# Patient Record
Sex: MALE | Race: BLACK OR AFRICAN AMERICAN | NOT HISPANIC OR LATINO | URBAN - METROPOLITAN AREA
[De-identification: names, ages, dates, MRNs, and addresses within clinical notes are randomized per-mention and may not be internally consistent; named-entity substitution may affect disease eponyms.]

---

## 2021-05-27 ENCOUNTER — INPATIENT (INPATIENT)
Facility: HOSPITAL | Age: 71
LOS: 1 days | Discharge: HOME | End: 2021-05-29
Attending: GENERAL ACUTE CARE HOSPITAL | Admitting: GENERAL ACUTE CARE HOSPITAL
Payer: COMMERCIAL

## 2021-05-27 VITALS
HEIGHT: 74 IN | HEART RATE: 84 BPM | SYSTOLIC BLOOD PRESSURE: 219 MMHG | TEMPERATURE: 98 F | OXYGEN SATURATION: 97 % | DIASTOLIC BLOOD PRESSURE: 97 MMHG | WEIGHT: 225.09 LBS | RESPIRATION RATE: 17 BRPM

## 2021-05-27 LAB
ALBUMIN SERPL ELPH-MCNC: 3.5 G/DL — SIGNIFICANT CHANGE UP (ref 3.3–5)
ALP SERPL-CCNC: 83 U/L — SIGNIFICANT CHANGE UP (ref 40–120)
ALT FLD-CCNC: 24 U/L — SIGNIFICANT CHANGE UP (ref 12–78)
ANION GAP SERPL CALC-SCNC: 6 MMOL/L — SIGNIFICANT CHANGE UP (ref 5–17)
AST SERPL-CCNC: 13 U/L — LOW (ref 15–37)
BASOPHILS # BLD AUTO: 0.03 K/UL — SIGNIFICANT CHANGE UP (ref 0–0.2)
BASOPHILS NFR BLD AUTO: 0.5 % — SIGNIFICANT CHANGE UP (ref 0–2)
BILIRUB SERPL-MCNC: 0.3 MG/DL — SIGNIFICANT CHANGE UP (ref 0.2–1.2)
BUN SERPL-MCNC: 10 MG/DL — SIGNIFICANT CHANGE UP (ref 7–23)
CALCIUM SERPL-MCNC: 8.2 MG/DL — LOW (ref 8.5–10.1)
CHLORIDE SERPL-SCNC: 105 MMOL/L — SIGNIFICANT CHANGE UP (ref 96–108)
CO2 SERPL-SCNC: 29 MMOL/L — SIGNIFICANT CHANGE UP (ref 22–31)
CREAT SERPL-MCNC: 1.25 MG/DL — SIGNIFICANT CHANGE UP (ref 0.5–1.3)
EOSINOPHIL # BLD AUTO: 0.09 K/UL — SIGNIFICANT CHANGE UP (ref 0–0.5)
EOSINOPHIL NFR BLD AUTO: 1.5 % — SIGNIFICANT CHANGE UP (ref 0–6)
FLUAV AG NPH QL: SIGNIFICANT CHANGE UP
FLUBV AG NPH QL: SIGNIFICANT CHANGE UP
GLUCOSE BLDC GLUCOMTR-MCNC: 95 MG/DL — SIGNIFICANT CHANGE UP (ref 70–99)
GLUCOSE SERPL-MCNC: 89 MG/DL — SIGNIFICANT CHANGE UP (ref 70–99)
HCT VFR BLD CALC: 46.3 % — SIGNIFICANT CHANGE UP (ref 39–50)
HGB BLD-MCNC: 15.1 G/DL — SIGNIFICANT CHANGE UP (ref 13–17)
IMM GRANULOCYTES NFR BLD AUTO: 0.5 % — SIGNIFICANT CHANGE UP (ref 0–1.5)
LYMPHOCYTES # BLD AUTO: 2.24 K/UL — SIGNIFICANT CHANGE UP (ref 1–3.3)
LYMPHOCYTES # BLD AUTO: 36.8 % — SIGNIFICANT CHANGE UP (ref 13–44)
MCHC RBC-ENTMCNC: 26.8 PG — LOW (ref 27–34)
MCHC RBC-ENTMCNC: 32.6 GM/DL — SIGNIFICANT CHANGE UP (ref 32–36)
MCV RBC AUTO: 82.1 FL — SIGNIFICANT CHANGE UP (ref 80–100)
MONOCYTES # BLD AUTO: 0.62 K/UL — SIGNIFICANT CHANGE UP (ref 0–0.9)
MONOCYTES NFR BLD AUTO: 10.2 % — SIGNIFICANT CHANGE UP (ref 2–14)
NEUTROPHILS # BLD AUTO: 3.08 K/UL — SIGNIFICANT CHANGE UP (ref 1.8–7.4)
NEUTROPHILS NFR BLD AUTO: 50.5 % — SIGNIFICANT CHANGE UP (ref 43–77)
NRBC # BLD: 0 /100 WBCS — SIGNIFICANT CHANGE UP (ref 0–0)
PLATELET # BLD AUTO: 239 K/UL — SIGNIFICANT CHANGE UP (ref 150–400)
POTASSIUM SERPL-MCNC: 3.3 MMOL/L — LOW (ref 3.5–5.3)
POTASSIUM SERPL-SCNC: 3.3 MMOL/L — LOW (ref 3.5–5.3)
PROT SERPL-MCNC: 7.2 GM/DL — SIGNIFICANT CHANGE UP (ref 6–8.3)
RBC # BLD: 5.64 M/UL — SIGNIFICANT CHANGE UP (ref 4.2–5.8)
RBC # FLD: 14.4 % — SIGNIFICANT CHANGE UP (ref 10.3–14.5)
SARS-COV-2 RNA SPEC QL NAA+PROBE: SIGNIFICANT CHANGE UP
SODIUM SERPL-SCNC: 140 MMOL/L — SIGNIFICANT CHANGE UP (ref 135–145)
TROPONIN I SERPL-MCNC: <.015 NG/ML — SIGNIFICANT CHANGE UP (ref 0.01–0.04)
WBC # BLD: 6.09 K/UL — SIGNIFICANT CHANGE UP (ref 3.8–10.5)
WBC # FLD AUTO: 6.09 K/UL — SIGNIFICANT CHANGE UP (ref 3.8–10.5)

## 2021-05-27 PROCEDURE — 70498 CT ANGIOGRAPHY NECK: CPT | Mod: 26,MA

## 2021-05-27 PROCEDURE — 70496 CT ANGIOGRAPHY HEAD: CPT | Mod: 26,MA

## 2021-05-27 PROCEDURE — 93010 ELECTROCARDIOGRAM REPORT: CPT

## 2021-05-27 PROCEDURE — 99285 EMERGENCY DEPT VISIT HI MDM: CPT | Mod: CS

## 2021-05-27 PROCEDURE — 99222 1ST HOSP IP/OBS MODERATE 55: CPT

## 2021-05-27 RX ORDER — ASPIRIN/CALCIUM CARB/MAGNESIUM 324 MG
81 TABLET ORAL DAILY
Refills: 0 | Status: DISCONTINUED | OUTPATIENT
Start: 2021-05-27 | End: 2021-05-28

## 2021-05-27 RX ORDER — AMLODIPINE BESYLATE 2.5 MG/1
10 TABLET ORAL ONCE
Refills: 0 | Status: COMPLETED | OUTPATIENT
Start: 2021-05-27 | End: 2021-05-27

## 2021-05-27 RX ORDER — ATORVASTATIN CALCIUM 80 MG/1
80 TABLET, FILM COATED ORAL AT BEDTIME
Refills: 0 | Status: DISCONTINUED | OUTPATIENT
Start: 2021-05-27 | End: 2021-05-29

## 2021-05-27 RX ADMIN — AMLODIPINE BESYLATE 10 MILLIGRAM(S): 2.5 TABLET ORAL at 19:33

## 2021-05-27 RX ADMIN — ATORVASTATIN CALCIUM 80 MILLIGRAM(S): 80 TABLET, FILM COATED ORAL at 22:27

## 2021-05-27 NOTE — ED PROVIDER NOTE - OBJECTIVE STATEMENT
72yo male with pmh HTN (noncompliant x 1 year), preDM, presents with rt arm/leg and face numbness. PT noted the symptoms mainly in the arm, thought it was sore 2 days ago, resolved after 30 min. Pt noted the symptoms worse upon waking up yesterday morning, and lasted until today, but pt reports not as bad as yesterday. Pt also notes some unsteady gait. PT also reports urinating a lot. denies headache, dizziness, cp, sob, abd pain, NVD.     No fever/chills, No photophobia/eye pain/changes in vision, No ear pain/sore throat/dysphagia, No chest pain/palpitations, no SOB/cough, no wheeze/stridor, No abdominal pain, No N/V/D, no dysuria/frequency/discharge, No neck/back pain, no rash, + rt side numbness

## 2021-05-27 NOTE — ED ADULT NURSE NOTE - NSIMPLEMENTINTERV_GEN_ALL_ED
Implemented All Universal Safety Interventions:  [place] to call system. Call bell, personal items and telephone within reach. Instruct patient to call for assistance. Room bathroom lighting operational. Non-slip footwear when patient is off stretcher. Physically safe environment: no spills, clutter or unnecessary equipment. Stretcher in lowest position, wheels locked, appropriate side rails in place.

## 2021-05-27 NOTE — ED PROVIDER NOTE - PHYSICAL EXAMINATION
Gen: Alert, Well appearing. NAD    Head: NC, AT, PERRL, normal lids/conjunctiva   ENT: patent oropharynx without erythema/exudate, uvula midline  Neck: supple, no tenderness/meningismus  Pulm: Bilateral clear BS, normal resp effort  CV: RRR, no M/R/G, +dist pulses   Abd: soft, NT/ND, +BS, no guarding/rebound tenderness  Mskel: extremities x4 with normal ROM. no ctl spine ttp. no edema/erythema/cyanosis   Skin: no rash, no bruising  Neuro: AAOx3

## 2021-05-27 NOTE — ED PROVIDER NOTE - CLINICAL SUMMARY MEDICAL DECISION MAKING FREE TEXT BOX
Pt clinically with stroke. pending labs/ct. Patient signed out to incoming physician.  All decisions regarding the progression of care will be made at their discretion. Pt clinically with stroke. pending labs/ct. Patient signed out to incoming physician.  All decisions regarding the progression of care will be made at their discretion.    Code stroke called, patient w/ PMHx of HTN, DM, HLD, prior CVA/TIA, presenting with right facial numbness/right UE/LE numbness  , last known well time of 2 days ago  NIH Stroke Scale documented.  Given history and exam, I have low suspicion for infectious etiology, neurological changes secondary to toxicologic ingestions, seizures, or complex migraine. Presentation concerning for possible CVA requiring workup.  - FSG: normal, CBC, BMP, Troponin, PT/INR/PTT, T&S, EKG, CXR, CT Head w/ CTA brain/neck/perfusion to r/o large vessel occlusion amenable to thrombectomy.  - Tele-stroke neurology consult. Patient is NOT TPA candidate.  - Permissive HTN < 220/120 in non-TPA pts  - Admit to Telemetry.

## 2021-05-27 NOTE — CONSULT NOTE ADULT - ASSESSMENT
Subjective Complaints:      Consult requested by ER doctor:                  Attending:     History of Present Illness:  Chief Complaint/Reason for Admission:  History of Present Illness:  HPI: hx of htn numbness r  arm leg for 2 days ct head left thalamic small infarction cta noted r ica stenosis  no heaDACHE NO HX OF SEIZURE MILD R Arm wenakess and numbness face equal speech fluent for work up nih stroke scale 2 ,         PAST MEDICAL & SURGICAL HISTORY:  71yMale    MEDICATIONS  (STANDING):    MEDICATIONS  (PRN):      Allergies    No Known Allergies    Intolerances      FAMILY HISTORY:      REVIEW OF SYSTEMS:  General:  No wt loss, fevers, chills, night sweats  Eyes:  Good vision, no reported pain  ENT:  No sore throat, pain, runny nose, dysphagia  CV:  No pain, palpitatioins, hypo/hypertension  Resp:  No dyspnea, cough, tachypnea, wheezing  GI:  No pain, nausea, vomiting, diarrhea, constipatiion  :  No pain, bleeding, incontinence, nocturia  Muscle:  No pain, weakness  Breast:  No pain, abscess, mass, discharge  Neuro:  No weakness, tingling, memory problems  Psych:  No fatigue, insomnia, mood problems, depression  Endocrine:  No polyuria, polydypsia, cold/heat intolerance  Heme:  No petechiae, ecchymosis, easy bruisability  Skin:  No rash, tattoos, scars, edema      Vital Signs Last 24 Hrs  T(C): 36.8 (27 May 2021 17:35), Max: 36.8 (27 May 2021 17:35)  T(F): 98.2 (27 May 2021 17:35), Max: 98.2 (27 May 2021 17:35)  HR: 74 (27 May 2021 18:33) (74 - 84)  BP: 171/93 (27 May 2021 18:33) (171/93 - 219/97)  BP(mean): --  RR: 17 (27 May 2021 17:35) (17 - 17)  SpO2: 97% (27 May 2021 17:35) (97% - 97%)    GENERAL PHYSICAL EXAM:  General:  Appears stated age, well-groomed, well-nourished, no distress  HEENT:  NC/AT, patent nares w/ pink mucosa, OP clear w/o lesions, PERRL, EOMI, conjunctivae clear, no thyromegaly, nodules, adenopathy, no JVD  Chest:  Full & symmetric excursion, no increased effort, breath sounds clear  Cardiovascular:  Regular rhythm, S1, S2, no murmur/rub/S3/S4, no carotid/femoral/abdominal bruit, radial/pedal pulses 2+, no edema  Abdomen:  Soft, non-tender, non-distended, normoactive bowel sounds, no HSM  Extremities:  Gait & station:   Digits:   Nails:   Joints, Bones, Muscles:   ROM:   Stability:  Skin:  No rash/erythema/ecchymoses/petechiae/wounds/abscess/warm/dry  Musculoskeletal:  Full ROM in all joints w/o swelling/tenderness/effusion    NEUROLOGICAL EXAM:  HENT:  Normocephalic head; atraumatic head.  Neck supple.  ENT: normal looking.  Mental State:    Alert.  Fully oriented to person, place and date.  Coherent.  Speech clear and intact.  Cooperative.  Responds appropriately.    Cranial Nerves:  II-XII:   Pupils round and reactive to light and accommodation.  Extraocular movements full.  Visual fields full (no homonymous hemianopsia).  Visual acuity wnl.  Facial symmetry intact.  Tongue midline.  Motor Functions:  Moves all extremities  r arm mild weakness Hand  intact bilaterally.  Ambulatory.    Sensory Functions:   Intact to touch and pinprick to face and extremities.    Reflexes:  Deep tendon reflexes normoactive to biceps, knees and ankles.  Babinski absent (present).  Cerebellar Testing:    Finger to nose intact.  Nystagmus absent.  Neurovascular: Carotid auscultation full without bruits.      LABS:                        15.1   6.09  )-----------( 239      ( 27 May 2021 18:32 )             46.3     05-27    140  |  105  |  10  ----------------------------<  89  3.3<L>   |  29  |  1.25    Ca    8.2<L>      27 May 2021 18:32    TPro  7.2  /  Alb  3.5  /  TBili  0.3  /  DBili  x   /  AST  13<L>  /  ALT  24  /  AlkPhos  83  05-27            RADIOLOGY & ADDITIONAL STUDIES:      Assessment & Opinion: hx of htn r arm leg numbness and wekaness for 2 days ct head nlted left thlamaic small infarctions  cta noted r ica stenosis nih stroke scale 2 , for mri enedelia aminata asa lipitor     Recommendations:  Brain MRI.  Carotid doppler.  Echocardiogram.  EEG.   DVT prophylaxis as ordered. tsh b12 asa 81 mg   Medications:

## 2021-05-27 NOTE — ED ADULT TRIAGE NOTE - CHIEF COMPLAINT QUOTE
pt states he had right sided numbness a few days ago subsided and return yesterday. Pt states he does 600 push- ups a day.  Pt denies chest pain or sob at triage. Pt states he haven't taken bp meds in over 1 year

## 2021-05-27 NOTE — ED PROVIDER NOTE - PROGRESS NOTE DETAILS
BART PLASENCIA: Signout; (+) R thalamic subacute infarct matching presentation. Will admit for MR. Pending neuro evaluation. 71M with PMHX HTN (noncompliant x 1 year), preDM, presents with rt arm/leg and face numbness. PT noted the symptoms mainly in the arm, thought it was sore 2 days ago, resolved after 30 min. Gradually improving today. To be admitted.

## 2021-05-27 NOTE — ED ADULT NURSE NOTE - CAS TRG GEN SKIN COLOR
Per Mom pt has been trying to get up at night to go to the bathroom unassisted d/t his urgency and confusion. Pt's Diuril was given on days in hope that it will not be needed overnight and his Precedex gtt was stopped to help with confusion so Mom is hopeful this will help with pt attempting to get out of bed unassisted. This RN did mention to mom that we sometimes utilize sitters in situations like this in order to ensure a pt's safety and she seemed open to the idea. She wanted to see how tonight goes now that changes have been made to pt's meds but understands this may be needed at some point to keep Antony safe. Charge nurse updated on conversation with Mom.    Normal for race

## 2021-05-28 DIAGNOSIS — I63.9 CEREBRAL INFARCTION, UNSPECIFIED: ICD-10-CM

## 2021-05-28 LAB
A1C WITH ESTIMATED AVERAGE GLUCOSE RESULT: 7.2 % — HIGH (ref 4–5.6)
ANION GAP SERPL CALC-SCNC: 7 MMOL/L — SIGNIFICANT CHANGE UP (ref 5–17)
BUN SERPL-MCNC: 8 MG/DL — SIGNIFICANT CHANGE UP (ref 7–23)
CALCIUM SERPL-MCNC: 8.5 MG/DL — SIGNIFICANT CHANGE UP (ref 8.5–10.1)
CHLORIDE SERPL-SCNC: 107 MMOL/L — SIGNIFICANT CHANGE UP (ref 96–108)
CHOLEST SERPL-MCNC: 260 MG/DL — HIGH
CO2 SERPL-SCNC: 27 MMOL/L — SIGNIFICANT CHANGE UP (ref 22–31)
COVID-19 SPIKE DOMAIN AB INTERP: POSITIVE
COVID-19 SPIKE DOMAIN ANTIBODY RESULT: >250 U/ML — HIGH
CREAT SERPL-MCNC: 0.96 MG/DL — SIGNIFICANT CHANGE UP (ref 0.5–1.3)
ESTIMATED AVERAGE GLUCOSE: 160 MG/DL — HIGH (ref 68–114)
GLUCOSE BLDC GLUCOMTR-MCNC: 118 MG/DL — HIGH (ref 70–99)
GLUCOSE SERPL-MCNC: 105 MG/DL — HIGH (ref 70–99)
HCV AB S/CO SERPL IA: 0.12 S/CO — SIGNIFICANT CHANGE UP (ref 0–0.99)
HCV AB SERPL-IMP: SIGNIFICANT CHANGE UP
HDLC SERPL-MCNC: 50 MG/DL — SIGNIFICANT CHANGE UP
LIPID PNL WITH DIRECT LDL SERPL: 200 MG/DL — HIGH
NON HDL CHOLESTEROL: 210 MG/DL — HIGH
POTASSIUM SERPL-MCNC: 3.3 MMOL/L — LOW (ref 3.5–5.3)
POTASSIUM SERPL-SCNC: 3.3 MMOL/L — LOW (ref 3.5–5.3)
SARS-COV-2 IGG+IGM SERPL QL IA: >250 U/ML — HIGH
SARS-COV-2 IGG+IGM SERPL QL IA: POSITIVE
SODIUM SERPL-SCNC: 141 MMOL/L — SIGNIFICANT CHANGE UP (ref 135–145)
TRIGL SERPL-MCNC: 49 MG/DL — SIGNIFICANT CHANGE UP
TSH SERPL-MCNC: 3.55 UIU/ML — SIGNIFICANT CHANGE UP (ref 0.36–3.74)
VIT B12 SERPL-MCNC: 438 PG/ML — SIGNIFICANT CHANGE UP (ref 232–1245)

## 2021-05-28 PROCEDURE — 93306 TTE W/DOPPLER COMPLETE: CPT | Mod: 26

## 2021-05-28 PROCEDURE — 70551 MRI BRAIN STEM W/O DYE: CPT | Mod: 26

## 2021-05-28 PROCEDURE — 99233 SBSQ HOSP IP/OBS HIGH 50: CPT

## 2021-05-28 RX ORDER — POTASSIUM CHLORIDE 20 MEQ
20 PACKET (EA) ORAL ONCE
Refills: 0 | Status: COMPLETED | OUTPATIENT
Start: 2021-05-28 | End: 2021-05-28

## 2021-05-28 RX ORDER — PREGABALIN 225 MG/1
1000 CAPSULE ORAL DAILY
Refills: 0 | Status: DISCONTINUED | OUTPATIENT
Start: 2021-05-28 | End: 2021-05-29

## 2021-05-28 RX ORDER — ASPIRIN/CALCIUM CARB/MAGNESIUM 324 MG
81 TABLET ORAL DAILY
Refills: 0 | Status: DISCONTINUED | OUTPATIENT
Start: 2021-05-29 | End: 2021-05-29

## 2021-05-28 RX ORDER — CLOPIDOGREL BISULFATE 75 MG/1
75 TABLET, FILM COATED ORAL DAILY
Refills: 0 | Status: DISCONTINUED | OUTPATIENT
Start: 2021-05-28 | End: 2021-05-29

## 2021-05-28 RX ORDER — GLUCAGON INJECTION, SOLUTION 0.5 MG/.1ML
1 INJECTION, SOLUTION SUBCUTANEOUS ONCE
Refills: 0 | Status: DISCONTINUED | OUTPATIENT
Start: 2021-05-28 | End: 2021-05-29

## 2021-05-28 RX ORDER — DEXTROSE 50 % IN WATER 50 %
25 SYRINGE (ML) INTRAVENOUS ONCE
Refills: 0 | Status: DISCONTINUED | OUTPATIENT
Start: 2021-05-28 | End: 2021-05-29

## 2021-05-28 RX ORDER — INSULIN LISPRO 100/ML
VIAL (ML) SUBCUTANEOUS
Refills: 0 | Status: DISCONTINUED | OUTPATIENT
Start: 2021-05-28 | End: 2021-05-29

## 2021-05-28 RX ORDER — DEXTROSE 50 % IN WATER 50 %
12.5 SYRINGE (ML) INTRAVENOUS ONCE
Refills: 0 | Status: DISCONTINUED | OUTPATIENT
Start: 2021-05-28 | End: 2021-05-29

## 2021-05-28 RX ORDER — ENOXAPARIN SODIUM 100 MG/ML
40 INJECTION SUBCUTANEOUS DAILY
Refills: 0 | Status: DISCONTINUED | OUTPATIENT
Start: 2021-05-28 | End: 2021-05-29

## 2021-05-28 RX ORDER — DEXTROSE 50 % IN WATER 50 %
15 SYRINGE (ML) INTRAVENOUS ONCE
Refills: 0 | Status: DISCONTINUED | OUTPATIENT
Start: 2021-05-28 | End: 2021-05-29

## 2021-05-28 RX ORDER — HYDRALAZINE HCL 50 MG
5 TABLET ORAL EVERY 6 HOURS
Refills: 0 | Status: DISCONTINUED | OUTPATIENT
Start: 2021-05-28 | End: 2021-05-29

## 2021-05-28 RX ORDER — SODIUM CHLORIDE 9 MG/ML
1000 INJECTION, SOLUTION INTRAVENOUS
Refills: 0 | Status: DISCONTINUED | OUTPATIENT
Start: 2021-05-28 | End: 2021-05-29

## 2021-05-28 RX ORDER — AMLODIPINE BESYLATE 2.5 MG/1
10 TABLET ORAL DAILY
Refills: 0 | Status: DISCONTINUED | OUTPATIENT
Start: 2021-05-29 | End: 2021-05-29

## 2021-05-28 RX ORDER — ASPIRIN/CALCIUM CARB/MAGNESIUM 324 MG
325 TABLET ORAL ONCE
Refills: 0 | Status: COMPLETED | OUTPATIENT
Start: 2021-05-28 | End: 2021-05-28

## 2021-05-28 RX ADMIN — ATORVASTATIN CALCIUM 80 MILLIGRAM(S): 80 TABLET, FILM COATED ORAL at 21:09

## 2021-05-28 RX ADMIN — Medication 20 MILLIEQUIVALENT(S): at 01:07

## 2021-05-28 RX ADMIN — Medication 20 MILLIEQUIVALENT(S): at 19:19

## 2021-05-28 RX ADMIN — Medication 325 MILLIGRAM(S): at 11:42

## 2021-05-28 RX ADMIN — ENOXAPARIN SODIUM 40 MILLIGRAM(S): 100 INJECTION SUBCUTANEOUS at 11:42

## 2021-05-28 NOTE — OCCUPATIONAL THERAPY INITIAL EVALUATION ADULT - LIGHT TOUCH SENSATION, RUE, REHAB EVAL
(pt reports mild numbness to dorsal side of right UE, which does not impact pt's current functional level)/mild impairment

## 2021-05-28 NOTE — PHYSICAL THERAPY INITIAL EVALUATION ADULT - GAIT TRAINING, PT EVAL
Patient will ambulate 1200 feet without device independently for community ambulation in 1-2 weeks. Patient will ascend/descend 14 steps with L rail up/R rail down independently in 2-3 days to safely navigate home environment.

## 2021-05-28 NOTE — OCCUPATIONAL THERAPY INITIAL EVALUATION ADULT - PERTINENT HX OF CURRENT PROBLEM, REHAB EVAL
Pt admitted with c/o right sided numbness. MRI head - "small acute lacunar infarct". CT Angio Head - "left thalamic infarct possible acute"

## 2021-05-28 NOTE — H&P ADULT - ASSESSMENT
72 yo male with PMH of HTN not on meds, pre diabetes presented to ED with right arm/leg numbness for a few days. as per pt, he was in his normal health status until a few days ago. he noticed right arm and right arm numbness. it has been progressive to right side body and right face this morning, and it lasted for the entire day.  he denies headache, no blur vision. no speech or swalow difficulties. no similar problem before.     at ED, CT of head found left thalamus small infarct, NIHSS inital 2. neurology consulted. Dr Eisenberg recommended inpatient admission and further work up.

## 2021-05-28 NOTE — PHYSICAL THERAPY INITIAL EVALUATION ADULT - GAIT DEVIATIONS NOTED, PT EVAL
mild trendelenberg pattern, improved with straight cane use/decreased navneet/decreased step length/decreased stride length/decreased weight-shifting ability

## 2021-05-28 NOTE — H&P ADULT - NSHPPHYSICALEXAM_GEN_ALL_CORE
Physical exam:   General: patient in no acute distress, resting comfortably  Head:  Atraumatic, Normocephalic  Eyes: EOMI, PERRLA, clear sclera  Neck: Supple, thyroid nontender, non enlarged  Cardio: S1/S2 +ve, regular rate and rhythm, no M/G/R  Resp: clear to ausculation bilaterally, no rales or wheezes  GI: abdomen soft, nontender, non distended, no guarding, BS +ve x 4  Ext: no significant pedal edema  Neuro: CN 2-12 intact, decreased sensation on right arm and lower face   Skin: No rashes or lesions

## 2021-05-28 NOTE — PHYSICAL THERAPY INITIAL EVALUATION ADULT - ADDITIONAL COMMENTS
As per patient, he splits his residence between [place] and [facility]. Patient reports the following set up for his [place] home: 2 steps to enter, + B rails and then 1 flight of steps down to basement with +B rails. Patient reports he has a tub/shower combo, no grab bars, standard toilet. He is right handed.

## 2021-05-28 NOTE — H&P ADULT - HISTORY OF PRESENT ILLNESS
70 yo male with PMH of HTN not on meds, pre diabetes presented to ED with right arm/leg numbness for a few days. as per pt, he was in his normal health status until a few days ago. he noticed right arm and right arm numbness. it has been progressive to right side body and right face this morning, and it lasted for the entire day.  he denies headache, no blur vision. no speech or swalow difficulties. no similar problem before.

## 2021-05-28 NOTE — OCCUPATIONAL THERAPY INITIAL EVALUATION ADULT - ADDITIONAL COMMENTS
Pt reports he lives in [place] but is staying at his second home in [place] with his son and wife. Pt's [place] home has 2 steps to enter and 1 flight of stairs to basement apartment. Pt has a tub/shower combo and a standard toilet and was independent with ADLs and mobility prior to admission.

## 2021-05-28 NOTE — PHYSICAL THERAPY INITIAL EVALUATION ADULT - STRENGTHENING, PT EVAL
Patient will improve strength in R LE by 1 grade in 6-8 weeks to improve overall functional mobility including gait, transfers, bed mobility and decrease risk of falls.

## 2021-05-28 NOTE — PROGRESS NOTE ADULT - SUBJECTIVE AND OBJECTIVE BOX
HPI:  72 yo male with PMH of HTN not on meds, pre diabetes presented to ED with right arm/leg numbness for a few days. as per pt, he was in his normal health status until a few days ago. he noticed right arm and right arm numbness. it has been progressive to right side body and right face this morning, and it lasted for the entire day.  he denies headache, no blur vision. no speech or swalow difficulties. no similar problem before.      (28 May 2021 00:43)      SUBJECTIVE & OBJECTIVE: Pt seen and examined at bedside.   no overnight events.   states Rt sided numbness resolving     Denies fever, chills, N/V, dizziness, HA, cough, CP, palpitations, SOB, abdominal pain, dysuria, diarrhea, constipation.     PHYSICAL EXAM:  T(C): 36.4 (05-28-21 @ 17:13), Max: 37.2 (05-27-21 @ 21:42)  HR: 71 (05-28-21 @ 17:13) (61 - 83)  BP: 165/71 (05-28-21 @ 17:13) (151/81 - 175/89)  RR: 18 (05-28-21 @ 17:13) (17 - 18)  SpO2: 96% (05-28-21 @ 17:13) (96% - 98%)  Wt(kg): --   Weight (kg): 104 (05-27 @ 23:18)  I&O's Detail    28 May 2021 07:01  -  28 May 2021 17:44  --------------------------------------------------------  IN:    Oral Fluid: 480 mL  Total IN: 480 mL    OUT:    Voided (mL): 400 mL  Total OUT: 400 mL    Total NET: 80 mL        GENERAL: NAD, well-groomed, well-developed, no increased WOB   HEAD:  Atraumatic, Normocephalic  EYES: EOMI, PERRLA, conjunctiva and sclera clear  ENMT: Moist mucous membranes  NECK: Supple, No JVD  NERVOUS SYSTEM:  Alert & Oriented X3, Motor Strength 5/5 B/L upper and lower extremities; finger-nose test neg, no issues with swallowing, speech clear, +Rt facial droop   CHEST/LUNG: CTAB  HEART: Regular rate and rhythm; No murmurs, rubs, or gallops  ABDOMEN: Soft, Nontender, Nondistended; Bowel sounds present  EXTREMITIES:  2+ Peripheral Pulses b/l, No clubbing, cyanosis, calf tenderness or edema b/l     MEDICATIONS  (STANDING):  atorvastatin 80 milliGRAM(s) Oral at bedtime  clopidogrel Tablet 75 milliGRAM(s) Oral daily  dextrose 40% Gel 15 Gram(s) Oral once  dextrose 5%. 1000 milliLiter(s) (50 mL/Hr) IV Continuous <Continuous>  dextrose 5%. 1000 milliLiter(s) (100 mL/Hr) IV Continuous <Continuous>  dextrose 50% Injectable 25 Gram(s) IV Push once  dextrose 50% Injectable 12.5 Gram(s) IV Push once  dextrose 50% Injectable 25 Gram(s) IV Push once  enoxaparin Injectable 40 milliGRAM(s) SubCutaneous daily  glucagon  Injectable 1 milliGRAM(s) IntraMuscular once  insulin lispro (ADMELOG) corrective regimen sliding scale   SubCutaneous three times a day before meals    MEDICATIONS  (PRN):      LABS:                        15.1   6.09  )-----------( 239      ( 27 May 2021 18:32 )             46.3     05-28    141  |  107  |  8   ----------------------------<  105<H>  3.3<L>   |  27  |  0.96    Ca    8.5      28 May 2021 06:21    TPro  7.2  /  Alb  3.5  /  TBili  0.3  /  DBili  x   /  AST  13<L>  /  ALT  24  /  AlkPhos  83  05-27          CAPILLARY BLOOD GLUCOSE      POCT Blood Glucose.: 95 mg/dL (27 May 2021 18:31)      CARDIAC MARKERS ( 27 May 2021 18:32 )  <.015 ng/mL / x     / x     / x     / x            RECENT CULTURES:      RADIOLOGY & ADDITIONAL TESTS:  < from: CT Angio Neck w/ IV Cont (05.27.21 @ 19:49) >   Stenosis of the right internal carotid artery with narrowing of the cervical segment suggesting physiologically decreased flow. Moderate stenosis of the right supraclinoid internal carotid artery.    Severe stenosis of the left posterior cerebral artery.    Moderate stenosis of the basilar artery.    Age indeterminate left thalamic infarct which may be acute.    Low-density changes within the occipital lobes more prominent on the right. While this may be artifactual in nature, the possibility of cerebral infarction is raised. MR of the brain is advised for further evaluation as clinically warranted.    < end of copied text >    < from: MR Head No Cont (05.28.21 @ 11:29) >    INTERPRETATION:  MRI brain without contrast    History CVA    Comparison CT performed the prior day    There is a small oval focus of diffusion restriction in the left thalamus. It measures roughly 11 mm in long axis dimension without associated mass effect or hemorrhage. There is mild scattered chronic microvascular ischemic change elsewhere. There is no cortical edema or hydrocephalus. The orbital and sellar contents and cerebellar tonsils are within normal limits.    IMPRESSION:  Small uncomplicated acute lacunar infarct    < end of copied text >    EKG: NSR       Imaging Personally Reviewed:  [ ] YES  [ ] NO    Consultant(s) Notes Reviewed:  [x ] YES  [ ] NO    Care Discussed with Consultants/Other Providers [x] YES  [ ] NO  Care discussed in detail with patient and family (designated point of contact per patient--Kira Beebe [phone]).  All questions and concerns addressed

## 2021-05-28 NOTE — EEG REPORT - NS EEG TEXT BOX
Routine 21-channel digital EEG was obtained to rule out any seizure activity or focal abnormalities.    FINDINGS: Background rhythm during awake stage shows well-organized, well-developed, average voltage 9 hertz alpha activity in the posterior regions. It blocks with eye opening and it is bilaterally synchronous and symmetrical. No spike-and-wave discharges or any lateralizing abnormalities are seen. Photic stimulation did not produce any abnormalities. No abnormalities were found during the procedure. Intermittent EMG artifacts were seen. Drowsiness was achieved. Stage II sleep was not achieved.    IMPRESSION: Normal awake study. No epileptiform discharges or any other paroxysmal activities or focal abnormalities seen. Clinical correlation is recommended.    Yuniel Eisenberg DO

## 2021-05-28 NOTE — PHYSICAL THERAPY INITIAL EVALUATION ADULT - PERTINENT HX OF CURRENT PROBLEM, REHAB EVAL
Patient admitted with a few days of R arm and leg numbness that became more aggressive. MR head shows small uncomplicated acute lacunar infarct.

## 2021-05-28 NOTE — H&P ADULT - PROBLEM SELECTOR PLAN 1
admit to tele  allow permissive HTN  neuro check q4, echo, MRI of brain, carotid dupplex, EEG per neuro  check TSH, VITAMIN B12   c.w ASA and statin

## 2021-05-28 NOTE — H&P ADULT - NSHPLABSRESULTS_GEN_ALL_CORE
Vital Signs Last 24 Hrs  T(C): 36.8 (27 May 2021 23:18), Max: 37.2 (27 May 2021 21:42)  T(F): 98.2 (27 May 2021 23:18), Max: 98.9 (27 May 2021 21:42)  HR: 83 (27 May 2021 23:18) (61 - 84)  BP: 174/94 (27 May 2021 23:18) (171/93 - 219/97)  BP(mean): --  RR: 17 (27 May 2021 23:18) (17 - 18)  SpO2: 98% (27 May 2021 23:18) (97% - 98%)    CBC Full  -  ( 27 May 2021 18:32 )  WBC Count : 6.09 K/uL  RBC Count : 5.64 M/uL  Hemoglobin : 15.1 g/dL  Hematocrit : 46.3 %  Platelet Count - Automated : 239 K/uL  Mean Cell Volume : 82.1 fl  Mean Cell Hemoglobin : 26.8 pg  Mean Cell Hemoglobin Concentration : 32.6 gm/dL  Auto Neutrophil # : 3.08 K/uL  Auto Lymphocyte # : 2.24 K/uL  Auto Monocyte # : 0.62 K/uL  Auto Eosinophil # : 0.09 K/uL  Auto Basophil # : 0.03 K/uL  Auto Neutrophil % : 50.5 %  Auto Lymphocyte % : 36.8 %  Auto Monocyte % : 10.2 %  Auto Eosinophil % : 1.5 %  Auto Basophil % : 0.5 %    05-27    140  |  105  |  10  ----------------------------<  89  3.3<L>   |  29  |  1.25    Ca    8.2<L>      27 May 2021 18:32    TPro  7.2  /  Alb  3.5  /  TBili  0.3  /  DBili  x   /  AST  13<L>  /  ALT  24  /  AlkPhos  83  05-27      Home Medications:    LIVER FUNCTIONS - ( 27 May 2021 18:32 )  Alb: 3.5 g/dL / Pro: 7.2 gm/dL / ALK PHOS: 83 U/L / ALT: 24 U/L / AST: 13 U/L / GGT: x

## 2021-05-28 NOTE — PHYSICAL THERAPY INITIAL EVALUATION ADULT - BALANCE TRAINING, PT EVAL
Patient will be normal in static and dynamic standing balance without device in 1-2 weeks to improve safety and decrease risk of falls.

## 2021-05-28 NOTE — H&P ADULT - NSHPREVIEWOFSYSTEMS_GEN_ALL_CORE
Constitutional: no fever, chills, night sweats  Ears: no hearing changes or ear pain,   Nose: no nasal congestion, sinus pain, or rhinorrhea  Cardio: no chest pain, orthopnea, edema, or palpitations  Resp: no dyspnea, cough, wheezing  GI: no nausea, vomiting, diarrhea, constipation, hematochezia, or melena  : no dysuria, urinary frequency, hematuria  MSK: no back pain, neck pain  Skin: no rash, pruritis   Neuro: right arm/face/body numbness.   Heme/Lymph: no bruising or bleeding

## 2021-05-28 NOTE — OCCUPATIONAL THERAPY INITIAL EVALUATION ADULT - GENERAL OBSERVATIONS, REHAB EVAL
Pt was encountered supine in bed; NAD, cardiac monitor, AXOX4; pt reports he was admitted due to right UE numbness, which he states has improved.

## 2021-05-29 ENCOUNTER — TRANSCRIPTION ENCOUNTER (OUTPATIENT)
Age: 71
End: 2021-05-29

## 2021-05-29 VITALS
DIASTOLIC BLOOD PRESSURE: 78 MMHG | TEMPERATURE: 98 F | OXYGEN SATURATION: 98 % | HEART RATE: 66 BPM | RESPIRATION RATE: 18 BRPM | SYSTOLIC BLOOD PRESSURE: 133 MMHG

## 2021-05-29 LAB
ANION GAP SERPL CALC-SCNC: 6 MMOL/L — SIGNIFICANT CHANGE UP (ref 5–17)
BUN SERPL-MCNC: 14 MG/DL — SIGNIFICANT CHANGE UP (ref 7–23)
CALCIUM SERPL-MCNC: 8.5 MG/DL — SIGNIFICANT CHANGE UP (ref 8.5–10.1)
CHLORIDE SERPL-SCNC: 107 MMOL/L — SIGNIFICANT CHANGE UP (ref 96–108)
CO2 SERPL-SCNC: 26 MMOL/L — SIGNIFICANT CHANGE UP (ref 22–31)
CREAT SERPL-MCNC: 1.16 MG/DL — SIGNIFICANT CHANGE UP (ref 0.5–1.3)
GLUCOSE SERPL-MCNC: 113 MG/DL — HIGH (ref 70–99)
HCT VFR BLD CALC: 47.2 % — SIGNIFICANT CHANGE UP (ref 39–50)
HGB BLD-MCNC: 15.1 G/DL — SIGNIFICANT CHANGE UP (ref 13–17)
MAGNESIUM SERPL-MCNC: 2.2 MG/DL — SIGNIFICANT CHANGE UP (ref 1.6–2.6)
MCHC RBC-ENTMCNC: 27.2 PG — SIGNIFICANT CHANGE UP (ref 27–34)
MCHC RBC-ENTMCNC: 32 GM/DL — SIGNIFICANT CHANGE UP (ref 32–36)
MCV RBC AUTO: 84.9 FL — SIGNIFICANT CHANGE UP (ref 80–100)
NRBC # BLD: 0 /100 WBCS — SIGNIFICANT CHANGE UP (ref 0–0)
PHOSPHATE SERPL-MCNC: 3.6 MG/DL — SIGNIFICANT CHANGE UP (ref 2.5–4.5)
PLATELET # BLD AUTO: 241 K/UL — SIGNIFICANT CHANGE UP (ref 150–400)
POTASSIUM SERPL-MCNC: 4 MMOL/L — SIGNIFICANT CHANGE UP (ref 3.5–5.3)
POTASSIUM SERPL-SCNC: 4 MMOL/L — SIGNIFICANT CHANGE UP (ref 3.5–5.3)
RBC # BLD: 5.56 M/UL — SIGNIFICANT CHANGE UP (ref 4.2–5.8)
RBC # FLD: 14.5 % — SIGNIFICANT CHANGE UP (ref 10.3–14.5)
SODIUM SERPL-SCNC: 139 MMOL/L — SIGNIFICANT CHANGE UP (ref 135–145)
WBC # BLD: 4.63 K/UL — SIGNIFICANT CHANGE UP (ref 3.8–10.5)
WBC # FLD AUTO: 4.63 K/UL — SIGNIFICANT CHANGE UP (ref 3.8–10.5)

## 2021-05-29 PROCEDURE — 99239 HOSP IP/OBS DSCHRG MGMT >30: CPT

## 2021-05-29 RX ORDER — METFORMIN HYDROCHLORIDE 850 MG/1
1 TABLET ORAL
Qty: 60 | Refills: 0
Start: 2021-05-29 | End: 2021-06-27

## 2021-05-29 RX ORDER — ASPIRIN/CALCIUM CARB/MAGNESIUM 324 MG
1 TABLET ORAL
Qty: 30 | Refills: 0
Start: 2021-05-29 | End: 2021-06-27

## 2021-05-29 RX ORDER — AMLODIPINE BESYLATE 2.5 MG/1
1 TABLET ORAL
Qty: 30 | Refills: 0
Start: 2021-05-29 | End: 2021-06-27

## 2021-05-29 RX ORDER — PREGABALIN 225 MG/1
1 CAPSULE ORAL
Qty: 30 | Refills: 0
Start: 2021-05-29 | End: 2021-06-27

## 2021-05-29 RX ORDER — CLOPIDOGREL BISULFATE 75 MG/1
1 TABLET, FILM COATED ORAL
Qty: 30 | Refills: 0
Start: 2021-05-29 | End: 2021-06-27

## 2021-05-29 RX ORDER — ATORVASTATIN CALCIUM 80 MG/1
1 TABLET, FILM COATED ORAL
Qty: 30 | Refills: 0
Start: 2021-05-29 | End: 2021-06-27

## 2021-05-29 RX ORDER — CLOPIDOGREL BISULFATE 75 MG/1
1 TABLET, FILM COATED ORAL
Qty: 21 | Refills: 0
Start: 2021-05-29 | End: 2021-06-18

## 2021-05-29 RX ADMIN — CLOPIDOGREL BISULFATE 75 MILLIGRAM(S): 75 TABLET, FILM COATED ORAL at 12:16

## 2021-05-29 RX ADMIN — AMLODIPINE BESYLATE 10 MILLIGRAM(S): 2.5 TABLET ORAL at 05:45

## 2021-05-29 RX ADMIN — Medication 81 MILLIGRAM(S): at 12:13

## 2021-05-29 NOTE — DISCHARGE NOTE NURSING/CASE MANAGEMENT/SOCIAL WORK - PATIENT PORTAL LINK FT
You can access the FollowMyHealth Patient Portal offered by [facility] by registering at the following website: [email or web address]. By joining [facility]’s FollowMyHealth portal, you will also be able to view your health information using other applications (apps) compatible with our system.

## 2021-05-29 NOTE — DISCHARGE NOTE PROVIDER - CARE PROVIDER_API CALL
your primary care doctor,   Phone: (   )    -  Fax: (   )    -  Follow Up Time: 1 week    Rodolfo Eisenberg  NEUROLOGY  [address], [place] [id number]  Phone: [phone]  Fax: [phone]  Follow Up Time: 2 weeks    stroke neurology,   MD NAKUL Armando   Address: [address] [place] [address]  Phone: [phone]  Phone: (   )    -  Fax: (   )    -  Follow Up Time: 2 weeks

## 2021-05-29 NOTE — DISCHARGE NOTE PROVIDER - NSDCCPCAREPLAN_GEN_ALL_CORE_FT
PRINCIPAL DISCHARGE DIAGNOSIS  Diagnosis: CVA (cerebral vascular accident)  Assessment and Plan of Treatment:        PRINCIPAL DISCHARGE DIAGNOSIS  Diagnosis: CVA (cerebral vascular accident)  Assessment and Plan of Treatment: take plavix 75mg daily (for 21 days then stop), ASA 81mg daily, lipitor 80mg at bedtime      SECONDARY DISCHARGE DIAGNOSES  Diagnosis: Essential hypertension  Assessment and Plan of Treatment: amlodipine 10mg daily    Diagnosis: Hyperlipidemia  Assessment and Plan of Treatment: lipitor    Diagnosis: DM (diabetes mellitus)  Assessment and Plan of Treatment: A1c 7.2%, metformin 1000mg twice daily

## 2021-05-29 NOTE — DISCHARGE NOTE PROVIDER - PROVIDER TOKENS
FREE:[LAST:[your primary care doctor],PHONE:[(   )    -],FAX:[(   )    -],FOLLOWUP:[1 week]],PROVIDER:[TOKEN:[[id number]],FOLLOWUP:[2 weeks]],FREE:[LAST:[stroke neurology],PHONE:[(   )    -],FAX:[(   )    -],ADDRESS:[MD NAKUL Armando   Address: [address] [place] [address] [address], [place] [address]  Phone: [phone]],FOLLOWUP:[2 weeks]]

## 2021-05-29 NOTE — DISCHARGE NOTE PROVIDER - HOSPITAL COURSE
70 yo male with PMH of HTN, pre-diabetes (diet controlled per pt) presented to ED with right arm/leg numbness and right facial droop.  In ED, CT of head found left thalamus small infarct, NIHSS initially 2 on arrival.     DISCHARGE DIAGNOSES:     # Acute Left thalamic CVA   received ASA 325mg x 1  MRI head; Small uncomplicated acute lacunar infarct  CT angio neck: Moderate stenosis of the right supraclinoid internal carotid artery.  Severe stenosis of the left posterior cerebral artery.  Moderate stenosis of the basilar artery.  NIHSS currently 1 for right facial droop   PT/OT : home with outpatient PT   A1c 7.2    TSH wnl   Tele: NSR, few PACs  EEG : normal   continue with ASA 81mg daily; Plavix 75mg daily x 21 days; lipitor 80mg daily   ECHO      will need follow-up with stroke neurology re CT angio neck/head findings  no acute intervention needed at this time for the  moderate and severe stenosis of above IC vessels.   Dr. Tom Styles / amanda /NP Dipika Joseph   [address] [place] [address]. [phone]  and can be referred for outpatient endovascular surgery if warranted   patient and family aware     #DM2, new diagnoses   A1c 7.2%  will d/c on metformin     #Essential HTN   will d/c with amlodipine     # Hyperlipidemia --lipitor 80mg qhs     #Hypokalemia --repleted     low normal vit B12 -- supplement       Discharge time : 40 min     RETURN PARAMETERS DISCUSSED WITH PATIENT, PATIENT EXPRESSED UNDERSTANDING AND IS AGREEABLE. DISCUSSED WITH PATIENT ON REFRAINING FROM DRIVING UNTIL FOLLOW-UP/ CLEARED BY PMD. PATIENT EXPRESSED UNDERSTANDING.   72 yo male with PMH of HTN, pre-diabetes (diet controlled per pt) presented to ED with right arm/leg numbness and right facial droop.  In ED, CT of head found left thalamus small infarct, NIHSS initially 2 on arrival.     DISCHARGE DIAGNOSES:     # Acute Left thalamic CVA   received ASA 325mg x 1  MRI head; Small uncomplicated acute lacunar infarct  CT angio neck: Moderate stenosis of the right supraclinoid internal carotid artery.  Severe stenosis of the left posterior cerebral artery.  Moderate stenosis of the basilar artery.  NIHSS currently 1 for right facial droop   PT/OT : home with outpatient PT   A1c 7.2    TSH wnl   5/29 Tele: NSR, few PACs  EEG : normal   continue with ASA 81mg daily; Plavix 75mg daily x 21 days; lipitor 80mg daily   ECHO: pEF , no sig findings       will need follow-up with stroke neurology re CT angio neck/head findings  no acute intervention needed at this time for the  moderate and severe stenosis of above IC vessels.   Dr. Tom Styles / amanda /NP Dipika Joseph   [address] [place] [address]. [phone]  and can be referred for outpatient endovascular surgery if warranted   patient and family aware     #DM2, new diagnoses   A1c 7.2%  nutrition consulted   will d/c with metformin     #Essential HTN   will d/c with amlodipine     # Hyperlipidemia --lipitor 80mg qhs     #Hypokalemia --repleted     low normal vit B12 -- supplement     discussed findings and care plan with patient , wife, and brother-in law, all questions and concerns addressed.       Discharge time : 40 min     RETURN PARAMETERS DISCUSSED WITH PATIENT, PATIENT EXPRESSED UNDERSTANDING AND IS AGREEABLE. DISCUSSED WITH PATIENT ON REFRAINING FROM DRIVING UNTIL FOLLOW-UP/ CLEARED BY PMD. PATIENT EXPRESSED UNDERSTANDING.

## 2021-05-29 NOTE — DISCHARGE NOTE PROVIDER - NSDCFUADDINST_GEN_ALL_CORE_FT
It is important to see your primary physician as well as other necessary consultants within the next week to perform a comprehensive medical review.  Call their offices for an appointment as soon as you leave the hospital.  If you do not have a primary physician or cant reach him/her, contact the Health system Physician Referral Service at [phone].  Your medical issues appear to be stable at this time, but if your symptoms recur or worsen, contact your physicians and/or return to the hospital if necessary.  If you encounter any issues or questions with your medication, call your physicians before stopping the medication.

## 2021-05-29 NOTE — DISCHARGE NOTE PROVIDER - NSDCMRMEDTOKEN_GEN_ALL_CORE_FT
amLODIPine 10 mg oral tablet: 1 tab(s) orally once a day  aspirin 81 mg oral delayed release tablet: 1 tab(s) orally once a day  atorvastatin 80 mg oral tablet: 1 tab(s) orally once a day (at bedtime)  clopidogrel 75 mg oral tablet: 1 tab(s) orally once a day  cyanocobalamin 1000 mcg oral tablet: 1 tab(s) orally once a day  metFORMIN 1000 mg oral tablet: 1 tab(s) orally 2 times a day   Outpatient physical therapy: 1 application buccal once a day

## 2021-05-29 NOTE — PROGRESS NOTE ADULT - ASSESSMENT
Subjective Complaints:  Historian:             Vital Signs Last 24 Hrs  T(C): 36.3 (28 May 2021 05:18), Max: 37.2 (27 May 2021 21:42)  T(F): 97.3 (28 May 2021 05:18), Max: 98.9 (27 May 2021 21:42)  HR: 64 (28 May 2021 05:18) (61 - 84)  BP: 151/81 (28 May 2021 05:18) (151/81 - 219/97)  BP(mean): --  RR: 18 (28 May 2021 05:18) (17 - 18)  SpO2: 96% (28 May 2021 05:18) (96% - 98%)    GENERAL PHYSICAL EXAM:  General:  Appears stated age, well-groomed, well-nourished, no distress  HEENT:  NC/AT, patent nares w/ pink mucosa, OP clear w/o lesions, PERRL, EOMI, conjunctivae clear, no thyromegaly, nodules, adenopathy, no JVD  Chest:  Full & symmetric excursion, no increased effort, breath sounds clear  Cardiovascular:  Regular rhythm, S1, S2, no murmur/rub/S3/S4, no carotid/femoral/abdominal bruit, radial/pedal pulses 2+, no edema  Abdomen:  Soft, non-tender, non-distended, normoactive bowel sounds, no HSM  Extremities:  Gait & station:   Digits:   Nails:   Joints, Bones, Muscles:   ROM:   Stability:  Skin:  No rash/erythema/ecchymoses/petechiae/wounds/abscess/warm/dry  Musculoskeletal:  Full ROM in all joints w/o swelling/tenderness/effusion        LABS:                        15.1   6.09  )-----------( 239      ( 27 May 2021 18:32 )             46.3     05-28    141  |  107  |  8   ----------------------------<  105<H>  3.3<L>   |  27  |  0.96    Ca    8.5      28 May 2021 06:21    TPro  7.2  /  Alb  3.5  /  TBili  0.3  /  DBili  x   /  AST  13<L>  /  ALT  24  /  AlkPhos  83  05-27          RADIOLOGY & ADDITIONAL STUDIES:        Neurology Progress Note:      Mental Status: awaek alert speech fluent follows commands       Cranial Nerves: 2 12intact      Motor:   r arm mild weakenss and numbness         Sensory: sensory intact      Cerebellar:  defrd      Gait: no ataxia       Assesment/Plan: mri enedelia noted left thalamic acute infarction htn on asa echo  pt  will follow on lipitor         
Subjective Complaints:  Historian:             Vital Signs Last 24 Hrs  T(C): 36.3 (29 May 2021 05:00), Max: 36.4 (28 May 2021 17:13)  T(F): 97.4 (29 May 2021 05:00), Max: 97.6 (28 May 2021 17:13)  HR: 74 (29 May 2021 09:03) (58 - 76)  BP: 169/98 (29 May 2021 09:03) (149/88 - 169/98)  BP(mean): --  RR: 18 (29 May 2021 09:03) (18 - 18)  SpO2: 94% (29 May 2021 09:03) (94% - 98%)    GENERAL PHYSICAL EXAM:  General:  Appears stated age, well-groomed, well-nourished, no distress  HEENT:  NC/AT, patent nares w/ pink mucosa, OP clear w/o lesions, PERRL, EOMI, conjunctivae clear, no thyromegaly, nodules, adenopathy, no JVD  Chest:  Full & symmetric excursion, no increased effort, breath sounds clear  Cardiovascular:  Regular rhythm, S1, S2, no murmur/rub/S3/S4, no carotid/femoral/abdominal bruit, radial/pedal pulses 2+, no edema  Abdomen:  Soft, non-tender, non-distended, normoactive bowel sounds, no HSM  Extremities:  Gait & station:   Digits:   Nails:   Joints, Bones, Muscles:   ROM:   Stability:  Skin:  No rash/erythema/ecchymoses/petechiae/wounds/abscess/warm/dry  Musculoskeletal:  Full ROM in all joints w/o swelling/tenderness/effusion        LABS:                        15.1   4.63  )-----------( 241      ( 29 May 2021 07:14 )             47.2     05-29    139  |  107  |  14  ----------------------------<  113<H>  4.0   |  26  |  1.16    Ca    8.5      29 May 2021 07:14  Phos  3.6     05-29  Mg     2.2     05-29    TPro  7.2  /  Alb  3.5  /  TBili  0.3  /  DBili  x   /  AST  13<L>  /  ALT  24  /  AlkPhos  83  05-27          RADIOLOGY & ADDITIONAL STUDIES:        Neurology Progress Note:      Mental Status: awaek alert speech fluent follows commands       Cranial Nerves: 2 12intact      Motor:   arm leg 4/5         Sensory: intact      Cerebellar: defrd      Gait: no ataxia       Assesment/Plan: left cva htn t arm numbness resolving on asa nd plavix and lipitor will follow in office        
70 yo male with PMH of HTN, pre-diabetes (diet controlled per pt) presented to ED with right arm/leg numbness and right facial droop.  In ED, CT of head found left thalamus small infarct, NIHSS initially 2 on arrival.     DISCHARGE DIAGNOSES:     # Acute Left thalamic CVA   received ASA 325mg x 1  MRI head; Small uncomplicated acute lacunar infarct  CT angio neck: Moderate stenosis of the right supraclinoid internal carotid artery.  Severe stenosis of the left posterior cerebral artery.  Moderate stenosis of the basilar artery.  NIHSS currently 1 for right facial droop   PT/OT : home with outpatient PT   A1c 7.2    TSH wnl   Tele: NSR, few PACs  EEG : normal   continue with ASA 81mg daily; Plavix 75mg daily x 21 days; lipitor 80mg daily   follow ECHO  monitor Tele     will need follow-up with stroke neurology re CT angio neck/head findings  no acute intervention needed at this time for the  moderate and severe stenosis of above IC vessels.   Dr. Tom Styles / amanda /NP Dipika [address]. [phone]  and can be referred for outpatient endovascular surgery if warranted   patient and family aware     #DM2, new diagnoses   A1c 7.2%  ISS   monitor FS   will discharge on PO meds     #Essential HTN   permissive hypertension   start amlodipine  tomorrow     # Hyperlipidemia --lipitor 80mg qhs     #Hypokalemia --repleted     low normal vit B12 -- supplement     #Preventative measures   lovenox SQ-dvt ppx  fall precautions

## 2021-06-02 PROBLEM — Z00.00 ENCOUNTER FOR PREVENTIVE HEALTH EXAMINATION: Status: ACTIVE | Noted: 2021-06-02

## 2021-06-07 ENCOUNTER — APPOINTMENT (OUTPATIENT)
Dept: NEUROLOGY | Facility: CLINIC | Age: 71
End: 2021-06-07
Payer: COMMERCIAL

## 2021-06-07 ENCOUNTER — NON-APPOINTMENT (OUTPATIENT)
Age: 71
End: 2021-06-07

## 2021-06-07 VITALS
WEIGHT: 230 LBS | DIASTOLIC BLOOD PRESSURE: 82 MMHG | BODY MASS INDEX: 31.15 KG/M2 | HEART RATE: 67 BPM | HEIGHT: 72 IN | SYSTOLIC BLOOD PRESSURE: 138 MMHG

## 2021-06-07 PROCEDURE — 99205 OFFICE O/P NEW HI 60 MIN: CPT

## 2021-06-08 DIAGNOSIS — E11.9 TYPE 2 DIABETES MELLITUS WITHOUT COMPLICATIONS: ICD-10-CM

## 2021-06-08 DIAGNOSIS — I63.9 CEREBRAL INFARCTION, UNSPECIFIED: ICD-10-CM

## 2021-06-08 DIAGNOSIS — E78.5 HYPERLIPIDEMIA, UNSPECIFIED: ICD-10-CM

## 2021-06-08 DIAGNOSIS — I69.392 FACIAL WEAKNESS FOLLOWING CEREBRAL INFARCTION: ICD-10-CM

## 2021-06-08 DIAGNOSIS — E87.6 HYPOKALEMIA: ICD-10-CM

## 2021-06-08 DIAGNOSIS — I10 ESSENTIAL (PRIMARY) HYPERTENSION: ICD-10-CM

## 2021-06-14 ENCOUNTER — APPOINTMENT (OUTPATIENT)
Dept: INTERNAL MEDICINE | Facility: CLINIC | Age: 71
End: 2021-06-14
Payer: COMMERCIAL

## 2021-06-14 VITALS — HEIGHT: 72 IN | WEIGHT: 227 LBS | BODY MASS INDEX: 30.75 KG/M2

## 2021-06-14 VITALS — DIASTOLIC BLOOD PRESSURE: 66 MMHG | RESPIRATION RATE: 12 BRPM | SYSTOLIC BLOOD PRESSURE: 128 MMHG | HEART RATE: 70 BPM

## 2021-06-14 PROCEDURE — 99072 ADDL SUPL MATRL&STAF TM PHE: CPT

## 2021-06-14 PROCEDURE — 99204 OFFICE O/P NEW MOD 45 MIN: CPT

## 2021-06-14 NOTE — ASSESSMENT
[FreeTextEntry1] : Lacunar CVA.  Appears not to have had chol of DM care prior to CVA.  R facial droop and R hemip improving slowly.  Carotid and vertebral art dis noted on MRI.  To get carotid US soon.  Told to DC Plavix at 21 days.  \par Chol 260 .  Now on Atorvastatin 80\par DM  A1C 7.2.  Now on Metformin 1000 bid\par Pfizer covid vaccine complete x 2 doses.\par Compliant with all new meds.  \par BMI 30.  Promises to reduce wt.  \par 1 mo labs.

## 2021-06-14 NOTE — HEALTH RISK ASSESSMENT
[YearQuit] : 1972 [Audit-CScore] : 1 [[id number]] : 0 [Change in mental status noted] : No change in mental status noted [Language] : denies difficulty with language [Behavior] : denies difficulty with behavior [Learning/Retaining New Information] : denies difficulty learning/retaining new information [Handling Complex Tasks] : denies difficulty handling complex tasks [Reasoning] : denies difficulty with reasoning [Spatial Ability and Orientation] : denies difficulty with spatial ability and orientation [Reports changes in hearing] : Reports no changes in hearing [Reports changes in dental health] : Reports no changes in dental health [Reports changes in vision] : Reports no changes in vision [ColonoscopyDate] : 06/2016

## 2021-06-14 NOTE — HISTORY OF PRESENT ILLNESS
[FreeTextEntry1] : Hsop 5/27/21 for R sided numbness.  Disch 5/29/21.  Small lacunar L cva and carotid stenosis.  \par Says he still has weakness and he used to do 600 pushups but can only do 300 now.  \par Did not have meds prior to CVA\par Had covid vaccine x 2 doses.

## 2021-06-16 ENCOUNTER — APPOINTMENT (OUTPATIENT)
Dept: NEUROLOGY | Facility: CLINIC | Age: 71
End: 2021-06-16
Payer: COMMERCIAL

## 2021-06-16 PROCEDURE — 93880 EXTRACRANIAL BILAT STUDY: CPT

## 2021-06-16 PROCEDURE — 93886 INTRACRANIAL COMPLETE STUDY: CPT

## 2021-06-16 PROCEDURE — 99072 ADDL SUPL MATRL&STAF TM PHE: CPT

## 2021-06-16 PROCEDURE — 93892 TCD EMBOLI DETECT W/O INJ: CPT

## 2021-07-27 ENCOUNTER — APPOINTMENT (OUTPATIENT)
Dept: NEUROLOGY | Facility: CLINIC | Age: 71
End: 2021-07-27
Payer: COMMERCIAL

## 2021-07-27 VITALS — SYSTOLIC BLOOD PRESSURE: 157 MMHG | DIASTOLIC BLOOD PRESSURE: 78 MMHG | HEART RATE: 71 BPM

## 2021-07-27 DIAGNOSIS — I63.9 CEREBRAL INFARCTION, UNSPECIFIED: ICD-10-CM

## 2021-07-27 PROCEDURE — 99214 OFFICE O/P EST MOD 30 MIN: CPT

## 2021-07-27 PROCEDURE — 99072 ADDL SUPL MATRL&STAF TM PHE: CPT

## 2021-08-25 ENCOUNTER — APPOINTMENT (OUTPATIENT)
Dept: NEUROLOGY | Facility: CLINIC | Age: 71
End: 2021-08-25

## 2021-09-03 ENCOUNTER — APPOINTMENT (OUTPATIENT)
Dept: INTERNAL MEDICINE | Facility: CLINIC | Age: 71
End: 2021-09-03

## 2021-10-08 ENCOUNTER — APPOINTMENT (OUTPATIENT)
Dept: INTERNAL MEDICINE | Facility: CLINIC | Age: 71
End: 2021-10-08
Payer: COMMERCIAL

## 2021-10-08 VITALS — BODY MASS INDEX: 29.8 KG/M2 | WEIGHT: 220 LBS | HEIGHT: 72 IN

## 2021-10-08 VITALS — DIASTOLIC BLOOD PRESSURE: 64 MMHG | SYSTOLIC BLOOD PRESSURE: 120 MMHG | HEART RATE: 75 BPM | RESPIRATION RATE: 12 BRPM

## 2021-10-08 DIAGNOSIS — I11.9 HYPERTENSIVE HEART DISEASE W/OUT HEART FAILURE: ICD-10-CM

## 2021-10-08 DIAGNOSIS — Z87.891 PERSONAL HISTORY OF NICOTINE DEPENDENCE: ICD-10-CM

## 2021-10-08 DIAGNOSIS — E78.00 PURE HYPERCHOLESTEROLEMIA, UNSPECIFIED: ICD-10-CM

## 2021-10-08 DIAGNOSIS — E11.49 TYPE 2 DIABETES MELLITUS WITH OTHER DIABETIC NEUROLOGICAL COMPLICATION: ICD-10-CM

## 2021-10-08 DIAGNOSIS — Z23 ENCOUNTER FOR IMMUNIZATION: ICD-10-CM

## 2021-10-08 PROCEDURE — 99214 OFFICE O/P EST MOD 30 MIN: CPT | Mod: 25

## 2021-10-08 PROCEDURE — G0008: CPT

## 2021-10-08 PROCEDURE — 90662 IIV NO PRSV INCREASED AG IM: CPT

## 2021-10-08 PROCEDURE — 36415 COLL VENOUS BLD VENIPUNCTURE: CPT

## 2021-10-08 RX ORDER — CLOPIDOGREL BISULFATE 75 MG/1
75 TABLET, FILM COATED ORAL
Qty: 90 | Refills: 3 | Status: DISCONTINUED | COMMUNITY
End: 2021-10-08

## 2021-10-08 RX ORDER — MAGNESIUM OXIDE 241.3 MG/1000MG
400 TABLET ORAL
Refills: 0 | Status: ACTIVE | COMMUNITY

## 2021-10-08 NOTE — HEALTH RISK ASSESSMENT
[5-9] : 5-9 [Yes] : Yes [Monthly or less (1 pt)] : Monthly or less (1 point) [1 or 2 (0 pts)] : 1 or 2 (0 points) [No] : In the past 12 months have you used drugs other than those required for medical reasons? No [No falls in past year] : Patient reported no falls in the past year [0] : 2) Feeling down, depressed, or hopeless: Not at all (0) [PHQ-2 Negative - No further assessment needed] : PHQ-2 Negative - No further assessment needed [] : No [Audit-CScore] : 1 [FOY6Oopcq] : 0

## 2021-10-08 NOTE — ASSESSMENT
[FreeTextEntry1] : Lacunar CVA.  Appears not to have had chol or DM care prior to CVA.  R facial droop and R hemip improved with signif residual weakness R side. .  \par Unsteady gait and R sided weakness and numbness.  Unable to drive.  Patient hoping to get [employer].  \par Muscular weakness on Atorvastatin 80 mg.  OK to reduce to 40 mg and check labs. \par Carotid and vertebral art dis noted on MRI.    Told to DC Plavix at 21 days by ?neuro.  \par Chol 260 .  Now on Atorvastatin 80 but has weakness.  OK to reduce to 40\par DM  A1C 7.2.  Now on Metformin 1000 bid\par Colonoscopy 2016.  Told 10 year f/up.\par Pfizer covid vaccine complete x 2 doses.\par Compliant with all new meds.  \par Edema.  Wt loss advised\par BMI 30.  Promises to reduce wt.  \par Given flu vaccine left deltoid

## 2021-10-11 LAB
25(OH)D3 SERPL-MCNC: 35.8 NG/ML
ALBUMIN SERPL ELPH-MCNC: 4.4 G/DL
ALP BLD-CCNC: 98 U/L
ALT SERPL-CCNC: 31 U/L
ANION GAP SERPL CALC-SCNC: 15 MMOL/L
AST SERPL-CCNC: 25 U/L
BASOPHILS # BLD AUTO: 0.04 K/UL
BASOPHILS NFR BLD AUTO: 0.7 %
BILIRUB SERPL-MCNC: 0.3 MG/DL
BUN SERPL-MCNC: 15 MG/DL
CALCIUM SERPL-MCNC: 9.6 MG/DL
CHLORIDE SERPL-SCNC: 108 MMOL/L
CHOLEST SERPL-MCNC: 168 MG/DL
CO2 SERPL-SCNC: 20 MMOL/L
CREAT SERPL-MCNC: 1.06 MG/DL
EOSINOPHIL # BLD AUTO: 0.14 K/UL
EOSINOPHIL NFR BLD AUTO: 2.6 %
ESTIMATED AVERAGE GLUCOSE: 120 MG/DL
GLUCOSE SERPL-MCNC: 115 MG/DL
HBA1C MFR BLD HPLC: 5.8 %
HCT VFR BLD CALC: 44.3 %
HDLC SERPL-MCNC: 49 MG/DL
HGB BLD-MCNC: 13.7 G/DL
IMM GRANULOCYTES NFR BLD AUTO: 0.4 %
LDLC SERPL CALC-MCNC: 101 MG/DL
LYMPHOCYTES # BLD AUTO: 1.4 K/UL
LYMPHOCYTES NFR BLD AUTO: 26 %
MAN DIFF?: NORMAL
MCHC RBC-ENTMCNC: 27.5 PG
MCHC RBC-ENTMCNC: 30.9 GM/DL
MCV RBC AUTO: 89 FL
MONOCYTES # BLD AUTO: 0.46 K/UL
MONOCYTES NFR BLD AUTO: 8.5 %
NEUTROPHILS # BLD AUTO: 3.33 K/UL
NEUTROPHILS NFR BLD AUTO: 61.8 %
NONHDLC SERPL-MCNC: 119 MG/DL
PLATELET # BLD AUTO: 335 K/UL
POTASSIUM SERPL-SCNC: 4.1 MMOL/L
PROT SERPL-MCNC: 7.2 G/DL
PSA SERPL-MCNC: 2.35 NG/ML
RBC # BLD: 4.98 M/UL
RBC # FLD: 14.9 %
SODIUM SERPL-SCNC: 144 MMOL/L
TRIGL SERPL-MCNC: 89 MG/DL
TSH SERPL-ACNC: 1.6 UIU/ML
WBC # FLD AUTO: 5.39 K/UL

## 2021-10-11 RX ORDER — ATORVASTATIN CALCIUM 80 MG/1
80 TABLET, FILM COATED ORAL DAILY
Qty: 90 | Refills: 3 | Status: ACTIVE | COMMUNITY
Start: 1900-01-01 | End: 1900-01-01

## 2021-10-11 RX ORDER — METFORMIN HYDROCHLORIDE 1000 MG/1
1000 TABLET, COATED ORAL TWICE DAILY
Qty: 180 | Refills: 3 | Status: ACTIVE | COMMUNITY
Start: 1900-01-01 | End: 1900-01-01

## 2021-10-12 NOTE — PHYSICAL THERAPY INITIAL EVALUATION ADULT - ASSISTIVE DEVICE FOR TRANSFER: GAIT, REHAB EVAL
no device- 150 feet, 10 feet with straight cane Constitutional: See HPI.  Eyes: No visual changes, eye pain or discharge.   ENMT: No hearing changes, pain, discharge or infections.   Cardiac: + chest pain. No SOB or edema. No chest pain with exertion.  Respiratory: No cough or respiratory distress. No hemoptysis.  GI: No nausea, vomiting, diarrhea or abdominal pain.  : No dysuria, frequency or burning. No Discharge  MS: + back pain. No myalgia, muscle weakness, joint pain.  Neuro: No headache or weakness. No LOC.  Skin: No skin rash.  Except as documented in the HPI, all other systems are negative.

## 2021-10-14 ENCOUNTER — APPOINTMENT (OUTPATIENT)
Dept: ORTHOPEDIC SURGERY | Facility: CLINIC | Age: 71
End: 2021-10-14
Payer: COMMERCIAL

## 2021-10-14 VITALS
WEIGHT: 220 LBS | DIASTOLIC BLOOD PRESSURE: 64 MMHG | HEART RATE: 75 BPM | HEIGHT: 72 IN | SYSTOLIC BLOOD PRESSURE: 120 MMHG | BODY MASS INDEX: 29.8 KG/M2

## 2021-10-14 DIAGNOSIS — M25.511 PAIN IN RIGHT SHOULDER: ICD-10-CM

## 2021-10-14 DIAGNOSIS — G89.29 PAIN IN RIGHT SHOULDER: ICD-10-CM

## 2021-10-14 PROCEDURE — 99203 OFFICE O/P NEW LOW 30 MIN: CPT

## 2021-10-14 PROCEDURE — 73030 X-RAY EXAM OF SHOULDER: CPT | Mod: RT

## 2021-10-14 NOTE — HISTORY OF PRESENT ILLNESS
[de-identified] : 71 year old male presents today with right shoulder pain x 6 months. He think that he may have RTC tear as result of doing 600  push ups per day. The pain has gotten worse since he had the stroke back in May.  hx stroke/TIA 5/25/21 and second stroke 5/27/21. Pain is brought on with overhead reaching. He  is unable to do push ups like he use to due to pain. He is not taking pain medication. States that he has slight numbness in the upper arm as results of stroke. Patient states that he plays basketball and is having difficulty throwing a basketball. \par \par The patient's past medical history, past surgical history, medications and allergies were reviewed by me today with the patient and documented accordingly. In addition, the patient's family and social history, which were noncontributory to this visit, were reviewed also.

## 2021-10-14 NOTE — DISCUSSION/SUMMARY
[de-identified] : 70 y/o male with right shoulder pain. \par \par Patient presents for evaluation of right shoulder pain and has some weakness and pain in ROM. A discussion was had with the patient regarding potential sources of inflammatory shoulder discomfort; including rotator cuff tendon dysfunction (including tendonitis vs. internal structural damage), subdeltoid/subacromial bursitis, or impingement of the rotator cuff at the acromion. Other contributing sources of pain may be the acromioclavicular joint or long head of the biceps tendon. Irritation is typically caused either by overhead repetitive activity or as a result of minor injury. \par \par Discussed short-term and long-term outcomes as well as the goal of treatment to reduce pain and restore function. Nonsurgical treatment is typically first-line therapy that may take weeks to months to resolve symptoms; includes rest from overhead activities, NSAIDs, home exercise program versus physical therapy to restore normal strength/ROM/function of the shoulder, and possible corticosteroid injection. Also discussed the role of arthroscopic surgical intervention when nonsurgical treatment does not adequately relieve pain/inflammation. \par \par Recommendations: Begin trial of PT, Rx given. Conservative modalities as above (overhead activity rest/activity avoidance until less symptomatic, ice, NSAIDs, home exercise strengthening and stretching program). \par \par Followup: If symptoms progress or persist for additional treatment as needed

## 2021-10-14 NOTE — PHYSICAL EXAM
[de-identified] : Oriented to time, place, person\par Mood: Normal\par Affect: Normal\par Appearance: Healthy, well appearing, no acute distress.\par Gait: Normal\par Assistive Devices: None\par \par Right shoulder exam:\par \par Inspection: No malalignment, No defects, No atrophy\par Skin: No masses, No lesions\par Neck: Negative Spurling, full ROM, no pain with ROM\par AROM: FF to 170, abduction to 90, ER to 80, IR to upper lumbar\par Painful arc ROM: Pain with FF\par Tenderness: No bicipital tenderness, no tenderness to greater tuberosity/RTC insertion, no anterior shoulder/lesser tuberosity tenderness\par Strength: 4/5 ER, 5/5 IR in adduction, 4/5 supraspinatus testing, negative McKenzie's test\par AC joint: No TTP/pain with cross arm testing\par Biceps: Speed Negative, Yergason Negative \par Impingement test: Negative Kirk, + Neer\par Vasc: 2+ radial pulse \par Stability: Stable \par Neuro: AIN, PIN, Ulnar nerve intact to motor\par Sensation: Intact to light touch throughout  [de-identified] : The following radiographs were ordered and read by me during this patients visit. I reviewed each radiograph in detail with the patient and discussed the findings as highlighted below.\par \par 3 views of right shoulder were obtained today, 10/14/2021, that show no acute fracture or dislocation. There is mild glenohumeral and moderate AC joint degenerative change seen. Type II acromion. There is no significant malalignment. There is degenerative changes at the RTC insertion.

## 2021-12-20 ENCOUNTER — APPOINTMENT (OUTPATIENT)
Dept: NEUROLOGY | Facility: CLINIC | Age: 71
End: 2021-12-20
Payer: COMMERCIAL

## 2021-12-20 VITALS
DIASTOLIC BLOOD PRESSURE: 71 MMHG | HEIGHT: 72 IN | HEART RATE: 66 BPM | WEIGHT: 220 LBS | BODY MASS INDEX: 29.8 KG/M2 | SYSTOLIC BLOOD PRESSURE: 136 MMHG

## 2021-12-20 DIAGNOSIS — R26.89 OTHER ABNORMALITIES OF GAIT AND MOBILITY: ICD-10-CM

## 2021-12-20 PROCEDURE — 99215 OFFICE O/P EST HI 40 MIN: CPT

## 2022-06-19 NOTE — OCCUPATIONAL THERAPY INITIAL EVALUATION ADULT - IMPAIRED TRANSFERS: TOILET, REHAB EVAL
You can access the FollowMyHealth Patient Portal offered by Health system by registering at the following website: [email or web address]. By joining [email or web address]’s FollowMyHealth portal, you will also be able to view your health information using other applications (apps) compatible with our system.
impaired balance

## 2022-06-20 ENCOUNTER — APPOINTMENT (OUTPATIENT)
Dept: NEUROLOGY | Facility: CLINIC | Age: 72
End: 2022-06-20
Payer: COMMERCIAL

## 2022-06-20 DIAGNOSIS — R20.2 PARESTHESIA OF SKIN: ICD-10-CM

## 2022-06-20 DIAGNOSIS — I63.9 CEREBRAL INFARCTION, UNSPECIFIED: ICD-10-CM

## 2022-06-20 PROCEDURE — 99215 OFFICE O/P EST HI 40 MIN: CPT

## 2022-06-20 RX ORDER — AMLODIPINE BESYLATE AND BENAZEPRIL HYDROCHLORIDE 10; 20 MG/1; MG/1
10-20 CAPSULE ORAL
Qty: 90 | Refills: 3 | Status: DISCONTINUED | COMMUNITY
Start: 1900-01-01 | End: 2022-06-20

## 2022-08-04 ENCOUNTER — APPOINTMENT (OUTPATIENT)
Dept: NEUROLOGY | Facility: CLINIC | Age: 72
End: 2022-08-04

## 2022-12-28 ENCOUNTER — APPOINTMENT (OUTPATIENT)
Dept: NEUROLOGY | Facility: CLINIC | Age: 72
End: 2022-12-28

## 2023-08-15 NOTE — ED PROVIDER NOTE - IV ALTEPLASE EXCL ABS HIDDEN
Your wounds or well-appearing at this time without any obvious evidence of skin infection.  Please take the prescribed antibiotics in case the infection is very early on in its course.  Follow-up as scheduled with wound care physician.  Return to the ER for any concerns or worsening condition.   show

## 2023-10-31 NOTE — ADDENDUM
[FreeTextEntry1] : This note was written by Lynnette Smith on 10/14/2021 acting solely as a scribe for Dr. Nestor Van.\par \par All medical record entries made by the Scribe were at my, Dr. Nestor Van, direction and personally dictated by me on 10/14/2021. I have personally reviewed the chart and agree that the record accurately reflects my personal performance of the history, physical exam, assessment and plan. 
PALLAVI Lion MD

## 2024-02-09 ENCOUNTER — INPATIENT (INPATIENT)
Facility: HOSPITAL | Age: 74
LOS: 0 days | Discharge: ANOTHER HOSPITAL | End: 2024-02-09
Attending: INTERNAL MEDICINE | Admitting: INTERNAL MEDICINE
Payer: COMMERCIAL

## 2024-02-09 ENCOUNTER — INPATIENT (INPATIENT)
Facility: HOSPITAL | Age: 74
LOS: 61 days | Discharge: NURSING, REHAB OR LONG-TERM CARE | DRG: 4 | End: 2024-04-11
Attending: STUDENT IN AN ORGANIZED HEALTH CARE EDUCATION/TRAINING PROGRAM | Admitting: PSYCHIATRY & NEUROLOGY
Payer: MEDICARE

## 2024-02-09 VITALS — DIASTOLIC BLOOD PRESSURE: 110 MMHG | HEART RATE: 88 BPM | SYSTOLIC BLOOD PRESSURE: 130 MMHG

## 2024-02-09 VITALS
HEART RATE: 74 BPM | OXYGEN SATURATION: 98 % | WEIGHT: 238.1 LBS | TEMPERATURE: 99 F | RESPIRATION RATE: 18 BRPM | SYSTOLIC BLOOD PRESSURE: 161 MMHG | HEIGHT: 74 IN | DIASTOLIC BLOOD PRESSURE: 70 MMHG

## 2024-02-09 VITALS
SYSTOLIC BLOOD PRESSURE: 196 MMHG | DIASTOLIC BLOOD PRESSURE: 92 MMHG | HEART RATE: 89 BPM | OXYGEN SATURATION: 98 % | RESPIRATION RATE: 18 BRPM

## 2024-02-09 DIAGNOSIS — I63.9 CEREBRAL INFARCTION, UNSPECIFIED: ICD-10-CM

## 2024-02-09 DIAGNOSIS — E78.5 HYPERLIPIDEMIA, UNSPECIFIED: ICD-10-CM

## 2024-02-09 DIAGNOSIS — E11.8 TYPE 2 DIABETES MELLITUS WITH UNSPECIFIED COMPLICATIONS: ICD-10-CM

## 2024-02-09 DIAGNOSIS — R68.89 OTHER GENERAL SYMPTOMS AND SIGNS: ICD-10-CM

## 2024-02-09 DIAGNOSIS — I10 ESSENTIAL (PRIMARY) HYPERTENSION: ICD-10-CM

## 2024-02-09 LAB
ALBUMIN SERPL ELPH-MCNC: 3.5 G/DL — SIGNIFICANT CHANGE UP (ref 3.3–5)
ALBUMIN SERPL ELPH-MCNC: 4.3 G/DL — SIGNIFICANT CHANGE UP (ref 3.3–5)
ALP SERPL-CCNC: 88 U/L — SIGNIFICANT CHANGE UP (ref 40–120)
ALP SERPL-CCNC: 93 U/L — SIGNIFICANT CHANGE UP (ref 40–120)
ALT FLD-CCNC: 16 U/L — SIGNIFICANT CHANGE UP (ref 10–45)
ALT FLD-CCNC: 20 U/L — SIGNIFICANT CHANGE UP (ref 12–78)
ANION GAP SERPL CALC-SCNC: 16 MMOL/L — SIGNIFICANT CHANGE UP (ref 5–17)
ANION GAP SERPL CALC-SCNC: 7 MMOL/L — SIGNIFICANT CHANGE UP (ref 5–17)
ANISOCYTOSIS BLD QL: SIGNIFICANT CHANGE UP
ANISOCYTOSIS BLD QL: SLIGHT — SIGNIFICANT CHANGE UP
APTT BLD: 26.7 SEC — SIGNIFICANT CHANGE UP (ref 24.5–35.6)
APTT BLD: 28.3 SEC — SIGNIFICANT CHANGE UP (ref 24.5–35.6)
AST SERPL-CCNC: 28 U/L — SIGNIFICANT CHANGE UP (ref 10–40)
AST SERPL-CCNC: 33 U/L — SIGNIFICANT CHANGE UP (ref 15–37)
BASE EXCESS BLDV CALC-SCNC: -1 MMOL/L — SIGNIFICANT CHANGE UP (ref -2–3)
BASOPHILS # BLD AUTO: 0 K/UL — SIGNIFICANT CHANGE UP (ref 0–0.2)
BASOPHILS # BLD AUTO: 0.03 K/UL — SIGNIFICANT CHANGE UP (ref 0–0.2)
BASOPHILS NFR BLD AUTO: 0 % — SIGNIFICANT CHANGE UP (ref 0–2)
BASOPHILS NFR BLD AUTO: 0.5 % — SIGNIFICANT CHANGE UP (ref 0–2)
BILIRUB SERPL-MCNC: 0.3 MG/DL — SIGNIFICANT CHANGE UP (ref 0.2–1.2)
BILIRUB SERPL-MCNC: 0.5 MG/DL — SIGNIFICANT CHANGE UP (ref 0.2–1.2)
BUN SERPL-MCNC: 11 MG/DL — SIGNIFICANT CHANGE UP (ref 7–23)
BUN SERPL-MCNC: 13 MG/DL — SIGNIFICANT CHANGE UP (ref 7–23)
BURR CELLS BLD QL SMEAR: PRESENT — SIGNIFICANT CHANGE UP
CA-I SERPL-SCNC: 1.16 MMOL/L — SIGNIFICANT CHANGE UP (ref 1.15–1.33)
CALCIUM SERPL-MCNC: 9.2 MG/DL — SIGNIFICANT CHANGE UP (ref 8.5–10.1)
CALCIUM SERPL-MCNC: 9.8 MG/DL — SIGNIFICANT CHANGE UP (ref 8.4–10.5)
CHLORIDE BLDV-SCNC: 101 MMOL/L — SIGNIFICANT CHANGE UP (ref 96–108)
CHLORIDE SERPL-SCNC: 100 MMOL/L — SIGNIFICANT CHANGE UP (ref 96–108)
CHLORIDE SERPL-SCNC: 104 MMOL/L — SIGNIFICANT CHANGE UP (ref 96–108)
CO2 BLDV-SCNC: 27 MMOL/L — HIGH (ref 22–26)
CO2 SERPL-SCNC: 22 MMOL/L — SIGNIFICANT CHANGE UP (ref 22–31)
CO2 SERPL-SCNC: 25 MMOL/L — SIGNIFICANT CHANGE UP (ref 22–31)
CREAT SERPL-MCNC: 1.15 MG/DL — SIGNIFICANT CHANGE UP (ref 0.5–1.3)
CREAT SERPL-MCNC: 1.32 MG/DL — HIGH (ref 0.5–1.3)
EGFR: 57 ML/MIN/1.73M2 — LOW
EGFR: 67 ML/MIN/1.73M2 — SIGNIFICANT CHANGE UP
ELLIPTOCYTES BLD QL SMEAR: SLIGHT — SIGNIFICANT CHANGE UP
ELLIPTOCYTES BLD QL SMEAR: SLIGHT — SIGNIFICANT CHANGE UP
EOSINOPHIL # BLD AUTO: 0.09 K/UL — SIGNIFICANT CHANGE UP (ref 0–0.5)
EOSINOPHIL # BLD AUTO: 0.21 K/UL — SIGNIFICANT CHANGE UP (ref 0–0.5)
EOSINOPHIL NFR BLD AUTO: 1.6 % — SIGNIFICANT CHANGE UP (ref 0–6)
EOSINOPHIL NFR BLD AUTO: 2.6 % — SIGNIFICANT CHANGE UP (ref 0–6)
FLUAV AG NPH QL: SIGNIFICANT CHANGE UP
FLUBV AG NPH QL: SIGNIFICANT CHANGE UP
GAS PNL BLDV: 134 MMOL/L — LOW (ref 136–145)
GAS PNL BLDV: SIGNIFICANT CHANGE UP
GLUCOSE BLDC GLUCOMTR-MCNC: 111 MG/DL — HIGH (ref 70–99)
GLUCOSE BLDV-MCNC: 138 MG/DL — HIGH (ref 70–99)
GLUCOSE SERPL-MCNC: 107 MG/DL — HIGH (ref 70–99)
GLUCOSE SERPL-MCNC: 163 MG/DL — HIGH (ref 70–99)
HCO3 BLDV-SCNC: 25 MMOL/L — SIGNIFICANT CHANGE UP (ref 22–29)
HCT VFR BLD CALC: 40.4 % — SIGNIFICANT CHANGE UP (ref 39–50)
HCT VFR BLD CALC: 43.3 % — SIGNIFICANT CHANGE UP (ref 39–50)
HCT VFR BLDA CALC: 36 % — LOW (ref 39–51)
HGB BLD CALC-MCNC: 12.1 G/DL — LOW (ref 12.6–17.4)
HGB BLD-MCNC: 11.8 G/DL — LOW (ref 13–17)
HGB BLD-MCNC: 12.7 G/DL — LOW (ref 13–17)
IMM GRANULOCYTES NFR BLD AUTO: 0.9 % — SIGNIFICANT CHANGE UP (ref 0–0.9)
INR BLD: 1.04 RATIO — SIGNIFICANT CHANGE UP (ref 0.85–1.18)
INR BLD: 1.08 RATIO — SIGNIFICANT CHANGE UP (ref 0.85–1.18)
LACTATE BLDV-MCNC: 2.1 MMOL/L — HIGH (ref 0.5–2)
LYMPHOCYTES # BLD AUTO: 1.33 K/UL — SIGNIFICANT CHANGE UP (ref 1–3.3)
LYMPHOCYTES # BLD AUTO: 1.92 K/UL — SIGNIFICANT CHANGE UP (ref 1–3.3)
LYMPHOCYTES # BLD AUTO: 22.9 % — SIGNIFICANT CHANGE UP (ref 13–44)
LYMPHOCYTES # BLD AUTO: 24.3 % — SIGNIFICANT CHANGE UP (ref 13–44)
MACROCYTES BLD QL: SLIGHT — SIGNIFICANT CHANGE UP
MANUAL SMEAR VERIFICATION: SIGNIFICANT CHANGE UP
MANUAL SMEAR VERIFICATION: SIGNIFICANT CHANGE UP
MCHC RBC-ENTMCNC: 21.2 PG — LOW (ref 27–34)
MCHC RBC-ENTMCNC: 21.3 PG — LOW (ref 27–34)
MCHC RBC-ENTMCNC: 29.2 G/DL — LOW (ref 32–36)
MCHC RBC-ENTMCNC: 29.3 GM/DL — LOW (ref 32–36)
MCV RBC AUTO: 72.7 FL — LOW (ref 80–100)
MCV RBC AUTO: 72.7 FL — LOW (ref 80–100)
MICROCYTES BLD QL: SIGNIFICANT CHANGE UP
MONOCYTES # BLD AUTO: 0.55 K/UL — SIGNIFICANT CHANGE UP (ref 0–0.9)
MONOCYTES # BLD AUTO: 0.72 K/UL — SIGNIFICANT CHANGE UP (ref 0–0.9)
MONOCYTES NFR BLD AUTO: 12.4 % — SIGNIFICANT CHANGE UP (ref 2–14)
MONOCYTES NFR BLD AUTO: 7 % — SIGNIFICANT CHANGE UP (ref 2–14)
NEUTROPHILS # BLD AUTO: 3.58 K/UL — SIGNIFICANT CHANGE UP (ref 1.8–7.4)
NEUTROPHILS # BLD AUTO: 5.22 K/UL — SIGNIFICANT CHANGE UP (ref 1.8–7.4)
NEUTROPHILS NFR BLD AUTO: 61.7 % — SIGNIFICANT CHANGE UP (ref 43–77)
NEUTROPHILS NFR BLD AUTO: 66.1 % — SIGNIFICANT CHANGE UP (ref 43–77)
NRBC # BLD: 0 /100 WBCS — SIGNIFICANT CHANGE UP (ref 0–0)
OVALOCYTES BLD QL SMEAR: SLIGHT — SIGNIFICANT CHANGE UP
PCO2 BLDV: 48 MMHG — SIGNIFICANT CHANGE UP (ref 42–55)
PH BLDV: 7.33 — SIGNIFICANT CHANGE UP (ref 7.32–7.43)
PLAT MORPH BLD: NORMAL — SIGNIFICANT CHANGE UP
PLAT MORPH BLD: NORMAL — SIGNIFICANT CHANGE UP
PLATELET # BLD AUTO: 360 K/UL — SIGNIFICANT CHANGE UP (ref 150–400)
PLATELET # BLD AUTO: 398 K/UL — SIGNIFICANT CHANGE UP (ref 150–400)
PO2 BLDV: 77 MMHG — HIGH (ref 25–45)
POLYCHROMASIA BLD QL SMEAR: SLIGHT — SIGNIFICANT CHANGE UP
POLYCHROMASIA BLD QL SMEAR: SLIGHT — SIGNIFICANT CHANGE UP
POTASSIUM BLDV-SCNC: 3.8 MMOL/L — SIGNIFICANT CHANGE UP (ref 3.5–5.1)
POTASSIUM SERPL-MCNC: 3.8 MMOL/L — SIGNIFICANT CHANGE UP (ref 3.5–5.3)
POTASSIUM SERPL-MCNC: 4.2 MMOL/L — SIGNIFICANT CHANGE UP (ref 3.5–5.3)
POTASSIUM SERPL-SCNC: 3.8 MMOL/L — SIGNIFICANT CHANGE UP (ref 3.5–5.3)
POTASSIUM SERPL-SCNC: 4.2 MMOL/L — SIGNIFICANT CHANGE UP (ref 3.5–5.3)
PROT SERPL-MCNC: 8.2 G/DL — SIGNIFICANT CHANGE UP (ref 6–8.3)
PROT SERPL-MCNC: 8.3 GM/DL — SIGNIFICANT CHANGE UP (ref 6–8.3)
PROTHROM AB SERPL-ACNC: 11.9 SEC — SIGNIFICANT CHANGE UP (ref 9.5–13)
PROTHROM AB SERPL-ACNC: 12.5 SEC — SIGNIFICANT CHANGE UP (ref 9.5–13)
RBC # BLD: 5.56 M/UL — SIGNIFICANT CHANGE UP (ref 4.2–5.8)
RBC # BLD: 5.96 M/UL — HIGH (ref 4.2–5.8)
RBC # FLD: 19.3 % — HIGH (ref 10.3–14.5)
RBC # FLD: 19.6 % — HIGH (ref 10.3–14.5)
RBC BLD AUTO: ABNORMAL
RBC BLD AUTO: ABNORMAL
SAO2 % BLDV: 95.4 % — HIGH (ref 67–88)
SARS-COV-2 RNA SPEC QL NAA+PROBE: SIGNIFICANT CHANGE UP
SCHISTOCYTES BLD QL AUTO: SLIGHT — SIGNIFICANT CHANGE UP
SODIUM SERPL-SCNC: 136 MMOL/L — SIGNIFICANT CHANGE UP (ref 135–145)
SODIUM SERPL-SCNC: 138 MMOL/L — SIGNIFICANT CHANGE UP (ref 135–145)
TROPONIN I, HIGH SENSITIVITY RESULT: 9 NG/L — SIGNIFICANT CHANGE UP
TROPONIN T, HIGH SENSITIVITY RESULT: 18 NG/L — SIGNIFICANT CHANGE UP (ref 0–51)
WBC # BLD: 5.8 K/UL — SIGNIFICANT CHANGE UP (ref 3.8–10.5)
WBC # BLD: 7.89 K/UL — SIGNIFICANT CHANGE UP (ref 3.8–10.5)
WBC # FLD AUTO: 5.8 K/UL — SIGNIFICANT CHANGE UP (ref 3.8–10.5)
WBC # FLD AUTO: 7.89 K/UL — SIGNIFICANT CHANGE UP (ref 3.8–10.5)

## 2024-02-09 PROCEDURE — 99223 1ST HOSP IP/OBS HIGH 75: CPT | Mod: AI

## 2024-02-09 PROCEDURE — 70496 CT ANGIOGRAPHY HEAD: CPT | Mod: 26,MA

## 2024-02-09 PROCEDURE — 0042T: CPT

## 2024-02-09 PROCEDURE — 70496 CT ANGIOGRAPHY HEAD: CPT | Mod: 26,77

## 2024-02-09 PROCEDURE — 31500 INSERT EMERGENCY AIRWAY: CPT

## 2024-02-09 PROCEDURE — 93010 ELECTROCARDIOGRAM REPORT: CPT

## 2024-02-09 PROCEDURE — 99232 SBSQ HOSP IP/OBS MODERATE 35: CPT

## 2024-02-09 PROCEDURE — 70498 CT ANGIOGRAPHY NECK: CPT | Mod: 26,MA

## 2024-02-09 PROCEDURE — 0042T: CPT | Mod: MA

## 2024-02-09 PROCEDURE — 70450 CT HEAD/BRAIN W/O DYE: CPT | Mod: 26,MA,XU

## 2024-02-09 PROCEDURE — 70498 CT ANGIOGRAPHY NECK: CPT | Mod: 26,77

## 2024-02-09 PROCEDURE — 71046 X-RAY EXAM CHEST 2 VIEWS: CPT | Mod: 26

## 2024-02-09 PROCEDURE — 70450 CT HEAD/BRAIN W/O DYE: CPT | Mod: 26,77,XU

## 2024-02-09 PROCEDURE — 99291 CRITICAL CARE FIRST HOUR: CPT

## 2024-02-09 PROCEDURE — 99291 CRITICAL CARE FIRST HOUR: CPT | Mod: 25

## 2024-02-09 RX ORDER — FENTANYL CITRATE 50 UG/ML
100 INJECTION INTRAVENOUS ONCE
Refills: 0 | Status: DISCONTINUED | OUTPATIENT
Start: 2024-02-09 | End: 2024-02-09

## 2024-02-09 RX ORDER — DEXTROSE 50 % IN WATER 50 %
12.5 SYRINGE (ML) INTRAVENOUS ONCE
Refills: 0 | Status: DISCONTINUED | OUTPATIENT
Start: 2024-02-09 | End: 2024-02-09

## 2024-02-09 RX ORDER — DEXTROSE 50 % IN WATER 50 %
25 SYRINGE (ML) INTRAVENOUS ONCE
Refills: 0 | Status: DISCONTINUED | OUTPATIENT
Start: 2024-02-09 | End: 2024-02-09

## 2024-02-09 RX ORDER — PHENYLEPHRINE HYDROCHLORIDE 10 MG/ML
0.2 INJECTION INTRAVENOUS
Qty: 40 | Refills: 0 | Status: DISCONTINUED | OUTPATIENT
Start: 2024-02-09 | End: 2024-02-10

## 2024-02-09 RX ORDER — PREGABALIN 225 MG/1
1000 CAPSULE ORAL DAILY
Refills: 0 | Status: DISCONTINUED | OUTPATIENT
Start: 2024-02-09 | End: 2024-02-11

## 2024-02-09 RX ORDER — HYDRALAZINE HCL 50 MG
10 TABLET ORAL ONCE
Refills: 0 | Status: COMPLETED | OUTPATIENT
Start: 2024-02-09 | End: 2024-02-09

## 2024-02-09 RX ORDER — SENNA PLUS 8.6 MG/1
1 TABLET ORAL AT BEDTIME
Refills: 0 | Status: DISCONTINUED | OUTPATIENT
Start: 2024-02-09 | End: 2024-03-15

## 2024-02-09 RX ORDER — INSULIN LISPRO 100/ML
VIAL (ML) SUBCUTANEOUS
Refills: 0 | Status: DISCONTINUED | OUTPATIENT
Start: 2024-02-09 | End: 2024-02-09

## 2024-02-09 RX ORDER — DEXTROSE 50 % IN WATER 50 %
15 SYRINGE (ML) INTRAVENOUS ONCE
Refills: 0 | Status: DISCONTINUED | OUTPATIENT
Start: 2024-02-09 | End: 2024-02-09

## 2024-02-09 RX ORDER — CANGRELOR 50 MG/1
2 INJECTION, POWDER, LYOPHILIZED, FOR SOLUTION INTRAVENOUS
Qty: 50 | Refills: 0 | Status: DISCONTINUED | OUTPATIENT
Start: 2024-02-09 | End: 2024-02-09

## 2024-02-09 RX ORDER — PREGABALIN 225 MG/1
1000 CAPSULE ORAL DAILY
Refills: 0 | Status: DISCONTINUED | OUTPATIENT
Start: 2024-02-09 | End: 2024-02-09

## 2024-02-09 RX ORDER — ASPIRIN/CALCIUM CARB/MAGNESIUM 324 MG
324 TABLET ORAL ONCE
Refills: 0 | Status: COMPLETED | OUTPATIENT
Start: 2024-02-09 | End: 2024-02-09

## 2024-02-09 RX ORDER — MIDAZOLAM HYDROCHLORIDE 1 MG/ML
5 INJECTION, SOLUTION INTRAMUSCULAR; INTRAVENOUS ONCE
Refills: 0 | Status: DISCONTINUED | OUTPATIENT
Start: 2024-02-09 | End: 2024-02-09

## 2024-02-09 RX ORDER — SUCCINYLCHOLINE CHLORIDE 100 MG/5ML
200 SYRINGE (ML) INTRAVENOUS ONCE
Refills: 0 | Status: COMPLETED | OUTPATIENT
Start: 2024-02-09 | End: 2024-02-09

## 2024-02-09 RX ORDER — PROPOFOL 10 MG/ML
50 INJECTION, EMULSION INTRAVENOUS
Qty: 500 | Refills: 0 | Status: DISCONTINUED | OUTPATIENT
Start: 2024-02-09 | End: 2024-02-10

## 2024-02-09 RX ORDER — ASPIRIN/CALCIUM CARB/MAGNESIUM 324 MG
600 TABLET ORAL ONCE
Refills: 0 | Status: COMPLETED | OUTPATIENT
Start: 2024-02-09 | End: 2024-02-09

## 2024-02-09 RX ORDER — HEPARIN SODIUM 5000 [USP'U]/ML
5000 INJECTION INTRAVENOUS; SUBCUTANEOUS EVERY 8 HOURS
Refills: 0 | Status: DISCONTINUED | OUTPATIENT
Start: 2024-02-09 | End: 2024-02-09

## 2024-02-09 RX ORDER — ONDANSETRON 8 MG/1
4 TABLET, FILM COATED ORAL ONCE
Refills: 0 | Status: COMPLETED | OUTPATIENT
Start: 2024-02-09 | End: 2024-02-09

## 2024-02-09 RX ORDER — GLUCAGON INJECTION, SOLUTION 0.5 MG/.1ML
1 INJECTION, SOLUTION SUBCUTANEOUS ONCE
Refills: 0 | Status: DISCONTINUED | OUTPATIENT
Start: 2024-02-09 | End: 2024-02-09

## 2024-02-09 RX ORDER — ETOMIDATE 2 MG/ML
30 INJECTION INTRAVENOUS ONCE
Refills: 0 | Status: COMPLETED | OUTPATIENT
Start: 2024-02-09 | End: 2024-02-09

## 2024-02-09 RX ORDER — ATORVASTATIN CALCIUM 80 MG/1
80 TABLET, FILM COATED ORAL AT BEDTIME
Refills: 0 | Status: DISCONTINUED | OUTPATIENT
Start: 2024-02-09 | End: 2024-03-03

## 2024-02-09 RX ORDER — INSULIN LISPRO 100/ML
VIAL (ML) SUBCUTANEOUS AT BEDTIME
Refills: 0 | Status: DISCONTINUED | OUTPATIENT
Start: 2024-02-09 | End: 2024-02-09

## 2024-02-09 RX ORDER — ATORVASTATIN CALCIUM 80 MG/1
80 TABLET, FILM COATED ORAL AT BEDTIME
Refills: 0 | Status: DISCONTINUED | OUTPATIENT
Start: 2024-02-09 | End: 2024-02-09

## 2024-02-09 RX ORDER — POLYETHYLENE GLYCOL 3350 17 G/17G
17 POWDER, FOR SOLUTION ORAL DAILY
Refills: 0 | Status: DISCONTINUED | OUTPATIENT
Start: 2024-02-09 | End: 2024-02-24

## 2024-02-09 RX ORDER — PANTOPRAZOLE SODIUM 20 MG/1
40 TABLET, DELAYED RELEASE ORAL DAILY
Refills: 0 | Status: DISCONTINUED | OUTPATIENT
Start: 2024-02-09 | End: 2024-02-20

## 2024-02-09 RX ORDER — SODIUM CHLORIDE 9 MG/ML
1000 INJECTION, SOLUTION INTRAVENOUS
Refills: 0 | Status: DISCONTINUED | OUTPATIENT
Start: 2024-02-09 | End: 2024-02-09

## 2024-02-09 RX ORDER — DEXMEDETOMIDINE HYDROCHLORIDE IN 0.9% SODIUM CHLORIDE 4 UG/ML
0.2 INJECTION INTRAVENOUS
Qty: 200 | Refills: 0 | Status: DISCONTINUED | OUTPATIENT
Start: 2024-02-09 | End: 2024-02-16

## 2024-02-09 RX ORDER — CHLORHEXIDINE GLUCONATE 213 G/1000ML
15 SOLUTION TOPICAL EVERY 12 HOURS
Refills: 0 | Status: DISCONTINUED | OUTPATIENT
Start: 2024-02-09 | End: 2024-04-11

## 2024-02-09 RX ORDER — ASPIRIN/CALCIUM CARB/MAGNESIUM 324 MG
81 TABLET ORAL DAILY
Refills: 0 | Status: DISCONTINUED | OUTPATIENT
Start: 2024-02-10 | End: 2024-02-09

## 2024-02-09 RX ADMIN — FENTANYL CITRATE 100 MICROGRAM(S): 50 INJECTION INTRAVENOUS at 21:50

## 2024-02-09 RX ADMIN — Medication 324 MILLIGRAM(S): at 11:21

## 2024-02-09 RX ADMIN — MIDAZOLAM HYDROCHLORIDE 5 MILLIGRAM(S): 1 INJECTION, SOLUTION INTRAMUSCULAR; INTRAVENOUS at 21:35

## 2024-02-09 RX ADMIN — FENTANYL CITRATE 100 MICROGRAM(S): 50 INJECTION INTRAVENOUS at 20:00

## 2024-02-09 RX ADMIN — Medication 200 MILLIGRAM(S): at 19:45

## 2024-02-09 RX ADMIN — Medication 10 MILLIGRAM(S): at 18:21

## 2024-02-09 RX ADMIN — Medication 600 MILLIGRAM(S): at 23:58

## 2024-02-09 RX ADMIN — PROPOFOL 30.5 MICROGRAM(S)/KG/MIN: 10 INJECTION, EMULSION INTRAVENOUS at 20:10

## 2024-02-09 RX ADMIN — FENTANYL CITRATE 100 MICROGRAM(S): 50 INJECTION INTRAVENOUS at 20:15

## 2024-02-09 RX ADMIN — FENTANYL CITRATE 100 MICROGRAM(S): 50 INJECTION INTRAVENOUS at 21:35

## 2024-02-09 RX ADMIN — ETOMIDATE 30 MILLIGRAM(S): 2 INJECTION INTRAVENOUS at 19:50

## 2024-02-09 RX ADMIN — FENTANYL CITRATE 100 MICROGRAM(S): 50 INJECTION INTRAVENOUS at 20:22

## 2024-02-09 RX ADMIN — ONDANSETRON 4 MILLIGRAM(S): 8 TABLET, FILM COATED ORAL at 18:21

## 2024-02-09 RX ADMIN — MIDAZOLAM HYDROCHLORIDE 5 MILLIGRAM(S): 1 INJECTION, SOLUTION INTRAMUSCULAR; INTRAVENOUS at 20:00

## 2024-02-09 RX ADMIN — HEPARIN SODIUM 5000 UNIT(S): 5000 INJECTION INTRAVENOUS; SUBCUTANEOUS at 15:54

## 2024-02-09 RX ADMIN — FENTANYL CITRATE 100 MICROGRAM(S): 50 INJECTION INTRAVENOUS at 22:37

## 2024-02-09 RX ADMIN — MIDAZOLAM HYDROCHLORIDE 5 MILLIGRAM(S): 1 INJECTION, SOLUTION INTRAMUSCULAR; INTRAVENOUS at 20:23

## 2024-02-09 RX ADMIN — Medication 10 MILLIGRAM(S): at 19:57

## 2024-02-09 RX ADMIN — PREGABALIN 1000 MICROGRAM(S): 225 CAPSULE ORAL at 15:55

## 2024-02-09 NOTE — ED ADULT NURSE NOTE - CHIEF COMPLAINT QUOTE
pt c/o headache, numbness and tingling in his hand and feet for 2 days. left facial droop, feeling off balance and difficulty swallowing his medication since 6am.. pt c/o cough  for 2 weeks. history of stroke, dm , high cholesterol. fs 108

## 2024-02-09 NOTE — CONSULT NOTE ADULT - ASSESSMENT
Probable acute stroke  Severe basilar and left PCA stenosis  Old strokes  HTN  HLD  DM2    - probably evolving acute stroke. Recommend ICU care  - repeat CT head  - contact transfer center for possible transfer to stroke team  - goal SBP ~220-160  - aspirin, Plavix, and statin for secondary stroke prevention  - MRI brain and Echo with bubble study  - will follow closely.

## 2024-02-09 NOTE — H&P ADULT - PROBLEM SELECTOR PLAN 1
h/o CVA x 2, present with unsteady gait and facial droop  Questionable slight dysmetria on RUE on exam  CT head  ( I personally review) with no acute pathology. Chronic left thalamic lacunar infarct. CTA with no large vessel occlusion. 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 CT perfusion with no acute abnormality  likely CVA  MRI brain, ECHO, telemetry monitoring, neuro check, permissive HTN  aspirin, high intensity  Lipid panel, A1c  PT/PT consult   ED consulted neurology

## 2024-02-09 NOTE — CONSULT NOTE ADULT - SUBJECTIVE AND OBJECTIVE BOX
HPI: 73 year old man with hx of stroke, HTN, HLD, and DM2 presenting with unsteady gait and facial droop. Symptoms began on 2/7/24. Has not had symptoms like this in the past. ED hold nurse noted his speech is worse since her shift started. CT head shows no acute process. CTA head/neck showed high grade stenosis of proximal basilar and left PCA.     PMHx: stroke, HTN, HLD, DM2  PSHx: none  PFHx: stomach cancer, bone cancer, DM2  Social Hx: non-smoker, no etoh  Allergies: NKDA  ROS: unsteady gait, facial droop, dysarthria  Medications: see EMR    Vitals: Temp 98.7F       RR 17       HR 70      /70  General: NAD  CV: regular rate and rhythm  Resp: no respiratory distress  Neck: supple  Neuro Exam: AOx3. Follows commands. Moderate dysarthria. No aphasia. EOM intact. Left facial droop with ptosis. PERRL. VFF. Tongue is midline. Palate elevate symmetrically. Shoulder shrug is intact. Finger to nose and heel to shin intact. Moving all four extremities. No focal weakness. Reflexes symmetric and toes down. Gait exam deferred. Sensory intact to touch.     CT head, CTA head/neck and labs reviewed  HPI: 73 year old man with hx of stroke, HTN, HLD, and DM2 presenting with unsteady gait and facial droop. Symptoms began on night of 2/8/24. Has not had symptoms like this in the past. As per Dr. Stubbs when I spoke to him patient had NIHSS 1 for mild facial droop and noted to have ataxia with walking. ED hold nurse noted his speech is worse since her shift started. CT head shows no acute process. CTA head/neck showed high grade stenosis of proximal basilar and left PCA.     PMHx: stroke, HTN, HLD, DM2  PSHx: none  PFHx: stomach cancer, bone cancer, DM2  Social Hx: non-smoker, no etoh  Allergies: NKDA  ROS: unsteady gait, facial droop, dysarthria  Medications: see EMR    Vitals: Temp 98.7F       RR 17       HR 70      /70  General: NAD  CV: regular rate and rhythm  Resp: no respiratory distress  Neck: supple  Neuro Exam: AOx3. Follows commands. Mild dysarthria and moderate hoarseness of voice. No aphasia. EOM intact. Left facial droop with ptosis. PERRL. VFF. Tongue is midline. Palate elevate symmetrically. Shoulder shrug is intact. Dysmetria with heel to shin on left. Moving all four extremities with left leg weakness. Reflexes symmetric and toes down. Gait exam deferred. Sensory intact to touch.     NIHSS- 8    CT head, CTA head/neck and labs reviewed

## 2024-02-09 NOTE — ED PROVIDER NOTE - CARE PLAN
Principal Discharge DX:	Acute ischemic stroke  Secondary Diagnosis:	Required emergent intubation   1

## 2024-02-09 NOTE — PROGRESS NOTE ADULT - SUBJECTIVE AND OBJECTIVE BOX
HOSPITAL COURSE:      Admission Scores  GCS:   HH:   MF:   NIHSS:   RASS:    CAM-ICU:   ICH:    24 hour Events:       Allergies    Allergy Status Unknown    Intolerances        REVIEW OF SYSTEMS: [ ] Unable to Assess due to neurologic exam   [ ] All ROS addressed below are non-contributory, except:  Neuro: [ ] Headache [ ] Back pain [ ] Numbness [ ] Weakness [ ] Ataxia [ ] Dizziness [ ] Aphasia [ ] Dysarthria [ ] Visual disturbance  Resp: [ ] Shortness of breath/dyspnea, [ ] Orthopnea [ ] Cough  CV: [ ] Chest pain [ ] Palpitation [ ] Lightheadedness [ ] Syncope  Renal: [ ] Thirst [ ] Edema  GI: [ ] Nausea [ ] Emesis [ ] Abdominal pain [ ] Constipation [ ] Diarrhea  Hem: [ ] Hematemesis [ ] bright red blood per rectum  ID: [ ] Fever [ ] Chills [ ] Dysuria  ENT: [ ] Rhinorrhea      DEVICES:   [ ] Restraints [ ] ET tube [ ] central line [ ] arterial line [ ] vences [ ] NGT/OGT [ ] EVD [ ] LD [ ] HAL/HMV [ ] Trach [ ] PEG [ ] Chest Tube     VITALS:   Vital Signs Last 24 Hrs  T(C): 37.3 (09 Feb 2024 22:00), Max: 37.3 (09 Feb 2024 22:00)  T(F): 99.1 (09 Feb 2024 22:00), Max: 99.1 (09 Feb 2024 22:00)  HR: 89 (09 Feb 2024 22:15) (82 - 105)  BP: 155/90 (09 Feb 2024 22:15) (144/90 - 242/117)  BP(mean): 111 (09 Feb 2024 22:15) (109 - 111)  RR: 16 (09 Feb 2024 22:15) (14 - 31)  SpO2: 100% (09 Feb 2024 22:15) (96% - 100%)    Parameters below as of 09 Feb 2024 22:00  Patient On (Oxygen Delivery Method): ventilator      CAPILLARY BLOOD GLUCOSE      POCT Blood Glucose.: 106 mg/dL (09 Feb 2024 18:52)    I&O's Summary    09 Feb 2024 07:01  -  09 Feb 2024 22:49  --------------------------------------------------------  IN: 0 mL / OUT: 0 mL / NET: 0 mL        Respiratory:  Mode: AC/ CMV (Assist Control/ Continuous Mandatory Ventilation)  RR (machine): 12  TV (machine): 500  FiO2: 100  PEEP: 5  PIP: 28        LABS:                        12.7   7.89  )-----------( 398      ( 09 Feb 2024 19:05 )             43.3     02-09    138  |  100  |  11  ----------------------------<  107<H>  4.2   |  22  |  1.15             MEDICATION LEVELS:     IVF FLUIDS/MEDICATIONS:   MEDICATIONS  (STANDING):  cangrelor Infusion 2 MICROgram(s)/kG/Min (60.9 mL/Hr) IV Continuous <Continuous>  chlorhexidine 0.12% Liquid 15 milliLiter(s) Oral Mucosa every 12 hours  propofol Infusion 50 MICROgram(s)/kG/Min (30.5 mL/Hr) IV Continuous <Continuous>    MEDICATIONS  (PRN):        IMAGING: < from: CT Brain Stroke Protocol (02.09.24 @ 21:53) >    ACC: [id number] EXAM:  CT BRAIN STROKE PROTOCOL   ORDERED BY: KIAN QUILES     PROCEDURE DATE:  02/09/2024          INTERPRETATION:  CLINICAL INFORMATION: Stroke code. Pt. with left leg   drift and dysmetria. NIHSS 4. Out of window.    TECHNIQUE: Noncontrast axial CT images were acquired through the head.   Two-dimensional sagittal and coronal reformats were generated.    COMPARISON STUDY: CT head 2/9/2024 9:35 AM    FINDINGS:  The presence of intravenous contrast likely accounts for the dense   appearance to the venous sinuses.    VENTRICLES AND SULCI:  Stable and unremarkable in size and configuration   for the patient's stated age.  INTRA-AXIAL:  No acute hemorrhage, vasogenic edema or evidence for acute   territorial infarct. Few hypoattenuating foci are again noted in the   bihemispheric white matter, nonspecific, but likely sequela of minimal   chronic microvascular type change. Stable tiny chronic left   thalamocapsular infarct.  EXTRA-AXIAL:  No mass or abnormal collection allowing for the presence of   contrast.  VISUALIZED SINUSES:  Opacified posterior left ethmoid sinus, unchanged.   Remaining clear.  VISUALIZED MASTOIDS:  Clear.  CALVARIUM:  Intact.  MISCELLANEOUS:  None.    IMPRESSION:  Stable exam. No acute intracranial hemorrhage, vasogenic edema or   hydrocephalus.    These findings were discussed with Dr. Rojas at 2/9/2024 9:53 PM by Dr. Carver with read back confirmation.    --- End of Report ---           BRODY CARVER MD; Resident Radiologist  This documenthas been electronically signed.  RAHEEL VICENTE MD; Attending Radiologist  This document has been electronically signed. Feb 9 2024 10:22PM    < end of copied text >        EXAMINATION:  PHYSICAL EXAM:    Constitutional: No Acute Distress     Neurological: intubated, no EO, pinpoint pupils, no FC, LUE loc, RUE wd, BLE withdraws    Pulmonary: Clear to Auscultation, No rales, No rhonchi, No wheezes     Cardiovascular: S1, S2, Regular rate and rhythm     Gastrointestinal: Soft, Non-tender, Non-distended     Extremities: No calf tenderness     Incision:    HOSPITAL COURSE:  HPI:  73 years old male with h/o HTN, HLD, DM, h/o CVA x 2 on ASA81 with no residual deficits present to ED with complain of unsteady gait and facial droop. Patient reported unsteady gait for 2 days. Patient also noted facial droop today at around 6AM, he went to bed fine at 11PM last night ( 2/8/24). No slurred speech, vision changes. Patient reported flu like symptoms lately  Hemodynamically stable, afebrile, sat well at RA. EKG with NSR. CT head with no acute pathology. Chronic left thalamic lacunar infarct. CTA with no large vessel occlusion. 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 CT perfusion with no acute abnormality.     Admission Scores  GCS:   HH:   MF:   NIHSS:4   RASS:    CAM-ICU:   ICH:    24 hour Events: transferred from [place], NIHSS of 4 in ED, then became more lethargic, stridorous and desaturated in ED, intubated for airway protected, repeat CTH/A obtained and brought emergently to IR for basilar stenting     Allergies    Allergy Status Unknown    Intolerances        REVIEW OF SYSTEMS: [ x] Unable to Assess due to neurologic exam   [ ] All ROS addressed below are non-contributory, except:  Neuro: [ ] Headache [ ] Back pain [ ] Numbness [ ] Weakness [ ] Ataxia [ ] Dizziness [ ] Aphasia [ ] Dysarthria [ ] Visual disturbance  Resp: [ ] Shortness of breath/dyspnea, [ ] Orthopnea [ ] Cough  CV: [ ] Chest pain [ ] Palpitation [ ] Lightheadedness [ ] Syncope  Renal: [ ] Thirst [ ] Edema  GI: [ ] Nausea [ ] Emesis [ ] Abdominal pain [ ] Constipation [ ] Diarrhea  Hem: [ ] Hematemesis [ ] bright red blood per rectum  ID: [ ] Fever [ ] Chills [ ] Dysuria  ENT: [ ] Rhinorrhea      DEVICES:   [x ] Restraints [x ] ET tube [ ] central line [x ] arterial line [x ] vences [ ] NGT/OGT [ ] EVD [ ] LD [ ] HAL/HMV [ ] Trach [ ] PEG [ ] Chest Tube     VITALS:   Vital Signs Last 24 Hrs  T(C): 37.3 (09 Feb 2024 22:00), Max: 37.3 (09 Feb 2024 22:00)  T(F): 99.1 (09 Feb 2024 22:00), Max: 99.1 (09 Feb 2024 22:00)  HR: 89 (09 Feb 2024 22:15) (82 - 105)  BP: 155/90 (09 Feb 2024 22:15) (144/90 - 242/117)  BP(mean): 111 (09 Feb 2024 22:15) (109 - 111)  RR: 16 (09 Feb 2024 22:15) (14 - 31)  SpO2: 100% (09 Feb 2024 22:15) (96% - 100%)    Parameters below as of 09 Feb 2024 22:00  Patient On (Oxygen Delivery Method): ventilator      CAPILLARY BLOOD GLUCOSE      POCT Blood Glucose.: 106 mg/dL (09 Feb 2024 18:52)    I&O's Summary    09 Feb 2024 07:01  -  09 Feb 2024 22:49  --------------------------------------------------------  IN: 0 mL / OUT: 0 mL / NET: 0 mL        Respiratory:  Mode: AC/ CMV (Assist Control/ Continuous Mandatory Ventilation)  RR (machine): 12  TV (machine): 500  FiO2: 100  PEEP: 5  PIP: 28        LABS:                        12.7   7.89  )-----------( 398      ( 09 Feb 2024 19:05 )             43.3     02-09    138  |  100  |  11  ----------------------------<  107<H>  4.2   |  22  |  1.15             MEDICATION LEVELS:     IVF FLUIDS/MEDICATIONS:   MEDICATIONS  (STANDING):  cangrelor Infusion 2 MICROgram(s)/kG/Min (60.9 mL/Hr) IV Continuous <Continuous>  chlorhexidine 0.12% Liquid 15 milliLiter(s) Oral Mucosa every 12 hours  propofol Infusion 50 MICROgram(s)/kG/Min (30.5 mL/Hr) IV Continuous <Continuous>    MEDICATIONS  (PRN):        IMAGING: < from: CT Brain Stroke Protocol (02.09.24 @ 21:53) >    ACC: [id number] EXAM:  CT BRAIN STROKE PROTOCOL   ORDERED BY: KIAN QUILES     PROCEDURE DATE:  02/09/2024          INTERPRETATION:  CLINICAL INFORMATION: Stroke code. Pt. with left leg   drift and dysmetria. NIHSS 4. Out of window.    TECHNIQUE: Noncontrast axial CT images were acquired through the head.   Two-dimensional sagittal and coronal reformats were generated.    COMPARISON STUDY: CT head 2/9/2024 9:35 AM    FINDINGS:  The presence of intravenous contrast likely accounts for the dense   appearance to the venous sinuses.    VENTRICLES AND SULCI:  Stable and unremarkable in size and configuration   for the patient's stated age.  INTRA-AXIAL:  No acute hemorrhage, vasogenic edema or evidence for acute   territorial infarct. Few hypoattenuating foci are again noted in the   bihemispheric white matter, nonspecific, but likely sequela of minimal   chronic microvascular type change. Stable tiny chronic left   thalamocapsular infarct.  EXTRA-AXIAL:  No mass or abnormal collection allowing for the presence of   contrast.  VISUALIZED SINUSES:  Opacified posterior left ethmoid sinus, unchanged.   Remaining clear.  VISUALIZED MASTOIDS:  Clear.  CALVARIUM:  Intact.  MISCELLANEOUS:  None.    IMPRESSION:  Stable exam. No acute intracranial hemorrhage, vasogenic edema or   hydrocephalus.    These findings were discussed with Dr. Rojas at 2/9/2024 9:53 PM by Dr. Carver with read back confirmation.    --- End of Report ---           BRODY CARVER MD; Resident Radiologist  This documenthas been electronically signed.  RAHEEL VICENTE MD; Attending Radiologist  This document has been electronically signed. Feb 9 2024 10:22PM    < end of copied text >        EXAMINATION:  PHYSICAL EXAM:    Constitutional: No Acute Distress     Neurological: intubated, no EO, pinpoint pupils, no FC, LUE loc, RUE wd, BLE withdraws    Pulmonary: Clear to Auscultation, No rales, No rhonchi, No wheezes     Cardiovascular: S1, S2, Regular rate and rhythm     Gastrointestinal: Soft, Non-tender, Non-distended     Extremities: No calf tenderness     Incision:    HOSPITAL COURSE:  HPI:  73 years old male with h/o HTN, HLD, DM, h/o CVA x 2 on ASA81 with no residual deficits present to ED with complain of unsteady gait and facial droop. Patient reported unsteady gait for 2 days. Patient also noted facial droop today at around 6AM, he went to bed fine at 11PM last night ( 2/8/24). No slurred speech, vision changes. Patient reported flu like symptoms lately  Hemodynamically stable, afebrile, sat well at RA. EKG with NSR. CT head with no acute pathology. Chronic left thalamic lacunar infarct. CTA with no large vessel occlusion. 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 CT perfusion with no acute abnormality.     Admission Scores  GCS:   HH:   MF:   NIHSS:4   RASS:    CAM-ICU:   ICH:    24 hour Events: transferred from [place], NIHSS of 4 in ED, then became more lethargic, stridorous and desaturated in ED, intubated for airway protected, repeat CTH/A obtained and brought emergently to IR for basilar stenting     Allergies    Allergy Status Unknown    Intolerances        REVIEW OF SYSTEMS: [ x] Unable to Assess due to neurologic exam   [ ] All ROS addressed below are non-contributory, except:  Neuro: [ ] Headache [ ] Back pain [ ] Numbness [ ] Weakness [ ] Ataxia [ ] Dizziness [ ] Aphasia [ ] Dysarthria [ ] Visual disturbance  Resp: [ ] Shortness of breath/dyspnea, [ ] Orthopnea [ ] Cough  CV: [ ] Chest pain [ ] Palpitation [ ] Lightheadedness [ ] Syncope  Renal: [ ] Thirst [ ] Edema  GI: [ ] Nausea [ ] Emesis [ ] Abdominal pain [ ] Constipation [ ] Diarrhea  Hem: [ ] Hematemesis [ ] bright red blood per rectum  ID: [ ] Fever [ ] Chills [ ] Dysuria  ENT: [ ] Rhinorrhea      DEVICES:   [x ] Restraints [x ] ET tube [ ] central line [x ] arterial line [x ] vences [ x] NGT/OGT [ ] EVD [ ] LD [ ] HAL/HMV [ ] Trach [ ] PEG [ ] Chest Tube     VITALS:   Vital Signs Last 24 Hrs  T(C): 37.3 (09 Feb 2024 22:00), Max: 37.3 (09 Feb 2024 22:00)  T(F): 99.1 (09 Feb 2024 22:00), Max: 99.1 (09 Feb 2024 22:00)  HR: 89 (09 Feb 2024 22:15) (82 - 105)  BP: 155/90 (09 Feb 2024 22:15) (144/90 - 242/117)  BP(mean): 111 (09 Feb 2024 22:15) (109 - 111)  RR: 16 (09 Feb 2024 22:15) (14 - 31)  SpO2: 100% (09 Feb 2024 22:15) (96% - 100%)    Parameters below as of 09 Feb 2024 22:00  Patient On (Oxygen Delivery Method): ventilator      CAPILLARY BLOOD GLUCOSE      POCT Blood Glucose.: 106 mg/dL (09 Feb 2024 18:52)    I&O's Summary    09 Feb 2024 07:01  -  09 Feb 2024 22:49  --------------------------------------------------------  IN: 0 mL / OUT: 0 mL / NET: 0 mL        Respiratory:  Mode: AC/ CMV (Assist Control/ Continuous Mandatory Ventilation)  RR (machine): 12  TV (machine): 500  FiO2: 100  PEEP: 5  PIP: 28        LABS:                        12.7   7.89  )-----------( 398      ( 09 Feb 2024 19:05 )             43.3     02-09    138  |  100  |  11  ----------------------------<  107<H>  4.2   |  22  |  1.15             MEDICATIONS  (STANDING):  aspirin  chewable 81 milliGRAM(s) Oral <User Schedule>  atorvastatin 80 milliGRAM(s) Oral at bedtime  chlorhexidine 0.12% Liquid 15 milliLiter(s) Oral Mucosa every 12 hours  cyanocobalamin 1000 MICROGram(s) Oral daily  dexMEDEtomidine Infusion 0.2 MICROgram(s)/kG/Hr (5.08 mL/Hr) IV Continuous <Continuous>  influenza  Vaccine (HIGH DOSE) 0.7 milliLiter(s) IntraMuscular once  magnesium sulfate  IVPB 1 Gram(s) IV Intermittent once  pantoprazole  Injectable 40 milliGRAM(s) IV Push daily  phenylephrine    Infusion 0.2 MICROgram(s)/kG/Min (7.61 mL/Hr) IV Continuous <Continuous>  polyethylene glycol 3350 17 Gram(s) Oral daily  potassium chloride  10 mEq/100 mL IVPB 10 milliEquivalent(s) IV Intermittent every 1 hour  propofol Infusion 50 MICROgram(s)/kG/Min (30.5 mL/Hr) IV Continuous <Continuous>  senna 1 Tablet(s) Oral at bedtime  sodium chloride 0.9%. 1000 milliLiter(s) (70 mL/Hr) IV Continuous <Continuous>      IMAGING: < from: CT Brain Stroke Protocol (02.09.24 @ 21:53) >    ACC: [id number] EXAM:  CT BRAIN STROKE PROTOCOL   ORDERED BY: KIAN QUILES     PROCEDURE DATE:  02/09/2024          INTERPRETATION:  CLINICAL INFORMATION: Stroke code. Pt. with left leg   drift and dysmetria. NIHSS 4. Out of window.    TECHNIQUE: Noncontrast axial CT images were acquired through the head.   Two-dimensional sagittal and coronal reformats were generated.    COMPARISON STUDY: CT head 2/9/2024 9:35 AM    FINDINGS:  The presence of intravenous contrast likely accounts for the dense   appearance to the venous sinuses.    VENTRICLES AND SULCI:  Stable and unremarkable in size and configuration   for the patient's stated age.  INTRA-AXIAL:  No acute hemorrhage, vasogenic edema or evidence for acute   territorial infarct. Few hypoattenuating foci are again noted in the   bihemispheric white matter, nonspecific, but likely sequela of minimal   chronic microvascular type change. Stable tiny chronic left   thalamocapsular infarct.  EXTRA-AXIAL:  No mass or abnormal collection allowing for the presence of   contrast.  VISUALIZED SINUSES:  Opacified posterior left ethmoid sinus, unchanged.   Remaining clear.  VISUALIZED MASTOIDS:  Clear.  CALVARIUM:  Intact.  MISCELLANEOUS:  None.    IMPRESSION:  Stable exam. No acute intracranial hemorrhage, vasogenic edema or   hydrocephalus.    These findings were discussed with Dr. Rojas at 2/9/2024 9:53 PM by Dr. Carver with read back confirmation.    --- End of Report ---           BRODY CARVER MD; Resident Radiologist  This documenthas been electronically signed.  RAHEEL VICENTE MD; Attending Radiologist  This document has been electronically signed. Feb 9 2024 10:22PM    < end of copied text >    < from: CT Angio Neck Stroke Protocol w/ IV Cont (02.09.24 @ 21:54) >  IMPRESSION:  CTA Neck: Stable exam. Calcified and noncalcified plaque results in   approximately 30% stenosis of the left internal carotid artery using   NASCET criteria. No hemodynamically significant stenosis in the right   carotid system or bilateralvertebral arteries.    CTA Head: Stable exam. No evidence of acute thrombotic occlusion. Similar   high-grade stenosis of the proximal basilar artery and proximal left P2   segment.    CT Perfusion: Stable exam. No perfusion abnormalities.    --- Endof Report ---           BRODY CARVER MD; Resident Radiologist  This document has been electronically signed.  RAHEEL VICENTE MD; Attending Radiologist  This document has been electronically signed. Feb 9 2024 11:12PM    < end of copied text >      EXAMINATION:  PHYSICAL EXAM:    Constitutional: No Acute Distress     Neurological: intubated, Ox3 w/choices, ANA, PERRL, FC, REYES AG    Pulmonary: Clear to Auscultation, No rales, No rhonchi, No wheezes     Cardiovascular: S1, S2, Regular rate and rhythm     Gastrointestinal: Soft, Non-tender, Non-distended     Extremities: No calf tenderness     Incision: c/d/i no hematoma, no active bleeding

## 2024-02-09 NOTE — PROGRESS NOTE ADULT - ASSESSMENT
ASSESSMENT: HPI:  3 years old male with h/o HTN, HLD, DM, h/o CVA x 2 with no residual deficits present to ED with complain of unsteady gait and facial droop. Patient reported unsteady gait for 2 days. CT head with no acute pathology. Chronic left thalamic lacunar infarct. CTA with no large vessel occlusion. 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 CT perfusion with no acute abnormality.   On arrival to [facility] ED, initial NIHSS 4. Later, pt started to have difficulty clearing secretions, became stridorous, and started to desaturate, so decision was made to intubate patient. (09 Feb 2024 20:27)      NEURO:  POD0 IR   repeat CTH.CTA no acute heme, no edema, severe basilar stenosis   repeat CTH in am  MRI w/wo post-op,  prn pain control  Activity: [] OOB as tolerated [x] Bedrest [] PT [] OT [] PMNR    PULM:  intubated   PRVC     CV:  Keep -150mmHg  daphne   home atenolol-chlorthalidone held, home ASA held     RENAL:  IVF until good PO intake, d/c vences in AM    GI:  Diet: Dysphagia screen and then advance diet as tolerated  GI prophylaxis [] not indicated [x] PPI [] other:  Bowel regimen [] colace [x] senna [x] other: miralax     ENDO:   Goal euglycemia (-180)  on metformin 1G outpatient  a1c-p, TSH-p  ISS     HEME/ONC:  VTE prophylaxis: [x] SCDs [] chemoprophylaxis [x] hold chemoprophylaxis due to: risk of hemorrhagic conversion  [x] high risk of DVT/PE on admission due to: h/o CVA x2 and acute basilar stenosis   LED-p     ID:  afebrile    MISC:    SOCIAL/FAMILY:  [] awaiting [x] updated at bedside [] family meeting    CODE STATUS:  [x] Full Code [] DNR [] DNI [] Palliative/Comfort Care    DISPOSITION:  [x] ICU [] Stroke Unit [] Floor [] EMU [] RCU [] PCU    [x] Patient is at high risk of neurologic deterioration/death due to: basilar occlusion, cerebral edema and herniation     Time seen:  Time spent: __35_ [x] critical care minutes    Contact: [phone] ASSESSMENT: HPI:  3 years old male with h/o HTN, HLD, DM, h/o CVA x 2 with no residual deficits present to ED with complain of unsteady gait and facial droop. Patient reported unsteady gait for 2 days. CT head with no acute pathology. Chronic left thalamic lacunar infarct. CTA with no large vessel occlusion. 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 CT perfusion with no acute abnormality.   On arrival to [facility] ED, initial NIHSS 4. Later, pt started to have difficulty clearing secretions, became stridorous, and started to desaturate, so decision was made to intubate patient. (09 Feb 2024 20:27)      NEURO:  POD0 Angio, no intervention basilar artery with 50% stenosis not flow limiting  repeat CTH/CTA no acute heme, no edema, severe basilar stenosis   repeat CTH in am  MRI w/wo post-op,  on prop and precedex for sedation   prn pain control  Activity: [] OOB as tolerated [x] Bedrest [] PT [] OT [] PMNR    PULM:  intubated   PRVC 500/12/5/40    CV:  Keep -180mmHg  daphne carlson gtt for bp parameter   home atenolol-chlorthalidone held  started ASA 81   TTE-p     RENAL:  IVF until good PO intake, d/c vences in AM    GI:  Diet: OGT   GI prophylaxis [] not indicated [x] PPI [] other:  Bowel regimen [] colace [x] senna [x] other: miralax     ENDO:   Goal euglycemia (-180)  on metformin 1G outpatient  a1c-p, TSH-p  ISS     HEME/ONC:  VTE prophylaxis: [x] SCDs [] chemoprophylaxis [x] hold chemoprophylaxis due to: risk of hemorrhagic conversion  [x] high risk of DVT/PE on admission due to: h/o CVA x2 and acute basilar stenosis   home cyanocobalamin restarted   LED-p     ID:  afebrile    MISC:    SOCIAL/FAMILY:  [] awaiting [x] updated at bedside [] family meeting    CODE STATUS:  [x] Full Code [] DNR [] DNI [] Palliative/Comfort Care    DISPOSITION:  [x] ICU [] Stroke Unit [] Floor [] EMU [] RCU [] PCU    [x] Patient is at high risk of neurologic deterioration/death due to: basilar occlusion, cerebral edema and herniation     Time seen:  Time spent: __35_ [x] critical care minutes    Contact: [phone]

## 2024-02-09 NOTE — ACUTE INTERFACILITY TRANSFER NOTE - PLAN OF CARE
Worsening stroke symptoms, tier 1 transfer to [facility] Worsening stroke symptoms, tier 1 transfer to [facility] Accepting Dawn Galarza

## 2024-02-09 NOTE — ED ADULT NURSE NOTE - OBJECTIVE STATEMENT
Pt is a 74yo Male AAOx4 NKDA pmh htn CVA dm BIB wife after feeling off balance for the past two days. Pt reports waking up this morning and noticing a Left facial droop in the mirror after showering. Pt reports going to sleep around 23:00 last night and was not able to sleep well. Pt reports headache, nonproductive cough, difficulty swallowing, difficulty walking, and bilateral extremity numbness. Pt denies headache at this time, dizziness, blurry vision, photophobia, SOB, chest pain, nausea, vomiting, fever, chills, abdominal pain, and dysuria. Pt currently attached to continuous cardiac and pulse oximetry monitoring at this time. Seizure precautions in place at this time. Pt and family updated on plan of care.

## 2024-02-09 NOTE — ACUTE INTERFACILITY TRANSFER NOTE - NS MD INTERFACILITY TRANSFER INST FT
73 year old man with hx of stroke, HTN, HLD, and DM2 presenting with unsteady gait and facial droop. Symptoms began on 2/7/24. Has not had symptoms like this in the past. ED hold nurse noted his speech is worse since her shift started. CT head shows no acute process. CTA head/neck showed high grade stenosis of proximal basilar and left PCA. Worsening NIH scale acutely, called transfer center for stroke team. Accepted to [facility] tier 1 to ER and activate code stroke team. Spoke with Dr. Bar via transfer center. Accepting MD PLASENCIA Tension 73 year old man with hx of stroke, HTN, HLD, and DM2 presenting with unsteady gait and facial droop. Symptoms began on 2/7/24. Has not had symptoms like this in the past. ED hold nurse noted his speech is worse since her shift started. CT head shows no acute process. CTA head/neck showed high grade stenosis of proximal basilar and left PCA. Worsening NIH scale acutely, called transfer center for stroke team. Accepted to [facility] tier 1 to ER and activate code stroke team. Spoke with Dr. Bar via transfer center. Dawn Galarza is accpeting MD

## 2024-02-09 NOTE — ED PROVIDER NOTE - PROGRESS NOTE DETAILS
Pt increasingly hypoxic w/difficulty tolerating secretions, tachypneic, and overall in distress, decision made to intubate for airway protection given concern for impending respiratory failure, neurology notified. - Lakesha Grant, PGY-3

## 2024-02-09 NOTE — H&P ADULT - HISTORY OF PRESENT ILLNESS
On arrival to [facility] ED, initial NIHSS 4. Later, pt started to have difficulty clearing secretions, became stridorous, and started to desaturate, so decision was made to intubate patient  On arrival to [facility] ED, initial NIHSS 4. Later, pt started to have difficulty clearing secretions, became stridorous, and started to desaturate, so decision was made to intubate patient. Adapted from [facility] H&P: 73 years old RH male with h/o HTN, HLD, DM, h/o CVA x 2 with no residual deficits initially presented to [facility] ED with complain of unsteady gait and facial droop. Patient reported unsteady gait for 2 days. Patient also noted facial droop today (2/9) at around 6 AM, he went to bed fine at 11PM last night ( 2/8/24). No slurred speech, vision changes. Patient reported flu like symptoms lately. Hemodynamically stable, afebrile, sat well at RA. EKG with NSR. No leukocytosis, plt 360, Cr 1.32, hsTnT 9. CT head with no acute pathology. Chronic left thalamic lacunar infarct. CTA with no large vessel occlusion. 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 CT perfusion with no acute abnormality.     Pt transfered from [facility] to [facility] due to concern for neurologic deterioration i/s/o aforementioned high grade proximal basiliar and L PCA stenosis. On arrival to [facility] ED, initial NIHSS 4. Later, pt started to have difficulty clearing secretions, became stridorous, and started to desaturate, so decision was made to intubate patient. Once pt medically stabilized, taken to angio suite by IR. Unable to obtain further history from patient at [facility] due to intubation/sedation 73 years old RH male with h/o HTN, HLD, DM, h/o CVA x 2 (no residual deficits) initially presented to [facility] ED with unsteady gait that began on 2/7 (exact LKW unknown) and L facial droop that began 6 AM on 2/9 (went to bed 11 PM on 2/8 without facial droop). No slurred speech, vision changes. Initial NIHSS 0 at [facility]. Hemodynamically stable, CT head with no acute pathology. Chronic left thalamic lacunar infarct. CTA with no large vessel occlusion. 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 CT perfusion with no acute abnormality.     Pt transfered from [facility] to [facility] due to concern for neurologic deterioration i/s/o aforementioned high grade proximal basiliar and L PCA stenosis. On arrival to [facility] ED, initial NIHSS 4 (+1 for LLE drift, +2 for b/l limb dysmetria, +1 for mild dysarthria). Later, pt started to have difficulty clearing secretions, became stridorous, and started to desaturate, so decision was made to intubate patient. Once pt medically stabilized, taken to angio suite by IR. Unable to obtain further history from patient at [facility] due to intubation/sedation

## 2024-02-09 NOTE — CHART NOTE - NSCHARTNOTEFT_GEN_A_CORE
CAPRINI SCORE [CLOT]    AGE RELATED RISK FACTORS                                                       MOBILITY RELATED FACTORS  [ ] Age 41-60 years                                            (1 Point)                  [ ] Bed rest                                                        (1 Point)  [ x] Age: 61-74 years                                           (2 Points)                 [ ] Plaster cast                                                   (2 Points)  [ ] Age= 75 years                                              (3 Points)                 [ ] Bed bound for more than 72 hours                 (2 Points)    DISEASE RELATED RISK FACTORS                                               GENDER SPECIFIC FACTORS  [ ] Edema in the lower extremities                       (1 Point)                  [ ] Pregnancy                                                     (1 Point)  [ ] Varicose veins                                               (1 Point)                  [ ] Post-partum < 6 weeks                                   (1 Point)             [x ] BMI > 25 Kg/m2                                            (1 Point)                  [ ] Hormonal therapy  or oral contraception          (1 Point)                 [ ] Sepsis (in the previous month)                        (1 Point)                  [ ] History of pregnancy complications                 (1 point)  [ ] Pneumonia or serious lung disease                                               [ ] Unexplained or recurrent                     (1 Point)           (in the previous month)                               (1 Point)  [ ] Abnormal pulmonary function test                     (1 Point)                 SURGERY RELATED RISK FACTORS  [ ] Acute myocardial infarction                              (1 Point)                 [ ]  Section                                             (1 Point)  [ ] Congestive heart failure (in the previous month)  (1 Point)               [ ] Minor surgery                                                  (1 Point)   [ ] Inflammatory bowel disease                             (1 Point)                 [ ] Arthroscopic surgery                                        (2 Points)  [ ] Central venous access                                      (2 Points)                [ ] General surgery lasting more than 45 minutes   (2 Points)       [x ] Stroke (in the previous month)                          (5 Points)               [ ] Elective arthroplasty                                         (5 Points)                                                                                                                                               HEMATOLOGY RELATED FACTORS                                                 TRAUMA RELATED RISK FACTORS  [ ] Prior episodes of VTE                                     (3 Points)                [ ] Fracture of the hip, pelvis, or leg                       (5 Points)  [ ] Positive family history for VTE                         (3 Points)                 [ ] Acute spinal cord injury (in the previous month)  (5 Points)  [ ] Prothrombin 28945 A                                     (3 Points)                 [ ] Paralysis  (less than 1 month)                             (5 Points)  [ ] Factor V Leiden                                             (3 Points)                  [ ] Multiple Trauma within 1 month                        (5 Points)  [ ] Lupus anticoagulants                                     (3 Points)                                                           [ ] Anticardiolipin antibodies                               (3 Points)                                                       [ ] High homocysteine in the blood                      (3 Points)                                             [ ] Other congenital or acquired thrombophilia      (3 Points)                                                [ ] Heparin induced thrombocytopenia                  (3 Points)                                          Total Score [     8     ]    Caprini Score 0 - 2:  Low Risk, No VTE Prophylaxis required for most patients, encourage ambulation  Caprini Score 3 - 6:  At Risk, pharmacologic VTE prophylaxis is indicated for most patients (in the absence of a contraindication)  Caprini Score Greater than or = 7:  High Risk, pharmacologic VTE prophylaxis is indicated for most patients (in the absence of a contraindication)

## 2024-02-09 NOTE — ED PROVIDER NOTE - CONTACT TIME
writer.   
09-Feb-2024 10:23

## 2024-02-09 NOTE — ED PROVIDER NOTE - CLINICAL SUMMARY MEDICAL DECISION MAKING FREE TEXT BOX
Dr. Ascencio Note: see attg statement below 73yoM w/Hx of HTN, HLD, DM, h/o CVA x 2 with no residual deficits transferred from [facility] to [facility] due to worsening stroke-like symptoms. Unable to obtain Hx from pt in extremis, unable to obtain ROS for same reason. Code stroke activated upon arrival to ED. Exam notable for dysarthria, +coarse BS on lung exam b/l, +ataxic gait. Neuro recs pending at this time. - Lakesha Grant, PGY-3     Dr. Ascencio Note: see attg statement below

## 2024-02-09 NOTE — H&P ADULT - NSHPPHYSICALEXAM_GEN_ALL_CORE
CONSTITUTIONAL: alert and cooperative, no acute distress.  EYES: PERRL, EOMI  ENT: Mucosa moist, tongue normal  NECK: Neck supple, trachea midline, non-tender  CARDIAC: Normal S1 and S2. Regular rate and rhythms. No Pedal edema. Peripheral pulses intact  LUNGS: Clear to auscultation, equal air entry both lungs. No rales, rhonchi, wheezing. Normal respiratory effort.   ABDOMEN: Soft, nondistended, nontender. No guarding or rebound tenderness. No hepatomegaly or splenomegaly. Bowel sound normal.   MUSCULOSKELETAL: Normocephalic, atraumatic. No significant deformity or joint abnormality.  NEUROLOGICAL: No gross motor or sensory deficits.  Facial asymmetry +, questionable slight right UE dysmetria. Visual field intact  SKIN: no lesions or eruptions. Normal turgor  PSYCHIATRIC: A&O x 3, appropriate mood and affect.

## 2024-02-09 NOTE — ED PROVIDER NOTE - ATTENDING CONTRIBUTION TO CARE
Hx: pt transferred from outside hospital with MCA stenosis ischemic stroke, hx from patient limited due to extremis.  Hx from EMS, transfer records, neurologist at bedside.  Onset of sxs 2 days ago.    to be continued....

## 2024-02-09 NOTE — ED PROVIDER NOTE - CONSTITUTIONAL, MLM
normal... Well appearing, awake, alert, oriented to person, place, time/situation and in no apparent distress. Speaking in clear full sentences no nasal flaring no shoulders retractions no diaphoresis, no active coughing, not holding his head/chest/abdomen, appears very comfortable

## 2024-02-09 NOTE — H&P ADULT - ASSESSMENT
73 years old male with h/o HTN, HLD, DM, h/o CVA x 2 with no residual deficits present to ED with complain of unsteady gait and facial droop. Patient reported unsteady gait for 2 days. Patient also noted facial droop today at around 6M, he went to bed fine at 11PM last night ( 2/8/24). No slurred speech, vision changes. Patient reported flu like symptoms lately  Hemodynamically stable, afebrile, sat well at RA. EKG with NSR. No leukocytosis, plt 360, Cr 1.32, hsTnT 9. CT head with no acute pathology. Chronic left thalamic lacunar infarct. CTA with no large vessel occlusion. 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 CT perfusion with no acute abnormality.

## 2024-02-09 NOTE — CHART NOTE - NSCHARTNOTEFT_GEN_A_CORE
Interventional Neuro- Radiology   Procedure Note PA-C    Procedure: Catheter Cerebral Angiogram, mechanical thrombectomy and stent placement    Pre- Procedure Diagnosis:  Post- Procedure Diagnosis:    : Dr Vince Leija  Fellow:    Dr Mac Riddle   Physician Assistant: Madelaine Billingsley PA-C    Nurse:                   Jeane Baeza RN  Radiologic Tech:   Niranjan Fagan LRT, Reyna Benedict LRT   Anesthesiologist:   Dr Nestor Noel   Sheath:  Barbeau:      I/Os: EBL less than 10cc  IV fluids:     cc  Urine output     cc    Contrast Visi             Vitals: BP         HR      Spo2     %          Preliminary Report:  Using a 5 Polish short sheath to the right groin under MAC sedation via left vertebral artery, left internal carotid artery, left external carotid artery, right vertebral artery, right internal carotid artery, right external carotid artery a selective cerebral angiography was performed and demonstrated                       Official note to follow.  Patient tolerated procedure well, hemodynamically stable, no change in neurological status compared to baseline. Results discussed with neuro ICU team, patient and patient's family. Right groin sheath was removed, manual compression held to hemostasis for 20 minutes, no active bleeding, no hematoma, quick clot and safeguard balloon dressing applied at Interventional Neuro- Radiology   Procedure Note PA-C    Procedure: Catheter Cerebral Angiogram, mechanical thrombectomy and stent placement    Pre- Procedure Diagnosis:  Post- Procedure Diagnosis:    : Dr Vince Leija  Fellow:    Dr Mac Riddle   Physician Assistant: Madelaine Billingsley PA-C    Nurse:                   Jeane Baeza RN  Radiologic Tech:   iNranjan Fagan LRT, Reyna Benedict LRT   Anesthesiologist:   Dr Nestor Noel   Sheath:  Barbeau:      I/Os: EBL less than 10cc  IV fluids:     cc  Urine output     cc    Contrast Visi            Heparin 3,ooo units     Milrinone 1.5mg via left vertebral artery     Vitals: /72        HR  66    Spo2  100%          Preliminary Report:  Using a 8 Palauan short BMX to the left wrist under general anesthesia via left vertebral artery a selective cerebral angiography was performed and demonstrated                       Official note to follow.  Patient tolerated procedure well, hemodynamically stable, no change in neurological status compared to baseline. Results discussed with neuro ICU team and patient's family. left radial sheath was removed, manual compression held to hemostasis for 10 minutes, no active bleeding, no hematoma, quick clot and dressing applied at    by resident. Interventional Neuro- Radiology   Procedure Note PA-C    Procedure: Catheter Cerebral Angiogram   Pre- Procedure Diagnosis:  Post- Procedure Diagnosis:    : Dr Vince Leija  Fellow:    Dr Mac Riddle   Physician Assistant: Madelaine Billingsley PA-C    Nurse:                   Jeane Baeza RN  Radiologic Tech:   Niranjan Fagan LRT, Reyna Benedict LRT   Anesthesiologist:   Dr Nestor Noel   Sheath:                 6 Maltese BMX Left radial access   Barbeau:               A      I/Os: EBL less than 10cc  IV fluids: 200cc  Urine due to void     Contrast Visi 46cc          Heparin 3,ooo units      Milrinone 1.5mg via left vertebral artery     Vitals: /72        HR  66    Spo2  100%          Preliminary Report:  Using a 6 Maltese  BMX to the left wrist under general anesthesia via left vertebral artery a selective cerebral angiography was performed and demonstrated                       Official note to follow.  Patient tolerated procedure well, hemodynamically stable, no change in neurological status compared to baseline. Results discussed with neuro ICU team and patient's family. Left radial sheath was removed, manual compression held to hemostasis for 10 minutes, no active bleeding, no hematoma, quick clot and dressing applied at 11:45pm by Fellow. Interventional Neuro- Radiology   Procedure Note PA-C    Procedure: Catheter Cerebral Angiogram   Pre- Procedure Diagnosis: Basilar stenosis   Post- Procedure Diagnosis: Non-flowing limiting basilar stenosis     : Dr Vince Leija  Fellow:    Dr Mac Riddle   Physician Assistant: Madelaine Billingsley PA-C    Nurse:                   Jeane Baeza RN  Radiologic Tech:   Niranjan Fagan LRT, Reyna Benedict LRT   Anesthesiologist:   Dr Nestor Noel   Sheath:                 6 Khmer BMX Left radial access   Barbeau:               A    I/Os: EBL less than 10cc  IV fluids: 200cc  Urine due to void     Contrast Visi 46cc          Heparin 3,ooo units      Milrinone 1.5mg via left vertebral artery     Vitals: /72        HR  66    Spo2  100%          Preliminary Report:  Using a 6 Khmer  BMX to the left wrist under general anesthesia via left vertebral artery a selective cerebral angiography was performed and demonstrated non-flow limiting basilar stenosis. Official note to follow.  Patient tolerated procedure well, hemodynamically stable, no change in neurological status compared to baseline. Results discussed with neuro ICU team and patient's family. Left radial sheath was removed, manual compression held to hemostasis for 10 minutes, no active bleeding, no hematoma, quick clot and dressing applied at 11:45pm by Fellow. Systolic blood pressure 140 to 180.

## 2024-02-09 NOTE — ED PROVIDER NOTE - CRITICAL CARE ATTENDING CONTRIBUTION TO CARE
Pt was brought in by wife c/o two days of difficulty to swallow and keep balance and noted left facial droop after woke up at 6:00 am Pt had initial neuro examine NIH score was zero then sent to CT scan discussed the case with the radiologist ct angio head/neck no LVO discussed the case with neurologist

## 2024-02-09 NOTE — PATIENT PROFILE ADULT - FALL HARM RISK - HARM RISK INTERVENTIONS

## 2024-02-09 NOTE — ED PROVIDER NOTE - NSICDXPASTMEDICALHX_GEN_ALL_CORE_FT
PAST MEDICAL HISTORY:  CVA (cerebral vascular accident) x 2 with right side weakness and numbness    Diabetes     Hypertension

## 2024-02-09 NOTE — ED ADULT NURSE NOTE - NSFALLHARMRISKINTERV_ED_ALL_ED

## 2024-02-09 NOTE — H&P ADULT - NSHPPHYSICALEXAM_GEN_ALL_CORE
*NOTE: This exam was obtained after pt intubated and sedated, so it is different from the initial NIHSS exam documented in the code stroke note.    Neurologic Exam (possibly confounded by intubation and sedation)  Mental Status: Grimaces to noxious stimuli, no comprehensible verbal output, *NOTE: This exam was obtained after pt intubated and sedated, so it is different from the initial NIHSS exam documented in the code stroke note.    Neurologic Exam (possibly confounded by intubation and sedation)  Mental Status: Grimaces to noxious stimuli, no comprehensible verbal output, not following commands  CN  Motor: Withdraws all extremities to nailbed pressure  Sensory: Withdraws all extremities to nailbed pressure  Reflexes: +1 in b/l UE and LE   Coordination: DANIAL due to mental status  Gait: DANIAL due to mental status

## 2024-02-09 NOTE — ED PROVIDER NOTE - NSICDXPASTMEDICALHX_GEN_ALL_CORE_FT
PAST MEDICAL HISTORY:  CVA (cerebrovascular accident)     DM2 (diabetes mellitus, type 2)     HLD (hyperlipidemia)     HTN (hypertension)

## 2024-02-09 NOTE — ED PROVIDER NOTE - OBJECTIVE STATEMENT
73 years old male brought in by wife c/o left faci 73 years old male brought in by wife c/o walking off balance since two days ago and woke up took shower and looked at the mirror noted he had left facial droop at 6:00 am this morning pt went to bed at 23:00 pm last night. 73 years old male brought in by wife c/o walking off balance since two days ago and woke up took shower and looked at the mirror noted he had left facial droop at 6:00 am this morning pt went to bed at 23:00 pm last night. Pt also c/o two days headache, productive coughs nasal congestion, difficulty to swallow, difficulty to walk and keep balance and bilateral fingers and feet numbness. Pt has hx of hypertension. However pt sts he has no more headache and dizziness. Pt denies recent hx of traveling, trauma, blurred visions, light sensitivities, focal/distal weakness or numbness, neck/back/hips/calfs pain, sob, chest pain, nausea, vomiting, fever, chills, abd pain, dysuria, or irregular bowel movements.

## 2024-02-09 NOTE — CHART NOTE - NSCHARTNOTEFT_GEN_A_CORE
Interventional Neuro Radiology  Pre-Procedure Note MAYITO        On arrival to [facility] ED, initial NIHSS 4. Later, pt started to have difficulty clearing secretions, became stridorous, and started to desaturate, so decision was made to intubate patient  (09 Feb 2024 20:27)      Allergies: Allergy Status Unknown  PMHX:  PSHX:  Social History:   FAMILY HISTORY:    Current Medications: chlorhexidine 0.12% Liquid 15 milliLiter(s) Oral Mucosa every 12 hours  fentaNYL    Injectable 100 MICROGram(s) IV Push once  fentaNYL    Injectable 100 MICROGram(s) IV Push once  midazolam Injectable 5 milliGRAM(s) IV Push once  midazolam Injectable 5 milliGRAM(s) IV Push once  propofol Infusion 50 MICROgram(s)/kG/Min IV Continuous       Labs:                         12.7   7.89  )-----------( 398      ( 09 Feb 2024 19:05 )             43.3       02-09    138  |  100  |  11  ----------------------------<  107<H>  4.2   |  22  |  1.15    Ca    9.8      09 Feb 2024 19:05    TPro  8.2  /  Alb  4.3  /  TBili  0.3  /  DBili  x   /  AST  28  /  ALT  16  /  AlkPhos  93  02-09          Blood Bank:       Assessment/Plan:   This is a 73y  year old right hand dominant Male  presents with   Patient presents to Neuro-IR for selective cerebral angiography.   Procedure, goals, risks, benefits and alternatives were discussed with patient and patient's family. All questions were answered. Risks include but are not limited to stroke, vessel injury, hemorrhage, and or right groin hematoma. Patient demonstrates understanding of all risks involved with this procedure and wishes to continue. Appropriate consent was obtained from patient and consent is in the patient's chart. IR neuro team informed of stroke intervention at 9:20pm.

## 2024-02-09 NOTE — ED ADULT NURSE NOTE - OBJECTIVE STATEMENT
73y Male AOx4 transfer from  to the ED for slurred speech. Pt reports symptoms started on 2/7, did not seek medical attention until 2/9. Pt was found to have MCA stenosis, transferred for neuro eval. Per EMS, pt was found to be hypertensive, was given hydralazine at . Pt continues to have slurred speech, attempting to cough up secretions. Denies N/V, fever/chills, SOB, chest pain. Spontaneous/unlabored respirations, speaking in full sentences. Side rails up, bed in lowest position, safety maintained.

## 2024-02-09 NOTE — H&P ADULT - HISTORY OF PRESENT ILLNESS
73 years old male with h/o HTN, HLD, DM, h/o CVA x 2 with no residual deficits present to ED with complain of unsteady gait and facial droop. Patient reported unsteady gait for 2 days. Patient also noted facial droop today at around 6M, he went to bed fine at 11PM last night ( 2/8/24). No slurred speech, vision changes. Patient reported flu like symptoms lately  Hemodynamically stable, afebrile, sat well at RA. EKG with NSR. No leukocytosis, plt 360, Cr 1.32, hsTnT 9. CT head with no acute pathology. Chronic left thalamic lacunar infarct. CTA with no large vessel occlusion. 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 CT perfusion with no acute abnormality.     SH: no toxic habits  FH: DM, father-stomach Ca, Mother-bone Ca

## 2024-02-09 NOTE — ACUTE INTERFACILITY TRANSFER NOTE - HOSPITAL COURSE
73 year old man with hx of stroke, HTN, HLD, and DM2 presenting with unsteady gait and facial droop. Symptoms began on 2/7/24. Has not had symptoms like this in the past. ED hold nurse noted his speech is worse since her shift started. CT head shows no acute process. CTA head/neck showed high grade stenosis of proximal basilar and left PCA. Worsening NIH scale acutely, called transfer center for stroke team. Accepted to [facility] tier 1 to ER and activate code stroke team. Spoke with Dr. Bar via transfer center. Accepting MD PLASENCIA Tension 73 year old man with hx of stroke, HTN, HLD, and DM2 presenting with unsteady gait and facial droop. Symptoms began on 2/7/24. Has not had symptoms like this in the past. ED hold nurse noted his speech is worse since her shift started. CT head shows no acute process. CTA head/neck showed high grade stenosis of proximal basilar and left PCA. Worsening NIH scale acutely, called transfer center for stroke team. Accepted to [facility] tier 1 to ER and activate code stroke team. Spoke with Dr. Bar via transfer center. Accepting Dawn Galarza

## 2024-02-09 NOTE — ED PROVIDER NOTE - PROGRESS NOTE DETAILS
pt is not a candidate for tenecteplase due to difficulty walking stated two days ago ( 2/7/2024 and woke up with left facial drop when he looked at himself on the mirror after taking shower at 6:00 am this morning pt last seen him self ok at the mirror was yesterday morning. NIH is zero. discussed the case with the radiologist and sts no bleed in the ct head. Pt return from the ct scan and NIH score remains zero. pt is not a candidate for tenecteplase due to difficulty walking stated two days ago ( 2/7/2024 and woke up with left facial drop when he looked at himself on the mirror after taking shower at 6:00 am this morning pt last seen him self ok at the mirror was yesterday morning.  two days of headache, difficulty to keep balance and bilateral fingers and feet numbness and difficulty to swallow, NIH is zero. ct angio head/neck no LVO Dr. Madrigal is notified and admit pt.

## 2024-02-09 NOTE — ED ADULT NURSE NOTE - ED STAT RN HANDOFF DETAILS
Handoff report given to 1 C RN Zaynab. All pending orders endorsed, all safety precautions maintained this shift. Pt updated on plan of care at this time. Handoff report given to 1 C RN Zaynab. All pending orders endorsed, all safety precautions maintained this shift. Pt updated on plan of care at this time. Pt observed leaving emergency department with patient transport.

## 2024-02-09 NOTE — ED ADULT NURSE REASSESSMENT NOTE - NS ED NURSE REASSESS COMMENT FT1
Patient unable to clear secretions. MD Ascencio at bedside. Code intubate called at 1945. 30mg etomidate and 200mg succinylcholine IV Push by MD Grant. Patient intubated by MD Ayala - 24 at the lip line, bilateral chest rise and fall, breath sounds heard bilaterally, good ETCO2 wave form, x-ray to ordered to confirm placement.

## 2024-02-09 NOTE — ED ADULT TRIAGE NOTE - CHIEF COMPLAINT QUOTE
pt c/o headache, numbness and tingling in his hand and feet for 2 days. left facial droop, feeling off balance and difficulty swallowing his medication since 6am.. pt c/o cough  for 2 weeks. history of stroke, dm , high cholesterol. pt c/o headache, numbness and tingling in his hand and feet for 2 days. left facial droop, feeling off balance and difficulty swallowing his medication since 6am.. pt c/o cough  for 2 weeks. history of stroke, dm , high cholesterol. fs 108

## 2024-02-10 LAB
A1C WITH ESTIMATED AVERAGE GLUCOSE RESULT: 6.8 % — HIGH (ref 4–5.6)
ALBUMIN SERPL ELPH-MCNC: 4.1 G/DL — SIGNIFICANT CHANGE UP (ref 3.3–5)
ALP SERPL-CCNC: 92 U/L — SIGNIFICANT CHANGE UP (ref 40–120)
ALT FLD-CCNC: 15 U/L — SIGNIFICANT CHANGE UP (ref 10–45)
ANION GAP SERPL CALC-SCNC: 14 MMOL/L — SIGNIFICANT CHANGE UP (ref 5–17)
APPEARANCE UR: CLEAR — SIGNIFICANT CHANGE UP
APTT BLD: 26.3 SEC — SIGNIFICANT CHANGE UP (ref 24.5–35.6)
APTT BLD: 39 SEC — HIGH (ref 24.5–35.6)
AST SERPL-CCNC: 25 U/L — SIGNIFICANT CHANGE UP (ref 10–40)
BACTERIA # UR AUTO: NEGATIVE /HPF — SIGNIFICANT CHANGE UP
BILIRUB SERPL-MCNC: 0.5 MG/DL — SIGNIFICANT CHANGE UP (ref 0.2–1.2)
BILIRUB UR-MCNC: NEGATIVE — SIGNIFICANT CHANGE UP
BUN SERPL-MCNC: 11 MG/DL — SIGNIFICANT CHANGE UP (ref 7–23)
BUN SERPL-MCNC: 11 MG/DL — SIGNIFICANT CHANGE UP (ref 7–23)
CALCIUM SERPL-MCNC: 8.4 MG/DL — SIGNIFICANT CHANGE UP (ref 8.4–10.5)
CALCIUM SERPL-MCNC: 9 MG/DL — SIGNIFICANT CHANGE UP (ref 8.4–10.5)
CAST: 1 /LPF — SIGNIFICANT CHANGE UP (ref 0–4)
CHLORIDE SERPL-SCNC: 103 MMOL/L — SIGNIFICANT CHANGE UP (ref 96–108)
CHLORIDE SERPL-SCNC: 98 MMOL/L — SIGNIFICANT CHANGE UP (ref 96–108)
CHOLEST SERPL-MCNC: 226 MG/DL — HIGH
CO2 SERPL-SCNC: 22 MMOL/L — SIGNIFICANT CHANGE UP (ref 22–31)
CO2 SERPL-SCNC: 22 MMOL/L — SIGNIFICANT CHANGE UP (ref 22–31)
COLOR SPEC: YELLOW — SIGNIFICANT CHANGE UP
CREAT SERPL-MCNC: 0.98 MG/DL — SIGNIFICANT CHANGE UP (ref 0.5–1.3)
CREAT SERPL-MCNC: 1.05 MG/DL — SIGNIFICANT CHANGE UP (ref 0.5–1.3)
DIFF PNL FLD: NEGATIVE — SIGNIFICANT CHANGE UP
EGFR: 75 ML/MIN/1.73M2 — SIGNIFICANT CHANGE UP
EGFR: 81 ML/MIN/1.73M2 — SIGNIFICANT CHANGE UP
ESTIMATED AVERAGE GLUCOSE: 148 MG/DL — HIGH (ref 68–114)
GAS PNL BLDA: SIGNIFICANT CHANGE UP
GLUCOSE BLDC GLUCOMTR-MCNC: 100 MG/DL — HIGH (ref 70–99)
GLUCOSE BLDC GLUCOMTR-MCNC: 101 MG/DL — HIGH (ref 70–99)
GLUCOSE BLDC GLUCOMTR-MCNC: 168 MG/DL — HIGH (ref 70–99)
GLUCOSE SERPL-MCNC: 131 MG/DL — HIGH (ref 70–99)
GLUCOSE SERPL-MCNC: 144 MG/DL — HIGH (ref 70–99)
GLUCOSE UR QL: NEGATIVE MG/DL — SIGNIFICANT CHANGE UP
HCT VFR BLD CALC: 42.7 % — SIGNIFICANT CHANGE UP (ref 39–50)
HDLC SERPL-MCNC: 51 MG/DL — SIGNIFICANT CHANGE UP
HGB BLD-MCNC: 12.3 G/DL — LOW (ref 13–17)
INR BLD: 1.18 RATIO — SIGNIFICANT CHANGE UP (ref 0.85–1.18)
KETONES UR-MCNC: ABNORMAL MG/DL
LACTATE SERPL-SCNC: 0.9 MMOL/L — SIGNIFICANT CHANGE UP (ref 0.5–2)
LEUKOCYTE ESTERASE UR-ACNC: NEGATIVE — SIGNIFICANT CHANGE UP
LIPID PNL WITH DIRECT LDL SERPL: 162 MG/DL — HIGH
MAGNESIUM SERPL-MCNC: 1.8 MG/DL — SIGNIFICANT CHANGE UP (ref 1.6–2.6)
MCHC RBC-ENTMCNC: 21.2 PG — LOW (ref 27–34)
MCHC RBC-ENTMCNC: 28.8 GM/DL — LOW (ref 32–36)
MCV RBC AUTO: 73.5 FL — LOW (ref 80–100)
NITRITE UR-MCNC: NEGATIVE — SIGNIFICANT CHANGE UP
NON HDL CHOLESTEROL: 175 MG/DL — HIGH
NRBC # BLD: 0 /100 WBCS — SIGNIFICANT CHANGE UP (ref 0–0)
PH UR: 6.5 — SIGNIFICANT CHANGE UP (ref 5–8)
PHOSPHATE SERPL-MCNC: 4 MG/DL — SIGNIFICANT CHANGE UP (ref 2.5–4.5)
PLATELET # BLD AUTO: 349 K/UL — SIGNIFICANT CHANGE UP (ref 150–400)
POTASSIUM SERPL-MCNC: 3.7 MMOL/L — SIGNIFICANT CHANGE UP (ref 3.5–5.3)
POTASSIUM SERPL-MCNC: 4.9 MMOL/L — SIGNIFICANT CHANGE UP (ref 3.5–5.3)
POTASSIUM SERPL-SCNC: 3.7 MMOL/L — SIGNIFICANT CHANGE UP (ref 3.5–5.3)
POTASSIUM SERPL-SCNC: 4.9 MMOL/L — SIGNIFICANT CHANGE UP (ref 3.5–5.3)
PROT SERPL-MCNC: 8 G/DL — SIGNIFICANT CHANGE UP (ref 6–8.3)
PROT UR-MCNC: 30 MG/DL
PROTHROM AB SERPL-ACNC: 12.9 SEC — SIGNIFICANT CHANGE UP (ref 9.5–13)
RBC # BLD: 5.81 M/UL — HIGH (ref 4.2–5.8)
RBC # FLD: 19.6 % — HIGH (ref 10.3–14.5)
RBC CASTS # UR COMP ASSIST: 1 /HPF — SIGNIFICANT CHANGE UP (ref 0–4)
SODIUM SERPL-SCNC: 134 MMOL/L — LOW (ref 135–145)
SODIUM SERPL-SCNC: 140 MMOL/L — SIGNIFICANT CHANGE UP (ref 135–145)
SP GR SPEC: 1.02 — SIGNIFICANT CHANGE UP (ref 1–1.03)
SQUAMOUS # UR AUTO: 0 /HPF — SIGNIFICANT CHANGE UP (ref 0–5)
T3 SERPL-MCNC: 100 NG/DL — SIGNIFICANT CHANGE UP (ref 80–200)
T3 SERPL-MCNC: 110 NG/DL — SIGNIFICANT CHANGE UP (ref 80–200)
T4 AB SER-ACNC: 7.7 UG/DL — SIGNIFICANT CHANGE UP (ref 4.6–12)
T4 AB SER-ACNC: 7.9 UG/DL — SIGNIFICANT CHANGE UP (ref 4.6–12)
T4 FREE SERPL-MCNC: 1.2 NG/DL — SIGNIFICANT CHANGE UP (ref 0.9–1.8)
TRIGL SERPL-MCNC: 76 MG/DL — SIGNIFICANT CHANGE UP
TSH SERPL-MCNC: 1.01 UIU/ML — SIGNIFICANT CHANGE UP (ref 0.27–4.2)
TSH SERPL-MCNC: 1.05 UIU/ML — SIGNIFICANT CHANGE UP (ref 0.27–4.2)
UROBILINOGEN FLD QL: 0.2 MG/DL — SIGNIFICANT CHANGE UP (ref 0.2–1)
WBC # BLD: 9.27 K/UL — SIGNIFICANT CHANGE UP (ref 3.8–10.5)
WBC # FLD AUTO: 9.27 K/UL — SIGNIFICANT CHANGE UP (ref 3.8–10.5)
WBC UR QL: 1 /HPF — SIGNIFICANT CHANGE UP (ref 0–5)

## 2024-02-10 PROCEDURE — 70551 MRI BRAIN STEM W/O DYE: CPT | Mod: 26

## 2024-02-10 PROCEDURE — 95720 EEG PHY/QHP EA INCR W/VEEG: CPT

## 2024-02-10 PROCEDURE — 99291 CRITICAL CARE FIRST HOUR: CPT

## 2024-02-10 PROCEDURE — 93970 EXTREMITY STUDY: CPT | Mod: 26

## 2024-02-10 PROCEDURE — 71045 X-RAY EXAM CHEST 1 VIEW: CPT | Mod: 26

## 2024-02-10 RX ORDER — SODIUM CHLORIDE 9 MG/ML
500 INJECTION INTRAMUSCULAR; INTRAVENOUS; SUBCUTANEOUS ONCE
Refills: 0 | Status: COMPLETED | OUTPATIENT
Start: 2024-02-10 | End: 2024-02-10

## 2024-02-10 RX ORDER — ASPIRIN/CALCIUM CARB/MAGNESIUM 324 MG
81 TABLET ORAL
Refills: 0 | Status: DISCONTINUED | OUTPATIENT
Start: 2024-02-10 | End: 2024-02-14

## 2024-02-10 RX ORDER — SODIUM CHLORIDE 9 MG/ML
1000 INJECTION INTRAMUSCULAR; INTRAVENOUS; SUBCUTANEOUS
Refills: 0 | Status: DISCONTINUED | OUTPATIENT
Start: 2024-02-10 | End: 2024-02-10

## 2024-02-10 RX ORDER — NICARDIPINE HYDROCHLORIDE 30 MG/1
5 CAPSULE, EXTENDED RELEASE ORAL
Qty: 40 | Refills: 0 | Status: DISCONTINUED | OUTPATIENT
Start: 2024-02-10 | End: 2024-02-10

## 2024-02-10 RX ORDER — INSULIN LISPRO 100/ML
VIAL (ML) SUBCUTANEOUS
Refills: 0 | Status: DISCONTINUED | OUTPATIENT
Start: 2024-02-10 | End: 2024-02-10

## 2024-02-10 RX ORDER — CALCIUM GLUCONATE 100 MG/ML
1 VIAL (ML) INTRAVENOUS ONCE
Refills: 0 | Status: COMPLETED | OUTPATIENT
Start: 2024-02-10 | End: 2024-02-10

## 2024-02-10 RX ORDER — PHENYLEPHRINE HYDROCHLORIDE 10 MG/ML
0.2 INJECTION INTRAVENOUS
Qty: 40 | Refills: 0 | Status: DISCONTINUED | OUTPATIENT
Start: 2024-02-10 | End: 2024-02-13

## 2024-02-10 RX ORDER — ENOXAPARIN SODIUM 100 MG/ML
40 INJECTION SUBCUTANEOUS
Refills: 0 | Status: DISCONTINUED | OUTPATIENT
Start: 2024-02-10 | End: 2024-02-21

## 2024-02-10 RX ORDER — FENTANYL CITRATE 50 UG/ML
25 INJECTION INTRAVENOUS ONCE
Refills: 0 | Status: DISCONTINUED | OUTPATIENT
Start: 2024-02-10 | End: 2024-02-10

## 2024-02-10 RX ORDER — MAGNESIUM SULFATE 500 MG/ML
1 VIAL (ML) INJECTION ONCE
Refills: 0 | Status: COMPLETED | OUTPATIENT
Start: 2024-02-10 | End: 2024-02-10

## 2024-02-10 RX ORDER — INSULIN LISPRO 100/ML
VIAL (ML) SUBCUTANEOUS EVERY 6 HOURS
Refills: 0 | Status: DISCONTINUED | OUTPATIENT
Start: 2024-02-10 | End: 2024-02-13

## 2024-02-10 RX ORDER — NICARDIPINE HYDROCHLORIDE 30 MG/1
5 CAPSULE, EXTENDED RELEASE ORAL
Qty: 40 | Refills: 0 | Status: DISCONTINUED | OUTPATIENT
Start: 2024-02-10 | End: 2024-02-11

## 2024-02-10 RX ORDER — CLOPIDOGREL BISULFATE 75 MG/1
75 TABLET, FILM COATED ORAL DAILY
Refills: 0 | Status: DISCONTINUED | OUTPATIENT
Start: 2024-02-10 | End: 2024-02-11

## 2024-02-10 RX ORDER — NOREPINEPHRINE BITARTRATE/D5W 8 MG/250ML
0.05 PLASTIC BAG, INJECTION (ML) INTRAVENOUS
Qty: 8 | Refills: 0 | Status: DISCONTINUED | OUTPATIENT
Start: 2024-02-10 | End: 2024-02-11

## 2024-02-10 RX ORDER — INFLUENZA VIRUS VACCINE 15; 15; 15; 15 UG/.5ML; UG/.5ML; UG/.5ML; UG/.5ML
0.7 SUSPENSION INTRAMUSCULAR ONCE
Refills: 0 | Status: DISCONTINUED | OUTPATIENT
Start: 2024-02-10 | End: 2024-02-13

## 2024-02-10 RX ORDER — SODIUM CHLORIDE 9 MG/ML
1000 INJECTION INTRAMUSCULAR; INTRAVENOUS; SUBCUTANEOUS
Refills: 0 | Status: DISCONTINUED | OUTPATIENT
Start: 2024-02-09 | End: 2024-02-12

## 2024-02-10 RX ORDER — CHLORHEXIDINE GLUCONATE 213 G/1000ML
1 SOLUTION TOPICAL DAILY
Refills: 0 | Status: DISCONTINUED | OUTPATIENT
Start: 2024-02-10 | End: 2024-02-23

## 2024-02-10 RX ORDER — POTASSIUM CHLORIDE 20 MEQ
10 PACKET (EA) ORAL
Refills: 0 | Status: DISCONTINUED | OUTPATIENT
Start: 2024-02-10 | End: 2024-02-10

## 2024-02-10 RX ORDER — SODIUM CHLORIDE 9 MG/ML
1000 INJECTION INTRAMUSCULAR; INTRAVENOUS; SUBCUTANEOUS ONCE
Refills: 0 | Status: COMPLETED | OUTPATIENT
Start: 2024-02-10 | End: 2024-02-10

## 2024-02-10 RX ORDER — POTASSIUM CHLORIDE 20 MEQ
20 PACKET (EA) ORAL ONCE
Refills: 0 | Status: COMPLETED | OUTPATIENT
Start: 2024-02-10 | End: 2024-02-10

## 2024-02-10 RX ADMIN — Medication 2: at 13:45

## 2024-02-10 RX ADMIN — ENOXAPARIN SODIUM 40 MILLIGRAM(S): 100 INJECTION SUBCUTANEOUS at 20:29

## 2024-02-10 RX ADMIN — SENNA PLUS 1 TABLET(S): 8.6 TABLET ORAL at 21:35

## 2024-02-10 RX ADMIN — NICARDIPINE HYDROCHLORIDE 25 MG/HR: 30 CAPSULE, EXTENDED RELEASE ORAL at 12:55

## 2024-02-10 RX ADMIN — DEXMEDETOMIDINE HYDROCHLORIDE IN 0.9% SODIUM CHLORIDE 5.08 MICROGRAM(S)/KG/HR: 4 INJECTION INTRAVENOUS at 18:09

## 2024-02-10 RX ADMIN — FENTANYL CITRATE 25 MICROGRAM(S): 50 INJECTION INTRAVENOUS at 13:09

## 2024-02-10 RX ADMIN — FENTANYL CITRATE 25 MICROGRAM(S): 50 INJECTION INTRAVENOUS at 12:54

## 2024-02-10 RX ADMIN — POLYETHYLENE GLYCOL 3350 17 GRAM(S): 17 POWDER, FOR SOLUTION ORAL at 13:26

## 2024-02-10 RX ADMIN — PHENYLEPHRINE HYDROCHLORIDE 7.61 MICROGRAM(S)/KG/MIN: 10 INJECTION INTRAVENOUS at 19:31

## 2024-02-10 RX ADMIN — Medication 9.52 MICROGRAM(S)/KG/MIN: at 19:30

## 2024-02-10 RX ADMIN — PANTOPRAZOLE SODIUM 40 MILLIGRAM(S): 20 TABLET, DELAYED RELEASE ORAL at 13:25

## 2024-02-10 RX ADMIN — SODIUM CHLORIDE 70 MILLILITER(S): 9 INJECTION INTRAMUSCULAR; INTRAVENOUS; SUBCUTANEOUS at 05:16

## 2024-02-10 RX ADMIN — SODIUM CHLORIDE 1000 MILLILITER(S): 9 INJECTION INTRAMUSCULAR; INTRAVENOUS; SUBCUTANEOUS at 05:16

## 2024-02-10 RX ADMIN — Medication 100 GRAM(S): at 05:16

## 2024-02-10 RX ADMIN — NICARDIPINE HYDROCHLORIDE 25 MG/HR: 30 CAPSULE, EXTENDED RELEASE ORAL at 19:30

## 2024-02-10 RX ADMIN — PHENYLEPHRINE HYDROCHLORIDE 7.61 MICROGRAM(S)/KG/MIN: 10 INJECTION INTRAVENOUS at 07:34

## 2024-02-10 RX ADMIN — DEXMEDETOMIDINE HYDROCHLORIDE IN 0.9% SODIUM CHLORIDE 5.08 MICROGRAM(S)/KG/HR: 4 INJECTION INTRAVENOUS at 23:28

## 2024-02-10 RX ADMIN — CLOPIDOGREL BISULFATE 75 MILLIGRAM(S): 75 TABLET, FILM COATED ORAL at 10:00

## 2024-02-10 RX ADMIN — CHLORHEXIDINE GLUCONATE 15 MILLILITER(S): 213 SOLUTION TOPICAL at 05:15

## 2024-02-10 RX ADMIN — SODIUM CHLORIDE 70 MILLILITER(S): 9 INJECTION INTRAMUSCULAR; INTRAVENOUS; SUBCUTANEOUS at 07:34

## 2024-02-10 RX ADMIN — DEXMEDETOMIDINE HYDROCHLORIDE IN 0.9% SODIUM CHLORIDE 5.08 MICROGRAM(S)/KG/HR: 4 INJECTION INTRAVENOUS at 19:30

## 2024-02-10 RX ADMIN — Medication 100 MILLIEQUIVALENT(S): at 06:06

## 2024-02-10 RX ADMIN — DEXMEDETOMIDINE HYDROCHLORIDE IN 0.9% SODIUM CHLORIDE 5.08 MICROGRAM(S)/KG/HR: 4 INJECTION INTRAVENOUS at 07:34

## 2024-02-10 RX ADMIN — Medication 20 MILLIEQUIVALENT(S): at 10:00

## 2024-02-10 RX ADMIN — CHLORHEXIDINE GLUCONATE 1 APPLICATION(S): 213 SOLUTION TOPICAL at 13:01

## 2024-02-10 RX ADMIN — Medication 81 MILLIGRAM(S): at 05:15

## 2024-02-10 RX ADMIN — Medication 100 MILLIEQUIVALENT(S): at 03:37

## 2024-02-10 RX ADMIN — PREGABALIN 1000 MICROGRAM(S): 225 CAPSULE ORAL at 12:59

## 2024-02-10 RX ADMIN — CHLORHEXIDINE GLUCONATE 15 MILLILITER(S): 213 SOLUTION TOPICAL at 18:09

## 2024-02-10 RX ADMIN — SODIUM CHLORIDE 500 MILLILITER(S): 9 INJECTION INTRAMUSCULAR; INTRAVENOUS; SUBCUTANEOUS at 22:12

## 2024-02-10 RX ADMIN — ATORVASTATIN CALCIUM 80 MILLIGRAM(S): 80 TABLET, FILM COATED ORAL at 21:35

## 2024-02-10 RX ADMIN — SODIUM CHLORIDE 70 MILLILITER(S): 9 INJECTION INTRAMUSCULAR; INTRAVENOUS; SUBCUTANEOUS at 19:30

## 2024-02-10 RX ADMIN — Medication 102 GRAM(S): at 05:16

## 2024-02-10 NOTE — PROGRESS NOTE ADULT - SUBJECTIVE AND OBJECTIVE BOX
Patient seen and examined at bedside.    --Anticoagulation--    T(C): 37.3 (02-09-24 @ 22:00), Max: 37.3 (02-09-24 @ 22:00)  HR: 89 (02-09-24 @ 22:15) (82 - 105)  BP: 155/90 (02-09-24 @ 22:15) (144/90 - 242/117)  RR: 16 (02-09-24 @ 22:15) (14 - 31)  SpO2: 100% (02-09-24 @ 22:15) (96% - 100%)  Wt(kg): --    Exam: Intubated, cough/gag/overbreathes, no FC, PERRL, LUE LOC RUE wds BLE wds

## 2024-02-10 NOTE — PROGRESS NOTE ADULT - SUBJECTIVE AND OBJECTIVE BOX
OSPITAL COURSE:  HPI:  73 years old male with h/o HTN, HLD, DM, h/o CVA x 2 on ASA81 with no residual deficits present to ED with complain of unsteady gait and facial droop. Patient reported unsteady gait for 2 days. Patient also noted facial droop today at around 6AM, he went to bed fine at 11PM last night ( 2/8/24). No slurred speech, vision changes. Patient reported flu like symptoms lately  Hemodynamically stable, afebrile, sat well at RA. EKG with NSR. CT head with no acute pathology. Chronic left thalamic lacunar infarct. CTA with no large vessel occlusion. 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 CT perfusion with no acute abnormality.     Admission Scores  GCS:   HH:   MF:   NIHSS:4   RASS:    CAM-ICU:   ICH:    24 hour Events: transferred from [place], NIHSS of 4 in ED, then became more lethargic, stridorous and desaturated in ED, intubated for airway protected, repeat CTH/A obtained and brought emergently to IR for basilar stenting     Allergies    Allergy Status Unknown    Intolerances      REVIEW OF SYSTEMS: [ x] Unable to Assess due to neurologic exam   [ ] All ROS addressed below are non-contributory, except:  Neuro: [ ] Headache [ ] Back pain [ ] Numbness [ ] Weakness [ ] Ataxia [ ] Dizziness [ ] Aphasia [ ] Dysarthria [ ] Visual disturbance  Resp: [ ] Shortness of breath/dyspnea, [ ] Orthopnea [ ] Cough  CV: [ ] Chest pain [ ] Palpitation [ ] Lightheadedness [ ] Syncope  Renal: [ ] Thirst [ ] Edema  GI: [ ] Nausea [ ] Emesis [ ] Abdominal pain [ ] Constipation [ ] Diarrhea  Hem: [ ] Hematemesis [ ] bright red blood per rectum  ID: [ ] Fever [ ] Chills [ ] Dysuria  ENT: [ ] Rhinorrhea      DEVICES:   [x ] Restraints [x ] ET tube [ ] central line [x ] arterial line [x ] vences [ x] NGT/OGT [ ] EVD [ ] LD [ ] HAL/HMV [ ] Trach [ ] PEG [ ] Chest Tube     ICU Vital Signs Last 24 Hrs  T(C): 36.6 (10 Feb 2024 17:00), Max: 37.3 (09 Feb 2024 22:00)  T(F): 97.9 (10 Feb 2024 17:00), Max: 99.1 (09 Feb 2024 22:00)  HR: 74 (10 Feb 2024 18:45) (65 - 121)  BP: 209/114 (10 Feb 2024 12:30) (110/68 - 242/117)  BP(mean): 139 (10 Feb 2024 12:30) (79 - 139)  ABP: 162/65 (10 Feb 2024 18:45) (96/47 - 218/84)  ABP(mean): 96 (10 Feb 2024 18:45) (61 - 338)  RR: 13 (10 Feb 2024 18:45) (11 - 31)  SpO2: 100% (10 Feb 2024 18:45) (93% - 100%)    O2 Parameters below as of 10 Feb 2024 07:00  Patient On (Oxygen Delivery Method): ventilator      I&O's Summary    09 Feb 2024 07:01  -  10 Feb 2024 07:00  --------------------------------------------------------  IN: 2211.8 mL / OUT: 1550 mL / NET: 661.8 mL    10 Feb 2024 07:01  -  10 Feb 2024 19:17  --------------------------------------------------------  IN: 1026.1 mL / OUT: 2200 mL / NET: -1173.9 mL      MEDICATIONS  (STANDING):  aspirin  chewable 81 milliGRAM(s) Oral <User Schedule>  atorvastatin 80 milliGRAM(s) Oral at bedtime  chlorhexidine 0.12% Liquid 15 milliLiter(s) Oral Mucosa every 12 hours  chlorhexidine 4% Liquid 1 Application(s) Topical daily  clopidogrel Tablet 75 milliGRAM(s) Oral daily  cyanocobalamin 1000 MICROGram(s) Oral daily  dexMEDEtomidine Infusion 0.2 MICROgram(s)/kG/Hr (5.08 mL/Hr) IV Continuous <Continuous>  enoxaparin Injectable 40 milliGRAM(s) SubCutaneous <User Schedule>  influenza  Vaccine (HIGH DOSE) 0.7 milliLiter(s) IntraMuscular once  insulin lispro (ADMELOG) corrective regimen sliding scale   SubCutaneous every 6 hours  niCARdipine Infusion 5 mG/Hr (25 mL/Hr) IV Continuous <Continuous>  norepinephrine Infusion 0.05 MICROgram(s)/kG/Min (9.52 mL/Hr) IV Continuous <Continuous>  pantoprazole  Injectable 40 milliGRAM(s) IV Push daily  phenylephrine    Infusion 0.2 MICROgram(s)/kG/Min (7.61 mL/Hr) IV Continuous <Continuous>  polyethylene glycol 3350 17 Gram(s) Oral daily  senna 1 Tablet(s) Oral at bedtime  sodium chloride 0.9%. 1000 milliLiter(s) (70 mL/Hr) IV Continuous <Continuous>    MEDICATIONS  (PRN):                          12.3   9.27  )-----------( 349      ( 10 Feb 2024 01:43 )             42.7   02-10    140  |  103  |  11  ----------------------------<  144<H>  4.9   |  22  |  0.98    Ca    8.4      10 Feb 2024 14:19  Phos  4.0     02-10  Mg     1.8     02-10    TPro  8.0  /  Alb  4.1  /  TBili  0.5  /  DBili  x   /  AST  25  /  ALT  15  /  AlkPhos  92  02-10    < from: MR Head No Cont (02.10.24 @ 12:00) >  PROCEDURE DATE:  02/10/2024          INTERPRETATION:  EXAM: MRI OF THE BRAIN WITHOUT CONTRAST    HISTORY: Stroke    TECHNIQUE: Multi-planar multi-sequential MR imaging of the brain was   performed without intravenous contrast.    COMPARISON: CT/CTA of the head February 9, 2024.    FINDINGS:    Punctate foci of diffusion restriction in the bilateral cerebellar   hemispheres, left greater than right, compatible with acute infarctions.   No hydrocephalus. Foci of increased T2/FLAIR signal in the deep and   periventricular white matter, compatible with chronic small vessel   disease. Foci of susceptibility artifact in the left frontal lobe and   left insula, likely sequela of prior microhemorrhages. The visualized   extra axial spaces and basal cisterns are within normal limits. No   midline shift or mass effect present.    The craniocervical junction is within normal limits. The pituitary is   unremarkable. The major intracranialvessels demonstrate the expected   signal void related to vascular flow. Mucosal thickening in the ethmoid   air cells and sphenoid sinuses. Small layering fluid levels in the   bilateral sphenoid sinuses and a few ethmoid air cells. Trace right   mastoid effusion. Small vascular lesion posterior to the right globe   measuring approximately 1.0 x 0.5 cm.      IMPRESSION:    1.  Small acute infarctions in the bilateral cerebellar hemispheres, left   greater than right.  2.  Chronic small vessel disease.  3.  Small vascular lesion posterior to the right globe, likely small   cavernous hemangioma. Other etiologies not totally excluded.    < end of copied text >      PHYSICAL EXAM:    Constitutional: No Acute Distress     Neurological: intubated, follows commands, limited horizontal gaze wose towards the left  ,has vertical gaze , right side extension 3/5, flexion 3/5 , proximally 2/5, right leg 4/5, left UE 1/5 proximally, left extension 2/5, flexion 2/5, left leg 3/5 , has gag and cough , overbreathing   Pulmonary: Clear to Auscultation, No rales, No rhonchi, No wheezes     Cardiovascular: S1, S2, Regular rate and rhythm     Gastrointestinal: Soft, Non-tender, Non-distended     Extremities: No calf tenderness     Incision: c/d/i no hematoma, no active bleeding

## 2024-02-10 NOTE — PATIENT PROFILE ADULT - FALL HARM RISK - HARM RISK INTERVENTIONS

## 2024-02-10 NOTE — CHART NOTE - NSCHARTNOTEFT_GEN_A_CORE
***Vascular Neurology Follow-up Chart Note***    S) pt was intubated on precedex, light sedation  limited interview, eye closed    MEDICATIONS  (STANDING):  aspirin  chewable 81 milliGRAM(s) Oral <User Schedule>  atorvastatin 80 milliGRAM(s) Oral at bedtime  chlorhexidine 0.12% Liquid 15 milliLiter(s) Oral Mucosa every 12 hours  chlorhexidine 4% Liquid 1 Application(s) Topical daily  clopidogrel Tablet 75 milliGRAM(s) Oral daily  cyanocobalamin 1000 MICROGram(s) Oral daily  dexMEDEtomidine Infusion 0.2 MICROgram(s)/kG/Hr (5.08 mL/Hr) IV Continuous <Continuous>  enoxaparin Injectable 40 milliGRAM(s) SubCutaneous <User Schedule>  influenza  Vaccine (HIGH DOSE) 0.7 milliLiter(s) IntraMuscular once  insulin lispro (ADMELOG) corrective regimen sliding scale   SubCutaneous three times a day before meals  pantoprazole  Injectable 40 milliGRAM(s) IV Push daily  phenylephrine    Infusion 0.2 MICROgram(s)/kG/Min (7.61 mL/Hr) IV Continuous <Continuous>  polyethylene glycol 3350 17 Gram(s) Oral daily  potassium chloride   Solution 20 milliEquivalent(s) Oral once  senna 1 Tablet(s) Oral at bedtime  sodium chloride 0.9%. 1000 milliLiter(s) (70 mL/Hr) IV Continuous <Continuous>    MEDICATIONS  (PRN):    O)   ICU Vital Signs Last 24 Hrs  T(C): 36.7 (10 Feb 2024 08:00), Max: 37.3 (09 Feb 2024 22:00)  T(F): 98.1 (10 Feb 2024 08:00), Max: 99.1 (09 Feb 2024 22:00)  HR: 95 (10 Feb 2024 08:02) (70 - 121)  BP: 110/68 (10 Feb 2024 07:00) (110/68 - 242/117)  BP(mean): 79 (10 Feb 2024 07:00) (79 - 111)  ABP: 173/67 (10 Feb 2024 08:00) (96/47 - 218/84)  ABP(mean): 100 (10 Feb 2024 08:00) (61 - 130)  RR: 12 (10 Feb 2024 08:00) (11 - 31)  SpO2: 98% (10 Feb 2024 08:02) (93% - 100%)    O2 Parameters below as of 10 Feb 2024 07:00  Patient On (Oxygen Delivery Method): ventilator    Exam  eyes closed, not opening on voice/commands and encourage (like apraxia), but can follow 1 step simple command to raise BL arm antigravity  can give L-thumb up not 2 fingers  maually open eyes show right gaze hard to cross midline.   RUE 4-/5 RLE 3/5 LUE 1-2/5 LLE 1-2/5  cannot test coordiation and gait  reduced sensory on L on gentle nox stim                        12.3   9.27  )-----------( 349      ( 10 Feb 2024 01:43 )             42.7   02-10    134<L>  |  98  |  11  ----------------------------<  131<H>  3.7   |  22  |  1.05    Ca    9.0      10 Feb 2024 01:44  Phos  4.0     02-10  Mg     1.8     02-10    TPro  8.0  /  Alb  4.1  /  TBili  0.5  /  DBili  x   /  AST  25  /  ALT  15  /  AlkPhos  92  02-10      A/P  73 years old RH male with h/o HTN, HLD, DM, h/o CVA x 2 (no residual deficits) initially presented to [facility] ED with 2d h/o unsteady gait (since 2/7) and L facial droop that began 6 AM on 2/9 (went to bed 11 PM on 2/8 without facial droop).   No slurred speech, vision changes. Initial NIHSS 0 at [facility]. Hemodynamically stable,   CTH showed chronic left thalamic lacunar infarct. CTA showed High-grade stenosis involving proximal basilar artery and left PCA. CT perfusion was negative at OSH at 9AM.  No TNK due to OOW. LKW 2/7 .  Tele strke was called at 1800, concerning neurologic deterioration, said worsen LFD and LLE drift.  i/s/o aforementioned high grade proximal basiliar and L PCA stenosis.   On arrival to [facility] ED, initial NIHSS 4 (+1 for LLE drift, +2 for b/l upper limb dysmetria, +1 for mild dysarthria).  Later, pt started to have difficulty clearing secretions, became stridorous, and started to desaturate, was then intubated for airway protection. Had labile BP at ED.  Taken to IR, found basilar stenosis and much irregularity of his vert and basilar. NO angioplastic or stenting.     LKW 2/7 (exact time unknown)  preMRS 0  NIHSS 4 initially at [facility] (see code stroke note)  Pt not IV tenecteplase candidate because outside of time window  Pt not thrombectomy candidate because outside of time window    Impression: Acute onset gait instability, limb dysmetria LLE weakness, dysarthria. C/f  posterior circulation acute ischemic stroke   i/s/o critical vertebrobasilar stenosis. Now s/p angiography without angioplasty or stent placement d/t non-flow limiting stenosis.  -Unknown raeson of why at ED pt suddenly cannot protect airway, pending [] MRI to evaluate stroke burden  MOA: Large artery atherosclerosis disease    2/10 exam limited due to sedation and intubation. now on [id number]     Recommendations:  Studies:  [ ] MRI Brain w/o contrast to evaluate for infarction  [ ] TTE   [] tele for now     Medications:  [] will start [id number]  [] Will consider DAPT (sammprise protocol: ASA 75mg qDay and Plavix 75mg qDay for 3 months then [id number] qDay after MRI evaluation of stroke burden.    Lab:  A1C 6.8   [] LDL pending    Others:  - now [place] at [facility] [place] [place]  - q1h vitals and neurochecks  [ ] BP goals per [facility] [place] [place]    - Dysphagia screen. If fails, speech/swallow eval  - aspiration, fall precautions  - STAT CT head non-contrast for change in neuro exam.   - PT/ OT / DVT ppx per primary team       Ej Bar MD PhD  Vascular neurology fellow

## 2024-02-10 NOTE — PROGRESS NOTE ADULT - ASSESSMENT
CTH in AM  -180  -> f/u in AM  wean to extubate MR when able  CTH in AM  -180  -> f/u in AM  wean to extubate

## 2024-02-10 NOTE — PROVIDER CONTACT NOTE (OTHER) - ACTION/TREATMENT ORDERED:
Provider at the bedside during transport, patient is hypotensive and 500cc NS bolus was given. Patient able to follow commands, new onset of Afib is noted, new EKG was obtained.

## 2024-02-10 NOTE — CHART NOTE - NSCHARTNOTEFT_GEN_A_CORE
:  Teresa Schafer    INDICATION:    [] Shock    [x] Acute Hypoxic Respiratory failure    [ ] Other:       PROCEDURE:    [x] LIMITED ECHO    [x] LIMITED CHEST    [ ] LIMITED ABDOMINAL    [ ] OTHER      FINDINGS:  Cardiac:   LV: Grossly Normal LV Function. LVOT VTI 25cm  RV: Normal RV size.   Pericardium: No pericardial effusion.      Pulmonary: A lines throughout. No pleural Effusion     IVC 1.47cm with respiratory variations.    INTERPRETATION:  Normal GDE  Normal aeration pattern of the lung. No PEFF.  IVC collapsable    Images stored on the ultrasound.

## 2024-02-10 NOTE — PROGRESS NOTE ADULT - ASSESSMENT
ASSESSMENT: HPI:  73 years old male with h/o HTN, HLD, DM, h/o CVA x 2 with no residual deficits now with basilar stenosis angio done showed Non-flowing limiting basilar stenosis , concern for pontine stroke   pending MRI brain       NEURO:  - ASA/ Plavix   - lipitor 80 mg   - neuro checks q 1 hr   - MR w/ Small acute infarctions in the bilateral cerebellar hemispheres  - precedex for sedation   - prn pain control  - Activity: [] OOB as tolerated [x] Bedrest [] PT [] OT [] PMNR    PULM:  - intubated  Mode: AC/ CMV (Assist Control/ Continuous Mandatory Ventilation), RR (machine): 12, TV (machine): 500, FiO2: 40, PEEP: 5, ITime: 1, MAP: 7, PIP: 11  - chest xray lines in good position   - CXR 2/10 clear BL, no infiltrate or consolidation    CV:  - Keep -180 mmHg  - daphne    - phenylephrine   - home atenolol-chlorthalidone held  - TTE-p     RENAL:  - NS 70 ml/hr   - Na goal 135-145  - I&O  - monitor renal function  - replete lytes PRN    GI:  - Diet: OGT   - pantoprazole   - GI prophylaxis [] not indicated [x] PPI [] other:  - Bowel regimen [] colace [x] senna [x] other: miralax     ENDO:   - Goal euglycemia (-180)  - hold home metformin   - finger sticks q 6 hr, ISS   - a1c 6.8  - TSH 1.01, WNL    HEME/ONC:  - lovenox 40 mg sc qhs   - home cyanocobalamin restarted   - LED negative 2/10    ID:  - afebrile    MISC:    SOCIAL/FAMILY:  [] awaiting [x] updated at bedside [] family meeting    CODE STATUS:  [x] Full Code [] DNR [] DNI [] Palliative/Comfort Care    DISPOSITION:  [x] ICU [] Stroke Unit [] Floor [] EMU [] RCU [] PCU    [x] Patient is at high risk of neurologic deterioration/death due to: basilar occlusion, cerebral edema and herniation     Time seen:  Time spent: __45_ [x] critical care minutes    Contact: [phone] ASSESSMENT: HPI:  73 years old male with h/o HTN, HLD, DM, h/o CVA x 2 with no residual deficits now with basilar stenosis angio done showed Non-flowing limiting basilar stenosis , concern for pontine stroke   pending MRI brain       NEURO:  - ASA/ Plavix   - lipitor 80 mg   - neuro checks q 1 hr   - MR w/ Small acute infarctions in the bilateral cerebellar hemispheres  - precedex for sedation   - prn pain control  - Activity: [] OOB as tolerated [x] Bedrest [] PT [] OT [] PMNR    PULM:  - intubated  Mode: AC/ CMV (Assist Control/ Continuous Mandatory Ventilation), RR (machine): 12, TV (machine): 500, FiO2: 40, PEEP: 5, ITime: 1, MAP: 7, PIP: 11  - chest xray lines in good position   - CXR 2/10 clear BL, no infiltrate or consolidation    CV:  - Keep -180 mmHg  - daphne    - phenylephrine   - TTE-p     RENAL:  - NS @ 75  - Na goal 135-145  - I&O  - monitor renal function  - replete lytes PRN    GI:  - Diet: OGT  - pantoprazole   - GI prophylaxis [] not indicated [x] PPI [] other:  - Bowel regimen [] colace [x] senna [x] other: miralax     ENDO:   - Goal euglycemia (-180)  - hold home metformin   - finger sticks q 6 hr, ISS   - a1c 6.8  - TSH 1.01, WNL    HEME/ONC:  - lovenox 40 mg sc qhs   - home cyanocobalamin restarted   - LED negative 2/10    ID:  - afebrile    MISC:    SOCIAL/FAMILY:  [] awaiting [x] updated at bedside [] family meeting    CODE STATUS:  [x] Full Code [] DNR [] DNI [] Palliative/Comfort Care    DISPOSITION:  [x] ICU [] Stroke Unit [] Floor [] EMU [] RCU [] PCU    [x] Patient is at high risk of neurologic deterioration/death due to: basilar occlusion, cerebral edema and herniation     Time seen:  Time spent: __45_ [x] critical care minutes    Contact: [phone]

## 2024-02-10 NOTE — PROGRESS NOTE ADULT - SUBJECTIVE AND OBJECTIVE BOX
OSPITAL COURSE:  HPI:  73 years old male with h/o HTN, HLD, DM, h/o CVA x 2 on ASA81 with no residual deficits present to ED with complain of unsteady gait and facial droop. Patient reported unsteady gait for 2 days. Patient also noted facial droop today at around 6AM, he went to bed fine at 11PM last night ( 2/8/24). No slurred speech, vision changes. Patient reported flu like symptoms lately  Hemodynamically stable, afebrile, sat well at RA. EKG with NSR. CT head with no acute pathology. Chronic left thalamic lacunar infarct. CTA with no large vessel occlusion. 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 CT perfusion with no acute abnormality.     Admission Scores  GCS:   HH:   MF:   NIHSS:4   RASS:    CAM-ICU:   ICH:    24 hour Events: transferred from [place], NIHSS of 4 in ED, then became more lethargic, stridorous and desaturated in ED, intubated for airway protected, repeat CTH/A obtained and brought emergently to IR for basilar stenting     Allergies    Allergy Status Unknown    Intolerances        REVIEW OF SYSTEMS: [ x] Unable to Assess due to neurologic exam   [ ] All ROS addressed below are non-contributory, except:  Neuro: [ ] Headache [ ] Back pain [ ] Numbness [ ] Weakness [ ] Ataxia [ ] Dizziness [ ] Aphasia [ ] Dysarthria [ ] Visual disturbance  Resp: [ ] Shortness of breath/dyspnea, [ ] Orthopnea [ ] Cough  CV: [ ] Chest pain [ ] Palpitation [ ] Lightheadedness [ ] Syncope  Renal: [ ] Thirst [ ] Edema  GI: [ ] Nausea [ ] Emesis [ ] Abdominal pain [ ] Constipation [ ] Diarrhea  Hem: [ ] Hematemesis [ ] bright red blood per rectum  ID: [ ] Fever [ ] Chills [ ] Dysuria  ENT: [ ] Rhinorrhea      DEVICES:   [x ] Restraints [x ] ET tube [ ] central line [x ] arterial line [x ] vences [ x] NGT/OGT [ ] EVD [ ] LD [ ] HAL/HMV [ ] Trach [ ] PEG [ ] Chest Tube                     INTERPRETATION:  CLINICAL INFORMATION: Stroke code. Pt. with left leg   drift and dysmetria. NIHSS 4. Out of window.    TECHNIQUE: Noncontrast axial CT images were acquired through the head.   Two-dimensional sagittal and coronal reformats were generated.    COMPARISON STUDY: CT head 2/9/2024 9:35 AM    FINDINGS:  The presence of intravenous contrast likely accounts for the dense   appearance to the venous sinuses.    VENTRICLES AND SULCI:  Stable and unremarkable in size and configuration   for the patient's stated age.  INTRA-AXIAL:  No acute hemorrhage, vasogenic edema or evidence for acute   territorial infarct. Few hypoattenuating foci are again noted in the   bihemispheric white matter, nonspecific, but likely sequela of minimal   chronic microvascular type change. Stable tiny chronic left   thalamocapsular infarct.  EXTRA-AXIAL:  No mass or abnormal collection allowing for the presence of   contrast.  VISUALIZED SINUSES:  Opacified posterior left ethmoid sinus, unchanged.   Remaining clear.  VISUALIZED MASTOIDS:  Clear.  CALVARIUM:  Intact.  MISCELLANEOUS:  None.    IMPRESSION:  Stable exam. No acute intracranial hemorrhage, vasogenic edema or   hydrocephalus.    These findings were discussed with Dr. Rojas at 2/9/2024 9:53 PM by Dr. Carver with read back confirmation.    --- End of Report ---           BRODY CARVER MD; Resident Radiologist  This documenthas been electronically signed.  RAHEEL VICENTE MD; Attending Radiologist  This document has been electronically signed. Feb 9 2024 10:22PM    < end of copied text >    < from: CT Angio Neck Stroke Protocol w/ IV Cont (02.09.24 @ 21:54) >  IMPRESSION:  CTA Neck: Stable exam. Calcified and noncalcified plaque results in   approximately 30% stenosis of the left internal carotid artery using   NASCET criteria. No hemodynamically significant stenosis in the right   carotid system or bilateralvertebral arteries.    CTA Head: Stable exam. No evidence of acute thrombotic occlusion. Similar   high-grade stenosis of the proximal basilar artery and proximal left P2   segment.    CT Perfusion: Stable exam. No perfusion abnormalities.    --- Endof Report ---           BRODY CARVER MD; Resident Radiologist  This document has been electronically signed.  RAHEEL VICENTE MD; Attending Radiologist  This document has been electronically signed. Feb 9 2024 11:12PM    < end of copied text >    T(C): 36.7 (02-10-24 @ 08:00), Max: 37.3 (02-09-24 @ 22:00)  HR: 87 (02-10-24 @ 08:00) (70 - 121)  BP: 110/68 (02-10-24 @ 07:00) (110/68 - 242/117)  RR: 12 (02-10-24 @ 08:00) (11 - 31)  SpO2: 98% (02-10-24 @ 08:00) (93% - 100%)  02-09-24 @ 07:01  -  02-10-24 @ 07:00  --------------------------------------------------------  IN: 2211.8 mL / OUT: 1550 mL / NET: 661.8 mL    02-10-24 @ 07:01  -  02-10-24 @ 08:19  --------------------------------------------------------  IN: 82.6 mL / OUT: 0 mL / NET: 82.6 mL    aspirin  chewable 81 milliGRAM(s) Oral <User Schedule>  atorvastatin 80 milliGRAM(s) Oral at bedtime  chlorhexidine 0.12% Liquid 15 milliLiter(s) Oral Mucosa every 12 hours  chlorhexidine 4% Liquid 1 Application(s) Topical daily  cyanocobalamin 1000 MICROGram(s) Oral daily  dexMEDEtomidine Infusion 0.2 MICROgram(s)/kG/Hr IV Continuous <Continuous>  influenza  Vaccine (HIGH DOSE) 0.7 milliLiter(s) IntraMuscular once  pantoprazole  Injectable 40 milliGRAM(s) IV Push daily  phenylephrine    Infusion 0.2 MICROgram(s)/kG/Min IV Continuous <Continuous>  polyethylene glycol 3350 17 Gram(s) Oral daily  potassium chloride   Solution 20 milliEquivalent(s) Oral once  senna 1 Tablet(s) Oral at bedtime  sodium chloride 0.9%. 1000 milliLiter(s) IV Continuous <Continuous>  Mode: AC/ CMV (Assist Control/ Continuous Mandatory Ventilation), RR (machine): 12, TV (machine): 500, FiO2: 40, PEEP: 5, ITime: 1, MAP: 7, PIP: 11    EXAMINATION:  PHYSICAL EXAM:    Constitutional: No Acute Distress     Neurological: intubated, follows commands, limited horizontal gaze wose towards the left  , has vertical gaze , right side extension 3/5, flexion 3/5 , proximally 2/5, right leg 4/5, left UE 1/5 proximally, left extension 2/5, flexion 2/5, left leg 3/5 , has gag and cough , overbreathing   Pulmonary: Clear to Auscultation, No rales, No rhonchi, No wheezes     Cardiovascular: S1, S2, Regular rate and rhythm     Gastrointestinal: Soft, Non-tender, Non-distended     Extremities: No calf tenderness     Incision: c/d/i no hematoma, no active bleeding       LABS:  Na: 134 (02-10 @ 01:44), 138 (02-09 @ 19:05)  K: 3.7 (02-10 @ 01:44), 4.2 (02-09 @ 19:05)  Cl: 98 (02-10 @ 01:44), 100 (02-09 @ 19:05)  CO2: 22 (02-10 @ 01:44), 22 (02-09 @ 19:05)  BUN: 11 (02-10 @ 01:44), 11 (02-09 @ 19:05)  Cr: 1.05 (02-10 @ 01:44), 1.15 (02-09 @ 19:05)  Glu: 131(02-10 @ 01:44), 107(02-09 @ 19:05)    Hgb: 12.3 (02-10 @ 01:43), 12.7 (02-09 @ 19:05)  Hct: 42.7 (02-10 @ 01:43), 43.3 (02-09 @ 19:05)  WBC: 9.27 (02-10 @ 01:43), 7.89 (02-09 @ 19:05)  Plt: 349 (02-10 @ 01:43), 398 (02-09 @ 19:05)    INR: 1.18 02-10-24 @ 01:36, 1.08 02-09-24 @ 19:05  PTT: 39.0 02-10-24 @ 01:36, 26.7 02-09-24 @ 19:05        ASSESSMENT: HPI:  73 years old male with h/o HTN, HLD, DM, h/o CVA x 2 with no residual deficits now with basilar stenosis angio done showed Non-flowing limiting basilar stenosis , concern for pontine stroke   pending MRI brain     NEURO:  aspirin   add plavix   lipitor 80 mg   neuro checks q 1 hr   MRI brain pending  send lipid profile HVA 1 c   precedex for sedation   prn pain control  Activity: [] OOB as tolerated [x] Bedrest [] PT [] OT [] PMNR    PULM:  acute respiratory failure,   intubated   Mode: AC/ CMV (Assist Control/ Continuous Mandatory Ventilation), RR (machine): 12, TV (machine): 500, FiO2: 40, PEEP: 5, ITime: 1, MAP: 7, PIP: 11  chest xray lines in good position     CV:  Keep -180 mmHg  daphne   ECG   phenylephrine   home atenolol-chlorthalidone held  TTE-p     RENAL:  NS 70 ml/hr   repeat BMP now, keep sodium 135-145     GI:  Diet: OGT   pantoprazole   GI prophylaxis [] not indicated [x] PPI [] other:  Bowel regimen [] colace [x] senna [x] other: miralax     ENDO:   Goal euglycemia (-180)  hold home metformin   finger sticks q 6 hr, ISS     HEME/ONC:  lovenox 40 mg sc qhs   home cyanocobalamin restarted   LED-p     ID:  afebrile    MISC:    SOCIAL/FAMILY:  [] awaiting [x] updated at bedside [] family meeting    CODE STATUS:  [x] Full Code [] DNR [] DNI [] Palliative/Comfort Care    DISPOSITION:  [x] ICU [] Stroke Unit [] Floor [] EMU [] RCU [] PCU    [x] Patient is at high risk of neurologic deterioration/death due to: basilar occlusion, cerebral edema and herniation     Time seen:  Time spent: __45_ [x] critical care minutes    Contact: [phone]

## 2024-02-11 ENCOUNTER — RESULT REVIEW (OUTPATIENT)
Age: 74
End: 2024-02-11

## 2024-02-11 LAB
ANION GAP SERPL CALC-SCNC: 12 MMOL/L — SIGNIFICANT CHANGE UP (ref 5–17)
ANION GAP SERPL CALC-SCNC: 13 MMOL/L — SIGNIFICANT CHANGE UP (ref 5–17)
APPEARANCE CSF: CLEAR — SIGNIFICANT CHANGE UP
BUN SERPL-MCNC: 10 MG/DL — SIGNIFICANT CHANGE UP (ref 7–23)
BUN SERPL-MCNC: 14 MG/DL — SIGNIFICANT CHANGE UP (ref 7–23)
CALCIUM SERPL-MCNC: 8.2 MG/DL — LOW (ref 8.4–10.5)
CALCIUM SERPL-MCNC: 8.6 MG/DL — SIGNIFICANT CHANGE UP (ref 8.4–10.5)
CHLORIDE SERPL-SCNC: 104 MMOL/L — SIGNIFICANT CHANGE UP (ref 96–108)
CHLORIDE SERPL-SCNC: 105 MMOL/L — SIGNIFICANT CHANGE UP (ref 96–108)
CK SERPL-CCNC: 100 U/L — SIGNIFICANT CHANGE UP (ref 30–200)
CO2 SERPL-SCNC: 21 MMOL/L — LOW (ref 22–31)
CO2 SERPL-SCNC: 23 MMOL/L — SIGNIFICANT CHANGE UP (ref 22–31)
COLOR CSF: SIGNIFICANT CHANGE UP
CREAT SERPL-MCNC: 0.8 MG/DL — SIGNIFICANT CHANGE UP (ref 0.5–1.3)
CREAT SERPL-MCNC: 0.84 MG/DL — SIGNIFICANT CHANGE UP (ref 0.5–1.3)
EGFR: 92 ML/MIN/1.73M2 — SIGNIFICANT CHANGE UP
EGFR: 93 ML/MIN/1.73M2 — SIGNIFICANT CHANGE UP
ERYTHROCYTE [SEDIMENTATION RATE] IN BLOOD: 113 MM/HR — HIGH (ref 0–20)
FOLATE SERPL-MCNC: >20 NG/ML — SIGNIFICANT CHANGE UP
GAS PNL BLDA: SIGNIFICANT CHANGE UP
GLUCOSE BLDC GLUCOMTR-MCNC: 118 MG/DL — HIGH (ref 70–99)
GLUCOSE BLDC GLUCOMTR-MCNC: 125 MG/DL — HIGH (ref 70–99)
GLUCOSE BLDC GLUCOMTR-MCNC: 127 MG/DL — HIGH (ref 70–99)
GLUCOSE BLDC GLUCOMTR-MCNC: 146 MG/DL — HIGH (ref 70–99)
GLUCOSE CSF-MCNC: 83 MG/DL — HIGH (ref 40–70)
GLUCOSE SERPL-MCNC: 120 MG/DL — HIGH (ref 70–99)
GLUCOSE SERPL-MCNC: 139 MG/DL — HIGH (ref 70–99)
GRAM STN FLD: SIGNIFICANT CHANGE UP
HCT VFR BLD CALC: 43 % — SIGNIFICANT CHANGE UP (ref 39–50)
HCT VFR BLD CALC: 44.3 % — SIGNIFICANT CHANGE UP (ref 39–50)
HGB BLD-MCNC: 12.1 G/DL — LOW (ref 13–17)
HGB BLD-MCNC: 12.2 G/DL — LOW (ref 13–17)
LDH CSF L TO P-CCNC: 22 U/L — SIGNIFICANT CHANGE UP
LDH FLD-CCNC: 22 U/L — SIGNIFICANT CHANGE UP
LYMPHOCYTES # CSF: 67 % — SIGNIFICANT CHANGE UP (ref 40–80)
MAGNESIUM SERPL-MCNC: 2.1 MG/DL — SIGNIFICANT CHANGE UP (ref 1.6–2.6)
MAGNESIUM SERPL-MCNC: 2.1 MG/DL — SIGNIFICANT CHANGE UP (ref 1.6–2.6)
MCHC RBC-ENTMCNC: 20.9 PG — LOW (ref 27–34)
MCHC RBC-ENTMCNC: 21.6 PG — LOW (ref 27–34)
MCHC RBC-ENTMCNC: 27.5 GM/DL — LOW (ref 32–36)
MCHC RBC-ENTMCNC: 28.1 GM/DL — LOW (ref 32–36)
MCV RBC AUTO: 76 FL — LOW (ref 80–100)
MCV RBC AUTO: 76.8 FL — LOW (ref 80–100)
MONOS+MACROS NFR CSF: 26 % — SIGNIFICANT CHANGE UP (ref 15–45)
NEUTROPHILS # CSF: 7 % — HIGH (ref 0–6)
NRBC # BLD: 0 /100 WBCS — SIGNIFICANT CHANGE UP (ref 0–0)
NRBC # BLD: 0 /100 WBCS — SIGNIFICANT CHANGE UP (ref 0–0)
NRBC NFR CSF: 11 /UL — HIGH (ref 0–5)
PA ADP PRP-ACNC: 208 PRU — SIGNIFICANT CHANGE UP (ref 194–417)
PHOSPHATE SERPL-MCNC: 3 MG/DL — SIGNIFICANT CHANGE UP (ref 2.5–4.5)
PHOSPHATE SERPL-MCNC: 3.6 MG/DL — SIGNIFICANT CHANGE UP (ref 2.5–4.5)
PLATELET # BLD AUTO: 332 K/UL — SIGNIFICANT CHANGE UP (ref 150–400)
PLATELET # BLD AUTO: 366 K/UL — SIGNIFICANT CHANGE UP (ref 150–400)
PLATELET RESPONSE ASPIRIN RESULT: 409 ARU — SIGNIFICANT CHANGE UP
POTASSIUM SERPL-MCNC: 4.5 MMOL/L — SIGNIFICANT CHANGE UP (ref 3.5–5.3)
POTASSIUM SERPL-MCNC: 4.9 MMOL/L — SIGNIFICANT CHANGE UP (ref 3.5–5.3)
POTASSIUM SERPL-SCNC: 4.5 MMOL/L — SIGNIFICANT CHANGE UP (ref 3.5–5.3)
POTASSIUM SERPL-SCNC: 4.9 MMOL/L — SIGNIFICANT CHANGE UP (ref 3.5–5.3)
PROT CSF-MCNC: 67 MG/DL — HIGH (ref 15–45)
PROT SERPL-MCNC: 6.9 G/DL — SIGNIFICANT CHANGE UP (ref 6–8.3)
RBC # BLD: 5.6 M/UL — SIGNIFICANT CHANGE UP (ref 4.2–5.8)
RBC # BLD: 5.83 M/UL — HIGH (ref 4.2–5.8)
RBC # CSF: 3 /UL — HIGH (ref 0–0)
RBC # FLD: 19.1 % — HIGH (ref 10.3–14.5)
RBC # FLD: 19.8 % — HIGH (ref 10.3–14.5)
SODIUM SERPL-SCNC: 139 MMOL/L — SIGNIFICANT CHANGE UP (ref 135–145)
SODIUM SERPL-SCNC: 139 MMOL/L — SIGNIFICANT CHANGE UP (ref 135–145)
SPECIMEN SOURCE: SIGNIFICANT CHANGE UP
T3 SERPL-MCNC: 86 NG/DL — SIGNIFICANT CHANGE UP (ref 80–200)
T4 AB SER-ACNC: 10.3 UG/DL — SIGNIFICANT CHANGE UP (ref 4.6–12)
T4 FREE SERPL-MCNC: 1.8 NG/DL — SIGNIFICANT CHANGE UP (ref 0.9–1.8)
TROPONIN T, HIGH SENSITIVITY RESULT: 20 NG/L — SIGNIFICANT CHANGE UP (ref 0–51)
TSH SERPL-MCNC: 0.42 UIU/ML — SIGNIFICANT CHANGE UP (ref 0.27–4.2)
TUBE TYPE: SIGNIFICANT CHANGE UP
VIT B12 SERPL-MCNC: >2000 PG/ML — HIGH (ref 232–1245)
WBC # BLD: 10.72 K/UL — HIGH (ref 3.8–10.5)
WBC # BLD: 11.93 K/UL — HIGH (ref 3.8–10.5)
WBC # FLD AUTO: 10.72 K/UL — HIGH (ref 3.8–10.5)
WBC # FLD AUTO: 11.93 K/UL — HIGH (ref 3.8–10.5)

## 2024-02-11 PROCEDURE — 71045 X-RAY EXAM CHEST 1 VIEW: CPT | Mod: 26

## 2024-02-11 PROCEDURE — 72157 MRI CHEST SPINE W/O & W/DYE: CPT | Mod: 26

## 2024-02-11 PROCEDURE — 70553 MRI BRAIN STEM W/O & W/DYE: CPT | Mod: 26

## 2024-02-11 PROCEDURE — 99291 CRITICAL CARE FIRST HOUR: CPT

## 2024-02-11 PROCEDURE — 93010 ELECTROCARDIOGRAM REPORT: CPT

## 2024-02-11 PROCEDURE — 62270 DX LMBR SPI PNXR: CPT

## 2024-02-11 PROCEDURE — 93306 TTE W/DOPPLER COMPLETE: CPT | Mod: 26

## 2024-02-11 PROCEDURE — 72156 MRI NECK SPINE W/O & W/DYE: CPT | Mod: 26

## 2024-02-11 PROCEDURE — 70496 CT ANGIOGRAPHY HEAD: CPT | Mod: 26

## 2024-02-11 PROCEDURE — 70450 CT HEAD/BRAIN W/O DYE: CPT | Mod: 26,XU

## 2024-02-11 PROCEDURE — 72158 MRI LUMBAR SPINE W/O & W/DYE: CPT | Mod: 26

## 2024-02-11 RX ORDER — ACETAMINOPHEN 500 MG
650 TABLET ORAL EVERY 6 HOURS
Refills: 0 | Status: DISCONTINUED | OUTPATIENT
Start: 2024-02-11 | End: 2024-03-03

## 2024-02-11 RX ORDER — OXYCODONE HYDROCHLORIDE 5 MG/1
5 TABLET ORAL EVERY 4 HOURS
Refills: 0 | Status: DISCONTINUED | OUTPATIENT
Start: 2024-02-11 | End: 2024-02-15

## 2024-02-11 RX ORDER — CLOPIDOGREL BISULFATE 75 MG/1
75 TABLET, FILM COATED ORAL DAILY
Refills: 0 | Status: DISCONTINUED | OUTPATIENT
Start: 2024-02-11 | End: 2024-02-12

## 2024-02-11 RX ORDER — LIDOCAINE HCL 20 MG/ML
20 VIAL (ML) INJECTION ONCE
Refills: 0 | Status: COMPLETED | OUTPATIENT
Start: 2024-02-11 | End: 2024-02-11

## 2024-02-11 RX ORDER — OXYCODONE HYDROCHLORIDE 5 MG/1
10 TABLET ORAL EVERY 4 HOURS
Refills: 0 | Status: DISCONTINUED | OUTPATIENT
Start: 2024-02-11 | End: 2024-02-15

## 2024-02-11 RX ADMIN — DEXMEDETOMIDINE HYDROCHLORIDE IN 0.9% SODIUM CHLORIDE 5.08 MICROGRAM(S)/KG/HR: 4 INJECTION INTRAVENOUS at 07:12

## 2024-02-11 RX ADMIN — PANTOPRAZOLE SODIUM 40 MILLIGRAM(S): 20 TABLET, DELAYED RELEASE ORAL at 15:40

## 2024-02-11 RX ADMIN — PHENYLEPHRINE HYDROCHLORIDE 7.61 MICROGRAM(S)/KG/MIN: 10 INJECTION INTRAVENOUS at 15:41

## 2024-02-11 RX ADMIN — DEXMEDETOMIDINE HYDROCHLORIDE IN 0.9% SODIUM CHLORIDE 5.08 MICROGRAM(S)/KG/HR: 4 INJECTION INTRAVENOUS at 19:32

## 2024-02-11 RX ADMIN — Medication 20 MILLILITER(S): at 15:19

## 2024-02-11 RX ADMIN — SODIUM CHLORIDE 70 MILLILITER(S): 9 INJECTION INTRAMUSCULAR; INTRAVENOUS; SUBCUTANEOUS at 15:41

## 2024-02-11 RX ADMIN — PREGABALIN 1000 MICROGRAM(S): 225 CAPSULE ORAL at 15:39

## 2024-02-11 RX ADMIN — NICARDIPINE HYDROCHLORIDE 25 MG/HR: 30 CAPSULE, EXTENDED RELEASE ORAL at 07:13

## 2024-02-11 RX ADMIN — PHENYLEPHRINE HYDROCHLORIDE 7.61 MICROGRAM(S)/KG/MIN: 10 INJECTION INTRAVENOUS at 07:12

## 2024-02-11 RX ADMIN — ATORVASTATIN CALCIUM 80 MILLIGRAM(S): 80 TABLET, FILM COATED ORAL at 21:53

## 2024-02-11 RX ADMIN — PHENYLEPHRINE HYDROCHLORIDE 7.61 MICROGRAM(S)/KG/MIN: 10 INJECTION INTRAVENOUS at 18:18

## 2024-02-11 RX ADMIN — Medication 81 MILLIGRAM(S): at 05:06

## 2024-02-11 RX ADMIN — CHLORHEXIDINE GLUCONATE 15 MILLILITER(S): 213 SOLUTION TOPICAL at 05:06

## 2024-02-11 RX ADMIN — SENNA PLUS 1 TABLET(S): 8.6 TABLET ORAL at 21:54

## 2024-02-11 RX ADMIN — PHENYLEPHRINE HYDROCHLORIDE 7.61 MICROGRAM(S)/KG/MIN: 10 INJECTION INTRAVENOUS at 19:33

## 2024-02-11 RX ADMIN — CHLORHEXIDINE GLUCONATE 15 MILLILITER(S): 213 SOLUTION TOPICAL at 18:18

## 2024-02-11 RX ADMIN — SODIUM CHLORIDE 70 MILLILITER(S): 9 INJECTION INTRAMUSCULAR; INTRAVENOUS; SUBCUTANEOUS at 19:32

## 2024-02-11 RX ADMIN — CHLORHEXIDINE GLUCONATE 1 APPLICATION(S): 213 SOLUTION TOPICAL at 15:42

## 2024-02-11 RX ADMIN — SODIUM CHLORIDE 70 MILLILITER(S): 9 INJECTION INTRAMUSCULAR; INTRAVENOUS; SUBCUTANEOUS at 07:13

## 2024-02-11 RX ADMIN — ENOXAPARIN SODIUM 40 MILLIGRAM(S): 100 INJECTION SUBCUTANEOUS at 21:53

## 2024-02-11 RX ADMIN — DEXMEDETOMIDINE HYDROCHLORIDE IN 0.9% SODIUM CHLORIDE 5.08 MICROGRAM(S)/KG/HR: 4 INJECTION INTRAVENOUS at 15:40

## 2024-02-11 RX ADMIN — POLYETHYLENE GLYCOL 3350 17 GRAM(S): 17 POWDER, FOR SOLUTION ORAL at 15:40

## 2024-02-11 NOTE — SPEECH LANGUAGE PATHOLOGY EVALUATION - SLP DIAGNOSIS
Consult received and appreciated. Chart reviewed. Pt is not appropriate for evaluation at this time given he is intubated/sedated. This service will continue to follow and evaluate when medically appropriate. Consult received and appreciated. Chart reviewed. Pt is not appropriate for evaluation at this time given he is intubated/sedated. This service will continue to follow and evaluate when medically appropriate. TAMARA Rangel aware and in agreement.

## 2024-02-11 NOTE — EEG REPORT - NS EEG TEXT BOX
AMANDA WILKINS [facility]-[id number]     Study Date: 		02-10 15:22-10:34 2-11-24  Duration in hours:  x18    --------------------------------------------------------------------------------------------------  History:  CC/ HPI Patient is a 73y old  Male who presents with a chief complaint of Stroke, critical stenosis, evaluation for angiography (11 Feb 2024 01:18)    MEDICATIONS  (STANDING):  aspirin  chewable 81 milliGRAM(s) Oral <User Schedule>  atorvastatin 80 milliGRAM(s) Oral at bedtime  chlorhexidine 0.12% Liquid 15 milliLiter(s) Oral Mucosa every 12 hours  chlorhexidine 4% Liquid 1 Application(s) Topical daily  cyanocobalamin 1000 MICROGram(s) Oral daily  dexMEDEtomidine Infusion 0.2 MICROgram(s)/kG/Hr (5.08 mL/Hr) IV Continuous <Continuous>  enoxaparin Injectable 40 milliGRAM(s) SubCutaneous <User Schedule>  influenza  Vaccine (HIGH DOSE) 0.7 milliLiter(s) IntraMuscular once  insulin lispro (ADMELOG) corrective regimen sliding scale   SubCutaneous every 6 hours  pantoprazole  Injectable 40 milliGRAM(s) IV Push daily  phenylephrine    Infusion 0.2 MICROgram(s)/kG/Min (7.61 mL/Hr) IV Continuous <Continuous>  polyethylene glycol 3350 17 Gram(s) Oral daily  senna 1 Tablet(s) Oral at bedtime  sodium chloride 0.9%. 1000 milliLiter(s) (70 mL/Hr) IV Continuous <Continuous>    --------------------------------------------------------------------------------------------------  Study Interpretation:    [Abbreviation Key:  PDR=alpha rhythm/posterior dominant rhythm. A-P=anterior posterior.  Amplitude: ‘very low’:<20; ‘low’:20-49; ‘medium’:; ‘high’:>150uV.  Persistence for periodic/rhythmic patterns (% of epoch) ‘rare’:<1%; ‘occasional’:1-10%; ‘frequent’:10-50%; ‘abundant’:50-90%; ‘continuous’:>90%.  Persistence for sporadic discharges: ‘rare’:<1/hr; ‘occasional’:1/min-1/hr; ‘frequent’:>1/min; ‘abundant’:>1/10 sec.  RPP=rhythmic and periodic patterns; GRDA=generalized rhythmic delta activity; FIRDA=frontal intermittent GRDA; LRDA=lateralized rhythmic delta activity; TIRDA=temporal intermittent rhythmic delta activity;  LPD=PLED=lateralized periodic discharges; GPD=generalized periodic discharges; BIPDs =bilateral independent periodic discharges; Mf=multifocal; SIRPDs=stimulus induced rhythmic, periodic, or ictal appearing discharges; BIRDs=brief potentially ictal rhythmic discharges >4 Hz, lasting .5-10s; PFA (paroxysmal bursts >13 Hz or =8 Hz <10s).  Modifiers: +F=with fast component; +S=with spike component; +R=with rhythmic component.  S-B=burst suppression pattern.  Max=maximal. N1-drowsy; N2-stage II sleep; N3-slow wave sleep. SSS/BETS=small sharp spikes/benign epileptiform transients of sleep. HV=hyperventilation; PS=photic stimulation]    FINDINGS:      Background:  Continuity: continuous  Symmetry: symmetric  PDR: 7 Hz activity, with amplitude to 30 uV, that attenuated to eye opening.    Reactivity: present  Voltage: normal (between 20-150uV)  Anterior Posterior Gradient: present  Other background findings: none  Breach: absent    Background Slowing:  Generalized slowing: diffuse irregular delta and theta activity.  Focal slowing: none was present.    State Changes:   -Drowsiness noted with increased slowing, attenuation of fast activity, vertex transients.  -Present with N2 sleep transients with symmetric spindles and K-complexes.    Sporadic Epileptiform Discharges:    None    Rhythmic and Periodic Patterns (RPPs):  None     Electrographic and Electroclinical seizures:  None    Other Clinical Events:  None    Activation Procedures:   -Hyperventilation was not performed.    -Photic stimulation was not performed.     Artifacts:  Intermittent myogenic and movement artifacts were noted.    ECG:  The heart rate on single channel ECG was predominantly between 60-80 BPM, irregular appearing.    EEG Classification / Summary:  Abnormal EEG study  Mild generalized background slowing    -----------------------------------------------------------------------------------------------------    Clinical Impression:  Mild diffuse/multi-focal cerebral dysfunction, not specific as to etiology.  There were no epileptiform abnormalities recorded.      -------------------------------------------------------------------------------------------------------  [facility] EEG Reading Room Ph#: [phone]  Epilepsy Answering Service after 5PM and before 8:30AM: Ph#: [phone]    Harley Hernandez M.D.   of Neurology, [facility]

## 2024-02-11 NOTE — DIETITIAN INITIAL EVALUATION ADULT - REASON INDICATOR FOR ASSESSMENT
Nutrition Assessment warranted for length of stay  Information obtained from: RN, comprehensive chart review, interdisciplinary medical rounds

## 2024-02-11 NOTE — OCCUPATIONAL THERAPY INITIAL EVALUATION ADULT - DIAGNOSIS, OT EVAL
Pt p/w impaired balance, strength, endurance, AROM, coordination, motor control, vision, cognition impacting independence with ADLs and functional mobility.

## 2024-02-11 NOTE — PROGRESS NOTE ADULT - ASSESSMENT
ASSESSMENT: HPI:  73 years old male with h/o HTN, HLD, DM, h/o CVA x 2 with no residual deficits now with basilar stenosis angio done showed Non-flowing limiting basilar stenosis , concern for pontine stroke   pending MRI brain       NEURO:  - ASA/ Plavix   - lipitor 80 mg   - neuro checks q 1 hr   - MR w/ Small acute infarctions in the bilateral cerebellar hemispheres  - precedex for sedation   - prn pain control  - Activity: [] OOB as tolerated [x] Bedrest [] PT [] OT [] PMNR    PULM:  - intubated  Mode: AC/ CMV (Assist Control/ Continuous Mandatory Ventilation), RR (machine): 12, TV (machine): 500, FiO2: 40, PEEP: 5, ITime: 1, MAP: 7, PIP: 11  - chest xray lines in good position   - CXR 2/10 clear BL, no infiltrate or consolidation  - CPAP tomorrow AM    CV:  - Keep -180 mmHg  - daphne    - phenylephrine   - TTE-p     RENAL:  - NS @ 75  - Na goal 135-145  - I&O  - monitor renal function  - replete lytes PRN    GI:  - Diet: OGT  - pantoprazole   - GI prophylaxis [] not indicated [x] PPI [] other:  - Bowel regimen [] colace [x] senna [x] other: miralax     ENDO:   - Goal euglycemia (-180)  - hold home metformin   - finger sticks q 6 hr, ISS   - a1c 6.8  - TSH 1.01, WNL    HEME/ONC:  - lovenox 40 mg sc qhs   - home cyanocobalamin restarted   - LED negative 2/10    ID:  - afebrile ASSESSMENT: HPI:  73 years old male with h/o HTN, HLD, DM, h/o CVA x 2 with no residual deficits now with basilar stenosis angio done showed Non-flowing limiting basilar stenosis , concern for pontine stroke   pending MRI brain       NEURO:  - ASA 81 mg/ Plavix 75mg  -  /   - consider increasing antiplatelet dose  - consider INR intervention if he fails medical treatment  - lipitor 80 mg   - neuro checks q 1 hr   - MR w/ Small acute infarctions in the bilateral cerebellar hemispheres  - precedex for sedation   - prn pain control  - Activity: [] OOB as tolerated [x] Bedrest [] PT [] OT [] PMNR    PULM:  - intubated  Mode: PRVC RR (machine): 12, TV (machine): 500, FiO2: 40, PEEP: 5, ITime: 1, MAP: 7, PIP: 11  - chest xray lines in good position   - CXR 2/10 clear BL, no infiltrate or consolidation    CV:  - Keep  - 200 mmHg  - daphne    - phenylephrine   - TTE-p     RENAL:  - NS @ 75  - Na goal 135-145  - I&O  - monitor renal function  - replete lytes PRN    GI:  - Diet: OGT  - pantoprazole   - GI prophylaxis [] not indicated [x] PPI [] other:  - Bowel regimen [] colace [x] senna [x] other: miralax     ENDO:   - Goal euglycemia (-180)  - hold home metformin   - finger sticks q 6 hr, ISS   - a1c 6.8  - TSH 1.01, WNL    HEME/ONC:  - lovenox 40 mg sc qhs   - home cyanocobalamin restarted   - LED negative 2/10    ID:  - afebrile

## 2024-02-11 NOTE — DIETITIAN INITIAL EVALUATION ADULT - PERTINENT LABORATORY DATA
02-11    139  |  104  |  10  ----------------------------<  120<H>  4.5   |  23  |  0.84    Ca    8.6      11 Feb 2024 00:22  Phos  3.6     02-11  Mg     2.1     02-11    TPro  8.0  /  Alb  4.1  /  TBili  0.5  /  DBili  x   /  AST  25  /  ALT  15  /  AlkPhos  92  02-10  POCT Blood Glucose.: 146 mg/dL (02-11-24 @ 05:05)  A1C with Estimated Average Glucose Result: 6.8 % (02-10-24 @ 01:43)

## 2024-02-11 NOTE — DIETITIAN INITIAL EVALUATION ADULT - REASON FOR ADMISSION
Pt is a 72 yo M with PMH: HTN, HLD, DM, h/o CVA x 2 with no residual deficits. Presented with complaints of unsteady gait and facial droop. CT head with no acute pathology. Chronic left thalamic lacunar infact. CTA with no large vessel occlusion. High grade stenosis involving proximal basilar artery and left posterior cerebral artery. Transferred from [facility]. Intubated for airway protection. On 2/10, pt noted with worsening brainstem symptoms while on DAPT.

## 2024-02-11 NOTE — DIETITIAN INITIAL EVALUATION ADULT - PERTINENT MEDS FT
MEDICATIONS  (STANDING):  aspirin  chewable 81 milliGRAM(s) Oral <User Schedule>  atorvastatin 80 milliGRAM(s) Oral at bedtime  chlorhexidine 0.12% Liquid 15 milliLiter(s) Oral Mucosa every 12 hours  chlorhexidine 4% Liquid 1 Application(s) Topical daily  cyanocobalamin 1000 MICROGram(s) Oral daily  dexMEDEtomidine Infusion 0.2 MICROgram(s)/kG/Hr (5.08 mL/Hr) IV Continuous <Continuous>  enoxaparin Injectable 40 milliGRAM(s) SubCutaneous <User Schedule>  influenza  Vaccine (HIGH DOSE) 0.7 milliLiter(s) IntraMuscular once  insulin lispro (ADMELOG) corrective regimen sliding scale   SubCutaneous every 6 hours  pantoprazole  Injectable 40 milliGRAM(s) IV Push daily  phenylephrine    Infusion 0.2 MICROgram(s)/kG/Min (7.61 mL/Hr) IV Continuous <Continuous>  polyethylene glycol 3350 17 Gram(s) Oral daily  senna 1 Tablet(s) Oral at bedtime  sodium chloride 0.9%. 1000 milliLiter(s) (70 mL/Hr) IV Continuous <Continuous>    MEDICATIONS  (PRN):  acetaminophen   Oral Liquid .. 650 milliGRAM(s) Oral every 6 hours PRN Mild Pain (1 - 3)  oxyCODONE    IR 5 milliGRAM(s) Oral every 4 hours PRN Moderate Pain (4 - 6)  oxyCODONE    IR 10 milliGRAM(s) Oral every 4 hours PRN Severe Pain (7 - 10)

## 2024-02-11 NOTE — SPEECH LANGUAGE PATHOLOGY EVALUATION - COMMENTS
IMAGIN/11 MRI head: IMPRESSION: No significant interval change in the small acute bilateral cerebellar infarcts. No new superimposed acute intracranial abnormality. No acute intracranial hemorrhage. Findings suspicious for cerebral amyloid angiopathy or hypertensive microhemorrhages. Probable minimal chronic microvascular ischemic disease. Sinus disease.    ***Pt is not previously known to this service and no prior swallow studies noted in PACS.

## 2024-02-11 NOTE — OCCUPATIONAL THERAPY INITIAL EVALUATION ADULT - PERTINENT HX OF CURRENT PROBLEM, REHAB EVAL
73M with h/o HTN, HLD, DM, h/o CVA x 2 on ASA81 with no residual deficits present to ED with complain of unsteady gait and facial droop. Patient reported unsteady gait for 2 days. Patient also noted facial droop today at around 6AM, he went to bed fine at 11PM last night (2/8/24). No slurred speech, vision changes. Patient reported flu like symptoms lately. Hemodynamically stable, afebrile, sat well at RA. EKG with NSR. CT head with no acute pathology. Chronic left thalamic lacunar infarct. CTA with no large vessel occlusion. 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 CT perfusion with no acute abnormality. MRI HEAD 2/10: Small acute infarctions in the bilateral cerebellar hemispheres, left greater than right. Chronic small vessel disease. Small vascular lesion posterior to the right globe, likely small cavernous hemangioma. Other etiologies not totally excluded. CTH 2/11: Previously seen small acute/subacute infarcts within the bilateral cerebellar hemispheres were better evaluated on the prior MRI examination. No acute intracranial hemorrhage. 73M with h/o HTN, HLD, DM, h/o CVA x 2 on ASA81 with no residual deficits present to ED with complain of unsteady gait and facial droop. Patient reported unsteady gait for 2 days. Patient also noted facial droop today at around 6AM, he went to bed fine at 11PM last night (2/8/24). No slurred speech, vision changes. Patient reported flu like symptoms lately. Hemodynamically stable, afebrile, sat well at RA. EKG with NSR. CT head with no acute pathology. Chronic left thalamic lacunar infarct. CTA with no large vessel occlusion. 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 CT perfusion with no acute abnormality. MRI HEAD 2/10: Small acute infarctions in the bilateral cerebellar hemispheres, left greater than right. Chronic small vessel disease. Small vascular lesion posterior to the right globe, likely small cavernous hemangioma. Other etiologies not totally excluded. CTH 2/11: Previously seen small acute/subacute infarcts within the bilateral cerebellar hemispheres were better evaluated on the prior MRI examination. No acute intracranial hemorrhage.    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 MRI spine with cord lesions at C3 and C4 with nerve root enhancement in the L spine. S/p EEG with mild diffuse/multi-focal cerebral dysfunction, not specific as to etiology. No epileptiform abnormalities.  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 S/p Plex x3 2/16, plan for 5. S/p tracheostomy 2/16.

## 2024-02-11 NOTE — DIETITIAN INITIAL EVALUATION ADULT - CALCULATED FROM (G/KG)
Dr. Crisostomo at bedside, updating pt on test results, discharge plan,home care,  and follow up care.    111.65

## 2024-02-11 NOTE — DIETITIAN INITIAL EVALUATION ADULT - OTHER INFO
Dosing wt (2/9): 223.3 lbs  [facility] UBW appears to be ~220-230 lbs. Will continue to monitor/trend.

## 2024-02-11 NOTE — PROVIDER CONTACT NOTE (OTHER) - ACTION/TREATMENT ORDERED:
MD Moon, NAKUL Lerma and TAMARA Arredondo at bedside to assess Pt. labs sent as per order and Pt being set up for [facility]

## 2024-02-11 NOTE — PROGRESS NOTE ADULT - SUBJECTIVE AND OBJECTIVE BOX
Interval events:    VITALS:  T(C): , Max: 37.9 (02-11-24 @ 15:00)  HR:  (61 - 110)  BP: --  ABP:  (109/46 - 238/96)  RR:  (12 - 25)  SpO2:  (100% - 100%)  Wt(kg): --  Device: Avea, Mode: AC/ CMV (Assist Control/ Continuous Mandatory Ventilation), RR (machine): 12, TV (machine): 500, FiO2: 40, PEEP: 5, ITime: 1, MAP: 8, PIP: 17    02-10-24 @ 07:01  -  02-11-24 @ 07:00  --------------------------------------------------------  IN: 2987.7 mL / OUT: 3250 mL / NET: -262.3 mL    02-11-24 @ 07:01  -  02-11-24 @ 19:11  --------------------------------------------------------  IN: 1603.7 mL / OUT: 1350 mL / NET: 253.7 mL      LABS:  Na: 139 (02-11 @ 00:22), 140 (02-10 @ 14:19), 134 (02-10 @ 01:44), 138 (02-09 @ 19:05)  K: 4.5 (02-11 @ 00:22), 4.9 (02-10 @ 14:19), 3.7 (02-10 @ 01:44), 4.2 (02-09 @ 19:05)  Cl: 104 (02-11 @ 00:22), 103 (02-10 @ 14:19), 98 (02-10 @ 01:44), 100 (02-09 @ 19:05)  CO2: 23 (02-11 @ 00:22), 22 (02-10 @ 14:19), 22 (02-10 @ 01:44), 22 (02-09 @ 19:05)  BUN: 10 (02-11 @ 00:22), 11 (02-10 @ 14:19), 11 (02-10 @ 01:44), 11 (02-09 @ 19:05)  Cr: 0.84 (02-11 @ 00:22), 0.98 (02-10 @ 14:19), 1.05 (02-10 @ 01:44), 1.15 (02-09 @ 19:05)  Glu: 120(02-11 @ 00:22), 144(02-10 @ 14:19), 131(02-10 @ 01:44), 107(02-09 @ 19:05)    Hgb: 12.2 (02-11 @ 00:22), 12.3 (02-10 @ 01:43), 12.7 (02-09 @ 19:05)  Hct: 44.3 (02-11 @ 00:22), 42.7 (02-10 @ 01:43), 43.3 (02-09 @ 19:05)  WBC: 11.93 (02-11 @ 00:22), 9.27 (02-10 @ 01:43), 7.89 (02-09 @ 19:05)  Plt: 366 (02-11 @ 00:22), 349 (02-10 @ 01:43), 398 (02-09 @ 19:05)    INR: 1.18 02-10-24 @ 01:36, 1.08 02-09-24 @ 19:05  PTT: 26.3 02-10-24 @ 09:16, 39.0 02-10-24 @ 01:36, 26.7 02-09-24 @ 19:05    MEDICATIONS:  acetaminophen   Oral Liquid .. 650 milliGRAM(s) Oral every 6 hours PRN  aspirin  chewable 81 milliGRAM(s) Oral <User Schedule>  atorvastatin 80 milliGRAM(s) Oral at bedtime  chlorhexidine 0.12% Liquid 15 milliLiter(s) Oral Mucosa every 12 hours  chlorhexidine 4% Liquid 1 Application(s) Topical daily  cyanocobalamin 1000 MICROGram(s) Oral daily  dexMEDEtomidine Infusion 0.2 MICROgram(s)/kG/Hr IV Continuous <Continuous>  enoxaparin Injectable 40 milliGRAM(s) SubCutaneous <User Schedule>  influenza  Vaccine (HIGH DOSE) 0.7 milliLiter(s) IntraMuscular once  insulin lispro (ADMELOG) corrective regimen sliding scale   SubCutaneous every 6 hours  oxyCODONE    IR 5 milliGRAM(s) Oral every 4 hours PRN  oxyCODONE    IR 10 milliGRAM(s) Oral every 4 hours PRN  pantoprazole  Injectable 40 milliGRAM(s) IV Push daily  phenylephrine    Infusion 0.2 MICROgram(s)/kG/Min IV Continuous <Continuous>  polyethylene glycol 3350 17 Gram(s) Oral daily  senna 1 Tablet(s) Oral at bedtime  sodium chloride 0.9%. 1000 milliLiter(s) IV Continuous <Continuous>    EXAMINATION:  General:  in NAD  HEENT:  MMM  Neuro:  awake, alert, oriented x 3, follows commands, EOMI, face symmetric, no PD, DF 5/5   Cards:  RRR  Respiratory:  no respiratory distress  Abdomen:  soft  Extremities:  no LE edema    Assessment/Plan:    Interval events:  LP today with OP 28, WBC 11 with lymphocytic predominance, P 67, G 83.   Afebrile.     VITALS:  T(C): , Max: 37.9 (02-11-24 @ 15:00)  HR:  (61 - 110)  BP: --  ABP:  (109/46 - 238/96)  RR:  (12 - 25)  SpO2:  (100% - 100%)  Wt(kg): --  Device: Avea, Mode: AC/ CMV (Assist Control/ Continuous Mandatory Ventilation), RR (machine): 12, TV (machine): 500, FiO2: 40, PEEP: 5, ITime: 1, MAP: 8, PIP: 17    02-10-24 @ 07:01  -  02-11-24 @ 07:00  --------------------------------------------------------  IN: 2987.7 mL / OUT: 3250 mL / NET: -262.3 mL    02-11-24 @ 07:01  -  02-11-24 @ 19:11  --------------------------------------------------------  IN: 1603.7 mL / OUT: 1350 mL / NET: 253.7 mL      LABS:  Na: 139 (02-11 @ 00:22), 140 (02-10 @ 14:19), 134 (02-10 @ 01:44), 138 (02-09 @ 19:05)  K: 4.5 (02-11 @ 00:22), 4.9 (02-10 @ 14:19), 3.7 (02-10 @ 01:44), 4.2 (02-09 @ 19:05)  Cl: 104 (02-11 @ 00:22), 103 (02-10 @ 14:19), 98 (02-10 @ 01:44), 100 (02-09 @ 19:05)  CO2: 23 (02-11 @ 00:22), 22 (02-10 @ 14:19), 22 (02-10 @ 01:44), 22 (02-09 @ 19:05)  BUN: 10 (02-11 @ 00:22), 11 (02-10 @ 14:19), 11 (02-10 @ 01:44), 11 (02-09 @ 19:05)  Cr: 0.84 (02-11 @ 00:22), 0.98 (02-10 @ 14:19), 1.05 (02-10 @ 01:44), 1.15 (02-09 @ 19:05)  Glu: 120(02-11 @ 00:22), 144(02-10 @ 14:19), 131(02-10 @ 01:44), 107(02-09 @ 19:05)    Hgb: 12.2 (02-11 @ 00:22), 12.3 (02-10 @ 01:43), 12.7 (02-09 @ 19:05)  Hct: 44.3 (02-11 @ 00:22), 42.7 (02-10 @ 01:43), 43.3 (02-09 @ 19:05)  WBC: 11.93 (02-11 @ 00:22), 9.27 (02-10 @ 01:43), 7.89 (02-09 @ 19:05)  Plt: 366 (02-11 @ 00:22), 349 (02-10 @ 01:43), 398 (02-09 @ 19:05)    INR: 1.18 02-10-24 @ 01:36, 1.08 02-09-24 @ 19:05  PTT: 26.3 02-10-24 @ 09:16, 39.0 02-10-24 @ 01:36, 26.7 02-09-24 @ 19:05    MEDICATIONS:  acetaminophen   Oral Liquid .. 650 milliGRAM(s) Oral every 6 hours PRN  aspirin  chewable 81 milliGRAM(s) Oral <User Schedule>  atorvastatin 80 milliGRAM(s) Oral at bedtime  chlorhexidine 0.12% Liquid 15 milliLiter(s) Oral Mucosa every 12 hours  chlorhexidine 4% Liquid 1 Application(s) Topical daily  cyanocobalamin 1000 MICROGram(s) Oral daily  dexMEDEtomidine Infusion 0.2 MICROgram(s)/kG/Hr IV Continuous <Continuous>  enoxaparin Injectable 40 milliGRAM(s) SubCutaneous <User Schedule>  influenza  Vaccine (HIGH DOSE) 0.7 milliLiter(s) IntraMuscular once  insulin lispro (ADMELOG) corrective regimen sliding scale   SubCutaneous every 6 hours  oxyCODONE    IR 5 milliGRAM(s) Oral every 4 hours PRN  oxyCODONE    IR 10 milliGRAM(s) Oral every 4 hours PRN  pantoprazole  Injectable 40 milliGRAM(s) IV Push daily  phenylephrine    Infusion 0.2 MICROgram(s)/kG/Min IV Continuous <Continuous>  polyethylene glycol 3350 17 Gram(s) Oral daily  senna 1 Tablet(s) Oral at bedtime  sodium chloride 0.9%. 1000 milliLiter(s) IV Continuous <Continuous>    EXAMINATION:  General:  in NAD  HEENT:  MMM  Neuro:  awake, alert, oriented x 3, follows commands, EOMI, face symmetric, no PD, DF 5/5   Cards:  RRR  Respiratory:  no respiratory distress  Abdomen:  soft  Extremities:  no LE edema    Assessment/Plan:   72yo man with HTN, HLD, DM, h/o recent stroke x 2 on ASA81 2 years prior, who presented 2/7 with 1.5 weeks of worsening cough and several days of dysphagia, at [place] ED developed dysarthria. CTH without acute findings, CTA concerning for a basilar stenosis for which the patient was transferred to [facility]. Angiogram with moderate basilar stenosis. With worsening exam with loss of corneals and cough but still following commands and moving LEs. MRI brain before and after worsening with small strokes in the cerebellum likely 2/2 angiogram. MRI C/T/L spine disease. Indistinct cord lesions at the C3 and C4 vertebral levels are nonspecific but suggest inflammatory/infectious/demyelinating etiology, also with some nerve root enhancement in the L spine.     f/u LP studies including CSF PCR, ACE, Lyme, crypto, CMV, West Nile, VDRL, CSF autoimmune encephalitis panel   send GI PCR  f/u anti-GQ1b and ganglioside Abs, ACE serum, serum autoimmune encephalitis panel   c/w DAPT  SBP goal 160-200with genny, trops and EKG  ABG  restart feeds, senna and miralax     Victorina Bettencourt  Neurocritical Care Attending     Interval events:  LP today with OP 28, WBC 11 with lymphocytic predominance, P 67, G 83.   Afebrile.     VITALS:  T(C): , Max: 37.9 (02-11-24 @ 15:00)  HR:  (61 - 110)  BP: --  ABP:  (109/46 - 238/96)  RR:  (12 - 25)  SpO2:  (100% - 100%)  Wt(kg): --  Device: Avea, Mode: AC/ CMV (Assist Control/ Continuous Mandatory Ventilation), RR (machine): 12, TV (machine): 500, FiO2: 40, PEEP: 5, ITime: 1, MAP: 8, PIP: 17    02-10-24 @ 07:01  -  02-11-24 @ 07:00  --------------------------------------------------------  IN: 2987.7 mL / OUT: 3250 mL / NET: -262.3 mL    02-11-24 @ 07:01  -  02-11-24 @ 19:11  --------------------------------------------------------  IN: 1603.7 mL / OUT: 1350 mL / NET: 253.7 mL      LABS:  Na: 139 (02-11 @ 00:22), 140 (02-10 @ 14:19), 134 (02-10 @ 01:44), 138 (02-09 @ 19:05)  K: 4.5 (02-11 @ 00:22), 4.9 (02-10 @ 14:19), 3.7 (02-10 @ 01:44), 4.2 (02-09 @ 19:05)  Cl: 104 (02-11 @ 00:22), 103 (02-10 @ 14:19), 98 (02-10 @ 01:44), 100 (02-09 @ 19:05)  CO2: 23 (02-11 @ 00:22), 22 (02-10 @ 14:19), 22 (02-10 @ 01:44), 22 (02-09 @ 19:05)  BUN: 10 (02-11 @ 00:22), 11 (02-10 @ 14:19), 11 (02-10 @ 01:44), 11 (02-09 @ 19:05)  Cr: 0.84 (02-11 @ 00:22), 0.98 (02-10 @ 14:19), 1.05 (02-10 @ 01:44), 1.15 (02-09 @ 19:05)  Glu: 120(02-11 @ 00:22), 144(02-10 @ 14:19), 131(02-10 @ 01:44), 107(02-09 @ 19:05)    Hgb: 12.2 (02-11 @ 00:22), 12.3 (02-10 @ 01:43), 12.7 (02-09 @ 19:05)  Hct: 44.3 (02-11 @ 00:22), 42.7 (02-10 @ 01:43), 43.3 (02-09 @ 19:05)  WBC: 11.93 (02-11 @ 00:22), 9.27 (02-10 @ 01:43), 7.89 (02-09 @ 19:05)  Plt: 366 (02-11 @ 00:22), 349 (02-10 @ 01:43), 398 (02-09 @ 19:05)    INR: 1.18 02-10-24 @ 01:36, 1.08 02-09-24 @ 19:05  PTT: 26.3 02-10-24 @ 09:16, 39.0 02-10-24 @ 01:36, 26.7 02-09-24 @ 19:05    MEDICATIONS:  acetaminophen   Oral Liquid .. 650 milliGRAM(s) Oral every 6 hours PRN  aspirin  chewable 81 milliGRAM(s) Oral <User Schedule>  atorvastatin 80 milliGRAM(s) Oral at bedtime  chlorhexidine 0.12% Liquid 15 milliLiter(s) Oral Mucosa every 12 hours  chlorhexidine 4% Liquid 1 Application(s) Topical daily  cyanocobalamin 1000 MICROGram(s) Oral daily  dexMEDEtomidine Infusion 0.2 MICROgram(s)/kG/Hr IV Continuous <Continuous>  enoxaparin Injectable 40 milliGRAM(s) SubCutaneous <User Schedule>  influenza  Vaccine (HIGH DOSE) 0.7 milliLiter(s) IntraMuscular once  insulin lispro (ADMELOG) corrective regimen sliding scale   SubCutaneous every 6 hours  oxyCODONE    IR 5 milliGRAM(s) Oral every 4 hours PRN  oxyCODONE    IR 10 milliGRAM(s) Oral every 4 hours PRN  pantoprazole  Injectable 40 milliGRAM(s) IV Push daily  phenylephrine    Infusion 0.2 MICROgram(s)/kG/Min IV Continuous <Continuous>  polyethylene glycol 3350 17 Gram(s) Oral daily  senna 1 Tablet(s) Oral at bedtime  sodium chloride 0.9%. 1000 milliLiter(s) IV Continuous <Continuous>    EXAMINATION:  General:  in NAD  HEENT: intubated  Neuro: unable to open eyes but alert and able to follow commands, communicated by wiggling his toes, oriented to self and year by moving toes, with severe ophthalmoplegia and minimal eye movement in any direction, no cough, with 2/5 distal movement in arms and feet but no proximal movement  Cards:  RRR  Respiratory:  no respiratory distress  Abdomen:  soft  Extremities:  no LE edema    Assessment/Plan:   72yo man with HTN, HLD, DM, h/o 2 stroke on ASA81 2 years prior, who presented 2/7 with 1.5 weeks of worsening cough and several days of dysphagia, at [place] ED developed dysarthria. CTH without acute findings, CTA concerning for a basilar stenosis for which the patient was transferred to [facility] ED where he required intubation. Angiogram with moderate basilar stenosis with no intervention done. With worsening exam with loss of corneals and cough on 2/10 but still following commands and moving distal extremities. MRI brain before and after worsening with small strokes in the cerebellum likely 2/2 angiogram. MRI C/T/L spine with cord lesions at the C3 and C4 which suggest inflammatory/infectious/demyelinating etiology, also with some nerve root enhancement in the L spine. 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     f/u LP studies including CSF PCR, ACE, Lyme, crypto, CMV, West Nile, VDRL, CSF autoimmune encephalitis panel   send GI PCR  f/u anti-GQ1b and ganglioside Abs, ACE serum, serum autoimmune encephalitis panel   c/w DAPT  SBP goal 160-200 with genny for potentially symptomatic basilar stenosis, trops and EKG  ABG  restart feeds, senna and miralax     Victorina Bettenocurt  Neurocritical Care Attending

## 2024-02-11 NOTE — PHYSICAL THERAPY INITIAL EVALUATION ADULT - PERTINENT HX OF CURRENT PROBLEM, REHAB EVAL
73 y.o. M PMH HTN, HLD, DM, h/o CVA x 2 (no residual deficits) initially presented to [facility] ED with unsteady gait that began on 2/7 (exact LKW unknown) and L facial droop that began 6 AM on 2/9 (went to bed 11 PM on 2/8 without facial droop). No slurred speech, vision changes. Initial NIHSS 0 at [facility]. Hemodynamically stable, CT head with no acute pathology. Chronic left thalamic lacunar infarct. CTA with no large vessel occlusion. 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 CT perfusion with no acute abnormality.     Pt transfered from [facility] to [facility] due to concern for neurologic deterioration i/s/o aforementioned high grade proximal basiliar and L PCA stenosis. On arrival to [facility] ED, initial NIHSS 4 (+1 for LLE drift, +2 for b/l limb dysmetria, +1 for mild dysarthria). Later, pt started to have difficulty clearing secretions, became stridorous, and started to desaturate, so decision was made to intubate patient. Once pt medically stabilized, taken to angio suite by IR. Unable to obtain further history from patient at [facility] due to intubation/sedation

## 2024-02-11 NOTE — PROGRESS NOTE ADULT - SUBJECTIVE AND OBJECTIVE BOX
Patient seen and examined at bedside.    --Anticoagulation--  aspirin  chewable 81 milliGRAM(s) Oral <User Schedule>  clopidogrel Tablet 75 milliGRAM(s) Oral daily  enoxaparin Injectable 40 milliGRAM(s) SubCutaneous <User Schedule>    T(C): 36.3 (02-10-24 @ 23:00), Max: 36.7 (02-10-24 @ 03:00)  HR: 78 (02-11-24 @ 00:45) (64 - 121)  BP: 209/114 (02-10-24 @ 12:30) (110/68 - 209/114)  RR: 13 (02-11-24 @ 00:45) (11 - 27)  SpO2: 100% (02-11-24 @ 00:45) (93% - 100%)  Wt(kg): --    Exam:    Exam: Intubated, FC BLE, no horizontal eye movement

## 2024-02-11 NOTE — DIETITIAN INITIAL EVALUATION ADULT - ORAL INTAKE PTA/DIET HISTORY
-Pt intubated/sedated and unable to participate in nutrition evaluation.   -Baseline tolerance to chewing/swallowing, and baseline provision of energy/nutrient intake unclear.   -No documented food allergies.   -A1c 6.8%. Hx of DM noted. Unclear if pt adhered to therapeutic diet and/or antihyperglycemic regimen PTA.

## 2024-02-11 NOTE — DIETITIAN INITIAL EVALUATION ADULT - ENTERAL
As able, recommend increase EN feeds to Glucerna 1.5 at 65ml/hr x 24 hrs. To provide (based on dosing wt 101.5kg): 1560ml, 2340kcal (23kcal/kg) and 129g protein (1.27g protein/kg).

## 2024-02-11 NOTE — PROGRESS NOTE ADULT - SUBJECTIVE AND OBJECTIVE BOX
OSPITAL COURSE:  HPI:  73 years old male with h/o HTN, HLD, DM, h/o CVA x 2 on ASA81 with no residual deficits present to ED with complain of unsteady gait and facial droop. Patient reported unsteady gait for 2 days. Patient also noted facial droop today at around 6AM, he went to bed fine at 11PM last night ( 2/8/24). No slurred speech, vision changes. Patient reported flu like symptoms lately  Hemodynamically stable, afebrile, sat well at [facility]. EKG with NSR. CT head with no acute pathology. Chronic left thalamic lacunar infarct. CTA with no large vessel occlusion. 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 CT perfusion with no acute abnormality.     Admission Scores  GCS:   HH:   MF:   NIHSS:4   RASS:    CAM-ICU:   ICH:    2/9: transferred from [place], NIHSS of 4 in ED, then became more lethargic, stridorous and desaturated in ED, intubated for airway protected, repeat CTH/A obtained and brought emergently to IR for basilar stenting   2/10: on DAPT. MRI showed small stroke burden. Labile blood pressure    Allergies    Allergy Status Unknown    Intolerances      REVIEW OF SYSTEMS: [ x] Unable to Assess due to neurologic exam   [ ] All ROS addressed below are non-contributory, except:  Neuro: [ ] Headache [ ] Back pain [ ] Numbness [ ] Weakness [ ] Ataxia [ ] Dizziness [ ] Aphasia [ ] Dysarthria [ ] Visual disturbance  Resp: [ ] Shortness of breath/dyspnea, [ ] Orthopnea [ ] Cough  CV: [ ] Chest pain [ ] Palpitation [ ] Lightheadedness [ ] Syncope  Renal: [ ] Thirst [ ] Edema  GI: [ ] Nausea [ ] Emesis [ ] Abdominal pain [ ] Constipation [ ] Diarrhea  Hem: [ ] Hematemesis [ ] bright red blood per rectum  ID: [ ] Fever [ ] Chills [ ] Dysuria  ENT: [ ] Rhinorrhea      DEVICES:   [x ] Restraints [x ] ET tube [ ] central line [x ] arterial line [x ] vences [ x] NGT/OGT [ ] EVD [ ] LD [ ] HAL/HMV [ ] Trach [ ] PEG [ ] Chest Tube     T(C): 36.3 (02-10-24 @ 23:00), Max: 36.7 (02-10-24 @ 03:00)  HR: 78 (02-11-24 @ 00:45) (64 - 121)  BP: 209/114 (02-10-24 @ 12:30) (110/68 - 209/114)  BP(mean): 139 (02-10-24 @ 12:30) (79 - 139)  RR: 13 (02-11-24 @ 00:45) (11 - 27)  SpO2: 100% (02-11-24 @ 00:45) (93% - 100%)    aspirin  chewable 81 milliGRAM(s) Oral <User Schedule>  atorvastatin 80 milliGRAM(s) Oral at bedtime  chlorhexidine 0.12% Liquid 15 milliLiter(s) Oral Mucosa every 12 hours  chlorhexidine 4% Liquid 1 Application(s) Topical daily  clopidogrel Tablet 75 milliGRAM(s) Oral daily  cyanocobalamin 1000 MICROGram(s) Oral daily  dexMEDEtomidine Infusion 0.2 MICROgram(s)/kG/Hr IV Continuous <Continuous>  enoxaparin Injectable 40 milliGRAM(s) SubCutaneous <User Schedule>  influenza  Vaccine (HIGH DOSE) 0.7 milliLiter(s) IntraMuscular once  insulin lispro (ADMELOG) corrective regimen sliding scale   SubCutaneous every 6 hours  niCARdipine Infusion 5 mG/Hr IV Continuous <Continuous>  norepinephrine Infusion 0.05 MICROgram(s)/kG/Min IV Continuous <Continuous>  pantoprazole  Injectable 40 milliGRAM(s) IV Push daily  phenylephrine    Infusion 0.2 MICROgram(s)/kG/Min IV Continuous <Continuous>  polyethylene glycol 3350 17 Gram(s) Oral daily  senna 1 Tablet(s) Oral at bedtime  sodium chloride 0.9%. 1000 milliLiter(s) IV Continuous <Continuous>        02-09-24 @ 07:01  -  02-10-24 @ 07:00  --------------------------------------------------------  IN: 2211.8 mL / OUT: 1550 mL / NET: 661.8 mL    02-10-24 @ 07:01  -  02-11-24 @ 01:16  --------------------------------------------------------  IN: 2102.7 mL / OUT: 2850 mL / NET: -747.3 mL                          12.2   11.93 )-----------( 366      ( 11 Feb 2024 00:22 )             44.3     02-11    139  |  104  |  10  ----------------------------<  120<H>  4.5   |  23  |  0.84        < from: MR Head No Cont (02.10.24 @ 12:00) >  PROCEDURE DATE:  02/10/2024          INTERPRETATION:  EXAM: MRI OF THE BRAIN WITHOUT CONTRAST    HISTORY: Stroke    TECHNIQUE: Multi-planar multi-sequential MR imaging of the brain was   performed without intravenous contrast.    COMPARISON: CT/CTA of the head February 9, 2024.    FINDINGS:    Punctate foci of diffusion restriction in the bilateral cerebellar   hemispheres, left greater than right, compatible with acute infarctions.   No hydrocephalus. Foci of increased T2/FLAIR signal in the deep and   periventricular white matter, compatible with chronic small vessel   disease. Foci of susceptibility artifact in the left frontal lobe and   left insula, likely sequela of prior microhemorrhages. The visualized   extra axial spaces and basal cisterns are within normal limits. No   midline shift or mass effect present.    The craniocervical junction is within normal limits. The pituitary is   unremarkable. The major intracranialvessels demonstrate the expected   signal void related to vascular flow. Mucosal thickening in the ethmoid   air cells and sphenoid sinuses. Small layering fluid levels in the   bilateral sphenoid sinuses and a few ethmoid air cells. Trace right   mastoid effusion. Small vascular lesion posterior to the right globe   measuring approximately 1.0 x 0.5 cm.      IMPRESSION:    1.  Small acute infarctions in the bilateral cerebellar hemispheres, left   greater than right.  2.  Chronic small vessel disease.  3.  Small vascular lesion posterior to the right globe, likely small   cavernous hemangioma. Other etiologies not totally excluded.    < end of copied text >      PHYSICAL EXAM:    Constitutional: No Acute Distress     Neurological: intubated, follows commands, limited horizontal gaze wose towards the left  ,has vertical gaze , right side extension 3/5, flexion 3/5 , proximally 2/5, right leg 4/5, left UE 1/5 proximally, left extension 2/5, flexion 2/5, left leg 3/5 , has gag and cough , overbreathing   Pulmonary: Clear to Auscultation, No rales, No rhonchi, No wheezes     Cardiovascular: S1, S2, Regular rate and rhythm     Gastrointestinal: Soft, Non-tender, Non-distended     Extremities: No calf tenderness     Incision: c/d/i no hematoma, no active bleeding        OSPITAL COURSE:  HPI:  73 years old male with h/o HTN, HLD, DM, h/o CVA x 2 on ASA81 with no residual deficits present to ED with complain of unsteady gait and facial droop. Patient reported unsteady gait for 2 days. Patient also noted facial droop today at around 6AM, he went to bed fine at 11PM last night ( 2/8/24). No slurred speech, vision changes. Patient reported flu like symptoms lately  Hemodynamically stable, afebrile, sat well at [facility]. EKG with NSR. CT head with no acute pathology. Chronic left thalamic lacunar infarct. CTA with no large vessel occlusion. 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 CT perfusion with no acute abnormality.     Admission Scores  GCS:   HH:   MF:   NIHSS:4   RASS:    CAM-ICU:   ICH:    2/9: transferred from [place], NIHSS of 4 in ED, then became more lethargic, stridorous and desaturated in ED, intubated for airway protected, repeat CTH/A obtained and brought emergently to IR for basilar stenting   2/10: worsening brainstem symptoms while on DAPT    Allergies    Allergy Status Unknown    Intolerances      REVIEW OF SYSTEMS: [ x] Unable to Assess due to neurologic exam   [ ] All ROS addressed below are non-contributory, except:  Neuro: [ ] Headache [ ] Back pain [ ] Numbness [ ] Weakness [ ] Ataxia [ ] Dizziness [ ] Aphasia [ ] Dysarthria [ ] Visual disturbance  Resp: [ ] Shortness of breath/dyspnea, [ ] Orthopnea [ ] Cough  CV: [ ] Chest pain [ ] Palpitation [ ] Lightheadedness [ ] Syncope  Renal: [ ] Thirst [ ] Edema  GI: [ ] Nausea [ ] Emesis [ ] Abdominal pain [ ] Constipation [ ] Diarrhea  Hem: [ ] Hematemesis [ ] bright red blood per rectum  ID: [ ] Fever [ ] Chills [ ] Dysuria  ENT: [ ] Rhinorrhea      DEVICES:   [x ] Restraints [x ] ET tube [ ] central line [x ] arterial line [x ] vences [ x] NGT/OGT [ ] EVD [ ] LD [ ] HAL/HMV [ ] Trach [ ] PEG [ ] Chest Tube     T(C): 36.3 (02-10-24 @ 23:00), Max: 36.7 (02-10-24 @ 03:00)  HR: 78 (02-11-24 @ 00:45) (64 - 121)  BP: 209/114 (02-10-24 @ 12:30) (110/68 - 209/114)  BP(mean): 139 (02-10-24 @ 12:30) (79 - 139)  RR: 13 (02-11-24 @ 00:45) (11 - 27)  SpO2: 100% (02-11-24 @ 00:45) (93% - 100%)    aspirin  chewable 81 milliGRAM(s) Oral <User Schedule>  atorvastatin 80 milliGRAM(s) Oral at bedtime  chlorhexidine 0.12% Liquid 15 milliLiter(s) Oral Mucosa every 12 hours  chlorhexidine 4% Liquid 1 Application(s) Topical daily  clopidogrel Tablet 75 milliGRAM(s) Oral daily  cyanocobalamin 1000 MICROGram(s) Oral daily  dexMEDEtomidine Infusion 0.2 MICROgram(s)/kG/Hr IV Continuous <Continuous>  enoxaparin Injectable 40 milliGRAM(s) SubCutaneous <User Schedule>  influenza  Vaccine (HIGH DOSE) 0.7 milliLiter(s) IntraMuscular once  insulin lispro (ADMELOG) corrective regimen sliding scale   SubCutaneous every 6 hours  niCARdipine Infusion 5 mG/Hr IV Continuous <Continuous>  norepinephrine Infusion 0.05 MICROgram(s)/kG/Min IV Continuous <Continuous>  pantoprazole  Injectable 40 milliGRAM(s) IV Push daily  phenylephrine    Infusion 0.2 MICROgram(s)/kG/Min IV Continuous <Continuous>  polyethylene glycol 3350 17 Gram(s) Oral daily  senna 1 Tablet(s) Oral at bedtime  sodium chloride 0.9%. 1000 milliLiter(s) IV Continuous <Continuous>        02-09-24 @ 07:01  -  02-10-24 @ 07:00  --------------------------------------------------------  IN: 2211.8 mL / OUT: 1550 mL / NET: 661.8 mL    02-10-24 @ 07:01  -  02-11-24 @ 01:16  --------------------------------------------------------  IN: 2102.7 mL / OUT: 2850 mL / NET: -747.3 mL                          12.2   11.93 )-----------( 366      ( 11 Feb 2024 00:22 )             44.3     02-11    139  |  104  |  10  ----------------------------<  120<H>  4.5   |  23  |  0.84        < from: MR Head No Cont (02.10.24 @ 12:00) >  PROCEDURE DATE:  02/10/2024          INTERPRETATION:  EXAM: MRI OF THE BRAIN WITHOUT CONTRAST    HISTORY: Stroke    TECHNIQUE: Multi-planar multi-sequential MR imaging of the brain was   performed without intravenous contrast.    COMPARISON: CT/CTA of the head February 9, 2024.    FINDINGS:    Punctate foci of diffusion restriction in the bilateral cerebellar   hemispheres, left greater than right, compatible with acute infarctions.   No hydrocephalus. Foci of increased T2/FLAIR signal in the deep and   periventricular white matter, compatible with chronic small vessel   disease. Foci of susceptibility artifact in the left frontal lobe and   left insula, likely sequela of prior microhemorrhages. The visualized   extra axial spaces and basal cisterns are within normal limits. No   midline shift or mass effect present.    The craniocervical junction is within normal limits. The pituitary is   unremarkable. The major intracranialvessels demonstrate the expected   signal void related to vascular flow. Mucosal thickening in the ethmoid   air cells and sphenoid sinuses. Small layering fluid levels in the   bilateral sphenoid sinuses and a few ethmoid air cells. Trace right   mastoid effusion. Small vascular lesion posterior to the right globe   measuring approximately 1.0 x 0.5 cm.      IMPRESSION:    1.  Small acute infarctions in the bilateral cerebellar hemispheres, left   greater than right.  2.  Chronic small vessel disease.  3.  Small vascular lesion posterior to the right globe, likely small   cavernous hemangioma. Other etiologies not totally excluded.    < end of copied text >      PHYSICAL EXAM:    Constitutional: No Acute Distress     Neurological (on  mmHg): intubated, follows commands, ophthalmoplegia with minimal upward and left sided movement, no eyes opening, follows commands, nods appropriately bilateral facial weakness, bilateral upper ext 2/5 R > L, right lower ext 3/5, left lower ext 2/5    Pulmonary: Clear to Auscultation, No rales, No rhonchi, No wheezes     Cardiovascular: S1, S2, Regular rate and rhythm     Gastrointestinal: Soft, Non-tender, Non-distended     Extremities: No calf tenderness     Incision: c/d/i no hematoma, no active bleeding

## 2024-02-11 NOTE — OCCUPATIONAL THERAPY INITIAL EVALUATION ADULT - BALANCE TRAINING, PT EVAL
Pt will improve static sitting balance by 1/2 grade to improve functional performance with ADLs within 4 weeks.

## 2024-02-11 NOTE — DIETITIAN INITIAL EVALUATION ADULT - ETIOLOGY
complex clinical course with altered neurological function deferring ability of pt to consume/receive optimal energy intake

## 2024-02-11 NOTE — DIETITIAN INITIAL EVALUATION ADULT - NSFNSGIIOFT_GEN_A_CORE
-Last BM 2/8. On bowel regimen (senna, Miralax). Prescribed IV Protonix.   -Continues on insulin lispro sliding scale to aid in management of BG. A1c 6.8%.   -Sodium goal 135-145 determined by neurosurgical team. Current Na 139 (2/11).

## 2024-02-11 NOTE — PHYSICAL THERAPY INITIAL EVALUATION ADULT - GENERAL OBSERVATIONS, REHAB EVAL
Rec'd semi-supine in bed, +ICU monitoring, +NGT, +orally intubated, +straight cath, +b/l wrist restraints

## 2024-02-11 NOTE — DIETITIAN INITIAL EVALUATION ADULT - NS FNS DIET ORDER
Diet, NPO with Tube Feed:   Tube Feeding Modality: Orogastric  Glucerna 1.5 David ([id number])  Total Volume for 24 Hours (mL): 960  Continuous  Starting Tube Feed Rate {mL per Hour}: 10  Increase Tube Feed Rate by (mL): 10     Every 3 hours  Until Goal Tube Feed Rate (mL per Hour): 40  Tube Feed Duration (in Hours): 24  Tube Feed Start Time: 22:10 (02-10-24 @ 22:01)

## 2024-02-11 NOTE — DIETITIAN INITIAL EVALUATION ADULT - ADD RECOMMEND
1. Obtain subjective wt/diet history from pt/family PRN.   2. Monitor GI tolerance. RD to remain available to adjust EN formulary, volume/rate PRN.   3. Antihyperglycemic regimen deferred to team.   4. Recommend multivitamin (if no medication contraindications) to aid in prevention of micronutrient deficiencies.

## 2024-02-12 ENCOUNTER — TRANSCRIPTION ENCOUNTER (OUTPATIENT)
Age: 74
End: 2024-02-12

## 2024-02-12 LAB
ALBUMIN CSF-MCNC: 41.3 MG/DL — HIGH (ref 14–25)
ALBUMIN SERPL ELPH-MCNC: 3.4 G/DL — SIGNIFICANT CHANGE UP (ref 3.3–5)
ALBUMIN SERPL ELPH-MCNC: 3069 MG/DL — LOW (ref 3500–5200)
ALP SERPL-CCNC: 92 U/L — SIGNIFICANT CHANGE UP (ref 40–120)
ALT FLD-CCNC: 18 U/L — SIGNIFICANT CHANGE UP (ref 10–45)
ANION GAP SERPL CALC-SCNC: 10 MMOL/L — SIGNIFICANT CHANGE UP (ref 5–17)
ANION GAP SERPL CALC-SCNC: 10 MMOL/L — SIGNIFICANT CHANGE UP (ref 5–17)
APTT BLD: 26.6 SEC — SIGNIFICANT CHANGE UP (ref 24.5–35.6)
AST SERPL-CCNC: 23 U/L — SIGNIFICANT CHANGE UP (ref 10–40)
BILIRUB SERPL-MCNC: 0.4 MG/DL — SIGNIFICANT CHANGE UP (ref 0.2–1.2)
BUN SERPL-MCNC: 15 MG/DL — SIGNIFICANT CHANGE UP (ref 7–23)
BUN SERPL-MCNC: 16 MG/DL — SIGNIFICANT CHANGE UP (ref 7–23)
CA-I BLD-SCNC: 1.12 MMOL/L — LOW (ref 1.15–1.33)
CALCIUM SERPL-MCNC: 7.9 MG/DL — LOW (ref 8.4–10.5)
CALCIUM SERPL-MCNC: 8.5 MG/DL — SIGNIFICANT CHANGE UP (ref 8.4–10.5)
CHLORIDE SERPL-SCNC: 104 MMOL/L — SIGNIFICANT CHANGE UP (ref 96–108)
CHLORIDE SERPL-SCNC: 109 MMOL/L — HIGH (ref 96–108)
CMV DNA CSF QL NAA+PROBE: SIGNIFICANT CHANGE UP IU/ML
CMV DNA SPEC NAA+PROBE-LOG#: SIGNIFICANT CHANGE UP LOG10IU/ML
CO2 SERPL-SCNC: 25 MMOL/L — SIGNIFICANT CHANGE UP (ref 22–31)
CO2 SERPL-SCNC: 26 MMOL/L — SIGNIFICANT CHANGE UP (ref 22–31)
CREAT SERPL-MCNC: 0.76 MG/DL — SIGNIFICANT CHANGE UP (ref 0.5–1.3)
CREAT SERPL-MCNC: 0.76 MG/DL — SIGNIFICANT CHANGE UP (ref 0.5–1.3)
CRYPTOC AG CSF-ACNC: NEGATIVE — SIGNIFICANT CHANGE UP
CSF PCR RESULT: SIGNIFICANT CHANGE UP
EBV DNA SERPL NAA+PROBE-ACNC: SIGNIFICANT CHANGE UP IU/ML
EBVPCR LOG: SIGNIFICANT CHANGE UP LOG10IU/ML
EGFR: 95 ML/MIN/1.73M2 — SIGNIFICANT CHANGE UP
EGFR: 95 ML/MIN/1.73M2 — SIGNIFICANT CHANGE UP
FIBRINOGEN PPP-MCNC: 796 MG/DL — HIGH (ref 200–445)
GAS PNL BLDA: SIGNIFICANT CHANGE UP
GLUCOSE BLDC GLUCOMTR-MCNC: 121 MG/DL — HIGH (ref 70–99)
GLUCOSE BLDC GLUCOMTR-MCNC: 147 MG/DL — HIGH (ref 70–99)
GLUCOSE BLDC GLUCOMTR-MCNC: 151 MG/DL — HIGH (ref 70–99)
GLUCOSE BLDC GLUCOMTR-MCNC: 164 MG/DL — HIGH (ref 70–99)
GLUCOSE SERPL-MCNC: 164 MG/DL — HIGH (ref 70–99)
GLUCOSE SERPL-MCNC: 205 MG/DL — HIGH (ref 70–99)
HIV 1+2 AB+HIV1 P24 AG SERPL QL IA: SIGNIFICANT CHANGE UP
IGG CSF-MCNC: 9.7 MG/DL — HIGH
IGG FLD-MCNC: 1421 MG/DL — SIGNIFICANT CHANGE UP (ref 610–1660)
IGG SYNTH RATE SER+CSF CALC-MRATE: 1.4 MG/DAY — SIGNIFICANT CHANGE UP
IGG/ALB CLEAR SER+CSF-RTO: 0.5 — SIGNIFICANT CHANGE UP
IGG/ALB CSF: 0.23 RATIO — SIGNIFICANT CHANGE UP
IGG/ALB SER: 0.46 RATIO — SIGNIFICANT CHANGE UP
INR BLD: 1.28 RATIO — HIGH (ref 0.85–1.18)
MAGNESIUM SERPL-MCNC: 2 MG/DL — SIGNIFICANT CHANGE UP (ref 1.6–2.6)
MAGNESIUM SERPL-MCNC: 2.2 MG/DL — SIGNIFICANT CHANGE UP (ref 1.6–2.6)
NIGHT BLUE STAIN TISS: SIGNIFICANT CHANGE UP
PHOSPHATE SERPL-MCNC: 1.6 MG/DL — LOW (ref 2.5–4.5)
PHOSPHATE SERPL-MCNC: 2.4 MG/DL — LOW (ref 2.5–4.5)
POTASSIUM SERPL-MCNC: 4.2 MMOL/L — SIGNIFICANT CHANGE UP (ref 3.5–5.3)
POTASSIUM SERPL-MCNC: 4.2 MMOL/L — SIGNIFICANT CHANGE UP (ref 3.5–5.3)
POTASSIUM SERPL-SCNC: 4.2 MMOL/L — SIGNIFICANT CHANGE UP (ref 3.5–5.3)
POTASSIUM SERPL-SCNC: 4.2 MMOL/L — SIGNIFICANT CHANGE UP (ref 3.5–5.3)
PROT SERPL-MCNC: 7.3 G/DL — SIGNIFICANT CHANGE UP (ref 6–8.3)
PROTHROM AB SERPL-ACNC: 13.3 SEC — HIGH (ref 9.5–13)
SODIUM SERPL-SCNC: 140 MMOL/L — SIGNIFICANT CHANGE UP (ref 135–145)
SODIUM SERPL-SCNC: 144 MMOL/L — SIGNIFICANT CHANGE UP (ref 135–145)
SPECIMEN SOURCE: SIGNIFICANT CHANGE UP
T PALLIDUM AB TITR SER: NEGATIVE — SIGNIFICANT CHANGE UP

## 2024-02-12 PROCEDURE — 36556 INSERT NON-TUNNEL CV CATH: CPT

## 2024-02-12 PROCEDURE — 70498 CT ANGIOGRAPHY NECK: CPT | Mod: 26

## 2024-02-12 PROCEDURE — 71045 X-RAY EXAM CHEST 1 VIEW: CPT | Mod: 26

## 2024-02-12 PROCEDURE — 99221 1ST HOSP IP/OBS SF/LOW 40: CPT | Mod: 25

## 2024-02-12 PROCEDURE — 70496 CT ANGIOGRAPHY HEAD: CPT | Mod: 26

## 2024-02-12 PROCEDURE — 36514 APHERESIS PLASMA: CPT

## 2024-02-12 PROCEDURE — 70450 CT HEAD/BRAIN W/O DYE: CPT | Mod: 26,XU

## 2024-02-12 PROCEDURE — 99291 CRITICAL CARE FIRST HOUR: CPT

## 2024-02-12 RX ORDER — NICARDIPINE HYDROCHLORIDE 30 MG/1
5 CAPSULE, EXTENDED RELEASE ORAL
Qty: 40 | Refills: 0 | Status: DISCONTINUED | OUTPATIENT
Start: 2024-02-12 | End: 2024-02-13

## 2024-02-12 RX ORDER — SODIUM,POTASSIUM PHOSPHATES 278-250MG
1 POWDER IN PACKET (EA) ORAL ONCE
Refills: 0 | Status: COMPLETED | OUTPATIENT
Start: 2024-02-12 | End: 2024-02-12

## 2024-02-12 RX ADMIN — FENTANYL CITRATE 25 MICROGRAM(S): 50 INJECTION INTRAVENOUS at 21:15

## 2024-02-12 RX ADMIN — Medication 63.75 MILLIMOLE(S): at 16:23

## 2024-02-12 RX ADMIN — CHLORHEXIDINE GLUCONATE 1 APPLICATION(S): 213 SOLUTION TOPICAL at 11:32

## 2024-02-12 RX ADMIN — Medication 1 PACKET(S): at 14:32

## 2024-02-12 RX ADMIN — POLYETHYLENE GLYCOL 3350 17 GRAM(S): 17 POWDER, FOR SOLUTION ORAL at 11:32

## 2024-02-12 RX ADMIN — DEXMEDETOMIDINE HYDROCHLORIDE IN 0.9% SODIUM CHLORIDE 5.08 MICROGRAM(S)/KG/HR: 4 INJECTION INTRAVENOUS at 07:00

## 2024-02-12 RX ADMIN — PHENYLEPHRINE HYDROCHLORIDE 7.61 MICROGRAM(S)/KG/MIN: 10 INJECTION INTRAVENOUS at 19:24

## 2024-02-12 RX ADMIN — Medication 81 MILLIGRAM(S): at 05:19

## 2024-02-12 RX ADMIN — ENOXAPARIN SODIUM 40 MILLIGRAM(S): 100 INJECTION SUBCUTANEOUS at 19:24

## 2024-02-12 RX ADMIN — Medication 2: at 17:18

## 2024-02-12 RX ADMIN — DEXMEDETOMIDINE HYDROCHLORIDE IN 0.9% SODIUM CHLORIDE 5.08 MICROGRAM(S)/KG/HR: 4 INJECTION INTRAVENOUS at 19:24

## 2024-02-12 RX ADMIN — CHLORHEXIDINE GLUCONATE 15 MILLILITER(S): 213 SOLUTION TOPICAL at 17:18

## 2024-02-12 RX ADMIN — PANTOPRAZOLE SODIUM 40 MILLIGRAM(S): 20 TABLET, DELAYED RELEASE ORAL at 11:32

## 2024-02-12 RX ADMIN — SENNA PLUS 1 TABLET(S): 8.6 TABLET ORAL at 22:15

## 2024-02-12 RX ADMIN — FENTANYL CITRATE 25 MICROGRAM(S): 50 INJECTION INTRAVENOUS at 21:00

## 2024-02-12 RX ADMIN — PHENYLEPHRINE HYDROCHLORIDE 7.61 MICROGRAM(S)/KG/MIN: 10 INJECTION INTRAVENOUS at 07:00

## 2024-02-12 RX ADMIN — ATORVASTATIN CALCIUM 80 MILLIGRAM(S): 80 TABLET, FILM COATED ORAL at 22:15

## 2024-02-12 RX ADMIN — Medication 2: at 23:31

## 2024-02-12 RX ADMIN — Medication 10 MILLIGRAM(S): at 16:30

## 2024-02-12 RX ADMIN — CHLORHEXIDINE GLUCONATE 15 MILLILITER(S): 213 SOLUTION TOPICAL at 05:19

## 2024-02-12 NOTE — PROGRESS NOTE ADULT - SUBJECTIVE AND OBJECTIVE BOX
HOSPITAL COURSE:  73 years old male with h/o HTN, HLD, DM, h/o CVA x 2 on ASA81 with no residual deficits present to ED with complain of unsteady gait and facial droop. Patient reported unsteady gait for 2 days. Patient also noted facial droop today at around 6AM, he went to bed fine at 11PM last night ( 2/8/24). No slurred speech, vision changes. Patient reported flu like symptoms lately  Hemodynamically stable, afebrile, sat well at RA. EKG with NSR. CT head with no acute pathology. Chronic left thalamic lacunar infarct. CTA with no large vessel occlusion. 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 CT perfusion with no acute abnormality.     Admission Scores  GCS:   HH:   MF:   NIHSS:4   RASS:    CAM-ICU:   ICH:    2/9: transferred from [place], NIHSS of 4 in ED, then became more lethargic, stridorous and desaturated in ED, intubated for airway protected, repeat CTH/A obtained and brought emergently to IR for basilar stenting   2/10: worsening brainstem symptoms while on DAPT  2/12- LP performed to rule out MFS as patient's examination is not explained by the strokes on MRI.     Admission Scores  GCS:   HH:   MF:   NIHSS:   RASS:    CAM-ICU:   ICH:    Allergies    Allergy Status Unknown    Intolerances        REVIEW OF SYSTEMS: [ ] Unable to Assess due to neurologic exam   [x ] All ROS addressed below are non-contributory, except:  Neuro: [ ] Headache [ ] Back pain [ ] Numbness [ ] Weakness [ ] Ataxia [ ] Dizziness [ ] Aphasia [ ] Dysarthria [ ] Visual disturbance  Resp: [ ] Shortness of breath/dyspnea, [ ] Orthopnea [ ] Cough  CV: [ ] Chest pain [ ] Palpitation [ ] Lightheadedness [ ] Syncope  Renal: [ ] Thirst [ ] Edema  GI: [ ] Nausea [ ] Emesis [ ] Abdominal pain [ ] Constipation [ ] Diarrhea  Hem: [ ] Hematemesis [ ] bright red blood per rectum  ID: [ ] Fever [ ] Chills [ ] Dysuria  ENT: [ ] Rhinorrhea      DEVICES:   [ ] Restraints [ ] ET tube [ ] central line [ ] arterial line [ ] vences [ ] NGT/OGT [ ] EVD [ ] LD [ ] HAL/HMV [ ] Trach [ ] PEG [ ] Chest Tube     VITALS:   Vital Signs Last 24 Hrs  T(C): 37.5 (12 Feb 2024 03:00), Max: 37.9 (11 Feb 2024 15:00)  T(F): 99.5 (12 Feb 2024 03:00), Max: 100.2 (11 Feb 2024 15:00)  HR: 67 (12 Feb 2024 06:30) (50 - 110)  BP: --  BP(mean): --  RR: 16 (12 Feb 2024 06:30) (12 - 22)  SpO2: 99% (12 Feb 2024 06:30) (96% - 100%)    Parameters below as of 12 Feb 2024 03:00  Patient On (Oxygen Delivery Method): ventilator    O2 Concentration (%): 40  CAPILLARY BLOOD GLUCOSE      POCT Blood Glucose.: 121 mg/dL (12 Feb 2024 05:19)  POCT Blood Glucose.: 125 mg/dL (11 Feb 2024 23:55)  POCT Blood Glucose.: 118 mg/dL (11 Feb 2024 17:58)  POCT Blood Glucose.: 127 mg/dL (11 Feb 2024 15:39)    I&O's Summary    11 Feb 2024 07:01  -  12 Feb 2024 07:00  --------------------------------------------------------  IN: 3486.7 mL / OUT: 2327 mL / NET: 1159.7 mL        Respiratory:  Mode: AC/ CMV (Assist Control/ Continuous Mandatory Ventilation)  RR (machine): 16  TV (machine): 500  FiO2: 40  PEEP: 5  ITime: 1  MAP: 9  PIP: 19    ABG - ( 12 Feb 2024 00:50 )  pH, Arterial: 7.41  pH, Blood: x     /  pCO2: 41    /  pO2: 175   / HCO3: 26    / Base Excess: 1.2   /  SaO2: 99.5            LABS:                        12.1   10.72 )-----------( 332      ( 11 Feb 2024 22:19 )             43.0     02-11    139  |  105  |  14  ----------------------------<  139<H>  4.9   |  21<L>  |  0.80             MEDICATION LEVELS:     IVF FLUIDS/MEDICATIONS:   MEDICATIONS  (STANDING):  aspirin  chewable 81 milliGRAM(s) Oral <User Schedule>  atorvastatin 80 milliGRAM(s) Oral at bedtime  chlorhexidine 0.12% Liquid 15 milliLiter(s) Oral Mucosa every 12 hours  chlorhexidine 4% Liquid 1 Application(s) Topical daily  clopidogrel Tablet 75 milliGRAM(s) Oral daily  dexMEDEtomidine Infusion 0.2 MICROgram(s)/kG/Hr (5.08 mL/Hr) IV Continuous <Continuous>  enoxaparin Injectable 40 milliGRAM(s) SubCutaneous <User Schedule>  influenza  Vaccine (HIGH DOSE) 0.7 milliLiter(s) IntraMuscular once  insulin lispro (ADMELOG) corrective regimen sliding scale   SubCutaneous every 6 hours  pantoprazole  Injectable 40 milliGRAM(s) IV Push daily  phenylephrine    Infusion 0.2 MICROgram(s)/kG/Min (7.61 mL/Hr) IV Continuous <Continuous>  polyethylene glycol 3350 17 Gram(s) Oral daily  senna 1 Tablet(s) Oral at bedtime    MEDICATIONS  (PRN):  acetaminophen   Oral Liquid .. 650 milliGRAM(s) Oral every 6 hours PRN Mild Pain (1 - 3)  oxyCODONE    IR 5 milliGRAM(s) Oral every 4 hours PRN Moderate Pain (4 - 6)  oxyCODONE    IR 10 milliGRAM(s) Oral every 4 hours PRN Severe Pain (7 - 10)        IMAGING:      EXAMINATION:  PHYSICAL EXAM:    Constitutional: No Acute Distress, patient intubated but following commands     Neurological: Bilateral ptosis, pupils 2 mm and sluggish, restricted gaze in all directions however, vertical gaze is better than horizontal. Facial asymmetry could not be appreciated due to ETT, patient following commands with his LLE.     Motor exam:          Upper extremity                         Delt     Bicep     Tricep    HG                                                 R         0/5        0/5        0/5      2/5 (activation of muscles could be appreciated in bilateral biceps and triceps)                                               L          0/5        0/5        0/5       1/5          Lower extremity                        HF         KF        KE       DF         PF                                                  R        0/5        0/5        5/5       1/5         4/5                                               L         0/5        0/5       5/5       2/5          5/5                                                  Reflexes: Deep Tendon Reflexes absent in all 4 extremities    Pulmonary: Clear to Auscultation, No rales, No rhonchi, No wheezes     Cardiovascular: S1, S2, Regular rate and rhythm     Gastrointestinal: Soft, Non-tender, Non-distended     Extremities: No calf tenderness    HOSPITAL COURSE:  73 years old male with h/o HTN, HLD, DM, h/o CVA x 2 on ASA81 with no residual deficits present to ED with complain of unsteady gait and facial droop. Patient reported unsteady gait for 2 days. Patient also noted facial droop today at around 6AM, he went to bed fine at 11PM last night ( 2/8/24). No slurred speech, vision changes. Patient reported flu like symptoms lately  Hemodynamically stable, afebrile, sat well at RA. EKG with NSR. CT head with no acute pathology. Chronic left thalamic lacunar infarct. CTA with no large vessel occlusion. 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 CT perfusion with no acute abnormality.     Admission Scores  GCS:   HH:   MF:   NIHSS: 4   RASS:    CAM-ICU:   ICH:    2/9: transferred from [place], NIHSS of 4 in ED, then became more lethargic, stridorous and desaturated in ED, intubated for airway protected, repeat CTH/A obtained and brought emergently to IR for basilar stenting   2/10: worsening brainstem symptoms while on DAPT  2/12- LP performed to rule out MFS as patient's examination is not explained by the strokes on MRI.     Admission Scores  GCS:   HH:   MF:   NIHSS:   RASS:    CAM-ICU:   ICH:    Allergies    Allergy Status Unknown    Intolerances        REVIEW OF SYSTEMS: [ ] Unable to Assess due to neurologic exam   [x ] All ROS addressed below are non-contributory, except:  Neuro: [ ] Headache [ ] Back pain [ ] Numbness [ ] Weakness [ ] Ataxia [ ] Dizziness [ ] Aphasia [ ] Dysarthria [ ] Visual disturbance  Resp: [ ] Shortness of breath/dyspnea, [ ] Orthopnea [ ] Cough  CV: [ ] Chest pain [ ] Palpitation [ ] Lightheadedness [ ] Syncope  Renal: [ ] Thirst [ ] Edema  GI: [ ] Nausea [ ] Emesis [ ] Abdominal pain [ ] Constipation [ ] Diarrhea  Hem: [ ] Hematemesis [ ] bright red blood per rectum  ID: [ ] Fever [ ] Chills [ ] Dysuria  ENT: [ ] Rhinorrhea      DEVICES:   [ ] Restraints [ ] ET tube [ ] central line [ ] arterial line [ ] vences [ ] NGT/OGT [ ] EVD [ ] LD [ ] HAL/HMV [ ] Trach [ ] PEG [ ] Chest Tube     VITALS:   Vital Signs Last 24 Hrs  T(C): 37.5 (12 Feb 2024 03:00), Max: 37.9 (11 Feb 2024 15:00)  T(F): 99.5 (12 Feb 2024 03:00), Max: 100.2 (11 Feb 2024 15:00)  HR: 67 (12 Feb 2024 06:30) (50 - 110)  BP: --  BP(mean): --  RR: 16 (12 Feb 2024 06:30) (12 - 22)  SpO2: 99% (12 Feb 2024 06:30) (96% - 100%)    Parameters below as of 12 Feb 2024 03:00  Patient On (Oxygen Delivery Method): ventilator    O2 Concentration (%): 40  CAPILLARY BLOOD GLUCOSE      POCT Blood Glucose.: 121 mg/dL (12 Feb 2024 05:19)  POCT Blood Glucose.: 125 mg/dL (11 Feb 2024 23:55)  POCT Blood Glucose.: 118 mg/dL (11 Feb 2024 17:58)  POCT Blood Glucose.: 127 mg/dL (11 Feb 2024 15:39)    I&O's Summary    11 Feb 2024 07:01  -  12 Feb 2024 07:00  --------------------------------------------------------  IN: 3486.7 mL / OUT: 2327 mL / NET: 1159.7 mL        Respiratory:  Mode: AC/ CMV (Assist Control/ Continuous Mandatory Ventilation)  RR (machine): 16  TV (machine): 500  FiO2: 40  PEEP: 5  ITime: 1  MAP: 9  PIP: 19    ABG - ( 12 Feb 2024 00:50 )  pH, Arterial: 7.41  pH, Blood: x     /  pCO2: 41    /  pO2: 175   / HCO3: 26    / Base Excess: 1.2   /  SaO2: 99.5            LABS:                        12.1   10.72 )-----------( 332      ( 11 Feb 2024 22:19 )             43.0     02-11    139  |  105  |  14  ----------------------------<  139<H>  4.9   |  21<L>  |  0.80             MEDICATION LEVELS:     IVF FLUIDS/MEDICATIONS:   MEDICATIONS  (STANDING):  aspirin  chewable 81 milliGRAM(s) Oral <User Schedule>  atorvastatin 80 milliGRAM(s) Oral at bedtime  chlorhexidine 0.12% Liquid 15 milliLiter(s) Oral Mucosa every 12 hours  chlorhexidine 4% Liquid 1 Application(s) Topical daily  clopidogrel Tablet 75 milliGRAM(s) Oral daily  dexMEDEtomidine Infusion 0.2 MICROgram(s)/kG/Hr (5.08 mL/Hr) IV Continuous <Continuous>  enoxaparin Injectable 40 milliGRAM(s) SubCutaneous <User Schedule>  influenza  Vaccine (HIGH DOSE) 0.7 milliLiter(s) IntraMuscular once  insulin lispro (ADMELOG) corrective regimen sliding scale   SubCutaneous every 6 hours  pantoprazole  Injectable 40 milliGRAM(s) IV Push daily  phenylephrine    Infusion 0.2 MICROgram(s)/kG/Min (7.61 mL/Hr) IV Continuous <Continuous>  polyethylene glycol 3350 17 Gram(s) Oral daily  senna 1 Tablet(s) Oral at bedtime    MEDICATIONS  (PRN):  acetaminophen   Oral Liquid .. 650 milliGRAM(s) Oral every 6 hours PRN Mild Pain (1 - 3)  oxyCODONE    IR 5 milliGRAM(s) Oral every 4 hours PRN Moderate Pain (4 - 6)  oxyCODONE    IR 10 milliGRAM(s) Oral every 4 hours PRN Severe Pain (7 - 10)        IMAGING:      EXAMINATION:  PHYSICAL EXAM:    Constitutional: No Acute Distress, patient intubated but following commands     Neurological: Bilateral ptosis, pupils 2 mm and sluggish, restricted gaze in all directions however, vertical gaze is better than horizontal. Facial asymmetry could not be appreciated due to ETT, patient following commands with his LLE.     Motor exam:          Upper extremity                         Delt     Bicep     Tricep    HG                                                 R         0/5        0/5        0/5      2/5 (activation of muscles could be appreciated in bilateral biceps and triceps)                                               L          0/5        0/5        0/5       1/5          Lower extremity                        HF         KF        KE       DF         PF                                                  R        0/5        0/5        5/5       1/5         4/5                                               L         0/5        0/5       5/5       2/5          5/5                                                  Reflexes: Deep Tendon Reflexes absent in all 4 extremities    Pulmonary: Clear to Auscultation, No rales, No rhonchi, No wheezes     Cardiovascular: S1, S2, Regular rate and rhythm     Gastrointestinal: Soft, Non-tender, Non-distended     Extremities: No calf tenderness

## 2024-02-12 NOTE — PROGRESS NOTE ADULT - SUBJECTIVE AND OBJECTIVE BOX
Interval events:    VITALS:  T(C): , Max: 37.6 (02-12-24 @ 15:00)  HR:  (52 - 133)  BP: --  ABP:  (106/60 - 234/99)  RR:  (12 - 21)  SpO2:  (92% - 100%)  Wt(kg): --  Device: Avea, Mode: AC/ CMV (Assist Control/ Continuous Mandatory Ventilation), RR (machine): 16, TV (machine): 500, FiO2: 40, PEEP: 5, ITime: 1, MAP: 10, PIP: 20    02-11-24 @ 07:01  -  02-12-24 @ 07:00  --------------------------------------------------------  IN: 3562.8 mL / OUT: 2327 mL / NET: 1235.8 mL    02-12-24 @ 07:01  -  02-12-24 @ 23:29  --------------------------------------------------------  IN: 1468.3 mL / OUT: 750 mL / NET: 718.3 mL      LABS:  Na: 144 (02-12 @ 16:22), 140 (02-12 @ 12:38), 139 (02-11 @ 22:19), 139 (02-11 @ 00:22), 140 (02-10 @ 14:19), 134 (02-10 @ 01:44)  K: 4.2 (02-12 @ 16:22), 4.2 (02-12 @ 12:38), 4.9 (02-11 @ 22:19), 4.5 (02-11 @ 00:22), 4.9 (02-10 @ 14:19), 3.7 (02-10 @ 01:44)  Cl: 109 (02-12 @ 16:22), 104 (02-12 @ 12:38), 105 (02-11 @ 22:19), 104 (02-11 @ 00:22), 103 (02-10 @ 14:19), 98 (02-10 @ 01:44)  CO2: 25 (02-12 @ 16:22), 26 (02-12 @ 12:38), 21 (02-11 @ 22:19), 23 (02-11 @ 00:22), 22 (02-10 @ 14:19), 22 (02-10 @ 01:44)  BUN: 16 (02-12 @ 16:22), 15 (02-12 @ 12:38), 14 (02-11 @ 22:19), 10 (02-11 @ 00:22), 11 (02-10 @ 14:19), 11 (02-10 @ 01:44)  Cr: 0.76 (02-12 @ 16:22), 0.76 (02-12 @ 12:38), 0.80 (02-11 @ 22:19), 0.84 (02-11 @ 00:22), 0.98 (02-10 @ 14:19), 1.05 (02-10 @ 01:44)  Glu: 205(02-12 @ 16:22), 164(02-12 @ 12:38), 139(02-11 @ 22:19), 120(02-11 @ 00:22), 144(02-10 @ 14:19), 131(02-10 @ 01:44)    Hgb: 12.1 (02-11 @ 22:19), 12.2 (02-11 @ 00:22), 12.3 (02-10 @ 01:43)  Hct: 43.0 (02-11 @ 22:19), 44.3 (02-11 @ 00:22), 42.7 (02-10 @ 01:43)  WBC: 10.72 (02-11 @ 22:19), 11.93 (02-11 @ 00:22), 9.27 (02-10 @ 01:43)  Plt: 332 (02-11 @ 22:19), 366 (02-11 @ 00:22), 349 (02-10 @ 01:43)    INR: 1.28 02-12-24 @ 12:40, 1.18 02-10-24 @ 01:36  PTT: 26.6 02-12-24 @ 12:40, 26.3 02-10-24 @ 09:16, 39.0 02-10-24 @ 01:36    MEDICATIONS:  acetaminophen   Oral Liquid .. 650 milliGRAM(s) Oral every 6 hours PRN  aspirin  chewable 81 milliGRAM(s) Oral <User Schedule>  atorvastatin 80 milliGRAM(s) Oral at bedtime  chlorhexidine 0.12% Liquid 15 milliLiter(s) Oral Mucosa every 12 hours  chlorhexidine 4% Liquid 1 Application(s) Topical daily  dexMEDEtomidine Infusion 0.2 MICROgram(s)/kG/Hr IV Continuous <Continuous>  enoxaparin Injectable 40 milliGRAM(s) SubCutaneous <User Schedule>  influenza  Vaccine (HIGH DOSE) 0.7 milliLiter(s) IntraMuscular once  insulin lispro (ADMELOG) corrective regimen sliding scale   SubCutaneous every 6 hours  niCARdipine Infusion 5 mG/Hr IV Continuous <Continuous>  oxyCODONE    IR 5 milliGRAM(s) Oral every 4 hours PRN  oxyCODONE    IR 10 milliGRAM(s) Oral every 4 hours PRN  pantoprazole  Injectable 40 milliGRAM(s) IV Push daily  phenylephrine    Infusion 0.2 MICROgram(s)/kG/Min IV Continuous <Continuous>  polyethylene glycol 3350 17 Gram(s) Oral daily  senna 1 Tablet(s) Oral at bedtime    EXAMINATION:  General:  in NAD  HEENT: intubated  Neuro: unable to open eyes but alert and able to follow commands, communicated by wiggling his toes, oriented to self and year by moving toes, with severe ophthalmoplegia and minimal eye movement in any direction, no cough, with 2/5 distal movement in arms and feet but no proximal movement  Cards:  RRR  Respiratory:  no respiratory distress  Abdomen:  soft  Extremities:  no LE edema    Assessment/Plan:   72yo man with HTN, HLD, DM, h/o 2 stroke on ASA81 2 years prior, who presented 2/7 with 1.5 weeks of worsening cough and several days of dysphagia, at [place] ED developed dysarthria. CTH without acute findings, CTA concerning for a basilar stenosis for which the patient was transferred to [facility] ED where he required intubation. Angiogram with moderate basilar stenosis with no intervention done. With worsening exam with loss of corneals and cough on 2/10 but still following commands and moving distal extremities. MRI brain before and after worsening with small strokes in the cerebellum likely 2/2 angiogram. MRI C/T/L spine with cord lesions at the C3 and C4 which suggest inflammatory/infectious/demyelinating etiology, also with some nerve root enhancement in the L spine. 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     f/u LP studies including CSF PCR, ACE, Lyme, crypto, CMV, West Nile, VDRL, CSF autoimmune encephalitis panel   send GI PCR  f/u anti-GQ1b and ganglioside Abs, ACE serum, serum autoimmune encephalitis panel   c/w DAPT  SBP goal 160-200 with genny for potentially symptomatic basilar stenosis, trops and EKG  ABG  restart feeds, senna and miralax     Victorina Bettencourt  Neurocritical Care Attending   Interval events:  This evening with a transient episode of not following commands. CT/CTA stable. Then following commands again.     VITALS:  T(C): , Max: 37.6 (02-12-24 @ 15:00)  HR:  (52 - 133)  BP: --  ABP:  (106/60 - 234/99)  RR:  (12 - 21)  SpO2:  (92% - 100%)  Wt(kg): --  Device: Avea, Mode: AC/ CMV (Assist Control/ Continuous Mandatory Ventilation), RR (machine): 16, TV (machine): 500, FiO2: 40, PEEP: 5, ITime: 1, MAP: 10, PIP: 20    02-11-24 @ 07:01  -  02-12-24 @ 07:00  --------------------------------------------------------  IN: 3562.8 mL / OUT: 2327 mL / NET: 1235.8 mL    02-12-24 @ 07:01  -  02-12-24 @ 23:29  --------------------------------------------------------  IN: 1468.3 mL / OUT: 750 mL / NET: 718.3 mL      LABS:  Na: 144 (02-12 @ 16:22), 140 (02-12 @ 12:38), 139 (02-11 @ 22:19), 139 (02-11 @ 00:22), 140 (02-10 @ 14:19), 134 (02-10 @ 01:44)  K: 4.2 (02-12 @ 16:22), 4.2 (02-12 @ 12:38), 4.9 (02-11 @ 22:19), 4.5 (02-11 @ 00:22), 4.9 (02-10 @ 14:19), 3.7 (02-10 @ 01:44)  Cl: 109 (02-12 @ 16:22), 104 (02-12 @ 12:38), 105 (02-11 @ 22:19), 104 (02-11 @ 00:22), 103 (02-10 @ 14:19), 98 (02-10 @ 01:44)  CO2: 25 (02-12 @ 16:22), 26 (02-12 @ 12:38), 21 (02-11 @ 22:19), 23 (02-11 @ 00:22), 22 (02-10 @ 14:19), 22 (02-10 @ 01:44)  BUN: 16 (02-12 @ 16:22), 15 (02-12 @ 12:38), 14 (02-11 @ 22:19), 10 (02-11 @ 00:22), 11 (02-10 @ 14:19), 11 (02-10 @ 01:44)  Cr: 0.76 (02-12 @ 16:22), 0.76 (02-12 @ 12:38), 0.80 (02-11 @ 22:19), 0.84 (02-11 @ 00:22), 0.98 (02-10 @ 14:19), 1.05 (02-10 @ 01:44)  Glu: 205(02-12 @ 16:22), 164(02-12 @ 12:38), 139(02-11 @ 22:19), 120(02-11 @ 00:22), 144(02-10 @ 14:19), 131(02-10 @ 01:44)    Hgb: 12.1 (02-11 @ 22:19), 12.2 (02-11 @ 00:22), 12.3 (02-10 @ 01:43)  Hct: 43.0 (02-11 @ 22:19), 44.3 (02-11 @ 00:22), 42.7 (02-10 @ 01:43)  WBC: 10.72 (02-11 @ 22:19), 11.93 (02-11 @ 00:22), 9.27 (02-10 @ 01:43)  Plt: 332 (02-11 @ 22:19), 366 (02-11 @ 00:22), 349 (02-10 @ 01:43)    INR: 1.28 02-12-24 @ 12:40, 1.18 02-10-24 @ 01:36  PTT: 26.6 02-12-24 @ 12:40, 26.3 02-10-24 @ 09:16, 39.0 02-10-24 @ 01:36    MEDICATIONS:  acetaminophen   Oral Liquid .. 650 milliGRAM(s) Oral every 6 hours PRN  aspirin  chewable 81 milliGRAM(s) Oral <User Schedule>  atorvastatin 80 milliGRAM(s) Oral at bedtime  chlorhexidine 0.12% Liquid 15 milliLiter(s) Oral Mucosa every 12 hours  chlorhexidine 4% Liquid 1 Application(s) Topical daily  dexMEDEtomidine Infusion 0.2 MICROgram(s)/kG/Hr IV Continuous <Continuous>  enoxaparin Injectable 40 milliGRAM(s) SubCutaneous <User Schedule>  influenza  Vaccine (HIGH DOSE) 0.7 milliLiter(s) IntraMuscular once  insulin lispro (ADMELOG) corrective regimen sliding scale   SubCutaneous every 6 hours  niCARdipine Infusion 5 mG/Hr IV Continuous <Continuous>  oxyCODONE    IR 5 milliGRAM(s) Oral every 4 hours PRN  oxyCODONE    IR 10 milliGRAM(s) Oral every 4 hours PRN  pantoprazole  Injectable 40 milliGRAM(s) IV Push daily  phenylephrine    Infusion 0.2 MICROgram(s)/kG/Min IV Continuous <Continuous>  polyethylene glycol 3350 17 Gram(s) Oral daily  senna 1 Tablet(s) Oral at bedtime    EXAMINATION:  Please see exam from daytime, stable    Assessment/Plan:   72yo man with HTN, HLD, DM, h/o 2 stroke on ASA81 2 years prior, who presented 2/7 with 1.5 weeks of worsening cough and several days of dysphagia, at [place] ED developed dysarthria. CTH without acute findings, CTA concerning for a basilar stenosis for which the patient was transferred to [facility] ED where he required intubation. Angiogram with moderate basilar stenosis with no intervention done. With worsening exam with loss of corneals and cough on 2/10 but still following commands and moving distal extremities. MRI brain before and after worsening with small strokes in the cerebellum likely 2/2 angiogram. MRI C/T/L spine with cord lesions at the C3 and C4 which suggest inflammatory/infectious/demyelinating etiology, also with some nerve root enhancement in the L spine. 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   LP with OP 28, WBC 11 with lymphocytic predominance, P 67, G 83, CSF PCR neg.     f/u LP studies including CSF, ACE, Lyme, crypto, CMV, West Nile, VDRL, CSF autoimmune encephalitis panel   send GI PCR  f/u anti-GQ1b and ganglioside Abs, ACE serum, serum autoimmune encephalitis panel   c/w ASA  SBP goal 100-200  ABG  c/w feeds, senna and miralax, LBM 2/7, add fleet enema   voiding   Lov ppx     Victorina Bettencourt  Neurocritical Care Attending

## 2024-02-12 NOTE — PROGRESS NOTE ADULT - SUBJECTIVE AND OBJECTIVE BOX
Patient seen and examined at bedside.    --Anticoagulation--  aspirin  chewable 81 milliGRAM(s) Oral <User Schedule>  clopidogrel Tablet 75 milliGRAM(s) Oral daily  enoxaparin Injectable 40 milliGRAM(s) SubCutaneous <User Schedule>    T(C): 36.3 (02-10-24 @ 23:00), Max: 36.7 (02-10-24 @ 03:00)  HR: 78 (02-11-24 @ 00:45) (64 - 121)  BP: 209/114 (02-10-24 @ 12:30) (110/68 - 209/114)  RR: 13 (02-11-24 @ 00:45) (11 - 27)  SpO2: 100% (02-11-24 @ 00:45) (93% - 100%)  Wt(kg): --    Exam: Intubated, no EO, Ox3 w/choices, PERRL, no horizontal eye movement, FC, BUE 2/5 (mainly wrist pronation), BLE 2/5

## 2024-02-12 NOTE — CONSULT NOTE ADULT - SUBJECTIVE AND OBJECTIVE BOX
HPI:  73 years old male with h/o HTN, HLD, DM, h/o CVA x 2 on ASA81 with no residual deficits presented to ED as a transfer from [facility] with complaint of unsteady gait and facial droop. Patient reported unsteady gait for 2 days. Patient also noted new facial droop prior to presentation. He denied slurred speech and vision changes at time of admission. Patient reported flu like symptoms in the time leading to hospitalization. The patient was transferred to [facility] for neurologic deterioration with difficulty clearing secretions, stridor, and O2 desaturation requiring intubation.     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    The patient is currently intubated and unable to verbally communicate.    Male    73y    PAST MEDICAL & SURGICAL HISTORY:  HTN (hypertension)  HLD (hyperlipidemia)  DM2 (diabetes mellitus, type 2)  CVA (cerebrovascular accident)  No significant past surgical history        MEDICATIONS  (STANDING):  aspirin  chewable 81 milliGRAM(s) Oral <User Schedule>  atorvastatin 80 milliGRAM(s) Oral at bedtime  bisacodyl Suppository 10 milliGRAM(s) Rectal once  chlorhexidine 0.12% Liquid 15 milliLiter(s) Oral Mucosa every 12 hours  chlorhexidine 4% Liquid 1 Application(s) Topical daily  dexMEDEtomidine Infusion 0.2 MICROgram(s)/kG/Hr (5.08 mL/Hr) IV Continuous <Continuous>  enoxaparin Injectable 40 milliGRAM(s) SubCutaneous <User Schedule>  influenza  Vaccine (HIGH DOSE) 0.7 milliLiter(s) IntraMuscular once  insulin lispro (ADMELOG) corrective regimen sliding scale   SubCutaneous every 6 hours  pantoprazole  Injectable 40 milliGRAM(s) IV Push daily  phenylephrine    Infusion 0.2 MICROgram(s)/kG/Min (7.61 mL/Hr) IV Continuous <Continuous>  polyethylene glycol 3350 17 Gram(s) Oral daily  senna 1 Tablet(s) Oral at bedtime    MEDICATIONS  (PRN):  acetaminophen   Oral Liquid .. 650 milliGRAM(s) Oral every 6 hours PRN Mild Pain (1 - 3)  oxyCODONE    IR 5 milliGRAM(s) Oral every 4 hours PRN Moderate Pain (4 - 6)  oxyCODONE    IR 10 milliGRAM(s) Oral every 4 hours PRN Severe Pain (7 - 10)      Allergies    Allergy Status Unknown    Intolerances        REVIEW OF SYSTEMS:    CONSTITUTIONAL: No weakness, fevers or chills  EYES/ENT: No visual changes;  No vertigo or throat pain   RESPIRATORY: No cough, wheezing, hemoptysis; No shortness of breath  CARDIOVASCULAR: No chest pain or palpitations  GASTROINTESTINAL: No abdominal or epigastric pain. No nausea, vomiting, or hematemesis; No diarrhea or constipation. No melena or hematochezia.  MUSCULOSKELETAL: Full range of motion  NEUROLOGICAL: No numbness or weakness  HEMATOLOGY: No anemia, no bleeding  SKIN: No itching, burning, rashes, petechia, or lesions  ENDOCRINE: No diabetes, no thyroid disease  All other review of systems is negative unless indicated above.  Unable to obtain:    Vital Signs Last 24 Hrs  T(C): 37 (12 Feb 2024 11:00), Max: 37.9 (11 Feb 2024 15:00)  T(F): 98.6 (12 Feb 2024 11:00), Max: 100.2 (11 Feb 2024 15:00)  HR: 102 (12 Feb 2024 12:30) (50 - 110)  BP: --  BP(mean): --  RR: 16 (12 Feb 2024 12:30) (12 - 22)  SpO2: 96% (12 Feb 2024 12:30) (94% - 100%)    Parameters below as of 12 Feb 2024 03:00  Patient On (Oxygen Delivery Method): ventilator    O2 Concentration (%): 40    Daily     Daily     PHYSICAL EXAM:  Constitutional: No Acute Distress, patient intubated but following commands   Neurological: Patient able to follow commands. Facial asymmetry could not be appreciated due to ETT.    Motor exam: Weakness in all 4 extremities                                                Pulmonary: Patient intubated on ventilator    Cardiovascular: S1, S2, Regular rate and rhythm     Gastrointestinal: Soft, Non-tender, Non-distended     Extremities: No calf tenderness                             12.1   10.72 )-----------( 332      ( 11 Feb 2024 22:19 )             43.0       Hematocrit: 43.0 % (02-11 @ 22:19)  Hematocrit: 44.3 % (02-11 @ 00:22)  Hematocrit: 42.7 % (02-10 @ 01:43)  Hematocrit: 43.3 % (02-09 @ 19:05)    02-11    139  |  105  |  14  ----------------------------<  139<H>  4.9   |  21<L>  |  0.80    Ca    8.2<L>      11 Feb 2024 22:19  Phos  3.0     02-11  Mg     2.1     02-11    TPro  6.9  /  Alb  x   /  TBili  x   /  DBili  x   /  AST  x   /  ALT  x   /  AlkPhos  x   02-11                                           HPI:  73 years old male with h/o HTN, HLD, DM, h/o CVA x 2 on ASA81 with no residual deficits presented to ED as a transfer from [facility] with complaint of unsteady gait and facial droop. Patient reported unsteady gait for 2 days. Patient also noted new facial droop prior to presentation. He denied slurred speech and vision changes at time of admission. Patient reported flu like symptoms in the time leading to hospitalization. The patient was transferred to [facility] for neurologic deterioration with difficulty clearing secretions, stridor, and O2 desaturation requiring intubation.     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    The patient is currently intubated and unable to verbally communicate.    Male    73y    PAST MEDICAL & SURGICAL HISTORY:  HTN (hypertension)  HLD (hyperlipidemia)  DM2 (diabetes mellitus, type 2)  CVA (cerebrovascular accident)  No significant past surgical history        MEDICATIONS  (STANDING):  aspirin  chewable 81 milliGRAM(s) Oral <User Schedule>  atorvastatin 80 milliGRAM(s) Oral at bedtime  bisacodyl Suppository 10 milliGRAM(s) Rectal once  chlorhexidine 0.12% Liquid 15 milliLiter(s) Oral Mucosa every 12 hours  chlorhexidine 4% Liquid 1 Application(s) Topical daily  dexMEDEtomidine Infusion 0.2 MICROgram(s)/kG/Hr (5.08 mL/Hr) IV Continuous <Continuous>  enoxaparin Injectable 40 milliGRAM(s) SubCutaneous <User Schedule>  influenza  Vaccine (HIGH DOSE) 0.7 milliLiter(s) IntraMuscular once  insulin lispro (ADMELOG) corrective regimen sliding scale   SubCutaneous every 6 hours  pantoprazole  Injectable 40 milliGRAM(s) IV Push daily  phenylephrine    Infusion 0.2 MICROgram(s)/kG/Min (7.61 mL/Hr) IV Continuous <Continuous>  polyethylene glycol 3350 17 Gram(s) Oral daily  senna 1 Tablet(s) Oral at bedtime    MEDICATIONS  (PRN):  acetaminophen   Oral Liquid .. 650 milliGRAM(s) Oral every 6 hours PRN Mild Pain (1 - 3)  oxyCODONE    IR 5 milliGRAM(s) Oral every 4 hours PRN Moderate Pain (4 - 6)  oxyCODONE    IR 10 milliGRAM(s) Oral every 4 hours PRN Severe Pain (7 - 10)      Allergies    Allergy Status Unknown    Intolerances        REVIEW OF SYSTEMS:  Unable to obtain    Vital Signs Last 24 Hrs  T(C): 37 (12 Feb 2024 11:00), Max: 37.9 (11 Feb 2024 15:00)  T(F): 98.6 (12 Feb 2024 11:00), Max: 100.2 (11 Feb 2024 15:00)  HR: 102 (12 Feb 2024 12:30) (50 - 110)  BP: --  BP(mean): --  RR: 16 (12 Feb 2024 12:30) (12 - 22)  SpO2: 96% (12 Feb 2024 12:30) (94% - 100%)    Parameters below as of 12 Feb 2024 03:00  Patient On (Oxygen Delivery Method): ventilator    O2 Concentration (%): 40    Daily     Daily     PHYSICAL EXAM:  Constitutional: No Acute Distress, patient intubated but following commands   Eyes: Unable to open eyes  Neurological: Patient able to follow commands. Facial asymmetry could not be appreciated due to ETT.  Motor exam: Weakness in all 4 extremities                                              Pulmonary: Patient intubated on ventilator  Cardiovascular: S1, S2, Regular rate and rhythm   Gastrointestinal: Soft, Non-tender, Non-distended   Extremities: No calf tenderness   Skin: No rash, no petechia  Catheters/Tubes/Wires: Intubated, RIJ in place                                  12.1   10.72 )-----------( 332      ( 11 Feb 2024 22:19 )             43.0       Hematocrit: 43.0 % (02-11 @ 22:19)  Hematocrit: 44.3 % (02-11 @ 00:22)  Hematocrit: 42.7 % (02-10 @ 01:43)  Hematocrit: 43.3 % (02-09 @ 19:05)    02-11    139  |  105  |  14  ----------------------------<  139<H>  4.9   |  21<L>  |  0.80    Ca    8.2<L>      11 Feb 2024 22:19  Phos  3.0     02-11  Mg     2.1     02-11    TPro  6.9  /  Alb  x   /  TBili  x   /  DBili  x   /  AST  x   /  ALT  x   /  AlkPhos  x   02-11                                                                                 HPI:  73 years old male with h/o HTN, HLD, DM, h/o CVA x 2 on ASA81 with no residual deficits presented to ED as a transfer from [facility] with complaint of unsteady gait and facial droop. Patient reported unsteady gait for 2 days. Patient also noted new facial droop prior to presentation. He denied slurred speech and vision changes at time of admission. Patient reported flu like symptoms in the time leading to hospitalization. The patient was transferred to [facility] for neurologic deterioration with difficulty clearing secretions, stridor, and O2 desaturation requiring intubation.     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    The patient is currently intubated and unable to verbally communicate.    PAST MEDICAL & SURGICAL HISTORY:  HTN (hypertension)  HLD (hyperlipidemia)  DM2 (diabetes mellitus, type 2)  CVA (cerebrovascular accident)  No significant past surgical history      MEDICATIONS  (STANDING):  aspirin  chewable 81 milliGRAM(s) Oral <User Schedule>  atorvastatin 80 milliGRAM(s) Oral at bedtime  bisacodyl Suppository 10 milliGRAM(s) Rectal once  chlorhexidine 0.12% Liquid 15 milliLiter(s) Oral Mucosa every 12 hours  chlorhexidine 4% Liquid 1 Application(s) Topical daily  dexMEDEtomidine Infusion 0.2 MICROgram(s)/kG/Hr (5.08 mL/Hr) IV Continuous <Continuous>  enoxaparin Injectable 40 milliGRAM(s) SubCutaneous <User Schedule>  influenza  Vaccine (HIGH DOSE) 0.7 milliLiter(s) IntraMuscular once  insulin lispro (ADMELOG) corrective regimen sliding scale   SubCutaneous every 6 hours  pantoprazole  Injectable 40 milliGRAM(s) IV Push daily  phenylephrine    Infusion 0.2 MICROgram(s)/kG/Min (7.61 mL/Hr) IV Continuous <Continuous>  polyethylene glycol 3350 17 Gram(s) Oral daily  senna 1 Tablet(s) Oral at bedtime    MEDICATIONS  (PRN):  acetaminophen   Oral Liquid .. 650 milliGRAM(s) Oral every 6 hours PRN Mild Pain (1 - 3)  oxyCODONE    IR 5 milliGRAM(s) Oral every 4 hours PRN Moderate Pain (4 - 6)  oxyCODONE    IR 10 milliGRAM(s) Oral every 4 hours PRN Severe Pain (7 - 10)      Allergies    Allergy Status Unknown    Intolerances        REVIEW OF SYSTEMS:  Unable to obtain    Vital Signs Last 24 Hrs  T(C): 37 (12 Feb 2024 11:00), Max: 37.9 (11 Feb 2024 15:00)  T(F): 98.6 (12 Feb 2024 11:00), Max: 100.2 (11 Feb 2024 15:00)  HR: 102 (12 Feb 2024 12:30) (50 - 110)  BP: --  BP(mean): --  RR: 16 (12 Feb 2024 12:30) (12 - 22)  SpO2: 96% (12 Feb 2024 12:30) (94% - 100%)    Parameters below as of 12 Feb 2024 03:00  Patient On (Oxygen Delivery Method): ventilator    O2 Concentration (%): 40    Daily     Daily     PHYSICAL EXAM:  Constitutional: No Acute Distress, patient intubated but following commands   Eyes: Unable to open eyes  Neurological: Patient able to follow commands. Facial asymmetry could not be appreciated due to ETT.  Motor exam: Weakness in all 4 extremities                                              Pulmonary: Patient intubated on ventilator  Cardiovascular: S1, S2, Regular rate and rhythm   Gastrointestinal: Soft, Non-tender, Non-distended   Extremities: No calf tenderness   Skin: No rash, no petechia  Catheters/Tubes/Wires: Intubated, RIJ in place                                12.1   10.72 )-----------( 332      ( 11 Feb 2024 22:19 )             43.0       Hematocrit: 43.0 % (02-11 @ 22:19)  Hematocrit: 44.3 % (02-11 @ 00:22)  Hematocrit: 42.7 % (02-10 @ 01:43)  Hematocrit: 43.3 % (02-09 @ 19:05)    02-11    139  |  105  |  14  ----------------------------<  139<H>  4.9   |  21<L>  |  0.80    Ca    8.2<L>      11 Feb 2024 22:19  Phos  3.0     02-11  Mg     2.1     02-11    TPro  6.9  /  Alb  x   /  TBili  x   /  DBili  x   /  AST  x   /  ALT  x   /  AlkPhos  x   02-11

## 2024-02-12 NOTE — CONSULT NOTE ADULT - ASSESSMENT
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 Agree clinical picture seems more consistent with AIDP/GBS (or variant) but cannot completely exclude other mimic such as infection or inflammation. MRI brain, personally reviewed by me, with small b/l cerebellar strokes (post-procedural) and prior L thalamic strokes which would not explain current clinical deficits and progressive nature. CSF shows mild lymphocytic pleiocystosis and increased protein which could be secondary to inflammatory or viral/infectious process. MRI spine, personally reviewed by me, with C3-C4 medullary lesion that does not enhance (demyelinating?) and some subtle enhancement of the L4-L5 nerve roots which could be due to stenosis at this level or inflammatory, neoplastic, or infectious process  - Send serum studies for anti-ganglioside Ab panel, autoimmune encephalopathy panel, WNV, Lyme, CRP, Vitamin D 25 OH, B1, B6, copper, ACE< FLORESITA, dsDNA, quantiferon TB gold, NMO, MOG  - Send CSF studies for oligoclonal bands, myelin basic protein, VZV PCR, HSV 1/2 PCR, WNV, cytology, flow cytometry, ACE, VDRL, autoimmune encephalopathy panel  - No neurologic contraindications to PLEX therapy (even if viral infection). Would consider round of IVIG following PLEX if no other medical contraindications pending on clinical improvement  - Continue to address above medical problems, as you are doing  - Will continue to follow patient peripherally with you, please call [phone]) with additional questions or concerns

## 2024-02-12 NOTE — DISCHARGE NOTE NURSING/CASE MANAGEMENT/SOCIAL WORK - PATIENT PORTAL LINK FT
You can access the FollowMyHealth Patient Portal offered by [facility] by registering at the following website: [email or web address]. By joining [facility]’s FollowMyHealth portal, you will also be able to view your health information using other applications (apps) compatible with our system.

## 2024-02-12 NOTE — CHART NOTE - NSCHARTNOTEFT_GEN_A_CORE
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    Transfusion Medicine was consulted to perform Therapeutic Plasma Exchange (TPE) in the setting of Guillan-[place] Syndrome.    Plan for 5 TPE over approximately 10 days, once every other day.    TPE #1 (02/12/2024). TPE #1 was performed on 02/12/24. One plasma volume was exchanged with 5% albumin and 3g of calcium was given throughout the procedure. Patient tolerated the procedure well.    TPE #2 planned for 02/14/24

## 2024-02-12 NOTE — PROGRESS NOTE ADULT - ASSESSMENT
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     NEURO:  Neurochecks Q4 hours  MRI spine with cord lesions at C3 and C4 with nerve root enhancement in the L spine  Will follow up rest of the autoimmune/infectious panel   f/u LP studies including CSF PCR, ACE, Lyme, crypto, CMV, West Nile, VDRL, CSF autoimmune encephalitis panel   send GI PCR  f/u anti-GQ1b and ganglioside Abs, ACE serum, serum autoimmune encephalitis panel   CSF WBC 11, protein 67, Glucose 83  Patient will need PLEX, will obtain central line access today   c/w DAPT  Activity: [] mobilize as tolerated [] Bedrest [] PT [] OT [] PMNR    PULM:  Intubated for airway protection  Patient may need early trach- will discuss with family   CXR clear    CV:   SBP goal 100-160 mmHg  Continue DAPT  TTE- EF 66%    RENAL:  Fluids: IVL if tolerating diet    GI:  Diet: TF AT goal?   GI prophylaxis [] not indicated [x] PPI [] other:  Bowel regimen [] colace [] senna [] other:    ENDO:   Goal euglycemia (-180)  A1c 6.8    HEME/ONC:  H/H stable   VTE prophylaxis: [x] SCDs [x] chemoprophylaxis- SQL [] hold chemoprophylaxis due to: [] high risk of DVT/PE on admission due to:    ID:  Afebrile, improved leukocytosis     MISC:    SOCIAL/FAMILY:  [] awaiting [x] updated at bedside [] family meeting    CODE STATUS:  [x] Full Code [] DNR [] DNI [] Palliative/Comfort Care    DISPOSITION:  [x] ICU [] Stroke Unit [] Floor [] EMU [] RCU [] PCU    [x] Patient is at high risk of neurologic deterioration/death due to: infection     Time seen:  Time spent: ___ [] critical care minutes    Contact: [phone] ASSESSMENT/PLAN: 72 y/o male with multiple comorbidities presenting initially with suspicious of basilar stroke however, 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     NEURO:  Neurochecks Q4 hours  MRI spine with cord lesions at C3 and C4 with nerve root enhancement in the L spine  Will follow up rest of the autoimmune/infectious panel   f/u LP studies including CSF PCR, ACE, Lyme, crypto, CMV, West Nile, VDRL, CSF autoimmune encephalitis panel   send GI PCR  f/u anti-GQ1b and ganglioside Abs, ACE serum, serum autoimmune encephalitis panel   CSF WBC 11, protein 67, Glucose 83  Patient will need PLEX, will obtain central line access today   C/W ASA   Neuromuscular consultation   Activity: [] mobilize as tolerated [] Bedrest [] PT [] OT [] PMNR    PULM:  Intubated for airway protection  16/500/5/40%- ABG reviewed, will decrease FiO2 to 30%  Patient may need early trach- will discuss with family   CXR clear    CV:   SBP goal 120-200 with genny for potentially symptomatic basilar stenosis, trops and EKG  Continue DAPT  TTE- EF 66%  R- axillary A line     RENAL:  Fluids: IVL if tolerating diet    GI:  Diet: TF AT goal  GI prophylaxis [] not indicated [x] PPI [] other:  Bowel regimen [x] colace [x] senna, dulcolax suppository   LBM PTA     ENDO:   Goal euglycemia (-180)  A1c 6.8    HEME/ONC:  H/H stable   VTE prophylaxis: [x] SCDs [x] chemoprophylaxis- SQL [] hold chemoprophylaxis due to: [] high risk of DVT/PE on admission due to:    ID:  Afebrile, improved leukocytosis     MISC:    SOCIAL/FAMILY:  [] awaiting [x] updated at bedside [] family meeting    CODE STATUS:  [x] Full Code [] DNR [] DNI [] Palliative/Comfort Care    DISPOSITION:  [x] ICU [] Stroke Unit [] Floor [] EMU [] RCU [] PCU    [x] Patient is at high risk of neurologic deterioration/death due to: infection

## 2024-02-12 NOTE — CONSULT NOTE ADULT - SUBJECTIVE AND OBJECTIVE BOX
NEUROLOGY INITIAL CONSULT NOTE    RFC: Weakness, brainstem dysfunction, concern for AIDP/GBS    HPI: Patient is a 73y Male with PMHx significant for HTN, HLD, DM type 2, and two prior strokes without residual deficits who initially presented to [facility] with unsteady gait and L facial weakness on 2/9. 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 He was transferred to NSICU for angiogram, which was done on 2/9 and showed moderate basilar stenosis without intervention done. He had to be intubated for worsening respiratory status. On further history, NSICU team reports that patient had been having progressive weakness and numbness of his extremities for the preceding 1-2 weeks following a viral URI. He has had progressive worsening of his extremity weakness as well as facial weakness and cranial nerve dysfunction with intact awareness. He endorses diffuse pain. Had LP on 2/11 and ICU team is concerned about AIDP/GBS.    Meds:  MEDICATIONS  (STANDING):  aspirin  chewable 81 milliGRAM(s) Oral <User Schedule>  atorvastatin 80 milliGRAM(s) Oral at bedtime  bisacodyl Suppository 10 milliGRAM(s) Rectal once  chlorhexidine 0.12% Liquid 15 milliLiter(s) Oral Mucosa every 12 hours  chlorhexidine 4% Liquid 1 Application(s) Topical daily  dexMEDEtomidine Infusion 0.2 MICROgram(s)/kG/Hr (5.08 mL/Hr) IV Continuous <Continuous>  enoxaparin Injectable 40 milliGRAM(s) SubCutaneous <User Schedule>  influenza  Vaccine (HIGH DOSE) 0.7 milliLiter(s) IntraMuscular once  insulin lispro (ADMELOG) corrective regimen sliding scale   SubCutaneous every 6 hours  pantoprazole  Injectable 40 milliGRAM(s) IV Push daily  phenylephrine    Infusion 0.2 MICROgram(s)/kG/Min (7.61 mL/Hr) IV Continuous <Continuous>  polyethylene glycol 3350 17 Gram(s) Oral daily  senna 1 Tablet(s) Oral at bedtime    MEDICATIONS  (PRN):  acetaminophen   Oral Liquid .. 650 milliGRAM(s) Oral every 6 hours PRN Mild Pain (1 - 3)  oxyCODONE    IR 5 milliGRAM(s) Oral every 4 hours PRN Moderate Pain (4 - 6)  oxyCODONE    IR 10 milliGRAM(s) Oral every 4 hours PRN Severe Pain (7 - 10)    GTT:  Precedex 0.7 mcg/kg/hour  Phenylephrine 2 mcg/kg/min    PMHx/PSHx/FHx: As above,  Cerebral infarction    Handoff    MEWS Score    HTN (hypertension)    HLD (hyperlipidemia)    DM2 (diabetes mellitus, type 2)    CVA (cerebrovascular accident)    Acute ischemic stroke    No significant past surgical history    CODE STROKE 1850 TRANSFER    Required emergent intubation    SysAdmin_VisitLink        SHx:  No reports of current alcohol, tobacco, or illicit drug use    Allergies:  Allergy Status Unknown      ROS: All systems negative except as documented in HPI    O:  T(C): 37 (02-12-24 @ 11:00), Max: 37.9 (02-11-24 @ 15:00)  HR: 102 (02-12-24 @ 12:30) (50 - 110)  BP: --  RR: 16 (02-12-24 @ 12:30) (12 - 22)  SpO2: 96% (02-12-24 @ 12:30) (94% - 100%)    Gen - NAD, intubated, sedated  CV - Peripheral circulation normal, no evidence of edema  Eyes - Fundoscopy not well visualized    Neurologic exam:  MS - Intubated, sedated, awake but eyes closed, attends to all stimuli b/l, no speech output but can move feet appropriately in response to "yes"/"no" questions, follows simple commands but limited due to profound weakness. Due to clinical condition unable to assess (DANIAL) orientation, rep/naming, attn/conc/recent and remote memory/fund of knowledge  CN - PERRL, EOMI by OCR with significantly dec excursions and no volitional movements, VFF to threat b/l (by foot tapping), face sens intact, facial strength with profound weakness b/l but weak corneals present b/l, hearing intact to conversation b/l, (-) spontaneous respirations (vent rate 16 bpm), (-) cough. DANIAL tongue/palate, trap/SCM  Motor - Normal bulk and flaccid tone throughout. Strength 0/5 all except for R  3+/5, L  4/5, L finger extensors 3+/5, b/l DF/PF 4/5  Sens - LT/PP intact all  DTR's - 2+ BR b/l, 0+ rest, and neutral b/l plantar response  Coord - DANIAL  Gait and station - DANAIL    Pertinent labs/studies:  CBC with inc WBC 10.72, low Hgb 12 and MCV WNL, otherwise essentially WNL  BMP essentially WNL  Albumin 4.1, LFT's WNL  Mg WNL, Phos WNL  ESR inc 113, B12 WNL, folate WNL, TSH/free T4 WNL, CPK WNL, A1C inc 6.8%, syphilis serology TP (-), HIV (-), CPK WNL, LDL inc 162, HDL 51    CSF (2/11) - clear, colorless, RBC 3, WBC 11 and lymphocytes 67%, glucose 83, protein 67, Cx - no PMN's or organisms, PCR panel (-), LDH 22, IgG Index WNL, cryptococcal Ag (-), AFB (-)    < from: CT Angio Head w/ IV Cont (02.11.24 @ 02:26) >  1. Focal small dissection suspected in the proximal left V4 segment.  2. Unchanged high-grade focal stenosis involving the proximalbasilar   artery and proximal left P2 segment.  3. Otherwise, no large vessel occlusion or major stenosis.    < end of copied text >      < from: MR Head w/wo IV Cont (02.11.24 @ 13:08) >  1.  No significant interval change in the small acute bilateral   cerebellar infarcts. No new superimposed acute intracranial abnormality.   No acute intracranial hemorrhage.  2.  Findings suspicious for cerebral amyloid angiopathy or hypertensive   microhemorrhages.  3.  Probable minimal chronic microvascular ischemic disease.  4.  Sinus disease.    < end of copied text >    < from: MR Thoracic Spine w/wo IV Cont (02.11.24 @ 13:09) >  1. CERVICAL SPINE:  Low-grade cervical degenerative disc disease.   Indistinct cord lesions at the C3 and C4 vertebral levels are nonspecific   but suggest inflammatory/infectious/demyelinating etiology. No associated   enhancement.    2. THORACIC SPINE:   Low-grade thoracic degenerative disc disease.   Thoracic cord intact.  No abnomal enhancement.    3. LUMBAR SPINE:   Advanced lower lumbar degenerative disc disease   includes moderate degenerative central canal stenosis at L4-L5 and   high-grade degenerative foraminal stenosis at L5-S1. Conus intact. Cauda   equina demonstrates central clustering with spinal stenosis atL4-L5 and   mild radicular enhancement predominantly distal to this location of   spinal stenosis, nonspecific, inflammatory versus congestion from the   stenosis. Vascular congestion between the conus and the L4-L5 stenosis.    Superimposed nodular enhancement at the L4 superior endplate level is   also nonspecific, inflammatory versus schwannoma    < end of copied text >    < from: TTE W or WO Ultrasound Enhancing Agent (02.11.24 @ 07:56) >   1. Left ventricular cavity is small. Left ventricular systolic function is normal with an ejection fraction of 66 % by Glynn's method of disks. There are no regional wall motion abnormalities seen.   2. Normal left ventricular diastolic function, with normal filling pressure.   3. Normal right ventricular cavity size, wall thickness, and normal systolic function.   4. Normal left and right atrial size.   5. No pericardial effusion seen.   6. Agitated saline injection was negative for intracardiac shunt.   7. No prior echocardiogram is available for comparison.   8. There is no evidence of a left ventricular thrombus.   9. There is normal LV mass and concentric remodeling.    < end of copied text >

## 2024-02-12 NOTE — PHARMACOTHERAPY INTERVENTION NOTE - COMMENTS
Review home medication list with patient's spouse at bedside and confirmed NKDA  Home Medications:  ascorbic acid 1000 mg oral tablet: 1 tab(s) orally once a day (12 Feb 2024 13:43)  aspirin 81 mg oral delayed release tablet: 1 tab(s) orally once a day x stroke prevention (12 Feb 2024 13:41)  atenolol-chlorthalidone 50 mg-25 mg oral tablet: 1 tab(s) orally once a day (12 Feb 2024 13:42)  metFORMIN 1000 mg oral tablet: 1 tab(s) orally 2 times a day (12 Feb 2024 13:42)  potassium chloride 20 mEq oral tablet, extended release: 1 tab(s) orally once a day (12 Feb 2024 13:42)

## 2024-02-12 NOTE — CONSULT NOTE ADULT - CONSULT REASON
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

## 2024-02-12 NOTE — CONSULT NOTE ADULT - ASSESSMENT
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    Transfusion Medicine was consulted to perform Therapeutic Plasma Exchange (TPE) in the setting of Guillan-Bradshaw Syndrome.    Plan for 5 TPE over approximately 10 day, once every other day.    Informed consent was obtained from the patient's Spouse for Therapeutic Plasma exchange and possible use of Donor Plasma.  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    Transfusion Medicine was consulted to perform Therapeutic Plasma Exchange (TPE) in the setting of Guillan-Eva Syndrome.    Plan for 5 TPE over approximately 10 days, once every other day.    Therapeutic plasma exchange (TPE) procedure (including replacement with 5% albumin and/or plasma and anticoagulation), benefits, risks and complications of the procedure (electrolyte imbalance, fluid imbalance, transfusion transmitted infections and transfusion reactions which may be life threatening) and alternative options explained in detail to the patient's spouse and family who conferred understanding and consented to the procedure after all questions answered.

## 2024-02-13 PROBLEM — E11.9 TYPE 2 DIABETES MELLITUS WITHOUT COMPLICATIONS: Chronic | Status: ACTIVE | Noted: 2024-02-09

## 2024-02-13 PROBLEM — I63.9 CEREBRAL INFARCTION, UNSPECIFIED: Chronic | Status: ACTIVE | Noted: 2024-02-09

## 2024-02-13 PROBLEM — I10 ESSENTIAL (PRIMARY) HYPERTENSION: Chronic | Status: ACTIVE | Noted: 2024-02-09

## 2024-02-13 LAB
24R-OH-CALCIDIOL SERPL-MCNC: 14.3 NG/ML — LOW (ref 30–80)
CRP SERPL-MCNC: 65 MG/L — HIGH (ref 0–4)
DSDNA AB SER-ACNC: <12 IU/ML — SIGNIFICANT CHANGE UP
ERYTHROCYTE [SEDIMENTATION RATE] IN BLOOD: 12 MM/HR — SIGNIFICANT CHANGE UP (ref 0–20)
GLUCOSE BLDC GLUCOMTR-MCNC: 147 MG/DL — HIGH (ref 70–99)
JCPYV DNA # CSF NAA+PROBE: SIGNIFICANT CHANGE UP COPIES/ML

## 2024-02-13 PROCEDURE — 71045 X-RAY EXAM CHEST 1 VIEW: CPT | Mod: 26

## 2024-02-13 PROCEDURE — 71045 X-RAY EXAM CHEST 1 VIEW: CPT | Mod: 26,77

## 2024-02-13 PROCEDURE — 93010 ELECTROCARDIOGRAM REPORT: CPT

## 2024-02-13 PROCEDURE — 99291 CRITICAL CARE FIRST HOUR: CPT

## 2024-02-13 RX ORDER — ATORVASTATIN CALCIUM 80 MG/1
1 TABLET, FILM COATED ORAL
Refills: 0 | DISCHARGE

## 2024-02-13 RX ORDER — FENTANYL CITRATE 50 UG/ML
25 INJECTION INTRAVENOUS ONCE
Refills: 0 | Status: DISCONTINUED | OUTPATIENT
Start: 2024-02-13 | End: 2024-02-13

## 2024-02-13 RX ORDER — POTASSIUM PHOSPHATE, MONOBASIC POTASSIUM PHOSPHATE, DIBASIC 236; 224 MG/ML; MG/ML
15 INJECTION, SOLUTION INTRAVENOUS ONCE
Refills: 0 | Status: DISCONTINUED | OUTPATIENT
Start: 2024-02-13 | End: 2024-02-13

## 2024-02-13 RX ORDER — FENTANYL CITRATE 50 UG/ML
12.5 INJECTION INTRAVENOUS ONCE
Refills: 0 | Status: DISCONTINUED | OUTPATIENT
Start: 2024-02-13 | End: 2024-02-13

## 2024-02-13 RX ORDER — SODIUM CHLORIDE 9 MG/ML
500 INJECTION INTRAMUSCULAR; INTRAVENOUS; SUBCUTANEOUS ONCE
Refills: 0 | Status: COMPLETED | OUTPATIENT
Start: 2024-02-13 | End: 2024-02-13

## 2024-02-13 RX ORDER — ERGOCALCIFEROL 1.25 MG/1
50000 CAPSULE ORAL ONCE
Refills: 0 | Status: COMPLETED | OUTPATIENT
Start: 2024-02-13 | End: 2024-02-13

## 2024-02-13 RX ORDER — FENTANYL CITRATE 50 UG/ML
25 INJECTION INTRAVENOUS ONCE
Refills: 0 | Status: DISCONTINUED | OUTPATIENT
Start: 2024-02-13 | End: 2024-02-12

## 2024-02-13 RX ORDER — IPRATROPIUM/ALBUTEROL SULFATE 18-103MCG
3 AEROSOL WITH ADAPTER (GRAM) INHALATION EVERY 6 HOURS
Refills: 0 | Status: DISCONTINUED | OUTPATIENT
Start: 2024-02-13 | End: 2024-02-13

## 2024-02-13 RX ORDER — PHENYLEPHRINE HYDROCHLORIDE 10 MG/ML
0.1 INJECTION INTRAVENOUS
Qty: 160 | Refills: 0 | Status: DISCONTINUED | OUTPATIENT
Start: 2024-02-13 | End: 2024-02-14

## 2024-02-13 RX ORDER — ERGOCALCIFEROL 1.25 MG/1
50000 CAPSULE ORAL
Refills: 0 | Status: DISCONTINUED | OUTPATIENT
Start: 2024-02-13 | End: 2024-03-03

## 2024-02-13 RX ORDER — SODIUM CHLORIDE 9 MG/ML
4 INJECTION INTRAMUSCULAR; INTRAVENOUS; SUBCUTANEOUS EVERY 6 HOURS
Refills: 0 | Status: COMPLETED | OUTPATIENT
Start: 2024-02-13 | End: 2024-02-16

## 2024-02-13 RX ORDER — PHENYLEPHRINE HYDROCHLORIDE 10 MG/ML
0.1 INJECTION INTRAVENOUS
Qty: 40 | Refills: 0 | Status: DISCONTINUED | OUTPATIENT
Start: 2024-02-13 | End: 2024-02-14

## 2024-02-13 RX ORDER — NOREPINEPHRINE BITARTRATE/D5W 8 MG/250ML
0.05 PLASTIC BAG, INJECTION (ML) INTRAVENOUS
Qty: 8 | Refills: 0 | Status: DISCONTINUED | OUTPATIENT
Start: 2024-02-13 | End: 2024-02-13

## 2024-02-13 RX ADMIN — FENTANYL CITRATE 12.5 MICROGRAM(S): 50 INJECTION INTRAVENOUS at 18:30

## 2024-02-13 RX ADMIN — PHENYLEPHRINE HYDROCHLORIDE 3.81 MICROGRAM(S)/KG/MIN: 10 INJECTION INTRAVENOUS at 07:00

## 2024-02-13 RX ADMIN — PANTOPRAZOLE SODIUM 40 MILLIGRAM(S): 20 TABLET, DELAYED RELEASE ORAL at 11:14

## 2024-02-13 RX ADMIN — CHLORHEXIDINE GLUCONATE 15 MILLILITER(S): 213 SOLUTION TOPICAL at 05:07

## 2024-02-13 RX ADMIN — FENTANYL CITRATE 12.5 MICROGRAM(S): 50 INJECTION INTRAVENOUS at 18:15

## 2024-02-13 RX ADMIN — ATORVASTATIN CALCIUM 80 MILLIGRAM(S): 80 TABLET, FILM COATED ORAL at 21:30

## 2024-02-13 RX ADMIN — CHLORHEXIDINE GLUCONATE 15 MILLILITER(S): 213 SOLUTION TOPICAL at 17:30

## 2024-02-13 RX ADMIN — SODIUM CHLORIDE 1000 MILLILITER(S): 9 INJECTION INTRAMUSCULAR; INTRAVENOUS; SUBCUTANEOUS at 07:50

## 2024-02-13 RX ADMIN — SODIUM CHLORIDE 4 MILLILITER(S): 9 INJECTION INTRAMUSCULAR; INTRAVENOUS; SUBCUTANEOUS at 18:35

## 2024-02-13 RX ADMIN — FENTANYL CITRATE 12.5 MICROGRAM(S): 50 INJECTION INTRAVENOUS at 13:17

## 2024-02-13 RX ADMIN — ENOXAPARIN SODIUM 40 MILLIGRAM(S): 100 INJECTION SUBCUTANEOUS at 20:25

## 2024-02-13 RX ADMIN — Medication 81 MILLIGRAM(S): at 05:06

## 2024-02-13 RX ADMIN — DEXMEDETOMIDINE HYDROCHLORIDE IN 0.9% SODIUM CHLORIDE 5.08 MICROGRAM(S)/KG/HR: 4 INJECTION INTRAVENOUS at 07:00

## 2024-02-13 RX ADMIN — Medication 3 MILLILITER(S): at 06:23

## 2024-02-13 RX ADMIN — ERGOCALCIFEROL 50000 UNIT(S): 1.25 CAPSULE ORAL at 11:34

## 2024-02-13 RX ADMIN — FENTANYL CITRATE 12.5 MICROGRAM(S): 50 INJECTION INTRAVENOUS at 13:35

## 2024-02-13 RX ADMIN — CHLORHEXIDINE GLUCONATE 1 APPLICATION(S): 213 SOLUTION TOPICAL at 11:15

## 2024-02-13 RX ADMIN — SODIUM CHLORIDE 4 MILLILITER(S): 9 INJECTION INTRAMUSCULAR; INTRAVENOUS; SUBCUTANEOUS at 11:44

## 2024-02-13 RX ADMIN — SODIUM CHLORIDE 4 MILLILITER(S): 9 INJECTION INTRAMUSCULAR; INTRAVENOUS; SUBCUTANEOUS at 23:02

## 2024-02-13 NOTE — ADVANCED PRACTICE NURSE CONSULT - ASSESSMENT
Picc Insertion Note  Patient or patient representative educated about central line associated blood stream infection prevention practices.  Catheter type: 5F,  DL Picc  : Bard  Power injectable: Yes  Lot# [id number]    Informed consent obtained by covering floor team.  Procedure assisted by: JULIEN Black RN  Time out was preformed, confirming the patient's first and last name, date of birth, MR#,  procedure, and correct site prior to start of procedure.    Patient was placed with HOB 30 degrees. Patient placement site was prepped with chlorhexidine solution, then draped using maximum sterile barrier protection. The area was injected with 2 ml of 1% lidocaine. Using the Bard Site Rite 8, the catheter was placed using the Modified Seldinger Technique. Strict adherence to outline aseptic technique including handwashing, glove and gown, utilizing mask and cap, plus draping the patient with a sterile drape was observed. Upon completion of line placement, the insertion site was covered with a sterile occlusive CHG dressing. Pt tolerated procedure well.   Pre/Post VS: patient in NSCU under constant monitoring    All materials used for catheter insertion, including the intact guide wires, were accounted for at the end of the procedure.  Number of attempts: 1  Complications/Comments: None    Emergency Placement:  No  Site: New   Anatomical Site of insertion: Left Brachial  Catheter size/length: 5F, 44cm  US guided Bard double lumen power picc placed    Post procedure verification with chest Xray as per orders.

## 2024-02-13 NOTE — PROGRESS NOTE ADULT - ASSESSMENT
MR brain neg, poss lesion in C spine c/f poss inflammatory process  -200  on [id number]/[id number]  vEEG-p  CTH/CTA obtained for decline in exam stable, neurology to see cont plasmapheresis

## 2024-02-13 NOTE — PROGRESS NOTE ADULT - SUBJECTIVE AND OBJECTIVE BOX
Interval events:  No acute events on evening rounds.   Intermittently on genny.     VITALS:  T(C): , Max: 37.5 (02-13-24 @ 11:00)  HR:  (64 - 122)  BP: --  ABP:  (78/39 - 215/86)  RR:  (16 - 25)  SpO2:  (96% - 100%)  Wt(kg): --  Device: Avea, Mode: AC/ CMV (Assist Control/ Continuous Mandatory Ventilation), RR (machine): 16, TV (machine): 500, FiO2: 40, PEEP: 5, ITime: 1, MAP: 8, PIP: 17    02-12-24 @ 07:01  -  02-13-24 @ 07:00  --------------------------------------------------------  IN: 2248 mL / OUT: 1700 mL / NET: 548 mL    02-13-24 @ 07:01  -  02-14-24 @ 00:11  --------------------------------------------------------  IN: 2562.4 mL / OUT: 850 mL / NET: 1712.4 mL      LABS:  Na: 144 (02-12 @ 16:22), 140 (02-12 @ 12:38), 139 (02-11 @ 22:19), 139 (02-11 @ 00:22)  K: 4.2 (02-12 @ 16:22), 4.2 (02-12 @ 12:38), 4.9 (02-11 @ 22:19), 4.5 (02-11 @ 00:22)  Cl: 109 (02-12 @ 16:22), 104 (02-12 @ 12:38), 105 (02-11 @ 22:19), 104 (02-11 @ 00:22)  CO2: 25 (02-12 @ 16:22), 26 (02-12 @ 12:38), 21 (02-11 @ 22:19), 23 (02-11 @ 00:22)  BUN: 16 (02-12 @ 16:22), 15 (02-12 @ 12:38), 14 (02-11 @ 22:19), 10 (02-11 @ 00:22)  Cr: 0.76 (02-12 @ 16:22), 0.76 (02-12 @ 12:38), 0.80 (02-11 @ 22:19), 0.84 (02-11 @ 00:22)  Glu: 205(02-12 @ 16:22), 164(02-12 @ 12:38), 139(02-11 @ 22:19), 120(02-11 @ 00:22)    Hgb: 12.1 (02-11 @ 22:19), 12.2 (02-11 @ 00:22)  Hct: 43.0 (02-11 @ 22:19), 44.3 (02-11 @ 00:22)  WBC: 10.72 (02-11 @ 22:19), 11.93 (02-11 @ 00:22)  Plt: 332 (02-11 @ 22:19), 366 (02-11 @ 00:22)    INR: 1.28 02-12-24 @ 12:40  PTT: 26.6 02-12-24 @ 12:40    MEDICATIONS:  acetaminophen   Oral Liquid .. 650 milliGRAM(s) Oral every 6 hours PRN  aspirin  chewable 81 milliGRAM(s) Oral <User Schedule>  atorvastatin 80 milliGRAM(s) Oral at bedtime  chlorhexidine 0.12% Liquid 15 milliLiter(s) Oral Mucosa every 12 hours  chlorhexidine 4% Liquid 1 Application(s) Topical daily  dexMEDEtomidine Infusion 0.2 MICROgram(s)/kG/Hr IV Continuous <Continuous>  enoxaparin Injectable 40 milliGRAM(s) SubCutaneous <User Schedule>  ergocalciferol Drops 99603 Unit(s) Oral <User Schedule>  oxyCODONE    IR 5 milliGRAM(s) Oral every 4 hours PRN  oxyCODONE    IR 10 milliGRAM(s) Oral every 4 hours PRN  pantoprazole  Injectable 40 milliGRAM(s) IV Push daily  phenylephrine    Infusion 0.1 MICROgram(s)/kG/Min IV Continuous <Continuous>  phenylephrine    Infusion 0.1 MICROgram(s)/kG/Min IV Continuous <Continuous>  polyethylene glycol 3350 17 Gram(s) Oral daily  senna 1 Tablet(s) Oral at bedtime  sodium chloride 3%  Inhalation 4 milliLiter(s) Inhalation every 6 hours    EXAMINATION:  Please see exam from daytime, stable.    Assessment/Plan:   72yo man with HTN, HLD, DM, h/o 2 stroke on ASA81 2 years prior, who presented 2/7 with 1.5 weeks of worsening cough and several days of dysphagia, at [place] ED developed dysarthria. CTH without acute findings, CTA concerning for a basilar stenosis for which the patient was transferred to [facility] ED where he required intubation. Angiogram with moderate basilar stenosis with no intervention done. With worsening exam with loss of corneals and cough on 2/10 but still following commands and moving distal extremities. MRI brain before and after worsening with small strokes in the cerebellum likely 2/2 angiogram. MRI C/T/L spine with cord lesions at the C3 and C4 which suggest inflammatory/infectious/demyelinating etiology, also with some nerve root enhancement in the L spine. 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 With autonomic instability.   LP with OP 28, WBC 11 with lymphocytic predominance, P 67, G 83, CSF PCR neg.     f/u LP studies including CSF, ACE, Lyme, crypto, CMV, West Nile, VDRL, CSF autoimmune encephalitis panel   f/u anti-GQ1b and ganglioside Abs, ACE serum, serum autoimmune encephalitis panel   c/w ASA  SBP goal 100-200  c/w feeds, senna and miralax, LBM 2/12  voiding   Lov ppx     Victorina Bettencourt  Neurocritical Care Attending

## 2024-02-13 NOTE — PROGRESS NOTE ADULT - ASSESSMENT
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     NEURO:  Neurochecks Q4 hours  MRI spine with cord lesions at C3 and C4 with nerve root enhancement in the L spine  Will follow up rest of the autoimmune/infectious panel   f/u LP studies including CSF PCR, ACE, Lyme, crypto, CMV, West Nile, VDRL, CSF autoimmune encephalitis panel   send GI PCR  f/u anti-GQ1b and ganglioside Abs, ACE serum, serum autoimmune encephalitis panel   CSF WBC 11, protein 67, Glucose 83  Patient will need PLEX, will obtain central line access today   C/W ASA   Neuromuscular consultation   Activity: [] mobilize as tolerated [] Bedrest [] PT [] OT [] PMNR    PULM:  Intubated for airway protection  16/500/5/40%- ABG reviewed, will decrease FiO2 to 30%  Patient may need early trach- will discuss with family   CXR clear    CV:   SBP goal 120-200 with genny for potentially symptomatic basilar stenosis, trops and EKG  Continue DAPT  TTE- EF 66%  R- axillary A line     RENAL:  Fluids: IVL if tolerating diet    GI:  Diet: TF AT goal  GI prophylaxis [] not indicated [x] PPI [] other:  Bowel regimen [x] colace [x] senna, dulcolax suppository   LBM PTA     ENDO:   Goal euglycemia (-180)  A1c 6.8    HEME/ONC:  H/H stable   VTE prophylaxis: [x] SCDs [x] chemoprophylaxis- SQL [] hold chemoprophylaxis due to: [] high risk of DVT/PE on admission due to:    ID:  Afebrile, improved leukocytosis     MISC:    SOCIAL/FAMILY:  [] awaiting [x] updated at bedside [] family meeting    CODE STATUS:  [x] Full Code [] DNR [] DNI [] Palliative/Comfort Care    DISPOSITION:  [x] ICU [] Stroke Unit [] Floor [] EMU [] RCU [] PCU    [x] Patient is at high risk of neurologic deterioration/death due to: infection    ASSESSMENT/PLAN: 72 y/o male with multiple comorbidities presenting initially with suspicious of basilar stroke however, 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     NEURO:  Neurochecks Q4 hours  MRI spine with cord lesions at C3 and C4 with nerve root enhancement in the L spine  Will follow up rest of the autoimmune/infectious panel   f/u LP studies including CSF PCR, ACE, Lyme, crypto, CMV, West Nile, VDRL, CSF autoimmune encephalitis panel   send GI PCR  f/u anti-GQ1b and ganglioside Abs, ACE serum, serum autoimmune encephalitis panel   CSF WBC 11, protein 67, Glucose 83  S/p Plex 2/12, next PLEX 2/14. (plan for 5 sessions)  C/W ASA   Neuromuscular consultation   Activity: [] mobilize as tolerated [] Bedrest [] PT [] OT [] PMNR    PULM:  Intubated for airway protection  16/500/5/40%- ABG reviewed, will decrease FiO2 to 30%  Patient may need early trach- will discuss with family   CXR clear    CV:   SBP goal 120-200 with genny for potentially symptomatic basilar stenosis, trops and EKG  Continue DAPT  TTE- EF 66%  R- axillary A line     RENAL:  Fluids: IVL if tolerating diet    GI:  Diet: TF AT goal  GI prophylaxis [] not indicated [x] PPI [] other:  Bowel regimen [x] colace [x] senna, dulcolax suppository   LBM PTA     ENDO:   Goal euglycemia (-180)  A1c 6.8    HEME/ONC:  H/H stable   VTE prophylaxis: [x] SCDs [x] chemoprophylaxis- SQL [] hold chemoprophylaxis due to: [] high risk of DVT/PE on admission due to:    ID:  Afebrile, improved leukocytosis     MISC:    SOCIAL/FAMILY:  [] awaiting [x] updated at bedside [] family meeting    CODE STATUS:  [x] Full Code [] DNR [] DNI [] Palliative/Comfort Care    DISPOSITION:  [x] ICU [] Stroke Unit [] Floor [] EMU [] RCU [] PCU    [x] Patient is at high risk of neurologic deterioration/death due to: infection    ASSESSMENT/PLAN: 74 y/o male with multiple comorbidities presenting initially with suspicious of basilar stroke however, 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     NEURO:  Neurochecks Q4 hours  MRI spine with cord lesions at C3 and C4 with nerve root enhancement in the L spine  s/p EEG with mild diffuse/multi-focal cerebral dysfunction, not specific as to etiology. No epileptiform abnormalities.    Will follow up rest of the autoimmune/infectious panel   f/u LP studies including CSF PCR, ACE, Lyme, crypto, CMV, West Nile, VDRL, CSF autoimmune encephalitis panel   send GI PCR  f/u anti-GQ1b and ganglioside Abs, ACE serum, serum autoimmune encephalitis panel   CSF WBC 11, protein 67, Glucose 83  S/p Plex 2/12, next PLEX 2/14. (plan for 5 sessions)  C/W ASA   Neuromuscular consultation   Activity: [] mobilize as tolerated [] Bedrest [] PT [] OT [] PMNR    PULM:  Intubated for airway protection  16/500/5/40%- ABG reviewed, will decrease FiO2 to 30%  Patient may need early trach- will discuss with family   CXR clear    CV:   SBP goal 120-200 with genny for potentially symptomatic basilar stenosis, trops and EKG  Continue DAPT  TTE- EF 66%  R- axillary A line     RENAL:  Fluids: IVL if tolerating diet    GI:  Diet: TF AT goal  GI prophylaxis [] not indicated [x] PPI [] other:  Bowel regimen [x] colace [x] senna, dulcolax suppository   LBM PTA     ENDO:   Goal euglycemia (-180)  A1c 6.8    HEME/ONC:  H/H stable   VTE prophylaxis: [x] SCDs [x] chemoprophylaxis- SQL [] hold chemoprophylaxis due to: [] high risk of DVT/PE on admission due to:    ID:  Afebrile, improved leukocytosis     MISC:    SOCIAL/FAMILY:  [] awaiting [x] updated at bedside [] family meeting    CODE STATUS:  [x] Full Code [] DNR [] DNI [] Palliative/Comfort Care    DISPOSITION:  [x] ICU [] Stroke Unit [] Floor [] EMU [] RCU [] PCU    [x] Patient is at high risk of neurologic deterioration/death due to: infection    ASSESSMENT/PLAN: 72 y/o male with multiple comorbidities presenting initially with suspicious of basilar stroke however, 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     NEURO:  Neurochecks Q4 hours  MRI spine with cord lesions at C3 and C4 with nerve root enhancement in the L spine  s/p EEG with mild diffuse/multi-focal cerebral dysfunction, not specific as to etiology. No epileptiform abnormalities.    Will follow up rest of the autoimmune/infectious panel   f/u LP studies including CSF PCR, ACE, Lyme, crypto, CMV, West Nile, VDRL, CSF autoimmune encephalitis panel   send GI PCR  f/u anti-GQ1b and ganglioside Abs, ACE serum, serum autoimmune encephalitis panel   CSF WBC 11, protein 67, Glucose 83  S/p Plex 2/12, next PLEX 2/14. (plan for 5 sessions)  C/W ASA   Seen by neurology   Activity: [] mobilize as tolerated [] Bedrest [] PT [] OT [] PMNR    PULM:  Intubated for airway protection  16/500/5/40%- ABG reviewed, will decrease FiO2 to 30%  Patient may need early trach - continue discussion with family   C/w hypertonic nebs and chest PT   f/u CXR     CV:   SBP goal  with MAP >65   Titrate genny to goal - PICC c/s for pressors   Continue ASA   TTE- EF 66%  R- axillary A line     RENAL:  Fluids: IVL if tolerating diet    GI:  Diet: TF AT goal  Vitamin D supplementation   GI prophylaxis [] not indicated [x] PPI [] other:  Bowel regimen [x] colace [x] senna, dulcolax suppository   LBM PTA     ENDO:   Goal euglycemia (-180)  A1c 6.8    HEME/ONC:  H/H stable   VTE prophylaxis: [x] SCDs [x] chemoprophylaxis- SQL [] hold chemoprophylaxis due to: [] high risk of DVT/PE on admission due to:    ID:  Afebrile, improved leukocytosis     MISC:  Lines/tubes: RILIBORIO PARDO foley     SOCIAL/FAMILY:  [] awaiting [x] updated at bedside [] family meeting    CODE STATUS:  [x] Full Code [] DNR [] DNI [] Palliative/Comfort Care    DISPOSITION:  [x] ICU [] Stroke Unit [] Floor [] EMU [] RCU [] PCU    [x] Patient is at high risk of neurologic deterioration/death due to: infection    ASSESSMENT/PLAN: 74 y/o male with multiple comorbidities presenting initially with suspicious of basilar stroke however, 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     NEURO:  Neurochecks Q2 hours  MRI spine with cord lesions at C3 and C4 with nerve root enhancement in the L spine  s/p EEG with mild diffuse/multi-focal cerebral dysfunction, not specific as to etiology. No epileptiform abnormalities.    Will follow up rest of the autoimmune/infectious panel   f/u LP studies including CSF PCR, ACE, Lyme, crypto, CMV, West Nile, VDRL, CSF autoimmune encephalitis panel   send GI PCR  f/u anti-GQ1b and ganglioside Abs, ACE serum, serum autoimmune encephalitis panel   CSF WBC 11, protein 67, Glucose 83  S/p Plex 2/12, next PLEX 2/14. (plan for 5 sessions)  C/W ASA   Seen by neurology   Activity: [] mobilize as tolerated [] Bedrest [] PT [] OT [] PMNR    PULM:  Intubated for airway protection  16/500/5/40%- ABG reviewed, will decrease FiO2 to 30%  Patient may need early trach - continue discussion with family   C/w hypertonic nebs and chest PT   f/u CXR     CV:   SBP goal  with MAP >65   Titrate genny to goal - PICC c/s for pressors   Continue ASA   TTE- EF 66%  R- axillary A line     RENAL:  Fluids: IVL if tolerating diet    GI:  Diet: TF AT goal  Vitamin D supplementation   GI prophylaxis [] not indicated [x] PPI [] other:  Bowel regimen [x] colace [x] senna, dulcolax suppository   LBM PTA     ENDO:   Goal euglycemia (-180)  A1c 6.8    HEME/ONC:  H/H stable   VTE prophylaxis: [x] SCDs [x] chemoprophylaxis- SQL [] hold chemoprophylaxis due to: [] high risk of DVT/PE on admission due to:    ID:  Afebrile, improved leukocytosis     MISC:  Lines/tubes: LIBORIO CAMPOS foley     SOCIAL/FAMILY:  [] awaiting [x] updated at bedside [] family meeting    CODE STATUS:  [x] Full Code [] DNR [] DNI [] Palliative/Comfort Care    DISPOSITION:  [x] ICU [] Stroke Unit [] Floor [] EMU [] RCU [] PCU    [x] Patient is at high risk of neurologic deterioration/death due to: infection

## 2024-02-13 NOTE — PROGRESS NOTE ADULT - SUBJECTIVE AND OBJECTIVE BOX
HOSPITAL COURSE:  73 years old male with h/o HTN, HLD, DM, h/o CVA x 2 on ASA81 with no residual deficits present to ED with complain of unsteady gait and facial droop. Patient reported unsteady gait for 2 days. Patient also noted facial droop today at around 6AM, he went to bed fine at 11PM last night ( 2/8/24). No slurred speech, vision changes. Patient reported flu like symptoms lately  Hemodynamically stable, afebrile, sat well at [facility]. EKG with NSR. CT head with no acute pathology. Chronic left thalamic lacunar infarct. CTA with no large vessel occlusion. 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 CT perfusion with no acute abnormality.     Admission Scores  GCS:   HH:   MF:   NIHSS: 4   RASS:    CAM-ICU:   ICH:    2/9: transferred from [place], NIHSS of 4 in ED, then became more lethargic, stridorous and desaturated in ED, intubated for airway protected, repeat CTH/A obtained and brought emergently to IR for basilar stenting   2/10: worsening brainstem symptoms while on DAPT  2/12- LP performed to rule out MFS as patient's examination is not explained by the strokes on MRI.     24 hour Events:       Allergies    No Known Allergies    Intolerances        REVIEW OF SYSTEMS: [ ] Unable to Assess due to neurologic exam   [ ] All ROS addressed below are non-contributory, except:  Neuro: [ ] Headache [ ] Back pain [ ] Numbness [ ] Weakness [ ] Ataxia [ ] Dizziness [ ] Aphasia [ ] Dysarthria [ ] Visual disturbance  Resp: [ ] Shortness of breath/dyspnea, [ ] Orthopnea [ ] Cough  CV: [ ] Chest pain [ ] Palpitation [ ] Lightheadedness [ ] Syncope  Renal: [ ] Thirst [ ] Edema  GI: [ ] Nausea [ ] Emesis [ ] Abdominal pain [ ] Constipation [ ] Diarrhea  Hem: [ ] Hematemesis [ ] bright red blood per rectum  ID: [ ] Fever [ ] Chills [ ] Dysuria  ENT: [ ] Rhinorrhea      DEVICES:   [ ] Restraints [ ] ET tube [ ] central line [ ] arterial line [ ] vences [ ] NGT/OGT [ ] EVD [ ] LD [ ] HAL/HMV [ ] Trach [ ] PEG [ ] Chest Tube     VITALS:   Vital Signs Last 24 Hrs  T(C): 36.1 (13 Feb 2024 03:00), Max: 37.6 (12 Feb 2024 15:00)  T(F): 97 (13 Feb 2024 03:00), Max: 99.7 (12 Feb 2024 15:00)  HR: 104 (13 Feb 2024 07:00) (70 - 133)  BP: --  BP(mean): --  RR: 16 (13 Feb 2024 07:00) (16 - 25)  SpO2: 100% (13 Feb 2024 07:00) (92% - 100%)    Parameters below as of 12 Feb 2024 19:00  Patient On (Oxygen Delivery Method): ventilator      CAPILLARY BLOOD GLUCOSE      POCT Blood Glucose.: 147 mg/dL (13 Feb 2024 05:05)  POCT Blood Glucose.: 164 mg/dL (12 Feb 2024 23:29)  POCT Blood Glucose.: 151 mg/dL (12 Feb 2024 17:15)  POCT Blood Glucose.: 147 mg/dL (12 Feb 2024 11:15)    I&O's Summary    12 Feb 2024 07:01  -  13 Feb 2024 07:00  --------------------------------------------------------  IN: 2227.7 mL / OUT: 1700 mL / NET: 527.7 mL        Respiratory:  Mode: AC/ CMV (Assist Control/ Continuous Mandatory Ventilation)  RR (machine): 16  TV (machine): 500  FiO2: 40  PEEP: 5  ITime: 1  MAP: 9  PIP: 19    ABG - ( 12 Feb 2024 00:50 )  pH, Arterial: 7.41  pH, Blood: x     /  pCO2: 41    /  pO2: 175   / HCO3: 26    / Base Excess: 1.2   /  SaO2: 99.5                LABS:                        12.1   10.72 )-----------( 332      ( 11 Feb 2024 22:19 )             43.0     02-12    144  |  109<H>  |  16  ----------------------------<  205<H>  4.2   |  25  |  0.76             MEDICATION LEVELS:     IVF FLUIDS/MEDICATIONS:   MEDICATIONS  (STANDING):  albuterol/ipratropium for Nebulization 3 milliLiter(s) Nebulizer every 6 hours  aspirin  chewable 81 milliGRAM(s) Oral <User Schedule>  atorvastatin 80 milliGRAM(s) Oral at bedtime  chlorhexidine 0.12% Liquid 15 milliLiter(s) Oral Mucosa every 12 hours  chlorhexidine 4% Liquid 1 Application(s) Topical daily  dexMEDEtomidine Infusion 0.2 MICROgram(s)/kG/Hr (5.08 mL/Hr) IV Continuous <Continuous>  enoxaparin Injectable 40 milliGRAM(s) SubCutaneous <User Schedule>  insulin lispro (ADMELOG) corrective regimen sliding scale   SubCutaneous every 6 hours  pantoprazole  Injectable 40 milliGRAM(s) IV Push daily  phenylephrine    Infusion 0.1 MICROgram(s)/kG/Min (3.81 mL/Hr) IV Continuous <Continuous>  polyethylene glycol 3350 17 Gram(s) Oral daily  senna 1 Tablet(s) Oral at bedtime    MEDICATIONS  (PRN):  acetaminophen   Oral Liquid .. 650 milliGRAM(s) Oral every 6 hours PRN Mild Pain (1 - 3)  oxyCODONE    IR 5 milliGRAM(s) Oral every 4 hours PRN Moderate Pain (4 - 6)  oxyCODONE    IR 10 milliGRAM(s) Oral every 4 hours PRN Severe Pain (7 - 10)        IMAGING:      EXAMINATION:  PHYSICAL EXAM:    Constitutional: No Acute Distress, patient intubated but following commands     Neurological: Bilateral ptosis, pupils 2 mm and sluggish, restricted gaze in all directions however, vertical gaze is better than horizontal. Facial asymmetry could not be appreciated due to ETT, patient following commands with his LLE.     Motor exam:          Upper extremity                         Delt     Bicep     Tricep    HG                                                 R         0/5        0/5        0/5      2/5 (activation of muscles could be appreciated in bilateral biceps and triceps)                                               L          0/5        0/5        0/5       1/5          Lower extremity                        HF         KF        KE       DF         PF                                                  R        0/5        0/5        5/5       1/5         4/5                                               L         0/5        0/5       5/5       2/5          5/5                                                  Reflexes: Deep Tendon Reflexes absent in all 4 extremities    Pulmonary: Clear to Auscultation, No rales, No rhonchi, No wheezes     Cardiovascular: S1, S2, Regular rate and rhythm     Gastrointestinal: Soft, Non-tender, Non-distended     Extremities: No calf tenderness     Incision:    HOSPITAL COURSE:   73y Male with PMHx significant for HTN, HLD, DM type 2, and two prior strokes without residual deficits [place] and L facial weakness on 2/9. 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 He was transferred to NSICU for angiogram, which was done on 2/9 and showed moderate basilar stenosis. No intervention done. He had to be intubated for worsening respiratory status. On further history it was found that patient had been having progressive weakness and numbness of his extremities for the preceding 1-2 weeks following a viral URI. 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       Admission Scores  GCS:   HH:   MF:   NIHSS: 4   RASS:    CAM-ICU:   ICH:    2/9: transferred from [place], NIHSS of 4 in ED, then became more lethargic, stridorous and desaturated in ED, intubated for airway protected, repeat CTH/A obtained and brought emergently to IR for basilar stenting   2/10: worsening brainstem symptoms while on DAPT  2/12- LP performed to rule out MFS as patient's examination is not explained by the strokes on MRI.     24 hour Events:  Pt with change in neuro exam overnight. S/p CT Head with no acute findings     Allergies    No Known Allergies    Intolerances        REVIEW OF SYSTEMS: [x] Unable to Assess due to neurologic exam   [ ] All ROS addressed below are non-contributory, except:  Neuro: [ ] Headache [ ] Back pain [ ] Numbness [ ] Weakness [ ] Ataxia [ ] Dizziness [ ] Aphasia [ ] Dysarthria [ ] Visual disturbance  Resp: [ ] Shortness of breath/dyspnea, [ ] Orthopnea [ ] Cough  CV: [ ] Chest pain [ ] Palpitation [ ] Lightheadedness [ ] Syncope  Renal: [ ] Thirst [ ] Edema  GI: [ ] Nausea [ ] Emesis [ ] Abdominal pain [ ] Constipation [ ] Diarrhea  Hem: [ ] Hematemesis [ ] bright red blood per rectum  ID: [ ] Fever [ ] Chills [ ] Dysuria  ENT: [ ] Rhinorrhea      DEVICES:   [ ] Restraints [ ] ET tube [ ] central line [ ] arterial line [ ] vences [ ] NGT/OGT [ ] EVD [ ] LD [ ] HAL/HMV [ ] Trach [ ] PEG [ ] Chest Tube     VITALS:   Vital Signs Last 24 Hrs  T(C): 36.1 (13 Feb 2024 03:00), Max: 37.6 (12 Feb 2024 15:00)  T(F): 97 (13 Feb 2024 03:00), Max: 99.7 (12 Feb 2024 15:00)  HR: 104 (13 Feb 2024 07:00) (70 - 133)  BP: --  BP(mean): --  RR: 16 (13 Feb 2024 07:00) (16 - 25)  SpO2: 100% (13 Feb 2024 07:00) (92% - 100%)    Parameters below as of 12 Feb 2024 19:00  Patient On (Oxygen Delivery Method): ventilator      CAPILLARY BLOOD GLUCOSE      POCT Blood Glucose.: 147 mg/dL (13 Feb 2024 05:05)  POCT Blood Glucose.: 164 mg/dL (12 Feb 2024 23:29)  POCT Blood Glucose.: 151 mg/dL (12 Feb 2024 17:15)  POCT Blood Glucose.: 147 mg/dL (12 Feb 2024 11:15)    I&O's Summary    12 Feb 2024 07:01  -  13 Feb 2024 07:00  --------------------------------------------------------  IN: 2227.7 mL / OUT: 1700 mL / NET: 527.7 mL        Respiratory:  Mode: AC/ CMV (Assist Control/ Continuous Mandatory Ventilation)  RR (machine): 16  TV (machine): 500  FiO2: 40  PEEP: 5  ITime: 1  MAP: 9  PIP: 19    ABG - ( 12 Feb 2024 00:50 )  pH, Arterial: 7.41  pH, Blood: x     /  pCO2: 41    /  pO2: 175   / HCO3: 26    / Base Excess: 1.2   /  SaO2: 99.5                LABS:                        12.1   10.72 )-----------( 332      ( 11 Feb 2024 22:19 )             43.0     02-12    144  |  109<H>  |  16  ----------------------------<  205<H>  4.2   |  25  |  0.76             MEDICATION LEVELS:     IVF FLUIDS/MEDICATIONS:   MEDICATIONS  (STANDING):  albuterol/ipratropium for Nebulization 3 milliLiter(s) Nebulizer every 6 hours  aspirin  chewable 81 milliGRAM(s) Oral <User Schedule>  atorvastatin 80 milliGRAM(s) Oral at bedtime  chlorhexidine 0.12% Liquid 15 milliLiter(s) Oral Mucosa every 12 hours  chlorhexidine 4% Liquid 1 Application(s) Topical daily  dexMEDEtomidine Infusion 0.2 MICROgram(s)/kG/Hr (5.08 mL/Hr) IV Continuous <Continuous>  enoxaparin Injectable 40 milliGRAM(s) SubCutaneous <User Schedule>  insulin lispro (ADMELOG) corrective regimen sliding scale   SubCutaneous every 6 hours  pantoprazole  Injectable 40 milliGRAM(s) IV Push daily  phenylephrine    Infusion 0.1 MICROgram(s)/kG/Min (3.81 mL/Hr) IV Continuous <Continuous>  polyethylene glycol 3350 17 Gram(s) Oral daily  senna 1 Tablet(s) Oral at bedtime    MEDICATIONS  (PRN):  acetaminophen   Oral Liquid .. 650 milliGRAM(s) Oral every 6 hours PRN Mild Pain (1 - 3)  oxyCODONE    IR 5 milliGRAM(s) Oral every 4 hours PRN Moderate Pain (4 - 6)  oxyCODONE    IR 10 milliGRAM(s) Oral every 4 hours PRN Severe Pain (7 - 10)        IMAGING:      EXAMINATION:  PHYSICAL EXAM:    Constitutional: No Acute Distress, patient intubated but following commands     Neurological: Bilateral ptosis, pupils 2 mm and sluggish, restricted gaze in all directions however, vertical gaze is better than horizontal. Facial asymmetry could not be appreciated due to ETT, patient following commands with his LLE.     Motor exam:          Upper extremity                         Delt     Bicep     Tricep    HG                                                 R         0/5        0/5        0/5      2/5 (activation of muscles could be appreciated in bilateral biceps and triceps)                                               L          0/5        0/5        0/5       1/5          Lower extremity                        HF         KF        KE       DF         PF                                                  R        0/5        0/5        5/5       1/5         4/5                                               L         0/5        0/5       5/5       2/5          5/5                                                  Reflexes: Deep Tendon Reflexes absent in all 4 extremities    Pulmonary: Clear to Auscultation, No rales, No rhonchi, No wheezes     Cardiovascular: S1, S2, Regular rate and rhythm     Gastrointestinal: Soft, Non-tender, Non-distended     Extremities: No calf tenderness     Incision:    HOSPITAL COURSE:   73y Male with PMHx significant for HTN, HLD, DM type 2, and two prior strokes without residual deficits [place] and [place] facial weakness on [place]. 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 He was transferred to NSICU for angiogram, which was done on 2/9 and showed moderate basilar stenosis. No intervention done. He had to be intubated for worsening respiratory status. On further history it was found that patient had been having progressive weakness and numbness of his extremities for the preceding 1-2 weeks following a viral URI. 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       Admission Scores  GCS:   HH:   MF:   NIHSS: 4   RASS:    CAM-ICU:   ICH:    2/9: transferred from [place], NIHSS of 4 in ED, then became more lethargic, stridorous and desaturated in ED, intubated for airway protected, repeat CTH/A obtained and brought emergently to IR for basilar stenting   2/10: worsening brainstem symptoms while on DAPT  2/12- LP performed to rule out MFS as patient's examination is not explained by the strokes on MRI.     24 hour Events:  Pt with change in neuro exam overnight. S/p CT Head with no acute findings     Allergies  No Known Allergies    Intolerances        REVIEW OF SYSTEMS: [x] Unable to Assess due to neurologic exam   [ ] All ROS addressed below are non-contributory, except:  Neuro: [ ] Headache [ ] Back pain [ ] Numbness [ ] Weakness [ ] Ataxia [ ] Dizziness [ ] Aphasia [ ] Dysarthria [ ] Visual disturbance  Resp: [ ] Shortness of breath/dyspnea, [ ] Orthopnea [ ] Cough  CV: [ ] Chest pain [ ] Palpitation [ ] Lightheadedness [ ] Syncope  Renal: [ ] Thirst [ ] Edema  GI: [ ] Nausea [ ] Emesis [ ] Abdominal pain [ ] Constipation [ ] Diarrhea  Hem: [ ] Hematemesis [ ] bright red blood per rectum  ID: [ ] Fever [ ] Chills [ ] Dysuria  ENT: [ ] Rhinorrhea      DEVICES:   [ ] Restraints [ ] ET tube [ ] central line [ ] arterial line [ ] vences [ ] NGT/OGT [ ] EVD [ ] LD [ ] HAL/HMV [ ] Trach [ ] PEG [ ] Chest Tube     VITALS:   Vital Signs Last 24 Hrs  T(C): 36.1 (13 Feb 2024 03:00), Max: 37.6 (12 Feb 2024 15:00)  T(F): 97 (13 Feb 2024 03:00), Max: 99.7 (12 Feb 2024 15:00)  HR: 104 (13 Feb 2024 07:00) (70 - 133)  BP: --  BP(mean): --  RR: 16 (13 Feb 2024 07:00) (16 - 25)  SpO2: 100% (13 Feb 2024 07:00) (92% - 100%)    Parameters below as of 12 Feb 2024 19:00  Patient On (Oxygen Delivery Method): ventilator      CAPILLARY BLOOD GLUCOSE      POCT Blood Glucose.: 147 mg/dL (13 Feb 2024 05:05)  POCT Blood Glucose.: 164 mg/dL (12 Feb 2024 23:29)  POCT Blood Glucose.: 151 mg/dL (12 Feb 2024 17:15)  POCT Blood Glucose.: 147 mg/dL (12 Feb 2024 11:15)    I&O's Summary    12 Feb 2024 07:01  -  13 Feb 2024 07:00  --------------------------------------------------------  IN: 2227.7 mL / OUT: 1700 mL / NET: 527.7 mL        Respiratory:  Mode: AC/ CMV (Assist Control/ Continuous Mandatory Ventilation)  RR (machine): 16  TV (machine): 500  FiO2: 40  PEEP: 5  ITime: 1  MAP: 9  PIP: 19    ABG - ( 12 Feb 2024 00:50 )  pH, Arterial: 7.41  pH, Blood: x     /  pCO2: 41    /  pO2: 175   / HCO3: 26    / Base Excess: 1.2   /  SaO2: 99.5                LABS:                        12.1   10.72 )-----------( 332      ( 11 Feb 2024 22:19 )             43.0     02-12    144  |  109<H>  |  16  ----------------------------<  205<H>  4.2   |  25  |  0.76             MEDICATION LEVELS:     IVF FLUIDS/MEDICATIONS:   MEDICATIONS  (STANDING):  albuterol/ipratropium for Nebulization 3 milliLiter(s) Nebulizer every 6 hours  aspirin  chewable 81 milliGRAM(s) Oral <User Schedule>  atorvastatin 80 milliGRAM(s) Oral at bedtime  chlorhexidine 0.12% Liquid 15 milliLiter(s) Oral Mucosa every 12 hours  chlorhexidine 4% Liquid 1 Application(s) Topical daily  dexMEDEtomidine Infusion 0.2 MICROgram(s)/kG/Hr (5.08 mL/Hr) IV Continuous <Continuous>  enoxaparin Injectable 40 milliGRAM(s) SubCutaneous <User Schedule>  insulin lispro (ADMELOG) corrective regimen sliding scale   SubCutaneous every 6 hours  pantoprazole  Injectable 40 milliGRAM(s) IV Push daily  phenylephrine    Infusion 0.1 MICROgram(s)/kG/Min (3.81 mL/Hr) IV Continuous <Continuous>  polyethylene glycol 3350 17 Gram(s) Oral daily  senna 1 Tablet(s) Oral at bedtime    MEDICATIONS  (PRN):  acetaminophen   Oral Liquid .. 650 milliGRAM(s) Oral every 6 hours PRN Mild Pain (1 - 3)  oxyCODONE    IR 5 milliGRAM(s) Oral every 4 hours PRN Moderate Pain (4 - 6)  oxyCODONE    IR 10 milliGRAM(s) Oral every 4 hours PRN Severe Pain (7 - 10)        IMAGING:      EXAMINATION:  PHYSICAL EXAM:    Constitutional: No Acute Distress, patient intubated but following commands     Neurological: Bilateral ptosis, pupils 2 mm and sluggish, restricted gaze in all directions however, vertical gaze is better than horizontal. Facial asymmetry could not be appreciated due to ETT, patient following commands with his LLE.     Motor exam:          Upper extremity                         Delt     Bicep     Tricep    HG                                                 R         0/5        0/5        0/5      2/5 (activation of muscles could be appreciated in bilateral biceps and triceps)                                               L          0/5        0/5        0/5       1/5          Lower extremity                        HF         KF        KE       DF         PF                                                  R        0/5        0/5        5/5       1/5         4/5                                               L         0/5        0/5       5/5       2/5          5/5                                                  Reflexes: Deep Tendon Reflexes absent in all 4 extremities    Pulmonary: Clear to Auscultation, No rales, No rhonchi, No wheezes     Cardiovascular: S1, S2, Regular rate and rhythm     Gastrointestinal: Soft, Non-tender, Non-distended     Extremities: No calf tenderness     Incision:    HOSPITAL COURSE:   73y Male with PMHx significant for HTN, HLD, DM type 2, and two prior strokes without residual deficits [place] facial weakness on [place]. 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 He was transferred to NSICU for angiogram, which was done on 2/9 and showed moderate basilar stenosis. No intervention done. He had to be intubated for worsening respiratory status. On further history it was found that patient had been having progressive weakness and numbness of his extremities for the preceding 1-2 weeks following a viral URI. 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       Admission Scores  GCS:   HH:   MF:   NIHSS: 4   RASS:    CAM-ICU:   ICH:    2/9: transferred from [place], NIHSS of 4 in ED, then became more lethargic, stridorous and desaturated in ED, intubated for airway protected, repeat CTH/A obtained and brought emergently to IR for basilar stenting   2/10: worsening brainstem symptoms while on DAPT  2/12- LP performed to rule out MFS as patient's examination is not explained by the strokes on MRI.     24 hour Events:  Pt with change in neuro exam overnight. S/p CT Head with no acute findings. Pt also with episode of desaturation overnight 2/2 mucus plug     Allergies  No Known Allergies    Intolerances        REVIEW OF SYSTEMS: [x] Unable to Assess due to neurologic exam   [ ] All ROS addressed below are non-contributory, except:  Neuro: [ ] Headache [ ] Back pain [ ] Numbness [ ] Weakness [ ] Ataxia [ ] Dizziness [ ] Aphasia [ ] Dysarthria [ ] Visual disturbance  Resp: [ ] Shortness of breath/dyspnea, [ ] Orthopnea [ ] Cough  CV: [ ] Chest pain [ ] Palpitation [ ] Lightheadedness [ ] Syncope  Renal: [ ] Thirst [ ] Edema  GI: [ ] Nausea [ ] Emesis [ ] Abdominal pain [ ] Constipation [ ] Diarrhea  Hem: [ ] Hematemesis [ ] bright red blood per rectum  ID: [ ] Fever [ ] Chills [ ] Dysuria  ENT: [ ] Rhinorrhea      DEVICES:   [ ] Restraints [ ] ET tube [ ] central line [ ] arterial line [ ] vences [ ] NGT/OGT [ ] EVD [ ] LD [ ] HAL/HMV [ ] Trach [ ] PEG [ ] Chest Tube     VITALS:   Vital Signs Last 24 Hrs  T(C): 36.1 (13 Feb 2024 03:00), Max: 37.6 (12 Feb 2024 15:00)  T(F): 97 (13 Feb 2024 03:00), Max: 99.7 (12 Feb 2024 15:00)  HR: 104 (13 Feb 2024 07:00) (70 - 133)  BP: --  BP(mean): --  RR: 16 (13 Feb 2024 07:00) (16 - 25)  SpO2: 100% (13 Feb 2024 07:00) (92% - 100%)    Parameters below as of 12 Feb 2024 19:00  Patient On (Oxygen Delivery Method): ventilator      CAPILLARY BLOOD GLUCOSE      POCT Blood Glucose.: 147 mg/dL (13 Feb 2024 05:05)  POCT Blood Glucose.: 164 mg/dL (12 Feb 2024 23:29)  POCT Blood Glucose.: 151 mg/dL (12 Feb 2024 17:15)  POCT Blood Glucose.: 147 mg/dL (12 Feb 2024 11:15)    I&O's Summary    12 Feb 2024 07:01  -  13 Feb 2024 07:00  --------------------------------------------------------  IN: 2227.7 mL / OUT: 1700 mL / NET: 527.7 mL        Respiratory:  Mode: AC/ CMV (Assist Control/ Continuous Mandatory Ventilation)  RR (machine): 16  TV (machine): 500  FiO2: 40  PEEP: 5  ITime: 1  MAP: 9  PIP: 19    ABG - ( 12 Feb 2024 00:50 )  pH, Arterial: 7.41  pH, Blood: x     /  pCO2: 41    /  pO2: 175   / HCO3: 26    / Base Excess: 1.2   /  SaO2: 99.5                LABS:                        12.1   10.72 )-----------( 332      ( 11 Feb 2024 22:19 )             43.0     02-12    144  |  109<H>  |  16  ----------------------------<  205<H>  4.2   |  25  |  0.76             MEDICATION LEVELS:     IVF FLUIDS/MEDICATIONS:   MEDICATIONS  (STANDING):  albuterol/ipratropium for Nebulization 3 milliLiter(s) Nebulizer every 6 hours  aspirin  chewable 81 milliGRAM(s) Oral <User Schedule>  atorvastatin 80 milliGRAM(s) Oral at bedtime  chlorhexidine 0.12% Liquid 15 milliLiter(s) Oral Mucosa every 12 hours  chlorhexidine 4% Liquid 1 Application(s) Topical daily  dexMEDEtomidine Infusion 0.2 MICROgram(s)/kG/Hr (5.08 mL/Hr) IV Continuous <Continuous>  enoxaparin Injectable 40 milliGRAM(s) SubCutaneous <User Schedule>  insulin lispro (ADMELOG) corrective regimen sliding scale   SubCutaneous every 6 hours  pantoprazole  Injectable 40 milliGRAM(s) IV Push daily  phenylephrine    Infusion 0.1 MICROgram(s)/kG/Min (3.81 mL/Hr) IV Continuous <Continuous>  polyethylene glycol 3350 17 Gram(s) Oral daily  senna 1 Tablet(s) Oral at bedtime    MEDICATIONS  (PRN):  acetaminophen   Oral Liquid .. 650 milliGRAM(s) Oral every 6 hours PRN Mild Pain (1 - 3)  oxyCODONE    IR 5 milliGRAM(s) Oral every 4 hours PRN Moderate Pain (4 - 6)  oxyCODONE    IR 10 milliGRAM(s) Oral every 4 hours PRN Severe Pain (7 - 10)        IMAGING:      EXAMINATION:  PHYSICAL EXAM:    Constitutional: No Acute Distress, patient intubated but following commands     Neurological: Bilateral ptosis, pupils 2 mm and sluggish, restricted gaze in all directions however, vertical gaze is better than horizontal. Facial asymmetry could not be appreciated due to ETT, patient following commands with his LLE.     Motor exam:          Upper extremity                         Delt     Bicep     Tricep    HG                                                 R         0/5        0/5        0/5      2/5 (activation of muscles could be appreciated in bilateral biceps and triceps)                                               L          0/5        0/5        0/5       1/5          Lower extremity                        HF         KF        KE       DF         PF                                                  R        0/5        0/5        5/5       1/5         4/5                                               L         0/5        0/5       5/5       2/5          5/5                                                  Reflexes: Deep Tendon Reflexes absent in all 4 extremities    Pulmonary: Clear to Auscultation, No rales, No rhonchi, No wheezes     Cardiovascular: S1, S2, Regular rate and rhythm     Gastrointestinal: Soft, Non-tender, Non-distended     Extremities: No calf tenderness     Incision:

## 2024-02-13 NOTE — PROGRESS NOTE ADULT - SUBJECTIVE AND OBJECTIVE BOX
Patient seen and examined at bedside.    --Anticoagulation--  aspirin  chewable 81 milliGRAM(s) Oral <User Schedule>  clopidogrel Tablet 75 milliGRAM(s) Oral daily  enoxaparin Injectable 40 milliGRAM(s) SubCutaneous <User Schedule>    T(C): 36.3 (02-10-24 @ 23:00), Max: 36.7 (02-10-24 @ 03:00)  HR: 78 (02-11-24 @ 00:45) (64 - 121)  BP: 209/114 (02-10-24 @ 12:30) (110/68 - 209/114)  RR: 13 (02-11-24 @ 00:45) (11 - 27)  SpO2: 100% (02-11-24 @ 00:45) (93% - 100%)  Wt(kg): --    Exam: Intubated, no EO, no longer oriented, PERRL, no eye movement, no FC, flaccid x4

## 2024-02-13 NOTE — PROGRESS NOTE ADULT - ATTENDING COMMENTS
74yo man adm for progressive weakness c/f GBS  no FC overnight, CTH done  increased secretion, mucus plug  intubated 2/9    phenylephrine @2   precedex 0.5    SBP , MAP>65  TTE    1. Left ventricular cavity is small. Left ventricular systolic function is normal with an ejection fraction of 66 % by Glynn's method of disks. There are no regional wall motion abnormalities seen.   2. Normal left ventricular diastolic function, with normal filling pressure.   3. Normal right ventricular cavity size, wall thickness, and normal systolic function.   4. Normal left and right atrial size.   5. No pericardial effusion seen.   6. Agitated saline injection was negative for intracardiac shunt.   7. No prior echocardiogram is available for comparison.   8. There is no evidence of a left ventricular thrombus.   9. There is normal LV mass and concentric remodeling.    16/500/5/40%  d/c duonebs, add hypertonics nebs, chest PT/suctioning  f/u CXR  LBM 2/13    ANDRES CAPONE axillary daphne     PLEX #1 2/12, continue EOD for 5 sessions  f/u CSF studies  pain control;   neurology following  ASA only for now, for basilar multiple stenoses   glucerna 1.2  LBM 2/13  d/c LUZMARIA   afebrile     PICC consult  vit D supp  lovenox ppx     cc 45min

## 2024-02-14 LAB
% ALBUMIN: 49.1 % — SIGNIFICANT CHANGE UP
% ALPHA 1: 6.6 % — SIGNIFICANT CHANGE UP
% ALPHA 2: 11.7 % — SIGNIFICANT CHANGE UP
% BETA: 14.2 % — SIGNIFICANT CHANGE UP
% GAMMA: 18.4 % — SIGNIFICANT CHANGE UP
% M SPIKE: SIGNIFICANT CHANGE UP
ACE SERPL-CCNC: 50 U/L — SIGNIFICANT CHANGE UP (ref 14–82)
ALBUMIN SERPL ELPH-MCNC: 3.4 G/DL — LOW (ref 3.6–5.5)
ALBUMIN/GLOB SERPL ELPH: 1 RATIO — SIGNIFICANT CHANGE UP
ALPHA1 GLOB SERPL ELPH-MCNC: 0.5 G/DL — HIGH (ref 0.1–0.4)
ALPHA2 GLOB SERPL ELPH-MCNC: 0.8 G/DL — SIGNIFICANT CHANGE UP (ref 0.5–1)
ANION GAP SERPL CALC-SCNC: 7 MMOL/L — SIGNIFICANT CHANGE UP (ref 5–17)
ANION GAP SERPL CALC-SCNC: 9 MMOL/L — SIGNIFICANT CHANGE UP (ref 5–17)
APTT BLD: 28.2 SEC — SIGNIFICANT CHANGE UP (ref 24.5–35.6)
B BURGDOR C6 AB SER-ACNC: NEGATIVE — SIGNIFICANT CHANGE UP
B BURGDOR IGG+IGM SER-ACNC: 0.04 INDEX — SIGNIFICANT CHANGE UP (ref 0.01–0.89)
B-GLOBULIN SERPL ELPH-MCNC: 1 G/DL — SIGNIFICANT CHANGE UP (ref 0.5–1)
BUN SERPL-MCNC: 21 MG/DL — SIGNIFICANT CHANGE UP (ref 7–23)
BUN SERPL-MCNC: 23 MG/DL — SIGNIFICANT CHANGE UP (ref 7–23)
CA-I BLD-SCNC: 1.09 MMOL/L — LOW (ref 1.15–1.33)
CALCIUM SERPL-MCNC: 8.1 MG/DL — LOW (ref 8.4–10.5)
CALCIUM SERPL-MCNC: 8.4 MG/DL — SIGNIFICANT CHANGE UP (ref 8.4–10.5)
CALCIUM SERPL-MCNC: 8.6 MG/DL — SIGNIFICANT CHANGE UP (ref 8.4–10.5)
CHLORIDE SERPL-SCNC: 111 MMOL/L — HIGH (ref 96–108)
CHLORIDE SERPL-SCNC: 113 MMOL/L — HIGH (ref 96–108)
CHOLEST SERPL-MCNC: 92 MG/DL — SIGNIFICANT CHANGE UP
CO2 SERPL-SCNC: 25 MMOL/L — SIGNIFICANT CHANGE UP (ref 22–31)
CO2 SERPL-SCNC: 28 MMOL/L — SIGNIFICANT CHANGE UP (ref 22–31)
CREAT SERPL-MCNC: 0.78 MG/DL — SIGNIFICANT CHANGE UP (ref 0.5–1.3)
CREAT SERPL-MCNC: 0.84 MG/DL — SIGNIFICANT CHANGE UP (ref 0.5–1.3)
EGFR: 92 ML/MIN/1.73M2 — SIGNIFICANT CHANGE UP
EGFR: 94 ML/MIN/1.73M2 — SIGNIFICANT CHANGE UP
FIBRINOGEN PPP-MCNC: 502 MG/DL — HIGH (ref 200–445)
GAMMA GLOBULIN: 1.3 G/DL — SIGNIFICANT CHANGE UP (ref 0.6–1.6)
GAS PNL BLDA: SIGNIFICANT CHANGE UP
GLUCOSE SERPL-MCNC: 193 MG/DL — HIGH (ref 70–99)
GLUCOSE SERPL-MCNC: 217 MG/DL — HIGH (ref 70–99)
HCT VFR BLD CALC: 39.3 % — SIGNIFICANT CHANGE UP (ref 39–50)
HCT VFR BLD CALC: 41 % — SIGNIFICANT CHANGE UP (ref 39–50)
HDLC SERPL-MCNC: 19 MG/DL — LOW
HGB BLD-MCNC: 11.2 G/DL — LOW (ref 13–17)
HGB BLD-MCNC: 11.6 G/DL — LOW (ref 13–17)
INR BLD: 1.19 RATIO — HIGH (ref 0.85–1.18)
LIPID PNL WITH DIRECT LDL SERPL: 53 MG/DL — SIGNIFICANT CHANGE UP
LYME IGG LINE BLOT INTERP.: NEGATIVE — SIGNIFICANT CHANGE UP
LYME IGM LINE BLOT INTERP.: NEGATIVE — SIGNIFICANT CHANGE UP
M-SPIKE: SIGNIFICANT CHANGE UP (ref 0–0)
MAGNESIUM SERPL-MCNC: 2.1 MG/DL — SIGNIFICANT CHANGE UP (ref 1.6–2.6)
MAGNESIUM SERPL-MCNC: 2.4 MG/DL — SIGNIFICANT CHANGE UP (ref 1.6–2.6)
MCHC RBC-ENTMCNC: 21.1 PG — LOW (ref 27–34)
MCHC RBC-ENTMCNC: 21.5 PG — LOW (ref 27–34)
MCHC RBC-ENTMCNC: 28.3 GM/DL — LOW (ref 32–36)
MCHC RBC-ENTMCNC: 28.5 GM/DL — LOW (ref 32–36)
MCV RBC AUTO: 74.7 FL — LOW (ref 80–100)
MCV RBC AUTO: 75.4 FL — LOW (ref 80–100)
NON HDL CHOLESTEROL: 73 MG/DL — SIGNIFICANT CHANGE UP
NRBC # BLD: 0 /100 WBCS — SIGNIFICANT CHANGE UP (ref 0–0)
NRBC # BLD: 0 /100 WBCS — SIGNIFICANT CHANGE UP (ref 0–0)
P18 AB. IGG: SIGNIFICANT CHANGE UP
P23 AB. IGG: SIGNIFICANT CHANGE UP
P23 AB. IGM: SIGNIFICANT CHANGE UP
P28 AB. IGG: SIGNIFICANT CHANGE UP
P30 AB. IGG: SIGNIFICANT CHANGE UP
P39 AB. IGG: SIGNIFICANT CHANGE UP
P39 AB. IGM: SIGNIFICANT CHANGE UP
P41 AB. IGG: SIGNIFICANT CHANGE UP
P41 AB. IGM: SIGNIFICANT CHANGE UP
P45 AB. IGG: SIGNIFICANT CHANGE UP
P58 AB. IGG: SIGNIFICANT CHANGE UP
P66 AB. IGG: SIGNIFICANT CHANGE UP
P93 AB. IGG: SIGNIFICANT CHANGE UP
PHOSPHATE SERPL-MCNC: 1.8 MG/DL — LOW (ref 2.5–4.5)
PHOSPHATE SERPL-MCNC: 2.4 MG/DL — LOW (ref 2.5–4.5)
PLATELET # BLD AUTO: 366 K/UL — SIGNIFICANT CHANGE UP (ref 150–400)
PLATELET # BLD AUTO: 387 K/UL — SIGNIFICANT CHANGE UP (ref 150–400)
POTASSIUM SERPL-MCNC: 4.3 MMOL/L — SIGNIFICANT CHANGE UP (ref 3.5–5.3)
POTASSIUM SERPL-MCNC: 4.5 MMOL/L — SIGNIFICANT CHANGE UP (ref 3.5–5.3)
POTASSIUM SERPL-SCNC: 4.3 MMOL/L — SIGNIFICANT CHANGE UP (ref 3.5–5.3)
POTASSIUM SERPL-SCNC: 4.5 MMOL/L — SIGNIFICANT CHANGE UP (ref 3.5–5.3)
PROT PATTERN SERPL ELPH-IMP: SIGNIFICANT CHANGE UP
PROT SERPL-MCNC: 6.9 G/DL — SIGNIFICANT CHANGE UP (ref 6–8.3)
PROTHROM AB SERPL-ACNC: 13 SEC — SIGNIFICANT CHANGE UP (ref 9.5–13)
PTH-INTACT FLD-MCNC: 104 PG/ML — HIGH (ref 15–65)
RBC # BLD: 5.21 M/UL — SIGNIFICANT CHANGE UP (ref 4.2–5.8)
RBC # BLD: 5.49 M/UL — SIGNIFICANT CHANGE UP (ref 4.2–5.8)
RBC # FLD: 19.9 % — HIGH (ref 10.3–14.5)
RBC # FLD: 19.9 % — HIGH (ref 10.3–14.5)
SODIUM SERPL-SCNC: 146 MMOL/L — HIGH (ref 135–145)
SODIUM SERPL-SCNC: 147 MMOL/L — HIGH (ref 135–145)
TM INTERPRETATION: SIGNIFICANT CHANGE UP
TRIGL SERPL-MCNC: 104 MG/DL — SIGNIFICANT CHANGE UP
WBC # BLD: 10.68 K/UL — HIGH (ref 3.8–10.5)
WBC # BLD: 13.56 K/UL — HIGH (ref 3.8–10.5)
WBC # FLD AUTO: 10.68 K/UL — HIGH (ref 3.8–10.5)
WBC # FLD AUTO: 13.56 K/UL — HIGH (ref 3.8–10.5)

## 2024-02-14 PROCEDURE — 71045 X-RAY EXAM CHEST 1 VIEW: CPT | Mod: 26,76

## 2024-02-14 PROCEDURE — 99291 CRITICAL CARE FIRST HOUR: CPT

## 2024-02-14 PROCEDURE — 36514 APHERESIS PLASMA: CPT

## 2024-02-14 RX ORDER — SODIUM CHLORIDE 9 MG/ML
10 INJECTION INTRAMUSCULAR; INTRAVENOUS; SUBCUTANEOUS
Refills: 0 | Status: DISCONTINUED | OUTPATIENT
Start: 2024-02-14 | End: 2024-03-03

## 2024-02-14 RX ORDER — CALCIUM GLUCONATE 100 MG/ML
2 VIAL (ML) INTRAVENOUS ONCE
Refills: 0 | Status: COMPLETED | OUTPATIENT
Start: 2024-02-14 | End: 2024-02-14

## 2024-02-14 RX ORDER — GABAPENTIN 400 MG/1
300 CAPSULE ORAL THREE TIMES A DAY
Refills: 0 | Status: DISCONTINUED | OUTPATIENT
Start: 2024-02-14 | End: 2024-02-16

## 2024-02-14 RX ORDER — FENTANYL CITRATE 50 UG/ML
50 INJECTION INTRAVENOUS
Refills: 0 | Status: DISCONTINUED | OUTPATIENT
Start: 2024-02-14 | End: 2024-02-17

## 2024-02-14 RX ORDER — POTASSIUM PHOSPHATE, MONOBASIC POTASSIUM PHOSPHATE, DIBASIC 236; 224 MG/ML; MG/ML
30 INJECTION, SOLUTION INTRAVENOUS ONCE
Refills: 0 | Status: COMPLETED | OUTPATIENT
Start: 2024-02-14 | End: 2024-02-14

## 2024-02-14 RX ORDER — ASPIRIN/CALCIUM CARB/MAGNESIUM 324 MG
81 TABLET ORAL
Refills: 0 | Status: DISCONTINUED | OUTPATIENT
Start: 2024-02-14 | End: 2024-02-18

## 2024-02-14 RX ORDER — FENTANYL CITRATE 50 UG/ML
25 INJECTION INTRAVENOUS ONCE
Refills: 0 | Status: DISCONTINUED | OUTPATIENT
Start: 2024-02-14 | End: 2024-02-14

## 2024-02-14 RX ORDER — FENTANYL CITRATE 50 UG/ML
50 INJECTION INTRAVENOUS ONCE
Refills: 0 | Status: DISCONTINUED | OUTPATIENT
Start: 2024-02-14 | End: 2024-02-14

## 2024-02-14 RX ORDER — LABETALOL HCL 100 MG
10 TABLET ORAL ONCE
Refills: 0 | Status: COMPLETED | OUTPATIENT
Start: 2024-02-14 | End: 2024-02-14

## 2024-02-14 RX ORDER — PHENYLEPHRINE HYDROCHLORIDE 10 MG/ML
0.1 INJECTION INTRAVENOUS
Qty: 160 | Refills: 0 | Status: DISCONTINUED | OUTPATIENT
Start: 2024-02-14 | End: 2024-02-15

## 2024-02-14 RX ORDER — LABETALOL HCL 100 MG
5 TABLET ORAL ONCE
Refills: 0 | Status: COMPLETED | OUTPATIENT
Start: 2024-02-14 | End: 2024-02-14

## 2024-02-14 RX ADMIN — DEXMEDETOMIDINE HYDROCHLORIDE IN 0.9% SODIUM CHLORIDE 5.08 MICROGRAM(S)/KG/HR: 4 INJECTION INTRAVENOUS at 07:00

## 2024-02-14 RX ADMIN — FENTANYL CITRATE 50 MICROGRAM(S): 50 INJECTION INTRAVENOUS at 02:30

## 2024-02-14 RX ADMIN — Medication 650 MILLIGRAM(S): at 02:00

## 2024-02-14 RX ADMIN — Medication 200 GRAM(S): at 03:49

## 2024-02-14 RX ADMIN — POTASSIUM PHOSPHATE, MONOBASIC POTASSIUM PHOSPHATE, DIBASIC 83.33 MILLIMOLE(S): 236; 224 INJECTION, SOLUTION INTRAVENOUS at 02:49

## 2024-02-14 RX ADMIN — SENNA PLUS 1 TABLET(S): 8.6 TABLET ORAL at 21:15

## 2024-02-14 RX ADMIN — GABAPENTIN 300 MILLIGRAM(S): 400 CAPSULE ORAL at 18:24

## 2024-02-14 RX ADMIN — Medication 5 MILLIGRAM(S): at 18:10

## 2024-02-14 RX ADMIN — FENTANYL CITRATE 25 MICROGRAM(S): 50 INJECTION INTRAVENOUS at 04:47

## 2024-02-14 RX ADMIN — FENTANYL CITRATE 50 MICROGRAM(S): 50 INJECTION INTRAVENOUS at 18:03

## 2024-02-14 RX ADMIN — SODIUM CHLORIDE 4 MILLILITER(S): 9 INJECTION INTRAMUSCULAR; INTRAVENOUS; SUBCUTANEOUS at 13:10

## 2024-02-14 RX ADMIN — OXYCODONE HYDROCHLORIDE 5 MILLIGRAM(S): 5 TABLET ORAL at 09:53

## 2024-02-14 RX ADMIN — Medication 81 MILLIGRAM(S): at 05:53

## 2024-02-14 RX ADMIN — FENTANYL CITRATE 50 MICROGRAM(S): 50 INJECTION INTRAVENOUS at 22:00

## 2024-02-14 RX ADMIN — POLYETHYLENE GLYCOL 3350 17 GRAM(S): 17 POWDER, FOR SOLUTION ORAL at 13:14

## 2024-02-14 RX ADMIN — OXYCODONE HYDROCHLORIDE 10 MILLIGRAM(S): 5 TABLET ORAL at 23:00

## 2024-02-14 RX ADMIN — CHLORHEXIDINE GLUCONATE 15 MILLILITER(S): 213 SOLUTION TOPICAL at 17:48

## 2024-02-14 RX ADMIN — Medication 10 MILLIGRAM(S): at 12:43

## 2024-02-14 RX ADMIN — FENTANYL CITRATE 50 MICROGRAM(S): 50 INJECTION INTRAVENOUS at 21:32

## 2024-02-14 RX ADMIN — Medication 85 MILLIMOLE(S): at 23:56

## 2024-02-14 RX ADMIN — SODIUM CHLORIDE 4 MILLILITER(S): 9 INJECTION INTRAMUSCULAR; INTRAVENOUS; SUBCUTANEOUS at 18:28

## 2024-02-14 RX ADMIN — SODIUM CHLORIDE 4 MILLILITER(S): 9 INJECTION INTRAMUSCULAR; INTRAVENOUS; SUBCUTANEOUS at 05:18

## 2024-02-14 RX ADMIN — FENTANYL CITRATE 50 MICROGRAM(S): 50 INJECTION INTRAVENOUS at 17:48

## 2024-02-14 RX ADMIN — ENOXAPARIN SODIUM 40 MILLIGRAM(S): 100 INJECTION SUBCUTANEOUS at 20:18

## 2024-02-14 RX ADMIN — CHLORHEXIDINE GLUCONATE 1 APPLICATION(S): 213 SOLUTION TOPICAL at 13:14

## 2024-02-14 RX ADMIN — PANTOPRAZOLE SODIUM 40 MILLIGRAM(S): 20 TABLET, DELAYED RELEASE ORAL at 13:13

## 2024-02-14 RX ADMIN — FENTANYL CITRATE 50 MICROGRAM(S): 50 INJECTION INTRAVENOUS at 00:40

## 2024-02-14 RX ADMIN — CHLORHEXIDINE GLUCONATE 15 MILLILITER(S): 213 SOLUTION TOPICAL at 05:53

## 2024-02-14 RX ADMIN — OXYCODONE HYDROCHLORIDE 10 MILLIGRAM(S): 5 TABLET ORAL at 22:26

## 2024-02-14 RX ADMIN — OXYCODONE HYDROCHLORIDE 5 MILLIGRAM(S): 5 TABLET ORAL at 10:38

## 2024-02-14 RX ADMIN — GABAPENTIN 300 MILLIGRAM(S): 400 CAPSULE ORAL at 10:54

## 2024-02-14 RX ADMIN — ATORVASTATIN CALCIUM 80 MILLIGRAM(S): 80 TABLET, FILM COATED ORAL at 21:15

## 2024-02-14 RX ADMIN — SODIUM CHLORIDE 4 MILLILITER(S): 9 INJECTION INTRAMUSCULAR; INTRAVENOUS; SUBCUTANEOUS at 23:58

## 2024-02-14 RX ADMIN — DEXMEDETOMIDINE HYDROCHLORIDE IN 0.9% SODIUM CHLORIDE 5.08 MICROGRAM(S)/KG/HR: 4 INJECTION INTRAVENOUS at 21:15

## 2024-02-14 NOTE — PROGRESS NOTE ADULT - ASSESSMENT
MR brain neg, poss lesion in C spine c/f poss inflammatory process  SBP   on [id number]  Further management per NSCU/neurology

## 2024-02-14 NOTE — CHART NOTE - NSCHARTNOTEFT_GEN_A_CORE
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    Plan for 5 TPE over approximately 10 days, once every other day.    TPE #1 (02/12/2024). TPE #1 was performed on 02/12/24. One plasma volume was exchanged with 5% albumin and 3g of calcium was given throughout the procedure. Patient tolerated the procedure well.    TPE #2 was performed on 02/14/24. Labs were reviewed prior to procedure and 1g calcium given prior to procedure. One plasma volume was exchanged with 5% albumin and 3g of calcium was given throughout the procedure. Patient experienced episode of hypertension during procedure which resolved with labetalol, otherwise tolerated the procedure well.    TPE#3 planned for 02/16/24. Patient to be discharged to rehab once medically stable.     Ostomy team will continue to follow.    Please contact Wound/Ostomy Care Service Line if we can be of further assistance (ext [phone]).

## 2024-02-14 NOTE — PROGRESS NOTE ADULT - SUBJECTIVE AND OBJECTIVE BOX
HOSPITAL COURSE:  73y Male with PMHx significant for HTN, HLD, DM type 2, and two prior strokes without residual deficits [place] and [place] facial weakness on [place]. 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 He was transferred to NSICU for angiogram, which was done on 2/9 and showed moderate basilar stenosis. No intervention done. He had to be intubated for worsening respiratory status. On further history it was found that patient had been having progressive weakness and numbness of his extremities for the preceding 1-2 weeks following a viral URI. 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       Admission Scores  GCS:   HH:   MF:   NIHSS: 4   RASS:    CAM-ICU:   ICH:    2/9: transferred from [place], NIHSS of 4 in ED, then became more lethargic, stridorous and desaturated in ED, intubated for airway protected, repeat CTH/A obtained and brought emergently to IR for basilar stenting   2/10: worsening brainstem symptoms while on DAPT  2/12- LP performed to rule out MFS as patient's examination is not explained by the strokes on MRI. First session of PLEX       24 hour Events: Hypertensive overnight with improvement after fent     Allergies    No Known Allergies    Intolerances        REVIEW OF SYSTEMS: [ ] Unable to Assess due to neurologic exam   [ ] All ROS addressed below are non-contributory, except:  Neuro: [ ] Headache [ ] Back pain [ ] Numbness [ ] Weakness [ ] Ataxia [ ] Dizziness [ ] Aphasia [ ] Dysarthria [ ] Visual disturbance  Resp: [ ] Shortness of breath/dyspnea, [ ] Orthopnea [ ] Cough  CV: [ ] Chest pain [ ] Palpitation [ ] Lightheadedness [ ] Syncope  Renal: [ ] Thirst [ ] Edema  GI: [ ] Nausea [ ] Emesis [ ] Abdominal pain [ ] Constipation [ ] Diarrhea  Hem: [ ] Hematemesis [ ] bright red blood per rectum  ID: [ ] Fever [ ] Chills [ ] Dysuria  ENT: [ ] Rhinorrhea      DEVICES:   [ ] Restraints [ ] ET tube [ ] central line [ ] arterial line [ ] vences [ ] NGT/OGT [ ] EVD [ ] LD [ ] HAL/HMV [ ] Trach [ ] PEG [ ] Chest Tube     VITALS:   Vital Signs Last 24 Hrs  T(C): 36.9 (14 Feb 2024 03:00), Max: 37.5 (13 Feb 2024 11:00)  T(F): 98.4 (14 Feb 2024 03:00), Max: 99.5 (13 Feb 2024 11:00)  HR: 86 (14 Feb 2024 06:45) (64 - 122)  BP: --  BP(mean): --  RR: 16 (14 Feb 2024 06:45) (15 - 20)  SpO2: 97% (14 Feb 2024 06:45) (96% - 100%)    Parameters below as of 14 Feb 2024 05:59  Patient On (Oxygen Delivery Method): ventilator      CAPILLARY BLOOD GLUCOSE        I&O's Summary    13 Feb 2024 07:01  -  14 Feb 2024 07:00  --------------------------------------------------------  IN: 3313.7 mL / OUT: 1950 mL / NET: 1363.7 mL        Respiratory:  Mode: AC/ CMV (Assist Control/ Continuous Mandatory Ventilation)  RR (machine): 16  TV (machine): 500  FiO2: 40  PEEP: 5  ITime: 1  MAP: 9  PIP: 17    ABG - ( 14 Feb 2024 00:48 )  pH, Arterial: 7.41  pH, Blood: x     /  pCO2: 47    /  pO2: 110   / HCO3: 30    / Base Excess: 4.4   /  SaO2: 98.7                LABS:                        11.6   10.68 )-----------( 366      ( 14 Feb 2024 00:52 )             41.0     02-14    146<H>  |  111<H>  |  23  ----------------------------<  193<H>  4.3   |  28  |  0.84             MEDICATION LEVELS:     IVF FLUIDS/MEDICATIONS:   MEDICATIONS  (STANDING):  aspirin  chewable 81 milliGRAM(s) Oral <User Schedule>  atorvastatin 80 milliGRAM(s) Oral at bedtime  chlorhexidine 0.12% Liquid 15 milliLiter(s) Oral Mucosa every 12 hours  chlorhexidine 4% Liquid 1 Application(s) Topical daily  dexMEDEtomidine Infusion 0.2 MICROgram(s)/kG/Hr (5.08 mL/Hr) IV Continuous <Continuous>  enoxaparin Injectable 40 milliGRAM(s) SubCutaneous <User Schedule>  ergocalciferol Drops 76187 Unit(s) Oral <User Schedule>  pantoprazole  Injectable 40 milliGRAM(s) IV Push daily  phenylephrine    Infusion 0.1 MICROgram(s)/kG/Min (3.81 mL/Hr) IV Continuous <Continuous>  polyethylene glycol 3350 17 Gram(s) Oral daily  senna 1 Tablet(s) Oral at bedtime  sodium chloride 3%  Inhalation 4 milliLiter(s) Inhalation every 6 hours    MEDICATIONS  (PRN):  acetaminophen   Oral Liquid .. 650 milliGRAM(s) Oral every 6 hours PRN Mild Pain (1 - 3)  oxyCODONE    IR 10 milliGRAM(s) Oral every 4 hours PRN Severe Pain (7 - 10)  oxyCODONE    IR 5 milliGRAM(s) Oral every 4 hours PRN Moderate Pain (4 - 6)        IMAGING:      EXAMINATION:  PHYSICAL EXAM:    Constitutional: No Acute Distress, patient intubated but following commands     Neurological: Bilateral ptosis, pupils 2 mm and sluggish, restricted gaze in all directions however, vertical gaze is better than horizontal. Facial asymmetry could not be appreciated due to ETT, patient following commands with his LLE.     Motor exam:          Upper extremity                         Delt     Bicep     Tricep    HG                                                 R         0/5        0/5        0/5      2/5 (activation of muscles could be appreciated in bilateral biceps and triceps)                                               L          0/5        0/5        0/5       1/5          Lower extremity                        HF         KF        KE       DF         PF                                                  R        0/5        0/5        5/5       1/5         4/5                                               L         0/5        0/5       5/5       2/5          5/5                                                  Reflexes: Deep Tendon Reflexes absent in all 4 extremities    Pulmonary: Clear to Auscultation, No rales, No rhonchi, No wheezes     Cardiovascular: S1, S2, Regular rate and rhythm     Gastrointestinal: Soft, Non-tender, Non-distended     Extremities: No calf tenderness      HOSPITAL COURSE:  73y Male with PMHx significant for HTN, HLD, DM type 2, and two prior strokes without residual deficits [place] and L facial weakness on [facility]. 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 He was transferred to NSICU for angiogram, which was done on 2/9 and showed moderate basilar stenosis. No intervention done. He had to be intubated for worsening respiratory status. On further history it was found that patient had been having progressive weakness and numbness of his extremities for the preceding 1-2 weeks following a viral URI. 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       Admission Scores  GCS:   HH:   MF:   NIHSS: 4   RASS:    CAM-ICU:   ICH:    2/9: transferred from [place], NIHSS of 4 in ED, then became more lethargic, stridorous and desaturated in ED, intubated for airway protected, repeat CTH/A obtained and brought emergently to IR for basilar stenting   2/10: worsening brainstem symptoms while on DAPT  2/12- LP performed to rule out MFS as patient's examination is not explained by the strokes on MRI. First session of PLEX  2/14- s/p 2nd PLEX tx, no acute interval events     Allergies    No Known Allergies    Intolerances    REVIEW OF SYSTEMS: [x ] Unable to Assess due to neurologic exam   [ ] All ROS addressed below are non-contributory, except:  Neuro: [ ] Headache [ ] Back pain [ ] Numbness [ ] Weakness [ ] Ataxia [ ] Dizziness [ ] Aphasia [ ] Dysarthria [ ] Visual disturbance  Resp: [ ] Shortness of breath/dyspnea, [ ] Orthopnea [ ] Cough  CV: [ ] Chest pain [ ] Palpitation [ ] Lightheadedness [ ] Syncope  Renal: [ ] Thirst [ ] Edema  GI: [ ] Nausea [ ] Emesis [ ] Abdominal pain [ ] Constipation [ ] Diarrhea  Hem: [ ] Hematemesis [ ] bright red blood per rectum  ID: [ ] Fever [ ] Chills [ ] Dysuria  ENT: [ ] Rhinorrhea    ICU Vital Signs Last 24 Hrs  T(C): 37 (14 Feb 2024 19:00), Max: 37 (14 Feb 2024 11:00)  T(F): 98.6 (14 Feb 2024 19:00), Max: 98.6 (14 Feb 2024 11:00)  HR: 62 (14 Feb 2024 20:00) (54 - 107)  BP: --  BP(mean): --  ABP: 123/55 (14 Feb 2024 20:00) (78/39 - 226/92)  ABP(mean): 78 (14 Feb 2024 20:00) (48 - 144)  RR: 16 (14 Feb 2024 20:00) (15 - 17)  SpO2: 100% (14 Feb 2024 20:00) (95% - 100%)    O2 Parameters below as of 14 Feb 2024 19:00  Patient On (Oxygen Delivery Method): ventilator    O2 Concentration (%): 30    I&O's Summary    13 Feb 2024 07:01  -  14 Feb 2024 07:00  --------------------------------------------------------  IN: 3318.8 mL / OUT: 1950 mL / NET: 1368.8 mL    14 Feb 2024 07:01  -  14 Feb 2024 20:27  --------------------------------------------------------  IN: 1493.1 mL / OUT: 650 mL / NET: 843.1 mL      Mode: AC/ CMV (Assist Control/ Continuous Mandatory Ventilation)  RR (machine): 16  TV (machine): 500  FiO2: 30  PEEP: 5  ITime: 1  MAP: 8  PIP: 18      LABS:                        11.6   10.68 )-----------( 366      ( 14 Feb 2024 00:52 )             41.0   02-14    146<H>  |  111<H>  |  23  ----------------------------<  193<H>  4.3   |  28  |  0.84    Ca    8.4      14 Feb 2024 00:52  Phos  1.8     02-14  Mg     2.4     02-14    MEDICATIONS  (STANDING):  aspirin  chewable 81 milliGRAM(s) Oral <User Schedule>  atorvastatin 80 milliGRAM(s) Oral at bedtime  chlorhexidine 0.12% Liquid 15 milliLiter(s) Oral Mucosa every 12 hours  chlorhexidine 4% Liquid 1 Application(s) Topical daily  dexMEDEtomidine Infusion 0.2 MICROgram(s)/kG/Hr (5.08 mL/Hr) IV Continuous <Continuous>  enoxaparin Injectable 40 milliGRAM(s) SubCutaneous <User Schedule>  ergocalciferol Drops 21662 Unit(s) Oral <User Schedule>  gabapentin Solution 300 milliGRAM(s) Oral three times a day  pantoprazole  Injectable 40 milliGRAM(s) IV Push daily  phenylephrine    Infusion 0.1 MICROgram(s)/kG/Min (3.81 mL/Hr) IV Continuous <Continuous>  polyethylene glycol 3350 17 Gram(s) Oral daily  senna 1 Tablet(s) Oral at bedtime  sodium chloride 3%  Inhalation 4 milliLiter(s) Inhalation every 6 hours    MEDICATIONS  (PRN):  acetaminophen   Oral Liquid .. 650 milliGRAM(s) Oral every 6 hours PRN Mild Pain (1 - 3)  fentaNYL    Injectable 50 MICROGram(s) IV Push every 2 hours PRN Severe Pain (7 - 10)  oxyCODONE    IR 10 milliGRAM(s) Oral every 4 hours PRN Severe Pain (7 - 10)  oxyCODONE    IR 5 milliGRAM(s) Oral every 4 hours PRN Moderate Pain (4 - 6)  sodium chloride 0.9% lock flush 10 milliLiter(s) IV Push every 1 hour PRN Pre/post blood products, medications, blood draw, and to maintain line patency      EXAMINATION:  PHYSICAL EXAM:    Constitutional: No Acute Distress, patient intubated but following commands     Neurological: Bilateral ptosis, pupils 2 mm and sluggish, restricted gaze in all directions however, vertical gaze is better than horizontal. Facial asymmetry could not be appreciated due to ETT, patient following commands with his BL LE    Motor exam:          Upper extremity                         Delt     Bicep     Tricep    HG                                                 R         0/5        0/5        0/5      2/5 (activation of muscles could be appreciated in bilateral biceps and triceps)                                               L          0/5        0/5        0/5       1/5          Lower extremity                        HF         KF        KE       DF         PF                                                  R        0/5        0/5        5/5       1/5         4/5                                               L         0/5        0/5       5/5       2/5          5/5                                                  Reflexes: Deep Tendon Reflexes absent in all 4 extremities    Pulmonary: Clear to Auscultation, No rales, No rhonchi, No wheezes     Cardiovascular: S1, S2, Regular rate and rhythm     Gastrointestinal: Soft, Non-tender, Non-distended     Extremities: No calf tenderness

## 2024-02-14 NOTE — ADVANCED PRACTICE NURSE CONSULT - RECOMMEDATIONS
Post procedure verbal instructions given to the patient or patient representative. Patient or patient representative  instructed regarding signs and symptoms of infection. Patient instructed to keep dressing dry and clean. Care for catheter as per hospital protocols

## 2024-02-14 NOTE — PROGRESS NOTE ADULT - ATTENDING COMMENTS
72yo man adm for progressive weakness c/f AIDP  hypertensive to 200's overnight, fentanyl pushes x3    precedex 0.2    oriented to choices, no EO     hypocalcemia repleted, hypokalemia, hypophosphatemia repleted    SBP , MAP>65  TTE    1. Left ventricular cavity is small. Left ventricular systolic function is normal with an ejection fraction of 66 % by Glynn's method of disks. There are no regional wall motion abnormalities seen.   2. Normal left ventricular diastolic function, with normal filling pressure.   3. Normal right ventricular cavity size, wall thickness, and normal systolic function.   4. Normal left and right atrial size.   5. No pericardial effusion seen.   6. Agitated saline injection was negative for intracardiac shunt.   7. No prior echocardiogram is available for comparison.   8. There is no evidence of a left ventricular thrombus.   9. There is normal LV mass and concentric remodeling.    16/500/5/30%  cont hypertonics nebs, chest PT/suctioning; secretion improved   CPAP trials as tolerated   LBM 2/13    ANDRES CAPONE axillary daphne   L PICC CXR kinked in brachiocephalic, re-call PICC team     PLEX #2 2/12, continue EOD for 5 sessions  f/u CSF studies--double stranded DNA, lyme in serum negative today   pain control--fentanyl pushes, oxy prn   add gabapentin 300mg tid, c/f central storming   minimize sedation   ASA only for now, for basilar multiple stenoses     glucerna 1.2 at goal   add free water 200ml Q6  LBM 2/13  d/c LUZMARIA   afebrile     vit D supp  lovenox ppx     cc 45min

## 2024-02-14 NOTE — PROGRESS NOTE ADULT - SUBJECTIVE AND OBJECTIVE BOX
HOSPITAL COURSE:  73y Male with PMHx significant for HTN, HLD, DM type 2, and two prior strokes without residual deficits [place] and L facial weakness on 2/9. 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 He was transferred to NSICU for angiogram, which was done on 2/9 and showed moderate basilar stenosis. No intervention done. He had to be intubated for worsening respiratory status. On further history it was found that patient had been having progressive weakness and numbness of his extremities for the preceding 1-2 weeks following a viral URI. 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       Admission Scores  GCS:   HH:   MF:   NIHSS: 4   RASS:    CAM-ICU:   ICH:    2/9: transferred from [place], NIHSS of 4 in ED, then became more lethargic, stridorous and desaturated in ED, intubated for airway protected, repeat CTH/A obtained and brought emergently to IR for basilar stenting   2/10: worsening brainstem symptoms while on DAPT  2/12- LP performed to rule out MFS as patient's examination is not explained by the strokes on MRI. First session of PLEX       24 hour Events:       Allergies    No Known Allergies    Intolerances        REVIEW OF SYSTEMS: [ ] Unable to Assess due to neurologic exam   [ ] All ROS addressed below are non-contributory, except:  Neuro: [ ] Headache [ ] Back pain [ ] Numbness [ ] Weakness [ ] Ataxia [ ] Dizziness [ ] Aphasia [ ] Dysarthria [ ] Visual disturbance  Resp: [ ] Shortness of breath/dyspnea, [ ] Orthopnea [ ] Cough  CV: [ ] Chest pain [ ] Palpitation [ ] Lightheadedness [ ] Syncope  Renal: [ ] Thirst [ ] Edema  GI: [ ] Nausea [ ] Emesis [ ] Abdominal pain [ ] Constipation [ ] Diarrhea  Hem: [ ] Hematemesis [ ] bright red blood per rectum  ID: [ ] Fever [ ] Chills [ ] Dysuria  ENT: [ ] Rhinorrhea      DEVICES:   [ ] Restraints [ ] ET tube [ ] central line [ ] arterial line [ ] vences [ ] NGT/OGT [ ] EVD [ ] LD [ ] HAL/HMV [ ] Trach [ ] PEG [ ] Chest Tube     VITALS:   Vital Signs Last 24 Hrs  T(C): 36.9 (14 Feb 2024 03:00), Max: 37.5 (13 Feb 2024 11:00)  T(F): 98.4 (14 Feb 2024 03:00), Max: 99.5 (13 Feb 2024 11:00)  HR: 86 (14 Feb 2024 06:45) (64 - 122)  BP: --  BP(mean): --  RR: 16 (14 Feb 2024 06:45) (15 - 20)  SpO2: 97% (14 Feb 2024 06:45) (96% - 100%)    Parameters below as of 14 Feb 2024 05:59  Patient On (Oxygen Delivery Method): ventilator      CAPILLARY BLOOD GLUCOSE        I&O's Summary    13 Feb 2024 07:01  -  14 Feb 2024 07:00  --------------------------------------------------------  IN: 3313.7 mL / OUT: 1950 mL / NET: 1363.7 mL        Respiratory:  Mode: AC/ CMV (Assist Control/ Continuous Mandatory Ventilation)  RR (machine): 16  TV (machine): 500  FiO2: 40  PEEP: 5  ITime: 1  MAP: 9  PIP: 17    ABG - ( 14 Feb 2024 00:48 )  pH, Arterial: 7.41  pH, Blood: x     /  pCO2: 47    /  pO2: 110   / HCO3: 30    / Base Excess: 4.4   /  SaO2: 98.7                LABS:                        11.6   10.68 )-----------( 366      ( 14 Feb 2024 00:52 )             41.0     02-14    146<H>  |  111<H>  |  23  ----------------------------<  193<H>  4.3   |  28  |  0.84             MEDICATION LEVELS:     IVF FLUIDS/MEDICATIONS:   MEDICATIONS  (STANDING):  aspirin  chewable 81 milliGRAM(s) Oral <User Schedule>  atorvastatin 80 milliGRAM(s) Oral at bedtime  chlorhexidine 0.12% Liquid 15 milliLiter(s) Oral Mucosa every 12 hours  chlorhexidine 4% Liquid 1 Application(s) Topical daily  dexMEDEtomidine Infusion 0.2 MICROgram(s)/kG/Hr (5.08 mL/Hr) IV Continuous <Continuous>  enoxaparin Injectable 40 milliGRAM(s) SubCutaneous <User Schedule>  ergocalciferol Drops 85680 Unit(s) Oral <User Schedule>  pantoprazole  Injectable 40 milliGRAM(s) IV Push daily  phenylephrine    Infusion 0.1 MICROgram(s)/kG/Min (3.81 mL/Hr) IV Continuous <Continuous>  polyethylene glycol 3350 17 Gram(s) Oral daily  senna 1 Tablet(s) Oral at bedtime  sodium chloride 3%  Inhalation 4 milliLiter(s) Inhalation every 6 hours    MEDICATIONS  (PRN):  acetaminophen   Oral Liquid .. 650 milliGRAM(s) Oral every 6 hours PRN Mild Pain (1 - 3)  oxyCODONE    IR 10 milliGRAM(s) Oral every 4 hours PRN Severe Pain (7 - 10)  oxyCODONE    IR 5 milliGRAM(s) Oral every 4 hours PRN Moderate Pain (4 - 6)        IMAGING:      EXAMINATION:  PHYSICAL EXAM:    Constitutional: No Acute Distress, patient intubated but following commands     Neurological: Bilateral ptosis, pupils 2 mm and sluggish, restricted gaze in all directions however, vertical gaze is better than horizontal. Facial asymmetry could not be appreciated due to ETT, patient following commands with his LLE.     Motor exam:          Upper extremity                         Delt     Bicep     Tricep    HG                                                 R         0/5        0/5        0/5      2/5 (activation of muscles could be appreciated in bilateral biceps and triceps)                                               L          0/5        0/5        0/5       1/5          Lower extremity                        HF         KF        KE       DF         PF                                                  R        0/5        0/5        5/5       1/5         4/5                                               L         0/5        0/5       5/5       2/5          5/5                                                  Reflexes: Deep Tendon Reflexes absent in all 4 extremities    Pulmonary: Clear to Auscultation, No rales, No rhonchi, No wheezes     Cardiovascular: S1, S2, Regular rate and rhythm     Gastrointestinal: Soft, Non-tender, Non-distended     Extremities: No calf tenderness      HOSPITAL COURSE:  73y Male with PMHx significant for HTN, HLD, DM type 2, and two prior strokes without residual deficits [place] facial weakness [place]. 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 He was transferred to NSICU for angiogram, which was done on 2/9 and showed moderate basilar stenosis. No intervention done. He had to be intubated for worsening respiratory status. On further history it was found that patient had been having progressive weakness and numbness of his extremities for the preceding 1-2 weeks following a viral URI. 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       Admission Scores  GCS:   HH:   MF:   NIHSS: 4   RASS:    CAM-ICU:   ICH:    2/9: transferred from [place], NIHSS of 4 in ED, then became more lethargic, stridorous and desaturated in ED, intubated for airway protected, repeat CTH/A obtained and brought emergently to IR for basilar stenting   2/10: worsening brainstem symptoms while on DAPT  2/12- LP performed to rule out MFS as patient's examination is not explained by the strokes on MRI. First session of PLEX       24 hour Events: Hypertensive overnight with improvement after fent     Allergies    No Known Allergies    Intolerances        REVIEW OF SYSTEMS: [ ] Unable to Assess due to neurologic exam   [ ] All ROS addressed below are non-contributory, except:  Neuro: [ ] Headache [ ] Back pain [ ] Numbness [ ] Weakness [ ] Ataxia [ ] Dizziness [ ] Aphasia [ ] Dysarthria [ ] Visual disturbance  Resp: [ ] Shortness of breath/dyspnea, [ ] Orthopnea [ ] Cough  CV: [ ] Chest pain [ ] Palpitation [ ] Lightheadedness [ ] Syncope  Renal: [ ] Thirst [ ] Edema  GI: [ ] Nausea [ ] Emesis [ ] Abdominal pain [ ] Constipation [ ] Diarrhea  Hem: [ ] Hematemesis [ ] bright red blood per rectum  ID: [ ] Fever [ ] Chills [ ] Dysuria  ENT: [ ] Rhinorrhea      DEVICES:   [ ] Restraints [ ] ET tube [ ] central line [ ] arterial line [ ] vences [ ] NGT/OGT [ ] EVD [ ] LD [ ] HAL/HMV [ ] Trach [ ] PEG [ ] Chest Tube     VITALS:   Vital Signs Last 24 Hrs  T(C): 36.9 (14 Feb 2024 03:00), Max: 37.5 (13 Feb 2024 11:00)  T(F): 98.4 (14 Feb 2024 03:00), Max: 99.5 (13 Feb 2024 11:00)  HR: 86 (14 Feb 2024 06:45) (64 - 122)  BP: --  BP(mean): --  RR: 16 (14 Feb 2024 06:45) (15 - 20)  SpO2: 97% (14 Feb 2024 06:45) (96% - 100%)    Parameters below as of 14 Feb 2024 05:59  Patient On (Oxygen Delivery Method): ventilator      CAPILLARY BLOOD GLUCOSE        I&O's Summary    13 Feb 2024 07:01  -  14 Feb 2024 07:00  --------------------------------------------------------  IN: 3313.7 mL / OUT: 1950 mL / NET: 1363.7 mL        Respiratory:  Mode: AC/ CMV (Assist Control/ Continuous Mandatory Ventilation)  RR (machine): 16  TV (machine): 500  FiO2: 40  PEEP: 5  ITime: 1  MAP: 9  PIP: 17    ABG - ( 14 Feb 2024 00:48 )  pH, Arterial: 7.41  pH, Blood: x     /  pCO2: 47    /  pO2: 110   / HCO3: 30    / Base Excess: 4.4   /  SaO2: 98.7                LABS:                        11.6   10.68 )-----------( 366      ( 14 Feb 2024 00:52 )             41.0     02-14    146<H>  |  111<H>  |  23  ----------------------------<  193<H>  4.3   |  28  |  0.84             MEDICATION LEVELS:     IVF FLUIDS/MEDICATIONS:   MEDICATIONS  (STANDING):  aspirin  chewable 81 milliGRAM(s) Oral <User Schedule>  atorvastatin 80 milliGRAM(s) Oral at bedtime  chlorhexidine 0.12% Liquid 15 milliLiter(s) Oral Mucosa every 12 hours  chlorhexidine 4% Liquid 1 Application(s) Topical daily  dexMEDEtomidine Infusion 0.2 MICROgram(s)/kG/Hr (5.08 mL/Hr) IV Continuous <Continuous>  enoxaparin Injectable 40 milliGRAM(s) SubCutaneous <User Schedule>  ergocalciferol Drops 09390 Unit(s) Oral <User Schedule>  pantoprazole  Injectable 40 milliGRAM(s) IV Push daily  phenylephrine    Infusion 0.1 MICROgram(s)/kG/Min (3.81 mL/Hr) IV Continuous <Continuous>  polyethylene glycol 3350 17 Gram(s) Oral daily  senna 1 Tablet(s) Oral at bedtime  sodium chloride 3%  Inhalation 4 milliLiter(s) Inhalation every 6 hours    MEDICATIONS  (PRN):  acetaminophen   Oral Liquid .. 650 milliGRAM(s) Oral every 6 hours PRN Mild Pain (1 - 3)  oxyCODONE    IR 10 milliGRAM(s) Oral every 4 hours PRN Severe Pain (7 - 10)  oxyCODONE    IR 5 milliGRAM(s) Oral every 4 hours PRN Moderate Pain (4 - 6)        IMAGING:      EXAMINATION:  PHYSICAL EXAM:    Constitutional: No Acute Distress, patient intubated but following commands     Neurological: Bilateral ptosis, pupils 2 mm and sluggish, restricted gaze in all directions however, vertical gaze is better than horizontal. Facial asymmetry could not be appreciated due to ETT, patient following commands with his LLE.     Motor exam:          Upper extremity                         Delt     Bicep     Tricep    HG                                                 R         0/5        0/5        0/5      2/5 (activation of muscles could be appreciated in bilateral biceps and triceps)                                               L          0/5        0/5        0/5       1/5          Lower extremity                        HF         KF        KE       DF         PF                                                  R        0/5        0/5        5/5       1/5         4/5                                               L         0/5        0/5       5/5       2/5          5/5                                                  Reflexes: Deep Tendon Reflexes absent in all 4 extremities    Pulmonary: Clear to Auscultation, No rales, No rhonchi, No wheezes     Cardiovascular: S1, S2, Regular rate and rhythm     Gastrointestinal: Soft, Non-tender, Non-distended     Extremities: No calf tenderness

## 2024-02-14 NOTE — DIETITIAN NUTRITION RISK NOTIFICATION - TREATMENT: THE FOLLOWING DIET HAS BEEN RECOMMENDED
Diet, NPO with Tube Feed:   Tube Feeding Modality: Orogastric  Glucerna 1.2 David (GLUCERNARTH)  Total Volume for 24 Hours (mL): 1440  Continuous     Every 4 hours  Until Goal Tube Feed Rate (mL per Hour): 60  Tube Feed Duration (in Hours): 24  Tube Feed Start Time: 22:10 (02-13-24 @ 09:32) [Active]

## 2024-02-14 NOTE — CHART NOTE - NSCHARTNOTEFT_GEN_A_CORE
Nutrition Follow Up Note  Patient seen for: Nutrition Follow Up     Chart reviewed, events noted. Pt is a 74 yo M with PMH: HTN, HLD, T2DM and two prior strokes without residual deficits. Initially presented to [facility] with unsteady gait and L facial weakness on 2/9. 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 Transferred to [facility] for angiogram, was done on 2/9 and showed moderate basilar stenosis. No intervention done. Required intubation for worsening respiratory status. On further history it was found that the patient had been having progressive weakness and numbness of his extremities for the preceding 1-2 weeks following a viral URI. 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 S/P LP 2/12 and initiated on PLEX. Remains intubated.     Source: [] Patient       [x] EMR        [x] RN        [] Family at bedside       [x] Other: interdisciplinary medical team    -If unable to interview patient: [x] Trach/Vent/BiPAP  [x] Disoriented/confused/inappropriate to interview    Diet Order:   Diet, NPO with Tube Feed:   Tube Feeding Modality: Orogastric  Glucerna 1.2 David (GLUCERNARTH)  Total Volume for 24 Hours (mL): 1440  Continuous     Every 4 hours  Until Goal Tube Feed Rate (mL per Hour): 60  Tube Feed Duration (in Hours): 24  Tube Feed Start Time: 22:10 (02-13-24)    EN Order Provides: 1440ml, 1728kcal and 86g protein     EN Provision (per nursing flow sheet):   (2/14): feeds infusing at 60ml/hr.   (2/13): 85% of regimen goal (Glucerna 1.2 at 40ml/hr x 24 hrs --> 60ml/hr x 24 hrs)  (2/12): 88% of regimen goal (Glucerna 1.2 at 40ml/hr x 24 hrs; below energy needs at goal)  (2/11): 24% of regimen goal (Glucerna 1.2 at 40ml/hr x 24 hrs; below energy needs at goal)  (2/9-2/10): pt NPO, no enteral nutrition provision     Is current diet order appropriate/adequate? see below for updated EN recommendations     Nutrition-related concerns:  -Last BM (2/13): x 3. On bowel regimen (senna, Miralax). Continues on IV Protonix as prescribed.   -Intubated; sedated on Precedex, infusing at 5.1ml/hr. Goals of care discussion re: trach ongoing with family.   -Receiving vitamin D drops 1x/week in setting of vitamin D deficiency.   -A1c 6.8%. Not on apparent antihyperglycemic regimen. Previously prescribed insulin lispro sliding scale, however discontinued on 2/13. Antihyperglycemic regimen deferred to team. FS goal 120-180mg/dL.     Weights:   Dosing wt (2/9): 223.3 lbs    MEDICATIONS  (STANDING):  atorvastatin  ergocalciferol Drops  pantoprazole  Injectable  phenylephrine    Infusion  polyethylene glycol 3350  senna    Pertinent Labs: 02-14 @ 00:52: Na 146<H>, BUN 23, Cr 0.84, <H>, K+ 4.3, Phos 1.8<L>, Mg 2.4, Alk Phos --, ALT/SGPT --, AST/SGOT --, HbA1c --    A1C with Estimated Average Glucose Result: 6.8 % (02-10-24 @ 01:43)    Finger Sticks:    Triglycerides, Serum: 76 mg/dL (02-10-24 @ 09:16)    Skin per nursing documentation: no documented pressure injuries   Edema: 1+ generalized     (based on dosing wt 101.5kg):   Estimated Energy Needs: (23-27kcal/kg): 2334-2740kcal   Estimated Protein Needs: (1.1-1.3g protein/kg): 112-132g protein   Estimated Fluid Needs: deferred to team    Previous Nutrition Diagnosis: Inadequate Energy Intake   Nutrition Diagnosis is: [] ongoing  [] resolved [] not applicable     New Nutrition Diagnosis: Acute Moderate Protein Calorie Malnutrition  Related To: Complex clinical course deferring ability of pt to consume/receive optimal energy intake  As Evidenced By: 1+ edema, energy intake on average </=75% of estimated energy needs x >/=7 days    Nutrition Care Plan:  [x] In Progress  [] Achieved  [] Not applicable    Nutrition Interventions:     Education Provided:       [] Yes:  [x] No:        Recommendations:      1. Recommend change EN feeds to Glucerna 1.5 at 65ml/hr x 24 hrs. To provide (based on dosing wt 101.5kg): 1560ml, 2340kcal (23kcal/kg) and 129g protein (1.27g protein/kg).  2. Monitor GI tolerance. RD to remain available to adjust EN formulary, volume/rate PRN.   3. Vitamin D supplementation as per discretion of team.   4. Antihyperglycemic regimen deferred to team.   5. Monitor wt trends/labs/skin integrity/hydration status/bowel regularity.     Monitoring and Evaluation:   Continue to monitor nutritional intake, tolerance to diet prescription, weights, labs, skin integrity    RD remains available upon request and will follow up per protocol    Jacey Marquez RD, available via TEAMS

## 2024-02-14 NOTE — PROGRESS NOTE ADULT - SUBJECTIVE AND OBJECTIVE BOX
Patient seen and examined at bedside.    --Anticoagulation--  aspirin  chewable 81 milliGRAM(s) Oral <User Schedule>  clopidogrel Tablet 75 milliGRAM(s) Oral daily  enoxaparin Injectable 40 milliGRAM(s) SubCutaneous <User Schedule>    T(C): 36.3 (02-10-24 @ 23:00), Max: 36.7 (02-10-24 @ 03:00)  HR: 78 (02-11-24 @ 00:45) (64 - 121)  BP: 209/114 (02-10-24 @ 12:30) (110/68 - 209/114)  RR: 13 (02-11-24 @ 00:45) (11 - 27)  SpO2: 100% (02-11-24 @ 00:45) (93% - 100%)  Wt(kg): --    Exam: Intubated, Ox3, PERRL, R eye able to move supero-inferiorally, FC, trace pronation R arm and RLE AG distally

## 2024-02-14 NOTE — ADVANCED PRACTICE NURSE CONSULT - ASSESSMENT
PICC Line Insertion Note  Patient or patient representative educated about central line associated blood stream infection prevention practices.  Catheter type: 5 F,  DL Picc  : Bard  Power injectable: Yes  LOT# [id number]    Informed consent obtained by covering floor team.  Procedure assisted by: JULIEN Menchaca RN  Time out was preformed, confirming the patient's first and last name, date of birth, procedure, and correct site prior to start of procedure.    Patient was placed with HOB 30 degrees. Patient placement site was prepped with chlorhexidine solution, then draped using maximum sterile barrier protection. The area was injected with 2 ml of 1% lidocaine. Using the Bard Site Rite 8, the catheter was placed using the Modified Seldinger Technique. Strict adherence to outline aseptic technique including handwashing, glove and gown, utilizing mask and cap, plus draping the patient with a sterile drape was observed. A mask and cap provided for patient. Upon completion of line placement, the insertion site was covered with a sterile CHG dressing. Pt tolerated procedure well.   V/S stable and monitored.    All materials used for catheter insertion, including the intact guide wires, were accounted for at the end of the procedure.  Number of attempts: 1  Complications/Comments: None  Emergency Placement: No    Site: Change over guidewire  Anatomical Site of insertion: Left Brachial vein  Catheter size/length: 5F,  48cm  US guided Bard Double lumen power picc placed    Post procedure verification with chest Xray as per orders.

## 2024-02-14 NOTE — ADVANCED PRACTICE NURSE CONSULT - REASON FOR CONSULT
Previous PICC tip in brachiaocephalic vein. Bedside picc order placed for changing over wire..   Indication: DL picc placement for access.

## 2024-02-14 NOTE — PROGRESS NOTE ADULT - ASSESSMENT
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     NEURO:  Neurochecks Q2 hours  PRN oxy for pain control   Add gabapentin for possible central cause of hemodynamic instability  Wean sedation as tolerated    MRI spine with cord lesions at C3 and C4 with nerve root enhancement in the L spine  s/p EEG with mild diffuse/multi-focal cerebral dysfunction, not specific as to etiology. No epileptiform abnormalities.    Will follow up rest of the autoimmune/infectious panel   f/u LP studies including CSF PCR, ACE, Lyme, crypto, CMV, West Nile, VDRL, CSF autoimmune encephalitis panel   send GI PCR  f/u anti-GQ1b and ganglioside Abs, ACE serum, serum autoimmune encephalitis panel   CSF WBC 11, protein 67, Glucose 83  S/p Plex 2/12, next PLEX today 2/14. (plan for 5 sessions)  C/W ASA   Seen by neurology   Activity: [] mobilize as tolerated [] Bedrest [] PT [] OT [] PMNR    PULM:  Intubated for airway protection  16/500/5/40%- ABG reviewed, will decrease FiO2 to 30%  Patient may need early trach - continue discussion with family   C/w hypertonic nebs and chest PT   f/u CXR     CV:   SBP goal  with MAP >65   Titrate genny to goal - PICC c/s for pressors   Continue ASA   TTE- EF 66%  R- axillary A line     RENAL:  Fluids: IVL if tolerating diet  c/w Free water    GI:  Diet: TF AT goal  Vitamin D supplementation   GI prophylaxis [] not indicated [x] PPI [] other:  Bowel regimen [x] colace [x] senna, dulcolax suppository   LBM PTA     ENDO:   Goal euglycemia (-180)  A1c 6.8    HEME/ONC:  H/H stable   VTE prophylaxis: [x] SCDs [x] chemoprophylaxis- SQL [] hold chemoprophylaxis due to: [] high risk of DVT/PE on admission due to:    ID:  Afebrile, improved leukocytosis     MISC:  Lines/tubes: RIJ, R axillary daphne, vences     SOCIAL/FAMILY:  [] awaiting [x] updated at bedside [] family meeting    CODE STATUS:  [x] Full Code [] DNR [] DNI [] Palliative/Comfort Care    DISPOSITION:  [x] ICU [] Stroke Unit [] Floor [] EMU [] RCU [] PCU    [x] Patient is at high risk of neurologic deterioration/death due to: infection    ASSESSMENT/PLAN: 72 y/o male with multiple comorbidities presenting initially with suspicious of basilar stroke however, 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     NEURO:  - Neurochecks Q2 hours  - PRN oxy for pain control   - Add gabapentin for possible central cause of hemodynamic instability  - Wean sedation as tolerated    - MRI spine with cord lesions at C3 and C4 with nerve root enhancement in the L spine  - s/p EEG with mild diffuse/multi-focal cerebral dysfunction, not specific as to etiology. No epileptiform abnormalities.    - Will follow up rest of the autoimmune/infectious panel   - f/u LP studies including CSF PCR, ACE, Lyme, crypto, CMV, West Nile, VDRL, CSF autoimmune encephalitis panel   - f/u anti-GQ1b and ganglioside Abs, ACE serum, serum autoimmune encephalitis panel   - CSF WBC 11, protein 67, Glucose 83  - S/p Plex 2/12, next PLEX today 2/14. (plan for 5 sessions)  - C/W ASA   - Seen by neurology   Activity: [] mobilize as tolerated [] Bedrest [] PT [] OT [] PMNR    PULM:  - Intubated for airway protection  - 16/500/5/40%- ABG reviewed, will decrease FiO2 to 30%  - Patient may need early trach - continue discussion with family   - C/w hypertonic nebs and chest PT     CV:   - SBP goal  with MAP >65   - Titrate genny to goal - PICC c/s for pressors   - Continue ASA   - TTE- EF 66%  - R- axillary A line     RENAL:  - Fluids: IVL if tolerating diet  - c/w Free water    GI:  - Diet: TF AT goal  - Vitamin D supplementation   - GI prophylaxis [] not indicated [x] PPI [] other:  - Bowel regimen [x] colace [x] senna, dulcolax suppository   - LBM PTA     ENDO:   - Goal euglycemia (-180)  - A1c 6.8    HEME/ONC:  - H/H stable   - VTE prophylaxis: [x] SCDs [x] chemoprophylaxis- SQL [] hold chemoprophylaxis due to: [] high risk of DVT/PE on admission due to:    ID:  - Afebrile, improved leukocytosis     MISC:  Lines/tubes: LIBORIO CAMPOS foley     SOCIAL/FAMILY:  [] awaiting [x] updated at bedside [] family meeting    CODE STATUS:  [x] Full Code [] DNR [] DNI [] Palliative/Comfort Care    DISPOSITION:  [x] ICU [] Stroke Unit [] Floor [] EMU [] RCU [] PCU    [x] Patient is at high risk of neurologic deterioration/death due to: infection

## 2024-02-15 ENCOUNTER — TRANSCRIPTION ENCOUNTER (OUTPATIENT)
Age: 74
End: 2024-02-15

## 2024-02-15 LAB
ANA TITR SER: NEGATIVE — SIGNIFICANT CHANGE UP
B BURGDOR DNA SPEC QL NAA+PROBE: NEGATIVE — SIGNIFICANT CHANGE UP
CULTURE RESULTS: SIGNIFICANT CHANGE UP
GAMMA INTERFERON BACKGROUND BLD IA-ACNC: 0.02 IU/ML — SIGNIFICANT CHANGE UP
GD1A GANGL IGG+IGM SER IA-ACNC: 43 IV — SIGNIFICANT CHANGE UP (ref 0–50)
GD1B GANGL IGG+IGM SER IA-ACNC: 110 IV — HIGH (ref 0–50)
GM1 ASIALO IGG+IGM SER IA-ACNC: 13 IV — SIGNIFICANT CHANGE UP (ref 0–50)
GM1 GANGL IGG+IGM SER-ACNC: 9 IV — SIGNIFICANT CHANGE UP (ref 0–50)
GM2 GANGL IGG+IGM SER IA-ACNC: 8 IV — SIGNIFICANT CHANGE UP (ref 0–50)
GQ1B GANGL IGG+IGM SER IA-ACNC: 28 IV — SIGNIFICANT CHANGE UP (ref 0–50)
M TB IFN-G BLD-IMP: NEGATIVE — SIGNIFICANT CHANGE UP
M TB IFN-G CD4+ BCKGRND COR BLD-ACNC: 0 IU/ML — SIGNIFICANT CHANGE UP
M TB IFN-G CD4+CD8+ BCKGRND COR BLD-ACNC: 0 IU/ML — SIGNIFICANT CHANGE UP
MBP CSF-MCNC: 3.3 NG/ML — SIGNIFICANT CHANGE UP (ref 0–5.4)
OLIGOCLONAL BANDS CSF ELPH-IMP: SIGNIFICANT CHANGE UP
QUANT TB PLUS MITOGEN MINUS NIL: 3.46 IU/ML — SIGNIFICANT CHANGE UP
SPECIMEN SOURCE: SIGNIFICANT CHANGE UP
VDRL CSF-TITR: SIGNIFICANT CHANGE UP
VZV PCR: SIGNIFICANT CHANGE UP COPIES/ML

## 2024-02-15 PROCEDURE — 99291 CRITICAL CARE FIRST HOUR: CPT

## 2024-02-15 PROCEDURE — 71045 X-RAY EXAM CHEST 1 VIEW: CPT | Mod: 26

## 2024-02-15 RX ORDER — LABETALOL HCL 100 MG
5 TABLET ORAL ONCE
Refills: 0 | Status: COMPLETED | OUTPATIENT
Start: 2024-02-15 | End: 2024-02-15

## 2024-02-15 RX ORDER — FENTANYL CITRATE 50 UG/ML
50 INJECTION INTRAVENOUS ONCE
Refills: 0 | Status: DISCONTINUED | OUTPATIENT
Start: 2024-02-15 | End: 2024-02-15

## 2024-02-15 RX ORDER — OXYCODONE HYDROCHLORIDE 5 MG/1
10 TABLET ORAL EVERY 6 HOURS
Refills: 0 | Status: DISCONTINUED | OUTPATIENT
Start: 2024-02-15 | End: 2024-02-17

## 2024-02-15 RX ORDER — LABETALOL HCL 100 MG
10 TABLET ORAL ONCE
Refills: 0 | Status: DISCONTINUED | OUTPATIENT
Start: 2024-02-15 | End: 2024-02-15

## 2024-02-15 RX ORDER — OXYCODONE HYDROCHLORIDE 5 MG/1
10 TABLET ORAL EVERY 6 HOURS
Refills: 0 | Status: DISCONTINUED | OUTPATIENT
Start: 2024-02-15 | End: 2024-02-15

## 2024-02-15 RX ADMIN — FENTANYL CITRATE 50 MICROGRAM(S): 50 INJECTION INTRAVENOUS at 19:26

## 2024-02-15 RX ADMIN — FENTANYL CITRATE 50 MICROGRAM(S): 50 INJECTION INTRAVENOUS at 22:00

## 2024-02-15 RX ADMIN — FENTANYL CITRATE 50 MICROGRAM(S): 50 INJECTION INTRAVENOUS at 07:15

## 2024-02-15 RX ADMIN — FENTANYL CITRATE 50 MICROGRAM(S): 50 INJECTION INTRAVENOUS at 17:00

## 2024-02-15 RX ADMIN — SODIUM CHLORIDE 4 MILLILITER(S): 9 INJECTION INTRAMUSCULAR; INTRAVENOUS; SUBCUTANEOUS at 05:59

## 2024-02-15 RX ADMIN — CHLORHEXIDINE GLUCONATE 1 APPLICATION(S): 213 SOLUTION TOPICAL at 12:49

## 2024-02-15 RX ADMIN — OXYCODONE HYDROCHLORIDE 10 MILLIGRAM(S): 5 TABLET ORAL at 03:30

## 2024-02-15 RX ADMIN — FENTANYL CITRATE 50 MICROGRAM(S): 50 INJECTION INTRAVENOUS at 02:00

## 2024-02-15 RX ADMIN — FENTANYL CITRATE 50 MICROGRAM(S): 50 INJECTION INTRAVENOUS at 04:44

## 2024-02-15 RX ADMIN — Medication 5 MILLIGRAM(S): at 21:25

## 2024-02-15 RX ADMIN — Medication 81 MILLIGRAM(S): at 05:55

## 2024-02-15 RX ADMIN — POLYETHYLENE GLYCOL 3350 17 GRAM(S): 17 POWDER, FOR SOLUTION ORAL at 12:49

## 2024-02-15 RX ADMIN — DEXMEDETOMIDINE HYDROCHLORIDE IN 0.9% SODIUM CHLORIDE 5.08 MICROGRAM(S)/KG/HR: 4 INJECTION INTRAVENOUS at 07:31

## 2024-02-15 RX ADMIN — FENTANYL CITRATE 50 MICROGRAM(S): 50 INJECTION INTRAVENOUS at 23:51

## 2024-02-15 RX ADMIN — SODIUM CHLORIDE 4 MILLILITER(S): 9 INJECTION INTRAMUSCULAR; INTRAVENOUS; SUBCUTANEOUS at 12:01

## 2024-02-15 RX ADMIN — GABAPENTIN 300 MILLIGRAM(S): 400 CAPSULE ORAL at 03:01

## 2024-02-15 RX ADMIN — Medication 5 MILLIGRAM(S): at 08:05

## 2024-02-15 RX ADMIN — FENTANYL CITRATE 50 MICROGRAM(S): 50 INJECTION INTRAVENOUS at 01:27

## 2024-02-15 RX ADMIN — FENTANYL CITRATE 50 MICROGRAM(S): 50 INJECTION INTRAVENOUS at 20:00

## 2024-02-15 RX ADMIN — ATORVASTATIN CALCIUM 80 MILLIGRAM(S): 80 TABLET, FILM COATED ORAL at 21:25

## 2024-02-15 RX ADMIN — FENTANYL CITRATE 50 MICROGRAM(S): 50 INJECTION INTRAVENOUS at 05:51

## 2024-02-15 RX ADMIN — GABAPENTIN 300 MILLIGRAM(S): 400 CAPSULE ORAL at 18:04

## 2024-02-15 RX ADMIN — FENTANYL CITRATE 50 MICROGRAM(S): 50 INJECTION INTRAVENOUS at 06:00

## 2024-02-15 RX ADMIN — CHLORHEXIDINE GLUCONATE 15 MILLILITER(S): 213 SOLUTION TOPICAL at 17:20

## 2024-02-15 RX ADMIN — Medication 650 MILLIGRAM(S): at 19:05

## 2024-02-15 RX ADMIN — SODIUM CHLORIDE 4 MILLILITER(S): 9 INJECTION INTRAMUSCULAR; INTRAVENOUS; SUBCUTANEOUS at 18:03

## 2024-02-15 RX ADMIN — FENTANYL CITRATE 50 MICROGRAM(S): 50 INJECTION INTRAVENOUS at 21:50

## 2024-02-15 RX ADMIN — FENTANYL CITRATE 50 MICROGRAM(S): 50 INJECTION INTRAVENOUS at 07:00

## 2024-02-15 RX ADMIN — FENTANYL CITRATE 50 MICROGRAM(S): 50 INJECTION INTRAVENOUS at 16:45

## 2024-02-15 RX ADMIN — SENNA PLUS 1 TABLET(S): 8.6 TABLET ORAL at 21:49

## 2024-02-15 RX ADMIN — CHLORHEXIDINE GLUCONATE 15 MILLILITER(S): 213 SOLUTION TOPICAL at 05:51

## 2024-02-15 RX ADMIN — PANTOPRAZOLE SODIUM 40 MILLIGRAM(S): 20 TABLET, DELAYED RELEASE ORAL at 12:49

## 2024-02-15 RX ADMIN — ENOXAPARIN SODIUM 40 MILLIGRAM(S): 100 INJECTION SUBCUTANEOUS at 19:07

## 2024-02-15 RX ADMIN — OXYCODONE HYDROCHLORIDE 10 MILLIGRAM(S): 5 TABLET ORAL at 02:30

## 2024-02-15 RX ADMIN — Medication 650 MILLIGRAM(S): at 20:00

## 2024-02-15 RX ADMIN — FENTANYL CITRATE 50 MICROGRAM(S): 50 INJECTION INTRAVENOUS at 05:00

## 2024-02-15 RX ADMIN — GABAPENTIN 300 MILLIGRAM(S): 400 CAPSULE ORAL at 10:07

## 2024-02-15 NOTE — PROGRESS NOTE ADULT - SUBJECTIVE AND OBJECTIVE BOX
HOSPITAL COURSE:  73y Male with PMHx significant for HTN, HLD, DM type 2, and two prior strokes without residual deficits [place] and L facial weakness on 2/9. 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 He was transferred to NSICU for angiogram, which was done on 2/9 and showed moderate basilar stenosis. No intervention done. He had to be intubated for worsening respiratory status. On further history it was found that patient had been having progressive weakness and numbness of his extremities for the preceding 1-2 weeks following a viral URI. 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       Admission Scores  GCS:   HH:   MF:   NIHSS: 4   RASS:    CAM-ICU:   ICH:    2/9: transferred from [place], NIHSS of 4 in ED, then became more lethargic, stridorous and desaturated in ED, intubated for airway protected, repeat CTH/A obtained and brought emergently to IR for basilar stenting   2/10: worsening brainstem symptoms while on DAPT  2/12- LP performed to rule out MFS as patient's examination is not explained by the strokes on MRI. First session of PLEX  2/14- s/p 2nd PLEX tx, no acute interval events  2/15- No acute events overnight. Patient's examination more or less the same.     Allergies    No Known Allergies    Intolerances        REVIEW OF SYSTEMS: [ ] Unable to Assess due to neurologic exam   [x] All ROS addressed below are non-contributory, except:  Neuro: [ ] Headache [ ] Back pain [ ] Numbness [ ] Weakness [ ] Ataxia [ ] Dizziness [ ] Aphasia [ ] Dysarthria [ ] Visual disturbance  Resp: [ ] Shortness of breath/dyspnea, [ ] Orthopnea [ ] Cough  CV: [ ] Chest pain [ ] Palpitation [ ] Lightheadedness [ ] Syncope  Renal: [ ] Thirst [ ] Edema  GI: [ ] Nausea [ ] Emesis [ ] Abdominal pain [ ] Constipation [ ] Diarrhea  Hem: [ ] Hematemesis [ ] bright red blood per rectum  ID: [ ] Fever [ ] Chills [ ] Dysuria  ENT: [ ] Rhinorrhea      DEVICES:   [ ] Restraints [x] ET tube [x] central line [x] arterial line [ ] vences [x ] NGT/OGT [ ] EVD [ ] LD [ ] HAL/HMV [ ] Trach [ ] PEG [ ] Chest Tube     VITALS:   Vital Signs Last 24 Hrs  T(C): 36.2 (15 Feb 2024 07:00), Max: 37 (14 Feb 2024 11:00)  T(F): 97.1 (15 Feb 2024 07:00), Max: 98.6 (14 Feb 2024 11:00)  HR: 85 (15 Feb 2024 07:15) (54 - 106)  BP: --  BP(mean): --  RR: 16 (15 Feb 2024 07:15) (16 - 16)  SpO2: 98% (15 Feb 2024 07:15) (95% - 100%)    Parameters below as of 15 Feb 2024 07:00  Patient On (Oxygen Delivery Method): ventilator    O2 Concentration (%): 30  CAPILLARY BLOOD GLUCOSE        I&O's Summary    14 Feb 2024 07:01  -  15 Feb 2024 07:00  --------------------------------------------------------  IN: 3542.4 mL / OUT: 1700 mL / NET: 1842.4 mL    15 Feb 2024 07:01  -  15 Feb 2024 08:06  --------------------------------------------------------  IN: 7 mL / OUT: 0 mL / NET: 7 mL        Respiratory:  Mode: AC/ CMV (Assist Control/ Continuous Mandatory Ventilation)  RR (machine): 16  TV (machine): 500  FiO2: 30  PEEP: 5  ITime: 1  MAP: 8  PIP: 17    ABG - ( 14 Feb 2024 00:48 )  pH, Arterial: 7.41  pH, Blood: x     /  pCO2: 47    /  pO2: 110   / HCO3: 30    / Base Excess: 4.4   /  SaO2: 98.7                LABS:                        11.2   13.56 )-----------( 387      ( 14 Feb 2024 21:33 )             39.3     02-14    147<H>  |  113<H>  |  21  ----------------------------<  217<H>  4.5   |  25  |  0.78             MEDICATION LEVELS:     IVF FLUIDS/MEDICATIONS:   MEDICATIONS  (STANDING):  aspirin  chewable 81 milliGRAM(s) Oral <User Schedule>  atorvastatin 80 milliGRAM(s) Oral at bedtime  chlorhexidine 0.12% Liquid 15 milliLiter(s) Oral Mucosa every 12 hours  chlorhexidine 4% Liquid 1 Application(s) Topical daily  dexMEDEtomidine Infusion 0.2 MICROgram(s)/kG/Hr (5.08 mL/Hr) IV Continuous <Continuous>  enoxaparin Injectable 40 milliGRAM(s) SubCutaneous <User Schedule>  ergocalciferol Drops 58864 Unit(s) Oral <User Schedule>  gabapentin Solution 300 milliGRAM(s) Oral three times a day  pantoprazole  Injectable 40 milliGRAM(s) IV Push daily  polyethylene glycol 3350 17 Gram(s) Oral daily  senna 1 Tablet(s) Oral at bedtime  sodium chloride 3%  Inhalation 4 milliLiter(s) Inhalation every 6 hours    MEDICATIONS  (PRN):  acetaminophen   Oral Liquid .. 650 milliGRAM(s) Oral every 6 hours PRN Mild Pain (1 - 3)  fentaNYL    Injectable 50 MICROGram(s) IV Push every 2 hours PRN Severe Pain (7 - 10)  oxyCODONE    IR 10 milliGRAM(s) Oral every 4 hours PRN Severe Pain (7 - 10)  oxyCODONE    IR 5 milliGRAM(s) Oral every 4 hours PRN Moderate Pain (4 - 6)  sodium chloride 0.9% lock flush 10 milliLiter(s) IV Push every 1 hour PRN Pre/post blood products, medications, blood draw, and to maintain line patency        IMAGING:      EXAMINATION:  PHYSICAL EXAM:    Constitutional: No Acute Distress, patient intubated but following commands     Neurological: Bilateral ptosis, pupils 2 mm and sluggish, restricted gaze in all directions however, vertical gaze is better than horizontal. Facial asymmetry could not be appreciated due to ETT, patient following commands with his BL LE    Motor exam:          Upper extremity                         Delt     Bicep     Tricep    HG                                                 R         0/5        0/5        0/5      2/5 (activation of muscles could be appreciated in bilateral biceps and triceps)                                               L          0/5        0/5        0/5       3/5          Lower extremity                        HF         KF        KE       DF         PF                                                  R        0/5        0/5        4/5       3/5         5/5                                               L         0/5        0/5       4/5       2/5          5/5                                                  Reflexes: Deep Tendon Reflexes absent in all 4 extremities    Pulmonary: Clear to Auscultation, No rales, No rhonchi, No wheezes     Cardiovascular: S1, S2, Regular rate and rhythm     Gastrointestinal: Soft, Non-tender, Non-distended     Extremities: No calf tenderness

## 2024-02-15 NOTE — PROGRESS NOTE ADULT - SUBJECTIVE AND OBJECTIVE BOX
Patient seen and examined at bedside.    --Anticoagulation--  aspirin  chewable 81 milliGRAM(s) Oral <User Schedule>  clopidogrel Tablet 75 milliGRAM(s) Oral daily  enoxaparin Injectable 40 milliGRAM(s) SubCutaneous <User Schedule>    T(C): 36.3 (02-10-24 @ 23:00), Max: 36.7 (02-10-24 @ 03:00)  HR: 78 (02-11-24 @ 00:45) (64 - 121)  BP: 209/114 (02-10-24 @ 12:30) (110/68 - 209/114)  RR: 13 (02-11-24 @ 00:45) (11 - 27)  SpO2: 100% (02-11-24 @ 00:45) (93% - 100%)  Wt(kg): --    Exam: Intubated, Ox3, PERRL, R eye able to move supero-inferiorally, FC, trace pronation b/larm and BLE AG distally

## 2024-02-15 NOTE — SPEECH LANGUAGE PATHOLOGY EVALUATION - SLP DIAGNOSIS
Pt seen to assess comprehension and to establish a reliable yes/no response. Pt awake, but only able to respond reliably via movement of b/l feet. We were able to establish R foot movement as "yes" response, and L foot movement as "no" response. When response was absent or unclear, repeating the question was able to elicit an appropriate response. With use of this method, Pt was noted to demonstrate reliable yes/no response for simple to moderate complexity verbal questions. Pt with occasional incorrect response to grammatically complex questions, but otherwise comprehension appears grossly intact. Pt also able to demonstrate intact basic addition and subtraction skills using tapping of his Right foot. Signs placed above the bed and at Pt's feet to help with carryover of strategy use with care providers and family members. This service will continue to follow while in house. Will monitor for added means of communication as overall status and movement improve.

## 2024-02-15 NOTE — SPEECH LANGUAGE PATHOLOGY EVALUATION - COMMENTS
: transferred from [place], NIHSS of 4 in ED, then became more lethargic, stridorous and desaturated in ED, intubated for airway protected, repeat CTH/A obtained and brought emergently to IR for basilar stenting   2/10: worsening brainstem symptoms while on DAPT  - LP performed to rule out MFS as patient's examination is not explained by the strokes on MRI. First session of PLEX  - s/p 2nd PLEX tx, no acute interval events  2/15- No acute events overnight. Patient's examination more or less the same.     IMAGIN/11 MRI head: IMPRESSION: No significant interval change in the small acute bilateral cerebellar infarcts. No new superimposed acute intracranial abnormality. No acute intracranial hemorrhage. Findings suspicious for cerebral amyloid angiopathy or hypertensive microhemorrhages. Probable minimal chronic microvascular ischemic disease. Sinus disease.    ***Pt is not previously known to this service and no prior swallow studies noted in PACS. Suspected to be relatively preserved.  Pt able to demonstrate basic addition and subtraction via R foot tapping.  limited assessment due to intubation and paralysis unable to assess due to intubation unable to assess due to intubation; suspect anarthria in setting of paralysis with suspected MFS

## 2024-02-15 NOTE — PROGRESS NOTE ADULT - ASSESSMENT
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     NEURO:  - Neurochecks Q2 hours  - PRN oxy for pain control   - Continue gabapentin for possible central cause of hemodynamic instability  - Wean sedation as tolerated    - MRI spine with cord lesions at C3 and C4 with nerve root enhancement in the L spine  - s/p EEG with mild diffuse/multi-focal cerebral dysfunction, not specific as to etiology. No epileptiform abnormalities.    - Will follow up rest of the autoimmune/infectious panel   - f/u LP studies including CSF PCR, ACE, Lyme, crypto, CMV, West Nile, VDRL, CSF autoimmune encephalitis panel   - Anti-GD1b antibody elevated  - CSF WBC 11, protein 67, Glucose 83  - S/p Plex 2/12, next PLEX 2/16. (plan for 5 sessions) and possible Rituxin after  - C/W ASA   - Seen by neurology   - Will have the patient be seen by speech pathologist  Activity: [x] mobilize as tolerated [] Bedrest [x] PT [x] OT [] PMNR    PULM:  - Intubated for airway protection  - 16/500/5/40%- ABG reviewed, will decrease FiO2 to 30%  - Patient may need early trach - continue discussion with family   - C/w hypertonic nebs and chest PT     CV:   - SBP goal  with MAP >65   - Titrate genny to goal - PICC c/s for pressors   - Continue ASA   - TTE- EF 66%  - R- axillary A line     RENAL:  - Fluids: IVL if tolerating diet  - c/w Free water    GI:  - Diet: TF AT goal  - Vitamin D supplementation   - GI prophylaxis [] not indicated [x] PPI [] other:  - Bowel regimen [x] colace [x] senna, dulcolax suppository   - LBM PTA     ENDO:   - Goal euglycemia (-180)  - A1c 6.8    HEME/ONC:  - H/H stable   - VTE prophylaxis: [x] SCDs [x] chemoprophylaxis- SQL [] hold chemoprophylaxis due to: [] high risk of DVT/PE on admission due to:    ID:  - Afebrile, improved leukocytosis     MISC:  Lines/tubes: LIBORIO CAMPOS foley     SOCIAL/FAMILY:  [] awaiting [x] updated at bedside [] family meeting    CODE STATUS:  [x] Full Code [] DNR [] DNI [] Palliative/Comfort Care    DISPOSITION:  [x] ICU [] Stroke Unit [] Floor [] EMU [] RCU [] PCU    [x] Patient is at high risk of neurologic deterioration/death due to: infection    ASSESSMENT/PLAN: 72 y/o male with multiple comorbidities presenting initially with suspicious of basilar stroke however, 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     NEURO:  - Neurochecks Q4 hours  - PRN Fentanyl and oxy 5 mg and 10 mg for pain control   - Continue gabapentin, will add propanolol 10 mg Q8 for central storming   - MRI spine with cord lesions at C3 and C4 with nerve root enhancement in the L spine  - s/p EEG with mild diffuse/multi-focal cerebral dysfunction, not specific as to etiology. No epileptiform abnormalities.    - Will follow up rest of the autoimmune/infectious panel   - f/u LP studies including CSF PCR, ACE, Lyme, crypto, CMV, West Nile, VDRL, CSF autoimmune encephalitis panel   - Anti-GD1b antibody elevated   - CSF WBC 11, protein 67, Glucose 83  - S/p Plex 2/12, next PLEX 2/16. (plan for 5 sessions) and possible Rituxin after  - C/W ASA   - Neurology following   - Will have the patient be seen by speech pathologist  Activity: [x] mobilize as tolerated [] Bedrest [x] PT [x] OT [] PMNR    PULM:  - Intubated for airway protection  - 16/500/5/40%- ABG reviewed  - Patient may need early trach - continue discussion with family   - C/w hypertonic nebs and chest PT   - CPAP as tolerated     CV:   - SBP goal  with MAP >65   - PICC  - Continue ASA   - TTE- EF 66%  - R- axillary A line     RENAL:  - Fluids: IVL  - c/w Free water 300 ccQ6h    GI:  - Diet: TF AT goal  - Vitamin D supplementation   - GI prophylaxis [] not indicated [x] PPI [] other:  - Bowel regimen [x] miralax [x] senna, dulcolax suppository   - LBM  2/13    ENDO:   - Goal euglycemia (-180)  - A1c 6.8    HEME/ONC:  - H/H stable   - VTE prophylaxis: [x] SCDs [x] chemoprophylaxis- SQL [] hold chemoprophylaxis due to: [] high risk of DVT/PE on admission due to:    ID:  - Afebrile, leukocytosis noted     MISC:  Lines/tubes: RIJ, R axillary daphne, vences     Splints on, holistic therapy    SOCIAL/FAMILY:  [] awaiting [x] updated at bedside [] family meeting    CODE STATUS:  [x] Full Code [] DNR [] DNI [] Palliative/Comfort Care    DISPOSITION:  [x] ICU [] Stroke Unit [] Floor [] EMU [] RCU [] PCU    [x] Patient is at high risk of neurologic deterioration/death due to: infection

## 2024-02-15 NOTE — SPEECH LANGUAGE PATHOLOGY EVALUATION - SLP ATTENTION
Pt cooperative throughout, noted to attend to tasks with relatively brisk responsiveness to all questions

## 2024-02-15 NOTE — SPEECH LANGUAGE PATHOLOGY EVALUATION - SLP ABLE TO FOLLOW COMMANDS
able to follow commands for number of foot taps, and to demonstrate foot movement on request; other movements limited due to paralysis/within functional limits

## 2024-02-15 NOTE — PROGRESS NOTE ADULT - SUBJECTIVE AND OBJECTIVE BOX
NEUROLOGY FOLLOW-UP CONSULT NOTE    RFC: Weakness, ophthalmoplegia, concern for AIDP/GBS    Interval history: No acute neurologic events overnight. Patient started on PLEX and showing some improvement. No abnormal movements noted.    Meds:  MEDICATIONS  (STANDING):  aspirin  chewable 81 milliGRAM(s) Oral <User Schedule>  atorvastatin 80 milliGRAM(s) Oral at bedtime  chlorhexidine 0.12% Liquid 15 milliLiter(s) Oral Mucosa every 12 hours  chlorhexidine 4% Liquid 1 Application(s) Topical daily  dexMEDEtomidine Infusion 0.2 MICROgram(s)/kG/Hr (5.08 mL/Hr) IV Continuous <Continuous>  enoxaparin Injectable 40 milliGRAM(s) SubCutaneous <User Schedule>  ergocalciferol Drops 59547 Unit(s) Oral <User Schedule>  gabapentin Solution 300 milliGRAM(s) Oral three times a day  pantoprazole  Injectable 40 milliGRAM(s) IV Push daily  polyethylene glycol 3350 17 Gram(s) Oral daily  propranolol 10 milliGRAM(s) Oral every 8 hours  senna 1 Tablet(s) Oral at bedtime  sodium chloride 3%  Inhalation 4 milliLiter(s) Inhalation every 6 hours    MEDICATIONS  (PRN):  acetaminophen   Oral Liquid .. 650 milliGRAM(s) Oral every 6 hours PRN Mild Pain (1 - 3)  fentaNYL    Injectable 50 MICROGram(s) IV Push every 2 hours PRN Severe Pain (7 - 10)  oxyCODONE    Solution 10 milliGRAM(s) Oral every 6 hours PRN Moderate Pain (4 - 6)  sodium chloride 0.9% lock flush 10 milliLiter(s) IV Push every 1 hour PRN Pre/post blood products, medications, blood draw, and to maintain line patency    GTT:  Precedex 0.35 mcg/kg/min    PMHx/PSHx/FHx/SHx:  Cerebral infarction    Handoff    MEWS Score    Hypertension    Diabetes    CVA (cerebral vascular accident)    HTN (hypertension)    HLD (hyperlipidemia)    DM2 (diabetes mellitus, type 2)    CVA (cerebrovascular accident)    Acute ischemic stroke    No significant past surgical history    CODE STROKE 1850 TRANSFER    Required emergent intubation    [email or web address]        Allergies:  No Known Allergies      ROS: All systems negative except as documented in Interval history    O:  T(C): 36.2 (02-15-24 @ 07:00), Max: 37 (02-14-24 @ 19:00)  HR: 86 (02-15-24 @ 12:01) (54 - 103)  BP: --  RR: 16 (02-15-24 @ 10:00) (16 - 17)  SpO2: 97% (02-15-24 @ 12:01) (95% - 100%)    Focused neurologic exam:  MS - Intubated, sedated, awake but eyes closed, attends to all stimuli b/l, no speech output but can move feet appropriately in response to "yes"/"no" questions, follows simple commands but limited due to profound weakness. Due to clinical condition unable to assess (DANIAL) orientation, rep/naming, attn/conc/recent and remote memory/fund of knowledge  CN - PERRL, EOMI by OCR with significantly dec excursions and volitional movements of R eye only, VFF to threat b/l (by foot tapping), face sens intact, facial strength with profound weakness b/l but weak corneals present b/l, hearing intact to conversation b/l, (-) spontaneous respirations (vent rate 16 bpm), (-) cough. DANIAL tongue/palate, trap/SCM  Motor - Normal bulk and flaccid tone throughout. Strength 0/5 all except for R  3+/5, L  3/5, L finger extensors 3/5, R DF/PF 4/5, L DF/PF 2/5  Sens - LT/PP intact all  DTR's - 2+ BR b/l, 0+ rest, and neutral b/l plantar response  Coord - DANIAL  Gait and station - DANIAL    Pertinent labs/studies:  CBC with inc WBC 13.56, low Hgb 11 and MCV WNL, otherwise essentially WNL  PT/INR inc 13.0/1.19, PTT WNL  BMP with inc Na 147, otherwise essentially WNL  Albumin 4.1, LFT's WNL  Mg WNL, Phos dec 2.4  ESR inc 113 -> WNL, B12 WNL, folate WNL, TSH/free T4 WNL, CPK WNL, A1C inc 6.8%, syphilis serology TP (-), HIV (-), CPK WNL, LDL inc 162 -> 53, HDL 51 -> 19, CRP inc 65, Vitamin D dec 14.3, ACE WNL, anti-ganglioside Ab panel inc GD1b 110, dsDNA WNL, Lyme (-), FLORESITA (-)    CSF (2/11) - clear, colorless, RBC 3, WBC 11 and lymphocytes 67%, glucose 83, protein 67, Cx - no PMN's or organisms, PCR panel (-), LDH 22, IgG Index WNL, cryptococcal Ag (-), AFB (-), myelin basic protein WNL, Lyme (-), cryptococcal Ag (-), flow cytometry - insufficient cells    < from: CT Angio Head w/ IV Cont (02.11.24 @ 02:26) >  1. Focal small dissection suspected in the proximal left V4 segment.  2. Unchanged high-grade focal stenosis involving the proximalbasilar   artery and proximal left P2 segment.  3. Otherwise, no large vessel occlusion or major stenosis.    < end of copied text >      < from: MR Head w/wo IV Cont (02.11.24 @ 13:08) >  1.  No significant interval change in the small acute bilateral   cerebellar infarcts. No new superimposed acute intracranial abnormality.   No acute intracranial hemorrhage.  2.  Findings suspicious for cerebral amyloid angiopathy or hypertensive   microhemorrhages.  3.  Probable minimal chronic microvascular ischemic disease.  4.  Sinus disease.    < end of copied text >    < from: MR Thoracic Spine w/wo IV Cont (02.11.24 @ 13:09) >  1. CERVICAL SPINE:  Low-grade cervical degenerative disc disease.   Indistinct cord lesions at the C3 and C4 vertebral levels are nonspecific   but suggest inflammatory/infectious/demyelinating etiology. No associated   enhancement.    2. THORACIC SPINE:   Low-grade thoracic degenerative disc disease.   Thoracic cord intact.  No abnomal enhancement.    3. LUMBAR SPINE:   Advanced lower lumbar degenerative disc disease   includes moderate degenerative central canal stenosis at L4-L5 and   high-grade degenerative foraminal stenosis at L5-S1. Conus intact. Cauda   equina demonstrates central clustering with spinal stenosis atL4-L5 and   mild radicular enhancement predominantly distal to this location of   spinal stenosis, nonspecific, inflammatory versus congestion from the   stenosis. Vascular congestion between the conus and the L4-L5 stenosis.    Superimposed nodular enhancement at the L4 superior endplate level is   also nonspecific, inflammatory versus schwannoma    < end of copied text >    < from: TTE W or WO Ultrasound Enhancing Agent (02.11.24 @ 07:56) >   1. Left ventricular cavity is small. Left ventricular systolic function is normal with an ejection fraction of 66 % by Glynn's method of disks. There are no regional wall motion abnormalities seen.   2. Normal left ventricular diastolic function, with normal filling pressure.   3. Normal right ventricular cavity size, wall thickness, and normal systolic function.   4. Normal left and right atrial size.   5. No pericardial effusion seen.   6. Agitated saline injection was negative for intracardiac shunt.   7. No prior echocardiogram is available for comparison.   8. There is no evidence of a left ventricular thrombus.   9. There is normal LV mass and concentric remodeling.    < end of copied text >

## 2024-02-15 NOTE — PROGRESS NOTE ADULT - ASSESSMENT
MR brain neg, poss lesion in C spine c/f poss inflammatory process  SBP   on [id number]  Further management per NSCU/neurology

## 2024-02-15 NOTE — PROGRESS NOTE ADULT - ASSESSMENT
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     NEURO:  - Neurochecks Q4 hours  - PRN Fentanyl and oxy 5 mg and 10 mg for pain control   - Continue gabapentin, will add propanolol 10 mg Q8 for central storming   - MRI spine with cord lesions at C3 and C4 with nerve root enhancement in the L spine  - s/p EEG with mild diffuse/multi-focal cerebral dysfunction, not specific as to etiology. No epileptiform abnormalities.    - Will follow up rest of the autoimmune/infectious panel   - f/u LP studies including CSF PCR, ACE, Lyme, crypto, CMV, West Nile, VDRL, CSF autoimmune encephalitis panel   - Anti-GD1b antibody elevated   - CSF WBC 11, protein 67, Glucose 83  - S/p Plex 2/12, next PLEX 2/16. (plan for 5 sessions) and possible Rituxin after  - C/W ASA   - Neurology following   - Will have the patient be seen by speech pathologist  Activity: [x] mobilize as tolerated [] Bedrest [x] PT [x] OT [] PMNR    PULM:  - Intubated for airway protection  - 16/500/5/40%- ABG reviewed  - Patient may need early trach - continue discussion with family   - C/w hypertonic nebs and chest PT   - CPAP as tolerated     CV:   - SBP goal  with MAP >65   - PICC  - Continue ASA   - TTE- EF 66%  - R- axillary A line     RENAL:  - Fluids: IVL  - c/w Free water 300 ccQ6h    GI:  - Diet: TF AT goal  - Vitamin D supplementation   - GI prophylaxis [] not indicated [x] PPI [] other:  - Bowel regimen [x] miralax [x] senna, dulcolax suppository   - LBM  2/13    ENDO:   - Goal euglycemia (-180)  - A1c 6.8    HEME/ONC:  - H/H stable   - VTE prophylaxis: [x] SCDs [x] chemoprophylaxis- SQL [] hold chemoprophylaxis due to: [] high risk of DVT/PE on admission due to:    ID:  - Afebrile, leukocytosis noted     MISC:  Lines/tubes: RIJ, R axillary daphne, vences     Splints on, holistic therapy    SOCIAL/FAMILY:  [] awaiting [x] updated at bedside [] family meeting    CODE STATUS:  [x] Full Code [] DNR [] DNI [] Palliative/Comfort Care    DISPOSITION:  [x] ICU [] Stroke Unit [] Floor [] EMU [] RCU [] PCU    [x] Patient is at high risk of neurologic deterioration/death due to: infection    ASSESSMENT/PLAN: 72 y/o male with multiple comorbidities presenting initially with suspicious of basilar stroke however, 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     NEURO:  - Neurochecks Q4 hours  - PRN Fentanyl and oxy 5 mg and 10 mg for pain control   - Continue gabapentin, will add propanolol 10 mg Q8 for central storming   - MRI spine with cord lesions at C3 and C4 with nerve root enhancement in the L spine  - s/p EEG with mild diffuse/multi-focal cerebral dysfunction, not specific as to etiology. No epileptiform abnormalities.    - Will follow up rest of the autoimmune/infectious panel   - f/u LP studies including CSF PCR, ACE, Lyme, crypto, CMV, West Nile, VDRL, CSF autoimmune encephalitis panel   - Anti-GD1b antibody elevated   - CSF WBC 11, protein 67, Glucose 83  - S/p Plex 2/12, next PLEX 2/16. (plan for 5 sessions) and possible Rituxin after  - C/W ASA   - Neurology following   - Will have the patient be seen by speech pathologist  Activity: [x] mobilize as tolerated [] Bedrest [x] PT [x] OT [] PMNR    PULM:  - Intubated for airway protection  - 16/500/5/40%- ABG reviewed  - Patient may need early trach - continue discussion with family   - C/w hypertonic nebs and chest PT   - CPAP as tolerated     CV:   - SBP goal  with MAP >65   - PICC  - Continue ASA   - TTE- EF 66%  - R- axillary A line     RENAL:  - Fluids: IVL  - c/w Free water 300 ccQ6h for Na goal 135-145    GI:  - Diet: TF AT goal  - Vitamin D supplementation   - GI prophylaxis [] not indicated [x] PPI [] other:  - Bowel regimen [x] miralax [x] senna, dulcolax suppository   - LBM  2/13    ENDO:   - Goal euglycemia (-180)  - A1c 6.8    HEME/ONC:  - H/H stable   - VTE prophylaxis: [x] SCDs [x] chemoprophylaxis- SQL [] hold chemoprophylaxis due to: [] high risk of DVT/PE on admission due to:    ID:  - Afebrile, leukocytosis noted     MISC:  Lines/tubes: RIJ, R axillary daphne, vences     Splints on, holistic therapy    SOCIAL/FAMILY:  [] awaiting [x] updated at bedside [] family meeting    CODE STATUS:  [x] Full Code [] DNR [] DNI [] Palliative/Comfort Care    DISPOSITION:  [x] ICU [] Stroke Unit [] Floor [] EMU [] RCU [] PCU    [x] Patient is at high risk of neurologic deterioration/death due to: infection

## 2024-02-15 NOTE — PROGRESS NOTE ADULT - ASSESSMENT
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 Agree clinical picture seems more consistent with AIDP/GBS (or variant) but cannot completely exclude other mimic such as infection or inflammation. MRI brain, personally reviewed by me, with small b/l cerebellar strokes (post-procedural) and prior L thalamic strokes which would not explain current clinical deficits and progressive nature. CSF shows mild lymphocytic pleiocystosis and increased protein which could be secondary to inflammatory or viral/infectious process. MRI spine, personally reviewed by me, with C3-C4 medullary lesion that does not enhance (demyelinating?) and some subtle enhancement of the L4-L5 nerve roots which could be due to stenosis at this level or inflammatory, neoplastic, or infectious process. 2/15 - Started PLEX with some improvement. Likely AIDP (Timur Serrano?) given presence of GD1b antibodies  - Await serum studies for autoimmune encephalopathy panel, WNV, B1, B6, copper, quantiferon TB gold, NMO, MOG  - Await CSF studies for oligoclonal bands, VZV PCR, HSV 1/2 PCR, WNV, cytology, ACE, VDRL, autoimmune encephalopathy panel  - No neurologic contraindications to PLEX therapy (even if viral infection). Would consider round of IVIG following PLEX if no other medical contraindications pending on clinical improvement  - Vitamin D levels low, would replete with D2 50953 Units PO weekly for 8 weeks and then recheck new levels  - Above recommendations discussed with NSICU team, who verbalized agreement and understanding  - Continue to address above medical problems, as you are doing  - Will continue to follow patient peripherally with you, please call [phone] with additional questions or concerns

## 2024-02-15 NOTE — SPEECH LANGUAGE PATHOLOGY EVALUATION - SLP GENERAL OBSERVATIONS
Pt seen at bedside with wife present. Pt intubated to vent, on precedex, which was held for the duration of this assessment. Pt awake, responsive only through movement of bilateral feet, more brisk on the right, weaker on the left.

## 2024-02-15 NOTE — SPEECH LANGUAGE PATHOLOGY EVALUATION - SLP PERTINENT HISTORY OF CURRENT PROBLEM
73y M with PMHx of HTN, HLD, DM, and CVA x2 (no residual deficits) initially presented to [facility] ED with unsteady gait that began on 2/7 and L facial droop that began on 2/9. NIHSS at OSH 0. 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 Pt transferred to [facility] due to c/f neurologic deterioration iso aforementioned high grade proximal basiliar and L PCA stenosis. On arrival to [facility] ED, initial NIHSS 4. Pt started to have difficulty clearing secretions, became stridorous, and started to desaturate, so decision was made to intubate patient. Once pt medically stabilized, taken to angio suite by IR. No intervention provided to basilar artery with 50% stenosis not flow limiting. Pt w/ worsening brainstem symptoms. C/f pontine infarct. Pending MRI.
73y M with PMHx of HTN, HLD, DM, and CVA x2 (no residual deficits) initially presented to [facility] ED with unsteady gait that began on 2/7 and L facial droop that began on 2/9. NIHSS at OSH 0. 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 Pt transferred to [facility] due to c/f neurologic deterioration iso aforementioned high grade proximal basiliar and L PCA stenosis. On arrival to [facility] ED, initial NIHSS 4. Pt started to have difficulty clearing secretions, became stridorous, and started to desaturate, so decision was made to intubate patient. Once pt medically stabilized, taken to angio suite by IR. No intervention provided to basilar artery with 50% stenosis not flow limiting. Pt w/ worsening brainstem symptoms. C/f pontine infarct. Pending MRI.

## 2024-02-15 NOTE — PROGRESS NOTE ADULT - SUBJECTIVE AND OBJECTIVE BOX
HOSPITAL COURSE:  73y Male with PMHx significant for HTN, HLD, DM type 2, and two prior strokes without residual deficits [place] facial weakness on [place]. 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 He was transferred to NSICU for angiogram, which was done on 2/9 and showed moderate basilar stenosis. No intervention done. He had to be intubated for worsening respiratory status. On further history it was found that patient had been having progressive weakness and numbness of his extremities for the preceding 1-2 weeks following a viral URI. 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       Admission Scores  GCS:   HH:   MF:   NIHSS: 4   RASS:    CAM-ICU:   ICH:    2/9: transferred from [place], NIHSS of 4 in ED, then became more lethargic, stridorous and desaturated in ED, intubated for airway protected, repeat CTH/A obtained and brought emergently to IR for basilar stenting   2/10: worsening brainstem symptoms while on DAPT  2/12- LP performed to rule out MFS as patient's examination is not explained by the strokes on MRI. First session of PLEX  2/14- s/p 2nd PLEX tx, no acute interval events  2/15- No acute events overnight. Patient's examination more or less the same.     Allergies    No Known Allergies    Intolerances      REVIEW OF SYSTEMS: [ ] Unable to Assess due to neurologic exam   [x] All ROS addressed below are non-contributory, except:  Neuro: [ ] Headache [ ] Back pain [ ] Numbness [ ] Weakness [ ] Ataxia [ ] Dizziness [ ] Aphasia [ ] Dysarthria [ ] Visual disturbance  Resp: [ ] Shortness of breath/dyspnea, [ ] Orthopnea [ ] Cough  CV: [ ] Chest pain [ ] Palpitation [ ] Lightheadedness [ ] Syncope  Renal: [ ] Thirst [ ] Edema  GI: [ ] Nausea [ ] Emesis [ ] Abdominal pain [ ] Constipation [ ] Diarrhea  Hem: [ ] Hematemesis [ ] bright red blood per rectum  ID: [ ] Fever [ ] Chills [ ] Dysuria  ENT: [ ] Rhinorrhea      DEVICES:   [ ] Restraints [x] ET tube [x] central line [x] arterial line [ ] vences [x ] NGT/OGT [ ] EVD [ ] LD [ ] HAL/HMV [ ] Trach [ ] PEG [ ] Chest Tube       ICU Vital Signs Last 24 Hrs  T(C): 37.3 (15 Feb 2024 19:00), Max: 37.4 (15 Feb 2024 15:00)  T(F): 99.1 (15 Feb 2024 19:00), Max: 99.3 (15 Feb 2024 15:00)  HR: 86 (15 Feb 2024 19:00) (60 - 99)  BP: --  BP(mean): --  ABP: 197/82 (15 Feb 2024 19:00) (91/43 - 227/91)  ABP(mean): 126 (15 Feb 2024 19:00) (58 - 146)  RR: 16 (15 Feb 2024 19:00) (16 - 17)  SpO2: 99% (15 Feb 2024 19:00) (96% - 100%)    O2 Parameters below as of 15 Feb 2024 19:00  Patient On (Oxygen Delivery Method): ventilator    O2 Concentration (%): 30      I&O's Summary    14 Feb 2024 07:01  -  15 Feb 2024 07:00  --------------------------------------------------------  IN: 3544.4 mL / OUT: 1700 mL / NET: 1844.4 mL    15 Feb 2024 07:01  -  15 Feb 2024 20:08  --------------------------------------------------------  IN: 1710 mL / OUT: 1000 mL / NET: 710 mL      MEDICATIONS  (STANDING):  aspirin  chewable 81 milliGRAM(s) Oral <User Schedule>  atorvastatin 80 milliGRAM(s) Oral at bedtime  chlorhexidine 0.12% Liquid 15 milliLiter(s) Oral Mucosa every 12 hours  chlorhexidine 4% Liquid 1 Application(s) Topical daily  dexMEDEtomidine Infusion 0.2 MICROgram(s)/kG/Hr (5.08 mL/Hr) IV Continuous <Continuous>  enoxaparin Injectable 40 milliGRAM(s) SubCutaneous <User Schedule>  ergocalciferol Drops 12252 Unit(s) Oral <User Schedule>  gabapentin Solution 300 milliGRAM(s) Oral three times a day  pantoprazole  Injectable 40 milliGRAM(s) IV Push daily  polyethylene glycol 3350 17 Gram(s) Oral daily  propranolol 10 milliGRAM(s) Oral every 8 hours  senna 1 Tablet(s) Oral at bedtime  sodium chloride 3%  Inhalation 4 milliLiter(s) Inhalation every 6 hours    MEDICATIONS  (PRN):  acetaminophen   Oral Liquid .. 650 milliGRAM(s) Oral every 6 hours PRN Mild Pain (1 - 3)  fentaNYL    Injectable 50 MICROGram(s) IV Push every 2 hours PRN Severe Pain (7 - 10)  oxyCODONE    Solution 10 milliGRAM(s) Oral every 6 hours PRN Moderate Pain (4 - 6)  sodium chloride 0.9% lock flush 10 milliLiter(s) IV Push every 1 hour PRN Pre/post blood products, medications, blood draw, and to maintain line patency                          11.2   13.56 )-----------( 387      ( 14 Feb 2024 21:33 )             39.3     02-14    147<H>  |  113<H>  |  21  ----------------------------<  217<H>  4.5   |  25  |  0.78    Ca    8.6      14 Feb 2024 21:33  Phos  2.4     02-14  Mg     2.1     02-14    EXAMINATION:  PHYSICAL EXAM:    Constitutional: No Acute Distress, patient intubated but following commands     Neurological: Bilateral ptosis, pupils 2 mm and sluggish, restricted gaze in all directions however, vertical gaze is better than horizontal. Facial asymmetry could not be appreciated due to ETT, patient following commands with his BL LE    Motor exam:          Upper extremity                         Delt     Bicep     Tricep    HG                                                 R         0/5        0/5        0/5      2/5 (activation of muscles could be appreciated in bilateral biceps and triceps)                                               L          0/5        0/5        0/5       3/5          Lower extremity                        HF         KF        KE       DF         PF                                                  R        0/5        0/5        4/5       3/5         5/5                                               L         0/5        0/5       4/5       2/5          5/5                                                  Reflexes: Deep Tendon Reflexes absent in all 4 extremities    Pulmonary: Clear to Auscultation, No rales, No rhonchi, No wheezes     Cardiovascular: S1, S2, Regular rate and rhythm     Gastrointestinal: Soft, Non-tender, Non-distended     Extremities: No calf tenderness

## 2024-02-15 NOTE — PROGRESS NOTE ADULT - ATTENDING COMMENTS
72yo man adm for progressive weakness c/f AIDP    GD1b antibioties +  Ganglioside antibodies are associated with diverse peripheral neuropathies.Elevated antibody levels to ganglioside-monosialic acid (GM1), and the neutral glycolipid, asialo GM1 are associated  with motor or sensorimotor neuropathies, particularly multifocal motor neuropathy.  Anti-GM1 may occur as IgM (polyclonal or monoclonal) or IgG antibodies.  These antibodies may also be found  in patients with diverse connective tissue diseases as well as normal individuals.  GD1a antibodies are associated with different variants of Guillain-Monroe syndrome (GBS) particularly acute motor  axonal neuropathy while GD1b antibodies are predominantly found in sensory ataxic neuropathy syndrome.  Anti-GQ1b antibodies are seen in more than 80 percent of patients with Ding-Serrano syndrome  and may be elevated in GBS patients with ophthalmoplegia.The role of isolated anti-GM2 antibodies is unknown. These tests by themselves are not diagnostic and should be used in conjunction  with other clinical parameters to confirm disease.    intubation #6    labile BP  fent pushes x2, oxy 5  precedex @0.3  hypophosphatemia repleted     oriented to choices, no EO     hypocalcemia repleted, hypokalemia, hypophosphatemia repleted    SBP , MAP>65  TTE    1. Left ventricular cavity is small. Left ventricular systolic function is normal with an ejection fraction of 66 % by Glynn's method of disks. There are no regional wall motion abnormalities seen.   2. Normal left ventricular diastolic function, with normal filling pressure.   3. Normal right ventricular cavity size, wall thickness, and normal systolic function.   4. Normal left and right atrial size.   5. No pericardial effusion seen.   6. Agitated saline injection was negative for intracardiac shunt.   7. No prior echocardiogram is available for comparison.   8. There is no evidence of a left ventricular thrombus.   9. There is normal LV mass and concentric remodeling.    16/500/5/30%  cont hypertonics nebs, chest PT/suctioning; secretion improved   CPAP trials as tolerated   LBM 2/13    ANDRES BOUCHER PICC     PLEX #3 2/16, continue EOD for 5 sessions  [id number]   pain control--fentanyl pushes, oxy standing x24hrs   add propranolol 10mg Q8  add gabapentin 300mg tid, c/f central storming   minimize sedation   ASA only for now, for basilar multiple stenoses     minimal vent support, CPAP trial     glucerna 1.2 at goal   add free water increased to 300ml Q6  LBM 2/13  d/c LUZMARIA   afebrile     vit D supp  lovenox ppx     OT splints  family discussion re: trach/peg     cc 45min

## 2024-02-16 LAB
ANION GAP SERPL CALC-SCNC: 11 MMOL/L — SIGNIFICANT CHANGE UP (ref 5–17)
ANION GAP SERPL CALC-SCNC: 9 MMOL/L — SIGNIFICANT CHANGE UP (ref 5–17)
APTT BLD: 29.6 SEC — SIGNIFICANT CHANGE UP (ref 24.5–35.6)
BUN SERPL-MCNC: 16 MG/DL — SIGNIFICANT CHANGE UP (ref 7–23)
BUN SERPL-MCNC: 19 MG/DL — SIGNIFICANT CHANGE UP (ref 7–23)
CA-I BLD-SCNC: 1.16 MMOL/L — SIGNIFICANT CHANGE UP (ref 1.15–1.33)
CALCIUM SERPL-MCNC: 8.5 MG/DL — SIGNIFICANT CHANGE UP (ref 8.4–10.5)
CALCIUM SERPL-MCNC: 9.2 MG/DL — SIGNIFICANT CHANGE UP (ref 8.4–10.5)
CHLORIDE SERPL-SCNC: 106 MMOL/L — SIGNIFICANT CHANGE UP (ref 96–108)
CHLORIDE SERPL-SCNC: 108 MMOL/L — SIGNIFICANT CHANGE UP (ref 96–108)
CO2 SERPL-SCNC: 28 MMOL/L — SIGNIFICANT CHANGE UP (ref 22–31)
CO2 SERPL-SCNC: 28 MMOL/L — SIGNIFICANT CHANGE UP (ref 22–31)
CREAT SERPL-MCNC: 0.69 MG/DL — SIGNIFICANT CHANGE UP (ref 0.5–1.3)
CREAT SERPL-MCNC: 0.8 MG/DL — SIGNIFICANT CHANGE UP (ref 0.5–1.3)
CULTURE RESULTS: SIGNIFICANT CHANGE UP
EGFR: 93 ML/MIN/1.73M2 — SIGNIFICANT CHANGE UP
EGFR: 98 ML/MIN/1.73M2 — SIGNIFICANT CHANGE UP
FIBRINOGEN AG PPP IA-MCNC: 765 MG/DL — HIGH (ref 233–496)
FIBRINOGEN PPP-MCNC: 481 MG/DL — HIGH (ref 200–445)
GLUCOSE BLDC GLUCOMTR-MCNC: 128 MG/DL — HIGH (ref 70–99)
GLUCOSE BLDC GLUCOMTR-MCNC: 161 MG/DL — HIGH (ref 70–99)
GLUCOSE BLDC GLUCOMTR-MCNC: 215 MG/DL — HIGH (ref 70–99)
GLUCOSE SERPL-MCNC: 161 MG/DL — HIGH (ref 70–99)
GLUCOSE SERPL-MCNC: 212 MG/DL — HIGH (ref 70–99)
HCT VFR BLD CALC: 37.6 % — LOW (ref 39–50)
HGB BLD-MCNC: 10.6 G/DL — LOW (ref 13–17)
INR BLD: 1.13 RATIO — SIGNIFICANT CHANGE UP (ref 0.85–1.18)
MAGNESIUM SERPL-MCNC: 2.3 MG/DL — SIGNIFICANT CHANGE UP (ref 1.6–2.6)
MAGNESIUM SERPL-MCNC: 2.4 MG/DL — SIGNIFICANT CHANGE UP (ref 1.6–2.6)
MCHC RBC-ENTMCNC: 21.3 PG — LOW (ref 27–34)
MCHC RBC-ENTMCNC: 28.2 GM/DL — LOW (ref 32–36)
MCV RBC AUTO: 75.5 FL — LOW (ref 80–100)
NRBC # BLD: 0 /100 WBCS — SIGNIFICANT CHANGE UP (ref 0–0)
PHOSPHATE SERPL-MCNC: 3.2 MG/DL — SIGNIFICANT CHANGE UP (ref 2.5–4.5)
PHOSPHATE SERPL-MCNC: 3.6 MG/DL — SIGNIFICANT CHANGE UP (ref 2.5–4.5)
PLATELET # BLD AUTO: 384 K/UL — SIGNIFICANT CHANGE UP (ref 150–400)
POTASSIUM SERPL-MCNC: 4.5 MMOL/L — SIGNIFICANT CHANGE UP (ref 3.5–5.3)
POTASSIUM SERPL-MCNC: 4.8 MMOL/L — SIGNIFICANT CHANGE UP (ref 3.5–5.3)
POTASSIUM SERPL-SCNC: 4.5 MMOL/L — SIGNIFICANT CHANGE UP (ref 3.5–5.3)
POTASSIUM SERPL-SCNC: 4.8 MMOL/L — SIGNIFICANT CHANGE UP (ref 3.5–5.3)
PROTHROM AB SERPL-ACNC: 12.4 SEC — SIGNIFICANT CHANGE UP (ref 9.5–13)
RBC # BLD: 4.98 M/UL — SIGNIFICANT CHANGE UP (ref 4.2–5.8)
RBC # FLD: 20.2 % — HIGH (ref 10.3–14.5)
SODIUM SERPL-SCNC: 145 MMOL/L — SIGNIFICANT CHANGE UP (ref 135–145)
SODIUM SERPL-SCNC: 145 MMOL/L — SIGNIFICANT CHANGE UP (ref 135–145)
SPECIMEN SOURCE: SIGNIFICANT CHANGE UP
WBC # BLD: 10.92 K/UL — HIGH (ref 3.8–10.5)
WBC # FLD AUTO: 10.92 K/UL — HIGH (ref 3.8–10.5)
WNV IGG TITR FLD: NEGATIVE — SIGNIFICANT CHANGE UP
WNV IGM SPEC QL: NEGATIVE — SIGNIFICANT CHANGE UP

## 2024-02-16 PROCEDURE — 99221 1ST HOSP IP/OBS SF/LOW 40: CPT | Mod: 25

## 2024-02-16 PROCEDURE — 43246 EGD PLACE GASTROSTOMY TUBE: CPT | Mod: GC

## 2024-02-16 PROCEDURE — 31600 PLANNED TRACHEOSTOMY: CPT | Mod: GC

## 2024-02-16 PROCEDURE — 71045 X-RAY EXAM CHEST 1 VIEW: CPT | Mod: 26

## 2024-02-16 PROCEDURE — 99291 CRITICAL CARE FIRST HOUR: CPT

## 2024-02-16 PROCEDURE — 36514 APHERESIS PLASMA: CPT

## 2024-02-16 RX ORDER — LIDOCAINE HCL 20 MG/ML
5 VIAL (ML) INJECTION ONCE
Refills: 0 | Status: COMPLETED | OUTPATIENT
Start: 2024-02-16 | End: 2024-02-16

## 2024-02-16 RX ORDER — GABAPENTIN 400 MG/1
400 CAPSULE ORAL THREE TIMES A DAY
Refills: 0 | Status: DISCONTINUED | OUTPATIENT
Start: 2024-02-16 | End: 2024-03-03

## 2024-02-16 RX ORDER — FENTANYL CITRATE 50 UG/ML
100 INJECTION INTRAVENOUS ONCE
Refills: 0 | Status: DISCONTINUED | OUTPATIENT
Start: 2024-02-16 | End: 2024-02-16

## 2024-02-16 RX ORDER — MIDAZOLAM HYDROCHLORIDE 1 MG/ML
6 INJECTION, SOLUTION INTRAMUSCULAR; INTRAVENOUS ONCE
Refills: 0 | Status: DISCONTINUED | OUTPATIENT
Start: 2024-02-16 | End: 2024-02-16

## 2024-02-16 RX ORDER — CEFAZOLIN SODIUM 1 G
2000 VIAL (EA) INJECTION ONCE
Refills: 0 | Status: COMPLETED | OUTPATIENT
Start: 2024-02-16 | End: 2024-02-16

## 2024-02-16 RX ORDER — FENTANYL CITRATE 50 UG/ML
50 INJECTION INTRAVENOUS ONCE
Refills: 0 | Status: DISCONTINUED | OUTPATIENT
Start: 2024-02-16 | End: 2024-02-16

## 2024-02-16 RX ORDER — PROPOFOL 10 MG/ML
20 INJECTION, EMULSION INTRAVENOUS
Qty: 1000 | Refills: 0 | Status: DISCONTINUED | OUTPATIENT
Start: 2024-02-16 | End: 2024-02-17

## 2024-02-16 RX ORDER — PHENYLEPHRINE HYDROCHLORIDE 10 MG/ML
1 INJECTION INTRAVENOUS
Qty: 40 | Refills: 0 | Status: DISCONTINUED | OUTPATIENT
Start: 2024-02-16 | End: 2024-02-17

## 2024-02-16 RX ORDER — MIDAZOLAM HYDROCHLORIDE 1 MG/ML
2 INJECTION, SOLUTION INTRAMUSCULAR; INTRAVENOUS ONCE
Refills: 0 | Status: DISCONTINUED | OUTPATIENT
Start: 2024-02-16 | End: 2024-02-16

## 2024-02-16 RX ORDER — GABAPENTIN 400 MG/1
400 CAPSULE ORAL EVERY 8 HOURS
Refills: 0 | Status: DISCONTINUED | OUTPATIENT
Start: 2024-02-16 | End: 2024-02-16

## 2024-02-16 RX ORDER — LABETALOL HCL 100 MG
5 TABLET ORAL ONCE
Refills: 0 | Status: COMPLETED | OUTPATIENT
Start: 2024-02-16 | End: 2024-02-16

## 2024-02-16 RX ORDER — MULTIVIT WITH MIN/MFOLATE/K2 340-15/3 G
296 POWDER (GRAM) ORAL ONCE
Refills: 0 | Status: COMPLETED | OUTPATIENT
Start: 2024-02-16 | End: 2024-02-16

## 2024-02-16 RX ORDER — SODIUM CHLORIDE 9 MG/ML
1000 INJECTION INTRAMUSCULAR; INTRAVENOUS; SUBCUTANEOUS ONCE
Refills: 0 | Status: COMPLETED | OUTPATIENT
Start: 2024-02-16 | End: 2024-02-16

## 2024-02-16 RX ORDER — PHENYLEPHRINE HYDROCHLORIDE 10 MG/ML
0.1 INJECTION INTRAVENOUS
Qty: 40 | Refills: 0 | Status: DISCONTINUED | OUTPATIENT
Start: 2024-02-16 | End: 2024-02-17

## 2024-02-16 RX ORDER — INSULIN LISPRO 100/ML
VIAL (ML) SUBCUTANEOUS EVERY 6 HOURS
Refills: 0 | Status: DISCONTINUED | OUTPATIENT
Start: 2024-02-16 | End: 2024-02-20

## 2024-02-16 RX ADMIN — PANTOPRAZOLE SODIUM 40 MILLIGRAM(S): 20 TABLET, DELAYED RELEASE ORAL at 13:05

## 2024-02-16 RX ADMIN — Medication 0.1 MILLIGRAM(S): at 21:42

## 2024-02-16 RX ADMIN — PROPOFOL 12.2 MICROGRAM(S)/KG/MIN: 10 INJECTION, EMULSION INTRAVENOUS at 05:56

## 2024-02-16 RX ADMIN — SODIUM CHLORIDE 4000 MILLILITER(S): 9 INJECTION INTRAMUSCULAR; INTRAVENOUS; SUBCUTANEOUS at 16:45

## 2024-02-16 RX ADMIN — FENTANYL CITRATE 50 MICROGRAM(S): 50 INJECTION INTRAVENOUS at 02:10

## 2024-02-16 RX ADMIN — Medication 650 MILLIGRAM(S): at 09:00

## 2024-02-16 RX ADMIN — Medication 296 MILLILITER(S): at 13:05

## 2024-02-16 RX ADMIN — GABAPENTIN 400 MILLIGRAM(S): 400 CAPSULE ORAL at 21:41

## 2024-02-16 RX ADMIN — PROPOFOL 12.2 MICROGRAM(S)/KG/MIN: 10 INJECTION, EMULSION INTRAVENOUS at 19:33

## 2024-02-16 RX ADMIN — SODIUM CHLORIDE 4 MILLILITER(S): 9 INJECTION INTRAMUSCULAR; INTRAVENOUS; SUBCUTANEOUS at 06:22

## 2024-02-16 RX ADMIN — ATORVASTATIN CALCIUM 80 MILLIGRAM(S): 80 TABLET, FILM COATED ORAL at 21:42

## 2024-02-16 RX ADMIN — CHLORHEXIDINE GLUCONATE 15 MILLILITER(S): 213 SOLUTION TOPICAL at 05:26

## 2024-02-16 RX ADMIN — Medication 81 MILLIGRAM(S): at 05:26

## 2024-02-16 RX ADMIN — GABAPENTIN 400 MILLIGRAM(S): 400 CAPSULE ORAL at 15:43

## 2024-02-16 RX ADMIN — CHLORHEXIDINE GLUCONATE 1 APPLICATION(S): 213 SOLUTION TOPICAL at 18:36

## 2024-02-16 RX ADMIN — FENTANYL CITRATE 50 MICROGRAM(S): 50 INJECTION INTRAVENOUS at 03:55

## 2024-02-16 RX ADMIN — SODIUM CHLORIDE 4 MILLILITER(S): 9 INJECTION INTRAMUSCULAR; INTRAVENOUS; SUBCUTANEOUS at 00:27

## 2024-02-16 RX ADMIN — FENTANYL CITRATE 50 MICROGRAM(S): 50 INJECTION INTRAVENOUS at 05:15

## 2024-02-16 RX ADMIN — FENTANYL CITRATE 50 MICROGRAM(S): 50 INJECTION INTRAVENOUS at 03:12

## 2024-02-16 RX ADMIN — Medication 4: at 13:03

## 2024-02-16 RX ADMIN — FENTANYL CITRATE 50 MICROGRAM(S): 50 INJECTION INTRAVENOUS at 04:57

## 2024-02-16 RX ADMIN — ENOXAPARIN SODIUM 40 MILLIGRAM(S): 100 INJECTION SUBCUTANEOUS at 20:43

## 2024-02-16 RX ADMIN — FENTANYL CITRATE 50 MICROGRAM(S): 50 INJECTION INTRAVENOUS at 01:50

## 2024-02-16 RX ADMIN — FENTANYL CITRATE 50 MICROGRAM(S): 50 INJECTION INTRAVENOUS at 03:20

## 2024-02-16 RX ADMIN — Medication 100 MILLIGRAM(S): at 16:10

## 2024-02-16 RX ADMIN — CHLORHEXIDINE GLUCONATE 15 MILLILITER(S): 213 SOLUTION TOPICAL at 18:38

## 2024-02-16 RX ADMIN — GABAPENTIN 300 MILLIGRAM(S): 400 CAPSULE ORAL at 03:21

## 2024-02-16 RX ADMIN — Medication 5 MILLILITER(S): at 16:10

## 2024-02-16 RX ADMIN — PHENYLEPHRINE HYDROCHLORIDE 3.81 MICROGRAM(S)/KG/MIN: 10 INJECTION INTRAVENOUS at 19:33

## 2024-02-16 RX ADMIN — MIDAZOLAM HYDROCHLORIDE 2 MILLIGRAM(S): 1 INJECTION, SOLUTION INTRAMUSCULAR; INTRAVENOUS at 16:10

## 2024-02-16 RX ADMIN — FENTANYL CITRATE 100 MICROGRAM(S): 50 INJECTION INTRAVENOUS at 16:10

## 2024-02-16 RX ADMIN — FENTANYL CITRATE 50 MICROGRAM(S): 50 INJECTION INTRAVENOUS at 05:55

## 2024-02-16 RX ADMIN — Medication 2: at 18:35

## 2024-02-16 RX ADMIN — FENTANYL CITRATE 50 MICROGRAM(S): 50 INJECTION INTRAVENOUS at 04:10

## 2024-02-16 RX ADMIN — Medication 650 MILLIGRAM(S): at 08:30

## 2024-02-16 RX ADMIN — Medication 5 MILLIGRAM(S): at 04:42

## 2024-02-16 RX ADMIN — Medication 0.1 MILLIGRAM(S): at 09:53

## 2024-02-16 RX ADMIN — FENTANYL CITRATE 50 MICROGRAM(S): 50 INJECTION INTRAVENOUS at 00:07

## 2024-02-16 NOTE — BRIEF OPERATIVE NOTE - OPERATION/FINDINGS
Bedside trach and PEG. Trach 8.0 cuffed, PEG @ 4cm. Please see upper endoscopy note for further details.

## 2024-02-16 NOTE — PROGRESS NOTE ADULT - ASSESSMENT
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     NEURO:  - Neurochecks Q4 hours  - PRN Fentanyl and oxy 5 mg and 10 mg for pain control   - Central storming: gabapentin 400 Q8, propanolol 10 mg Q8, added clonidine 0.1 mg Q8  - MRI spine with cord lesions at C3 and C4 with nerve root enhancement in the L spine  - s/p EEG with mild diffuse/multi-focal cerebral dysfunction, not specific as to etiology. No epileptiform abnormalities.    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  - CSF WBC 11, protein 67, Glucose 83  - S/p Plex 2/12, next PLEX 2/16. (plan for 5 sessions) and possible IVIG after  - C/W ASA   - Neurology following   - Speech pathologist evaluation done and mode of speech established  Activity: [x] mobilize as tolerated [] Bedrest [x] PT [x] OT [x] PMNR    PULM:  - Intubated for airway protection  - C/w hypertonic nebs and chest PT   - Dr. Sood consulted for tracheostomy  - CPAP as tolerated     CV:   - SBP goal  with MAP >65   - PICC  - Continue ASA   - On propanolol and clonidine for central storming   - TTE- EF 66%  - R- axillary A line     RENAL:  - Fluids: IVL  - c/w Free water 200 ccQ6h    GI:  - Diet: TF AT goal  - Vitamin D supplementation   - GI prophylaxis [] not indicated [x] PPI [] other:  - Bowel regimen [x] miralax [x] senna, dulcolax suppository   - Dr. Sood consulted for PEG  - LBM  2/13    ENDO:   - Goal euglycemia (-180)  - A1c 6.8    HEME/ONC:  - H/H stable   - VTE prophylaxis: [x] SCDs [x] chemoprophylaxis- SQL [] hold chemoprophylaxis due to: [] high risk of DVT/PE on admission due to:    ID:  - Afebrile, leukocytosis- trending up    MISC:  Lines/tubes: RIJ, R axillary daphne, vences     Splints on, holistic therapy    SOCIAL/FAMILY:  [] awaiting [x] updated at bedside [] family meeting    CODE STATUS:  [x] Full Code [] DNR [] DNI [] Palliative/Comfort Care    DISPOSITION:  [x] ICU [] Stroke Unit [] Floor [] EMU [] RCU [] PCU    [x] Patient is at high risk of neurologic deterioration/death due to: infection   ASSESSMENT/PLAN: 72 y/o male with multiple comorbidities presenting initially with suspicious of basilar stroke however, 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     NEURO:  - Neurochecks Q4 hours  - PRN Fentanyl and oxy 5 mg and 10 mg for pain control   - Central storming: gabapentin 400 Q8, propanolol 10 mg Q8, added clonidine 0.1 mg Q8  - MRI spine with cord lesions at C3 and C4 with nerve root enhancement in the L spine  - s/p EEG with mild diffuse/multi-focal cerebral dysfunction, not specific as to etiology. No epileptiform abnormalities.    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  - CSF WBC 11, protein 67, Glucose 83  - S/p Plex x3 2/16, plan for 5  - C/W ASA   - Neurology following   - Speech pathologist evaluation done and mode of speech established  Activity: [x] mobilize as tolerated [] Bedrest [x] PT [x] OT [x] PMNR    PULM:  - Intubated for airway protection  - C/w hypertonic nebs and chest PT   - Dr. Sood consulted for tracheostomy  - CPAP as tolerated     CV:   - SBP goal  with MAP >65   - PICC  - Continue ASA   - On propanolol and clonidine for central storming   - TTE- EF 66%  - R- axillary A line     RENAL:  - Fluids: IVL  - c/w Free water 200 ccQ6h    GI:  - Diet: TF AT goal  - Vitamin D supplementation   - GI prophylaxis [] not indicated [x] PPI [] other:  - Bowel regimen [x] miralax [x] senna, dulcolax suppository   - Dr. Sood consulted for PEG  - LBM  2/13    ENDO:   - Goal euglycemia (-180)  - A1c 6.8    HEME/ONC:  - H/H stable   - VTE prophylaxis: [x] SCDs [x] chemoprophylaxis- SQL [] hold chemoprophylaxis due to: [] high risk of DVT/PE on admission due to:    ID:  - Afebrile, leukocytosis- trending up    MISC:  Lines/tubes: RIJ, R axillary daphne, vences     Splints on, holistic therapy    SOCIAL/FAMILY:  [] awaiting [x] updated at bedside [] family meeting    CODE STATUS:  [x] Full Code [] DNR [] DNI [] Palliative/Comfort Care    DISPOSITION:  [x] ICU [] Stroke Unit [] Floor [] EMU [] RCU [] PCU    [x] Patient is at high risk of neurologic deterioration/death due to: infection   ASSESSMENT/PLAN: 72 y/o male with multiple comorbidities presenting initially with suspicious of basilar stroke however, 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     NEURO:  - Neurochecks Q4 hours  - PRN Fentanyl and oxy 5 mg and 10 mg for pain control   - Central storming: gabapentin 400 Q8, propanolol 10 mg Q8, added clonidine 0.1 mg Q8  - MRI spine with cord lesions at C3 and C4 with nerve root enhancement in the L spine  - s/p EEG with mild diffuse/multi-focal cerebral dysfunction, not specific as to etiology. No epileptiform abnormalities.    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  - CSF WBC 11, protein 67, Glucose 83  - S/p Plex x3 2/16, plan for 5  - C/W ASA   - Neurology following   - Speech pathologist evaluation done and mode of speech established  Activity: [x] mobilize as tolerated [] Bedrest [x] PT [x] OT [x] PMNR    PULM:  - s/p tracheostomy 2/16  - C/w hypertonic nebs and chest PT   - CPAP as tolerated     CV:   - SBP goal  with MAP >65   - PICC  - Continue ASA   - On propanolol and clonidine for central storming   - TTE- EF 66%  - R- axillary A line     RENAL:  - Fluids: IVL  - c/w Free water 200 ccQ6h  - Na stable @ 145    GI:  - s/p PEG 2/16  - Vitamin D supplementation   - GI prophylaxis [] not indicated [x] PPI [] other:  - Bowel regimen [x] miralax [x] senna, dulcolax suppository   - LBM  2/13    ENDO:   - Goal euglycemia (-180)  - A1c 6.8    HEME/ONC:  - H/H stable   - VTE prophylaxis: [x] SCDs [x] chemoprophylaxis- SQL [] hold chemoprophylaxis due to: [] high risk of DVT/PE on admission due to:    ID:  - Afebrile, leukocytosis- trending up    MISC:  Lines/tubes: RIJ, R ru cullen     SOCIAL/FAMILY:  [] awaiting [x] updated at bedside [] family meeting    CODE STATUS:  [x] Full Code [] DNR [] DNI [] Palliative/Comfort Care    DISPOSITION:  [x] ICU [] Stroke Unit [] Floor [] EMU [] RCU [] PCU    [x] Patient is at high risk of neurologic deterioration/death due to: infection

## 2024-02-16 NOTE — CONSULT NOTE ADULT - SUBJECTIVE AND OBJECTIVE BOX
2/9/2024 - admitted for Guillain-[place] syndrome    on propofol infusion    not on antibiotics  not on therapeutic anticoagulation  on ASA 81 mg    intubated with 7.5 ET tube  on mechanical vent (16 / 500 / 30% / +5)  trachea palpable with neck in neutral position  innominate artery palpable posterior to manubrium    was on Glucerna 1.2 via NG feeding tube in right nostril, but tube feeds were held this morning at 0800 in preparation for trach/PEG  soft / NT / ND  no surgical scars on abdomen      plats - 384    2/16 coags  -INR - 1.1  -ptt - 29.6 sec    CXR (2/14) - entire stomach is in abdomen based on location of NG feeding tube

## 2024-02-16 NOTE — PROGRESS NOTE ADULT - ASSESSMENT
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     NEURO:  - Neurochecks Q4 hours  - PRN Fentanyl and oxy 5 mg and 10 mg for pain control   - Continue gabapentin, will add propanolol 10 mg Q8 for central storming   - MRI spine with cord lesions at C3 and C4 with nerve root enhancement in the L spine  - s/p EEG with mild diffuse/multi-focal cerebral dysfunction, not specific as to etiology. No epileptiform abnormalities.    - Will follow up rest of the autoimmune/infectious panel   - f/u LP studies including CSF PCR, ACE, Lyme, crypto, CMV, West Nile, VDRL, CSF autoimmune encephalitis panel   - Anti-GD1b antibody elevated   - CSF WBC 11, protein 67, Glucose 83  - S/p Plex 2/12, next PLEX 2/16. (plan for 5 sessions) and possible Rituxin after  - C/W ASA   - Neurology following   - Will have the patient be seen by speech pathologist  Activity: [x] mobilize as tolerated [] Bedrest [x] PT [x] OT [] PMNR    PULM:  - Intubated for airway protection  - 16/500/5/40%- ABG reviewed  - Patient may need early trach - continue discussion with family   - C/w hypertonic nebs and chest PT   - CPAP as tolerated     CV:   - SBP goal  with MAP >65   - PICC  - Continue ASA   - TTE- EF 66%  - R- axillary A line     RENAL:  - Fluids: IVL  - c/w Free water 300 ccQ6h    GI:  - Diet: TF AT goal  - Vitamin D supplementation   - GI prophylaxis [] not indicated [x] PPI [] other:  - Bowel regimen [x] miralax [x] senna, dulcolax suppository   - LBM  2/13    ENDO:   - Goal euglycemia (-180)  - A1c 6.8    HEME/ONC:  - H/H stable   - VTE prophylaxis: [x] SCDs [x] chemoprophylaxis- SQL [] hold chemoprophylaxis due to: [] high risk of DVT/PE on admission due to:    ID:  - Afebrile, leukocytosis noted     MISC:  Lines/tubes: RIJ, R axillary daphne, vences     Splints on, holistic therapy    SOCIAL/FAMILY:  [] awaiting [x] updated at bedside [] family meeting    CODE STATUS:  [x] Full Code [] DNR [] DNI [] Palliative/Comfort Care    DISPOSITION:  [x] ICU [] Stroke Unit [] Floor [] EMU [] RCU [] PCU    [x] Patient is at high risk of neurologic deterioration/death due to: infection    ASSESSMENT/PLAN: 72 y/o male with multiple comorbidities presenting initially with suspicious of basilar stroke however, 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     NEURO:  - Neurochecks Q4 hours  - PRN Fentanyl and oxy 5 mg and 10 mg for pain control   - Continue gabapentin, will add propanolol 10 mg Q8 for central storming   - MRI spine with cord lesions at C3 and C4 with nerve root enhancement in the L spine  - s/p EEG with mild diffuse/multi-focal cerebral dysfunction, not specific as to etiology. No epileptiform abnormalities.    - Will follow up rest of the autoimmune/infectious panel   - f/u LP studies including CSF PCR, ACE, Lyme, crypto, CMV, West Nile, VDRL, CSF autoimmune encephalitis panel   - Anti-GD1b antibody elevated   - CSF WBC 11, protein 67, Glucose 83  - S/p Plex 2/12, next PLEX 2/16. (plan for 5 sessions) and possible Rituxin after  - C/W ASA   - Neurology following   - Will have the patient be seen by speech pathologist  Activity: [x] mobilize as tolerated [] Bedrest [x] PT [x] OT [] PMNR    PULM:  - Intubated for airway protection  - 16/500/5/40%- ABG reviewed  - Patient may need early trach - continue discussion with family   - C/w hypertonic nebs and chest PT   - CPAP as tolerated     CV:   - SBP goal  with MAP >65   - PICC  - Continue ASA   - TTE- EF 66%  - R- axillary A line     RENAL:  - Fluids: IVL  - c/w Free water 200 ccQ6h    GI:  - Diet: TF AT goal  - Vitamin D supplementation   - GI prophylaxis [] not indicated [x] PPI [] other:  - Bowel regimen [x] miralax [x] senna, dulcolax suppository   - LBM  2/13    ENDO:   - Goal euglycemia (-180)  - A1c 6.8, FS today in 200s (2/16)  -start ISS    HEME/ONC:  - H/H stable   - VTE prophylaxis: [x] SCDs [x] chemoprophylaxis- SQL [] hold chemoprophylaxis due to: [] high risk of DVT/PE on admission due to:    ID:  - Afebrile, leukocytosis noted     MISC:  Lines/tubes: RIJ, R axillary adphne, vences     Splradha on, holistic therapy    SOCIAL/FAMILY:  [] awaiting [x] updated at bedside [] family meeting    CODE STATUS:  [x] Full Code [] DNR [] DNI [] Palliative/Comfort Care    DISPOSITION:  [x] ICU [] Stroke Unit [] Floor [] EMU [] RCU [] PCU    [x] Patient is at high risk of neurologic deterioration/death due to: infection

## 2024-02-16 NOTE — BRIEF OPERATIVE NOTE - NSICDXBRIEFPROCEDURE_GEN_ALL_CORE_FT
PROCEDURES:  Tracheostomy, planned 16-Feb-2024 17:33:47  Sunday Valles  EGD, with PEG 16-Feb-2024 17:33:53  Sunday Valles

## 2024-02-16 NOTE — PROGRESS NOTE ADULT - SUBJECTIVE AND OBJECTIVE BOX
HOSPITAL COURSE:  73y Male with PMHx significant for HTN, HLD, DM type 2, and two prior strokes without residual deficits who [place] and L facial weakness on 2/9. 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 He was transferred to NSICU for angiogram, which was done on 2/9 and showed moderate basilar stenosis. No intervention done. He had to be intubated for worsening respiratory status. On further history it was found that patient had been having progressive weakness and numbness of his extremities for the preceding 1-2 weeks following a viral URI. 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       Admission Scores  GCS:   HH:   MF:   NIHSS: 4   RASS:    CAM-ICU:   ICH:    2/9: transferred from [place], NIHSS of 4 in ED, then became more lethargic, stridorous and desaturated in ED, intubated for airway protected, repeat CTH/A obtained and brought emergently to IR for basilar stenting   2/10: worsening brainstem symptoms while on DAPT  2/12- LP performed to rule out MFS as patient's examination is not explained by the strokes on MRI. First session of PLEX  2/14- s/p 2nd PLEX tx, no acute interval events  2/15- No acute events overnight. Patient's examination more or less the same.   2/15- No acute events overnight. Established communication strategy with speech using b/l extremities.          Allergies    No Known Allergies    Intolerances        REVIEW OF SYSTEMS: [ ] Unable to Assess due to neurologic exam   [x] All ROS addressed below are non-contributory, except:  Neuro: [ ] Headache [ ] Back pain [ ] Numbness [ ] Weakness [ ] Ataxia [ ] Dizziness [ ] Aphasia [ ] Dysarthria [ ] Visual disturbance  Resp: [ ] Shortness of breath/dyspnea, [ ] Orthopnea [ ] Cough  CV: [ ] Chest pain [ ] Palpitation [ ] Lightheadedness [ ] Syncope  Renal: [ ] Thirst [ ] Edema  GI: [ ] Nausea [ ] Emesis [ ] Abdominal pain [ ] Constipation [ ] Diarrhea  Hem: [ ] Hematemesis [ ] bright red blood per rectum  ID: [ ] Fever [ ] Chills [ ] Dysuria  ENT: [ ] Rhinorrhea      DEVICES:   [ ] Restraints [x] ET tube [x] central line [x] arterial line [ ] vences [x ] NGT/OGT [ ] EVD [ ] LD [ ] HAL/HMV [ ] Trach [ ] PEG [ ] Chest Tube     ICU Vital Signs Last 24 Hrs  T(C): 36.8 (16 Feb 2024 07:00), Max: 37.4 (15 Feb 2024 15:00)  T(F): 98.2 (16 Feb 2024 07:00), Max: 99.3 (15 Feb 2024 15:00)  HR: 72 (16 Feb 2024 07:00) (69 - 92)  ABP: 165/70 (16 Feb 2024 07:00) (88/45 - 218/90)  ABP(mean): 104 (16 Feb 2024 07:00) (58 - 143)  RR: 16 (16 Feb 2024 07:00) (16 - 19)  SpO2: 98% (16 Feb 2024 07:00) (95% - 100%)    O2 Parameters below as of 16 Feb 2024 06:22  Patient On (Oxygen Delivery Method): ventilator        O2 Concentration (%): 30  CAPILLARY BLOOD GLUCOSE      I&O's Summary    15 Feb 2024 07:01  -  16 Feb 2024 07:00  --------------------------------------------------------  IN: 2960 mL / OUT: 1600 mL / NET: 1360 mL          Respiratory:  Mode: AC/ CMV (Assist Control/ Continuous Mandatory Ventilation)  RR (machine): 16  TV (machine): 500  FiO2: 30  PEEP: 5  ITime: 1  MAP: 8  PIP: 17    ABG - ( 14 Feb 2024 00:48 )  pH, Arterial: 7.41  pH, Blood: x     /  pCO2: 47    /  pO2: 110   / HCO3: 30    / Base Excess: 4.4   /  SaO2: 98.7            LABS:                        11.2   13.56 )-----------( 387      ( 14 Feb 2024 21:33 )             39.3     02-14    147<H>  |  113<H>  |  21  ----------------------------<  217<H>  4.5   |  25  |  0.78             MEDICATION LEVELS:     MEDICATIONS  (STANDING):  aspirin  chewable 81 milliGRAM(s) Oral <User Schedule>  atorvastatin 80 milliGRAM(s) Oral at bedtime  chlorhexidine 0.12% Liquid 15 milliLiter(s) Oral Mucosa every 12 hours  chlorhexidine 4% Liquid 1 Application(s) Topical daily  enoxaparin Injectable 40 milliGRAM(s) SubCutaneous <User Schedule>  ergocalciferol Drops 81957 Unit(s) Oral <User Schedule>  gabapentin Solution 300 milliGRAM(s) Oral three times a day  pantoprazole  Injectable 40 milliGRAM(s) IV Push daily  polyethylene glycol 3350 17 Gram(s) Oral daily  propofol Infusion 20 MICROgram(s)/kG/Min (12.2 mL/Hr) IV Continuous <Continuous>  propranolol 10 milliGRAM(s) Oral every 8 hours  senna 1 Tablet(s) Oral at bedtime    MEDICATIONS  (PRN):  acetaminophen   Oral Liquid .. 650 milliGRAM(s) Oral every 6 hours PRN Mild Pain (1 - 3)  fentaNYL    Injectable 50 MICROGram(s) IV Push every 2 hours PRN Severe Pain (7 - 10)  oxyCODONE    Solution 10 milliGRAM(s) Oral every 6 hours PRN Moderate Pain (4 - 6)  sodium chloride 0.9% lock flush 10 milliLiter(s) IV Push every 1 hour PRN Pre/post blood products, medications, blood draw, and to maintain line patency            IMAGING:      EXAMINATION:  PHYSICAL EXAM:    Constitutional: No Acute Distress, patient intubated but following commands. Does not open eyes to voice.      Neurological: Bilateral ptosis, pupils 2 mm and sluggish, restricted gaze in all directions however, vertical gaze is better than horizontal. Facial asymmetry could not be appreciated due to ETT, patient following commands with his BL LE. No Clonus.     Motor exam:          Upper extremity                         Delt     Bicep     Tricep    HG                                                 R         0/5        0/5        0/5      2/5 (activation of muscles could be appreciated in bilateral biceps and triceps)                                               L          0/5        0/5        0/5       3/5          Lower extremity                        HF         KF        KE       DF         PF                                                  R        0/5        0/5        4/5       3/5         5/5                                               L         0/5        0/5       4/5       2/5          5/5                                                  Reflexes: Deep Tendon Reflexes absent in all 4 extremities    Pulmonary: Clear to Auscultation, No rales, No rhonchi, No wheezes     Cardiovascular: S1, S2, Regular rate and rhythm     Gastrointestinal: Soft, Non-tender, Non-distended     Extremities: No calf tenderness    HOSPITAL COURSE:  73y Male with PMHx significant for HTN, HLD, DM type 2, and two prior strokes without residual deficits [place] [place] [facility] and L facial weakness on 2/9. 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 He was transferred to NSICU for angiogram, which was done on 2/9 and showed moderate basilar stenosis. No intervention done. He had to be intubated for worsening respiratory status. On further history it was found that patient had been having progressive weakness and numbness of his extremities for the preceding 1-2 weeks following a viral URI. 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       Admission Scores  GCS:   HH:   MF:   NIHSS: 4   RASS:    CAM-ICU:   ICH:      Overnight events:  ·	Started propofol for suspected storming    Hospital Course:   2/9: transferred from [place], NIHSS of 4 in ED, then became more lethargic, stridorous and desaturated in ED, intubated for airway protected, repeat CTH/A obtained and brought emergently to IR for basilar stenting   2/10: worsening brainstem symptoms while on DAPT  2/12- LP performed to rule out MFS as patient's examination is not explained by the strokes on MRI. First session of PLEX  2/14- s/p 2nd PLEX tx, no acute interval events  2/15- No acute events overnight. Patient's examination more or less the same.   2/16- No acute events overnight. Established communication strategy with speech using b/l extremities. PLEX scheduled for today.        Allergies    No Known Allergies    Intolerances        REVIEW OF SYSTEMS: [ ] Unable to Assess due to neurologic exam   [x] All ROS addressed below are non-contributory, except:  Neuro: [ ] Headache [ ] Back pain [ ] Numbness [ ] Weakness [ ] Ataxia [ ] Dizziness [ ] Aphasia [ ] Dysarthria [ ] Visual disturbance  Resp: [ ] Shortness of breath/dyspnea, [ ] Orthopnea [ ] Cough  CV: [ ] Chest pain [ ] Palpitation [ ] Lightheadedness [ ] Syncope  Renal: [ ] Thirst [ ] Edema  GI: [ ] Nausea [ ] Emesis [ ] Abdominal pain [ ] Constipation [ ] Diarrhea  Hem: [ ] Hematemesis [ ] bright red blood per rectum  ID: [ ] Fever [ ] Chills [ ] Dysuria  ENT: [ ] Rhinorrhea      DEVICES:   [ ] Restraints [x] ET tube [x] central line [x] arterial line [ ] vences [x ] NGT/OGT [ ] EVD [ ] LD [ ] HAL/HMV [ ] Trach [ ] PEG [ ] Chest Tube     ICU Vital Signs Last 24 Hrs  T(C): 36.8 (16 Feb 2024 07:00), Max: 37.4 (15 Feb 2024 15:00)  T(F): 98.2 (16 Feb 2024 07:00), Max: 99.3 (15 Feb 2024 15:00)  HR: 72 (16 Feb 2024 07:00) (69 - 92)  ABP: 165/70 (16 Feb 2024 07:00) (88/45 - 218/90)  ABP(mean): 104 (16 Feb 2024 07:00) (58 - 143)  RR: 16 (16 Feb 2024 07:00) (16 - 19)  SpO2: 98% (16 Feb 2024 07:00) (95% - 100%)    O2 Parameters below as of 16 Feb 2024 06:22  Patient On (Oxygen Delivery Method): ventilator        O2 Concentration (%): 30  CAPILLARY BLOOD GLUCOSE      I&O's Summary    15 Feb 2024 07:01  -  16 Feb 2024 07:00  --------------------------------------------------------  IN: 2960 mL / OUT: 1600 mL / NET: 1360 mL          Respiratory:  Mode: AC/ CMV (Assist Control/ Continuous Mandatory Ventilation)  RR (machine): 16  TV (machine): 500  FiO2: 30  PEEP: 5  ITime: 1  MAP: 8  PIP: 17    ABG - ( 14 Feb 2024 00:48 )  pH, Arterial: 7.41  pH, Blood: x     /  pCO2: 47    /  pO2: 110   / HCO3: 30    / Base Excess: 4.4   /  SaO2: 98.7            LABS:                        11.2   13.56 )-----------( 387      ( 14 Feb 2024 21:33 )             39.3     02-14    147<H>  |  113<H>  |  21  ----------------------------<  217<H>  4.5   |  25  |  0.78             MEDICATION LEVELS:     MEDICATIONS  (STANDING):  aspirin  chewable 81 milliGRAM(s) Oral <User Schedule>  atorvastatin 80 milliGRAM(s) Oral at bedtime  chlorhexidine 0.12% Liquid 15 milliLiter(s) Oral Mucosa every 12 hours  chlorhexidine 4% Liquid 1 Application(s) Topical daily  enoxaparin Injectable 40 milliGRAM(s) SubCutaneous <User Schedule>  ergocalciferol Drops 96159 Unit(s) Oral <User Schedule>  gabapentin Solution 300 milliGRAM(s) Oral three times a day  pantoprazole  Injectable 40 milliGRAM(s) IV Push daily  polyethylene glycol 3350 17 Gram(s) Oral daily  propofol Infusion 20 MICROgram(s)/kG/Min (12.2 mL/Hr) IV Continuous <Continuous>  propranolol 10 milliGRAM(s) Oral every 8 hours  senna 1 Tablet(s) Oral at bedtime    MEDICATIONS  (PRN):  acetaminophen   Oral Liquid .. 650 milliGRAM(s) Oral every 6 hours PRN Mild Pain (1 - 3)  fentaNYL    Injectable 50 MICROGram(s) IV Push every 2 hours PRN Severe Pain (7 - 10)  oxyCODONE    Solution 10 milliGRAM(s) Oral every 6 hours PRN Moderate Pain (4 - 6)  sodium chloride 0.9% lock flush 10 milliLiter(s) IV Push every 1 hour PRN Pre/post blood products, medications, blood draw, and to maintain line patency            IMAGING:      EXAMINATION:  PHYSICAL EXAM:    Constitutional: No Acute Distress, patient intubated but following commands. Does not open eyes to voice.      Neurological: Bilateral ptosis, pupils 2 mm and sluggish, restricted gaze in all directions however, vertical gaze is better than horizontal. Facial asymmetry could not be appreciated due to ETT, patient following commands with his BL LE. No Clonus.     Motor exam:          Upper extremity                         Delt     Bicep     Tricep    HG                                                 R         0/5        0/5        0/5      2/5 (activation of muscles could be appreciated in bilateral biceps and triceps)                                               L          0/5        0/5        0/5       3/5          Lower extremity                        HF         KF        KE       DF         PF                                                  R        0/5        0/5        4/5       3/5         5/5                                               L         0/5        0/5       4/5       2/5          5/5                                                  Reflexes: Deep Tendon Reflexes absent in all 4 extremities    Pulmonary: Clear to Auscultation, No rales, No rhonchi, No wheezes     Cardiovascular: S1, S2, Regular rate and rhythm     Gastrointestinal: Soft, Non-tender, Non-distended     Extremities: No calf tenderness    HOSPITAL COURSE:  73y Male with PMHx significant for HTN, HLD, DM type 2, and two prior strokes without residual deficits [place] and L facial weakness on 2/9. 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 He was transferred to NSICU for angiogram, which was done on 2/9 and showed moderate basilar stenosis. No intervention done. He had to be intubated for worsening respiratory status. On further history it was found that patient had been having progressive weakness and numbness of his extremities for the preceding 1-2 weeks following a viral URI. 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       Admission Scores  GCS:   HH:   MF:   NIHSS: 4   RASS:    CAM-ICU:   ICH:      Overnight events:  ·	Started propofol for suspected storming    Hospital Course:   2/9: transferred from [place], NIHSS of 4 in ED, then became more lethargic, stridorous and desaturated in ED, intubated for airway protected, repeat CTH/A obtained and brought emergently to IR for basilar stenting   2/10: worsening brainstem symptoms while on DAPT  2/12- LP performed to rule out MFS as patient's examination is not explained by the strokes on MRI. First session of PLEX  2/14- s/p 2nd PLEX tx, no acute interval events  2/15- No acute events overnight. Patient's examination more or less the same.   2/16- No acute events overnight. Established communication strategy with speech using b/l extremities. PLEX scheduled for today. Trach planned.         Allergies    No Known Allergies    Intolerances        REVIEW OF SYSTEMS: [ ] Unable to Assess due to neurologic exam   [x] All ROS addressed below are non-contributory, except:  Neuro: [ ] Headache [ ] Back pain [ ] Numbness [ ] Weakness [ ] Ataxia [ ] Dizziness [ ] Aphasia [ ] Dysarthria [ ] Visual disturbance  Resp: [ ] Shortness of breath/dyspnea, [ ] Orthopnea [ ] Cough  CV: [ ] Chest pain [ ] Palpitation [ ] Lightheadedness [ ] Syncope  Renal: [ ] Thirst [ ] Edema  GI: [ ] Nausea [ ] Emesis [ ] Abdominal pain [ ] Constipation [ ] Diarrhea  Hem: [ ] Hematemesis [ ] bright red blood per rectum  ID: [ ] Fever [ ] Chills [ ] Dysuria  ENT: [ ] Rhinorrhea      DEVICES:   [ ] Restraints [x] ET tube [x] central line [x] arterial line [ ] vences [x ] NGT/OGT [ ] EVD [ ] LD [ ] HAL/HMV [ ] Trach [ ] PEG [ ] Chest Tube     ICU Vital Signs Last 24 Hrs  T(C): 36.8 (16 Feb 2024 07:00), Max: 37.4 (15 Feb 2024 15:00)  T(F): 98.2 (16 Feb 2024 07:00), Max: 99.3 (15 Feb 2024 15:00)  HR: 72 (16 Feb 2024 07:00) (69 - 92)  ABP: 165/70 (16 Feb 2024 07:00) (88/45 - 218/90)  ABP(mean): 104 (16 Feb 2024 07:00) (58 - 143)  RR: 16 (16 Feb 2024 07:00) (16 - 19)  SpO2: 98% (16 Feb 2024 07:00) (95% - 100%)    O2 Parameters below as of 16 Feb 2024 06:22  Patient On (Oxygen Delivery Method): ventilator        O2 Concentration (%): 30  CAPILLARY BLOOD GLUCOSE      I&O's Summary    15 Feb 2024 07:01  -  16 Feb 2024 07:00  --------------------------------------------------------  IN: 2960 mL / OUT: 1600 mL / NET: 1360 mL          Respiratory:  Mode: AC/ CMV (Assist Control/ Continuous Mandatory Ventilation)  RR (machine): 16  TV (machine): 500  FiO2: 30  PEEP: 5  ITime: 1  MAP: 8  PIP: 17    ABG - ( 14 Feb 2024 00:48 )  pH, Arterial: 7.41  pH, Blood: x     /  pCO2: 47    /  pO2: 110   / HCO3: 30    / Base Excess: 4.4   /  SaO2: 98.7            LABS:                        11.2   13.56 )-----------( 387      ( 14 Feb 2024 21:33 )             39.3     02-14    147<H>  |  113<H>  |  21  ----------------------------<  217<H>  4.5   |  25  |  0.78             MEDICATION LEVELS:     MEDICATIONS  (STANDING):  aspirin  chewable 81 milliGRAM(s) Oral <User Schedule>  atorvastatin 80 milliGRAM(s) Oral at bedtime  chlorhexidine 0.12% Liquid 15 milliLiter(s) Oral Mucosa every 12 hours  chlorhexidine 4% Liquid 1 Application(s) Topical daily  enoxaparin Injectable 40 milliGRAM(s) SubCutaneous <User Schedule>  ergocalciferol Drops 72024 Unit(s) Oral <User Schedule>  gabapentin Solution 300 milliGRAM(s) Oral three times a day  pantoprazole  Injectable 40 milliGRAM(s) IV Push daily  polyethylene glycol 3350 17 Gram(s) Oral daily  propofol Infusion 20 MICROgram(s)/kG/Min (12.2 mL/Hr) IV Continuous <Continuous>  propranolol 10 milliGRAM(s) Oral every 8 hours  senna 1 Tablet(s) Oral at bedtime    MEDICATIONS  (PRN):  acetaminophen   Oral Liquid .. 650 milliGRAM(s) Oral every 6 hours PRN Mild Pain (1 - 3)  fentaNYL    Injectable 50 MICROGram(s) IV Push every 2 hours PRN Severe Pain (7 - 10)  oxyCODONE    Solution 10 milliGRAM(s) Oral every 6 hours PRN Moderate Pain (4 - 6)  sodium chloride 0.9% lock flush 10 milliLiter(s) IV Push every 1 hour PRN Pre/post blood products, medications, blood draw, and to maintain line patency            IMAGING:      EXAMINATION:  PHYSICAL EXAM:    Constitutional: No Acute Distress, patient intubated but following commands. Does not open eyes to voice.      Neurological: Bilateral ptosis, pupils 2 mm and sluggish, restricted gaze in all directions however, vertical gaze is better than horizontal. Facial asymmetry could not be appreciated due to ETT, patient following commands with his BL LE. No Clonus.     Motor exam:          Upper extremity                         Delt     Bicep     Tricep    HG                                                 R         0/5        0/5        0/5      2/5 (activation of muscles could be appreciated in bilateral biceps and triceps)                                               L          0/5        0/5        0/5       3/5          Lower extremity                        HF         KF        KE       DF         PF                                                  R        0/5        0/5        4/5       3/5         5/5                                               L         0/5        0/5       4/5       2/5          5/5                                                  Reflexes: Deep Tendon Reflexes absent in all 4 extremities    Pulmonary: Clear to Auscultation, No rales, No rhonchi, No wheezes     Cardiovascular: S1, S2, Regular rate and rhythm     Gastrointestinal: Soft, Non-tender, Non-distended     Extremities: No calf tenderness

## 2024-02-16 NOTE — PROGRESS NOTE ADULT - ASSESSMENT
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 Agree clinical picture seems more consistent with AIDP/GBS (or variant) but cannot completely exclude other mimic such as infection or inflammation. MRI brain, personally reviewed by me, with small b/l cerebellar strokes (post-procedural) and prior L thalamic strokes which would not explain current clinical deficits and progressive nature. CSF shows mild lymphocytic pleiocystosis and increased protein which could be secondary to inflammatory or viral/infectious process. MRI spine, personally reviewed by me, with C3-C4 medullary lesion that does not enhance (demyelinating?) and some subtle enhancement of the L4-L5 nerve roots which could be due to stenosis at this level or inflammatory, neoplastic, or infectious process. 2/15 - Started PLEX with some improvement. Likely AIDP (Timur Serrano?) given presence of GD1b antibodies. 2/16 - Eyes stable to improved but  worsened. Likely need to wait for PLEX to further begin effect  - Await serum studies for autoimmune encephalopathy panel, B1, B6, copper, NMO, MOG  - Await CSF studies for HSV 1/2 PCR, WNV, cytology, ACE, autoimmune encephalopathy panel  - No neurologic contraindications to PLEX therapy (even if viral infection). Would consider round of IVIG following PLEX if no other medical contraindications pending on clinical improvement  - Vitamin D levels low, would replete with D2 92074 Units PO weekly for 8 weeks and then recheck new levels  - Above recommendations discussed with NSICU team, who verbalized agreement and understanding  - Continue to address above medical problems, as you are doing  - Will continue to follow patient peripherally with you, please call [phone]) with additional questions or concerns

## 2024-02-16 NOTE — CHART NOTE - NSCHARTNOTEFT_GEN_A_CORE
Chart Note - Malnutrition Follow Up    Chart reviewed, events noted. Pt is a 72 yo M with PMH: HTN, HLD, T2DM and two prior strokes without residual deficits. Initially presented to OSH with unsteady gait and L facial weakness on 2/9. 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 Transferred to [facility] for angiogram, was done on 2/9 and showed moderate basilar stenosis. No intervention done. Required intubation for worsening respiratory status. On further history it was found that the patient had been having progressive weakness and numbness of his extremities for the preceding 1-2 weeks following a viral URI. 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 S/P LP 2/12 and initiated on PLEX. Pending trach and PEG. Surgery following.     Diet: NPO  -Previously prescribed Glucerna 1.2 at 60ml/hr x 24 hrs (2/13-2/16).   -Now held for placement of PEG and trach.   -Received 100% of feeds on 2/14-2/15.   -Free water flushes ordered for 200ml q 6 hrs.   -See below for updated EN recommendations when applicable.    Nutrition Related Concerns:   -Last BM: (2/13): x 3. Prescribed Miralax, Senna  -A1c 6.8%. Prescribed insulin lispro sliding scale to aid in management of BG.   -Receiving Propofol, infusing at 7.3ml/hr. If to continue x 24 hrs, will provide 193kcal daily.   -Receiving vitamin D supplementation.     Weights   Dosing wt (2/9): 223.3 lbs.     Pertinent Meds:   Cefazolin, Insulin lispro sliding scale, Propofol, Ergocalciferol, Protonix, Miralax, Senna.     Pertinent Labs:   (2/16): POCT ; Glu 212  (2/14):   (2/10): A1c 6.8%     Skin: no documented pressure injuries   Edema: none noted    based on dosing wt 101.5kg):   Estimated Energy Needs: (23-27kcal/kg): 2334-2740kcal   Estimated Protein Needs: (1.1-1.3g protein/kg): 112-132g protein   Estimated Fluid Needs: deferred to team    Previous Nutrition Diagnosis: Inadequate Energy Intake   Nutrition Diagnosis is: [] ongoing  [] resolved [] not applicable     New Nutrition Diagnosis: Acute Moderate Protein Calorie Malnutrition  Related To: Complex clinical course deferring ability of pt to consume/receive optimal energy intake  As Evidenced By: 1+ edema, energy intake on average </=75% of estimated energy needs x >/=7 days    Nutrition Care Plan:  [x] In Progress  [] Achieved  [] Not applicable    Nutrition Interventions:     Education Provided:       [] Yes:  [x] No:        Recommendations:      1. When able, recommend change EN feeds to Glucerna 1.5 at 65ml/hr x 24 hrs. To provide (based on dosing wt 101.5kg): 1560ml, 2340kcal (23kcal/kg) and 129g protein (1.27g protein/kg).  2. Monitor GI tolerance. RD to remain available to adjust EN formulary, volume/rate PRN. Free water flushes deferred to team.   3. Vitamin D supplementation as per discretion of team.   4. Antihyperglycemic regimen deferred to team.   5. Monitor wt trends/labs/skin integrity/hydration status/bowel regularity.    Jacey Marquez RD, available via TEAMS Chart Note - Malnutrition Follow Up    Chart reviewed, events noted. Pt is a 72 yo M with PMH: HTN, HLD, T2DM and two prior strokes without residual deficits. Initially presented to OSH with unsteady gait and L facial weakness on 2/9. 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 Transferred to [facility] for angiogram, was done on 2/9 and showed moderate basilar stenosis. No intervention done. Required intubation for worsening respiratory status. On further history it was found that the patient had been having progressive weakness and numbness of his extremities for the preceding 1-2 weeks following a viral URI. 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 S/P LP 2/12 and initiated on PLEX. Pending trach and PEG. Surgery following.     Diet: NPO  -Previously prescribed Glucerna 1.2 at 60ml/hr x 24 hrs (2/13-2/16).   -Now held for placement of PEG and trach.   -Received 100% of feeds on 2/14-2/15.   -Free water flushes ordered for 200ml q 6 hrs.   -See below for updated EN recommendations when applicable.    Nutrition Related Concerns:   -Last BM: (2/13): x 3. Prescribed Miralax, Senna  -A1c 6.8%. Prescribed insulin lispro sliding scale to aid in management of BG.   -Receiving Propofol, infusing at 7.3ml/hr. If to continue x 24 hrs, will provide 193kcal daily. Ordered 2/16.   -Receiving vitamin D supplementation.   -Receiving PLEX; next session planned for 2/16.    Weights   Dosing wt (2/9): 223.3 lbs.     Pertinent Meds:   Cefazolin, Insulin lispro sliding scale, Propofol, Ergocalciferol, Protonix, Miralax, Senna.     Pertinent Labs:   (2/16): POCT ; Glu 212  (2/14):   (2/10): A1c 6.8%     Skin: no documented pressure injuries   Edema: none noted    based on dosing wt 101.5kg):   Estimated Energy Needs: (23-27kcal/kg): 2334-2740kcal   Estimated Protein Needs: (1.1-1.3g protein/kg): 112-132g protein   Estimated Fluid Needs: deferred to team    Previous Nutrition Diagnosis: Inadequate Energy Intake   Nutrition Diagnosis is: [] ongoing  [] resolved [] not applicable     New Nutrition Diagnosis: Acute Moderate Protein Calorie Malnutrition  Related To: Complex clinical course deferring ability of pt to consume/receive optimal energy intake  As Evidenced By: 1+ edema, energy intake on average </=75% of estimated energy needs x >/=7 days    Nutrition Care Plan:  [x] In Progress  [] Achieved  [] Not applicable    Nutrition Interventions:     Education Provided:       [] Yes:  [x] No:        Recommendations:      1. When able, recommend change EN feeds to Glucerna 1.5 at 65ml/hr x 24 hrs. To provide (based on dosing wt 101.5kg): 1560ml, 2340kcal (23kcal/kg) and 129g protein (1.27g protein/kg).  2. Monitor GI tolerance. RD to remain available to adjust EN formulary, volume/rate PRN. Free water flushes deferred to team. Trend Propofol; if pt to continue to receive s/p trach/PEG, RD to review EN feed and determine if rate/volume/formula remain appropriate.   3. Vitamin D supplementation as per discretion of team.   4. Antihyperglycemic regimen deferred to team.   5. Monitor wt trends/labs/skin integrity/hydration status/bowel regularity.    Jacey Marquez RD, available via TEAMS

## 2024-02-16 NOTE — PROGRESS NOTE ADULT - ATTENDING COMMENTS
74yo man adm for progressive weakness c/f AIDP    GD1b antibioties +  Ganglioside antibodies are associated with diverse peripheral neuropathies.Elevated antibody levels to ganglioside-monosialic acid (GM1), and the neutral glycolipid, asialo GM1 are associated  with motor or sensorimotor neuropathies, particularly multifocal motor neuropathy.  Anti-GM1 may occur as IgM (polyclonal or monoclonal) or IgG antibodies.  These antibodies may also be found  in patients with diverse connective tissue diseases as well as normal individuals.  GD1a antibodies are associated with different variants of Guillain-Bradner syndrome (GBS) particularly acute motor  axonal neuropathy while GD1b antibodies are predominantly found in sensory ataxic neuropathy syndrome.  Anti-GQ1b antibodies are seen in more than 80 percent of patients with Ding-Serrano syndrome  and may be elevated in GBS patients with ophthalmoplegia.The role of isolated anti-GM2 antibodies is unknown. These tests by themselves are not diagnostic and should be used in conjunction  with other clinical parameters to confirm disease.    intubation #7    labile BP, hypertensive to over 200's   fent pushes, labetalol pushes  prop @12    oriented to choices, no EO, vertical gaze better than horizontal, FC BLE, del/bi/tri 1/5, LHG 3/5, RHG 2/5, KE 3/5, [id number], [id number]      SBP , MAP>65  TTE    1. Left ventricular cavity is small. Left ventricular systolic function is normal with an ejection fraction of 66 % by Glynn's method of disks. There are no regional wall motion abnormalities seen.   2. Normal left ventricular diastolic function, with normal filling pressure.   3. Normal right ventricular cavity size, wall thickness, and normal systolic function.   4. Normal left and right atrial size.   5. No pericardial effusion seen.   6. Agitated saline injection was negative for intracardiac shunt.   7. No prior echocardiogram is available for comparison.   8. There is no evidence of a left ventricular thrombus.   9. There is normal LV mass and concentric remodeling.    ANDRES BOUCHER PICC     PLEX #3 today, continue EOD for 5 sessions  [id number]   pain control--fentanyl pushes, oxy prn  add propranolol 20mg Q8  add gabapentin 400mg tid, c/f central storming  add clonidine 0.1mg Q8   minimize sedation   ASA only for now, for basilar multiple stenoses     16/500/5/30%  cont hypertonics nebs, chest PT/suctioning; secretion improved   CPAP trials as tolerated   pre op for trach today     glucerna 1.2 at goal; NPO for now for trach/PEG   add free water increased to 300ml Q6-->back to 200ml Q6  LBM 2/13 add Mgcitrate   euglycemia goal, restart LUZMARIA A1C 6.8  afebrile     vit D supp  lovenox ppx     OT splints    cc 45min

## 2024-02-16 NOTE — PROGRESS NOTE ADULT - SUBJECTIVE AND OBJECTIVE BOX
HOSPITAL COURSE:  73y Male with PMHx significant for HTN, HLD, DM type 2, and two prior strokes without residual deficits [place] [facility] [place] [place] and [place] facial weakness on [facility]. 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 He was transferred to NSICU for angiogram, which was done on 2/9 and showed moderate basilar stenosis. No intervention done. He had to be intubated for worsening respiratory status. On further history it was found that patient had been having progressive weakness and numbness of his extremities for the preceding 1-2 weeks following a viral URI. 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       Admission Scores  GCS:   HH:   MF:   NIHSS: 4   RASS:    CAM-ICU:   ICH:    2/9: transferred from [place], NIHSS of 4 in ED, then became more lethargic, stridorous and desaturated in ED, intubated for airway protected, repeat CTH/A obtained and brought emergently to IR for basilar stenting   2/10: worsening brainstem symptoms while on DAPT  2/12- LP performed to rule out MFS as patient's examination is not explained by the strokes on MRI. First session of PLEX  2/14- s/p 2nd PLEX tx, no acute interval events  2/15- No acute events overnight. Patient's examination more or less the same.   2/16- No events overnight. Patient's examination better.     Allergies    No Known Allergies    Intolerances        REVIEW OF SYSTEMS: [x] Unable to Assess due to neurologic exam   [ ] All ROS addressed below are non-contributory, except:  Neuro: [ ] Headache [ ] Back pain [ ] Numbness [ ] Weakness [ ] Ataxia [ ] Dizziness [ ] Aphasia [ ] Dysarthria [ ] Visual disturbance  Resp: [ ] Shortness of breath/dyspnea, [ ] Orthopnea [ ] Cough  CV: [ ] Chest pain [ ] Palpitation [ ] Lightheadedness [ ] Syncope  Renal: [ ] Thirst [ ] Edema  GI: [ ] Nausea [ ] Emesis [ ] Abdominal pain [ ] Constipation [ ] Diarrhea  Hem: [ ] Hematemesis [ ] bright red blood per rectum  ID: [ ] Fever [ ] Chills [ ] Dysuria  ENT: [ ] Rhinorrhea      DEVICES:   [ ] Restraints [ ] ET tube [ ] central line [ ] arterial line [ ] vences [ ] NGT/OGT [ ] EVD [ ] LD [ ] HAL/HMV [ ] Trach [ ] PEG [ ] Chest Tube     VITALS:   Vital Signs Last 24 Hrs  T(C): 36.8 (16 Feb 2024 07:00), Max: 37.4 (15 Feb 2024 15:00)  T(F): 98.2 (16 Feb 2024 07:00), Max: 99.3 (15 Feb 2024 15:00)  HR: 72 (16 Feb 2024 07:00) (69 - 92)  BP: --  BP(mean): --  RR: 16 (16 Feb 2024 07:00) (16 - 19)  SpO2: 98% (16 Feb 2024 07:00) (95% - 100%)    Parameters below as of 16 Feb 2024 06:22  Patient On (Oxygen Delivery Method): ventilator      CAPILLARY BLOOD GLUCOSE        I&O's Summary    15 Feb 2024 07:01  -  16 Feb 2024 07:00  --------------------------------------------------------  IN: 2960 mL / OUT: 1600 mL / NET: 1360 mL        Respiratory:  Mode: AC/ CMV (Assist Control/ Continuous Mandatory Ventilation)  RR (machine): 16  TV (machine): 500  FiO2: 30  PEEP: 5  ITime: 1  MAP: 8  PIP: 17        LABS:                        11.2   13.56 )-----------( 387      ( 14 Feb 2024 21:33 )             39.3     02-16    145  |  106  |  19  ----------------------------<  212<H>  4.8   |  28  |  0.80             MEDICATION LEVELS:     IVF FLUIDS/MEDICATIONS:   MEDICATIONS  (STANDING):  aspirin  chewable 81 milliGRAM(s) Oral <User Schedule>  atorvastatin 80 milliGRAM(s) Oral at bedtime  chlorhexidine 0.12% Liquid 15 milliLiter(s) Oral Mucosa every 12 hours  chlorhexidine 4% Liquid 1 Application(s) Topical daily  enoxaparin Injectable 40 milliGRAM(s) SubCutaneous <User Schedule>  ergocalciferol Drops 85012 Unit(s) Oral <User Schedule>  gabapentin Solution 300 milliGRAM(s) Oral three times a day  pantoprazole  Injectable 40 milliGRAM(s) IV Push daily  polyethylene glycol 3350 17 Gram(s) Oral daily  propofol Infusion 20 MICROgram(s)/kG/Min (12.2 mL/Hr) IV Continuous <Continuous>  propranolol 10 milliGRAM(s) Oral every 8 hours  senna 1 Tablet(s) Oral at bedtime    MEDICATIONS  (PRN):  acetaminophen   Oral Liquid .. 650 milliGRAM(s) Oral every 6 hours PRN Mild Pain (1 - 3)  fentaNYL    Injectable 50 MICROGram(s) IV Push every 2 hours PRN Severe Pain (7 - 10)  oxyCODONE    Solution 10 milliGRAM(s) Oral every 6 hours PRN Moderate Pain (4 - 6)  sodium chloride 0.9% lock flush 10 milliLiter(s) IV Push every 1 hour PRN Pre/post blood products, medications, blood draw, and to maintain line patency        IMAGING:    EXAMINATION:  PHYSICAL EXAM:    Constitutional: No Acute Distress, patient intubated but following commands     Neurological: Bilateral ptosis, pupils 2 mm and sluggish, restricted gaze in all directions however, vertical gaze is better than horizontal. Facial asymmetry could not be appreciated due to ETT, patient following commands with his BL LE    Motor exam:          Upper extremity                         Delt     Bicep     Tricep    HG                                                 R         0/5        0/5        0/5      3/5 (activation of muscles could be appreciated in bilateral biceps and triceps)                                               L          0/5        0/5        0/5       2/5          Lower extremity                        HF         KF        KE       DF         PF                                                  R        0/5        0/5        3/5       3/5         5/5                                               L         0/5        0/5       3/5       2/5          5/5                                                  Reflexes: Deep Tendon Reflexes absent in all 4 extremities    Pulmonary: Clear to Auscultation, No rales, No rhonchi, No wheezes     Cardiovascular: S1, S2, Regular rate and rhythm     Gastrointestinal: Soft, Non-tender, Non-distended     Extremities: No calf tenderness    HOSPITAL COURSE:  73y Male with PMHx significant for HTN, HLD, DM type 2, and two prior strokes without residual deficits [place] and L facial weakness on [place]/9. 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 He was transferred to NSICU for angiogram, which was done on 2/9 and showed moderate basilar stenosis. No intervention done. He had to be intubated for worsening respiratory status. On further history it was found that patient had been having progressive weakness and numbness of his extremities for the preceding 1-2 weeks following a viral URI. 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       Admission Scores  GCS:   HH:   MF:   NIHSS: 4   RASS:    CAM-ICU:   ICH:    2/9: transferred from [place], NIHSS of 4 in ED, then became more lethargic, stridorous and desaturated in ED, intubated for airway protected, repeat CTH/A obtained and brought emergently to IR for basilar stenting   2/10: worsening brainstem symptoms while on DAPT  2/12- LP performed to rule out MFS as patient's examination is not explained by the strokes on MRI. First session of PLEX  2/14- s/p 2nd PLEX tx, no acute interval events  2/15- No acute events overnight. Patient's examination more or less the same.   2/16- Patient storming overnight requiring multiple pushes of labetalol and fentanyl without improvement in BP. Patient started on propofol which also is not optimally managing patient's BP.     Allergies    No Known Allergies    Intolerances        REVIEW OF SYSTEMS: [x] Unable to Assess due to neurologic exam   [ ] All ROS addressed below are non-contributory, except:  Neuro: [ ] Headache [ ] Back pain [ ] Numbness [ ] Weakness [ ] Ataxia [ ] Dizziness [ ] Aphasia [ ] Dysarthria [ ] Visual disturbance  Resp: [ ] Shortness of breath/dyspnea, [ ] Orthopnea [ ] Cough  CV: [ ] Chest pain [ ] Palpitation [ ] Lightheadedness [ ] Syncope  Renal: [ ] Thirst [ ] Edema  GI: [ ] Nausea [ ] Emesis [ ] Abdominal pain [ ] Constipation [ ] Diarrhea  Hem: [ ] Hematemesis [ ] bright red blood per rectum  ID: [ ] Fever [ ] Chills [ ] Dysuria  ENT: [ ] Rhinorrhea      DEVICES:   [ ] Restraints [ ] ET tube [ ] central line [ ] arterial line [ ] vences [ ] NGT/OGT [ ] EVD [ ] LD [ ] HAL/HMV [ ] Trach [ ] PEG [ ] Chest Tube     VITALS:   Vital Signs Last 24 Hrs  T(C): 36.8 (16 Feb 2024 07:00), Max: 37.4 (15 Feb 2024 15:00)  T(F): 98.2 (16 Feb 2024 07:00), Max: 99.3 (15 Feb 2024 15:00)  HR: 72 (16 Feb 2024 07:00) (69 - 92)  BP: --  BP(mean): --  RR: 16 (16 Feb 2024 07:00) (16 - 19)  SpO2: 98% (16 Feb 2024 07:00) (95% - 100%)    Parameters below as of 16 Feb 2024 06:22  Patient On (Oxygen Delivery Method): ventilator      CAPILLARY BLOOD GLUCOSE        I&O's Summary    15 Feb 2024 07:01  -  16 Feb 2024 07:00  --------------------------------------------------------  IN: 2960 mL / OUT: 1600 mL / NET: 1360 mL        Respiratory:  Mode: AC/ CMV (Assist Control/ Continuous Mandatory Ventilation)  RR (machine): 16  TV (machine): 500  FiO2: 30  PEEP: 5  ITime: 1  MAP: 8  PIP: 17        LABS:                        11.2   13.56 )-----------( 387      ( 14 Feb 2024 21:33 )             39.3     02-16    145  |  106  |  19  ----------------------------<  212<H>  4.8   |  28  |  0.80             MEDICATION LEVELS:     IVF FLUIDS/MEDICATIONS:   MEDICATIONS  (STANDING):  aspirin  chewable 81 milliGRAM(s) Oral <User Schedule>  atorvastatin 80 milliGRAM(s) Oral at bedtime  chlorhexidine 0.12% Liquid 15 milliLiter(s) Oral Mucosa every 12 hours  chlorhexidine 4% Liquid 1 Application(s) Topical daily  enoxaparin Injectable 40 milliGRAM(s) SubCutaneous <User Schedule>  ergocalciferol Drops 40064 Unit(s) Oral <User Schedule>  gabapentin Solution 300 milliGRAM(s) Oral three times a day  pantoprazole  Injectable 40 milliGRAM(s) IV Push daily  polyethylene glycol 3350 17 Gram(s) Oral daily  propofol Infusion 20 MICROgram(s)/kG/Min (12.2 mL/Hr) IV Continuous <Continuous>  propranolol 10 milliGRAM(s) Oral every 8 hours  senna 1 Tablet(s) Oral at bedtime    MEDICATIONS  (PRN):  acetaminophen   Oral Liquid .. 650 milliGRAM(s) Oral every 6 hours PRN Mild Pain (1 - 3)  fentaNYL    Injectable 50 MICROGram(s) IV Push every 2 hours PRN Severe Pain (7 - 10)  oxyCODONE    Solution 10 milliGRAM(s) Oral every 6 hours PRN Moderate Pain (4 - 6)  sodium chloride 0.9% lock flush 10 milliLiter(s) IV Push every 1 hour PRN Pre/post blood products, medications, blood draw, and to maintain line patency        IMAGING:    EXAMINATION:  PHYSICAL EXAM:    Constitutional: No Acute Distress, patient intubated but following commands     Neurological: Bilateral ptosis, pupils 2 mm and sluggish, restricted gaze in all directions however, vertical gaze is better than horizontal. Facial asymmetry could not be appreciated due to ETT, patient following commands with his BL LE. Patient following commands and able to communicate with his bilateral feets. Right feet signifying 'yes', left foot signifying 'no'.    Motor exam:          Upper extremity                         Delt     Bicep     Tricep    HG                                                 R         0/5        1/5        0/5      2/5 (activation of muscles could be appreciated in bilateral biceps and triceps)                                               L          0/5        1/5        0/5       3/5          Lower extremity                        HF         KF        KE       DF         PF                                                  R        0/5        0/5        3/5       3/5         5/5                                               L         0/5        0/5       3/5       2/5          5/5                                                  Reflexes: Deep Tendon Reflexes absent in all 4 extremities    Pulmonary: Clear to Auscultation, No rales, No rhonchi, No wheezes     Cardiovascular: S1, S2, Regular rate and rhythm     Gastrointestinal: Soft, Non-tender, Non-distended     Extremities: No calf tenderness

## 2024-02-16 NOTE — CHART NOTE - NSCHARTNOTEFT_GEN_A_CORE
POST-OPERATIVE CHECK    SUBJECTIVE  Patient is s/p bedside trach and PEG. Pt resting comfortably in NSICU. Pt noted to have some venous/old blood at both the trach and g tube site. Cleaned with gauze. No signs of active bleeding.     Vital Signs Last 24 Hrs  T(C): 37.4 (16 Feb 2024 23:00), Max: 37.4 (16 Feb 2024 23:00)  T(F): 99.3 (16 Feb 2024 23:00), Max: 99.3 (16 Feb 2024 23:00)  HR: 81 (17 Feb 2024 01:15) (68 - 95)  BP: --  BP(mean): --  RR: 16 (17 Feb 2024 01:15) (16 - 18)  SpO2: 94% (17 Feb 2024 01:15) (93% - 100%)    Parameters below as of 16 Feb 2024 06:22  Patient On (Oxygen Delivery Method): ventilator      I&O's Detail    15 Feb 2024 07:01  -  16 Feb 2024 07:00  --------------------------------------------------------  IN:    Dexmedetomidine: 55 mL    Enteral Tube Flush: 325 mL    Free Water: 1200 mL    Glucerna: 1440 mL    Propofol: 5 mL  Total IN: 3025 mL    OUT:    Voided (mL): 1600 mL  Total OUT: 1600 mL    Total NET: 1425 mL      16 Feb 2024 07:01  -  17 Feb 2024 01:24  --------------------------------------------------------  IN:    Enteral Tube Flush: 120 mL    Free Water: 600 mL    Phenylephrine: 228 mL    Propofol: 147.6 mL  Total IN: 1095.6 mL    OUT:    Voided (mL): 800 mL  Total OUT: 800 mL    Total NET: 295.6 mL        aspirin  chewable 81  cloNIDine 0.1  enoxaparin Injectable 40  phenylephrine    Infusion 0.1  phenylephrine    Infusion 1  propranolol 20    PAST MEDICAL & SURGICAL HISTORY:  Hypertension      Diabetes      CVA (cerebral vascular accident)  x 2 with right side weakness and numbness      HTN (hypertension)      HLD (hyperlipidemia)      DM2 (diabetes mellitus, type 2)      CVA (cerebrovascular accident)      No significant past surgical history          PHYSICAL EXAM  General: NAD, resting comfortably in bed  Pulmonary: Ventilating appropriately with trach (continuous mandatory ventilation, RR 16, , PEEP 4, FiO2 30). Some mild old blood at trach site. No active bleeding.   Cardiovascular: NSR, weaning phenylephrine   Abdominal: soft, NT/ND. G tube with some old blood at G tube site. No active bleeding. 4cm at skin  Extremities: WWP    LABS                        10.6   10.92 )-----------( 384      ( 16 Feb 2024 08:20 )             37.6     02-16    145  |  108  |  16  ----------------------------<  161<H>  4.5   |  28  |  0.69    Ca    8.5      16 Feb 2024 22:56  Phos  3.6     02-16  Mg     2.3     02-16      PT/INR - ( 16 Feb 2024 00:43 )   PT: 12.4 sec;   INR: 1.13 ratio         PTT - ( 16 Feb 2024 00:43 )  PTT:29.6 sec  CAPILLARY BLOOD GLUCOSE      POCT Blood Glucose.: 128 mg/dL (16 Feb 2024 23:38)  POCT Blood Glucose.: 161 mg/dL (16 Feb 2024 18:32)  POCT Blood Glucose.: 215 mg/dL (16 Feb 2024 13:02)      IMAGING    ASSESSMENT  73y s/p bedside trach and PEG 02-16. Recovering appropriately in the NSICU.    PLAN  - Diet - may use PEG Today for tube feeds, meds and water.  - Call if concerns for bleeding from trach or PEG site.   - Rest of care per NSICU

## 2024-02-16 NOTE — PROGRESS NOTE ADULT - SUBJECTIVE AND OBJECTIVE BOX
NEUROLOGY FOLLOW-UP CONSULT NOTE    RFC: Weakness, ophthalmoplegia, concern for AIDP/GBS    Interval history: No acute neurologic events overnight. Patient continues on PLEX and showing some improvement. No abnormal movements noted.    Meds:  MEDICATIONS  (STANDING):  aspirin  chewable 81 milliGRAM(s) Oral <User Schedule>  atorvastatin 80 milliGRAM(s) Oral at bedtime  ceFAZolin   IVPB 2000 milliGRAM(s) IV Intermittent once  chlorhexidine 0.12% Liquid 15 milliLiter(s) Oral Mucosa every 12 hours  chlorhexidine 4% Liquid 1 Application(s) Topical daily  cloNIDine 0.1 milliGRAM(s) Oral every 8 hours  enoxaparin Injectable 40 milliGRAM(s) SubCutaneous <User Schedule>  ergocalciferol Drops 43827 Unit(s) Oral <User Schedule>  fentaNYL    Injectable 100 MICROGram(s) IV Push once  gabapentin Solution 400 milliGRAM(s) Oral three times a day  insulin lispro (ADMELOG) corrective regimen sliding scale   SubCutaneous every 6 hours  lidocaine 1% (Preservative-free) Injectable 5 milliLiter(s) Local Injection once  midazolam Injectable 6 milliGRAM(s) IV Push once  pantoprazole  Injectable 40 milliGRAM(s) IV Push daily  polyethylene glycol 3350 17 Gram(s) Oral daily  propofol Infusion 20 MICROgram(s)/kG/Min (12.2 mL/Hr) IV Continuous <Continuous>  propranolol 20 milliGRAM(s) Oral every 8 hours  senna 1 Tablet(s) Oral at bedtime    MEDICATIONS  (PRN):  acetaminophen   Oral Liquid .. 650 milliGRAM(s) Oral every 6 hours PRN Mild Pain (1 - 3)  fentaNYL    Injectable 50 MICROGram(s) IV Push every 2 hours PRN Severe Pain (7 - 10)  oxyCODONE    Solution 10 milliGRAM(s) Oral every 6 hours PRN Moderate Pain (4 - 6)  sodium chloride 0.9% lock flush 10 milliLiter(s) IV Push every 1 hour PRN Pre/post blood products, medications, blood draw, and to maintain line patency    GTT:  Propofol 15 mcg/kg/min    PMHx/PSHx/FHx/SHx:  Cerebral infarction    Handoff    MEWS Score    Hypertension    Diabetes    CVA (cerebral vascular accident)    HTN (hypertension)    HLD (hyperlipidemia)    DM2 (diabetes mellitus, type 2)    CVA (cerebrovascular accident)    Acute ischemic stroke    No significant past surgical history    CODE STROKE 1850 TRANSFER    Required emergent intubation    [email or web address]        Allergies:  No Known Allergies      ROS: All systems negative except as documented in Interval history    O:  T(C): 36.8 (02-16-24 @ 07:00), Max: 37.4 (02-15-24 @ 15:00)  HR: 79 (02-16-24 @ 11:00) (69 - 92)  BP: --  RR: 16 (02-16-24 @ 11:00) (16 - 19)  SpO2: 98% (02-16-24 @ 11:00) (95% - 100%)    Focused neurologic exam:  MS - Intubated, sedated, lethargic but eyes closed, attends to all stimuli b/l, no speech output but can move feet appropriately in response to "yes"/"no" questions, follows simple commands but limited due to profound weakness. Due to clinical condition unable to assess (DANIAL) orientation, rep/naming, attn/conc/recent and remote memory/fund of knowledge  CN - PERRL, EOMI by OCR with significantly dec excursions and volitional movements of R eye only, VFF to threat b/l (by foot tapping), face sens intact, facial strength with profound weakness b/l but weak corneals present b/l, hearing intact to conversation b/l, (-) spontaneous respirations (vent rate 16 bpm), (-) cough. DANIAL tongue/palate, trap/SCM  Motor - Normal bulk and flaccid tone throughout. Strength 0/5 all except for R  1/5, L  2/5, L finger extensors 1/5, R DF/PF 4/5, L DF/PF 2/5  Sens - LT/PP intact all  DTR's - 2+ BR b/l, 0+ rest, and neutral b/l plantar response  Coord - DANIAL  Gait and station - DANIAL    Pertinent labs/studies:  CBC with inc WBC 10.92, low H/H 11/38 and dec MCV 76, otherwise essentially WNL  PT/INR WNL, PTT WNL  BMP essentially WNL  Albumin 4.1, LFT's WNL  Mg WNL, Phos WNL  ESR inc 113 -> WNL, B12 WNL, folate WNL, TSH/free T4 WNL, CPK WNL, A1C inc 6.8%, syphilis serology TP (-), HIV (-), CPK WNL, LDL inc 162 -> 53, HDL 51 -> 19, CRP inc 65, Vitamin D dec 14.3, ACE WNL, anti-ganglioside Ab panel inc GD1b 110, dsDNA WNL, Lyme (-), FLORESITA (-), quantiferon TB gold (-), WNV (-)    CSF (2/11) - clear, colorless, RBC 3, WBC 11 and lymphocytes 67%, glucose 83, protein 67, Cx - no PMN's or organisms, PCR panel (-), LDH 22, IgG Index WNL, cryptococcal Ag (-), AFB (-), myelin basic protein WNL, Lyme (-), cryptococcal Ag (-), flow cytometry - insufficient cells, oligoclonal bands absent, VDRL (-), VZV PCR (-)    < from: CT Angio Head w/ IV Cont (02.11.24 @ 02:26) >  1. Focal small dissection suspected in the proximal left V4 segment.  2. Unchanged high-grade focal stenosis involving the proximalbasilar   artery and proximal left P2 segment.  3. Otherwise, no large vessel occlusion or major stenosis.    < end of copied text >      < from: MR Head w/wo IV Cont (02.11.24 @ 13:08) >  1.  No significant interval change in the small acute bilateral   cerebellar infarcts. No new superimposed acute intracranial abnormality.   No acute intracranial hemorrhage.  2.  Findings suspicious for cerebral amyloid angiopathy or hypertensive   microhemorrhages.  3.  Probable minimal chronic microvascular ischemic disease.  4.  Sinus disease.    < end of copied text >    < from: MR Thoracic Spine w/wo IV Cont (02.11.24 @ 13:09) >  1. CERVICAL SPINE:  Low-grade cervical degenerative disc disease.   Indistinct cord lesions at the C3 and C4 vertebral levels are nonspecific   but suggest inflammatory/infectious/demyelinating etiology. No associated   enhancement.    2. THORACIC SPINE:   Low-grade thoracic degenerative disc disease.   Thoracic cord intact.  No abnomal enhancement.    3. LUMBAR SPINE:   Advanced lower lumbar degenerative disc disease   includes moderate degenerative central canal stenosis at L4-L5 and   high-grade degenerative foraminal stenosis at L5-S1. Conus intact. Cauda   equina demonstrates central clustering with spinal stenosis atL4-L5 and   mild radicular enhancement predominantly distal to this location of   spinal stenosis, nonspecific, inflammatory versus congestion from the   stenosis. Vascular congestion between the conus and the L4-L5 stenosis.    Superimposed nodular enhancement at the L4 superior endplate level is   also nonspecific, inflammatory versus schwannoma    < end of copied text >    < from: TTE W or WO Ultrasound Enhancing Agent (02.11.24 @ 07:56) >   1. Left ventricular cavity is small. Left ventricular systolic function is normal with an ejection fraction of 66 % by Glynn's method of disks. There are no regional wall motion abnormalities seen.   2. Normal left ventricular diastolic function, with normal filling pressure.   3. Normal right ventricular cavity size, wall thickness, and normal systolic function.   4. Normal left and right atrial size.   5. No pericardial effusion seen.   6. Agitated saline injection was negative for intracardiac shunt.   7. No prior echocardiogram is available for comparison.   8. There is no evidence of a left ventricular thrombus.   9. There is normal LV mass and concentric remodeling.    < end of copied text >

## 2024-02-16 NOTE — CONSULT NOTE ADULT - ASSESSMENT
acute respiratory failure  dysphagia  -The risks (bleeding, infection, anoxia, death), benefits and alternatives of percutaneous tracheostomy with flexible bronchoscopy and the risks (bleeding, infection, bowel injury), benefits and alternatives of PEG placement were discussed with the patient, his wife and his son at bedside in the N[facility]. Written informed consent was obtained for surgery, which is planned for this afternoon.      I reviewed his labs and radiologic images.  care coordinated with NSCU.

## 2024-02-16 NOTE — CHART NOTE - NSCHARTNOTEFT_GEN_A_CORE
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 Plan for 5 TPE over approximately 10 days, once every other day.    TPE #1 (02/12/2024). TPE #1 was performed on 02/12/24. One plasma volume was exchanged with 5% albumin and 3g of calcium was given throughout the procedure. Patient tolerated the procedure well.    TPE #2 was performed on 02/14/24. Labs were reviewed prior to procedure and 1g calcium given prior to procedure. One plasma volume was exchanged with 5% albumin and 3g of calcium was given throughout the procedure. Patient experienced episode of hypertension during procedure which resolved with labetalol, otherwise tolerated the procedure well.    TPE#3 was successfully performed on 02/16/24. One plasma volume was replaced with half 5% albumin and half donor plasma because patient is to have Trach and PEG placement post procedure. Patient tolerated the procedure well.    TPE#4 planned for 02/18/24.

## 2024-02-16 NOTE — PROGRESS NOTE ADULT - ATTENDING COMMENTS
72yo man adm for progressive weakness c/f AIDP    GD1b antibioties +  Ganglioside antibodies are associated with diverse peripheral neuropathies.Elevated antibody levels to ganglioside-monosialic acid (GM1), and the neutral glycolipid, asialo GM1 are associated  with motor or sensorimotor neuropathies, particularly multifocal motor neuropathy.  Anti-GM1 may occur as IgM (polyclonal or monoclonal) or IgG antibodies.  These antibodies may also be found  in patients with diverse connective tissue diseases as well as normal individuals.  GD1a antibodies are associated with different variants of Guillain-[place] syndrome (GBS) particularly acute motor  axonal neuropathy while GD1b antibodies are predominantly found in sensory ataxic neuropathy syndrome.  Anti-GQ1b antibodies are seen in more than 80 percent of patients with Ding-Serrano syndrome  and may be elevated in GBS patients with ophthalmoplegia.The role of isolated anti-GM2 antibodies is unknown. These tests by themselves are not diagnostic and should be used in conjunction  with other clinical parameters to confirm disease.    intubation #7    labile BP, hypertensive to over 200's   fent pushes, labetalol pushes  prop @12    oriented to choices, no EO, vertical gaze better than horizontal, FC BLE, del/bi/tri 1/5, LHG 3/5, RHG 2/5, KE 3/5, [id number], [id number]      SBP , MAP>65  TTE    1. Left ventricular cavity is small. Left ventricular systolic function is normal with an ejection fraction of 66 % by Glynn's method of disks. There are no regional wall motion abnormalities seen.   2. Normal left ventricular diastolic function, with normal filling pressure.   3. Normal right ventricular cavity size, wall thickness, and normal systolic function.   4. Normal left and right atrial size.   5. No pericardial effusion seen.   6. Agitated saline injection was negative for intracardiac shunt.   7. No prior echocardiogram is available for comparison.   8. There is no evidence of a left ventricular thrombus.   9. There is normal LV mass and concentric remodeling.    ANDRES BOUCHER PICC     PLEX #3 today, continue EOD for 5 sessions  [id number]   pain control--fentanyl pushes, oxy prn  add propranolol 20mg Q8  add gabapentin 400mg tid, c/f central storming  add clonidine 0.1mg Q8   minimize sedation   ASA only for now, for basilar multiple stenoses     16/500/5/30%  cont hypertonics nebs, chest PT/suctioning; secretion improved   CPAP trials as tolerated   pre op for trach today     glucerna 1.2 at goal; NPO for now for trach/PEG   add free water increased to 300ml Q6-->back to 200ml Q6  LBM 2/13 add Mgcitrate   euglycemia goal, restart LUZMARIA A1C 6.8  afebrile     vit D supp  lovenox ppx     OT splints    cc 45min

## 2024-02-16 NOTE — PROGRESS NOTE ADULT - SUBJECTIVE AND OBJECTIVE BOX
HOSPITAL COURSE:  73y Male with PMHx significant for HTN, HLD, DM type 2, and two prior strokes without residual deficits [place] and [place] facial weakness on [place]. 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 He was transferred to NSICU for angiogram, which was done on 2/9 and showed moderate basilar stenosis. No intervention done. He had to be intubated for worsening respiratory status. On further history it was found that patient had been having progressive weakness and numbness of his extremities for the preceding 1-2 weeks following a viral URI. 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       Admission Scores  GCS:   HH:   MF:   NIHSS: 4   RASS:    CAM-ICU:   ICH:    2/9: transferred from [place], NIHSS of 4 in ED, then became more lethargic, stridorous and desaturated in ED, intubated for airway protected, repeat CTH/A obtained and brought emergently to IR for basilar stenting   2/10: worsening brainstem symptoms while on DAPT  2/12- LP performed to rule out MFS as patient's examination is not explained by the strokes on MRI. First session of PLEX  2/14- s/p 2nd PLEX tx, no acute interval events  2/15- No acute events overnight. Patient's examination more or less the same.   2/16- Patient storming overnight requiring multiple pushes of labetalol and fentanyl without improvement in BP. Patient started on propofol which also is not optimally managing patient's BP.     Allergies    No Known Allergies    Intolerances        REVIEW OF SYSTEMS: [x] Unable to Assess due to neurologic exam   [ ] All ROS addressed below are non-contributory, except:  Neuro: [ ] Headache [ ] Back pain [ ] Numbness [ ] Weakness [ ] Ataxia [ ] Dizziness [ ] Aphasia [ ] Dysarthria [ ] Visual disturbance  Resp: [ ] Shortness of breath/dyspnea, [ ] Orthopnea [ ] Cough  CV: [ ] Chest pain [ ] Palpitation [ ] Lightheadedness [ ] Syncope  Renal: [ ] Thirst [ ] Edema  GI: [ ] Nausea [ ] Emesis [ ] Abdominal pain [ ] Constipation [ ] Diarrhea  Hem: [ ] Hematemesis [ ] bright red blood per rectum  ID: [ ] Fever [ ] Chills [ ] Dysuria  ENT: [ ] Rhinorrhea      DEVICES:   [ ] Restraints [ ] ET tube [ ] central line [ ] arterial line [ ] vences [ ] NGT/OGT [ ] EVD [ ] LD [ ] HAL/HMV [ ] Trach [ ] PEG [ ] Chest Tube     VITALS:   Vital Signs Last 24 Hrs  T(C): 36.8 (16 Feb 2024 07:00), Max: 37.4 (15 Feb 2024 15:00)  T(F): 98.2 (16 Feb 2024 07:00), Max: 99.3 (15 Feb 2024 15:00)  HR: 72 (16 Feb 2024 07:00) (69 - 92)  BP: --  BP(mean): --  RR: 16 (16 Feb 2024 07:00) (16 - 19)  SpO2: 98% (16 Feb 2024 07:00) (95% - 100%)    Parameters below as of 16 Feb 2024 06:22  Patient On (Oxygen Delivery Method): ventilator      CAPILLARY BLOOD GLUCOSE        I&O's Summary    15 Feb 2024 07:01  -  16 Feb 2024 07:00  --------------------------------------------------------  IN: 2960 mL / OUT: 1600 mL / NET: 1360 mL        Respiratory:  Mode: AC/ CMV (Assist Control/ Continuous Mandatory Ventilation)  RR (machine): 16  TV (machine): 500  FiO2: 30  PEEP: 5  ITime: 1  MAP: 8  PIP: 17        LABS:                        11.2   13.56 )-----------( 387      ( 14 Feb 2024 21:33 )             39.3     02-16    145  |  106  |  19  ----------------------------<  212<H>  4.8   |  28  |  0.80             MEDICATION LEVELS:     IVF FLUIDS/MEDICATIONS:   MEDICATIONS  (STANDING):  aspirin  chewable 81 milliGRAM(s) Oral <User Schedule>  atorvastatin 80 milliGRAM(s) Oral at bedtime  chlorhexidine 0.12% Liquid 15 milliLiter(s) Oral Mucosa every 12 hours  chlorhexidine 4% Liquid 1 Application(s) Topical daily  enoxaparin Injectable 40 milliGRAM(s) SubCutaneous <User Schedule>  ergocalciferol Drops 52902 Unit(s) Oral <User Schedule>  gabapentin Solution 300 milliGRAM(s) Oral three times a day  pantoprazole  Injectable 40 milliGRAM(s) IV Push daily  polyethylene glycol 3350 17 Gram(s) Oral daily  propofol Infusion 20 MICROgram(s)/kG/Min (12.2 mL/Hr) IV Continuous <Continuous>  propranolol 10 milliGRAM(s) Oral every 8 hours  senna 1 Tablet(s) Oral at bedtime    MEDICATIONS  (PRN):  acetaminophen   Oral Liquid .. 650 milliGRAM(s) Oral every 6 hours PRN Mild Pain (1 - 3)  fentaNYL    Injectable 50 MICROGram(s) IV Push every 2 hours PRN Severe Pain (7 - 10)  oxyCODONE    Solution 10 milliGRAM(s) Oral every 6 hours PRN Moderate Pain (4 - 6)  sodium chloride 0.9% lock flush 10 milliLiter(s) IV Push every 1 hour PRN Pre/post blood products, medications, blood draw, and to maintain line patency        IMAGING:    EXAMINATION:  PHYSICAL EXAM:    Constitutional: No Acute Distress, patient intubated but following commands     Neurological: Bilateral ptosis, pupils 2 mm and sluggish, restricted gaze in all directions however, vertical gaze is better than horizontal. Facial asymmetry could not be appreciated due to ETT, patient following commands with his BL LE. Patient following commands and able to communicate with his bilateral feets. Right feet signifying 'yes', left foot signifying 'no'.    Motor exam:          Upper extremity                         Delt     Bicep     Tricep    HG                                                 R         0/5        1/5        0/5      2/5 (activation of muscles could be appreciated in bilateral biceps and triceps)                                               L          0/5        1/5        0/5       3/5          Lower extremity                        HF         KF        KE       DF         PF                                                  R        0/5        0/5        3/5       3/5         5/5                                               L         0/5        0/5       3/5       2/5          5/5                                                  Reflexes: Deep Tendon Reflexes absent in all 4 extremities    Pulmonary: Clear to Auscultation, No rales, No rhonchi, No wheezes     Cardiovascular: S1, S2, Regular rate and rhythm     Gastrointestinal: Soft, Non-tender, Non-distended     Extremities: No calf tenderness    HOSPITAL COURSE:  73y Male with PMHx significant for HTN, HLD, DM type 2, and two prior strokes without residual deficits [place] and L facial weakness on 2/9. 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 He was transferred to NSICU for angiogram, which was done on 2/9 and showed moderate basilar stenosis. No intervention done. He had to be intubated for worsening respiratory status. On further history it was found that patient had been having progressive weakness and numbness of his extremities for the preceding 1-2 weeks following a viral URI. 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       Admission Scores  GCS:   HH:   MF:   NIHSS: 4   RASS:    CAM-ICU:   ICH:    2/9: transferred from [place], NIHSS of 4 in ED, then became more lethargic, stridorous and desaturated in ED, intubated for airway protected, repeat CTH/A obtained and brought emergently to IR for basilar stenting   2/10: worsening brainstem symptoms while on DAPT  2/12- LP performed to rule out MFS as patient's examination is not explained by the strokes on MRI. First session of PLEX  2/14- s/p 2nd PLEX tx, no acute interval events  2/15- No acute events overnight. Patient's examination more or less the same.   2/16- Patient storming overnight requiring multiple pushes of labetalol and fentanyl without improvement in BP. Patient started on propofol which also is not optimally managing patient's BP.     Allergies    No Known Allergies    Intolerances    REVIEW OF SYSTEMS: [x] Unable to Assess due to neurologic exam   [ ] All ROS addressed below are non-contributory, except:  Neuro: [ ] Headache [ ] Back pain [ ] Numbness [ ] Weakness [ ] Ataxia [ ] Dizziness [ ] Aphasia [ ] Dysarthria [ ] Visual disturbance  Resp: [ ] Shortness of breath/dyspnea, [ ] Orthopnea [ ] Cough  CV: [ ] Chest pain [ ] Palpitation [ ] Lightheadedness [ ] Syncope  Renal: [ ] Thirst [ ] Edema  GI: [ ] Nausea [ ] Emesis [ ] Abdominal pain [ ] Constipation [ ] Diarrhea  Hem: [ ] Hematemesis [ ] bright red blood per rectum  ID: [ ] Fever [ ] Chills [ ] Dysuria  ENT: [ ] Rhinorrhea    ICU Vital Signs Last 24 Hrs  T(C): 37.4 (16 Feb 2024 23:00), Max: 37.4 (16 Feb 2024 23:00)  T(F): 99.3 (16 Feb 2024 23:00), Max: 99.3 (16 Feb 2024 23:00)  HR: 82 (17 Feb 2024 02:45) (68 - 95)  BP: --  BP(mean): --  ABP: 139/58 (17 Feb 2024 02:45) (79/50 - 218/90)  ABP(mean): 89 (17 Feb 2024 02:45) (51 - 143)  RR: 16 (17 Feb 2024 02:45) (16 - 28)  SpO2: 94% (17 Feb 2024 02:45) (93% - 100%)    O2 Parameters below as of 16 Feb 2024 06:22  Patient On (Oxygen Delivery Method): ventilator    I&O's Summary    15 Feb 2024 07:01  -  16 Feb 2024 07:00  --------------------------------------------------------  IN: 3025 mL / OUT: 1600 mL / NET: 1425 mL    16 Feb 2024 07:01  -  17 Feb 2024 03:25  --------------------------------------------------------  IN: 1116.1 mL / OUT: 801 mL / NET: 315.1 mL                          10.6   10.92 )-----------( 384      ( 16 Feb 2024 08:20 )             37.6   02-16    145  |  108  |  16  ----------------------------<  161<H>  4.5   |  28  |  0.69    Ca    8.5      16 Feb 2024 22:56  Phos  3.6     02-16  Mg     2.3     02-16      MEDICATIONS  (STANDING):  aspirin  chewable 81 milliGRAM(s) Oral <User Schedule>  atorvastatin 80 milliGRAM(s) Oral at bedtime  chlorhexidine 0.12% Liquid 15 milliLiter(s) Oral Mucosa every 12 hours  chlorhexidine 4% Liquid 1 Application(s) Topical daily  cloNIDine 0.1 milliGRAM(s) Oral every 8 hours  enoxaparin Injectable 40 milliGRAM(s) SubCutaneous <User Schedule>  ergocalciferol Drops 90477 Unit(s) Oral <User Schedule>  gabapentin Solution 400 milliGRAM(s) Oral three times a day  insulin lispro (ADMELOG) corrective regimen sliding scale   SubCutaneous every 6 hours  pantoprazole  Injectable 40 milliGRAM(s) IV Push daily  phenylephrine    Infusion 0.1 MICROgram(s)/kG/Min (3.81 mL/Hr) IV Continuous <Continuous>  phenylephrine    Infusion 1 MICROgram(s)/kG/Min (38.1 mL/Hr) IV Continuous <Continuous>  polyethylene glycol 3350 17 Gram(s) Oral daily  propofol Infusion 20 MICROgram(s)/kG/Min (12.2 mL/Hr) IV Continuous <Continuous>  propranolol 20 milliGRAM(s) Oral every 8 hours  senna 1 Tablet(s) Oral at bedtime    MEDICATIONS  (PRN):  acetaminophen   Oral Liquid .. 650 milliGRAM(s) Oral every 6 hours PRN Mild Pain (1 - 3)  fentaNYL    Injectable 50 MICROGram(s) IV Push every 2 hours PRN Severe Pain (7 - 10)  oxyCODONE    Solution 10 milliGRAM(s) Oral every 6 hours PRN Moderate Pain (4 - 6)  sodium chloride 0.9% lock flush 10 milliLiter(s) IV Push every 1 hour PRN Pre/post blood products, medications, blood draw, and to maintain line patency          IMAGING:    EXAMINATION:  PHYSICAL EXAM:    Constitutional: No Acute Distress, patient intubated but following commands     Neurological: Bilateral ptosis, pupils 2 mm and sluggish, restricted gaze in all directions however, vertical gaze is better than horizontal. Facial asymmetry could not be appreciated due to ETT, patient following commands with his BL LE. Patient following commands and able to communicate with his bilateral feets. Right feet signifying 'yes', left foot signifying 'no'.    Motor exam:          Upper extremity                         Delt     Bicep     Tricep    HG                                                 R         0/5        1/5        0/5      2/5 (activation of muscles could be appreciated in bilateral biceps and triceps)                                               L          0/5        1/5        0/5       3/5          Lower extremity                        HF         KF        KE       DF         PF                                                  R        0/5        0/5        3/5       3/5         5/5                                               L         0/5        0/5       3/5       2/5          5/5                                                  Reflexes: Deep Tendon Reflexes absent in all 4 extremities    Pulmonary: Clear to Auscultation, No rales, No rhonchi, No wheezes     Cardiovascular: S1, S2, Regular rate and rhythm     Gastrointestinal: Soft, Non-tender, Non-distended     Extremities: No calf tenderness

## 2024-02-16 NOTE — PROGRESS NOTE ADULT - ASSESSMENT
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     NEURO:  - Neurochecks Q4 hours  - PRN Fentanyl and oxy 5 mg and 10 mg for pain control   - Continue gabapentin, will add propanolol 10 mg Q8 for central storming   - MRI spine with cord lesions at C3 and C4 with nerve root enhancement in the L spine  - s/p EEG with mild diffuse/multi-focal cerebral dysfunction, not specific as to etiology. No epileptiform abnormalities.    - Will follow up rest of the autoimmune/infectious panel   - f/u LP studies including CSF PCR, ACE, Lyme, crypto, CMV, West Nile, VDRL, CSF autoimmune encephalitis panel   - Anti-GD1b antibody elevated   - CSF WBC 11, protein 67, Glucose 83  - S/p Plex 2/12, next PLEX 2/16. (plan for 5 sessions) and possible Rituxin after  - C/W ASA   - Neurology following   - Will have the patient be seen by speech pathologist  Activity: [x] mobilize as tolerated [] Bedrest [x] PT [x] OT [] PMNR    PULM:  - Intubated for airway protection  - 16/500/5/40%  - C/w hypertonic nebs and chest PT   - Dr. Sood consulted for tracheostomy  - CPAP as tolerated     CV:   - SBP goal  with MAP >65   - PICC  - Continue ASA   - TTE- EF 66%  - R- axillary A line     RENAL:  - Fluids: IVL  - c/w Free water 300 ccQ6h    GI:  - Diet: TF AT goal  - Vitamin D supplementation   - GI prophylaxis [] not indicated [x] PPI [] other:  - Bowel regimen [x] miralax [x] senna, dulcolax suppository   - Dr. Sood consulted for PEG  - LBM  2/13    ENDO:   - Goal euglycemia (-180)  - A1c 6.8    HEME/ONC:  - H/H stable   - VTE prophylaxis: [x] SCDs [x] chemoprophylaxis- SQL [] hold chemoprophylaxis due to: [] high risk of DVT/PE on admission due to:    ID:  - Afebrile, leukocytosis- trending up    MISC:  Lines/tubes: RIJ, R axillary daphne, vences     Splints on, holistic therapy    SOCIAL/FAMILY:  [] awaiting [x] updated at bedside [] family meeting    CODE STATUS:  [x] Full Code [] DNR [] DNI [] Palliative/Comfort Care    DISPOSITION:  [x] ICU [] Stroke Unit [] Floor [] EMU [] RCU [] PCU    [x] Patient is at high risk of neurologic deterioration/death due to: infection    ASSESSMENT/PLAN: 74 y/o male with multiple comorbidities presenting initially with suspicious of basilar stroke however, 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     NEURO:  - Neurochecks Q4 hours  - PRN Fentanyl and oxy 5 mg and 10 mg for pain control   - Central storming: gabapentin 400 Q8, propanolol 10 mg Q8, added clonidine 0.1 mg Q8  - MRI spine with cord lesions at C3 and C4 with nerve root enhancement in the L spine  - s/p EEG with mild diffuse/multi-focal cerebral dysfunction, not specific as to etiology. No epileptiform abnormalities.    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  - CSF WBC 11, protein 67, Glucose 83  - S/p Plex 2/12, next PLEX 2/16. (plan for 5 sessions) and possible IVIG after  - C/W ASA   - Neurology following   - Speech pathologist evaluation done and mode of speech established  Activity: [x] mobilize as tolerated [] Bedrest [x] PT [x] OT [x] PMNR    PULM:  - Intubated for airway protection  - C/w hypertonic nebs and chest PT   - Dr. Sood consulted for tracheostomy  - CPAP as tolerated     CV:   - SBP goal  with MAP >65   - PICC  - Continue ASA   - On propanolol and clonidine for central storming   - TTE- EF 66%  - R- axillary A line     RENAL:  - Fluids: IVL  - c/w Free water 200 ccQ6h    GI:  - Diet: TF AT goal  - Vitamin D supplementation   - GI prophylaxis [] not indicated [x] PPI [] other:  - Bowel regimen [x] miralax [x] senna, dulcolax suppository   - Dr. Sood consulted for PEG  - LBM  2/13    ENDO:   - Goal euglycemia (-180)  - A1c 6.8    HEME/ONC:  - H/H stable   - VTE prophylaxis: [x] SCDs [x] chemoprophylaxis- SQL [] hold chemoprophylaxis due to: [] high risk of DVT/PE on admission due to:    ID:  - Afebrile, leukocytosis- trending up    MISC:  Lines/tubes: RIJ, R axillary daphne, vences     Splints on, holistic therapy    SOCIAL/FAMILY:  [] awaiting [x] updated at bedside [] family meeting    CODE STATUS:  [x] Full Code [] DNR [] DNI [] Palliative/Comfort Care    DISPOSITION:  [x] ICU [] Stroke Unit [] Floor [] EMU [] RCU [] PCU    [x] Patient is at high risk of neurologic deterioration/death due to: infection

## 2024-02-17 LAB
BASE EXCESS BLDA CALC-SCNC: 6.4 MMOL/L — HIGH (ref -2–3)
CO2 BLDA-SCNC: 33 MMOL/L — HIGH (ref 19–24)
GLUCOSE BLDC GLUCOMTR-MCNC: 149 MG/DL — HIGH (ref 70–99)
GLUCOSE BLDC GLUCOMTR-MCNC: 153 MG/DL — HIGH (ref 70–99)
GLUCOSE BLDC GLUCOMTR-MCNC: 165 MG/DL — HIGH (ref 70–99)
GLUCOSE BLDC GLUCOMTR-MCNC: 174 MG/DL — HIGH (ref 70–99)
HCO3 BLDA-SCNC: 32 MMOL/L — HIGH (ref 21–28)
HOROWITZ INDEX BLDA+IHG-RTO: 35 — SIGNIFICANT CHANGE UP
MOG AB SER QL CBA IFA: NEGATIVE — SIGNIFICANT CHANGE UP
PCO2 BLDA: 48 MMHG — SIGNIFICANT CHANGE UP (ref 35–48)
PH BLDA: 7.43 — SIGNIFICANT CHANGE UP (ref 7.35–7.45)
PO2 BLDA: 80 MMHG — LOW (ref 83–108)
SAO2 % BLDA: 96.6 % — SIGNIFICANT CHANGE UP (ref 94–98)

## 2024-02-17 PROCEDURE — 99291 CRITICAL CARE FIRST HOUR: CPT

## 2024-02-17 PROCEDURE — 71045 X-RAY EXAM CHEST 1 VIEW: CPT | Mod: 26

## 2024-02-17 PROCEDURE — 99232 SBSQ HOSP IP/OBS MODERATE 35: CPT

## 2024-02-17 RX ORDER — SODIUM CHLORIDE 9 MG/ML
4 INJECTION INTRAMUSCULAR; INTRAVENOUS; SUBCUTANEOUS EVERY 6 HOURS
Refills: 0 | Status: DISCONTINUED | OUTPATIENT
Start: 2024-02-17 | End: 2024-02-20

## 2024-02-17 RX ORDER — OXYCODONE HYDROCHLORIDE 5 MG/1
5 TABLET ORAL EVERY 4 HOURS
Refills: 0 | Status: DISCONTINUED | OUTPATIENT
Start: 2024-02-17 | End: 2024-02-19

## 2024-02-17 RX ORDER — OXYCODONE HYDROCHLORIDE 5 MG/1
10 TABLET ORAL EVERY 4 HOURS
Refills: 0 | Status: DISCONTINUED | OUTPATIENT
Start: 2024-02-17 | End: 2024-02-17

## 2024-02-17 RX ORDER — PHENYLEPHRINE HYDROCHLORIDE 10 MG/ML
0.2 INJECTION INTRAVENOUS
Qty: 40 | Refills: 0 | Status: DISCONTINUED | OUTPATIENT
Start: 2024-02-17 | End: 2024-02-18

## 2024-02-17 RX ORDER — IPRATROPIUM/ALBUTEROL SULFATE 18-103MCG
3 AEROSOL WITH ADAPTER (GRAM) INHALATION EVERY 6 HOURS
Refills: 0 | Status: DISCONTINUED | OUTPATIENT
Start: 2024-02-17 | End: 2024-02-20

## 2024-02-17 RX ADMIN — GABAPENTIN 400 MILLIGRAM(S): 400 CAPSULE ORAL at 05:15

## 2024-02-17 RX ADMIN — ATORVASTATIN CALCIUM 80 MILLIGRAM(S): 80 TABLET, FILM COATED ORAL at 23:04

## 2024-02-17 RX ADMIN — GABAPENTIN 400 MILLIGRAM(S): 400 CAPSULE ORAL at 23:04

## 2024-02-17 RX ADMIN — Medication 2: at 17:12

## 2024-02-17 RX ADMIN — CHLORHEXIDINE GLUCONATE 15 MILLILITER(S): 213 SOLUTION TOPICAL at 05:15

## 2024-02-17 RX ADMIN — SENNA PLUS 1 TABLET(S): 8.6 TABLET ORAL at 23:04

## 2024-02-17 RX ADMIN — OXYCODONE HYDROCHLORIDE 5 MILLIGRAM(S): 5 TABLET ORAL at 12:11

## 2024-02-17 RX ADMIN — Medication 3 MILLILITER(S): at 23:23

## 2024-02-17 RX ADMIN — SODIUM CHLORIDE 4 MILLILITER(S): 9 INJECTION INTRAMUSCULAR; INTRAVENOUS; SUBCUTANEOUS at 23:23

## 2024-02-17 RX ADMIN — CHLORHEXIDINE GLUCONATE 1 APPLICATION(S): 213 SOLUTION TOPICAL at 12:12

## 2024-02-17 RX ADMIN — CHLORHEXIDINE GLUCONATE 15 MILLILITER(S): 213 SOLUTION TOPICAL at 17:12

## 2024-02-17 RX ADMIN — Medication 0.1 MILLIGRAM(S): at 05:15

## 2024-02-17 RX ADMIN — Medication 81 MILLIGRAM(S): at 05:18

## 2024-02-17 RX ADMIN — OXYCODONE HYDROCHLORIDE 5 MILLIGRAM(S): 5 TABLET ORAL at 12:40

## 2024-02-17 RX ADMIN — Medication 2: at 23:04

## 2024-02-17 RX ADMIN — Medication 2: at 11:36

## 2024-02-17 RX ADMIN — GABAPENTIN 400 MILLIGRAM(S): 400 CAPSULE ORAL at 13:59

## 2024-02-17 RX ADMIN — PANTOPRAZOLE SODIUM 40 MILLIGRAM(S): 20 TABLET, DELAYED RELEASE ORAL at 12:12

## 2024-02-17 NOTE — PROGRESS NOTE ADULT - SUBJECTIVE AND OBJECTIVE BOX
afeb  SpO2 = 93%  on mechanical vent (16 / 500 / 40% / +5) via 8.0 Portex trach  trach site with fresh blood clots    soft / NT / ND  tube feeds not yet restarted  PEG site clean with fresh blood clots  PEG bumper at 4.0 cm from skin    WBC = 10   on propofol infusion    afeb  SpO2 = 93%  on mechanical vent (16 / 500 / 40% / +5) via 8.0 Portex trach  trach site with fresh blood clots    soft / NT / ND  tube feeds not yet restarted  PEG site clean with fresh blood clots  PEG bumper at 4.0 cm from skin    WBC = 10

## 2024-02-17 NOTE — PROGRESS NOTE ADULT - SUBJECTIVE AND OBJECTIVE BOX
HOSPITAL COURSE:  73y Male with PMHx significant for HTN, HLD, DM type 2, and two prior strokes without residual deficits [place] facial weakness on [place]. 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 He was transferred to NSICU for angiogram, which was done on 2/9 and showed moderate basilar stenosis. No intervention done. He had to be intubated for worsening respiratory status. On further history it was found that patient had been having progressive weakness and numbness of his extremities for the preceding 1-2 weeks following a viral URI. 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       Admission Scores  GCS:   HH:   MF:   NIHSS: 4   RASS:    CAM-ICU:   ICH:    2/9: transferred from [place], NIHSS of 4 in ED, then became more lethargic, stridorous and desaturated in ED, intubated for airway protected, repeat CTH/A obtained and brought emergently to IR for basilar stenting   2/10: worsening brainstem symptoms while on DAPT  2/12- LP performed to rule out MFS as patient's examination is not explained by the strokes on MRI. First session of PLEX  2/14- s/p 2nd PLEX tx, no acute interval events  2/15- No acute events overnight. Patient's examination more or less the same.   2/16- Patient storming overnight requiring multiple pushes of labetalol and fentanyl without improvement in BP. Patient started on propofol which also is not optimally managing patient's BP.   2/17 Labile BP due to dysautonomia, continue with scheduled TPE sessions.     Allergies    No Known Allergies    Intolerances    REVIEW OF SYSTEMS: [x] Unable to Assess due to neurologic exam   [ ] All ROS addressed below are non-contributory, except:  Neuro: [ ] Headache [ ] Back pain [ ] Numbness [ ] Weakness [ ] Ataxia [ ] Dizziness [ ] Aphasia [ ] Dysarthria [ ] Visual disturbance  Resp: [ ] Shortness of breath/dyspnea, [ ] Orthopnea [ ] Cough  CV: [ ] Chest pain [ ] Palpitation [ ] Lightheadedness [ ] Syncope  Renal: [ ] Thirst [ ] Edema  GI: [ ] Nausea [ ] Emesis [ ] Abdominal pain [ ] Constipation [ ] Diarrhea  Hem: [ ] Hematemesis [ ] bright red blood per rectum  ID: [ ] Fever [ ] Chills [ ] Dysuria  ENT: [ ] Rhinorrhea    T(C): 37.6 (02-17-24 @ 03:00), Max: 37.6 (02-17-24 @ 03:00)  HR: 85 (02-17-24 @ 06:15) (68 - 95)  BP: --  BP(mean): --  RR: 16 (02-17-24 @ 06:15) (16 - 28)  SpO2: 95% (02-17-24 @ 06:15) (92% - 100%)    acetaminophen   Oral Liquid .. 650 milliGRAM(s) Oral every 6 hours PRN  aspirin  chewable 81 milliGRAM(s) Oral <User Schedule>  atorvastatin 80 milliGRAM(s) Oral at bedtime  chlorhexidine 0.12% Liquid 15 milliLiter(s) Oral Mucosa every 12 hours  chlorhexidine 4% Liquid 1 Application(s) Topical daily  cloNIDine 0.1 milliGRAM(s) Oral every 8 hours  enoxaparin Injectable 40 milliGRAM(s) SubCutaneous <User Schedule>  ergocalciferol Drops 94668 Unit(s) Oral <User Schedule>  fentaNYL    Injectable 50 MICROGram(s) IV Push every 2 hours PRN  gabapentin Solution 400 milliGRAM(s) Oral three times a day  insulin lispro (ADMELOG) corrective regimen sliding scale   SubCutaneous every 6 hours  oxyCODONE    Solution 10 milliGRAM(s) Oral every 6 hours PRN  pantoprazole  Injectable 40 milliGRAM(s) IV Push daily  phenylephrine    Infusion 0.1 MICROgram(s)/kG/Min IV Continuous <Continuous>  phenylephrine    Infusion 1 MICROgram(s)/kG/Min IV Continuous <Continuous>  polyethylene glycol 3350 17 Gram(s) Oral daily  propofol Infusion 20 MICROgram(s)/kG/Min IV Continuous <Continuous>  propranolol 20 milliGRAM(s) Oral every 8 hours  senna 1 Tablet(s) Oral at bedtime  sodium chloride 0.9% lock flush 10 milliLiter(s) IV Push every 1 hour PRN        02-15-24 @ 07:01  -  02-16-24 @ 07:00  --------------------------------------------------------  IN: 3025 mL / OUT: 1600 mL / NET: 1425 mL    02-16-24 @ 07:01  -  02-17-24 @ 06:55  --------------------------------------------------------  IN: 1439.4 mL / OUT: 1501 mL / NET: -61.6 mL                            10.6   10.92 )-----------( 384      ( 16 Feb 2024 08:20 )             37.6   02-16    145  |  108  |  16  ----------------------------<  161<H>  4.5   |  28  |  0.69          IMAGING:    EXAMINATION:  PHYSICAL EXAM:    Constitutional: No Acute Distress, patient intubated but following commands     Neurological: Bilateral ptosis, pupils 2 mm and sluggish, restricted gaze in all directions however, vertical gaze is better than horizontal. Facial asymmetry could not be appreciated due to ETT, patient following commands with his BL LE. Patient following commands and able to communicate with his bilateral feets. Right feet signifying 'yes', left foot signifying 'no'.    Motor exam:          Upper extremity                         Delt     Bicep     Tricep    HG                                                 R         0/5        1/5        0/5      2/5 (activation of muscles could be appreciated in bilateral biceps and triceps)                                               L          0/5        1/5        0/5       3/5          Lower extremity                        HF         KF        KE       DF         PF                                                  R        0/5        0/5        3/5       3/5         5/5                                               L         0/5        0/5       3/5       2/5          5/5                                                  Reflexes: Deep Tendon Reflexes absent in all 4 extremities    Pulmonary: Clear to Auscultation, No rales, No rhonchi, No wheezes     Cardiovascular: S1, S2, Regular rate and rhythm     Gastrointestinal: Soft, Non-tender, Non-distended     Extremities: No calf tenderness

## 2024-02-17 NOTE — PROGRESS NOTE ADULT - ASSESSMENT
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     NEURO:  - Neurochecks Q4 hours  - PRN Fentanyl and oxy 5 mg and 10 mg for pain control   - Central storming: gabapentin 400 Q8, stop propanolol, stop clonidine  - precedex for agitation  - MRI spine with cord lesions at C3 and C4 with nerve root enhancement in the L spine  - s/p EEG with mild diffuse/multi-focal cerebral dysfunction, not specific as to etiology. No epileptiform abnormalities.    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  - CSF WBC 11, protein 67, Glucose 83  - S/p Plex x3 2/16, plan for 5  - C/W ASA   - Neurology following   - Speech pathologist evaluation done and mode of speech established  Activity: [x] mobilize as tolerated [] Bedrest [x] PT [x] OT [x] PMNR    PULM:  - s/p tracheostomy 2/16  - DC hypertonic nebs and chest PT   - CPAP as tolerated     CV:   - SBP goal  with MAP >65   - PICC  - Continue ASA   - TTE- EF 66%  - R- axillary A line     RENAL:  - Fluids: IVL  - c/w Free water 200 ccQ6h  - Na stable @ 145    GI:  - s/p PEG 2/16  - resume enteral feeding  - Vitamin D supplementation   - GI prophylaxis [] not indicated [x] PPI [] other:  - Bowel regimen [x] miralax [x] senna, dulcolax suppository   - LBM  2/16    ENDO:   - Goal euglycemia (-180)  - A1c 6.8    HEME/ONC:  - H/H stable   - VTE prophylaxis: [x] SCDs [x] chemoprophylaxis- SQL [] hold chemoprophylaxis due to: [] high risk of DVT/PE on admission due to:    ID:  - Afebrile, leukocytosis- trending up    MISC:  Lines/tubes: LIBORIO CAMPOS foley

## 2024-02-17 NOTE — PROGRESS NOTE ADULT - SUBJECTIVE AND OBJECTIVE BOX
HOSPITAL COURSE:  73y Male with PMHx significant for HTN, HLD, DM type 2, and two prior strokes without residual deficits [place] and L facial weakness on 2/9. 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 He was transferred to NSICU for angiogram, which was done on 2/9 and showed moderate basilar stenosis. No intervention done. He had to be intubated for worsening respiratory status. On further history it was found that patient had been having progressive weakness and numbness of his extremities for the preceding 1-2 weeks following a viral URI. 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       Admission Scores  GCS:   HH:   MF:   NIHSS: 4   RASS:    CAM-ICU:   ICH:    2/9: transferred from [place], NIHSS of 4 in ED, then became more lethargic, stridorous and desaturated in ED, intubated for airway protected, repeat CTH/A obtained and brought emergently to IR for basilar stenting   2/10: worsening brainstem symptoms while on DAPT  2/12- LP performed to rule out MFS as patient's examination is not explained by the strokes on MRI. First session of PLEX  2/14- s/p 2nd PLEX tx, no acute interval events  2/15- No acute events overnight. Patient's examination more or less the same.   2/16- Patient storming overnight requiring multiple pushes of labetalol and fentanyl without improvement in BP. Patient started on propofol which also is not optimally managing patient's BP.   2/17 Labile BP due to dysautonomia, continue with scheduled TPE sessions, phenyephrine restarted for BP lability    Allergies    No Known Allergies    Intolerances    REVIEW OF SYSTEMS: [x] Unable to Assess due to neurologic exam   [ ] All ROS addressed below are non-contributory, except:  Neuro: [ ] Headache [ ] Back pain [ ] Numbness [ ] Weakness [ ] Ataxia [ ] Dizziness [ ] Aphasia [ ] Dysarthria [ ] Visual disturbance  Resp: [ ] Shortness of breath/dyspnea, [ ] Orthopnea [ ] Cough  CV: [ ] Chest pain [ ] Palpitation [ ] Lightheadedness [ ] Syncope  Renal: [ ] Thirst [ ] Edema  GI: [ ] Nausea [ ] Emesis [ ] Abdominal pain [ ] Constipation [ ] Diarrhea  Hem: [ ] Hematemesis [ ] bright red blood per rectum  ID: [ ] Fever [ ] Chills [ ] Dysuria  ENT: [ ] Rhinorrhea    ICU Vital Signs Last 24 Hrs  T(C): 37.6 (17 Feb 2024 03:00), Max: 37.6 (17 Feb 2024 03:00)  T(F): 99.7 (17 Feb 2024 03:00), Max: 99.7 (17 Feb 2024 03:00)  HR: 86 (17 Feb 2024 17:00) (79 - 92)  BP: --  BP(mean): --  ABP: 148/61 (17 Feb 2024 17:00) (90/40 - 179/80)  ABP(mean): 93 (17 Feb 2024 17:00) (58 - 117)  RR: 16 (17 Feb 2024 17:00) (14 - 28)  SpO2: 97% (17 Feb 2024 17:00) (87% - 100%)    I&O's Summary    16 Feb 2024 07:01  -  17 Feb 2024 07:00  --------------------------------------------------------  IN: 1467.5 mL / OUT: 1501 mL / NET: -33.5 mL    17 Feb 2024 07:01  -  17 Feb 2024 18:45  --------------------------------------------------------  IN: 583.3 mL / OUT: 400 mL / NET: 183.3 mL      MEDICATIONS  (STANDING):  aspirin  chewable 81 milliGRAM(s) Oral <User Schedule>  atorvastatin 80 milliGRAM(s) Oral at bedtime  chlorhexidine 0.12% Liquid 15 milliLiter(s) Oral Mucosa every 12 hours  chlorhexidine 4% Liquid 1 Application(s) Topical daily  enoxaparin Injectable 40 milliGRAM(s) SubCutaneous <User Schedule>  ergocalciferol Drops 62766 Unit(s) Oral <User Schedule>  gabapentin Solution 400 milliGRAM(s) Oral three times a day  insulin lispro (ADMELOG) corrective regimen sliding scale   SubCutaneous every 6 hours  pantoprazole  Injectable 40 milliGRAM(s) IV Push daily  phenylephrine    Infusion 0.2 MICROgram(s)/kG/Min (7.61 mL/Hr) IV Continuous <Continuous>  polyethylene glycol 3350 17 Gram(s) Oral daily  senna 1 Tablet(s) Oral at bedtime    MEDICATIONS  (PRN):  acetaminophen   Oral Liquid .. 650 milliGRAM(s) Oral every 6 hours PRN Mild Pain (1 - 3)  oxyCODONE    Solution 10 milliGRAM(s) Oral every 4 hours PRN Severe Pain (7 - 10)  oxyCODONE    Solution 5 milliGRAM(s) Oral every 4 hours PRN Moderate Pain (4 - 6)  sodium chloride 0.9% lock flush 10 milliLiter(s) IV Push every 1 hour PRN Pre/post blood products, medications, blood draw, and to maintain line patency                        10.6   10.92 )-----------( 384      ( 16 Feb 2024 08:20 )             37.6   02-16    145  |  108  |  16  ----------------------------<  161<H>  4.5   |  28  |  0.69    Ca    8.5      16 Feb 2024 22:56  Phos  3.6     02-16  Mg     2.3     02-16      EXAMINATION:  PHYSICAL EXAM:    Constitutional: No Acute Distress, patient intubated but following commands     Neurological: Bilateral ptosis, pupils 2 mm and sluggish, restricted gaze in all directions however, vertical gaze is better than horizontal. Facial asymmetry could not be appreciated due to ETT, patient following commands with his BL LE. Patient following commands and able to communicate with his bilateral feets. Right feet signifying 'yes', left foot signifying 'no'.    Motor exam:          Upper extremity                         Delt     Bicep     Tricep    HG                                                 R         0/5        1/5        0/5      2/5 (activation of muscles could be appreciated in bilateral biceps and triceps)                                               L          0/5        1/5        0/5       3/5          Lower extremity                        HF         KF        KE       DF         PF                                                  R        0/5        0/5        3/5       3/5         5/5                                               L         0/5        0/5       3/5       2/5          5/5                                                  Reflexes: Deep Tendon Reflexes absent in all 4 extremities    Pulmonary: Clear to Auscultation, No rales, No rhonchi, No wheezes     Cardiovascular: S1, S2, Regular rate and rhythm     Gastrointestinal: Soft, Non-tender, Non-distended     Extremities: No calf tenderness

## 2024-02-17 NOTE — PROGRESS NOTE ADULT - ASSESSMENT
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     NEURO:  - Neurochecks Q4 hours  - PRN Fentanyl and oxy 5 mg and 10 mg for pain control   - Central storming: gabapentin 400 Q8, propanolol 10 mg Q8, added clonidine 0.1 mg Q8  - MRI spine with cord lesions at C3 and C4 with nerve root enhancement in the L spine  - s/p EEG with mild diffuse/multi-focal cerebral dysfunction, not specific as to etiology. No epileptiform abnormalities.    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  - CSF WBC 11, protein 67, Glucose 83  - S/p Plex x3 2/16, plan for 5  - C/W ASA   - Neurology following   - Speech pathologist evaluation done and mode of speech established  Activity: [x] mobilize as tolerated [] Bedrest [x] PT [x] OT [x] PMNR    PULM:  - s/p tracheostomy 2/16  - C/w hypertonic nebs and chest PT   - CPAP as tolerated     CV:   - SBP goal  with MAP >65   - PICC  - Continue ASA   - On propanolol and clonidine for central storming   - TTE- EF 66%  - R- axillary A line     RENAL:  - Fluids: IVL  - c/w Free water 200 ccQ6h  - Na stable @ 145    GI:  - s/p PEG 2/16  - resume enteral feeding  - Vitamin D supplementation   - GI prophylaxis [] not indicated [x] PPI [] other:  - Bowel regimen [x] miralax [x] senna, dulcolax suppository   - LBM  2/13    ENDO:   - Goal euglycemia (-180)  - A1c 6.8    HEME/ONC:  - H/H stable   - VTE prophylaxis: [x] SCDs [x] chemoprophylaxis- SQL [] hold chemoprophylaxis due to: [] high risk of DVT/PE on admission due to:    ID:  - Afebrile, leukocytosis- trending up    MISC:  Lines/tubes: LIBORIO CAMPOS foley  ASSESSMENT/PLAN: 74 y/o male with multiple comorbidities presenting initially with suspicious of basilar stroke however, 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     NEURO:  - Neurochecks Q4 hours  - PRN Fentanyl and oxy 5 mg and 10 mg for pain control   - Central storming: gabapentin 400 Q8, stop propanolol, stop clonidine  - precedex for agitation  - MRI spine with cord lesions at C3 and C4 with nerve root enhancement in the L spine  - s/p EEG with mild diffuse/multi-focal cerebral dysfunction, not specific as to etiology. No epileptiform abnormalities.    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  - CSF WBC 11, protein 67, Glucose 83  - S/p Plex x3 2/16, plan for 5  - C/W ASA   - Neurology following   - Speech pathologist evaluation done and mode of speech established  Activity: [x] mobilize as tolerated [] Bedrest [x] PT [x] OT [x] PMNR    PULM:  - s/p tracheostomy 2/16  - DC hypertonic nebs and chest PT   - CPAP as tolerated     CV:   - SBP goal  with MAP >65   - PICC  - Continue ASA   - TTE- EF 66%  - R- axillary A line     RENAL:  - Fluids: IVL  - c/w Free water 200 ccQ6h  - Na stable @ 145    GI:  - s/p PEG 2/16  - resume enteral feeding  - Vitamin D supplementation   - GI prophylaxis [] not indicated [x] PPI [] other:  - Bowel regimen [x] miralax [x] senna, dulcolax suppository   - LBM  2/16    ENDO:   - Goal euglycemia (-180)  - A1c 6.8    HEME/ONC:  - H/H stable   - VTE prophylaxis: [x] SCDs [x] chemoprophylaxis- SQL [] hold chemoprophylaxis due to: [] high risk of DVT/PE on admission due to:    ID:  - Afebrile, leukocytosis- trending up    MISC:  Lines/tubes: LIBORIO CAMPOS foley

## 2024-02-17 NOTE — PROGRESS NOTE ADULT - ASSESSMENT
2/16/2024 - percutaneous tracheostomy / PEG placement  -remove sutures from tracheostomy flange on Wed 2/21/2024  -ok to restart PEG tube feeds    skin bleeding is most likely secondary to ASA use and should resolve in the next few days  -I placed a clean gauze under the trach flange. Please keep it in place for 2 days to decrease the risk of recurrent bleeding.  -I placed a clean gauze under the PEG flange. Please keep it in place for 2 days to decrease the risk of recurrent bleeding.      I reviewed his labs.  care coordinated with [facility] team.  plan discussed with his wife at bedside.

## 2024-02-18 LAB
ALBUMIN SERPL ELPH-MCNC: 3.3 G/DL — SIGNIFICANT CHANGE UP (ref 3.3–5)
ALP SERPL-CCNC: 71 U/L — SIGNIFICANT CHANGE UP (ref 40–120)
ALT FLD-CCNC: 44 U/L — SIGNIFICANT CHANGE UP (ref 10–45)
ANION GAP SERPL CALC-SCNC: 8 MMOL/L — SIGNIFICANT CHANGE UP (ref 5–17)
APTT BLD: 27.6 SEC — SIGNIFICANT CHANGE UP (ref 24.5–35.6)
AST SERPL-CCNC: 42 U/L — HIGH (ref 10–40)
BILIRUB SERPL-MCNC: 0.3 MG/DL — SIGNIFICANT CHANGE UP (ref 0.2–1.2)
BUN SERPL-MCNC: 24 MG/DL — HIGH (ref 7–23)
CA-I BLD-SCNC: 1.12 MMOL/L — LOW (ref 1.15–1.33)
CALCIUM SERPL-MCNC: 8.9 MG/DL — SIGNIFICANT CHANGE UP (ref 8.4–10.5)
CHLORIDE SERPL-SCNC: 108 MMOL/L — SIGNIFICANT CHANGE UP (ref 96–108)
CO2 SERPL-SCNC: 30 MMOL/L — SIGNIFICANT CHANGE UP (ref 22–31)
CREAT SERPL-MCNC: 0.84 MG/DL — SIGNIFICANT CHANGE UP (ref 0.5–1.3)
EGFR: 92 ML/MIN/1.73M2 — SIGNIFICANT CHANGE UP
FIBRINOGEN PPP-MCNC: 847 MG/DL — HIGH (ref 200–445)
GLUCOSE BLDC GLUCOMTR-MCNC: 178 MG/DL — HIGH (ref 70–99)
GLUCOSE BLDC GLUCOMTR-MCNC: 196 MG/DL — HIGH (ref 70–99)
GLUCOSE BLDC GLUCOMTR-MCNC: 234 MG/DL — HIGH (ref 70–99)
GLUCOSE SERPL-MCNC: 212 MG/DL — HIGH (ref 70–99)
HCT VFR BLD CALC: 34.5 % — LOW (ref 39–50)
HGB BLD-MCNC: 9.7 G/DL — LOW (ref 13–17)
INR BLD: 1.2 RATIO — HIGH (ref 0.85–1.18)
MAGNESIUM SERPL-MCNC: 2.5 MG/DL — SIGNIFICANT CHANGE UP (ref 1.6–2.6)
MCHC RBC-ENTMCNC: 21.5 PG — LOW (ref 27–34)
MCHC RBC-ENTMCNC: 28.1 GM/DL — LOW (ref 32–36)
MCV RBC AUTO: 76.3 FL — LOW (ref 80–100)
NRBC # BLD: 0 /100 WBCS — SIGNIFICANT CHANGE UP (ref 0–0)
PHOSPHATE SERPL-MCNC: 3.3 MG/DL — SIGNIFICANT CHANGE UP (ref 2.5–4.5)
PLATELET # BLD AUTO: 413 K/UL — HIGH (ref 150–400)
POTASSIUM SERPL-MCNC: 4.4 MMOL/L — SIGNIFICANT CHANGE UP (ref 3.5–5.3)
POTASSIUM SERPL-SCNC: 4.4 MMOL/L — SIGNIFICANT CHANGE UP (ref 3.5–5.3)
PROT SERPL-MCNC: 6.2 G/DL — SIGNIFICANT CHANGE UP (ref 6–8.3)
PROTHROM AB SERPL-ACNC: 13.1 SEC — HIGH (ref 9.5–13)
RBC # BLD: 4.52 M/UL — SIGNIFICANT CHANGE UP (ref 4.2–5.8)
RBC # FLD: 20.2 % — HIGH (ref 10.3–14.5)
SODIUM SERPL-SCNC: 146 MMOL/L — HIGH (ref 135–145)
WBC # BLD: 15.04 K/UL — HIGH (ref 3.8–10.5)
WBC # FLD AUTO: 15.04 K/UL — HIGH (ref 3.8–10.5)

## 2024-02-18 PROCEDURE — 99231 SBSQ HOSP IP/OBS SF/LOW 25: CPT

## 2024-02-18 PROCEDURE — 36514 APHERESIS PLASMA: CPT

## 2024-02-18 PROCEDURE — 99291 CRITICAL CARE FIRST HOUR: CPT

## 2024-02-18 PROCEDURE — 71045 X-RAY EXAM CHEST 1 VIEW: CPT | Mod: 26

## 2024-02-18 RX ORDER — DEXTROSE 50 % IN WATER 50 %
12.5 SYRINGE (ML) INTRAVENOUS ONCE
Refills: 0 | Status: DISCONTINUED | OUTPATIENT
Start: 2024-02-18 | End: 2024-02-19

## 2024-02-18 RX ORDER — DEXTROSE 50 % IN WATER 50 %
15 SYRINGE (ML) INTRAVENOUS ONCE
Refills: 0 | Status: DISCONTINUED | OUTPATIENT
Start: 2024-02-18 | End: 2024-02-19

## 2024-02-18 RX ORDER — DEXTROSE 50 % IN WATER 50 %
25 SYRINGE (ML) INTRAVENOUS ONCE
Refills: 0 | Status: DISCONTINUED | OUTPATIENT
Start: 2024-02-18 | End: 2024-02-19

## 2024-02-18 RX ORDER — SODIUM CHLORIDE 9 MG/ML
500 INJECTION INTRAMUSCULAR; INTRAVENOUS; SUBCUTANEOUS ONCE
Refills: 0 | Status: COMPLETED | OUTPATIENT
Start: 2024-02-18 | End: 2024-02-18

## 2024-02-18 RX ORDER — SODIUM CHLORIDE 9 MG/ML
1000 INJECTION, SOLUTION INTRAVENOUS
Refills: 0 | Status: DISCONTINUED | OUTPATIENT
Start: 2024-02-18 | End: 2024-02-19

## 2024-02-18 RX ORDER — GLUCAGON INJECTION, SOLUTION 0.5 MG/.1ML
1 INJECTION, SOLUTION SUBCUTANEOUS ONCE
Refills: 0 | Status: DISCONTINUED | OUTPATIENT
Start: 2024-02-18 | End: 2024-02-19

## 2024-02-18 RX ADMIN — Medication 3 MILLILITER(S): at 11:17

## 2024-02-18 RX ADMIN — CHLORHEXIDINE GLUCONATE 15 MILLILITER(S): 213 SOLUTION TOPICAL at 17:26

## 2024-02-18 RX ADMIN — SODIUM CHLORIDE 1000 MILLILITER(S): 9 INJECTION INTRAMUSCULAR; INTRAVENOUS; SUBCUTANEOUS at 01:00

## 2024-02-18 RX ADMIN — Medication 4: at 17:13

## 2024-02-18 RX ADMIN — Medication 3 MILLILITER(S): at 23:01

## 2024-02-18 RX ADMIN — GABAPENTIN 400 MILLIGRAM(S): 400 CAPSULE ORAL at 14:15

## 2024-02-18 RX ADMIN — ENOXAPARIN SODIUM 40 MILLIGRAM(S): 100 INJECTION SUBCUTANEOUS at 21:52

## 2024-02-18 RX ADMIN — Medication 2: at 11:28

## 2024-02-18 RX ADMIN — SODIUM CHLORIDE 4 MILLILITER(S): 9 INJECTION INTRAMUSCULAR; INTRAVENOUS; SUBCUTANEOUS at 05:01

## 2024-02-18 RX ADMIN — SODIUM CHLORIDE 4 MILLILITER(S): 9 INJECTION INTRAMUSCULAR; INTRAVENOUS; SUBCUTANEOUS at 23:01

## 2024-02-18 RX ADMIN — POLYETHYLENE GLYCOL 3350 17 GRAM(S): 17 POWDER, FOR SOLUTION ORAL at 11:25

## 2024-02-18 RX ADMIN — SENNA PLUS 1 TABLET(S): 8.6 TABLET ORAL at 21:53

## 2024-02-18 RX ADMIN — ATORVASTATIN CALCIUM 80 MILLIGRAM(S): 80 TABLET, FILM COATED ORAL at 21:53

## 2024-02-18 RX ADMIN — Medication 81 MILLIGRAM(S): at 06:25

## 2024-02-18 RX ADMIN — PANTOPRAZOLE SODIUM 40 MILLIGRAM(S): 20 TABLET, DELAYED RELEASE ORAL at 11:24

## 2024-02-18 RX ADMIN — Medication 3 MILLILITER(S): at 17:12

## 2024-02-18 RX ADMIN — Medication 2: at 06:26

## 2024-02-18 RX ADMIN — Medication 3 MILLILITER(S): at 05:01

## 2024-02-18 RX ADMIN — CHLORHEXIDINE GLUCONATE 15 MILLILITER(S): 213 SOLUTION TOPICAL at 06:25

## 2024-02-18 RX ADMIN — CHLORHEXIDINE GLUCONATE 1 APPLICATION(S): 213 SOLUTION TOPICAL at 11:30

## 2024-02-18 RX ADMIN — SODIUM CHLORIDE 4 MILLILITER(S): 9 INJECTION INTRAMUSCULAR; INTRAVENOUS; SUBCUTANEOUS at 17:12

## 2024-02-18 RX ADMIN — SODIUM CHLORIDE 4 MILLILITER(S): 9 INJECTION INTRAMUSCULAR; INTRAVENOUS; SUBCUTANEOUS at 11:18

## 2024-02-18 RX ADMIN — GABAPENTIN 400 MILLIGRAM(S): 400 CAPSULE ORAL at 21:53

## 2024-02-18 RX ADMIN — GABAPENTIN 400 MILLIGRAM(S): 400 CAPSULE ORAL at 06:25

## 2024-02-18 NOTE — PROGRESS NOTE ADULT - SUBJECTIVE AND OBJECTIVE BOX
S/p percutaneous tracheostomy and PEG placement 2/16.     Vitals stable.   Trach in place without surrounding skin bleeding.  Airway suction. No bloody secretions.   Abdomen non distended. PEG tube in place without skin bleeding.     Hb downtrending from 10.6 to 9.7.

## 2024-02-18 NOTE — PROGRESS NOTE ADULT - ASSESSMENT
Impression; Pt with diffuse weakness (requiring intubation/ventilation) and brainstem dsyfunction, with GD1b Abs 110, and L4-L5 radicular enhancement, c/f GBS, currently on PLEX      Recommendations:  - PLEX Day 4 out of 5 today. Final PLEX session on 2/20.  - WBC increasing (today 15.04) - recommend appropriate workup per primary team.  - Await serum studies for autoimmune encephalopathy panel, B1, B6, copper, NMO, MOG  - Await CSF studies for HSV 1/2 PCR, WNV, cytology, ACE, autoimmune encephalopathy panel  - No neurologic contraindications to PLEX therapy (even if viral infection). Would consider round of IVIG following PLEX if no other medical contraindications pending on clinical improvement  - Vitamin D levels low, would replete with D2 27769 Units PO weekly for 8 weeks and then recheck new levels  - Above recommendations discussed with NSICU team, who verbalized agreement and understanding  - Continue to address above medical problems, as you are doing  - Will continue to follow patient peripherally with you, please call [phone]) with additional questions or concerns Impression; Pt with progressive ataxia, diffuse weakness (requiring intubation/ventilation) and brainstem dsyfunction, with GD1b Abs 110, and L4-L5 radicular enhancement, CSF with mild pleocytosis and elevated protein. Clinical presentation c/f GBS, currently on PLEX. Vascular etiology also considered, MRI with small b/l cerebellar strokes (postprocedural after angiography) and prior L thalamic strokes, although these would not explain pt's current clinical deficits and progress nature.     Recommendations:  - PLEX Day 4 out of 5 today. Final PLEX session on 2/20.   - WBC increasing (today 15.04) - recommend appropriate workup per primary team.   - Await serum studies for autoimmune encephalopathy panel, B1, B6, copper, NMOauto  - Await CSF studies for HSV 1/2 PCR, cytology, ACE, autoimmune encephalopathy panel  - Vitamin D levels low, would replete with D2 08709 Units PO weekly for 8 weeks and then recheck new levels      Case seen with neuro attending Dr. Osbaldo Hendricks. Note finalized once attested by attending.

## 2024-02-18 NOTE — PROGRESS NOTE ADULT - ASSESSMENT
No evidence continue bleeding from trach or PEG site.   -Gauze left in place below trach flange.   -Gauze below PEG tube flange removed due.   -ACS to follow.

## 2024-02-18 NOTE — CHART NOTE - NSCHARTNOTEFT_GEN_A_CORE
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 Plan for 5 TPE over approximately 10 days, once every other day.    TPE #1 was performed on 02/12/24. One plasma volume was exchanged with 5% albumin and 3g of calcium was given throughout the procedure. Patient tolerated the procedure well.    TPE #2 was performed on 02/14/24. Labs were reviewed prior to procedure and 1g calcium given prior to procedure. One plasma volume was exchanged with 5% albumin and 3g of calcium was given throughout the procedure. Patient experienced episode of hypertension during procedure which resolved with labetalol, otherwise tolerated the procedure well.    TPE#3 was successfully performed on 02/16/24. One plasma volume was replaced with half 5% albumin and half donor plasma because patient is to have Trach and PEG placement post procedure. Patient tolerated the procedure well.    TPE#4 was performed on 02/18/24.  One plasma volume was exchanged with 5% albumin.  Patient tolerated well.    TPE#5 scheduled on 2/20/24.

## 2024-02-18 NOTE — PROGRESS NOTE ADULT - ATTENDING COMMENTS
Patient getting day 4/5 PLEX today.     Exam: Patient able to move right foot on command, otherwise cannot follow instructions. Doesn't open eyes to name or noxious. Doesn't move rest of extremities to noxious or command.    Imp: This is a 73M who presented to [facility] ED with ataxia and left facial droop, initially concerning for stroke and w/up notable for basilar stenosis. Transferred to [facility] and quickly developed worsening dysarthria, dysphagia, and weakness. He was intubated and taken to angiogram on 2/11 showed moderate stenosis but no intervention done. Per NSICU team, the patient had been having progressive weakness and numbness of the extremities for the preceding 1-2 weeks following URI. GD1b found to be 110. Started on PLEX 2/13/24 due to likely GBS. Of note, patient has been having leukocytosis since 2/11, elevated today to 15.    - In setting or worsening leukocytosis, recommend infectious workup  - C/w 5 day course of PLEX  - F/u studies

## 2024-02-18 NOTE — PROGRESS NOTE ADULT - SUBJECTIVE AND OBJECTIVE BOX
HOSPITAL COURSE:  73y Male with PMHx significant for HTN, HLD, DM type 2, and two prior strokes without residual deficits who [facility] and L facial weakness on 2/9. 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 He was transferred to NSICU for angiogram, which was done on 2/9 and showed moderate basilar stenosis. No intervention done. He had to be intubated for worsening respiratory status. On further history it was found that patient had been having progressive weakness and numbness of his extremities for the preceding 1-2 weeks following a viral URI. 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       Admission Scores  GCS:   HH:   MF:   NIHSS: 4   RASS:    CAM-ICU:   ICH:    2/9: transferred from [place], NIHSS of 4 in ED, then became more lethargic, stridorous and desaturated in ED, intubated for airway protected, repeat CTH/A obtained and brought emergently to IR for basilar stenting   2/10: worsening brainstem symptoms while on DAPT  2/12- LP performed to rule out MFS as patient's examination is not explained by the strokes on MRI. First session of PLEX  2/14- s/p 2nd PLEX tx, no acute interval events  2/15- No acute events overnight. Patient's examination more or less the same.   2/16- Patient storming overnight requiring multiple pushes of labetalol and fentanyl without improvement in BP. Patient started on propofol which also is not optimally managing patient's BP.   2/17 Labile BP due to dysautonomia, continue with scheduled TPE sessions, phenyephrine restarted for BP lability    Allergies    No Known Allergies    Intolerances    REVIEW OF SYSTEMS: [x] Unable to Assess due to neurologic exam   [ ] All ROS addressed below are non-contributory, except:  Neuro: [ ] Headache [ ] Back pain [ ] Numbness [ ] Weakness [ ] Ataxia [ ] Dizziness [ ] Aphasia [ ] Dysarthria [ ] Visual disturbance  Resp: [ ] Shortness of breath/dyspnea, [ ] Orthopnea [ ] Cough  CV: [ ] Chest pain [ ] Palpitation [ ] Lightheadedness [ ] Syncope  Renal: [ ] Thirst [ ] Edema  GI: [ ] Nausea [ ] Emesis [ ] Abdominal pain [ ] Constipation [ ] Diarrhea  Hem: [ ] Hematemesis [ ] bright red blood per rectum  ID: [ ] Fever [ ] Chills [ ] Dysuria  ENT: [ ] Rhinorrhea    ICU Vital Signs Last 24 Hrs  T(C): 37.6 (17 Feb 2024 03:00), Max: 37.6 (17 Feb 2024 03:00)  T(F): 99.7 (17 Feb 2024 03:00), Max: 99.7 (17 Feb 2024 03:00)  HR: 86 (17 Feb 2024 17:00) (79 - 92)  BP: --  BP(mean): --  ABP: 148/61 (17 Feb 2024 17:00) (90/40 - 179/80)  ABP(mean): 93 (17 Feb 2024 17:00) (58 - 117)  RR: 16 (17 Feb 2024 17:00) (14 - 28)  SpO2: 97% (17 Feb 2024 17:00) (87% - 100%)    I&O's Summary    16 Feb 2024 07:01  -  17 Feb 2024 07:00  --------------------------------------------------------  IN: 1467.5 mL / OUT: 1501 mL / NET: -33.5 mL    17 Feb 2024 07:01  -  17 Feb 2024 18:45  --------------------------------------------------------  IN: 583.3 mL / OUT: 400 mL / NET: 183.3 mL      MEDICATIONS  (STANDING):  aspirin  chewable 81 milliGRAM(s) Oral <User Schedule>  atorvastatin 80 milliGRAM(s) Oral at bedtime  chlorhexidine 0.12% Liquid 15 milliLiter(s) Oral Mucosa every 12 hours  chlorhexidine 4% Liquid 1 Application(s) Topical daily  enoxaparin Injectable 40 milliGRAM(s) SubCutaneous <User Schedule>  ergocalciferol Drops 70632 Unit(s) Oral <User Schedule>  gabapentin Solution 400 milliGRAM(s) Oral three times a day  insulin lispro (ADMELOG) corrective regimen sliding scale   SubCutaneous every 6 hours  pantoprazole  Injectable 40 milliGRAM(s) IV Push daily  phenylephrine    Infusion 0.2 MICROgram(s)/kG/Min (7.61 mL/Hr) IV Continuous <Continuous>  polyethylene glycol 3350 17 Gram(s) Oral daily  senna 1 Tablet(s) Oral at bedtime    MEDICATIONS  (PRN):  acetaminophen   Oral Liquid .. 650 milliGRAM(s) Oral every 6 hours PRN Mild Pain (1 - 3)  oxyCODONE    Solution 10 milliGRAM(s) Oral every 4 hours PRN Severe Pain (7 - 10)  oxyCODONE    Solution 5 milliGRAM(s) Oral every 4 hours PRN Moderate Pain (4 - 6)  sodium chloride 0.9% lock flush 10 milliLiter(s) IV Push every 1 hour PRN Pre/post blood products, medications, blood draw, and to maintain line patency                        10.6   10.92 )-----------( 384      ( 16 Feb 2024 08:20 )             37.6   02-16    145  |  108  |  16  ----------------------------<  161<H>  4.5   |  28  |  0.69    Ca    8.5      16 Feb 2024 22:56  Phos  3.6     02-16  Mg     2.3     02-16      EXAMINATION:  PHYSICAL EXAM:    Constitutional: No Acute Distress, patient intubated but following commands     Neurological: Bilateral ptosis, pupils 2 mm and sluggish, restricted gaze in all directions however, vertical gaze is better than horizontal. Facial asymmetry could not be appreciated due to ETT, patient following commands with his BL LE. Patient following commands and able to communicate with his bilateral feets. Right feet signifying 'yes', left foot signifying 'no'.    Motor exam:          Upper extremity                         Delt     Bicep     Tricep    HG                                                 R         0/5        1/5        0/5      2/5 (activation of muscles could be appreciated in bilateral biceps and triceps)                                               L          0/5        1/5        0/5       3/5          Lower extremity                        HF         KF        KE       DF         PF                                                  R        0/5        0/5        3/5       3/5         5/5                                               L         0/5        0/5       3/5       2/5          5/5                                                  Reflexes: Deep Tendon Reflexes absent in all 4 extremities    Pulmonary: Clear to Auscultation, No rales, No rhonchi, No wheezes     Cardiovascular: S1, S2, Regular rate and rhythm     Gastrointestinal: Soft, Non-tender, Non-distended     Extremities: No calf tenderness

## 2024-02-18 NOTE — PROGRESS NOTE ADULT - SUBJECTIVE AND OBJECTIVE BOX
Night rounds   Patient seen and examined    T(C): 37.3 (02-18-24 @ 15:00), Max: 37.5 (02-17-24 @ 23:00)  HR: 92 (02-18-24 @ 17:11) (83 - 98)  BP: --  RR: 16 (02-18-24 @ 15:00) (16 - 23)  SpO2: 97% (02-18-24 @ 17:11) (87% - 100%)  02-17-24 @ 07:01  -  02-18-24 @ 07:00  --------------------------------------------------------  IN: 2005 mL / OUT: 1100 mL / NET: 905 mL    02-18-24 @ 07:01  -  02-18-24 @ 18:52  --------------------------------------------------------  IN: 1060 mL / OUT: 450 mL / NET: 610 mL    acetaminophen   Oral Liquid .. 650 milliGRAM(s) Oral every 6 hours PRN  albuterol/ipratropium for Nebulization 3 milliLiter(s) Nebulizer every 6 hours  atorvastatin 80 milliGRAM(s) Oral at bedtime  chlorhexidine 0.12% Liquid 15 milliLiter(s) Oral Mucosa every 12 hours  chlorhexidine 4% Liquid 1 Application(s) Topical daily  enoxaparin Injectable 40 milliGRAM(s) SubCutaneous <User Schedule>  ergocalciferol Drops 80874 Unit(s) Oral <User Schedule>  gabapentin Solution 400 milliGRAM(s) Oral three times a day  insulin lispro (ADMELOG) corrective regimen sliding scale   SubCutaneous every 6 hours  oxyCODONE    Solution 5 milliGRAM(s) Oral every 4 hours PRN  oxyCODONE    Solution 10 milliGRAM(s) Oral every 4 hours PRN  pantoprazole  Injectable 40 milliGRAM(s) IV Push daily  polyethylene glycol 3350 17 Gram(s) Oral daily  senna 1 Tablet(s) Oral at bedtime  sodium chloride 0.9% lock flush 10 milliLiter(s) IV Push every 1 hour PRN  sodium chloride 3%  Inhalation 4 milliLiter(s) Inhalation every 6 hours  Mode: AC/ CMV (Assist Control/ Continuous Mandatory Ventilation), RR (machine): 16, TV (machine): 500, FiO2: 40, PEEP: 5, ITime: 1, MAP: 10, PIP: 18      Exam unchanged from day rounds     labs and imaging reviewed      Patient with worsening weakness and brainstem dysfunction 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, however with MRI brain showing no acute brainstem infarct and  antecedent viral illness and continued worsened neuro exam, 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 is now post PLEX 3/5, might require IVIG after completing PLEX, continue aspirin, autonomic dysfunction , trach , lovenox 40 mg sc qhs     Deana Philip NSCU attending  Night rounds   Patient seen and examined    T(C): 37.3 (02-18-24 @ 15:00), Max: 37.5 (02-17-24 @ 23:00)  HR: 92 (02-18-24 @ 17:11) (83 - 98)  BP: --  RR: 16 (02-18-24 @ 15:00) (16 - 23)  SpO2: 97% (02-18-24 @ 17:11) (87% - 100%)  02-17-24 @ 07:01  -  02-18-24 @ 07:00  --------------------------------------------------------  IN: 2005 mL / OUT: 1100 mL / NET: 905 mL    02-18-24 @ 07:01  -  02-18-24 @ 18:52  --------------------------------------------------------  IN: 1060 mL / OUT: 450 mL / NET: 610 mL    acetaminophen   Oral Liquid .. 650 milliGRAM(s) Oral every 6 hours PRN  albuterol/ipratropium for Nebulization 3 milliLiter(s) Nebulizer every 6 hours  atorvastatin 80 milliGRAM(s) Oral at bedtime  chlorhexidine 0.12% Liquid 15 milliLiter(s) Oral Mucosa every 12 hours  chlorhexidine 4% Liquid 1 Application(s) Topical daily  enoxaparin Injectable 40 milliGRAM(s) SubCutaneous <User Schedule>  ergocalciferol Drops 00038 Unit(s) Oral <User Schedule>  gabapentin Solution 400 milliGRAM(s) Oral three times a day  insulin lispro (ADMELOG) corrective regimen sliding scale   SubCutaneous every 6 hours  oxyCODONE    Solution 5 milliGRAM(s) Oral every 4 hours PRN  oxyCODONE    Solution 10 milliGRAM(s) Oral every 4 hours PRN  pantoprazole  Injectable 40 milliGRAM(s) IV Push daily  polyethylene glycol 3350 17 Gram(s) Oral daily  senna 1 Tablet(s) Oral at bedtime  sodium chloride 0.9% lock flush 10 milliLiter(s) IV Push every 1 hour PRN  sodium chloride 3%  Inhalation 4 milliLiter(s) Inhalation every 6 hours  Mode: AC/ CMV (Assist Control/ Continuous Mandatory Ventilation), RR (machine): 16, TV (machine): 500, FiO2: 40, PEEP: 5, ITime: 1, MAP: 10, PIP: 18      Exam unchanged from day rounds  Bilateral ptosis, pupils 2 mm and sluggish, restricted gaze in all directions however, vertical gaze is better than horizontal,  patient following commands with his BL LE. Patient following commands and able to communicate with his bilateral feets. 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

## 2024-02-18 NOTE — PROGRESS NOTE ADULT - SUBJECTIVE AND OBJECTIVE BOX
Neurology - Consult Note    -  Spectra: [id number] ([facility]), [phone] ([facility])  -    HPI: Patient AMANDA WILKINS is a 73y (1950) wo/man with a PMHx significant for ***      Review of Systems:  INCOMPLETE   CONSTITUTIONAL: No fevers or chills  EYES AND ENT: No visual changes or no throat pain   NECK: No pain or stiffness  RESPIRATORY: No hemoptysis or shortness of breath  CARDIOVASCULAR: No chest pain or palpitations  GASTROINTESTINAL: No melena or hematochezia  GENITOURINARY: No dysuria or hematuria  NEUROLOGICAL: +As stated in HPI above  SKIN: No itching, burning, rashes, or lesions   All other review of systems is negative unless indicated above.    Allergies:  No Known Allergies      PMHx/PSHx/Family Hx: As above, otherwise see below   Hypertension    Diabetes    CVA (cerebral vascular accident)    HTN (hypertension)    HLD (hyperlipidemia)    DM2 (diabetes mellitus, type 2)    CVA (cerebrovascular accident)        Social Hx:  No current use of tobacco, alcohol, or illicit drugs  Lives with ***    Medications:  MEDICATIONS  (STANDING):  albuterol/ipratropium for Nebulization 3 milliLiter(s) Nebulizer every 6 hours  atorvastatin 80 milliGRAM(s) Oral at bedtime  chlorhexidine 0.12% Liquid 15 milliLiter(s) Oral Mucosa every 12 hours  chlorhexidine 4% Liquid 1 Application(s) Topical daily  enoxaparin Injectable 40 milliGRAM(s) SubCutaneous <User Schedule>  ergocalciferol Drops 15034 Unit(s) Oral <User Schedule>  gabapentin Solution 400 milliGRAM(s) Oral three times a day  insulin lispro (ADMELOG) corrective regimen sliding scale   SubCutaneous every 6 hours  pantoprazole  Injectable 40 milliGRAM(s) IV Push daily  polyethylene glycol 3350 17 Gram(s) Oral daily  senna 1 Tablet(s) Oral at bedtime  sodium chloride 3%  Inhalation 4 milliLiter(s) Inhalation every 6 hours    MEDICATIONS  (PRN):  acetaminophen   Oral Liquid .. 650 milliGRAM(s) Oral every 6 hours PRN Mild Pain (1 - 3)  oxyCODONE    Solution 10 milliGRAM(s) Oral every 4 hours PRN Severe Pain (7 - 10)  oxyCODONE    Solution 5 milliGRAM(s) Oral every 4 hours PRN Moderate Pain (4 - 6)  sodium chloride 0.9% lock flush 10 milliLiter(s) IV Push every 1 hour PRN Pre/post blood products, medications, blood draw, and to maintain line patency      Vitals:  T(C): 37.4 (02-18-24 @ 07:00), Max: 37.5 (02-17-24 @ 23:00)  HR: 91 (02-18-24 @ 09:12) (80 - 98)  BP: --  RR: 16 (02-18-24 @ 09:00) (14 - 24)  SpO2: 99% (02-18-24 @ 09:12) (87% - 100%)    Physical Examination: INCOMPLETE  General - NAD  Cardiovascular - Peripheral pulses palpable, no edema  Eyes - Fundoscopy with flat, sharp optic discs and no hemorrhage or exudates; Fundoscopy not well visualized; Fundoscopy not performed due to safety precautions in the setting of the COVID-19 pandemic    Neurologic Exam:  Mental status - Awake, Alert, Oriented to person, place, and time. Speech fluent, repetition and naming intact. Follows simple and complex commands. Attention/concentration, recent and remote memory (including registration and recall), and fund of knowledge intact    Cranial nerves - PERRLA, VFF, EOMI, face sensation (V1-V3) intact b/l, facial strength intact without asymmetry b/l, hearing intact b/l, palate with symmetric elevation, trapezius OR sternocleidomastoid 5/5 strength b/l, tongue midline on protrusion with full lateral movement    Motor - Normal bulk and tone throughout. No pronator drift.  Strength testing            Deltoid      Biceps      Triceps     Wrist Extension    Wrist Flexion     Interossei         R            5                 5               5                     5                              5                        5                 5  L             5                 5               5                     5                              5                        5                 5              Hip Flexion    Hip Extension    Knee Flexion    Knee Extension    Dorsiflexion    Plantar Flexion  R              5                           5                       5                           5                            5                          5  L              5                           5                        5                           5                            5                          5    Sensation - Light touch/temperature OR pain/vibration intact throughout    DTR's -             Biceps      Triceps     Brachioradialis      Patellar    Ankle    Toes/plantar response  R             2+             2+                  2+                       2+            2+                 Down  L              2+             2+                 2+                        2+           2+                 Down    Coordination - Finger to Nose intact b/l. No tremors appreciated    Gait and station - Normal casual gait. Romberg (-)    Labs:                        9.7    15.04 )-----------( 413      ( 18 Feb 2024 04:18 )             34.5     02-18    146<H>  |  108  |  24<H>  ----------------------------<  212<H>  4.4   |  30  |  0.84    Ca    8.9      18 Feb 2024 04:18  Phos  3.3     02-18  Mg     2.5     02-18    TPro  6.2  /  Alb  3.3  /  TBili  0.3  /  DBili  x   /  AST  42<H>  /  ALT  44  /  AlkPhos  71  02-18    CAPILLARY BLOOD GLUCOSE      POCT Blood Glucose.: 196 mg/dL (18 Feb 2024 06:23)    LIVER FUNCTIONS - ( 18 Feb 2024 04:18 )  Alb: 3.3 g/dL / Pro: 6.2 g/dL / ALK PHOS: 71 U/L / ALT: 44 U/L / AST: 42 U/L / GGT: x             PT/INR - ( 18 Feb 2024 04:18 )   PT: 13.1 sec;   INR: 1.20 ratio         PTT - ( 18 Feb 2024 04:18 )  PTT:27.6 sec  CSF:    Total Nucleated Cell Count, CSF: 11 /uL (02-11-24 @ 16:25)  RBC Count - Spinal Fluid: 3 /uL (02-11-24 @ 16:25)          Protein, CSF: 67 mg/dL (02-11-24 @ 16:25)        Radiology:  CT Head No Cont:  (12 Feb 2024 21:22)  MR Head w/wo IV Cont:  (11 Feb 2024 13:08)     Neurology Progress Note    SUBJECTIVE/OBJECTIVE/INTERVAL EVENTS: Patient seen and examined at bedside w/ neuro attending .     MEDICATIONS  (STANDING):  albuterol/ipratropium for Nebulization 3 milliLiter(s) Nebulizer every 6 hours  atorvastatin 80 milliGRAM(s) Oral at bedtime  chlorhexidine 0.12% Liquid 15 milliLiter(s) Oral Mucosa every 12 hours  chlorhexidine 4% Liquid 1 Application(s) Topical daily  enoxaparin Injectable 40 milliGRAM(s) SubCutaneous <User Schedule>  ergocalciferol Drops 41783 Unit(s) Oral <User Schedule>  gabapentin Solution 400 milliGRAM(s) Oral three times a day  insulin lispro (ADMELOG) corrective regimen sliding scale   SubCutaneous every 6 hours  pantoprazole  Injectable 40 milliGRAM(s) IV Push daily  polyethylene glycol 3350 17 Gram(s) Oral daily  senna 1 Tablet(s) Oral at bedtime  sodium chloride 3%  Inhalation 4 milliLiter(s) Inhalation every 6 hours    MEDICATIONS  (PRN):  acetaminophen   Oral Liquid .. 650 milliGRAM(s) Oral every 6 hours PRN Mild Pain (1 - 3)  oxyCODONE    Solution 5 milliGRAM(s) Oral every 4 hours PRN Moderate Pain (4 - 6)  oxyCODONE    Solution 10 milliGRAM(s) Oral every 4 hours PRN Severe Pain (7 - 10)  sodium chloride 0.9% lock flush 10 milliLiter(s) IV Push every 1 hour PRN Pre/post blood products, medications, blood draw, and to maintain line patency      VITALS & EXAMINATION:  Vital Signs Last 24 Hrs  T(C): 37.4 (18 Feb 2024 07:00), Max: 37.5 (17 Feb 2024 23:00)  T(F): 99.3 (18 Feb 2024 07:00), Max: 99.5 (17 Feb 2024 23:00)  HR: 87 (18 Feb 2024 11:20) (80 - 98)  BP: --  BP(mean): --  RR: 16 (18 Feb 2024 09:00) (14 - 24)  SpO2: 96% (18 Feb 2024 11:20) (87% - 100%)    Parameters below as of 18 Feb 2024 11:20  Patient On (Oxygen Delivery Method): ventilator      Physical Exam:   Neurological (>12):  MS: Eyes closed, not verbalizing, can tap R foot to yes/no questions, but otherwise does not appear to be following commands.   CNs: face appears symmetric but facial muscles not moving.   Motor - Only wiggling R foot toes - no other movements appreciated.   Sensation: Does not withdraw extremities to noxious stimuli x 4.  Reflexes L/R:  Biceps(C5) 0/0  BR(C6) 2/2   Triceps(C7) 0/0 Patellar(L4)   0/0   Ankles 0/0   Coordination: DANIAL  Gait: DANIAL    LABORATORY:  CBC                       9.7    15.04 )-----------( 413      ( 18 Feb 2024 04:18 )             34.5     Chem 02-18    146<H>  |  108  |  24<H>  ----------------------------<  212<H>  4.4   |  30  |  0.84    Ca    8.9      18 Feb 2024 04:18  Phos  3.3     02-18  Mg     2.5     02-18    TPro  6.2  /  Alb  3.3  /  TBili  0.3  /  DBili  x   /  AST  42<H>  /  ALT  44  /  AlkPhos  71  02-18    LFTs LIVER FUNCTIONS - ( 18 Feb 2024 04:18 )  Alb: 3.3 g/dL / Pro: 6.2 g/dL / ALK PHOS: 71 U/L / ALT: 44 U/L / AST: 42 U/L / GGT: x           Coagulopathy PT/INR - ( 18 Feb 2024 04:18 )   PT: 13.1 sec;   INR: 1.20 ratio         PTT - ( 18 Feb 2024 04:18 )  PTT:27.6 sec  Lipid Panel 02-14 Chol 92 LDL -- HDL 19<L> Trig 104, 02-10 Chol 226<H> LDL -- HDL 51 Trig 76  A1c   Cardiac enzymes     U/A Urinalysis Basic - ( 18 Feb 2024 04:18 )    Color: x / Appearance: x / SG: x / pH: x  Gluc: 212 mg/dL / Ketone: x  / Bili: x / Urobili: x   Blood: x / Protein: x / Nitrite: x   Leuk Esterase: x / RBC: x / WBC x   Sq Epi: x / Non Sq Epi: x / Bacteria: x      CSF  Immunological  Other    STUDIES & IMAGING: (EEG, CT, MR, U/S, TTE/MILADIS):

## 2024-02-19 LAB
ALBUMIN SERPL ELPH-MCNC: 3.9 G/DL — SIGNIFICANT CHANGE UP (ref 3.3–5)
ALP SERPL-CCNC: 49 U/L — SIGNIFICANT CHANGE UP (ref 40–120)
ALT FLD-CCNC: 21 U/L — SIGNIFICANT CHANGE UP (ref 10–45)
ANION GAP SERPL CALC-SCNC: 8 MMOL/L — SIGNIFICANT CHANGE UP (ref 5–17)
AST SERPL-CCNC: 28 U/L — SIGNIFICANT CHANGE UP (ref 10–40)
BILIRUB SERPL-MCNC: 0.3 MG/DL — SIGNIFICANT CHANGE UP (ref 0.2–1.2)
BUN SERPL-MCNC: 32 MG/DL — HIGH (ref 7–23)
CALCIUM SERPL-MCNC: 8.2 MG/DL — LOW (ref 8.4–10.5)
CHLORIDE SERPL-SCNC: 111 MMOL/L — HIGH (ref 96–108)
CO2 SERPL-SCNC: 28 MMOL/L — SIGNIFICANT CHANGE UP (ref 22–31)
CREAT SERPL-MCNC: 1.01 MG/DL — SIGNIFICANT CHANGE UP (ref 0.5–1.3)
EGFR: 79 ML/MIN/1.73M2 — SIGNIFICANT CHANGE UP
GLUCOSE BLDC GLUCOMTR-MCNC: 227 MG/DL — HIGH (ref 70–99)
GLUCOSE BLDC GLUCOMTR-MCNC: 238 MG/DL — HIGH (ref 70–99)
GLUCOSE BLDC GLUCOMTR-MCNC: 241 MG/DL — HIGH (ref 70–99)
GLUCOSE BLDC GLUCOMTR-MCNC: 243 MG/DL — HIGH (ref 70–99)
GLUCOSE BLDC GLUCOMTR-MCNC: 258 MG/DL — HIGH (ref 70–99)
GLUCOSE BLDC GLUCOMTR-MCNC: 268 MG/DL — HIGH (ref 70–99)
GLUCOSE BLDC GLUCOMTR-MCNC: 269 MG/DL — HIGH (ref 70–99)
GLUCOSE SERPL-MCNC: 279 MG/DL — HIGH (ref 70–99)
HCT VFR BLD CALC: 31.8 % — LOW (ref 39–50)
HGB BLD-MCNC: 9 G/DL — LOW (ref 13–17)
MAGNESIUM SERPL-MCNC: 2.7 MG/DL — HIGH (ref 1.6–2.6)
MCHC RBC-ENTMCNC: 21.7 PG — LOW (ref 27–34)
MCHC RBC-ENTMCNC: 28.3 GM/DL — LOW (ref 32–36)
MCV RBC AUTO: 76.6 FL — LOW (ref 80–100)
NRBC # BLD: 0 /100 WBCS — SIGNIFICANT CHANGE UP (ref 0–0)
PHOSPHATE SERPL-MCNC: 3.2 MG/DL — SIGNIFICANT CHANGE UP (ref 2.5–4.5)
PLATELET # BLD AUTO: 388 K/UL — SIGNIFICANT CHANGE UP (ref 150–400)
POTASSIUM SERPL-MCNC: 4.9 MMOL/L — SIGNIFICANT CHANGE UP (ref 3.5–5.3)
POTASSIUM SERPL-SCNC: 4.9 MMOL/L — SIGNIFICANT CHANGE UP (ref 3.5–5.3)
PROT SERPL-MCNC: 5.4 G/DL — LOW (ref 6–8.3)
PYRIDOXAL PHOS SERPL-MCNC: 17.2 UG/L — SIGNIFICANT CHANGE UP (ref 3.4–65.2)
RBC # BLD: 4.15 M/UL — LOW (ref 4.2–5.8)
RBC # FLD: 20.1 % — HIGH (ref 10.3–14.5)
SODIUM SERPL-SCNC: 147 MMOL/L — HIGH (ref 135–145)
VIT B1 SERPL-MCNC: 148.7 NMOL/L — SIGNIFICANT CHANGE UP (ref 66.5–200)
WBC # BLD: 14.45 K/UL — HIGH (ref 3.8–10.5)
WBC # FLD AUTO: 14.45 K/UL — HIGH (ref 3.8–10.5)

## 2024-02-19 PROCEDURE — 99291 CRITICAL CARE FIRST HOUR: CPT

## 2024-02-19 PROCEDURE — 71045 X-RAY EXAM CHEST 1 VIEW: CPT | Mod: 26

## 2024-02-19 PROCEDURE — 99231 SBSQ HOSP IP/OBS SF/LOW 25: CPT

## 2024-02-19 RX ORDER — SODIUM CHLORIDE 9 MG/ML
500 INJECTION, SOLUTION INTRAVENOUS ONCE
Refills: 0 | Status: COMPLETED | OUTPATIENT
Start: 2024-02-19 | End: 2024-02-19

## 2024-02-19 RX ORDER — HUMAN INSULIN 100 [IU]/ML
2 INJECTION, SUSPENSION SUBCUTANEOUS EVERY 8 HOURS
Refills: 0 | Status: DISCONTINUED | OUTPATIENT
Start: 2024-02-19 | End: 2024-02-19

## 2024-02-19 RX ORDER — HUMAN INSULIN 100 [IU]/ML
3 INJECTION, SUSPENSION SUBCUTANEOUS ONCE
Refills: 0 | Status: COMPLETED | OUTPATIENT
Start: 2024-02-19 | End: 2024-02-19

## 2024-02-19 RX ORDER — SODIUM CHLORIDE 9 MG/ML
1000 INJECTION, SOLUTION INTRAVENOUS
Refills: 0 | Status: DISCONTINUED | OUTPATIENT
Start: 2024-02-19 | End: 2024-02-20

## 2024-02-19 RX ORDER — HUMAN INSULIN 100 [IU]/ML
2 INJECTION, SUSPENSION SUBCUTANEOUS ONCE
Refills: 0 | Status: DISCONTINUED | OUTPATIENT
Start: 2024-02-19 | End: 2024-02-19

## 2024-02-19 RX ORDER — HUMAN INSULIN 100 [IU]/ML
4 INJECTION, SUSPENSION SUBCUTANEOUS EVERY 6 HOURS
Refills: 0 | Status: DISCONTINUED | OUTPATIENT
Start: 2024-02-19 | End: 2024-02-19

## 2024-02-19 RX ORDER — HUMAN INSULIN 100 [IU]/ML
5 INJECTION, SUSPENSION SUBCUTANEOUS EVERY 8 HOURS
Refills: 0 | Status: DISCONTINUED | OUTPATIENT
Start: 2024-02-19 | End: 2024-02-19

## 2024-02-19 RX ORDER — HUMAN INSULIN 100 [IU]/ML
6 INJECTION, SUSPENSION SUBCUTANEOUS EVERY 6 HOURS
Refills: 0 | Status: DISCONTINUED | OUTPATIENT
Start: 2024-02-19 | End: 2024-02-20

## 2024-02-19 RX ORDER — GLUCAGON INJECTION, SOLUTION 0.5 MG/.1ML
1 INJECTION, SOLUTION SUBCUTANEOUS ONCE
Refills: 0 | Status: DISCONTINUED | OUTPATIENT
Start: 2024-02-19 | End: 2024-02-20

## 2024-02-19 RX ADMIN — GABAPENTIN 400 MILLIGRAM(S): 400 CAPSULE ORAL at 13:21

## 2024-02-19 RX ADMIN — SODIUM CHLORIDE 4 MILLILITER(S): 9 INJECTION INTRAMUSCULAR; INTRAVENOUS; SUBCUTANEOUS at 05:53

## 2024-02-19 RX ADMIN — Medication 6: at 17:12

## 2024-02-19 RX ADMIN — SODIUM CHLORIDE 4 MILLILITER(S): 9 INJECTION INTRAMUSCULAR; INTRAVENOUS; SUBCUTANEOUS at 18:01

## 2024-02-19 RX ADMIN — HUMAN INSULIN 2 UNIT(S): 100 INJECTION, SUSPENSION SUBCUTANEOUS at 06:02

## 2024-02-19 RX ADMIN — CHLORHEXIDINE GLUCONATE 15 MILLILITER(S): 213 SOLUTION TOPICAL at 17:15

## 2024-02-19 RX ADMIN — Medication 3 MILLILITER(S): at 05:54

## 2024-02-19 RX ADMIN — CHLORHEXIDINE GLUCONATE 15 MILLILITER(S): 213 SOLUTION TOPICAL at 06:01

## 2024-02-19 RX ADMIN — HUMAN INSULIN 3 UNIT(S): 100 INJECTION, SUSPENSION SUBCUTANEOUS at 08:34

## 2024-02-19 RX ADMIN — Medication 6: at 06:01

## 2024-02-19 RX ADMIN — Medication 4: at 01:04

## 2024-02-19 RX ADMIN — Medication 3 MILLILITER(S): at 18:02

## 2024-02-19 RX ADMIN — Medication 6: at 23:24

## 2024-02-19 RX ADMIN — ATORVASTATIN CALCIUM 80 MILLIGRAM(S): 80 TABLET, FILM COATED ORAL at 21:00

## 2024-02-19 RX ADMIN — OXYCODONE HYDROCHLORIDE 5 MILLIGRAM(S): 5 TABLET ORAL at 16:38

## 2024-02-19 RX ADMIN — HUMAN INSULIN 4 UNIT(S): 100 INJECTION, SUSPENSION SUBCUTANEOUS at 17:12

## 2024-02-19 RX ADMIN — SODIUM CHLORIDE 500 MILLILITER(S): 9 INJECTION, SOLUTION INTRAVENOUS at 05:02

## 2024-02-19 RX ADMIN — PANTOPRAZOLE SODIUM 40 MILLIGRAM(S): 20 TABLET, DELAYED RELEASE ORAL at 11:09

## 2024-02-19 RX ADMIN — HUMAN INSULIN 4 UNIT(S): 100 INJECTION, SUSPENSION SUBCUTANEOUS at 12:15

## 2024-02-19 RX ADMIN — Medication 3 MILLILITER(S): at 11:48

## 2024-02-19 RX ADMIN — GABAPENTIN 400 MILLIGRAM(S): 400 CAPSULE ORAL at 06:01

## 2024-02-19 RX ADMIN — OXYCODONE HYDROCHLORIDE 5 MILLIGRAM(S): 5 TABLET ORAL at 16:08

## 2024-02-19 RX ADMIN — SODIUM CHLORIDE 4 MILLILITER(S): 9 INJECTION INTRAMUSCULAR; INTRAVENOUS; SUBCUTANEOUS at 11:48

## 2024-02-19 RX ADMIN — Medication 4: at 11:08

## 2024-02-19 RX ADMIN — SENNA PLUS 1 TABLET(S): 8.6 TABLET ORAL at 21:00

## 2024-02-19 RX ADMIN — GABAPENTIN 400 MILLIGRAM(S): 400 CAPSULE ORAL at 21:00

## 2024-02-19 RX ADMIN — CHLORHEXIDINE GLUCONATE 1 APPLICATION(S): 213 SOLUTION TOPICAL at 11:09

## 2024-02-19 RX ADMIN — POLYETHYLENE GLYCOL 3350 17 GRAM(S): 17 POWDER, FOR SOLUTION ORAL at 11:09

## 2024-02-19 RX ADMIN — HUMAN INSULIN 6 UNIT(S): 100 INJECTION, SUSPENSION SUBCUTANEOUS at 23:25

## 2024-02-19 RX ADMIN — ENOXAPARIN SODIUM 40 MILLIGRAM(S): 100 INJECTION SUBCUTANEOUS at 20:04

## 2024-02-19 NOTE — PROGRESS NOTE ADULT - ASSESSMENT
2/16/2024 - percutaneous tracheostomy / PEG placement  -remove sutures from tracheostomy flange on Wed 2/21/2024  -continue tube feeds via PEG as tolerated    I reviewed his labs.  care coordinated with NSCU team.  plan discussed with his daughter at bedside.

## 2024-02-19 NOTE — PROGRESS NOTE ADULT - SUBJECTIVE AND OBJECTIVE BOX
Exam unchanged from day rounds  Bilateral ptosis, pupils 2 mm and sluggish, restricted gaze in all directions however, vertical gaze is better than horizontal,  patient following commands with his BL LE. Patient following commands and able to communicate with his bilateral feets. 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 is now post PLEX 4/5, PLEX tomorrow  might require IVIG after completing PLEX, aspirin held during the day due to trach bleeding,  PEG on glucerna, autonomic dysfunction , trach , lovenox 40 mg sc qhs     Deana Philip NSCU attending    Night rounds   Patient seen and examined    T(C): 37.4 (02-19-24 @ 15:00), Max: 37.5 (02-18-24 @ 23:00)  HR: 92 (02-19-24 @ 18:02) (91 - 102)  BP: 117/58 (02-19-24 @ 18:00) (117/58 - 131/61)  RR: 16 (02-19-24 @ 18:00) (16 - 19)  SpO2: 99% (02-19-24 @ 18:02) (93% - 100%)  02-18-24 @ 07:01  -  02-19-24 @ 07:00  --------------------------------------------------------  IN: 2890 mL / OUT: 1300 mL / NET: 1590 mL    02-19-24 @ 07:01  -  02-19-24 @ 18:42  --------------------------------------------------------  IN: 1260 mL / OUT: 750 mL / NET: 510 mL    acetaminophen   Oral Liquid .. 650 milliGRAM(s) Oral every 6 hours PRN  albuterol/ipratropium for Nebulization 3 milliLiter(s) Nebulizer every 6 hours  atorvastatin 80 milliGRAM(s) Oral at bedtime  chlorhexidine 0.12% Liquid 15 milliLiter(s) Oral Mucosa every 12 hours  chlorhexidine 4% Liquid 1 Application(s) Topical daily  enoxaparin Injectable 40 milliGRAM(s) SubCutaneous <User Schedule>  ergocalciferol Drops 22241 Unit(s) Oral <User Schedule>  gabapentin Solution 400 milliGRAM(s) Oral three times a day  insulin lispro (ADMELOG) corrective regimen sliding scale   SubCutaneous every 6 hours  insulin NPH human recombinant 4 Unit(s) SubCutaneous every 6 hours  oxyCODONE    Solution 5 milliGRAM(s) Oral every 4 hours PRN  oxyCODONE    Solution 10 milliGRAM(s) Oral every 4 hours PRN  pantoprazole  Injectable 40 milliGRAM(s) IV Push daily  polyethylene glycol 3350 17 Gram(s) Oral daily  senna 1 Tablet(s) Oral at bedtime  sodium chloride 0.9% lock flush 10 milliLiter(s) IV Push every 1 hour PRN  sodium chloride 3%  Inhalation 4 milliLiter(s) Inhalation every 6 hours  Mode: AC/ CMV (Assist Control/ Continuous Mandatory Ventilation), RR (machine): 16, TV (machine): 500, FiO2: 40, PEEP: 5, ITime: 1, MAP: 9, PIP: 19      Exam unchanged from day rounds  Bilateral ptosis, pupils 2 mm and sluggish, restricted gaze in all directions however, vertical gaze is better than horizontal,  patient following commands with his BL LE. Patient following commands and able to communicate with his bilateral feets. 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    Night rounds   Patient seen and examined    T(C): 37.4 (02-19-24 @ 15:00), Max: 37.5 (02-18-24 @ 23:00)  HR: 92 (02-19-24 @ 18:02) (91 - 102)  BP: 117/58 (02-19-24 @ 18:00) (117/58 - 131/61)  RR: 16 (02-19-24 @ 18:00) (16 - 19)  SpO2: 99% (02-19-24 @ 18:02) (93% - 100%)  02-18-24 @ 07:01  -  02-19-24 @ 07:00  --------------------------------------------------------  IN: 2890 mL / OUT: 1300 mL / NET: 1590 mL    02-19-24 @ 07:01  -  02-19-24 @ 18:42  --------------------------------------------------------  IN: 1260 mL / OUT: 750 mL / NET: 510 mL    acetaminophen   Oral Liquid .. 650 milliGRAM(s) Oral every 6 hours PRN  albuterol/ipratropium for Nebulization 3 milliLiter(s) Nebulizer every 6 hours  atorvastatin 80 milliGRAM(s) Oral at bedtime  chlorhexidine 0.12% Liquid 15 milliLiter(s) Oral Mucosa every 12 hours  chlorhexidine 4% Liquid 1 Application(s) Topical daily  enoxaparin Injectable 40 milliGRAM(s) SubCutaneous <User Schedule>  ergocalciferol Drops 96737 Unit(s) Oral <User Schedule>  gabapentin Solution 400 milliGRAM(s) Oral three times a day  insulin lispro (ADMELOG) corrective regimen sliding scale   SubCutaneous every 6 hours  insulin NPH human recombinant 4 Unit(s) SubCutaneous every 6 hours  oxyCODONE    Solution 5 milliGRAM(s) Oral every 4 hours PRN  oxyCODONE    Solution 10 milliGRAM(s) Oral every 4 hours PRN  pantoprazole  Injectable 40 milliGRAM(s) IV Push daily  polyethylene glycol 3350 17 Gram(s) Oral daily  senna 1 Tablet(s) Oral at bedtime  sodium chloride 0.9% lock flush 10 milliLiter(s) IV Push every 1 hour PRN  sodium chloride 3%  Inhalation 4 milliLiter(s) Inhalation every 6 hours  Mode: AC/ CMV (Assist Control/ Continuous Mandatory Ventilation), RR (machine): 16, TV (machine): 500, FiO2: 40, PEEP: 5, ITime: 1, MAP: 9, PIP: 19      Exam unchanged from day rounds  Bilateral ptosis, pupils 2 mm and sluggish, restricted gaze in all directions however, vertical gaze is better than horizontal,  patient following commands with his BL LE. Patient following commands and able to communicate with his bilateral feets. Right feet signifying 'yes', left foot signifying 'no' otherwise he is 0/5 in the UE with slight movement of his fingers , LE 0/5 except for the toes     labs and imaging reviewed       LABS:  Na: 147 (02-19 @ 01:05), 146 (02-18 @ 04:18), 145 (02-16 @ 22:56)  K: 4.9 (02-19 @ 01:05), 4.4 (02-18 @ 04:18), 4.5 (02-16 @ 22:56)  Cl: 111 (02-19 @ 01:05), 108 (02-18 @ 04:18), 108 (02-16 @ 22:56)  CO2: 28 (02-19 @ 01:05), 30 (02-18 @ 04:18), 28 (02-16 @ 22:56)  BUN: 32 (02-19 @ 01:05), 24 (02-18 @ 04:18), 16 (02-16 @ 22:56)  Cr: 1.01 (02-19 @ 01:05), 0.84 (02-18 @ 04:18), 0.69 (02-16 @ 22:56)  Glu: 279(02-19 @ 01:05), 212(02-18 @ 04:18), 161(02-16 @ 22:56)    Hgb: 9.0 (02-19 @ 01:05), 9.7 (02-18 @ 04:18)  Hct: 31.8 (02-19 @ 01:05), 34.5 (02-18 @ 04:18)  WBC: 14.45 (02-19 @ 01:05), 15.04 (02-18 @ 04:18)  Plt: 388 (02-19 @ 01:05), 413 (02-18 @ 04:18)    INR: 1.20 02-18-24 @ 04:18  PTT: 27.6 02-18-24 @ 04:18             Patient with worsening weakness and brainstem dysfunction 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

## 2024-02-19 NOTE — PROGRESS NOTE ADULT - SUBJECTIVE AND OBJECTIVE BOX
73y Male with PMHx significant for HTN, HLD, DM type 2, and two prior strokes without residual deficits [place] and L facial weakness on [place]/9. 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 He was transferred to NSICU for angiogram, which was done on 2/9 and showed moderate basilar stenosis. No intervention done. He had to be intubated for worsening respiratory status. On further history it was found that patient had been having progressive weakness and numbness of his extremities for the preceding 1-2 weeks following a viral URI. 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       Admission Scores  GCS:   HH:   MF:   NIHSS: 4   RASS:    CAM-ICU:   ICH:    2/9: transferred from [place], NIHSS of 4 in ED, then became more lethargic, stridorous and desaturated in ED, intubated for airway protected, repeat CTH/A obtained and brought emergently to IR for basilar stenting   2/10: worsening brainstem symptoms while on DAPT  2/12- LP performed to rule out MFS as patient's examination is not explained by the strokes on MRI. First session of PLEX  2/14- s/p 2nd PLEX tx, no acute interval events  2/15- No acute events overnight. Patient's examination more or less the same.   2/16- Patient storming overnight requiring multiple pushes of labetalol and fentanyl without improvement in BP. Patient started on propofol which also is not optimally managing patient's BP.   2/17 Labile BP due to dysautonomia, continue with scheduled TPE sessions, phenyephrine restarted for BP lability  2/19- No acute events overnight       Allergies    No Known Allergies    Intolerances        REVIEW OF SYSTEMS: [ ] Unable to Assess due to neurologic exam   [ ] All ROS addressed below are non-contributory, except:  Neuro: [ ] Headache [ ] Back pain [ ] Numbness [ ] Weakness [ ] Ataxia [ ] Dizziness [ ] Aphasia [ ] Dysarthria [ ] Visual disturbance  Resp: [ ] Shortness of breath/dyspnea, [ ] Orthopnea [ ] Cough  CV: [ ] Chest pain [ ] Palpitation [ ] Lightheadedness [ ] Syncope  Renal: [ ] Thirst [ ] Edema  GI: [ ] Nausea [ ] Emesis [ ] Abdominal pain [ ] Constipation [ ] Diarrhea  Hem: [ ] Hematemesis [ ] bright red blood per rectum  ID: [ ] Fever [ ] Chills [ ] Dysuria  ENT: [ ] Rhinorrhea      DEVICES:   [ ] Restraints [ ] ET tube [ ] central line [ ] arterial line [ ] vences [ ] NGT/OGT [ ] EVD [ ] LD [ ] HAL/HMV [ ] Trach [ ] PEG [ ] Chest Tube     VITALS:   Vital Signs Last 24 Hrs  T(C): 37.4 (19 Feb 2024 03:00), Max: 37.5 (18 Feb 2024 23:00)  T(F): 99.3 (19 Feb 2024 03:00), Max: 99.5 (18 Feb 2024 23:00)  HR: 97 (19 Feb 2024 06:00) (87 - 102)  BP: --  BP(mean): --  RR: 16 (19 Feb 2024 06:00) (16 - 19)  SpO2: 98% (19 Feb 2024 06:00) (87% - 100%)    Parameters below as of 19 Feb 2024 06:00  Patient On (Oxygen Delivery Method): ventilator    O2 Concentration (%): 40  CAPILLARY BLOOD GLUCOSE      POCT Blood Glucose.: 269 mg/dL (19 Feb 2024 05:56)  POCT Blood Glucose.: 243 mg/dL (19 Feb 2024 00:18)  POCT Blood Glucose.: 234 mg/dL (18 Feb 2024 17:12)  POCT Blood Glucose.: 178 mg/dL (18 Feb 2024 11:27)    I&O's Summary    18 Feb 2024 07:01  -  19 Feb 2024 07:00  --------------------------------------------------------  IN: 2890 mL / OUT: 1300 mL / NET: 1590 mL        Respiratory:  Mode: AC/ CMV (Assist Control/ Continuous Mandatory Ventilation)  RR (machine): 16  TV (machine): 450  FiO2: 40  PEEP: 5  ITime: 1  MAP: 9  PIP: 18        LABS:                        9.0    14.45 )-----------( 388      ( 19 Feb 2024 01:05 )             31.8     02-19    147<H>  |  111<H>  |  32<H>  ----------------------------<  279<H>  4.9   |  28  |  1.01             MEDICATION LEVELS:     IVF FLUIDS/MEDICATIONS:   MEDICATIONS  (STANDING):  albuterol/ipratropium for Nebulization 3 milliLiter(s) Nebulizer every 6 hours  atorvastatin 80 milliGRAM(s) Oral at bedtime  chlorhexidine 0.12% Liquid 15 milliLiter(s) Oral Mucosa every 12 hours  chlorhexidine 4% Liquid 1 Application(s) Topical daily  enoxaparin Injectable 40 milliGRAM(s) SubCutaneous <User Schedule>  ergocalciferol Drops 97310 Unit(s) Oral <User Schedule>  gabapentin Solution 400 milliGRAM(s) Oral three times a day  insulin lispro (ADMELOG) corrective regimen sliding scale   SubCutaneous every 6 hours  insulin NPH human recombinant 2 Unit(s) SubCutaneous every 8 hours  pantoprazole  Injectable 40 milliGRAM(s) IV Push daily  polyethylene glycol 3350 17 Gram(s) Oral daily  senna 1 Tablet(s) Oral at bedtime  sodium chloride 3%  Inhalation 4 milliLiter(s) Inhalation every 6 hours    MEDICATIONS  (PRN):  acetaminophen   Oral Liquid .. 650 milliGRAM(s) Oral every 6 hours PRN Mild Pain (1 - 3)  oxyCODONE    Solution 10 milliGRAM(s) Oral every 4 hours PRN Severe Pain (7 - 10)  oxyCODONE    Solution 5 milliGRAM(s) Oral every 4 hours PRN Moderate Pain (4 - 6)  sodium chloride 0.9% lock flush 10 milliLiter(s) IV Push every 1 hour PRN Pre/post blood products, medications, blood draw, and to maintain line patency        EXAMINATION:  PHYSICAL EXAM:    Constitutional: No Acute Distress, patient intubated but following commands     Neurological: Bilateral ptosis, pupils 2 mm and sluggish, restricted gaze in all directions however, vertical gaze is better than horizontal. Facial asymmetry could not be appreciated due to ETT, patient following commands with his BL LE. Patient following commands and able to communicate with his bilateral feets. Right feet signifying 'yes', left foot signifying 'no'.    Motor exam:          Upper extremity                         Delt     Bicep     Tricep    HG                                                 R         0/5        1/5        0/5      2/5 (activation of muscles could be appreciated in bilateral biceps and triceps)                                               L          0/5        1/5        0/5       3/5          Lower extremity                        HF         KF        KE       DF         PF                                                  R        0/5        0/5        3/5       3/5         5/5                                               L         0/5        0/5       3/5       2/5          5/5                                                  Reflexes: Deep Tendon Reflexes absent in all 4 extremities    Pulmonary: Clear to Auscultation, No rales, No rhonchi, No wheezes     Cardiovascular: S1, S2, Regular rate and rhythm     Gastrointestinal: Soft, Non-tender, Non-distended     Extremities: No calf tenderness    73y Male with PMHx significant for HTN, HLD, DM type 2, and two prior strokes without residual deficits [place] and L facial weakness on 2/9. 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 He was transferred to NSICU for angiogram, which was done on 2/9 and showed moderate basilar stenosis. No intervention done. He had to be intubated for worsening respiratory status. On further history it was found that patient had been having progressive weakness and numbness of his extremities for the preceding 1-2 weeks following a viral URI. 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       Admission Scores  GCS:   HH:   MF:   NIHSS: 4   RASS:    CAM-ICU:   ICH:    2/9: transferred from [place], NIHSS of 4 in ED, then became more lethargic, stridorous and desaturated in ED, intubated for airway protected, repeat CTH/A obtained and brought emergently to IR for basilar stenting   2/10: worsening brainstem symptoms while on DAPT  2/12- LP performed to rule out MFS as patient's examination is not explained by the strokes on MRI. First session of PLEX  2/14- s/p 2nd PLEX tx, no acute interval events  2/15- No acute events overnight. Patient's examination more or less the same.   2/16- Patient storming overnight requiring multiple pushes of labetalol and fentanyl without improvement in BP. Patient started on propofol which also is not optimally managing patient's BP.   2/17 Labile BP due to dysautonomia, continue with scheduled TPE sessions, phenyephrine restarted for BP lability  2/19- No acute events overnight       Allergies    No Known Allergies    Intolerances        REVIEW OF SYSTEMS: [ X] Unable to Assess due to neurologic exam   [ ] All ROS addressed below are non-contributory, except:  Neuro: [ ] Headache [ ] Back pain [ ] Numbness [ ] Weakness [ ] Ataxia [ ] Dizziness [ ] Aphasia [ ] Dysarthria [ ] Visual disturbance  Resp: [ ] Shortness of breath/dyspnea, [ ] Orthopnea [ ] Cough  CV: [ ] Chest pain [ ] Palpitation [ ] Lightheadedness [ ] Syncope  Renal: [ ] Thirst [ ] Edema  GI: [ ] Nausea [ ] Emesis [ ] Abdominal pain [ ] Constipation [ ] Diarrhea  Hem: [ ] Hematemesis [ ] bright red blood per rectum  ID: [ ] Fever [ ] Chills [ ] Dysuria  ENT: [ ] Rhinorrhea      DEVICES:   [ ] Restraints [ ] ET tube [ ] central line [ ] arterial line [ ] vences [ ] NGT/OGT [ ] EVD [ ] LD [ ] HAL/HMV [ ] Trach [ ] PEG [ ] Chest Tube     VITALS:   Vital Signs Last 24 Hrs  T(C): 37.4 (19 Feb 2024 03:00), Max: 37.5 (18 Feb 2024 23:00)  T(F): 99.3 (19 Feb 2024 03:00), Max: 99.5 (18 Feb 2024 23:00)  HR: 97 (19 Feb 2024 06:00) (87 - 102)  BP: --  BP(mean): --  RR: 16 (19 Feb 2024 06:00) (16 - 19)  SpO2: 98% (19 Feb 2024 06:00) (87% - 100%)    Parameters below as of 19 Feb 2024 06:00  Patient On (Oxygen Delivery Method): ventilator    O2 Concentration (%): 40  CAPILLARY BLOOD GLUCOSE      POCT Blood Glucose.: 269 mg/dL (19 Feb 2024 05:56)  POCT Blood Glucose.: 243 mg/dL (19 Feb 2024 00:18)  POCT Blood Glucose.: 234 mg/dL (18 Feb 2024 17:12)  POCT Blood Glucose.: 178 mg/dL (18 Feb 2024 11:27)    I&O's Summary    18 Feb 2024 07:01  -  19 Feb 2024 07:00  --------------------------------------------------------  IN: 2890 mL / OUT: 1300 mL / NET: 1590 mL        Respiratory:  Mode: AC/ CMV (Assist Control/ Continuous Mandatory Ventilation)  RR (machine): 16  TV (machine): 450  FiO2: 40  PEEP: 5  ITime: 1  MAP: 9  PIP: 18        LABS:                        9.0    14.45 )-----------( 388      ( 19 Feb 2024 01:05 )             31.8     02-19    147<H>  |  111<H>  |  32<H>  ----------------------------<  279<H>  4.9   |  28  |  1.01             MEDICATION LEVELS:     IVF FLUIDS/MEDICATIONS:   MEDICATIONS  (STANDING):  albuterol/ipratropium for Nebulization 3 milliLiter(s) Nebulizer every 6 hours  atorvastatin 80 milliGRAM(s) Oral at bedtime  chlorhexidine 0.12% Liquid 15 milliLiter(s) Oral Mucosa every 12 hours  chlorhexidine 4% Liquid 1 Application(s) Topical daily  enoxaparin Injectable 40 milliGRAM(s) SubCutaneous <User Schedule>  ergocalciferol Drops 57557 Unit(s) Oral <User Schedule>  gabapentin Solution 400 milliGRAM(s) Oral three times a day  insulin lispro (ADMELOG) corrective regimen sliding scale   SubCutaneous every 6 hours  insulin NPH human recombinant 2 Unit(s) SubCutaneous every 8 hours  pantoprazole  Injectable 40 milliGRAM(s) IV Push daily  polyethylene glycol 3350 17 Gram(s) Oral daily  senna 1 Tablet(s) Oral at bedtime  sodium chloride 3%  Inhalation 4 milliLiter(s) Inhalation every 6 hours    MEDICATIONS  (PRN):  acetaminophen   Oral Liquid .. 650 milliGRAM(s) Oral every 6 hours PRN Mild Pain (1 - 3)  oxyCODONE    Solution 10 milliGRAM(s) Oral every 4 hours PRN Severe Pain (7 - 10)  oxyCODONE    Solution 5 milliGRAM(s) Oral every 4 hours PRN Moderate Pain (4 - 6)  sodium chloride 0.9% lock flush 10 milliLiter(s) IV Push every 1 hour PRN Pre/post blood products, medications, blood draw, and to maintain line patency        EXAMINATION:  PHYSICAL EXAM:    Constitutional: No Acute Distress, patient intubated but following commands     Neurological: Bilateral ptosis, pupils 2 mm and sluggish, restricted gaze in all directions however, vertical gaze is better than horizontal. Facial asymmetry could not be appreciated due to ETT, patient following commands with his BL LE. Patient following commands and able to communicate with his bilateral feets. Right feet signifying 'yes', left foot signifying 'no'.    Motor exam:          Upper extremity                         Delt     Bicep     Tricep    HG                                                 R         0/5        1/5        0/5      2/5 (activation of muscles could be appreciated in bilateral biceps and triceps)                                               L          0/5        1/5        0/5       3/5          Lower extremity                        HF         KF        KE       DF         PF                                                  R        0/5        0/5        3/5       3/5         5/5                                               L         0/5        0/5       3/5       2/5          5/5                                                  Reflexes: Deep Tendon Reflexes absent in all 4 extremities    Pulmonary: Clear to Auscultation, No rales, No rhonchi, No wheezes     Cardiovascular: S1, S2, Regular rate and rhythm     Gastrointestinal: Soft, Non-tender, Non-distended     Extremities: No calf tenderness

## 2024-02-19 NOTE — PROGRESS NOTE ADULT - ASSESSMENT
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  - CSF WBC 11, protein 67, Glucose 83  - S/p Plex x3 2/16, plan for 5  - C/W ASA   - Neurology following   - Speech pathologist evaluation done and mode of speech established  Activity: [x] mobilize as tolerated [] Bedrest [x] PT [x] OT [x] PMNR    PULM:  - s/p tracheostomy 2/16  - DC hypertonic nebs and chest PT   - CPAP as tolerated     CV:   - SBP goal  with MAP >65   - PICC  - Continue ASA   - TTE- EF 66%  - R- axillary A line     RENAL:  - Fluids: IVL  - c/w Free water 200 ccQ6h  - Na stable @ 145  - Mild BALTA- monitor renal function     GI:  - s/p PEG 2/16  - resume enteral feeding  - Vitamin D supplementation   - GI prophylaxis [] not indicated [x] PPI [] other:  - Bowel regimen [x] miralax [x] senna, dulcolax suppository   - LBM  2/16    ENDO:   - Goal euglycemia (-180)  - A1c 6.8  - Elevated glucose- patient currently on ISS and NPH 2 units TID    HEME/ONC:  - H/H stable   - VTE prophylaxis: [x] SCDs [x] chemoprophylaxis- SQL [] hold chemoprophylaxis due to: [] high risk of DVT/PE on admission due to:    ID:  - Afebrile, leukocytosis- trending down     MISC:  Lines/tubes: LIBORIO CAMPOS foley  ASSESSMENT/PLAN: 72 y/o male with multiple comorbidities presenting initially with suspicious of basilar stroke however, 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  - CSF WBC 11, protein 67, Glucose 83  - S/p Plex x4 2/18, plan for 5  - ASA stopped due to kathy blood from his trach site  - Neurology following   - Speech pathologist evaluation done and mode of speech established  Activity: [x] mobilize as tolerated [] Bedrest [x] PT [x] OT [x] PMNR    PULM:  - s/p tracheostomy 2/16  - Continue chest PT   - CPAP as tolerated   - Thick secretions on hypertonic nebs     CV:   - SBP goal  with MAP >65   - PICC  - TTE- EF 66%  - R- axillary A line     RENAL:  - Fluids: IVL  - c/w Free water 200 ccQ6h  - Na stable @ 145  - Mild BALTA- monitor renal function, maintain euvolemia      GI:  - s/p PEG 2/16  - Vitamin D supplementation   - GI prophylaxis [] not indicated [x] PPI [] other:  - Bowel regimen [x] miralax [x] senna, dulcolax suppository   - LBM  2/18    ENDO:   - Goal euglycemia (-180)  - A1c 6.8  - Elevated glucose- patient currently on ISS and NPH 2 units TID, increased to 4 units TID    HEME/ONC:  - H/H stable   - Fibrinogen checks Q8H while getting plasmapheresis   - VTE prophylaxis: [x] SCDs [x] chemoprophylaxis- SQL [] hold chemoprophylaxis due to: [] high risk of DVT/PE on admission due to: immobility     ID:  - Afebrile, leukocytosis- trending down     MISC:  Lines/tubes: RIJ, ru Patel

## 2024-02-19 NOTE — PROGRESS NOTE ADULT - SUBJECTIVE AND OBJECTIVE BOX
afeb  SpO2 = 99%  on mechanical vent (16 / 500 / 40% / +5) via 8.0 Portex trach  trach site clean without evidence of infection or recurrent bleeding, so I removed the gauze from under the flange    soft / NT / ND  on Glucerna 1.2 @ 60 mL/hr via PEG  PEG site clean without evidence of infection ro recurrent bleeding  I loosened the PEG bumper 4.0 -> 5.0 cm from skin    WBC = 14

## 2024-02-20 LAB
AMPA-R AB CBA, CSF: NEGATIVE — SIGNIFICANT CHANGE UP
AMPHIPHYSIN AB TITR CSF: NEGATIVE — SIGNIFICANT CHANGE UP
ANION GAP SERPL CALC-SCNC: 10 MMOL/L — SIGNIFICANT CHANGE UP (ref 5–17)
ANION GAP SERPL CALC-SCNC: 10 MMOL/L — SIGNIFICANT CHANGE UP (ref 5–17)
APTT BLD: 26.7 SEC — SIGNIFICANT CHANGE UP (ref 24.5–35.6)
BUN SERPL-MCNC: 30 MG/DL — HIGH (ref 7–23)
BUN SERPL-MCNC: 30 MG/DL — HIGH (ref 7–23)
CA-I BLD-SCNC: 1.12 MMOL/L — LOW (ref 1.15–1.33)
CALCIUM SERPL-MCNC: 8.6 MG/DL — SIGNIFICANT CHANGE UP (ref 8.4–10.5)
CALCIUM SERPL-MCNC: 8.7 MG/DL — SIGNIFICANT CHANGE UP (ref 8.4–10.5)
CASPR2-IGG CBA, CSF: NEGATIVE — SIGNIFICANT CHANGE UP
CHLORIDE SERPL-SCNC: 109 MMOL/L — HIGH (ref 96–108)
CHLORIDE SERPL-SCNC: 113 MMOL/L — HIGH (ref 96–108)
CO2 SERPL-SCNC: 25 MMOL/L — SIGNIFICANT CHANGE UP (ref 22–31)
CO2 SERPL-SCNC: 27 MMOL/L — SIGNIFICANT CHANGE UP (ref 22–31)
COPPER SERPL-MCNC: 156 UG/DL — HIGH (ref 69–132)
CREAT SERPL-MCNC: 0.93 MG/DL — SIGNIFICANT CHANGE UP (ref 0.5–1.3)
CREAT SERPL-MCNC: 0.99 MG/DL — SIGNIFICANT CHANGE UP (ref 0.5–1.3)
CV2 IGG TITR CSF: NEGATIVE — SIGNIFICANT CHANGE UP
DPPX ANTIBODY IFA, CSF: NEGATIVE — SIGNIFICANT CHANGE UP
EGFR: 80 ML/MIN/1.73M2 — SIGNIFICANT CHANGE UP
EGFR: 87 ML/MIN/1.73M2 — SIGNIFICANT CHANGE UP
FIBRINOGEN PPP-MCNC: 504 MG/DL — HIGH (ref 200–445)
FSP PPP-MCNC: >=5 <20 UG/ML
GABA-B-R AB CBA, CSF: NEGATIVE — SIGNIFICANT CHANGE UP
GAD65 AB CSF-SCNC: 0 NMOL/L — SIGNIFICANT CHANGE UP
GAS PNL BLDA: SIGNIFICANT CHANGE UP
GFAP IFA, CSF: NEGATIVE — SIGNIFICANT CHANGE UP
GLIAL NUC TYPE 1 AB TITR CSF: NEGATIVE — SIGNIFICANT CHANGE UP
GLUCOSE BLDC GLUCOMTR-MCNC: 153 MG/DL — HIGH (ref 70–99)
GLUCOSE BLDC GLUCOMTR-MCNC: 153 MG/DL — HIGH (ref 70–99)
GLUCOSE BLDC GLUCOMTR-MCNC: 157 MG/DL — HIGH (ref 70–99)
GLUCOSE BLDC GLUCOMTR-MCNC: 158 MG/DL — HIGH (ref 70–99)
GLUCOSE BLDC GLUCOMTR-MCNC: 162 MG/DL — HIGH (ref 70–99)
GLUCOSE BLDC GLUCOMTR-MCNC: 179 MG/DL — HIGH (ref 70–99)
GLUCOSE BLDC GLUCOMTR-MCNC: 181 MG/DL — HIGH (ref 70–99)
GLUCOSE BLDC GLUCOMTR-MCNC: 188 MG/DL — HIGH (ref 70–99)
GLUCOSE BLDC GLUCOMTR-MCNC: 199 MG/DL — HIGH (ref 70–99)
GLUCOSE BLDC GLUCOMTR-MCNC: 211 MG/DL — HIGH (ref 70–99)
GLUCOSE BLDC GLUCOMTR-MCNC: 230 MG/DL — HIGH (ref 70–99)
GLUCOSE BLDC GLUCOMTR-MCNC: 250 MG/DL — HIGH (ref 70–99)
GLUCOSE BLDC GLUCOMTR-MCNC: 255 MG/DL — HIGH (ref 70–99)
GLUCOSE BLDC GLUCOMTR-MCNC: 259 MG/DL — HIGH (ref 70–99)
GLUCOSE BLDC GLUCOMTR-MCNC: 263 MG/DL — HIGH (ref 70–99)
GLUCOSE BLDC GLUCOMTR-MCNC: 290 MG/DL — HIGH (ref 70–99)
GLUCOSE SERPL-MCNC: 176 MG/DL — HIGH (ref 70–99)
GLUCOSE SERPL-MCNC: 292 MG/DL — HIGH (ref 70–99)
H CAPSUL AG CSF IA-MCNC: SIGNIFICANT CHANGE UP
HCT VFR BLD CALC: 30.3 % — LOW (ref 39–50)
HCT VFR BLD CALC: 32.3 % — LOW (ref 39–50)
HGB BLD-MCNC: 8.7 G/DL — LOW (ref 13–17)
HGB BLD-MCNC: 8.9 G/DL — LOW (ref 13–17)
HU1 AB TITR CSF IF: NEGATIVE — SIGNIFICANT CHANGE UP
HU2 AB TITR CSF IF: NEGATIVE — SIGNIFICANT CHANGE UP
HU3 AB TITR CSF: NEGATIVE — SIGNIFICANT CHANGE UP
IFA NOTES: SIGNIFICANT CHANGE UP
IGLON5 IFA, CSF: NEGATIVE — SIGNIFICANT CHANGE UP
IMMUNOLOGIST REVIEW: SIGNIFICANT CHANGE UP
INNER EAR 68KD AB FLD QL: <1.5 U/L — SIGNIFICANT CHANGE UP (ref 0–3.1)
INR BLD: 1.18 RATIO — SIGNIFICANT CHANGE UP (ref 0.85–1.18)
LGI1-IGG CBA, CSF: NEGATIVE — SIGNIFICANT CHANGE UP
MAGNESIUM SERPL-MCNC: 2.5 MG/DL — SIGNIFICANT CHANGE UP (ref 1.6–2.6)
MAGNESIUM SERPL-MCNC: 2.8 MG/DL — HIGH (ref 1.6–2.6)
MCHC RBC-ENTMCNC: 21.5 PG — LOW (ref 27–34)
MCHC RBC-ENTMCNC: 22.2 PG — LOW (ref 27–34)
MCHC RBC-ENTMCNC: 27.6 GM/DL — LOW (ref 32–36)
MCHC RBC-ENTMCNC: 28.7 GM/DL — LOW (ref 32–36)
MCV RBC AUTO: 77.3 FL — LOW (ref 80–100)
MCV RBC AUTO: 78 FL — LOW (ref 80–100)
MGLUR1 AB IFA, CSF: NEGATIVE — SIGNIFICANT CHANGE UP
NRBC # BLD: 1 /100 WBCS — HIGH (ref 0–0)
NRBC # BLD: 1 /100 WBCS — HIGH (ref 0–0)
PCA-TR AB TITR CSF: NEGATIVE — SIGNIFICANT CHANGE UP
PHOSPHATE SERPL-MCNC: 3.3 MG/DL — SIGNIFICANT CHANGE UP (ref 2.5–4.5)
PHOSPHATE SERPL-MCNC: 5.4 MG/DL — HIGH (ref 2.5–4.5)
PLATELET # BLD AUTO: 322 K/UL — SIGNIFICANT CHANGE UP (ref 150–400)
PLATELET # BLD AUTO: 377 K/UL — SIGNIFICANT CHANGE UP (ref 150–400)
POTASSIUM SERPL-MCNC: 5.3 MMOL/L — SIGNIFICANT CHANGE UP (ref 3.5–5.3)
POTASSIUM SERPL-MCNC: 5.7 MMOL/L — HIGH (ref 3.5–5.3)
POTASSIUM SERPL-SCNC: 5.3 MMOL/L — SIGNIFICANT CHANGE UP (ref 3.5–5.3)
POTASSIUM SERPL-SCNC: 5.7 MMOL/L — HIGH (ref 3.5–5.3)
PROTHROM AB SERPL-ACNC: 12.3 SEC — SIGNIFICANT CHANGE UP (ref 9.5–13)
RBC # BLD: 3.92 M/UL — LOW (ref 4.2–5.8)
RBC # BLD: 4.14 M/UL — LOW (ref 4.2–5.8)
RBC # FLD: 20.3 % — HIGH (ref 10.3–14.5)
RBC # FLD: 20.6 % — HIGH (ref 10.3–14.5)
SODIUM SERPL-SCNC: 146 MMOL/L — HIGH (ref 135–145)
SODIUM SERPL-SCNC: 148 MMOL/L — HIGH (ref 135–145)
WBC # BLD: 12.88 K/UL — HIGH (ref 3.8–10.5)
WBC # BLD: 21.95 K/UL — HIGH (ref 3.8–10.5)
WBC # FLD AUTO: 12.88 K/UL — HIGH (ref 3.8–10.5)
WBC # FLD AUTO: 21.95 K/UL — HIGH (ref 3.8–10.5)

## 2024-02-20 PROCEDURE — 71045 X-RAY EXAM CHEST 1 VIEW: CPT | Mod: 26

## 2024-02-20 PROCEDURE — 99231 SBSQ HOSP IP/OBS SF/LOW 25: CPT

## 2024-02-20 PROCEDURE — 71275 CT ANGIOGRAPHY CHEST: CPT | Mod: 26

## 2024-02-20 PROCEDURE — 99291 CRITICAL CARE FIRST HOUR: CPT

## 2024-02-20 PROCEDURE — 36514 APHERESIS PLASMA: CPT

## 2024-02-20 RX ORDER — INSULIN HUMAN 100 [IU]/ML
2 INJECTION, SOLUTION SUBCUTANEOUS ONCE
Refills: 0 | Status: DISCONTINUED | OUTPATIENT
Start: 2024-02-20 | End: 2024-02-20

## 2024-02-20 RX ORDER — DEXTROSE 50 % IN WATER 50 %
50 SYRINGE (ML) INTRAVENOUS
Refills: 0 | Status: DISCONTINUED | OUTPATIENT
Start: 2024-02-20 | End: 2024-02-22

## 2024-02-20 RX ORDER — PIPERACILLIN AND TAZOBACTAM 4; .5 G/20ML; G/20ML
3.38 INJECTION, POWDER, LYOPHILIZED, FOR SOLUTION INTRAVENOUS ONCE
Refills: 0 | Status: COMPLETED | OUTPATIENT
Start: 2024-02-21 | End: 2024-02-21

## 2024-02-20 RX ORDER — HUMAN INSULIN 100 [IU]/ML
5 INJECTION, SUSPENSION SUBCUTANEOUS ONCE
Refills: 0 | Status: COMPLETED | OUTPATIENT
Start: 2024-02-20 | End: 2024-02-20

## 2024-02-20 RX ORDER — CALCIUM GLUCONATE 100 MG/ML
1 VIAL (ML) INTRAVENOUS ONCE
Refills: 0 | Status: COMPLETED | OUTPATIENT
Start: 2024-02-20 | End: 2024-02-20

## 2024-02-20 RX ORDER — SODIUM CHLORIDE 9 MG/ML
1000 INJECTION, SOLUTION INTRAVENOUS ONCE
Refills: 0 | Status: COMPLETED | OUTPATIENT
Start: 2024-02-20 | End: 2024-02-20

## 2024-02-20 RX ORDER — PIPERACILLIN AND TAZOBACTAM 4; .5 G/20ML; G/20ML
3.38 INJECTION, POWDER, LYOPHILIZED, FOR SOLUTION INTRAVENOUS EVERY 8 HOURS
Refills: 0 | Status: DISCONTINUED | OUTPATIENT
Start: 2024-02-21 | End: 2024-02-25

## 2024-02-20 RX ORDER — HYDRALAZINE HCL 50 MG
10 TABLET ORAL ONCE
Refills: 0 | Status: COMPLETED | OUTPATIENT
Start: 2024-02-20 | End: 2024-02-20

## 2024-02-20 RX ORDER — SODIUM CHLORIDE 9 MG/ML
1000 INJECTION, SOLUTION INTRAVENOUS
Refills: 0 | Status: DISCONTINUED | OUTPATIENT
Start: 2024-02-20 | End: 2024-02-22

## 2024-02-20 RX ORDER — ASPIRIN/CALCIUM CARB/MAGNESIUM 324 MG
81 TABLET ORAL DAILY
Refills: 0 | Status: DISCONTINUED | OUTPATIENT
Start: 2024-02-21 | End: 2024-02-21

## 2024-02-20 RX ORDER — DEXTROSE 50 % IN WATER 50 %
15 SYRINGE (ML) INTRAVENOUS ONCE
Refills: 0 | Status: DISCONTINUED | OUTPATIENT
Start: 2024-02-20 | End: 2024-02-22

## 2024-02-20 RX ORDER — HUMAN INSULIN 100 [IU]/ML
25 INJECTION, SUSPENSION SUBCUTANEOUS EVERY 6 HOURS
Refills: 0 | Status: DISCONTINUED | OUTPATIENT
Start: 2024-02-20 | End: 2024-02-21

## 2024-02-20 RX ORDER — HUMAN INSULIN 100 [IU]/ML
4 INJECTION, SUSPENSION SUBCUTANEOUS ONCE
Refills: 0 | Status: COMPLETED | OUTPATIENT
Start: 2024-02-20 | End: 2024-02-20

## 2024-02-20 RX ORDER — INSULIN HUMAN 100 [IU]/ML
2.5 INJECTION, SOLUTION SUBCUTANEOUS
Qty: 100 | Refills: 0 | Status: DISCONTINUED | OUTPATIENT
Start: 2024-02-20 | End: 2024-02-20

## 2024-02-20 RX ORDER — HUMAN INSULIN 100 [IU]/ML
10 INJECTION, SUSPENSION SUBCUTANEOUS EVERY 6 HOURS
Refills: 0 | Status: DISCONTINUED | OUTPATIENT
Start: 2024-02-20 | End: 2024-02-20

## 2024-02-20 RX ORDER — PIPERACILLIN AND TAZOBACTAM 4; .5 G/20ML; G/20ML
3.38 INJECTION, POWDER, LYOPHILIZED, FOR SOLUTION INTRAVENOUS ONCE
Refills: 0 | Status: COMPLETED | OUTPATIENT
Start: 2024-02-20 | End: 2024-02-20

## 2024-02-20 RX ORDER — PANTOPRAZOLE SODIUM 20 MG/1
40 TABLET, DELAYED RELEASE ORAL DAILY
Refills: 0 | Status: DISCONTINUED | OUTPATIENT
Start: 2024-02-20 | End: 2024-03-03

## 2024-02-20 RX ORDER — HUMAN INSULIN 100 [IU]/ML
15 INJECTION, SUSPENSION SUBCUTANEOUS EVERY 6 HOURS
Refills: 0 | Status: DISCONTINUED | OUTPATIENT
Start: 2024-02-20 | End: 2024-02-20

## 2024-02-20 RX ORDER — GLUCAGON INJECTION, SOLUTION 0.5 MG/.1ML
1 INJECTION, SOLUTION SUBCUTANEOUS ONCE
Refills: 0 | Status: DISCONTINUED | OUTPATIENT
Start: 2024-02-20 | End: 2024-02-22

## 2024-02-20 RX ORDER — INSULIN HUMAN 100 [IU]/ML
2 INJECTION, SOLUTION SUBCUTANEOUS ONCE
Refills: 0 | Status: COMPLETED | OUTPATIENT
Start: 2024-02-20 | End: 2024-02-20

## 2024-02-20 RX ORDER — INSULIN HUMAN 100 [IU]/ML
2.5 INJECTION, SOLUTION SUBCUTANEOUS
Qty: 50 | Refills: 0 | Status: DISCONTINUED | OUTPATIENT
Start: 2024-02-20 | End: 2024-02-20

## 2024-02-20 RX ORDER — ACETAMINOPHEN 500 MG
1000 TABLET ORAL ONCE
Refills: 0 | Status: COMPLETED | OUTPATIENT
Start: 2024-02-20 | End: 2024-02-20

## 2024-02-20 RX ORDER — INSULIN LISPRO 100/ML
VIAL (ML) SUBCUTANEOUS EVERY 6 HOURS
Refills: 0 | Status: DISCONTINUED | OUTPATIENT
Start: 2024-02-20 | End: 2024-03-03

## 2024-02-20 RX ORDER — VANCOMYCIN HCL 1 G
1500 VIAL (EA) INTRAVENOUS EVERY 12 HOURS
Refills: 0 | Status: DISCONTINUED | OUTPATIENT
Start: 2024-02-20 | End: 2024-02-22

## 2024-02-20 RX ADMIN — INSULIN HUMAN 2.5 UNIT(S)/HR: 100 INJECTION, SOLUTION SUBCUTANEOUS at 07:01

## 2024-02-20 RX ADMIN — SODIUM CHLORIDE 4 MILLILITER(S): 9 INJECTION INTRAMUSCULAR; INTRAVENOUS; SUBCUTANEOUS at 05:50

## 2024-02-20 RX ADMIN — Medication 650 MILLIGRAM(S): at 11:32

## 2024-02-20 RX ADMIN — GABAPENTIN 400 MILLIGRAM(S): 400 CAPSULE ORAL at 05:28

## 2024-02-20 RX ADMIN — Medication 3 MILLILITER(S): at 05:50

## 2024-02-20 RX ADMIN — ENOXAPARIN SODIUM 40 MILLIGRAM(S): 100 INJECTION SUBCUTANEOUS at 19:07

## 2024-02-20 RX ADMIN — HUMAN INSULIN 15 UNIT(S): 100 INJECTION, SUSPENSION SUBCUTANEOUS at 05:29

## 2024-02-20 RX ADMIN — Medication 10 MILLIGRAM(S): at 11:42

## 2024-02-20 RX ADMIN — PANTOPRAZOLE SODIUM 40 MILLIGRAM(S): 20 TABLET, DELAYED RELEASE ORAL at 11:43

## 2024-02-20 RX ADMIN — HUMAN INSULIN 25 UNIT(S): 100 INJECTION, SUSPENSION SUBCUTANEOUS at 18:29

## 2024-02-20 RX ADMIN — ERGOCALCIFEROL 50000 UNIT(S): 1.25 CAPSULE ORAL at 06:12

## 2024-02-20 RX ADMIN — SENNA PLUS 1 TABLET(S): 8.6 TABLET ORAL at 22:22

## 2024-02-20 RX ADMIN — HUMAN INSULIN 5 UNIT(S): 100 INJECTION, SUSPENSION SUBCUTANEOUS at 03:14

## 2024-02-20 RX ADMIN — Medication 3 MILLILITER(S): at 00:48

## 2024-02-20 RX ADMIN — INSULIN HUMAN 2 UNIT(S): 100 INJECTION, SOLUTION SUBCUTANEOUS at 03:23

## 2024-02-20 RX ADMIN — Medication 400 MILLIGRAM(S): at 17:17

## 2024-02-20 RX ADMIN — GABAPENTIN 400 MILLIGRAM(S): 400 CAPSULE ORAL at 13:18

## 2024-02-20 RX ADMIN — CHLORHEXIDINE GLUCONATE 15 MILLILITER(S): 213 SOLUTION TOPICAL at 05:28

## 2024-02-20 RX ADMIN — INSULIN HUMAN 2.5 UNIT(S)/HR: 100 INJECTION, SOLUTION SUBCUTANEOUS at 06:11

## 2024-02-20 RX ADMIN — Medication 6: at 05:29

## 2024-02-20 RX ADMIN — CHLORHEXIDINE GLUCONATE 15 MILLILITER(S): 213 SOLUTION TOPICAL at 18:30

## 2024-02-20 RX ADMIN — SODIUM CHLORIDE 333.33 MILLILITER(S): 9 INJECTION, SOLUTION INTRAVENOUS at 21:51

## 2024-02-20 RX ADMIN — HUMAN INSULIN 4 UNIT(S): 100 INJECTION, SUSPENSION SUBCUTANEOUS at 01:23

## 2024-02-20 RX ADMIN — ATORVASTATIN CALCIUM 80 MILLIGRAM(S): 80 TABLET, FILM COATED ORAL at 22:22

## 2024-02-20 RX ADMIN — GABAPENTIN 400 MILLIGRAM(S): 400 CAPSULE ORAL at 22:22

## 2024-02-20 RX ADMIN — POLYETHYLENE GLYCOL 3350 17 GRAM(S): 17 POWDER, FOR SOLUTION ORAL at 11:43

## 2024-02-20 RX ADMIN — CHLORHEXIDINE GLUCONATE 1 APPLICATION(S): 213 SOLUTION TOPICAL at 11:43

## 2024-02-20 RX ADMIN — SODIUM CHLORIDE 4 MILLILITER(S): 9 INJECTION INTRAMUSCULAR; INTRAVENOUS; SUBCUTANEOUS at 00:48

## 2024-02-20 RX ADMIN — INSULIN HUMAN 2.5 UNIT(S)/HR: 100 INJECTION, SOLUTION SUBCUTANEOUS at 19:07

## 2024-02-20 RX ADMIN — Medication 1000 MILLIGRAM(S): at 17:32

## 2024-02-20 RX ADMIN — Medication 650 MILLIGRAM(S): at 11:02

## 2024-02-20 RX ADMIN — Medication 100 GRAM(S): at 12:20

## 2024-02-20 NOTE — PROGRESS NOTE ADULT - REASON FOR ADMISSION
----- Message from Carolyn Mancia sent at 11/15/2022  9:47 AM CST -----  Contact: [facility] pharmacy  ..Type:  Pharmacy Calling to Clarify an RX    Name of Caller:Anushka  Pharmacy Name: [facility]   Prescription Name: amiodarone (PACERONE) 200 MG Tab  What do they need to clarify?: Verify directions   Best Call Back Number:[phone]  Additional Information:          Stroke, critical stenosis, evaluation for angiography

## 2024-02-20 NOTE — CHART NOTE - NSCHARTNOTEFT_GEN_A_CORE
74 y/o male with PMH CVA x2, HTN, HLD, and DM who is admitted for basilar-stroke like symptoms and possible geiger contreras variant of [place] Syndrome based on neurological examination. 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 Plan for 5 TPE over approximately 10 days, once every other day.    TPE #1 was performed on 02/12/24. One plasma volume was exchanged with 5% albumin and 3g of calcium was given throughout the procedure. Patient tolerated the procedure well.    TPE #2 was performed on 02/14/24. Labs were reviewed prior to procedure and 1g calcium given prior to procedure. One plasma volume was exchanged with 5% albumin and 3g of calcium was given throughout the procedure. Patient experienced episode of hypertension during procedure which resolved with labetalol, otherwise tolerated the procedure well.    TPE#3 was successfully performed on 02/16/24. One plasma volume was replaced with half 5% albumin and half donor plasma because patient is to have Trach and PEG placement post procedure. Patient tolerated the procedure well.    TPE#4 was performed on 02/18/24.  One plasma volume was exchanged with 5% albumin.  Patient tolerated well.    TPE#5 was performed on 2/20/24.  One plasma volume was exchanged with 5% albumin.  Patient tolerated well.

## 2024-02-20 NOTE — PROGRESS NOTE ADULT - ATTENDING COMMENTS
Patient seen and examined at bedside.  During my evaluation, patient's daughter was present and I appreciate that.    I agree with the findings and plan as documented in the Resident's note except below.    Exam: Patient able to move feet on command and minimal trace movement at bilateral hands, otherwise  not movement rest of extremities to noxious or command.    Mr. Ashley is a 73 year old unknown handed  man with PMHx of multiple vascular risk factors and other medical problems who presented to [facility] ER due to the ataxia and left facial droop, initially concerning for stroke and w/up notable for basilar stenosis. Transferred to [facility] and quickly developed worsening dysarthria, dysphagia, and weakness. He was intubated and taken to angiogram on 2/11 showed moderate stenosis but no intervention done. Per NSICU team, the patient had been having progressive weakness and numbness of the extremities for the preceding 1-2 weeks following URI. GD1b found to be 110. Started on PLEX 2/13/24 due to likely GBS. Today he is schedule for 5/5 PLEX .   Patient will benefit from IVIG after Plex if there is contraindication.   Continue medical management, neuro- check and fall precaution.  GI and DVT prophylaxis.    Patient is at high risk for complications and morbidity or mortality. There is high probability of imminent or life threatening deterioration in the patient's condition.  Patient is unable or incompetent to participate in giving a history and/or making decisions and discussion is necessary for determining treatment decisions.  I discussed the diagnosis and treatment plan with the patient's daughter. All questions and concerns were addressed.   My cumulative time taking care of this critically ill patient is 60 minutes  If you have any further questions, please do not hesitate to contact our team.  Thank you for allowing us to participate in this patient care. Patient seen and examined at bedside.  During my evaluation, patient's daughter was present and I appreciate that.    I agree with the findings and plan as documented in the Resident's note except below.    Exam: Patient able to move feet on command and minimal trace movement at bilateral hands, otherwise  no movements at rest of extremities to noxious stimule or command.    Mr. Ashley is a 73 year old unknown handed  man with PMHx of multiple vascular risk factors and other medical problems who presented to [facility] ER due to the ataxia and left facial droop, initially concerning for stroke and w/up notable for basilar stenosis. Transferred to [facility] and quickly developed worsening dysarthria, dysphagia, and weakness. He was intubated and taken to angiogram on 2/11 showed moderate stenosis but no intervention done. MRI with small b/l cerebellar strokes (postprocedural after angiography) and prior L thalamic strokes, although these symptoms would not explain pt's current clinical deficits and progress nature.   Per NSICU team, the patient had been having progressive weakness and numbness of the extremities for the preceding 1-2 weeks following URI. GD1b found to be 110. 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 Today he is schedule for 5/5 PLEX .   Patient will benefit from IVIG after Plex if there is contraindication.   Continue medical management, neuro- check and fall precaution.  GI and DVT prophylaxis.    Patient is at high risk for complications and morbidity or mortality. There is high probability of imminent or life threatening deterioration in the patient's condition.  Patient is unable or incompetent to participate in giving a history and/or making decisions and discussion is necessary for determining treatment decisions.  I discussed the diagnosis and treatment plan with the patient's daughter. All questions and concerns were addressed.   My cumulative time taking care of this critically ill patient is 60 minutes  If you have any further questions, please do not hesitate to contact our team.  Thank you for allowing us to participate in this patient care.

## 2024-02-20 NOTE — PROGRESS NOTE ADULT - SUBJECTIVE AND OBJECTIVE BOX
Tm 100.4 (2/19 1900)  on mechanical vent (16 / 500 / 40% / +5) via 8.0 Portex trach  trach site clean without evidence of infection or recurrent bleeding    soft / NT / ND  on Glucerna 1.2 @ 60 mL/hr via PEG  PEG site clean without evidence of infection or recurrent bleeding  I loosened the PEG bumper 5.0 -> 5.5 cm from skin    WBC = 12

## 2024-02-20 NOTE — PROGRESS NOTE ADULT - SUBJECTIVE AND OBJECTIVE BOX
Neurology Progress Note    SUBJECTIVE/OBJECTIVE/INTERVAL EVENTS: Patient seen and examined at bedside w/ neuro attending and team. Patient continues to communicate with b/l feet movement. Daughter at bedside. Improvement seems minimal.    INTERVAL HISTORY: 74 y/o male with multiple comorbidities presenting initially with suspicious of basilar stroke however, neurological examination more consistent with GBS. 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 Elevated sugars overnight requiring insulin drip. Scheduled for last dose of PLEX today, 2/20.     REVIEW OF SYSTEMS: unable to obtain    MEDICATIONS  (STANDING):  atorvastatin 80 milliGRAM(s) Oral at bedtime  chlorhexidine 0.12% Liquid 15 milliLiter(s) Oral Mucosa every 12 hours  chlorhexidine 4% Liquid 1 Application(s) Topical daily  dextrose 50% Injectable 50 milliLiter(s) IV Push every 15 minutes  enoxaparin Injectable 40 milliGRAM(s) SubCutaneous <User Schedule>  ergocalciferol Drops 46274 Unit(s) Oral <User Schedule>  gabapentin Solution 400 milliGRAM(s) Oral three times a day  insulin regular Infusion 2.5 Unit(s)/Hr (2.5 mL/Hr) IV Continuous <Continuous>  pantoprazole   Suspension 40 milliGRAM(s) Oral daily  polyethylene glycol 3350 17 Gram(s) Oral daily  senna 1 Tablet(s) Oral at bedtime    MEDICATIONS  (PRN):  acetaminophen   Oral Liquid .. 650 milliGRAM(s) Oral every 6 hours PRN Mild Pain (1 - 3)  oxyCODONE    Solution 5 milliGRAM(s) Oral every 4 hours PRN Moderate Pain (4 - 6)  oxyCODONE    Solution 10 milliGRAM(s) Oral every 4 hours PRN Severe Pain (7 - 10)  sodium chloride 0.9% lock flush 10 milliLiter(s) IV Push every 1 hour PRN Pre/post blood products, medications, blood draw, and to maintain line patency    VITALS & EXAMINATION:  Vital Signs Last 24 Hrs  T(C): 37.7 (20 Feb 2024 11:00), Max: 38 (19 Feb 2024 19:00)  T(F): 99.9 (20 Feb 2024 11:00), Max: 100.4 (19 Feb 2024 19:00)  HR: 100 (20 Feb 2024 13:00) (90 - 107)  BP: 173/74 (20 Feb 2024 13:00) (117/58 - 186/79)  BP(mean): 107 (20 Feb 2024 13:00) (80 - 116)  RR: 14 (20 Feb 2024 13:00) (14 - 29)  SpO2: 93% (20 Feb 2024 13:00) (93% - 99%)    Parameters below as of 20 Feb 2024 07:00  Patient On (Oxygen Delivery Method): ventilator    Limited due to intubated   Neurological (>12):  MS: Awake. responds to yes or no questions with b/l feet movement  Language: no verbal output, intubated  CNs: PERRL (R 2mm, L 2mm) sluggish reactive. VFF minimally. eyes are conjugate and straight head. patient is suffering from opthalmoplegia, no eye movements but seems to be trying  Motor - Normal bulk and flaccid tone throughout.     L/R (out of 5 each)       Deltoid  0/0    Biceps   0/0      Triceps  0/0              1/1  L/R (out of 5 each)       Hip Flexion  0/0    Hip Extension  0/0  Knee Extension  0/0  Dorsiflexion  2/2      Plantar Flexion 2/2     Sensation: Intact to LT b/l x4 extremities. C  Reflexes L/R:  Biceps(C5) 2/2  BR(C6) 2/2   Triceps(C7)  2/2 Patellar(L4)   0/0   Ankles 0/0  Coordination: unable to assess  Gait: deferred, unable to assess    LABORATORY:  CBC                       8.7    12.88 )-----------( 377      ( 20 Feb 2024 00:29 )             30.3     Chem 02-20    146<H>  |  109<H>  |  30<H>  ----------------------------<  292<H>  5.3   |  27  |  0.93    Ca    8.6      20 Feb 2024 00:29  Phos  3.3     02-20  Mg     2.8     02-20    TPro  5.4<L>  /  Alb  3.9  /  TBili  0.3  /  DBili  x   /  AST  28  /  ALT  21  /  AlkPhos  49  02-19    LFTs LIVER FUNCTIONS - ( 19 Feb 2024 01:05 )  Alb: 3.9 g/dL / Pro: 5.4 g/dL / ALK PHOS: 49 U/L / ALT: 21 U/L / AST: 28 U/L / GGT: x           Coagulopathy PT/INR - ( 20 Feb 2024 10:57 )   PT: 12.3 sec;   INR: 1.18 ratio         PTT - ( 20 Feb 2024 10:57 )  PTT:26.7 sec  Lipid Panel 02-14 Chol 92 LDL -- HDL 19<L> Trig 104, 02-10 Chol 226<H> LDL -- HDL 51 Trig 76  A1c   Cardiac enzymes     GM1 9, GM2 8, GD1a 43, GD1b 110, GQ1b 28. Lyme -. DNA <12. TB-.    CSF studies for oligoclonal bands (-), myelin basic protein (3.3),  ACE, VDRL (-), VZV PCR (-), HSV 1/2 PCR (-),Ds DNA (-), PAULINA 9-), CMV (-). Royce bar(-), WNV (-)  cytology, flow cytometry: insufficient cells  pending Serum autoimmune encephalopathy panel     LP  Clear, no color,   83 glucose (elevated)   67 protein (elevated)  TNC 11, 7% Neutrophils (elevated), 67% lymphocytes (wnl), 26% monocytes (wnl)  3 RBCs  IgG CSF 9.7 (elevated)  CSF albumin 41.3 (elevated)  CSF PCR (-)  IgG/albumin ratio CSF 0.23 (wnl) Serum 0.46 (wnl)  IgG index wnl  IgG synthesis wnl  culture gram stain (-)        STUDIES & IMAGING: (EEG, CT, MR, U/S, TTE/MILADIS):  CTH no acute pathology  CTA: High grade stenosis of proximal basilar + L PCA  CTP: no perfusion deficit    MRI brain with and without contrast 2/11  No significant interval change in the small acute bilateral cerebellar infarcts. No new superimposed acute intracranial abnormality. No acute intracranial hemorrhage.  2. Findings suspicious for cerebral amyloid angiopathy or hypertensive microhemorrhages.  3. Probable minimal chronic microvascular ischemic disease.  4. Sinus disease.    MRI C, T, L spine ww/o  CERVICAL SPINE: Low-grade cervical degenerative disc disease. Indistinct cord lesions at the C3 and C4 vertebral levels are nonspecific but suggest inflammatory/infectious/demyelinating etiology. No associated enhancement.    2. THORACIC SPINE: Low-grade thoracic degenerative disc disease. Thoracic cord intact. No abnomal enhancement.    3. LUMBAR SPINE: Advanced lower lumbar degenerative disc disease includes moderate degenerative central canal stenosis at L4-L5 and high-grade degenerative foraminal stenosis at L5-S1. Conus intact. Cauda equina demonstrates central clustering with spinal stenosis at L4-L5 and mild radicular enhancement predominantly distal to this location of spinal stenosis, nonspecific, inflammatory versus congestion from the stenosis. Vascular congestion between the conus and the L4-L5 stenosis. Superimposed nodular enhancement at the L4 superior endplate level is also nonspecific, inflammatory versus schwannoma    EEG 2/11 (18 hr study)  Abnormal EEG study  Mild generalized background slowing  Clinical Impression:  Mild diffuse/multi-focal cerebral dysfunction, not specific as to etiology.  There were no epileptiform abnormalities recorded.

## 2024-02-20 NOTE — PROGRESS NOTE ADULT - ASSESSMENT
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  - CSF WBC 11, protein 67, Glucose 83  - S/p Plex x4 2/18, plan for 5  - ASA stopped due to kathy blood from his trach site  - Neurology following   - Speech pathologist evaluation done and mode of speech established  Activity: [x] mobilize as tolerated [] Bedrest [x] PT [x] OT [x] PMNR    PULM:  - s/p tracheostomy 2/16  - Continue chest PT   - CPAP as tolerated   - Thick secretions on hypertonic nebs     CV:   - SBP goal  with MAP >65   - PICC  - TTE- EF 66%  - R- axillary A line     RENAL:  - Fluids: IVL  - c/w Free water 200 ccQ6h  - Na stable @ 145  - Mild BALTA- monitor renal function, maintain euvolemia      GI:  - s/p PEG 2/16  - Vitamin D supplementation   - GI prophylaxis [] not indicated [x] PPI [] other:  - Bowel regimen [x] miralax [x] senna, dulcolax suppository   - LBM  2/18    ENDO:   - Goal euglycemia (-180)  - A1c 6.8  - Elevated glucose- patient currently on ISS and NPH 2 units TID, increased to 4 units TID    HEME/ONC:  - H/H stable   - Fibrinogen checks Q8H while getting plasmapheresis   - VTE prophylaxis: [x] SCDs [x] chemoprophylaxis- SQL [] hold chemoprophylaxis due to: [] high risk of DVT/PE on admission due to: immobility     ID:  - Afebrile, leukocytosis- trending down     MISC:  Lines/tubes: RIJ, ru Patel  ASSESSMENT/PLAN: 74 y/o male with multiple comorbidities presenting initially with suspicious of basilar stroke however, 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  - 2/20- PLEX x 4 today  - ASA stopped due to blood from his trach site  - Neurology following   - Speech pathologist evaluation done and mode of speech established  Activity: [x] mobilize as tolerated [] Bedrest [x] PT [x] OT [x] PMNR    PULM:  - s/p tracheostomy 2/16  - Continue chest PT   - CPAP as tolerated   - Thick secretions on hypertonic nebs     CV:   - SBP goal  with MAP >65   - PICC  - TTE- EF 66%  - R- axillary A line     RENAL:  - Fluids: IVL  - c/w Free water 400 ccQ6h  - Na stable @ 145    GI:  - s/p PEG 2/16  - Vitamin D supplementation   - GI prophylaxis [] not indicated [x] PPI [] other:  - Bowel regimen [x] miralax [x] senna, dulcolax suppository   - LBM  2/18    ENDO:   - Goal euglycemia (-180)  - A1c 6.8  - Insulin gtt which was started overnight 2/20    HEME/ONC:  - H/H stable   - Fibrinogen checks Q48H while getting plasmapheresis   - VTE prophylaxis: [x] SCDs [x] chemoprophylaxis- SQL [] hold chemoprophylaxis due to: [] high risk of DVT/PE on admission due to: immobility     ID:  - Afebrile, leukocytosis- trending down     MISC:  Lines/tubes: RIJ, R axillary ru serna  ASSESSMENT/PLAN: 72 y/o male with multiple comorbidities presenting initially with suspicious of basilar stroke however, neurological examination more consistent with GBS. 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 Elevated sugars overnight requiring insulin drip. Scheduled for PLEX today, 2/20.     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  - 2/20- PLEX x 4 today  - ASA stopped due to blood from his trach site  - Neurology following   - Speech pathologist evaluation done and mode of speech established  Activity: [x] mobilize as tolerated [] Bedrest [x] PT [x] OT [x] PMNR    PULM:  - s/p tracheostomy 2/16  - Continue chest PT   - CPAP as tolerated   - Thick secretions on hypertonic nebs     CV:   - SBP goal  with MAP >65   - PICC  - TTE- EF 66%  - d/c R- axillary A line     RENAL:  - Fluids: IVL  - c/w Free water 400 ccQ6h  - Na stable @ 146    GI:  - s/p PEG 2/16, Glucerna feeds at goal (per nursing 2/20)  - Vitamin D supplementation   - GI prophylaxis [] not indicated [x] PPI [] other:  - Bowel regimen [x] miralax [x] senna, dulcolax suppository   - LBM  2/18    ENDO:   - Goal euglycemia (-180)  - A1c 6.8  - Insulin gtt which was started overnight 2/20    HEME/ONC:  - H/H stable   - Fibrinogen checks Q48H while getting plasmapheresis   - VTE prophylaxis: [x] SCDs [x] chemoprophylaxis- SQL [] hold chemoprophylaxis due to: [] high risk of DVT/PE on admission due to: immobility     ID:  - Afebrile, leukocytosis- trending down     MISC:  Lines/tubes: ANDRES, R ru cullen  ASSESSMENT/PLAN: 74 y/o male with multiple comorbidities presenting initially with suspicious of basilar stroke however, neurological examination more consistent with GBS. 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 Elevated sugars overnight requiring insulin drip. Scheduled for PLEX today, 2/20.     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  - 2/20- PLEX x 4 today  - ASA stopped due to blood from his trach site  - Neurology following   - Speech pathologist evaluation done and mode of speech established  Activity: [x] mobilize as tolerated [] Bedrest [x] PT [x] OT [x] PMNR    PULM:  - s/p tracheostomy 2/16  - Continue chest PT   - CPAP as tolerated   - Thick secretions on hypertonic nebs     CV:   - SBP goal  with MAP >65   - PICC  - TTE- EF 66%  - d/c R- axillary A line     RENAL:  - Fluids: IVL  - c/w Free water 400 ccQ6h  - Na stable @ 146    GI:  - s/p PEG 2/16, Glucerna feeds at goal (per nursing 2/20)  - Vitamin D supplementation   - GI prophylaxis [] not indicated [x] PPI [] other:  - Bowel regimen [x] miralax [x] senna, dulcolax suppository   - LBM  2/18    ENDO:   - Goal euglycemia (-180)  - A1c 6.8  - Sugars elevated overnight, Insulin gtt which started 2/20    HEME/ONC:  - H/H stable   - Fibrinogen checks Q48H while getting plasmapheresis   - VTE prophylaxis: [x] SCDs [x] chemoprophylaxis- SQL [] hold chemoprophylaxis due to: [] high risk of DVT/PE on admission due to: immobility     ID:  - Afebrile, leukocytosis- trending down     MISC:  Lines/tubes: RIJ, vences  ASSESSMENT/PLAN: 74 y/o male with multiple comorbidities presenting initially with suspicious of basilar stroke however, neurological examination more consistent with GBS. 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 Elevated sugars overnight requiring insulin drip. Scheduled for last dose of PLEX today, 2/20.     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  - 2/20- PLEX x 5 today  - ASA stopped due to blood from his trach site  - Neurology following   - Speech pathologist evaluation done and mode of speech established  Activity: [x] mobilize as tolerated [] Bedrest [x] PT [x] OT [x] PMNR    PULM:  - s/p tracheostomy 2/16  - Continue chest PT   - CPAP as tolerated   - Thick secretions on hypertonic nebs     CV:   - SBP goal  with MAP >65   - PICC  - TTE- EF 66%  - d/c R- axillary A line     RENAL:  - Fluids: IVL  - c/w Free water 400 ccQ6h  - Na stable @ 146    GI:  - s/p PEG 2/16, Glucerna feeds at goal (per nursing 2/20)  - Vitamin D supplementation   - GI prophylaxis [] not indicated [x] PPI [] other:  - Bowel regimen [x] miralax [x] senna, dulcolax suppository   - LBM  2/18    ENDO:   - Goal euglycemia (-180)  - A1c 6.8  - Sugars elevated overnight, Insulin gtt which started 2/20    HEME/ONC:  - H/H stable   - Fibrinogen checks Q48H while getting plasmapheresis   - VTE prophylaxis: [x] SCDs [x] chemoprophylaxis- SQL [] hold chemoprophylaxis due to: [] high risk of DVT/PE on admission due to: immobility     ID:  - Afebrile, leukocytosis- trending down     MISC:  Lines/tubes: RIJ, vences

## 2024-02-20 NOTE — PROGRESS NOTE ADULT - ASSESSMENT
Assessment: 72 y/o male with multiple comorbidities presenting initially with suspicious of basilar stroke however, neurological examination more consistent with GBS. 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 Elevated sugars overnight requiring insulin drip. Scheduled for last dose of PLEX today, 2/20.    Impression; poorly responding ataxia, diffuse weakness (requiring intubation/ventilation) and brainstem dysfunction with GD1b Abs 110, and L4-L5 radicular enhancement, CSF with mild pleocytosis and elevated protein. Clinical presentation c/f GBS, currently on PLEX. Vascular etiology also considered, MRI with small b/l cerebellar strokes (postprocedural after angiography) and prior L thalamic strokes, although these would not explain pt's current clinical deficits and progress nature.     Recommendations:  - Start IVIG 5 sessions tomorrow due to poor response to PLEX  - PLEX Day 5 out of 5 today. Final PLEX session (2/20)  - WBC increasing (today 15.04) - recommend appropriate workup per primary team.   - Await serum studies for autoimmune encephalopathy panel, B1, B6, copper, NMOauto  - Await CSF studies for HSV 1/2 PCR, cytology, ACE, autoimmune encephalopathy panel  - Vitamin D levels low, would replete with D2 39770 Units PO weekly for 8 weeks and then recheck new levels    Case seen and discussed with Neurology Attending

## 2024-02-20 NOTE — PROGRESS NOTE ADULT - SUBJECTIVE AND OBJECTIVE BOX
Night rounds   Patient seen and examined    T(C): 37.7 (02-20-24 @ 15:00), Max: 38 (02-19-24 @ 19:00)  HR: 100 (02-20-24 @ 15:00) (90 - 107)  BP: 124/58 (02-20-24 @ 15:00) (124/58 - 186/79)  RR: 16 (02-20-24 @ 15:00) (14 - 29)  SpO2: 93% (02-20-24 @ 15:00) (93% - 98%)  02-19-24 @ 07:01  -  02-20-24 @ 07:00  --------------------------------------------------------  IN: 2927 mL / OUT: 1750 mL / NET: 1177 mL    02-20-24 @ 07:01  -  02-20-24 @ 18:21  --------------------------------------------------------  IN: 911 mL / OUT: 450 mL / NET: 461 mL    acetaminophen   Oral Liquid .. 650 milliGRAM(s) Oral every 6 hours PRN  atorvastatin 80 milliGRAM(s) Oral at bedtime  chlorhexidine 0.12% Liquid 15 milliLiter(s) Oral Mucosa every 12 hours  chlorhexidine 4% Liquid 1 Application(s) Topical daily  dextrose 5%. 1000 milliLiter(s) IV Continuous <Continuous>  dextrose 5%. 1000 milliLiter(s) IV Continuous <Continuous>  dextrose 50% Injectable 50 milliLiter(s) IV Push every 15 minutes  dextrose Oral Gel 15 Gram(s) Oral once PRN  enoxaparin Injectable 40 milliGRAM(s) SubCutaneous <User Schedule>  ergocalciferol Drops 72881 Unit(s) Oral <User Schedule>  gabapentin Solution 400 milliGRAM(s) Oral three times a day  glucagon  Injectable 1 milliGRAM(s) IntraMuscular once  insulin lispro (ADMELOG) corrective regimen sliding scale   SubCutaneous every 6 hours  insulin NPH human recombinant 25 Unit(s) SubCutaneous every 6 hours  insulin regular Infusion 2.5 Unit(s)/Hr IV Continuous <Continuous>  oxyCODONE    Solution 10 milliGRAM(s) Oral every 4 hours PRN  oxyCODONE    Solution 5 milliGRAM(s) Oral every 4 hours PRN  pantoprazole   Suspension 40 milliGRAM(s) Oral daily  polyethylene glycol 3350 17 Gram(s) Oral daily  senna 1 Tablet(s) Oral at bedtime  sodium chloride 0.9% lock flush 10 milliLiter(s) IV Push every 1 hour PRN  Mode: AC/ CMV (Assist Control/ Continuous Mandatory Ventilation), RR (machine): 16, TV (machine): 500, FiO2: 40, PEEP: 5, ITime: 1, MAP: 9, PIP: 19       Exam unchanged from day rounds  Bilateral ptosis, pupils 2 mm and sluggish, restricted gaze in all directions however, vertical gaze is better than horizontal,  patient following commands with his BL LE. Patient following commands and able to communicate with his bilateral feets. Right feet signifying 'yes', left foot signifying 'no' otherwise he is 0/5 in the UE with slight movement of his fingers , LE 0/5 except for the toes     labs and imaging reviewed       LABS:  Na: 146 (02-20 @ 00:29), 147 (02-19 @ 01:05), 146 (02-18 @ 04:18)  K: 5.3 (02-20 @ 00:29), 4.9 (02-19 @ 01:05), 4.4 (02-18 @ 04:18)  Cl: 109 (02-20 @ 00:29), 111 (02-19 @ 01:05), 108 (02-18 @ 04:18)  CO2: 27 (02-20 @ 00:29), 28 (02-19 @ 01:05), 30 (02-18 @ 04:18)  BUN: 30 (02-20 @ 00:29), 32 (02-19 @ 01:05), 24 (02-18 @ 04:18)  Cr: 0.93 (02-20 @ 00:29), 1.01 (02-19 @ 01:05), 0.84 (02-18 @ 04:18)  Glu: 292(02-20 @ 00:29), 279(02-19 @ 01:05), 212(02-18 @ 04:18)    Hgb: 8.7 (02-20 @ 00:29), 9.0 (02-19 @ 01:05), 9.7 (02-18 @ 04:18)  Hct: 30.3 (02-20 @ 00:29), 31.8 (02-19 @ 01:05), 34.5 (02-18 @ 04:18)  WBC: 12.88 (02-20 @ 00:29), 14.45 (02-19 @ 01:05), 15.04 (02-18 @ 04:18)  Plt: 377 (02-20 @ 00:29), 388 (02-19 @ 01:05), 413 (02-18 @ 04:18)    INR: 1.18 02-20-24 @ 10:57, 1.20 02-18-24 @ 04:18  PTT: 26.7 02-20-24 @ 10:57, 27.6 02-18-24 @ 04:18             Patient with worsening weakness and brainstem dysfunction 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, however with MRI brain showing no acute brainstem infarct and  antecedent viral illness and continued worsened neuro exam, 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 is now post PLEX 5/5 PLEX, start IVIG tomorrow,  aspirin remains on  hold due to trach bleeding hgb dropped, repeat cbc today, ig hgb stable, will resume aspirin,  PEG on glucerna, autonomic dysfunction , trach ,hyperglycemic on NPH 25 q 6 hr,lovenox 40 mg sc qhs     20 critical care time     Deana Philip N[facility] attending        Night rounds   Patient seen and examined    T(C): 37.7 (02-20-24 @ 15:00), Max: 38 (02-19-24 @ 19:00)  HR: 100 (02-20-24 @ 15:00) (90 - 107)  BP: 124/58 (02-20-24 @ 15:00) (124/58 - 186/79)  RR: 16 (02-20-24 @ 15:00) (14 - 29)  SpO2: 93% (02-20-24 @ 15:00) (93% - 98%)  02-19-24 @ 07:01  -  02-20-24 @ 07:00  --------------------------------------------------------  IN: 2927 mL / OUT: 1750 mL / NET: 1177 mL    02-20-24 @ 07:01  -  02-20-24 @ 18:21  --------------------------------------------------------  IN: 911 mL / OUT: 450 mL / NET: 461 mL    acetaminophen   Oral Liquid .. 650 milliGRAM(s) Oral every 6 hours PRN  atorvastatin 80 milliGRAM(s) Oral at bedtime  chlorhexidine 0.12% Liquid 15 milliLiter(s) Oral Mucosa every 12 hours  chlorhexidine 4% Liquid 1 Application(s) Topical daily  dextrose 5%. 1000 milliLiter(s) IV Continuous <Continuous>  dextrose 5%. 1000 milliLiter(s) IV Continuous <Continuous>  dextrose 50% Injectable 50 milliLiter(s) IV Push every 15 minutes  dextrose Oral Gel 15 Gram(s) Oral once PRN  enoxaparin Injectable 40 milliGRAM(s) SubCutaneous <User Schedule>  ergocalciferol Drops 80286 Unit(s) Oral <User Schedule>  gabapentin Solution 400 milliGRAM(s) Oral three times a day  glucagon  Injectable 1 milliGRAM(s) IntraMuscular once  insulin lispro (ADMELOG) corrective regimen sliding scale   SubCutaneous every 6 hours  insulin NPH human recombinant 25 Unit(s) SubCutaneous every 6 hours  insulin regular Infusion 2.5 Unit(s)/Hr IV Continuous <Continuous>  oxyCODONE    Solution 10 milliGRAM(s) Oral every 4 hours PRN  oxyCODONE    Solution 5 milliGRAM(s) Oral every 4 hours PRN  pantoprazole   Suspension 40 milliGRAM(s) Oral daily  polyethylene glycol 3350 17 Gram(s) Oral daily  senna 1 Tablet(s) Oral at bedtime  sodium chloride 0.9% lock flush 10 milliLiter(s) IV Push every 1 hour PRN  Mode: AC/ CMV (Assist Control/ Continuous Mandatory Ventilation), RR (machine): 16, TV (machine): 500, FiO2: 40, PEEP: 5, ITime: 1, MAP: 9, PIP: 19       Exam unchanged from day rounds  Bilateral ptosis, pupils 2 mm and sluggish, restricted gaze in all directions however, vertical gaze is better than horizontal,  patient following commands with his BL LE. Patient following commands and able to communicate with his bilateral feets. Right feet signifying 'yes', left foot signifying 'no' otherwise he is 0/5 in the UE with slight movement of his fingers , LE 0/5 except for the toes     labs and imaging reviewed       LABS:  Na: 146 (02-20 @ 00:29), 147 (02-19 @ 01:05), 146 (02-18 @ 04:18)  K: 5.3 (02-20 @ 00:29), 4.9 (02-19 @ 01:05), 4.4 (02-18 @ 04:18)  Cl: 109 (02-20 @ 00:29), 111 (02-19 @ 01:05), 108 (02-18 @ 04:18)  CO2: 27 (02-20 @ 00:29), 28 (02-19 @ 01:05), 30 (02-18 @ 04:18)  BUN: 30 (02-20 @ 00:29), 32 (02-19 @ 01:05), 24 (02-18 @ 04:18)  Cr: 0.93 (02-20 @ 00:29), 1.01 (02-19 @ 01:05), 0.84 (02-18 @ 04:18)  Glu: 292(02-20 @ 00:29), 279(02-19 @ 01:05), 212(02-18 @ 04:18)    Hgb: 8.7 (02-20 @ 00:29), 9.0 (02-19 @ 01:05), 9.7 (02-18 @ 04:18)  Hct: 30.3 (02-20 @ 00:29), 31.8 (02-19 @ 01:05), 34.5 (02-18 @ 04:18)  WBC: 12.88 (02-20 @ 00:29), 14.45 (02-19 @ 01:05), 15.04 (02-18 @ 04:18)  Plt: 377 (02-20 @ 00:29), 388 (02-19 @ 01:05), 413 (02-18 @ 04:18)    INR: 1.18 02-20-24 @ 10:57, 1.20 02-18-24 @ 04:18  PTT: 26.7 02-20-24 @ 10:57, 27.6 02-18-24 @ 04:18             Patient with worsening weakness and brainstem dysfunction 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, however with MRI brain showing no acute brainstem infarct and  antecedent viral illness and continued worsened neuro exam, 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

## 2024-02-20 NOTE — PROGRESS NOTE ADULT - SUBJECTIVE AND OBJECTIVE BOX
73y Male with PMHx significant for HTN, HLD, DM type 2, and two prior strokes without residual deficits [place] and L facial weakness on 2/9. 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 He was transferred to NSICU for angiogram, which was done on 2/9 and showed moderate basilar stenosis. No intervention done. He had to be intubated for worsening respiratory status. On further history it was found that patient had been having progressive weakness and numbness of his extremities for the preceding 1-2 weeks following a viral URI. 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       Admission Scores  GCS:   HH:   MF:   NIHSS: 4   RASS:    CAM-ICU:   ICH:    2/9: transferred from [place], NIHSS of 4 in ED, then became more lethargic, stridorous and desaturated in ED, intubated for airway protected, repeat CTH/A obtained and brought emergently to IR for basilar stenting   2/10: worsening brainstem symptoms while on DAPT  2/12- LP performed to rule out MFS as patient's examination is not explained by the strokes on MRI. First session of PLEX  2/14- s/p 2nd PLEX tx, no acute interval events  2/15- No acute events overnight. Patient's examination more or less the same.   2/16- Patient storming overnight requiring multiple pushes of labetalol and fentanyl without improvement in BP. Patient started on propofol which also is not optimally managing patient's BP.   2/17 Labile BP due to dysautonomia, continue with scheduled TPE sessions, phenyephrine restarted for BP lability  2/19- No acute events overnight   2/20 - no acute events overnight, unchanged physical exam    Allergies    No Known Allergies    Intolerances        REVIEW OF SYSTEMS: [ X] Unable to Assess due to neurologic exam   [ ] All ROS addressed below are non-contributory, except:  Neuro: [ ] Headache [ ] Back pain [ ] Numbness [ ] Weakness [ ] Ataxia [ ] Dizziness [ ] Aphasia [ ] Dysarthria [ ] Visual disturbance  Resp: [ ] Shortness of breath/dyspnea, [ ] Orthopnea [ ] Cough  CV: [ ] Chest pain [ ] Palpitation [ ] Lightheadedness [ ] Syncope  Renal: [ ] Thirst [ ] Edema  GI: [ ] Nausea [ ] Emesis [ ] Abdominal pain [ ] Constipation [ ] Diarrhea  Hem: [ ] Hematemesis [ ] bright red blood per rectum  ID: [ ] Fever [ ] Chills [ ] Dysuria  ENT: [ ] Rhinorrhea      DEVICES:   [ ] Restraints [ ] ET tube [X ] central line [x ] arterial line [X ] vences [ ] NGT/OGT [ ] EVD [ ] LD [ ] HAL/HMV [ ] Trach [ ] PEG [ ] Chest Tube     Vital Signs Last 24 Hrs  T(C): 37.4 (20 Feb 2024 03:00), Max: 38 (19 Feb 2024 19:00)  T(F): 99.3 (20 Feb 2024 03:00), Max: 100.4 (19 Feb 2024 19:00)  HR: 95 (20 Feb 2024 06:00) (90 - 107)  BP: 178/81 (20 Feb 2024 06:00) (117/58 - 186/79)  BP(mean): 116 (20 Feb 2024 06:00) (80 - 116)  RR: 29 (20 Feb 2024 06:00) (16 - 29)  SpO2: 97% (20 Feb 2024 06:00) (93% - 100%)    Parameters below as of 20 Feb 2024 05:50  Patient On (Oxygen Delivery Method): ventilator      CAPILLARY BLOOD GLUCOSE      POCT Blood Glucose.: 250 mg/dL (20 Feb 2024 06:59)  POCT Blood Glucose.: 255 mg/dL (20 Feb 2024 06:06)  POCT Blood Glucose.: 263 mg/dL (20 Feb 2024 05:28)  POCT Blood Glucose.: 259 mg/dL (20 Feb 2024 03:03)  POCT Blood Glucose.: 290 mg/dL (20 Feb 2024 01:21)  POCT Blood Glucose.: 258 mg/dL (19 Feb 2024 23:21)  POCT Blood Glucose.: 268 mg/dL (19 Feb 2024 17:08)  POCT Blood Glucose.: 238 mg/dL (19 Feb 2024 13:18)  POCT Blood Glucose.: 227 mg/dL (19 Feb 2024 11:06)  POCT Blood Glucose.: 241 mg/dL (19 Feb 2024 08:32)      I&O's Summary    19 Feb 2024 07:01  -  20 Feb 2024 07:00  --------------------------------------------------------  IN: 2863 mL / OUT: 1750 mL / NET: 1113 mL          Respiratory:  Mode: AC/ CMV (Assist Control/ Continuous Mandatory Ventilation)  RR (machine): 16  TV (machine): 450  FiO2: 40  PEEP: 5  ITime: 1  MAP: 9  PIP: 18        LABS:                          8.7    12.88 )-----------( 377      ( 20 Feb 2024 00:29 )             30.3     02-20    146<H>  |  109<H>  |  30<H>  ----------------------------<  292<H>  5.3   |  27  |  0.93    Ca    8.6      20 Feb 2024 00:29  Phos  3.3     02-20  Mg     2.8     02-20    TPro  5.4<L>  /  Alb  3.9  /  TBili  0.3  /  DBili  x   /  AST  28  /  ALT  21  /  AlkPhos  49  02-19    MEDICATIONS  (STANDING):  albuterol/ipratropium for Nebulization 3 milliLiter(s) Nebulizer every 6 hours  atorvastatin 80 milliGRAM(s) Oral at bedtime  chlorhexidine 0.12% Liquid 15 milliLiter(s) Oral Mucosa every 12 hours  chlorhexidine 4% Liquid 1 Application(s) Topical daily  dextrose 50% Injectable 50 milliLiter(s) IV Push every 15 minutes  enoxaparin Injectable 40 milliGRAM(s) SubCutaneous <User Schedule>  ergocalciferol Drops 43891 Unit(s) Oral <User Schedule>  gabapentin Solution 400 milliGRAM(s) Oral three times a day  insulin regular Infusion 2.5 Unit(s)/Hr (2.5 mL/Hr) IV Continuous <Continuous>  pantoprazole  Injectable 40 milliGRAM(s) IV Push daily  polyethylene glycol 3350 17 Gram(s) Oral daily  senna 1 Tablet(s) Oral at bedtime  sodium chloride 3%  Inhalation 4 milliLiter(s) Inhalation every 6 hours    MEDICATIONS  (PRN):  acetaminophen   Oral Liquid .. 650 milliGRAM(s) Oral every 6 hours PRN Mild Pain (1 - 3)  oxyCODONE    Solution 10 milliGRAM(s) Oral every 4 hours PRN Severe Pain (7 - 10)  oxyCODONE    Solution 5 milliGRAM(s) Oral every 4 hours PRN Moderate Pain (4 - 6)  sodium chloride 0.9% lock flush 10 milliLiter(s) IV Push every 1 hour PRN Pre/post blood products, medications, blood draw, and to maintain line patency       EXAMINATION:  PHYSICAL EXAM:    Constitutional: No Acute Distress, patient intubated but following commands     Neurological: Bilateral ptosis, pupils 2 mm and sluggish, restricted gaze in all directions however, vertical gaze is better than horizontal. Facial asymmetry could not be appreciated due to ETT, patient following commands with his BL LE. Patient following commands and able to communicate with his bilateral feet. Right foot moved-- signifying 'yes', left foot moved-- signifying 'no'.    Motor exam:          Upper extremity                         Delt     Bicep     Tricep    HG                                                 R         0/5        1/5        0/5      2/5 (activation of muscles could be appreciated in bilateral biceps and triceps)                                               L          0/5        1/5        0/5       3/5          Lower extremity                        HF         KF        KE       DF         PF                                                  R        0/5        0/5        3/5       3/5         5/5                                               L         0/5        0/5       3/5       2/5          5/5                                                  Reflexes: Deep Tendon Reflexes absent in all 4 extremities    Pulmonary: Clear to Auscultation, No rales, No rhonchi, No wheezes     Cardiovascular: S1, S2, Regular rate and rhythm     Gastrointestinal: Soft, Non-tender, Non-distended     Extremities: No calf tenderness    73y Male with PMHx significant for HTN, HLD, DM type 2, and two prior strokes without residual deficits [place] and L facial weakness on [place]. 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 He was transferred to NSICU for angiogram, which was done on 2/9 and showed moderate basilar stenosis. No intervention done. He had to be intubated for worsening respiratory status. On further history it was found that patient had been having progressive weakness and numbness of his extremities for the preceding 1-2 weeks following a viral URI. 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       Admission Scores  GCS:   HH:   MF:   NIHSS: 4   RASS:    CAM-ICU:   ICH:    2/9: transferred from [place], NIHSS of 4 in ED, then became more lethargic, stridorous and desaturated in ED, intubated for airway protected, repeat CTH/A obtained and brought emergently to IR for basilar stenting   2/10: worsening brainstem symptoms while on DAPT  2/12- LP performed to rule out MFS as patient's examination is not explained by the strokes on MRI. First session of PLEX  2/14- s/p 2nd PLEX tx, no acute interval events  2/15- No acute events overnight. Patient's examination more or less the same.   2/16- Patient storming overnight requiring multiple pushes of labetalol and fentanyl without improvement in BP. Patient started on propofol which also is not optimally managing patient's BP.   2/17 Labile BP due to dysautonomia, continue with scheduled TPE sessions, phenyephrine restarted for BP lability  2/19- No acute events overnight   2/20 - no acute events overnight, unchanged physical exam    Allergies    No Known Allergies    Intolerances        REVIEW OF SYSTEMS: [ X] Unable to Assess due to neurologic exam   [ ] All ROS addressed below are non-contributory, except:  Neuro: [ ] Headache [ ] Back pain [ ] Numbness [ ] Weakness [ ] Ataxia [ ] Dizziness [ ] Aphasia [ ] Dysarthria [ ] Visual disturbance  Resp: [ ] Shortness of breath/dyspnea, [ ] Orthopnea [ ] Cough  CV: [ ] Chest pain [ ] Palpitation [ ] Lightheadedness [ ] Syncope  Renal: [ ] Thirst [ ] Edema  GI: [ ] Nausea [ ] Emesis [ ] Abdominal pain [ ] Constipation [ ] Diarrhea  Hem: [ ] Hematemesis [ ] bright red blood per rectum  ID: [ ] Fever [ ] Chills [ ] Dysuria  ENT: [ ] Rhinorrhea      DEVICES:   [ ] Restraints [ ] ET tube [X ] central line [x ] arterial line [X ] vences [ ] NGT/OGT [ ] EVD [ ] LD [ ] HAL/HMV [ ] Trach [ ] PEG [ ] Chest Tube     Vital Signs Last 24 Hrs  T(C): 37.4 (20 Feb 2024 03:00), Max: 38 (19 Feb 2024 19:00)  T(F): 99.3 (20 Feb 2024 03:00), Max: 100.4 (19 Feb 2024 19:00)  HR: 95 (20 Feb 2024 06:00) (90 - 107)  BP: 178/81 (20 Feb 2024 06:00) (117/58 - 186/79)  BP(mean): 116 (20 Feb 2024 06:00) (80 - 116)  RR: 29 (20 Feb 2024 06:00) (16 - 29)  SpO2: 97% (20 Feb 2024 06:00) (93% - 100%)    Parameters below as of 20 Feb 2024 05:50  Patient On (Oxygen Delivery Method): ventilator      CAPILLARY BLOOD GLUCOSE      POCT Blood Glucose.: 250 mg/dL (20 Feb 2024 06:59)  POCT Blood Glucose.: 255 mg/dL (20 Feb 2024 06:06)  POCT Blood Glucose.: 263 mg/dL (20 Feb 2024 05:28)  POCT Blood Glucose.: 259 mg/dL (20 Feb 2024 03:03)  POCT Blood Glucose.: 290 mg/dL (20 Feb 2024 01:21)  POCT Blood Glucose.: 258 mg/dL (19 Feb 2024 23:21)  POCT Blood Glucose.: 268 mg/dL (19 Feb 2024 17:08)  POCT Blood Glucose.: 238 mg/dL (19 Feb 2024 13:18)  POCT Blood Glucose.: 227 mg/dL (19 Feb 2024 11:06)  POCT Blood Glucose.: 241 mg/dL (19 Feb 2024 08:32)      I&O's Summary    19 Feb 2024 07:01  -  20 Feb 2024 07:00  --------------------------------------------------------  IN: 2863 mL / OUT: 1750 mL / NET: 1113 mL          Respiratory:  Mode: AC/ CMV (Assist Control/ Continuous Mandatory Ventilation)  RR (machine): 16  TV (machine): 450  FiO2: 40  PEEP: 5  ITime: 1  MAP: 9  PIP: 18        LABS:                          8.7    12.88 )-----------( 377      ( 20 Feb 2024 00:29 )             30.3     02-20    146<H>  |  109<H>  |  30<H>  ----------------------------<  292<H>  5.3   |  27  |  0.93    Ca    8.6      20 Feb 2024 00:29  Phos  3.3     02-20  Mg     2.8     02-20    TPro  5.4<L>  /  Alb  3.9  /  TBili  0.3  /  DBili  x   /  AST  28  /  ALT  21  /  AlkPhos  49  02-19    MEDICATIONS  (STANDING):  albuterol/ipratropium for Nebulization 3 milliLiter(s) Nebulizer every 6 hours  atorvastatin 80 milliGRAM(s) Oral at bedtime  chlorhexidine 0.12% Liquid 15 milliLiter(s) Oral Mucosa every 12 hours  chlorhexidine 4% Liquid 1 Application(s) Topical daily  dextrose 50% Injectable 50 milliLiter(s) IV Push every 15 minutes  enoxaparin Injectable 40 milliGRAM(s) SubCutaneous <User Schedule>  ergocalciferol Drops 56437 Unit(s) Oral <User Schedule>  gabapentin Solution 400 milliGRAM(s) Oral three times a day  insulin regular Infusion 2.5 Unit(s)/Hr (2.5 mL/Hr) IV Continuous <Continuous>  pantoprazole  Injectable 40 milliGRAM(s) IV Push daily  polyethylene glycol 3350 17 Gram(s) Oral daily  senna 1 Tablet(s) Oral at bedtime  sodium chloride 3%  Inhalation 4 milliLiter(s) Inhalation every 6 hours    MEDICATIONS  (PRN):  acetaminophen   Oral Liquid .. 650 milliGRAM(s) Oral every 6 hours PRN Mild Pain (1 - 3)  oxyCODONE    Solution 10 milliGRAM(s) Oral every 4 hours PRN Severe Pain (7 - 10)  oxyCODONE    Solution 5 milliGRAM(s) Oral every 4 hours PRN Moderate Pain (4 - 6)  sodium chloride 0.9% lock flush 10 milliLiter(s) IV Push every 1 hour PRN Pre/post blood products, medications, blood draw, and to maintain line patency       EXAMINATION:  PHYSICAL EXAM:    Constitutional: No Acute Distress, patient intubated but following commands     Neurological: Bilateral ptosis, pupils 2 mm and sluggish, restricted gaze in all directions however, vertical gaze is better than horizontal. Facial asymmetry could not be appreciated due to ETT, patient following commands with his BL LE. Patient following commands and able to communicate with his bilateral feet. Right foot moved-- signifying 'yes', left foot moved-- signifying 'no'.    Motor exam:          Upper extremity                         Delt     Bicep     Tricep    HG                                                 R         0/5        0/5        0/5      2/5 (activation of muscles could be appreciated in bilateral biceps and triceps)                                               L          0/5        0/5        0/5       3/5          Lower extremity                        HF         KF        KE       DF         PF                                                  R        0/5        0/5        3/5       2/5         5/5                                               L         0/5        0/5       3/5       2/5          5/5                                                  Reflexes: Deep Tendon Reflexes absent in all 4 extremities    Pulmonary: Clear to Auscultation, No rales, No rhonchi, No wheezes     Cardiovascular: S1, S2, Regular rate and rhythm     Gastrointestinal: Soft, Non-tender, Non-distended     Extremities: No calf tenderness    73y Male with PMHx significant for HTN, HLD, DM type 2, and two prior strokes without residual deficits [place] and L facial weakness on 2/9. 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 He was transferred to NSICU for angiogram, which was done on 2/9 and showed moderate basilar stenosis. No intervention done. He had to be intubated for worsening respiratory status. On further history it was found that patient had been having progressive weakness and numbness of his extremities for the preceding 1-2 weeks following a viral URI.     Admission Scores  GCS:   HH:   MF:   NIHSS: 4   RASS:    CAM-ICU:   ICH:    2/9: transferred from [place], NIHSS of 4 in ED, then became more lethargic, stridorous and desaturated in ED, intubated for airway protected, repeat CTH/A obtained and brought emergently to IR for basilar stenting   2/10: worsening brainstem symptoms while on DAPT  2/12- LP performed to rule out MFS as patient's examination is not explained by the strokes on MRI. First session of PLEX  2/14- s/p 2nd PLEX tx, no acute interval events  2/15- No acute events overnight. Patient's examination more or less the same.   2/16- Patient storming overnight requiring multiple pushes of labetalol and fentanyl without improvement in BP. Patient started on propofol which also is not optimally managing patient's BP.   2/17 Labile BP due to dysautonomia, continue with scheduled TPE sessions, phenyephrine restarted for BP lability  2/19- No acute events overnight   2/20 - no acute events overnight, unchanged physical exam    Allergies    No Known Allergies    Intolerances        REVIEW OF SYSTEMS: [ X] Unable to Assess due to neurologic exam   [ ] All ROS addressed below are non-contributory, except:  Neuro: [ ] Headache [ ] Back pain [ ] Numbness [ ] Weakness [ ] Ataxia [ ] Dizziness [ ] Aphasia [ ] Dysarthria [ ] Visual disturbance  Resp: [ ] Shortness of breath/dyspnea, [ ] Orthopnea [ ] Cough  CV: [ ] Chest pain [ ] Palpitation [ ] Lightheadedness [ ] Syncope  Renal: [ ] Thirst [ ] Edema  GI: [ ] Nausea [ ] Emesis [ ] Abdominal pain [ ] Constipation [ ] Diarrhea  Hem: [ ] Hematemesis [ ] bright red blood per rectum  ID: [ ] Fever [ ] Chills [ ] Dysuria  ENT: [ ] Rhinorrhea      DEVICES:   [ ] Restraints [ ] ET tube [X ] central line [ ] arterial line [X ] vences [ ] NGT/OGT [ ] EVD [ ] LD [ ] HAL/HMV [ ] Trach [ ] PEG [ ] Chest Tube     Vital Signs Last 24 Hrs  T(C): 37.4 (20 Feb 2024 03:00), Max: 38 (19 Feb 2024 19:00)  T(F): 99.3 (20 Feb 2024 03:00), Max: 100.4 (19 Feb 2024 19:00)  HR: 95 (20 Feb 2024 06:00) (90 - 107)  BP: 178/81 (20 Feb 2024 06:00) (117/58 - 186/79)  BP(mean): 116 (20 Feb 2024 06:00) (80 - 116)  RR: 29 (20 Feb 2024 06:00) (16 - 29)  SpO2: 97% (20 Feb 2024 06:00) (93% - 100%)    Parameters below as of 20 Feb 2024 05:50  Patient On (Oxygen Delivery Method): ventilator      CAPILLARY BLOOD GLUCOSE      POCT Blood Glucose.: 250 mg/dL (20 Feb 2024 06:59)  POCT Blood Glucose.: 255 mg/dL (20 Feb 2024 06:06)  POCT Blood Glucose.: 263 mg/dL (20 Feb 2024 05:28)  POCT Blood Glucose.: 259 mg/dL (20 Feb 2024 03:03)  POCT Blood Glucose.: 290 mg/dL (20 Feb 2024 01:21)  POCT Blood Glucose.: 258 mg/dL (19 Feb 2024 23:21)  POCT Blood Glucose.: 268 mg/dL (19 Feb 2024 17:08)  POCT Blood Glucose.: 238 mg/dL (19 Feb 2024 13:18)  POCT Blood Glucose.: 227 mg/dL (19 Feb 2024 11:06)  POCT Blood Glucose.: 241 mg/dL (19 Feb 2024 08:32)      I&O's Summary    19 Feb 2024 07:01  -  20 Feb 2024 07:00  --------------------------------------------------------  IN: 2863 mL / OUT: 1750 mL / NET: 1113 mL          Respiratory:  Mode: AC/ CMV (Assist Control/ Continuous Mandatory Ventilation)  RR (machine): 16  TV (machine): 500  FiO2: 40  PEEP: 5  ITime: 1  MAP: 9  PIP: 18        LABS:                          8.7    12.88 )-----------( 377      ( 20 Feb 2024 00:29 )             30.3     02-20    146<H>  |  109<H>  |  30<H>  ----------------------------<  292<H>  5.3   |  27  |  0.93    Ca    8.6      20 Feb 2024 00:29  Phos  3.3     02-20  Mg     2.8     02-20    TPro  5.4<L>  /  Alb  3.9  /  TBili  0.3  /  DBili  x   /  AST  28  /  ALT  21  /  AlkPhos  49  02-19    MEDICATIONS  (STANDING):  albuterol/ipratropium for Nebulization 3 milliLiter(s) Nebulizer every 6 hours  atorvastatin 80 milliGRAM(s) Oral at bedtime  chlorhexidine 0.12% Liquid 15 milliLiter(s) Oral Mucosa every 12 hours  chlorhexidine 4% Liquid 1 Application(s) Topical daily  dextrose 50% Injectable 50 milliLiter(s) IV Push every 15 minutes  enoxaparin Injectable 40 milliGRAM(s) SubCutaneous <User Schedule>  ergocalciferol Drops 85717 Unit(s) Oral <User Schedule>  gabapentin Solution 400 milliGRAM(s) Oral three times a day  insulin regular Infusion 2.5 Unit(s)/Hr (2.5 mL/Hr) IV Continuous <Continuous>  pantoprazole  Injectable 40 milliGRAM(s) IV Push daily  polyethylene glycol 3350 17 Gram(s) Oral daily  senna 1 Tablet(s) Oral at bedtime  sodium chloride 3%  Inhalation 4 milliLiter(s) Inhalation every 6 hours    MEDICATIONS  (PRN):  acetaminophen   Oral Liquid .. 650 milliGRAM(s) Oral every 6 hours PRN Mild Pain (1 - 3)  oxyCODONE    Solution 10 milliGRAM(s) Oral every 4 hours PRN Severe Pain (7 - 10)  oxyCODONE    Solution 5 milliGRAM(s) Oral every 4 hours PRN Moderate Pain (4 - 6)  sodium chloride 0.9% lock flush 10 milliLiter(s) IV Push every 1 hour PRN Pre/post blood products, medications, blood draw, and to maintain line patency       EXAMINATION:  PHYSICAL EXAM:    Constitutional: No Acute Distress, patient intubated but following commands     Neurological: Bilateral ptosis, pupils 2 mm and sluggish, restricted gaze in all directions however, vertical gaze is better than horizontal. Facial asymmetry could not be appreciated due to ETT, patient following commands with his BL LE. Patient following commands and able to communicate with his bilateral feet. Right foot moved-- signifying 'yes', left foot moved-- signifying 'no'.    Motor exam:          Upper extremity                         Delt     Bicep     Tricep    HG                                                 R         0/5        0/5        0/5      2/5 (activation of muscles could be appreciated in bilateral biceps and triceps)                                               L          0/5        0/5        0/5       3/5          Lower extremity                        HF         KF        KE       DF         PF                                                  R        0/5        0/5        3/5       2/5         5/5                                               L         0/5        0/5       3/5       2/5          5/5                                                  Reflexes: Deep Tendon Reflexes absent in all 4 extremities    Pulmonary: Clear to Auscultation, No rales, No rhonchi, No wheezes     Cardiovascular: S1, S2, Regular rate and rhythm     Gastrointestinal: Soft, Non-tender, Non-distended     Extremities: No calf tenderness    73y Male with PMHx significant for HTN, HLD, DM type 2, and two prior strokes without residual deficits [place] and [place] facial weakness on [place]. 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 He was transferred to NSICU for angiogram, which was done on 2/9 and showed moderate basilar stenosis. No intervention done. He had to be intubated for worsening respiratory status. On further history it was found that patient had been having progressive weakness and numbness of his extremities for the preceding 1-2 weeks following a viral URI.     Admission Scores  GCS:   HH:   MF:   NIHSS: 4   RASS:    CAM-ICU:   ICH:    2/9: transferred from [place], NIHSS of 4 in ED, then became more lethargic, stridorous and desaturated in ED, intubated for airway protected, repeat CTH/A obtained and brought emergently to IR for basilar stenting   2/10: worsening brainstem symptoms while on DAPT  2/12- LP performed to rule out MFS as patient's examination is not explained by the strokes on MRI. First session of PLEX  2/14- s/p 2nd PLEX tx, no acute interval events  2/15- No acute events overnight. Patient's examination more or less the same.   2/16- Patient storming overnight requiring multiple pushes of labetalol and fentanyl without improvement in BP. Patient started on propofol which also is not optimally managing patient's BP.   2/17 Labile BP due to dysautonomia, continue with scheduled TPE sessions, phenylephrine restarted for BP lability  2/19- No acute events overnight   2/20 - no acute events overnight, unchanged physical exam    Allergies    No Known Allergies    Intolerances        REVIEW OF SYSTEMS: [ X] Unable to Assess due to neurologic exam   [ ] All ROS addressed below are non-contributory, except:  Neuro: [ ] Headache [ ] Back pain [ ] Numbness [ ] Weakness [ ] Ataxia [ ] Dizziness [ ] Aphasia [ ] Dysarthria [ ] Visual disturbance  Resp: [ ] Shortness of breath/dyspnea, [ ] Orthopnea [ ] Cough  CV: [ ] Chest pain [ ] Palpitation [ ] Lightheadedness [ ] Syncope  Renal: [ ] Thirst [ ] Edema  GI: [ ] Nausea [ ] Emesis [ ] Abdominal pain [ ] Constipation [ ] Diarrhea  Hem: [ ] Hematemesis [ ] bright red blood per rectum  ID: [ ] Fever [ ] Chills [ ] Dysuria  ENT: [ ] Rhinorrhea      DEVICES:   [ ] Restraints [ ] ET tube [X ] central line [ ] arterial line [X ] vences [ ] NGT/OGT [ ] EVD [ ] LD [ ] HAL/HMV [ ] Trach [ ] PEG [ ] Chest Tube     Vital Signs Last 24 Hrs  T(C): 37.4 (20 Feb 2024 03:00), Max: 38 (19 Feb 2024 19:00)  T(F): 99.3 (20 Feb 2024 03:00), Max: 100.4 (19 Feb 2024 19:00)  HR: 95 (20 Feb 2024 06:00) (90 - 107)  BP: 178/81 (20 Feb 2024 06:00) (117/58 - 186/79)  BP(mean): 116 (20 Feb 2024 06:00) (80 - 116)  RR: 29 (20 Feb 2024 06:00) (16 - 29)  SpO2: 97% (20 Feb 2024 06:00) (93% - 100%)    Parameters below as of 20 Feb 2024 05:50  Patient On (Oxygen Delivery Method): ventilator      CAPILLARY BLOOD GLUCOSE      POCT Blood Glucose.: 250 mg/dL (20 Feb 2024 06:59)  POCT Blood Glucose.: 255 mg/dL (20 Feb 2024 06:06)  POCT Blood Glucose.: 263 mg/dL (20 Feb 2024 05:28)  POCT Blood Glucose.: 259 mg/dL (20 Feb 2024 03:03)  POCT Blood Glucose.: 290 mg/dL (20 Feb 2024 01:21)  POCT Blood Glucose.: 258 mg/dL (19 Feb 2024 23:21)  POCT Blood Glucose.: 268 mg/dL (19 Feb 2024 17:08)  POCT Blood Glucose.: 238 mg/dL (19 Feb 2024 13:18)  POCT Blood Glucose.: 227 mg/dL (19 Feb 2024 11:06)  POCT Blood Glucose.: 241 mg/dL (19 Feb 2024 08:32)      I&O's Summary    19 Feb 2024 07:01  -  20 Feb 2024 07:00  --------------------------------------------------------  IN: 2863 mL / OUT: 1750 mL / NET: 1113 mL          Respiratory:  Mode: AC/ CMV (Assist Control/ Continuous Mandatory Ventilation)  RR (machine): 16  TV (machine): 500  FiO2: 40  PEEP: 5  ITime: 1  MAP: 9  PIP: 18        LABS:                          8.7    12.88 )-----------( 377      ( 20 Feb 2024 00:29 )             30.3     02-20    146<H>  |  109<H>  |  30<H>  ----------------------------<  292<H>  5.3   |  27  |  0.93    Ca    8.6      20 Feb 2024 00:29  Phos  3.3     02-20  Mg     2.8     02-20    TPro  5.4<L>  /  Alb  3.9  /  TBili  0.3  /  DBili  x   /  AST  28  /  ALT  21  /  AlkPhos  49  02-19    MEDICATIONS  (STANDING):  albuterol/ipratropium for Nebulization 3 milliLiter(s) Nebulizer every 6 hours  atorvastatin 80 milliGRAM(s) Oral at bedtime  chlorhexidine 0.12% Liquid 15 milliLiter(s) Oral Mucosa every 12 hours  chlorhexidine 4% Liquid 1 Application(s) Topical daily  dextrose 50% Injectable 50 milliLiter(s) IV Push every 15 minutes  enoxaparin Injectable 40 milliGRAM(s) SubCutaneous <User Schedule>  ergocalciferol Drops 43032 Unit(s) Oral <User Schedule>  gabapentin Solution 400 milliGRAM(s) Oral three times a day  insulin regular Infusion 2.5 Unit(s)/Hr (2.5 mL/Hr) IV Continuous <Continuous>  pantoprazole  Injectable 40 milliGRAM(s) IV Push daily  polyethylene glycol 3350 17 Gram(s) Oral daily  senna 1 Tablet(s) Oral at bedtime  sodium chloride 3%  Inhalation 4 milliLiter(s) Inhalation every 6 hours    MEDICATIONS  (PRN):  acetaminophen   Oral Liquid .. 650 milliGRAM(s) Oral every 6 hours PRN Mild Pain (1 - 3)  oxyCODONE    Solution 10 milliGRAM(s) Oral every 4 hours PRN Severe Pain (7 - 10)  oxyCODONE    Solution 5 milliGRAM(s) Oral every 4 hours PRN Moderate Pain (4 - 6)  sodium chloride 0.9% lock flush 10 milliLiter(s) IV Push every 1 hour PRN Pre/post blood products, medications, blood draw, and to maintain line patency       EXAMINATION:  PHYSICAL EXAM:    Constitutional: No Acute Distress, patient intubated but following commands     Neurological: Bilateral ptosis, pupils 2 mm and sluggish, restricted gaze in all directions however, vertical gaze is better than horizontal. Facial asymmetry could not be appreciated due to ETT, patient following commands with his BL LE. Patient following commands and able to communicate with his bilateral feet. Right foot moved-- signifying 'yes', left foot moved-- signifying 'no'.    Motor exam:          Upper extremity                         Delt     Bicep     Tricep    HG                                                 R         0/5        0/5        0/5      2/5 (activation of muscles could be appreciated in bilateral biceps and triceps)                                               L          0/5        0/5        0/5       3/5          Lower extremity                        HF         KF        KE       DF         PF                                                  R        0/5        0/5        3/5       2/5         5/5                                               L         0/5        0/5       3/5       2/5          5/5                                                  Reflexes: Deep Tendon Reflexes absent in all 4 extremities    Pulmonary: Clear to Auscultation, No rales, No rhonchi, No wheezes     Cardiovascular: S1, S2, Regular rate and rhythm     Gastrointestinal: Soft, Non-tender, Non-distended     Extremities: No calf tenderness

## 2024-02-21 LAB
ALBUMIN SERPL ELPH-MCNC: 3.5 G/DL — SIGNIFICANT CHANGE UP (ref 3.3–5)
ALP SERPL-CCNC: 57 U/L — SIGNIFICANT CHANGE UP (ref 40–120)
ALT FLD-CCNC: 81 U/L — HIGH (ref 10–45)
AMORPH SED URNS QL MICRO: PRESENT
ANION GAP SERPL CALC-SCNC: 10 MMOL/L — SIGNIFICANT CHANGE UP (ref 5–17)
ANION GAP SERPL CALC-SCNC: 10 MMOL/L — SIGNIFICANT CHANGE UP (ref 5–17)
ANISOCYTOSIS BLD QL: SLIGHT — SIGNIFICANT CHANGE UP
APPEARANCE UR: ABNORMAL
AQP4 H2O CHANNEL AB SERPL IA-ACNC: NEGATIVE — SIGNIFICANT CHANGE UP
ARSENIC SERPL-MCNC: 2 UG/L — SIGNIFICANT CHANGE UP (ref 0–9)
AST SERPL-CCNC: 154 U/L — HIGH (ref 10–40)
BACTERIA # UR AUTO: NEGATIVE /HPF — SIGNIFICANT CHANGE UP
BASOPHILS # BLD AUTO: 0 K/UL — SIGNIFICANT CHANGE UP (ref 0–0.2)
BASOPHILS NFR BLD AUTO: 0 % — SIGNIFICANT CHANGE UP (ref 0–2)
BILIRUB SERPL-MCNC: 0.4 MG/DL — SIGNIFICANT CHANGE UP (ref 0.2–1.2)
BILIRUB UR-MCNC: NEGATIVE — SIGNIFICANT CHANGE UP
BUN SERPL-MCNC: 39 MG/DL — HIGH (ref 7–23)
BUN SERPL-MCNC: 39 MG/DL — HIGH (ref 7–23)
CADMIUM SERPL-MCNC: 0.7 UG/L — SIGNIFICANT CHANGE UP (ref 0–1.2)
CALCIUM SERPL-MCNC: 8.4 MG/DL — SIGNIFICANT CHANGE UP (ref 8.4–10.5)
CALCIUM SERPL-MCNC: 8.6 MG/DL — SIGNIFICANT CHANGE UP (ref 8.4–10.5)
CAST: 25 /LPF — HIGH (ref 0–4)
CHLORIDE SERPL-SCNC: 110 MMOL/L — HIGH (ref 96–108)
CHLORIDE SERPL-SCNC: 111 MMOL/L — HIGH (ref 96–108)
CO2 SERPL-SCNC: 24 MMOL/L — SIGNIFICANT CHANGE UP (ref 22–31)
CO2 SERPL-SCNC: 26 MMOL/L — SIGNIFICANT CHANGE UP (ref 22–31)
COARSE GRAN CASTS #/AREA URNS AUTO: PRESENT
COLOR SPEC: YELLOW — SIGNIFICANT CHANGE UP
CREAT SERPL-MCNC: 1.07 MG/DL — SIGNIFICANT CHANGE UP (ref 0.5–1.3)
CREAT SERPL-MCNC: 1.35 MG/DL — HIGH (ref 0.5–1.3)
DIFF PNL FLD: NEGATIVE — SIGNIFICANT CHANGE UP
EGFR: 55 ML/MIN/1.73M2 — LOW
EGFR: 73 ML/MIN/1.73M2 — SIGNIFICANT CHANGE UP
ELLIPTOCYTES BLD QL SMEAR: SLIGHT — SIGNIFICANT CHANGE UP
EOSINOPHIL # BLD AUTO: 0 K/UL — SIGNIFICANT CHANGE UP (ref 0–0.5)
EOSINOPHIL NFR BLD AUTO: 0 % — SIGNIFICANT CHANGE UP (ref 0–6)
FINE GRAN CASTS #/AREA URNS AUTO: PRESENT
GAS PNL BLDA: SIGNIFICANT CHANGE UP
GIANT PLATELETS BLD QL SMEAR: PRESENT — SIGNIFICANT CHANGE UP
GLUCOSE BLDC GLUCOMTR-MCNC: 184 MG/DL — HIGH (ref 70–99)
GLUCOSE BLDC GLUCOMTR-MCNC: 231 MG/DL — HIGH (ref 70–99)
GLUCOSE BLDC GLUCOMTR-MCNC: 278 MG/DL — HIGH (ref 70–99)
GLUCOSE BLDC GLUCOMTR-MCNC: 285 MG/DL — HIGH (ref 70–99)
GLUCOSE SERPL-MCNC: 242 MG/DL — HIGH (ref 70–99)
GLUCOSE SERPL-MCNC: 312 MG/DL — HIGH (ref 70–99)
GLUCOSE UR QL: NEGATIVE MG/DL — SIGNIFICANT CHANGE UP
HCT VFR BLD CALC: 27.7 % — LOW (ref 39–50)
HCT VFR BLD CALC: 27.8 % — LOW (ref 39–50)
HGB BLD-MCNC: 7.6 G/DL — LOW (ref 13–17)
HGB BLD-MCNC: 7.7 G/DL — LOW (ref 13–17)
HYALINE CASTS # UR AUTO: PRESENT
KETONES UR-MCNC: NEGATIVE MG/DL — SIGNIFICANT CHANGE UP
LEAD BLD-MCNC: 1.4 UG/DL — SIGNIFICANT CHANGE UP (ref 0–3.4)
LEUKOCYTE ESTERASE UR-ACNC: NEGATIVE — SIGNIFICANT CHANGE UP
LYMPHOCYTES # BLD AUTO: 0.89 K/UL — LOW (ref 1–3.3)
LYMPHOCYTES # BLD AUTO: 5.3 % — LOW (ref 13–44)
MAGNESIUM SERPL-MCNC: 2.7 MG/DL — HIGH (ref 1.6–2.6)
MANUAL SMEAR VERIFICATION: SIGNIFICANT CHANGE UP
MCHC RBC-ENTMCNC: 21.5 PG — LOW (ref 27–34)
MCHC RBC-ENTMCNC: 21.6 PG — LOW (ref 27–34)
MCHC RBC-ENTMCNC: 27.3 GM/DL — LOW (ref 32–36)
MCHC RBC-ENTMCNC: 27.8 GM/DL — LOW (ref 32–36)
MCV RBC AUTO: 77.6 FL — LOW (ref 80–100)
MCV RBC AUTO: 78.5 FL — LOW (ref 80–100)
MERCURY SERPL-MCNC: <1 UG/L — SIGNIFICANT CHANGE UP (ref 0–14.9)
METAMYELOCYTES # FLD: 0.9 % — HIGH (ref 0–0)
MICROCYTES BLD QL: SLIGHT — SIGNIFICANT CHANGE UP
MOG AB CSF QL CBA IFA: NEGATIVE — SIGNIFICANT CHANGE UP
MONOCYTES # BLD AUTO: 0.59 K/UL — SIGNIFICANT CHANGE UP (ref 0–0.9)
MONOCYTES NFR BLD AUTO: 3.5 % — SIGNIFICANT CHANGE UP (ref 2–14)
MRSA PCR RESULT.: SIGNIFICANT CHANGE UP
NEUTROPHILS # BLD AUTO: 15.2 K/UL — HIGH (ref 1.8–7.4)
NEUTROPHILS NFR BLD AUTO: 78.9 % — HIGH (ref 43–77)
NEUTS BAND # BLD: 11.4 % — HIGH (ref 0–8)
NITRITE UR-MCNC: NEGATIVE — SIGNIFICANT CHANGE UP
NRBC # BLD: 0 /100 WBCS — SIGNIFICANT CHANGE UP (ref 0–0)
NRBC # BLD: 2 /100 WBCS — HIGH (ref 0–0)
PH UR: 5 — SIGNIFICANT CHANGE UP (ref 5–8)
PHOSPHATE SERPL-MCNC: 5.3 MG/DL — HIGH (ref 2.5–4.5)
PLAT MORPH BLD: NORMAL — SIGNIFICANT CHANGE UP
PLATELET # BLD AUTO: 258 K/UL — SIGNIFICANT CHANGE UP (ref 150–400)
PLATELET # BLD AUTO: 263 K/UL — SIGNIFICANT CHANGE UP (ref 150–400)
POIKILOCYTOSIS BLD QL AUTO: SLIGHT — SIGNIFICANT CHANGE UP
POLYCHROMASIA BLD QL SMEAR: SLIGHT — SIGNIFICANT CHANGE UP
POTASSIUM SERPL-MCNC: 5 MMOL/L — SIGNIFICANT CHANGE UP (ref 3.5–5.3)
POTASSIUM SERPL-MCNC: 5.5 MMOL/L — HIGH (ref 3.5–5.3)
POTASSIUM SERPL-SCNC: 5 MMOL/L — SIGNIFICANT CHANGE UP (ref 3.5–5.3)
POTASSIUM SERPL-SCNC: 5.5 MMOL/L — HIGH (ref 3.5–5.3)
PROT SERPL-MCNC: 5.3 G/DL — LOW (ref 6–8.3)
PROT UR-MCNC: 30 MG/DL
RBC # BLD: 3.54 M/UL — LOW (ref 4.2–5.8)
RBC # BLD: 3.57 M/UL — LOW (ref 4.2–5.8)
RBC # FLD: 20.2 % — HIGH (ref 10.3–14.5)
RBC # FLD: 20.3 % — HIGH (ref 10.3–14.5)
RBC BLD AUTO: ABNORMAL
RBC CASTS # UR COMP ASSIST: 1 /HPF — SIGNIFICANT CHANGE UP (ref 0–4)
REVIEW: SIGNIFICANT CHANGE UP
S AUREUS DNA NOSE QL NAA+PROBE: DETECTED
SCHISTOCYTES BLD QL AUTO: SLIGHT — SIGNIFICANT CHANGE UP
SODIUM SERPL-SCNC: 144 MMOL/L — SIGNIFICANT CHANGE UP (ref 135–145)
SODIUM SERPL-SCNC: 147 MMOL/L — HIGH (ref 135–145)
SP GR SPEC: >1.03 — HIGH (ref 1–1.03)
SPHEROCYTES BLD QL SMEAR: SLIGHT — SIGNIFICANT CHANGE UP
SQUAMOUS # UR AUTO: 3 /HPF — SIGNIFICANT CHANGE UP (ref 0–5)
UROBILINOGEN FLD QL: 1 MG/DL — SIGNIFICANT CHANGE UP (ref 0.2–1)
WBC # BLD: 16.83 K/UL — HIGH (ref 3.8–10.5)
WBC # BLD: 17.54 K/UL — HIGH (ref 3.8–10.5)
WBC # FLD AUTO: 16.83 K/UL — HIGH (ref 3.8–10.5)
WBC # FLD AUTO: 17.54 K/UL — HIGH (ref 3.8–10.5)
WBC UR QL: 1 /HPF — SIGNIFICANT CHANGE UP (ref 0–5)

## 2024-02-21 PROCEDURE — 99291 CRITICAL CARE FIRST HOUR: CPT

## 2024-02-21 RX ORDER — LIDOCAINE HCL 20 MG/ML
10 VIAL (ML) INJECTION ONCE
Refills: 0 | Status: COMPLETED | OUTPATIENT
Start: 2024-02-21 | End: 2024-02-21

## 2024-02-21 RX ORDER — DEXTROSE 50 % IN WATER 50 %
25 SYRINGE (ML) INTRAVENOUS ONCE
Refills: 0 | Status: COMPLETED | OUTPATIENT
Start: 2024-02-21 | End: 2024-02-21

## 2024-02-21 RX ORDER — IMMUNE GLOBULIN (HUMAN) 10 G/100ML
35 INJECTION INTRAVENOUS; SUBCUTANEOUS DAILY
Refills: 0 | Status: COMPLETED | OUTPATIENT
Start: 2024-02-21 | End: 2024-02-25

## 2024-02-21 RX ORDER — HEPARIN SODIUM 5000 [USP'U]/ML
5000 INJECTION INTRAVENOUS; SUBCUTANEOUS EVERY 8 HOURS
Refills: 0 | Status: DISCONTINUED | OUTPATIENT
Start: 2024-02-21 | End: 2024-02-23

## 2024-02-21 RX ORDER — DIPHENHYDRAMINE HCL 50 MG
25 CAPSULE ORAL ONCE
Refills: 0 | Status: COMPLETED | OUTPATIENT
Start: 2024-02-21 | End: 2024-02-21

## 2024-02-21 RX ORDER — HUMAN INSULIN 100 [IU]/ML
34 INJECTION, SUSPENSION SUBCUTANEOUS EVERY 6 HOURS
Refills: 0 | Status: DISCONTINUED | OUTPATIENT
Start: 2024-02-21 | End: 2024-02-22

## 2024-02-21 RX ORDER — LIDOCAINE HCL 20 MG/ML
VIAL (ML) INJECTION
Refills: 0 | Status: DISCONTINUED | OUTPATIENT
Start: 2024-02-21 | End: 2024-02-23

## 2024-02-21 RX ORDER — DIPHENHYDRAMINE HCL 50 MG
50 CAPSULE ORAL ONCE
Refills: 0 | Status: DISCONTINUED | OUTPATIENT
Start: 2024-02-21 | End: 2024-02-21

## 2024-02-21 RX ORDER — INSULIN HUMAN 100 [IU]/ML
5 INJECTION, SOLUTION SUBCUTANEOUS ONCE
Refills: 0 | Status: COMPLETED | OUTPATIENT
Start: 2024-02-21 | End: 2024-02-21

## 2024-02-21 RX ORDER — ACETAMINOPHEN 500 MG
650 TABLET ORAL EVERY 24 HOURS
Refills: 0 | Status: COMPLETED | OUTPATIENT
Start: 2024-02-21 | End: 2024-02-25

## 2024-02-21 RX ORDER — FENTANYL CITRATE 50 UG/ML
50 INJECTION INTRAVENOUS ONCE
Refills: 0 | Status: DISCONTINUED | OUTPATIENT
Start: 2024-02-21 | End: 2024-02-21

## 2024-02-21 RX ORDER — HUMAN INSULIN 100 [IU]/ML
30 INJECTION, SUSPENSION SUBCUTANEOUS EVERY 6 HOURS
Refills: 0 | Status: DISCONTINUED | OUTPATIENT
Start: 2024-02-21 | End: 2024-02-21

## 2024-02-21 RX ORDER — LIDOCAINE HCL 20 MG/ML
10 VIAL (ML) INJECTION ONCE
Refills: 0 | Status: DISCONTINUED | OUTPATIENT
Start: 2024-02-21 | End: 2024-02-23

## 2024-02-21 RX ORDER — SODIUM CHLORIDE 9 MG/ML
1000 INJECTION, SOLUTION INTRAVENOUS
Refills: 0 | Status: DISCONTINUED | OUTPATIENT
Start: 2024-02-21 | End: 2024-02-22

## 2024-02-21 RX ORDER — SODIUM CHLORIDE 9 MG/ML
1000 INJECTION, SOLUTION INTRAVENOUS ONCE
Refills: 0 | Status: COMPLETED | OUTPATIENT
Start: 2024-02-21 | End: 2024-02-21

## 2024-02-21 RX ADMIN — HUMAN INSULIN 30 UNIT(S): 100 INJECTION, SUSPENSION SUBCUTANEOUS at 12:39

## 2024-02-21 RX ADMIN — HEPARIN SODIUM 5000 UNIT(S): 5000 INJECTION INTRAVENOUS; SUBCUTANEOUS at 16:00

## 2024-02-21 RX ADMIN — CHLORHEXIDINE GLUCONATE 1 APPLICATION(S): 213 SOLUTION TOPICAL at 21:05

## 2024-02-21 RX ADMIN — SODIUM CHLORIDE 1000 MILLILITER(S): 9 INJECTION, SOLUTION INTRAVENOUS at 05:04

## 2024-02-21 RX ADMIN — PIPERACILLIN AND TAZOBACTAM 200 GRAM(S): 4; .5 INJECTION, POWDER, LYOPHILIZED, FOR SOLUTION INTRAVENOUS at 00:20

## 2024-02-21 RX ADMIN — Medication 1 APPLICATION(S): at 05:02

## 2024-02-21 RX ADMIN — ATORVASTATIN CALCIUM 80 MILLIGRAM(S): 80 TABLET, FILM COATED ORAL at 21:03

## 2024-02-21 RX ADMIN — Medication 300 MILLIGRAM(S): at 17:45

## 2024-02-21 RX ADMIN — HUMAN INSULIN 25 UNIT(S): 100 INJECTION, SUSPENSION SUBCUTANEOUS at 00:27

## 2024-02-21 RX ADMIN — HUMAN INSULIN 30 UNIT(S): 100 INJECTION, SUSPENSION SUBCUTANEOUS at 17:45

## 2024-02-21 RX ADMIN — Medication 25 MILLILITER(S): at 04:23

## 2024-02-21 RX ADMIN — PIPERACILLIN AND TAZOBACTAM 25 GRAM(S): 4; .5 INJECTION, POWDER, LYOPHILIZED, FOR SOLUTION INTRAVENOUS at 10:41

## 2024-02-21 RX ADMIN — FENTANYL CITRATE 50 MICROGRAM(S): 50 INJECTION INTRAVENOUS at 14:00

## 2024-02-21 RX ADMIN — Medication 1 APPLICATION(S): at 21:06

## 2024-02-21 RX ADMIN — CHLORHEXIDINE GLUCONATE 15 MILLILITER(S): 213 SOLUTION TOPICAL at 17:45

## 2024-02-21 RX ADMIN — Medication 2: at 00:27

## 2024-02-21 RX ADMIN — Medication 4: at 05:03

## 2024-02-21 RX ADMIN — Medication 25 MILLIGRAM(S): at 12:38

## 2024-02-21 RX ADMIN — Medication 6: at 17:43

## 2024-02-21 RX ADMIN — INSULIN HUMAN 5 UNIT(S): 100 INJECTION, SOLUTION SUBCUTANEOUS at 04:23

## 2024-02-21 RX ADMIN — GABAPENTIN 400 MILLIGRAM(S): 400 CAPSULE ORAL at 16:00

## 2024-02-21 RX ADMIN — GABAPENTIN 400 MILLIGRAM(S): 400 CAPSULE ORAL at 05:03

## 2024-02-21 RX ADMIN — PIPERACILLIN AND TAZOBACTAM 25 GRAM(S): 4; .5 INJECTION, POWDER, LYOPHILIZED, FOR SOLUTION INTRAVENOUS at 02:28

## 2024-02-21 RX ADMIN — Medication 650 MILLIGRAM(S): at 12:38

## 2024-02-21 RX ADMIN — Medication 1 APPLICATION(S): at 16:00

## 2024-02-21 RX ADMIN — HUMAN INSULIN 25 UNIT(S): 100 INJECTION, SUSPENSION SUBCUTANEOUS at 05:04

## 2024-02-21 RX ADMIN — PIPERACILLIN AND TAZOBACTAM 25 GRAM(S): 4; .5 INJECTION, POWDER, LYOPHILIZED, FOR SOLUTION INTRAVENOUS at 16:04

## 2024-02-21 RX ADMIN — PIPERACILLIN AND TAZOBACTAM 25 GRAM(S): 4; .5 INJECTION, POWDER, LYOPHILIZED, FOR SOLUTION INTRAVENOUS at 21:04

## 2024-02-21 RX ADMIN — HEPARIN SODIUM 5000 UNIT(S): 5000 INJECTION INTRAVENOUS; SUBCUTANEOUS at 21:04

## 2024-02-21 RX ADMIN — Medication 300 MILLIGRAM(S): at 05:02

## 2024-02-21 RX ADMIN — SENNA PLUS 1 TABLET(S): 8.6 TABLET ORAL at 21:04

## 2024-02-21 RX ADMIN — PANTOPRAZOLE SODIUM 40 MILLIGRAM(S): 20 TABLET, DELAYED RELEASE ORAL at 12:38

## 2024-02-21 RX ADMIN — HEPARIN SODIUM 5000 UNIT(S): 5000 INJECTION INTRAVENOUS; SUBCUTANEOUS at 05:03

## 2024-02-21 RX ADMIN — CHLORHEXIDINE GLUCONATE 15 MILLILITER(S): 213 SOLUTION TOPICAL at 05:18

## 2024-02-21 RX ADMIN — SODIUM CHLORIDE 70 MILLILITER(S): 9 INJECTION, SOLUTION INTRAVENOUS at 19:54

## 2024-02-21 RX ADMIN — IMMUNE GLOBULIN (HUMAN) 58.33 GRAM(S): 10 INJECTION INTRAVENOUS; SUBCUTANEOUS at 12:42

## 2024-02-21 RX ADMIN — SODIUM CHLORIDE 70 MILLILITER(S): 9 INJECTION, SOLUTION INTRAVENOUS at 04:24

## 2024-02-21 RX ADMIN — GABAPENTIN 400 MILLIGRAM(S): 400 CAPSULE ORAL at 21:03

## 2024-02-21 RX ADMIN — Medication 6: at 12:39

## 2024-02-21 NOTE — PROGRESS NOTE ADULT - SUBJECTIVE AND OBJECTIVE BOX
Neurology Progress Note    SUBJECTIVE/OBJECTIVE/INTERVAL EVENTS: Patient seen and examined at bedside w/ neuro attending and team. Wife at bedside. Patient was being seen by PT at the time of assessment. Patient is more awake today. Exam stable from yesterday with very minimal improvement.    INTERVAL HISTORY: 74 y/o male with multiple comorbidities presenting initially with suspicious of basilar stroke however, neurological examination more consistent with GBS. 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 Elevated sugars overnight requiring insulin drip. Patient received 5 days of PLEX completed yesterday 2/20. Patient to start IVIG due to poor improvement with PLEX.    REVIEW OF SYSTEMS: Otherwise denies fever, chills, headaches, vision changes, blurry vision, double vision, nausea, vomiting, hearing change, focal weakness, focal numbness, parasthesias, bowel/ bladder incontinence.  Few questions of a 10-system ROS was performed and is negative except for those items noted above and/or in the HPI.    MEDICATIONS  (STANDING):  acetaminophen   Oral Liquid .. 650 milliGRAM(s) Oral every 24 hours  atorvastatin 80 milliGRAM(s) Oral at bedtime  chlorhexidine 0.12% Liquid 15 milliLiter(s) Oral Mucosa every 12 hours  chlorhexidine 4% Liquid 1 Application(s) Topical daily  dextrose 5%. 1000 milliLiter(s) (50 mL/Hr) IV Continuous <Continuous>  dextrose 5%. 1000 milliLiter(s) (100 mL/Hr) IV Continuous <Continuous>  dextrose 50% Injectable 50 milliLiter(s) IV Push every 15 minutes  ergocalciferol Drops 76034 Unit(s) Oral <User Schedule>  gabapentin Solution 400 milliGRAM(s) Oral three times a day  glucagon  Injectable 1 milliGRAM(s) IntraMuscular once  heparin   Injectable 5000 Unit(s) SubCutaneous every 8 hours  immune   globulin 10% (GAMMAGARD) IVPB 35 Gram(s) IV Intermittent daily  insulin lispro (ADMELOG) corrective regimen sliding scale   SubCutaneous every 6 hours  insulin NPH human recombinant 30 Unit(s) SubCutaneous every 6 hours  lactated ringers. 1000 milliLiter(s) (70 mL/Hr) IV Continuous <Continuous>  pantoprazole   Suspension 40 milliGRAM(s) Oral daily  petrolatum Ophthalmic Ointment 1 Application(s) Both EYES three times a day  piperacillin/tazobactam IVPB.- 3.375 Gram(s) IV Intermittent once  piperacillin/tazobactam IVPB.. 3.375 Gram(s) IV Intermittent every 8 hours  polyethylene glycol 3350 17 Gram(s) Oral daily  senna 1 Tablet(s) Oral at bedtime  sorbitol 70%/mineral oil/magnesium hydroxide/glycerin Enema 120 milliLiter(s) Rectal once  vancomycin  IVPB 1500 milliGRAM(s) IV Intermittent every 12 hours    MEDICATIONS  (PRN):  acetaminophen   Oral Liquid .. 650 milliGRAM(s) Oral every 6 hours PRN Mild Pain (1 - 3)  dextrose Oral Gel 15 Gram(s) Oral once PRN Blood Glucose LESS THAN 70 milliGRAM(s)/deciliter  diphenhydrAMINE Elixir 25 milliGRAM(s) Oral once PRN 30 mins before IVIG  oxyCODONE    Solution 10 milliGRAM(s) Oral every 4 hours PRN Severe Pain (7 - 10)  oxyCODONE    Solution 5 milliGRAM(s) Oral every 4 hours PRN Moderate Pain (4 - 6)  sodium chloride 0.9% lock flush 10 milliLiter(s) IV Push every 1 hour PRN Pre/post blood products, medications, blood draw, and to maintain line patency      VITALS & EXAMINATION:  Vital Signs Last 24 Hrs  T(C): 36.5 (21 Feb 2024 07:00), Max: 39.2 (20 Feb 2024 23:20)  T(F): 97.7 (21 Feb 2024 07:00), Max: 102.6 (20 Feb 2024 23:20)  HR: 93 (21 Feb 2024 11:04) (82 - 106)  BP: 96/53 (21 Feb 2024 11:04) (91/53 - 186/79)  BP(mean): 68 (21 Feb 2024 11:04) (64 - 113)  RR: 20 (21 Feb 2024 11:04) (14 - 22)  SpO2: 99% (21 Feb 2024 11:04) (92% - 100%)    Parameters below as of 21 Feb 2024 11:04  Patient On (Oxygen Delivery Method): ventilator, trach    Limited due to intubation and neurological disease process  Neurological (>12):  MS: Awake. responds to yes or no questions with b/l feet movement  Language: no verbal output, intubated  CNs: PERRL (R 2mm, L 2mm) sluggish reactive. VFF minimally. eyes are conjugate and straight head. patient is suffering from opthalmoplegia, EOM are limited. right eye able to move more to commands. Right eye can look up and attempt left and right (stays relatively midline for horizontal movement attempts). No voluntary control of eyelids (b/l ptosis)    Motor - Normal bulk and flaccid tone throughout.     L/R (out of 5 each)       Deltoid  0/0    Biceps   0/0      Triceps  0/0              1/1  L/R (out of 5 each)       Hip Flexion  0/0    Hip Extension  0/0  Knee Extension  0/0  Dorsiflexion  1/1      Plantar Flexion 1/1     Sensation: Intact to LT b/l x4 extremities.   Reflexes L/R:  Biceps(C5) 2/2  BR(C6) 2/2   Triceps(C7)  2/2 Patellar(L4)   0/0   Ankles 0/0  Coordination: unable to assess  Gait: deferred, unable to assess    LABORATORY:  CBC                       8.9    21.95 )-----------( 322      ( 20 Feb 2024 22:02 )             32.3     Chem 02-21    144  |  110<H>  |  39<H>  ----------------------------<  312<H>  5.0   |  24  |  1.07    Ca    8.4      21 Feb 2024 08:31  Phos  5.3     02-21  Mg     2.7     02-21    TPro  5.3<L>  /  Alb  3.5  /  TBili  0.4  /  DBili  x   /  AST  154<H>  /  ALT  81<H>  /  AlkPhos  57  02-21    LFTs LIVER FUNCTIONS - ( 21 Feb 2024 08:31 )  Alb: 3.5 g/dL / Pro: 5.3 g/dL / ALK PHOS: 57 U/L / ALT: 81 U/L / AST: 154 U/L / GGT: x           Coagulopathy PT/INR - ( 20 Feb 2024 10:57 )   PT: 12.3 sec;   INR: 1.18 ratio       PTT - ( 20 Feb 2024 10:57 )  PTT:26.7 sec  Lipid Panel 02-14 Chol 92 LDL -- HDL 19<L> Trig 104, 02-10 Chol 226<H> LDL -- HDL 51 Trig 76  A1c   Cardiac enzymes     STUDIES & IMAGING: (EEG, CT, MR, U/S, TTE/MILADIS):  CTH no acute pathology  CTA: High grade stenosis of proximal basilar + L PCA  CTP: no perfusion deficit    MRI brain with and without contrast 2/11  No significant interval change in the small acute bilateral cerebellar infarcts. No new superimposed acute intracranial abnormality. No acute intracranial hemorrhage.  2. Findings suspicious for cerebral amyloid angiopathy or hypertensive microhemorrhages.  3. Probable minimal chronic microvascular ischemic disease.  4. Sinus disease.    MRI C, T, L spine ww/o  CERVICAL SPINE: Low-grade cervical degenerative disc disease. Indistinct cord lesions at the C3 and C4 vertebral levels are nonspecific but suggest inflammatory/infectious/demyelinating etiology. No associated enhancement.    2. THORACIC SPINE: Low-grade thoracic degenerative disc disease. Thoracic cord intact. No abnomal enhancement.    3. LUMBAR SPINE: Advanced lower lumbar degenerative disc disease includes moderate degenerative central canal stenosis at L4-L5 and high-grade degenerative foraminal stenosis at L5-S1. Conus intact. Cauda equina demonstrates central clustering with spinal stenosis at L4-L5 and mild radicular enhancement predominantly distal to this location of spinal stenosis, nonspecific, inflammatory versus congestion from the stenosis. Vascular congestion between the conus and the L4-L5 stenosis. Superimposed nodular enhancement at the L4 superior endplate level is also nonspecific, inflammatory versus schwannoma    LP  Clear, no color,   83 glucose (elevated)   67 protein (elevated)  TNC 11, 7% Neutrophils (elevated), 67% lymphocytes (wnl), 26% monocytes (wnl)  3 RBCs  IgG CSF 9.7 (elevated)  CSF albumin 41.3 (elevated)  CSF PCR (-)  IgG/albumin ratio CSF 0.23 (wnl) Serum 0.46 (wnl)  IgG index wnl  IgG synthesis wnl  culture gram stain (-)  CSF ACE, autoimmune encephalopathy panel unremarkable    EEG 2/11 (18 hr study)  Abnormal EEG study  Mild generalized background slowing  Clinical Impression:  Mild diffuse/multi-focal cerebral dysfunction, not specific as to etiology.  There were no epileptiform abnormalities recorded.

## 2024-02-21 NOTE — PROGRESS NOTE ADULT - ASSESSMENT
73M s/p trach/peg on 2/16. Trach sutures removed today.    Plan  - continue TFs via PEG as tolerated  - Please reconsult as needed    ACS/Trauma  [phone] (pager)

## 2024-02-21 NOTE — PROGRESS NOTE ADULT - ATTENDING COMMENTS
Patient seen and examined at bedside.  During my evaluation, patient's wife was present and I appreciate that.    I agree with the findings and plan as documented in the Resident's note except below.    Exam: Patient able to move feet on command and minimal trace movement at bilateral hands, otherwise no movements at rest of extremities to noxious stimuli or command.    Mr. Ashley is a 73 year old unknown handed  man with PMHx of multiple vascular risk factors and other medical problems who presented to [facility] ER due to the ataxia and left facial droop, initially concerning for stroke and w/up notable for basilar stenosis. Transferred to [facility] and quickly developed worsening dysarthria, dysphagia, and weakness. He was intubated and taken to angiogram on 2/11 showed moderate stenosis but no intervention done. MRI with small b/l cerebellar strokes (postprocedural after angiography) and prior L thalamic strokes, although these symptoms would not explain pt's current clinical deficits and progress nature.   Per NSICU team, the patient had been having progressive weakness and numbness of the extremities for the preceding 1-2 weeks following URI. GD1b found to be 110. 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 Completed 5 sessions of PLEX on 02/20/2024 . Clinically patient remains unchanged as compared yesterday.   Patient will benefit from IVIG after Plex if there is contraindication. Will be receiving first dose today.   Continue medical management, neuro- check and fall precaution.  GI and DVT prophylaxis.    Patient is at high risk for complications and morbidity or mortality. There is high probability of imminent or life threatening deterioration in the patient's condition.  Patient is unable or incompetent to participate in giving a history and/or making decisions and discussion is necessary for determining treatment decisions.  I discussed the diagnosis and treatment plan with the patient's daughter. All questions and concerns were addressed.   My cumulative time taking care of this critically ill patient is 55 minutes  If you have any further questions, please do not hesitate to contact our team.  Thank you for allowing us to participate in this patient care.

## 2024-02-21 NOTE — PROCEDURE NOTE - NSBRONCHPROCDETAILS_GEN_A_CORE_FT
The elzbieta was visualized and appeared sharp. Left and right mainstem bronchus were visualized. Able to access both bronchus and was able to sample from each bronchus. Thick, purulent discharge was suctioned. No intra-op complications. Patient tolerated procedure well.

## 2024-02-21 NOTE — PROGRESS NOTE ADULT - SUBJECTIVE AND OBJECTIVE BOX
73y Male with PMHx significant for HTN, HLD, DM type 2, and two prior strokes without residual deficits [place] and L facial weakness on [place]. 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 He was transferred to NSICU for angiogram, which was done on 2/9 and showed moderate basilar stenosis. No intervention done. He had to be intubated for worsening respiratory status. On further history it was found that patient had been having progressive weakness and numbness of his extremities for the preceding 1-2 weeks following a viral URI.     Admission Scores  GCS:   HH:   MF:   NIHSS: 4   RASS:    CAM-ICU:   ICH:    2/9: transferred from [place], NIHSS of 4 in ED, then became more lethargic, stridorous and desaturated in ED, intubated for airway protected, repeat CTH/A obtained and brought emergently to IR for basilar stenting   2/10: worsening brainstem symptoms while on DAPT  2/12- LP performed to rule out MFS as patient's examination is not explained by the strokes on MRI. First session of PLEX  2/14- s/p 2nd PLEX tx, no acute interval events  2/15- No acute events overnight. Patient's examination more or less the same.   2/16- Patient storming overnight requiring multiple pushes of labetalol and fentanyl without improvement in BP. Patient started on propofol which also is not optimally managing patient's BP.   2/17 Labile BP due to dysautonomia, continue with scheduled TPE sessions, phenylephrine restarted for BP lability  2/19- No acute events overnight   2/20 - no acute events overnight, unchanged physical exam  2/21 - acute desaturation with fevers requiring emergent chest CT, likely secondary to aspiration pneumonia  2/22: No acute events overnight    Allergies    No Known Allergies    Intolerances        REVIEW OF SYSTEMS: [ X] Unable to Assess due to neurologic exam   [ ] All ROS addressed below are non-contributory, except:  Neuro: [ ] Headache [ ] Back pain [ ] Numbness [ ] Weakness [ ] Ataxia [ ] Dizziness [ ] Aphasia [ ] Dysarthria [ ] Visual disturbance  Resp: [ ] Shortness of breath/dyspnea, [ ] Orthopnea [ ] Cough  CV: [ ] Chest pain [ ] Palpitation [ ] Lightheadedness [ ] Syncope  Renal: [ ] Thirst [ ] Edema  GI: [ ] Nausea [ ] Emesis [ ] Abdominal pain [ ] Constipation [ ] Diarrhea  Hem: [ ] Hematemesis [ ] bright red blood per rectum  ID: [ ] Fever [ ] Chills [ ] Dysuria  ENT: [ ] Rhinorrhea      DEVICES:   [ ] Restraints [ ] ET tube [X ] central line [ ] arterial line [X ] vences [ ] NGT/OGT [ ] EVD [ ] LD [ ] HAL/HMV [ ] Trach [ ] PEG [ ] Chest Tube     ICU Vital Signs Last 24 Hrs  T(C): 37.5 (21 Feb 2024 11:00), Max: 39.2 (20 Feb 2024 23:20)  T(F): 99.5 (21 Feb 2024 11:00), Max: 102.6 (20 Feb 2024 23:20)  HR: 92 (21 Feb 2024 12:00) (82 - 106)  BP: 103/55 (21 Feb 2024 12:00) (91/53 - 173/75)  BP(mean): 73 (21 Feb 2024 12:00) (64 - 108)  RR: 20 (21 Feb 2024 12:00) (14 - 22)  SpO2: 98% (21 Feb 2024 12:00) (92% - 100%)    O2 Parameters below as of 21 Feb 2024 11:04  Patient On (Oxygen Delivery Method): ventilator, trach      CAPILLARY BLOOD GLUCOSE    POCT Blood Glucose.: 231 mg/dL (21 Feb 2024 04:22)  POCT Blood Glucose.: 184 mg/dL (21 Feb 2024 00:26)  POCT Blood Glucose.: 162 mg/dL (20 Feb 2024 19:01)  POCT Blood Glucose.: 153 mg/dL (20 Feb 2024 18:13)  POCT Blood Glucose.: 158 mg/dL (20 Feb 2024 17:22)  POCT Blood Glucose.: 157 mg/dL (20 Feb 2024 15:01)  POCT Blood Glucose.: 188 mg/dL (20 Feb 2024 14:12)  POCT Blood Glucose.: 179 mg/dL (20 Feb 2024 13:21)      I&O's Summary    20 Feb 2024 07:01  -  21 Feb 2024 07:00  --------------------------------------------------------  IN: 5536.3 mL / OUT: 1401 mL / NET: 4135.3 mL    21 Feb 2024 07:01  -  21 Feb 2024 12:17  --------------------------------------------------------  IN: 1500 mL / OUT: 0 mL / NET: 1500 mL        Respiratory:  Mode: AC/ CMV (Assist Control/ Continuous Mandatory Ventilation)  RR (machine): 20  TV (machine): 500  FiO2: 70  PEEP: 12  ITime: 1  MAP: 9  PIP: 18        LABS:  LABS:                          7.6    16.83 )-----------( 258      ( 21 Feb 2024 11:41 )             27.8     02-21    144  |  110<H>  |  39<H>  ----------------------------<  312<H>  5.0   |  24  |  1.07    Ca    8.4      21 Feb 2024 08:31  Phos  5.3     02-21  Mg     2.7     02-21    TPro  5.3<L>  /  Alb  3.5  /  TBili  0.4  /  DBili  x   /  AST  154<H>  /  ALT  81<H>  /  AlkPhos  57  02-21    PT/INR - ( 20 Feb 2024 10:57 )   PT: 12.3 sec;   INR: 1.18 ratio         PTT - ( 20 Feb 2024 10:57 )  PTT:26.7 sec      MEDICATIONS  (STANDING):  acetaminophen   Oral Liquid .. 650 milliGRAM(s) Oral every 24 hours  atorvastatin 80 milliGRAM(s) Oral at bedtime  chlorhexidine 0.12% Liquid 15 milliLiter(s) Oral Mucosa every 12 hours  chlorhexidine 4% Liquid 1 Application(s) Topical daily  dextrose 5%. 1000 milliLiter(s) (100 mL/Hr) IV Continuous <Continuous>  dextrose 5%. 1000 milliLiter(s) (50 mL/Hr) IV Continuous <Continuous>  dextrose 50% Injectable 50 milliLiter(s) IV Push every 15 minutes  ergocalciferol Drops 26641 Unit(s) Oral <User Schedule>  gabapentin Solution 400 milliGRAM(s) Oral three times a day  glucagon  Injectable 1 milliGRAM(s) IntraMuscular once  heparin   Injectable 5000 Unit(s) SubCutaneous every 8 hours  immune   globulin 10% (GAMMAGARD) IVPB 35 Gram(s) IV Intermittent daily  insulin lispro (ADMELOG) corrective regimen sliding scale   SubCutaneous every 6 hours  insulin NPH human recombinant 30 Unit(s) SubCutaneous every 6 hours  lactated ringers. 1000 milliLiter(s) (70 mL/Hr) IV Continuous <Continuous>  pantoprazole   Suspension 40 milliGRAM(s) Oral daily  petrolatum Ophthalmic Ointment 1 Application(s) Both EYES three times a day  piperacillin/tazobactam IVPB.- 3.375 Gram(s) IV Intermittent once  piperacillin/tazobactam IVPB.. 3.375 Gram(s) IV Intermittent every 8 hours  polyethylene glycol 3350 17 Gram(s) Oral daily  senna 1 Tablet(s) Oral at bedtime  sorbitol 70%/mineral oil/magnesium hydroxide/glycerin Enema 120 milliLiter(s) Rectal once  vancomycin  IVPB 1500 milliGRAM(s) IV Intermittent every 12 hours    MEDICATIONS  (PRN):  acetaminophen   Oral Liquid .. 650 milliGRAM(s) Oral every 6 hours PRN Mild Pain (1 - 3)  dextrose Oral Gel 15 Gram(s) Oral once PRN Blood Glucose LESS THAN 70 milliGRAM(s)/deciliter  diphenhydrAMINE Elixir 25 milliGRAM(s) Oral once PRN 30 mins before IVIG  oxyCODONE    Solution 10 milliGRAM(s) Oral every 4 hours PRN Severe Pain (7 - 10)  oxyCODONE    Solution 5 milliGRAM(s) Oral every 4 hours PRN Moderate Pain (4 - 6)  sodium chloride 0.9% lock flush 10 milliLiter(s) IV Push every 1 hour PRN Pre/post blood products, medications, blood draw, and to maintain line patency       EXAMINATION:  PHYSICAL EXAM:    Constitutional: No Acute Distress, patient intubated but following commands     Neurological: Bilateral ptosis, pupils 2 mm and sluggish, restricted gaze in all directions however, vertical gaze is better than horizontal. Facial asymmetry could not be appreciated due to ETT, patient following commands with his BL LE. Patient following commands and able to communicate with his bilateral feet. Right foot moved-- signifying 'yes', left foot moved-- signifying 'no'.    Motor exam:          Upper extremity                         Delt     Bicep     Tricep    HG                                                 R         0/5        0/5        0/5      2/5 (activation of muscles could be appreciated in bilateral biceps and triceps)                                               L          0/5        0/5        0/5       3/5          Lower extremity                        HF         KF        KE       DF         PF                                                  R        0/5        0/5        3/5       2/5         5/5                                               L         0/5        0/5       3/5       2/5          5/5                                                  Reflexes: Deep Tendon Reflexes absent in all 4 extremities    Pulmonary: Clear to Auscultation, No rales, No rhonchi, No wheezes     Cardiovascular: S1, S2, Regular rate and rhythm     Gastrointestinal: Soft, Non-tender, Non-distended     Extremities: No calf tenderness

## 2024-02-21 NOTE — PROCEDURE NOTE - NSBRONCHHISTORY_GEN_A_CORE_FT
Patient presenting with AIDP, intubated and subsequently trached for failure to protect airway. Patient with episodes of desaturation.

## 2024-02-21 NOTE — PROGRESS NOTE ADULT - ASSESSMENT
ASSESSMENT/PLAN: 72 y/o male with multiple comorbidities presenting initially with suspicious of basilar stroke however, neurological examination more consistent with GBS. 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 No longer requiring insulin drips. Labile O2 saturation and temperature. Scheduled for first IVIG session today, 2/21.     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  - 2/20- PLEX x 5(complete)  - 2/21- IvIG x 5   - hold Aspirin   - Neurology following   - Speech pathologist evaluation done and mode of speech established  Activity: [x] mobilize as tolerated [] Bedrest [x] PT [x] OT [x] PMNR    PULM:  - s/p tracheostomy 2/16  - Continue chest PT   - CPAP as tolerated   - Bronchoscopy today      CV:   - SBP goal  with MAP >65   - PICC  - TTE- EF 66%      RENAL:  - Fluids: LR 70ml/hr  - c/w Free water 400 ccQ4h  - Na stable @ 147    GI:  - s/p PEG 2/16, Glucerna feeds at goal (per nursing 2/20)  - Vitamin D supplementation   - GI prophylaxis [] not indicated [x] PPI [] other:  - Bowel regimen [x] miralax [x] senna, dulcolax suppository   - Smog enema today  - LBM  2/18    ENDO:   - Goal euglycemia (-180)  - A1c 6.8  - ISS    HEME/ONC:  - H/H stable   - VTE prophylaxis: [x] SCDs [x] chemoprophylaxis- SQL [] hold chemoprophylaxis due to: [] high risk of DVT/PE on admission due to: immobility     ID:  - febrile overnight  - c/w vanc and zosyn     MISC:  Lines/tubes: RIJ, vences

## 2024-02-21 NOTE — PROGRESS NOTE ADULT - ASSESSMENT
ASSESSMENT/PLAN: 74 y/o male with multiple comorbidities presenting initially with suspicious of basilar stroke however, neurological examination more consistent with GBS. 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 Elevated sugars overnight requiring insulin drip. Scheduled for last dose of PLEX today, 2/20.     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  - 2/20- PLEX x 5 today  - ASA stopped due to blood from his trach site  - Neurology following   - Speech pathologist evaluation done and mode of speech established  Activity: [x] mobilize as tolerated [] Bedrest [x] PT [x] OT [x] PMNR    PULM:  - s/p tracheostomy 2/16  - Continue chest PT   - CPAP as tolerated   - Thick secretions on hypertonic nebs     CV:   - SBP goal  with MAP >65   - PICC  - TTE- EF 66%  - d/c R- axillary A line     RENAL:  - Fluids: IVL  - c/w Free water 400 ccQ6h  - Na stable @ 146    GI:  - s/p PEG 2/16, Glucerna feeds at goal (per nursing 2/20)  - Vitamin D supplementation   - GI prophylaxis [] not indicated [x] PPI [] other:  - Bowel regimen [x] miralax [x] senna, dulcolax suppository   - LBM  2/18    ENDO:   - Goal euglycemia (-180)  - A1c 6.8  - Sugars elevated overnight, Insulin gtt which started 2/20    HEME/ONC:  - H/H stable   - Fibrinogen checks Q48H while getting plasmapheresis   - VTE prophylaxis: [x] SCDs [x] chemoprophylaxis- SQL [] hold chemoprophylaxis due to: [] high risk of DVT/PE on admission due to: immobility     ID:  - Afebrile, leukocytosis- trending down     MISC:  Lines/tubes: RIJ, vences  ASSESSMENT/PLAN: 74 y/o male with multiple comorbidities presenting initially with suspicious of basilar stroke however, neurological examination more consistent with GBS. 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 Elevated sugars overnight requiring insulin drip. Scheduled for IVIG today, 2/21.  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  - 2/20- PLEX x 5(complete)  - ASA stopped due to blood from his trach site  - Neurology following   - Speech pathologist evaluation done and mode of speech established  Activity: [x] mobilize as tolerated [] Bedrest [x] PT [x] OT [x] PMNR    PULM:  - s/p tracheostomy 2/16  - Continue chest PT   - CPAP as tolerated   - Thick secretions on hypertonic nebs     CV:   - SBP goal  with MAP >65   - PICC  - TTE- EF 66%  - d/c R- axillary A line     RENAL:  - Fluids: IVL  - c/w Free water 400 ccQ6h  - Na stable @ 146    GI:  - s/p PEG 2/16, Glucerna feeds at goal (per nursing 2/20)  - Vitamin D supplementation   - GI prophylaxis [] not indicated [x] PPI [] other:  - Bowel regimen [x] miralax [x] senna, dulcolax suppository   - LBM  2/18    ENDO:   - Goal euglycemia (-180)  - A1c 6.8  - Sugars elevated overnight, Insulin gtt which started 2/20    HEME/ONC:  - H/H stable   - Fibrinogen checks Q48H while getting plasmapheresis   - VTE prophylaxis: [x] SCDs [x] chemoprophylaxis- SQL [] hold chemoprophylaxis due to: [] high risk of DVT/PE on admission due to: immobility     ID:  - Afebrile, leukocytosis- trending down     MISC:  Lines/tubes: RIJ, vences  ASSESSMENT/PLAN: 74 y/o male with multiple comorbidities presenting initially with suspicious of basilar stroke however, neurological examination more consistent with GBS. 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 No longer requiring insulin drips. Labile O2 saturation and temperature. Scheduled for first IVIG session today, 2/21.     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  - 2/20- PLEX x 5(complete)  - 2/21- IvIG x 5   - hold Aspirin   - Neurology following   - Speech pathologist evaluation done and mode of speech established  Activity: [x] mobilize as tolerated [] Bedrest [x] PT [x] OT [x] PMNR    PULM:  - s/p tracheostomy 2/16  - Continue chest PT   - CPAP as tolerated   - Bronchoscopy today      CV:   - SBP goal  with MAP >65   - PICC  - TTE- EF 66%      RENAL:  - Fluids: LR 70ml/hr  - c/w Free water 400 ccQ4h  - Na stable @ 147    GI:  - s/p PEG 2/16, Glucerna feeds at goal (per nursing 2/20)  - Vitamin D supplementation   - GI prophylaxis [] not indicated [x] PPI [] other:  - Bowel regimen [x] miralax [x] senna, dulcolax suppository   - Smog enema today  - LBM  2/18    ENDO:   - Goal euglycemia (-180)  - A1c 6.8  - ISS    HEME/ONC:  - H/H stable   - VTE prophylaxis: [x] SCDs [x] chemoprophylaxis- SQL [] hold chemoprophylaxis due to: [] high risk of DVT/PE on admission due to: immobility     ID:  - febrile overnight  -start vanc and zosyn     MISC:  Lines/tubes: RIJ, vences

## 2024-02-21 NOTE — PROGRESS NOTE ADULT - ASSESSMENT
Assessment: 72 y/o male with multiple comorbidities presenting initially with suspicious of basilar stroke however, neurological examination more consistent with GBS. 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 Elevated sugars overnight requiring insulin drip. Patient received 5 days of PLEX completed yesterday 2/20. Patient to start IVIG due to poor improvement with PLEX.    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 s/p PLEX.    Recommendations:  [/]Start IVIG 5 sessions tomorrow due to poor response to PLEX  [x]PLEX Day 5 out of 5 today. (completed 2/20)  [/]awaitingserum studies for autoimmune encephalopathy panel (specimen received by lab)  [x] ASA/Plavix per 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 B12 >2000. Folate >20. Vit D 25-OH 14.3.B1wnl (148.7), B6 wnl (17.2), copper elevated (156)  [x] TTE EF 66%, no PFO, no LV thrombus  [x] CSF studies:  oligoclonal bands (-), myelin basic protein (3.3), CSF ACE (-), VDRL (-), VZV PCR (-), HSV 1/2 PCR (-),Ds DNA (-), PAULINA 9-), CMV (-). Royce bar(-), WNV (-)  [x] cytology, flow cytometry: insufficient cells    Case seen and discussed with Neurology Attending

## 2024-02-21 NOTE — PROGRESS NOTE ADULT - SUBJECTIVE AND OBJECTIVE BOX
73y Male with PMHx significant for HTN, HLD, DM type 2, and two prior strokes without residual deficits [place] and L facial weakness on 2/9. 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 He was transferred to NSICU for angiogram, which was done on 2/9 and showed moderate basilar stenosis. No intervention done. He had to be intubated for worsening respiratory status. On further history it was found that patient had been having progressive weakness and numbness of his extremities for the preceding 1-2 weeks following a viral URI.     Admission Scores  GCS:   HH:   MF:   NIHSS: 4   RASS:    CAM-ICU:   ICH:    2/9: transferred from [place], NIHSS of 4 in ED, then became more lethargic, stridorous and desaturated in ED, intubated for airway protected, repeat CTH/A obtained and brought emergently to IR for basilar stenting   2/10: worsening brainstem symptoms while on DAPT  2/12- LP performed to rule out MFS as patient's examination is not explained by the strokes on MRI. First session of PLEX  2/14- s/p 2nd PLEX tx, no acute interval events  2/15- No acute events overnight. Patient's examination more or less the same.   2/16- Patient storming overnight requiring multiple pushes of labetalol and fentanyl without improvement in BP. Patient started on propofol which also is not optimally managing patient's BP.   2/17 Labile BP due to dysautonomia, continue with scheduled TPE sessions, phenylephrine restarted for BP lability  2/19- No acute events overnight   2/20 - no acute events overnight, unchanged physical exam    Allergies    No Known Allergies    Intolerances        REVIEW OF SYSTEMS: [ X] Unable to Assess due to neurologic exam   [ ] All ROS addressed below are non-contributory, except:  Neuro: [ ] Headache [ ] Back pain [ ] Numbness [ ] Weakness [ ] Ataxia [ ] Dizziness [ ] Aphasia [ ] Dysarthria [ ] Visual disturbance  Resp: [ ] Shortness of breath/dyspnea, [ ] Orthopnea [ ] Cough  CV: [ ] Chest pain [ ] Palpitation [ ] Lightheadedness [ ] Syncope  Renal: [ ] Thirst [ ] Edema  GI: [ ] Nausea [ ] Emesis [ ] Abdominal pain [ ] Constipation [ ] Diarrhea  Hem: [ ] Hematemesis [ ] bright red blood per rectum  ID: [ ] Fever [ ] Chills [ ] Dysuria  ENT: [ ] Rhinorrhea      DEVICES:   [ ] Restraints [ ] ET tube [X ] central line [ ] arterial line [X ] vences [ ] NGT/OGT [ ] EVD [ ] LD [ ] HAL/HMV [ ] Trach [ ] PEG [ ] Chest Tube     Vital Signs Last 24 Hrs  T(C): 37.4 (20 Feb 2024 03:00), Max: 38 (19 Feb 2024 19:00)  T(F): 99.3 (20 Feb 2024 03:00), Max: 100.4 (19 Feb 2024 19:00)  HR: 95 (20 Feb 2024 06:00) (90 - 107)  BP: 178/81 (20 Feb 2024 06:00) (117/58 - 186/79)  BP(mean): 116 (20 Feb 2024 06:00) (80 - 116)  RR: 29 (20 Feb 2024 06:00) (16 - 29)  SpO2: 97% (20 Feb 2024 06:00) (93% - 100%)    Parameters below as of 20 Feb 2024 05:50  Patient On (Oxygen Delivery Method): ventilator      CAPILLARY BLOOD GLUCOSE      POCT Blood Glucose.: 250 mg/dL (20 Feb 2024 06:59)  POCT Blood Glucose.: 255 mg/dL (20 Feb 2024 06:06)  POCT Blood Glucose.: 263 mg/dL (20 Feb 2024 05:28)  POCT Blood Glucose.: 259 mg/dL (20 Feb 2024 03:03)  POCT Blood Glucose.: 290 mg/dL (20 Feb 2024 01:21)  POCT Blood Glucose.: 258 mg/dL (19 Feb 2024 23:21)  POCT Blood Glucose.: 268 mg/dL (19 Feb 2024 17:08)  POCT Blood Glucose.: 238 mg/dL (19 Feb 2024 13:18)  POCT Blood Glucose.: 227 mg/dL (19 Feb 2024 11:06)  POCT Blood Glucose.: 241 mg/dL (19 Feb 2024 08:32)      I&O's Summary    19 Feb 2024 07:01  -  20 Feb 2024 07:00  --------------------------------------------------------  IN: 2863 mL / OUT: 1750 mL / NET: 1113 mL          Respiratory:  Mode: AC/ CMV (Assist Control/ Continuous Mandatory Ventilation)  RR (machine): 16  TV (machine): 500  FiO2: 40  PEEP: 5  ITime: 1  MAP: 9  PIP: 18        LABS:                          8.7    12.88 )-----------( 377      ( 20 Feb 2024 00:29 )             30.3     02-20    146<H>  |  109<H>  |  30<H>  ----------------------------<  292<H>  5.3   |  27  |  0.93    Ca    8.6      20 Feb 2024 00:29  Phos  3.3     02-20  Mg     2.8     02-20    TPro  5.4<L>  /  Alb  3.9  /  TBili  0.3  /  DBili  x   /  AST  28  /  ALT  21  /  AlkPhos  49  02-19    MEDICATIONS  (STANDING):  albuterol/ipratropium for Nebulization 3 milliLiter(s) Nebulizer every 6 hours  atorvastatin 80 milliGRAM(s) Oral at bedtime  chlorhexidine 0.12% Liquid 15 milliLiter(s) Oral Mucosa every 12 hours  chlorhexidine 4% Liquid 1 Application(s) Topical daily  dextrose 50% Injectable 50 milliLiter(s) IV Push every 15 minutes  enoxaparin Injectable 40 milliGRAM(s) SubCutaneous <User Schedule>  ergocalciferol Drops 91455 Unit(s) Oral <User Schedule>  gabapentin Solution 400 milliGRAM(s) Oral three times a day  insulin regular Infusion 2.5 Unit(s)/Hr (2.5 mL/Hr) IV Continuous <Continuous>  pantoprazole  Injectable 40 milliGRAM(s) IV Push daily  polyethylene glycol 3350 17 Gram(s) Oral daily  senna 1 Tablet(s) Oral at bedtime  sodium chloride 3%  Inhalation 4 milliLiter(s) Inhalation every 6 hours    MEDICATIONS  (PRN):  acetaminophen   Oral Liquid .. 650 milliGRAM(s) Oral every 6 hours PRN Mild Pain (1 - 3)  oxyCODONE    Solution 10 milliGRAM(s) Oral every 4 hours PRN Severe Pain (7 - 10)  oxyCODONE    Solution 5 milliGRAM(s) Oral every 4 hours PRN Moderate Pain (4 - 6)  sodium chloride 0.9% lock flush 10 milliLiter(s) IV Push every 1 hour PRN Pre/post blood products, medications, blood draw, and to maintain line patency       EXAMINATION:  PHYSICAL EXAM:    Constitutional: No Acute Distress, patient intubated but following commands     Neurological: Bilateral ptosis, pupils 2 mm and sluggish, restricted gaze in all directions however, vertical gaze is better than horizontal. Facial asymmetry could not be appreciated due to ETT, patient following commands with his BL LE. Patient following commands and able to communicate with his bilateral feet. Right foot moved-- signifying 'yes', left foot moved-- signifying 'no'.    Motor exam:          Upper extremity                         Delt     Bicep     Tricep    HG                                                 R         0/5        0/5        0/5      2/5 (activation of muscles could be appreciated in bilateral biceps and triceps)                                               L          0/5        0/5        0/5       3/5          Lower extremity                        HF         KF        KE       DF         PF                                                  R        0/5        0/5        3/5       2/5         5/5                                               L         0/5        0/5       3/5       2/5          5/5                                                  Reflexes: Deep Tendon Reflexes absent in all 4 extremities    Pulmonary: Clear to Auscultation, No rales, No rhonchi, No wheezes     Cardiovascular: S1, S2, Regular rate and rhythm     Gastrointestinal: Soft, Non-tender, Non-distended     Extremities: No calf tenderness    73y Male with PMHx significant for HTN, HLD, DM type 2, and two prior strokes without residual deficits [place] and L facial weakness on [facility]. 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 He was transferred to NSICU for angiogram, which was done on 2/9 and showed moderate basilar stenosis. No intervention done. He had to be intubated for worsening respiratory status. On further history it was found that patient had been having progressive weakness and numbness of his extremities for the preceding 1-2 weeks following a viral URI.     Admission Scores  GCS:   HH:   MF:   NIHSS: 4   RASS:    CAM-ICU:   ICH:    2/9: transferred from [place], NIHSS of 4 in ED, then became more lethargic, stridorous and desaturated in ED, intubated for airway protected, repeat CTH/A obtained and brought emergently to IR for basilar stenting   2/10: worsening brainstem symptoms while on DAPT  2/12- LP performed to rule out MFS as patient's examination is not explained by the strokes on MRI. First session of PLEX  2/14- s/p 2nd PLEX tx, no acute interval events  2/15- No acute events overnight. Patient's examination more or less the same.   2/16- Patient storming overnight requiring multiple pushes of labetalol and fentanyl without improvement in BP. Patient started on propofol which also is not optimally managing patient's BP.   2/17 Labile BP due to dysautonomia, continue with scheduled TPE sessions, phenylephrine restarted for BP lability  2/19- No acute events overnight   2/20 - no acute events overnight, unchanged physical exam  2/21 - acute desaturation requiring emergent chest CT.     Allergies    No Known Allergies    Intolerances        REVIEW OF SYSTEMS: [ X] Unable to Assess due to neurologic exam   [ ] All ROS addressed below are non-contributory, except:  Neuro: [ ] Headache [ ] Back pain [ ] Numbness [ ] Weakness [ ] Ataxia [ ] Dizziness [ ] Aphasia [ ] Dysarthria [ ] Visual disturbance  Resp: [ ] Shortness of breath/dyspnea, [ ] Orthopnea [ ] Cough  CV: [ ] Chest pain [ ] Palpitation [ ] Lightheadedness [ ] Syncope  Renal: [ ] Thirst [ ] Edema  GI: [ ] Nausea [ ] Emesis [ ] Abdominal pain [ ] Constipation [ ] Diarrhea  Hem: [ ] Hematemesis [ ] bright red blood per rectum  ID: [ ] Fever [ ] Chills [ ] Dysuria  ENT: [ ] Rhinorrhea      DEVICES:   [ ] Restraints [ ] ET tube [X ] central line [ ] arterial line [X ] vences [ ] NGT/OGT [ ] EVD [ ] LD [ ] HAL/HMV [ ] Trach [ ] PEG [ ] Chest Tube     Vital Signs Last 24 Hrs  T(C): 37.4 (20 Feb 2024 03:00), Max: 38 (19 Feb 2024 19:00)  T(F): 99.3 (20 Feb 2024 03:00), Max: 100.4 (19 Feb 2024 19:00)  HR: 95 (20 Feb 2024 06:00) (90 - 107)  BP: 178/81 (20 Feb 2024 06:00) (117/58 - 186/79)  BP(mean): 116 (20 Feb 2024 06:00) (80 - 116)  RR: 29 (20 Feb 2024 06:00) (16 - 29)  SpO2: 97% (20 Feb 2024 06:00) (93% - 100%)    Parameters below as of 20 Feb 2024 05:50  Patient On (Oxygen Delivery Method): ventilator      CAPILLARY BLOOD GLUCOSE      POCT Blood Glucose.: 250 mg/dL (20 Feb 2024 06:59)  POCT Blood Glucose.: 255 mg/dL (20 Feb 2024 06:06)  POCT Blood Glucose.: 263 mg/dL (20 Feb 2024 05:28)  POCT Blood Glucose.: 259 mg/dL (20 Feb 2024 03:03)  POCT Blood Glucose.: 290 mg/dL (20 Feb 2024 01:21)  POCT Blood Glucose.: 258 mg/dL (19 Feb 2024 23:21)  POCT Blood Glucose.: 268 mg/dL (19 Feb 2024 17:08)  POCT Blood Glucose.: 238 mg/dL (19 Feb 2024 13:18)  POCT Blood Glucose.: 227 mg/dL (19 Feb 2024 11:06)  POCT Blood Glucose.: 241 mg/dL (19 Feb 2024 08:32)      I&O's Summary    19 Feb 2024 07:01  -  20 Feb 2024 07:00  --------------------------------------------------------  IN: 2863 mL / OUT: 1750 mL / NET: 1113 mL          Respiratory:  Mode: AC/ CMV (Assist Control/ Continuous Mandatory Ventilation)  RR (machine): 16  TV (machine): 500  FiO2: 40  PEEP: 5  ITime: 1  MAP: 9  PIP: 18        LABS:                          8.7    12.88 )-----------( 377      ( 20 Feb 2024 00:29 )             30.3     02-20    146<H>  |  109<H>  |  30<H>  ----------------------------<  292<H>  5.3   |  27  |  0.93    Ca    8.6      20 Feb 2024 00:29  Phos  3.3     02-20  Mg     2.8     02-20    TPro  5.4<L>  /  Alb  3.9  /  TBili  0.3  /  DBili  x   /  AST  28  /  ALT  21  /  AlkPhos  49  02-19    MEDICATIONS  (STANDING):  albuterol/ipratropium for Nebulization 3 milliLiter(s) Nebulizer every 6 hours  atorvastatin 80 milliGRAM(s) Oral at bedtime  chlorhexidine 0.12% Liquid 15 milliLiter(s) Oral Mucosa every 12 hours  chlorhexidine 4% Liquid 1 Application(s) Topical daily  dextrose 50% Injectable 50 milliLiter(s) IV Push every 15 minutes  enoxaparin Injectable 40 milliGRAM(s) SubCutaneous <User Schedule>  ergocalciferol Drops 31133 Unit(s) Oral <User Schedule>  gabapentin Solution 400 milliGRAM(s) Oral three times a day  insulin regular Infusion 2.5 Unit(s)/Hr (2.5 mL/Hr) IV Continuous <Continuous>  pantoprazole  Injectable 40 milliGRAM(s) IV Push daily  polyethylene glycol 3350 17 Gram(s) Oral daily  senna 1 Tablet(s) Oral at bedtime  sodium chloride 3%  Inhalation 4 milliLiter(s) Inhalation every 6 hours    MEDICATIONS  (PRN):  acetaminophen   Oral Liquid .. 650 milliGRAM(s) Oral every 6 hours PRN Mild Pain (1 - 3)  oxyCODONE    Solution 10 milliGRAM(s) Oral every 4 hours PRN Severe Pain (7 - 10)  oxyCODONE    Solution 5 milliGRAM(s) Oral every 4 hours PRN Moderate Pain (4 - 6)  sodium chloride 0.9% lock flush 10 milliLiter(s) IV Push every 1 hour PRN Pre/post blood products, medications, blood draw, and to maintain line patency       EXAMINATION:  PHYSICAL EXAM:    Constitutional: No Acute Distress, patient intubated but following commands     Neurological: Bilateral ptosis, pupils 2 mm and sluggish, restricted gaze in all directions however, vertical gaze is better than horizontal. Facial asymmetry could not be appreciated due to ETT, patient following commands with his BL LE. Patient following commands and able to communicate with his bilateral feet. Right foot moved-- signifying 'yes', left foot moved-- signifying 'no'.    Motor exam:          Upper extremity                         Delt     Bicep     Tricep    HG                                                 R         0/5        0/5        0/5      2/5 (activation of muscles could be appreciated in bilateral biceps and triceps)                                               L          0/5        0/5        0/5       3/5          Lower extremity                        HF         KF        KE       DF         PF                                                  R        0/5        0/5        3/5       2/5         5/5                                               L         0/5        0/5       3/5       2/5          5/5                                                  Reflexes: Deep Tendon Reflexes absent in all 4 extremities    Pulmonary: Clear to Auscultation, No rales, No rhonchi, No wheezes     Cardiovascular: S1, S2, Regular rate and rhythm     Gastrointestinal: Soft, Non-tender, Non-distended     Extremities: No calf tenderness    73y Male with PMHx significant for HTN, HLD, DM type 2, and two prior strokes without residual deficits [place] and L facial weakness on [place]. 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 He was transferred to NSICU for angiogram, which was done on 2/9 and showed moderate basilar stenosis. No intervention done. He had to be intubated for worsening respiratory status. On further history it was found that patient had been having progressive weakness and numbness of his extremities for the preceding 1-2 weeks following a viral URI.     Admission Scores  GCS:   HH:   MF:   NIHSS: 4   RASS:    CAM-ICU:   ICH:    2/9: transferred from [place], NIHSS of 4 in ED, then became more lethargic, stridorous and desaturated in ED, intubated for airway protected, repeat CTH/A obtained and brought emergently to IR for basilar stenting   2/10: worsening brainstem symptoms while on DAPT  2/12- LP performed to rule out MFS as patient's examination is not explained by the strokes on MRI. First session of PLEX  2/14- s/p 2nd PLEX tx, no acute interval events  2/15- No acute events overnight. Patient's examination more or less the same.   2/16- Patient storming overnight requiring multiple pushes of labetalol and fentanyl without improvement in BP. Patient started on propofol which also is not optimally managing patient's BP.   2/17 Labile BP due to dysautonomia, continue with scheduled TPE sessions, phenylephrine restarted for BP lability  2/19- No acute events overnight   2/20 - no acute events overnight, unchanged physical exam  2/21 - acute desaturation with fevers requiring emergent chest CT, likely secondary to aspiration pneumonia    Allergies    No Known Allergies    Intolerances        REVIEW OF SYSTEMS: [ X] Unable to Assess due to neurologic exam   [ ] All ROS addressed below are non-contributory, except:  Neuro: [ ] Headache [ ] Back pain [ ] Numbness [ ] Weakness [ ] Ataxia [ ] Dizziness [ ] Aphasia [ ] Dysarthria [ ] Visual disturbance  Resp: [ ] Shortness of breath/dyspnea, [ ] Orthopnea [ ] Cough  CV: [ ] Chest pain [ ] Palpitation [ ] Lightheadedness [ ] Syncope  Renal: [ ] Thirst [ ] Edema  GI: [ ] Nausea [ ] Emesis [ ] Abdominal pain [ ] Constipation [ ] Diarrhea  Hem: [ ] Hematemesis [ ] bright red blood per rectum  ID: [ ] Fever [ ] Chills [ ] Dysuria  ENT: [ ] Rhinorrhea      DEVICES:   [ ] Restraints [ ] ET tube [X ] central line [ ] arterial line [X ] vences [ ] NGT/OGT [ ] EVD [ ] LD [ ] HAL/HMV [ ] Trach [ ] PEG [ ] Chest Tube     ICU Vital Signs Last 24 Hrs  T(C): 37.5 (21 Feb 2024 11:00), Max: 39.2 (20 Feb 2024 23:20)  T(F): 99.5 (21 Feb 2024 11:00), Max: 102.6 (20 Feb 2024 23:20)  HR: 92 (21 Feb 2024 12:00) (82 - 106)  BP: 103/55 (21 Feb 2024 12:00) (91/53 - 173/75)  BP(mean): 73 (21 Feb 2024 12:00) (64 - 108)  RR: 20 (21 Feb 2024 12:00) (14 - 22)  SpO2: 98% (21 Feb 2024 12:00) (92% - 100%)    O2 Parameters below as of 21 Feb 2024 11:04  Patient On (Oxygen Delivery Method): ventilator, trach      CAPILLARY BLOOD GLUCOSE    POCT Blood Glucose.: 231 mg/dL (21 Feb 2024 04:22)  POCT Blood Glucose.: 184 mg/dL (21 Feb 2024 00:26)  POCT Blood Glucose.: 162 mg/dL (20 Feb 2024 19:01)  POCT Blood Glucose.: 153 mg/dL (20 Feb 2024 18:13)  POCT Blood Glucose.: 158 mg/dL (20 Feb 2024 17:22)  POCT Blood Glucose.: 157 mg/dL (20 Feb 2024 15:01)  POCT Blood Glucose.: 188 mg/dL (20 Feb 2024 14:12)  POCT Blood Glucose.: 179 mg/dL (20 Feb 2024 13:21)      I&O's Summary    20 Feb 2024 07:01  -  21 Feb 2024 07:00  --------------------------------------------------------  IN: 5536.3 mL / OUT: 1401 mL / NET: 4135.3 mL    21 Feb 2024 07:01  -  21 Feb 2024 12:17  --------------------------------------------------------  IN: 1500 mL / OUT: 0 mL / NET: 1500 mL        Respiratory:  Mode: AC/ CMV (Assist Control/ Continuous Mandatory Ventilation)  RR (machine): 20  TV (machine): 500  FiO2: 70  PEEP: 12  ITime: 1  MAP: 9  PIP: 18        LABS:  LABS:                          7.6    16.83 )-----------( 258      ( 21 Feb 2024 11:41 )             27.8     02-21    144  |  110<H>  |  39<H>  ----------------------------<  312<H>  5.0   |  24  |  1.07    Ca    8.4      21 Feb 2024 08:31  Phos  5.3     02-21  Mg     2.7     02-21    TPro  5.3<L>  /  Alb  3.5  /  TBili  0.4  /  DBili  x   /  AST  154<H>  /  ALT  81<H>  /  AlkPhos  57  02-21    PT/INR - ( 20 Feb 2024 10:57 )   PT: 12.3 sec;   INR: 1.18 ratio         PTT - ( 20 Feb 2024 10:57 )  PTT:26.7 sec      MEDICATIONS  (STANDING):  acetaminophen   Oral Liquid .. 650 milliGRAM(s) Oral every 24 hours  atorvastatin 80 milliGRAM(s) Oral at bedtime  chlorhexidine 0.12% Liquid 15 milliLiter(s) Oral Mucosa every 12 hours  chlorhexidine 4% Liquid 1 Application(s) Topical daily  dextrose 5%. 1000 milliLiter(s) (100 mL/Hr) IV Continuous <Continuous>  dextrose 5%. 1000 milliLiter(s) (50 mL/Hr) IV Continuous <Continuous>  dextrose 50% Injectable 50 milliLiter(s) IV Push every 15 minutes  ergocalciferol Drops 83450 Unit(s) Oral <User Schedule>  gabapentin Solution 400 milliGRAM(s) Oral three times a day  glucagon  Injectable 1 milliGRAM(s) IntraMuscular once  heparin   Injectable 5000 Unit(s) SubCutaneous every 8 hours  immune   globulin 10% (GAMMAGARD) IVPB 35 Gram(s) IV Intermittent daily  insulin lispro (ADMELOG) corrective regimen sliding scale   SubCutaneous every 6 hours  insulin NPH human recombinant 30 Unit(s) SubCutaneous every 6 hours  lactated ringers. 1000 milliLiter(s) (70 mL/Hr) IV Continuous <Continuous>  pantoprazole   Suspension 40 milliGRAM(s) Oral daily  petrolatum Ophthalmic Ointment 1 Application(s) Both EYES three times a day  piperacillin/tazobactam IVPB.- 3.375 Gram(s) IV Intermittent once  piperacillin/tazobactam IVPB.. 3.375 Gram(s) IV Intermittent every 8 hours  polyethylene glycol 3350 17 Gram(s) Oral daily  senna 1 Tablet(s) Oral at bedtime  sorbitol 70%/mineral oil/magnesium hydroxide/glycerin Enema 120 milliLiter(s) Rectal once  vancomycin  IVPB 1500 milliGRAM(s) IV Intermittent every 12 hours    MEDICATIONS  (PRN):  acetaminophen   Oral Liquid .. 650 milliGRAM(s) Oral every 6 hours PRN Mild Pain (1 - 3)  dextrose Oral Gel 15 Gram(s) Oral once PRN Blood Glucose LESS THAN 70 milliGRAM(s)/deciliter  diphenhydrAMINE Elixir 25 milliGRAM(s) Oral once PRN 30 mins before IVIG  oxyCODONE    Solution 10 milliGRAM(s) Oral every 4 hours PRN Severe Pain (7 - 10)  oxyCODONE    Solution 5 milliGRAM(s) Oral every 4 hours PRN Moderate Pain (4 - 6)  sodium chloride 0.9% lock flush 10 milliLiter(s) IV Push every 1 hour PRN Pre/post blood products, medications, blood draw, and to maintain line patency       EXAMINATION:  PHYSICAL EXAM:    Constitutional: No Acute Distress, patient intubated but following commands     Neurological: Bilateral ptosis, pupils 2 mm and sluggish, restricted gaze in all directions however, vertical gaze is better than horizontal. Facial asymmetry could not be appreciated due to ETT, patient following commands with his BL LE. Patient following commands and able to communicate with his bilateral feet. Right foot moved-- signifying 'yes', left foot moved-- signifying 'no'.    Motor exam:          Upper extremity                         Delt     Bicep     Tricep    HG                                                 R         0/5        0/5        0/5      2/5 (activation of muscles could be appreciated in bilateral biceps and triceps)                                               L          0/5        0/5        0/5       3/5          Lower extremity                        HF         KF        KE       DF         PF                                                  R        0/5        0/5        3/5       2/5         5/5                                               L         0/5        0/5       3/5       2/5          5/5                                                  Reflexes: Deep Tendon Reflexes absent in all 4 extremities    Pulmonary: Clear to Auscultation, No rales, No rhonchi, No wheezes     Cardiovascular: S1, S2, Regular rate and rhythm     Gastrointestinal: Soft, Non-tender, Non-distended     Extremities: No calf tenderness

## 2024-02-21 NOTE — PROGRESS NOTE ADULT - SUBJECTIVE AND OBJECTIVE BOX
SURGERY DAILY PROGRESS NOTE    --------------- OVERNIGHT/INTERVAL---------------  - No acute events overnight    --------------- SUBJECTIVE ---------------  - Patient seen and examined at bedside with surgical team    --------------- OBJECTIVE ---------------  -----VITALS-----  T(C): 36.5, Max: 39.2 (02-20)  T(F): 97.7, Max: 102.6 (02-20)  HR: 85 (82 - 106)  BP: 131/62 (91/53 - 186/79)  BP(mean): 89 (64 - 113)  ABP: --  ABP(mean): --  RR: 20 (14 - 22)  SpO2: 94% (92% - 100%)  CVP(mm Hg): --  CVP(cm H2O): --  ventilator            -----PHYSICAL EXAM-----  GENERAL: NAD, lying in bed   HEENT: NCAT, trachea midline. Trach  PULM: Respirations non-labored  ABD: Soft, non-tender, non-distended, no peritonitis/rebound tenderness. PEG @ 5.5cm  EXT: Warm, well perfused     -----INs & OUTs-----      -----MEDICATIONS-----  STANDING  aspirin  chewable 81 milliGRAM(s) Oral daily  atorvastatin 80 milliGRAM(s) Oral at bedtime  chlorhexidine 0.12% Liquid 15 milliLiter(s) Oral Mucosa every 12 hours  chlorhexidine 4% Liquid 1 Application(s) Topical daily  dextrose 5%. 1000 milliLiter(s) (100 mL/Hr) IV Continuous <Continuous>  dextrose 5%. 1000 milliLiter(s) (50 mL/Hr) IV Continuous <Continuous>  dextrose 50% Injectable 50 milliLiter(s) IV Push every 15 minutes  ergocalciferol Drops 95512 Unit(s) Oral <User Schedule>  gabapentin Solution 400 milliGRAM(s) Oral three times a day  glucagon  Injectable 1 milliGRAM(s) IntraMuscular once  heparin   Injectable 5000 Unit(s) SubCutaneous every 8 hours  insulin lispro (ADMELOG) corrective regimen sliding scale   SubCutaneous every 6 hours  insulin NPH human recombinant 25 Unit(s) SubCutaneous every 6 hours  lactated ringers. 1000 milliLiter(s) (70 mL/Hr) IV Continuous <Continuous>  pantoprazole   Suspension 40 milliGRAM(s) Oral daily  petrolatum Ophthalmic Ointment 1 Application(s) Both EYES three times a day  piperacillin/tazobactam IVPB.- 3.375 Gram(s) IV Intermittent once  piperacillin/tazobactam IVPB.- 3.375 Gram(s) IV Intermittent once  piperacillin/tazobactam IVPB.. 3.375 Gram(s) IV Intermittent every 8 hours  polyethylene glycol 3350 17 Gram(s) Oral daily  senna 1 Tablet(s) Oral at bedtime  vancomycin  IVPB 1500 milliGRAM(s) IV Intermittent every 12 hours    PRN  acetaminophen   Oral Liquid .. 650 milliGRAM(s) Oral every 6 hours PRN Mild Pain (1 - 3)  dextrose Oral Gel 15 Gram(s) Oral once PRN Blood Glucose LESS THAN 70 milliGRAM(s)/deciliter  oxyCODONE    Solution 10 milliGRAM(s) Oral every 4 hours PRN Severe Pain (7 - 10)  oxyCODONE    Solution 5 milliGRAM(s) Oral every 4 hours PRN Moderate Pain (4 - 6)  sodium chloride 0.9% lock flush 10 milliLiter(s) IV Push every 1 hour PRN Pre/post blood products, medications, blood draw, and to maintain line patency    -----LABS-----  CBC (02-20 @ 22:02)                              8.9<L>                         21.95<H>  )----------------(  322        --    % Neutrophils, --    % Lymphocytes, ANC: --                                  32.3<L>  CBC (02-20 @ 00:29)                              8.7<L>                         12.88<H>  )----------------(  377        --    % Neutrophils, --    % Lymphocytes, ANC: --                                  30.3<L>    BMP (02-21 @ 03:38)             147<H>  |  111<H>  |  39<H> 		Ca++ --      Ca 8.6                ---------------------------------( 242<H>		Mg 2.7<H>             5.5<H>  |  26      |  1.35<H>			Ph 5.3<H>  BMP (02-20 @ 22:02)             148<H>  |  113<H>  |  30<H> 		Ca++ --      Ca 8.7                ---------------------------------( 176<H>		Mg 2.5                5.7<H>  |  25      |  0.99  			Ph 5.4<H>      Coags (02-20 @ 10:57)  aPTT 26.7 / INR 1.18 / PT 12.3      ABG (02-21 @ 03:31)     7.34<L> / 51<H> / 80<L> / 28 / 1.3 / 95.6%     Lactate:    ABG (02-21 @ 02:26)     7.32<L> / 51<H> / 73<L> / 26 / -0.1 / 94.7%     Lactate:        Urinalysis (02-21 @ 03:38):     Color:  / Appearance:  / SG:  / pH:  / Gluc: 242<H> / Ketones:  / Bili:  / Urobili:  / Protein : / Nitrites:  / Leuk.Est:  / RBC:  / WBC:  / Sq Epi:  / Non Sq Epi:  / Bacteria        -> .Stool Feces Culture (02-13 @ 01:54)     NG    NG    No enteric pathogens isolated.  (Stool culture examined for Salmonella,  Shigella, Campylobacter, Aeromonas, Plesiomonas,  Vibrio, E.coli O157 and Yersinia)    -> .CSF CSF Culture (02-11 @ 16:25)       No polymorphonuclear leukocytes seen  No organisms seen  by cytocentrifuge    NG    Culture is being performed.

## 2024-02-22 DIAGNOSIS — Z11.52 ENCOUNTER FOR SCREENING FOR COVID-19: ICD-10-CM

## 2024-02-22 DIAGNOSIS — R29.810 FACIAL WEAKNESS: ICD-10-CM

## 2024-02-22 DIAGNOSIS — I10 ESSENTIAL (PRIMARY) HYPERTENSION: ICD-10-CM

## 2024-02-22 DIAGNOSIS — I63.9 CEREBRAL INFARCTION, UNSPECIFIED: ICD-10-CM

## 2024-02-22 DIAGNOSIS — R29.700 NIHSS SCORE 0: ICD-10-CM

## 2024-02-22 DIAGNOSIS — Z79.82 LONG TERM (CURRENT) USE OF ASPIRIN: ICD-10-CM

## 2024-02-22 DIAGNOSIS — Z79.84 LONG TERM (CURRENT) USE OF ORAL HYPOGLYCEMIC DRUGS: ICD-10-CM

## 2024-02-22 DIAGNOSIS — I63.532 CEREBRAL INFARCTION DUE TO UNSPECIFIED OCCLUSION OR STENOSIS OF LEFT POSTERIOR CEREBRAL ARTERY: ICD-10-CM

## 2024-02-22 DIAGNOSIS — Z86.73 PERSONAL HISTORY OF TRANSIENT ISCHEMIC ATTACK (TIA), AND CEREBRAL INFARCTION WITHOUT RESIDUAL DEFICITS: ICD-10-CM

## 2024-02-22 DIAGNOSIS — R05.9 COUGH, UNSPECIFIED: ICD-10-CM

## 2024-02-22 DIAGNOSIS — R47.1 DYSARTHRIA AND ANARTHRIA: ICD-10-CM

## 2024-02-22 DIAGNOSIS — R09.81 NASAL CONGESTION: ICD-10-CM

## 2024-02-22 DIAGNOSIS — E78.5 HYPERLIPIDEMIA, UNSPECIFIED: ICD-10-CM

## 2024-02-22 DIAGNOSIS — R27.0 ATAXIA, UNSPECIFIED: ICD-10-CM

## 2024-02-22 DIAGNOSIS — E11.8 TYPE 2 DIABETES MELLITUS WITH UNSPECIFIED COMPLICATIONS: ICD-10-CM

## 2024-02-22 DIAGNOSIS — I65.1 OCCLUSION AND STENOSIS OF BASILAR ARTERY: ICD-10-CM

## 2024-02-22 DIAGNOSIS — Z79.899 OTHER LONG TERM (CURRENT) DRUG THERAPY: ICD-10-CM

## 2024-02-22 LAB
ANION GAP SERPL CALC-SCNC: 6 MMOL/L — SIGNIFICANT CHANGE UP (ref 5–17)
BLD GP AB SCN SERPL QL: NEGATIVE — SIGNIFICANT CHANGE UP
BUN SERPL-MCNC: 32 MG/DL — HIGH (ref 7–23)
CALCIUM SERPL-MCNC: 7.9 MG/DL — LOW (ref 8.4–10.5)
CHLORIDE SERPL-SCNC: 112 MMOL/L — HIGH (ref 96–108)
CO2 SERPL-SCNC: 28 MMOL/L — SIGNIFICANT CHANGE UP (ref 22–31)
CREAT SERPL-MCNC: 0.92 MG/DL — SIGNIFICANT CHANGE UP (ref 0.5–1.3)
EGFR: 88 ML/MIN/1.73M2 — SIGNIFICANT CHANGE UP
GANGLIOSIDE GQ1B IGG ANTIBODY RESULT: SIGNIFICANT CHANGE UP TITER
GAS PNL BLDA: SIGNIFICANT CHANGE UP
GAS PNL BLDA: SIGNIFICANT CHANGE UP
GLUCOSE BLDC GLUCOMTR-MCNC: 110 MG/DL — HIGH (ref 70–99)
GLUCOSE BLDC GLUCOMTR-MCNC: 112 MG/DL — HIGH (ref 70–99)
GLUCOSE BLDC GLUCOMTR-MCNC: 116 MG/DL — HIGH (ref 70–99)
GLUCOSE BLDC GLUCOMTR-MCNC: 142 MG/DL — HIGH (ref 70–99)
GLUCOSE BLDC GLUCOMTR-MCNC: 162 MG/DL — HIGH (ref 70–99)
GLUCOSE BLDC GLUCOMTR-MCNC: 179 MG/DL — HIGH (ref 70–99)
GLUCOSE SERPL-MCNC: 179 MG/DL — HIGH (ref 70–99)
GRAM STN FLD: ABNORMAL
GRAM STN FLD: ABNORMAL
HCT VFR BLD CALC: 24.1 % — LOW (ref 39–50)
HGB BLD-MCNC: 6.8 G/DL — CRITICAL LOW (ref 13–17)
MAGNESIUM SERPL-MCNC: 2.6 MG/DL — SIGNIFICANT CHANGE UP (ref 1.6–2.6)
MCHC RBC-ENTMCNC: 21.8 PG — LOW (ref 27–34)
MCHC RBC-ENTMCNC: 28.2 GM/DL — LOW (ref 32–36)
MCV RBC AUTO: 77.2 FL — LOW (ref 80–100)
NRBC # BLD: 1 /100 WBCS — HIGH (ref 0–0)
PHOSPHATE SERPL-MCNC: 2 MG/DL — LOW (ref 2.5–4.5)
PLATELET # BLD AUTO: 218 K/UL — SIGNIFICANT CHANGE UP (ref 150–400)
POTASSIUM SERPL-MCNC: 4 MMOL/L — SIGNIFICANT CHANGE UP (ref 3.5–5.3)
POTASSIUM SERPL-SCNC: 4 MMOL/L — SIGNIFICANT CHANGE UP (ref 3.5–5.3)
RBC # BLD: 3.12 M/UL — LOW (ref 4.2–5.8)
RBC # FLD: 20.7 % — HIGH (ref 10.3–14.5)
RH IG SCN BLD-IMP: POSITIVE — SIGNIFICANT CHANGE UP
SODIUM SERPL-SCNC: 146 MMOL/L — HIGH (ref 135–145)
SPECIMEN SOURCE: SIGNIFICANT CHANGE UP
SPECIMEN SOURCE: SIGNIFICANT CHANGE UP
VANCOMYCIN TROUGH SERPL-MCNC: 14.2 UG/ML — SIGNIFICANT CHANGE UP (ref 10–20)
WBC # BLD: 10.74 K/UL — HIGH (ref 3.8–10.5)
WBC # FLD AUTO: 10.74 K/UL — HIGH (ref 3.8–10.5)
WNV IGG CSF IA-ACNC: NEGATIVE — SIGNIFICANT CHANGE UP
WNV IGM CSF IA-ACNC: NEGATIVE — SIGNIFICANT CHANGE UP

## 2024-02-22 PROCEDURE — 93970 EXTREMITY STUDY: CPT | Mod: 26

## 2024-02-22 PROCEDURE — 99291 CRITICAL CARE FIRST HOUR: CPT

## 2024-02-22 PROCEDURE — 71045 X-RAY EXAM CHEST 1 VIEW: CPT | Mod: 26

## 2024-02-22 RX ORDER — HUMAN INSULIN 100 [IU]/ML
30 INJECTION, SUSPENSION SUBCUTANEOUS EVERY 6 HOURS
Refills: 0 | Status: DISCONTINUED | OUTPATIENT
Start: 2024-02-22 | End: 2024-02-25

## 2024-02-22 RX ORDER — PREGABALIN 225 MG/1
1000 CAPSULE ORAL DAILY
Refills: 0 | Status: DISCONTINUED | OUTPATIENT
Start: 2024-02-22 | End: 2024-03-03

## 2024-02-22 RX ORDER — DEXTROSE 50 % IN WATER 50 %
25 SYRINGE (ML) INTRAVENOUS ONCE
Refills: 0 | Status: DISCONTINUED | OUTPATIENT
Start: 2024-02-22 | End: 2024-02-22

## 2024-02-22 RX ORDER — SODIUM CHLORIDE 9 MG/ML
1000 INJECTION, SOLUTION INTRAVENOUS
Refills: 0 | Status: DISCONTINUED | OUTPATIENT
Start: 2024-02-22 | End: 2024-02-22

## 2024-02-22 RX ORDER — MUPIROCIN 20 MG/G
1 OINTMENT TOPICAL
Refills: 0 | Status: COMPLETED | OUTPATIENT
Start: 2024-02-22 | End: 2024-02-27

## 2024-02-22 RX ORDER — DEXTROSE 50 % IN WATER 50 %
12.5 SYRINGE (ML) INTRAVENOUS ONCE
Refills: 0 | Status: DISCONTINUED | OUTPATIENT
Start: 2024-02-22 | End: 2024-02-22

## 2024-02-22 RX ORDER — GLUCAGON INJECTION, SOLUTION 0.5 MG/.1ML
1 INJECTION, SOLUTION SUBCUTANEOUS ONCE
Refills: 0 | Status: DISCONTINUED | OUTPATIENT
Start: 2024-02-22 | End: 2024-02-22

## 2024-02-22 RX ORDER — HUMAN INSULIN 100 [IU]/ML
10 INJECTION, SUSPENSION SUBCUTANEOUS ONCE
Refills: 0 | Status: COMPLETED | OUTPATIENT
Start: 2024-02-22 | End: 2024-02-22

## 2024-02-22 RX ORDER — POTASSIUM PHOSPHATE, MONOBASIC POTASSIUM PHOSPHATE, DIBASIC 236; 224 MG/ML; MG/ML
30 INJECTION, SOLUTION INTRAVENOUS ONCE
Refills: 0 | Status: COMPLETED | OUTPATIENT
Start: 2024-02-22 | End: 2024-02-22

## 2024-02-22 RX ORDER — ERYTHROPOIETIN 10000 [IU]/ML
20000 INJECTION, SOLUTION INTRAVENOUS; SUBCUTANEOUS
Refills: 0 | Status: COMPLETED | OUTPATIENT
Start: 2024-02-22 | End: 2024-02-27

## 2024-02-22 RX ORDER — IRON SUCROSE 20 MG/ML
100 INJECTION, SOLUTION INTRAVENOUS EVERY 24 HOURS
Refills: 0 | Status: COMPLETED | OUTPATIENT
Start: 2024-02-22 | End: 2024-02-26

## 2024-02-22 RX ORDER — FERROUS SULFATE 325(65) MG
300 TABLET ORAL
Refills: 0 | Status: DISCONTINUED | OUTPATIENT
Start: 2024-02-22 | End: 2024-03-03

## 2024-02-22 RX ORDER — DEXTROSE 50 % IN WATER 50 %
15 SYRINGE (ML) INTRAVENOUS ONCE
Refills: 0 | Status: DISCONTINUED | OUTPATIENT
Start: 2024-02-22 | End: 2024-02-22

## 2024-02-22 RX ORDER — FOLIC ACID 0.8 MG
1 TABLET ORAL DAILY
Refills: 0 | Status: DISCONTINUED | OUTPATIENT
Start: 2024-02-22 | End: 2024-03-03

## 2024-02-22 RX ADMIN — CHLORHEXIDINE GLUCONATE 15 MILLILITER(S): 213 SOLUTION TOPICAL at 05:06

## 2024-02-22 RX ADMIN — Medication 300 MILLIGRAM(S): at 05:56

## 2024-02-22 RX ADMIN — PIPERACILLIN AND TAZOBACTAM 25 GRAM(S): 4; .5 INJECTION, POWDER, LYOPHILIZED, FOR SOLUTION INTRAVENOUS at 13:10

## 2024-02-22 RX ADMIN — HEPARIN SODIUM 5000 UNIT(S): 5000 INJECTION INTRAVENOUS; SUBCUTANEOUS at 13:10

## 2024-02-22 RX ADMIN — HUMAN INSULIN 30 UNIT(S): 100 INJECTION, SUSPENSION SUBCUTANEOUS at 23:43

## 2024-02-22 RX ADMIN — HUMAN INSULIN 34 UNIT(S): 100 INJECTION, SUSPENSION SUBCUTANEOUS at 05:05

## 2024-02-22 RX ADMIN — PIPERACILLIN AND TAZOBACTAM 25 GRAM(S): 4; .5 INJECTION, POWDER, LYOPHILIZED, FOR SOLUTION INTRAVENOUS at 22:45

## 2024-02-22 RX ADMIN — ERYTHROPOIETIN 20000 UNIT(S): 10000 INJECTION, SOLUTION INTRAVENOUS; SUBCUTANEOUS at 17:51

## 2024-02-22 RX ADMIN — CHLORHEXIDINE GLUCONATE 15 MILLILITER(S): 213 SOLUTION TOPICAL at 17:39

## 2024-02-22 RX ADMIN — HEPARIN SODIUM 5000 UNIT(S): 5000 INJECTION INTRAVENOUS; SUBCUTANEOUS at 05:06

## 2024-02-22 RX ADMIN — GABAPENTIN 400 MILLIGRAM(S): 400 CAPSULE ORAL at 22:45

## 2024-02-22 RX ADMIN — Medication 1 MILLIGRAM(S): at 17:39

## 2024-02-22 RX ADMIN — HUMAN INSULIN 10 UNIT(S): 100 INJECTION, SUSPENSION SUBCUTANEOUS at 18:10

## 2024-02-22 RX ADMIN — HUMAN INSULIN 34 UNIT(S): 100 INJECTION, SUSPENSION SUBCUTANEOUS at 00:30

## 2024-02-22 RX ADMIN — MUPIROCIN 1 APPLICATION(S): 20 OINTMENT TOPICAL at 17:39

## 2024-02-22 RX ADMIN — Medication 1 APPLICATION(S): at 13:10

## 2024-02-22 RX ADMIN — PIPERACILLIN AND TAZOBACTAM 25 GRAM(S): 4; .5 INJECTION, POWDER, LYOPHILIZED, FOR SOLUTION INTRAVENOUS at 05:06

## 2024-02-22 RX ADMIN — PANTOPRAZOLE SODIUM 40 MILLIGRAM(S): 20 TABLET, DELAYED RELEASE ORAL at 12:20

## 2024-02-22 RX ADMIN — GABAPENTIN 400 MILLIGRAM(S): 400 CAPSULE ORAL at 13:10

## 2024-02-22 RX ADMIN — Medication 650 MILLIGRAM(S): at 12:20

## 2024-02-22 RX ADMIN — POTASSIUM PHOSPHATE, MONOBASIC POTASSIUM PHOSPHATE, DIBASIC 83.33 MILLIMOLE(S): 236; 224 INJECTION, SOLUTION INTRAVENOUS at 01:29

## 2024-02-22 RX ADMIN — CHLORHEXIDINE GLUCONATE 1 APPLICATION(S): 213 SOLUTION TOPICAL at 22:46

## 2024-02-22 RX ADMIN — IMMUNE GLOBULIN (HUMAN) 58.33 GRAM(S): 10 INJECTION INTRAVENOUS; SUBCUTANEOUS at 12:53

## 2024-02-22 RX ADMIN — ATORVASTATIN CALCIUM 80 MILLIGRAM(S): 80 TABLET, FILM COATED ORAL at 22:45

## 2024-02-22 RX ADMIN — PREGABALIN 1000 MICROGRAM(S): 225 CAPSULE ORAL at 17:39

## 2024-02-22 RX ADMIN — Medication 1 APPLICATION(S): at 05:12

## 2024-02-22 RX ADMIN — Medication 2: at 05:06

## 2024-02-22 RX ADMIN — IRON SUCROSE 210 MILLIGRAM(S): 20 INJECTION, SOLUTION INTRAVENOUS at 17:38

## 2024-02-22 RX ADMIN — Medication 2: at 00:28

## 2024-02-22 RX ADMIN — HEPARIN SODIUM 5000 UNIT(S): 5000 INJECTION INTRAVENOUS; SUBCUTANEOUS at 22:45

## 2024-02-22 RX ADMIN — Medication 1 APPLICATION(S): at 22:46

## 2024-02-22 RX ADMIN — SENNA PLUS 1 TABLET(S): 8.6 TABLET ORAL at 22:45

## 2024-02-22 RX ADMIN — Medication 300 MILLIGRAM(S): at 03:51

## 2024-02-22 RX ADMIN — GABAPENTIN 400 MILLIGRAM(S): 400 CAPSULE ORAL at 05:06

## 2024-02-22 NOTE — PROGRESS NOTE ADULT - ATTENDING COMMENTS
74yo man, Jehovah witness, with HTN, HLD, DM, h/o 2 stroke on ASA 81 2 years prior, who presented 2/7 with 1.5 weeks of worsening cough and several days of dysphagia, with progression to weakness of all extremities and need for intubation due to bulbar symptoms. Diagnosed with GBS (GD1b positive) and MRI L spine with L4-L5 radicular enhancement.  Hospital course otherwise notable for hypoxia, CT chest with atelectasis of the b/l lower lobes.     Interval events;  S/p 2nd dose of IVIG today.  Bronch cult with gram neg rods, MRSA neg    Exam as per fellow.     c/w IVIG for 5 doses  increase PEEP to 10 for atelectasis, decrease FiO2 to 50%  stop LR at 70cc/hr, IVL  on TF at goal, LBM 2/22  start epo and iron infusions for low Hg  c/w Zosyn, stop vanc (MRSA neg)  heparin ppx      Patient seen and examined by attending on 2/22/2023.    Patient is critically ill due to GBS and at high risk for neurological deterioration or death due to: requiring mechanical ventilation and high vent setting for oxygenation 2/2 inability to protect airway, VAP and atelectasis.

## 2024-02-22 NOTE — PROGRESS NOTE ADULT - SUBJECTIVE AND OBJECTIVE BOX
73y Male with PMHx significant for HTN, HLD, DM type 2, and two prior strokes without residual deficits [place] and [place] facial weakness [place]. 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 He was transferred to NSICU for angiogram, which was done on 2/9 and showed moderate basilar stenosis. No intervention done. He had to be intubated for worsening respiratory status. On further history it was found that patient had been having progressive weakness and numbness of his extremities for the preceding 1-2 weeks following a viral URI.     Admission Scores  GCS:   HH:   MF:   NIHSS: 4   RASS:    CAM-ICU:   ICH:    2/9: transferred from [place], NIHSS of 4 in ED, then became more lethargic, stridorous and desaturated in ED, intubated for airway protected, repeat CTH/A obtained and brought emergently to IR for basilar stenting   2/10: worsening brainstem symptoms while on DAPT  2/12- LP performed to rule out MFS as patient's examination is not explained by the strokes on MRI. First session of PLEX  2/14- s/p 2nd PLEX tx, no acute interval events  2/15- No acute events overnight. Patient's examination more or less the same.   2/16- Patient storming overnight requiring multiple pushes of labetalol and fentanyl without improvement in BP. Patient started on propofol which also is not optimally managing patient's BP.   2/17 Labile BP due to dysautonomia, continue with scheduled TPE sessions, phenylephrine restarted for BP lability  2/19- No acute events overnight   2/20 - no acute events overnight, unchanged physical exam  2/21 - acute desaturation with fevers requiring emergent chest CT, likely secondary to aspiration pneumonia  2/22: No acute events overnight  2/22 O/N- patient's hemoglobin dropped, patient's [employer].     Allergies    No Known Allergies    Intolerances        REVIEW OF SYSTEMS: [x] Unable to Assess due to neurologic exam   [ ] All ROS addressed below are non-contributory, except:  Neuro: [ ] Headache [ ] Back pain [ ] Numbness [ ] Weakness [ ] Ataxia [ ] Dizziness [ ] Aphasia [ ] Dysarthria [ ] Visual disturbance  Resp: [ ] Shortness of breath/dyspnea, [ ] Orthopnea [ ] Cough  CV: [ ] Chest pain [ ] Palpitation [ ] Lightheadedness [ ] Syncope  Renal: [ ] Thirst [ ] Edema  GI: [ ] Nausea [ ] Emesis [ ] Abdominal pain [ ] Constipation [ ] Diarrhea  Hem: [ ] Hematemesis [ ] bright red blood per rectum  ID: [ ] Fever [ ] Chills [ ] Dysuria  ENT: [ ] Rhinorrhea      DEVICES:   [ ] Restraints [ ] ET tube [ ] central line [ ] arterial line [ ] vences [ ] NGT/OGT [ ] EVD [ ] LD [ ] HAL/HMV [ ] Trach [ ] PEG [ ] Chest Tube     VITALS:   Vital Signs Last 24 Hrs  T(C): 36.9 (22 Feb 2024 03:00), Max: 37.5 (21 Feb 2024 11:00)  T(F): 98.4 (22 Feb 2024 03:00), Max: 99.5 (21 Feb 2024 11:00)  HR: 85 (22 Feb 2024 06:00) (75 - 98)  BP: 115/56 (22 Feb 2024 06:00) (96/53 - 131/62)  BP(mean): 81 (22 Feb 2024 06:00) (68 - 89)  RR: 20 (22 Feb 2024 06:00) (20 - 20)  SpO2: 100% (22 Feb 2024 06:00) (93% - 100%)    Parameters below as of 21 Feb 2024 19:00  Patient On (Oxygen Delivery Method): ventilator assisted      CAPILLARY BLOOD GLUCOSE      POCT Blood Glucose.: 162 mg/dL (22 Feb 2024 05:04)  POCT Blood Glucose.: 179 mg/dL (22 Feb 2024 00:27)  POCT Blood Glucose.: 278 mg/dL (21 Feb 2024 17:41)  POCT Blood Glucose.: 285 mg/dL (21 Feb 2024 12:34)    I&O's Summary    21 Feb 2024 07:01  -  22 Feb 2024 07:00  --------------------------------------------------------  IN: 7379.8 mL / OUT: 1800 mL / NET: 5579.8 mL        Respiratory:  Mode: AC/ CMV (Assist Control/ Continuous Mandatory Ventilation)  RR (machine): 20  TV (machine): 500  FiO2: 70  PEEP: 8  ITime: 1  MAP: 13  PIP: 20    ABG - ( 21 Feb 2024 23:31 )  pH, Arterial: 7.47  pH, Blood: x     /  pCO2: 43    /  pO2: 120   / HCO3: 31    / Base Excess: 7.0   /  SaO2: 99.2                LABS:                        6.8    10.74 )-----------( 218      ( 21 Feb 2024 23:43 )             24.1     02-21    146<H>  |  112<H>  |  32<H>  ----------------------------<  179<H>  4.0   |  28  |  0.92             MEDICATION LEVELS:     IVF FLUIDS/MEDICATIONS:   MEDICATIONS  (STANDING):  acetaminophen   Oral Liquid .. 650 milliGRAM(s) Oral every 24 hours  atorvastatin 80 milliGRAM(s) Oral at bedtime  chlorhexidine 0.12% Liquid 15 milliLiter(s) Oral Mucosa every 12 hours  chlorhexidine 4% Liquid 1 Application(s) Topical daily  ergocalciferol Drops 60699 Unit(s) Oral <User Schedule>  ferrous    sulfate Liquid 300 milliGRAM(s) Enteral Tube <User Schedule>  gabapentin Solution 400 milliGRAM(s) Oral three times a day  heparin   Injectable 5000 Unit(s) SubCutaneous every 8 hours  immune   globulin 10% (GAMMAGARD) IVPB 35 Gram(s) IV Intermittent daily  insulin lispro (ADMELOG) corrective regimen sliding scale   SubCutaneous every 6 hours  insulin NPH human recombinant 34 Unit(s) SubCutaneous every 6 hours  lactated ringers. 1000 milliLiter(s) (70 mL/Hr) IV Continuous <Continuous>  lidocaine 2% Injectable 10 milliLiter(s) Local Injection once  lidocaine 2% Injectable      pantoprazole   Suspension 40 milliGRAM(s) Oral daily  petrolatum Ophthalmic Ointment 1 Application(s) Both EYES three times a day  piperacillin/tazobactam IVPB.. 3.375 Gram(s) IV Intermittent every 8 hours  polyethylene glycol 3350 17 Gram(s) Oral daily  senna 1 Tablet(s) Oral at bedtime  vancomycin  IVPB 1500 milliGRAM(s) IV Intermittent every 12 hours    MEDICATIONS  (PRN):  acetaminophen   Oral Liquid .. 650 milliGRAM(s) Oral every 6 hours PRN Mild Pain (1 - 3)  oxyCODONE    Solution 5 milliGRAM(s) Oral every 4 hours PRN Moderate Pain (4 - 6)  oxyCODONE    Solution 10 milliGRAM(s) Oral every 4 hours PRN Severe Pain (7 - 10)  sodium chloride 0.9% lock flush 10 milliLiter(s) IV Push every 1 hour PRN Pre/post blood products, medications, blood draw, and to maintain line patency        IMAGING:      EXAMINATION:  PHYSICAL EXAM:    Constitutional: No Acute Distress     Neurological: Awake, alert oriented to person, place and time, Following Commands, PERRL, EOMI, No Gaze Preference, Face Symmetrical, Speech Fluent, No dysmetria, No ataxia, No nystagmus     Motor exam:          Upper extremity                         Delt     Bicep     Tricep    HG                                                 R         5/5        5/5        5/5       5/5                                               L          5/5        5/5        5/5       5/5          Lower extremity                        HF         KF        KE       DF         PF                                                  R        5/5        5/5        5/5       5/5         5/5                                               L         5/5        5/5       5/5       5/5          5/5                                                 Sensation: [ ] intact to light touch  [ ] decreased:     Reflexes: Deep Tendon Reflexes Intact     Pulmonary: Clear to Auscultation, No rales, No rhonchi, No wheezes     Cardiovascular: S1, S2, Regular rate and rhythm     Gastrointestinal: Soft, Non-tender, Non-distended     Extremities: No calf tenderness     Incision:

## 2024-02-22 NOTE — PROGRESS NOTE ADULT - ASSESSMENT
ASSESSMENT/PLAN: 72 y/o male with multiple comorbidities presenting initially with suspicious of basilar stroke however, neurological examination more consistent with GBS. 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 No longer requiring insulin drips. Labile O2 saturation and temperature. Scheduled for second session of IVIg today, 2/22.     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  - 2/20- PLEX x 5(complete)  - 2/21, 2/22- IvIG x 5   - hold Aspirin   - Neurology following   - Speech pathologist evaluation done and mode of speech established  Activity: [x] mobilize as tolerated [] Bedrest [x] PT [x] OT [x] PMNR    PULM:  - s/p tracheostomy 2/16  - Continue chest PT   - CPAP as tolerated   -Fio2 50/ PEEP 10/ RR 20/       CV:   - SBP goal  with MAP >65   - PICC  - TTE- EF 66%      RENAL:  - IVL  - c/w Free water 400 ccQ4h  - Na stable @ 146    GI:  - s/p PEG 2/16, Glucerna feeds at goal (per nursing 2/22)  - Vitamin D supplementation   - GI prophylaxis [] not indicated [x] PPI [] other:  - Bowel regimen [x] miralax [x] senna, dulcolax suppository   - Smog enema yesterday  - LB  2/22    ENDO:   - Goal euglycemia (-180)  - A1c 6.8  - ISS, 30 Q6    HEME/ONC:  - H/H decreasing. Patient does not receive blood products. Minimize blood draws.   - s/p ferrous sulfate liquid dose  - supplement w/ EPO, folic acid, B12, iron (IV infusion for 5 days)  - VTE prophylaxis: [x] SCDs [x] chemoprophylaxis- SQL [] hold chemoprophylaxis due to: [] high risk of DVT/PE on admission due to: immobility     ID:  - afebrile, zosyn for suspected pneumonia   -start vanc and zosyn     MISC:  Lines/tubes: RIJ, vences  ASSESSMENT/PLAN: 74 y/o male with multiple comorbidities presenting initially with suspicious of basilar stroke however, neurological examination more consistent with GBS. 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 No longer requiring insulin drips. Labile O2 saturation and temperature. Scheduled for second session of IVIg today, 2/22.     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  - 2/20- PLEX x 5(complete)  - 2/21, 2/22- IvIG x 5   - hold Aspirin   - Neurology following   - Speech pathologist evaluation done and mode of speech established  Activity: [x] mobilize as tolerated [] Bedrest [x] PT [x] OT [x] PMNR    PULM:  - s/p tracheostomy 2/16  - Continue chest PT   - CPAP as tolerated   -Fio2 50/ PEEP 10/ RR 20/       CV:   - SBP goal  with MAP >65   - PICC  - TTE- EF 66%      RENAL:  - IVL  - c/w Free water 400 ccQ4h  - Na stable @ 146    GI:  - s/p PEG 2/16, Glucerna feeds at goal (per nursing 2/22)  - Vitamin D supplementation   - GI prophylaxis [] not indicated [x] PPI [] other:  - Bowel regimen [x] miralax [x] senna, dulcolax suppository   - Smog enema yesterday  - LB  2/22    ENDO:   - Goal euglycemia (-180)  - A1c 6.8  - ISS, 30 Q6    HEME/ONC:  - H/H decreasing. Patient  and wife agreed to blood products.  - s/p ferrous sulfate liquid dose  - supplement w/ EPO, folic acid, B12, iron (IV infusion for 5 days)  - VTE prophylaxis: [x] SCDs [x] chemoprophylaxis- SQL [] hold chemoprophylaxis due to: [] high risk of DVT/PE on admission due to: immobility     ID:  - afebrile, zosyn for suspected pneumonia   -start vanc and zosyn     MISC:  Lines/tubes: RIJ, vences

## 2024-02-22 NOTE — PROGRESS NOTE ADULT - ATTENDING COMMENTS
Patient seen and examined at bedside. During my evaluation, patient's wife was present and I appreciate that.    I agree with the findings and plan as documented in the Resident's note except below.    Exam: Patient able to move head, feet on command and minimal trace movement at bilateral hands, otherwise no movements at rest of extremities to noxious stimuli or command.    Mr. Ashley is a 73 year old unknown handed  man with PMHx of multiple vascular risk factors and other medical problems who presented to [facility] ER due to the ataxia and left facial droop, initially concerning for stroke and w/up notable for basilar stenosis. Transferred to [facility] and quickly developed worsening dysarthria, dysphagia, and weakness. He was intubated and taken to angiogram on 2/11 showed moderate stenosis but no intervention done. MRI with small b/l cerebellar strokes (postprocedural after angiography) and prior L thalamic strokes, although these symptoms would not explain pt's current clinical deficits and progress nature.   Per NSICU team, the patient had been having progressive weakness and numbness of the extremities for the preceding 1-2 weeks following URI. GD1b found to be 110. 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 Completed 5 sessions of PLEX on 02/20/2024 . Clinically patient is making gradual recovery on daily basis.   Currently getting IVIG and schedule for second dose today.   Continue medical management, neuro- check and fall precaution.  GI and DVT prophylaxis.    Patient is at high risk for complications and morbidity or mortality. There is high probability of imminent or life threatening deterioration in the patient's condition.  Patient is unable or incompetent to participate in giving a history and/or making decisions and discussion is necessary for determining treatment decisions.  I discussed the diagnosis and treatment plan with the patient's daughter. All questions and concerns were addressed.   My cumulative time taking care of this critically ill patient is 50 minutes  If you have any further questions, please do not hesitate to contact our team.  Thank you for allowing us to participate in this patient care.

## 2024-02-22 NOTE — PROGRESS NOTE ADULT - ASSESSMENT
Assessment: 72 y/o male with multiple comorbidities presenting initially with suspicious of basilar stroke however, neurological examination more consistent with GBS. 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 Elevated sugars overnight requiring insulin drip. Patient received 5 days of PLEX completed yesterday 2/20. Patient undergoing IVIG due to poor improvement with PLEX.    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 s/p PLEX. Started IVIG.    Recommendations:  [/] IVIG 5 sessions   [x]PLEX Day 5 out of 5 today. (completed 2/20)  [/]awaitingserum studies for autoimmune encephalopathy panel (specimen received by lab)  [x] ASA/Plavix per [facility]  [x] MRI Brain w/o contrast to evaluate for infarction  [x] Angio 2/9: 50% stenosis of basilar, no stent placed  [x] BP goals per NSCU  [x] Atorvastatin 80 mg daily titrated per LDL < 70  [x] , A1c 6.8, CRP 65. B12 >2000. Folate >20. Vit D 25-OH 14.3.B1wnl (148.7), B6 wnl (17.2), copper elevated (156)  [x] TTE EF 66%, no PFO, no LV thrombus  [x] CSF studies:  oligoclonal bands (-), myelin basic protein (3.3), CSF ACE (-), VDRL (-), VZV PCR (-), HSV 1/2 PCR (-),Ds DNA (-), PAULINA 9-), CMV (-). Royce bar(-), WNV (-)  [x] cytology, flow cytometry: insufficient cells    Case seen and discussed with Neurology Attending

## 2024-02-22 NOTE — PROGRESS NOTE ADULT - SUBJECTIVE AND OBJECTIVE BOX
73y Male with PMHx significant for HTN, HLD, DM type 2, and two prior strokes without residual deficits who initially presented to [place] with unsteady gait and L facial weakness on 2/9. 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 He was transferred to NSICU for angiogram, which was done on 2/9 and showed moderate basilar stenosis. No intervention done. He had to be intubated for worsening respiratory status. On further history it was found that patient had been having progressive weakness and numbness of his extremities for the preceding 1-2 weeks following a viral URI.     Admission Scores  GCS:   HH:   MF:   NIHSS: 4   RASS:    CAM-ICU:   ICH:    2/9: transferred from [place], NIHSS of 4 in ED, then became more lethargic, stridorous and desaturated in ED, intubated for airway protected, repeat CTH/A obtained and brought emergently to IR for basilar stenting   2/10: worsening brainstem symptoms while on DAPT  2/12- LP performed to rule out MFS as patient's examination is not explained by the strokes on MRI. First session of PLEX  2/14- s/p 2nd PLEX tx, no acute interval events  2/15- No acute events overnight. Patient's examination more or less the same.   2/16- Patient storming overnight requiring multiple pushes of labetalol and fentanyl without improvement in BP. Patient started on propofol which also is not optimally managing patient's BP.   2/17 Labile BP due to dysautonomia, continue with scheduled TPE sessions, phenylephrine restarted for BP lability  2/19- No acute events overnight   2/20 - no acute events overnight, unchanged physical exam  2/21 - acute desaturation with fevers requiring emergent chest CT, likely secondary to aspiration pneumonia  2/22 - no acute events overnight, unchanged physical exam  2/23- no acute events overnight, unchanged physical exam    Allergies    No Known Allergies    Intolerances        REVIEW OF SYSTEMS: [ X] Unable to Assess due to neurologic exam   [ ] All ROS addressed below are non-contributory, except:  Neuro: [ ] Headache [ ] Back pain [ ] Numbness [ ] Weakness [ ] Ataxia [ ] Dizziness [ ] Aphasia [ ] Dysarthria [ ] Visual disturbance  Resp: [ ] Shortness of breath/dyspnea, [ ] Orthopnea [ ] Cough  CV: [ ] Chest pain [ ] Palpitation [ ] Lightheadedness [ ] Syncope  Renal: [ ] Thirst [ ] Edema  GI: [ ] Nausea [ ] Emesis [ ] Abdominal pain [ ] Constipation [ ] Diarrhea  Hem: [ ] Hematemesis [ ] bright red blood per rectum  ID: [ ] Fever [ ] Chills [ ] Dysuria  ENT: [ ] Rhinorrhea      DEVICES:   [ ] Restraints [ ] ET tube [X ] central line [ ] arterial line [X ] vences [ ] NGT/OGT [ ] EVD [ ] LD [ ] HAL/HMV [ ] Trach [ ] PEG [ ] Chest Tube     ICU Vital Signs Last 24 Hrs  T(C): 37.3 (22 Feb 2024 11:00), Max: 37.4 (21 Feb 2024 23:00)  T(F): 99.1 (22 Feb 2024 11:00), Max: 99.3 (21 Feb 2024 23:00)  HR: 86 (22 Feb 2024 11:00) (75 - 93)  BP: 127/60 (22 Feb 2024 11:00) (103/55 - 129/62)  BP(mean): 87 (22 Feb 2024 11:00) (73 - 89)  RR: 20 (22 Feb 2024 11:00) (20 - 20)  SpO2: 100% (22 Feb 2024 11:00) (95% - 100%)    O2 Parameters below as of 22 Feb 2024 11:00  Patient On (Oxygen Delivery Method): ventilator    O2 Concentration (%): 70      CAPILLARY BLOOD GLUCOSE      POCT Blood Glucose.: 162 mg/dL (22 Feb 2024 05:04)  POCT Blood Glucose.: 179 mg/dL (22 Feb 2024 00:27)  POCT Blood Glucose.: 278 mg/dL (21 Feb 2024 17:41)  POCT Blood Glucose.: 285 mg/dL (21 Feb 2024 12:34)    I&O's Summary    21 Feb 2024 07:01  -  22 Feb 2024 07:00  --------------------------------------------------------  IN: 7618.1 mL / OUT: 1800 mL / NET: 5818.1 mL    22 Feb 2024 07:01  -  22 Feb 2024 11:29  --------------------------------------------------------  IN: 1845 mL / OUT: 0 mL / NET: 1845 mL        Respiratory:  Mode: AC/ CMV (Assist Control/ Continuous Mandatory Ventilation)  RR (machine): 20  TV (machine): 500  FiO2: 70  PEEP: 8  ITime: 1  MAP: 12  PIP: 22      LABS:                          6.8    10.74 )-----------( 218      ( 21 Feb 2024 23:43 )             24.1     02-21    146<H>  |  112<H>  |  32<H>  ----------------------------<  179<H>  4.0   |  28  |  0.92    Ca    7.9<L>      21 Feb 2024 23:43  Phos  2.0     02-21  Mg     2.6     02-21    TPro  5.3<L>  /  Alb  3.5  /  TBili  0.4  /  DBili  x   /  AST  154<H>  /  ALT  81<H>  /  AlkPhos  57  02-21        MEDICATIONS  (STANDING):  acetaminophen   Oral Liquid .. 650 milliGRAM(s) Oral every 24 hours  atorvastatin 80 milliGRAM(s) Oral at bedtime  chlorhexidine 0.12% Liquid 15 milliLiter(s) Oral Mucosa every 12 hours  chlorhexidine 4% Liquid 1 Application(s) Topical daily  ergocalciferol Drops 59538 Unit(s) Oral <User Schedule>  ferrous    sulfate Liquid 300 milliGRAM(s) Enteral Tube <User Schedule>  gabapentin Solution 400 milliGRAM(s) Oral three times a day  heparin   Injectable 5000 Unit(s) SubCutaneous every 8 hours  immune   globulin 10% (GAMMAGARD) IVPB 35 Gram(s) IV Intermittent daily  insulin lispro (ADMELOG) corrective regimen sliding scale   SubCutaneous every 6 hours  insulin NPH human recombinant 34 Unit(s) SubCutaneous every 6 hours  lactated ringers. 1000 milliLiter(s) (70 mL/Hr) IV Continuous <Continuous>  lidocaine 2% Injectable 10 milliLiter(s) Local Injection once  lidocaine 2% Injectable      mupirocin 2% Nasal 1 Application(s) Both Nostrils two times a day  pantoprazole   Suspension 40 milliGRAM(s) Oral daily  petrolatum Ophthalmic Ointment 1 Application(s) Both EYES three times a day  piperacillin/tazobactam IVPB.. 3.375 Gram(s) IV Intermittent every 8 hours  polyethylene glycol 3350 17 Gram(s) Oral daily  senna 1 Tablet(s) Oral at bedtime  vancomycin  IVPB 1500 milliGRAM(s) IV Intermittent every 12 hours    MEDICATIONS  (PRN):  acetaminophen   Oral Liquid .. 650 milliGRAM(s) Oral every 6 hours PRN Mild Pain (1 - 3)  oxyCODONE    Solution 5 milliGRAM(s) Oral every 4 hours PRN Moderate Pain (4 - 6)  oxyCODONE    Solution 10 milliGRAM(s) Oral every 4 hours PRN Severe Pain (7 - 10)  sodium chloride 0.9% lock flush 10 milliLiter(s) IV Push every 1 hour PRN Pre/post blood products, medications, blood draw, and to maintain line patency      EXAMINATION:  PHYSICAL EXAM:    Constitutional: No Acute Distress, patient intubated but following commands     Neurological: Bilateral ptosis, pupils 2 mm and sluggish, restricted gaze in all directions however, vertical gaze is better than horizontal. Facial asymmetry could not be appreciated due to ETT, patient following commands with his BL LE. Patient following commands and able to communicate with his bilateral feet. Right foot moved-- signifying 'yes', left foot moved-- signifying 'no'.    Motor exam:          Upper extremity                         Delt     Bicep     Tricep    HG                                                 R         0/5        0/5        0/5      2/5 (activation of muscles could be appreciated in bilateral biceps and triceps)                                               L          0/5        0/5        0/5       3/5          Lower extremity                        HF         KF        KE       DF         PF                                                  R        0/5        0/5        3/5       2/5         5/5                                               L         0/5        0/5       3/5       2/5          5/5                                                  Reflexes: Deep Tendon Reflexes absent in all 4 extremities    Pulmonary: Clear to Auscultation, No rales, No rhonchi, No wheezes     Cardiovascular: S1, S2, Regular rate and rhythm     Gastrointestinal: Soft, Non-tender, Non-distended     Extremities: No calf tenderness    73y Male with PMHx significant for HTN, HLD, DM type 2, and two prior strokes without residual deficits who initially presented to [place] with unsteady gait and L facial weakness on 2/9. 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 He was transferred to NSICU for angiogram, which was done on 2/9 and showed moderate basilar stenosis. No intervention done. He had to be intubated for worsening respiratory status. On further history it was found that patient had been having progressive weakness and numbness of his extremities for the preceding 1-2 weeks following a viral URI.     Admission Scores  GCS:   HH:   MF:   NIHSS: 4   RASS:    CAM-ICU:   ICH:    2/9: transferred from [place], NIHSS of 4 in ED, then became more lethargic, stridorous and desaturated in ED, intubated for airway protected, repeat CTH/A obtained and brought emergently to IR for basilar stenting   2/10: worsening brainstem symptoms while on DAPT  2/12- LP performed to rule out MFS as patient's examination is not explained by the strokes on MRI. First session of PLEX  2/14- s/p 2nd PLEX tx, no acute interval events  2/15- No acute events overnight. Patient's examination more or less the same.   2/16- Patient storming overnight requiring multiple pushes of labetalol and fentanyl without improvement in BP. Patient started on propofol which also is not optimally managing patient's BP.   2/17 Labile BP due to dysautonomia, continue with scheduled TPE sessions, phenylephrine restarted for BP lability  2/19- No acute events overnight   2/20 - no acute events overnight, unchanged physical exam  2/21 - acute desaturation with fevers requiring emergent chest CT, likely secondary to aspiration pneumonia  2/22 - no acute events overnight, unchanged physical exam  2/23- no acute events overnight, unchanged physical exam. Patient's Hg dropped to 6.6 from 6.8. Family discussion held. Wife at bedside requests to administer 1 unit of PRBC. Patient is also able to communicate with his legs. He understands that he needs a blood transfusion and also agrees. Consent completed by wife: Margarette.     Allergies    No Known Allergies    Intolerances        REVIEW OF SYSTEMS: [ X] Unable to Assess due to neurologic exam   [ ] All ROS addressed below are non-contributory, except:  Neuro: [ ] Headache [ ] Back pain [ ] Numbness [ ] Weakness [ ] Ataxia [ ] Dizziness [ ] Aphasia [ ] Dysarthria [ ] Visual disturbance  Resp: [ ] Shortness of breath/dyspnea, [ ] Orthopnea [ ] Cough  CV: [ ] Chest pain [ ] Palpitation [ ] Lightheadedness [ ] Syncope  Renal: [ ] Thirst [ ] Edema  GI: [ ] Nausea [ ] Emesis [ ] Abdominal pain [ ] Constipation [ ] Diarrhea  Hem: [ ] Hematemesis [ ] bright red blood per rectum  ID: [ ] Fever [ ] Chills [ ] Dysuria  ENT: [ ] Rhinorrhea      DEVICES:   [ ] Restraints [ ] ET tube [X ] central line [ ] arterial line [X ] vences [ ] NGT/OGT [ ] EVD [ ] LD [ ] HAL/HMV [ ] Trach [ ] PEG [ ] Chest Tube     ICU Vital Signs Last 24 Hrs  T(C): 37.3 (22 Feb 2024 11:00), Max: 37.4 (21 Feb 2024 23:00)  T(F): 99.1 (22 Feb 2024 11:00), Max: 99.3 (21 Feb 2024 23:00)  HR: 86 (22 Feb 2024 11:00) (75 - 93)  BP: 127/60 (22 Feb 2024 11:00) (103/55 - 129/62)  BP(mean): 87 (22 Feb 2024 11:00) (73 - 89)  RR: 20 (22 Feb 2024 11:00) (20 - 20)  SpO2: 100% (22 Feb 2024 11:00) (95% - 100%)    O2 Parameters below as of 22 Feb 2024 11:00  Patient On (Oxygen Delivery Method): ventilator    O2 Concentration (%): 70      CAPILLARY BLOOD GLUCOSE      POCT Blood Glucose.: 162 mg/dL (22 Feb 2024 05:04)  POCT Blood Glucose.: 179 mg/dL (22 Feb 2024 00:27)  POCT Blood Glucose.: 278 mg/dL (21 Feb 2024 17:41)  POCT Blood Glucose.: 285 mg/dL (21 Feb 2024 12:34)    I&O's Summary    21 Feb 2024 07:01  -  22 Feb 2024 07:00  --------------------------------------------------------  IN: 7618.1 mL / OUT: 1800 mL / NET: 5818.1 mL    22 Feb 2024 07:01  -  22 Feb 2024 11:29  --------------------------------------------------------  IN: 1845 mL / OUT: 0 mL / NET: 1845 mL        Respiratory:  Mode: AC/ CMV (Assist Control/ Continuous Mandatory Ventilation)  RR (machine): 20  TV (machine): 500  FiO2: 70  PEEP: 8  ITime: 1  MAP: 12  PIP: 22      LABS:                          6.8    10.74 )-----------( 218      ( 21 Feb 2024 23:43 )             24.1     02-21    146<H>  |  112<H>  |  32<H>  ----------------------------<  179<H>  4.0   |  28  |  0.92    Ca    7.9<L>      21 Feb 2024 23:43  Phos  2.0     02-21  Mg     2.6     02-21    TPro  5.3<L>  /  Alb  3.5  /  TBili  0.4  /  DBili  x   /  AST  154<H>  /  ALT  81<H>  /  AlkPhos  57  02-21        MEDICATIONS  (STANDING):  acetaminophen   Oral Liquid .. 650 milliGRAM(s) Oral every 24 hours  atorvastatin 80 milliGRAM(s) Oral at bedtime  chlorhexidine 0.12% Liquid 15 milliLiter(s) Oral Mucosa every 12 hours  chlorhexidine 4% Liquid 1 Application(s) Topical daily  ergocalciferol Drops 16119 Unit(s) Oral <User Schedule>  ferrous    sulfate Liquid 300 milliGRAM(s) Enteral Tube <User Schedule>  gabapentin Solution 400 milliGRAM(s) Oral three times a day  heparin   Injectable 5000 Unit(s) SubCutaneous every 8 hours  immune   globulin 10% (GAMMAGARD) IVPB 35 Gram(s) IV Intermittent daily  insulin lispro (ADMELOG) corrective regimen sliding scale   SubCutaneous every 6 hours  insulin NPH human recombinant 34 Unit(s) SubCutaneous every 6 hours  lactated ringers. 1000 milliLiter(s) (70 mL/Hr) IV Continuous <Continuous>  lidocaine 2% Injectable 10 milliLiter(s) Local Injection once  lidocaine 2% Injectable      mupirocin 2% Nasal 1 Application(s) Both Nostrils two times a day  pantoprazole   Suspension 40 milliGRAM(s) Oral daily  petrolatum Ophthalmic Ointment 1 Application(s) Both EYES three times a day  piperacillin/tazobactam IVPB.. 3.375 Gram(s) IV Intermittent every 8 hours  polyethylene glycol 3350 17 Gram(s) Oral daily  senna 1 Tablet(s) Oral at bedtime  vancomycin  IVPB 1500 milliGRAM(s) IV Intermittent every 12 hours    MEDICATIONS  (PRN):  acetaminophen   Oral Liquid .. 650 milliGRAM(s) Oral every 6 hours PRN Mild Pain (1 - 3)  oxyCODONE    Solution 5 milliGRAM(s) Oral every 4 hours PRN Moderate Pain (4 - 6)  oxyCODONE    Solution 10 milliGRAM(s) Oral every 4 hours PRN Severe Pain (7 - 10)  sodium chloride 0.9% lock flush 10 milliLiter(s) IV Push every 1 hour PRN Pre/post blood products, medications, blood draw, and to maintain line patency      EXAMINATION:  PHYSICAL EXAM:    Constitutional: No Acute Distress, patient intubated but following commands     Neurological: Bilateral ptosis, pupils 2 mm and sluggish, restricted gaze in all directions however, vertical gaze is better than horizontal. Facial asymmetry could not be appreciated due to ETT, patient following commands with his BL LE. Patient following commands and able to communicate with his bilateral feet. Right foot moved-- signifying 'yes', left foot moved-- signifying 'no'.    Motor exam:          Upper extremity                         Delt     Bicep     Tricep    HG                                                 R         0/5        0/5        0/5      2/5 (activation of muscles could be appreciated in bilateral biceps and triceps)                                               L          0/5        0/5        0/5       3/5          Lower extremity                        HF         KF        KE       DF         PF                                                  R        0/5        0/5        3/5       2/5         5/5                                               L         0/5        0/5       3/5       2/5          5/5                                                  Reflexes: Deep Tendon Reflexes absent in all 4 extremities    Pulmonary: Clear to Auscultation, No rales, No rhonchi, No wheezes     Cardiovascular: S1, S2, Regular rate and rhythm     Gastrointestinal: Soft, Non-tender, Non-distended     Extremities: No calf tenderness

## 2024-02-22 NOTE — CHART NOTE - NSCHARTNOTEFT_GEN_A_CORE
Patient is a Jehovah witness.   I spoke with patient, his wife at bedside and his son and daughter over the phone. I explained that IVIG is a human blood product. The family stated that they would like to continue to receive IVIG.  They would also consider pRBCs if this was recommended, but would like to have further discussions regarding this.    Victorina Bettencourt  Neurocritical Care Attending

## 2024-02-22 NOTE — PROGRESS NOTE ADULT - SUBJECTIVE AND OBJECTIVE BOX
73y Male with PMHx significant for HTN, HLD, DM type 2, and two prior strokes without residual deficits [place] and L facial weakness on 2/9. 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 He was transferred to NSICU for angiogram, which was done on 2/9 and showed moderate basilar stenosis. No intervention done. He had to be intubated for worsening respiratory status. On further history it was found that patient had been having progressive weakness and numbness of his extremities for the preceding 1-2 weeks following a viral URI.     Admission Scores  GCS:   HH:   MF:   NIHSS: 4   RASS:    CAM-ICU:   ICH:    2/9: transferred from [place], NIHSS of 4 in ED, then became more lethargic, stridorous and desaturated in ED, intubated for airway protected, repeat CTH/A obtained and brought emergently to IR for basilar stenting   2/10: worsening brainstem symptoms while on DAPT  2/12- LP performed to rule out MFS as patient's examination is not explained by the strokes on MRI. First session of PLEX  2/14- s/p 2nd PLEX tx, no acute interval events  2/15- No acute events overnight. Patient's examination more or less the same.   2/16- Patient storming overnight requiring multiple pushes of labetalol and fentanyl without improvement in BP. Patient started on propofol which also is not optimally managing patient's BP.   2/17 Labile BP due to dysautonomia, continue with scheduled TPE sessions, phenylephrine restarted for BP lability  2/19- No acute events overnight   2/20 - no acute events overnight, unchanged physical exam  2/21 - acute desaturation with fevers requiring emergent chest CT, likely secondary to aspiration pneumonia  2/22 -     Allergies    No Known Allergies    Intolerances        REVIEW OF SYSTEMS: [ X] Unable to Assess due to neurologic exam   [ ] All ROS addressed below are non-contributory, except:  Neuro: [ ] Headache [ ] Back pain [ ] Numbness [ ] Weakness [ ] Ataxia [ ] Dizziness [ ] Aphasia [ ] Dysarthria [ ] Visual disturbance  Resp: [ ] Shortness of breath/dyspnea, [ ] Orthopnea [ ] Cough  CV: [ ] Chest pain [ ] Palpitation [ ] Lightheadedness [ ] Syncope  Renal: [ ] Thirst [ ] Edema  GI: [ ] Nausea [ ] Emesis [ ] Abdominal pain [ ] Constipation [ ] Diarrhea  Hem: [ ] Hematemesis [ ] bright red blood per rectum  ID: [ ] Fever [ ] Chills [ ] Dysuria  ENT: [ ] Rhinorrhea      DEVICES:   [ ] Restraints [ ] ET tube [X ] central line [ ] arterial line [X ] vences [ ] NGT/OGT [ ] EVD [ ] LD [ ] HAL/HMV [ ] Trach [ ] PEG [ ] Chest Tube     ICU Vital Signs Last 24 Hrs  T(C): 37.5 (21 Feb 2024 11:00), Max: 39.2 (20 Feb 2024 23:20)  T(F): 99.5 (21 Feb 2024 11:00), Max: 102.6 (20 Feb 2024 23:20)  HR: 92 (21 Feb 2024 12:00) (82 - 106)  BP: 103/55 (21 Feb 2024 12:00) (91/53 - 173/75)  BP(mean): 73 (21 Feb 2024 12:00) (64 - 108)  RR: 20 (21 Feb 2024 12:00) (14 - 22)  SpO2: 98% (21 Feb 2024 12:00) (92% - 100%)    O2 Parameters below as of 21 Feb 2024 11:04  Patient On (Oxygen Delivery Method): ventilator, trach      CAPILLARY BLOOD GLUCOSE    POCT Blood Glucose.: 231 mg/dL (21 Feb 2024 04:22)  POCT Blood Glucose.: 184 mg/dL (21 Feb 2024 00:26)  POCT Blood Glucose.: 162 mg/dL (20 Feb 2024 19:01)  POCT Blood Glucose.: 153 mg/dL (20 Feb 2024 18:13)  POCT Blood Glucose.: 158 mg/dL (20 Feb 2024 17:22)  POCT Blood Glucose.: 157 mg/dL (20 Feb 2024 15:01)  POCT Blood Glucose.: 188 mg/dL (20 Feb 2024 14:12)  POCT Blood Glucose.: 179 mg/dL (20 Feb 2024 13:21)      I&O's Summary    20 Feb 2024 07:01  -  21 Feb 2024 07:00  --------------------------------------------------------  IN: 5536.3 mL / OUT: 1401 mL / NET: 4135.3 mL    21 Feb 2024 07:01  -  21 Feb 2024 12:17  --------------------------------------------------------  IN: 1500 mL / OUT: 0 mL / NET: 1500 mL        Respiratory:  Mode: AC/ CMV (Assist Control/ Continuous Mandatory Ventilation)  RR (machine): 20  TV (machine): 500  FiO2: 70  PEEP: 12  ITime: 1  MAP: 9  PIP: 18        LABS:  LABS:                          7.6    16.83 )-----------( 258      ( 21 Feb 2024 11:41 )             27.8     02-21    144  |  110<H>  |  39<H>  ----------------------------<  312<H>  5.0   |  24  |  1.07    Ca    8.4      21 Feb 2024 08:31  Phos  5.3     02-21  Mg     2.7     02-21    TPro  5.3<L>  /  Alb  3.5  /  TBili  0.4  /  DBili  x   /  AST  154<H>  /  ALT  81<H>  /  AlkPhos  57  02-21    PT/INR - ( 20 Feb 2024 10:57 )   PT: 12.3 sec;   INR: 1.18 ratio         PTT - ( 20 Feb 2024 10:57 )  PTT:26.7 sec      MEDICATIONS  (STANDING):  acetaminophen   Oral Liquid .. 650 milliGRAM(s) Oral every 24 hours  atorvastatin 80 milliGRAM(s) Oral at bedtime  chlorhexidine 0.12% Liquid 15 milliLiter(s) Oral Mucosa every 12 hours  chlorhexidine 4% Liquid 1 Application(s) Topical daily  dextrose 5%. 1000 milliLiter(s) (100 mL/Hr) IV Continuous <Continuous>  dextrose 5%. 1000 milliLiter(s) (50 mL/Hr) IV Continuous <Continuous>  dextrose 50% Injectable 50 milliLiter(s) IV Push every 15 minutes  ergocalciferol Drops 86534 Unit(s) Oral <User Schedule>  gabapentin Solution 400 milliGRAM(s) Oral three times a day  glucagon  Injectable 1 milliGRAM(s) IntraMuscular once  heparin   Injectable 5000 Unit(s) SubCutaneous every 8 hours  immune   globulin 10% (GAMMAGARD) IVPB 35 Gram(s) IV Intermittent daily  insulin lispro (ADMELOG) corrective regimen sliding scale   SubCutaneous every 6 hours  insulin NPH human recombinant 30 Unit(s) SubCutaneous every 6 hours  lactated ringers. 1000 milliLiter(s) (70 mL/Hr) IV Continuous <Continuous>  pantoprazole   Suspension 40 milliGRAM(s) Oral daily  petrolatum Ophthalmic Ointment 1 Application(s) Both EYES three times a day  piperacillin/tazobactam IVPB.- 3.375 Gram(s) IV Intermittent once  piperacillin/tazobactam IVPB.. 3.375 Gram(s) IV Intermittent every 8 hours  polyethylene glycol 3350 17 Gram(s) Oral daily  senna 1 Tablet(s) Oral at bedtime  sorbitol 70%/mineral oil/magnesium hydroxide/glycerin Enema 120 milliLiter(s) Rectal once  vancomycin  IVPB 1500 milliGRAM(s) IV Intermittent every 12 hours    MEDICATIONS  (PRN):  acetaminophen   Oral Liquid .. 650 milliGRAM(s) Oral every 6 hours PRN Mild Pain (1 - 3)  dextrose Oral Gel 15 Gram(s) Oral once PRN Blood Glucose LESS THAN 70 milliGRAM(s)/deciliter  diphenhydrAMINE Elixir 25 milliGRAM(s) Oral once PRN 30 mins before IVIG  oxyCODONE    Solution 10 milliGRAM(s) Oral every 4 hours PRN Severe Pain (7 - 10)  oxyCODONE    Solution 5 milliGRAM(s) Oral every 4 hours PRN Moderate Pain (4 - 6)  sodium chloride 0.9% lock flush 10 milliLiter(s) IV Push every 1 hour PRN Pre/post blood products, medications, blood draw, and to maintain line patency       EXAMINATION:  PHYSICAL EXAM:    Constitutional: No Acute Distress, patient intubated but following commands     Neurological: Bilateral ptosis, pupils 2 mm and sluggish, restricted gaze in all directions however, vertical gaze is better than horizontal. Facial asymmetry could not be appreciated due to ETT, patient following commands with his BL LE. Patient following commands and able to communicate with his bilateral feet. Right foot moved-- signifying 'yes', left foot moved-- signifying 'no'.    Motor exam:          Upper extremity                         Delt     Bicep     Tricep    HG                                                 R         0/5        0/5        0/5      2/5 (activation of muscles could be appreciated in bilateral biceps and triceps)                                               L          0/5        0/5        0/5       3/5          Lower extremity                        HF         KF        KE       DF         PF                                                  R        0/5        0/5        3/5       2/5         5/5                                               L         0/5        0/5       3/5       2/5          5/5                                                  Reflexes: Deep Tendon Reflexes absent in all 4 extremities    Pulmonary: Clear to Auscultation, No rales, No rhonchi, No wheezes     Cardiovascular: S1, S2, Regular rate and rhythm     Gastrointestinal: Soft, Non-tender, Non-distended     Extremities: No calf tenderness    73y Male with PMHx significant for HTN, HLD, DM type 2, and two prior strokes without residual deficits [place] and [place] facial weakness on [place]/9. 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 He was transferred to NSICU for angiogram, which was done on 2/9 and showed moderate basilar stenosis. No intervention done. He had to be intubated for worsening respiratory status. On further history it was found that patient had been having progressive weakness and numbness of his extremities for the preceding 1-2 weeks following a viral URI.     Admission Scores  GCS:   HH:   MF:   NIHSS: 4   RASS:    CAM-ICU:   ICH:    2/9: transferred from [place], NIHSS of 4 in ED, then became more lethargic, stridorous and desaturated in ED, intubated for airway protected, repeat CTH/A obtained and brought emergently to IR for basilar stenting   2/10: worsening brainstem symptoms while on DAPT  2/12- LP performed to rule out MFS as patient's examination is not explained by the strokes on MRI. First session of PLEX  2/14- s/p 2nd PLEX tx, no acute interval events  2/15- No acute events overnight. Patient's examination more or less the same.   2/16- Patient storming overnight requiring multiple pushes of labetalol and fentanyl without improvement in BP. Patient started on propofol which also is not optimally managing patient's BP.   2/17 Labile BP due to dysautonomia, continue with scheduled TPE sessions, phenylephrine restarted for BP lability  2/19- No acute events overnight   2/20 - no acute events overnight, unchanged physical exam  2/21 - acute desaturation with fevers requiring emergent chest CT, likely secondary to aspiration pneumonia  2/22 - no acute events overnight, unchanged physical exam    Allergies    No Known Allergies    Intolerances        REVIEW OF SYSTEMS: [ X] Unable to Assess due to neurologic exam   [ ] All ROS addressed below are non-contributory, except:  Neuro: [ ] Headache [ ] Back pain [ ] Numbness [ ] Weakness [ ] Ataxia [ ] Dizziness [ ] Aphasia [ ] Dysarthria [ ] Visual disturbance  Resp: [ ] Shortness of breath/dyspnea, [ ] Orthopnea [ ] Cough  CV: [ ] Chest pain [ ] Palpitation [ ] Lightheadedness [ ] Syncope  Renal: [ ] Thirst [ ] Edema  GI: [ ] Nausea [ ] Emesis [ ] Abdominal pain [ ] Constipation [ ] Diarrhea  Hem: [ ] Hematemesis [ ] bright red blood per rectum  ID: [ ] Fever [ ] Chills [ ] Dysuria  ENT: [ ] Rhinorrhea      DEVICES:   [ ] Restraints [ ] ET tube [X ] central line [ ] arterial line [X ] vences [ ] NGT/OGT [ ] EVD [ ] LD [ ] HAL/HMV [ ] Trach [ ] PEG [ ] Chest Tube     ICU Vital Signs Last 24 Hrs  T(C): 37.3 (22 Feb 2024 11:00), Max: 37.4 (21 Feb 2024 23:00)  T(F): 99.1 (22 Feb 2024 11:00), Max: 99.3 (21 Feb 2024 23:00)  HR: 86 (22 Feb 2024 11:00) (75 - 93)  BP: 127/60 (22 Feb 2024 11:00) (103/55 - 129/62)  BP(mean): 87 (22 Feb 2024 11:00) (73 - 89)  RR: 20 (22 Feb 2024 11:00) (20 - 20)  SpO2: 100% (22 Feb 2024 11:00) (95% - 100%)    O2 Parameters below as of 22 Feb 2024 11:00  Patient On (Oxygen Delivery Method): ventilator    O2 Concentration (%): 70      CAPILLARY BLOOD GLUCOSE      POCT Blood Glucose.: 162 mg/dL (22 Feb 2024 05:04)  POCT Blood Glucose.: 179 mg/dL (22 Feb 2024 00:27)  POCT Blood Glucose.: 278 mg/dL (21 Feb 2024 17:41)  POCT Blood Glucose.: 285 mg/dL (21 Feb 2024 12:34)    I&O's Summary    21 Feb 2024 07:01  -  22 Feb 2024 07:00  --------------------------------------------------------  IN: 7618.1 mL / OUT: 1800 mL / NET: 5818.1 mL    22 Feb 2024 07:01  -  22 Feb 2024 11:29  --------------------------------------------------------  IN: 1845 mL / OUT: 0 mL / NET: 1845 mL        Respiratory:  Mode: AC/ CMV (Assist Control/ Continuous Mandatory Ventilation)  RR (machine): 20  TV (machine): 490  FiO2: 70  PEEP: 8      LABS:                          6.8    10.74 )-----------( 218      ( 21 Feb 2024 23:43 )             24.1     02-21    146<H>  |  112<H>  |  32<H>  ----------------------------<  179<H>  4.0   |  28  |  0.92    Ca    7.9<L>      21 Feb 2024 23:43  Phos  2.0     02-21  Mg     2.6     02-21    TPro  5.3<L>  /  Alb  3.5  /  TBili  0.4  /  DBili  x   /  AST  154<H>  /  ALT  81<H>  /  AlkPhos  57  02-21        MEDICATIONS  (STANDING):  acetaminophen   Oral Liquid .. 650 milliGRAM(s) Oral every 24 hours  atorvastatin 80 milliGRAM(s) Oral at bedtime  chlorhexidine 0.12% Liquid 15 milliLiter(s) Oral Mucosa every 12 hours  chlorhexidine 4% Liquid 1 Application(s) Topical daily  ergocalciferol Drops 62867 Unit(s) Oral <User Schedule>  ferrous    sulfate Liquid 300 milliGRAM(s) Enteral Tube <User Schedule>  gabapentin Solution 400 milliGRAM(s) Oral three times a day  heparin   Injectable 5000 Unit(s) SubCutaneous every 8 hours  immune   globulin 10% (GAMMAGARD) IVPB 35 Gram(s) IV Intermittent daily  insulin lispro (ADMELOG) corrective regimen sliding scale   SubCutaneous every 6 hours  insulin NPH human recombinant 34 Unit(s) SubCutaneous every 6 hours  lactated ringers. 1000 milliLiter(s) (70 mL/Hr) IV Continuous <Continuous>  lidocaine 2% Injectable 10 milliLiter(s) Local Injection once  lidocaine 2% Injectable      mupirocin 2% Nasal 1 Application(s) Both Nostrils two times a day  pantoprazole   Suspension 40 milliGRAM(s) Oral daily  petrolatum Ophthalmic Ointment 1 Application(s) Both EYES three times a day  piperacillin/tazobactam IVPB.. 3.375 Gram(s) IV Intermittent every 8 hours  polyethylene glycol 3350 17 Gram(s) Oral daily  senna 1 Tablet(s) Oral at bedtime  vancomycin  IVPB 1500 milliGRAM(s) IV Intermittent every 12 hours    MEDICATIONS  (PRN):  acetaminophen   Oral Liquid .. 650 milliGRAM(s) Oral every 6 hours PRN Mild Pain (1 - 3)  oxyCODONE    Solution 5 milliGRAM(s) Oral every 4 hours PRN Moderate Pain (4 - 6)  oxyCODONE    Solution 10 milliGRAM(s) Oral every 4 hours PRN Severe Pain (7 - 10)  sodium chloride 0.9% lock flush 10 milliLiter(s) IV Push every 1 hour PRN Pre/post blood products, medications, blood draw, and to maintain line patency      EXAMINATION:  PHYSICAL EXAM:    Constitutional: No Acute Distress, patient intubated but following commands     Neurological: Bilateral ptosis, pupils 2 mm and sluggish, restricted gaze in all directions however, vertical gaze is better than horizontal. Facial asymmetry could not be appreciated due to ETT, patient following commands with his BL LE. Patient following commands and able to communicate with his bilateral feet. Right foot moved-- signifying 'yes', left foot moved-- signifying 'no'.    Motor exam:          Upper extremity                         Delt     Bicep     Tricep    HG                                                 R         0/5        0/5        0/5      2/5 (activation of muscles could be appreciated in bilateral biceps and triceps)                                               L          0/5        0/5        0/5       3/5          Lower extremity                        HF         KF        KE       DF         PF                                                  R        0/5        0/5        3/5       2/5         5/5                                               L         0/5        0/5       3/5       2/5          5/5                                                  Reflexes: Deep Tendon Reflexes absent in all 4 extremities    Pulmonary: Clear to Auscultation, No rales, No rhonchi, No wheezes     Cardiovascular: S1, S2, Regular rate and rhythm     Gastrointestinal: Soft, Non-tender, Non-distended     Extremities: No calf tenderness    73y Male with PMHx significant for HTN, HLD, DM type 2, and two prior strokes without residual deficits [place] and L facial weakness on [place]. 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 He was transferred to NSICU for angiogram, which was done on 2/9 and showed moderate basilar stenosis. No intervention done. He had to be intubated for worsening respiratory status. On further history it was found that patient had been having progressive weakness and numbness of his extremities for the preceding 1-2 weeks following a viral URI.     Admission Scores  GCS:   HH:   MF:   NIHSS: 4   RASS:    CAM-ICU:   ICH:    2/9: transferred from [place], NIHSS of 4 in ED, then became more lethargic, stridorous and desaturated in ED, intubated for airway protected, repeat CTH/A obtained and brought emergently to IR for basilar stenting   2/10: worsening brainstem symptoms while on DAPT  2/12- LP performed to rule out MFS as patient's examination is not explained by the strokes on MRI. First session of PLEX  2/14- s/p 2nd PLEX tx, no acute interval events  2/15- No acute events overnight. Patient's examination more or less the same.   2/16- Patient storming overnight requiring multiple pushes of labetalol and fentanyl without improvement in BP. Patient started on propofol which also is not optimally managing patient's BP.   2/17 Labile BP due to dysautonomia, continue with scheduled TPE sessions, phenylephrine restarted for BP lability  2/19- No acute events overnight   2/20 - no acute events overnight, unchanged physical exam  2/21 - acute desaturation with fevers requiring emergent chest CT, likely secondary to aspiration pneumonia  2/22 - no acute events overnight, unchanged physical exam    Allergies    No Known Allergies    Intolerances        REVIEW OF SYSTEMS: [ X] Unable to Assess due to neurologic exam   [ ] All ROS addressed below are non-contributory, except:  Neuro: [ ] Headache [ ] Back pain [ ] Numbness [ ] Weakness [ ] Ataxia [ ] Dizziness [ ] Aphasia [ ] Dysarthria [ ] Visual disturbance  Resp: [ ] Shortness of breath/dyspnea, [ ] Orthopnea [ ] Cough  CV: [ ] Chest pain [ ] Palpitation [ ] Lightheadedness [ ] Syncope  Renal: [ ] Thirst [ ] Edema  GI: [ ] Nausea [ ] Emesis [ ] Abdominal pain [ ] Constipation [ ] Diarrhea  Hem: [ ] Hematemesis [ ] bright red blood per rectum  ID: [ ] Fever [ ] Chills [ ] Dysuria  ENT: [ ] Rhinorrhea      DEVICES:   [ ] Restraints [ ] ET tube [X ] central line [ ] arterial line [X ] vences [ ] NGT/OGT [ ] EVD [ ] LD [ ] HAL/HMV [ ] Trach [ ] PEG [ ] Chest Tube     ICU Vital Signs Last 24 Hrs  T(C): 37.3 (22 Feb 2024 11:00), Max: 37.4 (21 Feb 2024 23:00)  T(F): 99.1 (22 Feb 2024 11:00), Max: 99.3 (21 Feb 2024 23:00)  HR: 86 (22 Feb 2024 11:00) (75 - 93)  BP: 127/60 (22 Feb 2024 11:00) (103/55 - 129/62)  BP(mean): 87 (22 Feb 2024 11:00) (73 - 89)  RR: 20 (22 Feb 2024 11:00) (20 - 20)  SpO2: 100% (22 Feb 2024 11:00) (95% - 100%)    O2 Parameters below as of 22 Feb 2024 11:00  Patient On (Oxygen Delivery Method): ventilator    O2 Concentration (%): 70      CAPILLARY BLOOD GLUCOSE      POCT Blood Glucose.: 162 mg/dL (22 Feb 2024 05:04)  POCT Blood Glucose.: 179 mg/dL (22 Feb 2024 00:27)  POCT Blood Glucose.: 278 mg/dL (21 Feb 2024 17:41)  POCT Blood Glucose.: 285 mg/dL (21 Feb 2024 12:34)    I&O's Summary    21 Feb 2024 07:01  -  22 Feb 2024 07:00  --------------------------------------------------------  IN: 7618.1 mL / OUT: 1800 mL / NET: 5818.1 mL    22 Feb 2024 07:01  -  22 Feb 2024 11:29  --------------------------------------------------------  IN: 1845 mL / OUT: 0 mL / NET: 1845 mL        Respiratory:  Mode: AC/ CMV (Assist Control/ Continuous Mandatory Ventilation)  RR (machine): 20  TV (machine): 500  FiO2: 70  PEEP: 8  ITime: 1  MAP: 12  PIP: 22      LABS:                          6.8    10.74 )-----------( 218      ( 21 Feb 2024 23:43 )             24.1     02-21    146<H>  |  112<H>  |  32<H>  ----------------------------<  179<H>  4.0   |  28  |  0.92    Ca    7.9<L>      21 Feb 2024 23:43  Phos  2.0     02-21  Mg     2.6     02-21    TPro  5.3<L>  /  Alb  3.5  /  TBili  0.4  /  DBili  x   /  AST  154<H>  /  ALT  81<H>  /  AlkPhos  57  02-21        MEDICATIONS  (STANDING):  acetaminophen   Oral Liquid .. 650 milliGRAM(s) Oral every 24 hours  atorvastatin 80 milliGRAM(s) Oral at bedtime  chlorhexidine 0.12% Liquid 15 milliLiter(s) Oral Mucosa every 12 hours  chlorhexidine 4% Liquid 1 Application(s) Topical daily  ergocalciferol Drops 65645 Unit(s) Oral <User Schedule>  ferrous    sulfate Liquid 300 milliGRAM(s) Enteral Tube <User Schedule>  gabapentin Solution 400 milliGRAM(s) Oral three times a day  heparin   Injectable 5000 Unit(s) SubCutaneous every 8 hours  immune   globulin 10% (GAMMAGARD) IVPB 35 Gram(s) IV Intermittent daily  insulin lispro (ADMELOG) corrective regimen sliding scale   SubCutaneous every 6 hours  insulin NPH human recombinant 34 Unit(s) SubCutaneous every 6 hours  lactated ringers. 1000 milliLiter(s) (70 mL/Hr) IV Continuous <Continuous>  lidocaine 2% Injectable 10 milliLiter(s) Local Injection once  lidocaine 2% Injectable      mupirocin 2% Nasal 1 Application(s) Both Nostrils two times a day  pantoprazole   Suspension 40 milliGRAM(s) Oral daily  petrolatum Ophthalmic Ointment 1 Application(s) Both EYES three times a day  piperacillin/tazobactam IVPB.. 3.375 Gram(s) IV Intermittent every 8 hours  polyethylene glycol 3350 17 Gram(s) Oral daily  senna 1 Tablet(s) Oral at bedtime  vancomycin  IVPB 1500 milliGRAM(s) IV Intermittent every 12 hours    MEDICATIONS  (PRN):  acetaminophen   Oral Liquid .. 650 milliGRAM(s) Oral every 6 hours PRN Mild Pain (1 - 3)  oxyCODONE    Solution 5 milliGRAM(s) Oral every 4 hours PRN Moderate Pain (4 - 6)  oxyCODONE    Solution 10 milliGRAM(s) Oral every 4 hours PRN Severe Pain (7 - 10)  sodium chloride 0.9% lock flush 10 milliLiter(s) IV Push every 1 hour PRN Pre/post blood products, medications, blood draw, and to maintain line patency      EXAMINATION:  PHYSICAL EXAM:    Constitutional: No Acute Distress, patient intubated but following commands     Neurological: Bilateral ptosis, pupils 2 mm and sluggish, restricted gaze in all directions however, vertical gaze is better than horizontal. Facial asymmetry could not be appreciated due to ETT, patient following commands with his BL LE. Patient following commands and able to communicate with his bilateral feet. Right foot moved-- signifying 'yes', left foot moved-- signifying 'no'.    Motor exam:          Upper extremity                         Delt     Bicep     Tricep    HG                                                 R         0/5        0/5        0/5      2/5 (activation of muscles could be appreciated in bilateral biceps and triceps)                                               L          0/5        0/5        0/5       3/5          Lower extremity                        HF         KF        KE       DF         PF                                                  R        0/5        0/5        3/5       2/5         5/5                                               L         0/5        0/5       3/5       2/5          5/5                                                  Reflexes: Deep Tendon Reflexes absent in all 4 extremities    Pulmonary: Clear to Auscultation, No rales, No rhonchi, No wheezes     Cardiovascular: S1, S2, Regular rate and rhythm     Gastrointestinal: Soft, Non-tender, Non-distended     Extremities: No calf tenderness    73y Male with PMHx significant for HTN, HLD, DM type 2, and two prior strokes without residual deficits who initially presented to [place] with unsteady gait and L facial weakness on 2/9. 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 He was transferred to NSICU for angiogram, which was done on 2/9 and showed moderate basilar stenosis. No intervention done. He had to be intubated for worsening respiratory status. On further history it was found that patient had been having progressive weakness and numbness of his extremities for the preceding 1-2 weeks following a viral URI.     Admission Scores  GCS:   HH:   MF:   NIHSS: 4   RASS:    CAM-ICU:   ICH:    2/9: transferred from [place], NIHSS of 4 in ED, then became more lethargic, stridorous and desaturated in ED, intubated for airway protected, repeat CTH/A obtained and brought emergently to IR for basilar stenting   2/10: worsening brainstem symptoms while on DAPT  2/12- LP performed to rule out MFS as patient's examination is not explained by the strokes on MRI. First session of PLEX  2/14- s/p 2nd PLEX tx, no acute interval events  2/15- No acute events overnight. Patient's examination more or less the same.   2/16- Patient storming overnight requiring multiple pushes of labetalol and fentanyl without improvement in BP. Patient started on propofol which also is not optimally managing patient's BP.   2/17 Labile BP due to dysautonomia, continue with scheduled TPE sessions, phenylephrine restarted for BP lability  2/19- No acute events overnight   2/20 - no acute events overnight, unchanged physical exam  2/21 - acute desaturation with fevers requiring emergent chest CT, likely secondary to aspiration pneumonia  2/22 - no acute events overnight, unchanged physical exam    Allergies    No Known Allergies    Intolerances        REVIEW OF SYSTEMS: [ X] Unable to Assess due to neurologic exam   [ ] All ROS addressed below are non-contributory, except:  Neuro: [ ] Headache [ ] Back pain [ ] Numbness [ ] Weakness [ ] Ataxia [ ] Dizziness [ ] Aphasia [ ] Dysarthria [ ] Visual disturbance  Resp: [ ] Shortness of breath/dyspnea, [ ] Orthopnea [ ] Cough  CV: [ ] Chest pain [ ] Palpitation [ ] Lightheadedness [ ] Syncope  Renal: [ ] Thirst [ ] Edema  GI: [ ] Nausea [ ] Emesis [ ] Abdominal pain [ ] Constipation [ ] Diarrhea  Hem: [ ] Hematemesis [ ] bright red blood per rectum  ID: [ ] Fever [ ] Chills [ ] Dysuria  ENT: [ ] Rhinorrhea      DEVICES:   [ ] Restraints [ ] ET tube [X ] central line [ ] arterial line [X ] vences [ ] NGT/OGT [ ] EVD [ ] LD [ ] HAL/HMV [ ] Trach [ ] PEG [ ] Chest Tube     ICU Vital Signs Last 24 Hrs  T(C): 37.3 (22 Feb 2024 11:00), Max: 37.4 (21 Feb 2024 23:00)  T(F): 99.1 (22 Feb 2024 11:00), Max: 99.3 (21 Feb 2024 23:00)  HR: 86 (22 Feb 2024 11:00) (75 - 93)  BP: 127/60 (22 Feb 2024 11:00) (103/55 - 129/62)  BP(mean): 87 (22 Feb 2024 11:00) (73 - 89)  RR: 20 (22 Feb 2024 11:00) (20 - 20)  SpO2: 100% (22 Feb 2024 11:00) (95% - 100%)    O2 Parameters below as of 22 Feb 2024 11:00  Patient On (Oxygen Delivery Method): ventilator    O2 Concentration (%): 70      CAPILLARY BLOOD GLUCOSE      POCT Blood Glucose.: 162 mg/dL (22 Feb 2024 05:04)  POCT Blood Glucose.: 179 mg/dL (22 Feb 2024 00:27)  POCT Blood Glucose.: 278 mg/dL (21 Feb 2024 17:41)  POCT Blood Glucose.: 285 mg/dL (21 Feb 2024 12:34)    I&O's Summary    21 Feb 2024 07:01  -  22 Feb 2024 07:00  --------------------------------------------------------  IN: 7618.1 mL / OUT: 1800 mL / NET: 5818.1 mL    22 Feb 2024 07:01  -  22 Feb 2024 11:29  --------------------------------------------------------  IN: 1845 mL / OUT: 0 mL / NET: 1845 mL        Respiratory:  Mode: AC/ CMV (Assist Control/ Continuous Mandatory Ventilation)  RR (machine): 20  TV (machine): 500  FiO2: 70  PEEP: 8  ITime: 1  MAP: 12  PIP: 22      LABS:                          6.8    10.74 )-----------( 218      ( 21 Feb 2024 23:43 )             24.1     02-21    146<H>  |  112<H>  |  32<H>  ----------------------------<  179<H>  4.0   |  28  |  0.92    Ca    7.9<L>      21 Feb 2024 23:43  Phos  2.0     02-21  Mg     2.6     02-21    TPro  5.3<L>  /  Alb  3.5  /  TBili  0.4  /  DBili  x   /  AST  154<H>  /  ALT  81<H>  /  AlkPhos  57  02-21        MEDICATIONS  (STANDING):  acetaminophen   Oral Liquid .. 650 milliGRAM(s) Oral every 24 hours  atorvastatin 80 milliGRAM(s) Oral at bedtime  chlorhexidine 0.12% Liquid 15 milliLiter(s) Oral Mucosa every 12 hours  chlorhexidine 4% Liquid 1 Application(s) Topical daily  ergocalciferol Drops 94858 Unit(s) Oral <User Schedule>  ferrous    sulfate Liquid 300 milliGRAM(s) Enteral Tube <User Schedule>  gabapentin Solution 400 milliGRAM(s) Oral three times a day  heparin   Injectable 5000 Unit(s) SubCutaneous every 8 hours  immune   globulin 10% (GAMMAGARD) IVPB 35 Gram(s) IV Intermittent daily  insulin lispro (ADMELOG) corrective regimen sliding scale   SubCutaneous every 6 hours  insulin NPH human recombinant 34 Unit(s) SubCutaneous every 6 hours  lactated ringers. 1000 milliLiter(s) (70 mL/Hr) IV Continuous <Continuous>  lidocaine 2% Injectable 10 milliLiter(s) Local Injection once  lidocaine 2% Injectable      mupirocin 2% Nasal 1 Application(s) Both Nostrils two times a day  pantoprazole   Suspension 40 milliGRAM(s) Oral daily  petrolatum Ophthalmic Ointment 1 Application(s) Both EYES three times a day  piperacillin/tazobactam IVPB.. 3.375 Gram(s) IV Intermittent every 8 hours  polyethylene glycol 3350 17 Gram(s) Oral daily  senna 1 Tablet(s) Oral at bedtime  vancomycin  IVPB 1500 milliGRAM(s) IV Intermittent every 12 hours    MEDICATIONS  (PRN):  acetaminophen   Oral Liquid .. 650 milliGRAM(s) Oral every 6 hours PRN Mild Pain (1 - 3)  oxyCODONE    Solution 5 milliGRAM(s) Oral every 4 hours PRN Moderate Pain (4 - 6)  oxyCODONE    Solution 10 milliGRAM(s) Oral every 4 hours PRN Severe Pain (7 - 10)  sodium chloride 0.9% lock flush 10 milliLiter(s) IV Push every 1 hour PRN Pre/post blood products, medications, blood draw, and to maintain line patency      EXAMINATION:  PHYSICAL EXAM:    Constitutional: No Acute Distress, patient intubated but following commands     Neurological: Bilateral ptosis, pupils 2 mm and sluggish, restricted gaze in all directions however, vertical gaze is better than horizontal. Facial asymmetry could not be appreciated due to ETT, patient following commands with his BL LE. Patient following commands and able to communicate with his bilateral feet. Right foot moved-- signifying 'yes', left foot moved-- signifying 'no'.    Motor exam:          Upper extremity                         Delt     Bicep     Tricep    HG                                                 R         0/5        0/5        0/5      2/5 (activation of muscles could be appreciated in bilateral biceps and triceps)                                               L          0/5        0/5        0/5       3/5          Lower extremity                        HF         KF        KE       DF         PF                                                  R        0/5        0/5        3/5       2/5         5/5                                               L         0/5        0/5       3/5       2/5          5/5                                                  Reflexes: Deep Tendon Reflexes absent in all 4 extremities    Pulmonary: Clear to Auscultation, No rales, No rhonchi, No wheezes     Cardiovascular: S1, S2, Regular rate and rhythm     Gastrointestinal: Soft, Non-tender, Non-distended     Extremities: No calf tenderness

## 2024-02-22 NOTE — PROGRESS NOTE ADULT - ASSESSMENT
ASSESSMENT/PLAN: 72 y/o male with multiple comorbidities presenting initially with suspicious of basilar stroke however, neurological examination more consistent with GBS. 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 No longer requiring insulin drips. Labile O2 saturation and temperature.     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  - 2/20- PLEX x 5(complete)  - 2/21- IvIG x 5   - hold Aspirin   - Neurology following   - Speech pathologist evaluation done and mode of speech established  Activity: [x] mobilize as tolerated [] Bedrest [x] PT [x] OT [x] PMNR    PULM:  - s/p tracheostomy 2/16  - Continue chest PT   - CPAP as tolerated   - Bronchoscopy today      CV:   - SBP goal  with MAP >65   - PICC  - TTE- EF 66%      RENAL:  - Fluids: LR 70ml/hr  - c/w Free water 400 ccQ4h  - Na stable @ 147    GI:  - s/p PEG 2/16, Glucerna feeds at goal (per nursing 2/20)  - Vitamin D supplementation   - GI prophylaxis [] not indicated [x] PPI [] other:  - Bowel regimen [x] miralax [x] senna, dulcolax suppository   - Smog enema today  - LBM  2/18    ENDO:   - Goal euglycemia (-180)  - A1c 6.8  - ISS    HEME/ONC:  - H/H stable   - VTE prophylaxis: [x] SCDs [x] chemoprophylaxis- SQL [] hold chemoprophylaxis due to: [] high risk of DVT/PE on admission due to: immobility     ID:  - febrile overnight  -start vanc and zosyn     MISC:  Lines/tubes: ru CAMPOS  ASSESSMENT/PLAN: 72 y/o male with multiple comorbidities presenting initially with suspicious of basilar stroke however, neurological examination more consistent with GBS. 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 No longer requiring insulin drips. Labile O2 saturation and temperature. Scheduled for second session of IVIg today, 2/22.     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  - 2/20- PLEX x 5(complete)  - 2/21, 2/22- IvIG x 5   - hold Aspirin   - Neurology following   - Speech pathologist evaluation done and mode of speech established  Activity: [x] mobilize as tolerated [] Bedrest [x] PT [x] OT [x] PMNR    PULM:  - s/p tracheostomy 2/16  - Continue chest PT   - CPAP as tolerated   - Bronchoscopy today      CV:   - SBP goal  with MAP >65   - PICC  - TTE- EF 66%      RENAL:  - Fluids: LR 70ml/hr  - c/w Free water 400 ccQ4h  - Na stable @ 147    GI:  - s/p PEG 2/16, Glucerna feeds at goal (per nursing 2/20)  - Vitamin D supplementation   - GI prophylaxis [] not indicated [x] PPI [] other:  - Bowel regimen [x] miralax [x] senna, dulcolax suppository   - Smog enema today  - LBM  2/18    ENDO:   - Goal euglycemia (-180)  - A1c 6.8  - ISS    HEME/ONC:  - H/H stable   - VTE prophylaxis: [x] SCDs [x] chemoprophylaxis- SQL [] hold chemoprophylaxis due to: [] high risk of DVT/PE on admission due to: immobility     ID:  - febrile overnight  -start vanc and zosyn     MISC:  Lines/tubes: RIJ, vences  ASSESSMENT/PLAN: 74 y/o male with multiple comorbidities presenting initially with suspicious of basilar stroke however, neurological examination more consistent with GBS. 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 No longer requiring insulin drips. Labile O2 saturation and temperature. Scheduled for second session of IVIg today, 2/22.     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  - 2/20- PLEX x 5(complete)  - 2/21, 2/22- IvIG x 5   - hold Aspirin   - Neurology following   - Speech pathologist evaluation done and mode of speech established  Activity: [x] mobilize as tolerated [] Bedrest [x] PT [x] OT [x] PMNR    PULM:  - s/p tracheostomy 2/16  - Continue chest PT   - CPAP as tolerated   -Fio2 50/ PEEP 10/ RR 20/       CV:   - SBP goal  with MAP >65   - PICC  - TTE- EF 66%      RENAL:  - IVL  - c/w Free water 400 ccQ4h  - Na stable @ 146    GI:  - s/p PEG 2/16, Glucerna feeds at goal (per nursing 2/22)  - Vitamin D supplementation   - GI prophylaxis [] not indicated [x] PPI [] other:  - Bowel regimen [x] miralax [x] senna, dulcolax suppository   - Smog enema yesterday  - LB  2/22    ENDO:   - Goal euglycemia (-180)  - A1c 6.8  - ISS, 30 Q6    HEME/ONC:  - H/H decreasing. Patient does not receive blood products. Minimize blood draws.   - s/p ferrous sulfate liquid dose  - supplement w/ EPO, folic acid, B12, iron (IV infusion for 5 days)  - VTE prophylaxis: [x] SCDs [x] chemoprophylaxis- SQL [] hold chemoprophylaxis due to: [] high risk of DVT/PE on admission due to: immobility     ID:  - afebrile, zosyn for suspected pneumonia   -start vanc and zosyn     MISC:  Lines/tubes: RIJ, vences

## 2024-02-22 NOTE — PROGRESS NOTE ADULT - SUBJECTIVE AND OBJECTIVE BOX
Neurology Progress Note    SUBJECTIVE/OBJECTIVE/INTERVAL EVENTS: Patient seen and examined at bedside w/ neuro attending and team. Patient slightly improved with minor head flexion and extension noted.     INTERVAL HISTORY: 74 y/o male with multiple comorbidities presenting initially with suspicious of basilar stroke however, neurological examination more consistent with GBS. 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 Elevated sugars overnight requiring insulin drip. Patient received 5 days of PLEX completed yesterday 2/20. Patient to undergoingIVIG due to poor improvement with PLEX.    REVIEW OF SYSTEMS: unable to obtain     MEDICATIONS  (STANDING):  acetaminophen   Oral Liquid .. 650 milliGRAM(s) Oral every 24 hours  atorvastatin 80 milliGRAM(s) Oral at bedtime  chlorhexidine 0.12% Liquid 15 milliLiter(s) Oral Mucosa every 12 hours  chlorhexidine 4% Liquid 1 Application(s) Topical daily  cyanocobalamin 1000 MICROGram(s) Oral daily  epoetin geeta-epbx (RETACRIT) Injectable 90584 Unit(s) SubCutaneous <User Schedule>  ergocalciferol Drops 04317 Unit(s) Oral <User Schedule>  ferrous    sulfate Liquid 300 milliGRAM(s) Enteral Tube <User Schedule>  folic acid 1 milliGRAM(s) Oral daily  gabapentin Solution 400 milliGRAM(s) Oral three times a day  heparin   Injectable 5000 Unit(s) SubCutaneous every 8 hours  immune   globulin 10% (GAMMAGARD) IVPB 35 Gram(s) IV Intermittent daily  insulin lispro (ADMELOG) corrective regimen sliding scale   SubCutaneous every 6 hours  insulin NPH human recombinant 30 Unit(s) SubCutaneous every 6 hours  iron sucrose IVPB 100 milliGRAM(s) IV Intermittent every 24 hours  lidocaine 2% Injectable 10 milliLiter(s) Local Injection once  lidocaine 2% Injectable      mupirocin 2% Nasal 1 Application(s) Both Nostrils two times a day  pantoprazole   Suspension 40 milliGRAM(s) Oral daily  petrolatum Ophthalmic Ointment 1 Application(s) Both EYES three times a day  piperacillin/tazobactam IVPB.. 3.375 Gram(s) IV Intermittent every 8 hours  polyethylene glycol 3350 17 Gram(s) Oral daily  senna 1 Tablet(s) Oral at bedtime  vancomycin  IVPB 1500 milliGRAM(s) IV Intermittent every 12 hours    MEDICATIONS  (PRN):  acetaminophen   Oral Liquid .. 650 milliGRAM(s) Oral every 6 hours PRN Mild Pain (1 - 3)  oxyCODONE    Solution 5 milliGRAM(s) Oral every 4 hours PRN Moderate Pain (4 - 6)  oxyCODONE    Solution 10 milliGRAM(s) Oral every 4 hours PRN Severe Pain (7 - 10)  sodium chloride 0.9% lock flush 10 milliLiter(s) IV Push every 1 hour PRN Pre/post blood products, medications, blood draw, and to maintain line patency      VITALS & EXAMINATION:  Vital Signs Last 24 Hrs  T(C): 37.4 (22 Feb 2024 15:00), Max: 37.4 (21 Feb 2024 23:00)  T(F): 99.3 (22 Feb 2024 15:00), Max: 99.3 (21 Feb 2024 23:00)  HR: 84 (22 Feb 2024 15:00) (76 - 86)  BP: 120/58 (22 Feb 2024 14:00) (106/51 - 129/62)  BP(mean): 83 (22 Feb 2024 14:00) (73 - 89)  RR: 20 (22 Feb 2024 15:00) (20 - 20)  SpO2: 100% (22 Feb 2024 15:00) (95% - 100%)    Parameters below as of 22 Feb 2024 11:00  Patient On (Oxygen Delivery Method): ventilator    O2 Concentration (%): 70    Limited due to intubation and neurological disease process  Neurological (>12):  MS: Awake. responds to yes or no questions with b/l feet movement  Language: no verbal output, intubated  CNs: PERRL (R 2mm, L 2mm) sluggish reactive. VFF minimally. eyes are conjugate and straight head. patient is suffering from opthalmoplegia, EOM are limited. right eye able to move more to commands. Right eye can look up and attempt left and right (stays relatively midline for horizontal movement attempts). No voluntary control of eyelids (b/l ptosis)    Motor - Normal bulk and flaccid tone throughout.     Neck ext/flex 1-2/5  L/R (out of 5 each)       Deltoid  0/0    Biceps   0/0      Triceps  0/0              1/1  L/R (out of 5 each)       Hip Flexion  0/0    Hip Extension  0/0  Knee Extension  0/0  Dorsiflexion  2/2      Plantar Flexion 2/2     Sensation: Intact to LT b/l x4 extremities.   Reflexes L/R:  Biceps(C5) 2/2  BR(C6) 2/2   Triceps(C7)  2/2 Patellar(L4)   0/0   Ankles 0/0  Coordination: unable to assess  Gait: deferred, unable to assess    LABORATORY:  CBC                       6.8    10.74 )-----------( 218      ( 21 Feb 2024 23:43 )             24.1     Chem 02-21    146<H>  |  112<H>  |  32<H>  ----------------------------<  179<H>  4.0   |  28  |  0.92    Ca    7.9<L>      21 Feb 2024 23:43  Phos  2.0     02-21  Mg     2.6     02-21    TPro  5.3<L>  /  Alb  3.5  /  TBili  0.4  /  DBili  x   /  AST  154<H>  /  ALT  81<H>  /  AlkPhos  57  02-21    LFTs LIVER FUNCTIONS - ( 21 Feb 2024 08:31 )  Alb: 3.5 g/dL / Pro: 5.3 g/dL / ALK PHOS: 57 U/L / ALT: 81 U/L / AST: 154 U/L / GGT: x           Coagulopathy   Lipid Panel 02-14 Chol 92 LDL -- HDL 19<L> Trig 104, 02-10 Chol 226<H> LDL -- HDL 51 Trig 76    STUDIES & IMAGING: (EEG, CT, MR, U/S, TTE/MILADIS):  CTH no acute pathology  CTA: High grade stenosis of proximal basilar + L PCA  CTP: no perfusion deficit    MRI brain with and without contrast 2/11  No significant interval change in the small acute bilateral cerebellar infarcts. No new superimposed acute intracranial abnormality. No acute intracranial hemorrhage.  2. Findings suspicious for cerebral amyloid angiopathy or hypertensive microhemorrhages.  3. Probable minimal chronic microvascular ischemic disease.  4. Sinus disease.    MRI C, T, L spine ww/o  CERVICAL SPINE: Low-grade cervical degenerative disc disease. Indistinct cord lesions at the C3 and C4 vertebral levels are nonspecific but suggest inflammatory/infectious/demyelinating etiology. No associated enhancement.    2. THORACIC SPINE: Low-grade thoracic degenerative disc disease. Thoracic cord intact. No abnomal enhancement.    3. LUMBAR SPINE: Advanced lower lumbar degenerative disc disease includes moderate degenerative central canal stenosis at L4-L5 and high-grade degenerative foraminal stenosis at L5-S1. Conus intact. Cauda equina demonstrates central clustering with spinal stenosis at L4-L5 and mild radicular enhancement predominantly distal to this location of spinal stenosis, nonspecific, inflammatory versus congestion from the stenosis. Vascular congestion between the conus and the L4-L5 stenosis. Superimposed nodular enhancement at the L4 superior endplate level is also nonspecific, inflammatory versus schwannoma    LP  Clear, no color,   83 glucose (elevated)   67 protein (elevated)  TNC 11, 7% Neutrophils (elevated), 67% lymphocytes (wnl), 26% monocytes (wnl)  3 RBCs  IgG CSF 9.7 (elevated)  CSF albumin 41.3 (elevated)  CSF PCR (-)  IgG/albumin ratio CSF 0.23 (wnl) Serum 0.46 (wnl)  IgG index wnl  IgG synthesis wnl  culture gram stain (-)  CSF ACE, autoimmune encephalopathy panel unremarkable    EEG 2/11 (18 hr study)  Abnormal EEG study  Mild generalized background slowing  Clinical Impression:  Mild diffuse/multi-focal cerebral dysfunction, not specific as to etiology.  There were no epileptiform abnormalities recorded.

## 2024-02-22 NOTE — CHART NOTE - NSCHARTNOTEFT_GEN_A_CORE
Nutrition Follow Up Note  Patient seen for: follow up    Source: [] Patient       [x] Medical Record        [] RN        [] Family at bedside       [x] Other: team during interdisciplinary rounds     -If unable to interview patient: [x] Trach/Vent/BiPAP  [] Disoriented/confused/inappropriate to interview    Diet Order:   Diet, NPO with Tube Feed:   Tube Feeding Modality: Gastrostomy  Glucerna 1.2 David (GLUCERNARTH)  Total Volume for 24 Hours (mL): 1440  Continuous  Starting Tube Feed Rate {mL per Hour}: 30  Increase Tube Feed Rate by (mL): 10     Every 4 hours  Until Goal Tube Feed Rate (mL per Hour): 60  Tube Feed Duration (in Hours): 24  Tube Feed Start Time: 12:00  Supplement Feeding Modality:  Gastrostomy  Probiotic Yogurt/Smoothie Cans or Servings Per Day:  2       Frequency:  Daily (24)    EN order providing if 100% provision: 1728kcal and 86g protein  EN provision: pt meeting 62% of EER x 5 days per nursing documentation on flow sheets.     - Is current order appropriate/adequate? see below for recommendations.     Nutrition-related concerns:  -s/p tracheostomy     GI:  Last BM .   Bowel Regimen? [] Yes   [] No      Weights:   Daily Weight in k.7 ()    Nutritionally Pertinent MEDICATIONS  (STANDING):  atorvastatin  ergocalciferol Drops  ferrous    sulfate Liquid  insulin lispro (ADMELOG) corrective regimen sliding scale  insulin NPH human recombinant  lactated ringers.  pantoprazole   Suspension  piperacillin/tazobactam IVPB..  polyethylene glycol 3350  senna  vancomycin  IVPB    Pertinent Labs:  @ 23:43: Na 146<H>, BUN 32<H>, Cr 0.92, <H>, K+ 4.0, Phos 2.0<L>, Mg 2.6, Alk Phos --, ALT/SGPT --, AST/SGOT --, HbA1c --    A1C with Estimated Average Glucose Result: 6.8 % (02-10-24 @ 01:43)    Finger Sticks:  POCT Blood Glucose.: 162 mg/dL ( @ 05:04)  POCT Blood Glucose.: 179 mg/dL ( @ 00:27)  POCT Blood Glucose.: 278 mg/dL ( @ 17:41)  POCT Blood Glucose.: 285 mg/dL ( @ 12:34)      Skin per nursing documentation: No pressure injuries noted.  Edema per nursing documentation:     Estimated Needs:   [] no change since previous assessment  [] recalculated:     Previous Nutrition Diagnosis:   Nutrition Diagnosis is: [] ongoing  [] resolved [] not applicable     Nutrition Care Plan:  [] In Progress  [] Achieved  [] Not applicable    New Nutrition Diagnosis: [] Not applicable    Nutrition Interventions:     Education Provided   [] Yes:  [] No:     Recommendations:          Monitoring and Evaluation:   Continue to monitor nutritional intake, tolerance to diet prescription, weights, labs, skin integrity    RD remains available upon request and will follow up per protocol  Alicia Randle, MS, RD, CDN Nutrition Follow Up Note  Patient seen for: follow up    Source: [] Patient       [x] Medical Record        [] RN        [] Family at bedside       [x] Other: team during interdisciplinary rounds     -If unable to interview patient: [x] Trach/Vent/BiPAP  [] Disoriented/confused/inappropriate to interview    Diet Order:   Diet, NPO with Tube Feed:   Tube Feeding Modality: Gastrostomy  Glucerna 1.2 David (GLUCERNARTH)  Total Volume for 24 Hours (mL): 1440  Continuous  Starting Tube Feed Rate {mL per Hour}: 30  Increase Tube Feed Rate by (mL): 10     Every 4 hours  Until Goal Tube Feed Rate (mL per Hour): 60  Tube Feed Duration (in Hours): 24  Tube Feed Start Time: 12:00  Supplement Feeding Modality:  Gastrostomy  Probiotic Yogurt/Smoothie Cans or Servings Per Day:  2       Frequency:  Daily (24)    EN order providing if 100% provision: 1728kcal (17kcal/kg) and 86g protein (0.8g/kg)   EN provision: pt meeting 62% of EER x 5 days per nursing documentation on flow sheets.     - Is current order appropriate/adequate? see below for recommendations.     Nutrition-related concerns:  -s/p tracheostomy  and PEG  -free water 400ml every 4 hours ordered   -phosphorus low yesterday, replenished   -IV Fluids: lactated ringers 70ml/hr   -vitamin D deficient, replenishment ordered   -Hgb A1c 6.8%, insulin regimen ordered to maintain glycemic control  -- PLEX x 5    GI:  Last BM .  Bowel Regimen? [x] Yes   [] No    Weights:   Dosing weight 101.5kg  Daily Weight in k.7 ()    Nutritionally Pertinent MEDICATIONS  (STANDING):  atorvastatin  ergocalciferol Drops  ferrous    sulfate Liquid  insulin lispro (ADMELOG) corrective regimen sliding scale  insulin NPH human recombinant  lactated ringers.  pantoprazole   Suspension  piperacillin/tazobactam IVPB..  polyethylene glycol 3350  senna  vancomycin  IVPB    Pertinent Labs:  @ 23:43: Na 146<H>, BUN 32<H>, Cr 0.92, <H>, K+ 4.0, Phos 2.0<L>, Mg 2.6, Alk Phos --, ALT/SGPT --, AST/SGOT --, HbA1c --    A1C with Estimated Average Glucose Result: 6.8 % (02-10-24 @ 01:43)    Finger Sticks:  POCT Blood Glucose.: 162 mg/dL ( @ 05:04)  POCT Blood Glucose.: 179 mg/dL ( @ 00:27)  POCT Blood Glucose.: 278 mg/dL ( @ 17:41)  POCT Blood Glucose.: 285 mg/dL ( @ 12:34)    Skin per nursing documentation: No pressure injuries noted.  Edema per nursing documentation: none noted     based on dosing wt 101.5kg):   Estimated Energy Needs: (23-27kcal/kg): 2334-2740kcal   Estimated Protein Needs: (1.1-1.3g protein/kg): 112-132g protein   Estimated Fluid Needs: deferred to team    Previous Nutrition Diagnosis: Acute Moderate Protein Calorie Malnutrition  Nutrition Diagnosis is: [x] ongoing  [] resolved [] not applicable     Nutrition Care Plan:  [x] In Progress  [] Achieved  [] Not applicable    New Nutrition Diagnosis: [x] Not applicable    Nutrition Interventions:     Education Provided   [] Yes:  [x] No:     Recommendations:      -When able, recommend change EN feeds to Glucerna 1.5 at 65ml/hr x 24 hrs. To provide (based on dosing wt 101.5kg): 1560ml, 2340kcal (23kcal/kg) and 129g protein (1.27g protein/kg).  -Vitamin D and ferrous sulfate supplementation as per discretion of team.   -Antihyperglycemic regimen deferred to team.   -Monitor wt trends/labs/skin integrity/hydration status/bowel regularity.    Monitoring and Evaluation:   Continue to monitor nutritional intake, tolerance to diet prescription, weights, labs, skin integrity    RD remains available upon request and will follow up per protocol  Alicia Ruiz, MS, RD, CDN / Teams

## 2024-02-23 LAB
-  AZTREONAM: SIGNIFICANT CHANGE UP
-  AZTREONAM: SIGNIFICANT CHANGE UP
-  CEFEPIME: SIGNIFICANT CHANGE UP
-  CEFEPIME: SIGNIFICANT CHANGE UP
-  CEFTAZIDIME: SIGNIFICANT CHANGE UP
-  CEFTAZIDIME: SIGNIFICANT CHANGE UP
-  CIPROFLOXACIN: SIGNIFICANT CHANGE UP
-  CIPROFLOXACIN: SIGNIFICANT CHANGE UP
-  IMIPENEM: SIGNIFICANT CHANGE UP
-  IMIPENEM: SIGNIFICANT CHANGE UP
-  LEVOFLOXACIN: SIGNIFICANT CHANGE UP
-  LEVOFLOXACIN: SIGNIFICANT CHANGE UP
-  MEROPENEM: SIGNIFICANT CHANGE UP
-  MEROPENEM: SIGNIFICANT CHANGE UP
-  PIPERACILLIN/TAZOBACTAM: SIGNIFICANT CHANGE UP
-  PIPERACILLIN/TAZOBACTAM: SIGNIFICANT CHANGE UP
APPEARANCE UR: CLEAR — SIGNIFICANT CHANGE UP
BACTERIA # UR AUTO: NEGATIVE /HPF — SIGNIFICANT CHANGE UP
BILIRUB UR-MCNC: NEGATIVE — SIGNIFICANT CHANGE UP
CAST: 1 /LPF — SIGNIFICANT CHANGE UP (ref 0–4)
COLOR SPEC: YELLOW — SIGNIFICANT CHANGE UP
CULTURE RESULTS: ABNORMAL
CULTURE RESULTS: ABNORMAL
DIFF PNL FLD: NEGATIVE — SIGNIFICANT CHANGE UP
GLUCOSE BLDC GLUCOMTR-MCNC: 152 MG/DL — HIGH (ref 70–99)
GLUCOSE BLDC GLUCOMTR-MCNC: 162 MG/DL — HIGH (ref 70–99)
GLUCOSE BLDC GLUCOMTR-MCNC: 164 MG/DL — HIGH (ref 70–99)
GLUCOSE BLDC GLUCOMTR-MCNC: 171 MG/DL — HIGH (ref 70–99)
GLUCOSE BLDC GLUCOMTR-MCNC: 182 MG/DL — HIGH (ref 70–99)
GLUCOSE BLDC GLUCOMTR-MCNC: 201 MG/DL — HIGH (ref 70–99)
GLUCOSE BLDC GLUCOMTR-MCNC: 211 MG/DL — HIGH (ref 70–99)
GLUCOSE UR QL: NEGATIVE MG/DL — SIGNIFICANT CHANGE UP
HCT VFR BLD CALC: 29.1 % — LOW (ref 39–50)
HGB BLD-MCNC: 8.5 G/DL — LOW (ref 13–17)
KETONES UR-MCNC: NEGATIVE MG/DL — SIGNIFICANT CHANGE UP
LEUKOCYTE ESTERASE UR-ACNC: NEGATIVE — SIGNIFICANT CHANGE UP
MCHC RBC-ENTMCNC: 22.7 PG — LOW (ref 27–34)
MCHC RBC-ENTMCNC: 29.2 GM/DL — LOW (ref 32–36)
MCV RBC AUTO: 77.8 FL — LOW (ref 80–100)
METHOD TYPE: SIGNIFICANT CHANGE UP
METHOD TYPE: SIGNIFICANT CHANGE UP
NITRITE UR-MCNC: NEGATIVE — SIGNIFICANT CHANGE UP
NRBC # BLD: 2 /100 WBCS — HIGH (ref 0–0)
ORGANISM # SPEC MICROSCOPIC CNT: ABNORMAL
PH UR: 7 — SIGNIFICANT CHANGE UP (ref 5–8)
PLATELET # BLD AUTO: 231 K/UL — SIGNIFICANT CHANGE UP (ref 150–400)
PROT UR-MCNC: 30 MG/DL
RBC # BLD: 3.74 M/UL — LOW (ref 4.2–5.8)
RBC # FLD: 21.2 % — HIGH (ref 10.3–14.5)
RBC CASTS # UR COMP ASSIST: 1 /HPF — SIGNIFICANT CHANGE UP (ref 0–4)
SP GR SPEC: 1.02 — SIGNIFICANT CHANGE UP (ref 1–1.03)
SPECIMEN SOURCE: SIGNIFICANT CHANGE UP
SPECIMEN SOURCE: SIGNIFICANT CHANGE UP
SQUAMOUS # UR AUTO: 0 /HPF — SIGNIFICANT CHANGE UP (ref 0–5)
UROBILINOGEN FLD QL: 1 MG/DL — SIGNIFICANT CHANGE UP (ref 0.2–1)
WBC # BLD: 11.4 K/UL — HIGH (ref 3.8–10.5)
WBC # FLD AUTO: 11.4 K/UL — HIGH (ref 3.8–10.5)
WBC UR QL: 1 /HPF — SIGNIFICANT CHANGE UP (ref 0–5)

## 2024-02-23 PROCEDURE — 71045 X-RAY EXAM CHEST 1 VIEW: CPT | Mod: 26,77

## 2024-02-23 PROCEDURE — 99291 CRITICAL CARE FIRST HOUR: CPT

## 2024-02-23 PROCEDURE — 71045 X-RAY EXAM CHEST 1 VIEW: CPT | Mod: 26

## 2024-02-23 PROCEDURE — 99232 SBSQ HOSP IP/OBS MODERATE 35: CPT

## 2024-02-23 PROCEDURE — 99223 1ST HOSP IP/OBS HIGH 75: CPT

## 2024-02-23 RX ORDER — HYDRALAZINE HCL 50 MG
10 TABLET ORAL ONCE
Refills: 0 | Status: COMPLETED | OUTPATIENT
Start: 2024-02-23 | End: 2024-02-23

## 2024-02-23 RX ORDER — HYDROMORPHONE HYDROCHLORIDE 2 MG/ML
0.5 INJECTION INTRAMUSCULAR; INTRAVENOUS; SUBCUTANEOUS ONCE
Refills: 0 | Status: DISCONTINUED | OUTPATIENT
Start: 2024-02-23 | End: 2024-02-23

## 2024-02-23 RX ORDER — ENOXAPARIN SODIUM 100 MG/ML
30 INJECTION SUBCUTANEOUS
Refills: 0 | Status: DISCONTINUED | OUTPATIENT
Start: 2024-02-23 | End: 2024-03-03

## 2024-02-23 RX ORDER — DIPHENHYDRAMINE HCL 50 MG
25 CAPSULE ORAL DAILY
Refills: 0 | Status: COMPLETED | OUTPATIENT
Start: 2024-02-23 | End: 2024-02-25

## 2024-02-23 RX ORDER — HYDROMORPHONE HYDROCHLORIDE 2 MG/ML
0.5 INJECTION INTRAMUSCULAR; INTRAVENOUS; SUBCUTANEOUS ONCE
Refills: 0 | Status: DISCONTINUED | OUTPATIENT
Start: 2024-02-23 | End: 2024-02-24

## 2024-02-23 RX ADMIN — IMMUNE GLOBULIN (HUMAN) 58.33 GRAM(S): 10 INJECTION INTRAVENOUS; SUBCUTANEOUS at 13:20

## 2024-02-23 RX ADMIN — GABAPENTIN 400 MILLIGRAM(S): 400 CAPSULE ORAL at 14:30

## 2024-02-23 RX ADMIN — CHLORHEXIDINE GLUCONATE 1 APPLICATION(S): 213 SOLUTION TOPICAL at 12:14

## 2024-02-23 RX ADMIN — ENOXAPARIN SODIUM 30 MILLIGRAM(S): 100 INJECTION SUBCUTANEOUS at 17:30

## 2024-02-23 RX ADMIN — HUMAN INSULIN 30 UNIT(S): 100 INJECTION, SUSPENSION SUBCUTANEOUS at 05:55

## 2024-02-23 RX ADMIN — Medication 650 MILLIGRAM(S): at 12:47

## 2024-02-23 RX ADMIN — PIPERACILLIN AND TAZOBACTAM 25 GRAM(S): 4; .5 INJECTION, POWDER, LYOPHILIZED, FOR SOLUTION INTRAVENOUS at 21:40

## 2024-02-23 RX ADMIN — Medication 10 MILLIGRAM(S): at 20:16

## 2024-02-23 RX ADMIN — PREGABALIN 1000 MICROGRAM(S): 225 CAPSULE ORAL at 12:15

## 2024-02-23 RX ADMIN — HYDROMORPHONE HYDROCHLORIDE 0.5 MILLIGRAM(S): 2 INJECTION INTRAMUSCULAR; INTRAVENOUS; SUBCUTANEOUS at 21:00

## 2024-02-23 RX ADMIN — POLYETHYLENE GLYCOL 3350 17 GRAM(S): 17 POWDER, FOR SOLUTION ORAL at 12:16

## 2024-02-23 RX ADMIN — Medication 650 MILLIGRAM(S): at 12:17

## 2024-02-23 RX ADMIN — CHLORHEXIDINE GLUCONATE 15 MILLILITER(S): 213 SOLUTION TOPICAL at 05:50

## 2024-02-23 RX ADMIN — Medication 25 MILLIGRAM(S): at 12:16

## 2024-02-23 RX ADMIN — GABAPENTIN 400 MILLIGRAM(S): 400 CAPSULE ORAL at 05:50

## 2024-02-23 RX ADMIN — PIPERACILLIN AND TAZOBACTAM 25 GRAM(S): 4; .5 INJECTION, POWDER, LYOPHILIZED, FOR SOLUTION INTRAVENOUS at 05:49

## 2024-02-23 RX ADMIN — PANTOPRAZOLE SODIUM 40 MILLIGRAM(S): 20 TABLET, DELAYED RELEASE ORAL at 12:15

## 2024-02-23 RX ADMIN — SENNA PLUS 1 TABLET(S): 8.6 TABLET ORAL at 21:43

## 2024-02-23 RX ADMIN — Medication 2: at 17:30

## 2024-02-23 RX ADMIN — Medication 2: at 12:42

## 2024-02-23 RX ADMIN — Medication 1 APPLICATION(S): at 05:50

## 2024-02-23 RX ADMIN — Medication 1 APPLICATION(S): at 14:30

## 2024-02-23 RX ADMIN — HUMAN INSULIN 30 UNIT(S): 100 INJECTION, SUSPENSION SUBCUTANEOUS at 17:26

## 2024-02-23 RX ADMIN — PIPERACILLIN AND TAZOBACTAM 25 GRAM(S): 4; .5 INJECTION, POWDER, LYOPHILIZED, FOR SOLUTION INTRAVENOUS at 14:33

## 2024-02-23 RX ADMIN — Medication 1 MILLIGRAM(S): at 12:15

## 2024-02-23 RX ADMIN — ERYTHROPOIETIN 20000 UNIT(S): 10000 INJECTION, SOLUTION INTRAVENOUS; SUBCUTANEOUS at 12:48

## 2024-02-23 RX ADMIN — CHLORHEXIDINE GLUCONATE 15 MILLILITER(S): 213 SOLUTION TOPICAL at 17:26

## 2024-02-23 RX ADMIN — ATORVASTATIN CALCIUM 80 MILLIGRAM(S): 80 TABLET, FILM COATED ORAL at 21:42

## 2024-02-23 RX ADMIN — Medication 1 APPLICATION(S): at 21:42

## 2024-02-23 RX ADMIN — Medication 2: at 05:55

## 2024-02-23 RX ADMIN — Medication 650 MILLIGRAM(S): at 21:43

## 2024-02-23 RX ADMIN — HUMAN INSULIN 30 UNIT(S): 100 INJECTION, SUSPENSION SUBCUTANEOUS at 12:43

## 2024-02-23 RX ADMIN — HYDROMORPHONE HYDROCHLORIDE 0.5 MILLIGRAM(S): 2 INJECTION INTRAMUSCULAR; INTRAVENOUS; SUBCUTANEOUS at 20:20

## 2024-02-23 RX ADMIN — HEPARIN SODIUM 5000 UNIT(S): 5000 INJECTION INTRAVENOUS; SUBCUTANEOUS at 05:50

## 2024-02-23 RX ADMIN — IRON SUCROSE 210 MILLIGRAM(S): 20 INJECTION, SOLUTION INTRAVENOUS at 17:25

## 2024-02-23 RX ADMIN — MUPIROCIN 1 APPLICATION(S): 20 OINTMENT TOPICAL at 17:29

## 2024-02-23 RX ADMIN — GABAPENTIN 400 MILLIGRAM(S): 400 CAPSULE ORAL at 21:43

## 2024-02-23 RX ADMIN — MUPIROCIN 1 APPLICATION(S): 20 OINTMENT TOPICAL at 05:50

## 2024-02-23 NOTE — PROGRESS NOTE ADULT - NS ATTEND AMEND GEN_ALL_CORE FT
Patient seen and examined at bedside. During my evaluation, patient's wife was present and I appreciate that. Patient wife reported he is better today as compared yesterday.     I agree with the findings and plan as documented in the NP's note except below.    Exam: Patient able to move head, open the mouth, feet on command and minimal trace movement at bilateral hands, otherwise no movements at rest of extremities to noxious stimuli or command.    Mr. Ashley is a 73 year old unknown handed  man with PMHx of multiple vascular risk factors and other medical problems who presented to [facility] ER due to the ataxia and left facial droop, initially concerning for stroke and w/up notable for basilar stenosis. Transferred to [facility] and quickly developed worsening dysarthria, dysphagia, and weakness. He was intubated and taken to angiogram on 2/11 showed moderate stenosis but no intervention done. MRI with small b/l cerebellar strokes (postprocedural after angiography) and prior L thalamic strokes, although these symptoms would not explain pt's current clinical deficits and progress nature.   Per NSICU team, the patient had been having progressive weakness and numbness of the extremities for the preceding 1-2 weeks following URI. GD1b found to be 110. 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 Completed 5 sessions of PLEX on 02/20/2024 . Clinically patient is making progress on daily basis.   Currently getting IVIG and schedule for third dose today.   Continue medical management, neuro- check and fall precaution.  GI and DVT prophylaxis.    Patient is at high risk for complications and morbidity or mortality. There is high probability of imminent or life threatening deterioration in the patient's condition.  Patient is unable or incompetent to participate in giving a history and/or making decisions and discussion is necessary for determining treatment decisions.  I discussed the diagnosis and treatment plan with the patient's wife. All questions and concerns were addressed.   My cumulative time taking care of this critically ill patient is 40 minutes  If you have any further questions, please do not hesitate to contact our team.  Thank you for allowing us to participate in this patient care.

## 2024-02-23 NOTE — PROGRESS NOTE ADULT - SUBJECTIVE AND OBJECTIVE BOX
73y Male with PMHx significant for HTN, HLD, DM type 2, and two prior strokes without residual deficits who initially presented to [facility] with unsteady gait and L facial weakness on 2/9. 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 He was transferred to NSICU for angiogram, which was done on 2/9 and showed moderate basilar stenosis. No intervention done. He had to be intubated for worsening respiratory status. On further history it was found that patient had been having progressive weakness and numbness of his extremities for the preceding 1-2 weeks following a viral URI.     Admission Scores  GCS:   HH:   MF:   NIHSS: 4   RASS:    CAM-ICU:   ICH:    2/9: transferred from [facility], NIHSS of 4 in ED, then became more lethargic, stridorous and desaturated in ED, intubated for airway protected, repeat CTH/A obtained and brought emergently to IR for basilar stenting   2/10: worsening brainstem symptoms while on DAPT  2/12- LP performed to rule out MFS as patient's examination is not explained by the strokes on MRI. First session of PLEX  2/14- s/p 2nd PLEX tx, no acute interval events  2/15- No acute events overnight. Patient's examination more or less the same.   2/16- Patient storming overnight requiring multiple pushes of labetalol and fentanyl without improvement in BP. Patient started on propofol which also is not optimally managing patient's BP.   2/17 Labile BP due to dysautonomia, continue with scheduled TPE sessions, phenylephrine restarted for BP lability  2/19- No acute events overnight   2/20 - no acute events overnight, unchanged physical exam  2/21 - acute desaturation with fevers requiring emergent chest CT, likely secondary to aspiration pneumonia  2/22 - no acute events overnight, unchanged physical exam  2/23 -     Allergies    No Known Allergies    Intolerances        REVIEW OF SYSTEMS: [ X] Unable to Assess due to neurologic exam   [ ] All ROS addressed below are non-contributory, except:  Neuro: [ ] Headache [ ] Back pain [ ] Numbness [ ] Weakness [ ] Ataxia [ ] Dizziness [ ] Aphasia [ ] Dysarthria [ ] Visual disturbance  Resp: [ ] Shortness of breath/dyspnea, [ ] Orthopnea [ ] Cough  CV: [ ] Chest pain [ ] Palpitation [ ] Lightheadedness [ ] Syncope  Renal: [ ] Thirst [ ] Edema  GI: [ ] Nausea [ ] Emesis [ ] Abdominal pain [ ] Constipation [ ] Diarrhea  Hem: [ ] Hematemesis [ ] bright red blood per rectum  ID: [ ] Fever [ ] Chills [ ] Dysuria  ENT: [ ] Rhinorrhea      DEVICES:   [ ] Restraints [ ] ET tube [X ] central line [ ] arterial line [X ] vences [ ] NGT/OGT [ ] EVD [ ] LD [ ] HAL/HMV [ ] Trach [ ] PEG [ ] Chest Tube     ICU Vital Signs Last 24 Hrs  T(C): 37.4 (23 Feb 2024 03:00), Max: 37.4 (22 Feb 2024 15:00)  T(F): 99.3 (23 Feb 2024 03:00), Max: 99.3 (22 Feb 2024 15:00)  HR: 84 (23 Feb 2024 06:00) (48 - 98)  BP: 171/83 (23 Feb 2024 06:00) (106/51 - 180/84)  BP(mean): 119 (23 Feb 2024 06:00) (73 - 120)  RR: 20 (23 Feb 2024 06:00) (20 - 21)  SpO2: 98% (23 Feb 2024 06:00) (98% - 100%)    O2 Parameters below as of 23 Feb 2024 03:00  Patient On (Oxygen Delivery Method): ventilator    O2 Concentration (%): 50        CAPILLARY BLOOD GLUCOSE      POCT Blood Glucose.: 152 mg/dL (23 Feb 2024 05:54)  POCT Blood Glucose.: 162 mg/dL (23 Feb 2024 02:11)  POCT Blood Glucose.: 164 mg/dL (23 Feb 2024 00:45)  POCT Blood Glucose.: 142 mg/dL (22 Feb 2024 22:44)  POCT Blood Glucose.: 112 mg/dL (22 Feb 2024 17:28)  POCT Blood Glucose.: 116 mg/dL (22 Feb 2024 13:00)  POCT Blood Glucose.: 110 mg/dL (22 Feb 2024 12:12)        MEDICATIONS  (STANDING):  acetaminophen   Oral Liquid .. 650 milliGRAM(s) Oral every 24 hours  atorvastatin 80 milliGRAM(s) Oral at bedtime  chlorhexidine 0.12% Liquid 15 milliLiter(s) Oral Mucosa every 12 hours  chlorhexidine 4% Liquid 1 Application(s) Topical daily  cyanocobalamin 1000 MICROGram(s) Oral daily  epoetin geeta-epbx (RETACRIT) Injectable 06401 Unit(s) SubCutaneous <User Schedule>  ergocalciferol Drops 86785 Unit(s) Oral <User Schedule>  ferrous    sulfate Liquid 300 milliGRAM(s) Enteral Tube <User Schedule>  folic acid 1 milliGRAM(s) Oral daily  gabapentin Solution 400 milliGRAM(s) Oral three times a day  heparin   Injectable 5000 Unit(s) SubCutaneous every 8 hours  HYDROmorphone  Injectable 0.5 milliGRAM(s) IV Push once  immune   globulin 10% (GAMMAGARD) IVPB 35 Gram(s) IV Intermittent daily  insulin lispro (ADMELOG) corrective regimen sliding scale   SubCutaneous every 6 hours  insulin NPH human recombinant 30 Unit(s) SubCutaneous every 6 hours  iron sucrose IVPB 100 milliGRAM(s) IV Intermittent every 24 hours  lidocaine 2% Injectable 10 milliLiter(s) Local Injection once  lidocaine 2% Injectable      mupirocin 2% Nasal 1 Application(s) Both Nostrils two times a day  pantoprazole   Suspension 40 milliGRAM(s) Oral daily  petrolatum Ophthalmic Ointment 1 Application(s) Both EYES three times a day  piperacillin/tazobactam IVPB.. 3.375 Gram(s) IV Intermittent every 8 hours  polyethylene glycol 3350 17 Gram(s) Oral daily  senna 1 Tablet(s) Oral at bedtime    MEDICATIONS  (PRN):  acetaminophen   Oral Liquid .. 650 milliGRAM(s) Oral every 6 hours PRN Mild Pain (1 - 3)  oxyCODONE    Solution 5 milliGRAM(s) Oral every 4 hours PRN Moderate Pain (4 - 6)  oxyCODONE    Solution 10 milliGRAM(s) Oral every 4 hours PRN Severe Pain (7 - 10)  sodium chloride 0.9% lock flush 10 milliLiter(s) IV Push every 1 hour PRN Pre/post blood products, medications, blood draw, and to maintain line patency        I&O's Summary    22 Feb 2024 07:01  -  23 Feb 2024 07:00  --------------------------------------------------------  IN: 5760 mL / OUT: 2200 mL / NET: 3560 mL          Respiratory:  Mode: AC/ CMV (Assist Control/ Continuous Mandatory Ventilation)  RR (machine): 20  TV (machine): 500  FiO2: 70  PEEP: 8  ITime: 1  MAP: 12  PIP: 22      LABS:                          8.5    11.40 )-----------( 231      ( 23 Feb 2024 02:10 )             29.1     02-21    146<H>  |  112<H>  |  32<H>  ----------------------------<  179<H>  4.0   |  28  |  0.92    Ca    7.9<L>      21 Feb 2024 23:43  Phos  2.0     02-21  Mg     2.6     02-21    TPro  5.3<L>  /  Alb  3.5  /  TBili  0.4  /  DBili  x   /  AST  154<H>  /  ALT  81<H>  /  AlkPhos  57  02-21        EXAMINATION:  PHYSICAL EXAM:    Constitutional: No Acute Distress, patient intubated but following commands     Neurological: Bilateral ptosis, pupils 2 mm and sluggish, restricted gaze in all directions however, vertical gaze is better than horizontal. Facial asymmetry could not be appreciated due to ETT, patient following commands with his BL LE. Patient following commands and able to communicate with his bilateral feet. Right foot moved-- signifying 'yes', left foot moved-- signifying 'no'.    Motor exam:          Upper extremity                         Delt     Bicep     Tricep    HG                                                 R         0/5        0/5        0/5      2/5 (activation of muscles could be appreciated in bilateral biceps and triceps)                                               L          0/5        0/5        0/5       3/5          Lower extremity                        HF         KF        KE       DF         PF                                                  R        0/5        0/5        3/5       2/5         5/5                                               L         0/5        0/5       3/5       2/5          5/5                                                  Reflexes: Deep Tendon Reflexes absent in all 4 extremities    Pulmonary: Clear to Auscultation, No rales, No rhonchi, No wheezes     Cardiovascular: S1, S2, Regular rate and rhythm     Gastrointestinal: Soft, Non-tender, Non-distended     Extremities: No calf tenderness    73y Male with PMHx significant for HTN, HLD, DM type 2, and two prior strokes without residual deficits who initially presented to [facility] with unsteady gait and L facial weakness on 2/9. 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 He was transferred to NSICU for angiogram, which was done on 2/9 and showed moderate basilar stenosis. No intervention done. He had to be intubated for worsening respiratory status. On further history it was found that patient had been having progressive weakness and numbness of his extremities for the preceding 1-2 weeks following a viral URI.     Admission Scores  GCS:   HH:   MF:   NIHSS: 4   RASS:    CAM-ICU:   ICH:    2/9: transferred from [place], NIHSS of 4 in ED, then became more lethargic, stridorous and desaturated in ED, intubated for airway protected, repeat CTH/A obtained and brought emergently to IR for basilar stenting   2/10: worsening brainstem symptoms while on DAPT  2/12- LP performed to rule out MFS as patient's examination is not explained by the strokes on MRI. First session of PLEX  2/14- s/p 2nd PLEX tx, no acute interval events  2/15- No acute events overnight. Patient's examination more or less the same.   2/16- Patient storming overnight requiring multiple pushes of labetalol and fentanyl without improvement in BP. Patient started on propofol which also is not optimally managing patient's BP.   2/17 Labile BP due to dysautonomia, continue with scheduled TPE sessions, phenylephrine restarted for BP lability  2/19- No acute events overnight   2/20 - no acute events overnight, unchanged physical exam  2/21 - acute desaturation with fevers requiring emergent chest CT, likely secondary to aspiration pneumonia  2/22 - no acute events overnight, unchanged physical exam  2/23 - Patient's Hg dropped to 6.6 from 6.8. Family discussion held. Wife at bedside requests to administer 1 unit of PRBC. Patient is also able to communicate with his legs. He understands that he needs a blood transfusion and also agrees. Consent completed by wife: Margarette. H&H responded appropriately.       Allergies    No Known Allergies    Intolerances        REVIEW OF SYSTEMS: [ X] Unable to Assess due to neurologic exam   [ ] All ROS addressed below are non-contributory, except:  Neuro: [ ] Headache [ ] Back pain [ ] Numbness [ ] Weakness [ ] Ataxia [ ] Dizziness [ ] Aphasia [ ] Dysarthria [ ] Visual disturbance  Resp: [ ] Shortness of breath/dyspnea, [ ] Orthopnea [ ] Cough  CV: [ ] Chest pain [ ] Palpitation [ ] Lightheadedness [ ] Syncope  Renal: [ ] Thirst [ ] Edema  GI: [ ] Nausea [ ] Emesis [ ] Abdominal pain [ ] Constipation [ ] Diarrhea  Hem: [ ] Hematemesis [ ] bright red blood per rectum  ID: [ ] Fever [ ] Chills [ ] Dysuria  ENT: [ ] Rhinorrhea      DEVICES:   [ ] Restraints [ ] ET tube [X ] central line [ ] arterial line [X ] vences [ ] NGT/OGT [ ] EVD [ ] LD [ ] HAL/HMV [ ] Trach [ ] PEG [ ] Chest Tube     ICU Vital Signs Last 24 Hrs  T(C): 37.4 (23 Feb 2024 03:00), Max: 37.4 (22 Feb 2024 15:00)  T(F): 99.3 (23 Feb 2024 03:00), Max: 99.3 (22 Feb 2024 15:00)  HR: 84 (23 Feb 2024 06:00) (48 - 98)  BP: 171/83 (23 Feb 2024 06:00) (106/51 - 180/84)  BP(mean): 119 (23 Feb 2024 06:00) (73 - 120)  RR: 20 (23 Feb 2024 06:00) (20 - 21)  SpO2: 98% (23 Feb 2024 06:00) (98% - 100%)    O2 Parameters below as of 23 Feb 2024 03:00  Patient On (Oxygen Delivery Method): ventilator    O2 Concentration (%): 50        CAPILLARY BLOOD GLUCOSE      POCT Blood Glucose.: 152 mg/dL (23 Feb 2024 05:54)  POCT Blood Glucose.: 162 mg/dL (23 Feb 2024 02:11)  POCT Blood Glucose.: 164 mg/dL (23 Feb 2024 00:45)  POCT Blood Glucose.: 142 mg/dL (22 Feb 2024 22:44)  POCT Blood Glucose.: 112 mg/dL (22 Feb 2024 17:28)  POCT Blood Glucose.: 116 mg/dL (22 Feb 2024 13:00)  POCT Blood Glucose.: 110 mg/dL (22 Feb 2024 12:12)        MEDICATIONS  (STANDING):  acetaminophen   Oral Liquid .. 650 milliGRAM(s) Oral every 24 hours  atorvastatin 80 milliGRAM(s) Oral at bedtime  chlorhexidine 0.12% Liquid 15 milliLiter(s) Oral Mucosa every 12 hours  chlorhexidine 4% Liquid 1 Application(s) Topical daily  cyanocobalamin 1000 MICROGram(s) Oral daily  epoetin geeta-epbx (RETACRIT) Injectable 02113 Unit(s) SubCutaneous <User Schedule>  ergocalciferol Drops 69520 Unit(s) Oral <User Schedule>  ferrous    sulfate Liquid 300 milliGRAM(s) Enteral Tube <User Schedule>  folic acid 1 milliGRAM(s) Oral daily  gabapentin Solution 400 milliGRAM(s) Oral three times a day  heparin   Injectable 5000 Unit(s) SubCutaneous every 8 hours  HYDROmorphone  Injectable 0.5 milliGRAM(s) IV Push once  immune   globulin 10% (GAMMAGARD) IVPB 35 Gram(s) IV Intermittent daily  insulin lispro (ADMELOG) corrective regimen sliding scale   SubCutaneous every 6 hours  insulin NPH human recombinant 30 Unit(s) SubCutaneous every 6 hours  iron sucrose IVPB 100 milliGRAM(s) IV Intermittent every 24 hours  lidocaine 2% Injectable 10 milliLiter(s) Local Injection once  lidocaine 2% Injectable      mupirocin 2% Nasal 1 Application(s) Both Nostrils two times a day  pantoprazole   Suspension 40 milliGRAM(s) Oral daily  petrolatum Ophthalmic Ointment 1 Application(s) Both EYES three times a day  piperacillin/tazobactam IVPB.. 3.375 Gram(s) IV Intermittent every 8 hours  polyethylene glycol 3350 17 Gram(s) Oral daily  senna 1 Tablet(s) Oral at bedtime    MEDICATIONS  (PRN):  acetaminophen   Oral Liquid .. 650 milliGRAM(s) Oral every 6 hours PRN Mild Pain (1 - 3)  oxyCODONE    Solution 5 milliGRAM(s) Oral every 4 hours PRN Moderate Pain (4 - 6)  oxyCODONE    Solution 10 milliGRAM(s) Oral every 4 hours PRN Severe Pain (7 - 10)  sodium chloride 0.9% lock flush 10 milliLiter(s) IV Push every 1 hour PRN Pre/post blood products, medications, blood draw, and to maintain line patency        I&O's Summary    22 Feb 2024 07:01  -  23 Feb 2024 07:00  --------------------------------------------------------  IN: 5760 mL / OUT: 2200 mL / NET: 3560 mL          Respiratory:  Mode: AC/ CMV (Assist Control/ Continuous Mandatory Ventilation)  RR (machine): 20  TV (machine): 500  FiO2: 70  PEEP: 8  ITime: 1  MAP: 12  PIP: 22      LABS:                          8.5    11.40 )-----------( 231      ( 23 Feb 2024 02:10 )             29.1     02-21    146<H>  |  112<H>  |  32<H>  ----------------------------<  179<H>  4.0   |  28  |  0.92    Ca    7.9<L>      21 Feb 2024 23:43  Phos  2.0     02-21  Mg     2.6     02-21    TPro  5.3<L>  /  Alb  3.5  /  TBili  0.4  /  DBili  x   /  AST  154<H>  /  ALT  81<H>  /  AlkPhos  57  02-21        EXAMINATION:  PHYSICAL EXAM:    Constitutional: No Acute Distress, patient intubated but following commands     Neurological: Bilateral ptosis, pupils 2 mm and sluggish, restricted gaze in all directions however, vertical gaze is better than horizontal. Facial asymmetry could not be appreciated due to ETT, patient following commands with his BL LE. Patient following commands and able to communicate with his bilateral feet. Right foot moved-- signifying 'yes', left foot moved-- signifying 'no'.    Motor exam:          Upper extremity                         Delt     Bicep     Tricep    HG                                                 R         0/5        0/5        0/5      2/5 (activation of muscles could be appreciated in bilateral biceps and triceps)                                               L          0/5        0/5        0/5       3/5          Lower extremity                        HF         KF        KE       DF         PF                                                  R        0/5        0/5        3/5       2/5         5/5                                               L         0/5        0/5       3/5       2/5          5/5                                                  Reflexes: Deep Tendon Reflexes absent in all 4 extremities    Pulmonary: Clear to Auscultation, No rales, No rhonchi, No wheezes     Cardiovascular: S1, S2, Regular rate and rhythm     Gastrointestinal: Soft, Non-tender, Non-distended     Extremities: No calf tenderness    73y Male with PMHx significant for HTN, HLD, DM type 2, and two prior strokes without residual deficits who initially presented to [place] with unsteady gait and L facial weakness on 2/9. 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 He was transferred to NSICU for angiogram, which was done on 2/9 and showed moderate basilar stenosis. No intervention done. He had to be intubated for worsening respiratory status. On further history it was found that patient had been having progressive weakness and numbness of his extremities for the preceding 1-2 weeks following a viral URI.     Admission Scores  GCS:   HH:   MF:   NIHSS: 4   RASS:    CAM-ICU:   ICH:    2/9: transferred from [place], NIHSS of 4 in ED, then became more lethargic, stridorous and desaturated in ED, intubated for airway protected, repeat CTH/A obtained and brought emergently to IR for basilar stenting   2/10: worsening brainstem symptoms while on DAPT  2/12- LP performed to rule out MFS as patient's examination is not explained by the strokes on MRI. First session of PLEX  2/14- s/p 2nd PLEX tx, no acute interval events  2/15- No acute events overnight. Patient's examination more or less the same.   2/16- Patient storming overnight requiring multiple pushes of labetalol and fentanyl without improvement in BP. Patient started on propofol which also is not optimally managing patient's BP.   2/17 Labile BP due to dysautonomia, continue with scheduled TPE sessions, phenylephrine restarted for BP lability  2/19- No acute events overnight   2/20 - no acute events overnight, unchanged physical exam  2/21 - acute desaturation with fevers requiring emergent chest CT, likely secondary to aspiration pneumonia  2/22 - no acute events overnight, unchanged physical exam  2/23 - Patient's Hg dropped to 6.6 from 6.8. Family discussion held. Wife at bedside requests to administer 1 unit of PRBC. Patient is also able to communicate with his legs. He understands that he needs a blood transfusion and also agrees. Consent completed by wife: Margarette. H&H responded appropriately.       Allergies    No Known Allergies    Intolerances        REVIEW OF SYSTEMS: [ X] Unable to Assess due to neurologic exam   [ ] All ROS addressed below are non-contributory, except:  Neuro: [ ] Headache [ ] Back pain [ ] Numbness [ ] Weakness [ ] Ataxia [ ] Dizziness [ ] Aphasia [ ] Dysarthria [ ] Visual disturbance  Resp: [ ] Shortness of breath/dyspnea, [ ] Orthopnea [ ] Cough  CV: [ ] Chest pain [ ] Palpitation [ ] Lightheadedness [ ] Syncope  Renal: [ ] Thirst [ ] Edema  GI: [ ] Nausea [ ] Emesis [ ] Abdominal pain [ ] Constipation [ ] Diarrhea  Hem: [ ] Hematemesis [ ] bright red blood per rectum  ID: [ ] Fever [ ] Chills [ ] Dysuria  ENT: [ ] Rhinorrhea      DEVICES:   [ ] Restraints [ ] ET tube [X ] central line [ ] arterial line [X ] vences [ ] NGT/OGT [ ] EVD [ ] LD [ ] HAL/HMV [ ] Trach [ ] PEG [ ] Chest Tube     ICU Vital Signs Last 24 Hrs  T(C): 37.4 (23 Feb 2024 03:00), Max: 37.4 (22 Feb 2024 15:00)  T(F): 99.3 (23 Feb 2024 03:00), Max: 99.3 (22 Feb 2024 15:00)  HR: 84 (23 Feb 2024 06:00) (48 - 98)  BP: 171/83 (23 Feb 2024 06:00) (106/51 - 180/84)  BP(mean): 119 (23 Feb 2024 06:00) (73 - 120)  RR: 20 (23 Feb 2024 06:00) (20 - 21)  SpO2: 98% (23 Feb 2024 06:00) (98% - 100%)    O2 Parameters below as of 23 Feb 2024 03:00  Patient On (Oxygen Delivery Method): ventilator    O2 Concentration (%): 50        CAPILLARY BLOOD GLUCOSE      POCT Blood Glucose.: 152 mg/dL (23 Feb 2024 05:54)  POCT Blood Glucose.: 162 mg/dL (23 Feb 2024 02:11)  POCT Blood Glucose.: 164 mg/dL (23 Feb 2024 00:45)  POCT Blood Glucose.: 142 mg/dL (22 Feb 2024 22:44)  POCT Blood Glucose.: 112 mg/dL (22 Feb 2024 17:28)  POCT Blood Glucose.: 116 mg/dL (22 Feb 2024 13:00)  POCT Blood Glucose.: 110 mg/dL (22 Feb 2024 12:12)        MEDICATIONS  (STANDING):  acetaminophen   Oral Liquid .. 650 milliGRAM(s) Oral every 24 hours  atorvastatin 80 milliGRAM(s) Oral at bedtime  chlorhexidine 0.12% Liquid 15 milliLiter(s) Oral Mucosa every 12 hours  chlorhexidine 4% Liquid 1 Application(s) Topical daily  cyanocobalamin 1000 MICROGram(s) Oral daily  epoetin geeta-epbx (RETACRIT) Injectable 72514 Unit(s) SubCutaneous <User Schedule>  ergocalciferol Drops 75005 Unit(s) Oral <User Schedule>  ferrous    sulfate Liquid 300 milliGRAM(s) Enteral Tube <User Schedule>  folic acid 1 milliGRAM(s) Oral daily  gabapentin Solution 400 milliGRAM(s) Oral three times a day  heparin   Injectable 5000 Unit(s) SubCutaneous every 8 hours  HYDROmorphone  Injectable 0.5 milliGRAM(s) IV Push once  immune   globulin 10% (GAMMAGARD) IVPB 35 Gram(s) IV Intermittent daily  insulin lispro (ADMELOG) corrective regimen sliding scale   SubCutaneous every 6 hours  insulin NPH human recombinant 30 Unit(s) SubCutaneous every 6 hours  iron sucrose IVPB 100 milliGRAM(s) IV Intermittent every 24 hours  lidocaine 2% Injectable 10 milliLiter(s) Local Injection once  lidocaine 2% Injectable      mupirocin 2% Nasal 1 Application(s) Both Nostrils two times a day  pantoprazole   Suspension 40 milliGRAM(s) Oral daily  petrolatum Ophthalmic Ointment 1 Application(s) Both EYES three times a day  piperacillin/tazobactam IVPB.. 3.375 Gram(s) IV Intermittent every 8 hours  polyethylene glycol 3350 17 Gram(s) Oral daily  senna 1 Tablet(s) Oral at bedtime    MEDICATIONS  (PRN):  acetaminophen   Oral Liquid .. 650 milliGRAM(s) Oral every 6 hours PRN Mild Pain (1 - 3)  oxyCODONE    Solution 5 milliGRAM(s) Oral every 4 hours PRN Moderate Pain (4 - 6)  oxyCODONE    Solution 10 milliGRAM(s) Oral every 4 hours PRN Severe Pain (7 - 10)  sodium chloride 0.9% lock flush 10 milliLiter(s) IV Push every 1 hour PRN Pre/post blood products, medications, blood draw, and to maintain line patency        I&O's Summary    22 Feb 2024 07:01  -  23 Feb 2024 07:00  --------------------------------------------------------  IN: 5760 mL / OUT: 2200 mL / NET: 3560 mL          Respiratory:  Mode: AC/ CMV (Assist Control/ Continuous Mandatory Ventilation)  RR (machine): 20  TV (machine): 500  FiO2: 70  PEEP: 8  ITime: 1  MAP: 12  PIP: 22      LABS:                          8.5    11.40 )-----------( 231      ( 23 Feb 2024 02:10 )             29.1     02-21    146<H>  |  112<H>  |  32<H>  ----------------------------<  179<H>  4.0   |  28  |  0.92    Ca    7.9<L>      21 Feb 2024 23:43  Phos  2.0     02-21  Mg     2.6     02-21    TPro  5.3<L>  /  Alb  3.5  /  TBili  0.4  /  DBili  x   /  AST  154<H>  /  ALT  81<H>  /  AlkPhos  57  02-21        EXAMINATION:  PHYSICAL EXAM:    Constitutional: No Acute Distress, patient intubated but following commands     Neurological: Bilateral ptosis, pupils 2 mm and sluggish, restricted gaze in all directions however, vertical gaze is better than horizontal. No facial assymetry, patient following commands with his BL LE. Patient following commands and able to communicate with his bilateral feet. Right foot moved-- signifying 'yes', left foot moved-- signifying 'no'. Able to shake head slightly.     Motor exam:          Upper extremity                         Delt     Bicep     Tricep    HG                                                 R         0/5        0/5        0/5      1/5 (activation of muscles could be appreciated in bilateral biceps and triceps)                                               L          0/5        0/5        0/5       3/5          Lower extremity                        HF         KF        KE       DF         PF                                                  R        0/5        0/5        0/5       2/5         4/5                                               L         0/5        0/5       0/5       2/5          5/5                                                  Reflexes: Deep Tendon Reflexes absent in all 4 extremities    Pulmonary: Clear to Auscultation, No rales, No rhonchi, No wheezes     Cardiovascular: S1, S2, Regular rate and rhythm     Gastrointestinal: Soft, Non-tender, Non-distended     Extremities: No calf tenderness

## 2024-02-23 NOTE — PROGRESS NOTE ADULT - ASSESSMENT
ASSESSMENT/PLAN: 74 y/o male with multiple comorbidities presenting initially with suspicious of basilar stroke however, neurological examination more consistent with GBS. 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 No longer requiring insulin drips. Labile O2 saturation and temperature. Scheduled for 3/5 session of IVIg today, 2/23.     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  - 2/20- PLEX x 5(complete)  - 2/21, 2/22, 2/23- IvIG x 5   - hold Aspirin   - Neurology following   - Speech pathologist evaluation done and mode of speech established  Activity: [x] mobilize as tolerated [] Bedrest [x] PT [x] OT [x] PMNR    PULM:  - s/p tracheostomy 2/16  - Continue chest PT   - CPAP as tolerated   -Fio2 50/ PEEP 10/ RR 20/       CV:   - SBP goal  with MAP >65   - PICC  - TTE- EF 66%      RENAL:  - IVL  - c/w Free water 400 ccQ4h  - Na stable @ 146    GI:  - s/p PEG 2/16, Glucerna feeds at goal (per nursing 2/22)  - Vitamin D supplementation   - GI prophylaxis [] not indicated [x] PPI [] other:  - Bowel regimen [x] miralax [x] senna, dulcolax suppository   - Smog enema yesterday  - LBM  2/22    ENDO:   - Goal euglycemia (-180)  - A1c 6.8  - ISS, 30 Q6    HEME/ONC:  - H/H decreasing. Patient does not receive blood products. Minimize blood draws.   - s/p ferrous sulfate liquid dose  - supplement w/ EPO, folic acid, B12, iron (IV infusion for 5 days)  - VTE prophylaxis: [x] SCDs [x] chemoprophylaxis- SQL [] hold chemoprophylaxis due to: [] high risk of DVT/PE on admission due to: immobility     ID:  - afebrile, zosyn for suspected pneumonia   - continue vanc and zosyn     MISC:  Lines/tubes: ru CAMPOS  ASSESSMENT/PLAN: 72 y/o male with multiple comorbidities presenting initially with suspicious of basilar stroke however, neurological examination more consistent with GBS. 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 No longer requiring insulin drips. Labile O2 saturation and temperature. Scheduled for 3/5 session of IVIg today, 2/23. 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  - 2/20- PLEX x 5(complete)  - 2/21, 2/22, 2/23- IvIG x 5   - hold Aspirin   - Neurology following   - Speech pathologist evaluation done and mode of speech established  Activity: [x] mobilize as tolerated [] Bedrest [x] PT [x] OT [x] PMNR    PULM:  - s/p tracheostomy 2/16  - Continue chest PT   - CPAP as tolerated   - Fio2 40/ PEEP 7/ RR 20/     CV:   - SBP goal  with MAP >65   - PICC  - TTE- EF 66%      RENAL:  - IVL  - change Free water 400 ccQ4h --> Q8h  - Na stable @ 146    GI:  - s/p PEG 2/16, Glucerna feeds at goal (per nursing 2/23)  - Vitamin D supplementation   - GI prophylaxis [] not indicated [x] PPI [] other:  - Bowel regimen [x] miralax [x] senna, dulcolax suppository   - Smog enema yesterday  - LBM  2/22    ENDO:   - Goal euglycemia (-180)  - A1c 6.8  - ISS, 30 Q6    HEME/ONC:  - H/H decreasing. Patient does not receive blood products. Minimize blood draws.   - s/p ferrous sulfate liquid dose  - supplement w/ EPO, folic acid, B12, iron (IV infusion for 5 days)  - VTE prophylaxis: [x] SCDs [x] chemoprophylaxis- SQL [] hold chemoprophylaxis due to: [] high risk of DVT/PE on admission due to: immobility   Lovenox 30q12    ID:  - afebrile, zosyn for suspected pneumonia   - d/c vanc given cultures    MISC:  Lines/tubes: ru CAMPOS  ASSESSMENT/PLAN: 72 y/o male with multiple comorbidities presenting initially with suspicious of basilar stroke however, neurological examination more consistent with GBS. 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 Vitals have been WNL for 24hrs. Scheduled for 3/5 session of IVIg today, 2/23. Needs RCU consult.     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  - 2/20- PLEX x 5(complete)  - 2/21, 2/22, 2/23- IvIG x 5   - hold Aspirin   - Neurology following   - Speech pathologist evaluation done and mode of speech established  Activity: [x] mobilize as tolerated [] Bedrest [x] PT [x] OT [x] PMNR    PULM:  - s/p tracheostomy 2/16  - Continue chest PT   - CPAP as tolerated   - Fio2 40/ PEEP 7/ RR 20/     CV:   - SBP goal  with MAP >65   - PICC  - TTE- EF 66%      RENAL:  - IVL  - change Free water 400 ccQ4h --> Q8h  - Na stable @ 146    GI:  - s/p PEG 2/16, Glucerna feeds at goal (per nursing 2/23)  - Vitamin D supplementation   - GI prophylaxis [] not indicated [x] PPI [] other:  - Bowel regimen [x] miralax [x] senna, dulcolax suppository   - LBM  2/22    ENDO:   - Goal euglycemia (-180)  - A1c 6.8  - ISS, 30 Q6    HEME/ONC:  - H/H decreasing. Patient does not receive blood products. Minimize blood draws.   - s/p ferrous sulfate liquid dose  - supplement w/ EPO, folic acid, B12, iron (IV infusion for 5 days)  - VTE prophylaxis: [x] SCDs [x] chemoprophylaxis- SQL [] hold chemoprophylaxis due to: [] high risk of DVT/PE on admission due to: immobility   Lovenox 30q12    ID:  - afebrile, zosyn for suspected pneumonia   - d/c vanc given cultures    MISC:  Lines/tubes: ru CAMPOS  ASSESSMENT/PLAN: 74 y/o male with multiple comorbidities presenting initially with suspicious of basilar stroke however, neurological examination more consistent with GBS. 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 Vitals have been WNL for 24hrs. H&H improved appropriately following 1 unit PRBC overnight 2/22. Scheduled for 3/5 session of IVIg today, 2/23. Needs RCU consult.     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  - 2/20- PLEX x 5(complete)  - 2/21, 2/22, 2/23- IvIG x 5   - hold Aspirin   - Neurology following   - Speech pathologist evaluation done and mode of speech established  Activity: [x] mobilize as tolerated [] Bedrest [x] PT [x] OT [x] PMNR    PULM:  - s/p tracheostomy 2/16  - Continue chest PT   - CPAP as tolerated   - Fio2 40/ PEEP 7/ RR 20/     CV:   - SBP goal  with MAP >65   - PICC  - TTE- EF 66%      RENAL:  - IVL  - change Free water 400 ccQ4h --> Q8h  - Na stable @ 146    GI:  - s/p PEG 2/16, Glucerna feeds at goal (per nursing 2/23)  - Vitamin D supplementation   - GI prophylaxis [] not indicated [x] PPI [] other:  - Bowel regimen [x] miralax [x] senna, dulcolax suppository   - LBM  2/22    ENDO:   - Goal euglycemia (-180)  - A1c 6.8  - ISS, 30 Q6    HEME/ONC:  - Hg 8.6 this morning, (2/23)  - s/p 1 unit PRBC with appropriate response  - Continue to ask patient before giving blood products. Minimize blood draws.   - supplement w/ EPO, folic acid, B12, iron (IV infusion for 5 days)  - VTE prophylaxis: [x] SCDs [x] chemoprophylaxis- SQL [] hold chemoprophylaxis due to: [] high risk of DVT/PE on admission due to: immobility   Lovenox 30q12    ID:  - afebrile, zosyn for suspected pneumonia   - d/c vanc given cultures    MISC:  Lines/tubes: ru CAMPOS  ASSESSMENT/PLAN: 74 y/o male with multiple comorbidities presenting initially with suspicious of basilar stroke however, neurological examination more consistent with GBS. 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 Vitals have been WNL for 24hrs. Physical exam slightly improved with trace movement in neck allowing slight side to side movement. H&H improved appropriately following 1 unit PRBC overnight 2/22. Scheduled for 3/5 session of IVIg today, 2/23. Needs RCU consult.     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  - 2/20- PLEX x 5 (complete)  - 2/21, 2/22, 2/23- IvIG x 5   - hold Aspirin   - Neurology following   - Speech pathologist evaluation done and mode of speech established  Activity: [x] mobilize as tolerated [] Bedrest [x] PT [x] OT [x] PMNR    PULM:  - s/p tracheostomy 2/16  - Continue chest PT   - CPAP as tolerated   - Fio2 40/ PEEP 7/ RR 20/     CV:   - SBP goal  with MAP >65   - PICC  - TTE- EF 66%      RENAL:  - IVL  - change Free water 400 ccQ4h --> Q8h  - Na stable @ 146    GI:  - s/p PEG 2/16, Glucerna feeds at goal (per nursing 2/23)  - Vitamin D supplementation   - GI prophylaxis [] not indicated [x] PPI [] other:  - Bowel regimen [x] miralax [x] senna, dulcolax suppository   - LBM  2/22    ENDO:   - Goal euglycemia (-180)  - A1c 6.8  - ISS, 30 Q6    HEME/ONC:  - Hg 8.6 this morning, (2/23)  - Lovenox 30q12  - s/p 1 unit PRBC with appropriate response  - Continue to ask patient before giving blood products. Minimize blood draws.   - supplement w/ EPO, folic acid, B12, iron (IV infusion for 5 days)  - VTE prophylaxis: [x] SCDs [x] chemoprophylaxis- SQL [] hold chemoprophylaxis due to: [] high risk of DVT/PE on admission due to: immobility       ID:  - afebrile, zosyn for suspected pneumonia   - d/c vanc given cultures    MISC:  Lines/tubes: ru CAMPOS

## 2024-02-23 NOTE — PROVIDER CONTACT NOTE (OTHER) - ACTION/TREATMENT ORDERED:
ICU called, MD made aware of situation and came to assess pt, 10mg of hydralizine and 0.5mg diuidiad given, will continue to monitor

## 2024-02-23 NOTE — PROGRESS NOTE ADULT - THIS PATIENT HAS THE FOLLOWING CONDITION(S)/DIAGNOSES ON THIS ADMISSION:
Cerebral Edema
GBS
MFS
progressive extremity weakness
MFS
Acute Respiratory Failure
Acute Respiratory Failure
Encephalopathy
Encephalopathy
MFS
Acute Respiratory Failure
MFS
basilar stenosis
Acute Respiratory Failure
Encephalopathy
MFS
Encephalopathy
MFS

## 2024-02-23 NOTE — PROGRESS NOTE ADULT - ATTENDING COMMENTS
72yo man, Jehovah witness, with HTN, HLD, DM, h/o 2 stroke on ASA 81 2 years prior, who presented 2/7 with 1.5 weeks of worsening cough and several days of dysphagia, with progression to weakness of all extremities and need for intubation due to bulbar symptoms. Diagnosed with GBS (GD1b positive and MRI L spine with L4-L5 radicular enhancement). S/p 5 sessions of PLEX and now receiving IVIG.   Hospital course otherwise notable for hypoxia, CT chest with atelectasis of the b/l lower lobes. Now improving.     Interval events:  Patient and family agreed to pRBCs last night, with appropriate Hg response.  S/p 2nd dose of IVIG yesterday.     Exam as per fellow.    stable for RCU   c/w IVIG for 5 doses (dose 3 today)   PEEP at 7 for atelectasis, decrease FiO2 to 40%  on TF at goal, LBM 2/22  c/w epo and iron infusions for low Hg  c/w Zosyn, stop vanc (MRSA neg), bronch cultures with pseudomonas   Lov ppx     I personally updated the wife at bedside.     Patient seen and examined by attending on 2/23/2023.    Patient is not critically ill but is medically complex due to GBS treatment, weaning vent and treating VAP.

## 2024-02-23 NOTE — CONSULT NOTE ADULT - ASSESSMENT
73 year old male with hypertension, hyperlipidemia, diabetes, prior CVA's without residual deficits who initially presented with concern for stroke-like symptoms s/p unrevealing angiogram and ultimately found to have clinical picture most concerning for ding tellez variant of GBS.     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) s/p PLEX x 5  - IVIG planned for 5 doses  - Appreciate neurology input   - Pain control with oxy    #Pulmonary:   Mixed respiratory failure s/p tracheostomy  - Wean vent as able, daily PSV trail, vent weaning per protocol  - Would optimize with airway clearance, likely will have difficulty mobilizing secretions in the setting of profound weakness: Albuterol neb followed by hypertonic saline followed by use of IPV and chest PT with deep suctioning    #GI: S/P PEG  - VD, tube feeds, bowel regimen    #Endo:   DM (A1C 6.8)  - ISS, goal of euglycemia     #Heme/Onc:   Chronic anemia  - Patient/Family would like to be asked before blood products    #ID: Pseudomonas pneumonia  - Would plan for 14 day course of antibiotics pending sensitivities    DVT ppx  Will accept to transfer to RCU once bed is available

## 2024-02-23 NOTE — PROGRESS NOTE ADULT - ASSESSMENT
ASSESSMENT:   Assessment: 74 y/o male with multiple comorbidities presenting initially with suspicious of basilar stroke however, neurological examination more consistent with GBS. 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 Elevated sugars overnight requiring insulin drip. Patient received 5 days of PLEX completed  2/20. Patient undergoing IVIG due to poor improvement with PLEX on day 3/5 2/23.     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 s/p PLEX. Started IVIG.      Recommendations:  [/] IVIG 5 sessions (day 3/5)   [x]PLEX Day 5 out of 5 today. (completed 2/20)  [/]awaitingserum studies for autoimmune encephalopathy panel (specimen received by lab)  [x] ASA/Plavix per [facility]  [x] MRI Brain w/o contrast to evaluate for infarction  [x] Angio 2/9: 50% stenosis of basilar, no stent placed  [x] BP goals per NSCU  [x] Atorvastatin 80 mg daily titrated per LDL < 70  [x] , A1c 6.8, CRP 65. B12 >2000. Folate >20. Vit D 25-OH 14.3.B1wnl (148.7), B6 wnl (17.2), copper elevated (156)  [x] TTE EF 66%, no PFO, no LV thrombus  [x] CSF studies:  oligoclonal bands (-), myelin basic protein (3.3), CSF ACE (-), VDRL (-), VZV PCR (-), HSV 1/2 PCR (-),Ds DNA (-), PAULINA 9-), CMV (-). Royce bar(-), WNV (-)  [x] cytology, flow cytometry: insufficient cells    Case seen and discussed with Neurology Attending     Quality 130: Documentation Of Current Medications In The Medical Record: Current Medications Documented Detail Level: Detailed Quality 226: Preventive Care And Screening: Tobacco Use: Screening And Cessation Intervention: Patient screened for tobacco use and is an ex/non-smoker

## 2024-02-23 NOTE — CONSULT NOTE ADULT - SUBJECTIVE AND OBJECTIVE BOX
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 He underwent an angiogram (2/11) which showed moderate stenosis and no intervention was done.   His hospital course was complicated by progressive respiratory failure and on further history it was noted that he had been having progressive weakness following a viral URI 1-2 weeks prior to presentation. 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 He is currently s/p 5 rounds of plasma exchange (2/13-2/20). He is undergoing IVIG therapy. A     Of note, he did have an MRI with small bilateral cerebellar strokes and prior L thalamic strokes. Per neurology, these findings would not explain the patients current presentation and clinical status.     At the time of my exam, the patient was able to move his feed on command. Otherwise, no meaningful movement to command. His wife was at the bedside and provided collateral and confirmed above history.     His hospital course has otherwise been complicated by hypoxemic respiratory failure. Chest CT completed 2/20 showed complete atalectasis of lower lobes. He underwent a bronchoscopy on 2/21/24 with lavage sampling that is growing moderate amount of Pseudomonas. CXR shows persistent right sided atelectasis left sided infiltrate/atalectasis.    PAST MEDICAL & SURGICAL HISTORY:  Hypertension  Diabetes  CVA (cerebral vascular accident)  HTN (hypertension)  HLD (hyperlipidemia)      No significant past surgical history      FAMILY HISTORY:  Denies significant family history    SOCIAL HISTORY:  No reports of alcohol, tobacco, drug use    Allergies    No Known Allergies    Intolerances        HOME MEDICATIONS:    REVIEW OF SYSTEMS:  [x] Unable to assess ROS because of neurologic status    OBJECTIVE:  ICU Vital Signs Last 24 Hrs  T(C): 37.8 (23 Feb 2024 15:00), Max: 37.8 (23 Feb 2024 15:00)  T(F): 100 (23 Feb 2024 15:00), Max: 100 (23 Feb 2024 15:00)  HR: 86 (23 Feb 2024 15:20) (48 - 98)  BP: 183/86 (23 Feb 2024 15:00) (126/59 - 187/86)  BP(mean): 124 (23 Feb 2024 15:00) (85 - 124)  ABP: --  ABP(mean): --  RR: 20 (23 Feb 2024 15:13) (20 - 21)  SpO2: 99% (23 Feb 2024 15:20) (95% - 100%)    O2 Parameters below as of 23 Feb 2024 15:13  Patient On (Oxygen Delivery Method): vent      Mode: AC/ CMV (Assist Control/ Continuous Mandatory Ventilation), RR (machine): 20, TV (machine): 500, FiO2: 40, PEEP: 7, ITime: 1, MAP: 13, PIP: 26    02-22 @ 07:01  -  02-23 @ 07:00  --------------------------------------------------------  IN: 5760 mL / OUT: 2200 mL / NET: 3560 mL    02-23 @ 07:01  - 02-23 @ 15:49  --------------------------------------------------------  IN: 1531.6 mL / OUT: 0 mL / NET: 1531.6 mL      CAPILLARY BLOOD GLUCOSE      POCT Blood Glucose.: 171 mg/dL (23 Feb 2024 12:37)      PHYSICAL EXAM:  General: Awake, alert   Respiratory: Normal chest expansion                         Normal percussion                         Decreased breath sounds at bases                         No wheeze, rhonchi or rales.  Cardiovascular: S1 S2 normal. No murmurs, rubs or gallops.   Abdomen: Soft, non-tender, non-distended. No organomegaly.  Extremities: Warm to touch. Peripheral pulse palpable. No pedal edema.   Skin: No rashes or skin lesions  Neurological: Limited to foot flexion and extension. moves to [facility]    HOSPITAL MEDICATIONS:  MEDICATIONS  (STANDING):  acetaminophen   Oral Liquid .. 650 milliGRAM(s) Oral every 24 hours  atorvastatin 80 milliGRAM(s) Oral at bedtime  chlorhexidine 0.12% Liquid 15 milliLiter(s) Oral Mucosa every 12 hours  chlorhexidine 4% Liquid 1 Application(s) Topical daily  cyanocobalamin 1000 MICROGram(s) Oral daily  diphenhydrAMINE 25 milliGRAM(s) Oral daily  enoxaparin Injectable 30 milliGRAM(s) SubCutaneous <User Schedule>  epoetin geeta-epbx (RETACRIT) Injectable 43631 Unit(s) SubCutaneous <User Schedule>  ergocalciferol Drops 60837 Unit(s) Oral <User Schedule>  ferrous    sulfate Liquid 300 milliGRAM(s) Enteral Tube <User Schedule>  folic acid 1 milliGRAM(s) Oral daily  gabapentin Solution 400 milliGRAM(s) Oral three times a day  HYDROmorphone  Injectable 0.5 milliGRAM(s) IV Push once  immune   globulin 10% (GAMMAGARD) IVPB 35 Gram(s) IV Intermittent daily  insulin lispro (ADMELOG) corrective regimen sliding scale   SubCutaneous every 6 hours  insulin NPH human recombinant 30 Unit(s) SubCutaneous every 6 hours  iron sucrose IVPB 100 milliGRAM(s) IV Intermittent every 24 hours  mupirocin 2% Nasal 1 Application(s) Both Nostrils two times a day  pantoprazole   Suspension 40 milliGRAM(s) Oral daily  petrolatum Ophthalmic Ointment 1 Application(s) Both EYES three times a day  piperacillin/tazobactam IVPB.. 3.375 Gram(s) IV Intermittent every 8 hours  polyethylene glycol 3350 17 Gram(s) Oral daily  senna 1 Tablet(s) Oral at bedtime    MEDICATIONS  (PRN):  acetaminophen   Oral Liquid .. 650 milliGRAM(s) Oral every 6 hours PRN Mild Pain (1 - 3)  oxyCODONE    Solution 10 milliGRAM(s) Oral every 4 hours PRN Severe Pain (7 - 10)  oxyCODONE    Solution 5 milliGRAM(s) Oral every 4 hours PRN Moderate Pain (4 - 6)  sodium chloride 0.9% lock flush 10 milliLiter(s) IV Push every 1 hour PRN Pre/post blood products, medications, blood draw, and to maintain line patency      LABS:                        8.5    11.40 )-----------( 231      ( 23 Feb 2024 02:10 )             29.1     02-21    146<H>  |  112<H>  |  32<H>  ----------------------------<  179<H>  4.0   |  28  |  0.92    Ca    7.9<L>      21 Feb 2024 23:43  Phos  2.0     02-21  Mg     2.6     02-21        Urinalysis Basic - ( 21 Feb 2024 23:43 )    Color: x / Appearance: x / SG: x / pH: x  Gluc: 179 mg/dL / Ketone: x  / Bili: x / Urobili: x   Blood: x / Protein: x / Nitrite: x   Leuk Esterase: x / RBC: x / WBC x   Sq Epi: x / Non Sq Epi: x / Bacteria: x      Arterial Blood Gas:  02-22 @ 18:27  7.48/39/146/29/100.0/5.1  ABG lactate: --  Arterial Blood Gas:  02-22 @ 16:15  7.49/41/111/31/99.5/7.2  ABG lactate: --  Arterial Blood Gas:  02-21 @ 23:31  7.47/43/120/31/99.2/7.0  ABG lactate: --        MICROBIOLOGY:     RADIOLOGY:  [x] Reviewed and interpreted by me  See HPI and plan    PFT:  None for reviw

## 2024-02-23 NOTE — PROGRESS NOTE ADULT - SUBJECTIVE AND OBJECTIVE BOX
Neurology Progress Note  AMANDA WILKINS  MRN-[id number]  [id number]      SUBJECTIVE/OBJECTIVE/INTERVAL EVENTS: Patient seen and examined at bedside w/ neuro attending and team. Patient slightly improved with minor head flexion, rotation, and extension noted. Patient able to move jaw.     INTERVAL HISTORY: 72 y/o male with multiple comorbidities presenting initially with suspicious of basilar stroke however, neurological examination more consistent with GBS. 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 Elevated sugars overnight requiring insulin drip. Patient received 5 days of PLEX completed yesterday 2/20. Patient to undergoingIVIG due to poor improvement with PLEX.      Vital Signs Last 24 Hrs  T(C): 37.4 (23 Feb 2024 03:00), Max: 37.4 (22 Feb 2024 15:00)  T(F): 99.3 (23 Feb 2024 03:00), Max: 99.3 (22 Feb 2024 15:00)  HR: 92 (23 Feb 2024 13:00) (48 - 98)  BP: 155/74 (23 Feb 2024 13:00) (123/59 - 180/84)  BP(mean): 107 (23 Feb 2024 13:00) (85 - 120)  RR: 20 (23 Feb 2024 13:00) (20 - 21)  SpO2: 97% (23 Feb 2024 13:00) (95% - 100%)    Parameters below as of 23 Feb 2024 03:00  Patient On (Oxygen Delivery Method): ventilator    O2 Concentration (%): 50      Limited due to intubation and neurological disease process  Neurological (>12):  MS: Awake. responds to yes or no questions with b/l feet movement  Language: no verbal output  CNs: PERRL (R 2mm, L 2mm) sluggish reactive. VFF minimally. eyes are conjugate and straight head. patient is suffering from opthalmoplegia, EOM are limited. right eye able to move more to commands. Right eye can look up and attempt left and right (stays relatively midline for horizontal movement attempts). No voluntary control of eyelids (b/l ptosis)    Motor - Normal bulk and flaccid tone throughout.     Neck ext/flex 1-2/5  L/R (out of 5 each)       Deltoid  0/0    Biceps   0/0      Triceps  0/0              1/1  L/R (out of 5 each)       Hip Flexion  0/0    Hip Extension  0/0  Knee Extension  0/0  Dorsiflexion  2/2      Plantar Flexion 2/2     Sensation: Intact to LT b/l x4 extremities.   Reflexes L/R:  Biceps(C5) 2/2  BR(C6) 2/2   Triceps(C7)  2/2 Patellar(L4)   0/0   Ankles 0/0  Coordination: unable to assess  Gait: deferred, unable to assess      Medications:    MEDICATIONS  (STANDING):  acetaminophen   Oral Liquid .. 650 milliGRAM(s) Oral every 24 hours  atorvastatin 80 milliGRAM(s) Oral at bedtime  chlorhexidine 0.12% Liquid 15 milliLiter(s) Oral Mucosa every 12 hours  chlorhexidine 4% Liquid 1 Application(s) Topical daily  cyanocobalamin 1000 MICROGram(s) Oral daily  diphenhydrAMINE 25 milliGRAM(s) Oral daily  enoxaparin Injectable 30 milliGRAM(s) SubCutaneous <User Schedule>  epoetin geeta-epbx (RETACRIT) Injectable 93893 Unit(s) SubCutaneous <User Schedule>  ergocalciferol Drops 26072 Unit(s) Oral <User Schedule>  ferrous    sulfate Liquid 300 milliGRAM(s) Enteral Tube <User Schedule>  folic acid 1 milliGRAM(s) Oral daily  gabapentin Solution 400 milliGRAM(s) Oral three times a day  HYDROmorphone  Injectable 0.5 milliGRAM(s) IV Push once  immune   globulin 10% (GAMMAGARD) IVPB 35 Gram(s) IV Intermittent daily  insulin lispro (ADMELOG) corrective regimen sliding scale   SubCutaneous every 6 hours  insulin NPH human recombinant 30 Unit(s) SubCutaneous every 6 hours  iron sucrose IVPB 100 milliGRAM(s) IV Intermittent every 24 hours  mupirocin 2% Nasal 1 Application(s) Both Nostrils two times a day  pantoprazole   Suspension 40 milliGRAM(s) Oral daily  petrolatum Ophthalmic Ointment 1 Application(s) Both EYES three times a day  piperacillin/tazobactam IVPB.. 3.375 Gram(s) IV Intermittent every 8 hours  polyethylene glycol 3350 17 Gram(s) Oral daily  senna 1 Tablet(s) Oral at bedtime    MEDICATIONS  (PRN):  acetaminophen   Oral Liquid .. 650 milliGRAM(s) Oral every 6 hours PRN Mild Pain (1 - 3)  oxyCODONE    Solution 10 milliGRAM(s) Oral every 4 hours PRN Severe Pain (7 - 10)  oxyCODONE    Solution 5 milliGRAM(s) Oral every 4 hours PRN Moderate Pain (4 - 6)  sodium chloride 0.9% lock flush 10 milliLiter(s) IV Push every 1 hour PRN Pre/post blood products, medications, blood draw, and to maintain line patency      LABS:                        8.5    11.40 )-----------( 231      ( 23 Feb 2024 02:10 )             29.1    02-21    146<H>  |  112<H>  |  32<H>  ----------------------------<  179<H>  4.0   |  28  |  0.92    Ca    7.9<L>      21 Feb 2024 23:43  Phos  2.0     02-21  Mg     2.6     02-21    LFTs LIVER FUNCTIONS - ( 21 Feb 2024 08:31 )  Alb: 3.5 g/dL / Pro: 5.3 g/dL / ALK PHOS: 57 U/L / ALT: 81 U/L / AST: 154 U/L / GGT: x           Coagulopathy   Lipid Panel 02-14 Chol 92 LDL -- HDL 19<L> Trig 104, 02-10 Chol 226<H> LDL -- HDL 51 Trig 76    STUDIES & IMAGING: (EEG, CT, MR, U/S, TTE/MILADIS):  CTH no acute pathology  CTA: High grade stenosis of proximal basilar + L PCA  CTP: no perfusion deficit    MRI brain with and without contrast 2/11  No significant interval change in the small acute bilateral cerebellar infarcts. No new superimposed acute intracranial abnormality. No acute intracranial hemorrhage.  2. Findings suspicious for cerebral amyloid angiopathy or hypertensive microhemorrhages.  3. Probable minimal chronic microvascular ischemic disease.  4. Sinus disease.    MRI C, T, L spine ww/o  CERVICAL SPINE: Low-grade cervical degenerative disc disease. Indistinct cord lesions at the C3 and C4 vertebral levels are nonspecific but suggest inflammatory/infectious/demyelinating etiology. No associated enhancement.    2. THORACIC SPINE: Low-grade thoracic degenerative disc disease. Thoracic cord intact. No abnomal enhancement.    3. LUMBAR SPINE: Advanced lower lumbar degenerative disc disease includes moderate degenerative central canal stenosis at L4-L5 and high-grade degenerative foraminal stenosis at L5-S1. Conus intact. Cauda equina demonstrates central clustering with spinal stenosis at L4-L5 and mild radicular enhancement predominantly distal to this location of spinal stenosis, nonspecific, inflammatory versus congestion from the stenosis. Vascular congestion between the conus and the L4-L5 stenosis. Superimposed nodular enhancement at the L4 superior endplate level is also nonspecific, inflammatory versus schwannoma    LP  Clear, no color,   83 glucose (elevated)   67 protein (elevated)  TNC 11, 7% Neutrophils (elevated), 67% lymphocytes (wnl), 26% monocytes (wnl)  3 RBCs  IgG CSF 9.7 (elevated)  CSF albumin 41.3 (elevated)  CSF PCR (-)  IgG/albumin ratio CSF 0.23 (wnl) Serum 0.46 (wnl)  IgG index wnl  IgG synthesis wnl  culture gram stain (-)  CSF ACE, autoimmune encephalopathy panel unremarkable    EEG 2/11 (18 hr study)  Abnormal EEG study  Mild generalized background slowing  Clinical Impression:  Mild diffuse/multi-focal cerebral dysfunction, not specific as to etiology.  There were no epileptiform abnormalities recorded.

## 2024-02-24 DIAGNOSIS — R13.10 DYSPHAGIA, UNSPECIFIED: ICD-10-CM

## 2024-02-24 DIAGNOSIS — Z71.89 OTHER SPECIFIED COUNSELING: ICD-10-CM

## 2024-02-24 DIAGNOSIS — D64.9 ANEMIA, UNSPECIFIED: ICD-10-CM

## 2024-02-24 DIAGNOSIS — Z29.9 ENCOUNTER FOR PROPHYLACTIC MEASURES, UNSPECIFIED: ICD-10-CM

## 2024-02-24 DIAGNOSIS — G61.0 GUILLAIN-BARRE SYNDROME: ICD-10-CM

## 2024-02-24 DIAGNOSIS — E11.9 TYPE 2 DIABETES MELLITUS WITHOUT COMPLICATIONS: ICD-10-CM

## 2024-02-24 DIAGNOSIS — I10 ESSENTIAL (PRIMARY) HYPERTENSION: ICD-10-CM

## 2024-02-24 DIAGNOSIS — E87.0 HYPEROSMOLALITY AND HYPERNATREMIA: ICD-10-CM

## 2024-02-24 DIAGNOSIS — G90.8 OTHER DISORDERS OF AUTONOMIC NERVOUS SYSTEM: ICD-10-CM

## 2024-02-24 DIAGNOSIS — J96.01 ACUTE RESPIRATORY FAILURE WITH HYPOXIA: ICD-10-CM

## 2024-02-24 DIAGNOSIS — B99.9 UNSPECIFIED INFECTIOUS DISEASE: ICD-10-CM

## 2024-02-24 LAB
ALBUMIN SERPL ELPH-MCNC: 2.6 G/DL — LOW (ref 3.3–5)
ALP SERPL-CCNC: 112 U/L — SIGNIFICANT CHANGE UP (ref 40–120)
ALT FLD-CCNC: 106 U/L — HIGH (ref 10–45)
ANION GAP SERPL CALC-SCNC: 5 MMOL/L — SIGNIFICANT CHANGE UP (ref 5–17)
APTT BLD: 28.9 SEC — SIGNIFICANT CHANGE UP (ref 24.5–35.6)
AST SERPL-CCNC: 135 U/L — HIGH (ref 10–40)
BASE EXCESS BLDA CALC-SCNC: 7 MMOL/L — HIGH (ref -2–3)
BASE EXCESS BLDA CALC-SCNC: 9.7 MMOL/L — HIGH (ref -2–3)
BILIRUB SERPL-MCNC: 0.3 MG/DL — SIGNIFICANT CHANGE UP (ref 0.2–1.2)
BUN SERPL-MCNC: 21 MG/DL — SIGNIFICANT CHANGE UP (ref 7–23)
CALCIUM SERPL-MCNC: 8.5 MG/DL — SIGNIFICANT CHANGE UP (ref 8.4–10.5)
CHLORIDE SERPL-SCNC: 111 MMOL/L — HIGH (ref 96–108)
CHOLEST SERPL-MCNC: 90 MG/DL — SIGNIFICANT CHANGE UP
CO2 BLDA-SCNC: 37 MMOL/L — HIGH (ref 19–24)
CO2 BLDA-SCNC: 37 MMOL/L — HIGH (ref 19–24)
CO2 SERPL-SCNC: 32 MMOL/L — HIGH (ref 22–31)
CREAT SERPL-MCNC: 0.81 MG/DL — SIGNIFICANT CHANGE UP (ref 0.5–1.3)
EGFR: 93 ML/MIN/1.73M2 — SIGNIFICANT CHANGE UP
GLUCOSE BLDC GLUCOMTR-MCNC: 122 MG/DL — HIGH (ref 70–99)
GLUCOSE BLDC GLUCOMTR-MCNC: 144 MG/DL — HIGH (ref 70–99)
GLUCOSE BLDC GLUCOMTR-MCNC: 158 MG/DL — HIGH (ref 70–99)
GLUCOSE BLDC GLUCOMTR-MCNC: 172 MG/DL — HIGH (ref 70–99)
GLUCOSE SERPL-MCNC: 174 MG/DL — HIGH (ref 70–99)
HCO3 BLDA-SCNC: 35 MMOL/L — HIGH (ref 21–28)
HCO3 BLDA-SCNC: 35 MMOL/L — HIGH (ref 21–28)
HCT VFR BLD CALC: 28.6 % — LOW (ref 39–50)
HDLC SERPL-MCNC: 22 MG/DL — LOW
HGB BLD-MCNC: 8.2 G/DL — LOW (ref 13–17)
HOROWITZ INDEX BLDA+IHG-RTO: 40 — SIGNIFICANT CHANGE UP
HOROWITZ INDEX BLDA+IHG-RTO: 40 — SIGNIFICANT CHANGE UP
INR BLD: 1.18 RATIO — SIGNIFICANT CHANGE UP (ref 0.85–1.18)
LIPID PNL WITH DIRECT LDL SERPL: 49 MG/DL — SIGNIFICANT CHANGE UP
MAGNESIUM SERPL-MCNC: 2.7 MG/DL — HIGH (ref 1.6–2.6)
MCHC RBC-ENTMCNC: 23 PG — LOW (ref 27–34)
MCHC RBC-ENTMCNC: 28.7 GM/DL — LOW (ref 32–36)
MCV RBC AUTO: 80.1 FL — SIGNIFICANT CHANGE UP (ref 80–100)
NON HDL CHOLESTEROL: 68 MG/DL — SIGNIFICANT CHANGE UP
NRBC # BLD: 2 /100 WBCS — HIGH (ref 0–0)
PCO2 BLDA: 51 MMHG — HIGH (ref 35–48)
PCO2 BLDA: 65 MMHG — HIGH (ref 35–48)
PH BLDA: 7.34 — LOW (ref 7.35–7.45)
PH BLDA: 7.45 — SIGNIFICANT CHANGE UP (ref 7.35–7.45)
PHOSPHATE SERPL-MCNC: 3.3 MG/DL — SIGNIFICANT CHANGE UP (ref 2.5–4.5)
PLATELET # BLD AUTO: 261 K/UL — SIGNIFICANT CHANGE UP (ref 150–400)
PO2 BLDA: 104 MMHG — SIGNIFICANT CHANGE UP (ref 83–108)
PO2 BLDA: 81 MMHG — LOW (ref 83–108)
POTASSIUM SERPL-MCNC: 4.8 MMOL/L — SIGNIFICANT CHANGE UP (ref 3.5–5.3)
POTASSIUM SERPL-SCNC: 4.8 MMOL/L — SIGNIFICANT CHANGE UP (ref 3.5–5.3)
PROT SERPL-MCNC: 7.5 G/DL — SIGNIFICANT CHANGE UP (ref 6–8.3)
PROTHROM AB SERPL-ACNC: 12.3 SEC — SIGNIFICANT CHANGE UP (ref 9.5–13)
RBC # BLD: 3.57 M/UL — LOW (ref 4.2–5.8)
RBC # FLD: 21.1 % — HIGH (ref 10.3–14.5)
SAO2 % BLDA: 96.5 % — SIGNIFICANT CHANGE UP (ref 94–98)
SAO2 % BLDA: 98.4 % — HIGH (ref 94–98)
SODIUM SERPL-SCNC: 148 MMOL/L — HIGH (ref 135–145)
TRIGL SERPL-MCNC: 94 MG/DL — SIGNIFICANT CHANGE UP
WBC # BLD: 12.11 K/UL — HIGH (ref 3.8–10.5)
WBC # FLD AUTO: 12.11 K/UL — HIGH (ref 3.8–10.5)

## 2024-02-24 PROCEDURE — 99232 SBSQ HOSP IP/OBS MODERATE 35: CPT

## 2024-02-24 PROCEDURE — 99291 CRITICAL CARE FIRST HOUR: CPT | Mod: GC

## 2024-02-24 RX ORDER — POLYETHYLENE GLYCOL 3350 17 G/17G
17 POWDER, FOR SOLUTION ORAL EVERY 12 HOURS
Refills: 0 | Status: DISCONTINUED | OUTPATIENT
Start: 2024-02-24 | End: 2024-02-28

## 2024-02-24 RX ORDER — HYDRALAZINE HCL 50 MG
5 TABLET ORAL ONCE
Refills: 0 | Status: COMPLETED | OUTPATIENT
Start: 2024-02-24 | End: 2024-02-24

## 2024-02-24 RX ADMIN — GABAPENTIN 400 MILLIGRAM(S): 400 CAPSULE ORAL at 22:24

## 2024-02-24 RX ADMIN — HUMAN INSULIN 30 UNIT(S): 100 INJECTION, SUSPENSION SUBCUTANEOUS at 12:27

## 2024-02-24 RX ADMIN — GABAPENTIN 400 MILLIGRAM(S): 400 CAPSULE ORAL at 13:39

## 2024-02-24 RX ADMIN — Medication 1 MILLIGRAM(S): at 12:24

## 2024-02-24 RX ADMIN — Medication 300 MILLIGRAM(S): at 06:22

## 2024-02-24 RX ADMIN — Medication 25 MILLIGRAM(S): at 12:25

## 2024-02-24 RX ADMIN — HUMAN INSULIN 30 UNIT(S): 100 INJECTION, SUSPENSION SUBCUTANEOUS at 06:32

## 2024-02-24 RX ADMIN — IRON SUCROSE 210 MILLIGRAM(S): 20 INJECTION, SOLUTION INTRAVENOUS at 19:24

## 2024-02-24 RX ADMIN — Medication 2: at 18:51

## 2024-02-24 RX ADMIN — PANTOPRAZOLE SODIUM 40 MILLIGRAM(S): 20 TABLET, DELAYED RELEASE ORAL at 12:25

## 2024-02-24 RX ADMIN — Medication 650 MILLIGRAM(S): at 00:27

## 2024-02-24 RX ADMIN — ERYTHROPOIETIN 20000 UNIT(S): 10000 INJECTION, SOLUTION INTRAVENOUS; SUBCUTANEOUS at 13:29

## 2024-02-24 RX ADMIN — SENNA PLUS 1 TABLET(S): 8.6 TABLET ORAL at 22:25

## 2024-02-24 RX ADMIN — POLYETHYLENE GLYCOL 3350 17 GRAM(S): 17 POWDER, FOR SOLUTION ORAL at 18:21

## 2024-02-24 RX ADMIN — HUMAN INSULIN 30 UNIT(S): 100 INJECTION, SUSPENSION SUBCUTANEOUS at 18:51

## 2024-02-24 RX ADMIN — Medication 2: at 06:32

## 2024-02-24 RX ADMIN — ATORVASTATIN CALCIUM 80 MILLIGRAM(S): 80 TABLET, FILM COATED ORAL at 22:24

## 2024-02-24 RX ADMIN — HUMAN INSULIN 30 UNIT(S): 100 INJECTION, SUSPENSION SUBCUTANEOUS at 00:12

## 2024-02-24 RX ADMIN — Medication 4: at 00:12

## 2024-02-24 RX ADMIN — GABAPENTIN 400 MILLIGRAM(S): 400 CAPSULE ORAL at 06:22

## 2024-02-24 RX ADMIN — Medication 1 APPLICATION(S): at 06:23

## 2024-02-24 RX ADMIN — Medication 1 APPLICATION(S): at 14:49

## 2024-02-24 RX ADMIN — CHLORHEXIDINE GLUCONATE 15 MILLILITER(S): 213 SOLUTION TOPICAL at 18:17

## 2024-02-24 RX ADMIN — Medication 5 MILLIGRAM(S): at 14:48

## 2024-02-24 RX ADMIN — Medication 1 APPLICATION(S): at 22:24

## 2024-02-24 RX ADMIN — ENOXAPARIN SODIUM 30 MILLIGRAM(S): 100 INJECTION SUBCUTANEOUS at 06:21

## 2024-02-24 RX ADMIN — CHLORHEXIDINE GLUCONATE 15 MILLILITER(S): 213 SOLUTION TOPICAL at 06:22

## 2024-02-24 RX ADMIN — PIPERACILLIN AND TAZOBACTAM 25 GRAM(S): 4; .5 INJECTION, POWDER, LYOPHILIZED, FOR SOLUTION INTRAVENOUS at 06:23

## 2024-02-24 RX ADMIN — MUPIROCIN 1 APPLICATION(S): 20 OINTMENT TOPICAL at 18:20

## 2024-02-24 RX ADMIN — PREGABALIN 1000 MICROGRAM(S): 225 CAPSULE ORAL at 12:23

## 2024-02-24 RX ADMIN — PIPERACILLIN AND TAZOBACTAM 25 GRAM(S): 4; .5 INJECTION, POWDER, LYOPHILIZED, FOR SOLUTION INTRAVENOUS at 22:24

## 2024-02-24 RX ADMIN — IMMUNE GLOBULIN (HUMAN) 58.33 GRAM(S): 10 INJECTION INTRAVENOUS; SUBCUTANEOUS at 13:04

## 2024-02-24 RX ADMIN — PIPERACILLIN AND TAZOBACTAM 25 GRAM(S): 4; .5 INJECTION, POWDER, LYOPHILIZED, FOR SOLUTION INTRAVENOUS at 13:40

## 2024-02-24 RX ADMIN — ENOXAPARIN SODIUM 30 MILLIGRAM(S): 100 INJECTION SUBCUTANEOUS at 18:20

## 2024-02-24 RX ADMIN — MUPIROCIN 1 APPLICATION(S): 20 OINTMENT TOPICAL at 06:23

## 2024-02-24 RX ADMIN — Medication 650 MILLIGRAM(S): at 12:24

## 2024-02-24 NOTE — PROGRESS NOTE ADULT - PROBLEM SELECTOR PLAN 4
- Patient Febrile on 2/23   - BAL 2/21: PSA   - Blood cxs 2/23 Sent ( Results Pending)   - UA 2/23: Negative   - Currently remains on Zosyn will treat x 7 days ( Ends 2/28)

## 2024-02-24 NOTE — PROGRESS NOTE ADULT - PROBLEM SELECTOR PLAN 2
- Patient S/p Tracheostomy # 8 Cuffed Portex on 2/16 ( )  - CTA Chest 2/20: No PE, Complete Atelectasis b/l lower lobes    - Currently remains on Mechanical Ventilation   - Will continue weaning attempts as tolerated  - Continue Chest PT and Suctioning PRN

## 2024-02-24 NOTE — PROGRESS NOTE ADULT - PROBLEM SELECTOR PLAN 10
- Patients Wife Margarette and son Rubio provided extensive medical update at bedside this morning - Continue Lovenox

## 2024-02-24 NOTE — PROGRESS NOTE ADULT - PROBLEM SELECTOR PLAN 3
- Patient with Sympathetic Storming in NSCU   - Patient with Labile BP HTN and Hypotensive; S/p Pressors   - Continue Gabapentin TID

## 2024-02-24 NOTE — PROGRESS NOTE ADULT - NS ATTEND AMEND GEN_ALL_CORE FT
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) s/p PLEX x 5  - IVIG planned for 5 doses D3/5  - Appreciate neurology input      - Pain control with oxy    #Pulmonary:   Mixed respiratory failure s/p tracheostomy  - Wean vent as able, daily PSV trail, vent weaning per protocol  - Would optimize with airway clearance, likely will have difficulty mobilizing secretions in the setting of profound weakness: Albuterol neb followed by hypertonic saline followed by use of IPV and chest PT with deep suctioning    #GI: S/P PEG   - VD, tube feeds, bowel regimen    #Endo:     DM (A1C 6.8)   - ISS, goal of euglycemia     #Heme/Onc:    Chronic anemia              - Patient/Family would like to be asked before blood products    #ID: Pseudomonas pneumonia  - Would plan for 7 day course of antibiotics-zosyn-as of 2/21-2/28    DVT ppx  Dispo--DC planning to rehab facility pending progress  Rome Alberto MD-Pulmonary   [phone]

## 2024-02-24 NOTE — PROGRESS NOTE ADULT - PROBLEM SELECTOR PLAN 1
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

## 2024-02-24 NOTE — PROGRESS NOTE ADULT - SUBJECTIVE AND OBJECTIVE BOX
Patient is a 73y old  Male who presents with a chief complaint of Stroke, critical stenosis, evaluation for angiography (2024 15:49)      Interval Events:    REVIEW OF SYSTEMS:  [ ] Positive  [ ] All other systems negative  [ ] Unable to assess ROS because ________    Vital Signs Last 24 Hrs  T(C): 37.1 (24 @ 04:00), Max: 38.4 (24 @ 18:00)  T(F): 98.8 (24 @ 04:00), Max: 101.1 (24 @ 18:00)  HR: 90 (24 @ 04:00) (76 - 100)  BP: 156/73 (24 @ 04:00) (128/61 - 219/108)  RR: 20 (24 @ 04:00) (20 - 22)  SpO2: 99% (24 @ 04:00) (89% - 100%)    PHYSICAL EXAM:  HEENT:   [ ]Tracheostomy:  [ ]Pupils equal  [ ]No oral lesions  [ ]Abnormal    SKIN  [ ]No Rash  [ ] Abnormal  [ ] pressure    CARDIAC  [ ]Regular  [ ]Abnormal    PULMONARY  [ ]Bilateral Clear Breath Sounds  [ ]Normal Excursion  [ ]Abnormal    GI  [ ]PEG      [ ] +BS		              [ ]Soft, nondistended, nontender	  [ ]Abnormal    MUSCULOSKELETAL                                   [ ]Bedbound                 [ ]Abnormal    [ ]Ambulatory/OOB to chair                           EXTREMITIES                                         [ ]Normal  [ ]Edema                           NEUROLOGIC  [ ] Normal, non focal  [ ] Focal findings:    PSYCHIATRIC  [ ]Alert and appropriate  [ ] Sedated	 [ ]Agitated    :  [place]: [ ] Yes, if yes: Date of Placement:                   [  ] No    LINES: Central Lines [ ] Yes, if yes: Date of Placement                                     [  ] No    HOSPITAL MEDICATIONS:  MEDICATIONS  (STANDING):  acetaminophen   Oral Liquid .. 650 milliGRAM(s) Oral every 24 hours  atorvastatin 80 milliGRAM(s) Oral at bedtime  chlorhexidine 0.12% Liquid 15 milliLiter(s) Oral Mucosa every 12 hours  cyanocobalamin 1000 MICROGram(s) Oral daily  diphenhydrAMINE 25 milliGRAM(s) Oral daily  enoxaparin Injectable 30 milliGRAM(s) SubCutaneous <User Schedule>  epoetin geeta-epbx (RETACRIT) Injectable 07233 Unit(s) SubCutaneous <User Schedule>  ergocalciferol Drops 42524 Unit(s) Oral <User Schedule>  ferrous    sulfate Liquid 300 milliGRAM(s) Enteral Tube <User Schedule>  folic acid 1 milliGRAM(s) Oral daily  gabapentin Solution 400 milliGRAM(s) Oral three times a day  HYDROmorphone  Injectable 0.5 milliGRAM(s) IV Push once  immune   globulin 10% (GAMMAGARD) IVPB 35 Gram(s) IV Intermittent daily  insulin lispro (ADMELOG) corrective regimen sliding scale   SubCutaneous every 6 hours  insulin NPH human recombinant 30 Unit(s) SubCutaneous every 6 hours  iron sucrose IVPB 100 milliGRAM(s) IV Intermittent every 24 hours  mupirocin 2% Nasal 1 Application(s) Both Nostrils two times a day  pantoprazole   Suspension 40 milliGRAM(s) Oral daily  petrolatum Ophthalmic Ointment 1 Application(s) Both EYES three times a day  piperacillin/tazobactam IVPB.. 3.375 Gram(s) IV Intermittent every 8 hours  polyethylene glycol 3350 17 Gram(s) Oral daily  senna 1 Tablet(s) Oral at bedtime    MEDICATIONS  (PRN):  acetaminophen   Oral Liquid .. 650 milliGRAM(s) Oral every 6 hours PRN Mild Pain (1 - 3)  oxyCODONE    Solution 10 milliGRAM(s) Oral every 4 hours PRN Severe Pain (7 - 10)  oxyCODONE    Solution 5 milliGRAM(s) Oral every 4 hours PRN Moderate Pain (4 - 6)  sodium chloride 0.9% lock flush 10 milliLiter(s) IV Push every 1 hour PRN Pre/post blood products, medications, blood draw, and to maintain line patency      LABS:                        8.5    11.40 )-----------( 231      ( 2024 02:10 )             29.1             Urinalysis Basic - ( 2024 18:30 )    Color: Yellow / Appearance: Clear / S.022 / pH: x  Gluc: x / Ketone: Negative mg/dL  / Bili: Negative / Urobili: 1.0 mg/dL   Blood: x / Protein: 30 mg/dL / Nitrite: Negative   Leuk Esterase: Negative / RBC: 1 /HPF / WBC 1 /HPF   Sq Epi: x / Non Sq Epi: 0 /HPF / Bacteria: Negative /HPF      Arterial Blood Gas:   @ 18:27  7.48/39/146/29/100.0/5.1  ABG lactate: --  Arterial Blood Gas:   @ 16:15  7.49/41/111/31/99.5/7.2  ABG lactate: --      CAPILLARY BLOOD GLUCOSE    MICROBIOLOGY:     RADIOLOGY:  [ ] Reviewed and interpreted by me    Mode: AC/ CMV (Assist Control/ Continuous Mandatory Ventilation)  RR (machine): 20  TV (machine): 500  FiO2: 80  PEEP: 7  ITime: 1  MAP: 2  PIP: 19     HPI: 73 year old male with hypertension, hyperlipidemia, diabetes, prior CVA's without residual deficits who initially presented as a transfer from [place] with concern for CVA in the setting of 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 He underwent an angiogram (2/11) which showed moderate stenosis and no intervention was done.   His hospital course was complicated by progressive respiratory failure and on further history it was noted that he had been having progressive weakness following a viral URI 1-2 weeks prior to presentation. 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 He is currently s/p 5 rounds of plasma exchange (2/13-2/20). He is undergoing IVIG therapy. A     Of note, he did have an MRI with small bilateral cerebellar strokes and prior L thalamic strokes. Per neurology, these findings would not explain the patients current presentation and clinical status.     At the time of my exam, the patient was able to move his feed on command. Otherwise, no meaningful movement to command. His wife was at the bedside and provided collateral and confirmed above history.     His hospital course has otherwise been complicated by hypoxemic respiratory failure. Chest CT completed 2/20 showed complete atalectasis of lower lobes. He underwent a bronchoscopy on 2/21/24 with lavage sampling that is growing moderate amount of Pseudomonas. CXR shows persistent right sided atelectasis left sided infiltrate/atalectasis.    PAST MEDICAL & SURGICAL HISTORY:  Hypertension  Diabetes  CVA (cerebral vascular accident)  HTN (hypertension)  HLD (hyperlipidemia)      No significant past surgical history      FAMILY HISTORY:  Denies significant family history    SOCIAL HISTORY:  No reports of alcohol, tobacco, drug use    Allergies    No Known Allergies    Intolerances        HOME MEDICATIONS:    REVIEW OF SYSTEMS:  [x] Unable to assess ROS because of neurologic status    OBJECTIVE:  ICU Vital Signs Last 24 Hrs  T(C): 37.8 (23 Feb 2024 15:00), Max: 37.8 (23 Feb 2024 15:00)  T(F): 100 (23 Feb 2024 15:00), Max: 100 (23 Feb 2024 15:00)  HR: 86 (23 Feb 2024 15:20) (48 - 98)  BP: 183/86 (23 Feb 2024 15:00) (126/59 - 187/86)  BP(mean): 124 (23 Feb 2024 15:00) (85 - 124)  ABP: --  ABP(mean): --  RR: 20 (23 Feb 2024 15:13) (20 - 21)  SpO2: 99% (23 Feb 2024 15:20) (95% - 100%)    O2 Parameters below as of 23 Feb 2024 15:13  Patient On (Oxygen Delivery Method): vent      Mode: AC/ CMV (Assist Control/ Continuous Mandatory Ventilation), RR (machine): 20, TV (machine): 500, FiO2: 40, PEEP: 7, ITime: 1, MAP: 13, PIP: 26    02-22 @ 07:01  -  02-23 @ 07:00  --------------------------------------------------------  IN: 5760 mL / OUT: 2200 mL / NET: 3560 mL    02-23 @ 07:01  - 02-23 @ 15:49  --------------------------------------------------------  IN: 1531.6 mL / OUT: 0 mL / NET: 1531.6 mL      CAPILLARY BLOOD GLUCOSE      POCT Blood Glucose.: 171 mg/dL (23 Feb 2024 12:37)      PHYSICAL EXAM:  General: Awake, alert   Respiratory: Normal chest expansion                         Normal percussion                         Decreased breath sounds at bases                         No wheeze, rhonchi or rales.  Cardiovascular: S1 S2 normal. No murmurs, rubs or gallops.   Abdomen: Soft, non-tender, non-distended. No organomegaly.  Extremities: Warm to touch. Peripheral pulse palpable. No pedal edema.   Skin: No rashes or skin lesions  Neurological: Limited to foot flexion and extension. moves to [facility]    HOSPITAL MEDICATIONS:  MEDICATIONS  (STANDING):  acetaminophen   Oral Liquid .. 650 milliGRAM(s) Oral every 24 hours  atorvastatin 80 milliGRAM(s) Oral at bedtime  chlorhexidine 0.12% Liquid 15 milliLiter(s) Oral Mucosa every 12 hours  chlorhexidine 4% Liquid 1 Application(s) Topical daily  cyanocobalamin 1000 MICROGram(s) Oral daily  diphenhydrAMINE 25 milliGRAM(s) Oral daily  enoxaparin Injectable 30 milliGRAM(s) SubCutaneous <User Schedule>  epoetin geeta-epbx (RETACRIT) Injectable 67086 Unit(s) SubCutaneous <User Schedule>  ergocalciferol Drops 54330 Unit(s) Oral <User Schedule>  ferrous    sulfate Liquid 300 milliGRAM(s) Enteral Tube <User Schedule>  folic acid 1 milliGRAM(s) Oral daily  gabapentin Solution 400 milliGRAM(s) Oral three times a day  HYDROmorphone  Injectable 0.5 milliGRAM(s) IV Push once  immune   globulin 10% (GAMMAGARD) IVPB 35 Gram(s) IV Intermittent daily  insulin lispro (ADMELOG) corrective regimen sliding scale   SubCutaneous every 6 hours  insulin NPH human recombinant 30 Unit(s) SubCutaneous every 6 hours  iron sucrose IVPB 100 milliGRAM(s) IV Intermittent every 24 hours  mupirocin 2% Nasal 1 Application(s) Both Nostrils two times a day  pantoprazole   Suspension 40 milliGRAM(s) Oral daily  petrolatum Ophthalmic Ointment 1 Application(s) Both EYES three times a day  piperacillin/tazobactam IVPB.. 3.375 Gram(s) IV Intermittent every 8 hours  polyethylene glycol 3350 17 Gram(s) Oral daily  senna 1 Tablet(s) Oral at bedtime    MEDICATIONS  (PRN):  acetaminophen   Oral Liquid .. 650 milliGRAM(s) Oral every 6 hours PRN Mild Pain (1 - 3)  oxyCODONE    Solution 10 milliGRAM(s) Oral every 4 hours PRN Severe Pain (7 - 10)  oxyCODONE    Solution 5 milliGRAM(s) Oral every 4 hours PRN Moderate Pain (4 - 6)  sodium chloride 0.9% lock flush 10 milliLiter(s) IV Push every 1 hour PRN Pre/post blood products, medications, blood draw, and to maintain line patency      LABS:                        8.5    11.40 )-----------( 231      ( 23 Feb 2024 02:10 )             29.1     02-21    146<H>  |  112<H>  |  32<H>  ----------------------------<  179<H>  4.0   |  28  |  0.92    Ca    7.9<L>      21 Feb 2024 23:43  Phos  2.0     02-21  Mg     2.6     02-21        Urinalysis Basic - ( 21 Feb 2024 23:43 )    Color: x / Appearance: x / SG: x / pH: x  Gluc: 179 mg/dL / Ketone: x  / Bili: x / Urobili: x   Blood: x / Protein: x / Nitrite: x   Leuk Esterase: x / RBC: x / WBC x   Sq Epi: x / Non Sq Epi: x / Bacteria: x      Arterial Blood Gas:  02-22 @ 18:27  7.48/39/146/29/100.0/5.1  ABG lactate: --  Arterial Blood Gas:  02-22 @ 16:15  7.49/41/111/31/99.5/7.2  ABG lactate: --  Arterial Blood Gas:  02-21 @ 23:31  7.47/43/120/31/99.2/7.0  ABG lactate: --        MICROBIOLOGY:     RADIOLOGY:  [x] Reviewed and interpreted by me  See HPI and plan    PFT:  None for reviw Patient is a 73y old  Male who presents with a chief complaint of Stroke, critical stenosis, evaluation for angiography (24 Feb 2024 07:16)      Interval Events: Patient transferred from [facility] Overnight, patient with elevated BP Upon transfer was treated with IV Hydralazine with improved BP     REVIEW OF SYSTEMS:  [ ] Positive  [ ] All other systems negative  [x] Unable to assess ROS because patient not answering verbal questioning     Vital Signs Last 24 Hrs  T(C): 36.5 (02-24-24 @ 08:13), Max: 38.4 (02-23-24 @ 18:00)  T(F): 97.7 (02-24-24 @ 08:13), Max: 101.1 (02-23-24 @ 18:00)  HR: 88 (02-24-24 @ 08:58) (76 - 100)  BP: 132/69 (02-24-24 @ 08:13) (128/61 - 219/108)  RR: 18 (02-24-24 @ 08:13) (18 - 22)  SpO2: 100% (02-24-24 @ 08:58) (89% - 100%)    PHYSICAL EXAM:  HEENT:   [x]Tracheostomy: # 8 Cuffed Portex   [ ]Pupils equal  [ ]No oral lesions  [ ]Abnormal    SKIN  [x]No Rash  [ ] Abnormal  [ ] pressure    CARDIAC  [x]Regular  [ ]Abnormal    PULMONARY  [ ]Bilateral Clear Breath Sounds  [ ]Normal Excursion  [x]Abnormal: Bilateral Coarse Breath sounds decreased at bases b/l     GI  [x]PEG      [x] +BS		              [x]Soft, nondistended, nontender	  [ ]Abnormal    MUSCULOSKELETAL                                   [x]Bedbound                 [ ]Abnormal    [ ]Ambulatory/OOB to chair                           EXTREMITIES                                         [ ]Normal  [x]Edema: + Upper and Lower extremity edema bilaterally                           NEUROLOGIC  [ ] Normal, non focal  [x] Focal findings: Intermittently opens eyes, following commands with Feet ( Rt foot "yes" / Lft foot "No")    PSYCHIATRIC  [ ]Alert and appropriate  [x] Sedated	 [ ]Agitated    :  [place]: [ ] Yes, if yes: Date of Placement:                   [x] No    LINES: Central Lines [x] Yes, if yes: Date of Placement: RT IJ Shiley placed 2/12 / LUE PICC Placed 2/14                                      [  ] No    HOSPITAL MEDICATIONS:  MEDICATIONS  (STANDING):  acetaminophen   Oral Liquid .. 650 milliGRAM(s) Oral every 24 hours  atorvastatin 80 milliGRAM(s) Oral at bedtime  chlorhexidine 0.12% Liquid 15 milliLiter(s) Oral Mucosa every 12 hours  cyanocobalamin 1000 MICROGram(s) Oral daily  diphenhydrAMINE 25 milliGRAM(s) Oral daily  enoxaparin Injectable 30 milliGRAM(s) SubCutaneous <User Schedule>  epoetin geeta-epbx (RETACRIT) Injectable 98601 Unit(s) SubCutaneous <User Schedule>  ergocalciferol Drops 84966 Unit(s) Oral <User Schedule>  ferrous    sulfate Liquid 300 milliGRAM(s) Enteral Tube <User Schedule>  folic acid 1 milliGRAM(s) Oral daily  gabapentin Solution 400 milliGRAM(s) Oral three times a day  HYDROmorphone  Injectable 0.5 milliGRAM(s) IV Push once  immune   globulin 10% (GAMMAGARD) IVPB 35 Gram(s) IV Intermittent daily  insulin lispro (ADMELOG) corrective regimen sliding scale   SubCutaneous every 6 hours  insulin NPH human recombinant 30 Unit(s) SubCutaneous every 6 hours  iron sucrose IVPB 100 milliGRAM(s) IV Intermittent every 24 hours  mupirocin 2% Nasal 1 Application(s) Both Nostrils two times a day  pantoprazole   Suspension 40 milliGRAM(s) Oral daily  petrolatum Ophthalmic Ointment 1 Application(s) Both EYES three times a day  piperacillin/tazobactam IVPB.. 3.375 Gram(s) IV Intermittent every 8 hours  polyethylene glycol 3350 17 Gram(s) Oral every 12 hours  senna 1 Tablet(s) Oral at bedtime    MEDICATIONS  (PRN):  acetaminophen   Oral Liquid .. 650 milliGRAM(s) Oral every 6 hours PRN Mild Pain (1 - 3)  oxyCODONE    Solution 5 milliGRAM(s) Oral every 4 hours PRN Moderate Pain (4 - 6)  oxyCODONE    Solution 10 milliGRAM(s) Oral every 4 hours PRN Severe Pain (7 - 10)  sodium chloride 0.9% lock flush 10 milliLiter(s) IV Push every 1 hour PRN Pre/post blood products, medications, blood draw, and to maintain line patency      LABS:                        8.2    12.11 )-----------( 261      ( 24 Feb 2024 07:23 )             28.6     02-24    148<H>  |  111<H>  |  21  ----------------------------<  174<H>  4.8   |  32<H>  |  0.81    Ca    8.5      24 Feb 2024 07:22  Phos  3.3     02-24  Mg     2.7     02-24    TPro  7.5  /  Alb  2.6<L>  /  TBili  0.3  /  DBili  x   /  AST  135<H>  /  ALT  106<H>  /  AlkPhos  112  02-24    PT/INR - ( 24 Feb 2024 07:22 )   PT: 12.3 sec;   INR: 1.18 ratio         PTT - ( 24 Feb 2024 07:22 )  PTT:28.9 sec  Urinalysis Basic - ( 24 Feb 2024 07:22 )    Color: x / Appearance: x / SG: x / pH: x  Gluc: 174 mg/dL / Ketone: x  / Bili: x / Urobili: x   Blood: x / Protein: x / Nitrite: x   Leuk Esterase: x / RBC: x / WBC x   Sq Epi: x / Non Sq Epi: x / Bacteria: x      Arterial Blood Gas:  02-22 @ 18:27  7.48/39/146/29/100.0/5.1  ABG lactate: --  Arterial Blood Gas:  02-22 @ 16:15  7.49/41/111/31/99.5/7.2  ABG lactate: --      CAPILLARY BLOOD GLUCOSE    MICROBIOLOGY:     RADIOLOGY:  [ ] Reviewed and interpreted by me    Mode: AC/ CMV (Assist Control/ Continuous Mandatory Ventilation)  RR (machine): 20  TV (machine): 500  FiO2: 80  PEEP: 7  ITime: 1  MAP: 12  PIP: 21

## 2024-02-24 NOTE — PROGRESS NOTE ADULT - PROBLEM SELECTOR PLAN 7
- Patient Judaism  - Family would like to be asked prior to administration of blood products   - S/p 1 unit PRBCs on 2/22  - Cont Venofer ( 2/22-2/27)   - Cont Cyanocobalamine, Folic acid, Ferrous Sulfate  - Continue Epogen

## 2024-02-24 NOTE — PROGRESS NOTE ADULT - PROBLEM SELECTOR PLAN 5
- Patient with hx of HTN Prior to admission   - As per wife was on Atenolol /Chlorthiazide 50mg/25mg daily   - Patient remains with labile BP  - Will monitor BP and cont Hydralazine IVP PRN  - HLD: Cont Atorvastatin

## 2024-02-24 NOTE — PROGRESS NOTE ADULT - ASSESSMENT
73 year old male with hypertension, hyperlipidemia, diabetes, prior CVA's without residual deficits who initially presented with concern for stroke-like symptoms s/p unrevealing angiogram and ultimately found to have clinical picture most concerning for ding tellez variant of GBS.     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) s/p PLEX x 5  - IVIG planned for 5 doses  - Appreciate neurology input   - Pain control with oxy    #Pulmonary:   Mixed respiratory failure s/p tracheostomy  - Wean vent as able, daily PSV trail, vent weaning per protocol  - Would optimize with airway clearance, likely will have difficulty mobilizing secretions in the setting of profound weakness: Albuterol neb followed by hypertonic saline followed by use of IPV and chest PT with deep suctioning    #GI: S/P PEG  - VD, tube feeds, bowel regimen    #Endo:   DM (A1C 6.8)  - ISS, goal of euglycemia     #Heme/Onc:   Chronic anemia  - Patient/Family would like to be asked before blood products    #ID: Pseudomonas pneumonia  - Would plan for 14 day course of antibiotics pending sensitivities    DVT ppx  Will accept to transfer to RCU once bed is available 73 year old male with hypertension, hyperlipidemia, diabetes, prior CVA's without residual deficits who initially presented with concern for stroke-like symptoms s/p unrevealing angiogram and ultimately found to have clinical picture most concerning for ding tellez variant of GBS.     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) s/p PLEX x 5  - IVIG planned for 5 doses  - Appreciate neurology input   - Pain control with oxy    #Pulmonary:   Mixed respiratory failure s/p tracheostomy  - Wean vent as able, daily PSV trail, vent weaning per protocol  - Would optimize with airway clearance, likely will have difficulty mobilizing secretions in the setting of profound weakness: Albuterol neb followed by hypertonic saline followed by use of IPV and chest PT with deep suctioning    #GI: S/P PEG  - VD, tube feeds, bowel regimen    #Endo:   DM (A1C 6.8)  - ISS, goal of euglycemia     #Heme/Onc:   Chronic anemia  - Patient/Family would like to be asked before blood products    #ID: Pseudomonas pneumonia  - Would plan for 14 day course of antibiotics pending sensitivities    DVT ppx  *******************************    2/24-tx RCU 73 year old male with hypertension, hyperlipidemia, diabetes, prior CVA's without residual deficits who initially presented as a transfer from [place] with concern for CVA in the setting of 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 Prior to admission patient had Viral URI ( 1-2 weeks prior to admission) with subsequent weakness. He underwent an angiogram (2/11) which showed moderate stenosis and no intervention was done. MRI Performed c/w small small bilateral cerebellar strokes and prior L thalamic strokes, as per Neurology was not c/w current presentation. 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 He is currently s/p 5 rounds of plasma exchange (2/13-2/20) and will receive IVIG x 5 ( 2/21-2/26)  His hospital course was complicated by progressive respiratory failure. CT Chest performed on 2/20 with atelectasis of b/l lower lobes. BAL 2/21 Growing PSA Treating with course of Zosyn.     2/24: Patient transferred to RCU overnight. Patient with elevated BP Overnight was treated with Hydralazine with IVP with improved BP. Patient febrile yesterday in NSCU; Blood cxs sent ( Results pending), Sputum cx growing PSA currently remains on Zosyn. Patient with hypernatremia will increase free water to 250 cc q 4 hrs.  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 Prior to admission patient had Viral URI ( 1-2 weeks prior to admission) with subsequent weakness. He underwent an angiogram (2/11) which showed moderate stenosis and no intervention was done. MRI Performed c/w small small bilateral cerebellar strokes and prior L thalamic strokes, as per Neurology was not c/w current presentation. 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 He is currently s/p 5 rounds of plasma exchange (2/13-2/20) and will receive IVIG x 5 ( 2/21-2/26)  His hospital course was complicated by progressive respiratory failure. CT Chest performed on 2/20 with atelectasis of b/l lower lobes. BAL 2/21 Growing PSA Treating with course of Zosyn.     2/24: Patient transferred to RCU overnight. Patient with elevated BP Overnight was treated with IVP Hydralazine with improved BP. Patient febrile yesterday in NSCU; Blood cxs sent ( Results pending), UA 2/23; Negative, Sputum cx growing PSA currently remains on Zosyn. Patient with hypernatremia will increase free water to 250 cc q 4 hrs. Patient remains on MV did not tolerate CPAP this morning. Will adjust Vent settings to promote weaning; RR Decreased to 16, PEEP dec to 5 and FIO2 Dec to 40 %. Will Repeat ABG on 1 hr. Reached out to Neurology to determine if further PLEX will be performed if not will plan to remove Shiley ( Awaiting response). 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 Prior to admission patient had Viral URI ( 1-2 weeks prior to admission) with subsequent weakness. He underwent an angiogram (2/11) which showed moderate stenosis and no intervention was done. MRI Performed c/w small small bilateral cerebellar strokes and prior L thalamic strokes, as per Neurology was not c/w current presentation. 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 He is currently s/p 5 rounds of plasma exchange (2/13-2/20) and will receive IVIG x 5 ( 2/21-2/26)  His hospital course was complicated by progressive respiratory failure. CT Chest performed on 2/20 with atelectasis of b/l lower lobes. BAL 2/21 Growing PSA Treating with course of Zosyn.     2/24: Patient transferred to RCU overnight. Patient with elevated BP Overnight was treated with IVP Hydralazine with improved BP. Patient febrile yesterday in NSCU; Blood cxs sent ( Results pending), UA 2/23; Negative, Sputum cx growing PSA currently remains on Zosyn. Patient with hypernatremia will increase free water to 250 cc q 4 hrs. Patient remains on MV did not tolerate CPAP this morning due to bradycardia. Will adjust Vent settings to promote weaning; RR Decreased to 16, PEEP dec to 5 and FIO2 Dec to 40 %. Will Repeat ABG on 1 hr. Reached out to Neurology to determine if further PLEX will be performed if not will plan to remove Shiley ( Awaiting response). Pt will receive 4 th dose of IVIG today.

## 2024-02-24 NOTE — PROGRESS NOTE ADULT - SUBJECTIVE AND OBJECTIVE BOX
Neurology Progress Note    SUBJECTIVE/OBJECTIVE/INTERVAL EVENTS: Patient seen and examined at bedside w/ neuro attending and team.     INTERVAL HISTORY:    REVIEW OF SYSTEMS: Otherwise denies fever, chills, headaches, vision changes, blurry vision, double vision, nausea, vomiting, hearing change, focal weakness, focal numbness, parasthesias, bowel/ bladder incontinence.  Few questions of a 10-system ROS was performed and is negative except for those items noted above and/or in the HPI.    MEDICATIONS  (STANDING):  acetaminophen   Oral Liquid .. 650 milliGRAM(s) Oral every 24 hours  atorvastatin 80 milliGRAM(s) Oral at bedtime  chlorhexidine 0.12% Liquid 15 milliLiter(s) Oral Mucosa every 12 hours  cyanocobalamin 1000 MICROGram(s) Oral daily  diphenhydrAMINE 25 milliGRAM(s) Oral daily  enoxaparin Injectable 30 milliGRAM(s) SubCutaneous <User Schedule>  epoetin geeta-epbx (RETACRIT) Injectable 30386 Unit(s) SubCutaneous <User Schedule>  ergocalciferol Drops 08744 Unit(s) Oral <User Schedule>  ferrous    sulfate Liquid 300 milliGRAM(s) Enteral Tube <User Schedule>  folic acid 1 milliGRAM(s) Oral daily  gabapentin Solution 400 milliGRAM(s) Oral three times a day  immune   globulin 10% (GAMMAGARD) IVPB 35 Gram(s) IV Intermittent daily  insulin lispro (ADMELOG) corrective regimen sliding scale   SubCutaneous every 6 hours  insulin NPH human recombinant 30 Unit(s) SubCutaneous every 6 hours  iron sucrose IVPB 100 milliGRAM(s) IV Intermittent every 24 hours  mupirocin 2% Nasal 1 Application(s) Both Nostrils two times a day  pantoprazole   Suspension 40 milliGRAM(s) Oral daily  petrolatum Ophthalmic Ointment 1 Application(s) Both EYES three times a day  piperacillin/tazobactam IVPB.. 3.375 Gram(s) IV Intermittent every 8 hours  polyethylene glycol 3350 17 Gram(s) Oral every 12 hours  senna 1 Tablet(s) Oral at bedtime    MEDICATIONS  (PRN):  acetaminophen   Oral Liquid .. 650 milliGRAM(s) Oral every 6 hours PRN Mild Pain (1 - 3)  oxyCODONE    Solution 10 milliGRAM(s) Oral every 4 hours PRN Severe Pain (7 - 10)  oxyCODONE    Solution 5 milliGRAM(s) Oral every 4 hours PRN Moderate Pain (4 - 6)  sodium chloride 0.9% lock flush 10 milliLiter(s) IV Push every 1 hour PRN Pre/post blood products, medications, blood draw, and to maintain line patency      VITALS & EXAMINATION:  Vital Signs Last 24 Hrs  T(C): 36.8 (24 Feb 2024 17:10), Max: 38.1 (23 Feb 2024 20:13)  T(F): 98.2 (24 Feb 2024 17:10), Max: 100.6 (23 Feb 2024 20:13)  HR: 78 (24 Feb 2024 17:10) (68 - 100)  BP: 152/68 (24 Feb 2024 17:10) (110/56 - 219/108)  BP(mean): 110 (24 Feb 2024 16:15) (110 - 130)  RR: 18 (24 Feb 2024 17:10) (18 - 22)  SpO2: 98% (24 Feb 2024 17:10) (89% - 100%)    Parameters below as of 24 Feb 2024 17:10  Patient On (Oxygen Delivery Method): ventilator    O2 Concentration (%): 40    General:  Constitutional: Male, appears stated age, nontoxic, not in distress,    Head: Normocephalic;   Eyes: clear sclera;   Extremities: No cyanosis;   Resp: breathing comfortably  Neck: no nuchal rigidity  Fundoscopic exam:      Neurological (>12):  MS: Awake, alert.  Oriented person place situation year month. Follows simple complex cross commands. Able to ID 3/3 objects. Attends to examiner  Language: Speech is hypophonic, clear, fluent, good repetition,  comprehension, registration of words.  CNs: PERRL (R 3mm, L 3mm). VFF. EOMI. No disconjugate gaze, nystagmus. V1-3 intact LT, No facial asymmetry b/l. Hearing grossly normal b/l. Tongue midline and can move side to side.     Motor - Normal bulk and tone throughout. No pronator drift.    L/R (out of 5 each)       Deltoid  5/5    Biceps   5/5      Triceps  5/5         Wrist Extension 5/5   Wrist Flexion  5/5   Interossei 5/5     5/5   L/R (out of 5 each)       Hip Flexion  5/5    Hip Extension  5/5  Knee Extension  5/5  Dorsiflexion  5/5      Plantar Flexion 5/5     Sensation: Intact to LT b/l x4 extremities. Cortical: Extinction on DSS (neglect): none  Reflexes L/R:  Biceps(C5) 2/2  BR(C6) 2/2   Triceps(C7)  2/2 Patellar(L4)   2/2   Ankles 2/2   Toes: mute b/l  no ankle clonus  Coordination: No dysmetria to FTN b/l UE  Gait: Normal Romberg. No postural instability. Normal stance. Gait baseline.    LABORATORY:  CBC                       8.2    12.11 )-----------( 261      ( 24 Feb 2024 07:23 )             28.6     Chem 02-24    148<H>  |  111<H>  |  21  ----------------------------<  174<H>  4.8   |  32<H>  |  0.81    Ca    8.5      24 Feb 2024 07:22  Phos  3.3     02-24  Mg     2.7     02-24    TPro  7.5  /  Alb  2.6<L>  /  TBili  0.3  /  DBili  x   /  AST  135<H>  /  ALT  106<H>  /  AlkPhos  112  02-24    LFTs LIVER FUNCTIONS - ( 24 Feb 2024 07:22 )  Alb: 2.6 g/dL / Pro: 7.5 g/dL / ALK PHOS: 112 U/L / ALT: 106 U/L / AST: 135 U/L / GGT: x           Coagulopathy PT/INR - ( 24 Feb 2024 07:22 )   PT: 12.3 sec;   INR: 1.18 ratio         PTT - ( 24 Feb 2024 07:22 )  PTT:28.9 sec  Lipid Panel 02-24 Chol 90 LDL -- HDL 22<L> Trig 94, 02-14 Chol 92 LDL -- HDL 19<L> Trig 104, 02-10 Chol 226<H> LDL -- HDL 51 Trig 76  A1c   Cardiac enzymes     U/A Urinalysis Basic - ( 24 Feb 2024 07:22 )    Color: x / Appearance: x / SG: x / pH: x  Gluc: 174 mg/dL / Ketone: x  / Bili: x / Urobili: x   Blood: x / Protein: x / Nitrite: x   Leuk Esterase: x / RBC: x / WBC x   Sq Epi: x / Non Sq Epi: x / Bacteria: x      CSF  Immunological  Other    STUDIES & IMAGING: (EEG, CT, MR, U/S, TTE/MILADIS): Neurology Progress Note    SUBJECTIVE/OBJECTIVE/INTERVAL EVENTS: Patient seen and examined at bedside w/ neuro attending and team.      Patient seen and examined at bedside w/ neuro attending and team. Patient slightly improved with minor head flexion, rotation, and extension noted. Patient able to move jaw.     INTERVAL HISTORY: 74 y/o male with multiple comorbidities presenting initially with suspicious of basilar stroke however, neurological examination more consistent with GBS. 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 Elevated sugars overnight requiring insulin drip. Patient received 5 days of PLEX completed ]2/20. Patient to undergoingIVIG due to poor improvement with PLEX.    MEDICATIONS  (STANDING):  acetaminophen   Oral Liquid .. 650 milliGRAM(s) Oral every 24 hours  atorvastatin 80 milliGRAM(s) Oral at bedtime  chlorhexidine 0.12% Liquid 15 milliLiter(s) Oral Mucosa every 12 hours  cyanocobalamin 1000 MICROGram(s) Oral daily  diphenhydrAMINE 25 milliGRAM(s) Oral daily  enoxaparin Injectable 30 milliGRAM(s) SubCutaneous <User Schedule>  epoetin geeta-epbx (RETACRIT) Injectable 72188 Unit(s) SubCutaneous <User Schedule>  ergocalciferol Drops 80686 Unit(s) Oral <User Schedule>  ferrous    sulfate Liquid 300 milliGRAM(s) Enteral Tube <User Schedule>  folic acid 1 milliGRAM(s) Oral daily  gabapentin Solution 400 milliGRAM(s) Oral three times a day  immune   globulin 10% (GAMMAGARD) IVPB 35 Gram(s) IV Intermittent daily  insulin lispro (ADMELOG) corrective regimen sliding scale   SubCutaneous every 6 hours  insulin NPH human recombinant 30 Unit(s) SubCutaneous every 6 hours  iron sucrose IVPB 100 milliGRAM(s) IV Intermittent every 24 hours  mupirocin 2% Nasal 1 Application(s) Both Nostrils two times a day  pantoprazole   Suspension 40 milliGRAM(s) Oral daily  petrolatum Ophthalmic Ointment 1 Application(s) Both EYES three times a day  piperacillin/tazobactam IVPB.. 3.375 Gram(s) IV Intermittent every 8 hours  polyethylene glycol 3350 17 Gram(s) Oral every 12 hours  senna 1 Tablet(s) Oral at bedtime    MEDICATIONS  (PRN):  acetaminophen   Oral Liquid .. 650 milliGRAM(s) Oral every 6 hours PRN Mild Pain (1 - 3)  oxyCODONE    Solution 10 milliGRAM(s) Oral every 4 hours PRN Severe Pain (7 - 10)  oxyCODONE    Solution 5 milliGRAM(s) Oral every 4 hours PRN Moderate Pain (4 - 6)  sodium chloride 0.9% lock flush 10 milliLiter(s) IV Push every 1 hour PRN Pre/post blood products, medications, blood draw, and to maintain line patency      VITALS & EXAMINATION:  Vital Signs Last 24 Hrs  T(C): 36.8 (24 Feb 2024 17:10), Max: 38.1 (23 Feb 2024 20:13)  T(F): 98.2 (24 Feb 2024 17:10), Max: 100.6 (23 Feb 2024 20:13)  HR: 78 (24 Feb 2024 17:10) (68 - 100)  BP: 152/68 (24 Feb 2024 17:10) (110/56 - 219/108)  BP(mean): 110 (24 Feb 2024 16:15) (110 - 130)  RR: 18 (24 Feb 2024 17:10) (18 - 22)  SpO2: 98% (24 Feb 2024 17:10) (89% - 100%)    Parameters below as of 24 Feb 2024 17:10  Patient On (Oxygen Delivery Method): ventilator    O2 Concentration (%): 40    Limited due to intubation and neurological disease process  Neurological (>12):  MS: Awake. responds to yes or no questions with b/l feet movement  Language: no verbal output  CNs: PERRL (R 2mm, L 2mm) sluggish reactive. VFF minimally. eyes are conjugate and straight head. patient is suffering from opthalmoplegia, EOM are limited. right eye able to move more to commands. Right eye can look up and attempt left and right (stays relatively midline for horizontal movement attempts). No voluntary control of eyelids (b/l ptosis)    Motor - Normal bulk and flaccid tone throughout.     Neck ext/flex 1-2/5  L/R (out of 5 each)       Deltoid  0/0    Biceps   0/0      Triceps  0/0              1/1  L/R (out of 5 each)       Hip Flexion  0/0    Hip Extension  0/0  Knee Extension  0/0  Dorsiflexion  2/2      Plantar Flexion 2/2     Sensation: Intact to LT b/l x4 extremities.   Reflexes L/R:  Biceps(C5) 2/2  BR(C6) 2/2   Triceps(C7)  2/2 Patellar(L4)   0/0   Ankles 0/0  Coordination: unable to assess  Gait: deferred, unable to assess    LABORATORY:  CBC                       8.2    12.11 )-----------( 261      ( 24 Feb 2024 07:23 )             28.6     Chem 02-24    148<H>  |  111<H>  |  21  ----------------------------<  174<H>  4.8   |  32<H>  |  0.81    Ca    8.5      24 Feb 2024 07:22  Phos  3.3     02-24  Mg     2.7     02-24    TPro  7.5  /  Alb  2.6<L>  /  TBili  0.3  /  DBili  x   /  AST  135<H>  /  ALT  106<H>  /  AlkPhos  112  02-24    LFTs LIVER FUNCTIONS - ( 24 Feb 2024 07:22 )  Alb: 2.6 g/dL / Pro: 7.5 g/dL / ALK PHOS: 112 U/L / ALT: 106 U/L / AST: 135 U/L / GGT: x           Coagulopathy PT/INR - ( 24 Feb 2024 07:22 )   PT: 12.3 sec;   INR: 1.18 ratio         PTT - ( 24 Feb 2024 07:22 )  PTT:28.9 sec  Lipid Panel 02-24 Chol 90 LDL -- HDL 22<L> Trig 94, 02-14 Chol 92 LDL -- HDL 19<L> Trig 104, 02-10 Chol 226<H> LDL -- HDL 51 Trig 76  A1c   Cardiac enzymes     U/A Urinalysis Basic - ( 24 Feb 2024 07:22 )    Color: x / Appearance: x / SG: x / pH: x  Gluc: 174 mg/dL / Ketone: x  / Bili: x / Urobili: x   Blood: x / Protein: x / Nitrite: x   Leuk Esterase: x / RBC: x / WBC x   Sq Epi: x / Non Sq Epi: x / Bacteria: x      CSF  Immunological  Other    STUDIES & IMAGING: (EEG, CT, MR, U/S, TTE/MILADIS):

## 2024-02-24 NOTE — PROGRESS NOTE ADULT - ASSESSMENT
ASSESSMENT:   Assessment: 74 y/o male with multiple comorbidities presenting initially with suspicious of basilar stroke however, neurological examination more consistent with GBS. 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 Elevated sugars overnight requiring insulin drip. Patient received 5 days of PLEX completed  2/20. Patient undergoing IVIG due to poor improvement with PLEX on day 3/5 2/23.     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 s/p PLEX. Started IVIG.      Recommendations:  [/] IVIG 5 sessions (day 4/5)   [x]PLEX Day 5 out of 5 today. 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 B12 >2000. Folate >20. Vit D 25-OH 14.3.B1wnl (148.7), B6 wnl (17.2), copper elevated (156)  [x] TTE EF 66%, no PFO, no LV thrombus  [x] CSF studies:  oligoclonal bands (-), myelin basic protein (3.3), CSF ACE (-), VDRL (-), VZV PCR (-), HSV 1/2 PCR (-),Ds DNA (-), PAULINA 9-), CMV (-). Royce bar(-), WNV (-)  [x] cytology, flow cytometry: insufficient cells    Case seen and discussed with Neurology Attending

## 2024-02-24 NOTE — PROGRESS NOTE ADULT - ATTENDING COMMENTS
Patient seen and examined at bedside. During my evaluation, patient's wife and son were present and I appreciate that. Patient wife and son reported he is tired today as compared yesterday and also he was transferred from NICU to RCU.     I agree with the findings and plan as documented in the NP's note except below.    Exam: Patient able to move head, open the mouth, feet on command and minimal trace movement at bilateral hands, otherwise no movements at rest of extremities to noxious stimuli or command.    Mr. Ashley is a 73 year old unknown handed  man with PMHx of multiple vascular risk factors and other medical problems who presented to [facility] ER due to the ataxia and left facial droop, initially concerning for stroke and w/up notable for basilar stenosis. Transferred to [facility] and quickly developed worsening dysarthria, dysphagia, and weakness. He was intubated and taken to angiogram on 2/11 showed moderate stenosis but no intervention done. MRI with small b/l cerebellar strokes (postprocedural after angiography) and prior L thalamic strokes, although these symptoms would not explain pt's current clinical deficits and progress nature.   Per NSICU team, the patient had been having progressive weakness and numbness of the extremities for the preceding 1-2 weeks following URI. GD1b found to be 110. 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 Completed 5 sessions of PLEX on 02/20/2024 . Clinically patient is making progress on daily basis.   Currently getting IVIG and schedule for fourth dose today.   Continue medical management, neuro- check and fall precaution.  GI and DVT prophylaxis.    Patient is at high risk for complications and morbidity or mortality. There is high probability of imminent or life threatening deterioration in the patient's condition.  Patient is unable or incompetent to participate in giving a history and/or making decisions and discussion is necessary for determining treatment decisions.  I discussed the diagnosis and treatment plan with the patient's wife. All questions and concerns were addressed.   My cumulative time taking care of this critically ill patient is 35 minutes  If you have any further questions, please do not hesitate to contact our team.  Thank you for allowing us to participate in this patient care.

## 2024-02-25 LAB
ANION GAP SERPL CALC-SCNC: 6 MMOL/L — SIGNIFICANT CHANGE UP (ref 5–17)
APTT BLD: 31.5 SEC — SIGNIFICANT CHANGE UP (ref 24.5–35.6)
BUN SERPL-MCNC: 23 MG/DL — SIGNIFICANT CHANGE UP (ref 7–23)
CALCIUM SERPL-MCNC: 8.5 MG/DL — SIGNIFICANT CHANGE UP (ref 8.4–10.5)
CHLORIDE SERPL-SCNC: 110 MMOL/L — HIGH (ref 96–108)
CO2 SERPL-SCNC: 34 MMOL/L — HIGH (ref 22–31)
CREAT SERPL-MCNC: 0.88 MG/DL — SIGNIFICANT CHANGE UP (ref 0.5–1.3)
CULTURE RESULTS: SIGNIFICANT CHANGE UP
CULTURE RESULTS: SIGNIFICANT CHANGE UP
EGFR: 91 ML/MIN/1.73M2 — SIGNIFICANT CHANGE UP
GLUCOSE BLDC GLUCOMTR-MCNC: 100 MG/DL — HIGH (ref 70–99)
GLUCOSE BLDC GLUCOMTR-MCNC: 104 MG/DL — HIGH (ref 70–99)
GLUCOSE BLDC GLUCOMTR-MCNC: 109 MG/DL — HIGH (ref 70–99)
GLUCOSE BLDC GLUCOMTR-MCNC: 110 MG/DL — HIGH (ref 70–99)
GLUCOSE BLDC GLUCOMTR-MCNC: 115 MG/DL — HIGH (ref 70–99)
GLUCOSE SERPL-MCNC: 103 MG/DL — HIGH (ref 70–99)
HCT VFR BLD CALC: 29.3 % — LOW (ref 39–50)
HGB BLD-MCNC: 7.9 G/DL — LOW (ref 13–17)
INR BLD: 1.12 RATIO — SIGNIFICANT CHANGE UP (ref 0.85–1.18)
MAGNESIUM SERPL-MCNC: 2.7 MG/DL — HIGH (ref 1.6–2.6)
MCHC RBC-ENTMCNC: 22.3 PG — LOW (ref 27–34)
MCHC RBC-ENTMCNC: 27 GM/DL — LOW (ref 32–36)
MCV RBC AUTO: 82.8 FL — SIGNIFICANT CHANGE UP (ref 80–100)
NRBC # BLD: 3 /100 WBCS — HIGH (ref 0–0)
PHOSPHATE SERPL-MCNC: 3.3 MG/DL — SIGNIFICANT CHANGE UP (ref 2.5–4.5)
PLATELET # BLD AUTO: 269 K/UL — SIGNIFICANT CHANGE UP (ref 150–400)
POTASSIUM SERPL-MCNC: 4.7 MMOL/L — SIGNIFICANT CHANGE UP (ref 3.5–5.3)
POTASSIUM SERPL-SCNC: 4.7 MMOL/L — SIGNIFICANT CHANGE UP (ref 3.5–5.3)
PROTHROM AB SERPL-ACNC: 12.3 SEC — SIGNIFICANT CHANGE UP (ref 9.5–13)
RBC # BLD: 3.54 M/UL — LOW (ref 4.2–5.8)
RBC # FLD: 21.5 % — HIGH (ref 10.3–14.5)
SODIUM SERPL-SCNC: 150 MMOL/L — HIGH (ref 135–145)
SPECIMEN SOURCE: SIGNIFICANT CHANGE UP
SPECIMEN SOURCE: SIGNIFICANT CHANGE UP
WBC # BLD: 11.55 K/UL — HIGH (ref 3.8–10.5)
WBC # FLD AUTO: 11.55 K/UL — HIGH (ref 3.8–10.5)

## 2024-02-25 PROCEDURE — 99291 CRITICAL CARE FIRST HOUR: CPT | Mod: GC

## 2024-02-25 PROCEDURE — 99232 SBSQ HOSP IP/OBS MODERATE 35: CPT

## 2024-02-25 RX ORDER — CHLORHEXIDINE GLUCONATE 213 G/1000ML
1 SOLUTION TOPICAL DAILY
Refills: 0 | Status: DISCONTINUED | OUTPATIENT
Start: 2024-02-25 | End: 2024-04-11

## 2024-02-25 RX ORDER — HUMAN INSULIN 100 [IU]/ML
10 INJECTION, SUSPENSION SUBCUTANEOUS ONCE
Refills: 0 | Status: COMPLETED | OUTPATIENT
Start: 2024-02-25 | End: 2024-02-25

## 2024-02-25 RX ORDER — PIPERACILLIN AND TAZOBACTAM 4; .5 G/20ML; G/20ML
3.38 INJECTION, POWDER, LYOPHILIZED, FOR SOLUTION INTRAVENOUS EVERY 8 HOURS
Refills: 0 | Status: COMPLETED | OUTPATIENT
Start: 2024-02-25 | End: 2024-02-28

## 2024-02-25 RX ORDER — HUMAN INSULIN 100 [IU]/ML
20 INJECTION, SUSPENSION SUBCUTANEOUS EVERY 6 HOURS
Refills: 0 | Status: DISCONTINUED | OUTPATIENT
Start: 2024-02-25 | End: 2024-02-27

## 2024-02-25 RX ADMIN — PREGABALIN 1000 MICROGRAM(S): 225 CAPSULE ORAL at 12:22

## 2024-02-25 RX ADMIN — PANTOPRAZOLE SODIUM 40 MILLIGRAM(S): 20 TABLET, DELAYED RELEASE ORAL at 12:22

## 2024-02-25 RX ADMIN — ERYTHROPOIETIN 20000 UNIT(S): 10000 INJECTION, SOLUTION INTRAVENOUS; SUBCUTANEOUS at 14:30

## 2024-02-25 RX ADMIN — POLYETHYLENE GLYCOL 3350 17 GRAM(S): 17 POWDER, FOR SOLUTION ORAL at 18:38

## 2024-02-25 RX ADMIN — MUPIROCIN 1 APPLICATION(S): 20 OINTMENT TOPICAL at 18:38

## 2024-02-25 RX ADMIN — SENNA PLUS 1 TABLET(S): 8.6 TABLET ORAL at 23:01

## 2024-02-25 RX ADMIN — HUMAN INSULIN 30 UNIT(S): 100 INJECTION, SUSPENSION SUBCUTANEOUS at 12:23

## 2024-02-25 RX ADMIN — IMMUNE GLOBULIN (HUMAN) 58.33 GRAM(S): 10 INJECTION INTRAVENOUS; SUBCUTANEOUS at 12:53

## 2024-02-25 RX ADMIN — HUMAN INSULIN 30 UNIT(S): 100 INJECTION, SUSPENSION SUBCUTANEOUS at 05:53

## 2024-02-25 RX ADMIN — GABAPENTIN 400 MILLIGRAM(S): 400 CAPSULE ORAL at 15:04

## 2024-02-25 RX ADMIN — ATORVASTATIN CALCIUM 80 MILLIGRAM(S): 80 TABLET, FILM COATED ORAL at 23:01

## 2024-02-25 RX ADMIN — Medication 1 APPLICATION(S): at 22:54

## 2024-02-25 RX ADMIN — PIPERACILLIN AND TAZOBACTAM 25 GRAM(S): 4; .5 INJECTION, POWDER, LYOPHILIZED, FOR SOLUTION INTRAVENOUS at 05:57

## 2024-02-25 RX ADMIN — HUMAN INSULIN 10 UNIT(S): 100 INJECTION, SUSPENSION SUBCUTANEOUS at 18:37

## 2024-02-25 RX ADMIN — PIPERACILLIN AND TAZOBACTAM 25 GRAM(S): 4; .5 INJECTION, POWDER, LYOPHILIZED, FOR SOLUTION INTRAVENOUS at 15:03

## 2024-02-25 RX ADMIN — Medication 1 MILLIGRAM(S): at 12:22

## 2024-02-25 RX ADMIN — CHLORHEXIDINE GLUCONATE 15 MILLILITER(S): 213 SOLUTION TOPICAL at 05:54

## 2024-02-25 RX ADMIN — CHLORHEXIDINE GLUCONATE 1 APPLICATION(S): 213 SOLUTION TOPICAL at 11:30

## 2024-02-25 RX ADMIN — GABAPENTIN 400 MILLIGRAM(S): 400 CAPSULE ORAL at 06:15

## 2024-02-25 RX ADMIN — CHLORHEXIDINE GLUCONATE 15 MILLILITER(S): 213 SOLUTION TOPICAL at 18:55

## 2024-02-25 RX ADMIN — Medication 1 APPLICATION(S): at 15:04

## 2024-02-25 RX ADMIN — GABAPENTIN 400 MILLIGRAM(S): 400 CAPSULE ORAL at 23:00

## 2024-02-25 RX ADMIN — Medication 1 APPLICATION(S): at 05:53

## 2024-02-25 RX ADMIN — HUMAN INSULIN 30 UNIT(S): 100 INJECTION, SUSPENSION SUBCUTANEOUS at 00:05

## 2024-02-25 RX ADMIN — Medication 25 MILLIGRAM(S): at 12:22

## 2024-02-25 RX ADMIN — ENOXAPARIN SODIUM 30 MILLIGRAM(S): 100 INJECTION SUBCUTANEOUS at 18:55

## 2024-02-25 RX ADMIN — PIPERACILLIN AND TAZOBACTAM 25 GRAM(S): 4; .5 INJECTION, POWDER, LYOPHILIZED, FOR SOLUTION INTRAVENOUS at 23:01

## 2024-02-25 RX ADMIN — Medication 650 MILLIGRAM(S): at 12:21

## 2024-02-25 RX ADMIN — MUPIROCIN 1 APPLICATION(S): 20 OINTMENT TOPICAL at 05:54

## 2024-02-25 RX ADMIN — IRON SUCROSE 210 MILLIGRAM(S): 20 INJECTION, SOLUTION INTRAVENOUS at 18:55

## 2024-02-25 NOTE — PROGRESS NOTE ADULT - ATTENDING COMMENTS
Patient seen and examined at bedside. During my evaluation, patient's daughter and nurse were present and I appreciate that. As per primary team, he failed weaning trial.      I agree with the findings and plan as documented in the NP's note except below.    Exam: Patient able to move head, open the mouth, feet on command and minimal trace movement at bilateral hands, otherwise no movements at rest of extremities to noxious stimuli or command.    Mr. Ashley is a 73 year old unknown handed  man with PMHx of multiple vascular risk factors and other medical problems who presented to [facility] ER due to the ataxia and left facial droop, initially concerning for stroke and w/up notable for basilar stenosis. Transferred to [facility] and quickly developed worsening dysarthria, dysphagia, and weakness. He was intubated and taken to angiogram on 2/11 showed moderate stenosis but no intervention done. MRI with small b/l cerebellar strokes (postprocedural after angiography) and prior L thalamic strokes, although these symptoms would not explain pt's current clinical deficits and progress nature.   Per NSICU team, the patient had been having progressive weakness and numbness of the extremities for the preceding 1-2 weeks following URI. GD1b found to be 110. 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 Completed 5 sessions of PLEX on 02/20/2024 . Clinically patient remains unchanged as compared yesterday.   Currently getting IVIG and schedule for fifth dose today.   Continue medical management, neuro- check and fall precaution.  GI and DVT prophylaxis.    Patient is at high risk for complications and morbidity or mortality. There is high probability of imminent or life threatening deterioration in the patient's condition.  Patient is unable or incompetent to participate in giving a history and/or making decisions and discussion is necessary for determining treatment decisions.  I discussed the diagnosis and treatment plan with the patient's daughter. All questions and concerns were addressed.   My cumulative time taking care of this critically ill patient is 30 minutes  If you have any further questions, please do not hesitate to contact our team.  Thank you for allowing us to participate in this patient care.

## 2024-02-25 NOTE — PROGRESS NOTE ADULT - PROBLEM SELECTOR PLAN 11
- Patients Wife Margarette and son Rubio provided extensive medical update at bedside this morning - Patients Wife Margarette and daughter provided extensive medical update at bedside this morning

## 2024-02-25 NOTE — PROGRESS NOTE ADULT - PROBLEM SELECTOR PLAN 9
- free water adjusted to 250 cc q 4hrs  - Monitor BMP - free water increased 300 cc q 4hrs  - Monitor BMP

## 2024-02-25 NOTE — PROGRESS NOTE ADULT - PROBLEM SELECTOR PLAN 5
- Patient with hx of HTN Prior to admission   - As per wife was on Atenolol /Chlorthiazide 50mg/25mg daily   - Patient remains with labile BP  - Will monitor BP and cont Hydralazine IVP PRN  - HLD: Cont Atorvastatin - Patient with hx of HTN Prior to admission   - As per wife was on Atenolol /Chlorthiazide 50mg/25mg daily   - Patient remains with labile BP  - Will monitor BP and cont Hydralazine IVP PRN  - Received Hydralazine 5 mg IVP on 2/25   - HLD: Cont Atorvastatin

## 2024-02-25 NOTE — PROGRESS NOTE ADULT - ASSESSMENT
ASSESSMENT:   72 y/o male with multiple comorbidities presenting initially with suspicious of basilar stroke however, neurological examination more consistent with GBS. 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 Elevated sugars overnight requiring insulin drip. Patient received 5 days of PLEX completed  2/20. Patient undergoing IVIG due to poor improvement with PLEX on day 3/5 2/23.     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 s/p PLEX. Started IVIG.    Recommendations:  [/] IVIG 5 sessions (day 5/5)   [x]PLEX Day 5 out of 5 today. 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 B12 >2000. Folate >20. Vit D 25-OH 14.3.B1wnl (148.7), B6 wnl (17.2), copper elevated (156)  [x] TTE EF 66%, no PFO, no LV thrombus  [x] CSF studies:  oligoclonal bands (-), myelin basic protein (3.3), CSF ACE (-), VDRL (-), VZV PCR (-), HSV 1/2 PCR (-),Ds DNA (-), PAULINA 9-), CMV (-). Royce bar(-), WNV (-)  [x] cytology, flow cytometry: insufficient cells    Seen and discussed with neurology attending.

## 2024-02-25 NOTE — PROGRESS NOTE ADULT - PROBLEM SELECTOR PLAN 4
- Patient Febrile on 2/23   - BAL 2/21: PSA   - Blood cxs 2/23 Sent ( Results Pending)   - UA 2/23: Negative   - Currently remains on Zosyn will treat x 7 days ( Ends 2/28) - Patient Febrile on 2/23   - BAL 2/21: PSA   - Blood cxs 2/23: NGTD   - UA 2/23: Negative   - Currently remains on Zosyn will treat x 7 days ( Ends 2/28)

## 2024-02-25 NOTE — PROGRESS NOTE ADULT - PROBLEM SELECTOR PLAN 3
- Patient with Sympathetic Storming in NSCU   - Patient with Labile BP HTN and Hypotensive; S/p Pressors   - Continue Gabapentin TID - Patient with Sympathetic Storming in NSCU   - Patient with Labile BP HTN and Hypotensive Episodes; S/p Pressors   - Continue Gabapentin TID

## 2024-02-25 NOTE — PROGRESS NOTE ADULT - PROBLEM SELECTOR PLAN 2
- Patient S/p Tracheostomy # 8 Cuffed Portex on 2/16 ( )  - CTA Chest 2/20: No PE, Complete Atelectasis b/l lower lobes    - Currently remains on Mechanical Ventilation   - Will continue weaning attempts as tolerated  - Continue Chest PT and Suctioning PRN - Patient S/p Tracheostomy # 8 Cuffed Portex on 2/16 ( )  - CTA Chest 2/20: No PE, Complete Atelectasis b/l lower lobes    - Currently remains on Mechanical Ventilation   - Did not tolerate weaning yesterday in setting of Bradycardia   - Will continue weaning attempts as tolerated  - Continue Chest PT and Suctioning PRN - Patient S/p Tracheostomy # 8 Cuffed Portex on 2/16 ( )  - CTA Chest 2/20: No PE, Complete Atelectasis b/l lower lobes    - Currently remains on Mechanical Ventilation   - Did not tolerate weaning yesterday in setting of Bradycardia /Hypercarbia  - Will continue weaning attempts as tolerated  - Continue Chest PT and Suctioning PRN

## 2024-02-25 NOTE — PROGRESS NOTE ADULT - SUBJECTIVE AND OBJECTIVE BOX
Patient is a 73y old  Male who presents with a chief complaint of Stroke, critical stenosis, evaluation for angiography (24 Feb 2024 18:01)      Interval Events: Patient with Hypoxia overnight; O2 increased to 100% and PEEP Increased to 10      REVIEW OF SYSTEMS:  [ ] Positive  [ ] All other systems negative  [x] Unable to assess ROS because patient is Non-Verbal     Vital Signs Last 24 Hrs  T(C): 36.7 (02-25-24 @ 03:54), Max: 37.1 (02-24-24 @ 12:00)  T(F): 98 (02-25-24 @ 03:54), Max: 98.8 (02-24-24 @ 12:00)  HR: 90 (02-25-24 @ 06:55) (68 - 90)  BP: 137/69 (02-25-24 @ 03:54) (110/56 - 167/70)  RR: 20 (02-25-24 @ 03:54) (18 - 20)  SpO2: 99% (02-25-24 @ 06:55) (95% - 100%)    PHYSICAL EXAM:  HEENT:   [x]Tracheostomy: # 8 Cuffed Portex   [ ]Pupils equal  [ ]No oral lesions  [ ]Abnormal    SKIN  [x]No Rash  [ ] Abnormal  [ ] pressure    CARDIAC  [x]Regular  [ ]Abnormal    PULMONARY  [ ]Bilateral Clear Breath Sounds  [ ]Normal Excursion  [x]Abnormal: Bilateral Coarse Breath sounds decreased at bases b/l     GI  [x]PEG      [x] +BS		              [x]Soft, nondistended, nontender	  [ ]Abnormal    MUSCULOSKELETAL                                   [x]Bedbound                 [ ]Abnormal    [ ]Ambulatory/OOB to chair                           EXTREMITIES                                         [ ]Normal  [x]Edema: + Upper and Lower extremity edema bilaterally                           NEUROLOGIC  [ ] Normal, non focal  [x] Focal findings: Intermittently opens eyes, following commands with Feet ( Rt foot "yes" / Lft foot "No")    PSYCHIATRIC  [ ]Alert and appropriate  [x] Sedated	 [ ]Agitated    :  [place]: [ ] Yes, if yes: Date of Placement:                   [x] No    LINES: Central Lines [x] Yes, if yes: Date of Placement: RT IJ Shiley placed 2/12 / LUE PICC Placed 2/14                                      [  ] No    HOSPITAL MEDICATIONS:  MEDICATIONS  (STANDING):  acetaminophen   Oral Liquid .. 650 milliGRAM(s) Oral every 24 hours  atorvastatin 80 milliGRAM(s) Oral at bedtime  chlorhexidine 0.12% Liquid 15 milliLiter(s) Oral Mucosa every 12 hours  chlorhexidine 4% Liquid 1 Application(s) Topical daily  cyanocobalamin 1000 MICROGram(s) Oral daily  diphenhydrAMINE 25 milliGRAM(s) Oral daily  enoxaparin Injectable 30 milliGRAM(s) SubCutaneous <User Schedule>  epoetin geeta-epbx (RETACRIT) Injectable 87445 Unit(s) SubCutaneous <User Schedule>  ergocalciferol Drops 95234 Unit(s) Oral <User Schedule>  ferrous    sulfate Liquid 300 milliGRAM(s) Enteral Tube <User Schedule>  folic acid 1 milliGRAM(s) Oral daily  gabapentin Solution 400 milliGRAM(s) Oral three times a day  immune   globulin 10% (GAMMAGARD) IVPB 35 Gram(s) IV Intermittent daily  insulin lispro (ADMELOG) corrective regimen sliding scale   SubCutaneous every 6 hours  insulin NPH human recombinant 30 Unit(s) SubCutaneous every 6 hours  iron sucrose IVPB 100 milliGRAM(s) IV Intermittent every 24 hours  mupirocin 2% Nasal 1 Application(s) Both Nostrils two times a day  pantoprazole   Suspension 40 milliGRAM(s) Oral daily  petrolatum Ophthalmic Ointment 1 Application(s) Both EYES three times a day  piperacillin/tazobactam IVPB.. 3.375 Gram(s) IV Intermittent every 8 hours  polyethylene glycol 3350 17 Gram(s) Oral every 12 hours  senna 1 Tablet(s) Oral at bedtime    MEDICATIONS  (PRN):  acetaminophen   Oral Liquid .. 650 milliGRAM(s) Oral every 6 hours PRN Mild Pain (1 - 3)  sodium chloride 0.9% lock flush 10 milliLiter(s) IV Push every 1 hour PRN Pre/post blood products, medications, blood draw, and to maintain line patency      LABS:                        8.2    12.11 )-----------( 261      ( 24 Feb 2024 07:23 )             28.6     02-24    148<H>  |  111<H>  |  21  ----------------------------<  174<H>  4.8   |  32<H>  |  0.81    Ca    8.5      24 Feb 2024 07:22  Phos  3.3     02-24  Mg     2.7     02-24    TPro  7.5  /  Alb  2.6<L>  /  TBili  0.3  /  DBili  x   /  AST  135<H>  /  ALT  106<H>  /  AlkPhos  112  02-24    PT/INR - ( 24 Feb 2024 07:22 )   PT: 12.3 sec;   INR: 1.18 ratio         PTT - ( 24 Feb 2024 07:22 )  PTT:28.9 sec  Urinalysis Basic - ( 24 Feb 2024 07:22 )    Color: x / Appearance: x / SG: x / pH: x  Gluc: 174 mg/dL / Ketone: x  / Bili: x / Urobili: x   Blood: x / Protein: x / Nitrite: x   Leuk Esterase: x / RBC: x / WBC x   Sq Epi: x / Non Sq Epi: x / Bacteria: x      Arterial Blood Gas:  02-24 @ 18:03  7.45/51/104/35/98.4/9.7  ABG lactate: --  Arterial Blood Gas:  02-24 @ 14:52  7.34/65/81/35/96.5/7.0  ABG lactate: --      CAPILLARY BLOOD GLUCOSE    MICROBIOLOGY:     RADIOLOGY:  [ ] Reviewed and interpreted by me    Mode: AC/ CMV (Assist Control/ Continuous Mandatory Ventilation)  RR (machine): 20  TV (machine): 500  FiO2: 40  PEEP: 7  ITime: 1  MAP: 15  PIP: 24   Patient is a 73y old  Male who presents with a chief complaint of Stroke, critical stenosis, evaluation for angiography (24 Feb 2024 18:01)      Interval Events: Patient with Hypoxia overnight; O2 increased to 100% and PEEP Increased to 10 as per overnight ACP     REVIEW OF SYSTEMS:  [ ] Positive  [ ] All other systems negative  [x] Unable to assess ROS because patient is Non-Verbal     Vital Signs Last 24 Hrs  T(C): 36.7 (02-25-24 @ 03:54), Max: 37.1 (02-24-24 @ 12:00)  T(F): 98 (02-25-24 @ 03:54), Max: 98.8 (02-24-24 @ 12:00)  HR: 90 (02-25-24 @ 06:55) (68 - 90)  BP: 137/69 (02-25-24 @ 03:54) (110/56 - 167/70)  RR: 20 (02-25-24 @ 03:54) (18 - 20)  SpO2: 99% (02-25-24 @ 06:55) (95% - 100%)    PHYSICAL EXAM:  HEENT:   [x]Tracheostomy: # 8 Cuffed Portex   [ ]Pupils equal  [ ]No oral lesions  [ ]Abnormal    SKIN  [x]No Rash  [ ] Abnormal  [ ] pressure    CARDIAC  [x]Regular  [ ]Abnormal    PULMONARY  [ ]Bilateral Clear Breath Sounds  [ ]Normal Excursion  [x]Abnormal: Bilateral Coarse Breath sounds decreased at bases b/l     GI  [x]PEG      [x] +BS		              [x]Soft, nondistended, nontender	  [ ]Abnormal    MUSCULOSKELETAL                                   [x]Bedbound                 [ ]Abnormal    [ ]Ambulatory/OOB to chair                           EXTREMITIES                                         [ ]Normal  [x]Edema: + Upper and Lower extremity edema bilaterally                           NEUROLOGIC  [ ] Normal, non focal  [x] Focal findings: Intermittently opens eyes, following commands with Feet ( Rt foot "yes" / Lft foot "No")    PSYCHIATRIC  [ ]Alert and appropriate  [x] Sedated	 [ ]Agitated    :  [place]: [ ] Yes, if yes: Date of Placement:                   [x] No    LINES: Central Lines [x] Yes, if yes: Date of Placement:  TASHAE PICC Placed 2/14                                      [  ] No    HOSPITAL MEDICATIONS:  MEDICATIONS  (STANDING):  acetaminophen   Oral Liquid .. 650 milliGRAM(s) Oral every 24 hours  atorvastatin 80 milliGRAM(s) Oral at bedtime  chlorhexidine 0.12% Liquid 15 milliLiter(s) Oral Mucosa every 12 hours  chlorhexidine 4% Liquid 1 Application(s) Topical daily  cyanocobalamin 1000 MICROGram(s) Oral daily  diphenhydrAMINE 25 milliGRAM(s) Oral daily  enoxaparin Injectable 30 milliGRAM(s) SubCutaneous <User Schedule>  epoetin geeta-epbx (RETACRIT) Injectable 71051 Unit(s) SubCutaneous <User Schedule>  ergocalciferol Drops 57707 Unit(s) Oral <User Schedule>  ferrous    sulfate Liquid 300 milliGRAM(s) Enteral Tube <User Schedule>  folic acid 1 milliGRAM(s) Oral daily  gabapentin Solution 400 milliGRAM(s) Oral three times a day  immune   globulin 10% (GAMMAGARD) IVPB 35 Gram(s) IV Intermittent daily  insulin lispro (ADMELOG) corrective regimen sliding scale   SubCutaneous every 6 hours  insulin NPH human recombinant 30 Unit(s) SubCutaneous every 6 hours  iron sucrose IVPB 100 milliGRAM(s) IV Intermittent every 24 hours  mupirocin 2% Nasal 1 Application(s) Both Nostrils two times a day  pantoprazole   Suspension 40 milliGRAM(s) Oral daily  petrolatum Ophthalmic Ointment 1 Application(s) Both EYES three times a day  piperacillin/tazobactam IVPB.. 3.375 Gram(s) IV Intermittent every 8 hours  polyethylene glycol 3350 17 Gram(s) Oral every 12 hours  senna 1 Tablet(s) Oral at bedtime    MEDICATIONS  (PRN):  acetaminophen   Oral Liquid .. 650 milliGRAM(s) Oral every 6 hours PRN Mild Pain (1 - 3)  sodium chloride 0.9% lock flush 10 milliLiter(s) IV Push every 1 hour PRN Pre/post blood products, medications, blood draw, and to maintain line patency      LABS:                        8.2    12.11 )-----------( 261      ( 24 Feb 2024 07:23 )             28.6     02-24    148<H>  |  111<H>  |  21  ----------------------------<  174<H>  4.8   |  32<H>  |  0.81    Ca    8.5      24 Feb 2024 07:22  Phos  3.3     02-24  Mg     2.7     02-24    TPro  7.5  /  Alb  2.6<L>  /  TBili  0.3  /  DBili  x   /  AST  135<H>  /  ALT  106<H>  /  AlkPhos  112  02-24    PT/INR - ( 24 Feb 2024 07:22 )   PT: 12.3 sec;   INR: 1.18 ratio         PTT - ( 24 Feb 2024 07:22 )  PTT:28.9 sec  Urinalysis Basic - ( 24 Feb 2024 07:22 )    Color: x / Appearance: x / SG: x / pH: x  Gluc: 174 mg/dL / Ketone: x  / Bili: x / Urobili: x   Blood: x / Protein: x / Nitrite: x   Leuk Esterase: x / RBC: x / WBC x   Sq Epi: x / Non Sq Epi: x / Bacteria: x      Arterial Blood Gas:  02-24 @ 18:03  7.45/51/104/35/98.4/9.7  ABG lactate: --  Arterial Blood Gas:  02-24 @ 14:52  7.34/65/81/35/96.5/7.0  ABG lactate: --      CAPILLARY BLOOD GLUCOSE    MICROBIOLOGY:     RADIOLOGY:  [ ] Reviewed and interpreted by me    Mode: AC/ CMV (Assist Control/ Continuous Mandatory Ventilation)  RR (machine): 20  TV (machine): 500  FiO2: 40  PEEP: 7  ITime: 1  MAP: 15  PIP: 24

## 2024-02-25 NOTE — PROGRESS NOTE ADULT - PROBLEM SELECTOR PLAN 1
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

## 2024-02-25 NOTE — PROGRESS NOTE ADULT - ASSESSMENT
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 Prior to admission patient had Viral URI ( 1-2 weeks prior to admission) with subsequent weakness. He underwent an angiogram (2/11) which showed moderate stenosis and no intervention was done. MRI Performed c/w small small bilateral cerebellar strokes and prior L thalamic strokes, as per Neurology was not c/w current presentation. 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 He is currently s/p 5 rounds of plasma exchange (2/13-2/20) and will receive IVIG x 5 ( 2/21-2/26)  His hospital course was complicated by progressive respiratory failure. CT Chest performed on 2/20 with atelectasis of b/l lower lobes. BAL 2/21 Growing PSA Treating with course of Zosyn.     2/24: Patient transferred to RCU overnight. Patient with elevated BP Overnight was treated with IVP Hydralazine with improved BP. Patient febrile yesterday in NSCU; Blood cxs sent ( Results pending), UA 2/23; Negative, Sputum cx growing PSA currently remains on Zosyn. Patient with hypernatremia will increase free water to 250 cc q 4 hrs. Patient remains on MV did not tolerate CPAP this morning due to bradycardia. Will adjust Vent settings to promote weaning; RR Decreased to 16, PEEP dec to 5 and FIO2 Dec to 40 %. Will Repeat ABG on 1 hr. Reached out to Neurology to determine if further PLEX will be performed if not will plan to remove Shiley ( Awaiting response). Pt will receive 4 th dose of IVIG today.  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 Prior to admission patient had Viral URI ( 1-2 weeks prior to admission) with subsequent weakness. He underwent an angiogram (2/11) which showed moderate stenosis and no intervention was done. MRI Performed c/w small small bilateral cerebellar strokes and prior L thalamic strokes, as per Neurology was not c/w current presentation. 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 He is currently s/p 5 rounds of plasma exchange (2/13-2/20) and will receive IVIG x 5 ( 2/21-2/26)  His hospital course was complicated by progressive respiratory failure. CT Chest performed on 2/20 with atelectasis of b/l lower lobes. BAL 2/21 Growing PSA Treating with course of Zosyn.     2/24: Patient transferred to RCU overnight. Patient with elevated BP Overnight was treated with IVP Hydralazine with improved BP. Patient febrile yesterday in NSCU; Blood cxs sent ( Results pending), UA 2/23; Negative, Sputum cx growing PSA currently remains on Zosyn. Patient with hypernatremia will increase free water to 250 cc q 4 hrs. Patient remains on MV did not tolerate CPAP this morning due to bradycardia. Will adjust Vent settings to promote weaning; RR Decreased to 16, PEEP dec to 5 and FIO2 Dec to 40 %. Will Repeat ABG on 1 hr. Reached out to Neurology to determine if further PLEX will be performed if not will plan to remove Shiley ( Awaiting response). Pt will receive 4 th dose of IVIG today.   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 Prior to admission patient had Viral URI ( 1-2 weeks prior to admission) with subsequent weakness. He underwent an angiogram (2/11) which showed moderate stenosis and no intervention was done. MRI Performed c/w small small bilateral cerebellar strokes and prior L thalamic strokes, as per Neurology was not c/w current presentation. 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 He is currently s/p 5 rounds of plasma exchange (2/13-2/20) and will receive IVIG x 5 ( 2/21-2/26)  His hospital course was complicated by progressive respiratory failure. CT Chest performed on 2/20 with atelectasis of b/l lower lobes. BAL 2/21 Growing PSA Treating with course of Zosyn.     2/25: Vent setting attempted to be made yesterday during the day RR decreased to 16 and PEEP decreased to 5, Repeat ABG with hypercarbia and pt was placed back to initial RR of 20. Patient with reported Hypoxemia overnight 88%; FIO2 Was temporarily increased to 100% and PEEP was increased to 7. Patient ambu and Lavaged by respiratory with improved hypoxemia. Patients Vent settings adjusted Back to FIO2 of 40% and PEEP remains the same at 7. Patient S/p 4 th session of IVIG session yesterday and will receive 5th dose today. Case d/w Neurology yesterday no further plans for PLEX, RT IJ Shiley Catheter removed at bedside this morning. Hemostasis achieved with manual compression for 15 mins, pressure dressing applied. Patient remains with Hypernatremia; Freee water increased to 300 cc q 4 hrs and Zosyn reconstitute Changed to D5 fluids.

## 2024-02-25 NOTE — PROGRESS NOTE ADULT - NS ATTEND AMEND GEN_ALL_CORE FT
I have made amendments to the documentation where necessary. Additional comments: as above:  73 year old male with hypertension, hyperlipidemia, diabetes, prior CVA's without residual deficits who initially presented with concern for stroke-like symptoms s/p unrevealing angiogram and ultimately found to have clinical picture most concerning for Ding Parham Variant of GBS.     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) s/p PLEX x 5  - IVIG planned for 5 doses D3/5  - Appreciate neurology input      - Pain control with oxy    #Pulmonary:   Mixed respiratory failure s/p tracheostomy  - Wean vent as able, daily PSV trail, vent weaning per protocol  - Would optimize with airway clearance, likely will have difficulty mobilizing secretions in the setting of profound weakness: Albuterol neb followed by hypertonic saline followed by use of IPV and chest PT with deep suctioning    #GI: S/P PEG   - VD, tube feeds, bowel regimen    #Endo:     DM (A1C 6.8)   - ISS, goal of euglycemia     #Heme/Onc:    Chronic anemia              - Patient/Family would like to be asked before blood products    #ID: Pseudomonas pneumonia  - Would plan for 7 day course of antibiotics-zosyn-as of 2/21-2/28    DVT ppx  Dispo--DC planning to rehab facility pending progress  Rome Alberto MD-Pulmonary   [phone].     Time-based billing (NON-critical care).     75 minutes spent on total encounter. The necessity of the time spent during the encounter on this date of service was due to:     Review of patient records, including history, laboratory data, imaging. Patient evaluation and assessment. Coordination of care. Time excludes teaching. as above: hypoxemia --vent adjustments made--40% and peep 7  73 year old male with hypertension, hyperlipidemia, diabetes, prior CVA's without residual deficits who initially presented with concern for stroke-like symptoms s/p unrevealing angiogram and ultimately found to have clinical picture most concerning for Ding Parham Variant of GBS.     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) s/p PLEX x 5  - IVIG planned for 5 doses D4/5  - Appreciate neurology input      - Pain control with oxy    #Pulmonary:   Mixed respiratory failure s/p tracheostomy-AC/CMV-500/20/40%/7-pressures normal  - Wean vent as able, daily PSV trail, vent weaning per protocol  - Would optimize with airway clearance, likely will have difficulty mobilizing secretions in the setting of profound weakness: Albuterol neb followed by hypertonic saline followed by use of IPV and chest PT with deep suctioning    #GI: S/P PEG   - VD, tube feeds, bowel regimen    #Endo:     DM (A1C 6.8)   - ISS, goal of euglycemia     #Heme/Onc:    Chronic anemia              - Patient/Family would like to be asked before blood products    #ID: Pseudomonas pneumonia  - Would plan for 7 day course of antibiotics-zosyn-as of 2/21-2/28    DVT ppx  Dispo--DC planning to rehab facility pending progress  Rome Alberto MD-Pulmonary   [phone].     Time-based billing (NON-critical care).     75 minutes spent on total encounter. The necessity of the time spent during the encounter on this date of service was due to:     Review of patient records, including history, laboratory data, imaging. Patient evaluation and assessment. Coordination of care. Time excludes teaching.

## 2024-02-25 NOTE — PROGRESS NOTE ADULT - PROBLEM SELECTOR PLAN 7
- Patient Pentecostalism  - Family would like to be asked prior to administration of blood products   - S/p 1 unit PRBCs on 2/22  - Cont Venofer ( 2/22-2/27)   - Cont Cyanocobalamine, Folic acid, Ferrous Sulfate  - Continue Epogen - Patient Confucianist  - Family would like to be asked prior to administration of blood products   - S/p 1 unit PRBCs on 2/22  - Cont Venofer ( 2/22-2/27)   - Cont Cyanocobalamine, Folic acid, Ferrous Sulfate  - Continue Epogen  - Will attempt to limit phlebotomy once labs stabilize

## 2024-02-25 NOTE — PROGRESS NOTE ADULT - SUBJECTIVE AND OBJECTIVE BOX
Neurology Progress Note    SUBJECTIVE/OBJECTIVE/INTERVAL EVENTS: Patient seen and examined at bedside w/ neuro attending and team.     INTERVAL HISTORY: Hypoxia overnight; Shiley dced.    REVIEW OF SYSTEMS: DANIAL    VITALS & EXAMINATION:  Vital Signs Last 24 Hrs  T(C): 36.7 (25 Feb 2024 13:10), Max: 36.9 (24 Feb 2024 18:34)  T(F): 98.1 (25 Feb 2024 13:10), Max: 98.4 (24 Feb 2024 18:34)  HR: 70 (25 Feb 2024 13:10) (69 - 90)  BP: 134/60 (25 Feb 2024 13:10) (110/56 - 156/71)  BP(mean): 94 (24 Feb 2024 20:00) (94 - 110)  RR: 20 (25 Feb 2024 13:10) (18 - 20)  SpO2: 100% (25 Feb 2024 13:10) (94% - 100%)    Parameters below as of 25 Feb 2024 13:10  Patient On (Oxygen Delivery Method): ventilator    O2 Concentration (%): 40  Neurological (>12):  MS: Awake. responds to yes or no questions with b/l feet movement  Language: no verbal output  CNs: PERRL (R 2mm, L 2mm) sluggish reactive. VFF minimally. eyes are conjugate and straight head. patient is suffering from opthalmoplegia, EOM are limited. right eye able to move more to commands. Right eye can look up and attempt left and right (stays relatively midline for horizontal movement attempts). No voluntary control of eyelids (b/l ptosis)  Motor - Normal bulk and flaccid tone throughout.   Neck ext/flex 1-2/5  L/R (out of 5 each)       Deltoid  0/0    Biceps   0/0      Triceps  0/0              1/1  L/R (out of 5 each)       Hip Flexion  0/0    Hip Extension  0/0  Knee Extension  0/0  Dorsiflexion  2/2      Plantar Flexion 2/2   Sensation: Intact to LT b/l x4 extremities.   Reflexes L/R:  Biceps(C5) 2/2  BR(C6) 2/2   Triceps(C7)  2/2 Patellar(L4)   0/0   Ankles 0/0  Coordination: unable to assess  Gait: deferred, unable to assess      LABORATORY:  CBC                       7.9    11.55 )-----------( 269      ( 25 Feb 2024 07:40 )             29.3     Chem 02-25    150<H>  |  110<H>  |  23  ----------------------------<  103<H>  4.7   |  34<H>  |  0.88    Ca    8.5      25 Feb 2024 07:40  Phos  3.3     02-25  Mg     2.7     02-25    TPro  7.5  /  Alb  2.6<L>  /  TBili  0.3  /  DBili  x   /  AST  135<H>  /  ALT  106<H>  /  AlkPhos  112  02-24    LFTs LIVER FUNCTIONS - ( 24 Feb 2024 07:22 )  Alb: 2.6 g/dL / Pro: 7.5 g/dL / ALK PHOS: 112 U/L / ALT: 106 U/L / AST: 135 U/L / GGT: x           Coagulopathy PT/INR - ( 25 Feb 2024 07:40 )   PT: 12.3 sec;   INR: 1.12 ratio         PTT - ( 25 Feb 2024 07:40 )  PTT:31.5 sec  Lipid Panel 02-24 Chol 90 LDL -- HDL 22<L> Trig 94, 02-14 Chol 92 LDL -- HDL 19<L> Trig 104, 02-10 Chol 226<H> LDL -- HDL 51 Trig 76  A1c   Cardiac enzymes     U/A Urinalysis Basic - ( 25 Feb 2024 07:40 )    Color: x / Appearance: x / SG: x / pH: x  Gluc: 103 mg/dL / Ketone: x  / Bili: x / Urobili: x   Blood: x / Protein: x / Nitrite: x   Leuk Esterase: x / RBC: x / WBC x   Sq Epi: x / Non Sq Epi: x / Bacteria: x      CSF  Immunological  Other    STUDIES & IMAGING: (EEG, CT, MR, U/S, TTE/MILADIS):

## 2024-02-26 LAB
ANION GAP SERPL CALC-SCNC: 7 MMOL/L — SIGNIFICANT CHANGE UP (ref 5–17)
BUN SERPL-MCNC: 25 MG/DL — HIGH (ref 7–23)
CALCIUM SERPL-MCNC: 8.4 MG/DL — SIGNIFICANT CHANGE UP (ref 8.4–10.5)
CHLORIDE SERPL-SCNC: 107 MMOL/L — SIGNIFICANT CHANGE UP (ref 96–108)
CO2 SERPL-SCNC: 32 MMOL/L — HIGH (ref 22–31)
CREAT SERPL-MCNC: 0.86 MG/DL — SIGNIFICANT CHANGE UP (ref 0.5–1.3)
EGFR: 91 ML/MIN/1.73M2 — SIGNIFICANT CHANGE UP
GLUCOSE BLDC GLUCOMTR-MCNC: 121 MG/DL — HIGH (ref 70–99)
GLUCOSE BLDC GLUCOMTR-MCNC: 132 MG/DL — HIGH (ref 70–99)
GLUCOSE BLDC GLUCOMTR-MCNC: 134 MG/DL — HIGH (ref 70–99)
GLUCOSE BLDC GLUCOMTR-MCNC: 170 MG/DL — HIGH (ref 70–99)
GLUCOSE SERPL-MCNC: 126 MG/DL — HIGH (ref 70–99)
HCT VFR BLD CALC: 28.3 % — LOW (ref 39–50)
HGB BLD-MCNC: 7.9 G/DL — LOW (ref 13–17)
MAGNESIUM SERPL-MCNC: 2.4 MG/DL — SIGNIFICANT CHANGE UP (ref 1.6–2.6)
MCHC RBC-ENTMCNC: 23.3 PG — LOW (ref 27–34)
MCHC RBC-ENTMCNC: 27.9 GM/DL — LOW (ref 32–36)
MCV RBC AUTO: 83.5 FL — SIGNIFICANT CHANGE UP (ref 80–100)
NRBC # BLD: 2 /100 WBCS — HIGH (ref 0–0)
PHOSPHATE SERPL-MCNC: 3.6 MG/DL — SIGNIFICANT CHANGE UP (ref 2.5–4.5)
PLATELET # BLD AUTO: 271 K/UL — SIGNIFICANT CHANGE UP (ref 150–400)
POTASSIUM SERPL-MCNC: 4.4 MMOL/L — SIGNIFICANT CHANGE UP (ref 3.5–5.3)
POTASSIUM SERPL-SCNC: 4.4 MMOL/L — SIGNIFICANT CHANGE UP (ref 3.5–5.3)
RBC # BLD: 3.39 M/UL — LOW (ref 4.2–5.8)
RBC # FLD: 22.4 % — HIGH (ref 10.3–14.5)
SODIUM SERPL-SCNC: 146 MMOL/L — HIGH (ref 135–145)
WBC # BLD: 9.83 K/UL — SIGNIFICANT CHANGE UP (ref 3.8–10.5)
WBC # FLD AUTO: 9.83 K/UL — SIGNIFICANT CHANGE UP (ref 3.8–10.5)

## 2024-02-26 PROCEDURE — 99233 SBSQ HOSP IP/OBS HIGH 50: CPT

## 2024-02-26 RX ORDER — IPRATROPIUM/ALBUTEROL SULFATE 18-103MCG
3 AEROSOL WITH ADAPTER (GRAM) INHALATION EVERY 6 HOURS
Refills: 0 | Status: DISCONTINUED | OUTPATIENT
Start: 2024-02-26 | End: 2024-03-03

## 2024-02-26 RX ORDER — ASPIRIN/CALCIUM CARB/MAGNESIUM 324 MG
81 TABLET ORAL DAILY
Refills: 0 | Status: DISCONTINUED | OUTPATIENT
Start: 2024-02-26 | End: 2024-03-03

## 2024-02-26 RX ADMIN — MUPIROCIN 1 APPLICATION(S): 20 OINTMENT TOPICAL at 18:02

## 2024-02-26 RX ADMIN — IRON SUCROSE 210 MILLIGRAM(S): 20 INJECTION, SOLUTION INTRAVENOUS at 18:01

## 2024-02-26 RX ADMIN — CHLORHEXIDINE GLUCONATE 15 MILLILITER(S): 213 SOLUTION TOPICAL at 05:22

## 2024-02-26 RX ADMIN — PANTOPRAZOLE SODIUM 40 MILLIGRAM(S): 20 TABLET, DELAYED RELEASE ORAL at 12:05

## 2024-02-26 RX ADMIN — Medication 1 MILLIGRAM(S): at 12:05

## 2024-02-26 RX ADMIN — Medication 1 APPLICATION(S): at 13:57

## 2024-02-26 RX ADMIN — ENOXAPARIN SODIUM 30 MILLIGRAM(S): 100 INJECTION SUBCUTANEOUS at 18:06

## 2024-02-26 RX ADMIN — ERYTHROPOIETIN 20000 UNIT(S): 10000 INJECTION, SOLUTION INTRAVENOUS; SUBCUTANEOUS at 14:09

## 2024-02-26 RX ADMIN — PIPERACILLIN AND TAZOBACTAM 25 GRAM(S): 4; .5 INJECTION, POWDER, LYOPHILIZED, FOR SOLUTION INTRAVENOUS at 13:58

## 2024-02-26 RX ADMIN — HUMAN INSULIN 20 UNIT(S): 100 INJECTION, SUSPENSION SUBCUTANEOUS at 18:02

## 2024-02-26 RX ADMIN — Medication 1 APPLICATION(S): at 22:10

## 2024-02-26 RX ADMIN — ATORVASTATIN CALCIUM 80 MILLIGRAM(S): 80 TABLET, FILM COATED ORAL at 22:10

## 2024-02-26 RX ADMIN — Medication 3 MILLILITER(S): at 23:51

## 2024-02-26 RX ADMIN — CHLORHEXIDINE GLUCONATE 15 MILLILITER(S): 213 SOLUTION TOPICAL at 18:06

## 2024-02-26 RX ADMIN — GABAPENTIN 400 MILLIGRAM(S): 400 CAPSULE ORAL at 13:57

## 2024-02-26 RX ADMIN — GABAPENTIN 400 MILLIGRAM(S): 400 CAPSULE ORAL at 22:09

## 2024-02-26 RX ADMIN — PREGABALIN 1000 MICROGRAM(S): 225 CAPSULE ORAL at 12:06

## 2024-02-26 RX ADMIN — POLYETHYLENE GLYCOL 3350 17 GRAM(S): 17 POWDER, FOR SOLUTION ORAL at 05:28

## 2024-02-26 RX ADMIN — Medication 300 MILLIGRAM(S): at 03:22

## 2024-02-26 RX ADMIN — Medication 3 MILLILITER(S): at 18:09

## 2024-02-26 RX ADMIN — SENNA PLUS 1 TABLET(S): 8.6 TABLET ORAL at 22:10

## 2024-02-26 RX ADMIN — HUMAN INSULIN 20 UNIT(S): 100 INJECTION, SUSPENSION SUBCUTANEOUS at 06:41

## 2024-02-26 RX ADMIN — ENOXAPARIN SODIUM 30 MILLIGRAM(S): 100 INJECTION SUBCUTANEOUS at 05:29

## 2024-02-26 RX ADMIN — CHLORHEXIDINE GLUCONATE 1 APPLICATION(S): 213 SOLUTION TOPICAL at 10:20

## 2024-02-26 RX ADMIN — GABAPENTIN 400 MILLIGRAM(S): 400 CAPSULE ORAL at 05:28

## 2024-02-26 RX ADMIN — Medication 1 APPLICATION(S): at 05:21

## 2024-02-26 RX ADMIN — POLYETHYLENE GLYCOL 3350 17 GRAM(S): 17 POWDER, FOR SOLUTION ORAL at 18:03

## 2024-02-26 RX ADMIN — MUPIROCIN 1 APPLICATION(S): 20 OINTMENT TOPICAL at 05:21

## 2024-02-26 RX ADMIN — PIPERACILLIN AND TAZOBACTAM 25 GRAM(S): 4; .5 INJECTION, POWDER, LYOPHILIZED, FOR SOLUTION INTRAVENOUS at 05:28

## 2024-02-26 RX ADMIN — PIPERACILLIN AND TAZOBACTAM 25 GRAM(S): 4; .5 INJECTION, POWDER, LYOPHILIZED, FOR SOLUTION INTRAVENOUS at 22:09

## 2024-02-26 RX ADMIN — HUMAN INSULIN 20 UNIT(S): 100 INJECTION, SUSPENSION SUBCUTANEOUS at 12:05

## 2024-02-26 RX ADMIN — Medication 2: at 18:02

## 2024-02-26 NOTE — PROGRESS NOTE ADULT - NS ATTEND AMEND GEN_ALL_CORE FT
Patient is a 74 yo M w/ HTN, HLD, T2DM prior CVA's without residual deficits who initially presented with concern for stroke-like symptoms s/p unrevealing angiogram and ultimately found to have clinical picture most concerning for Ding Par[place] Variant of GBS.     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 Inconsistently following commands by moving L or R sides for yes or no  - f/u neuro recs. Will clarify need for DAPT, was on previously then stopped possibly due to anemia?  #Pulmonary - Mixed respiratory failure s/p tracheostomy. Currently on PRVC 20/500/7/40%. Not triggering breaths on PS trial this AM  - Add Duonebs q6h  #GI - Oropharyngeal dysphagia S/P PEG   - c/w tube feeds via PEG  #Endocrine  - c/w NPH and ISS, titrate as needed  #Heme/Onc - Chronic anemia                - Patient/Family would like to be asked before blood products  #ID - Pseudomonas pneumonia  - Zosyn through 2/28  #DVT ppx  - Lovenox    Gary Giang MD  Pulmonary & Critical Care Patient is a 74 yo M w/ HTN, HLD, T2DM prior CVA's without residual deficits who initially presented with concern for stroke-like symptoms s/p unrevealing angiogram and ultimately found to have clinical picture most concerning for Ding [place] Variant of GBS.     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 Inconsistently following commands by moving L or R sides for yes or no  - f/u neuro recs. Was on DAPT which was eventually stopped due to reported to episode of blood clots in airway  - Resumed ASA yesterday. Will clarify with neuro if Plavix is necessary  #Pulmonary - Mixed respiratory failure s/p tracheostomy. Currently on PRVC 20/500/7/40%. Not triggering breaths on PS trial this AM  - Add Duonebs q6h  #GI - Oropharyngeal dysphagia S/P PEG   - c/w tube feeds via PEG  #Endocrine  - c/w NPH and ISS, titrate as needed  #Heme/Onc - Chronic anemia                - Patient/Family would like to be asked before blood products  #ID - Pseudomonas pneumonia  - Zosyn through 2/28  #DVT ppx  - Lovenox    Gary Giang MD  Pulmonary & Critical Care

## 2024-02-26 NOTE — PROGRESS NOTE ADULT - PROBLEM SELECTOR PLAN 7
- Patient Congregational  - Family would like to be asked prior to administration of blood products   - S/p 1 unit PRBCs on 2/22  - Cont Venofer ( 2/22-2/27)   - Cont Cyanocobalamine, Folic acid, Ferrous Sulfate  - Continue Epogen  - Will attempt to limit phlebotomy once labs stabilize

## 2024-02-26 NOTE — PROGRESS NOTE ADULT - PROBLEM SELECTOR PLAN 4
- Patient Febrile on 2/23   - BAL 2/21: PSA   - Blood cxs 2/23: NGTD   - UA 2/23: Negative   - Currently remains on Zosyn will treat x 7 days ( Ends 2/28) - Patient Febrile on 2/23   - BAL 2/21: + PSA   - Blood cxs 2/23: NGTD   - UA 2/23: Negative   - Currently remains on Zosyn will treat x 7 days ( Ends 2/28) - Patient Febrile on 2/23   - BAL 2/21: + PSA   - Blood cxs 2/23: NGTD   - UA 2/23: Negative   - PSA pneumonia- currently on Zosyn x 7 days ( Ends 2/28)

## 2024-02-26 NOTE — PROGRESS NOTE ADULT - ASSESSMENT
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 Prior to admission patient had Viral URI ( 1-2 weeks prior to admission) with subsequent weakness. He underwent an angiogram (2/11) which showed moderate stenosis and no intervention was done. MRI Performed c/w small small bilateral cerebellar strokes and prior L thalamic strokes, as per Neurology was not c/w current presentation. 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 He is currently s/p 5 rounds of plasma exchange (2/13-2/20) and will receive IVIG x 5 ( 2/21-2/26)  His hospital course was complicated by progressive respiratory failure. CT Chest performed on 2/20 with atelectasis of b/l lower lobes. BAL 2/21 Growing PSA Treating with course of Zosyn.     2/25: Vent setting attempted to be made yesterday during the day RR decreased to 16 and PEEP decreased to 5, Repeat ABG with hypercarbia and pt was placed back to initial RR of 20. Patient with reported Hypoxemia overnight 88%; FIO2 Was temporarily increased to 100% and PEEP was increased to 7. Patient ambu and Lavaged by respiratory with improved hypoxemia. Patients Vent settings adjusted Back to FIO2 of 40% and PEEP remains the same at 7. Patient S/p 4 th session of IVIG session yesterday and will receive 5th dose today. Case d/w Neurology yesterday no further plans for PLEX, RT IJ Shiley Catheter removed at bedside this morning. Hemostasis achieved with manual compression for 15 mins, pressure dressing applied. Patient remains with Hypernatremia; Freee water increased to 300 cc q 4 hrs and Zosyn reconstitute Changed to D5 fluids.   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 Prior to admission patient had Viral URI ( 1-2 weeks prior to admission) with subsequent weakness. He underwent an angiogram (2/11) which showed moderate stenosis and no intervention was done. MRI Performed c/w small small bilateral cerebellar strokes and prior L thalamic strokes, as per Neurology was not c/w current presentation. 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 He is currently s/p 5 rounds of plasma exchange (2/13-2/20) and will receive IVIG x 5 ( 2/21-2/26)  His hospital course was complicated by progressive respiratory failure. CT Chest performed on 2/20 with atelectasis of b/l lower lobes. BAL 2/21 Growing PSA Treating with course of Zosyn.     2/26: Stable overnight on Zosyn for PSA pneumonia, will likely resume ASA tonight as recc by neurology patient will need to be on ASA/Plavix. Trend H/H daily. Hypernatremia improving.

## 2024-02-26 NOTE — PROGRESS NOTE ADULT - PROBLEM SELECTOR PLAN 1
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

## 2024-02-26 NOTE — PROGRESS NOTE ADULT - PROBLEM SELECTOR PLAN 3
- Patient with Sympathetic Storming in NSCU   - Patient with Labile BP HTN and Hypotensive Episodes; S/p Pressors   - Continue Gabapentin TID

## 2024-02-26 NOTE — PROGRESS NOTE ADULT - PROBLEM SELECTOR PLAN 2
- Patient S/p Tracheostomy # 8 Cuffed Portex on 2/16 ( )  - CTA Chest 2/20: No PE, Complete Atelectasis b/l lower lobes    - Currently remains on Mechanical Ventilation   - Did not tolerate weaning yesterday in setting of Bradycardia /Hypercarbia  - Will continue weaning attempts as tolerated  - Continue Chest PT and Suctioning PRN

## 2024-02-26 NOTE — PROGRESS NOTE ADULT - SUBJECTIVE AND OBJECTIVE BOX
Patient is a 73y old  Male who presents with a chief complaint of Stroke, critical stenosis, evaluation for angiography (25 Feb 2024 14:38)      Interval Events:    REVIEW OF SYSTEMS:  [ ] Positive  [ ] All other systems negative  [ ] Unable to assess ROS because ________    Vital Signs Last 24 Hrs  T(C): 37 (02-26-24 @ 06:12), Max: 37.1 (02-25-24 @ 17:43)  T(F): 98.6 (02-26-24 @ 06:12), Max: 98.8 (02-25-24 @ 17:43)  HR: 80 (02-26-24 @ 06:12) (67 - 84)  BP: 177/75 (02-26-24 @ 06:12) (108/55 - 177/75)  RR: 18 (02-26-24 @ 06:12) (18 - 20)  SpO2: 100% (02-26-24 @ 06:12) (94% - 100%)PHYSICAL EXAM:  HEENT:   [ ]Tracheostomy:  [ ]Pupils equal  [ ]No oral lesions  [ ]Abnormal        SKIN  [ ]No Rash  [ ] Abnormal  [ ] pressure    CARDIAC  [ ]Regular  [ ]Abnormal    PULMONARY  [ ]Bilateral Clear Breath Sounds  [ ]Normal Excursion  [ ]Abnormal    GI  [ ]PEG      [ ] +BS		              [ ]Soft, nondistended, nontender	  [ ]Abnormal    MUSCULOSKELETAL                                   [ ]Bedbound                 [ ]Abnormal    [ ]Ambulatory/OOB to chair                           EXTREMITIES                                         [ ]Normal  [ ]Edema                           NEUROLOGIC  [ ] Normal, non focal  [ ] Focal findings:    PSYCHIATRIC  [ ]Alert and appropriate  [ ] Sedated	 [ ]Agitated    :  [place]: [ ] Yes, if yes: Date of Placement:                   [  ] No    LINES: Central Lines [ ] Yes, if yes: Date of Placement                                     [  ] No    HOSPITAL MEDICATIONS:  MEDICATIONS  (STANDING):  atorvastatin 80 milliGRAM(s) Oral at bedtime  chlorhexidine 0.12% Liquid 15 milliLiter(s) Oral Mucosa every 12 hours  chlorhexidine 4% Liquid 1 Application(s) Topical daily  cyanocobalamin 1000 MICROGram(s) Oral daily  enoxaparin Injectable 30 milliGRAM(s) SubCutaneous <User Schedule>  epoetin geeta-epbx (RETACRIT) Injectable 56728 Unit(s) SubCutaneous <User Schedule>  ergocalciferol Drops 74399 Unit(s) Oral <User Schedule>  ferrous    sulfate Liquid 300 milliGRAM(s) Enteral Tube <User Schedule>  folic acid 1 milliGRAM(s) Oral daily  gabapentin Solution 400 milliGRAM(s) Oral three times a day  insulin lispro (ADMELOG) corrective regimen sliding scale   SubCutaneous every 6 hours  insulin NPH human recombinant 20 Unit(s) SubCutaneous every 6 hours  iron sucrose IVPB 100 milliGRAM(s) IV Intermittent every 24 hours  mupirocin 2% Nasal 1 Application(s) Both Nostrils two times a day  pantoprazole   Suspension 40 milliGRAM(s) Oral daily  petrolatum Ophthalmic Ointment 1 Application(s) Both EYES three times a day  piperacillin/tazobactam IVPB.. 3.375 Gram(s) IV Intermittent every 8 hours  polyethylene glycol 3350 17 Gram(s) Oral every 12 hours  senna 1 Tablet(s) Oral at bedtime    MEDICATIONS  (PRN):  acetaminophen   Oral Liquid .. 650 milliGRAM(s) Oral every 6 hours PRN Mild Pain (1 - 3)  sodium chloride 0.9% lock flush 10 milliLiter(s) IV Push every 1 hour PRN Pre/post blood products, medications, blood draw, and to maintain line patency      LABS:                        7.9    9.83  )-----------( 271      ( 26 Feb 2024 07:01 )             28.3     02-26    146<H>  |  107  |  25<H>  ----------------------------<  126<H>  4.4   |  32<H>  |  0.86    Ca    8.4      26 Feb 2024 07:01  Phos  3.6     02-26  Mg     2.4     02-26      PT/INR - ( 25 Feb 2024 07:40 )   PT: 12.3 sec;   INR: 1.12 ratio         PTT - ( 25 Feb 2024 07:40 )  PTT:31.5 sec  Urinalysis Basic - ( 26 Feb 2024 07:01 )    Color: x / Appearance: x / SG: x / pH: x  Gluc: 126 mg/dL / Ketone: x  / Bili: x / Urobili: x   Blood: x / Protein: x / Nitrite: x   Leuk Esterase: x / RBC: x / WBC x   Sq Epi: x / Non Sq Epi: x / Bacteria: x      Arterial Blood Gas:  02-24 @ 18:03  7.45/51/104/35/98.4/9.7  ABG lactate: --  Arterial Blood Gas:  02-24 @ 14:52  7.34/65/81/35/96.5/7.0  ABG lactate: --      CAPILLARY BLOOD GLUCOSE    MICROBIOLOGY:     RADIOLOGY:  [ ] Reviewed and interpreted by me    Mode: AC/ CMV (Assist Control/ Continuous Mandatory Ventilation)  RR (machine): 20  TV (machine): 500  FiO2: 40  PEEP: 7  ITime: 1  MAP: 12  PIP: 20   Patient is a 73y old  Male who presents with a chief complaint of Stroke, critical stenosis, evaluation for angiography (25 Feb 2024 14:38)      Interval Events: Stable overnight on Full vent support, Fio2 unchanged     REVIEW OF SYSTEMS:  [ ] Positive  [ ] All other systems negative  [x ] Unable to assess ROS because __Nonverbal/trach to vent ______    Vital Signs Last 24 Hrs  T(C): 37 (02-26-24 @ 06:12), Max: 37.1 (02-25-24 @ 17:43)  T(F): 98.6 (02-26-24 @ 06:12), Max: 98.8 (02-25-24 @ 17:43)  HR: 80 (02-26-24 @ 06:12) (67 - 84)  BP: 177/75 (02-26-24 @ 06:12) (108/55 - 177/75)  RR: 18 (02-26-24 @ 06:12) (18 - 20)  SpO2: 100% (02-26-24 @ 06:12) (94% - 100%)    PHYSICAL EXAM:    HEENT:   [x]Tracheostomy: # 8 Cuffed Portex   [ ]Pupils equal  [ ]No oral lesions  [ ]Abnormal    SKIN  [x]No Rash  [ ] Abnormal  [ ] pressure    CARDIAC  [x]Regular  [ ]Abnormal    PULMONARY  [ ]Bilateral Clear Breath Sounds  [ ]Normal Excursion  [x]Abnormal: Mild Coarse Breath bilaterally      GI  [x]PEG site c/d/I     [x] +BS		              [x]Soft, nondistended, nontender	  [ ]Abnormal    MUSCULOSKELETAL                                   [x]Bedbound                 [ ]Abnormal    [ ]Ambulatory/OOB to chair                           EXTREMITIES                                         [ ]Normal  [x]Edema: + Upper and Lower extremity edema bilaterally                           NEUROLOGIC  [ ] Normal, non focal  [x] Focal findings: Intermittently opens eyes, Minimally responsive however following commands with Feet ( Rt foot "yes" / Lft foot "No")    PSYCHIATRIC  [x ] UTO, Minimally responsive   [ ] Sedated	 [ ]Agitated    :  [place]: [ ] Yes, if yes: Date of Placement:                   [x] No    LINES: Central Lines [x] Yes, if yes: Date of Placement:  LUE PICC Placed 2/14                                      [  ] No    HOSPITAL MEDICATIONS:  MEDICATIONS  (STANDING):  atorvastatin 80 milliGRAM(s) Oral at bedtime  chlorhexidine 0.12% Liquid 15 milliLiter(s) Oral Mucosa every 12 hours  chlorhexidine 4% Liquid 1 Application(s) Topical daily  cyanocobalamin 1000 MICROGram(s) Oral daily  enoxaparin Injectable 30 milliGRAM(s) SubCutaneous <User Schedule>  epoetin geeta-epbx (RETACRIT) Injectable 62036 Unit(s) SubCutaneous <User Schedule>  ergocalciferol Drops 61765 Unit(s) Oral <User Schedule>  ferrous    sulfate Liquid 300 milliGRAM(s) Enteral Tube <User Schedule>  folic acid 1 milliGRAM(s) Oral daily  gabapentin Solution 400 milliGRAM(s) Oral three times a day  insulin lispro (ADMELOG) corrective regimen sliding scale   SubCutaneous every 6 hours  insulin NPH human recombinant 20 Unit(s) SubCutaneous every 6 hours  iron sucrose IVPB 100 milliGRAM(s) IV Intermittent every 24 hours  mupirocin 2% Nasal 1 Application(s) Both Nostrils two times a day  pantoprazole   Suspension 40 milliGRAM(s) Oral daily  petrolatum Ophthalmic Ointment 1 Application(s) Both EYES three times a day  piperacillin/tazobactam IVPB.. 3.375 Gram(s) IV Intermittent every 8 hours  polyethylene glycol 3350 17 Gram(s) Oral every 12 hours  senna 1 Tablet(s) Oral at bedtime    MEDICATIONS  (PRN):  acetaminophen   Oral Liquid .. 650 milliGRAM(s) Oral every 6 hours PRN Mild Pain (1 - 3)  sodium chloride 0.9% lock flush 10 milliLiter(s) IV Push every 1 hour PRN Pre/post blood products, medications, blood draw, and to maintain line patency      LABS:                        7.9    9.83  )-----------( 271      ( 26 Feb 2024 07:01 )             28.3     02-26    146<H>  |  107  |  25<H>  ----------------------------<  126<H>  4.4   |  32<H>  |  0.86    Ca    8.4      26 Feb 2024 07:01  Phos  3.6     02-26  Mg     2.4     02-26      PT/INR - ( 25 Feb 2024 07:40 )   PT: 12.3 sec;   INR: 1.12 ratio         PTT - ( 25 Feb 2024 07:40 )  PTT:31.5 sec  Urinalysis Basic - ( 26 Feb 2024 07:01 )    Color: x / Appearance: x / SG: x / pH: x  Gluc: 126 mg/dL / Ketone: x  / Bili: x / Urobili: x   Blood: x / Protein: x / Nitrite: x   Leuk Esterase: x / RBC: x / WBC x   Sq Epi: x / Non Sq Epi: x / Bacteria: x      Arterial Blood Gas:  02-24 @ 18:03  7.45/51/104/35/98.4/9.7  ABG lactate: --  Arterial Blood Gas:  02-24 @ 14:52  7.34/65/81/35/96.5/7.0  ABG lactate: --      CAPILLARY BLOOD GLUCOSE    MICROBIOLOGY:     RADIOLOGY:  [ ] Reviewed and interpreted by me    Mode: AC/ CMV (Assist Control/ Continuous Mandatory Ventilation)  RR (machine): 20  TV (machine): 500  FiO2: 40  PEEP: 7  ITime: 1  MAP: 12  PIP: 20

## 2024-02-26 NOTE — PROGRESS NOTE ADULT - PROBLEM SELECTOR PLAN 5
- Patient with hx of HTN Prior to admission   - As per wife was on Atenolol /Chlorthiazide 50mg/25mg daily   - Patient remains with labile BP  - Will monitor BP and cont Hydralazine IVP PRN  - Received Hydralazine 5 mg IVP on 2/25   - HLD: Cont Atorvastatin

## 2024-02-27 LAB
ANION GAP SERPL CALC-SCNC: 10 MMOL/L — SIGNIFICANT CHANGE UP (ref 5–17)
BUN SERPL-MCNC: 24 MG/DL — HIGH (ref 7–23)
CALCIUM SERPL-MCNC: 8.7 MG/DL — SIGNIFICANT CHANGE UP (ref 8.4–10.5)
CHLORIDE SERPL-SCNC: 107 MMOL/L — SIGNIFICANT CHANGE UP (ref 96–108)
CO2 SERPL-SCNC: 29 MMOL/L — SIGNIFICANT CHANGE UP (ref 22–31)
CREAT SERPL-MCNC: 0.82 MG/DL — SIGNIFICANT CHANGE UP (ref 0.5–1.3)
EGFR: 93 ML/MIN/1.73M2 — SIGNIFICANT CHANGE UP
GLUCOSE BLDC GLUCOMTR-MCNC: 114 MG/DL — HIGH (ref 70–99)
GLUCOSE BLDC GLUCOMTR-MCNC: 150 MG/DL — HIGH (ref 70–99)
GLUCOSE BLDC GLUCOMTR-MCNC: 81 MG/DL — SIGNIFICANT CHANGE UP (ref 70–99)
GLUCOSE BLDC GLUCOMTR-MCNC: 95 MG/DL — SIGNIFICANT CHANGE UP (ref 70–99)
GLUCOSE SERPL-MCNC: 115 MG/DL — HIGH (ref 70–99)
HCT VFR BLD CALC: 30.1 % — LOW (ref 39–50)
HGB BLD-MCNC: 8.3 G/DL — LOW (ref 13–17)
MAGNESIUM SERPL-MCNC: 2.4 MG/DL — SIGNIFICANT CHANGE UP (ref 1.6–2.6)
MCHC RBC-ENTMCNC: 23.1 PG — LOW (ref 27–34)
MCHC RBC-ENTMCNC: 27.6 GM/DL — LOW (ref 32–36)
MCV RBC AUTO: 83.8 FL — SIGNIFICANT CHANGE UP (ref 80–100)
NRBC # BLD: 4 /100 WBCS — HIGH (ref 0–0)
PHOSPHATE SERPL-MCNC: 3.7 MG/DL — SIGNIFICANT CHANGE UP (ref 2.5–4.5)
PLATELET # BLD AUTO: 334 K/UL — SIGNIFICANT CHANGE UP (ref 150–400)
POTASSIUM SERPL-MCNC: 4.4 MMOL/L — SIGNIFICANT CHANGE UP (ref 3.5–5.3)
POTASSIUM SERPL-SCNC: 4.4 MMOL/L — SIGNIFICANT CHANGE UP (ref 3.5–5.3)
RBC # BLD: 3.59 M/UL — LOW (ref 4.2–5.8)
RBC # FLD: 22.8 % — HIGH (ref 10.3–14.5)
SODIUM SERPL-SCNC: 146 MMOL/L — HIGH (ref 135–145)
WBC # BLD: 10.68 K/UL — HIGH (ref 3.8–10.5)
WBC # FLD AUTO: 10.68 K/UL — HIGH (ref 3.8–10.5)

## 2024-02-27 PROCEDURE — 99233 SBSQ HOSP IP/OBS HIGH 50: CPT

## 2024-02-27 RX ORDER — DEXTROSE 50 % IN WATER 50 %
15 SYRINGE (ML) INTRAVENOUS ONCE
Refills: 0 | Status: DISCONTINUED | OUTPATIENT
Start: 2024-02-27 | End: 2024-03-03

## 2024-02-27 RX ORDER — HUMAN INSULIN 100 [IU]/ML
15 INJECTION, SUSPENSION SUBCUTANEOUS EVERY 6 HOURS
Refills: 0 | Status: DISCONTINUED | OUTPATIENT
Start: 2024-02-27 | End: 2024-03-03

## 2024-02-27 RX ORDER — HUMAN INSULIN 100 [IU]/ML
10 INJECTION, SUSPENSION SUBCUTANEOUS ONCE
Refills: 0 | Status: COMPLETED | OUTPATIENT
Start: 2024-02-27 | End: 2024-02-27

## 2024-02-27 RX ORDER — CLOPIDOGREL BISULFATE 75 MG/1
75 TABLET, FILM COATED ORAL DAILY
Refills: 0 | Status: DISCONTINUED | OUTPATIENT
Start: 2024-02-27 | End: 2024-03-03

## 2024-02-27 RX ADMIN — PIPERACILLIN AND TAZOBACTAM 25 GRAM(S): 4; .5 INJECTION, POWDER, LYOPHILIZED, FOR SOLUTION INTRAVENOUS at 13:40

## 2024-02-27 RX ADMIN — GABAPENTIN 400 MILLIGRAM(S): 400 CAPSULE ORAL at 05:41

## 2024-02-27 RX ADMIN — GABAPENTIN 400 MILLIGRAM(S): 400 CAPSULE ORAL at 13:39

## 2024-02-27 RX ADMIN — Medication 3 MILLILITER(S): at 17:51

## 2024-02-27 RX ADMIN — PANTOPRAZOLE SODIUM 40 MILLIGRAM(S): 20 TABLET, DELAYED RELEASE ORAL at 11:56

## 2024-02-27 RX ADMIN — PIPERACILLIN AND TAZOBACTAM 25 GRAM(S): 4; .5 INJECTION, POWDER, LYOPHILIZED, FOR SOLUTION INTRAVENOUS at 05:41

## 2024-02-27 RX ADMIN — PIPERACILLIN AND TAZOBACTAM 25 GRAM(S): 4; .5 INJECTION, POWDER, LYOPHILIZED, FOR SOLUTION INTRAVENOUS at 22:24

## 2024-02-27 RX ADMIN — GABAPENTIN 400 MILLIGRAM(S): 400 CAPSULE ORAL at 22:23

## 2024-02-27 RX ADMIN — HUMAN INSULIN 20 UNIT(S): 100 INJECTION, SUSPENSION SUBCUTANEOUS at 05:37

## 2024-02-27 RX ADMIN — ENOXAPARIN SODIUM 30 MILLIGRAM(S): 100 INJECTION SUBCUTANEOUS at 17:48

## 2024-02-27 RX ADMIN — POLYETHYLENE GLYCOL 3350 17 GRAM(S): 17 POWDER, FOR SOLUTION ORAL at 05:39

## 2024-02-27 RX ADMIN — Medication 3 MILLILITER(S): at 05:02

## 2024-02-27 RX ADMIN — CHLORHEXIDINE GLUCONATE 1 APPLICATION(S): 213 SOLUTION TOPICAL at 10:15

## 2024-02-27 RX ADMIN — POLYETHYLENE GLYCOL 3350 17 GRAM(S): 17 POWDER, FOR SOLUTION ORAL at 17:49

## 2024-02-27 RX ADMIN — Medication 1 APPLICATION(S): at 05:41

## 2024-02-27 RX ADMIN — HUMAN INSULIN 10 UNIT(S): 100 INJECTION, SUSPENSION SUBCUTANEOUS at 12:36

## 2024-02-27 RX ADMIN — Medication 1 APPLICATION(S): at 13:40

## 2024-02-27 RX ADMIN — HUMAN INSULIN 20 UNIT(S): 100 INJECTION, SUSPENSION SUBCUTANEOUS at 00:02

## 2024-02-27 RX ADMIN — CHLORHEXIDINE GLUCONATE 15 MILLILITER(S): 213 SOLUTION TOPICAL at 05:39

## 2024-02-27 RX ADMIN — ENOXAPARIN SODIUM 30 MILLIGRAM(S): 100 INJECTION SUBCUTANEOUS at 05:38

## 2024-02-27 RX ADMIN — MUPIROCIN 1 APPLICATION(S): 20 OINTMENT TOPICAL at 05:39

## 2024-02-27 RX ADMIN — ERGOCALCIFEROL 50000 UNIT(S): 1.25 CAPSULE ORAL at 05:39

## 2024-02-27 RX ADMIN — HUMAN INSULIN 15 UNIT(S): 100 INJECTION, SUSPENSION SUBCUTANEOUS at 18:45

## 2024-02-27 RX ADMIN — PREGABALIN 1000 MICROGRAM(S): 225 CAPSULE ORAL at 11:56

## 2024-02-27 RX ADMIN — Medication 3 MILLILITER(S): at 11:32

## 2024-02-27 RX ADMIN — Medication 1 APPLICATION(S): at 22:24

## 2024-02-27 RX ADMIN — ERYTHROPOIETIN 20000 UNIT(S): 10000 INJECTION, SOLUTION INTRAVENOUS; SUBCUTANEOUS at 12:51

## 2024-02-27 RX ADMIN — Medication 81 MILLIGRAM(S): at 17:48

## 2024-02-27 RX ADMIN — Medication 1 MILLIGRAM(S): at 11:57

## 2024-02-27 RX ADMIN — CHLORHEXIDINE GLUCONATE 15 MILLILITER(S): 213 SOLUTION TOPICAL at 17:49

## 2024-02-27 RX ADMIN — ATORVASTATIN CALCIUM 80 MILLIGRAM(S): 80 TABLET, FILM COATED ORAL at 22:24

## 2024-02-27 NOTE — PROGRESS NOTE ADULT - SUBJECTIVE AND OBJECTIVE BOX
NEUROLOGY FOLLOW-UP CONSULT NOTE    RFC: ***    Interval history: No acute neurologic events overnight.    Meds:  MEDICATIONS  (STANDING):  albuterol/ipratropium for Nebulization 3 milliLiter(s) Nebulizer every 6 hours  aspirin  chewable 81 milliGRAM(s) Oral daily  atorvastatin 80 milliGRAM(s) Oral at bedtime  chlorhexidine 0.12% Liquid 15 milliLiter(s) Oral Mucosa every 12 hours  chlorhexidine 4% Liquid 1 Application(s) Topical daily  cyanocobalamin 1000 MICROGram(s) Oral daily  enoxaparin Injectable 30 milliGRAM(s) SubCutaneous <User Schedule>  ergocalciferol Drops 19291 Unit(s) Oral <User Schedule>  ferrous    sulfate Liquid 300 milliGRAM(s) Enteral Tube <User Schedule>  folic acid 1 milliGRAM(s) Oral daily  gabapentin Solution 400 milliGRAM(s) Oral three times a day  insulin lispro (ADMELOG) corrective regimen sliding scale   SubCutaneous every 6 hours  insulin NPH human recombinant 20 Unit(s) SubCutaneous every 6 hours  pantoprazole   Suspension 40 milliGRAM(s) Oral daily  petrolatum Ophthalmic Ointment 1 Application(s) Both EYES three times a day  piperacillin/tazobactam IVPB.. 3.375 Gram(s) IV Intermittent every 8 hours  polyethylene glycol 3350 17 Gram(s) Oral every 12 hours  senna 1 Tablet(s) Oral at bedtime    MEDICATIONS  (PRN):  acetaminophen   Oral Liquid .. 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)    Acute respiratory failure with hypoxia    HTN (hypertension)    Dysphagia    Sympathetic storming    Infection    Anemia    Need for prophylactic measure    Counseling regarding goals of care    Diabetes    Hypernatremia    Tracheostomy, planned    EGD, with PEG    No significant past surgical history    CODE STROKE 1850 TRANSFER    Required emergent intubation    [email or web address]        Allergies:  No Known Allergies      ROS: All systems negative except as documented in Interval history    O:  T(C): 36.8 (02-27-24 @ 13:00), Max: 36.8 (02-27-24 @ 00:03)  HR: 65 (02-27-24 @ 12:40) (65 - 92)  BP: 143/79 (02-27-24 @ 12:40) (143/79 - 164/84)  RR: 20 (02-27-24 @ 08:37) (18 - 20)  SpO2: 100% (02-27-24 @ 12:40) (96% - 100%)    Focused neurologic exam:  MS - AAO x3, speech fluent, rep/naming intact, follows commands, attn/conc/recent and remote memory/fund of knowledge WNL  CN - PERRLA, EOMI, VFF, face sens/str/hearing WNL b/l, tongue/palate midline, trap 5/5 b/l  Motor - Normal bulk/tone, 5/5 all  Sens - LT/temp/vib intact all  DTR's - 2+ all and downgoing b/l plantar response  Coord - FtN intact b/l  Gait and station - Normal casual gait. Able to heel, toe, tandem. Romberg (-)    Pertinent labs/studies:  *** NEUROLOGY FOLLOW-UP CONSULT NOTE    RFC: Weakness, ophthalmoplegia, concern for AIDP/GBS    Interval history: No acute neurologic events overnight. Patient remains weak but continues to slowly and steadily improve following PLEX and IVIG treatment. No abnormal movements noted.    Meds:  MEDICATIONS  (STANDING):  albuterol/ipratropium for Nebulization 3 milliLiter(s) Nebulizer every 6 hours  aspirin  chewable 81 milliGRAM(s) Oral daily  atorvastatin 80 milliGRAM(s) Oral at bedtime  chlorhexidine 0.12% Liquid 15 milliLiter(s) Oral Mucosa every 12 hours  chlorhexidine 4% Liquid 1 Application(s) Topical daily  cyanocobalamin 1000 MICROGram(s) Oral daily  enoxaparin Injectable 30 milliGRAM(s) SubCutaneous <User Schedule>  ergocalciferol Drops 48313 Unit(s) Oral <User Schedule>  ferrous    sulfate Liquid 300 milliGRAM(s) Enteral Tube <User Schedule>  folic acid 1 milliGRAM(s) Oral daily  gabapentin Solution 400 milliGRAM(s) Oral three times a day  insulin lispro (ADMELOG) corrective regimen sliding scale   SubCutaneous every 6 hours  insulin NPH human recombinant 20 Unit(s) SubCutaneous every 6 hours  pantoprazole   Suspension 40 milliGRAM(s) Oral daily  petrolatum Ophthalmic Ointment 1 Application(s) Both EYES three times a day  piperacillin/tazobactam IVPB.. 3.375 Gram(s) IV Intermittent every 8 hours  polyethylene glycol 3350 17 Gram(s) Oral every 12 hours  senna 1 Tablet(s) Oral at bedtime    MEDICATIONS  (PRN):  acetaminophen   Oral Liquid .. 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)    Acute respiratory failure with hypoxia    HTN (hypertension)    Dysphagia    Sympathetic storming    Infection    Anemia    Need for prophylactic measure    Counseling regarding goals of care    Diabetes    Hypernatremia    Tracheostomy, planned    EGD, with PEG    No significant past surgical history    CODE STROKE 1850 TRANSFER    Required emergent intubation    [email or web address]        Allergies:  No Known Allergies      ROS: All systems negative except as documented in Interval history    O:  T(C): 36.8 (02-27-24 @ 13:00), Max: 36.8 (02-27-24 @ 00:03)  HR: 65 (02-27-24 @ 12:40) (65 - 92)  BP: 143/79 (02-27-24 @ 12:40) (143/79 - 164/84)  RR: 20 (02-27-24 @ 08:37) (18 - 20)  SpO2: 100% (02-27-24 @ 12:40) (96% - 100%)    Focused neurologic exam:  MS - Intubated, sedated, lethargic but eyes closed, attends to all stimuli b/l, no speech output but can move feet appropriately in response to "yes"/"no" questions, follows simple commands but limited due to profound weakness. Due to clinical condition unable to assess (DANIAL) orientation, rep/naming, attn/conc/recent and remote memory/fund of knowledge  CN - PERRL, EOMI by OCR with significantly dec excursions and volitional movements of R eye only, VFF to threat b/l (by foot tapping), face sens intact, facial strength with profound weakness b/l but weak corneals present b/l, hearing intact to conversation b/l, (-) spontaneous respirations (vent rate 16 bpm), (-) cough. DANIAL tongue/palate, trap/SCM  Motor - Normal bulk and flaccid tone throughout. Strength 0/5 all except for R  1/5, L  2/5, L finger extensors 1/5, R DF/PF 4/5, L DF/PF 2/5  Sens - LT/PP intact all  DTR's - 2+ BR b/l, 0+ rest, and neutral b/l plantar response  Coord - DANIAL  Gait and station - DANIAL    Pertinent labs/studies:  CBC with inc WBC 10.92, low H/H 11/38 and dec MCV 76, otherwise essentially WNL  PT/INR WNL, PTT WNL  BMP essentially WNL  Albumin 4.1, LFT's WNL  Mg WNL, Phos WNL  ESR inc 113 -> WNL, B12 WNL, folate WNL, TSH/free T4 WNL, CPK WNL, A1C inc 6.8%, syphilis serology TP (-), HIV (-), CPK WNL, LDL inc 162 -> 53, HDL 51 -> 19, CRP inc 65, Vitamin D dec 14.3, ACE WNL, anti-ganglioside Ab panel inc GD1b 110, dsDNA WNL, Lyme (-), FLORESITA (-), quantiferon TB gold (-), WNV (-)    CSF (2/11) - clear, colorless, RBC 3, WBC 11 and lymphocytes 67%, glucose 83, protein 67, Cx - no PMN's or organisms, PCR panel (-), LDH 22, IgG Index WNL, cryptococcal Ag (-), AFB (-), myelin basic protein WNL, Lyme (-), cryptococcal Ag (-), flow cytometry - insufficient cells, oligoclonal bands absent, VDRL (-), VZV PCR (-)    < from: CT Angio Head w/ IV Cont (02.11.24 @ 02:26) >  1. Focal small dissection suspected in the proximal left V4 segment.  2. Unchanged high-grade focal stenosis involving the proximalbasilar   artery and proximal left P2 segment.  3. Otherwise, no large vessel occlusion or major stenosis.    < end of copied text >      < from: MR Head w/wo IV Cont (02.11.24 @ 13:08) >  1.  No significant interval change in the small acute bilateral   cerebellar infarcts. No new superimposed acute intracranial abnormality.   No acute intracranial hemorrhage.  2.  Findings suspicious for cerebral amyloid angiopathy or hypertensive   microhemorrhages.  3.  Probable minimal chronic microvascular ischemic disease.  4.  Sinus disease.    < end of copied text >    < from: MR Thoracic Spine w/wo IV Cont (02.11.24 @ 13:09) >  1. CERVICAL SPINE:  Low-grade cervical degenerative disc disease.   Indistinct cord lesions at the C3 and C4 vertebral levels are nonspecific   but suggest inflammatory/infectious/demyelinating etiology. No associated   enhancement.    2. THORACIC SPINE:   Low-grade thoracic degenerative disc disease.   Thoracic cord intact.  No abnomal enhancement.    3. LUMBAR SPINE:   Advanced lower lumbar degenerative disc disease   includes moderate degenerative central canal stenosis at L4-L5 and   high-grade degenerative foraminal stenosis at L5-S1. Conus intact. Cauda   equina demonstrates central clustering with spinal stenosis atL4-L5 and   mild radicular enhancement predominantly distal to this location of   spinal stenosis, nonspecific, inflammatory versus congestion from the   stenosis. Vascular congestion between the conus and the L4-L5 stenosis.    Superimposed nodular enhancement at the L4 superior endplate level is   also nonspecific, inflammatory versus schwannoma    < end of copied text >    < from: TTE W or WO Ultrasound Enhancing Agent (02.11.24 @ 07:56) >   1. Left ventricular cavity is small. Left ventricular systolic function is normal with an ejection fraction of 66 % by Glynn's method of disks. There are no regional wall motion abnormalities seen.   2. Normal left ventricular diastolic function, with normal filling pressure.   3. Normal right ventricular cavity size, wall thickness, and normal systolic function.   4. Normal left and right atrial size.   5. No pericardial effusion seen.   6. Agitated saline injection was negative for intracardiac shunt.   7. No prior echocardiogram is available for comparison.   8. There is no evidence of a left ventricular thrombus.   9. There is normal LV mass and concentric remodeling.    < end of copied text > NEUROLOGY FOLLOW-UP CONSULT NOTE    RFC: Weakness, ophthalmoplegia, concern for AIDP/GBS    Interval history: No acute neurologic events overnight. Patient remains weak but continues to slowly and steadily improve following PLEX and IVIG treatment. No abnormal movements noted.    Meds:  MEDICATIONS  (STANDING):  albuterol/ipratropium for Nebulization 3 milliLiter(s) Nebulizer every 6 hours  aspirin  chewable 81 milliGRAM(s) Oral daily  atorvastatin 80 milliGRAM(s) Oral at bedtime  chlorhexidine 0.12% Liquid 15 milliLiter(s) Oral Mucosa every 12 hours  chlorhexidine 4% Liquid 1 Application(s) Topical daily  cyanocobalamin 1000 MICROGram(s) Oral daily  enoxaparin Injectable 30 milliGRAM(s) SubCutaneous <User Schedule>  ergocalciferol Drops 94176 Unit(s) Oral <User Schedule>  ferrous    sulfate Liquid 300 milliGRAM(s) Enteral Tube <User Schedule>  folic acid 1 milliGRAM(s) Oral daily  gabapentin Solution 400 milliGRAM(s) Oral three times a day  insulin lispro (ADMELOG) corrective regimen sliding scale   SubCutaneous every 6 hours  insulin NPH human recombinant 20 Unit(s) SubCutaneous every 6 hours  pantoprazole   Suspension 40 milliGRAM(s) Oral daily  petrolatum Ophthalmic Ointment 1 Application(s) Both EYES three times a day  piperacillin/tazobactam IVPB.. 3.375 Gram(s) IV Intermittent every 8 hours  polyethylene glycol 3350 17 Gram(s) Oral every 12 hours  senna 1 Tablet(s) Oral at bedtime    MEDICATIONS  (PRN):  acetaminophen   Oral Liquid .. 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)    Acute respiratory failure with hypoxia    HTN (hypertension)    Dysphagia    Sympathetic storming    Infection    Anemia    Need for prophylactic measure    Counseling regarding goals of care    Diabetes    Hypernatremia    Tracheostomy, planned    EGD, with PEG    No significant past surgical history    CODE STROKE 1850 TRANSFER    Required emergent intubation    [email or web address]        Allergies:  No Known Allergies      ROS: All systems negative except as documented in Interval history    O:  T(C): 36.8 (02-27-24 @ 13:00), Max: 36.8 (02-27-24 @ 00:03)  HR: 65 (02-27-24 @ 12:40) (65 - 92)  BP: 143/79 (02-27-24 @ 12:40) (143/79 - 164/84)  RR: 20 (02-27-24 @ 08:37) (18 - 20)  SpO2: 100% (02-27-24 @ 12:40) (96% - 100%)    Focused neurologic exam:  MS - Tracheostomy in place, not sedated, mildly lethargic but can readily open eyes, attends to all stimuli b/l, no speech output but can move feet appropriately in response to "yes"/"no" questions, follows simple commands but limited due to profound weakness. Due to clinical condition unable to assess (DANIAL) orientation, rep/naming, attn/conc/recent and remote memory/fund of knowledge  CN - PERRL, EOM's with near normal movements of R eye and significantly dec excursions and volitional movements of L eye, VFF to threat b/l (by foot tapping), face sens intact, facial strength with profound weakness L > R in a peripheral pattern, hearing intact to conversation b/l, (-) spontaneous respirations (vent rate 20 bpm with tracheostomy), (-) cough. DANIAL tongue/palate, trap/SCM  Motor - Normal bulk and flaccid tone throughout. Strength 0/5 all except for  b/l 2/5, R DF/PF 4/5, L DF/PF 2/5  Sens - LT/PP intact all  DTR's - 2+ BR b/l, 0+ rest, and neutral b/l plantar response  Coord - DANIAL  Gait and station - DANIAL    Pertinent labs/studies:  CBC with inc WBC 10.68, low H/H 8/30 and MCV WNL, otherwise essentially WNL  PT/INR WNL, PTT WNL  BMP with inc Na 146, otherwise essentially WNL  Albumin 4.1, LFT's WNL  Mg WNL, Phos WNL  ESR inc 113 -> WNL, B12 WNL, folate WNL, TSH/free T4 WNL, CPK WNL, A1C inc 6.8%, syphilis serology TP (-), HIV (-), CPK WNL, LDL inc 162 -> 53, HDL 51 -> 19, CRP inc 65, Vitamin D dec 14.3, ACE WNL, anti-ganglioside Ab panel inc GD1b 110, dsDNA WNL, Lyme (-), FLORESITA (-), quantiferon TB gold (-), WNV (-), B1 WNL, B6 WNL, MOG (-), copper inc 156, heavy metal screen (-), NMO Ab (-)    CSF (2/11) - clear, colorless, RBC 3, WBC 11 and lymphocytes 67%, glucose 83, protein 67, Cx - no PMN's or organisms, PCR panel (-), LDH 22, IgG Index WNL, cryptococcal Ag (-), AFB (-), myelin basic protein WNL, Lyme (-), cryptococcal Ag (-), flow cytometry - insufficient cells, oligoclonal bands absent, VDRL (-), VZV PCR (-), myelin basic protein WNL, autoimmune encephalopathy panel (-), ACE WNL, WNV (-)    < from: CT Angio Head w/ IV Cont (02.11.24 @ 02:26) >  1. Focal small dissection suspected in the proximal left V4 segment.  2. Unchanged high-grade focal stenosis involving the proximalbasilar   artery and proximal left P2 segment.  3. Otherwise, no large vessel occlusion or major stenosis.    < end of copied text >      < from: MR Head w/wo IV Cont (02.11.24 @ 13:08) >  1.  No significant interval change in the small acute bilateral   cerebellar infarcts. No new superimposed acute intracranial abnormality.   No acute intracranial hemorrhage.  2.  Findings suspicious for cerebral amyloid angiopathy or hypertensive   microhemorrhages.  3.  Probable minimal chronic microvascular ischemic disease.  4.  Sinus disease.    < end of copied text >    < from: MR Thoracic Spine w/wo IV Cont (02.11.24 @ 13:09) >  1. CERVICAL SPINE:  Low-grade cervical degenerative disc disease.   Indistinct cord lesions at the C3 and C4 vertebral levels are nonspecific   but suggest inflammatory/infectious/demyelinating etiology. No associated   enhancement.    2. THORACIC SPINE:   Low-grade thoracic degenerative disc disease.   Thoracic cord intact.  No abnomal enhancement.    3. LUMBAR SPINE:   Advanced lower lumbar degenerative disc disease   includes moderate degenerative central canal stenosis at L4-L5 and   high-grade degenerative foraminal stenosis at L5-S1. Conus intact. Cauda   equina demonstrates central clustering with spinal stenosis atL4-L5 and   mild radicular enhancement predominantly distal to this location of   spinal stenosis, nonspecific, inflammatory versus congestion from the   stenosis. Vascular congestion between the conus and the L4-L5 stenosis.    Superimposed nodular enhancement at the L4 superior endplate level is   also nonspecific, inflammatory versus schwannoma    < end of copied text >    < from: TTE W or WO Ultrasound Enhancing Agent (02.11.24 @ 07:56) >   1. Left ventricular cavity is small. Left ventricular systolic function is normal with an ejection fraction of 66 % by Glynn's method of disks. There are no regional wall motion abnormalities seen.   2. Normal left ventricular diastolic function, with normal filling pressure.   3. Normal right ventricular cavity size, wall thickness, and normal systolic function.   4. Normal left and right atrial size.   5. No pericardial effusion seen.   6. Agitated saline injection was negative for intracardiac shunt.   7. No prior echocardiogram is available for comparison.   8. There is no evidence of a left ventricular thrombus.   9. There is normal LV mass and concentric remodeling.    < end of copied text >

## 2024-02-27 NOTE — PROGRESS NOTE ADULT - ASSESSMENT
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 Agree clinical picture seems more consistent with AIDP/GBS (or variant) but cannot completely exclude other mimic such as infection or inflammation. MRI brain, personally reviewed by me, with small b/l cerebellar strokes (post-procedural) and prior L thalamic strokes which would not explain current clinical deficits and progressive nature. CSF shows mild lymphocytic pleiocystosis and increased protein which could be secondary to inflammatory or viral/infectious process. MRI spine, personally reviewed by me, with C3-C4 medullary lesion that does not enhance (demyelinating?) and some subtle enhancement of the L4-L5 nerve roots which could be due to stenosis at this level or inflammatory, neoplastic, or infectious process. 2/15 - Started PLEX with some improvement. Likely AIDP (Timur Serrano?) given presence of GD1b antibodies. 2/16 - Eyes stable to improved but  worsened. Likely need to wait for PLEX to further begin effect  - Await serum studies for autoimmune encephalopathy panel, B1, B6, copper, NMO, MOG  - Await CSF studies for HSV 1/2 PCR, WNV, cytology, ACE, autoimmune encephalopathy panel  - No neurologic contraindications to PLEX therapy (even if viral infection). 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 Agree clinical picture seems more consistent with AIDP/GBS (or variant) but cannot completely exclude other mimic such as infection or inflammation. MRI brain, personally reviewed by me, with small b/l cerebellar strokes (post-procedural) and prior L thalamic strokes which would not explain current clinical deficits and progressive nature. CSF shows mild lymphocytic pleiocystosis and increased protein which could be secondary to inflammatory or viral/infectious process. MRI spine, personally reviewed by me, with C3-C4 medullary lesion that does not enhance (demyelinating?) and some subtle enhancement of the L4-L5 nerve roots which could be due to stenosis at this level or inflammatory, neoplastic, or infectious process. 2/15 - Started PLEX with some improvement. Likely AIDP (Timur Serrano?) given presence of GD1b antibodies. 2/16 - Eyes stable to improved but  worsened. Likely need to wait for PLEX to further begin effect. 2/27 - Slow but consistent improvement noted  - Await serum studies for autoimmune encephalopathy panel  - Await CSF studies for HSV 1/2 PCR, cytology  - No neurologic contraindications to PLEX therapy (even if viral infection). Finished PLEX  and full course of IVIG (2g/kg)  - Vitamin D levels low, would replete with D2 68738 Units PO weekly for 8 weeks and then recheck new levels  - ASA 81mg PO daily and plavix 75mg PO daily for 3 months and then ASA monotherapy for SAMMPRIS trial  - PT/OT as tolerated  - Above recommendations discussed with medical team (TAMARA Ward), who verbalized agreement and understanding  - Continue to address above medical problems, as you are doing  - Will continue to follow patient peripherally with you, please call [phone]) with additional questions or concerns

## 2024-02-27 NOTE — PROGRESS NOTE ADULT - ASSESSMENT
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 Prior to admission patient had Viral URI ( 1-2 weeks prior to admission) with subsequent weakness. He underwent an angiogram (2/11) which showed moderate stenosis and no intervention was done. MRI Performed c/w small small bilateral cerebellar strokes and prior L thalamic strokes, as per Neurology was not c/w current presentation. 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 He is currently s/p 5 rounds of plasma exchange (2/13-2/20) and will receive IVIG x 5 ( 2/21-2/26)  His hospital course was complicated by progressive respiratory failure. CT Chest performed on 2/20 with atelectasis of b/l lower lobes. BAL 2/21 Growing PSA Treating with course of Zosyn.     2/26: Stable overnight on Zosyn for PSA pneumonia, will likely resume ASA tonight as recc by neurology patient will need to be on ASA/Plavix. Trend H/H daily. Hypernatremia improving.     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 Prior to admission patient had Viral URI ( 1-2 weeks prior to admission) with subsequent weakness. He underwent an angiogram (2/11) which showed moderate stenosis and no intervention was done. MRI Performed c/w small small bilateral cerebellar strokes and prior L thalamic strokes, as per Neurology was not c/w current presentation. 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 He is currently s/p 5 rounds of plasma exchange (2/13-2/20) and will receive IVIG x 5 ( 2/21-2/26)  His hospital course was complicated by progressive respiratory failure. CT Chest performed on 2/20 with atelectasis of b/l lower lobes. BAL 2/21 Growing PSA treating with course of Zosyn (2/21-2/28).     2/27: Started ASA with stable H/H. Now adding plavix per discussion with neurology. Zosyn ends 2/28.

## 2024-02-27 NOTE — PROGRESS NOTE ADULT - PROBLEM SELECTOR PLAN 4
- Patient Febrile on 2/23   - BAL 2/21: + PSA   - Blood cxs 2/23: NGTD   - UA 2/23: Negative   - PSA pneumonia- currently on Zosyn x 7 days ( Ends 2/28)

## 2024-02-27 NOTE — PROGRESS NOTE ADULT - NS ATTEND AMEND GEN_ALL_CORE FT
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 Inconsistently following commands by moving L or R sides for yes or no  - f/u neuro recs. Was on DAPT which was eventually stopped due to reported to episode of blood clots in airway  - Resumed ASA yesterday. Will clarify with neuro if Plavix is necessary  #Pulmonary - Mixed respiratory failure s/p tracheostomy. Currently on PRVC 20/500/7/40%. Not triggering breaths on PS trial this AM  - c/w Duonebs q6h  #GI - Oropharyngeal dysphagia S/P PEG   - c/w tube feeds via PEG  #Endocrine  - c/w NPH and ISS, titrate as needed  #Heme/Onc - Chronic anemia                - Patient/Family would like to be asked before blood products  #ID - Pseudomonas pneumonia  - Zosyn through 2/28  #DVT ppx  - Lovenox    Gary Giang MD  Pulmonary & Critical Care

## 2024-02-27 NOTE — PROGRESS NOTE ADULT - PROBLEM SELECTOR PLAN 7
- Patient Taoism  - Family would like to be asked prior to administration of blood products   - S/p 1 unit PRBCs on 2/22  - Cont Venofer ( 2/22-2/27)   - Cont Cyanocobalamine, Folic acid, Ferrous Sulfate  - Continue Epogen  - Will attempt to limit phlebotomy once labs stabilize

## 2024-02-27 NOTE — PROGRESS NOTE ADULT - SUBJECTIVE AND OBJECTIVE BOX
Patient is a 73y old  Male who presents with a chief complaint of Stroke, critical stenosis, evaluation for angiography (27 Feb 2024 14:24)      Interval Events:    REVIEW OF SYSTEMS:  [ ] Positive  [ ] All other systems negative  [ ] Unable to assess ROS because ________    Vital Signs Last 24 Hrs  T(C): 36.8 (02-27-24 @ 13:00), Max: 36.8 (02-27-24 @ 00:03)  T(F): 98.2 (02-27-24 @ 13:00), Max: 98.2 (02-27-24 @ 00:03)  HR: 65 (02-27-24 @ 12:40) (65 - 92)  BP: 143/79 (02-27-24 @ 12:40) (143/79 - 164/79)  RR: 20 (02-27-24 @ 08:37) (18 - 20)  SpO2: 100% (02-27-24 @ 12:40) (96% - 100%)PHYSICAL EXAM:  HEENT:   [ ]Tracheostomy:  [ ]Pupils equal  [ ]No oral lesions  [ ]Abnormal        SKIN  [ ]No Rash  [ ] Abnormal  [ ] pressure    CARDIAC  [ ]Regular  [ ]Abnormal    PULMONARY  [ ]Bilateral Clear Breath Sounds  [ ]Normal Excursion  [ ]Abnormal    GI  [ ]PEG      [ ] +BS		              [ ]Soft, nondistended, nontender	  [ ]Abnormal    MUSCULOSKELETAL                                   [ ]Bedbound                 [ ]Abnormal    [ ]Ambulatory/OOB to chair                           EXTREMITIES                                         [ ]Normal  [ ]Edema                           NEUROLOGIC  [ ] Normal, non focal  [ ] Focal findings:    PSYCHIATRIC  [ ]Alert and appropriate  [ ] Sedated	 [ ]Agitated    :  [place]: [ ] Yes, if yes: Date of Placement:                   [  ] No    LINES: Central Lines [ ] Yes, if yes: Date of Placement                                     [  ] No    HOSPITAL MEDICATIONS:  MEDICATIONS  (STANDING):  albuterol/ipratropium for Nebulization 3 milliLiter(s) Nebulizer every 6 hours  aspirin  chewable 81 milliGRAM(s) Oral daily  atorvastatin 80 milliGRAM(s) Oral at bedtime  chlorhexidine 0.12% Liquid 15 milliLiter(s) Oral Mucosa every 12 hours  chlorhexidine 4% Liquid 1 Application(s) Topical daily  cyanocobalamin 1000 MICROGram(s) Oral daily  enoxaparin Injectable 30 milliGRAM(s) SubCutaneous <User Schedule>  ergocalciferol Drops 49991 Unit(s) Oral <User Schedule>  ferrous    sulfate Liquid 300 milliGRAM(s) Enteral Tube <User Schedule>  folic acid 1 milliGRAM(s) Oral daily  gabapentin Solution 400 milliGRAM(s) Oral three times a day  insulin lispro (ADMELOG) corrective regimen sliding scale   SubCutaneous every 6 hours  insulin NPH human recombinant 20 Unit(s) SubCutaneous every 6 hours  pantoprazole   Suspension 40 milliGRAM(s) Oral daily  petrolatum Ophthalmic Ointment 1 Application(s) Both EYES three times a day  piperacillin/tazobactam IVPB.. 3.375 Gram(s) IV Intermittent every 8 hours  polyethylene glycol 3350 17 Gram(s) Oral every 12 hours  senna 1 Tablet(s) Oral at bedtime    MEDICATIONS  (PRN):  acetaminophen   Oral Liquid .. 650 milliGRAM(s) Oral every 6 hours PRN Mild Pain (1 - 3)  dextrose Oral Gel 15 Gram(s) Oral once PRN Blood Glucose LESS THAN 70 milliGRAM(s)/deciliter  sodium chloride 0.9% lock flush 10 milliLiter(s) IV Push every 1 hour PRN Pre/post blood products, medications, blood draw, and to maintain line patency      LABS:                        8.3    10.68 )-----------( 334      ( 27 Feb 2024 07:26 )             30.1     02-27    146<H>  |  107  |  24<H>  ----------------------------<  115<H>  4.4   |  29  |  0.82    Ca    8.7      27 Feb 2024 07:29  Phos  3.7     02-27  Mg     2.4     02-27        Urinalysis Basic - ( 27 Feb 2024 07:29 )    Color: x / Appearance: x / SG: x / pH: x  Gluc: 115 mg/dL / Ketone: x  / Bili: x / Urobili: x   Blood: x / Protein: x / Nitrite: x   Leuk Esterase: x / RBC: x / WBC x   Sq Epi: x / Non Sq Epi: x / Bacteria: x          CAPILLARY BLOOD GLUCOSE    MICROBIOLOGY:     RADIOLOGY:  [ ] Reviewed and interpreted by me    Mode: AC/ CMV (Assist Control/ Continuous Mandatory Ventilation)  RR (machine): 20  TV (machine): 500  FiO2: 40  PEEP: 7  ITime: 1  MAP: 11  PIP: 18   Patient is a 73y old  Male who presents with a chief complaint of Stroke, critical stenosis, evaluation for angiography (27 Feb 2024 14:24)      Interval Events: Not tolerating Cpap trials yet    REVIEW OF SYSTEMS:  [ ] Positive  [ ] All other systems negative  [x ] Unable to assess ROS because __Nonverbal/ Trached to vent ______    Vital Signs Last 24 Hrs  T(C): 36.8 (02-27-24 @ 13:00), Max: 36.8 (02-27-24 @ 00:03)  T(F): 98.2 (02-27-24 @ 13:00), Max: 98.2 (02-27-24 @ 00:03)  HR: 65 (02-27-24 @ 12:40) (65 - 92)  BP: 143/79 (02-27-24 @ 12:40) (143/79 - 164/79)  RR: 20 (02-27-24 @ 08:37) (18 - 20)  SpO2: 100% (02-27-24 @ 12:40) (96% - 100%)    PHYSICAL EXAM:    HEENT:   [x]Tracheostomy: # 8 Cuffed Portex   [ ]Pupils equal  [ ]No oral lesions  [ ]Abnormal    SKIN  [x]No Rash  [ ] Abnormal  [ ] pressure    CARDIAC  [x]Regular  [ ]Abnormal    PULMONARY  [ ]Bilateral Clear Breath Sounds  [ ]Normal Excursion  [x]Abnormal: Mild Coarse Breath bilaterally      GI  [x]PEG site c/d/I     [x] +BS		              [x]Soft, nondistended, nontender	  [ ]Abnormal    MUSCULOSKELETAL                                   [x]Bedbound                 [ ]Abnormal    [ ]Ambulatory/OOB to chair                           EXTREMITIES                                         [ ]Normal  [x]Edema: + Upper and Lower extremity edema bilaterally                           NEUROLOGIC  [ ] Normal, non focal  [x] Focal findings: Intermittently opens eyes, Minimally responsive however following commands with Feet ( Rt foot "yes" / Lft foot "No")    PSYCHIATRIC  [x ] UTO, Minimally responsive   [ ] Sedated	 [ ]Agitated    :  [place]: [ ] Yes, if yes: Date of Placement:                   [x] No    LINES: Central Lines [x] Yes, if yes: Date of Placement:  LUE PICC Placed 2/14                                      [  ] No    HOSPITAL MEDICATIONS:  MEDICATIONS  (STANDING):  albuterol/ipratropium for Nebulization 3 milliLiter(s) Nebulizer every 6 hours  aspirin  chewable 81 milliGRAM(s) Oral daily  atorvastatin 80 milliGRAM(s) Oral at bedtime  chlorhexidine 0.12% Liquid 15 milliLiter(s) Oral Mucosa every 12 hours  chlorhexidine 4% Liquid 1 Application(s) Topical daily  cyanocobalamin 1000 MICROGram(s) Oral daily  enoxaparin Injectable 30 milliGRAM(s) SubCutaneous <User Schedule>  ergocalciferol Drops 15130 Unit(s) Oral <User Schedule>  ferrous    sulfate Liquid 300 milliGRAM(s) Enteral Tube <User Schedule>  folic acid 1 milliGRAM(s) Oral daily  gabapentin Solution 400 milliGRAM(s) Oral three times a day  insulin lispro (ADMELOG) corrective regimen sliding scale   SubCutaneous every 6 hours  insulin NPH human recombinant 20 Unit(s) SubCutaneous every 6 hours  pantoprazole   Suspension 40 milliGRAM(s) Oral daily  petrolatum Ophthalmic Ointment 1 Application(s) Both EYES three times a day  piperacillin/tazobactam IVPB.. 3.375 Gram(s) IV Intermittent every 8 hours  polyethylene glycol 3350 17 Gram(s) Oral every 12 hours  senna 1 Tablet(s) Oral at bedtime    MEDICATIONS  (PRN):  acetaminophen   Oral Liquid .. 650 milliGRAM(s) Oral every 6 hours PRN Mild Pain (1 - 3)  dextrose Oral Gel 15 Gram(s) Oral once PRN Blood Glucose LESS THAN 70 milliGRAM(s)/deciliter  sodium chloride 0.9% lock flush 10 milliLiter(s) IV Push every 1 hour PRN Pre/post blood products, medications, blood draw, and to maintain line patency      LABS:                        8.3    10.68 )-----------( 334      ( 27 Feb 2024 07:26 )             30.1     02-27    146<H>  |  107  |  24<H>  ----------------------------<  115<H>  4.4   |  29  |  0.82    Ca    8.7      27 Feb 2024 07:29  Phos  3.7     02-27  Mg     2.4     02-27        Urinalysis Basic - ( 27 Feb 2024 07:29 )    Color: x / Appearance: x / SG: x / pH: x  Gluc: 115 mg/dL / Ketone: x  / Bili: x / Urobili: x   Blood: x / Protein: x / Nitrite: x   Leuk Esterase: x / RBC: x / WBC x   Sq Epi: x / Non Sq Epi: x / Bacteria: x          CAPILLARY BLOOD GLUCOSE    MICROBIOLOGY:     RADIOLOGY:  [ ] Reviewed and interpreted by me    Mode: AC/ CMV (Assist Control/ Continuous Mandatory Ventilation)  RR (machine): 20  TV (machine): 500  FiO2: 40  PEEP: 7  ITime: 1  MAP: 11  PIP: 18   Patient is a 73y old  Male who presents with a chief complaint of Stroke, critical stenosis, evaluation for angiography (27 Feb 2024 14:24)      Interval Events: Not tolerating Cpap trials yet                         No events on Full vent support overnight     REVIEW OF SYSTEMS:  [ ] Positive  [ ] All other systems negative  [x ] Unable to assess ROS because __Nonverbal/ Trached to vent ______    Vital Signs Last 24 Hrs  T(C): 36.8 (02-27-24 @ 13:00), Max: 36.8 (02-27-24 @ 00:03)  T(F): 98.2 (02-27-24 @ 13:00), Max: 98.2 (02-27-24 @ 00:03)  HR: 65 (02-27-24 @ 12:40) (65 - 92)  BP: 143/79 (02-27-24 @ 12:40) (143/79 - 164/79)  RR: 20 (02-27-24 @ 08:37) (18 - 20)  SpO2: 100% (02-27-24 @ 12:40) (96% - 100%)    PHYSICAL EXAM:    HEENT:   [x]Tracheostomy: # 8 Cuffed Portex   [ ]Pupils equal  [ ]No oral lesions  [ ]Abnormal    SKIN  [x]No Rash  [ ] Abnormal  [ ] pressure    CARDIAC  [x]Regular  [ ]Abnormal    PULMONARY  [ ]Bilateral Clear Breath Sounds  [ ]Normal Excursion  [x]Abnormal: Mild Coarse Breath bilaterally      GI  [x]PEG site c/d/I     [x] +BS		              [x]Soft, nondistended, nontender	  [ ]Abnormal    MUSCULOSKELETAL                                   [x]Bedbound                 [ ]Abnormal    [ ]Ambulatory/OOB to chair                           EXTREMITIES                                         [ ]Normal  [x]Edema: + Upper and Lower extremity edema bilaterally                           NEUROLOGIC  [ ] Normal, non focal  [x] Focal findings: Intermittently opens eyes, Minimally responsive however following commands with Feet ( Rt foot "yes" / Lft foot "No")    PSYCHIATRIC  [x ] UTO, Minimally responsive   [ ] Sedated	 [ ]Agitated    :  [place]: [ ] Yes, if yes: Date of Placement:                   [x] No    LINES: Central Lines [x] Yes, if yes: Date of Placement:  LUE PICC Placed 2/14                                      [  ] No    HOSPITAL MEDICATIONS:  MEDICATIONS  (STANDING):  albuterol/ipratropium for Nebulization 3 milliLiter(s) Nebulizer every 6 hours  aspirin  chewable 81 milliGRAM(s) Oral daily  atorvastatin 80 milliGRAM(s) Oral at bedtime  chlorhexidine 0.12% Liquid 15 milliLiter(s) Oral Mucosa every 12 hours  chlorhexidine 4% Liquid 1 Application(s) Topical daily  cyanocobalamin 1000 MICROGram(s) Oral daily  enoxaparin Injectable 30 milliGRAM(s) SubCutaneous <User Schedule>  ergocalciferol Drops 03348 Unit(s) Oral <User Schedule>  ferrous    sulfate Liquid 300 milliGRAM(s) Enteral Tube <User Schedule>  folic acid 1 milliGRAM(s) Oral daily  gabapentin Solution 400 milliGRAM(s) Oral three times a day  insulin lispro (ADMELOG) corrective regimen sliding scale   SubCutaneous every 6 hours  insulin NPH human recombinant 20 Unit(s) SubCutaneous every 6 hours  pantoprazole   Suspension 40 milliGRAM(s) Oral daily  petrolatum Ophthalmic Ointment 1 Application(s) Both EYES three times a day  piperacillin/tazobactam IVPB.. 3.375 Gram(s) IV Intermittent every 8 hours  polyethylene glycol 3350 17 Gram(s) Oral every 12 hours  senna 1 Tablet(s) Oral at bedtime    MEDICATIONS  (PRN):  acetaminophen   Oral Liquid .. 650 milliGRAM(s) Oral every 6 hours PRN Mild Pain (1 - 3)  dextrose Oral Gel 15 Gram(s) Oral once PRN Blood Glucose LESS THAN 70 milliGRAM(s)/deciliter  sodium chloride 0.9% lock flush 10 milliLiter(s) IV Push every 1 hour PRN Pre/post blood products, medications, blood draw, and to maintain line patency      LABS:                        8.3    10.68 )-----------( 334      ( 27 Feb 2024 07:26 )             30.1     02-27    146<H>  |  107  |  24<H>  ----------------------------<  115<H>  4.4   |  29  |  0.82    Ca    8.7      27 Feb 2024 07:29  Phos  3.7     02-27  Mg     2.4     02-27        Urinalysis Basic - ( 27 Feb 2024 07:29 )    Color: x / Appearance: x / SG: x / pH: x  Gluc: 115 mg/dL / Ketone: x  / Bili: x / Urobili: x   Blood: x / Protein: x / Nitrite: x   Leuk Esterase: x / RBC: x / WBC x   Sq Epi: x / Non Sq Epi: x / Bacteria: x          CAPILLARY BLOOD GLUCOSE    MICROBIOLOGY:     RADIOLOGY:  [ ] Reviewed and interpreted by me    Mode: AC/ CMV (Assist Control/ Continuous Mandatory Ventilation)  RR (machine): 20  TV (machine): 500  FiO2: 40  PEEP: 7  ITime: 1  MAP: 11  PIP: 18

## 2024-02-27 NOTE — PROGRESS NOTE ADULT - PROBLEM SELECTOR PLAN 1
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

## 2024-02-28 LAB
ANION GAP SERPL CALC-SCNC: 8 MMOL/L — SIGNIFICANT CHANGE UP (ref 5–17)
BUN SERPL-MCNC: 26 MG/DL — HIGH (ref 7–23)
CALCIUM SERPL-MCNC: 8.7 MG/DL — SIGNIFICANT CHANGE UP (ref 8.4–10.5)
CHLORIDE SERPL-SCNC: 108 MMOL/L — SIGNIFICANT CHANGE UP (ref 96–108)
CO2 SERPL-SCNC: 29 MMOL/L — SIGNIFICANT CHANGE UP (ref 22–31)
CREAT SERPL-MCNC: 0.83 MG/DL — SIGNIFICANT CHANGE UP (ref 0.5–1.3)
CULTURE RESULTS: SIGNIFICANT CHANGE UP
CULTURE RESULTS: SIGNIFICANT CHANGE UP
EGFR: 92 ML/MIN/1.73M2 — SIGNIFICANT CHANGE UP
GLUCOSE BLDC GLUCOMTR-MCNC: 137 MG/DL — HIGH (ref 70–99)
GLUCOSE BLDC GLUCOMTR-MCNC: 140 MG/DL — HIGH (ref 70–99)
GLUCOSE BLDC GLUCOMTR-MCNC: 149 MG/DL — HIGH (ref 70–99)
GLUCOSE BLDC GLUCOMTR-MCNC: 166 MG/DL — HIGH (ref 70–99)
GLUCOSE BLDC GLUCOMTR-MCNC: 166 MG/DL — HIGH (ref 70–99)
GLUCOSE SERPL-MCNC: 132 MG/DL — HIGH (ref 70–99)
HCT VFR BLD CALC: 30.4 % — LOW (ref 39–50)
HGB BLD-MCNC: 8.4 G/DL — LOW (ref 13–17)
MAGNESIUM SERPL-MCNC: 2.3 MG/DL — SIGNIFICANT CHANGE UP (ref 1.6–2.6)
MCHC RBC-ENTMCNC: 23.2 PG — LOW (ref 27–34)
MCHC RBC-ENTMCNC: 27.6 GM/DL — LOW (ref 32–36)
MCV RBC AUTO: 84 FL — SIGNIFICANT CHANGE UP (ref 80–100)
NRBC # BLD: 6 /100 WBCS — HIGH (ref 0–0)
PHOSPHATE SERPL-MCNC: 3.5 MG/DL — SIGNIFICANT CHANGE UP (ref 2.5–4.5)
PLATELET # BLD AUTO: 341 K/UL — SIGNIFICANT CHANGE UP (ref 150–400)
POTASSIUM SERPL-MCNC: 4.2 MMOL/L — SIGNIFICANT CHANGE UP (ref 3.5–5.3)
POTASSIUM SERPL-SCNC: 4.2 MMOL/L — SIGNIFICANT CHANGE UP (ref 3.5–5.3)
RBC # BLD: 3.62 M/UL — LOW (ref 4.2–5.8)
RBC # FLD: 23.4 % — HIGH (ref 10.3–14.5)
SODIUM SERPL-SCNC: 145 MMOL/L — SIGNIFICANT CHANGE UP (ref 135–145)
SPECIMEN SOURCE: SIGNIFICANT CHANGE UP
SPECIMEN SOURCE: SIGNIFICANT CHANGE UP
WBC # BLD: 9.62 K/UL — SIGNIFICANT CHANGE UP (ref 3.8–10.5)
WBC # FLD AUTO: 9.62 K/UL — SIGNIFICANT CHANGE UP (ref 3.8–10.5)

## 2024-02-28 PROCEDURE — 99233 SBSQ HOSP IP/OBS HIGH 50: CPT

## 2024-02-28 RX ORDER — POLYETHYLENE GLYCOL 3350 17 G/17G
17 POWDER, FOR SOLUTION ORAL DAILY
Refills: 0 | Status: DISCONTINUED | OUTPATIENT
Start: 2024-02-28 | End: 2024-03-13

## 2024-02-28 RX ADMIN — PIPERACILLIN AND TAZOBACTAM 25 GRAM(S): 4; .5 INJECTION, POWDER, LYOPHILIZED, FOR SOLUTION INTRAVENOUS at 05:21

## 2024-02-28 RX ADMIN — Medication 1 APPLICATION(S): at 13:30

## 2024-02-28 RX ADMIN — CHLORHEXIDINE GLUCONATE 15 MILLILITER(S): 213 SOLUTION TOPICAL at 05:36

## 2024-02-28 RX ADMIN — Medication 3 MILLILITER(S): at 05:24

## 2024-02-28 RX ADMIN — HUMAN INSULIN 15 UNIT(S): 100 INJECTION, SUSPENSION SUBCUTANEOUS at 12:49

## 2024-02-28 RX ADMIN — ATORVASTATIN CALCIUM 80 MILLIGRAM(S): 80 TABLET, FILM COATED ORAL at 22:20

## 2024-02-28 RX ADMIN — PREGABALIN 1000 MICROGRAM(S): 225 CAPSULE ORAL at 11:27

## 2024-02-28 RX ADMIN — Medication 3 MILLILITER(S): at 23:09

## 2024-02-28 RX ADMIN — Medication 1 MILLIGRAM(S): at 11:27

## 2024-02-28 RX ADMIN — HUMAN INSULIN 15 UNIT(S): 100 INJECTION, SUSPENSION SUBCUTANEOUS at 17:48

## 2024-02-28 RX ADMIN — PANTOPRAZOLE SODIUM 40 MILLIGRAM(S): 20 TABLET, DELAYED RELEASE ORAL at 11:27

## 2024-02-28 RX ADMIN — Medication 1 APPLICATION(S): at 22:19

## 2024-02-28 RX ADMIN — POLYETHYLENE GLYCOL 3350 17 GRAM(S): 17 POWDER, FOR SOLUTION ORAL at 11:27

## 2024-02-28 RX ADMIN — Medication 1 APPLICATION(S): at 05:21

## 2024-02-28 RX ADMIN — HUMAN INSULIN 15 UNIT(S): 100 INJECTION, SUSPENSION SUBCUTANEOUS at 05:22

## 2024-02-28 RX ADMIN — SENNA PLUS 1 TABLET(S): 8.6 TABLET ORAL at 22:20

## 2024-02-28 RX ADMIN — CHLORHEXIDINE GLUCONATE 15 MILLILITER(S): 213 SOLUTION TOPICAL at 17:22

## 2024-02-28 RX ADMIN — GABAPENTIN 400 MILLIGRAM(S): 400 CAPSULE ORAL at 13:30

## 2024-02-28 RX ADMIN — HUMAN INSULIN 15 UNIT(S): 100 INJECTION, SUSPENSION SUBCUTANEOUS at 00:12

## 2024-02-28 RX ADMIN — ENOXAPARIN SODIUM 30 MILLIGRAM(S): 100 INJECTION SUBCUTANEOUS at 17:22

## 2024-02-28 RX ADMIN — GABAPENTIN 400 MILLIGRAM(S): 400 CAPSULE ORAL at 05:20

## 2024-02-28 RX ADMIN — GABAPENTIN 400 MILLIGRAM(S): 400 CAPSULE ORAL at 22:19

## 2024-02-28 RX ADMIN — Medication 3 MILLILITER(S): at 11:16

## 2024-02-28 RX ADMIN — CLOPIDOGREL BISULFATE 75 MILLIGRAM(S): 75 TABLET, FILM COATED ORAL at 11:27

## 2024-02-28 RX ADMIN — Medication 3 MILLILITER(S): at 17:32

## 2024-02-28 RX ADMIN — Medication 3 MILLILITER(S): at 00:09

## 2024-02-28 RX ADMIN — Medication 81 MILLIGRAM(S): at 11:27

## 2024-02-28 RX ADMIN — ENOXAPARIN SODIUM 30 MILLIGRAM(S): 100 INJECTION SUBCUTANEOUS at 05:20

## 2024-02-28 RX ADMIN — CHLORHEXIDINE GLUCONATE 1 APPLICATION(S): 213 SOLUTION TOPICAL at 11:28

## 2024-02-28 NOTE — PROGRESS NOTE ADULT - ASSESSMENT
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 Prior to admission patient had Viral URI ( 1-2 weeks prior to admission) with subsequent weakness. He underwent an angiogram (2/11) which showed moderate stenosis and no intervention was done. MRI Performed c/w small small bilateral cerebellar strokes and prior L thalamic strokes, as per Neurology was not c/w current presentation. 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 He is currently s/p 5 rounds of plasma exchange (2/13-2/20) and will receive IVIG x 5 ( 2/21-2/26)  His hospital course was complicated by progressive respiratory failure. CT Chest performed on 2/20 with atelectasis of b/l lower lobes. BAL 2/21 Growing PSA treating with course of Zosyn (2/21-2/28).     2/27: Started ASA with stable H/H. Now adding plavix per discussion with neurology. Zosyn ends 2/28.    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 Prior to admission patient had Viral URI ( 1-2 weeks prior to admission) with subsequent weakness. He underwent an angiogram (2/11) which showed moderate stenosis and no intervention was done. MRI Performed c/w small small bilateral cerebellar strokes and prior L thalamic strokes, as per Neurology was not c/w current presentation. 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 He is currently s/p 5 rounds of plasma exchange (2/13-2/20) and will receive IVIG x 5 ( 2/21-2/26)  His hospital course was complicated by progressive respiratory failure. CT Chest performed on 2/20 with atelectasis of b/l lower lobes. BAL 2/21 Growing PSA treated with course of Zosyn (2/21-2/28).     2/28: Asa/ plavix resumed with stable H/H. Completed Zosyn.     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 Prior to admission patient had Viral URI ( 1-2 weeks prior to admission) with subsequent weakness. He underwent an angiogram (2/11) which showed moderate stenosis and no intervention was done. MRI Performed c/w small small bilateral cerebellar strokes and prior L thalamic strokes, as per Neurology was not c/w current presentation. 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 He is currently s/p 5 rounds of plasma exchange (2/13-2/20) and will receive IVIG x 5 ( 2/21-2/26)  His hospital course was complicated by progressive respiratory failure. CT Chest performed on 2/20 with atelectasis of b/l lower lobes. BAL 2/21 Growing PSA treated with course of Zosyn (2/21-2/28).     2/28: Asa/ plavix resumed with stable H/H. Completed Zosyn.  Wife and Daughter updated in plan of care by Attending Physician.

## 2024-02-28 NOTE — PROGRESS NOTE ADULT - PROBLEM SELECTOR PLAN 11
- Patients Wife Margarette and daughter provided extensive medical update at bedside this morning - Patients Jeremy Pfeiffer  provided extensive medical update at bedside this morning

## 2024-02-28 NOTE — PROGRESS NOTE ADULT - SUBJECTIVE AND OBJECTIVE BOX
Patient is a 73y old  Male who presents with a chief complaint of Stroke, critical stenosis, evaluation for angiography (27 Feb 2024 15:12)      Interval Events:    REVIEW OF SYSTEMS:  [ ] Positive  [ ] All other systems negative  [ ] Unable to assess ROS because ________    Vital Signs Last 24 Hrs  T(C): 37.1 (02-28-24 @ 05:20), Max: 37.1 (02-28-24 @ 05:20)  T(F): 98.7 (02-28-24 @ 05:20), Max: 98.7 (02-28-24 @ 05:20)  HR: 85 (02-28-24 @ 05:24) (65 - 92)  BP: 123/78 (02-28-24 @ 05:20) (116/70 - 154/81)  RR: 20 (02-28-24 @ 05:20) (20 - 20)  SpO2: 100% (02-28-24 @ 05:24) (96% - 100%)PHYSICAL EXAM:  HEENT:   [ ]Tracheostomy:  [ ]Pupils equal  [ ]No oral lesions  [ ]Abnormal        SKIN  [ ]No Rash  [ ] Abnormal  [ ] pressure    CARDIAC  [ ]Regular  [ ]Abnormal    PULMONARY  [ ]Bilateral Clear Breath Sounds  [ ]Normal Excursion  [ ]Abnormal    GI  [ ]PEG      [ ] +BS		              [ ]Soft, nondistended, nontender	  [ ]Abnormal    MUSCULOSKELETAL                                   [ ]Bedbound                 [ ]Abnormal    [ ]Ambulatory/OOB to chair                           EXTREMITIES                                         [ ]Normal  [ ]Edema                           NEUROLOGIC  [ ] Normal, non focal  [ ] Focal findings:    PSYCHIATRIC  [ ]Alert and appropriate  [ ] Sedated	 [ ]Agitated    :  [place]: [ ] Yes, if yes: Date of Placement:                   [  ] No    LINES: Central Lines [ ] Yes, if yes: Date of Placement                                     [  ] No    HOSPITAL MEDICATIONS:  MEDICATIONS  (STANDING):  albuterol/ipratropium for Nebulization 3 milliLiter(s) Nebulizer every 6 hours  aspirin  chewable 81 milliGRAM(s) Oral daily  atorvastatin 80 milliGRAM(s) Oral at bedtime  chlorhexidine 0.12% Liquid 15 milliLiter(s) Oral Mucosa every 12 hours  chlorhexidine 4% Liquid 1 Application(s) Topical daily  clopidogrel Tablet 75 milliGRAM(s) Enteral Tube daily  cyanocobalamin 1000 MICROGram(s) Oral daily  enoxaparin Injectable 30 milliGRAM(s) SubCutaneous <User Schedule>  ergocalciferol Drops 72438 Unit(s) Oral <User Schedule>  ferrous    sulfate Liquid 300 milliGRAM(s) Enteral Tube <User Schedule>  folic acid 1 milliGRAM(s) Oral daily  gabapentin Solution 400 milliGRAM(s) Oral three times a day  insulin lispro (ADMELOG) corrective regimen sliding scale   SubCutaneous every 6 hours  insulin NPH human recombinant 15 Unit(s) SubCutaneous every 6 hours  pantoprazole   Suspension 40 milliGRAM(s) Oral daily  petrolatum Ophthalmic Ointment 1 Application(s) Both EYES three times a day  polyethylene glycol 3350 17 Gram(s) Oral every 12 hours  senna 1 Tablet(s) Oral at bedtime    MEDICATIONS  (PRN):  acetaminophen   Oral Liquid .. 650 milliGRAM(s) Oral every 6 hours PRN Mild Pain (1 - 3)  dextrose Oral Gel 15 Gram(s) Oral once PRN Blood Glucose LESS THAN 70 milliGRAM(s)/deciliter  sodium chloride 0.9% lock flush 10 milliLiter(s) IV Push every 1 hour PRN Pre/post blood products, medications, blood draw, and to maintain line patency      LABS:                        8.4    9.62  )-----------( 341      ( 28 Feb 2024 07:00 )             30.4     02-27    146<H>  |  107  |  24<H>  ----------------------------<  115<H>  4.4   |  29  |  0.82    Ca    8.7      27 Feb 2024 07:29  Phos  3.7     02-27  Mg     2.4     02-27        Urinalysis Basic - ( 27 Feb 2024 07:29 )    Color: x / Appearance: x / SG: x / pH: x  Gluc: 115 mg/dL / Ketone: x  / Bili: x / Urobili: x   Blood: x / Protein: x / Nitrite: x   Leuk Esterase: x / RBC: x / WBC x   Sq Epi: x / Non Sq Epi: x / Bacteria: x          CAPILLARY BLOOD GLUCOSE    MICROBIOLOGY:     RADIOLOGY:  [ ] Reviewed and interpreted by me    Mode: AC/ CMV (Assist Control/ Continuous Mandatory Ventilation)  RR (machine): 20  TV (machine): 500  FiO2: 40  PEEP: 7  ITime: 1  MAP: 12  PIP: 20   Patient is a 73y old  Male who presents with a chief complaint of Stroke, critical stenosis, evaluation for angiography (27 Feb 2024 15:12)      Interval Events: Stable overnight off Abx on full vent support                       REVIEW OF SYSTEMS:  [ ] Positive  [ ] All other systems negative  [x ] Unable to assess ROS because __Noverbal/ Trach to vent w/ continued encephalopathy______    Vital Signs Last 24 Hrs  T(C): 37.1 (02-28-24 @ 05:20), Max: 37.1 (02-28-24 @ 05:20)  T(F): 98.7 (02-28-24 @ 05:20), Max: 98.7 (02-28-24 @ 05:20)  HR: 85 (02-28-24 @ 05:24) (65 - 92)  BP: 123/78 (02-28-24 @ 05:20) (116/70 - 154/81)  RR: 20 (02-28-24 @ 05:20) (20 - 20)  SpO2: 100% (02-28-24 @ 05:24) (96% - 100%)    PHYSICAL EXAM:    HEENT:   [x]Tracheostomy: # 8 Cuffed Portex   [ ]Pupils equal  [ ]No oral lesions  [ ]Abnormal    SKIN  [x]No Rash  [ ] Abnormal  [ ] pressure    CARDIAC  [x]Regular  [ ]Abnormal    PULMONARY  [x ]Bilateral Clear Breath Sounds  [ ]Normal Excursion  [ ]Abnormal:     GI  [x]PEG site c/d/I     [x] +BS		              [x]Soft, nondistended, nontender	  [ ]Abnormal    MUSCULOSKELETAL                                   [x]Bedbound                 [ ]Abnormal    [ ]Ambulatory/OOB to chair                           EXTREMITIES                                         [ ]Normal  [x]Edema: + Upper and Lower extremity edema bilaterally                           NEUROLOGIC  [ ] Normal, non focal  [x] Focal findings: Intermittently opens eyes, Minimally responsive however following commands with Feet ( Rt foot "yes" / Lft foot "No")    PSYCHIATRIC  [x ] UTO, Minimally responsive   [ ] Sedated	 [ ]Agitated    :  [place]: [ ] Yes, if yes: Date of Placement:                   [x] No    LINES: Central Lines [x] Yes, if yes: Date of Placement:  LUE PICC Placed 2/14                                      [  ] No    HOSPITAL MEDICATIONS:  MEDICATIONS  (STANDING):  albuterol/ipratropium for Nebulization 3 milliLiter(s) Nebulizer every 6 hours  aspirin  chewable 81 milliGRAM(s) Oral daily  atorvastatin 80 milliGRAM(s) Oral at bedtime  chlorhexidine 0.12% Liquid 15 milliLiter(s) Oral Mucosa every 12 hours  chlorhexidine 4% Liquid 1 Application(s) Topical daily  clopidogrel Tablet 75 milliGRAM(s) Enteral Tube daily  cyanocobalamin 1000 MICROGram(s) Oral daily  enoxaparin Injectable 30 milliGRAM(s) SubCutaneous <User Schedule>  ergocalciferol Drops 63404 Unit(s) Oral <User Schedule>  ferrous    sulfate Liquid 300 milliGRAM(s) Enteral Tube <User Schedule>  folic acid 1 milliGRAM(s) Oral daily  gabapentin Solution 400 milliGRAM(s) Oral three times a day  insulin lispro (ADMELOG) corrective regimen sliding scale   SubCutaneous every 6 hours  insulin NPH human recombinant 15 Unit(s) SubCutaneous every 6 hours  pantoprazole   Suspension 40 milliGRAM(s) Oral daily  petrolatum Ophthalmic Ointment 1 Application(s) Both EYES three times a day  polyethylene glycol 3350 17 Gram(s) Oral every 12 hours  senna 1 Tablet(s) Oral at bedtime    MEDICATIONS  (PRN):  acetaminophen   Oral Liquid .. 650 milliGRAM(s) Oral every 6 hours PRN Mild Pain (1 - 3)  dextrose Oral Gel 15 Gram(s) Oral once PRN Blood Glucose LESS THAN 70 milliGRAM(s)/deciliter  sodium chloride 0.9% lock flush 10 milliLiter(s) IV Push every 1 hour PRN Pre/post blood products, medications, blood draw, and to maintain line patency      LABS:                        8.4    9.62  )-----------( 341      ( 28 Feb 2024 07:00 )             30.4     02-27    146<H>  |  107  |  24<H>  ----------------------------<  115<H>  4.4   |  29  |  0.82    Ca    8.7      27 Feb 2024 07:29  Phos  3.7     02-27  Mg     2.4     02-27        Urinalysis Basic - ( 27 Feb 2024 07:29 )    Color: x / Appearance: x / SG: x / pH: x  Gluc: 115 mg/dL / Ketone: x  / Bili: x / Urobili: x   Blood: x / Protein: x / Nitrite: x   Leuk Esterase: x / RBC: x / WBC x   Sq Epi: x / Non Sq Epi: x / Bacteria: x          CAPILLARY BLOOD GLUCOSE    MICROBIOLOGY:     RADIOLOGY:  [ ] Reviewed and interpreted by me    Mode: AC/ CMV (Assist Control/ Continuous Mandatory Ventilation)  RR (machine): 20  TV (machine): 500  FiO2: 40  PEEP: 7  ITime: 1  MAP: 12  PIP: 20

## 2024-02-28 NOTE — PROGRESS NOTE ADULT - PROBLEM SELECTOR PLAN 4
- Patient Febrile on 2/23   - BAL 2/21: + PSA   - Blood cxs 2/23: NGTD   - UA 2/23: Negative   - PSA pneumonia- currently on Zosyn x 7 days ( Ends 2/28) - Patient Febrile on 2/23   - BAL 2/21: + PSA   - Blood cxs 2/23: NGTD   - UA 2/23: Negative   - PSA pneumonia- completed Zosyn x 7 days ( 2/21-2/28)

## 2024-02-28 NOTE — PROGRESS NOTE ADULT - PROBLEM SELECTOR PLAN 7
- Patient Christianity  - Family would like to be asked prior to administration of blood products   - S/p 1 unit PRBCs on 2/22  - Cont Venofer ( 2/22-2/27)   - Cont Cyanocobalamine, Folic acid, Ferrous Sulfate  - Continue Epogen  - Will attempt to limit phlebotomy once labs stabilize

## 2024-02-28 NOTE — PROGRESS NOTE ADULT - NS ATTEND AMEND GEN_ALL_CORE FT
Patient is a 74 yo M w/ HTN, HLD, T2DM prior CVA's without residual deficits who initially presented with concern for stroke-like symptoms s/p unrevealing angiogram and ultimately found to have clinical picture most concerning for [place] Variant of GBS.     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 Inconsistently following commands by moving L or R sides for yes or no  - f/u neuro recs. Was on DAPT which was eventually stopped due to reported to episode of blood clots in airway  - Restarted on DAPT, monitor for bleeding.   - F/U with neuro is anyfurther treatment and for prognosis.   #Pulmonary - Mixed respiratory failure s/p tracheostomy. Currently on PRVC 20/500/7/40%. Not triggering breaths on PS trial this AM  - c/w Duonebs q6h  #GI - Oropharyngeal dysphagia S/P PEG   - c/w tube feeds via PEG  #Endocrine  - c/w NPH and ISS, titrate as needed  #Heme/Onc - Chronic anemia                - Patient/Family would like to be asked before blood products  #ID - Pseudomonas pneumonia  - Zosyn through 2/28  #DVT ppx  - Lovenox    # GBS with funtional quadrapengia  # acute hypoxemic respiratory failure   # vent dependence  # oropharyngeal dysphagia  # Pseudomonas pna.

## 2024-02-28 NOTE — PROGRESS NOTE ADULT - PROBLEM SELECTOR PLAN 2
- Patient S/p Tracheostomy # 8 Cuffed Portex on 2/16 ( )  - CTA Chest 2/20: No PE, Complete Atelectasis b/l lower lobes    - Currently remains on Mechanical Ventilation   - Did not tolerate weaning yesterday in setting of Bradycardia /Hypercarbia  - Will continue weaning attempts as tolerated  - Continue Chest PT and Suctioning PRN - Patient S/p Tracheostomy # 8 Cuffed Portex on 2/16 ( )  - CTA Chest 2/20: No PE, Complete Atelectasis b/l lower lobes    - Currently remains on Mechanical Ventilation   - Weaning limited due inadequately triggering/ Apnea    - Will continue weaning attempts as tolerated  - Continue Chest PT and Suctioning PRN

## 2024-02-29 LAB
GLUCOSE BLDC GLUCOMTR-MCNC: 122 MG/DL — HIGH (ref 70–99)
GLUCOSE BLDC GLUCOMTR-MCNC: 141 MG/DL — HIGH (ref 70–99)
GLUCOSE BLDC GLUCOMTR-MCNC: 153 MG/DL — HIGH (ref 70–99)
GLUCOSE BLDC GLUCOMTR-MCNC: 169 MG/DL — HIGH (ref 70–99)

## 2024-02-29 PROCEDURE — 99223 1ST HOSP IP/OBS HIGH 75: CPT

## 2024-02-29 PROCEDURE — 99233 SBSQ HOSP IP/OBS HIGH 50: CPT

## 2024-02-29 RX ADMIN — Medication 2: at 11:52

## 2024-02-29 RX ADMIN — Medication 1 APPLICATION(S): at 21:36

## 2024-02-29 RX ADMIN — ENOXAPARIN SODIUM 30 MILLIGRAM(S): 100 INJECTION SUBCUTANEOUS at 05:32

## 2024-02-29 RX ADMIN — GABAPENTIN 400 MILLIGRAM(S): 400 CAPSULE ORAL at 05:32

## 2024-02-29 RX ADMIN — Medication 3 MILLILITER(S): at 12:10

## 2024-02-29 RX ADMIN — HUMAN INSULIN 15 UNIT(S): 100 INJECTION, SUSPENSION SUBCUTANEOUS at 17:29

## 2024-02-29 RX ADMIN — Medication 3 MILLILITER(S): at 05:17

## 2024-02-29 RX ADMIN — HUMAN INSULIN 15 UNIT(S): 100 INJECTION, SUSPENSION SUBCUTANEOUS at 23:39

## 2024-02-29 RX ADMIN — Medication 300 MILLIGRAM(S): at 03:40

## 2024-02-29 RX ADMIN — Medication 1 MILLIGRAM(S): at 11:20

## 2024-02-29 RX ADMIN — ENOXAPARIN SODIUM 30 MILLIGRAM(S): 100 INJECTION SUBCUTANEOUS at 17:29

## 2024-02-29 RX ADMIN — Medication 1 APPLICATION(S): at 05:33

## 2024-02-29 RX ADMIN — ATORVASTATIN CALCIUM 80 MILLIGRAM(S): 80 TABLET, FILM COATED ORAL at 21:35

## 2024-02-29 RX ADMIN — HUMAN INSULIN 15 UNIT(S): 100 INJECTION, SUSPENSION SUBCUTANEOUS at 05:32

## 2024-02-29 RX ADMIN — CLOPIDOGREL BISULFATE 75 MILLIGRAM(S): 75 TABLET, FILM COATED ORAL at 11:20

## 2024-02-29 RX ADMIN — CHLORHEXIDINE GLUCONATE 1 APPLICATION(S): 213 SOLUTION TOPICAL at 11:20

## 2024-02-29 RX ADMIN — HUMAN INSULIN 15 UNIT(S): 100 INJECTION, SUSPENSION SUBCUTANEOUS at 00:01

## 2024-02-29 RX ADMIN — Medication 3 MILLILITER(S): at 18:36

## 2024-02-29 RX ADMIN — Medication 2: at 23:39

## 2024-02-29 RX ADMIN — Medication 1 APPLICATION(S): at 17:03

## 2024-02-29 RX ADMIN — Medication 81 MILLIGRAM(S): at 11:19

## 2024-02-29 RX ADMIN — Medication 3 MILLILITER(S): at 23:55

## 2024-02-29 RX ADMIN — POLYETHYLENE GLYCOL 3350 17 GRAM(S): 17 POWDER, FOR SOLUTION ORAL at 11:20

## 2024-02-29 RX ADMIN — CHLORHEXIDINE GLUCONATE 15 MILLILITER(S): 213 SOLUTION TOPICAL at 05:32

## 2024-02-29 RX ADMIN — Medication 2: at 00:01

## 2024-02-29 RX ADMIN — GABAPENTIN 400 MILLIGRAM(S): 400 CAPSULE ORAL at 14:55

## 2024-02-29 RX ADMIN — SENNA PLUS 1 TABLET(S): 8.6 TABLET ORAL at 21:36

## 2024-02-29 RX ADMIN — PREGABALIN 1000 MICROGRAM(S): 225 CAPSULE ORAL at 11:20

## 2024-02-29 RX ADMIN — CHLORHEXIDINE GLUCONATE 15 MILLILITER(S): 213 SOLUTION TOPICAL at 17:29

## 2024-02-29 RX ADMIN — HUMAN INSULIN 15 UNIT(S): 100 INJECTION, SUSPENSION SUBCUTANEOUS at 11:53

## 2024-02-29 RX ADMIN — PANTOPRAZOLE SODIUM 40 MILLIGRAM(S): 20 TABLET, DELAYED RELEASE ORAL at 11:20

## 2024-02-29 RX ADMIN — GABAPENTIN 400 MILLIGRAM(S): 400 CAPSULE ORAL at 21:36

## 2024-02-29 NOTE — PROGRESS NOTE ADULT - PROBLEM SELECTOR PLAN 8
- S/p PEG 2/16 ( )   - Tolerating TFs at goal rate  - Cont Bowel Regimen; Senna and Miralax - S/p PEG 2/16 ( Dr. Sood)   - Tolerating TFs at goal rate  - Cont Bowel Regimen; Senna and Miralax

## 2024-02-29 NOTE — PROGRESS NOTE ADULT - SUBJECTIVE AND OBJECTIVE BOX
Patient is a 73y old  Male who presents with a chief complaint of Stroke, critical stenosis, evaluation for angiography (28 Feb 2024 07:21)    HPI:  73 years old RH male with h/o HTN, HLD, DM, h/o CVA x 2 (no residual deficits) initially presented to [facility] ED with unsteady gait that began on 2/7 (exact LKW unknown) and L facial droop that began 6 AM on 2/9 (went to bed 11 PM on 2/8 without facial droop). No slurred speech, vision changes. Initial NIHSS 0 at [facility]. Hemodynamically stable, CT head with no acute pathology. Chronic left thalamic lacunar infarct. CTA with no large vessel occlusion. 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 CT perfusion with no acute abnormality.   Pt transfered from [facility] to [facility] due to concern for neurologic deterioration i/s/o aforementioned high grade proximal basiliar and L PCA stenosis. On arrival to [facility] ED, initial NIHSS 4 (+1 for LLE drift, +2 for b/l limb dysmetria, +1 for mild dysarthria). Later, pt started to have difficulty clearing secretions, became stridorous, and started to desaturate, so decision was made to intubate patient. Once pt medically stabilized, taken to angio suite by IR. Unable to obtain further history from patient at [facility] due to intubation/sedation (09 Feb 2024 20:27)    Interval Events:    REVIEW OF SYSTEMS:  [ ] Positive  [ ] All other systems negative  [ ] Unable to assess ROS because ________    Vital Signs Last 24 Hrs  T(C): 36.8 (02-29-24 @ 05:00), Max: 36.8 (02-28-24 @ 15:30)  T(F): 98.2 (02-29-24 @ 05:00), Max: 98.3 (02-28-24 @ 19:29)  HR: 79 (02-29-24 @ 05:54) (76 - 99)  BP: 135/76 (02-29-24 @ 05:00) (132/70 - 175/84)  RR: 20 (02-29-24 @ 05:00) (20 - 20)  SpO2: 100% (02-29-24 @ 05:54) (96% - 100%)    PHYSICAL EXAM:  HEENT:   [ ]Tracheostomy:  [ ]Pupils equal  [ ]No oral lesions  [ ]Abnormal    SKIN  [ ] No Rash  [ ] Abnormal  [ ] pressure    CARDIAC  [ ]Regular  [ ]Abnormal    PULMONARY  [ ]Bilateral Clear Breath Sounds  [ ]Normal Excursion  [ ]Abnormal    GI  [ ]PEG      [ ] +BS		              [ ]Soft, nondistended, nontender	  [ ]Abnormal    MUSCULOSKELETAL                                   [ ]Bedbound                 [ ]Abnormal    [ ]Ambulatory/OOB to chair                           EXTREMITIES                                         [ ]Normal  [ ]Edema                           NEUROLOGIC  [ ] Normal, non focal  [ ] Focal findings:    PSYCHIATRIC  [ ]Alert and appropriate  [ ] Sedated	 [ ]Agitated    :  [place]: [ ] Yes, if yes: Date of Placement:                   [  ] No    LINES: Central Lines [ ] Yes, if yes: Date of Placement                                     [  ] No    HOSPITAL MEDICATIONS:  MEDICATIONS  (STANDING):  albuterol/ipratropium for Nebulization 3 milliLiter(s) Nebulizer every 6 hours  aspirin  chewable 81 milliGRAM(s) Oral daily  atorvastatin 80 milliGRAM(s) Oral at bedtime  chlorhexidine 0.12% Liquid 15 milliLiter(s) Oral Mucosa every 12 hours  chlorhexidine 4% Liquid 1 Application(s) Topical daily  clopidogrel Tablet 75 milliGRAM(s) Enteral Tube daily  cyanocobalamin 1000 MICROGram(s) Oral daily  enoxaparin Injectable 30 milliGRAM(s) SubCutaneous <User Schedule>  ergocalciferol Drops 60661 Unit(s) Oral <User Schedule>  ferrous    sulfate Liquid 300 milliGRAM(s) Enteral Tube <User Schedule>  folic acid 1 milliGRAM(s) Oral daily  gabapentin Solution 400 milliGRAM(s) Oral three times a day  insulin lispro (ADMELOG) corrective regimen sliding scale   SubCutaneous every 6 hours  insulin NPH human recombinant 15 Unit(s) SubCutaneous every 6 hours  pantoprazole   Suspension 40 milliGRAM(s) Oral daily  petrolatum Ophthalmic Ointment 1 Application(s) Both EYES three times a day  polyethylene glycol 3350 17 Gram(s) Oral daily  senna 1 Tablet(s) Oral at bedtime    MEDICATIONS  (PRN):  acetaminophen   Oral Liquid .. 650 milliGRAM(s) Oral every 6 hours PRN Mild Pain (1 - 3)  dextrose Oral Gel 15 Gram(s) Oral once PRN Blood Glucose LESS THAN 70 milliGRAM(s)/deciliter  sodium chloride 0.9% lock flush 10 milliLiter(s) IV Push every 1 hour PRN Pre/post blood products, medications, blood draw, and to maintain line patency      LABS:                        8.4    9.62  )-----------( 341      ( 28 Feb 2024 07:00 )             30.4     02-28    145  |  108  |  26<H>  ----------------------------<  132<H>  4.2   |  29  |  0.83    Ca    8.7      28 Feb 2024 07:00  Phos  3.5     02-28  Mg     2.3     02-28        Urinalysis Basic - ( 28 Feb 2024 07:00 )    Color: x / Appearance: x / SG: x / pH: x  Gluc: 132 mg/dL / Ketone: x  / Bili: x / Urobili: x   Blood: x / Protein: x / Nitrite: x   Leuk Esterase: x / RBC: x / WBC x   Sq Epi: x / Non Sq Epi: x / Bacteria: x      Mode: AC/ CMV (Assist Control/ Continuous Mandatory Ventilation)  RR (machine): 20  TV (machine): 500  FiO2: 40  PEEP: 5  ITime: 1  MAP: 10  PIP: 18     Patient is a 73y old  Male who presents with a chief complaint of Stroke, critical stenosis, evaluation for angiography (28 Feb 2024 07:21)    HPI:  73 years old RH male with h/o HTN, HLD, DM, h/o CVA x 2 (no residual deficits) initially presented to [facility] ED with unsteady gait that began on 2/7 (exact LKW unknown) and L facial droop that began 6 AM on 2/9 (went to bed 11 PM on 2/8 without facial droop). No slurred speech, vision changes. Initial NIHSS 0 at [facility]. Hemodynamically stable, CT head with no acute pathology. Chronic left thalamic lacunar infarct. CTA with no large vessel occlusion. 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 CT perfusion with no acute abnormality.   Pt transferred from [facility] to [facility] due to concern for neurologic deterioration i/s/o aforementioned high grade proximal basilar and L PCA stenosis. On arrival to [facility] ED, initial NIHSS 4 (+1 for LLE drift, +2 for b/l limb dysmetria, +1 for mild dysarthria). Later, pt started to have difficulty clearing secretions, became stridor and started to desaturate, so decision was made to intubate patient. Once pt medically stabilized, taken to angio suite by IR. Unable to obtain further history from patient at [facility] due to intubation/sedation (09 Feb 2024 20:27)    Interval Events:    REVIEW OF SYSTEMS:  [ ] Positive  [ ] All other systems negative  [ ] Unable to assess ROS because ________    Vital Signs Last 24 Hrs  T(C): 36.8 (02-29-24 @ 05:00), Max: 36.8 (02-28-24 @ 15:30)  T(F): 98.2 (02-29-24 @ 05:00), Max: 98.3 (02-28-24 @ 19:29)  HR: 79 (02-29-24 @ 05:54) (76 - 99)  BP: 135/76 (02-29-24 @ 05:00) (132/70 - 175/84)  RR: 20 (02-29-24 @ 05:00) (20 - 20)  SpO2: 100% (02-29-24 @ 05:54) (96% - 100%)    PHYSICAL EXAM:    HEENT:   [x]Tracheostomy: # 8 Cuffed Portex   [ ]Pupils equal  [ ]No oral lesions  [ ]Abnormal    SKIN  [x]No Rash  [ ] Abnormal  [ ] pressure    CARDIAC  [x]Regular  [ ]Abnormal    PULMONARY  [x ]Bilateral Clear Breath Sounds  [ ]Normal Excursion  [ ]Abnormal:     GI  [x]PEG site c/d/I     [x] +BS		              [x]Soft, nondistended, nontender	  [ ]Abnormal    MUSCULOSKELETAL                                   [x]Bedbound                 [ ]Abnormal    [ ]Ambulatory/OOB to chair                           EXTREMITIES                                         [ ]Normal  [x]Edema: + Upper and Lower extremity edema bilaterally                           NEUROLOGIC  [ ] Normal, non focal  [x] Focal findings: Intermittently opens eyes, Minimally responsive however following commands with Feet ( Rt foot "yes" / Lft foot "No")    PSYCHIATRIC  [x ] UTO, Minimally responsive   [ ] Sedated	 [ ]Agitated    :  [place]: [ ] Yes, if yes: Date of Placement:                   [x] No    LINES: Central Lines [x] Yes, if yes: Date of Placement:  LUE PICC Placed 2/14                                      [  ] No    HOSPITAL MEDICATIONS:  MEDICATIONS  (STANDING):  albuterol/ipratropium for Nebulization 3 milliLiter(s) Nebulizer every 6 hours  aspirin  chewable 81 milliGRAM(s) Oral daily  atorvastatin 80 milliGRAM(s) Oral at bedtime  chlorhexidine 0.12% Liquid 15 milliLiter(s) Oral Mucosa every 12 hours  chlorhexidine 4% Liquid 1 Application(s) Topical daily  clopidogrel Tablet 75 milliGRAM(s) Enteral Tube daily  cyanocobalamin 1000 MICROGram(s) Oral daily  enoxaparin Injectable 30 milliGRAM(s) SubCutaneous <User Schedule>  ergocalciferol Drops 97610 Unit(s) Oral <User Schedule>  ferrous    sulfate Liquid 300 milliGRAM(s) Enteral Tube <User Schedule>  folic acid 1 milliGRAM(s) Oral daily  gabapentin Solution 400 milliGRAM(s) Oral three times a day  insulin lispro (ADMELOG) corrective regimen sliding scale   SubCutaneous every 6 hours  insulin NPH human recombinant 15 Unit(s) SubCutaneous every 6 hours  pantoprazole   Suspension 40 milliGRAM(s) Oral daily  petrolatum Ophthalmic Ointment 1 Application(s) Both EYES three times a day  polyethylene glycol 3350 17 Gram(s) Oral daily  senna 1 Tablet(s) Oral at bedtime    MEDICATIONS  (PRN):  acetaminophen   Oral Liquid .. 650 milliGRAM(s) Oral every 6 hours PRN Mild Pain (1 - 3)  dextrose Oral Gel 15 Gram(s) Oral once PRN Blood Glucose LESS THAN 70 milliGRAM(s)/deciliter  sodium chloride 0.9% lock flush 10 milliLiter(s) IV Push every 1 hour PRN Pre/post blood products, medications, blood draw, and to maintain line patency      LABS:                        8.4    9.62  )-----------( 341      ( 28 Feb 2024 07:00 )             30.4     02-28    145  |  108  |  26<H>  ----------------------------<  132<H>  4.2   |  29  |  0.83    Ca    8.7      28 Feb 2024 07:00  Phos  3.5     02-28  Mg     2.3     02-28    Urinalysis Basic - ( 28 Feb 2024 07:00 )    Color: x / Appearance: x / SG: x / pH: x  Gluc: 132 mg/dL / Ketone: x  / Bili: x / Urobili: x   Blood: x / Protein: x / Nitrite: x   Leuk Esterase: x / RBC: x / WBC x   Sq Epi: x / Non Sq Epi: x / Bacteria: x      Mode: AC/ CMV (Assist Control/ Continuous Mandatory Ventilation)  RR (machine): 20  TV (machine): 500  FiO2: 40  PEEP: 5  ITime: 1  MAP: 10  PIP: 18

## 2024-02-29 NOTE — PROGRESS NOTE ADULT - PROBLEM SELECTOR PLAN 7
- Patient Hinduism  - Family would like to be asked prior to administration of blood products   - S/p 1 unit PRBCs on 2/22  - Cont Venofer ( 2/22-2/27)   - Cont Cyanocobalamine, Folic acid, Ferrous Sulfate  - Continue Epogen  - Will attempt to limit phlebotomy once labs stabilize

## 2024-02-29 NOTE — PROGRESS NOTE ADULT - ASSESSMENT
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 Prior to admission patient had Viral URI ( 1-2 weeks prior to admission) with subsequent weakness. He underwent an angiogram (2/11) which showed moderate stenosis and no intervention was done. MRI Performed c/w small bilateral cerebellar strokes and prior L thalamic strokes, as per Neurology was not c/w current presentation. 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 He is currently s/p 5 rounds of plasma exchange (2/13-2/20) and will receive IVIG x 5 ( 2/21-2/26)  His hospital course was complicated by progressive respiratory failure. CT Chest performed on 2/20 with atelectasis of b/l lower lobes. BAL 2/21 Growing PSA treated with course of Zosyn (2/21-2/28).     2/28: Asa/ Plavix resumed with stable H/H. Completed Zosyn.  Wife and Daughter updated in plan of care by Attending Physician.    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 Prior to admission patient had Viral URI ( 1-2 weeks prior to admission) with subsequent weakness. He underwent an angiogram (2/11) which showed moderate stenosis and no intervention was done. MRI Performed c/w small bilateral cerebellar strokes and prior L thalamic strokes, as per Neurology was not c/w current presentation. 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 He is currently s/p 5 rounds of plasma exchange (2/13-2/20) and will receive IVIG x 5 ( 2/21-2/26)  His hospital course was complicated by progressive respiratory failure. CT Chest performed on 2/20 with atelectasis of b/l lower lobes. BAL 2/21 Growing PSA treated with course of Zosyn (2/21-2/28).     2/29: Not tolerating weaning from vent but more responsive to commands.  Attempting to wiggle toes and express needs.

## 2024-02-29 NOTE — PROGRESS NOTE ADULT - NS ATTEND AMEND GEN_ALL_CORE FT
Patient is a 74 yo M w/ HTN, HLD, T2DM prior CVA's without residual deficits who initially presented with concern for stroke-like symptoms s/p unrevealing angiogram and ultimately found to have clinical picture most concerning for Ding Parham Variant of GBS.     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 Inconsistently following commands by moving L or R sides for yes or no  - f/u neuro recs. Was on DAPT which was eventually stopped due to reported to episode of blood clots in airway  - Restarted on DAPT, monitor for bleeding. Currently without any bleeding. Will need at least 3 months per neuro.   - F/U with neuro is any further treatment and prognosis for GBS.   #Pulmonary - Mixed respiratory failure s/p tracheostomy. Currently on PRVC 20/500/7/40%. Not triggering breaths on PS trial this AM.  - c/w Duonebs q6h  #GI - Oropharyngeal dysphagia S/P PEG   - c/w tube feeds via PEG  #Endocrine  - c/w NPH and ISS, titrate as needed  #Heme/Onc - Chronic anemia                - Patient/Family would like to be asked before blood products  #ID - Pseudomonas pneumonia  - s/p Zosyn completed on 2/28  #DVT ppx  - Lovenox  # Dispo- Acute rehab eval. Patient unlikely to come off ventilator at this time 2/2 to GBS/MFS    # GBS with funtional quadrapengia  # acute hypoxemic respiratory failure   # vent dependence  # oropharyngeal dysphagia  # Pseudomonas pna.

## 2024-02-29 NOTE — PROGRESS NOTE ADULT - PROBLEM SELECTOR PLAN 2
- Patient S/p Tracheostomy # 8 Cuffed Portex on 2/16 ( )  - CTA Chest 2/20: No PE, Complete Atelectasis b/l lower lobes    - Currently remains on Mechanical Ventilation   - Weaning limited due inadequately triggering/ Apnea    - Will continue weaning attempts as tolerated  - Continue Chest PT and Suctioning PRN

## 2024-02-29 NOTE — PROGRESS NOTE ADULT - PROBLEM SELECTOR PLAN 1
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

## 2024-02-29 NOTE — CONSULT NOTE ADULT - SUBJECTIVE AND OBJECTIVE BOX
Patient is a 73y old  Male who presents with a chief complaint of Stroke, critical stenosis, evaluation for angiography (29 Feb 2024 07:44)    Admission HPI:  73 years old RH male with h/o HTN, HLD, DM, h/o CVA x 2 (no residual deficits) initially presented to [facility] ED with unsteady gait that began on 2/7 (exact LKW unknown) and L facial droop that began 6 AM on 2/9 (went to bed 11 PM on 2/8 without facial droop). No slurred speech, vision changes. Initial NIHSS 0 at [facility]. Hemodynamically stable, CT head with no acute pathology. Chronic left thalamic lacunar infarct. CTA with no large vessel occlusion. 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 CT perfusion with no acute abnormality.     Pt transfered from [facility] to [facility] due to concern for neurologic deterioration i/s/o aforementioned high grade proximal basiliar and L PCA stenosis. On arrival to [facility] ED, initial NIHSS 4 (+1 for LLE drift, +2 for b/l limb dysmetria, +1 for mild dysarthria). Later, pt started to have difficulty clearing secretions, became stridorous, and started to desaturate, so decision was made to intubate patient. Once pt medically stabilized, taken to angio suite by IR. Unable to obtain further history from patient at [facility] due to intubation/sedation (09 Feb 2024 20:27)    Interval History:  Patient has had a complicated hospital course.  Diagnosed w GBS and treated with PLEX and IVIG.  Had persistent respiratory failure and underwent trach.    REVIEW OF SYSTEMS: No chest pain, shortness of breath, nausea, vomiting or diarhea; other ROS neg     PAST MEDICAL & SURGICAL HISTORY  Hypertension    Diabetes    CVA (cerebral vascular accident)    HTN (hypertension)    HLD (hyperlipidemia)    DM2 (diabetes mellitus, type 2)    CVA (cerebrovascular accident)    No significant past surgical history    FUNCTIONAL HISTORY:   Lives   Independent    CURRENT FUNCTIONAL STATUS:  Max A bed mob    FAMILY HISTORY   N/C    MEDICATIONS   acetaminophen   Oral Liquid .. 650 milliGRAM(s) Oral every 6 hours PRN  albuterol/ipratropium for Nebulization 3 milliLiter(s) Nebulizer every 6 hours  aspirin  chewable 81 milliGRAM(s) Oral daily  atorvastatin 80 milliGRAM(s) Oral at bedtime  chlorhexidine 0.12% Liquid 15 milliLiter(s) Oral Mucosa every 12 hours  chlorhexidine 4% Liquid 1 Application(s) Topical daily  clopidogrel Tablet 75 milliGRAM(s) Enteral Tube daily  cyanocobalamin 1000 MICROGram(s) Oral daily  dextrose Oral Gel 15 Gram(s) Oral once PRN  enoxaparin Injectable 30 milliGRAM(s) SubCutaneous <User Schedule>  ergocalciferol Drops 89135 Unit(s) Oral <User Schedule>  ferrous    sulfate Liquid 300 milliGRAM(s) Enteral Tube <User Schedule>  folic acid 1 milliGRAM(s) Oral daily  gabapentin Solution 400 milliGRAM(s) Oral three times a day  insulin lispro (ADMELOG) corrective regimen sliding scale   SubCutaneous every 6 hours  insulin NPH human recombinant 15 Unit(s) SubCutaneous every 6 hours  pantoprazole   Suspension 40 milliGRAM(s) Oral daily  petrolatum Ophthalmic Ointment 1 Application(s) Both EYES three times a day  polyethylene glycol 3350 17 Gram(s) Oral daily  senna 1 Tablet(s) Oral at bedtime  sodium chloride 0.9% lock flush 10 milliLiter(s) IV Push every 1 hour PRN    ALLERGIES  No Known Allergies    VITALS  T(C): 36.8 (02-29-24 @ 05:00), Max: 36.8 (02-28-24 @ 15:30)  HR: 88 (02-29-24 @ 09:45) (76 - 99)  BP: 135/76 (02-29-24 @ 05:00) (132/70 - 159/77)  RR: 20 (02-29-24 @ 05:00) (20 - 20)  SpO2: 100% (02-29-24 @ 09:45) (96% - 100%)  Wt(kg): --    PHYSICAL EXAM  Constitutional - NAD, Comfortable  HEENT - NCAT, EOMI  Neck - Supple  Chest - No distress, no use of accessory muscles for respiration  Cardiovascular -Well perfused  Abdomen - BS+, Soft, NTND  Extremities - No C/C/E, No calf tenderness   Neurologic Exam -                    Cognitive - Awake, Alert, AAO to self, place, date, year, situation     Communication - Fluent, No dysarthria, no aphasia     Cranial Nerves - CN 2-12 intact     Motor - No focal deficits      Sensory - Intact to LT     Reflexes - DTR Intact, No primitive reflexive  Psychiatric - Mood stable, Affect WNL    RECENT LABS/IMAGING  CBC Full  -  ( 28 Feb 2024 07:00 )  WBC Count : 9.62 K/uL  RBC Count : 3.62 M/uL  Hemoglobin : 8.4 g/dL  Hematocrit : 30.4 %  Platelet Count - Automated : 341 K/uL  Mean Cell Volume : 84.0 fl  Mean Cell Hemoglobin : 23.2 pg  Mean Cell Hemoglobin Concentration : 27.6 gm/dL  Auto Neutrophil # : x  Auto Lymphocyte # : x  Auto Monocyte # : x  Auto Eosinophil # : x  Auto Basophil # : x  Auto Neutrophil % : x  Auto Lymphocyte % : x  Auto Monocyte % : x  Auto Eosinophil % : x  Auto Basophil % : x    02-28    145  |  108  |  26<H>  ----------------------------<  132<H>  4.2   |  29  |  0.83    Ca    8.7      28 Feb 2024 07:00  Phos  3.5     02-28  Mg     2.3     02-28      Urinalysis Basic - ( 28 Feb 2024 07:00 )    Color: x / Appearance: x / SG: x / pH: x  Gluc: 132 mg/dL / Ketone: x  / Bili: x / Urobili: x   Blood: x / Protein: x / Nitrite: x   Leuk Esterase: x / RBC: x / WBC x   Sq Epi: x / Non Sq Epi: x / Bacteria: x    CT Head No Cont (02.12.24 @ 21:22) >  Stable acute small left cerebellar hemisphere infarctions. No new   infarction. No acute intracranial hemorrhage. No large vessel occlusion.    MR Thoracic Spine w/wo IV Cont (02.11.24 @ 13:09) >  1. CERVICAL SPINE:  Low-grade cervical degenerative disc disease.   Indistinct cord lesions at the C3 and C4 vertebral levels are nonspecific   but suggest inflammatory/infectious/demyelinating etiology. No associated   enhancement.    2. THORACIC SPINE:   Low-grade thoracic degenerative disc disease.   Thoracic cord intact.  No abnomal enhancement.    3. LUMBAR SPINE:   Advanced lower lumbar degenerative disc disease   includes moderate degenerative central canal stenosis at L4-L5 and   high-grade degenerative foraminal stenosis at L5-S1. Conus intact. Cauda   equina demonstrates central clustering with spinal stenosis atL4-L5 and   mild radicular enhancement predominantly distal to this location of   spinal stenosis, nonspecific, inflammatory versus congestion from the   stenosis. Vascular congestion between the conus and the L4-L5 stenosis.    Superimposed nodular enhancement at the L4 superior endplate level is   also nonspecific, inflammatory versus schwannoma    Impression:   72 yo with functional deficits secondary to diagnosis of CVA, GBS    Plan:  PT- ROM, Bed Mob, Transfers, Amb w AD and bracing as needed  OT- ADLs, bracing  SLP- Dysphagia eval and treat  Prec- Falls, Cardiac, Pulm  DVT Prophylaxis- Lovenox  Skin- Turn q2 h  Dispo-  Patient is a 73y old  Male who presents with a chief complaint of Stroke, critical stenosis, evaluation for angiography (29 Feb 2024 07:44)    Admission HPI:  73 years old RH male with h/o HTN, HLD, DM, h/o CVA x 2 (no residual deficits) initially presented to [facility] ED with unsteady gait that began on 2/7 (exact LKW unknown) and L facial droop that began 6 AM on 2/9 (went to bed 11 PM on 2/8 without facial droop). No slurred speech, vision changes. Initial NIHSS 0 at [facility]. Hemodynamically stable, CT head with no acute pathology. Chronic left thalamic lacunar infarct. CTA with no large vessel occlusion. 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 CT perfusion with no acute abnormality.     Pt transfered from [facility] to [facility] due to concern for neurologic deterioration i/s/o aforementioned high grade proximal basiliar and L PCA stenosis. On arrival to [facility] ED, initial NIHSS 4 (+1 for LLE drift, +2 for b/l limb dysmetria, +1 for mild dysarthria). Later, pt started to have difficulty clearing secretions, became stridorous, and started to desaturate, so decision was made to intubate patient. Once pt medically stabilized, taken to angio suite by IR. Unable to obtain further history from patient at [facility] due to intubation/sedation (09 Feb 2024 20:27)    Interval History:  Patient has had a complicated hospital course.  Diagnosed w GBS and treated with PLEX and IVIG.  Had persistent respiratory failure and underwent trach.  Patient's daughter at bedside.     REVIEW OF SYSTEMS: Patient on vent - able to nod yes/no - has some discomfort at trach site and weakness throughout    PAST MEDICAL & SURGICAL HISTORY  Hypertension    Diabetes    CVA (cerebral vascular accident)    HTN (hypertension)    HLD (hyperlipidemia)    DM2 (diabetes mellitus, type 2)    CVA (cerebrovascular accident)    No significant past surgical history    FUNCTIONAL HISTORY:   Lives in [place], [place]  in home w stairs.  PTA Independent    CURRENT FUNCTIONAL STATUS:  Max A bed mob    FAMILY HISTORY   N/C    MEDICATIONS   acetaminophen   Oral Liquid .. 650 milliGRAM(s) Oral every 6 hours PRN  albuterol/ipratropium for Nebulization 3 milliLiter(s) Nebulizer every 6 hours  aspirin  chewable 81 milliGRAM(s) Oral daily  atorvastatin 80 milliGRAM(s) Oral at bedtime  chlorhexidine 0.12% Liquid 15 milliLiter(s) Oral Mucosa every 12 hours  chlorhexidine 4% Liquid 1 Application(s) Topical daily  clopidogrel Tablet 75 milliGRAM(s) Enteral Tube daily  cyanocobalamin 1000 MICROGram(s) Oral daily  dextrose Oral Gel 15 Gram(s) Oral once PRN  enoxaparin Injectable 30 milliGRAM(s) SubCutaneous <User Schedule>  ergocalciferol Drops 08808 Unit(s) Oral <User Schedule>  ferrous    sulfate Liquid 300 milliGRAM(s) Enteral Tube <User Schedule>  folic acid 1 milliGRAM(s) Oral daily  gabapentin Solution 400 milliGRAM(s) Oral three times a day  insulin lispro (ADMELOG) corrective regimen sliding scale   SubCutaneous every 6 hours  insulin NPH human recombinant 15 Unit(s) SubCutaneous every 6 hours  pantoprazole   Suspension 40 milliGRAM(s) Oral daily  petrolatum Ophthalmic Ointment 1 Application(s) Both EYES three times a day  polyethylene glycol 3350 17 Gram(s) Oral daily  senna 1 Tablet(s) Oral at bedtime  sodium chloride 0.9% lock flush 10 milliLiter(s) IV Push every 1 hour PRN    ALLERGIES  No Known Allergies    VITALS  T(C): 36.8 (02-29-24 @ 05:00), Max: 36.8 (02-28-24 @ 15:30)  HR: 88 (02-29-24 @ 09:45) (76 - 99)  BP: 135/76 (02-29-24 @ 05:00) (132/70 - 159/77)  RR: 20 (02-29-24 @ 05:00) (20 - 20)  SpO2: 100% (02-29-24 @ 09:45) (96% - 100%)  Wt(kg): --    PHYSICAL EXAM  Constitutional - NAD, Comfortable  HEENT - NCAT, EOMI  Neck - Trach intactSupple  Chest - On vent  Cardiovascular -Well perfused  Abdomen - BS+, Soft, NTND  Extremities - 1+ edema, No calf tenderness   Neurologic Exam -                 Alert  Cooperative  Motor 0/5 bl UE and LEs  Sensation diminished in all 4 extremities  No clonus  Psychiatric - Mood stable, Affect WNL    RECENT LABS/IMAGING  CBC Full  -  ( 28 Feb 2024 07:00 )  WBC Count : 9.62 K/uL  RBC Count : 3.62 M/uL  Hemoglobin : 8.4 g/dL  Hematocrit : 30.4 %  Platelet Count - Automated : 341 K/uL  Mean Cell Volume : 84.0 fl  Mean Cell Hemoglobin : 23.2 pg  Mean Cell Hemoglobin Concentration : 27.6 gm/dL  Auto Neutrophil # : x  Auto Lymphocyte # : x  Auto Monocyte # : x  Auto Eosinophil # : x  Auto Basophil # : x  Auto Neutrophil % : x  Auto Lymphocyte % : x  Auto Monocyte % : x  Auto Eosinophil % : x  Auto Basophil % : x    02-28    145  |  108  |  26<H>  ----------------------------<  132<H>  4.2   |  29  |  0.83    Ca    8.7      28 Feb 2024 07:00  Phos  3.5     02-28  Mg     2.3     02-28      Urinalysis Basic - ( 28 Feb 2024 07:00 )    Color: x / Appearance: x / SG: x / pH: x  Gluc: 132 mg/dL / Ketone: x  / Bili: x / Urobili: x   Blood: x / Protein: x / Nitrite: x   Leuk Esterase: x / RBC: x / WBC x   Sq Epi: x / Non Sq Epi: x / Bacteria: x    CT Head No Cont (02.12.24 @ 21:22) >  Stable acute small left cerebellar hemisphere infarctions. No new   infarction. No acute intracranial hemorrhage. No large vessel occlusion.    MR Thoracic Spine w/wo IV Cont (02.11.24 @ 13:09) >  1. CERVICAL SPINE:  Low-grade cervical degenerative disc disease.   Indistinct cord lesions at the C3 and C4 vertebral levels are nonspecific   but suggest inflammatory/infectious/demyelinating etiology. No associated   enhancement.    2. THORACIC SPINE:   Low-grade thoracic degenerative disc disease.   Thoracic cord intact.  No abnomal enhancement.    3. LUMBAR SPINE:   Advanced lower lumbar degenerative disc disease   includes moderate degenerative central canal stenosis at L4-L5 and   high-grade degenerative foraminal stenosis at L5-S1. Conus intact. Cauda   equina demonstrates central clustering with spinal stenosis atL4-L5 and   mild radicular enhancement predominantly distal to this location of   spinal stenosis, nonspecific, inflammatory versus congestion from the   stenosis. Vascular congestion between the conus and the L4-L5 stenosis.    Superimposed nodular enhancement at the L4 superior endplate level is   also nonspecific, inflammatory versus schwannoma    Impression:   72 yo with functional deficits secondary to diagnosis of CVA, GBS    Plan:  Had d/w RCU team- patient still on vent; finished course of IVIG and PLEX- will have neuro re-eval to see if further treatments necessary.  PT- ROM, Bed Mob, Transfers, Amb w AD and bracing as needed  OT- ADLs, bracing  SLP- Dysphagia eval and treat  Prec- Falls, Cardiac, Pulm  DVT Prophylaxis- Lovenox  Skin- Turn q2 h  Dispo- When stable would benefit from Acute SCI Rehab in vent facility- patient requires active and ongoing therapeutic interventions of multiple disciplines and can tolerate 3 hours of therapies x 4wks. Can actively participate and benefit from  an intensive rehabilitation program. Requires supervision by a rehabilitation physician and a coordinated interdisciplinary approach to providing rehabilitation.   D/W patient's daughter and RCU Team

## 2024-02-29 NOTE — PROGRESS NOTE ADULT - PROBLEM SELECTOR PLAN 4
- Patient Febrile on 2/23   - BAL 2/21: + PSA   - Blood cxs 2/23: NGTD   - UA 2/23: Negative   - PSA pneumonia- completed Zosyn x 7 days ( 2/21-2/28)

## 2024-03-01 LAB
BUN SERPL-MCNC: 26 MG/DL — HIGH (ref 7–23)
CALCIUM SERPL-MCNC: 9.1 MG/DL — SIGNIFICANT CHANGE UP (ref 8.4–10.5)
CHLORIDE SERPL-SCNC: 107 MMOL/L — SIGNIFICANT CHANGE UP (ref 96–108)
CO2 SERPL-SCNC: 28 MMOL/L — SIGNIFICANT CHANGE UP (ref 22–31)
CREAT SERPL-MCNC: 0.7 MG/DL — SIGNIFICANT CHANGE UP (ref 0.5–1.3)
EGFR: 97 ML/MIN/1.73M2 — SIGNIFICANT CHANGE UP
GLUCOSE BLDC GLUCOMTR-MCNC: 124 MG/DL — HIGH (ref 70–99)
GLUCOSE BLDC GLUCOMTR-MCNC: 130 MG/DL — HIGH (ref 70–99)
GLUCOSE BLDC GLUCOMTR-MCNC: 142 MG/DL — HIGH (ref 70–99)
GLUCOSE BLDC GLUCOMTR-MCNC: 185 MG/DL — HIGH (ref 70–99)
GLUCOSE SERPL-MCNC: 130 MG/DL — HIGH (ref 70–99)
HCT VFR BLD CALC: 33.5 % — LOW (ref 39–50)
HGB BLD-MCNC: 9 G/DL — LOW (ref 13–17)
MAGNESIUM SERPL-MCNC: 2.2 MG/DL — SIGNIFICANT CHANGE UP (ref 1.6–2.6)
MCHC RBC-ENTMCNC: 23.3 PG — LOW (ref 27–34)
MCHC RBC-ENTMCNC: 26.9 GM/DL — LOW (ref 32–36)
MCV RBC AUTO: 86.8 FL — SIGNIFICANT CHANGE UP (ref 80–100)
NRBC # BLD: 5 /100 WBCS — HIGH (ref 0–0)
PHOSPHATE SERPL-MCNC: 4.1 MG/DL — SIGNIFICANT CHANGE UP (ref 2.5–4.5)
PLATELET # BLD AUTO: 386 K/UL — SIGNIFICANT CHANGE UP (ref 150–400)
POTASSIUM SERPL-MCNC: 4.5 MMOL/L — SIGNIFICANT CHANGE UP (ref 3.5–5.3)
POTASSIUM SERPL-SCNC: 4.5 MMOL/L — SIGNIFICANT CHANGE UP (ref 3.5–5.3)
RBC # BLD: 3.86 M/UL — LOW (ref 4.2–5.8)
RBC # FLD: 25.9 % — HIGH (ref 10.3–14.5)
SODIUM SERPL-SCNC: 144 MMOL/L — SIGNIFICANT CHANGE UP (ref 135–145)
WBC # BLD: 11.38 K/UL — HIGH (ref 3.8–10.5)
WBC # FLD AUTO: 11.38 K/UL — HIGH (ref 3.8–10.5)

## 2024-03-01 PROCEDURE — 99233 SBSQ HOSP IP/OBS HIGH 50: CPT

## 2024-03-01 RX ORDER — FUROSEMIDE 40 MG
20 TABLET ORAL ONCE
Refills: 0 | Status: COMPLETED | OUTPATIENT
Start: 2024-03-01 | End: 2024-03-01

## 2024-03-01 RX ADMIN — Medication 1 APPLICATION(S): at 23:27

## 2024-03-01 RX ADMIN — Medication 3 MILLILITER(S): at 11:14

## 2024-03-01 RX ADMIN — Medication 1 MILLIGRAM(S): at 11:48

## 2024-03-01 RX ADMIN — CHLORHEXIDINE GLUCONATE 15 MILLILITER(S): 213 SOLUTION TOPICAL at 17:20

## 2024-03-01 RX ADMIN — HUMAN INSULIN 15 UNIT(S): 100 INJECTION, SUSPENSION SUBCUTANEOUS at 17:30

## 2024-03-01 RX ADMIN — Medication 81 MILLIGRAM(S): at 11:47

## 2024-03-01 RX ADMIN — POLYETHYLENE GLYCOL 3350 17 GRAM(S): 17 POWDER, FOR SOLUTION ORAL at 11:48

## 2024-03-01 RX ADMIN — ATORVASTATIN CALCIUM 80 MILLIGRAM(S): 80 TABLET, FILM COATED ORAL at 23:25

## 2024-03-01 RX ADMIN — GABAPENTIN 400 MILLIGRAM(S): 400 CAPSULE ORAL at 23:25

## 2024-03-01 RX ADMIN — Medication 3 MILLILITER(S): at 17:06

## 2024-03-01 RX ADMIN — GABAPENTIN 400 MILLIGRAM(S): 400 CAPSULE ORAL at 13:32

## 2024-03-01 RX ADMIN — GABAPENTIN 400 MILLIGRAM(S): 400 CAPSULE ORAL at 05:11

## 2024-03-01 RX ADMIN — HUMAN INSULIN 15 UNIT(S): 100 INJECTION, SUSPENSION SUBCUTANEOUS at 12:29

## 2024-03-01 RX ADMIN — CLOPIDOGREL BISULFATE 75 MILLIGRAM(S): 75 TABLET, FILM COATED ORAL at 11:49

## 2024-03-01 RX ADMIN — ENOXAPARIN SODIUM 30 MILLIGRAM(S): 100 INJECTION SUBCUTANEOUS at 05:11

## 2024-03-01 RX ADMIN — PANTOPRAZOLE SODIUM 40 MILLIGRAM(S): 20 TABLET, DELAYED RELEASE ORAL at 11:48

## 2024-03-01 RX ADMIN — Medication 3 MILLILITER(S): at 05:10

## 2024-03-01 RX ADMIN — ENOXAPARIN SODIUM 30 MILLIGRAM(S): 100 INJECTION SUBCUTANEOUS at 17:22

## 2024-03-01 RX ADMIN — Medication 1 APPLICATION(S): at 05:11

## 2024-03-01 RX ADMIN — Medication 20 MILLIGRAM(S): at 13:48

## 2024-03-01 RX ADMIN — Medication 1 APPLICATION(S): at 13:32

## 2024-03-01 RX ADMIN — PREGABALIN 1000 MICROGRAM(S): 225 CAPSULE ORAL at 11:43

## 2024-03-01 RX ADMIN — SENNA PLUS 1 TABLET(S): 8.6 TABLET ORAL at 23:25

## 2024-03-01 RX ADMIN — CHLORHEXIDINE GLUCONATE 1 APPLICATION(S): 213 SOLUTION TOPICAL at 13:48

## 2024-03-01 RX ADMIN — Medication 3 MILLILITER(S): at 23:08

## 2024-03-01 RX ADMIN — HUMAN INSULIN 15 UNIT(S): 100 INJECTION, SUSPENSION SUBCUTANEOUS at 05:31

## 2024-03-01 RX ADMIN — CHLORHEXIDINE GLUCONATE 15 MILLILITER(S): 213 SOLUTION TOPICAL at 05:11

## 2024-03-01 NOTE — PROGRESS NOTE ADULT - PROBLEM SELECTOR PLAN 1
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

## 2024-03-01 NOTE — PROGRESS NOTE ADULT - SUBJECTIVE AND OBJECTIVE BOX
Patient is a 73y old  Male who presents with a chief complaint of Stroke, critical stenosis, evaluation for angiography (29 Feb 2024 10:58)      Interval Events: No events reported overnight     REVIEW OF SYSTEMS:  [ ] Positive  [ ] All other systems negative  [ ] Unable to assess ROS because ________    Vital Signs Last 24 Hrs  T(C): 36.7 (03-01-24 @ 05:06), Max: 36.7 (03-01-24 @ 05:06)  T(F): 98 (03-01-24 @ 05:06), Max: 98 (03-01-24 @ 05:06)  HR: 88 (03-01-24 @ 05:10) (71 - 95)  BP: 146/74 (03-01-24 @ 05:06) (136/71 - 170/82)  RR: 20 (03-01-24 @ 05:06) (18 - 20)  SpO2: 100% (03-01-24 @ 05:10) (98% - 100%)    PHYSICAL EXAM:  HEENT:   [ ]Tracheostomy:  [ ]Pupils equal  [ ]No oral lesions  [ ]Abnormal    SKIN  [ ]No Rash  [ ] Abnormal  [ ] pressure    CARDIAC  [ ]Regular  [ ]Abnormal    PULMONARY  [ ]Bilateral Clear Breath Sounds  [ ]Normal Excursion  [ ]Abnormal    GI  [ ]PEG      [ ] +BS		              [ ]Soft, nondistended, nontender	  [ ]Abnormal    MUSCULOSKELETAL                                   [ ]Bedbound                 [ ]Abnormal    [ ]Ambulatory/OOB to chair                           EXTREMITIES                                         [ ]Normal  [ ]Edema                           NEUROLOGIC  [ ] Normal, non focal  [ ] Focal findings:    PSYCHIATRIC  [ ]Alert and appropriate  [ ] Sedated	 [ ]Agitated    :  Fredrick: [ ] Yes, if yes: Date of Placement:                   [  ] No    LINES: Central Lines [ ] Yes, if yes: Date of Placement                                     [  ] No    HOSPITAL MEDICATIONS:  MEDICATIONS  (STANDING):  albuterol/ipratropium for Nebulization 3 milliLiter(s) Nebulizer every 6 hours  aspirin  chewable 81 milliGRAM(s) Oral daily  atorvastatin 80 milliGRAM(s) Oral at bedtime  chlorhexidine 0.12% Liquid 15 milliLiter(s) Oral Mucosa every 12 hours  chlorhexidine 4% Liquid 1 Application(s) Topical daily  clopidogrel Tablet 75 milliGRAM(s) Enteral Tube daily  cyanocobalamin 1000 MICROGram(s) Oral daily  enoxaparin Injectable 30 milliGRAM(s) SubCutaneous <User Schedule>  ergocalciferol Drops 70468 Unit(s) Oral <User Schedule>  ferrous    sulfate Liquid 300 milliGRAM(s) Enteral Tube <User Schedule>  folic acid 1 milliGRAM(s) Oral daily  gabapentin Solution 400 milliGRAM(s) Oral three times a day  insulin lispro (ADMELOG) corrective regimen sliding scale   SubCutaneous every 6 hours  insulin NPH human recombinant 15 Unit(s) SubCutaneous every 6 hours  pantoprazole   Suspension 40 milliGRAM(s) Oral daily  petrolatum Ophthalmic Ointment 1 Application(s) Both EYES three times a day  polyethylene glycol 3350 17 Gram(s) Oral daily  senna 1 Tablet(s) Oral at bedtime    MEDICATIONS  (PRN):  acetaminophen   Oral Liquid .. 650 milliGRAM(s) Oral every 6 hours PRN Mild Pain (1 - 3)  dextrose Oral Gel 15 Gram(s) Oral once PRN Blood Glucose LESS THAN 70 milliGRAM(s)/deciliter  sodium chloride 0.9% lock flush 10 milliLiter(s) IV Push every 1 hour PRN Pre/post blood products, medications, blood draw, and to maintain line patency      LABS:                        9.0    11.38 )-----------( 386      ( 01 Mar 2024 06:35 )             33.5     03-01    144  |  107  |  26<H>  ----------------------------<  130<H>  4.5   |  28  |  0.70    Ca    9.1      01 Mar 2024 06:35  Phos  4.1     03-01  Mg     2.2     03-01        Urinalysis Basic - ( 01 Mar 2024 06:35 )    Color: x / Appearance: x / SG: x / pH: x  Gluc: 130 mg/dL / Ketone: x  / Bili: x / Urobili: x   Blood: x / Protein: x / Nitrite: x   Leuk Esterase: x / RBC: x / WBC x   Sq Epi: x / Non Sq Epi: x / Bacteria: x          CAPILLARY BLOOD GLUCOSE    MICROBIOLOGY:     RADIOLOGY:  [ ] Reviewed and interpreted by me    Mode: AC/ CMV (Assist Control/ Continuous Mandatory Ventilation)  RR (machine): 20  TV (machine): 500  FiO2: 30  PEEP: 8  ITime: 1  MAP: 10  PIP: 18   Patient is a 73y old  Male who presents with a chief complaint of Stroke, critical stenosis, evaluation for angiography (29 Feb 2024 10:58)      Interval Events: No events reported overnight     REVIEW OF SYSTEMS:  [ ] Positive  [x] All other systems negative  [ ] Unable to assess ROS because ________    Vital Signs Last 24 Hrs  T(C): 36.7 (03-01-24 @ 05:06), Max: 36.7 (03-01-24 @ 05:06)  T(F): 98 (03-01-24 @ 05:06), Max: 98 (03-01-24 @ 05:06)  HR: 88 (03-01-24 @ 05:10) (71 - 95)  BP: 146/74 (03-01-24 @ 05:06) (136/71 - 170/82)  RR: 20 (03-01-24 @ 05:06) (18 - 20)  SpO2: 100% (03-01-24 @ 05:10) (98% - 100%)    PHYSICAL EXAM:  HEENT:   [x]Tracheostomy: # 8 Cuffed Portex   [ ]Pupils equal  [ ]No oral lesions  [ ]Abnormal    SKIN  [x]No Rash  [ ] Abnormal  [ ] pressure    CARDIAC  [x]Regular  [ ]Abnormal    PULMONARY  [ ]Bilateral Clear Breath Sounds  [ ]Normal Excursion  [x]Abnormal: Bilateral Coarse Breath sounds decreased at bases b/l     GI  [x]PEG      [x] +BS		              [x]Soft, nondistended, nontender	  [ ]Abnormal    MUSCULOSKELETAL                                   [x]Bedbound                 [ ]Abnormal    [ ]Ambulatory/OOB to chair                           EXTREMITIES                                         [ ]Normal  [x]Edema: + Upper and Lower extremity edema bilaterally                           NEUROLOGIC  [ ] Normal, non focal  [x] Focal findings: Eyes open, Nodding his head yes/ no, attempting to squeeze with b/l hands lft> RT, following commands with Feet ( Rt foot "yes" / Lft foot "No")    PSYCHIATRIC  [ ]Alert and appropriate  [x] Sedated	 [ ]Agitated    :  [place]: [ ] Yes, if yes: Date of Placement:                   [x] No    LINES: Central Lines [x] Yes, if yes: Date of Placement:  LUE PICC Placed 2/14                                      [  ] No    HOSPITAL MEDICATIONS:  MEDICATIONS  (STANDING):  albuterol/ipratropium for Nebulization 3 milliLiter(s) Nebulizer every 6 hours  aspirin  chewable 81 milliGRAM(s) Oral daily  atorvastatin 80 milliGRAM(s) Oral at bedtime  chlorhexidine 0.12% Liquid 15 milliLiter(s) Oral Mucosa every 12 hours  chlorhexidine 4% Liquid 1 Application(s) Topical daily  clopidogrel Tablet 75 milliGRAM(s) Enteral Tube daily  cyanocobalamin 1000 MICROGram(s) Oral daily  enoxaparin Injectable 30 milliGRAM(s) SubCutaneous <User Schedule>  ergocalciferol Drops 21208 Unit(s) Oral <User Schedule>  ferrous    sulfate Liquid 300 milliGRAM(s) Enteral Tube <User Schedule>  folic acid 1 milliGRAM(s) Oral daily  gabapentin Solution 400 milliGRAM(s) Oral three times a day  insulin lispro (ADMELOG) corrective regimen sliding scale   SubCutaneous every 6 hours  insulin NPH human recombinant 15 Unit(s) SubCutaneous every 6 hours  pantoprazole   Suspension 40 milliGRAM(s) Oral daily  petrolatum Ophthalmic Ointment 1 Application(s) Both EYES three times a day  polyethylene glycol 3350 17 Gram(s) Oral daily  senna 1 Tablet(s) Oral at bedtime    MEDICATIONS  (PRN):  acetaminophen   Oral Liquid .. 650 milliGRAM(s) Oral every 6 hours PRN Mild Pain (1 - 3)  dextrose Oral Gel 15 Gram(s) Oral once PRN Blood Glucose LESS THAN 70 milliGRAM(s)/deciliter  sodium chloride 0.9% lock flush 10 milliLiter(s) IV Push every 1 hour PRN Pre/post blood products, medications, blood draw, and to maintain line patency      LABS:                        9.0    11.38 )-----------( 386      ( 01 Mar 2024 06:35 )             33.5     03-01    144  |  107  |  26<H>  ----------------------------<  130<H>  4.5   |  28  |  0.70    Ca    9.1      01 Mar 2024 06:35  Phos  4.1     03-01  Mg     2.2     03-01        Urinalysis Basic - ( 01 Mar 2024 06:35 )    Color: x / Appearance: x / SG: x / pH: x  Gluc: 130 mg/dL / Ketone: x  / Bili: x / Urobili: x   Blood: x / Protein: x / Nitrite: x   Leuk Esterase: x / RBC: x / WBC x   Sq Epi: x / Non Sq Epi: x / Bacteria: x          CAPILLARY BLOOD GLUCOSE    MICROBIOLOGY:     RADIOLOGY:  [ ] Reviewed and interpreted by me    Mode: AC/ CMV (Assist Control/ Continuous Mandatory Ventilation)  RR (machine): 20  TV (machine): 500  FiO2: 30  PEEP: 8  ITime: 1  MAP: 10  PIP: 18

## 2024-03-01 NOTE — PROGRESS NOTE ADULT - PROBLEM SELECTOR PLAN 11
- Patients Wife Margarette  provided medical update at bedside this morning  - Recommended for Acute SCI Injury Rehab but however still on MV; currently not tolerating weaning attempts  - Case management to speak with family and provide list for dc planning

## 2024-03-01 NOTE — PROGRESS NOTE ADULT - PROBLEM SELECTOR PLAN 7
- Patient Rastafari  - Family would like to be asked prior to administration of blood products   - S/p 1 unit PRBCs on 2/22  - S/p Venofer ( 2/22-2/27)   - Cont Cyanocobalamine, Folic acid, Ferrous Sulfate  - S/p Epogen x 5 days ( Ended 2/27)  - Will limit phlebotomy ( q 48 hrs) to prevent anemia

## 2024-03-01 NOTE — PROGRESS NOTE ADULT - NS ATTEND AMEND GEN_ALL_CORE FT
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 Inconsistently following commands by moving L or R sides for yes or no  - f/u neuro recs. Was on DAPT which was eventually stopped due to reported to episode of blood clots in airway  - Restarted on DAPT, monitor for bleeding. Currently without any bleeding. Will need at least 3 months per neuro.   - Per neuro no further treatment for MFS.   #Pulmonary - Mixed respiratory failure s/p tracheostomy. Currently on PRVC 20/500/7/40%. Not triggering breaths on PS trial. Unlikely to be able to be weaned from the ventilator.   - c/w Duonebs q6h  #GI - Oropharyngeal dysphagia S/P PEG   - c/w tube feeds via PEG  #Endocrine  - c/w NPH and ISS, titrate as needed  #Heme/Onc - Chronic anemia                - Patient/Family would like to be asked before blood products  #ID - Pseudomonas pneumonia  - s/p Zosyn completed on 2/28  #DVT ppx  - Lovenox  # Dispo- Acute rehab eval. Patient unlikely to come off ventilator at this time 2/2 to GBS/MFS    # GBS with funtional quadrapengia  # acute hypoxemic respiratory failure   # vent dependence  # oropharyngeal dysphagia  # Pseudomonas pna.

## 2024-03-01 NOTE — PROGRESS NOTE ADULT - ASSESSMENT
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 Prior to admission patient had Viral URI ( 1-2 weeks prior to admission) with subsequent weakness. He underwent an angiogram (2/11) which showed moderate stenosis and no intervention was done. MRI Performed c/w small bilateral cerebellar strokes and prior L thalamic strokes, as per Neurology was not c/w current presentation. 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 He is currently s/p 5 rounds of plasma exchange (2/13-2/20) and IVIG x 5 ( 2/21-2/26)  His hospital course was complicated by progressive respiratory failure. CT Chest performed on 2/20 with atelectasis of b/l lower lobes. BAL 2/21 Grew PSA treated with course of Zosyn (2/21-2/28).     3/1: Not tolerating weaning from vent but more responsive to commands. Attempting to wiggle his toes, squeeze hands, moving head and express his needs.   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 Prior to admission patient had Viral URI ( 1-2 weeks prior to admission) with subsequent weakness. He underwent an angiogram (2/11) which showed moderate stenosis and no intervention was done. MRI Performed c/w small bilateral cerebellar strokes and prior L thalamic strokes, as per Neurology was not c/w current presentation. 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 He is currently s/p 5 rounds of plasma exchange (2/13-2/20) and IVIG x 5 ( 2/21-2/26)  His hospital course was complicated by progressive respiratory failure. CT Chest performed on 2/20 with atelectasis of b/l lower lobes. BAL 2/21 Grew PSA treated with course of Zosyn (2/21-2/28).     3/1: Not tolerating weaning from vent but more responsive to commands. Attempting to wiggle his toes, squeeze hands, moving head and express his needs. Will give Lasix 20 mg IVP x 1 for Anasarca.

## 2024-03-01 NOTE — PROGRESS NOTE ADULT - PROBLEM SELECTOR PLAN 4
- BAL 2/21: + PSA   - Blood cxs 2/23: NGTD   - UA 2/23: Negative   - PSA pneumonia; Completed Zosyn x 7 days course ( 2/21-2/28)

## 2024-03-02 LAB
GLUCOSE BLDC GLUCOMTR-MCNC: 132 MG/DL — HIGH (ref 70–99)
GLUCOSE BLDC GLUCOMTR-MCNC: 165 MG/DL — HIGH (ref 70–99)
GLUCOSE BLDC GLUCOMTR-MCNC: 177 MG/DL — HIGH (ref 70–99)

## 2024-03-02 PROCEDURE — 99233 SBSQ HOSP IP/OBS HIGH 50: CPT

## 2024-03-02 RX ADMIN — Medication 1 APPLICATION(S): at 22:31

## 2024-03-02 RX ADMIN — Medication 81 MILLIGRAM(S): at 12:25

## 2024-03-02 RX ADMIN — ATORVASTATIN CALCIUM 80 MILLIGRAM(S): 80 TABLET, FILM COATED ORAL at 22:36

## 2024-03-02 RX ADMIN — PREGABALIN 1000 MICROGRAM(S): 225 CAPSULE ORAL at 12:25

## 2024-03-02 RX ADMIN — Medication 3 MILLILITER(S): at 23:05

## 2024-03-02 RX ADMIN — Medication 3 MILLILITER(S): at 05:38

## 2024-03-02 RX ADMIN — PANTOPRAZOLE SODIUM 40 MILLIGRAM(S): 20 TABLET, DELAYED RELEASE ORAL at 12:25

## 2024-03-02 RX ADMIN — Medication 1 APPLICATION(S): at 06:02

## 2024-03-02 RX ADMIN — GABAPENTIN 400 MILLIGRAM(S): 400 CAPSULE ORAL at 13:54

## 2024-03-02 RX ADMIN — HUMAN INSULIN 15 UNIT(S): 100 INJECTION, SUSPENSION SUBCUTANEOUS at 06:01

## 2024-03-02 RX ADMIN — CHLORHEXIDINE GLUCONATE 15 MILLILITER(S): 213 SOLUTION TOPICAL at 06:00

## 2024-03-02 RX ADMIN — HUMAN INSULIN 15 UNIT(S): 100 INJECTION, SUSPENSION SUBCUTANEOUS at 18:08

## 2024-03-02 RX ADMIN — GABAPENTIN 400 MILLIGRAM(S): 400 CAPSULE ORAL at 22:36

## 2024-03-02 RX ADMIN — Medication 2: at 06:01

## 2024-03-02 RX ADMIN — Medication 3 MILLILITER(S): at 11:59

## 2024-03-02 RX ADMIN — ENOXAPARIN SODIUM 30 MILLIGRAM(S): 100 INJECTION SUBCUTANEOUS at 17:14

## 2024-03-02 RX ADMIN — HUMAN INSULIN 15 UNIT(S): 100 INJECTION, SUSPENSION SUBCUTANEOUS at 12:26

## 2024-03-02 RX ADMIN — Medication 300 MILLIGRAM(S): at 06:06

## 2024-03-02 RX ADMIN — Medication 3 MILLILITER(S): at 17:34

## 2024-03-02 RX ADMIN — GABAPENTIN 400 MILLIGRAM(S): 400 CAPSULE ORAL at 06:05

## 2024-03-02 RX ADMIN — Medication 1 MILLIGRAM(S): at 12:25

## 2024-03-02 RX ADMIN — Medication 2: at 18:08

## 2024-03-02 RX ADMIN — CHLORHEXIDINE GLUCONATE 15 MILLILITER(S): 213 SOLUTION TOPICAL at 17:14

## 2024-03-02 RX ADMIN — Medication 2: at 00:05

## 2024-03-02 RX ADMIN — Medication 1 APPLICATION(S): at 13:55

## 2024-03-02 RX ADMIN — CHLORHEXIDINE GLUCONATE 1 APPLICATION(S): 213 SOLUTION TOPICAL at 12:26

## 2024-03-02 RX ADMIN — HUMAN INSULIN 15 UNIT(S): 100 INJECTION, SUSPENSION SUBCUTANEOUS at 00:04

## 2024-03-02 RX ADMIN — ENOXAPARIN SODIUM 30 MILLIGRAM(S): 100 INJECTION SUBCUTANEOUS at 06:05

## 2024-03-02 RX ADMIN — SENNA PLUS 1 TABLET(S): 8.6 TABLET ORAL at 22:30

## 2024-03-02 RX ADMIN — CLOPIDOGREL BISULFATE 75 MILLIGRAM(S): 75 TABLET, FILM COATED ORAL at 12:25

## 2024-03-02 NOTE — PROGRESS NOTE ADULT - SUBJECTIVE AND OBJECTIVE BOX
Patient is a 73y old  Male who presents with a chief complaint of Stroke, critical stenosis, evaluation for angiography (29 Feb 2024 10:58)      Interval Events: No events reported overnight     REVIEW OF SYSTEMS:  [ ] Positive  [x] All other systems negative  [ ] Unable to assess ROS because ________    Vital Signs Last 24 Hrs  T(C): 36.7 (03-01-24 @ 05:06), Max: 36.7 (03-01-24 @ 05:06)  T(F): 98 (03-01-24 @ 05:06), Max: 98 (03-01-24 @ 05:06)  HR: 88 (03-01-24 @ 05:10) (71 - 95)  BP: 146/74 (03-01-24 @ 05:06) (136/71 - 170/82)  RR: 20 (03-01-24 @ 05:06) (18 - 20)  SpO2: 100% (03-01-24 @ 05:10) (98% - 100%)    PHYSICAL EXAM:  HEENT:   [x]Tracheostomy: # 8 Cuffed Portex   [ ]Pupils equal  [ ]No oral lesions  [ ]Abnormal    SKIN  [x]No Rash  [ ] Abnormal  [ ] pressure    CARDIAC  [x]Regular  [ ]Abnormal    PULMONARY  [ ]Bilateral Clear Breath Sounds  [ ]Normal Excursion  [x]Abnormal: Bilateral Coarse Breath sounds decreased at bases b/l     GI  [x]PEG      [x] +BS		              [x]Soft, nondistended, nontender	  [ ]Abnormal    MUSCULOSKELETAL                                   [x]Bedbound                 [ ]Abnormal    [ ]Ambulatory/OOB to chair                           EXTREMITIES                                         [ ]Normal  [x]Edema: + Upper and Lower extremity edema bilaterally                           NEUROLOGIC  [ ] Normal, non focal  [x] Focal findings: Eyes open, Nodding his head yes/ no, attempting to squeeze with b/l hands lft> RT, following commands with Feet ( Rt foot "yes" / Lft foot "No")    PSYCHIATRIC  [ ]Alert and appropriate  [x] Sedated	 [ ]Agitated    :  [place]: [ ] Yes, if yes: Date of Placement:                   [x] No    LINES: Central Lines [x] Yes, if yes: Date of Placement:  LUE PICC Placed 2/14                                      [  ] No    HOSPITAL MEDICATIONS:  MEDICATIONS  (STANDING):  albuterol/ipratropium for Nebulization 3 milliLiter(s) Nebulizer every 6 hours  aspirin  chewable 81 milliGRAM(s) Oral daily  atorvastatin 80 milliGRAM(s) Oral at bedtime  chlorhexidine 0.12% Liquid 15 milliLiter(s) Oral Mucosa every 12 hours  chlorhexidine 4% Liquid 1 Application(s) Topical daily  clopidogrel Tablet 75 milliGRAM(s) Enteral Tube daily  cyanocobalamin 1000 MICROGram(s) Oral daily  enoxaparin Injectable 30 milliGRAM(s) SubCutaneous <User Schedule>  ergocalciferol Drops 04785 Unit(s) Oral <User Schedule>  ferrous    sulfate Liquid 300 milliGRAM(s) Enteral Tube <User Schedule>  folic acid 1 milliGRAM(s) Oral daily  gabapentin Solution 400 milliGRAM(s) Oral three times a day  insulin lispro (ADMELOG) corrective regimen sliding scale   SubCutaneous every 6 hours  insulin NPH human recombinant 15 Unit(s) SubCutaneous every 6 hours  pantoprazole   Suspension 40 milliGRAM(s) Oral daily  petrolatum Ophthalmic Ointment 1 Application(s) Both EYES three times a day  polyethylene glycol 3350 17 Gram(s) Oral daily  senna 1 Tablet(s) Oral at bedtime    MEDICATIONS  (PRN):  acetaminophen   Oral Liquid .. 650 milliGRAM(s) Oral every 6 hours PRN Mild Pain (1 - 3)  dextrose Oral Gel 15 Gram(s) Oral once PRN Blood Glucose LESS THAN 70 milliGRAM(s)/deciliter  sodium chloride 0.9% lock flush 10 milliLiter(s) IV Push every 1 hour PRN Pre/post blood products, medications, blood draw, and to maintain line patency      LABS:                        9.0    11.38 )-----------( 386      ( 01 Mar 2024 06:35 )             33.5     03-01    144  |  107  |  26<H>  ----------------------------<  130<H>  4.5   |  28  |  0.70    Ca    9.1      01 Mar 2024 06:35  Phos  4.1     03-01  Mg     2.2     03-01        Urinalysis Basic - ( 01 Mar 2024 06:35 )    Color: x / Appearance: x / SG: x / pH: x  Gluc: 130 mg/dL / Ketone: x  / Bili: x / Urobili: x   Blood: x / Protein: x / Nitrite: x   Leuk Esterase: x / RBC: x / WBC x   Sq Epi: x / Non Sq Epi: x / Bacteria: x          CAPILLARY BLOOD GLUCOSE    MICROBIOLOGY:     RADIOLOGY:  [ ] Reviewed and interpreted by me    Mode: AC/ CMV (Assist Control/ Continuous Mandatory Ventilation)  RR (machine): 20  TV (machine): 500  FiO2: 30  PEEP: 8  ITime: 1  MAP: 10  PIP: 18   Patient is a 73y old  Male who presents with a chief complaint of Stroke, critical stenosis, evaluation for angiography (29 Feb 2024 10:58)      Interval Events: No events reported overnight     REVIEW OF SYSTEMS:  [ ] Positive  [x] All other systems negative  [ ] Unable to assess ROS because ________    Vital Signs Last 24 Hrs  T(C): 36.7 (03-01-24 @ 05:06), Max: 36.7 (03-01-24 @ 05:06)  T(F): 98 (03-01-24 @ 05:06), Max: 98 (03-01-24 @ 05:06)  HR: 88 (03-01-24 @ 05:10) (71 - 95)  BP: 146/74 (03-01-24 @ 05:06) (136/71 - 170/82)  RR: 20 (03-01-24 @ 05:06) (18 - 20)  SpO2: 100% (03-01-24 @ 05:10) (98% - 100%)    PHYSICAL EXAM:  HEENT:   [x]Tracheostomy: # 8 Cuffed Portex   no wean  apneic   [ ]Pupils equal  [ ]No oral lesions  [ ]Abnormal    SKIN  [x]No Rash  [ ] Abnormal  [ ] pressure    CARDIAC  [x]Regular  [ ]Abnormal    PULMONARY  [ ]Bilateral Clear Breath Sounds  [ ]Normal Excursion  [x]Abnormal: Bilateral Coarse Breath sounds decreased at bases b/l     GI  [x]PEG      [x] +BS		              [x]Soft, nondistended, nontender	  [ ]Abnormal    MUSCULOSKELETAL                                   [x]Bedbound                 [ ]Abnormal    [ ]Ambulatory/OOB to chair                           EXTREMITIES                                         [ ]Normal  [x]Edema: + Upper and Lower extremity edema bilaterally                           NEUROLOGIC  [ ] Normal, non focal  [x] Focal findings: Eyes open, Nodding his head yes/ no, attempting to squeeze with b/l hands lft> RT, following commands with Feet ( Rt foot "yes" / Lft foot "No")    PSYCHIATRIC  [ ]Alert and appropriate  [x] Sedated	 [ ]Agitated    :  Fredrick: [ ] Yes, if yes: Date of Placement:                   [x] No    LINES: Central Lines [x] Yes, if yes: Date of Placement:  LUE PICC Placed 2/14                                      [  ] No    HOSPITAL MEDICATIONS:  MEDICATIONS  (STANDING):  albuterol/ipratropium for Nebulization 3 milliLiter(s) Nebulizer every 6 hours  aspirin  chewable 81 milliGRAM(s) Oral daily  atorvastatin 80 milliGRAM(s) Oral at bedtime  chlorhexidine 0.12% Liquid 15 milliLiter(s) Oral Mucosa every 12 hours  chlorhexidine 4% Liquid 1 Application(s) Topical daily  clopidogrel Tablet 75 milliGRAM(s) Enteral Tube daily  cyanocobalamin 1000 MICROGram(s) Oral daily  enoxaparin Injectable 30 milliGRAM(s) SubCutaneous <User Schedule>  ergocalciferol Drops 15908 Unit(s) Oral <User Schedule>  ferrous    sulfate Liquid 300 milliGRAM(s) Enteral Tube <User Schedule>  folic acid 1 milliGRAM(s) Oral daily  gabapentin Solution 400 milliGRAM(s) Oral three times a day  insulin lispro (ADMELOG) corrective regimen sliding scale   SubCutaneous every 6 hours  insulin NPH human recombinant 15 Unit(s) SubCutaneous every 6 hours  pantoprazole   Suspension 40 milliGRAM(s) Oral daily  petrolatum Ophthalmic Ointment 1 Application(s) Both EYES three times a day  polyethylene glycol 3350 17 Gram(s) Oral daily  senna 1 Tablet(s) Oral at bedtime    MEDICATIONS  (PRN):  acetaminophen   Oral Liquid .. 650 milliGRAM(s) Oral every 6 hours PRN Mild Pain (1 - 3)  dextrose Oral Gel 15 Gram(s) Oral once PRN Blood Glucose LESS THAN 70 milliGRAM(s)/deciliter  sodium chloride 0.9% lock flush 10 milliLiter(s) IV Push every 1 hour PRN Pre/post blood products, medications, blood draw, and to maintain line patency      LABS:                        9.0    11.38 )-----------( 386      ( 01 Mar 2024 06:35 )             33.5     03-01    144  |  107  |  26<H>  ----------------------------<  130<H>  4.5   |  28  |  0.70    Ca    9.1      01 Mar 2024 06:35  Phos  4.1     03-01  Mg     2.2     03-01        Urinalysis Basic - ( 01 Mar 2024 06:35 )    Color: x / Appearance: x / SG: x / pH: x  Gluc: 130 mg/dL / Ketone: x  / Bili: x / Urobili: x   Blood: x / Protein: x / Nitrite: x   Leuk Esterase: x / RBC: x / WBC x   Sq Epi: x / Non Sq Epi: x / Bacteria: x          CAPILLARY BLOOD GLUCOSE    MICROBIOLOGY:     RADIOLOGY:  [ ] Reviewed and interpreted by me    Mode: AC/ CMV (Assist Control/ Continuous Mandatory Ventilation)  RR (machine): 20  TV (machine): 500  FiO2: 30  PEEP: 8  ITime: 1  MAP: 10  PIP: 18

## 2024-03-02 NOTE — PROGRESS NOTE ADULT - ASSESSMENT
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 Prior to admission patient had Viral URI ( 1-2 weeks prior to admission) with subsequent weakness. He underwent an angiogram (2/11) which showed moderate stenosis and no intervention was done. MRI Performed c/w small bilateral cerebellar strokes and prior L thalamic strokes, as per Neurology was not c/w current presentation. 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 He is currently s/p 5 rounds of plasma exchange (2/13-2/20) and IVIG x 5 ( 2/21-2/26)  His hospital course was complicated by progressive respiratory failure. CT Chest performed on 2/20 with atelectasis of b/l lower lobes. BAL 2/21 Grew PSA treated with course of Zosyn (2/21-2/28).     3/1: Not tolerating weaning from vent but more responsive to commands. Attempting to wiggle his toes, squeeze hands, moving head and express his needs. Will give Lasix 20 mg IVP x 1 for Anasarca.

## 2024-03-02 NOTE — PROGRESS NOTE ADULT - PROBLEM SELECTOR PLAN 7
- Patient Scientologist  - Family would like to be asked prior to administration of blood products   - S/p 1 unit PRBCs on 2/22  - S/p Venofer ( 2/22-2/27)   - Cont Cyanocobalamine, Folic acid, Ferrous Sulfate  - S/p Epogen x 5 days ( Ended 2/27)  - Will limit phlebotomy ( q 48 hrs) to prevent anemia

## 2024-03-02 NOTE — PROGRESS NOTE ADULT - NS ATTEND AMEND GEN_ALL_CORE FT
agree with above  currently vent dependent, not able to wean  hemodyn stable and afebrile  PMR appreciated

## 2024-03-03 LAB
ALBUMIN SERPL ELPH-MCNC: 2 G/DL — LOW (ref 3.3–5)
ALBUMIN SERPL ELPH-MCNC: 2.2 G/DL — LOW (ref 3.3–5)
ALP SERPL-CCNC: 110 U/L — SIGNIFICANT CHANGE UP (ref 40–120)
ALP SERPL-CCNC: 89 U/L — SIGNIFICANT CHANGE UP (ref 40–120)
ALT FLD-CCNC: 76 U/L — HIGH (ref 10–45)
ALT FLD-CCNC: 91 U/L — HIGH (ref 10–45)
AMMONIA BLD-MCNC: 61 UMOL/L — HIGH (ref 11–55)
ANION GAP SERPL CALC-SCNC: 10 MMOL/L — SIGNIFICANT CHANGE UP (ref 5–17)
ANION GAP SERPL CALC-SCNC: 12 MMOL/L — SIGNIFICANT CHANGE UP (ref 5–17)
ANION GAP SERPL CALC-SCNC: 8 MMOL/L — SIGNIFICANT CHANGE UP (ref 5–17)
ANISOCYTOSIS BLD QL: SIGNIFICANT CHANGE UP
APPEARANCE UR: CLEAR — SIGNIFICANT CHANGE UP
APTT BLD: 29.7 SEC — SIGNIFICANT CHANGE UP (ref 24.5–35.6)
APTT BLD: 30.7 SEC — SIGNIFICANT CHANGE UP (ref 24.5–35.6)
AST SERPL-CCNC: 127 U/L — HIGH (ref 10–40)
AST SERPL-CCNC: 95 U/L — HIGH (ref 10–40)
BACTERIA # UR AUTO: NEGATIVE /HPF — SIGNIFICANT CHANGE UP
BASE EXCESS BLDV CALC-SCNC: 1.6 MMOL/L — SIGNIFICANT CHANGE UP (ref -2–3)
BASE EXCESS BLDV CALC-SCNC: 2.2 MMOL/L — SIGNIFICANT CHANGE UP (ref -2–3)
BASE EXCESS BLDV CALC-SCNC: 2.5 MMOL/L — SIGNIFICANT CHANGE UP (ref -2–3)
BASE EXCESS BLDV CALC-SCNC: 2.7 MMOL/L — SIGNIFICANT CHANGE UP (ref -2–3)
BASE EXCESS BLDV CALC-SCNC: 3.2 MMOL/L — HIGH (ref -2–3)
BASOPHILS # BLD AUTO: 0.14 K/UL — SIGNIFICANT CHANGE UP (ref 0–0.2)
BASOPHILS NFR BLD AUTO: 0.9 % — SIGNIFICANT CHANGE UP (ref 0–2)
BILIRUB SERPL-MCNC: 0.2 MG/DL — SIGNIFICANT CHANGE UP (ref 0.2–1.2)
BILIRUB SERPL-MCNC: 0.2 MG/DL — SIGNIFICANT CHANGE UP (ref 0.2–1.2)
BILIRUB UR-MCNC: NEGATIVE — SIGNIFICANT CHANGE UP
BLD GP AB SCN SERPL QL: NEGATIVE — SIGNIFICANT CHANGE UP
BUN SERPL-MCNC: 68 MG/DL — HIGH (ref 7–23)
BUN SERPL-MCNC: 68 MG/DL — HIGH (ref 7–23)
BUN SERPL-MCNC: 79 MG/DL — HIGH (ref 7–23)
CA-I SERPL-SCNC: 1.05 MMOL/L — LOW (ref 1.15–1.33)
CA-I SERPL-SCNC: 1.11 MMOL/L — LOW (ref 1.15–1.33)
CA-I SERPL-SCNC: 1.12 MMOL/L — LOW (ref 1.15–1.33)
CALCIUM SERPL-MCNC: 6.3 MG/DL — CRITICAL LOW (ref 8.4–10.5)
CALCIUM SERPL-MCNC: 7.6 MG/DL — LOW (ref 8.4–10.5)
CALCIUM SERPL-MCNC: 8.4 MG/DL — SIGNIFICANT CHANGE UP (ref 8.4–10.5)
CAST: 12 /LPF — HIGH (ref 0–4)
CHLORIDE BLDV-SCNC: 107 MMOL/L — SIGNIFICANT CHANGE UP (ref 96–108)
CHLORIDE BLDV-SCNC: 109 MMOL/L — HIGH (ref 96–108)
CHLORIDE BLDV-SCNC: 109 MMOL/L — HIGH (ref 96–108)
CHLORIDE BLDV-SCNC: 110 MMOL/L — HIGH (ref 96–108)
CHLORIDE BLDV-SCNC: 111 MMOL/L — HIGH (ref 96–108)
CHLORIDE SERPL-SCNC: 108 MMOL/L — SIGNIFICANT CHANGE UP (ref 96–108)
CHLORIDE SERPL-SCNC: 112 MMOL/L — HIGH (ref 96–108)
CHLORIDE SERPL-SCNC: 117 MMOL/L — HIGH (ref 96–108)
CK MB BLD-MCNC: 0.5 % — SIGNIFICANT CHANGE UP (ref 0–3.5)
CK MB CFR SERPL CALC: 4.5 NG/ML — SIGNIFICANT CHANGE UP (ref 0–6.7)
CK SERPL-CCNC: 956 U/L — HIGH (ref 30–200)
CO2 BLDV-SCNC: 28 MMOL/L — HIGH (ref 22–26)
CO2 BLDV-SCNC: 30 MMOL/L — HIGH (ref 22–26)
CO2 BLDV-SCNC: 31 MMOL/L — HIGH (ref 22–26)
CO2 SERPL-SCNC: 23 MMOL/L — SIGNIFICANT CHANGE UP (ref 22–31)
CO2 SERPL-SCNC: 28 MMOL/L — SIGNIFICANT CHANGE UP (ref 22–31)
CO2 SERPL-SCNC: 32 MMOL/L — HIGH (ref 22–31)
COLOR SPEC: YELLOW — SIGNIFICANT CHANGE UP
CREAT ?TM UR-MCNC: 56 MG/DL — SIGNIFICANT CHANGE UP
CREAT SERPL-MCNC: 1.14 MG/DL — SIGNIFICANT CHANGE UP (ref 0.5–1.3)
CREAT SERPL-MCNC: 1.56 MG/DL — HIGH (ref 0.5–1.3)
CREAT SERPL-MCNC: 1.67 MG/DL — HIGH (ref 0.5–1.3)
DIFF PNL FLD: ABNORMAL
EGFR: 43 ML/MIN/1.73M2 — LOW
EGFR: 47 ML/MIN/1.73M2 — LOW
EGFR: 68 ML/MIN/1.73M2 — SIGNIFICANT CHANGE UP
EOSINOPHIL # BLD AUTO: 0 K/UL — SIGNIFICANT CHANGE UP (ref 0–0.5)
EOSINOPHIL NFR BLD AUTO: 0 % — SIGNIFICANT CHANGE UP (ref 0–6)
GAS PNL BLDA: SIGNIFICANT CHANGE UP
GAS PNL BLDA: SIGNIFICANT CHANGE UP
GAS PNL BLDV: 143 MMOL/L — SIGNIFICANT CHANGE UP (ref 136–145)
GAS PNL BLDV: 144 MMOL/L — SIGNIFICANT CHANGE UP (ref 136–145)
GAS PNL BLDV: 144 MMOL/L — SIGNIFICANT CHANGE UP (ref 136–145)
GAS PNL BLDV: 145 MMOL/L — SIGNIFICANT CHANGE UP (ref 136–145)
GAS PNL BLDV: 145 MMOL/L — SIGNIFICANT CHANGE UP (ref 136–145)
GAS PNL BLDV: SIGNIFICANT CHANGE UP
GIANT PLATELETS BLD QL SMEAR: PRESENT — SIGNIFICANT CHANGE UP
GLUCOSE BLDC GLUCOMTR-MCNC: 148 MG/DL — HIGH (ref 70–99)
GLUCOSE BLDC GLUCOMTR-MCNC: 164 MG/DL — HIGH (ref 70–99)
GLUCOSE BLDC GLUCOMTR-MCNC: 170 MG/DL — HIGH (ref 70–99)
GLUCOSE BLDC GLUCOMTR-MCNC: 171 MG/DL — HIGH (ref 70–99)
GLUCOSE BLDV-MCNC: 132 MG/DL — HIGH (ref 70–99)
GLUCOSE BLDV-MCNC: 141 MG/DL — HIGH (ref 70–99)
GLUCOSE BLDV-MCNC: 151 MG/DL — HIGH (ref 70–99)
GLUCOSE BLDV-MCNC: 217 MG/DL — HIGH (ref 70–99)
GLUCOSE BLDV-MCNC: 230 MG/DL — HIGH (ref 70–99)
GLUCOSE SERPL-MCNC: 139 MG/DL — HIGH (ref 70–99)
GLUCOSE SERPL-MCNC: 149 MG/DL — HIGH (ref 70–99)
GLUCOSE SERPL-MCNC: 178 MG/DL — HIGH (ref 70–99)
GLUCOSE UR QL: NEGATIVE MG/DL — SIGNIFICANT CHANGE UP
HCO3 BLDV-SCNC: 27 MMOL/L — SIGNIFICANT CHANGE UP (ref 22–29)
HCO3 BLDV-SCNC: 29 MMOL/L — SIGNIFICANT CHANGE UP (ref 22–29)
HCO3 BLDV-SCNC: 30 MMOL/L — HIGH (ref 22–29)
HCT VFR BLD CALC: 20.7 % — CRITICAL LOW (ref 39–50)
HCT VFR BLD CALC: 24.6 % — LOW (ref 39–50)
HCT VFR BLD CALC: 27.3 % — LOW (ref 39–50)
HCT VFR BLD CALC: 29.1 % — LOW (ref 39–50)
HCT VFR BLDA CALC: 20 % — CRITICAL LOW (ref 39–51)
HCT VFR BLDA CALC: 22 % — LOW (ref 39–51)
HCT VFR BLDA CALC: 26 % — LOW (ref 39–51)
HCT VFR BLDA CALC: 28 % — LOW (ref 39–51)
HCT VFR BLDA CALC: 30 % — LOW (ref 39–51)
HGB BLD CALC-MCNC: 6.7 G/DL — CRITICAL LOW (ref 12.6–17.4)
HGB BLD CALC-MCNC: 7.4 G/DL — LOW (ref 12.6–17.4)
HGB BLD CALC-MCNC: 8.8 G/DL — LOW (ref 12.6–17.4)
HGB BLD CALC-MCNC: 9.4 G/DL — LOW (ref 12.6–17.4)
HGB BLD CALC-MCNC: 9.9 G/DL — LOW (ref 12.6–17.4)
HGB BLD-MCNC: 5.4 G/DL — CRITICAL LOW (ref 13–17)
HGB BLD-MCNC: 7.1 G/DL — LOW (ref 13–17)
HGB BLD-MCNC: 7.3 G/DL — LOW (ref 13–17)
HGB BLD-MCNC: 8.4 G/DL — LOW (ref 13–17)
HOROWITZ INDEX BLDV+IHG-RTO: 21 — SIGNIFICANT CHANGE UP
HOROWITZ INDEX BLDV+IHG-RTO: 30 — SIGNIFICANT CHANGE UP
HOROWITZ INDEX BLDV+IHG-RTO: 30 — SIGNIFICANT CHANGE UP
INR BLD: 1.37 RATIO — HIGH (ref 0.85–1.18)
INR BLD: 1.45 RATIO — HIGH (ref 0.85–1.18)
KETONES UR-MCNC: NEGATIVE MG/DL — SIGNIFICANT CHANGE UP
LACTATE BLDV-MCNC: 2.9 MMOL/L — HIGH (ref 0.5–2)
LACTATE BLDV-MCNC: 2.9 MMOL/L — HIGH (ref 0.5–2)
LACTATE BLDV-MCNC: 3.2 MMOL/L — HIGH (ref 0.5–2)
LACTATE BLDV-MCNC: 3.8 MMOL/L — HIGH (ref 0.5–2)
LACTATE BLDV-MCNC: 4.7 MMOL/L — CRITICAL HIGH (ref 0.5–2)
LEUKOCYTE ESTERASE UR-ACNC: NEGATIVE — SIGNIFICANT CHANGE UP
LYMPHOCYTES # BLD AUTO: 1.45 K/UL — SIGNIFICANT CHANGE UP (ref 1–3.3)
LYMPHOCYTES # BLD AUTO: 9.6 % — LOW (ref 13–44)
MACROCYTES BLD QL: SLIGHT — SIGNIFICANT CHANGE UP
MAGNESIUM SERPL-MCNC: 2 MG/DL — SIGNIFICANT CHANGE UP (ref 1.6–2.6)
MAGNESIUM SERPL-MCNC: 2.5 MG/DL — SIGNIFICANT CHANGE UP (ref 1.6–2.6)
MAGNESIUM SERPL-MCNC: 2.5 MG/DL — SIGNIFICANT CHANGE UP (ref 1.6–2.6)
MANUAL SMEAR VERIFICATION: SIGNIFICANT CHANGE UP
MCHC RBC-ENTMCNC: 23.4 PG — LOW (ref 27–34)
MCHC RBC-ENTMCNC: 23.6 PG — LOW (ref 27–34)
MCHC RBC-ENTMCNC: 24.3 PG — LOW (ref 27–34)
MCHC RBC-ENTMCNC: 25.7 PG — LOW (ref 27–34)
MCHC RBC-ENTMCNC: 26 GM/DL — LOW (ref 32–36)
MCHC RBC-ENTMCNC: 26.1 GM/DL — LOW (ref 32–36)
MCHC RBC-ENTMCNC: 28.9 GM/DL — LOW (ref 32–36)
MCHC RBC-ENTMCNC: 29.7 GM/DL — LOW (ref 32–36)
MCV RBC AUTO: 84.3 FL — SIGNIFICANT CHANGE UP (ref 80–100)
MCV RBC AUTO: 86.6 FL — SIGNIFICANT CHANGE UP (ref 80–100)
MCV RBC AUTO: 89.8 FL — SIGNIFICANT CHANGE UP (ref 80–100)
MCV RBC AUTO: 90.4 FL — SIGNIFICANT CHANGE UP (ref 80–100)
MICROCYTES BLD QL: SLIGHT — SIGNIFICANT CHANGE UP
MONOCYTES # BLD AUTO: 0.39 K/UL — SIGNIFICANT CHANGE UP (ref 0–0.9)
MONOCYTES NFR BLD AUTO: 2.6 % — SIGNIFICANT CHANGE UP (ref 2–14)
MRSA PCR RESULT.: SIGNIFICANT CHANGE UP
NEUTROPHILS # BLD AUTO: 13.13 K/UL — HIGH (ref 1.8–7.4)
NEUTROPHILS NFR BLD AUTO: 85.1 % — HIGH (ref 43–77)
NEUTS BAND # BLD: 1.8 % — SIGNIFICANT CHANGE UP (ref 0–8)
NITRITE UR-MCNC: NEGATIVE — SIGNIFICANT CHANGE UP
NRBC # BLD: 4 /100 WBCS — HIGH (ref 0–0)
NRBC # BLD: 6 /100 WBCS — HIGH (ref 0–0)
NRBC # BLD: 8 /100 WBCS — HIGH (ref 0–0)
NRBC # BLD: 8 /100 WBCS — HIGH (ref 0–0)
NT-PROBNP SERPL-SCNC: 1426 PG/ML — HIGH (ref 0–300)
OVALOCYTES BLD QL SMEAR: SLIGHT — SIGNIFICANT CHANGE UP
PCO2 BLDV: 43 MMHG — SIGNIFICANT CHANGE UP (ref 42–55)
PCO2 BLDV: 50 MMHG — SIGNIFICANT CHANGE UP (ref 42–55)
PCO2 BLDV: 56 MMHG — HIGH (ref 42–55)
PCO2 BLDV: 56 MMHG — HIGH (ref 42–55)
PCO2 BLDV: 57 MMHG — HIGH (ref 42–55)
PH BLDV: 7.32 — SIGNIFICANT CHANGE UP (ref 7.32–7.43)
PH BLDV: 7.32 — SIGNIFICANT CHANGE UP (ref 7.32–7.43)
PH BLDV: 7.33 — SIGNIFICANT CHANGE UP (ref 7.32–7.43)
PH BLDV: 7.37 — SIGNIFICANT CHANGE UP (ref 7.32–7.43)
PH BLDV: 7.4 — SIGNIFICANT CHANGE UP (ref 7.32–7.43)
PH UR: 5.5 — SIGNIFICANT CHANGE UP (ref 5–8)
PHOSPHATE SERPL-MCNC: 4.7 MG/DL — HIGH (ref 2.5–4.5)
PHOSPHATE SERPL-MCNC: 4.7 MG/DL — HIGH (ref 2.5–4.5)
PHOSPHATE SERPL-MCNC: 5 MG/DL — HIGH (ref 2.5–4.5)
PLAT MORPH BLD: NORMAL — SIGNIFICANT CHANGE UP
PLATELET # BLD AUTO: 293 K/UL — SIGNIFICANT CHANGE UP (ref 150–400)
PLATELET # BLD AUTO: 319 K/UL — SIGNIFICANT CHANGE UP (ref 150–400)
PLATELET # BLD AUTO: 337 K/UL — SIGNIFICANT CHANGE UP (ref 150–400)
PLATELET # BLD AUTO: 397 K/UL — SIGNIFICANT CHANGE UP (ref 150–400)
PO2 BLDV: 104 MMHG — HIGH (ref 25–45)
PO2 BLDV: 18 MMHG — LOW (ref 25–45)
PO2 BLDV: 20 MMHG — LOW (ref 25–45)
PO2 BLDV: 30 MMHG — SIGNIFICANT CHANGE UP (ref 25–45)
PO2 BLDV: 33 MMHG — SIGNIFICANT CHANGE UP (ref 25–45)
POIKILOCYTOSIS BLD QL AUTO: SLIGHT — SIGNIFICANT CHANGE UP
POLYCHROMASIA BLD QL SMEAR: SIGNIFICANT CHANGE UP
POTASSIUM BLDV-SCNC: 3.9 MMOL/L — SIGNIFICANT CHANGE UP (ref 3.5–5.1)
POTASSIUM BLDV-SCNC: 4.1 MMOL/L — SIGNIFICANT CHANGE UP (ref 3.5–5.1)
POTASSIUM BLDV-SCNC: 4.7 MMOL/L — SIGNIFICANT CHANGE UP (ref 3.5–5.1)
POTASSIUM BLDV-SCNC: 5.5 MMOL/L — HIGH (ref 3.5–5.1)
POTASSIUM BLDV-SCNC: 5.7 MMOL/L — HIGH (ref 3.5–5.1)
POTASSIUM SERPL-MCNC: 4.5 MMOL/L — SIGNIFICANT CHANGE UP (ref 3.5–5.3)
POTASSIUM SERPL-MCNC: 4.6 MMOL/L — SIGNIFICANT CHANGE UP (ref 3.5–5.3)
POTASSIUM SERPL-MCNC: 5.2 MMOL/L — SIGNIFICANT CHANGE UP (ref 3.5–5.3)
POTASSIUM SERPL-SCNC: 4.5 MMOL/L — SIGNIFICANT CHANGE UP (ref 3.5–5.3)
POTASSIUM SERPL-SCNC: 4.6 MMOL/L — SIGNIFICANT CHANGE UP (ref 3.5–5.3)
POTASSIUM SERPL-SCNC: 5.2 MMOL/L — SIGNIFICANT CHANGE UP (ref 3.5–5.3)
PROCALCITONIN SERPL-MCNC: 5.07 NG/ML — HIGH (ref 0.02–0.1)
PROT ?TM UR-MCNC: 28 MG/DL — HIGH (ref 0–12)
PROT SERPL-MCNC: 5.1 G/DL — LOW (ref 6–8.3)
PROT SERPL-MCNC: 5.8 G/DL — LOW (ref 6–8.3)
PROT UR-MCNC: SIGNIFICANT CHANGE UP MG/DL
PROT/CREAT UR-RTO: 0.5 RATIO — HIGH (ref 0–0.2)
PROTHROM AB SERPL-ACNC: 14.9 SEC — HIGH (ref 9.5–13)
PROTHROM AB SERPL-ACNC: 15.8 SEC — HIGH (ref 9.5–13)
RBC # BLD: 2.29 M/UL — LOW (ref 4.2–5.8)
RBC # BLD: 2.84 M/UL — LOW (ref 4.2–5.8)
RBC # BLD: 3.04 M/UL — LOW (ref 4.2–5.8)
RBC # BLD: 3.45 M/UL — LOW (ref 4.2–5.8)
RBC # FLD: 24.1 % — HIGH (ref 10.3–14.5)
RBC # FLD: 24.5 % — HIGH (ref 10.3–14.5)
RBC # FLD: 26.6 % — HIGH (ref 10.3–14.5)
RBC # FLD: 27.4 % — HIGH (ref 10.3–14.5)
RBC BLD AUTO: ABNORMAL
RBC CASTS # UR COMP ASSIST: 2 /HPF — SIGNIFICANT CHANGE UP (ref 0–4)
REVIEW: SIGNIFICANT CHANGE UP
RH IG SCN BLD-IMP: POSITIVE — SIGNIFICANT CHANGE UP
ROULEAUX BLD QL SMEAR: PRESENT
S AUREUS DNA NOSE QL NAA+PROBE: SIGNIFICANT CHANGE UP
SAO2 % BLDV: 19.5 % — LOW (ref 67–88)
SAO2 % BLDV: 22 % — LOW (ref 67–88)
SAO2 % BLDV: 46.6 % — LOW (ref 67–88)
SAO2 % BLDV: 51.1 % — LOW (ref 67–88)
SAO2 % BLDV: 98.2 % — HIGH (ref 67–88)
SCHISTOCYTES BLD QL AUTO: SLIGHT — SIGNIFICANT CHANGE UP
SMUDGE CELLS # BLD: PRESENT — SIGNIFICANT CHANGE UP
SODIUM SERPL-SCNC: 148 MMOL/L — HIGH (ref 135–145)
SODIUM SERPL-SCNC: 150 MMOL/L — HIGH (ref 135–145)
SODIUM SERPL-SCNC: 152 MMOL/L — HIGH (ref 135–145)
SODIUM UR-SCNC: 12 MMOL/L — SIGNIFICANT CHANGE UP
SP GR SPEC: >1.03 — HIGH (ref 1–1.03)
SQUAMOUS # UR AUTO: 1 /HPF — SIGNIFICANT CHANGE UP (ref 0–5)
TARGETS BLD QL SMEAR: SLIGHT — SIGNIFICANT CHANGE UP
TROPONIN T, HIGH SENSITIVITY RESULT: 129 NG/L — HIGH (ref 0–51)
UROBILINOGEN FLD QL: 1 MG/DL — SIGNIFICANT CHANGE UP (ref 0.2–1)
UUN UR-MCNC: 730 MG/DL — SIGNIFICANT CHANGE UP
WBC # BLD: 15.11 K/UL — HIGH (ref 3.8–10.5)
WBC # BLD: 16.95 K/UL — HIGH (ref 3.8–10.5)
WBC # BLD: 17.42 K/UL — HIGH (ref 3.8–10.5)
WBC # BLD: 19.4 K/UL — HIGH (ref 3.8–10.5)
WBC # FLD AUTO: 15.11 K/UL — HIGH (ref 3.8–10.5)
WBC # FLD AUTO: 16.95 K/UL — HIGH (ref 3.8–10.5)
WBC # FLD AUTO: 17.42 K/UL — HIGH (ref 3.8–10.5)
WBC # FLD AUTO: 19.4 K/UL — HIGH (ref 3.8–10.5)
WBC UR QL: 0 /HPF — SIGNIFICANT CHANGE UP (ref 0–5)

## 2024-03-03 PROCEDURE — 36620 INSERTION CATHETER ARTERY: CPT

## 2024-03-03 PROCEDURE — 99233 SBSQ HOSP IP/OBS HIGH 50: CPT

## 2024-03-03 PROCEDURE — 71260 CT THORAX DX C+: CPT | Mod: 26

## 2024-03-03 PROCEDURE — 74177 CT ABD & PELVIS W/CONTRAST: CPT | Mod: 26

## 2024-03-03 PROCEDURE — 70496 CT ANGIOGRAPHY HEAD: CPT | Mod: 26

## 2024-03-03 PROCEDURE — 70450 CT HEAD/BRAIN W/O DYE: CPT | Mod: 26,XU

## 2024-03-03 PROCEDURE — 0042T: CPT

## 2024-03-03 PROCEDURE — 99291 CRITICAL CARE FIRST HOUR: CPT | Mod: GC,25

## 2024-03-03 PROCEDURE — 70498 CT ANGIOGRAPHY NECK: CPT | Mod: 26

## 2024-03-03 PROCEDURE — 76604 US EXAM CHEST: CPT | Mod: 26

## 2024-03-03 RX ORDER — PIPERACILLIN AND TAZOBACTAM 4; .5 G/20ML; G/20ML
3.38 INJECTION, POWDER, LYOPHILIZED, FOR SOLUTION INTRAVENOUS EVERY 8 HOURS
Refills: 0 | Status: DISCONTINUED | OUTPATIENT
Start: 2024-03-03 | End: 2024-03-03

## 2024-03-03 RX ORDER — INSULIN LISPRO 100/ML
VIAL (ML) SUBCUTANEOUS EVERY 6 HOURS
Refills: 0 | Status: DISCONTINUED | OUTPATIENT
Start: 2024-03-03 | End: 2024-04-11

## 2024-03-03 RX ORDER — NOREPINEPHRINE BITARTRATE/D5W 8 MG/250ML
0.05 PLASTIC BAG, INJECTION (ML) INTRAVENOUS
Qty: 8 | Refills: 0 | Status: DISCONTINUED | OUTPATIENT
Start: 2024-03-03 | End: 2024-03-03

## 2024-03-03 RX ORDER — DESMOPRESSIN ACETATE 0.1 MG/1
30 TABLET ORAL ONCE
Refills: 0 | Status: COMPLETED | OUTPATIENT
Start: 2024-03-03 | End: 2024-03-03

## 2024-03-03 RX ORDER — PANTOPRAZOLE SODIUM 20 MG/1
40 TABLET, DELAYED RELEASE ORAL EVERY 12 HOURS
Refills: 0 | Status: DISCONTINUED | OUTPATIENT
Start: 2024-03-03 | End: 2024-03-06

## 2024-03-03 RX ORDER — ACETAMINOPHEN 500 MG
1000 TABLET ORAL ONCE
Refills: 0 | Status: DISCONTINUED | OUTPATIENT
Start: 2024-03-03 | End: 2024-03-04

## 2024-03-03 RX ORDER — PIPERACILLIN AND TAZOBACTAM 4; .5 G/20ML; G/20ML
3.38 INJECTION, POWDER, LYOPHILIZED, FOR SOLUTION INTRAVENOUS ONCE
Refills: 0 | Status: COMPLETED | OUTPATIENT
Start: 2024-03-03 | End: 2024-03-03

## 2024-03-03 RX ORDER — SODIUM CHLORIDE 9 MG/ML
1000 INJECTION, SOLUTION INTRAVENOUS
Refills: 0 | Status: DISCONTINUED | OUTPATIENT
Start: 2024-03-03 | End: 2024-03-04

## 2024-03-03 RX ORDER — PIPERACILLIN AND TAZOBACTAM 4; .5 G/20ML; G/20ML
3.38 INJECTION, POWDER, LYOPHILIZED, FOR SOLUTION INTRAVENOUS ONCE
Refills: 0 | Status: DISCONTINUED | OUTPATIENT
Start: 2024-03-03 | End: 2024-03-03

## 2024-03-03 RX ORDER — DEXTROSE 50 % IN WATER 50 %
15 SYRINGE (ML) INTRAVENOUS ONCE
Refills: 0 | Status: DISCONTINUED | OUTPATIENT
Start: 2024-03-03 | End: 2024-03-03

## 2024-03-03 RX ORDER — ACETAMINOPHEN 500 MG
1000 TABLET ORAL ONCE
Refills: 0 | Status: COMPLETED | OUTPATIENT
Start: 2024-03-03 | End: 2024-03-03

## 2024-03-03 RX ORDER — HUMAN INSULIN 100 [IU]/ML
7 INJECTION, SUSPENSION SUBCUTANEOUS EVERY 6 HOURS
Refills: 0 | Status: DISCONTINUED | OUTPATIENT
Start: 2024-03-03 | End: 2024-04-11

## 2024-03-03 RX ORDER — HUMAN INSULIN 100 [IU]/ML
15 INJECTION, SUSPENSION SUBCUTANEOUS EVERY 6 HOURS
Refills: 0 | Status: DISCONTINUED | OUTPATIENT
Start: 2024-03-03 | End: 2024-03-03

## 2024-03-03 RX ORDER — MEROPENEM 1 G/30ML
INJECTION INTRAVENOUS
Refills: 0 | Status: DISCONTINUED | OUTPATIENT
Start: 2024-03-03 | End: 2024-03-03

## 2024-03-03 RX ORDER — MEROPENEM 1 G/30ML
2000 INJECTION INTRAVENOUS EVERY 8 HOURS
Refills: 0 | Status: DISCONTINUED | OUTPATIENT
Start: 2024-03-03 | End: 2024-03-04

## 2024-03-03 RX ORDER — SODIUM CHLORIDE 9 MG/ML
1000 INJECTION, SOLUTION INTRAVENOUS ONCE
Refills: 0 | Status: DISCONTINUED | OUTPATIENT
Start: 2024-03-03 | End: 2024-03-03

## 2024-03-03 RX ORDER — IPRATROPIUM/ALBUTEROL SULFATE 18-103MCG
3 AEROSOL WITH ADAPTER (GRAM) INHALATION EVERY 6 HOURS
Refills: 0 | Status: DISCONTINUED | OUTPATIENT
Start: 2024-03-03 | End: 2024-03-07

## 2024-03-03 RX ORDER — VANCOMYCIN HCL 1 G
1500 VIAL (EA) INTRAVENOUS EVERY 24 HOURS
Refills: 0 | Status: DISCONTINUED | OUTPATIENT
Start: 2024-03-03 | End: 2024-03-05

## 2024-03-03 RX ORDER — NOREPINEPHRINE BITARTRATE/D5W 8 MG/250ML
0.05 PLASTIC BAG, INJECTION (ML) INTRAVENOUS
Qty: 8 | Refills: 0 | Status: DISCONTINUED | OUTPATIENT
Start: 2024-03-03 | End: 2024-03-06

## 2024-03-03 RX ADMIN — PANTOPRAZOLE SODIUM 40 MILLIGRAM(S): 20 TABLET, DELAYED RELEASE ORAL at 14:33

## 2024-03-03 RX ADMIN — Medication 9.52 MICROGRAM(S)/KG/MIN: at 17:35

## 2024-03-03 RX ADMIN — Medication 9.52 MICROGRAM(S)/KG/MIN: at 21:09

## 2024-03-03 RX ADMIN — ENOXAPARIN SODIUM 30 MILLIGRAM(S): 100 INJECTION SUBCUTANEOUS at 05:31

## 2024-03-03 RX ADMIN — Medication 1 APPLICATION(S): at 05:25

## 2024-03-03 RX ADMIN — CHLORHEXIDINE GLUCONATE 15 MILLILITER(S): 213 SOLUTION TOPICAL at 17:35

## 2024-03-03 RX ADMIN — Medication 300 MILLIGRAM(S): at 17:34

## 2024-03-03 RX ADMIN — HUMAN INSULIN 7 UNIT(S): 100 INJECTION, SUSPENSION SUBCUTANEOUS at 17:52

## 2024-03-03 RX ADMIN — Medication 2: at 05:52

## 2024-03-03 RX ADMIN — CHLORHEXIDINE GLUCONATE 1 APPLICATION(S): 213 SOLUTION TOPICAL at 17:35

## 2024-03-03 RX ADMIN — HUMAN INSULIN 15 UNIT(S): 100 INJECTION, SUSPENSION SUBCUTANEOUS at 05:51

## 2024-03-03 RX ADMIN — GABAPENTIN 400 MILLIGRAM(S): 400 CAPSULE ORAL at 05:31

## 2024-03-03 RX ADMIN — Medication 2: at 00:33

## 2024-03-03 RX ADMIN — SODIUM CHLORIDE 75 MILLILITER(S): 9 INJECTION, SOLUTION INTRAVENOUS at 17:00

## 2024-03-03 RX ADMIN — MEROPENEM 280 MILLIGRAM(S): 1 INJECTION INTRAVENOUS at 17:34

## 2024-03-03 RX ADMIN — Medication 3 MILLILITER(S): at 05:01

## 2024-03-03 RX ADMIN — Medication 3 MILLILITER(S): at 17:24

## 2024-03-03 RX ADMIN — Medication 1000 MILLIGRAM(S): at 17:44

## 2024-03-03 RX ADMIN — MEROPENEM 280 MILLIGRAM(S): 1 INJECTION INTRAVENOUS at 21:09

## 2024-03-03 RX ADMIN — HUMAN INSULIN 15 UNIT(S): 100 INJECTION, SUSPENSION SUBCUTANEOUS at 00:33

## 2024-03-03 RX ADMIN — PIPERACILLIN AND TAZOBACTAM 25 GRAM(S): 4; .5 INJECTION, POWDER, LYOPHILIZED, FOR SOLUTION INTRAVENOUS at 14:32

## 2024-03-03 RX ADMIN — SODIUM CHLORIDE 75 MILLILITER(S): 9 INJECTION, SOLUTION INTRAVENOUS at 21:09

## 2024-03-03 RX ADMIN — DESMOPRESSIN ACETATE 230 MICROGRAM(S): 0.1 TABLET ORAL at 17:34

## 2024-03-03 RX ADMIN — Medication 400 MILLIGRAM(S): at 14:31

## 2024-03-03 RX ADMIN — CHLORHEXIDINE GLUCONATE 15 MILLILITER(S): 213 SOLUTION TOPICAL at 05:26

## 2024-03-03 NOTE — PROGRESS NOTE ADULT - SUBJECTIVE AND OBJECTIVE BOX
Patient is a 73y old  Male who presents with a chief complaint of Stroke, critical stenosis, evaluation for angiography (02 Mar 2024 09:30)    HPI:  73 years old RH male with h/o HTN, HLD, DM, h/o CVA x 2 (no residual deficits) initially presented to [facility] ED with unsteady gait that began on 2/7 (exact LKW unknown) and L facial droop that began 6 AM on 2/9 (went to bed 11 PM on 2/8 without facial droop). No slurred speech, vision changes. Initial NIHSS 0 at [facility]. Hemodynamically stable, CT head with no acute pathology. Chronic left thalamic lacunar infarct. CTA with no large vessel occlusion. 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 CT perfusion with no acute abnormality.   Pt transfered from [facility] to [facility] due to concern for neurologic deterioration i/s/o aforementioned high grade proximal basiliar and L PCA stenosis. On arrival to [facility] ED, initial NIHSS 4 (+1 for LLE drift, +2 for b/l limb dysmetria, +1 for mild dysarthria). Later, pt started to have difficulty clearing secretions, became stridorous, and started to desaturate, so decision was made to intubate patient. Once pt medically stabilized, taken to angio suite by IR. Unable to obtain further history from patient at [facility] due to intubation/sedation (09 Feb 2024 20:27)    Interval Events:    REVIEW OF SYSTEMS:  [ ] Positive  [ ] All other systems negative  [ ] Unable to assess ROS because ________    Vital Signs Last 24 Hrs  T(C): 37.1 (03-03-24 @ 05:46), Max: 37.7 (03-02-24 @ 12:14)  T(F): 98.7 (03-03-24 @ 05:46), Max: 99.9 (03-02-24 @ 12:14)  HR: 109 (03-03-24 @ 05:46) (88 - 109)  BP: 97/60 (03-03-24 @ 05:46) (97/60 - 139/77)  RR: 18 (03-03-24 @ 05:46) (18 - 19)  SpO2: 95% (03-03-24 @ 05:46) (94% - 100%)    PHYSICAL EXAM:  HEENT:   [ ]Tracheostomy:  [ ]Pupils equal  [ ]No oral lesions  [ ]Abnormal    SKIN  [ ] No Rash  [ ] Abnormal  [ ] pressure    CARDIAC  [ ]Regular  [ ]Abnormal    PULMONARY  [ ]Bilateral Clear Breath Sounds  [ ]Normal Excursion  [ ]Abnormal    GI  [ ]PEG      [ ] +BS		              [ ]Soft, nondistended, nontender	  [ ]Abnormal    MUSCULOSKELETAL                                   [ ]Bedbound                 [ ]Abnormal    [ ]Ambulatory/OOB to chair                           EXTREMITIES                                         [ ]Normal  [ ]Edema                           NEUROLOGIC  [ ] Normal, non focal  [ ] Focal findings:    PSYCHIATRIC  [ ]Alert and appropriate  [ ] Sedated	 [ ]Agitated    :  [place]: [ ] Yes, if yes: Date of Placement:                   [  ] No    LINES: Central Lines [ ] Yes, if yes: Date of Placement                                     [  ] No    HOSPITAL MEDICATIONS:  MEDICATIONS  (STANDING):  albuterol/ipratropium for Nebulization 3 milliLiter(s) Nebulizer every 6 hours  aspirin  chewable 81 milliGRAM(s) Oral daily  atorvastatin 80 milliGRAM(s) Oral at bedtime  chlorhexidine 0.12% Liquid 15 milliLiter(s) Oral Mucosa every 12 hours  chlorhexidine 4% Liquid 1 Application(s) Topical daily  clopidogrel Tablet 75 milliGRAM(s) Enteral Tube daily  cyanocobalamin 1000 MICROGram(s) Oral daily  enoxaparin Injectable 30 milliGRAM(s) SubCutaneous <User Schedule>  ergocalciferol Drops 04792 Unit(s) Oral <User Schedule>  ferrous    sulfate Liquid 300 milliGRAM(s) Enteral Tube <User Schedule>  folic acid 1 milliGRAM(s) Oral daily  gabapentin Solution 400 milliGRAM(s) Oral three times a day  insulin lispro (ADMELOG) corrective regimen sliding scale   SubCutaneous every 6 hours  insulin NPH human recombinant 15 Unit(s) SubCutaneous every 6 hours  pantoprazole   Suspension 40 milliGRAM(s) Oral daily  petrolatum Ophthalmic Ointment 1 Application(s) Both EYES three times a day  polyethylene glycol 3350 17 Gram(s) Oral daily  senna 1 Tablet(s) Oral at bedtime    MEDICATIONS  (PRN):  acetaminophen   Oral Liquid .. 650 milliGRAM(s) Oral every 6 hours PRN Mild Pain (1 - 3)  dextrose Oral Gel 15 Gram(s) Oral once PRN Blood Glucose LESS THAN 70 milliGRAM(s)/deciliter  sodium chloride 0.9% lock flush 10 milliLiter(s) IV Push every 1 hour PRN Pre/post blood products, medications, blood draw, and to maintain line patency      LABS:                        7.1    17.42 )-----------( 397      ( 03 Mar 2024 07:03 )             27.3     03-03    148<H>  |  108  |  68<H>  ----------------------------<  178<H>  5.2   |  32<H>  |  1.14    Ca    8.4      03 Mar 2024 07:03  Phos  5.0     03-03  Mg     2.5     03-03      Mode: AC/ CMV (Assist Control/ Continuous Mandatory Ventilation)  RR (machine): 20  TV (machine): 500  FiO2: 30  PEEP: 5  ITime: 1  MAP: 9  PIP: 16

## 2024-03-03 NOTE — PROGRESS NOTE ADULT - ASSESSMENT
ARMOND 8 AM on 3/3/2024  NIHSS   premRS 5       CTH  CTA  CTP    Impression:    Recommendations: ARMOND 8 AM on 3/3/2024  NIHSS 34 (see code stroke note for point breakdown)  premRS 5   Pt not IV tenecteplase candidate because recent ischemic stroke and concern for GIB (black stool, acute hgb drop, elevated BUN)  Pt not thrombectomy candidate because no acute LVO      CTH  CTA    Impression: Worsening neuro exam (not arousable to pain, not following commands, cranial nerves not intact) with CTH negative for acute pathology. Findings likely i/s/o hypotension, fever, possible acute GIB, hypocalcemia, but would be prudent to rule out acute intracranial process.    Recommendations:  [ ] F/u final CTH and CTA read  [ ] MRI Brain w/wo IV contrast when stable  [ ] vEEG (look for seizure activity)  [ ] Check serum ammonia  [ ] Management of other medical issues per primary team    Case to be seen by neuro attending on rounds. Recommendations not finalized until attested by attending.       ARMOND 8 AM on 3/3/2024  NIHSS 34 (see code stroke note for point breakdown)  premRS 5   Pt not IV tenecteplase candidate because recent ischemic stroke and concern for GIB (black stool, acute hgb drop, elevated BUN)  Pt not thrombectomy candidate because no acute LVO    CTH no acute pathology    Impression: Significant neurologic deterioration (not arousable to pain, not following commands, cranial nerves not intact) i/s/o hypotension (SBPs 70s), fever (>40C) , possible acute GIB, with CTH negative for acute intracranial pathology. Findings could be i/s/o aforementioned toxic metabolic process and/or from resultant hypoperfusion from said processes    Recommendations:  [ ] F/u final CTH and CTA read  [ ] MRI Brain w/wo IV contrast when stable  [ ] vEEG (look for seizure activity)  [ ] Check serum NSE and ammonia  [ ] Management of other medical issues per primary team    Case to be seen by neuro attending on rounds. Recommendations not finalized until attested by attending.

## 2024-03-03 NOTE — CHART NOTE - NSCHARTNOTEFT_GEN_A_CORE
Called to bedside by RN reporting unresponsiveness in patient. No pupillary response, no gag, not following commands or focal.  Temp of 105.4 Code stroke called.

## 2024-03-03 NOTE — CONSULT NOTE ADULT - SUBJECTIVE AND OBJECTIVE BOX
Initial GI Consult    Patient is a 73y old  Male who presents with a chief complaint of Stroke, critical stenosis, evaluation for angiography (03 Mar 2024 11:15)    HPI: 73 year old male with hypertension, hyperlipidemia, diabetes, prior CVA's without residual deficits who initially presented as a transfer from [place] with concern for CVA in the setting of high-grade proximal basilar and left posterior cerebral artery stenosis s/p angiogram (2/11) showing moderate stenosis. 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 He was started on trial of DAPT per clinical trial for Ding Serrano variant of GBS and had some bleeding from trach. He is currently s/p 5 rounds of plasma exchange (2/13-2/20) and IVIG x 5 ( 2/21-2/26)  His hospital course was complicated by progressive respiratory failure. CT Chest performed on 2/20 with atelectasis of b/l lower lobes. BAL 2/21 Grew PSA treated with course of Zosyn (2/21-2/28).     RRT called on 3/3 for unresponsiveness. Patient with unreactive pupils, no corneal reflex. Also found to be hypotensive and febrile to 105. Noted to have new melena. Hgb 5. Given 1 unit of pRBC. Brought to [facility] for pressor support ans further care.       PAST MEDICAL & SURGICAL HISTORY:  Hypertension      Diabetes      CVA (cerebral vascular accident)  x 2 with right side weakness and numbness      HTN (hypertension)      HLD (hyperlipidemia)      DM2 (diabetes mellitus, type 2)      CVA (cerebrovascular accident)      No significant past surgical history      FAMILY HISTORY:      MEDS:  MEDICATIONS  (STANDING):  acetaminophen   IVPB .. 1000 milliGRAM(s) IV Intermittent once  albuterol/ipratropium for Nebulization 3 milliLiter(s) Nebulizer every 6 hours  chlorhexidine 0.12% Liquid 15 milliLiter(s) Oral Mucosa every 12 hours  chlorhexidine 4% Liquid 1 Application(s) Topical daily  dextrose 5%. 1000 milliLiter(s) (75 mL/Hr) IV Continuous <Continuous>  insulin lispro (ADMELOG) corrective regimen sliding scale   SubCutaneous every 6 hours  insulin NPH human recombinant 7 Unit(s) SubCutaneous every 6 hours  meropenem  IVPB 2000 milliGRAM(s) IV Intermittent every 8 hours  norepinephrine Infusion 0.05 MICROgram(s)/kG/Min (9.52 mL/Hr) IV Continuous <Continuous>  pantoprazole  Injectable 40 milliGRAM(s) IV Push every 12 hours  polyethylene glycol 3350 17 Gram(s) Oral daily  senna 1 Tablet(s) Oral at bedtime  vancomycin  IVPB 1500 milliGRAM(s) IV Intermittent every 24 hours    MEDICATIONS  (PRN):    Allergies    No Known Allergies    Intolerances    ROS- unable to obtain ROS     ______________________________________________________________________  PHYSICAL EXAM:  T(C): 39.1 (03-03-24 @ 16:30), Max: 39.6 (03-03-24 @ 14:45)  HR: 101 (03-03-24 @ 17:45)  BP: 104/53 (03-03-24 @ 17:30)  RR: 20 (03-03-24 @ 17:45)  SpO2: 99% (03-03-24 @ 17:45)  Wt(kg): --    03-02 - 03-03  --------------------------------------------------------  IN:    Enteral Tube Flush: 180 mL    Free Water: 900 mL    Glucerna: 1540 mL  Total IN: 2620 mL    OUT:  Total OUT: 0 mL    Total NET: 2620 mL      03-03 - 03-03  --------------------------------------------------------  IN:    dextrose 5%: 150 mL    IV PiggyBack: 500 mL    IV PiggyBack: 100 mL    Norepinephrine: 395.6 mL    PRBCs (Packed Red Blood Cells): 600 mL  Total IN: 1745.6 mL    OUT:    Indwelling Catheter - Urethral (mL): 550 mL  Total OUT: 550 mL    Total NET: 1195.6 mL    GEN: NAD, normocephalic  CVS: S1S2+  CHEST: clear to auscultation, trach  ABD: soft , nontender, nondistended, bowel sounds present, PEG with clean tubing   EXTR: no cyanosis, no clubbing, no edema  NEURO: A&OX0  SKIN:  warm;  non icteric    ______________________________________________________________________  LABS:                        7.3    16.95 )-----------( 319      ( 03 Mar 2024 13:04 )             24.6     03-03    150<H>  |  112<H>  |  79<H>  ----------------------------<  149<H>  4.6   |  28  |  1.67<H>    Ca    7.6<L>      03 Mar 2024 13:04  Phos  4.7     03-03  Mg     2.5     03-03    TPro  5.8<L>  /  Alb  2.2<L>  /  TBili  0.2  /  DBili  x   /  AST  127<H>  /  ALT  91<H>  /  AlkPhos  110  03-03    LIVER FUNCTIONS - ( 03 Mar 2024 13:04 )  Alb: 2.2 g/dL / Pro: 5.8 g/dL / ALK PHOS: 110 U/L / ALT: 91 U/L / AST: 127 U/L / GGT: x           PT/INR - ( 03 Mar 2024 13:04 )   PT: 14.9 sec;   INR: 1.37 ratio         PTT - ( 03 Mar 2024 13:04 )  PTT:30.7 sec  ____________________________________________

## 2024-03-03 NOTE — CHART NOTE - NSCHARTNOTEFT_GEN_A_CORE
Spoke with patient's wife, Margarette Padilla, and patient's children, as well as relative Dre Dominguez, who is a physician. Explained that patient was transferred to the ICU due to low blood pressures requiring medication to raise his blood pressure. Explained that the likely cause of his low blood pressure is a bleed given his hemoglobin drop this morning and his bloody stools. Patient so far has already received 1 unit of blood. Wife consented to additional blood transfusions as need, at the provider's discretion. Discussed that we are waiting for the results of the CT A/P to identify a possible source of the bleed, and depending on what it shows, may need to go with embolization with IR or EGD/colonoscopy. I reassured them that we will keep them updates as the results of the scan come back    Brigitte Moon MD  Internal Medicine PGY3 Spoke with patient's wife, Margarette Padilla, and patient's children, as well as relative Dre Dominguez, who is a physician. Explained that patient was transferred to the ICU due to low blood pressures requiring medication to raise his blood pressure. Explained that the likely cause of his low blood pressure is a bleed given his hemoglobin drop this morning and his bloody stools. Patient so far has already received 1 unit of blood. Wife consented to additional blood transfusions as needed, at the provider's discretion. Discussed that we are waiting for the results of the CT A/P to identify a possible source of the bleed, and depending on what it shows, may need to go with embolization with IR or EGD/colonoscopy. I reassured them that we will keep them updated as the results of the scan come back    Brigitte Moon MD  Internal Medicine PGY3

## 2024-03-03 NOTE — PROGRESS NOTE ADULT - ATTENDING COMMENTS
Agree with resident's note.     Patient became unresponsive in setting of hypotension, fever and drop in Hct. Currently in ICU on pressors and AC ventilation  Patient comatose. Pinpoint pupils (-) VOR (-) corneals (-) BTT  Flaccid weakness throughout  DTRs absent  No spasms or convulsions present    HCT : no acute pathology  CTA : no large vessel occlusion    Patient in a coma at this time. Will need brain MRI when stable and EEG  NSE can be sent   Will f/u

## 2024-03-03 NOTE — STROKE CODE NOTE - NIH STROKE SCALE: 4. FACIAL PALSY, QM
(0) Normal symmetrical movements
(3) Complete paralysis of one or both sides (absence of facial movement in the upper and lower face)

## 2024-03-03 NOTE — CHART NOTE - NSCHARTNOTEFT_GEN_A_CORE
MICU Accept Note    CHIEF COMPLAINT:     HPI / INTERVAL HISTORY:    PAST MEDICAL & SURGICAL HISTORY:  Hypertension      Diabetes      CVA (cerebral vascular accident)  x 2 with right side weakness and numbness      HTN (hypertension)      HLD (hyperlipidemia)      DM2 (diabetes mellitus, type 2)      CVA (cerebrovascular accident)      No significant past surgical history          FAMILY HISTORY:      SOCIAL HISTORY:  Smoking:   Substance Use:   EtOH Use:   Marital Status:   Sexual History:   Occupation:  Recent Travel:  Country of Birth:   Advance Directives:     HOME MEDICATIONS:      Allergies    No Known Allergies    Intolerances          REVIEW OF SYSTEMS:  Constitutional: No fevers, chills, weight loss, weight gain  HEENT: No vision problems, eye pain, nasal congestion, rhinorrhea, sore throat, dysphagia  CV: No chest pain, orthopnea, palpitations  Resp: No cough, dyspnea, wheezing, hemoptysis  GI: No nausea, vomiting, diarrhea, constipation, abdominal pain  : [ ] dysuria [ ] nocturia [ ] hematuria [ ] increased urinary frequency  Musculoskeletal: [ ] back pain [ ] myalgias [ ] arthralgias [ ] fracture  Skin: [ ] rash [ ] itch  Neurological: [ ] headache [ ] dizziness [ ] syncope [ ] weakness [ ] numbness  Psychiatric: [ ] anxiety [ ] depression  Endocrine: [ ] diabetes [ ] thyroid problem  Hematologic/Lymphatic: [ ] anemia [ ] bleeding problem  Allergic/Immunologic: [ ] itchy eyes [ ] nasal discharge [ ] hives [ ] angioedema  [ ] All other systems negative  [ ] Unable to assess ROS because ________    OBJECTIVE:  ICU Vital Signs Last 24 Hrs  T(C): 37.1 (03 Mar 2024 05:46), Max: 37.7 (02 Mar 2024 12:14)  T(F): 98.7 (03 Mar 2024 05:46), Max: 99.9 (02 Mar 2024 12:14)  HR: 99 (03 Mar 2024 09:19) (88 - 109)  BP: 97/60 (03 Mar 2024 05:46) (97/60 - 139/77)  BP(mean): --  ABP: --  ABP(mean): --  RR: 18 (03 Mar 2024 05:46) (18 - 19)  SpO2: 100% (03 Mar 2024 09:19) (95% - 100%)    O2 Parameters below as of 03 Mar 2024 05:46  Patient On (Oxygen Delivery Method): ventilator          Mode: AC/ CMV (Assist Control/ Continuous Mandatory Ventilation), RR (machine): 20, TV (machine): 500, FiO2: 30, PEEP: 5, ITime: 1, MAP: 9, PIP: 16    03-02 @ 07:01  -  03-03 @ 07:00  --------------------------------------------------------  IN: 2620 mL / OUT: 0 mL / NET: 2620 mL      CAPILLARY BLOOD GLUCOSE      POCT Blood Glucose.: 164 mg/dL (03 Mar 2024 10:42)      PHYSICAL EXAM:  Constitutional: NAD  HEENT: NCAT, no conjunctival injection, PERRL, EOMI, moist oral mucosa, no oropharyngeal exudates  Neck: no JVD, supple, no LAD, no thyromegaly  Chest: normal WOB at rest, CTAB  Heart: RRR, physiologic S1 and S2, no murmurs, no rubs, no gallops, 2+ radial pulses  Abd: nondistended, normoactive bowel sounds, soft, nontender, no rebound, no involuntary guarding  Extremities: no clubbing, warm hands and feet, no edema  Neuro: alert, A&Ox3, no focal deficits  MSK: spontaneous movement of all 4 extremities, full and equal strength, no joint swelling  Psych: appropriate mood and affect  Skin: no rashes    LINES:     HOSPITAL MEDICATIONS:  MEDICATIONS  (STANDING):  acetaminophen   IVPB .. 1000 milliGRAM(s) IV Intermittent once  acetaminophen   IVPB .. 1000 milliGRAM(s) IV Intermittent once  albuterol/ipratropium for Nebulization 3 milliLiter(s) Nebulizer every 6 hours  aspirin  chewable 81 milliGRAM(s) Oral daily  atorvastatin 80 milliGRAM(s) Oral at bedtime  chlorhexidine 0.12% Liquid 15 milliLiter(s) Oral Mucosa every 12 hours  chlorhexidine 4% Liquid 1 Application(s) Topical daily  clopidogrel Tablet 75 milliGRAM(s) Enteral Tube daily  cyanocobalamin 1000 MICROGram(s) Oral daily  enoxaparin Injectable 30 milliGRAM(s) SubCutaneous <User Schedule>  ergocalciferol Drops 67510 Unit(s) Oral <User Schedule>  ferrous    sulfate Liquid 300 milliGRAM(s) Enteral Tube <User Schedule>  folic acid 1 milliGRAM(s) Oral daily  gabapentin Solution 400 milliGRAM(s) Oral three times a day  insulin lispro (ADMELOG) corrective regimen sliding scale   SubCutaneous every 6 hours  insulin NPH human recombinant 15 Unit(s) SubCutaneous every 6 hours  lactated ringers Bolus 1000 milliLiter(s) IV Bolus once  norepinephrine Infusion 0.05 MICROgram(s)/kG/Min (9.52 mL/Hr) IV Continuous <Continuous>  pantoprazole   Suspension 40 milliGRAM(s) Oral daily  petrolatum Ophthalmic Ointment 1 Application(s) Both EYES three times a day  piperacillin/tazobactam IVPB. 3.375 Gram(s) IV Intermittent once  piperacillin/tazobactam IVPB.- 3.375 Gram(s) IV Intermittent once  piperacillin/tazobactam IVPB.. 3.375 Gram(s) IV Intermittent every 8 hours  polyethylene glycol 3350 17 Gram(s) Oral daily  senna 1 Tablet(s) Oral at bedtime    MEDICATIONS  (PRN):  acetaminophen   Oral Liquid .. 650 milliGRAM(s) Oral every 6 hours PRN Mild Pain (1 - 3)  dextrose Oral Gel 15 Gram(s) Oral once PRN Blood Glucose LESS THAN 70 milliGRAM(s)/deciliter  sodium chloride 0.9% lock flush 10 milliLiter(s) IV Push every 1 hour PRN Pre/post blood products, medications, blood draw, and to maintain line patency      LABS:                        5.4    15.11 )-----------( 293      ( 03 Mar 2024 11:12 )             20.7     Hgb Trend: 5.4<--, 7.1<--, 9.0<--, 8.4<--, 8.3<--  03-03    148<H>  |  108  |  68<H>  ----------------------------<  178<H>  5.2   |  32<H>  |  1.14    Ca    8.4      03 Mar 2024 07:03  Phos  5.0     03-03  Mg     2.5     03-03      Creatinine Trend: 1.14<--, 0.70<--, 0.83<--, 0.82<--, 0.86<--, 0.88<--  PT/INR - ( 03 Mar 2024 11:12 )   PT: 15.8 sec;   INR: 1.45 ratio         PTT - ( 03 Mar 2024 11:12 )  PTT:29.7 sec  Urinalysis Basic - ( 03 Mar 2024 07:03 )    Color: x / Appearance: x / SG: x / pH: x  Gluc: 178 mg/dL / Ketone: x  / Bili: x / Urobili: x   Blood: x / Protein: x / Nitrite: x   Leuk Esterase: x / RBC: x / WBC x   Sq Epi: x / Non Sq Epi: x / Bacteria: x      Arterial Blood Gas:  03-03 @ 11:00  7.33/42/33/22/48.0/-3.5  ABG lactate: --    Venous Blood Gas:  03-03 @ 11:14  7.40/43/104/27/98.2  VBG Lactate: 4.7      MICROBIOLOGY:     RADIOLOGY & ADDITIONAL TESTS:      ASSESSMENT AND PLAN:  Mr./Mrs./Ms. 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 Prior to admission patient had Viral URI ( 1-2 weeks prior to admission) with subsequent weakness. He underwent an angiogram (2/11) which showed moderate stenosis and no intervention was done. MRI Performed c/w small bilateral cerebellar strokes and prior L thalamic strokes, as per Neurology was not c/w current presentation. 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 He is currently s/p 5 rounds of plasma exchange (2/13-2/20) and IVIG x 5 ( 2/21-2/26)  His hospital course was complicated by progressive respiratory failure. CT Chest performed on 2/20 with atelectasis of b/l lower lobes. BAL 2/21 Grew PSA treated with course of Zosyn (2/21-2/28).     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    3/1: Not tolerating weaning from vent but more responsive to commands. Attempting to wiggle his toes, squeeze hands, moving head and express his needs. Will give Lasix 20     RRT called on 3/3 for unresponsiveness. Patient with unreactive pupils, no corneal reflex. Also found to be hypotensive and febrile to 105. Noted to have new melena. Hgb 5. Given 1 unit of pRBC. Pending CTH, CTA head and neck, CTA Chest and A/P.    PAST MEDICAL & SURGICAL HISTORY:  Hypertension      Diabetes      CVA (cerebral vascular accident)  x 2 with right side weakness and numbness      HTN (hypertension)      HLD (hyperlipidemia)      DM2 (diabetes mellitus, type 2)      CVA (cerebrovascular accident)      No significant past surgical history          FAMILY HISTORY:      SOCIAL HISTORY:  Smoking:   Substance Use:   EtOH Use:   Marital Status:   Sexual History:   Occupation:  Recent Travel:  Country of Birth:   Advance Directives:     HOME MEDICATIONS:      Allergies    No Known Allergies    Intolerances          REVIEW OF SYSTEMS:  Constitutional: No fevers, chills, weight loss, weight gain  HEENT: No vision problems, eye pain, nasal congestion, rhinorrhea, sore throat, dysphagia  CV: No chest pain, orthopnea, palpitations  Resp: No cough, dyspnea, wheezing, hemoptysis  GI: No nausea, vomiting, diarrhea, constipation, abdominal pain  : [ ] dysuria [ ] nocturia [ ] hematuria [ ] increased urinary frequency  Musculoskeletal: [ ] back pain [ ] myalgias [ ] arthralgias [ ] fracture  Skin: [ ] rash [ ] itch  Neurological: [ ] headache [ ] dizziness [ ] syncope [ ] weakness [ ] numbness  Psychiatric: [ ] anxiety [ ] depression  Endocrine: [ ] diabetes [ ] thyroid problem  Hematologic/Lymphatic: [ ] anemia [ ] bleeding problem  Allergic/Immunologic: [ ] itchy eyes [ ] nasal discharge [ ] hives [ ] angioedema  [ ] All other systems negative  [ ] Unable to assess ROS because ________    OBJECTIVE:  ICU Vital Signs Last 24 Hrs  T(C): 37.1 (03 Mar 2024 05:46), Max: 37.7 (02 Mar 2024 12:14)  T(F): 98.7 (03 Mar 2024 05:46), Max: 99.9 (02 Mar 2024 12:14)  HR: 99 (03 Mar 2024 09:19) (88 - 109)  BP: 97/60 (03 Mar 2024 05:46) (97/60 - 139/77)  BP(mean): --  ABP: --  ABP(mean): --  RR: 18 (03 Mar 2024 05:46) (18 - 19)  SpO2: 100% (03 Mar 2024 09:19) (95% - 100%)    O2 Parameters below as of 03 Mar 2024 05:46  Patient On (Oxygen Delivery Method): ventilator          Mode: AC/ CMV (Assist Control/ Continuous Mandatory Ventilation), RR (machine): 20, TV (machine): 500, FiO2: 30, PEEP: 5, ITime: 1, MAP: 9, PIP: 16    03-02 @ 07:01  -  03-03 @ 07:00  --------------------------------------------------------  IN: 2620 mL / OUT: 0 mL / NET: 2620 mL      CAPILLARY BLOOD GLUCOSE      POCT Blood Glucose.: 164 mg/dL (03 Mar 2024 10:42)      PHYSICAL EXAM:  Constitutional: NAD  HEENT: NCAT, no conjunctival injection, PERRL, EOMI, moist oral mucosa, no oropharyngeal exudates  Neck: no JVD, supple, no LAD, no thyromegaly  Chest: normal WOB at rest, CTAB  Heart: RRR, physiologic S1 and S2, no murmurs, no rubs, no gallops, 2+ radial pulses  Abd: nondistended, normoactive bowel sounds, soft, nontender, no rebound, no involuntary guarding  Extremities: no clubbing, warm hands and feet, no edema  Neuro: alert, A&Ox3, no focal deficits  MSK: spontaneous movement of all 4 extremities, full and equal strength, no joint swelling  Psych: appropriate mood and affect  Skin: no rashes    LINES:     HOSPITAL MEDICATIONS:  MEDICATIONS  (STANDING):  acetaminophen   IVPB .. 1000 milliGRAM(s) IV Intermittent once  acetaminophen   IVPB .. 1000 milliGRAM(s) IV Intermittent once  albuterol/ipratropium for Nebulization 3 milliLiter(s) Nebulizer every 6 hours  aspirin  chewable 81 milliGRAM(s) Oral daily  atorvastatin 80 milliGRAM(s) Oral at bedtime  chlorhexidine 0.12% Liquid 15 milliLiter(s) Oral Mucosa every 12 hours  chlorhexidine 4% Liquid 1 Application(s) Topical daily  clopidogrel Tablet 75 milliGRAM(s) Enteral Tube daily  cyanocobalamin 1000 MICROGram(s) Oral daily  enoxaparin Injectable 30 milliGRAM(s) SubCutaneous <User Schedule>  ergocalciferol Drops 58098 Unit(s) Oral <User Schedule>  ferrous    sulfate Liquid 300 milliGRAM(s) Enteral Tube <User Schedule>  folic acid 1 milliGRAM(s) Oral daily  gabapentin Solution 400 milliGRAM(s) Oral three times a day  insulin lispro (ADMELOG) corrective regimen sliding scale   SubCutaneous every 6 hours  insulin NPH human recombinant 15 Unit(s) SubCutaneous every 6 hours  lactated ringers Bolus 1000 milliLiter(s) IV Bolus once  norepinephrine Infusion 0.05 MICROgram(s)/kG/Min (9.52 mL/Hr) IV Continuous <Continuous>  pantoprazole   Suspension 40 milliGRAM(s) Oral daily  petrolatum Ophthalmic Ointment 1 Application(s) Both EYES three times a day  piperacillin/tazobactam IVPB. 3.375 Gram(s) IV Intermittent once  piperacillin/tazobactam IVPB.- 3.375 Gram(s) IV Intermittent once  piperacillin/tazobactam IVPB.. 3.375 Gram(s) IV Intermittent every 8 hours  polyethylene glycol 3350 17 Gram(s) Oral daily  senna 1 Tablet(s) Oral at bedtime    MEDICATIONS  (PRN):  acetaminophen   Oral Liquid .. 650 milliGRAM(s) Oral every 6 hours PRN Mild Pain (1 - 3)  dextrose Oral Gel 15 Gram(s) Oral once PRN Blood Glucose LESS THAN 70 milliGRAM(s)/deciliter  sodium chloride 0.9% lock flush 10 milliLiter(s) IV Push every 1 hour PRN Pre/post blood products, medications, blood draw, and to maintain line patency      LABS:                        5.4    15.11 )-----------( 293      ( 03 Mar 2024 11:12 )             20.7     Hgb Trend: 5.4<--, 7.1<--, 9.0<--, 8.4<--, 8.3<--  03-03    148<H>  |  108  |  68<H>  ----------------------------<  178<H>  5.2   |  32<H>  |  1.14    Ca    8.4      03 Mar 2024 07:03  Phos  5.0     03-03  Mg     2.5     03-03      Creatinine Trend: 1.14<--, 0.70<--, 0.83<--, 0.82<--, 0.86<--, 0.88<--  PT/INR - ( 03 Mar 2024 11:12 )   PT: 15.8 sec;   INR: 1.45 ratio         PTT - ( 03 Mar 2024 11:12 )  PTT:29.7 sec  Urinalysis Basic - ( 03 Mar 2024 07:03 )    Color: x / Appearance: x / SG: x / pH: x  Gluc: 178 mg/dL / Ketone: x  / Bili: x / Urobili: x   Blood: x / Protein: x / Nitrite: x   Leuk Esterase: x / RBC: x / WBC x   Sq Epi: x / Non Sq Epi: x / Bacteria: x      Arterial Blood Gas:  03-03 @ 11:00  7.33/42/33/22/48.0/-3.5  ABG lactate: --    Venous Blood Gas:  03-03 @ 11:14  7.40/43/104/27/98.2  VBG Lactate: 4.7      MICROBIOLOGY:     RADIOLOGY & ADDITIONAL TESTS:      ASSESSMENT AND PLAN:  ././Ms. 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 Prior to admission patient had Viral URI ( 1-2 weeks prior to admission) with subsequent weakness. He underwent an angiogram (2/11) which showed moderate stenosis and no intervention was done. MRI Performed c/w small bilateral cerebellar strokes and prior L thalamic strokes, as per Neurology was not c/w current presentation. 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 He is currently s/p 5 rounds of plasma exchange (2/13-2/20) and IVIG x 5 ( 2/21-2/26)  His hospital course was complicated by progressive respiratory failure. CT Chest performed on 2/20 with atelectasis of b/l lower lobes. BAL 2/21 Grew PSA treated with course of Zosyn (2/21-2/28).     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    RRT called on 3/3 for unresponsiveness. Patient with unreactive pupils, no corneal reflex. Also found to be hypotensive and febrile to 105. Noted to have new melena. Hgb 5. Given 1 unit of pRBC. Pending CTH, CTA head and neck, CTA Chest and A/P.    PAST MEDICAL & SURGICAL HISTORY:  Hypertension      Diabetes      CVA (cerebral vascular accident)  x 2 with right side weakness and numbness      HTN (hypertension)      HLD (hyperlipidemia)      DM2 (diabetes mellitus, type 2)      CVA (cerebrovascular accident)      No significant past surgical history          FAMILY HISTORY:      SOCIAL HISTORY:  Smoking:   Substance Use:   EtOH Use:   Marital Status:   Sexual History:   Occupation:  Recent Travel:  Country of Birth:   Advance Directives:     HOME MEDICATIONS:      Allergies    No Known Allergies    Intolerances          REVIEW OF SYSTEMS:  Constitutional: No fevers, chills, weight loss, weight gain  HEENT: No vision problems, eye pain, nasal congestion, rhinorrhea, sore throat, dysphagia  CV: No chest pain, orthopnea, palpitations  Resp: No cough, dyspnea, wheezing, hemoptysis  GI: No nausea, vomiting, diarrhea, constipation, abdominal pain  : [ ] dysuria [ ] nocturia [ ] hematuria [ ] increased urinary frequency  Musculoskeletal: [ ] back pain [ ] myalgias [ ] arthralgias [ ] fracture  Skin: [ ] rash [ ] itch  Neurological: [ ] headache [ ] dizziness [ ] syncope [ ] weakness [ ] numbness  Psychiatric: [ ] anxiety [ ] depression  Endocrine: [ ] diabetes [ ] thyroid problem  Hematologic/Lymphatic: [ ] anemia [ ] bleeding problem  Allergic/Immunologic: [ ] itchy eyes [ ] nasal discharge [ ] hives [ ] angioedema  [ ] All other systems negative  [ ] Unable to assess ROS because ________    OBJECTIVE:  ICU Vital Signs Last 24 Hrs  T(C): 37.1 (03 Mar 2024 05:46), Max: 37.7 (02 Mar 2024 12:14)  T(F): 98.7 (03 Mar 2024 05:46), Max: 99.9 (02 Mar 2024 12:14)  HR: 99 (03 Mar 2024 09:19) (88 - 109)  BP: 97/60 (03 Mar 2024 05:46) (97/60 - 139/77)  BP(mean): --  ABP: --  ABP(mean): --  RR: 18 (03 Mar 2024 05:46) (18 - 19)  SpO2: 100% (03 Mar 2024 09:19) (95% - 100%)    O2 Parameters below as of 03 Mar 2024 05:46  Patient On (Oxygen Delivery Method): ventilator          Mode: AC/ CMV (Assist Control/ Continuous Mandatory Ventilation), RR (machine): 20, TV (machine): 500, FiO2: 30, PEEP: 5, ITime: 1, MAP: 9, PIP: 16    03-02 @ 07:01  -  03-03 @ 07:00  --------------------------------------------------------  IN: 2620 mL / OUT: 0 mL / NET: 2620 mL      CAPILLARY BLOOD GLUCOSE      POCT Blood Glucose.: 164 mg/dL (03 Mar 2024 10:42)      PHYSICAL EXAM:  Constitutional: NAD  HEENT: NCAT, no conjunctival injection, PERRL, EOMI, moist oral mucosa, no oropharyngeal exudates  Neck: no JVD, supple, no LAD, no thyromegaly  Chest: normal WOB at rest, CTAB  Heart: RRR, physiologic S1 and S2, no murmurs, no rubs, no gallops, 2+ radial pulses  Abd: nondistended, normoactive bowel sounds, soft, nontender, no rebound, no involuntary guarding  Extremities: no clubbing, warm hands and feet, no edema  Neuro: alert, A&Ox3, no focal deficits  MSK: spontaneous movement of all 4 extremities, full and equal strength, no joint swelling  Psych: appropriate mood and affect  Skin: no rashes    LINES:     HOSPITAL MEDICATIONS:  MEDICATIONS  (STANDING):  acetaminophen   IVPB .. 1000 milliGRAM(s) IV Intermittent once  acetaminophen   IVPB .. 1000 milliGRAM(s) IV Intermittent once  albuterol/ipratropium for Nebulization 3 milliLiter(s) Nebulizer every 6 hours  aspirin  chewable 81 milliGRAM(s) Oral daily  atorvastatin 80 milliGRAM(s) Oral at bedtime  chlorhexidine 0.12% Liquid 15 milliLiter(s) Oral Mucosa every 12 hours  chlorhexidine 4% Liquid 1 Application(s) Topical daily  clopidogrel Tablet 75 milliGRAM(s) Enteral Tube daily  cyanocobalamin 1000 MICROGram(s) Oral daily  enoxaparin Injectable 30 milliGRAM(s) SubCutaneous <User Schedule>  ergocalciferol Drops 57203 Unit(s) Oral <User Schedule>  ferrous    sulfate Liquid 300 milliGRAM(s) Enteral Tube <User Schedule>  folic acid 1 milliGRAM(s) Oral daily  gabapentin Solution 400 milliGRAM(s) Oral three times a day  insulin lispro (ADMELOG) corrective regimen sliding scale   SubCutaneous every 6 hours  insulin NPH human recombinant 15 Unit(s) SubCutaneous every 6 hours  lactated ringers Bolus 1000 milliLiter(s) IV Bolus once  norepinephrine Infusion 0.05 MICROgram(s)/kG/Min (9.52 mL/Hr) IV Continuous <Continuous>  pantoprazole   Suspension 40 milliGRAM(s) Oral daily  petrolatum Ophthalmic Ointment 1 Application(s) Both EYES three times a day  piperacillin/tazobactam IVPB. 3.375 Gram(s) IV Intermittent once  piperacillin/tazobactam IVPB.- 3.375 Gram(s) IV Intermittent once  piperacillin/tazobactam IVPB.. 3.375 Gram(s) IV Intermittent every 8 hours  polyethylene glycol 3350 17 Gram(s) Oral daily  senna 1 Tablet(s) Oral at bedtime    MEDICATIONS  (PRN):  acetaminophen   Oral Liquid .. 650 milliGRAM(s) Oral every 6 hours PRN Mild Pain (1 - 3)  dextrose Oral Gel 15 Gram(s) Oral once PRN Blood Glucose LESS THAN 70 milliGRAM(s)/deciliter  sodium chloride 0.9% lock flush 10 milliLiter(s) IV Push every 1 hour PRN Pre/post blood products, medications, blood draw, and to maintain line patency      LABS:                        5.4    15.11 )-----------( 293      ( 03 Mar 2024 11:12 )             20.7     Hgb Trend: 5.4<--, 7.1<--, 9.0<--, 8.4<--, 8.3<--  03-03    148<H>  |  108  |  68<H>  ----------------------------<  178<H>  5.2   |  32<H>  |  1.14    Ca    8.4      03 Mar 2024 07:03  Phos  5.0     03-03  Mg     2.5     03-03      Creatinine Trend: 1.14<--, 0.70<--, 0.83<--, 0.82<--, 0.86<--, 0.88<--  PT/INR - ( 03 Mar 2024 11:12 )   PT: 15.8 sec;   INR: 1.45 ratio         PTT - ( 03 Mar 2024 11:12 )  PTT:29.7 sec  Urinalysis Basic - ( 03 Mar 2024 07:03 )    Color: x / Appearance: x / SG: x / pH: x  Gluc: 178 mg/dL / Ketone: x  / Bili: x / Urobili: x   Blood: x / Protein: x / Nitrite: x   Leuk Esterase: x / RBC: x / WBC x   Sq Epi: x / Non Sq Epi: x / Bacteria: x      Arterial Blood Gas:  03-03 @ 11:00  7.33/42/33/22/48.0/-3.5  ABG lactate: --    Venous Blood Gas:  03-03 @ 11:14  7.40/43/104/27/98.2  VBG Lactate: 4.7      MICROBIOLOGY:     RADIOLOGY & ADDITIONAL TESTS:      ASSESSMENT AND PLAN:  Mr././Ms. is a ___    #Neuro    #Cardiovascular    #Respiratory    #GI/Nutrition    #/Renal    #Skin    #ID    #Endocrine    #Hematologic/DVT ppx    #Ethics MICU Accept Note    Name: AMANDA WILKINS  MRN: [id number]    Transfer from: RCU  Transfer to: MICU    Accepting physician: Dr. 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 Prior to admission patient had Viral URI ( 1-2 weeks prior to admission) with subsequent weakness. He underwent an angiogram (2/11) which showed moderate stenosis and no intervention was done. MRI Performed c/w small bilateral cerebellar strokes and prior L thalamic strokes, as per Neurology was not c/w current presentation. 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 He is currently s/p 5 rounds of plasma exchange (2/13-2/20) and IVIG x 5 ( 2/21-2/26)  His hospital course was complicated by progressive respiratory failure. CT Chest performed on 2/20 with atelectasis of b/l lower lobes. BAL 2/21 Grew PSA treated with course of Zosyn (2/21-2/28).     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    RRT called on 3/3 for unresponsiveness. Patient with unreactive pupils, no corneal reflex. Also found to be hypotensive and febrile to 105. Noted to have new melena. Hgb 5. Given 1 unit of pRBC. Pending CTH, CTA head and neck, CTA Chest and A/P.      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     NEURO:  #AMS  Unresponsive with unreactive pupils and no corneal reflex on 3/3 for which an RRT was called. In setting of septic shock vs hemorrhagic shock.  - CT head    #Ding Serrano variant of GBS  - On clinical trial ([facility]) with DAPT: [id number] + Plavix 75mg daily  - Neurology following    CARDIOVASCULAR:  #Shock      PULMONARY:      GI:  Diet:       RENAL:      ENDO:      HEME/ONC/RHEUM:      ID:      SKIN/MSK:      ETHICS:  Full Code        PAST MEDICAL & SURGICAL HISTORY:  Hypertension  Diabetes  CVA (cerebral vascular accident)  x 2 with right side weakness and numbness  HTN (hypertension)  HLD (hyperlipidemia)  DM2 (diabetes mellitus, type 2)  CVA (cerebrovascular accident)    No significant past surgical history    MEDICATIONS  (STANDING):  acetaminophen   IVPB .. 1000 milliGRAM(s) IV Intermittent once  acetaminophen   IVPB .. 1000 milliGRAM(s) IV Intermittent once  albuterol/ipratropium for Nebulization 3 milliLiter(s) Nebulizer every 6 hours  aspirin  chewable 81 milliGRAM(s) Oral daily  atorvastatin 80 milliGRAM(s) Oral at bedtime  chlorhexidine 0.12% Liquid 15 milliLiter(s) Oral Mucosa every 12 hours  chlorhexidine 4% Liquid 1 Application(s) Topical daily  clopidogrel Tablet 75 milliGRAM(s) Enteral Tube daily  cyanocobalamin 1000 MICROGram(s) Oral daily  enoxaparin Injectable 30 milliGRAM(s) SubCutaneous <User Schedule>  ergocalciferol Drops 39067 Unit(s) Oral <User Schedule>  ferrous    sulfate Liquid 300 milliGRAM(s) Enteral Tube <User Schedule>  folic acid 1 milliGRAM(s) Oral daily  gabapentin Solution 400 milliGRAM(s) Oral three times a day  insulin lispro (ADMELOG) corrective regimen sliding scale   SubCutaneous every 6 hours  insulin NPH human recombinant 15 Unit(s) SubCutaneous every 6 hours  lactated ringers Bolus 1000 milliLiter(s) IV Bolus once  norepinephrine Infusion 0.05 MICROgram(s)/kG/Min (9.52 mL/Hr) IV Continuous <Continuous>  pantoprazole   Suspension 40 milliGRAM(s) Oral daily  petrolatum Ophthalmic Ointment 1 Application(s) Both EYES three times a day  piperacillin/tazobactam IVPB. 3.375 Gram(s) IV Intermittent once  piperacillin/tazobactam IVPB.- 3.375 Gram(s) IV Intermittent once  piperacillin/tazobactam IVPB.. 3.375 Gram(s) IV Intermittent every 8 hours  polyethylene glycol 3350 17 Gram(s) Oral daily  senna 1 Tablet(s) Oral at bedtime    MEDICATIONS  (PRN):  acetaminophen   Oral Liquid .. 650 milliGRAM(s) Oral every 6 hours PRN Mild Pain (1 - 3)  dextrose Oral Gel 15 Gram(s) Oral once PRN Blood Glucose LESS THAN 70 milliGRAM(s)/deciliter  sodium chloride 0.9% lock flush 10 milliLiter(s) IV Push every 1 hour PRN Pre/post blood products, medications, blood draw, and to maintain line patency    Vital Signs Last 24 Hrs  T(C): 37.1 (03 Mar 2024 05:46), Max: 37.7 (02 Mar 2024 12:14)  T(F): 98.7 (03 Mar 2024 05:46), Max: 99.9 (02 Mar 2024 12:14)  HR: 99 (03 Mar 2024 09:19) (88 - 109)  BP: 97/60 (03 Mar 2024 05:46) (97/60 - 139/77)  BP(mean): --  RR: 18 (03 Mar 2024 05:46) (18 - 19)  SpO2: 100% (03 Mar 2024 09:19) (95% - 100%)    Parameters below as of 03 Mar 2024 05:46  Patient On (Oxygen Delivery Method): ventilator    I&O's Summary    02 Mar 2024 07:01  -  03 Mar 2024 07:00  --------------------------------------------------------  IN: 2620 mL / OUT: 0 mL / NET: 2620 mL    LABS                        5.4    15.11 )-----------( 293      ( 03 Mar 2024 11:12 )             20.7     03-03    152<H>  |  117<H>  |  68<H>  ----------------------------<  139<H>  4.5   |  23  |  1.56<H>    Ca    6.3<LL>      03 Mar 2024 11:12  Phos  4.7     03-03  Mg     2.0     03-03    TPro  5.1<L>  /  Alb  2.0<L>  /  TBili  0.2  /  DBili  x   /  AST  95<H>  /  ALT  76<H>  /  AlkPhos  89  03-03  PT/INR - ( 03 Mar 2024 11:12 )   PT: 15.8 sec;   INR: 1.45 ratio    PTT - ( 03 Mar 2024 11:12 )  PTT:29.7 sec    __________  Pierce Knight MD MICU Accept Note    Name: AMANDA WILKINS  MRN: [id number]    Transfer from: RCU  Transfer to: MICU    Accepting physician: Dr. 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 Prior to admission patient had Viral URI ( 1-2 weeks prior to admission) with subsequent weakness. He underwent an angiogram (2/11) which showed moderate stenosis and no intervention was done. MRI Performed c/w small bilateral cerebellar strokes and prior L thalamic strokes, as per Neurology was not c/w current presentation. 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 He is currently s/p 5 rounds of plasma exchange (2/13-2/20) and IVIG x 5 ( 2/21-2/26)  His hospital course was complicated by progressive respiratory failure. CT Chest performed on 2/20 with atelectasis of b/l lower lobes. BAL 2/21 Grew PSA treated with course of Zosyn (2/21-2/28).     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    RRT called on 3/3 for unresponsiveness. Patient with unreactive pupils, no corneal reflex. Also found to be hypotensive and febrile to 105. Noted to have new melena. Hgb 5. Given 1 unit of pRBC. Pending CTH, CTA head and neck, CTA Chest and A/P.      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     NEURO:  #AMS  Unresponsive with unreactive pupils and no corneal reflex on 3/3 for which an RRT was called. In setting of septic shock vs hemorrhagic shock.  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 Hg 5.4. Septic vs Hemorrhagic shock.   - continue Levophed, wean as tolerated  - Patient is a [employer]. Conditional approval for 1u pRBC per spouse  - f/u UA, BCx, RVP, MRSA PCR, procal  - f/u CT head, chest, abd/pel and CTA head    #HTN  Home meds: Atenolol/Chlorthiazide 50mg/25mg daily  - hold in setting of shock    #HLD  Home med: Atorvastatin 80mg daily    PULMONARY:  #Acute Hypoxic Respiratory Failure  - s/p Tracheostomy #8 Cuffed Portex 2/16 (Dr. Sood)  - Chest PT and suctioning prn    GI:  #Melena  New melena on 3/3 with Hg drop to 5.4.   - Bahai. Spouse gave conditional approval for 1u pRBC  - f/u CT Abd/Pel  - GI consult  - monitor CBC    #S/p PEG 2/26 (Dr. Sood)  - was tolerating tube feeds at goal  - NPO for now    Diet: NPO    RENAL:  #Hypernatremia  Na 152 on 3/3.  - continue free water flushes 250mL q6h    ENDO:  #T2DM  Home med: Metformin 1g BID  - NPH 7u q6h  - MDSS    HEME/ONC/RHEUM:  #Anemia  #Bahai  Hg has been 7-8 this admission, however, new decrease to 5.4 with melena on 3.3.  - Melena/GI bleed management as above.    DVT ppx: SCDs only given concern for GI bleed    ID:  #Sepsis  AMS, febrile, and hypotensive on 3/3. In shock.  - Prior BCx this admission have been negative  - f/u UA, BCx, RVP, MRSA PCR, procal  - f/u CT head, chest, abd/pel and CTA head  - Zosyn 3.375g q8h    SKIN/MSK: no active issues    ETHICS:  Full Code        PAST MEDICAL & SURGICAL HISTORY:  Hypertension  Diabetes  CVA (cerebral vascular accident)  x 2 with right side weakness and numbness  HTN (hypertension)  HLD (hyperlipidemia)  DM2 (diabetes mellitus, type 2)  CVA (cerebrovascular accident)    No significant past surgical history    MEDICATIONS  (STANDING):  acetaminophen   IVPB .. 1000 milliGRAM(s) IV Intermittent once  acetaminophen   IVPB .. 1000 milliGRAM(s) IV Intermittent once  albuterol/ipratropium for Nebulization 3 milliLiter(s) Nebulizer every 6 hours  aspirin  chewable 81 milliGRAM(s) Oral daily  atorvastatin 80 milliGRAM(s) Oral at bedtime  chlorhexidine 0.12% Liquid 15 milliLiter(s) Oral Mucosa every 12 hours  chlorhexidine 4% Liquid 1 Application(s) Topical daily  clopidogrel Tablet 75 milliGRAM(s) Enteral Tube daily  cyanocobalamin 1000 MICROGram(s) Oral daily  enoxaparin Injectable 30 milliGRAM(s) SubCutaneous <User Schedule>  ergocalciferol Drops 55668 Unit(s) Oral <User Schedule>  ferrous    sulfate Liquid 300 milliGRAM(s) Enteral Tube <User Schedule>  folic acid 1 milliGRAM(s) Oral daily  gabapentin Solution 400 milliGRAM(s) Oral three times a day  insulin lispro (ADMELOG) corrective regimen sliding scale   SubCutaneous every 6 hours  insulin NPH human recombinant 15 Unit(s) SubCutaneous every 6 hours  lactated ringers Bolus 1000 milliLiter(s) IV Bolus once  norepinephrine Infusion 0.05 MICROgram(s)/kG/Min (9.52 mL/Hr) IV Continuous <Continuous>  pantoprazole   Suspension 40 milliGRAM(s) Oral daily  petrolatum Ophthalmic Ointment 1 Application(s) Both EYES three times a day  piperacillin/tazobactam IVPB. 3.375 Gram(s) IV Intermittent once  piperacillin/tazobactam IVPB.- 3.375 Gram(s) IV Intermittent once  piperacillin/tazobactam IVPB.. 3.375 Gram(s) IV Intermittent every 8 hours  polyethylene glycol 3350 17 Gram(s) Oral daily  senna 1 Tablet(s) Oral at bedtime    MEDICATIONS  (PRN):  acetaminophen   Oral Liquid .. 650 milliGRAM(s) Oral every 6 hours PRN Mild Pain (1 - 3)  dextrose Oral Gel 15 Gram(s) Oral once PRN Blood Glucose LESS THAN 70 milliGRAM(s)/deciliter  sodium chloride 0.9% lock flush 10 milliLiter(s) IV Push every 1 hour PRN Pre/post blood products, medications, blood draw, and to maintain line patency    Vital Signs Last 24 Hrs  T(C): 37.1 (03 Mar 2024 05:46), Max: 37.7 (02 Mar 2024 12:14)  T(F): 98.7 (03 Mar 2024 05:46), Max: 99.9 (02 Mar 2024 12:14)  HR: 99 (03 Mar 2024 09:19) (88 - 109)  BP: 97/60 (03 Mar 2024 05:46) (97/60 - 139/77)  BP(mean): --  RR: 18 (03 Mar 2024 05:46) (18 - 19)  SpO2: 100% (03 Mar 2024 09:19) (95% - 100%)    Parameters below as of 03 Mar 2024 05:46  Patient On (Oxygen Delivery Method): ventilator    I&O's Summary    02 Mar 2024 07:01  -  03 Mar 2024 07:00  --------------------------------------------------------  IN: 2620 mL / OUT: 0 mL / NET: 2620 mL    LABS                        5.4    15.11 )-----------( 293      ( 03 Mar 2024 11:12 )             20.7     03-03    152<H>  |  117<H>  |  68<H>  ----------------------------<  139<H>  4.5   |  23  |  1.56<H>    Ca    6.3<LL>      03 Mar 2024 11:12  Phos  4.7     03-03  Mg     2.0     03-03    TPro  5.1<L>  /  Alb  2.0<L>  /  TBili  0.2  /  DBili  x   /  AST  95<H>  /  ALT  76<H>  /  AlkPhos  89  03-03  PT/INR - ( 03 Mar 2024 11:12 )   PT: 15.8 sec;   INR: 1.45 ratio    PTT - ( 03 Mar 2024 11:12 )  PTT:29.7 sec    __________  Pierce Knight MD MICU Accept Note    Name: AMANDA WILKINS  MRN: [id number]    Transfer from: RCU  Transfer to: MICU    Accepting physician: Dr. 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 Prior to admission patient had Viral URI ( 1-2 weeks prior to admission) with subsequent weakness. He underwent an angiogram (2/11) which showed moderate stenosis and no intervention was done. MRI Performed c/w small bilateral cerebellar strokes and prior L thalamic strokes, as per Neurology was not c/w current presentation. 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 He is currently s/p 5 rounds of plasma exchange (2/13-2/20) and IVIG x 5 ( 2/21-2/26)  His hospital course was complicated by progressive respiratory failure. CT Chest performed on 2/20 with atelectasis of b/l lower lobes. BAL 2/21 Grew PSA treated with course of Zosyn (2/21-2/28).     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    RRT called on 3/3 for unresponsiveness. Patient with unreactive pupils, no corneal reflex. Also found to be hypotensive and febrile to 105. Noted to have new melena. Hgb 5. Given 1 unit of pRBC. Pending CTH, CTA head and neck, CTA Chest and A/P.      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     NEURO:  #AMS  Unresponsive with unreactive pupils and no corneal reflex on 3/3 for which an RRT was called. In setting of septic shock vs hemorrhagic shock.  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 Hg 5.4. Septic vs Hemorrhagic shock.   - continue Levophed, wean as tolerated  - Patient is a [employer]. Conditional approval for 1u pRBC per spouse  - f/u UA, BCx, RVP, MRSA PCR, procal  - f/u CT head, chest, abd/pel and CTA head    #HTN  Home meds: Atenolol/Chlorthiazide 50mg/25mg daily  - hold in setting of shock    #HLD  Home med: Atorvastatin 80mg daily    PULMONARY:  #Acute Hypoxic Respiratory Failure  Course complicated by progressive respiratory failure. CT Chest 2/20 with atelectasis of bilateral lower lobes.  - BAL 2/21 Grew PSA treated with course of Zosyn (2/21-2/28)  - s/p Tracheostomy #8 Cuffed Portex 2/16 (Dr. Sood)  - Chest PT and suctioning prn    GI:  #Melena  New melena on 3/3 with Hg drop to 5.4.   - [employer]. Spouse gave conditional approval for 1u pRBC  - f/u CT Abd/Pel  - GI consult  - monitor CBC    #S/p PEG 2/26 (Dr. Sood)  - was tolerating tube feeds at goal  - NPO for now    Diet: NPO    RENAL:  #Hypernatremia  Na 152 on 3/3.  - continue free water flushes 250mL q6h    ENDO:  #T2DM  Home med: Metformin 1g BID  - NPH 7u q6h  - MDSS    HEME/ONC/RHEUM:  #Anemia  #Mormon  Hg has been 7-8 this admission, however, new decrease to 5.4 with melena on 3.3.  - Melena/GI bleed management as above.    DVT ppx: SCDs only given concern for GI bleed    ID:  #Sepsis  AMS, febrile, and hypotensive on 3/3. In shock.  - BAL 2/21 Grew PSA treated with course of Zosyn (2/21-2/28)  - Prior BCx this admission have been negative  - f/u UA, BCx, RVP, MRSA PCR, procal  - f/u CT head, chest, abd/pel and CTA head  - Zosyn 3.375g q8h  - Vancomycin 1500mg q24h    SKIN/MSK: no active issues    ETHICS:  Full Code        PAST MEDICAL & SURGICAL HISTORY:  Hypertension  Diabetes  CVA (cerebral vascular accident)  x 2 with right side weakness and numbness  HTN (hypertension)  HLD (hyperlipidemia)  DM2 (diabetes mellitus, type 2)  CVA (cerebrovascular accident)    No significant past surgical history    MEDICATIONS  (STANDING):  acetaminophen   IVPB .. 1000 milliGRAM(s) IV Intermittent once  acetaminophen   IVPB .. 1000 milliGRAM(s) IV Intermittent once  albuterol/ipratropium for Nebulization 3 milliLiter(s) Nebulizer every 6 hours  aspirin  chewable 81 milliGRAM(s) Oral daily  atorvastatin 80 milliGRAM(s) Oral at bedtime  chlorhexidine 0.12% Liquid 15 milliLiter(s) Oral Mucosa every 12 hours  chlorhexidine 4% Liquid 1 Application(s) Topical daily  clopidogrel Tablet 75 milliGRAM(s) Enteral Tube daily  cyanocobalamin 1000 MICROGram(s) Oral daily  enoxaparin Injectable 30 milliGRAM(s) SubCutaneous <User Schedule>  ergocalciferol Drops 41307 Unit(s) Oral <User Schedule>  ferrous    sulfate Liquid 300 milliGRAM(s) Enteral Tube <User Schedule>  folic acid 1 milliGRAM(s) Oral daily  gabapentin Solution 400 milliGRAM(s) Oral three times a day  insulin lispro (ADMELOG) corrective regimen sliding scale   SubCutaneous every 6 hours  insulin NPH human recombinant 15 Unit(s) SubCutaneous every 6 hours  lactated ringers Bolus 1000 milliLiter(s) IV Bolus once  norepinephrine Infusion 0.05 MICROgram(s)/kG/Min (9.52 mL/Hr) IV Continuous <Continuous>  pantoprazole   Suspension 40 milliGRAM(s) Oral daily  petrolatum Ophthalmic Ointment 1 Application(s) Both EYES three times a day  piperacillin/tazobactam IVPB. 3.375 Gram(s) IV Intermittent once  piperacillin/tazobactam IVPB.- 3.375 Gram(s) IV Intermittent once  piperacillin/tazobactam IVPB.. 3.375 Gram(s) IV Intermittent every 8 hours  polyethylene glycol 3350 17 Gram(s) Oral daily  senna 1 Tablet(s) Oral at bedtime    MEDICATIONS  (PRN):  acetaminophen   Oral Liquid .. 650 milliGRAM(s) Oral every 6 hours PRN Mild Pain (1 - 3)  dextrose Oral Gel 15 Gram(s) Oral once PRN Blood Glucose LESS THAN 70 milliGRAM(s)/deciliter  sodium chloride 0.9% lock flush 10 milliLiter(s) IV Push every 1 hour PRN Pre/post blood products, medications, blood draw, and to maintain line patency    Vital Signs Last 24 Hrs  T(C): 37.1 (03 Mar 2024 05:46), Max: 37.7 (02 Mar 2024 12:14)  T(F): 98.7 (03 Mar 2024 05:46), Max: 99.9 (02 Mar 2024 12:14)  HR: 99 (03 Mar 2024 09:19) (88 - 109)  BP: 97/60 (03 Mar 2024 05:46) (97/60 - 139/77)  BP(mean): --  RR: 18 (03 Mar 2024 05:46) (18 - 19)  SpO2: 100% (03 Mar 2024 09:19) (95% - 100%)    Parameters below as of 03 Mar 2024 05:46  Patient On (Oxygen Delivery Method): ventilator    I&O's Summary    02 Mar 2024 07:01  -  03 Mar 2024 07:00  --------------------------------------------------------  IN: 2620 mL / OUT: 0 mL / NET: 2620 mL    LABS                        5.4    15.11 )-----------( 293      ( 03 Mar 2024 11:12 )             20.7     03-03    152<H>  |  117<H>  |  68<H>  ----------------------------<  139<H>  4.5   |  23  |  1.56<H>    Ca    6.3<LL>      03 Mar 2024 11:12  Phos  4.7     03-03  Mg     2.0     03-03    TPro  5.1<L>  /  Alb  2.0<L>  /  TBili  0.2  /  DBili  x   /  AST  95<H>  /  ALT  76<H>  /  AlkPhos  89  03-03  PT/INR - ( 03 Mar 2024 11:12 )   PT: 15.8 sec;   INR: 1.45 ratio    PTT - ( 03 Mar 2024 11:12 )  PTT:29.7 sec    __________  Pierce Knight MD MICU Accept Note    Name: AMANDA WILKINS  MRN: [id number]    Transfer from: RCU  Transfer to: MICU    Accepting physician: Dr. 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 Prior to admission patient had Viral URI ( 1-2 weeks prior to admission) with subsequent weakness. He underwent an angiogram (2/11) which showed moderate stenosis and no intervention was done. MRI Performed c/w small bilateral cerebellar strokes and prior L thalamic strokes, as per Neurology was not c/w current presentation. 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 He is currently s/p 5 rounds of plasma exchange (2/13-2/20) and IVIG x 5 ( 2/21-2/26)  His hospital course was complicated by progressive respiratory failure. CT Chest performed on 2/20 with atelectasis of b/l lower lobes. BAL 2/21 Grew PSA treated with course of Zosyn (2/21-2/28).     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    RRT called on 3/3 for unresponsiveness. Patient with unreactive pupils, no corneal reflex. Also found to be hypotensive and febrile to 105. Noted to have new melena. Hgb 5. Given 1 unit of pRBC. Pending CTH, CTA head and neck, CTA Chest and A/P.      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     NEURO:  #AMS  Unresponsive with unreactive pupils and no corneal reflex on 3/3 for which an RRT was called. In setting of septic shock vs hemorrhagic shock.  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 Hg 5.4. Septic vs Hemorrhagic shock.   - continue Levophed, wean as tolerated  - Patient is a [employer]. Conditional approval for 1u pRBC per spouse  - f/u UA, BCx, RVP, MRSA PCR, procal  - f/u CT head, chest, abd/pel and CTA head    #HTN  Home meds: Atenolol/Chlorthiazide 50mg/25mg daily  - hold in setting of shock    #HLD  Home med: Atorvastatin 80mg daily    PULMONARY:  #Acute Hypoxic Respiratory Failure  Course complicated by progressive respiratory failure. CT Chest 2/20 with atelectasis of bilateral lower lobes.  - BAL 2/21 Grew PSA treated with course of Zosyn (2/21-2/28)  - s/p Tracheostomy #8 Cuffed Portex 2/16 (Dr. Sood)  - Chest PT and suctioning prn    GI:  #Melena  New melena on 3/3 with Hg drop to 5.4.   - [employer]. Spouse gave conditional approval for 1u pRBC  - f/u CT Abd/Pel  - GI consult  - monitor CBC    #S/p PEG 2/26 (Dr. Sood)  - was tolerating tube feeds at goal  - NPO for now    Diet: NPO    RENAL:  #Hypernatremia  Na 152 on 3/3.  - D5W 100mL/hr  - DDAVP    #BALTA  Cr 1.67 on 3/3. Cr 0.7-0.8 this admission.  - IVF as above  - UA and urine lytes    ENDO:  #T2DM  Home med: Metformin 1g BID  - NPH 7u q6h  - MDSS    HEME/ONC/RHEUM:  #Anemia  #Evangelical  Hg has been 7-8 this admission, however, new decrease to 5.4 with melena on 3.3.  - Melena/GI bleed management as above.    DVT ppx: SCDs only given concern for GI bleed    ID:  #Sepsis  AMS, febrile, and hypotensive on 3/3. In shock.  - BAL 2/21 Grew PSA treated with course of Zosyn (2/21-2/28)  - Prior BCx this admission have been negative  - f/u UA, BCx, RVP, MRSA PCR, procal  - f/u CT head, chest, abd/pel and CTA head  - Meropenem 2g q8h  - Vancomycin 1500mg q24h    SKIN/MSK: no active issues    ETHICS:  Full Code        PAST MEDICAL & SURGICAL HISTORY:  Hypertension  Diabetes  CVA (cerebral vascular accident)  x 2 with right side weakness and numbness  HTN (hypertension)  HLD (hyperlipidemia)  DM2 (diabetes mellitus, type 2)  CVA (cerebrovascular accident)    No significant past surgical history    MEDICATIONS  (STANDING):  acetaminophen   IVPB .. 1000 milliGRAM(s) IV Intermittent once  acetaminophen   IVPB .. 1000 milliGRAM(s) IV Intermittent once  albuterol/ipratropium for Nebulization 3 milliLiter(s) Nebulizer every 6 hours  aspirin  chewable 81 milliGRAM(s) Oral daily  atorvastatin 80 milliGRAM(s) Oral at bedtime  chlorhexidine 0.12% Liquid 15 milliLiter(s) Oral Mucosa every 12 hours  chlorhexidine 4% Liquid 1 Application(s) Topical daily  clopidogrel Tablet 75 milliGRAM(s) Enteral Tube daily  cyanocobalamin 1000 MICROGram(s) Oral daily  enoxaparin Injectable 30 milliGRAM(s) SubCutaneous <User Schedule>  ergocalciferol Drops 39372 Unit(s) Oral <User Schedule>  ferrous    sulfate Liquid 300 milliGRAM(s) Enteral Tube <User Schedule>  folic acid 1 milliGRAM(s) Oral daily  gabapentin Solution 400 milliGRAM(s) Oral three times a day  insulin lispro (ADMELOG) corrective regimen sliding scale   SubCutaneous every 6 hours  insulin NPH human recombinant 15 Unit(s) SubCutaneous every 6 hours  lactated ringers Bolus 1000 milliLiter(s) IV Bolus once  norepinephrine Infusion 0.05 MICROgram(s)/kG/Min (9.52 mL/Hr) IV Continuous <Continuous>  pantoprazole   Suspension 40 milliGRAM(s) Oral daily  petrolatum Ophthalmic Ointment 1 Application(s) Both EYES three times a day  piperacillin/tazobactam IVPB. 3.375 Gram(s) IV Intermittent once  piperacillin/tazobactam IVPB.- 3.375 Gram(s) IV Intermittent once  piperacillin/tazobactam IVPB.. 3.375 Gram(s) IV Intermittent every 8 hours  polyethylene glycol 3350 17 Gram(s) Oral daily  senna 1 Tablet(s) Oral at bedtime    MEDICATIONS  (PRN):  acetaminophen   Oral Liquid .. 650 milliGRAM(s) Oral every 6 hours PRN Mild Pain (1 - 3)  dextrose Oral Gel 15 Gram(s) Oral once PRN Blood Glucose LESS THAN 70 milliGRAM(s)/deciliter  sodium chloride 0.9% lock flush 10 milliLiter(s) IV Push every 1 hour PRN Pre/post blood products, medications, blood draw, and to maintain line patency    Vital Signs Last 24 Hrs  T(C): 37.1 (03 Mar 2024 05:46), Max: 37.7 (02 Mar 2024 12:14)  T(F): 98.7 (03 Mar 2024 05:46), Max: 99.9 (02 Mar 2024 12:14)  HR: 99 (03 Mar 2024 09:19) (88 - 109)  BP: 97/60 (03 Mar 2024 05:46) (97/60 - 139/77)  BP(mean): --  RR: 18 (03 Mar 2024 05:46) (18 - 19)  SpO2: 100% (03 Mar 2024 09:19) (95% - 100%)    Parameters below as of 03 Mar 2024 05:46  Patient On (Oxygen Delivery Method): ventilator    I&O's Summary    02 Mar 2024 07:01  -  03 Mar 2024 07:00  --------------------------------------------------------  IN: 2620 mL / OUT: 0 mL / NET: 2620 mL    LABS                        5.4    15.11 )-----------( 293      ( 03 Mar 2024 11:12 )             20.7     03-03    152<H>  |  117<H>  |  68<H>  ----------------------------<  139<H>  4.5   |  23  |  1.56<H>    Ca    6.3<LL>      03 Mar 2024 11:12  Phos  4.7     03-03  Mg     2.0     03-03    TPro  5.1<L>  /  Alb  2.0<L>  /  TBili  0.2  /  DBili  x   /  AST  95<H>  /  ALT  76<H>  /  AlkPhos  89  03-03  PT/INR - ( 03 Mar 2024 11:12 )   PT: 15.8 sec;   INR: 1.45 ratio    PTT - ( 03 Mar 2024 11:12 )  PTT:29.7 sec    __________  Pierce Knight MD MICU Accept Note    Name: AMANDA WILKINS  MRN: [id number]    Transfer from: RCU  Transfer to: MICU    Accepting physician: Dr. 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 Prior to admission patient had Viral URI ( 1-2 weeks prior to admission) with subsequent weakness. He underwent an angiogram (2/11) which showed moderate stenosis and no intervention was done. MRI Performed c/w small bilateral cerebellar strokes and prior L thalamic strokes, as per Neurology was not c/w current presentation. 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 He is currently s/p 5 rounds of plasma exchange (2/13-2/20) and IVIG x 5 ( 2/21-2/26)  His hospital course was complicated by progressive respiratory failure. CT Chest performed on 2/20 with atelectasis of b/l lower lobes. BAL 2/21 Grew PSA treated with course of Zosyn (2/21-2/28).     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    RRT called on 3/3 for unresponsiveness. Patient with unreactive pupils, no corneal reflex. Also found to be hypotensive and febrile to 105. Noted to have new melena. Hgb 5. Given 1 unit of pRBC. Pending CTH, CTA head and neck, CTA Chest and A/P.      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     NEURO:  #AMS  Unresponsive with unreactive pupils and no corneal reflex on 3/3 for which an RRT was called. In setting of septic shock vs hemorrhagic shock.  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 Hg 5.4. Septic vs Hemorrhagic shock.   - continue Levophed, wean as tolerated  - Patient is a [employer]. Conditional approval for 1u pRBC per spouse  - f/u UA, BCx, RVP, MRSA PCR, procal  - f/u CT head, chest, abd/pel and CTA head    #HTN  Home meds: Atenolol/Chlorthiazide 50mg/25mg daily  - hold in setting of shock    #HLD  Home med: Atorvastatin 80mg daily    PULMONARY:  #Acute Hypoxic Respiratory Failure  Course complicated by progressive respiratory failure. CT Chest 2/20 with atelectasis of bilateral lower lobes.  - BAL 2/21 Grew PSA treated with course of Zosyn (2/21-2/28)  - s/p Tracheostomy #8 Cuffed Portex 2/16 (Dr. Sood)  - Chest PT and suctioning prn    GI:  #Melena  New melena on 3/3 with Hg drop to 5.4.   - Baptism. Spouse gave conditional approval for 1u pRBC  - f/u CT Abd/Pel  - GI consult  - monitor CBC    #S/p PEG 2/26 (Dr. Sood)  - was tolerating tube feeds at goal  - NPO for now    Diet: NPO    RENAL:  #Hypernatremia  Na 152 on 3/3.  - D5W 100mL/hr  - DDAVP    #BALTA  Cr 1.67 on 3/3. Cr 0.7-0.8 this admission.  - IVF as above  - UA and urine lytes    ENDO:  #T2DM  Home med: Metformin 1g BID  - NPH 7u q6h  - MDSS    HEME/ONC/RHEUM:  #Anemia  #Baptism  Hg has been 7-8 this admission, however, new decrease to 5.4 with melena on 3.3.  - Melena/GI bleed management as above.    DVT ppx: SCDs only given concern for GI bleed    ID:  #Sepsis  AMS, febrile, and hypotensive on 3/3. In shock.  - BAL 2/21 Grew PSA treated with course of Zosyn (2/21-2/28)  - Prior BCx this admission have been negative  - f/u UA, BCx, RVP, MRSA PCR, procal  - f/u CT head, chest, abd/pel and CTA head  - Meropenem 2g q8h  - Vancomycin 1500mg q24h    SKIN/MSK: no active issues    ETHICS:  Full Code        PAST MEDICAL & SURGICAL HISTORY:  Hypertension  Diabetes  CVA (cerebral vascular accident)  x 2 with right side weakness and numbness  HTN (hypertension)  HLD (hyperlipidemia)  DM2 (diabetes mellitus, type 2)  CVA (cerebrovascular accident)    No significant past surgical history    MEDICATIONS  (STANDING):  acetaminophen   IVPB .. 1000 milliGRAM(s) IV Intermittent once  acetaminophen   IVPB .. 1000 milliGRAM(s) IV Intermittent once  albuterol/ipratropium for Nebulization 3 milliLiter(s) Nebulizer every 6 hours  aspirin  chewable 81 milliGRAM(s) Oral daily  atorvastatin 80 milliGRAM(s) Oral at bedtime  chlorhexidine 0.12% Liquid 15 milliLiter(s) Oral Mucosa every 12 hours  chlorhexidine 4% Liquid 1 Application(s) Topical daily  clopidogrel Tablet 75 milliGRAM(s) Enteral Tube daily  cyanocobalamin 1000 MICROGram(s) Oral daily  enoxaparin Injectable 30 milliGRAM(s) SubCutaneous <User Schedule>  ergocalciferol Drops 90982 Unit(s) Oral <User Schedule>  ferrous    sulfate Liquid 300 milliGRAM(s) Enteral Tube <User Schedule>  folic acid 1 milliGRAM(s) Oral daily  gabapentin Solution 400 milliGRAM(s) Oral three times a day  insulin lispro (ADMELOG) corrective regimen sliding scale   SubCutaneous every 6 hours  insulin NPH human recombinant 15 Unit(s) SubCutaneous every 6 hours  lactated ringers Bolus 1000 milliLiter(s) IV Bolus once  norepinephrine Infusion 0.05 MICROgram(s)/kG/Min (9.52 mL/Hr) IV Continuous <Continuous>  pantoprazole   Suspension 40 milliGRAM(s) Oral daily  petrolatum Ophthalmic Ointment 1 Application(s) Both EYES three times a day  piperacillin/tazobactam IVPB. 3.375 Gram(s) IV Intermittent once  piperacillin/tazobactam IVPB.- 3.375 Gram(s) IV Intermittent once  piperacillin/tazobactam IVPB.. 3.375 Gram(s) IV Intermittent every 8 hours  polyethylene glycol 3350 17 Gram(s) Oral daily  senna 1 Tablet(s) Oral at bedtime    MEDICATIONS  (PRN):  acetaminophen   Oral Liquid .. 650 milliGRAM(s) Oral every 6 hours PRN Mild Pain (1 - 3)  dextrose Oral Gel 15 Gram(s) Oral once PRN Blood Glucose LESS THAN 70 milliGRAM(s)/deciliter  sodium chloride 0.9% lock flush 10 milliLiter(s) IV Push every 1 hour PRN Pre/post blood products, medications, blood draw, and to maintain line patency    Vital Signs Last 24 Hrs  T(C): 37.1 (03 Mar 2024 05:46), Max: 37.7 (02 Mar 2024 12:14)  T(F): 98.7 (03 Mar 2024 05:46), Max: 99.9 (02 Mar 2024 12:14)  HR: 99 (03 Mar 2024 09:19) (88 - 109)  BP: 97/60 (03 Mar 2024 05:46) (97/60 - 139/77)  BP(mean): --  RR: 18 (03 Mar 2024 05:46) (18 - 19)  SpO2: 100% (03 Mar 2024 09:19) (95% - 100%)    Parameters below as of 03 Mar 2024 05:46  Patient On (Oxygen Delivery Method): ventilator    I&O's Summary    02 Mar 2024 07:01  -  03 Mar 2024 07:00  --------------------------------------------------------  IN: 2620 mL / OUT: 0 mL / NET: 2620 mL    LABS                        5.4    15.11 )-----------( 293      ( 03 Mar 2024 11:12 )             20.7     03-03    152<H>  |  117<H>  |  68<H>  ----------------------------<  139<H>  4.5   |  23  |  1.56<H>    Ca    6.3<LL>      03 Mar 2024 11:12  Phos  4.7     03-03  Mg     2.0     03-03    TPro  5.1<L>  /  Alb  2.0<L>  /  TBili  0.2  /  DBili  x   /  AST  95<H>  /  ALT  76<H>  /  AlkPhos  89  03-03  PT/INR - ( 03 Mar 2024 11:12 )   PT: 15.8 sec;   INR: 1.45 ratio    PTT - ( 03 Mar 2024 11:12 )  PTT:29.7 sec    __________  Pierce Knight MD      ATTENDING ATTESTATION:  72 yo M w/ HTN, HLD, T2DM prior CVA's without residual deficits who initially presented with concern for stroke-like symptoms s/p unrevealing angiogram and ultimately found to have clinical picture most concerning for Ding Parham Variant of GBS. S/p trach/peg 2/9/24. Developed several episodes of melena 3/3/24, febrile to 105.4, hypotensive and obtunded.    #Acute UGIB  - several episodes melena this AM  - PPI BID  - s/p 1u PRBC, 2u PRBC transfusing (patient is [employer] but family is agreeable to transfusion under the circumstances)  - DDAVP for uremia  - pending post transfusion CBC may transfuse platelets given DAPT  - Abd CT prelim read with PV gas of unclear etiology  - GI consulted  #Shock, presumed septic  - febrile to 105.4, pancultured  - respiratory vs abdominal source, ischemic colitis/ulcer?  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   - given obtundation today s/p Head CT w/o e/o Acute ICH nor ischemic event  #Mixed respiratory failure s/p tracheostomy  - has been on PRVC 20/500/7/40%. Does not triggering breaths on PS trial. Unlikely to be able to be weaned from the ventilator.   - c/w Duonebs q6h, would add Hypersal 3% q6h given mucous impaction on CT Chest  #DVT ppx - SCDs given GIB  #Ethics - full code    Nilda Geiger MD, MSCR MICU Accept Note    Name: AMANDA WILKINS  MRN: [id number]    Transfer from: RCU  Transfer to: MICU    Accepting physician: Dr. 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 Prior to admission patient had Viral URI ( 1-2 weeks prior to admission) with subsequent weakness. He underwent an angiogram (2/11) which showed moderate stenosis and no intervention was done. MRI Performed c/w small bilateral cerebellar strokes and prior L thalamic strokes, as per Neurology was not c/w current presentation. 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 He is currently s/p 5 rounds of plasma exchange (2/13-2/20) and IVIG x 5 ( 2/21-2/26)  His hospital course was complicated by progressive respiratory failure. CT Chest performed on 2/20 with atelectasis of b/l lower lobes. BAL 2/21 Grew PSA treated with course of Zosyn (2/21-2/28).     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    RRT called on 3/3 for unresponsiveness. Patient with unreactive pupils, no corneal reflex. Also found to be hypotensive and febrile to 105. Noted to have new melena. Hgb 5. Given 1 unit of pRBC. Pending CTH, CTA head and neck, CTA Chest and A/P.      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     NEURO:  #AMS  Unresponsive with unreactive pupils and no corneal reflex on 3/3 for which an RRT was called. In setting of septic shock vs hemorrhagic shock.  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 Hg 5.4. Septic vs Hemorrhagic shock.   - continue Levophed, wean as tolerated  - Patient is a [employer]. Conditional approval for 1u pRBC per spouse  - f/u UA, BCx, RVP, MRSA PCR, procal  - f/u CT head, chest, abd/pel and CTA head    #HTN  Home meds: Atenolol/Chlorthiazide 50mg/25mg daily  - hold in setting of shock    #HLD  Home med: Atorvastatin 80mg daily    PULMONARY:  #Acute Hypoxic Respiratory Failure  Course complicated by progressive respiratory failure. CT Chest 2/20 with atelectasis of bilateral lower lobes.  - BAL 2/21 Grew PSA treated with course of Zosyn (2/21-2/28)  - s/p Tracheostomy #8 Cuffed Portex 2/16 (Dr. Sood)  - Chest PT and suctioning prn    GI:  #Melena  New melena on 3/3 with Hg drop to 5.4.   - [employer]. Spouse gave conditional approval for 1u pRBC  - f/u CT Abd/Pel  - GI consult  - monitor CBC    #S/p PEG 2/26 (Dr. Sood)  - was tolerating tube feeds at goal  - NPO for now    Diet: NPO    RENAL:  #Hypernatremia  Na 152 on 3/3.  - D5W 100mL/hr  - DDAVP    #BALTA  Cr 1.67 on 3/3. Cr 0.7-0.8 this admission.  - IVF as above  - UA and urine lytes    ENDO:  #T2DM  Home med: Metformin 1g BID  - NPH 7u q6h  - MDSS    HEME/ONC/RHEUM:  #Anemia  #Congregation  Hg has been 7-8 this admission, however, new decrease to 5.4 with melena on 3.3.  - Melena/GI bleed management as above.    DVT ppx: SCDs only given concern for GI bleed    ID:  #Sepsis  AMS, febrile, and hypotensive on 3/3. In shock.  - BAL 2/21 Grew PSA treated with course of Zosyn (2/21-2/28)  - Prior BCx this admission have been negative  - f/u UA, BCx, RVP, MRSA PCR, procal  - f/u CT head, chest, abd/pel and CTA head  - Meropenem 2g q8h  - Vancomycin 1500mg q24h    SKIN/MSK: no active issues    ETHICS:  Full Code        PAST MEDICAL & SURGICAL HISTORY:  Hypertension  Diabetes  CVA (cerebral vascular accident)  x 2 with right side weakness and numbness  HTN (hypertension)  HLD (hyperlipidemia)  DM2 (diabetes mellitus, type 2)  CVA (cerebrovascular accident)    No significant past surgical history    MEDICATIONS  (STANDING):  acetaminophen   IVPB .. 1000 milliGRAM(s) IV Intermittent once  acetaminophen   IVPB .. 1000 milliGRAM(s) IV Intermittent once  albuterol/ipratropium for Nebulization 3 milliLiter(s) Nebulizer every 6 hours  aspirin  chewable 81 milliGRAM(s) Oral daily  atorvastatin 80 milliGRAM(s) Oral at bedtime  chlorhexidine 0.12% Liquid 15 milliLiter(s) Oral Mucosa every 12 hours  chlorhexidine 4% Liquid 1 Application(s) Topical daily  clopidogrel Tablet 75 milliGRAM(s) Enteral Tube daily  cyanocobalamin 1000 MICROGram(s) Oral daily  enoxaparin Injectable 30 milliGRAM(s) SubCutaneous <User Schedule>  ergocalciferol Drops 55334 Unit(s) Oral <User Schedule>  ferrous    sulfate Liquid 300 milliGRAM(s) Enteral Tube <User Schedule>  folic acid 1 milliGRAM(s) Oral daily  gabapentin Solution 400 milliGRAM(s) Oral three times a day  insulin lispro (ADMELOG) corrective regimen sliding scale   SubCutaneous every 6 hours  insulin NPH human recombinant 15 Unit(s) SubCutaneous every 6 hours  lactated ringers Bolus 1000 milliLiter(s) IV Bolus once  norepinephrine Infusion 0.05 MICROgram(s)/kG/Min (9.52 mL/Hr) IV Continuous <Continuous>  pantoprazole   Suspension 40 milliGRAM(s) Oral daily  petrolatum Ophthalmic Ointment 1 Application(s) Both EYES three times a day  piperacillin/tazobactam IVPB. 3.375 Gram(s) IV Intermittent once  piperacillin/tazobactam IVPB.- 3.375 Gram(s) IV Intermittent once  piperacillin/tazobactam IVPB.. 3.375 Gram(s) IV Intermittent every 8 hours  polyethylene glycol 3350 17 Gram(s) Oral daily  senna 1 Tablet(s) Oral at bedtime    MEDICATIONS  (PRN):  acetaminophen   Oral Liquid .. 650 milliGRAM(s) Oral every 6 hours PRN Mild Pain (1 - 3)  dextrose Oral Gel 15 Gram(s) Oral once PRN Blood Glucose LESS THAN 70 milliGRAM(s)/deciliter  sodium chloride 0.9% lock flush 10 milliLiter(s) IV Push every 1 hour PRN Pre/post blood products, medications, blood draw, and to maintain line patency    Vital Signs Last 24 Hrs  T(C): 37.1 (03 Mar 2024 05:46), Max: 37.7 (02 Mar 2024 12:14)  T(F): 98.7 (03 Mar 2024 05:46), Max: 99.9 (02 Mar 2024 12:14)  HR: 99 (03 Mar 2024 09:19) (88 - 109)  BP: 97/60 (03 Mar 2024 05:46) (97/60 - 139/77)  BP(mean): --  RR: 18 (03 Mar 2024 05:46) (18 - 19)  SpO2: 100% (03 Mar 2024 09:19) (95% - 100%)    Parameters below as of 03 Mar 2024 05:46  Patient On (Oxygen Delivery Method): ventilator    I&O's Summary    02 Mar 2024 07:01  -  03 Mar 2024 07:00  --------------------------------------------------------  IN: 2620 mL / OUT: 0 mL / NET: 2620 mL    LABS                        5.4    15.11 )-----------( 293      ( 03 Mar 2024 11:12 )             20.7     03-03    152<H>  |  117<H>  |  68<H>  ----------------------------<  139<H>  4.5   |  23  |  1.56<H>    Ca    6.3<LL>      03 Mar 2024 11:12  Phos  4.7     03-03  Mg     2.0     03-03    TPro  5.1<L>  /  Alb  2.0<L>  /  TBili  0.2  /  DBili  x   /  AST  95<H>  /  ALT  76<H>  /  AlkPhos  89  03-03  PT/INR - ( 03 Mar 2024 11:12 )   PT: 15.8 sec;   INR: 1.45 ratio    PTT - ( 03 Mar 2024 11:12 )  PTT:29.7 sec    __________  Pierce Knight MD      ATTENDING ATTESTATION:  74 yo M w/ HTN, HLD, T2DM prior CVA's without residual deficits who initially presented with concern for stroke-like symptoms s/p unrevealing angiogram and ultimately found to have clinical picture most concerning for Ding Parham Variant of GBS. S/p trach/peg 2/9/24. Developed several episodes of melena 3/3/24, febrile to 105.4, hypotensive and obtunded. Bedside POCUS, hyperdynamic LV, RV fcn intact with RV<LV, IVC not visualized d/t intervening bowel gas.    #Acute UGIB  - several episodes melena this AM  - PPI BID  - s/p 1u PRBC, 2u PRBC transfusing (patient is [employer] but family is agreeable to transfusion under the circumstances)  - DDAVP for uremia  - pending post transfusion CBC may transfuse platelets given DAPT  - Abd CT prelim read with PV gas of unclear etiology, f/u final read  - GI consulted  #Shock, presumed septic  - febrile to 105.4, pancultured  - IVF bolus 500cc LR  - respiratory vs abdominal source, ischemic colitis/ulcer?  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   - given obtundation today s/p Head CT w/o e/o Acute ICH nor ischemic event  #Mixed respiratory failure s/p tracheostomy  - has been on PRVC 20/500/7/40%. Does not triggering breaths on PS trial. Unlikely to be able to be weaned from the ventilator.   - c/w Duonebs q6h, would add Hypersal 3% q6h given mucous impaction on CT Chest  #DVT ppx - SCDs given GIB  #Ethics - full code    Nilda Geiger MD, MSCR

## 2024-03-03 NOTE — STROKE CODE NOTE - NSRESATTESTFELSPV_ED_ALL_CORE_FT
INITIAL OFFICE VISIT      Harley Kyle Jr.  2024             Visit Vitals  Ht 22.75\" (57.8 cm)   Wt 4.182 kg (9 lb 3.5 oz)   HC 38 cm (14.96\")   BMI 12.52 kg/m²     Weight: 9.22 lbs        YOUR BABY AT BIRTH TO 2 WEEKS OF AGE    Key points at this age…     Breast milk is the best nutrition.    Car seats save lives--make sure to install and use them correctly!    “Back to sleep” is recommended--it’s the safest sleeping position for your young infant!    FEEDING: Breast milk is the best food for Harley at this age; it is beneficial in many ways to both babies and moms! It can be challenging early on, so ask for help from your doctors and the lactation specialists at the hospital. Breastfeeding moms should make sure their doctors know about any medications they are taking.     If you are bottle feeding, make sure to prepare formula exactly as directed (standard formula preparation is one scoop of formula in 2 ounces of water). You should hold your baby upright (not lying down) while feeding and never prop the bottle so that it stays in your baby’s mouth without you holding it (this can cause dangerous choking episodes as well as ear infections).     ABOUT VITAMIN D: All babies ( or formula fed) need extra vitamin D. Vitamin D is very important for bone health (especially preventing rickets) as well as other important health aspects. Breast milk is the “perfect” food except that it does not contain enough vitamin D for your baby. Formula has some vitamin D in it, but your baby needs extra until they are taking ~32-33 ounces of formula daily. We recommend 400 IU of vitamin D once daily. Vitamin D drops are available in two forms-- a concentrated drop which provides the full dose in one DROP or a liquid product which is dosed at one ml (or one cc-- the syringes provided with the drops are labeled with the correct dose). Make sure to follow the instructions on the bottle exactly for 
proper dosing! A  baby should continue this for as long as they are primarily breastfeeding; by the time a formula-fed baby goes through one bottle of vitamin D drops they are likely getting enough vitamin D in their formula.     BEHAVIOR & CRYING: Touch and hold and carry your baby as often as you can. Cuddling, caressing, talking and singing to your baby makes them feel secure and loved. You will not spoil them when they are this young! Be sure to focus on them when you are feeding them; this is an important time for bonding and social development (don't be distracted by your cell phone or computer-- focus on your baby!). It’s also important to remember that other children in the house may be jealous of the new baby, and sometimes they may act out to get your attention. Try to set aside short periods of time every day devoted exclusively to your older child (or children). Also, try to find simple ways that they can help care for the baby-- this will help them connect better.      CAR SAFETY:  Your baby should be secured in a rear-facing infant car safety seat whenever riding in the car for at least the first year of life.This will protect your baby in case of an accident, plus it is the law. When choosing a car seat, you can check on any safety concerns for a particular model at www.safercar.gov. You can also search for local inspection stations on that site. (Or at www.safekidswi.org.) Correct installation of the car seat is critical (at least 7 out of 10 car seats are estimated to be incorrectly installed or used!). A certified car seat  can ensure your baby is as safely secured as possible. You should also register your car seat with the , so that you will be notified of any future problems or recalls with that seat. The straps need to be very snug to protect your baby in case of an accident (so no thick coats or snowsuits while riding in the car during the winter!). Never leave a 
child alone in a car, even for a very short time.      SAFE SLEEPING: The safest place for a  to sleep is in their own crib in their parent’s room; this is recommended until at least 6 months of age. They should always be placed on their back to sleep (not on their tummy or their side). This “back to sleep” position decreases the risk of crib death (SIDS). Avoid loose, soft bedding (blankets, comforters, sheepskins, quilts, pillows, pillow-like bumper pads) and soft toys in the baby’s crib because they are a risk for suffocation (and SIDS). “Sleep sacks” and swaddling with thin blankets are okay. Cribs (including Pack-n-Plays) should be certified by [facility] (a safety agency for baby products). Safety criteria for cribs include: slats or bars no more than 2-3/8 inches (6cm) apart, a snug-fitting mattress, and a distance of no less than 26 inches from the mattress to the top rail. Sleeping in or with other devices (car seats, swings, bed sharing devices, cushions) are not recommended; likewise, sleeping on sofas or in armchairs is very dangerous for small infants.     TUMMY TIME: It is safe to start “tummy time” as soon as you bring your baby home. (Laying on your chest or across your lap counts!) It should always be done when your baby is awake. Start with a few minutes at a time, and increase it as your baby grows older. (Never leave your baby alone on their tummy.) Tummy time strengthens their neck and shoulder muscles and helps with their development. (It also prevents lopsided or flat heads that come from lying in the same position on their back all the time.)    UMBILICAL CORD CARE: The umbilical cord stump and the area around it should be left completely dry until the cord  stump falls off. (Washing it or using alcohol wipes will just delay how long it takes to fall off.) Keep it open to air as much as possible. Give your baby sponge baths early on; don’t put them in a tub with their belly button underwater 
until the cord has fallen off and the base of it appears dry. A tiny bit of oozing blood when the cord falls off is normal.     OTHER SAFETY GUIDELINES:  Burns:  Adjust your hot-water heater to 120-125°F or use the “low” setting. This will decrease the risk of scald burns (and save money on your utility bills!).     Smoke and carbon monoxide detectors: Make sure that detectors are on each level of your home, especially near sleeping areas. Check the batteries regularly and replace them at least twice a year. Discuss a “fire escape plan” with all family members.     No smoking! Exposure to tobacco smoke puts children at risk for lots of problems (asthma and other severe breathing problems, crib death [SIDS], ear infections and even behavior and learning problems). If you smoke, this is the time to talk to your doctor about quitting. If you can’t quit, keep all smoking (including e-cigarettes and vaping) outside the home (not just in another room), and all smokers should change their clothes and wash their hands before handling the baby.     Tap water is safe! Tap water is safe for formula preparation. News reports in 2016 raised concerns about a risk of lead in the [place] city water supply. This water supply is very safe--there is no lead in the water that leaves the local water treatment center. In some older homes (built in 1951), there may be lead in the service pipe lines (which carry water from the main water pipe to individual homes). When water sits in these pipes for prolonged periods, some lead may dissolve into the water. As a precaution, the [facility] recommends a water filter at the tap of homes which have lead service lines. If you are not sure when your home was built, do an internet search for \"[place] lead awareness and drinking water safety\". From this web page, you can search for your address to find out if your home was built with lead service lines. There are also helpful hints 
regarding water safety on this site. Another option is to call the Customer Service line at (038) 583-9100. If it would make you feel better, you could also get a filter for your faucet or tap. This would help save you money (and the environment--which is going to be important to your baby's future!).     OTHER  ISSUES:   Body temperature: A rectal temperature is most accurate way to check for fever in this age group. Normal rectal temperatures range between 97.9 and 100.3°F. A fever is defined as a rectal temperature of 100.4°F or higher. Call the doctor or have Harley seen if they have a fever. In a baby this young, a fever needs to be evaluated. Do not give Tylenol or ibuprofen at this age.     Bowel movements:  Once your baby is feeding well, how often they poop may vary from once every feeding to once every three to four days ( babies often poop less than once a day).  It is normal for babies to strain and turn a little red in the face with bowel movements, as long as the stool they pass is soft.  If you feel Harley is very uncomfortable, call the clinic. Blood in the stool may be normal early on in nursing infants, but you should call us to discuss it if you do see any blood.    Most Recent Immunizations   Administered Date(s) Administered    Hep B, adolescent or pediatric 2024          Follow up visits: After the  period, we usually recommend your baby be seen at 2 weeks of age, and then at two months of age. Of course, we will see you anytime for any concerns and to follow up on any problems.     Thank you for entrusting your care to [facility]!    Also, check out “Children’s Health” on the [facility] Blog for updates on timely topics regarding children’s health!      
Dr. Verenice Calvert
Dr. Smith

## 2024-03-03 NOTE — STROKE CODE NOTE - NIH STROKE SCALE: 10. DYSARTHRIA, QM
(UN) Intubated or other physical barrier
(1) Mild-to-moderate dysarthria; patient slurs at least some words and, at worst, can be understood with some difficulty

## 2024-03-03 NOTE — STROKE CODE NOTE - NIH STROKE SCALE: 8. SENSORY, QM
(0) Normal; no sensory loss
(2) Severe to total sensory loss; patient is not aware of being touched in the face, arm, and leg

## 2024-03-03 NOTE — STROKE CODE NOTE - NIH STROKE SCALE: 1A. LEVEL OF CONSCIOUSNESS, QM
(0) Alert; keenly responsive
(3) Responds only with reflex motor or autonomic effects or totally unresponsive, flaccid, and areflexic

## 2024-03-03 NOTE — PROGRESS NOTE ADULT - PROBLEM SELECTOR PLAN 7
- Patient Jainism  - Family would like to be asked prior to administration of blood products   - S/p 1 unit PRBCs on 2/22  - S/p Venofer ( 2/22-2/27)   - Cont Cyanocobalamine, Folic acid, Ferrous Sulfate  - S/p Epogen x 5 days ( Ended 2/27)  - Will limit phlebotomy ( q 48 hrs) to prevent anemia

## 2024-03-03 NOTE — PROGRESS NOTE ADULT - SUBJECTIVE AND OBJECTIVE BOX
Neurology Progress Note    SUBJECTIVE/OBJECTIVE/INTERVAL EVENTS: RRT and code stroke called for AMS. LKW was at 8 AM on 3/3/2024 (at this time, pt was following commands and attending to examiner). Of note, pt was hypotensive to SBP 70s, Fever > 40C, Hgb down to 5s, pt eventually transfered to MICU     MEDICATIONS  (STANDING):  acetaminophen   IVPB .. 1000 milliGRAM(s) IV Intermittent once  albuterol/ipratropium for Nebulization 3 milliLiter(s) Nebulizer every 6 hours  chlorhexidine 0.12% Liquid 15 milliLiter(s) Oral Mucosa every 12 hours  chlorhexidine 4% Liquid 1 Application(s) Topical daily  dextrose 5%. 1000 milliLiter(s) (75 mL/Hr) IV Continuous <Continuous>  insulin lispro (ADMELOG) corrective regimen sliding scale   SubCutaneous every 6 hours  insulin NPH human recombinant 7 Unit(s) SubCutaneous every 6 hours  meropenem  IVPB 2000 milliGRAM(s) IV Intermittent every 8 hours  norepinephrine Infusion 0.05 MICROgram(s)/kG/Min (9.52 mL/Hr) IV Continuous <Continuous>  pantoprazole  Injectable 40 milliGRAM(s) IV Push every 12 hours  polyethylene glycol 3350 17 Gram(s) Oral daily  senna 1 Tablet(s) Oral at bedtime  vancomycin  IVPB 1500 milliGRAM(s) IV Intermittent every 24 hours    MEDICATIONS  (PRN):      VITALS & EXAMINATION:  Vital Signs Last 24 Hrs  T(C): 39.1 (03 Mar 2024 16:30), Max: 39.6 (03 Mar 2024 14:45)  T(F): 102.4 (03 Mar 2024 16:30), Max: 103.3 (03 Mar 2024 14:45)  HR: 102 (03 Mar 2024 20:30) (84 - 114)  BP: 96/55 (03 Mar 2024 18:00) (83/43 - 135/62)  BP(mean): 72 (03 Mar 2024 18:00) (61 - 89)  RR: 20 (03 Mar 2024 20:30) (15 - 30)  SpO2: 96% (03 Mar 2024 20:30) (91% - 100%)    Parameters below as of 03 Mar 2024 20:00  Patient On (Oxygen Delivery Method): ventilator    O2 Concentration (%): 30    General:  Constitutional: Male, appears stated age, nontoxic, not in distress,    Head: Normocephalic;   Eyes: clear sclera;   Extremities: No cyanosis;   Resp: breathing comfortably  Neck: no nuchal rigidity  Fundoscopic exam:      Neurological (>12):  MS: Not arousable to noxious stimuli, not following commands, not attending to examiner, no verbal output  CN: No pupillary response b/l, no BTT, VOR not intact, corneals not intact, gag not intact  M: No WTP x 4 extremities  S: No WTP x 4 extremities  R: 0 in all groups b/l, toes mute  C: DANIAL due to mental status/medical instability  G: DANIAL due to mental status/medical instability    LABORATORY:  CBC                       8.4    19.40 )-----------( 337      ( 03 Mar 2024 18:23 )             29.1     Chem 03-03    150<H>  |  112<H>  |  79<H>  ----------------------------<  149<H>  4.6   |  28  |  1.67<H>    Ca    7.6<L>      03 Mar 2024 13:04  Phos  4.7     03-03  Mg     2.5     03-03    TPro  5.8<L>  /  Alb  2.2<L>  /  TBili  0.2  /  DBili  x   /  AST  127<H>  /  ALT  91<H>  /  AlkPhos  110  03-03    LFTs LIVER FUNCTIONS - ( 03 Mar 2024 13:04 )  Alb: 2.2 g/dL / Pro: 5.8 g/dL / ALK PHOS: 110 U/L / ALT: 91 U/L / AST: 127 U/L / GGT: x           Coagulopathy PT/INR - ( 03 Mar 2024 13:04 )   PT: 14.9 sec;   INR: 1.37 ratio         PTT - ( 03 Mar 2024 13:04 )  PTT:30.7 sec  Lipid Panel 02-24 Chol 90 LDL -- HDL 22<L> Trig 94, 02-14 Chol 92 LDL -- HDL 19<L> Trig 104, 02-10 Chol 226<H> LDL -- HDL 51 Trig 76  A1c   Cardiac enzymes CARDIAC MARKERS ( 03 Mar 2024 16:32 )  x     / x     / 956 U/L / x     / 4.5 ng/mL      U/A Urinalysis Basic - ( 03 Mar 2024 13:04 )    Color: Yellow / Appearance: Clear / SG: >1.030 / pH: x  Gluc: 149 mg/dL / Ketone: Negative mg/dL  / Bili: Negative / Urobili: 1.0 mg/dL   Blood: x / Protein: Trace mg/dL / Nitrite: Negative   Leuk Esterase: Negative / RBC: 2 /HPF / WBC 0 /HPF   Sq Epi: x / Non Sq Epi: 1 /HPF / Bacteria: Negative /HPF      CSF  Immunological  Other    STUDIES & IMAGING: (EEG, CT, MR, U/S, TTE/MILADIS):

## 2024-03-03 NOTE — STROKE CODE NOTE - MRS SCORE
(5) Severe disability. Requires constant nursing care and attention, bedridden, incontent.
(0) No symptoms

## 2024-03-03 NOTE — PROGRESS NOTE ADULT - NS ATTEND AMEND GEN_ALL_CORE FT
agree with above  afebrile. monitor wbc. currently no sx infection  has been unable to wean due to apnea  plan for acute spinal rehab

## 2024-03-03 NOTE — CONSULT NOTE ADULT - ASSESSMENT
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 RRT today for hypotension , AMS and melena     #Melena   Given his drop in hbg and reported melena, cannot rule out GI sourse of blood loss. He is responding well to prbc and if bleeding, it is likely only minimally contributing to his overall clinical status. DDX: PUD, AVM, Dieulafoy     #Portal vein gas  Would suspect iatrogenic given his recent EGD for PEG placement but cannot r/o PUD as a potential cause given his drop in hbg and reported melena. CT imaging reviewed; without any significant thickening/ gas/ inflammation in the walls of intestines/ colon which make me less suspicious for pneumatosis intestinales but recognize that imaging studies and differential may evolve as patient is critically ill.    #SIR/Septic shock   #Hx of CVA  # Ding Serrano variant of GBS and started on trial of DAPT now s/p 5 rounds of plasma exchange (2/13-2/20) and IVIG x 5 ( 2/21-2/26)    Recommendations:  - Given overall response to prbc and patient critically ill, would hold off on urgent endoscopy at this time   - Will re-evaluate for EGD in AM or if patient continues to have melena with dropping hbg not responding to prbc   - Please put on PPI IV BID   - Serial abd exams and for any new changes in abd would repeat imaging  - Trend hbg and keep >7    Rebekah Pal MD  Gastroenterology/Hepatology Fellow, PGY-4  Please contact via TEAMS      [email or web address] [email or web address]. Hepatic portal venous gas: a report of two cases and a review of the epidemiology, pathogenesis, diagnosis and approach to management. Can J Gastroenterol. 2007 May;21(5):309-13.

## 2024-03-03 NOTE — CHART NOTE - NSCHARTNOTEFT_GEN_A_CORE
: Niharika Forrest    INDICATION: shock    PROCEDURE:  [x] LIMITED ECHO  [x] LIMITED CHEST  [ ] LIMITED RETROPERITONEAL  [ ] LIMITED ABDOMINAL  [ ] LIMITED DVT  [ ] NEEDLE GUIDANCE VASCULAR  [ ] NEEDLE GUIDANCE THORACENTESIS  [ ] NEEDLE GUIDANCE PARACENTESIS  [ ] NEEDLE GUIDANCE PERICARDIOCENTESIS  [ ] OTHER    FINDINGS:  A line predominance anterior lung fields  Bibasilar consolidation  No pleural effusion    LV hyperdynamic   RV < LV  Unable to visualize IVC (air in stomach)   No pericardial effusion  LVOT VTI 22     INTERPRETATION:  shock likely vasoplegic, possible hypovolemia     Images uploaded on [employer]

## 2024-03-03 NOTE — PROGRESS NOTE ADULT - ASSESSMENT
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 Prior to admission patient had Viral URI ( 1-2 weeks prior to admission) with subsequent weakness. He underwent an angiogram (2/11) which showed moderate stenosis and no intervention was done. MRI Performed c/w small bilateral cerebellar strokes and prior L thalamic strokes, as per Neurology was not c/w current presentation. 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 He is currently s/p 5 rounds of plasma exchange (2/13-2/20) and IVIG x 5 ( 2/21-2/26)  His hospital course was complicated by progressive respiratory failure. CT Chest performed on 2/20 with atelectasis of b/l lower lobes. BAL 2/21 Grew PSA treated with course of Zosyn (2/21-2/28).     3/1: Not tolerating weaning from vent but more responsive to commands. Attempting to wiggle his toes, squeeze hands, moving head and express his needs. Will give Lasix 20 mg IVP x 1 for Anasarca.

## 2024-03-03 NOTE — RAPID RESPONSE TEAM SUMMARY - NSADDTLFINDINGSRRT_GEN_ALL_CORE
Upon arrival pt hypotensive to 70s and febrile to 105 w/ HR in 100s. Had melena. Started tylenol and 1L bolus w/o improvement. Levophed started. Neuro at bedside wants stroke workup. Labs drawn w/ Hgb in 5s. Pt Jehovah witness. Called family who agreed on blood transfusion. Called MICU who accepted the patient. Urgent blood released for hemorrhagic shock. Zosyn given due to concerns for septic shock as well. Pt transported to CT scan to r/o stroke, brain bleed, and GIB/RP bleed. Pt then transported to MICU for escalation of care.

## 2024-03-04 LAB
ALBUMIN SERPL ELPH-MCNC: 2.4 G/DL — LOW (ref 3.3–5)
ALP SERPL-CCNC: 118 U/L — SIGNIFICANT CHANGE UP (ref 40–120)
ALT FLD-CCNC: 140 U/L — HIGH (ref 10–45)
ANION GAP SERPL CALC-SCNC: 8 MMOL/L — SIGNIFICANT CHANGE UP (ref 5–17)
APTT BLD: 27.9 SEC — SIGNIFICANT CHANGE UP (ref 24.5–35.6)
AST SERPL-CCNC: 186 U/L — HIGH (ref 10–40)
BASE EXCESS BLDV CALC-SCNC: 2.8 MMOL/L — SIGNIFICANT CHANGE UP (ref -2–3)
BILIRUB SERPL-MCNC: 0.4 MG/DL — SIGNIFICANT CHANGE UP (ref 0.2–1.2)
BUN SERPL-MCNC: 68 MG/DL — HIGH (ref 7–23)
CA-I SERPL-SCNC: 1.11 MMOL/L — LOW (ref 1.15–1.33)
CALCIUM SERPL-MCNC: 7.5 MG/DL — LOW (ref 8.4–10.5)
CHLORIDE BLDV-SCNC: 113 MMOL/L — HIGH (ref 96–108)
CHLORIDE SERPL-SCNC: 113 MMOL/L — HIGH (ref 96–108)
CK SERPL-CCNC: 872 U/L — HIGH (ref 30–200)
CO2 BLDV-SCNC: 30 MMOL/L — HIGH (ref 22–26)
CO2 SERPL-SCNC: 25 MMOL/L — SIGNIFICANT CHANGE UP (ref 22–31)
CREAT SERPL-MCNC: 1.2 MG/DL — SIGNIFICANT CHANGE UP (ref 0.5–1.3)
CULTURE RESULTS: NO GROWTH — SIGNIFICANT CHANGE UP
EGFR: 64 ML/MIN/1.73M2 — SIGNIFICANT CHANGE UP
GAS PNL BLDA: SIGNIFICANT CHANGE UP
GAS PNL BLDV: 146 MMOL/L — HIGH (ref 136–145)
GAS PNL BLDV: SIGNIFICANT CHANGE UP
GAS PNL BLDV: SIGNIFICANT CHANGE UP
GLUCOSE BLDC GLUCOMTR-MCNC: 144 MG/DL — HIGH (ref 70–99)
GLUCOSE BLDC GLUCOMTR-MCNC: 145 MG/DL — HIGH (ref 70–99)
GLUCOSE BLDC GLUCOMTR-MCNC: 190 MG/DL — HIGH (ref 70–99)
GLUCOSE BLDV-MCNC: 158 MG/DL — HIGH (ref 70–99)
GLUCOSE SERPL-MCNC: 229 MG/DL — HIGH (ref 70–99)
GRAM STN FLD: ABNORMAL
HCO3 BLDV-SCNC: 28 MMOL/L — SIGNIFICANT CHANGE UP (ref 22–29)
HCT VFR BLD CALC: 30.4 % — LOW (ref 39–50)
HCT VFR BLD CALC: 32.3 % — LOW (ref 39–50)
HCT VFR BLDA CALC: 28 % — LOW (ref 39–51)
HGB BLD CALC-MCNC: 9.2 G/DL — LOW (ref 12.6–17.4)
HGB BLD-MCNC: 9.2 G/DL — LOW (ref 13–17)
HGB BLD-MCNC: 9.3 G/DL — LOW (ref 13–17)
HOROWITZ INDEX BLDV+IHG-RTO: 30 — SIGNIFICANT CHANGE UP
INR BLD: 1.32 RATIO — HIGH (ref 0.85–1.18)
LACTATE BLDV-MCNC: 1.4 MMOL/L — SIGNIFICANT CHANGE UP (ref 0.5–2)
LEGIONELLA AG UR QL: NEGATIVE — SIGNIFICANT CHANGE UP
MAGNESIUM SERPL-MCNC: 2.3 MG/DL — SIGNIFICANT CHANGE UP (ref 1.6–2.6)
MCHC RBC-ENTMCNC: 24.3 PG — LOW (ref 27–34)
MCHC RBC-ENTMCNC: 24.8 PG — LOW (ref 27–34)
MCHC RBC-ENTMCNC: 28.8 GM/DL — LOW (ref 32–36)
MCHC RBC-ENTMCNC: 30.3 GM/DL — LOW (ref 32–36)
MCV RBC AUTO: 81.9 FL — SIGNIFICANT CHANGE UP (ref 80–100)
MCV RBC AUTO: 84.6 FL — SIGNIFICANT CHANGE UP (ref 80–100)
NRBC # BLD: 5 /100 WBCS — HIGH (ref 0–0)
NRBC # BLD: 6 /100 WBCS — HIGH (ref 0–0)
NT-PROBNP SERPL-SCNC: 2517 PG/ML — HIGH (ref 0–300)
PCO2 BLDV: 48 MMHG — SIGNIFICANT CHANGE UP (ref 42–55)
PH BLDV: 7.38 — SIGNIFICANT CHANGE UP (ref 7.32–7.43)
PHOSPHATE SERPL-MCNC: 3.5 MG/DL — SIGNIFICANT CHANGE UP (ref 2.5–4.5)
PLATELET # BLD AUTO: 369 K/UL — SIGNIFICANT CHANGE UP (ref 150–400)
PLATELET # BLD AUTO: 389 K/UL — SIGNIFICANT CHANGE UP (ref 150–400)
PO2 BLDV: 32 MMHG — SIGNIFICANT CHANGE UP (ref 25–45)
POTASSIUM BLDV-SCNC: 3.9 MMOL/L — SIGNIFICANT CHANGE UP (ref 3.5–5.1)
POTASSIUM SERPL-MCNC: 3.8 MMOL/L — SIGNIFICANT CHANGE UP (ref 3.5–5.3)
POTASSIUM SERPL-SCNC: 3.8 MMOL/L — SIGNIFICANT CHANGE UP (ref 3.5–5.3)
PROT SERPL-MCNC: 6.5 G/DL — SIGNIFICANT CHANGE UP (ref 6–8.3)
PROTHROM AB SERPL-ACNC: 14.4 SEC — HIGH (ref 9.5–13)
RBC # BLD: 3.71 M/UL — LOW (ref 4.2–5.8)
RBC # BLD: 3.82 M/UL — LOW (ref 4.2–5.8)
RBC # FLD: 25.2 % — HIGH (ref 10.3–14.5)
RBC # FLD: 26.2 % — HIGH (ref 10.3–14.5)
S PNEUM AG UR QL: NEGATIVE — SIGNIFICANT CHANGE UP
SAO2 % BLDV: 50.3 % — LOW (ref 67–88)
SODIUM SERPL-SCNC: 146 MMOL/L — HIGH (ref 135–145)
SPECIMEN SOURCE: SIGNIFICANT CHANGE UP
SPECIMEN SOURCE: SIGNIFICANT CHANGE UP
TROPONIN T, HIGH SENSITIVITY RESULT: 92 NG/L — HIGH (ref 0–51)
WBC # BLD: 20.97 K/UL — HIGH (ref 3.8–10.5)
WBC # BLD: 24.76 K/UL — HIGH (ref 3.8–10.5)
WBC # FLD AUTO: 20.97 K/UL — HIGH (ref 3.8–10.5)
WBC # FLD AUTO: 24.76 K/UL — HIGH (ref 3.8–10.5)

## 2024-03-04 PROCEDURE — 71045 X-RAY EXAM CHEST 1 VIEW: CPT | Mod: 26

## 2024-03-04 PROCEDURE — 99291 CRITICAL CARE FIRST HOUR: CPT

## 2024-03-04 PROCEDURE — 99291 CRITICAL CARE FIRST HOUR: CPT | Mod: GC

## 2024-03-04 PROCEDURE — 95720 EEG PHY/QHP EA INCR W/VEEG: CPT

## 2024-03-04 PROCEDURE — 36556 INSERT NON-TUNNEL CV CATH: CPT | Mod: 59

## 2024-03-04 PROCEDURE — 99223 1ST HOSP IP/OBS HIGH 75: CPT

## 2024-03-04 RX ORDER — MEROPENEM 1 G/30ML
1000 INJECTION INTRAVENOUS EVERY 8 HOURS
Refills: 0 | Status: COMPLETED | OUTPATIENT
Start: 2024-03-04 | End: 2024-03-11

## 2024-03-04 RX ORDER — ACETAMINOPHEN 500 MG
1000 TABLET ORAL ONCE
Refills: 0 | Status: COMPLETED | OUTPATIENT
Start: 2024-03-04 | End: 2024-03-04

## 2024-03-04 RX ORDER — SODIUM CHLORIDE 9 MG/ML
500 INJECTION, SOLUTION INTRAVENOUS ONCE
Refills: 0 | Status: COMPLETED | OUTPATIENT
Start: 2024-03-04 | End: 2024-03-04

## 2024-03-04 RX ORDER — SODIUM CHLORIDE 9 MG/ML
1000 INJECTION, SOLUTION INTRAVENOUS ONCE
Refills: 0 | Status: DISCONTINUED | OUTPATIENT
Start: 2024-03-04 | End: 2024-03-04

## 2024-03-04 RX ADMIN — Medication 300 MILLIGRAM(S): at 17:35

## 2024-03-04 RX ADMIN — Medication 1000 MILLIGRAM(S): at 11:45

## 2024-03-04 RX ADMIN — SODIUM CHLORIDE 1000 MILLILITER(S): 9 INJECTION, SOLUTION INTRAVENOUS at 12:15

## 2024-03-04 RX ADMIN — HUMAN INSULIN 7 UNIT(S): 100 INJECTION, SUSPENSION SUBCUTANEOUS at 11:39

## 2024-03-04 RX ADMIN — Medication 3 MILLILITER(S): at 11:37

## 2024-03-04 RX ADMIN — HUMAN INSULIN 7 UNIT(S): 100 INJECTION, SUSPENSION SUBCUTANEOUS at 05:26

## 2024-03-04 RX ADMIN — PANTOPRAZOLE SODIUM 40 MILLIGRAM(S): 20 TABLET, DELAYED RELEASE ORAL at 18:18

## 2024-03-04 RX ADMIN — CHLORHEXIDINE GLUCONATE 15 MILLILITER(S): 213 SOLUTION TOPICAL at 17:37

## 2024-03-04 RX ADMIN — MEROPENEM 100 MILLIGRAM(S): 1 INJECTION INTRAVENOUS at 21:06

## 2024-03-04 RX ADMIN — Medication 3 MILLILITER(S): at 06:27

## 2024-03-04 RX ADMIN — Medication 3 MILLILITER(S): at 17:25

## 2024-03-04 RX ADMIN — HUMAN INSULIN 7 UNIT(S): 100 INJECTION, SUSPENSION SUBCUTANEOUS at 00:23

## 2024-03-04 RX ADMIN — Medication 3 MILLILITER(S): at 00:37

## 2024-03-04 RX ADMIN — PANTOPRAZOLE SODIUM 40 MILLIGRAM(S): 20 TABLET, DELAYED RELEASE ORAL at 05:26

## 2024-03-04 RX ADMIN — SENNA PLUS 1 TABLET(S): 8.6 TABLET ORAL at 21:06

## 2024-03-04 RX ADMIN — Medication 9.52 MICROGRAM(S)/KG/MIN: at 19:26

## 2024-03-04 RX ADMIN — MEROPENEM 100 MILLIGRAM(S): 1 INJECTION INTRAVENOUS at 13:47

## 2024-03-04 RX ADMIN — MEROPENEM 280 MILLIGRAM(S): 1 INJECTION INTRAVENOUS at 05:27

## 2024-03-04 RX ADMIN — Medication 4: at 00:23

## 2024-03-04 RX ADMIN — Medication 2: at 05:26

## 2024-03-04 RX ADMIN — POLYETHYLENE GLYCOL 3350 17 GRAM(S): 17 POWDER, FOR SOLUTION ORAL at 11:38

## 2024-03-04 RX ADMIN — HUMAN INSULIN 7 UNIT(S): 100 INJECTION, SUSPENSION SUBCUTANEOUS at 18:18

## 2024-03-04 RX ADMIN — Medication 9.52 MICROGRAM(S)/KG/MIN: at 13:48

## 2024-03-04 RX ADMIN — CHLORHEXIDINE GLUCONATE 15 MILLILITER(S): 213 SOLUTION TOPICAL at 05:26

## 2024-03-04 RX ADMIN — Medication 400 MILLIGRAM(S): at 11:23

## 2024-03-04 RX ADMIN — CHLORHEXIDINE GLUCONATE 1 APPLICATION(S): 213 SOLUTION TOPICAL at 11:38

## 2024-03-04 NOTE — PROGRESS NOTE ADULT - SUBJECTIVE AND OBJECTIVE BOX
INTERVAL HPI/OVERNIGHT EVENTS:    SUBJECTIVE: Patient seen and examined at bedside.     ROS: All negative except as listed above.    VITAL SIGNS:  ICU Vital Signs Last 24 Hrs  T(C): 37 (04 Mar 2024 05:00), Max: 39.6 (03 Mar 2024 14:45)  T(F): 98.6 (04 Mar 2024 05:00), Max: 103.3 (03 Mar 2024 14:45)  HR: 94 (04 Mar 2024 07:00) (84 - 114)  BP: 96/55 (03 Mar 2024 18:00) (83/43 - 135/62)  BP(mean): 72 (03 Mar 2024 18:00) (61 - 89)  ABP: 105/52 (04 Mar 2024 07:00) (79/41 - 168/70)  ABP(mean): 69 (04 Mar 2024 07:00) (53 - 99)  RR: 20 (04 Mar 2024 07:00) (15 - 30)  SpO2: 97% (04 Mar 2024 07:00) (91% - 100%)    O2 Parameters below as of 03 Mar 2024 20:00  Patient On (Oxygen Delivery Method): ventilator    O2 Concentration (%): 30      Mode: AC/ CMV (Assist Control/ Continuous Mandatory Ventilation), RR (machine): 20, TV (machine): 500, FiO2: 30, PEEP: 5, ITime: 1, MAP: 9, PIP: 17  Plateau pressure:   P/F ratio:     03-03 @ 07:01  -  03-04 @ 07:00  --------------------------------------------------------  IN: 4817.5 mL / OUT: 2645 mL / NET: 2172.5 mL      CAPILLARY BLOOD GLUCOSE      POCT Blood Glucose.: 190 mg/dL (04 Mar 2024 05:25)      ECG: reviewed.    PHYSICAL EXAM:    GENERAL: NAD, lying in bed comfortably  HEAD:  Atraumatic, normocephalic  EYES: EOMI, PERRLA, conjunctiva and sclera clear  NECK: Supple, trachea midline, no JVD  HEART: Regular rate and rhythm, no murmurs, rubs, or gallops  LUNGS: Unlabored respirations.  Clear to auscultation bilaterally, no crackles, wheezing, or rhonchi  ABDOMEN: Soft, nontender, nondistended, +BS  EXTREMITIES: 2+ peripheral pulses bilaterally, cap refill<2 secs. No clubbing, cyanosis, or edema  NERVOUS SYSTEM:  A&Ox3, following commands, moving all extremities, no focal deficits   SKIN: No rashes or lesions    MEDICATIONS:  MEDICATIONS  (STANDING):  acetaminophen   IVPB .. 1000 milliGRAM(s) IV Intermittent once  albuterol/ipratropium for Nebulization 3 milliLiter(s) Nebulizer every 6 hours  chlorhexidine 0.12% Liquid 15 milliLiter(s) Oral Mucosa every 12 hours  chlorhexidine 4% Liquid 1 Application(s) Topical daily  insulin lispro (ADMELOG) corrective regimen sliding scale   SubCutaneous every 6 hours  insulin NPH human recombinant 7 Unit(s) SubCutaneous every 6 hours  meropenem  IVPB 2000 milliGRAM(s) IV Intermittent every 8 hours  norepinephrine Infusion 0.05 MICROgram(s)/kG/Min (9.52 mL/Hr) IV Continuous <Continuous>  pantoprazole  Injectable 40 milliGRAM(s) IV Push every 12 hours  polyethylene glycol 3350 17 Gram(s) Oral daily  senna 1 Tablet(s) Oral at bedtime  vancomycin  IVPB 1500 milliGRAM(s) IV Intermittent every 24 hours    MEDICATIONS  (PRN):      ALLERGIES:  Allergies    No Known Allergies    Intolerances        LABS:                        9.2    20.97 )-----------( 369      ( 04 Mar 2024 00:10 )             30.4     03-04    146<H>  |  113<H>  |  68<H>  ----------------------------<  229<H>  3.8   |  25  |  1.20    Ca    7.5<L>      04 Mar 2024 00:10  Phos  3.5     03-04  Mg     2.3     03-04    TPro  6.5  /  Alb  2.4<L>  /  TBili  0.4  /  DBili  x   /  AST  186<H>  /  ALT  140<H>  /  AlkPhos  118  03-04    PT/INR - ( 04 Mar 2024 00:10 )   PT: 14.4 sec;   INR: 1.32 ratio         PTT - ( 04 Mar 2024 00:10 )  PTT:27.9 sec  Urinalysis Basic - ( 04 Mar 2024 00:10 )    Color: x / Appearance: x / SG: x / pH: x  Gluc: 229 mg/dL / Ketone: x  / Bili: x / Urobili: x   Blood: x / Protein: x / Nitrite: x   Leuk Esterase: x / RBC: x / WBC x   Sq Epi: x / Non Sq Epi: x / Bacteria: x      ABG:  pH, Arterial: 7.43 (03-04-24 @ 00:00)  pCO2, Arterial: 39 mmHg (03-04-24 @ 00:00)  pO2, Arterial: 73 mmHg (03-04-24 @ 00:00)  pH, Arterial: 7.41 (03-03-24 @ 16:05)  pCO2, Arterial: 42 mmHg (03-03-24 @ 16:05)  pO2, Arterial: 69 mmHg (03-03-24 @ 16:05)  pH, Arterial: 7.33 (03-03-24 @ 11:00)  pCO2, Arterial: 42 mmHg (03-03-24 @ 11:00)  pO2, Arterial: 33 mmHg (03-03-24 @ 11:00)      vBG:  pH, Venous: 7.32 (03-03-24 @ 21:05)  pCO2, Venous: 57 mmHg (03-03-24 @ 21:05)  pO2, Venous: 20 mmHg (03-03-24 @ 21:05)  HCO3, Venous: 29 mmol/L (03-03-24 @ 21:05)  pH, Venous: 7.33 (03-03-24 @ 20:20)  pCO2, Venous: 56 mmHg (03-03-24 @ 20:20)  pO2, Venous: 18 mmHg (03-03-24 @ 20:20)  HCO3, Venous: 30 mmol/L (03-03-24 @ 20:20)  pH, Venous: 7.32 (03-03-24 @ 15:40)  pCO2, Venous: 56 mmHg (03-03-24 @ 15:40)  pO2, Venous: 33 mmHg (03-03-24 @ 15:40)  HCO3, Venous: 29 mmol/L (03-03-24 @ 15:40)  pH, Venous: 7.37 (03-03-24 @ 13:01)  pCO2, Venous: 50 mmHg (03-03-24 @ 13:01)  pO2, Venous: 30 mmHg (03-03-24 @ 13:01)  HCO3, Venous: 29 mmol/L (03-03-24 @ 13:01)  pH, Venous: 7.40 (03-03-24 @ 11:14)  pCO2, Venous: 43 mmHg (03-03-24 @ 11:14)  pO2, Venous: 104 mmHg (03-03-24 @ 11:14)  HCO3, Venous: 27 mmol/L (03-03-24 @ 11:14)    Micro:    Culture - Blood (collected 02-23-24 @ 18:03)  Source: .Blood Blood  Final Report (02-28-24 @ 23:00):    No growth at 5 days    Culture - Blood (collected 02-23-24 @ 18:03)  Source: .Blood Blood  Final Report (02-28-24 @ 23:00):    No growth at 5 days    Culture - Blood (collected 02-20-24 @ 16:57)  Source: .Blood Blood  Final Report (02-25-24 @ 23:00):    No growth at 5 days    Culture - Blood (collected 02-20-24 @ 16:47)  Source: .Blood Blood  Final Report (02-25-24 @ 23:00):    No growth at 5 days          RADIOLOGY & ADDITIONAL TESTS: Reviewed.   INTERVAL HPI/OVERNIGHT EVENTS:    SUBJECTIVE: Patient seen and examined at bedside.     ROS: All negative except as listed above.    VITAL SIGNS:  ICU Vital Signs Last 24 Hrs  T(C): 37 (04 Mar 2024 05:00), Max: 39.6 (03 Mar 2024 14:45)  T(F): 98.6 (04 Mar 2024 05:00), Max: 103.3 (03 Mar 2024 14:45)  HR: 94 (04 Mar 2024 07:00) (84 - 114)  BP: 96/55 (03 Mar 2024 18:00) (83/43 - 135/62)  BP(mean): 72 (03 Mar 2024 18:00) (61 - 89)  ABP: 105/52 (04 Mar 2024 07:00) (79/41 - 168/70)  ABP(mean): 69 (04 Mar 2024 07:00) (53 - 99)  RR: 20 (04 Mar 2024 07:00) (15 - 30)  SpO2: 97% (04 Mar 2024 07:00) (91% - 100%)    O2 Parameters below as of 03 Mar 2024 20:00  Patient On (Oxygen Delivery Method): ventilator    O2 Concentration (%): 30      Mode: AC/ CMV (Assist Control/ Continuous Mandatory Ventilation), RR (machine): 20, TV (machine): 500, FiO2: 30, PEEP: 5, ITime: 1, MAP: 9, PIP: 17  Plateau pressure:   P/F ratio:     03-03 @ 07:01  -  03-04 @ 07:00  --------------------------------------------------------  IN: 4817.5 mL / OUT: 2645 mL / NET: 2172.5 mL      CAPILLARY BLOOD GLUCOSE      POCT Blood Glucose.: 190 mg/dL (04 Mar 2024 05:25)      ECG: reviewed.    PHYSICAL EXAM:    GENERAL: NAD, lying in bed, trach with oral secretions   HEAD:  Atraumatic, normocephalic  EYES: EOMI, PERRLA, conjunctiva and sclera clear  NECK: Supple, trachea midline, no JVD, trach in place   HEART: tachycardic, no murmurs, rubs, or gallops  LUNGS: Unlabored respirations.  Clear to auscultation bilaterally, no crackles, wheezing, or rhonchi  ABDOMEN: Soft, nontender, nondistended, +BS  EXTREMITIES: 2+ peripheral pulses bilaterally, cap refill<2 secs. No clubbing, cyanosis, or edema  NERVOUS SYSTEM:  A&Ox0, some spontaneous head movements, unresponsive to voice and unable to follow commands   SKIN: No rashes or lesions    MEDICATIONS:  MEDICATIONS  (STANDING):  acetaminophen   IVPB .. 1000 milliGRAM(s) IV Intermittent once  albuterol/ipratropium for Nebulization 3 milliLiter(s) Nebulizer every 6 hours  chlorhexidine 0.12% Liquid 15 milliLiter(s) Oral Mucosa every 12 hours  chlorhexidine 4% Liquid 1 Application(s) Topical daily  insulin lispro (ADMELOG) corrective regimen sliding scale   SubCutaneous every 6 hours  insulin NPH human recombinant 7 Unit(s) SubCutaneous every 6 hours  meropenem  IVPB 2000 milliGRAM(s) IV Intermittent every 8 hours  norepinephrine Infusion 0.05 MICROgram(s)/kG/Min (9.52 mL/Hr) IV Continuous <Continuous>  pantoprazole  Injectable 40 milliGRAM(s) IV Push every 12 hours  polyethylene glycol 3350 17 Gram(s) Oral daily  senna 1 Tablet(s) Oral at bedtime  vancomycin  IVPB 1500 milliGRAM(s) IV Intermittent every 24 hours    MEDICATIONS  (PRN):      ALLERGIES:  Allergies    No Known Allergies    Intolerances        LABS:                        9.2    20.97 )-----------( 369      ( 04 Mar 2024 00:10 )             30.4     03-04    146<H>  |  113<H>  |  68<H>  ----------------------------<  229<H>  3.8   |  25  |  1.20    Ca    7.5<L>      04 Mar 2024 00:10  Phos  3.5     03-04  Mg     2.3     03-04    TPro  6.5  /  Alb  2.4<L>  /  TBili  0.4  /  DBili  x   /  AST  186<H>  /  ALT  140<H>  /  AlkPhos  118  03-04    PT/INR - ( 04 Mar 2024 00:10 )   PT: 14.4 sec;   INR: 1.32 ratio         PTT - ( 04 Mar 2024 00:10 )  PTT:27.9 sec  Urinalysis Basic - ( 04 Mar 2024 00:10 )    Color: x / Appearance: x / SG: x / pH: x  Gluc: 229 mg/dL / Ketone: x  / Bili: x / Urobili: x   Blood: x / Protein: x / Nitrite: x   Leuk Esterase: x / RBC: x / WBC x   Sq Epi: x / Non Sq Epi: x / Bacteria: x      ABG:  pH, Arterial: 7.43 (03-04-24 @ 00:00)  pCO2, Arterial: 39 mmHg (03-04-24 @ 00:00)  pO2, Arterial: 73 mmHg (03-04-24 @ 00:00)  pH, Arterial: 7.41 (03-03-24 @ 16:05)  pCO2, Arterial: 42 mmHg (03-03-24 @ 16:05)  pO2, Arterial: 69 mmHg (03-03-24 @ 16:05)  pH, Arterial: 7.33 (03-03-24 @ 11:00)  pCO2, Arterial: 42 mmHg (03-03-24 @ 11:00)  pO2, Arterial: 33 mmHg (03-03-24 @ 11:00)      vBG:  pH, Venous: 7.32 (03-03-24 @ 21:05)  pCO2, Venous: 57 mmHg (03-03-24 @ 21:05)  pO2, Venous: 20 mmHg (03-03-24 @ 21:05)  HCO3, Venous: 29 mmol/L (03-03-24 @ 21:05)  pH, Venous: 7.33 (03-03-24 @ 20:20)  pCO2, Venous: 56 mmHg (03-03-24 @ 20:20)  pO2, Venous: 18 mmHg (03-03-24 @ 20:20)  HCO3, Venous: 30 mmol/L (03-03-24 @ 20:20)  pH, Venous: 7.32 (03-03-24 @ 15:40)  pCO2, Venous: 56 mmHg (03-03-24 @ 15:40)  pO2, Venous: 33 mmHg (03-03-24 @ 15:40)  HCO3, Venous: 29 mmol/L (03-03-24 @ 15:40)  pH, Venous: 7.37 (03-03-24 @ 13:01)  pCO2, Venous: 50 mmHg (03-03-24 @ 13:01)  pO2, Venous: 30 mmHg (03-03-24 @ 13:01)  HCO3, Venous: 29 mmol/L (03-03-24 @ 13:01)  pH, Venous: 7.40 (03-03-24 @ 11:14)  pCO2, Venous: 43 mmHg (03-03-24 @ 11:14)  pO2, Venous: 104 mmHg (03-03-24 @ 11:14)  HCO3, Venous: 27 mmol/L (03-03-24 @ 11:14)    Micro:    Culture - Blood (collected 02-23-24 @ 18:03)  Source: .Blood Blood  Final Report (02-28-24 @ 23:00):    No growth at 5 days    Culture - Blood (collected 02-23-24 @ 18:03)  Source: .Blood Blood  Final Report (02-28-24 @ 23:00):    No growth at 5 days    Culture - Blood (collected 02-20-24 @ 16:57)  Source: .Blood Blood  Final Report (02-25-24 @ 23:00):    No growth at 5 days    Culture - Blood (collected 02-20-24 @ 16:47)  Source: .Blood Blood  Final Report (02-25-24 @ 23:00):    No growth at 5 days          RADIOLOGY & ADDITIONAL TESTS: Reviewed.

## 2024-03-04 NOTE — PROCEDURE NOTE - NSSITEPREP_SKIN_A_CORE
chlorhexidine/Adherence to aseptic technique: hand hygiene prior to donning barriers (gown, gloves), don cap and mask, sterile drape over patient
chlorhexidine/povidone iodine (if allergic to chlorhexidine)
chlorhexidine/Adherence to aseptic technique: hand hygiene prior to donning barriers (gown, gloves), don cap and mask, sterile drape over patient
chlorhexidine
chlorhexidine

## 2024-03-04 NOTE — PROCEDURE NOTE - NSPOSTPRCRAD_GEN_A_CORE
central line located in the/central line located in the superior vena cava/depth of insertion/line adjusted to depth of insertion/no pneumothorax/post-procedure radiography performed
Guidewire ascertained in Lt IJ via POCUS,  Catheter filtered over guidewire, Guidewire removed in entirety, Lung sliding ascertained via POCUS/central line located in the

## 2024-03-04 NOTE — PROGRESS NOTE ADULT - PROVIDER SPECIALTY LIST ADULT
MICU Detail Level: Detailed Render Post-Care Instructions In Note?: no Number Of Freeze-Thaw Cycles: 1 freeze-thaw cycle Show Aperture Variable?: Yes Consent: The patient's consent was obtained including but not limited to risks of crusting, scabbing, blistering, scarring, darker or lighter pigmentary change, recurrence, incomplete removal and infection. Post-Care Instructions: I reviewed with the patient in detail post-care instructions. Patient is to wear sunprotection, and avoid picking at any of the treated lesions. Pt may apply Vaseline to crusted or scabbing areas. Duration Of Freeze Thaw-Cycle (Seconds): 3 Application Tool (Optional): Liquid Nitrogen Sprayer

## 2024-03-04 NOTE — PROGRESS NOTE ADULT - SUBJECTIVE AND OBJECTIVE BOX
NEUROLOGY FOLLOW-UP CONSULT NOTE    RFC: Weakness, ophthalmoplegia, concern for AIDP/GBS    Interval history: Had RRT on 3/3 due to episode of decrease mentation with hypotension. Transferred to MICU and placed on pressors with improvement. No acute neurologic events overnight. Patient remains weak following PLEX and IVIG treatment. No abnormal movements noted. EEG connected at bedside.    Meds:  MEDICATIONS  (STANDING):  albuterol/ipratropium for Nebulization 3 milliLiter(s) Nebulizer every 6 hours  chlorhexidine 0.12% Liquid 15 milliLiter(s) Oral Mucosa every 12 hours  chlorhexidine 4% Liquid 1 Application(s) Topical daily  insulin lispro (ADMELOG) corrective regimen sliding scale   SubCutaneous every 6 hours  insulin NPH human recombinant 7 Unit(s) SubCutaneous every 6 hours  lactated ringers Bolus 1000 milliLiter(s) IV Bolus once  meropenem  IVPB 1000 milliGRAM(s) IV Intermittent every 8 hours  norepinephrine Infusion 0.05 MICROgram(s)/kG/Min (9.52 mL/Hr) IV Continuous <Continuous>  pantoprazole  Injectable 40 milliGRAM(s) IV Push every 12 hours  polyethylene glycol 3350 17 Gram(s) Oral daily  senna 1 Tablet(s) Oral at bedtime  vancomycin  IVPB 1500 milliGRAM(s) IV Intermittent every 24 hours    MEDICATIONS  (PRN):    GTT:  Norepinephrine 0.5 mcg/kg/min    PMHx/PSHx/FHx/SHx:  Cerebral infarction    Handoff    MEWS Score    Hypertension    Diabetes    CVA (cerebral vascular accident)    HTN (hypertension)    HLD (hyperlipidemia)    DM2 (diabetes mellitus, type 2)    CVA (cerebrovascular accident)    Acute ischemic stroke    GBS (Guillain-[place] syndrome)    Acute respiratory failure with hypoxia    HTN (hypertension)    Dysphagia    Sympathetic storming    Infection    Anemia    Need for prophylactic measure    Counseling regarding goals of care    Diabetes    Hypernatremia    Tracheostomy, planned    EGD, with PEG    No significant past surgical history    CODE STROKE 1850 TRANSFER    Required emergent intubation    [email or web address]        Allergies:  No Known Allergies      ROS: All systems negative except as documented in Interval history    O:  T(C): 38 (03-04-24 @ 12:00), Max: 39.6 (03-03-24 @ 14:45)  HR: 93 (03-04-24 @ 12:30) (84 - 123)  BP: 96/55 (03-03-24 @ 18:00) (83/43 - 114/55)  RR: 20 (03-04-24 @ 12:30) (15 - 29)  SpO2: 98% (03-04-24 @ 12:30) (91% - 100%)    Focused neurologic exam:  MS - Tracheostomy in place, not sedated, awake, attends to all stimuli b/l, no speech output but can move feet appropriately and no head in response to "yes"/"no" questions, follows simple commands but limited due to profound weakness. Due to clinical condition unable to assess (DANIAL) orientation, rep/naming, attn/conc/recent and remote memory/fund of knowledge  CN - PERRL, EOM's with near normal movements of R eye and significantly dec excursions and volitional movements of L eye, VFF to threat b/l (by foot tapping), face sens intact, facial strength with profound weakness L > R in a peripheral pattern, hearing intact to conversation b/l, (-) spontaneous respirations (vent rate 20 bpm with tracheostomy), (-) cough, tongue midline, trap 4/5 b/l and symmetric. DANIAL palate  Motor - Normal bulk and flaccid tone throughout. Strength 0/5 all except for R DF/PF 2-3/5, L DF/PF 1-2/5  Sens - LT/PP intact all  DTR's - 0+ all and neutral b/l plantar response  Coord - DANIAL  Gait and station - DANIAL    Pertinent labs/studies:  CBC with inc WBC 24.76, low H/H 9/32 and MCV WNL, otherwise essentially WNL  PT/INR inc 14.4/1.32, PTT WNL  BMP with inc Na 146, otherwise essentially WNL  Albumin dec 2.4, LFT's with inc ALT//186  Mg WNL, Phos WNL  ESR inc 113 -> WNL, B12 WNL, folate WNL, TSH/free T4 WNL, CPK WNL, A1C inc 6.8%, syphilis serology TP (-), HIV (-), CPK WNL, LDL inc 162 -> 53, HDL 51 -> 19, CRP inc 65, Vitamin D dec 14.3, ACE WNL, anti-ganglioside Ab panel inc GD1b 110, dsDNA WNL, Lyme (-), FLORESITA (-), quantiferon TB gold (-), WNV (-), B1 WNL, B6 WNL, MOG (-), copper inc 156, heavy metal screen (-), NMO Ab (-), Legionella Ag (-)    CSF (2/11) - clear, colorless, RBC 3, WBC 11 and lymphocytes 67%, glucose 83, protein 67, Cx - no PMN's or organisms, PCR panel (-), LDH 22, IgG Index WNL, cryptococcal Ag (-), AFB (-), myelin basic protein WNL, Lyme (-), cryptococcal Ag (-), flow cytometry - insufficient cells, oligoclonal bands absent, VDRL (-), VZV PCR (-), myelin basic protein WNL, autoimmune encephalopathy panel (-), ACE WNL, WNV (-)    < from: CT Head No Cont (03.03.24 @ 12:15) >  1. BRAIN:  No intracranial hemorrhage is appreciated.  No intracranial   mass lesion is appreciated. No significant interval change to 11/20/2024    2.  RIGHT CAROTID SYSTEM:    Atherosclerotic plaque carotid bulb without    hemodynamically significant stenosis.    3.   LEFT CAROTID SYSTEM:     Atherosclerotic plaque carotid bulb without    hemodynamically significant stenosis.    4.   VERTEBRAL CIRCULATION:    Patent.    5.  ANTERIOR INTRACRANIAL CIRCULATION: Intracranial atherosclerosis   cavernous and clinoid segments of the internal carotid arteries,   mild-to-moderate.    6.  POSTERIOR INTRACRANIAL CIRCULATION:    Intracranial atherosclerosis   basilar artery, moderate to severe.    7.  No large vessel occlusion    8.  BRAIN PERFUSION:   No acute infarction of the brain is convincingly   demonstrated.    < end of copied text >      < from: CT Angio Head w/ IV Cont (02.11.24 @ 02:26) >  1. Focal small dissection suspected in the proximal left V4 segment.  2. Unchanged high-grade focal stenosis involving the proximalbasilar   artery and proximal left P2 segment.  3. Otherwise, no large vessel occlusion or major stenosis.    < end of copied text >      < from: MR Head w/wo IV Cont (02.11.24 @ 13:08) >  1.  No significant interval change in the small acute bilateral   cerebellar infarcts. No new superimposed acute intracranial abnormality.   No acute intracranial hemorrhage.  2.  Findings suspicious for cerebral amyloid angiopathy or hypertensive   microhemorrhages.  3.  Probable minimal chronic microvascular ischemic disease.  4.  Sinus disease.    < end of copied text >    < from: MR Thoracic Spine w/wo IV Cont (02.11.24 @ 13:09) >  1. CERVICAL SPINE:  Low-grade cervical degenerative disc disease.   Indistinct cord lesions at the C3 and C4 vertebral levels are nonspecific   but suggest inflammatory/infectious/demyelinating etiology. No associated   enhancement.    2. THORACIC SPINE:   Low-grade thoracic degenerative disc disease.   Thoracic cord intact.  No abnomal enhancement.    3. LUMBAR SPINE:   Advanced lower lumbar degenerative disc disease   includes moderate degenerative central canal stenosis at L4-L5 and   high-grade degenerative foraminal stenosis at L5-S1. Conus intact. Cauda   equina demonstrates central clustering with spinal stenosis atL4-L5 and   mild radicular enhancement predominantly distal to this location of   spinal stenosis, nonspecific, inflammatory versus congestion from the   stenosis. Vascular congestion between the conus and the L4-L5 stenosis.    Superimposed nodular enhancement at the L4 superior endplate level is   also nonspecific, inflammatory versus schwannoma    < end of copied text >    < from: TTE W or WO Ultrasound Enhancing Agent (02.11.24 @ 07:56) >   1. Left ventricular cavity is small. Left ventricular systolic function is normal with an ejection fraction of 66 % by Glynn's method of disks. There are no regional wall motion abnormalities seen.   2. Normal left ventricular diastolic function, with normal filling pressure.   3. Normal right ventricular cavity size, wall thickness, and normal systolic function.   4. Normal left and right atrial size.   5. No pericardial effusion seen.   6. Agitated saline injection was negative for intracardiac shunt.   7. No prior echocardiogram is available for comparison.   8. There is no evidence of a left ventricular thrombus.   9. There is normal LV mass and concentric remodeling.    < end of copied text >

## 2024-03-04 NOTE — PROCEDURE NOTE - PROCEDURE DATE TIME, MLM
21-Feb-2024 13:20
11-Feb-2024 16:00
04-Mar-2024 13:15
03-Mar-2024 17:03
11-Feb-2024 23:35
12-Feb-2024 10:00

## 2024-03-04 NOTE — CHART NOTE - NSCHARTNOTEFT_GEN_A_CORE
EEG preliminary read (not final) on the initial recording hour(s) = x 1     No seizures recorded.  Moderate slowing noted, nonspecific.  Final report to follow tomorrow morning after completion of study.    [facility] EEG Reading Room Ph#: [phone]  Epilepsy Answering Service after 5PM and before 8:30AM: Ph#: [phone]

## 2024-03-04 NOTE — PROCEDURE NOTE - NSPROCDETAILS_GEN_ALL_CORE
guidewire recovered/lumen(s) aspirated and flushed/sterile dressing applied/sterile technique, catheter placed/ultrasound guidance with use of sterile gel and probe cove
guidewire recovered/lumen(s) aspirated and flushed/sterile dressing applied/sterile technique, catheter placed/ultrasound guidance with use of sterile gel and probe cove
location identified, draped/prepped, sterile technique used, needle inserted/introduced
location identified, draped/prepped, sterile technique used, needle inserted/introduced/positive blood return obtained via catheter/connected to a pressurized flush line/sutured in place/Seldinger technique/all materials/supplies accounted for at end of procedure
location identified, draped/prepped, sterile technique used, needle inserted/introduced/positive blood return obtained via catheter/connected to a pressurized flush line/sutured in place/hemostasis with direct pressure, dressing applied/Seldinger technique/all materials/supplies accounted for at end of procedure

## 2024-03-04 NOTE — CHART NOTE - NSCHARTNOTEFT_GEN_A_CORE
: Ian Juares    INDICATION: Septic shock    PROCEDURE:  [xx ] LIMITED ECHO  [xx] LIMITED CHEST  [ ] LIMITED RETROPERITONEAL  [ ] LIMITED ABDOMINAL  [ ] LIMITED DVT  [ ] NEEDLE GUIDANCE VASCULAR  [ ] NEEDLE GUIDANCE THORACENTESIS  [ ] NEEDLE GUIDANCE PARACENTESIS  [ ] NEEDLE GUIDANCE PERICARDIOCENTESIS  [ ] OTHER    FINDINGS:  A line predominant, bilateral small pleural effusions, small areas of consolidation in bilat lower lung mckenna Lt>Rt    Good LVFx, RV<LV, VTI 24, IVC 1.6 with < 50% variability    INTERPRETATION:  A line predominant  bilat small pleural effusions  bilat small areas of consolidation     Good LVFx,   VTI 24  IVC 1.6 with <50% variability    In setting of Septic Shock

## 2024-03-04 NOTE — PROCEDURE NOTE - NSINFORMCONSENT_GEN_A_CORE
Benefits, risks, and possible complications of procedure explained to patient/caregiver who verbalized understanding and gave written consent.
wife/Benefits, risks, and possible complications of procedure explained to patient/caregiver who verbalized understanding and gave verbal consent.
This was an emergent procedure.
Benefits, risks, and possible complications of procedure explained to patient/caregiver who verbalized understanding and gave written consent.

## 2024-03-04 NOTE — PROGRESS NOTE ADULT - ATTENDING COMMENTS
74 yo M w/ HTN, HLD, T2DM prior CVA's without residual deficits who initially presented with concern for stroke-like symptoms s/p unrevealing angiogram and ultimately found to have clinical picture most concerning for Ding Parham Variant of GBS. S/p trach/peg 2/9/24.  Now with presumed septic shock, melena, of unclear etiology.    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

## 2024-03-04 NOTE — PROCEDURE NOTE - NSPOSTCAREGUIDE_GEN_A_CORE
Verbal/written post procedure instructions were given to patient/caregiver
Verbal/written post procedure instructions were given to patient/caregiver/Instructed patient/caregiver to follow-up with primary care physician/Instructed patient/caregiver regarding signs and symptoms of infection/Keep the cast/splint/dressing clean and dry/Care for catheter as per unit/ICU protocols
Care for catheter as per unit/ICU protocols
Verbal/written post procedure instructions were given to patient/caregiver/Instructed patient/caregiver to follow-up with primary care physician/Instructed patient/caregiver regarding signs and symptoms of infection/Keep the cast/splint/dressing clean and dry

## 2024-03-04 NOTE — PROGRESS NOTE ADULT - ASSESSMENT
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 Admitted to MICU in the setting of AMS with hypotension and febrile to 105 with Hgb noted for Hgb 5 admitted to MICu in the setting of septic shock with unclear source.     NEURO:  #AMS  Unresponsive with unreactive pupils and no corneal reflex on 3/3 for which an RRT was called. In setting of septic shock vs hemorrhagic shock.  - CT head: no intracrnial hemorrhage,     #Ding Serrano variant of GBS  GQ1B negative, Anti-GD1b in CSF  - s/p PLEX x5 (2/13-2/20)  - s/p IVIG x5 (2/21-2/25)  - On clinical trial ([facility]) with DAPT: ASA81 + Plavix 75mg daily  - Neurology following    CARDIOVASCULAR:  #Shock  RRT on 3/3 for AMS, hypotensive to 80/40s, and febrile. Hg 5.4. Septic vs Hemorrhagic shock.   - continue Levophed, wean as tolerated  - Patient is a [employer]. Conditional approval for 1u pRBC per spouse  - f/u UA, BCx, RVP, MRSA PCR, procal  - f/u CT head, chest, abd/pel and CTA head    #HTN  Home meds: Atenolol/Chlorthiazide 50mg/25mg daily  - hold in setting of shock    #HLD  Home med: Atorvastatin 80mg daily    PULMONARY:  #Acute Hypoxic Respiratory Failure  Course complicated by progressive respiratory failure. CT Chest 2/20 with atelectasis of bilateral lower lobes.  - BAL 2/21 Grew PSA treated with course of Zosyn (2/21-2/28)  - s/p Tracheostomy #8 Cuffed Portex 2/16 (Dr. Sood)  - Chest PT and suctioning prn    GI:  #Melena  New melena on 3/3 with Hg drop to 5.4.   - Bahai. Spouse gave conditional approval for 1u pRBC  - f/u CT Abd/Pel  - GI consult  - monitor CBC    #S/p PEG 2/26 (Dr. Sood)  - was tolerating tube feeds at goal  - NPO for now    Diet: NPO    RENAL:  #Hypernatremia  Na 152 on 3/3.  - D5W 100mL/hr  - DDAVP    #BALTA  Cr 1.67 on 3/3. Cr 0.7-0.8 this admission.  - IVF as above  - UA and urine lytes    ENDO:  #T2DM  Home med: Metformin 1g BID  - NPH 7u q6h  - MDSS    HEME/ONC/RHEUM:  #Anemia  #Bahai  Hg has been 7-8 this admission, however, new decrease to 5.4 with melena on 3.3.  - Melena/GI bleed management as above.    DVT ppx: SCDs only given concern for GI bleed    ID:  #Sepsis  AMS, febrile, and hypotensive on 3/3. In shock.  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 Admitted to MICU in the setting of AMS with hypotension and febrile to 105 with Hgb noted for Hgb 5 admitted to MICu in the setting of septic shock with unclear source.     NEURO:  #AMS  -Unresponsive with unreactive pupils and no corneal reflex on 3/3 for which an RRT was called. In setting of septic shock vs hemorrhagic shock.  - 3/3 CT brain and neck angio showing no intracranial hemorrhage or mass lesion with no interval change since imaging on 11/2023. Some intracranial l atherosclerosis in intracrnail circulation but no large vessel occlusion and no acute infarction    - mixed picture given Ding Serrano vs toxic metabolic encephatlopathy in setting of septic shock    - pending EEG   - mildly elevated ammonia - will ensure BM     #Timur Serrano variant of GBS  GQ1B negative, Anti-GD1b in CSF  - s/p PLEX x5 (2/13-2/20)  - s/p IVIG x5 (2/21-2/25)  - On clinical trial ([facility]) with DAPT: ASA81 + Plavix 75mg daily  - Neurology following    CARDIOVASCULAR:  #Shock  RRT on 3/3 for AMS, hypotensive to 80/40s, and febrile. Hg 5.4. Septic vs Hemorrhagic shock.   - continue Levophed, wean as tolerated  - Patient is a [employer]. Conditional approval for 1u pRBC per spouse  - f/u UA, BCx, RVP, MRSA PCR, procal      #HTN  Home meds: Atenolol/Chlorthiazide 50mg/25mg daily  - hold in setting of shock    #HLD  Home med: Atorvastatin 80mg daily    PULMONARY:  #Acute Hypoxic Respiratory Failure  Course complicated by progressive respiratory failure. CT Chest 2/20 with atelectasis of bilateral lower lobes.  - BAL 2/21 Grew PSA treated with course of Zosyn (2/21-2/28)  - s/p Tracheostomy #8 Cuffed Portex 2/16 (Dr. Sood)  - Chest PT and suctioning prn    #bibasilar PNA with mucoid impacted lower lobe on CT imaging   - cw meropenem and vanc   - fu sputum CX   - chest PT and suctioning PRN .    GI:  #Melena  New melena on 3/3 with Hg drop to 5.4.   - Jain. Spouse gave conditional approval for 1u pRBC  - repeat Hgb 9.4 will obtain repeat CBC   - CT ABD/pelvis: showing no evidence active GI bleed. portal venous gas within main portal vein may be 2/2 to ischemia in setting of shock.    - GI consult: no urgent need for EGD at this time   - monitor CBC    #S/p PEG 2/26 (Dr. Sood)  - was tolerating tube feeds at goal  - NPO for now    #transaminitis  -  and    -most likely in setting of shock   - pending RUQ US     Diet: NPO    RENAL:  #Hypernatremia  Na 152 on 3/3.  - s/p D5W 100mL/hr and DDAVP   -increase free water 350 q6h     #BALTA  Cr 1.67 on 3/3. Cr 0.7-0.8 this admission.  - IVF as above  - UA and urine lytes    ENDO:  #T2DM  Home med: Metformin 1g BID  - NPH 7u q6h  - ISS q6h     HEME/ONC/RHEUM:  #Anemia  #Jain  Hg has been 7-8 this admission, however, new decrease to 5.4 with melena on 3.3.  - Melena/GI bleed management as above.    DVT ppx: SCDs only given concern for GI bleed    ID:  #Sepsis  AMS, febrile, and hypotensive on 3/3. In shock.  - BAL 2/21 Grew PSA treated with course of Zosyn (2/21-2/28)  - Prior BCx this admission have been negative  - procal elevated at 5.04 l MRSA negative I RVP negative I UA neg   - f/u UCX , BCx,  sputum cx   - Meropenem 2g q8h (3/3 - )  - Vancomycin 1500mg q24h (3/3- ) until sputum cx comes back     SKIN/MSK: no active issues    ETHICS:  Full Code

## 2024-03-04 NOTE — CHART NOTE - NSCHARTNOTEFT_GEN_A_CORE
Nutrition Follow Up Note  Patient seen for: nutrition follow up. Chart reviewed, events noted.    Source: [] Patient       [x] Medical Record        [x] RN        [] Family at bedside       [] Other:    -If unable to interview patient: [x] Trach/Vent/BiPAP  [] Disoriented/confused/inappropriate to interview    Diet Order: N/A   - Pt previously ordered for feeds of Glucerna 1.2 at 70ml/hr x 24hr    EN Order Provides: N/A  Current Pump Rate: N/A  EN provision (per flow sheets):    - 5 Day EN Average = 1282ml volume, 1538kcal (?accuracy of documentation while out of ICU)    Is current diet order appropriate/adequate? See recommendations below.    PO intake :   [] >75%  Adequate    [] 50-75%  Fair       [] <50%  Poor      [x] N/A    Nutrition-related concerns:  - PEG placed , pt remains trach to vent. Currently NPO secondary to suspected GIB.   - Pt admitted to MICU s/p RRT on 3/3 for unresponsiveness, found to be hypotensive and febrile.   - Septic vs. hemorrhagic shock on norepinephrine @ 0.43mcgs/kg/min   - NPH and sliding scale insulin for BG management   - Hypernatremia noted   - EEG in progress     GI: Melena on 3/3, s/p PRBC. Last BM 3/4.   Bowel Regimen? [x] Yes   [] No    Weights:   Daily Weight in k.8 (-28)    Drug Dosing Weight  Height (cm): 188 (10 Feb 2024 13:08)  Weight (kg): 101.5 (2024 20:08)  BMI (kg/m2): 28.7 (10 Feb 2024 13:08)  BSA (m2): 2.28 (10 Feb 2024 13:08)    MEDICATIONS  (STANDING):  albuterol/ipratropium for Nebulization 3 milliLiter(s) Nebulizer every 6 hours  chlorhexidine 0.12% Liquid 15 milliLiter(s) Oral Mucosa every 12 hours  chlorhexidine 4% Liquid 1 Application(s) Topical daily  insulin lispro (ADMELOG) corrective regimen sliding scale   SubCutaneous every 6 hours  insulin NPH human recombinant 7 Unit(s) SubCutaneous every 6 hours  meropenem  IVPB 1000 milliGRAM(s) IV Intermittent every 8 hours  norepinephrine Infusion 0.05 MICROgram(s)/kG/Min (9.52 mL/Hr) IV Continuous <Continuous>  pantoprazole  Injectable 40 milliGRAM(s) IV Push every 12 hours  polyethylene glycol 3350 17 Gram(s) Oral daily  senna 1 Tablet(s) Oral at bedtime  vancomycin  IVPB 1500 milliGRAM(s) IV Intermittent every 24 hours    Pertinent Labs:  @ 00:10: Na 146<H>, BUN 68<H>, Cr 1.20, <H>, K+ 3.8, Phos 3.5, Mg 2.3, Alk Phos 118, ALT/SGPT 140<H>, AST/SGOT 186<H>, HbA1c --   @ 13:04: Na 150<H>, BUN 79<H>, Cr 1.67<H>, <H>, K+ 4.6, Phos 4.7<H>, Mg 2.5, Alk Phos 110, ALT/SGPT 91<H>, AST/SGOT 127<H>, HbA1c --    A1C with Estimated Average Glucose Result: 6.8 % (02-10-24 @ 01:43)    Finger Sticks:  POCT Blood Glucose.: 145 mg/dL ( @ 11:37)  POCT Blood Glucose.: 190 mg/dL ( @ 05:25)  POCT Blood Glucose.: 148 mg/dL ( @ 17:41)    Triglycerides, Serum: 94 mg/dL (24 @ 07:23)  Triglycerides, Serum: 104 mg/dL (24 @ 03:16)  Triglycerides, Serum: 76 mg/dL (02-10-24 @ 09:16)    Skin per nursing documentation: No noted pressure injuries as per documentation.   Edema: 2+ generalized    based on dosing wt 101.5kg  Estimated Energy Needs: (20-25kcal/kg): 2030 - 2536kcal   Estimated Protein Needs: (1.1-1.3g protein/kg): 112-132g protein   Estimated Fluid Needs: deferred to team  [place] Equation: 2437kcal (pt febrile in past 24hr)     Previous Nutrition Diagnosis: Moderate Protein Calorie Malnutrition  Nutrition Diagnosis is: [x] ongoing  [] resolved [] not applicable     Nutrition Care Plan:  [x] In Progress  [] Achieved  [] Not applicable    New Nutrition Diagnosis: [x] Not applicable    Nutrition Interventions:     Education Provided:       [] Yes:  [x] No:     Recommendations:   1) Defer diet advancement to team. When appropriate, consider feeds of Vital 1.5 (low fiber in setting of current pressor requirements) at 10ml/hr advancing as tolerated to goal rate of 80ml/hr x 18hr + Prosource TF Free x 1 daily to provide 1440ml volume, 2250kcal, 112g protein, 1100ml free water. Meets 22kcal/kg, 1.1g/kg protein based on dosing wt 101.5kg. Defer additional free water to team.  2) Monitor GI tolerance to feeds, RD remains available to adjust TF regimen/formulary as needed/upon request.       Monitoring and Evaluation:   Continue to monitor nutritional intake, tolerance to diet prescription, weights, labs, skin integrity    RD remains available upon request and will follow up per protocol    Johana Simental MS, RD, CDN, CNSC; available via [employer] Teams

## 2024-03-04 NOTE — PROCEDURE NOTE - NSINDICATIONS_GEN_A_CORE
critical illness/venous access
critical patient
critical patient/CSF sampling/mental status change
critical patient
dialysis/CRRT

## 2024-03-04 NOTE — PROCEDURE NOTE - NSCVLACTUALSITE_VASC_A_CORE
Telephone Encounter by Behna, Carol, RN at 01/10/18 02:48 PM     Author:  Behna, Carol, RN Service:  (none) Author Type:  Registered Nurse     Filed:  01/10/18 02:52 PM Encounter Date:  1/10/2018 Status:  Signed     :  Behna, Carol, RN (Registered Nurse)            Patient called stating that he put his gold wedding band in his sweat pants prior to procedure and forgot as he got dressed after procedure. Once home realized that he did not have his wedding band. We searched the room entirely including the garbage can. Ring not found. Notified patient that we will keep looking and if he finds it in the mean time to please call us.[CB1.1M]      Revision History        User Key Date/Time User Provider Type Action    > CB1.1 01/10/18 02:52 PM Behna, Carol, RN Registered Nurse Sign    M - Manual            
right/internal jugular
left/internal jugular

## 2024-03-04 NOTE — PROGRESS NOTE ADULT - ASSESSMENT
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 Agree clinical picture seems more consistent with AIDP/GBS (or variant) but cannot completely exclude other mimic such as infection or inflammation. MRI brain, personally reviewed by me, with small b/l cerebellar strokes (post-procedural) and prior L thalamic strokes which would not explain current clinical deficits and progressive nature. CSF shows mild lymphocytic pleiocystosis and increased protein which could be secondary to inflammatory or viral/infectious process. MRI spine, personally reviewed by me, with C3-C4 medullary lesion that does not enhance (demyelinating?) and some subtle enhancement of the L4-L5 nerve roots which could be due to stenosis at this level or inflammatory, neoplastic, or infectious process. 2/15 - Started PLEX with some improvement. Likely AIDP (Ding Serrano?) given presence of GD1b antibodies. 2/16 - Eyes stable to improved but  worsened. Likely need to wait for PLEX to further begin effect. 2/27 - Slow but consistent improvement noted. 3/4 - Worsened mental status and weakness from infection and low H/H with melena. CT head, personally reviewed by me, without acute intracranial event. Agree with need to rule out seizure, EEG unrevealing thus far. Also could be hypoperfusion/watershed strokes  - vEEG to evaluate for focal slowing, epileptiform discharges, or seizures. Would stop tomorrow (3/5) if unrevealing  - MRI brain w/o to assess for above  - Await serum studies for autoimmune encephalopathy panel  - Await CSF studies for HSV 1/2 PCR, cytology  - No neurologic contraindications to PLEX therapy (even if viral infection). Finished PLEX  and full course of IVIG (2g/kg)  - Vitamin D levels low, would replete with D2 42804 Units PO weekly for 8 weeks and then recheck new levels  - ASA 81mg PO daily and plavix 75mg PO daily for 3 months and then ASA monotherapy for SAMMPRIS trial  - PT/OT as tolerated  - Above recommendations discussed with ICU team, who verbalized agreement and understanding  - Continue to address above medical problems, as you are doing  - Will continue to follow patient with you

## 2024-03-04 NOTE — PROCEDURE NOTE - NSPROCNAME_GEN_A_CORE
Central Line Insertion
Bronchoscopy
Arterial Puncture/Cannulation
Lumbar Puncture
Arterial Puncture/Cannulation
Central Line Insertion

## 2024-03-05 LAB
ALBUMIN SERPL ELPH-MCNC: 2.4 G/DL — LOW (ref 3.3–5)
ALP SERPL-CCNC: 111 U/L — SIGNIFICANT CHANGE UP (ref 40–120)
ALT FLD-CCNC: 177 U/L — HIGH (ref 10–45)
AMMONIA BLD-MCNC: 43 UMOL/L — SIGNIFICANT CHANGE UP (ref 11–55)
ANION GAP SERPL CALC-SCNC: 9 MMOL/L — SIGNIFICANT CHANGE UP (ref 5–17)
APTT BLD: 24.4 SEC — LOW (ref 24.5–35.6)
AST SERPL-CCNC: 190 U/L — HIGH (ref 10–40)
BILIRUB SERPL-MCNC: 0.4 MG/DL — SIGNIFICANT CHANGE UP (ref 0.2–1.2)
BUN SERPL-MCNC: 60 MG/DL — HIGH (ref 7–23)
CALCIUM SERPL-MCNC: 7.4 MG/DL — LOW (ref 8.4–10.5)
CHLORIDE SERPL-SCNC: 115 MMOL/L — HIGH (ref 96–108)
CK SERPL-CCNC: 506 U/L — HIGH (ref 30–200)
CO2 SERPL-SCNC: 25 MMOL/L — SIGNIFICANT CHANGE UP (ref 22–31)
CREAT SERPL-MCNC: 0.94 MG/DL — SIGNIFICANT CHANGE UP (ref 0.5–1.3)
EGFR: 86 ML/MIN/1.73M2 — SIGNIFICANT CHANGE UP
GLUCOSE BLDC GLUCOMTR-MCNC: 127 MG/DL — HIGH (ref 70–99)
GLUCOSE BLDC GLUCOMTR-MCNC: 130 MG/DL — HIGH (ref 70–99)
GLUCOSE BLDC GLUCOMTR-MCNC: 93 MG/DL — SIGNIFICANT CHANGE UP (ref 70–99)
GLUCOSE SERPL-MCNC: 166 MG/DL — HIGH (ref 70–99)
HCT VFR BLD CALC: 29.9 % — LOW (ref 39–50)
HGB BLD-MCNC: 8.4 G/DL — LOW (ref 13–17)
INR BLD: 1.39 RATIO — HIGH (ref 0.85–1.18)
MAGNESIUM SERPL-MCNC: 2.6 MG/DL — SIGNIFICANT CHANGE UP (ref 1.6–2.6)
MCHC RBC-ENTMCNC: 24.1 PG — LOW (ref 27–34)
MCHC RBC-ENTMCNC: 28.1 GM/DL — LOW (ref 32–36)
MCV RBC AUTO: 85.9 FL — SIGNIFICANT CHANGE UP (ref 80–100)
NRBC # BLD: 4 /100 WBCS — HIGH (ref 0–0)
NT-PROBNP SERPL-SCNC: 606 PG/ML — HIGH (ref 0–300)
PHOSPHATE SERPL-MCNC: 2.7 MG/DL — SIGNIFICANT CHANGE UP (ref 2.5–4.5)
PLATELET # BLD AUTO: 326 K/UL — SIGNIFICANT CHANGE UP (ref 150–400)
POTASSIUM SERPL-MCNC: 3.5 MMOL/L — SIGNIFICANT CHANGE UP (ref 3.5–5.3)
POTASSIUM SERPL-SCNC: 3.5 MMOL/L — SIGNIFICANT CHANGE UP (ref 3.5–5.3)
PROT SERPL-MCNC: 6.4 G/DL — SIGNIFICANT CHANGE UP (ref 6–8.3)
PROTHROM AB SERPL-ACNC: 14.5 SEC — HIGH (ref 9.5–13)
RBC # BLD: 3.48 M/UL — LOW (ref 4.2–5.8)
RBC # FLD: 25.8 % — HIGH (ref 10.3–14.5)
SODIUM SERPL-SCNC: 149 MMOL/L — HIGH (ref 135–145)
TROPONIN T, HIGH SENSITIVITY RESULT: 68 NG/L — HIGH (ref 0–51)
WBC # BLD: 18.51 K/UL — HIGH (ref 3.8–10.5)
WBC # FLD AUTO: 18.51 K/UL — HIGH (ref 3.8–10.5)

## 2024-03-05 PROCEDURE — 76705 ECHO EXAM OF ABDOMEN: CPT | Mod: 26

## 2024-03-05 PROCEDURE — 99232 SBSQ HOSP IP/OBS MODERATE 35: CPT | Mod: GC

## 2024-03-05 PROCEDURE — 70551 MRI BRAIN STEM W/O DYE: CPT | Mod: 26

## 2024-03-05 PROCEDURE — 99291 CRITICAL CARE FIRST HOUR: CPT | Mod: GC

## 2024-03-05 PROCEDURE — 99291 CRITICAL CARE FIRST HOUR: CPT

## 2024-03-05 RX ORDER — DROXIDOPA 100 MG/1
100 CAPSULE ORAL THREE TIMES A DAY
Refills: 0 | Status: DISCONTINUED | OUTPATIENT
Start: 2024-03-05 | End: 2024-03-05

## 2024-03-05 RX ORDER — HEPARIN SODIUM 5000 [USP'U]/ML
5000 INJECTION INTRAVENOUS; SUBCUTANEOUS EVERY 12 HOURS
Refills: 0 | Status: DISCONTINUED | OUTPATIENT
Start: 2024-03-05 | End: 2024-03-06

## 2024-03-05 RX ORDER — DROXIDOPA 100 MG/1
200 CAPSULE ORAL EVERY 8 HOURS
Refills: 0 | Status: DISCONTINUED | OUTPATIENT
Start: 2024-03-05 | End: 2024-03-07

## 2024-03-05 RX ORDER — POTASSIUM CHLORIDE 20 MEQ
10 PACKET (EA) ORAL
Refills: 0 | Status: COMPLETED | OUTPATIENT
Start: 2024-03-05 | End: 2024-03-05

## 2024-03-05 RX ADMIN — Medication 2: at 00:05

## 2024-03-05 RX ADMIN — Medication 3 MILLILITER(S): at 06:32

## 2024-03-05 RX ADMIN — DROXIDOPA 200 MILLIGRAM(S): 100 CAPSULE ORAL at 11:39

## 2024-03-05 RX ADMIN — CHLORHEXIDINE GLUCONATE 15 MILLILITER(S): 213 SOLUTION TOPICAL at 17:31

## 2024-03-05 RX ADMIN — HUMAN INSULIN 7 UNIT(S): 100 INJECTION, SUSPENSION SUBCUTANEOUS at 05:18

## 2024-03-05 RX ADMIN — MEROPENEM 100 MILLIGRAM(S): 1 INJECTION INTRAVENOUS at 05:18

## 2024-03-05 RX ADMIN — Medication 100 MILLIEQUIVALENT(S): at 02:29

## 2024-03-05 RX ADMIN — CHLORHEXIDINE GLUCONATE 15 MILLILITER(S): 213 SOLUTION TOPICAL at 05:18

## 2024-03-05 RX ADMIN — Medication 9.52 MICROGRAM(S)/KG/MIN: at 19:59

## 2024-03-05 RX ADMIN — Medication 3 MILLILITER(S): at 00:02

## 2024-03-05 RX ADMIN — PANTOPRAZOLE SODIUM 40 MILLIGRAM(S): 20 TABLET, DELAYED RELEASE ORAL at 05:18

## 2024-03-05 RX ADMIN — CHLORHEXIDINE GLUCONATE 1 APPLICATION(S): 213 SOLUTION TOPICAL at 11:09

## 2024-03-05 RX ADMIN — DROXIDOPA 200 MILLIGRAM(S): 100 CAPSULE ORAL at 19:59

## 2024-03-05 RX ADMIN — HEPARIN SODIUM 5000 UNIT(S): 5000 INJECTION INTRAVENOUS; SUBCUTANEOUS at 17:25

## 2024-03-05 RX ADMIN — POLYETHYLENE GLYCOL 3350 17 GRAM(S): 17 POWDER, FOR SOLUTION ORAL at 11:09

## 2024-03-05 RX ADMIN — Medication 100 MILLIEQUIVALENT(S): at 01:50

## 2024-03-05 RX ADMIN — Medication 3 MILLILITER(S): at 17:15

## 2024-03-05 RX ADMIN — MEROPENEM 100 MILLIGRAM(S): 1 INJECTION INTRAVENOUS at 13:50

## 2024-03-05 RX ADMIN — Medication 3 MILLILITER(S): at 11:20

## 2024-03-05 RX ADMIN — Medication 100 MILLIEQUIVALENT(S): at 03:03

## 2024-03-05 RX ADMIN — Medication 9.52 MICROGRAM(S)/KG/MIN: at 11:40

## 2024-03-05 RX ADMIN — HUMAN INSULIN 7 UNIT(S): 100 INJECTION, SUSPENSION SUBCUTANEOUS at 00:04

## 2024-03-05 RX ADMIN — MEROPENEM 100 MILLIGRAM(S): 1 INJECTION INTRAVENOUS at 21:22

## 2024-03-05 RX ADMIN — PANTOPRAZOLE SODIUM 40 MILLIGRAM(S): 20 TABLET, DELAYED RELEASE ORAL at 17:25

## 2024-03-05 NOTE — PROGRESS NOTE ADULT - SUBJECTIVE AND OBJECTIVE BOX
Interval Events:   -    Hospital Medications:  albuterol/ipratropium for Nebulization 3 milliLiter(s) Nebulizer every 6 hours  chlorhexidine 0.12% Liquid 15 milliLiter(s) Oral Mucosa every 12 hours  chlorhexidine 4% Liquid 1 Application(s) Topical daily  droxidopa 200 milliGRAM(s) Oral every 8 hours  heparin   Injectable 5000 Unit(s) SubCutaneous every 12 hours  insulin lispro (ADMELOG) corrective regimen sliding scale   SubCutaneous every 6 hours  insulin NPH human recombinant 7 Unit(s) SubCutaneous every 6 hours  meropenem  IVPB 1000 milliGRAM(s) IV Intermittent every 8 hours  norepinephrine Infusion 0.05 MICROgram(s)/kG/Min (9.52 mL/Hr) IV Continuous <Continuous>  pantoprazole  Injectable 40 milliGRAM(s) IV Push every 12 hours  polyethylene glycol 3350 17 Gram(s) Oral daily  senna 1 Tablet(s) Oral at bedtime        albuterol/ipratropium for Nebulization: 3 milliLiter(s) Nebulizer (24 @ 17:25)  vancomycin  IVPB: 300 mL/Hr IV Intermittent (24 @ 17:35)  chlorhexidine 0.12% Liquid: 15 milliLiter(s) Oral Mucosa (24 @ 17:37)  insulin NPH human recombinant: 7 Unit(s) SubCutaneous (24 @ 18:18)  pantoprazole  Injectable: 40 milliGRAM(s) IV Push (24 @ 18:18)  norepinephrine Infusion: 9.52 mL/Hr IV Continuous (24 @ 19:27)  meropenem  IVPB: 100 mL/Hr IV Intermittent (24 @ 21:06)  senna: 1 Tablet(s) Oral (24 @ 21:06)  albuterol/ipratropium for Nebulization: 3 milliLiter(s) Nebulizer (24 @ 00:02)  insulin NPH human recombinant: 7 Unit(s) SubCutaneous (24 @ 00:04)  insulin lispro (ADMELOG) corrective regimen sliding scale: 2 Unit(s) SubCutaneous (24 @ 00:05)  potassium chloride  10 mEq/100 mL IVPB: 100 mL/Hr IV Intermittent (24 @ 01:50)  potassium chloride  10 mEq/100 mL IVPB: 100 mL/Hr IV Intermittent (24 @ 02:29)  potassium chloride  10 mEq/100 mL IVPB: 100 mL/Hr IV Intermittent (24 @ 03:03)  chlorhexidine 0.12% Liquid: 15 milliLiter(s) Oral Mucosa (24 @ 05:18)  insulin NPH human recombinant: 7 Unit(s) SubCutaneous (24 @ 05:18)  meropenem  IVPB: 100 mL/Hr IV Intermittent (24 @ 05:18)  pantoprazole  Injectable: 40 milliGRAM(s) IV Push (24 @ 05:18)  albuterol/ipratropium for Nebulization: 3 milliLiter(s) Nebulizer (24 @ 06:32)  chlorhexidine 4% Liquid: 1 Application(s) Topical (24 @ 11:09)  polyethylene glycol 3350: 17 Gram(s) Oral (24 @ 11:09)  albuterol/ipratropium for Nebulization: 3 milliLiter(s) Nebulizer (24 @ 11:20)  droxidopa: 200 milliGRAM(s) Oral (24 @ 11:39)  meropenem  IVPB: 100 mL/Hr IV Intermittent (24 @ 13:50)        US Abdomen Upper Quadrant Right:   ACC: [id number] EXAM:  US ABDOMEN RT UPR QUADRANT   ORDERED BY:  CONNIE <TRUNCATED> (24 @ 14:45)      24 @ 07:01  -  24 @ 07:00  --------------------------------------------------------  IN: 1400 mL / OUT: 0 mL / NET: 1400 mL    24 @ 07:01  -  24 @ 15:52  --------------------------------------------------------  IN: 350 mL / OUT: 0 mL / NET: 350 mL          PHYSICAL EXAM:   Vital Signs:  Vital Signs Last 24 Hrs  T(C): 38 (05 Mar 2024 12:00), Max: 38 (04 Mar 2024 16:00)  T(F): 100.4 (05 Mar 2024 12:00), Max: 100.4 (04 Mar 2024 16:00)  HR: 86 (05 Mar 2024 15:30) (68 - 99)  BP: --  BP(mean): --  RR: 20 (05 Mar 2024 15:30) (20 - 25)  SpO2: 98% (05 Mar 2024 15:30) (93% - 100%)    Parameters below as of 05 Mar 2024 11:52  Patient On (Oxygen Delivery Method): ventilator      Daily     Daily   GENERAL:  NAD, Appears stated age  HEENT:  NC/AT,  conjunctivae clear and pink, sclera -anicteric  CHEST:  Normal Effort, no signs of resp distress  HEART:  RRR, HD stable  ABDOMEN:  Soft, non-tender, non-distended  EXTREMITIES:  no cyanosis or edema  SKIN:  Warm & Dry. No rash or erythema  NEURO:  Alert, oriented, no focal deficit  LABS:                        8.4    18.51 )-----------( 326      ( 04 Mar 2024 23:57 )             29.9     Last Hb:Hemoglobin: 8.4 g/dL (24 @ 23:57)  Hemoglobin: 9.3 g/dL (24 @ 12:10)  Hemoglobin: 9.2 g/dL (24 @ 00:10)               149   |  115   |  60                 Ca: 7.4    BMP:   ----------------------------< 166    M.6   (24 @ 00:45)             3.5    |  25    | 0.94               Ph: 2.7      LFT:     TPro: 6.4 / Alb: 2.4 / TBili: 0.4 / DBili: x / AST: 190 / ALT: 177 / AlkPhos: 111   (24 @ 00:45)    Creatinine: 0.94 mg/dL  Creatinine: 1.20 mg/dL  Creatinine: 1.67 mg/dL      LIVER FUNCTIONS - ( 05 Mar 2024 00:45 )  Alb: 2.4 g/dL / Pro: 6.4 g/dL / ALK PHOS: 111 U/L / ALT: 177 U/L / AST: 190 U/L / GGT: x           PT/INR - ( 04 Mar 2024 23:57 )   PT: 14.5 sec;   INR: 1.39 ratio         PTT - ( 04 Mar 2024 23:57 )  PTT:24.4 sec  Urinalysis Basic - ( 05 Mar 2024 00:45 )    Color: x / Appearance: x / SG: x / pH: x  Gluc: 166 mg/dL / Ketone: x  / Bili: x / Urobili: x   Blood: x / Protein: x / Nitrite: x   Leuk Esterase: x / RBC: x / WBC x   Sq Epi: x / Non Sq Epi: x / Bacteria: x        Culture - Sputum (collected 24 @ 09:22)  Source: ET Tube ET Tube  Gram Stain (24 @ 21:42):    Numerous polymorphonuclear leukocytes per low power field    Moderate Squamous epithelial cells per low power field    Moderate Gram Negative Rods seen per oil power field    Few Gram Positive Cocci in Pairs and Chains seen per oil power field    Culture - Blood (collected 24 @ 16:00)  Source: .Blood Blood-Arterial  Preliminary Report (24 @ 22:03):    No growth at 24 hours    Culture - Urine (collected 24 @ 13:56)  Source: Catheterized Catheterized  Final Report (24 @ 17:26):    No growth    Culture - Blood (collected 24 @ 11:15)  Source: .Blood Blood  Preliminary Report (24 @ 18:02):    No growth at 24 hours    Culture - Blood (collected 24 @ 18:03)  Source: .Blood Blood  Final Report (24 @ 23:00):    No growth at 5 days    Culture - Blood (collected 24 @ 18:03)  Source: .Blood Blood  Final Report (24 @ 23:00):    No growth at 5 days    Culture - Bronchial (collected 24 @ 14:29)  Source: .Bronchial Bronchial Lavage  Gram Stain (24 @ 06:36):    Rare polymorphonuclear leukocytes seen per low power field    Rare Squamous epithelial cells seen per low power field    Few Gram Negative Rods seen per oil power field    Rare Gram positive cocci in pairs seen per oil power field  Final Report (24 @ 18:43):    Few Pseudomonas aeruginosa    Normal Respiratory Nallely present  Organism: Pseudomonas aeruginosa (24 @ 18:43)  Organism: Pseudomonas aeruginosa (24 @ 18:43)      Method Type: KELLY      -  Aztreonam: S 8      -  Cefepime: S <=2      -  Ceftazidime: S 4      -  Ciprofloxacin: S <=0.25      -  Imipenem: S <=1      -  Levofloxacin: S <=0.5      -  Meropenem: S <=1      -  Piperacillin/Tazobactam: S <=8    Culture - Bronchial (collected 24 @ 14:29)  Source: .Bronchial Bronchial Lavage  Gram Stain (24 @ 06:34):    Numerous polymorphonuclear leukocytes seen per low power field    Rare Squamous epithelial cells seen per low power field    Few Gram Negative Rods seen per oil power field    Few Gram positive cocci in pairs seen per oil power field  Final Report (24 @ 18:46):    Moderate Mixed gram negative rods including    Moderate Pseudomonas aeruginosa    Normal Respiratory Nallely present  Organism: Pseudomonas aeruginosa (24 @ 18:46)  Organism: Pseudomonas aeruginosa (24 @ 18:46)      Method Type: KELLY      -  Aztreonam: S 8      -  Cefepime: S 8      -  Ceftazidime: S 4      -  Ciprofloxacin: S <=0.25      -  Imipenem: S <=1      -  Levofloxacin: S <=0.5      -  Meropenem: S <=1      -  Piperacillin/Tazobactam: S <=8    Culture - Blood (collected 24 @ 16:57)  Source: .Blood Blood  Final Report (24 @ 23:00):    No growth at 5 days    Culture - Blood (collected 24 @ 16:47)  Source: .Blood Blood  Final Report (24 @ 23:00):    No growth at 5 days      Amylase Serum--      Lipase serum--       [id number]           Interval Events:   -Hb 9.2-->9.3 --> 8.4  -Remains on lveophed  -being evaluated by neuro for persistent AMS    Hospital Medications:  albuterol/ipratropium for Nebulization 3 milliLiter(s) Nebulizer every 6 hours  chlorhexidine 0.12% Liquid 15 milliLiter(s) Oral Mucosa every 12 hours  chlorhexidine 4% Liquid 1 Application(s) Topical daily  droxidopa 200 milliGRAM(s) Oral every 8 hours  heparin   Injectable 5000 Unit(s) SubCutaneous every 12 hours  insulin lispro (ADMELOG) corrective regimen sliding scale   SubCutaneous every 6 hours  insulin NPH human recombinant 7 Unit(s) SubCutaneous every 6 hours  meropenem  IVPB 1000 milliGRAM(s) IV Intermittent every 8 hours  norepinephrine Infusion 0.05 MICROgram(s)/kG/Min (9.52 mL/Hr) IV Continuous <Continuous>  pantoprazole  Injectable 40 milliGRAM(s) IV Push every 12 hours  polyethylene glycol 3350 17 Gram(s) Oral daily  senna 1 Tablet(s) Oral at bedtime      US Abdomen Upper Quadrant Right:   ACC: [id number] EXAM:  US ABDOMEN RT UPR QUADRANT   ORDERED BY:  CONNIE <TRUNCATED> (24 @ 14:45)      24 @ 07:01  -  24 @ 07:00  --------------------------------------------------------  IN: 1400 mL / OUT: 0 mL / NET: 1400 mL    24 @ 07:01  -  24 @ 15:52  --------------------------------------------------------  IN: 350 mL / OUT: 0 mL / NET: 350 mL          PHYSICAL EXAM:   Vital Signs:  Vital Signs Last 24 Hrs  T(C): 38 (05 Mar 2024 12:00), Max: 38 (04 Mar 2024 16:00)  T(F): 100.4 (05 Mar 2024 12:00), Max: 100.4 (04 Mar 2024 16:00)  HR: 86 (05 Mar 2024 15:30) (68 - 99)  BP: --  BP(mean): --  RR: 20 (05 Mar 2024 15:30) (20 - 25)  SpO2: 98% (05 Mar 2024 15:30) (93% - 100%)    Parameters below as of 05 Mar 2024 11:52  Patient On (Oxygen Delivery Method): ventilator      Daily     Daily   GENERAL:  NAD, Appears stated age  HEENT:  NC/AT,  conjunctivae clear and pink, sclera -anicteric  CHEST:  Normal Effort, no signs of resp distress  HEART:  RRR, HD stable  ABDOMEN:  Soft, non-tender, non-distended  EXTREMITIES:  no cyanosis or edema  SKIN:  Warm & Dry. No rash or erythema  NEURO:  Alert, oriented, no focal deficit  LABS:                        8.4    18.51 )-----------( 326      ( 04 Mar 2024 23:57 )             29.9     Last Hb:Hemoglobin: 8.4 g/dL (24 @ 23:57)  Hemoglobin: 9.3 g/dL (24 @ 12:10)  Hemoglobin: 9.2 g/dL (24 @ 00:10)               149   |  115   |  60                 Ca: 7.4    BMP:   ----------------------------< 166    M.6   (24 @ 00:45)             3.5    |  25    | 0.94               Ph: 2.7      LFT:     TPro: 6.4 / Alb: 2.4 / TBili: 0.4 / DBili: x / AST: 190 / ALT: 177 / AlkPhos: 111   (24 @ 00:45)    Creatinine: 0.94 mg/dL  Creatinine: 1.20 mg/dL  Creatinine: 1.67 mg/dL      LIVER FUNCTIONS - ( 05 Mar 2024 00:45 )  Alb: 2.4 g/dL / Pro: 6.4 g/dL / ALK PHOS: 111 U/L / ALT: 177 U/L / AST: 190 U/L / GGT: x           PT/INR - ( 04 Mar 2024 23:57 )   PT: 14.5 sec;   INR: 1.39 ratio         PTT - ( 04 Mar 2024 23:57 )  PTT:24.4 sec  Urinalysis Basic - ( 05 Mar 2024 00:45 )    Color: x / Appearance: x / SG: x / pH: x  Gluc: 166 mg/dL / Ketone: x  / Bili: x / Urobili: x   Blood: x / Protein: x / Nitrite: x   Leuk Esterase: x / RBC: x / WBC x   Sq Epi: x / Non Sq Epi: x / Bacteria: x        Culture - Sputum (collected 24 @ 09:22)  Source: ET Tube ET Tube  Gram Stain (24 @ 21:42):    Numerous polymorphonuclear leukocytes per low power field    Moderate Squamous epithelial cells per low power field    Moderate Gram Negative Rods seen per oil power field    Few Gram Positive Cocci in Pairs and Chains seen per oil power field    Culture - Blood (collected 24 @ 16:00)  Source: .Blood Blood-Arterial  Preliminary Report (24 @ 22:03):    No growth at 24 hours    Culture - Urine (collected 24 @ 13:56)  Source: Catheterized Catheterized  Final Report (24 @ 17:26):    No growth    Culture - Blood (collected 24 @ 11:15)  Source: .Blood Blood  Preliminary Report (24 @ 18:02):    No growth at 24 hours    Culture - Blood (collected 24 @ 18:03)  Source: .Blood Blood  Final Report (24 @ 23:00):    No growth at 5 days    Culture - Blood (collected 24 @ 18:03)  Source: .Blood Blood  Final Report (24 @ 23:00):    No growth at 5 days    Culture - Bronchial (collected 24 @ 14:29)  Source: .Bronchial Bronchial Lavage  Gram Stain (24 @ 06:36):    Rare polymorphonuclear leukocytes seen per low power field    Rare Squamous epithelial cells seen per low power field    Few Gram Negative Rods seen per oil power field    Rare Gram positive cocci in pairs seen per oil power field  Final Report (24 @ 18:43):    Few Pseudomonas aeruginosa    Normal Respiratory Nallely present  Organism: Pseudomonas aeruginosa (24 @ 18:43)  Organism: Pseudomonas aeruginosa (24 @ 18:43)      Method Type: EKLLY      -  Aztreonam: S 8      -  Cefepime: S <=2      -  Ceftazidime: S 4      -  Ciprofloxacin: S <=0.25      -  Imipenem: S <=1      -  Levofloxacin: S <=0.5      -  Meropenem: S <=1      -  Piperacillin/Tazobactam: S <=8    Culture - Bronchial (collected 24 @ 14:29)  Source: .Bronchial Bronchial Lavage  Gram Stain (24 @ 06:34):    Numerous polymorphonuclear leukocytes seen per low power field    Rare Squamous epithelial cells seen per low power field    Few Gram Negative Rods seen per oil power field    Few Gram positive cocci in pairs seen per oil power field  Final Report (24 @ 18:46):    Moderate Mixed gram negative rods including    Moderate Pseudomonas aeruginosa    Normal Respiratory Nallely present  Organism: Pseudomonas aeruginosa (24 @ 18:46)  Organism: Pseudomonas aeruginosa (24 @ 18:46)      Method Type: KELLY      -  Aztreonam: S 8      -  Cefepime: S 8      -  Ceftazidime: S 4      -  Ciprofloxacin: S <=0.25      -  Imipenem: S <=1      -  Levofloxacin: S <=0.5      -  Meropenem: S <=1      -  Piperacillin/Tazobactam: S <=8    Culture - Blood (collected 24 @ 16:57)  Source: .Blood Blood  Final Report (24 @ 23:00):    No growth at 5 days    Culture - Blood (collected 24 @ 16:47)  Source: .Blood Blood  Final Report (24 @ 23:00):    No growth at 5 days      Amylase Serum--      Lipase serum--       [id number]

## 2024-03-05 NOTE — PROGRESS NOTE ADULT - ATTENDING COMMENTS
Agree with above. D/w patient's wife and daughter at bedside. No signs of active bleeding, brown stool this AM. Anemia likely multifactorial, in the absence of active GI bleeding, no plans for endoscopic intervention at this time.

## 2024-03-05 NOTE — PROGRESS NOTE ADULT - SUBJECTIVE AND OBJECTIVE BOX
NEUROLOGY FOLLOW-UP CONSULT NOTE    RFC: Weakness, ophthalmoplegia, concern for AIDP/GBS    Interval history: No acute neurologic events overnight. Patient remains weak following PLEX and IVIG treatment but mentation improving. No abnormal movements noted. EEG connected at bedside.    Meds:  MEDICATIONS  (STANDING):  albuterol/ipratropium for Nebulization 3 milliLiter(s) Nebulizer every 6 hours  chlorhexidine 0.12% Liquid 15 milliLiter(s) Oral Mucosa every 12 hours  chlorhexidine 4% Liquid 1 Application(s) Topical daily  droxidopa 200 milliGRAM(s) Oral every 8 hours  heparin   Injectable 5000 Unit(s) SubCutaneous every 12 hours  insulin lispro (ADMELOG) corrective regimen sliding scale   SubCutaneous every 6 hours  insulin NPH human recombinant 7 Unit(s) SubCutaneous every 6 hours  meropenem  IVPB 1000 milliGRAM(s) IV Intermittent every 8 hours  norepinephrine Infusion 0.05 MICROgram(s)/kG/Min (9.52 mL/Hr) IV Continuous <Continuous>  pantoprazole  Injectable 40 milliGRAM(s) IV Push every 12 hours  polyethylene glycol 3350 17 Gram(s) Oral daily  senna 1 Tablet(s) Oral at bedtime    MEDICATIONS  (PRN):    GTT:  Norepinephrine 0.18 mcg/kg/min    PMHx/PSHx/FHx/SHx:  Cerebral infarction    Handoff    MEWS Score    Hypertension    Diabetes    CVA (cerebral vascular accident)    HTN (hypertension)    HLD (hyperlipidemia)    DM2 (diabetes mellitus, type 2)    CVA (cerebrovascular accident)    Acute ischemic stroke    GBS (Guillain-Naples syndrome)    Acute respiratory failure with hypoxia    HTN (hypertension)    Dysphagia    Sympathetic storming    Infection    Anemia    Need for prophylactic measure    Counseling regarding goals of care    Diabetes    Hypernatremia    Tracheostomy, planned    EGD, with PEG    No significant past surgical history    CODE STROKE 1850 TRANSFER    Required emergent intubation    [email or web address]        Allergies:  No Known Allergies      ROS: All systems negative except as documented in Interval history    O:  T(C): 38 (03-05-24 @ 12:00), Max: 38 (03-04-24 @ 16:00)  HR: 82 (03-05-24 @ 12:45) (68 - 112)  BP: --  RR: 20 (03-05-24 @ 12:45) (20 - 25)  SpO2: 97% (03-05-24 @ 12:45) (93% - 100%)    Focused neurologic exam:  MS - Tracheostomy in place, not sedated, awake, attends to all stimuli b/l, no speech output but can move feet appropriately and no head in response to "yes"/"no" questions, follows simple commands but limited due to profound weakness. Due to clinical condition unable to assess (DANIAL) orientation, rep/naming, attn/conc/recent and remote memory/fund of knowledge  CN - PERRL, EOM's with near normal movements of R eye and significantly dec excursions and volitional movements of L eye, VFF to threat b/l (by foot tapping), face sens intact, facial strength with profound weakness L > R in a peripheral pattern, hearing intact to conversation b/l, (-) spontaneous respirations (vent rate 20 bpm with tracheostomy), (-) cough, tongue midline but weak, trap 4/5 b/l and symmetric. DANIAL palate  Motor - Normal bulk and flaccid tone throughout. Strength 0/5 throughout  Sens - LT/PP intact all  DTR's - 0+ all and neutral b/l plantar response  Coord - DANIAL  Gait and station - DANIAL    Pertinent labs/studies:  CBC with inc WBC 18.51, low H/H 8/30 and MCV WNL, otherwise essentially WNL  PT/INR inc 14.5/1.39, PTT dec 24.4  BMP with inc Na 149, otherwise essentially WNL  Albumin dec 2.4, LFT's with inc ALT//190  Mg WNL, Phos WNL  ESR inc 113 -> WNL, B12 WNL, folate WNL, TSH/free T4 WNL, CPK WNL, A1C inc 6.8%, syphilis serology TP (-), HIV (-), CPK WNL, LDL inc 162 -> 53, HDL 51 -> 19, CRP inc 65, Vitamin D dec 14.3, ACE WNL, anti-ganglioside Ab panel inc GD1b 110, dsDNA WNL, Lyme (-), FLORESITA (-), quantiferon TB gold (-), WNV (-), B1 WNL, B6 WNL, MOG (-), copper inc 156, heavy metal screen (-), NMO Ab (-), Legionella Ag (-), NH3 WNL    CSF (2/11) - clear, colorless, RBC 3, WBC 11 and lymphocytes 67%, glucose 83, protein 67, Cx - no PMN's or organisms, PCR panel (-), LDH 22, IgG Index WNL, cryptococcal Ag (-), AFB (-), myelin basic protein WNL, Lyme (-), cryptococcal Ag (-), flow cytometry - insufficient cells, oligoclonal bands absent, VDRL (-), VZV PCR (-), myelin basic protein WNL, autoimmune encephalopathy panel (-), ACE WNL, WNV (-)    vEEG 3/5 -  EEG Summary / Classification:  Abnormal EEG.  •	Background slowing    EEG Impression / Clinical Correlate:  Abnormal prolonged EEG study due to Mild to moderate diffuse cerebral dysfunction that is not specific in etiology    No epileptic discharges recorded.  No seizures recorded.  • Noted irregular heart rhythm on EKG    < from: CT Head No Cont (03.03.24 @ 12:15) >  1. BRAIN:  No intracranial hemorrhage is appreciated.  No intracranial   mass lesion is appreciated. No significant interval change to 11/20/2024    2.  RIGHT CAROTID SYSTEM:    Atherosclerotic plaque carotid bulb without    hemodynamically significant stenosis.    3.   LEFT CAROTID SYSTEM:     Atherosclerotic plaque carotid bulb without    hemodynamically significant stenosis.    4.   VERTEBRAL CIRCULATION:    Patent.    5.  ANTERIOR INTRACRANIAL CIRCULATION: Intracranial atherosclerosis   cavernous and clinoid segments of the internal carotid arteries,   mild-to-moderate.    6.  POSTERIOR INTRACRANIAL CIRCULATION:    Intracranial atherosclerosis   basilar artery, moderate to severe.    7.  No large vessel occlusion    8.  BRAIN PERFUSION:   No acute infarction of the brain is convincingly   demonstrated.    < end of copied text >      < from: CT Angio Head w/ IV Cont (02.11.24 @ 02:26) >  1. Focal small dissection suspected in the proximal left V4 segment.  2. Unchanged high-grade focal stenosis involving the proximalbasilar   artery and proximal left P2 segment.  3. Otherwise, no large vessel occlusion or major stenosis.    < end of copied text >      < from: MR Head w/wo IV Cont (02.11.24 @ 13:08) >  1.  No significant interval change in the small acute bilateral   cerebellar infarcts. No new superimposed acute intracranial abnormality.   No acute intracranial hemorrhage.  2.  Findings suspicious for cerebral amyloid angiopathy or hypertensive   microhemorrhages.  3.  Probable minimal chronic microvascular ischemic disease.  4.  Sinus disease.    < end of copied text >    < from: MR Thoracic Spine w/wo IV Cont (02.11.24 @ 13:09) >  1. CERVICAL SPINE:  Low-grade cervical degenerative disc disease.   Indistinct cord lesions at the C3 and C4 vertebral levels are nonspecific   but suggest inflammatory/infectious/demyelinating etiology. No associated   enhancement.    2. THORACIC SPINE:   Low-grade thoracic degenerative disc disease.   Thoracic cord intact.  No abnomal enhancement.    3. LUMBAR SPINE:   Advanced lower lumbar degenerative disc disease   includes moderate degenerative central canal stenosis at L4-L5 and   high-grade degenerative foraminal stenosis at L5-S1. Conus intact. Cauda   equina demonstrates central clustering with spinal stenosis atL4-L5 and   mild radicular enhancement predominantly distal to this location of   spinal stenosis, nonspecific, inflammatory versus congestion from the   stenosis. Vascular congestion between the conus and the L4-L5 stenosis.    Superimposed nodular enhancement at the L4 superior endplate level is   also nonspecific, inflammatory versus schwannoma    < end of copied text >    < from: TTE W or WO Ultrasound Enhancing Agent (02.11.24 @ 07:56) >   1. Left ventricular cavity is small. Left ventricular systolic function is normal with an ejection fraction of 66 % by Glynn's method of disks. There are no regional wall motion abnormalities seen.   2. Normal left ventricular diastolic function, with normal filling pressure.   3. Normal right ventricular cavity size, wall thickness, and normal systolic function.   4. Normal left and right atrial size.   5. No pericardial effusion seen.   6. Agitated saline injection was negative for intracardiac shunt.   7. No prior echocardiogram is available for comparison.   8. There is no evidence of a left ventricular thrombus.   9. There is normal LV mass and concentric remodeling.    < end of copied text >

## 2024-03-05 NOTE — PROGRESS NOTE ADULT - ASSESSMENT
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 Agree clinical picture seems more consistent with AIDP/GBS (or variant) but cannot completely exclude other mimic such as infection or inflammation. MRI brain, personally reviewed by me, with small b/l cerebellar strokes (post-procedural) and prior L thalamic strokes which would not explain current clinical deficits and progressive nature. CSF shows mild lymphocytic pleiocystosis and increased protein which could be secondary to inflammatory or viral/infectious process. MRI spine, personally reviewed by me, with C3-C4 medullary lesion that does not enhance (demyelinating?) and some subtle enhancement of the L4-L5 nerve roots which could be due to stenosis at this level or inflammatory, neoplastic, or infectious process. 2/15 - Started PLEX with some improvement. Likely AIDP (Ding Serrano?) given presence of GD1b antibodies. 2/16 - Eyes stable to improved but  worsened. Likely need to wait for PLEX to further begin effect. 2/27 - Slow but consistent improvement noted. 3/4 - Worsened mental status and weakness from infection and low H/H with melena. CT head, personally reviewed by me, without acute intracranial event. Agree with need to rule out seizure, EEG unrevealing thus far. Also could be hypoperfusion/watershed strokes. 3/5 - Continues with improved mental status but worsened weakness. EEG unrevealing  - vEEG with mild to moderate generalized slowing but no evidence for focal slowing, epileptiform discharges, or seizures. PLEASE STOP  - MRI brain w/o to assess for above  - Await serum studies for autoimmune encephalopathy panel  - Await CSF studies for HSV 1/2 PCR, cytology  - No neurologic contraindications to PLEX therapy (even if viral infection). Finished PLEX and full course of IVIG (2g/kg)  - Vitamin D levels low, would replete with D2 98520 Units PO weekly for 8 weeks and then recheck new levels  - ASA 81mg PO daily and plavix 75mg PO daily for 3 months and then ASA monotherapy for SAMMPRIS trial  - PT/OT as tolerated  - Above recommendations discussed with ICU team, who verbalized agreement and understanding  - Continue to address above medical problems, as you are doing  - Will continue to follow patient with you

## 2024-03-05 NOTE — PROGRESS NOTE ADULT - ASSESSMENT
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 RRT today for hypotension , AMS and melena     #Melena   Given his drop in hbg and reported melena, cannot rule out GI sourse of blood loss. He is responding well to prbc and if bleeding, it is likely only minimally contributing to his overall clinical status. Hb remains stable and w/ brown stool as reported by nursing. DDX: PUD, AVM, Dieulafoy     #Portal vein gas  Would suspect iatrogenic given his recent EGD for PEG placement but cannot r/o PUD as a potential cause given his drop in hbg and reported melena. CT imaging reviewed; without any significant thickening/ gas/ inflammation in the walls of intestines/ colon which make me less suspicious for pneumatosis intestinales but recognize that imaging studies and differential may evolve as patient is critically ill.    #SIR/Septic shock   #Hx of CVA  # Ding Serrano variant of GBS and started on trial of DAPT now s/p 5 rounds of plasma exchange (2/13-2/20) and IVIG x 5 ( 2/21-2/26)    Recommendations:  - Given overall response to prbc and patient critically ill, would not pursue endoscopic evaluation at this time  - Will re-evaluate for EGD if patient continues to have melena with dropping hbg not responding to prbc   - Please put on PPI IV BID   - Serial abd exams and for any new changes in abd would repeat imaging  - Trend hbg and keep >7  - GI will sign off at this time. Please call with quesitons or if Hb continues to drop and pt not responsive to transfusions  - Ongoing GOC    Note incomplete until finalized by attending signature/attestation.    Yanet Conde  GI/Hepatology Fellow PGY5    NON-URGENT CONSULTS:  Please email [email or web address]  AT NIGHT AND ON WEEKENDS:  Available on Microsoft Teams  [phone] (Long Range Pager)    After 5pm, please contact the on-call GI fellow. 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 RRT today for hypotension , AMS and melena     #Melena   Given his drop in hbg and reported melena, cannot rule out GI sourse of blood loss. He is responding well to prbc and if bleeding, it is likely only minimally contributing to his overall clinical status. Hb remains stable and w/ brown stool as reported by nursing. DDX: PUD, AVM, Dieulafoy     #Portal vein gas  Would suspect iatrogenic given his recent EGD for PEG placement but cannot r/o PUD as a potential cause given his drop in hbg and reported melena. CT imaging reviewed; without any significant thickening/ gas/ inflammation in the walls of intestines/ colon which make me less suspicious for pneumatosis intestinales but recognize that imaging studies and differential may evolve as patient is critically ill.    #SIR/Septic shock   #Hx of CVA  # Ding Serrano variant of GBS and started on trial of DAPT now s/p 5 rounds of plasma exchange (2/13-2/20) and IVIG x 5 ( 2/21-2/26)    Recommendations:  - Given appropriate response to prbc and patient critically ill and no active bleeding at this time, would not pursue endoscopic evaluation at this time  - Will re-evaluate for EGD if patient continues to have melena with dropping hbg not responding to prbc   - Please put on PPI IV BID   - Serial abd exams and for any new changes in abd would repeat imaging  - Trend hbg and keep >7  - GI will sign off at this time. Please call with quesitons or if Hb continues to drop and pt not responsive to transfusions  - Ongoing GOC    Note incomplete until finalized by attending signature/attestation.    Yanet Conde  GI/Hepatology Fellow PGY5    NON-URGENT CONSULTS:  Please email [email or web address] OR [email or web address]  AT NIGHT AND ON WEEKENDS:  Available on [employer] Teams  [phone] (Long Range Pager)    After 5pm, please contact the on-call GI fellow. [phone]

## 2024-03-05 NOTE — CHART NOTE - NSCHARTNOTEFT_GEN_A_CORE
Pt status improving as per MICU team. MRI today.  If status changes benjamin will reconsult. Pallaitive care signed off.

## 2024-03-05 NOTE — PROGRESS NOTE ADULT - ASSESSMENT
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 Admitted to MICU in the setting of AMS with hypotension and febrile to 105 with Hgb noted for Hgb 5 admitted to MICu in the setting of septic shock with unclear source.     NEURO:  #AMS  -Unresponsive with unreactive pupils and no corneal reflex on 3/3 for which an RRT was called. In setting of septic shock vs hemorrhagic shock.  - 3/3 CT brain and neck angio showing no intracranial hemorrhage or mass lesion with no interval change since imaging on 11/2023. 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 Hg 5.4. Septic vs Hemorrhagic shock.   - continue Levophed, wean as tolerated  - Patient is a [employer]. Conditional approval for 1u pRBC per spouse  - f/u UA, BCx, RVP, MRSA PCR, procal      #HTN  Home meds: Atenolol/Chlorthiazide 50mg/25mg daily  - hold in setting of shock    #HLD  Home med: Atorvastatin 80mg daily    PULMONARY:  #Acute Hypoxic Respiratory Failure  Course complicated by progressive respiratory failure. CT Chest 2/20 with atelectasis of bilateral lower lobes.  - BAL 2/21 Grew PSA treated with course of Zosyn (2/21-2/28)  - s/p Tracheostomy #8 Cuffed Portex 2/16 (Dr. Sood)  - Chest PT and suctioning prn    #bibasilar PNA with mucoid impacted lower lobe on CT imaging   - cw meropenem and vanc   - fu sputum CX   - chest PT and suctioning PRN .    GI:  #Melena  New melena on 3/3 with Hg drop to 5.4.   - [employer]. Spouse gave conditional approval for 1u pRBC  - repeat Hgb 9.4 will obtain repeat CBC   - CT ABD/pelvis: showing no evidence active GI bleed. portal venous gas within main portal vein may be 2/2 to ischemia in setting of shock.    - GI consult: no urgent need for EGD at this time   - monitor CBC    #S/p PEG 2/26 (Dr. Sood)  - was tolerating tube feeds at goal  - NPO for now    #transaminitis  -  and    -most likely in setting of shock   - pending RUQ US     Diet: NPO    RENAL:  #Hypernatremia  Na 152 on 3/3.  - s/p D5W 100mL/hr and DDAVP   -increase free water 350 q6h     #BALTA  Cr 1.67 on 3/3. Cr 0.7-0.8 this admission.  - IVF as above  - UA and urine lytes    ENDO:  #T2DM  Home med: Metformin 1g BID  - NPH 7u q6h  - ISS q6h     HEME/ONC/RHEUM:  #Anemia  #Orthodox  Hg has been 7-8 this admission, however, new decrease to 5.4 with melena on 3.3.  - Melena/GI bleed management as above.    DVT ppx: SCDs only given concern for GI bleed    ID:  #Sepsis  AMS, febrile, and hypotensive on 3/3. In shock.  - BAL 2/21 Grew PSA treated with course of Zosyn (2/21-2/28)  - Prior BCx this admission have been negative  - procal elevated at 5.04 l MRSA negative I RVP negative I UA neg   - f/u UCX , BCx,  sputum cx   - Meropenem 2g q8h (3/3 - )  - Vancomycin 1500mg q24h (3/3- ) until sputum cx comes back     SKIN/MSK: no active issues    ETHICS:  Full Code       6D: Impaired Aerobic Capacity/Endurance Associated with Cardiovascular Pump Dysfunction or Failure ASSESSMENT & PLAN:   AMANDA WILKINS is a 73y Male with a hx of HTN, HLD, T2DM, prior CVAs without residual deficits who initially presented as a transfer from [place] with concern for CVA in the setting of 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 Admitted to MICU in the setting of AMS with hypotension and febrile to 105 with Hgb noted for Hgb 5 admitted to MICu in the setting of septic shock with unclear source.     NEURO:  #AMS  -Unresponsive with unreactive pupils and no corneal reflex on 3/3 for which an RRT was called. In setting of septic shock vs hemorrhagic shock.  - 3/3 CT brain and neck angio showing no intracranial hemorrhage or mass lesion with no interval change since imaging on 11/2023. 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 Hg 5.4. Septic vs Hemorrhagic shock.   - continue Levophed, wean as tolerated  - Patient is a [employer]. Conditional approval for 1u pRBC per spouse        #HTN  Home meds: Atenolol/Chlorthiazide 50mg/25mg daily  - hold in setting of shock    #HLD  Home med: Atorvastatin 80mg daily    PULMONARY:  #Acute Hypoxic Respiratory Failure  Course complicated by progressive respiratory failure. CT Chest 2/20 with atelectasis of bilateral lower lobes.  - BAL 2/21 Grew PSA treated with course of Zosyn (2/21-2/28)  - s/p Tracheostomy #8 Cuffed Portex 2/16 (Dr. Sood)  - Chest PT and suctioning prn    #bibasilar PNA with mucoid impacted lower lobe on CT imaging   - cw meropenem and vanc   - fu sputum CX   - chest PT and suctioning PRN .    GI:  #Melena  New melena on 3/3 with Hg drop to 5.4.   - Zoroastrianism. Spouse gave conditional approval for 1u pRBC  - repeat Hgb 9.4 will obtain repeat CBC   - CT ABD/pelvis: showing no evidence active GI bleed. portal venous gas within main portal vein may be 2/2 to ischemia in setting of shock.    - GI consult: no urgent need for EGD at this time   - monitor CBC    #S/p PEG 2/26 (Dr. Sood)  - was tolerating tube feeds at goal  - NPO for now    #transaminitis  -  and  --> now uptrending   -most likely in setting of shock   - pending RUQ US     Diet: NPO    RENAL:  #Hypernatremia  Na 152 on 3/3--> 149 on 3/3   - s/p D5W 100mL/hr and DDAVP   -increase free water 350 q6h     #BALTA- resolving   Cr 1.67 on 3/3. Cr 0.7-0.8 this admission. Now downtrending to 0.9   - IVF as above  - UA and urine lytes    ENDO:  #T2DM  Home med: Metformin 1g BID  - NPH 7u q6h  - ISS q6h     HEME/ONC/RHEUM:  #Anemia  #Zoroastrianism  Hg has been 7-8 this admission, however, new decrease to 5.4 with melena on 3.3.  - Melena/GI bleed management as above.    DVT ppx: SCDs only given concern for GI bleed    ID:  #Sepsis  AMS, febrile, and hypotensive on 3/3. In shock.  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 Admitted to MICU in the setting of AMS with hypotension and febrile to 105 with Hgb noted for Hgb 5 admitted to MICu in the setting of septic shock with unclear source.     NEURO:  #AMS  -Unresponsive with unreactive pupils and no corneal reflex on 3/3 for which an RRT was called. In setting of septic shock vs hemorrhagic shock.  - 3/3 CT brain and neck angio showing no intracranial hemorrhage or mass lesion with no interval change since imaging on 11/2023. 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 Hg 5.4. Septic vs Hemorrhagic shock.   - continue Levophed, wean as tolerated  - will add droxidopa 100mg TID   - Patient is a Zoroast[employer]. Conditional approval for 1u pRBC per spouse        #HTN  Home meds: Atenolol/Chlorthiazide 50mg/25mg daily  - hold in setting of shock    #HLD  Home med: Atorvastatin 80mg daily    PULMONARY:  #Acute Hypoxic Respiratory Failure  Course complicated by progressive respiratory failure. CT Chest 2/20 with atelectasis of bilateral lower lobes.  - BAL 2/21 Grew PSA treated with course of Zosyn (2/21-2/28)  - s/p Tracheostomy #8 Cuffed Portex 2/16 (Dr. Sood)  - Chest PT and suctioning prn    #bibasilar PNA with mucoid impacted lower lobe on CT imaging   - cw meropenem and vanc   - fu sputum CX   - chest PT and suctioning PRN .    GI:  #Melena  New melena on 3/3 with Hg drop to 5.4.   - Syn[employer]. Spouse gave conditional approval for 1u pRBC  - repeat Hgb 9.4 will obtain repeat CBC   - CT ABD/pelvis: showing no evidence active GI bleed. portal venous gas within main portal vein may be 2/2 to ischemia in setting of shock.    - GI consult: no urgent need for EGD at this time   - monitor CBC    #S/p PEG 2/26 (Dr. Sood)  - was tolerating tube feeds at goal  - will restart tue feeds     #transaminitis  -  and  --> now uptrending   -most likely in setting of shock   - pending RUQ US     Diet: NPO    RENAL:  #Hypernatremia  Na 152 on 3/3--> 149 on 3/3   - s/p D5W 100mL/hr and DDAVP   -increase free water 350 q6h     #BALTA- resolving   Cr 1.67 on 3/3. Cr 0.7-0.8 this admission. Now downtrending to 0.9   - IVF as above  - UA and urine lytes  - vences can be removed     ENDO:  #T2DM  Home med: Metformin 1g BID  - NPH 7u q6h  - ISS q6h     HEME/ONC/RHEUM:  #Anemia  #Synagogue  Hg has been 7-8 this admission, however, new decrease to 5.4 with melena on 3.3.  - no overt signs of bleeding will cw IV PPI pending GI recs   - Melena/GI bleed management as above but Hgb has been stable     DVT ppx: starting heparin subQ     ID:  #Sepsis  AMS, febrile, and hypotensive on 3/3. In shock.  - BAL 2/21 Grew PSA treated with course of Zosyn (2/21-2/28)  - Prior BCx this admission have been negative  - procal elevated at 5.04 l MRSA negative I RVP negative I UA neg I UCx neg I BCx NGTD   - sputum cx : gram stain Moderate Gram Negative Rods and Few Gram Positive Cocci in Pairs and Chains --> awaiting speciation   - Meropenem 2g q8h (3/3 - )  - DC vanc     SKIN/MSK: no active issues    ETHICS:  Full Code

## 2024-03-05 NOTE — PROGRESS NOTE ADULT - ATTENDING COMMENTS
72 yo M w/ HTN, HLD, T2DM prior CVA's without residual deficits who initially presented with concern for stroke-like symptoms s/p unrevealing angiogram and ultimately found to have clinical picture most concerning for Ding Parham Variant of GBS. S/p trach/peg 2/9/24.  Now with presumed septic shock, melena, of unclear etiology.    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

## 2024-03-05 NOTE — EEG REPORT - NS EEG TEXT BOX
REPORT OF CONTINUOUS VIDEO EEG      [facility]: [address], [place] [address], Phone: [phone]  [facility]: [address] [facility], [place] [address], Phone: [phone]  [facility]: [address] [place] [address], Phone: [phone]    Patient Name: Rubio Ashley    Age: 73 year, : 1950  Rubin: -5 MICU 27  EEG #:     Study Date: 3/4/2024   Start Time: 9:44:10 AM      End Date: 3/5/2024         End Time: 08:43:17 AM     Study Duration: 21 h 33 m    Study Information:    EEG Recording Technique:  The patient underwent continuous Video-EEG monitoring, using Telemetry System hardware on the XLTek Digital System. EEG and video data were stored on a computer hard drive with important events saved in digital archive files. The material was reviewed by a physician (electroencephalographer / epileptologist) on a daily basis. Messi and seizure detection algorithms were utilized and reviewed. An EEG Technician attended to the patient, and was available throughout daytime work hours.  The epilepsy center neurologist was available in person or on call 24-hours per day.    EEG Placement and Labeling of Electrodes:  The EEG was performed utilizing 20 channel referential EEG connections (coronal over temporal over parasagittal montage) using all standard 10-20 electrode placements with EKG, with additional electrodes placed in the inferior temporal region using the modified 10-10 montage electrode placements for elective admissions, or if deemed necessary. Recording was at a sampling rate of 256 samples per second per channel. Time synchronized digital video recording was done simultaneously with EEG recording. A low light infrared camera was used for low light recording.     History:  -73 year old Male with altered mental status is here to rule out seizures      Medication  Hibiclens    Peridex    AdmeLOG    Humulin    Ofirmev    Duoneb    Levophed    Protonix    Miralax    Senna      Interpretation:    [[[Abbreviation Key:  PDR=alpha rhythm/posterior dominant rhythm. A-P=anterior posterior.  Amplitude: ‘very low’:<20; ‘low’:20-49; ‘medium’:; ‘high’:>150uV.  Persistence for periodic/rhythmic patterns (% of epoch) ‘rare’:<1%; ‘occasional’:1-10%; ‘frequent’:10-50%; ‘abundant’:50-90%; ‘continuous’:>90%.  Persistence for sporadic discharges: ‘rare’:<1/hr; ‘occasional’:1/min-1/hr; ‘frequent’:>1/min; ‘abundant’:>1/10 sec.  RPP=rhythmic and periodic patterns; GRDA=generalized rhythmic delta activity; FIRDA=frontal intermittent GRDA; LRDA=lateralized rhythmic delta activity; TIRDA=temporal intermittent rhythmic delta activity;  LPD=PLED=lateralized periodic discharges; GPD=generalized periodic discharges; BIPDs =bilateral independent periodic discharges; Mf=multifocal; SIRPDs=stimulus induced rhythmic, periodic, or ictal appearing discharges; BIRDs=brief potentially ictal rhythmic discharges >4 Hz, lasting .5-10s; PFA (paroxysmal bursts >13 Hz or =8 Hz <10s).  Modifiers: +F=with fast component; +S=with spike component; +R=with rhythmic component.  S-B=burst suppression pattern.  Max=maximal. N1-drowsy; N2-stage II sleep; N3-slow wave sleep. SSS/BETS=small sharp spikes/benign epileptiform transients of sleep. HV=hyperventilation; PS=photic stimulation]]]      Daily EEG Visual Analysis    FINDINGS:      Background:  Symmetry: symmetric  Continuous: continuous  PDR: symmetric, well-modulated xxx Hz activity, with amplitude to 40 uV, that attenuated to eye opening.  Low amplitude frontal beta noted in wakefulness.  Reactivity: present  Voltage: normal, [defined typically between 20-150uV]  Anterior Posterior Gradient: present  Breach: absent    Background Slowing:  Generalized slowing: present. Background is diffusely slow consisting of delta theta activity up to 6 Hz  Focal slowing: none was present.    State Changes:     -N2 sleep transients with symmetric spindles and poorly formed K-complexes.      Sporadic Epileptiform Discharges:   None    Rhythmic and Periodic Patterns (RPPs):  None     Electrographic and Electroclinical seizures:  None    Other Clinical Events:  None    Activation Procedures:   -Hyperventilation was not performed.    -Photic stimulation was not performed.    Artifacts:  Intermittent myogenic and movement artifacts were noted.    ECG:  The heart rate on single channel ECG was predominantly between  BPM.    EEG Summary / Classification:  Abnormal EEG.  •	Background slowing    EEG Impression / Clinical Correlate:  Abnormal prolonged EEG study due to Mild to moderate diffuse cerebral dysfunction that is not specific in etiology    No epileptic discharges recorded.  No seizures recorded.  •	    ________________________________________    TERESA Carreon  Attending Physician, [facility] [place]    ------------------------------------  EEG Reading Room: [phone]  On Call Service After Hours: [phone]      REPORT OF CONTINUOUS VIDEO EEG      [facility]: [address], [place] [address], Phone: [phone]  [facility]: [address] [facility], [place] [address], Phone: [phone]  [facility]: [address] [place] [address], Phone: [phone]    Patient Name: Rubio Ashley    Age: 73 year, : 1950  Rubin: -5 MICU 27  EEG #:     Study Date: 3/4/2024   Start Time: 9:44:10 AM      End Date: 3/5/2024         End Time: 11:43    Study Duration: 25 h 16 m    Study Information:    EEG Recording Technique:  The patient underwent continuous Video-EEG monitoring, using Telemetry System hardware on the XLTek Digital System. EEG and video data were stored on a computer hard drive with important events saved in digital archive files. The material was reviewed by a physician (electroencephalographer / epileptologist) on a daily basis. Messi and seizure detection algorithms were utilized and reviewed. An EEG Technician attended to the patient, and was available throughout daytime work hours.  The epilepsy center neurologist was available in person or on call 24-hours per day.    EEG Placement and Labeling of Electrodes:  The EEG was performed utilizing 20 channel referential EEG connections (coronal over temporal over parasagittal montage) using all standard 10-20 electrode placements with EKG, with additional electrodes placed in the inferior temporal region using the modified 10-10 montage electrode placements for elective admissions, or if deemed necessary. Recording was at a sampling rate of 256 samples per second per channel. Time synchronized digital video recording was done simultaneously with EEG recording. A low light infrared camera was used for low light recording.     History:  -73 year old Male with altered mental status is here to rule out seizures      Medication  Hibiclens    Peridex    AdmeLOG    Humulin    Ofirmev    Duoneb    Levophed    Protonix    Miralax    Senna      Interpretation:    [[[Abbreviation Key:  PDR=alpha rhythm/posterior dominant rhythm. A-P=anterior posterior.  Amplitude: ‘very low’:<20; ‘low’:20-49; ‘medium’:; ‘high’:>150uV.  Persistence for periodic/rhythmic patterns (% of epoch) ‘rare’:<1%; ‘occasional’:1-10%; ‘frequent’:10-50%; ‘abundant’:50-90%; ‘continuous’:>90%.  Persistence for sporadic discharges: ‘rare’:<1/hr; ‘occasional’:1/min-1/hr; ‘frequent’:>1/min; ‘abundant’:>1/10 sec.  RPP=rhythmic and periodic patterns; GRDA=generalized rhythmic delta activity; FIRDA=frontal intermittent GRDA; LRDA=lateralized rhythmic delta activity; TIRDA=temporal intermittent rhythmic delta activity;  LPD=PLED=lateralized periodic discharges; GPD=generalized periodic discharges; BIPDs =bilateral independent periodic discharges; Mf=multifocal; SIRPDs=stimulus induced rhythmic, periodic, or ictal appearing discharges; BIRDs=brief potentially ictal rhythmic discharges >4 Hz, lasting .5-10s; PFA (paroxysmal bursts >13 Hz or =8 Hz <10s).  Modifiers: +F=with fast component; +S=with spike component; +R=with rhythmic component.  S-B=burst suppression pattern.  Max=maximal. N1-drowsy; N2-stage II sleep; N3-slow wave sleep. SSS/BETS=small sharp spikes/benign epileptiform transients of sleep. HV=hyperventilation; PS=photic stimulation]]]      Daily EEG Visual Analysis    FINDINGS:      Background:  Symmetry: symmetric  Continuous: continuous  PDR: symmetric, well-modulated xxx Hz activity, with amplitude to 40 uV, that attenuated to eye opening.  Low amplitude frontal beta noted in wakefulness.  Reactivity: present  Voltage: normal, [defined typically between 20-150uV]  Anterior Posterior Gradient: present  Breach: absent    Background Slowing:  Generalized slowing: present. Background is diffusely slow consisting of delta theta activity up to 6 Hz  Focal slowing: none was present.    State Changes:     -N2 sleep transients with symmetric spindles and poorly formed K-complexes.      Sporadic Epileptiform Discharges:   None    Rhythmic and Periodic Patterns (RPPs):  None     Electrographic and Electroclinical seizures:  None    Other Clinical Events:  None    Activation Procedures:   -Hyperventilation was not performed.    -Photic stimulation was not performed.    Artifacts:  Intermittent myogenic and movement artifacts were noted.    ECG:  The heart rate on single channel ECG was predominantly between  BPM.    EEG Summary / Classification:  Abnormal EEG.  •	Background slowing    EEG Impression / Clinical Correlate:  Abnormal prolonged EEG study due to Mild to moderate diffuse cerebral dysfunction that is not specific in etiology    No epileptic discharges recorded.  No seizures recorded.  •	    ________________________________________    TERESA Carreon  Attending Physician, [facility] [place]    ------------------------------------  EEG Reading Room: [phone]  On Call Service After Hours: [phone]      REPORT OF CONTINUOUS VIDEO EEG      [facility]: [address], [place] [address], Phone: [phone]  [facility]: [address] [facility], [place] [address], Phone: [phone]  [facility]: [address] [place] [address], Phone: [phone]    Patient Name: Rubio Ashley    Age: 73 year, : 1950  Rubin: -5 MICU 27  EEG #:     Study Date: 3/4/2024   Start Time: 9:44:10 AM      End Date: 3/5/2024         End Time: 11:43    Study Duration: 25 h 16 m    Study Information:    EEG Recording Technique:  The patient underwent continuous Video-EEG monitoring, using Telemetry System hardware on the XLTek Digital System. EEG and video data were stored on a computer hard drive with important events saved in digital archive files. The material was reviewed by a physician (electroencephalographer / epileptologist) on a daily basis. Messi and seizure detection algorithms were utilized and reviewed. An EEG Technician attended to the patient, and was available throughout daytime work hours.  The epilepsy center neurologist was available in person or on call 24-hours per day.    EEG Placement and Labeling of Electrodes:  The EEG was performed utilizing 20 channel referential EEG connections (coronal over temporal over parasagittal montage) using all standard 10-20 electrode placements with EKG, with additional electrodes placed in the inferior temporal region using the modified 10-10 montage electrode placements for elective admissions, or if deemed necessary. Recording was at a sampling rate of 256 samples per second per channel. Time synchronized digital video recording was done simultaneously with EEG recording. A low light infrared camera was used for low light recording.     History:  -73 year old Male with altered mental status is here to rule out seizures      Medication  Hibiclens    Peridex    AdmeLOG    Humulin    Ofirmev    Duoneb    Levophed    Protonix    Miralax    Senna      Interpretation:    [[[Abbreviation Key:  PDR=alpha rhythm/posterior dominant rhythm. A-P=anterior posterior.  Amplitude: ‘very low’:<20; ‘low’:20-49; ‘medium’:; ‘high’:>150uV.  Persistence for periodic/rhythmic patterns (% of epoch) ‘rare’:<1%; ‘occasional’:1-10%; ‘frequent’:10-50%; ‘abundant’:50-90%; ‘continuous’:>90%.  Persistence for sporadic discharges: ‘rare’:<1/hr; ‘occasional’:1/min-1/hr; ‘frequent’:>1/min; ‘abundant’:>1/10 sec.  RPP=rhythmic and periodic patterns; GRDA=generalized rhythmic delta activity; FIRDA=frontal intermittent GRDA; LRDA=lateralized rhythmic delta activity; TIRDA=temporal intermittent rhythmic delta activity;  LPD=PLED=lateralized periodic discharges; GPD=generalized periodic discharges; BIPDs =bilateral independent periodic discharges; Mf=multifocal; SIRPDs=stimulus induced rhythmic, periodic, or ictal appearing discharges; BIRDs=brief potentially ictal rhythmic discharges >4 Hz, lasting .5-10s; PFA (paroxysmal bursts >13 Hz or =8 Hz <10s).  Modifiers: +F=with fast component; +S=with spike component; +R=with rhythmic component.  S-B=burst suppression pattern.  Max=maximal. N1-drowsy; N2-stage II sleep; N3-slow wave sleep. SSS/BETS=small sharp spikes/benign epileptiform transients of sleep. HV=hyperventilation; PS=photic stimulation]]]      Daily EEG Visual Analysis    FINDINGS:      Background:  Symmetry: symmetric  Continuous: continuous  PDR: symmetric, well-modulated xxx Hz activity, with amplitude to 40 uV, that attenuated to eye opening.  Low amplitude frontal beta noted in wakefulness.  Reactivity: present  Voltage: normal, [defined typically between 20-150uV]  Anterior Posterior Gradient: present  Breach: absent    Background Slowing:  Generalized slowing: present. Background is diffusely slow consisting of delta theta activity up to 6 Hz  Focal slowing: none was present.    State Changes:     -N2 sleep transients with symmetric spindles and poorly formed K-complexes.      Sporadic Epileptiform Discharges:   None    Rhythmic and Periodic Patterns (RPPs):  None     Electrographic and Electroclinical seizures:  None    Other Clinical Events:  None    Activation Procedures:   -Hyperventilation was not performed.    -Photic stimulation was not performed.    Artifacts:  Intermittent myogenic and movement artifacts were noted.    ECG:  The heart rate on single channel ECG was predominantly between  BPM.    EEG Summary / Classification:  Abnormal EEG.  •	Background slowing    EEG Impression / Clinical Correlate:  Abnormal prolonged EEG study due to Mild to moderate diffuse cerebral dysfunction that is not specific in etiology    No epileptic discharges recorded.  No seizures recorded.  • Noted irregular heart rhythm on EKG	    ________________________________________    TERESA Carreon  Attending Physician, [facility]    ------------------------------------  EEG Reading Room: [phone]  On Call Service After Hours: [phone]

## 2024-03-05 NOTE — CONSULT NOTE ADULT - SUBJECTIVE AND OBJECTIVE BOX
HPI:  73 years old RH male with h/o HTN, HLD, DM, h/o CVA x 2 (no residual deficits) initially presented to [facility] ED with unsteady gait that began on 2/7 (exact LKW unknown) and L facial droop that began 6 AM on 2/9 (went to bed 11 PM on 2/8 without facial droop). No slurred speech, vision changes. Initial NIHSS 0 at [facility]. Hemodynamically stable, CT head with no acute pathology. Chronic left thalamic lacunar infarct. CTA with no large vessel occlusion. 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 CT perfusion with no acute abnormality.     Pt transfered from [facility] to [facility] due to concern for neurologic deterioration i/s/o aforementioned high grade proximal basiliar and L PCA stenosis. On arrival to [facility] ED, initial NIHSS 4 (+1 for LLE drift, +2 for b/l limb dysmetria, +1 for mild dysarthria). Later, pt started to have difficulty clearing secretions, became stridorous, and started to desaturate, so decision was made to intubate patient. Once pt medically stabilized, taken to angio suite by IR. Unable to obtain further history from patient at [facility] due to intubation/sedation (09 Feb 2024 20:27)    PERTINENT PM/SXH:   Hypertension    Diabetes    CVA (cerebral vascular accident)    HTN (hypertension)    HLD (hyperlipidemia)    DM2 (diabetes mellitus, type 2)    CVA (cerebrovascular accident)      No significant past surgical history      FAMILY HISTORY:    Family Hx substance abuse [ ]yes [ ]no  ITEMS NOT CHECKED ARE NOT PRESENT    SOCIAL HISTORY:   Significant other/partner[ ]  Children[ ]  Congregational/Spirituality:  Substance hx:  [ ]   Tobacco hx:  [ ]   Alcohol hx: [ ]   Home Opioid hx:  [ ] I-Stop Reference No:  Living Situation: [ ]Home  [ ]Long term care  [ ]Rehab [ ]Other    ADVANCE DIRECTIVES:    DNR/MOLST  [ ]  Living Will  [ ]   DECISION MAKER(s):  [ ] Health Care Proxy(s)  [ ] Surrogate(s)  [ ] Guardian           Name(s): Phone Number(s):    BASELINE (I)ADL(s) (prior to admission):  [place]: [ ]Total  [ ] Moderate [ ]Dependent    Allergies    No Known Allergies    Intolerances    MEDICATIONS  (STANDING):  albuterol/ipratropium for Nebulization 3 milliLiter(s) Nebulizer every 6 hours  chlorhexidine 0.12% Liquid 15 milliLiter(s) Oral Mucosa every 12 hours  chlorhexidine 4% Liquid 1 Application(s) Topical daily  droxidopa 100 milliGRAM(s) Oral three times a day  heparin   Injectable 5000 Unit(s) SubCutaneous every 12 hours  insulin lispro (ADMELOG) corrective regimen sliding scale   SubCutaneous every 6 hours  insulin NPH human recombinant 7 Unit(s) SubCutaneous every 6 hours  meropenem  IVPB 1000 milliGRAM(s) IV Intermittent every 8 hours  norepinephrine Infusion 0.05 MICROgram(s)/kG/Min (9.52 mL/Hr) IV Continuous <Continuous>  pantoprazole  Injectable 40 milliGRAM(s) IV Push every 12 hours  polyethylene glycol 3350 17 Gram(s) Oral daily  senna 1 Tablet(s) Oral at bedtime    MEDICATIONS  (PRN):    PRESENT SYMPTOMS: [ ]Unable to self-report  [ ] CPOT [ ] PAINADs [ ] RDOS  Source if other than patient:  [ ]Family   [ ]Team     Pain: [ ]yes [ ]no  QOL impact -   Location -                    Aggravating factors -  Quality -  Radiation -  Timing-  Severity (0-10 scale):  Minimal acceptable level (0-10 scale):     CPOT:    https://[email or web address]/getattachment/[email or web address]/Critical-Care-Pain-Observation-Tool-(CPOT)    PAIN AD Score:   http://[email or web address]/painad.pdf (press ctrl +  left click to view)    Dyspnea:                           [ ]Mild [ ]Moderate [ ]Severe      RDOS:  0 to 2  minimal or no respiratory distress   3  mild distress  4 to 6 moderate distress  >7 severe distress  https://homecareinformation.net/handouts/hen/Respiratory_Distress_Observation_Scale.pdf (Ctrl +  left click to view)     Anxiety:                             [ ]Mild [ ]Moderate [ ]Severe  Fatigue:                             [ ]Mild [ ]Moderate [ ]Severe  Nausea:                             [ ]Mild [ ]Moderate [ ]Severe  Loss of appetite:              [ ]Mild [ ]Moderate [ ]Severe  Constipation:                    [ ]Mild [ ]Moderate [ ]Severe    PCSSQ[Palliative Care Spiritual Screening Question]   Severity (0-10):  Score of 4 or > indicate consideration of Chaplaincy referral.  Chaplaincy Referral: [ ] yes [ ] refused [ ] following [ ] Deferred     Caregiver [place]? : [ ] yes [ ] no [ ] Deferred [ ] Declined             Social work referral [ ] Patient & Family Centered Care Referral [ ]     Anticipatory Grief present?:  [ ] yes [ ] no  [ ] Deferred                  Social work referral [ ] Chaplaincy Referral[ ]      Other Symptoms:  [ ]All other review of systems negative     Palliative Performance Status Version 2:         %    http://[facility].org/files/news/palliative_performance_scale_ppsv2.pdf  PHYSICAL EXAM:  Vital Signs Last 24 Hrs  T(C): 37.4 (05 Mar 2024 08:00), Max: 38 (04 Mar 2024 12:00)  T(F): 99.3 (05 Mar 2024 08:00), Max: 100.4 (04 Mar 2024 12:00)  HR: 88 (05 Mar 2024 11:15) (68 - 112)  BP: --  BP(mean): --  RR: 20 (05 Mar 2024 11:15) (20 - 29)  SpO2: 98% (05 Mar 2024 11:15) (93% - 100%)    Parameters below as of 05 Mar 2024 08:00  Patient On (Oxygen Delivery Method): ventilator    O2 Concentration (%): 30 I&O's Summary    04 Mar 2024 07:01  -  05 Mar 2024 07:00  --------------------------------------------------------  IN: 4748.4 mL / OUT: 1690 mL / NET: 3058.4 mL    05 Mar 2024 07:01  -  05 Mar 2024 11:28  --------------------------------------------------------  IN: 599.6 mL / OUT: 150 mL / NET: 449.6 mL      GENERAL: [ ]Cachexia    [ ]Alert  [ ]Oriented x   [ ]Lethargic  [ ]Unarousable  [ ]Verbal  [ ]Non-Verbal  Behavioral:   [ ] Anxiety  [ ] Delirium [ ] Agitation [ ] Other  HEENT:  [ ]Normal   [ ]Dry mouth   [ ]ET Tube/Trach  [ ]Oral lesions  PULMONARY:   [ ]Clear [ ]Tachypnea  [ ]Audible excessive secretions   [ ]Rhonchi        [ ]Right [ ]Left [ ]Bilateral  [ ]Crackles        [ ]Right [ ]Left [ ]Bilateral  [ ]Wheezing     [ ]Right [ ]Left [ ]Bilateral  [ ]Diminished breath sounds [ ]right [ ]left [ ]bilateral  CARDIOVASCULAR:    [ ]Regular [ ]Irregular [ ]Tachy  [ ][place] [ ]Murmur [ ]Other  GASTROINTESTINAL:  [ ]Soft  [ ]Distended   [ ]+BS  [ ]Non tender [ ]Tender  [ ]Other [ ]PEG [ ]OGT/ NGT  Last BM:  GENITOURINARY:  [ ]Normal [ ] Incontinent   [ ]Oliguria/Anuria   [ ][place]  MUSCULOSKELETAL:   [ ]Normal   [ ]Weakness  [ ]Bed/Wheelchair bound [ ]Edema  NEUROLOGIC:   [ ]No focal deficits  [ ]Cognitive impairment  [ ]Dysphagia [ ]Dysarthria [ ]Paresis [ ]Other   SKIN:   [ ]Normal  [ ]Rash  [ ]Other  [ ]Pressure ulcer(s)       Present on admission [ ]y [ ]n    CRITICAL CARE:  [ ] Shock Present  [ ]Septic [ ]Cardiogenic [ ]Neurologic [ ]Hypovolemic  [ ]  Vasopressors [ ]  Inotropes   [ ]Respiratory failure present [ ]Mechanical ventilation [ ]Non-invasive ventilatory support [ ]High flow  Mode: AC/ CMV (Assist Control/ Continuous Mandatory Ventilation), RR (machine): 20, TV (machine): 500, FiO2: 30, PEEP: 5, ITime: 1, MAP: 10, PIP: 18  [ ]Acute  [ ]Chronic [ ]Hypoxic  [ ]Hypercarbic [ ]Other  [ ]Other organ failure     LABS:                        8.4    18.51 )-----------( 326      ( 04 Mar 2024 23:57 )             29.9   03-05    149<H>  |  115<H>  |  60<H>  ----------------------------<  166<H>  3.5   |  25  |  0.94    Ca    7.4<L>      05 Mar 2024 00:45  Phos  2.7     03-05  Mg     2.6     03-05    TPro  6.4  /  Alb  2.4<L>  /  TBili  0.4  /  DBili  x   /  AST  190<H>  /  ALT  177<H>  /  AlkPhos  111  03-05  PT/INR - ( 04 Mar 2024 23:57 )   PT: 14.5 sec;   INR: 1.39 ratio         PTT - ( 04 Mar 2024 23:57 )  PTT:24.4 sec    Urinalysis Basic - ( 05 Mar 2024 00:45 )    Color: x / Appearance: x / SG: x / pH: x  Gluc: 166 mg/dL / Ketone: x  / Bili: x / Urobili: x   Blood: x / Protein: x / Nitrite: x   Leuk Esterase: x / RBC: x / WBC x   Sq Epi: x / Non Sq Epi: x / Bacteria: x      RADIOLOGY & ADDITIONAL STUDIES:    PROTEIN CALORIE MALNUTRITION PRESENT: [ ]mild [ ]moderate [ ]severe [ ]underweight [ ]morbid obesity  https://www.andeal.org/vault/2440/web/files/ONC/Table_Clinical%20Characteristics%20to%20Document%20Malnutrition-White%20JV%20et%20al%202012.pdf    Height (cm): 188 (02-10-24 @ 13:08), 188 (02-09-24 @ 08:59)  Weight (kg): 101.5 (02-09-24 @ 20:08), 108 (02-09-24 @ 09:26)  BMI (kg/m2): 28.7 (02-10-24 @ 13:08), 28.7 (02-09-24 @ 20:08), 30.6 (02-09-24 @ 09:26)    [ ]PPSV2 < or = to 30% [ ]significant weight loss  [ ]poor nutritional intake  [ ]anasarca[ ]Artificial Nutrition      Other REFERRALS:  [ ]Hospice  [ ]Child Life  [ ]Social Work  [ ]Case management [ ]Holistic Therapy     Goals of Care Document:

## 2024-03-06 DIAGNOSIS — I63.9 CEREBRAL INFARCTION, UNSPECIFIED: ICD-10-CM

## 2024-03-06 LAB
-  AZTREONAM: SIGNIFICANT CHANGE UP
-  CEFEPIME: SIGNIFICANT CHANGE UP
-  CEFTAZIDIME: SIGNIFICANT CHANGE UP
-  CIPROFLOXACIN: SIGNIFICANT CHANGE UP
-  IMIPENEM: SIGNIFICANT CHANGE UP
-  LEVOFLOXACIN: SIGNIFICANT CHANGE UP
-  MEROPENEM: SIGNIFICANT CHANGE UP
-  PIPERACILLIN/TAZOBACTAM: SIGNIFICANT CHANGE UP
ALBUMIN SERPL ELPH-MCNC: 2.3 G/DL — LOW (ref 3.3–5)
ALP SERPL-CCNC: 112 U/L — SIGNIFICANT CHANGE UP (ref 40–120)
ALT FLD-CCNC: 422 U/L — HIGH (ref 10–45)
ANION GAP SERPL CALC-SCNC: 8 MMOL/L — SIGNIFICANT CHANGE UP (ref 5–17)
APTT BLD: 24 SEC — LOW (ref 24.5–35.6)
AST SERPL-CCNC: 450 U/L — HIGH (ref 10–40)
BILIRUB SERPL-MCNC: 0.4 MG/DL — SIGNIFICANT CHANGE UP (ref 0.2–1.2)
BUN SERPL-MCNC: 45 MG/DL — HIGH (ref 7–23)
CALCIUM SERPL-MCNC: 7.7 MG/DL — LOW (ref 8.4–10.5)
CHLORIDE SERPL-SCNC: 118 MMOL/L — HIGH (ref 96–108)
CO2 SERPL-SCNC: 26 MMOL/L — SIGNIFICANT CHANGE UP (ref 22–31)
CREAT SERPL-MCNC: 0.96 MG/DL — SIGNIFICANT CHANGE UP (ref 0.5–1.3)
CULTURE RESULTS: ABNORMAL
EGFR: 83 ML/MIN/1.73M2 — SIGNIFICANT CHANGE UP
GAS PNL BLDA: SIGNIFICANT CHANGE UP
GLUCOSE BLDC GLUCOMTR-MCNC: 114 MG/DL — HIGH (ref 70–99)
GLUCOSE BLDC GLUCOMTR-MCNC: 131 MG/DL — HIGH (ref 70–99)
GLUCOSE BLDC GLUCOMTR-MCNC: 139 MG/DL — HIGH (ref 70–99)
GLUCOSE SERPL-MCNC: 150 MG/DL — HIGH (ref 70–99)
HCT VFR BLD CALC: 27.5 % — LOW (ref 39–50)
HGB BLD-MCNC: 8 G/DL — LOW (ref 13–17)
INR BLD: 1.11 RATIO — SIGNIFICANT CHANGE UP (ref 0.85–1.18)
MAGNESIUM SERPL-MCNC: 2.8 MG/DL — HIGH (ref 1.6–2.6)
MCHC RBC-ENTMCNC: 24.9 PG — LOW (ref 27–34)
MCHC RBC-ENTMCNC: 29.1 GM/DL — LOW (ref 32–36)
MCV RBC AUTO: 85.7 FL — SIGNIFICANT CHANGE UP (ref 80–100)
METHOD TYPE: SIGNIFICANT CHANGE UP
NRBC # BLD: 6 /100 WBCS — HIGH (ref 0–0)
NSE FLD-MCNC: 40.4 NG/ML — HIGH (ref 0–17.6)
ORGANISM # SPEC MICROSCOPIC CNT: ABNORMAL
ORGANISM # SPEC MICROSCOPIC CNT: ABNORMAL
PHOSPHATE SERPL-MCNC: 3 MG/DL — SIGNIFICANT CHANGE UP (ref 2.5–4.5)
PLATELET # BLD AUTO: 312 K/UL — SIGNIFICANT CHANGE UP (ref 150–400)
POTASSIUM SERPL-MCNC: 3.9 MMOL/L — SIGNIFICANT CHANGE UP (ref 3.5–5.3)
POTASSIUM SERPL-SCNC: 3.9 MMOL/L — SIGNIFICANT CHANGE UP (ref 3.5–5.3)
PROT SERPL-MCNC: 6.4 G/DL — SIGNIFICANT CHANGE UP (ref 6–8.3)
PROTHROM AB SERPL-ACNC: 12.2 SEC — SIGNIFICANT CHANGE UP (ref 9.5–13)
RBC # BLD: 3.21 M/UL — LOW (ref 4.2–5.8)
RBC # FLD: 26.1 % — HIGH (ref 10.3–14.5)
SODIUM SERPL-SCNC: 152 MMOL/L — HIGH (ref 135–145)
SPECIMEN SOURCE: SIGNIFICANT CHANGE UP
WBC # BLD: 13.64 K/UL — HIGH (ref 3.8–10.5)
WBC # FLD AUTO: 13.64 K/UL — HIGH (ref 3.8–10.5)

## 2024-03-06 PROCEDURE — 99291 CRITICAL CARE FIRST HOUR: CPT | Mod: GC

## 2024-03-06 PROCEDURE — 99291 CRITICAL CARE FIRST HOUR: CPT

## 2024-03-06 RX ORDER — HEPARIN SODIUM 5000 [USP'U]/ML
5000 INJECTION INTRAVENOUS; SUBCUTANEOUS EVERY 8 HOURS
Refills: 0 | Status: DISCONTINUED | OUTPATIENT
Start: 2024-03-06 | End: 2024-03-27

## 2024-03-06 RX ORDER — ASPIRIN/CALCIUM CARB/MAGNESIUM 324 MG
81 TABLET ORAL DAILY
Refills: 0 | Status: DISCONTINUED | OUTPATIENT
Start: 2024-03-06 | End: 2024-03-27

## 2024-03-06 RX ORDER — PANTOPRAZOLE SODIUM 20 MG/1
40 TABLET, DELAYED RELEASE ORAL DAILY
Refills: 0 | Status: DISCONTINUED | OUTPATIENT
Start: 2024-03-07 | End: 2024-03-27

## 2024-03-06 RX ADMIN — Medication 1 DROP(S): at 21:30

## 2024-03-06 RX ADMIN — CHLORHEXIDINE GLUCONATE 15 MILLILITER(S): 213 SOLUTION TOPICAL at 17:37

## 2024-03-06 RX ADMIN — Medication 81 MILLIGRAM(S): at 17:40

## 2024-03-06 RX ADMIN — HEPARIN SODIUM 5000 UNIT(S): 5000 INJECTION INTRAVENOUS; SUBCUTANEOUS at 14:03

## 2024-03-06 RX ADMIN — CHLORHEXIDINE GLUCONATE 1 APPLICATION(S): 213 SOLUTION TOPICAL at 11:15

## 2024-03-06 RX ADMIN — MEROPENEM 100 MILLIGRAM(S): 1 INJECTION INTRAVENOUS at 05:23

## 2024-03-06 RX ADMIN — SENNA PLUS 1 TABLET(S): 8.6 TABLET ORAL at 21:29

## 2024-03-06 RX ADMIN — HUMAN INSULIN 7 UNIT(S): 100 INJECTION, SUSPENSION SUBCUTANEOUS at 00:40

## 2024-03-06 RX ADMIN — DROXIDOPA 200 MILLIGRAM(S): 100 CAPSULE ORAL at 11:06

## 2024-03-06 RX ADMIN — MEROPENEM 100 MILLIGRAM(S): 1 INJECTION INTRAVENOUS at 14:04

## 2024-03-06 RX ADMIN — DROXIDOPA 200 MILLIGRAM(S): 100 CAPSULE ORAL at 03:01

## 2024-03-06 RX ADMIN — HEPARIN SODIUM 5000 UNIT(S): 5000 INJECTION INTRAVENOUS; SUBCUTANEOUS at 05:22

## 2024-03-06 RX ADMIN — MEROPENEM 100 MILLIGRAM(S): 1 INJECTION INTRAVENOUS at 21:29

## 2024-03-06 RX ADMIN — Medication 3 MILLILITER(S): at 00:25

## 2024-03-06 RX ADMIN — Medication 3 MILLILITER(S): at 11:04

## 2024-03-06 RX ADMIN — DROXIDOPA 200 MILLIGRAM(S): 100 CAPSULE ORAL at 20:25

## 2024-03-06 RX ADMIN — HEPARIN SODIUM 5000 UNIT(S): 5000 INJECTION INTRAVENOUS; SUBCUTANEOUS at 21:29

## 2024-03-06 RX ADMIN — Medication 3 MILLILITER(S): at 17:49

## 2024-03-06 RX ADMIN — PANTOPRAZOLE SODIUM 40 MILLIGRAM(S): 20 TABLET, DELAYED RELEASE ORAL at 05:23

## 2024-03-06 RX ADMIN — HUMAN INSULIN 7 UNIT(S): 100 INJECTION, SUSPENSION SUBCUTANEOUS at 17:39

## 2024-03-06 RX ADMIN — HUMAN INSULIN 7 UNIT(S): 100 INJECTION, SUSPENSION SUBCUTANEOUS at 05:23

## 2024-03-06 RX ADMIN — Medication 3 MILLILITER(S): at 23:15

## 2024-03-06 RX ADMIN — CHLORHEXIDINE GLUCONATE 15 MILLILITER(S): 213 SOLUTION TOPICAL at 05:22

## 2024-03-06 RX ADMIN — HUMAN INSULIN 7 UNIT(S): 100 INJECTION, SUSPENSION SUBCUTANEOUS at 12:04

## 2024-03-06 RX ADMIN — Medication 3 MILLILITER(S): at 05:03

## 2024-03-06 NOTE — PROGRESS NOTE ADULT - SUBJECTIVE AND OBJECTIVE BOX
Neurology - Consult Note    -  Spectra: [phone] ([facility]), [phone] ([facility])  -    HPI: 73 years old RH male with h/o HTN, HLD, DM, h/o CVA x 2 (no residual deficits) initially presented to [facility] ED with unsteady gait that began on 2/7 (exact LKW unknown) and L facial droop that began 6 AM on 2/9 (went to bed 11 PM on 2/8 without facial droop). No slurred speech, vision changes. Initial NIHSS 0 at [facility]. Hemodynamically stable, CT head with no acute pathology. Chronic left thalamic lacunar infarct. CTA with no large vessel occlusion. 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 CT perfusion with no acute abnormality.     Pt transfered from [facility] to [facility] due to concern for neurologic deterioration i/s/o aforementioned high grade proximal basiliar and L PCA stenosis. On arrival to [facility] ED, initial NIHSS 4 (+1 for LLE drift, +2 for b/l limb dysmetria, +1 for mild dysarthria). Later, pt started to have difficulty clearing secretions, became stridorous, and started to desaturate, so decision was made to intubate patient. Once pt medically stabilized, taken to angio suite by IR. Unable to obtain further history from patient at [facility] due to intubation/sedation    2/11  "Angiogram with moderate basilar stenosis with no intervention done. With worsening exam with loss of corneals and cough on 2/10 but still following commands and moving distal extremities. MRI brain before and after worsening with small strokes in the cerebellum likely 2/2 angiogram. MRI C/T/L spine with cord lesions at the C3 and C4 which suggest inflammatory/infectious/demyelinating etiology, also with some nerve root enhancement in the L spine. 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    Allergies:  No Known Allergies      PMHx/PSHx/Family Hx: As above, otherwise see below   Hypertension    Diabetes    CVA (cerebral vascular accident)    HTN (hypertension)    HLD (hyperlipidemia)    DM2 (diabetes mellitus, type 2)    CVA (cerebrovascular accident)        Social Hx:  No current use of tobacco, alcohol, or illicit drugs    Medications:  MEDICATIONS  (STANDING):  albuterol/ipratropium for Nebulization 3 milliLiter(s) Nebulizer every 6 hours  aspirin  chewable 81 milliGRAM(s) Oral daily  chlorhexidine 0.12% Liquid 15 milliLiter(s) Oral Mucosa every 12 hours  chlorhexidine 4% Liquid 1 Application(s) Topical daily  droxidopa 200 milliGRAM(s) Oral every 8 hours  heparin   Injectable 5000 Unit(s) SubCutaneous every 8 hours  insulin lispro (ADMELOG) corrective regimen sliding scale   SubCutaneous every 6 hours  insulin NPH human recombinant 7 Unit(s) SubCutaneous every 6 hours  meropenem  IVPB 1000 milliGRAM(s) IV Intermittent every 8 hours  pantoprazole   Suspension 40 milliGRAM(s) Oral daily  polyethylene glycol 3350 17 Gram(s) Oral daily  senna 1 Tablet(s) Oral at bedtime    MEDICATIONS  (PRN):      Vitals:  T(C): 37 (03-06-24 @ 08:00), Max: 38.3 (03-06-24 @ 00:00)  HR: 82 (03-06-24 @ 16:02) (77 - 105)  BP: 109/56 (03-06-24 @ 16:00) (109/56 - 109/56)  RR: 20 (03-06-24 @ 16:02) (10 - 26)  SpO2: 93% (03-06-24 @ 16:02) (90% - 100%)    Physical Examination:   General - NAD, trached  Cardiovascular - Peripheral pulses palpable, no edema  Eyes - Fundoscopy not performed due to safety precautions in the setting of the COVID-19 pandemic    Neurologic Exam:  Mental status - Eyes open, nonverbal, midline gaze.  Does not attend to examiner. Does not follow commands.     Cranial nerves - Surgical pupils, minimally reactive, decreased blink to threat bilaterally, Midline pupils- minimal crossing either side, face sensation (V1-V3) unable to test, facial strength intact without asymmetry b/l, tongue midline    Motor/sensory - Normal bulk and tone throughout. No pronator drift.  Strength testing  No spontaneous movement of extremities noted. Grimaces to noxious stimuli of all extremities. No withdrawal noted in LUE, RLE and LLE to noxious stimuli. Trace movement of RLE noted to noxious stimuli.     Coordination - Unable to test due to mental status    Gait and station - Unable to test due to mental status.     Labs:                        8.0    13.64 )-----------( 312      ( 06 Mar 2024 00:18 )             27.5     03-06    152<H>  |  118<H>  |  45<H>  ----------------------------<  150<H>  3.9   |  26  |  0.96    Ca    7.7<L>      06 Mar 2024 00:18  Phos  3.0     03-06  Mg     2.8     03-06    TPro  6.4  /  Alb  2.3<L>  /  TBili  0.4  /  DBili  x   /  AST  450<H>  /  ALT  422<H>  /  AlkPhos  112  03-06    CAPILLARY BLOOD GLUCOSE      POCT Blood Glucose.: 139 mg/dL (06 Mar 2024 11:14)    LIVER FUNCTIONS - ( 06 Mar 2024 00:18 )  Alb: 2.3 g/dL / Pro: 6.4 g/dL / ALK PHOS: 112 U/L / ALT: 422 U/L / AST: 450 U/L / GGT: x             Culture - Sputum (collected 04 Mar 2024 09:22)  Source: ET Tube ET Tube  Gram Stain (04 Mar 2024 21:42):    Numerous polymorphonuclear leukocytes per low power field    Moderate Squamous epithelial cells per low power field    Moderate Gram Negative Rods seen per oil power field    Few Gram Positive Cocci in Pairs and Chains seen per oil power field  Preliminary Report (05 Mar 2024 17:50):    Moderate Pseudomonas aeruginosa    Normal Respiratory Nallely present      PT/INR - ( 06 Mar 2024 00:18 )   PT: 12.2 sec;   INR: 1.11 ratio         PTT - ( 06 Mar 2024 00:18 )  PTT:24.0 sec  CSF:                  Radiology:  MR Head No Cont:  (05 Mar 2024 23:28)  CT Head No Cont:  (03 Mar 2024 12:15)    MRI brain without contrast 3/5  -Small scattered foci of diffusion restriction within the right cerebellar hemisphere, left parietal lobe, and symmetrically in the bilateral basal ganglia which may reflect acute/subacute infarcts with associated cytotoxic edema. Some of these findings are new when compared with 2/11/2024.    -Embolic phenomenon can be considered given the vascular distributions. Septic emboli are not excluded.    -Carbon monoxide poisoning cannot be excluded, given symmetric involvement of the bilateral basal ganglia.    -Interval resolution of foci of diffusion restriction within the left cerebellar hemisphere when compared with 2/11/2024.    TTE 2/11  EF 66%, no shunt    CTH no acute pathology  CTA: High grade stenosis of proximal basilar + L PCA  CTP: no perfusion deficit    MRI brain with and without contrast 2/11  No significant interval change in the small acute bilateral cerebellar infarcts. No new superimposed acute intracranial abnormality. No acute intracranial hemorrhage.  2. Findings suspicious for cerebral amyloid angiopathy or hypertensive microhemorrhages.  3. Probable minimal chronic microvascular ischemic disease.  4. Sinus disease.    MRI C, T, L spine ww/o  CERVICAL SPINE: Low-grade cervical degenerative disc disease. Indistinct cord lesions at the C3 and C4 vertebral levels are nonspecific but suggest inflammatory/infectious/demyelinating etiology. No associated enhancement.    2. THORACIC SPINE: Low-grade thoracic degenerative disc disease. Thoracic cord intact. No abnomal enhancement.    3. LUMBAR SPINE: Advanced lower lumbar degenerative disc disease includes moderate degenerative central canal stenosis at L4-L5 and high-grade degenerative foraminal stenosis at L5-S1. Conus intact. Cauda equina demonstrates central clustering with spinal stenosis at L4-L5 and mild radicular enhancement predominantly distal to this location of spinal stenosis, nonspecific, inflammatory versus congestion from the stenosis. Vascular congestion between the conus and the L4-L5 stenosis. Superimposed nodular enhancement at the L4 superior endplate level is also nonspecific, inflammatory versus schwannoma    LP  Clear, no color,   83 glucose (elevated)   67 protein (elevated)  TNC 11, 7% Neutrophils (elevated), 67% lymphocytes (wnl), 26% monocytes (wnl)  3 RBCs  IgG CSF 9.7 (elevated)  CSF albumin 41.3 (elevated)  CSF PCR (-)  IgG/albumin ratio CSF 0.23 (wnl) Serum 0.46 (wnl)  IgG index wnl  IgG synthesis wnl  culture gram stain (-)  ACE CSF (-)  LDH 22 wnl     EEG 2/11 (18 hr study)  Abnormal EEG study  Mild generalized background slowing  Clinical Impression:  Mild diffuse/multi-focal cerebral dysfunction, not specific as to etiology.  There were no epileptiform abnormalities recorded.   Neurology - Consult Note    -  Spectra: [phone] ([facility]), [phone] ([facility])  -    HPI: 73 years old RH male with h/o HTN, HLD, DM, h/o CVA x 2 (no residual deficits) initially presented to [facility] ED with unsteady gait that began on 2/7 (exact LKW unknown) and L facial droop that began 6 AM on 2/9 (went to bed 11 PM on 2/8 without facial droop). No slurred speech, vision changes. Initial NIHSS 0 at [facility]. Hemodynamically stable, CT head with no acute pathology. Chronic left thalamic lacunar infarct. CTA with no large vessel occlusion. 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 CT perfusion with no acute abnormality.     Pt transferred from [facility] to [facility] due to concern for neurologic deterioration i/s/o aforementioned high grade proximal basiliar and L PCA stenosis. On arrival to [facility] ED on 2/9/24, initial NIHSS 4 (+1 for LLE drift, +2 for b/l limb dysmetria, +1 for mild dysarthria). Later, pt started to have difficulty clearing secretions, became stridorous, and started to desaturate, so decision was made to intubate patient. Once pt medically stabilized, taken to angio suite by IR. Unable to obtain further history from patient at [facility] due to intubation/sedation.    2/11  "Angiogram with moderate basilar stenosis with no intervention done. With worsening exam with loss of corneals and cough on 2/10 but still following commands and moving distal extremities. MRI brain before and after worsening with small strokes in the cerebellum likely 2/2 angiogram. MRI C/T/L spine with cord lesions at the C3 and C4 which suggest inflammatory/infectious/demyelinating etiology, also with some nerve root enhancement in the L spine. 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    Allergies:  No Known Allergies      PMHx/PSHx/Family Hx: As above, otherwise see below   Hypertension    Diabetes    CVA (cerebral vascular accident)    HTN (hypertension)    HLD (hyperlipidemia)    DM2 (diabetes mellitus, type 2)    CVA (cerebrovascular accident)        Social Hx:  No current use of tobacco, alcohol, or illicit drugs    Medications:  MEDICATIONS  (STANDING):  albuterol/ipratropium for Nebulization 3 milliLiter(s) Nebulizer every 6 hours  aspirin  chewable 81 milliGRAM(s) Oral daily  chlorhexidine 0.12% Liquid 15 milliLiter(s) Oral Mucosa every 12 hours  chlorhexidine 4% Liquid 1 Application(s) Topical daily  droxidopa 200 milliGRAM(s) Oral every 8 hours  heparin   Injectable 5000 Unit(s) SubCutaneous every 8 hours  insulin lispro (ADMELOG) corrective regimen sliding scale   SubCutaneous every 6 hours  insulin NPH human recombinant 7 Unit(s) SubCutaneous every 6 hours  meropenem  IVPB 1000 milliGRAM(s) IV Intermittent every 8 hours  pantoprazole   Suspension 40 milliGRAM(s) Oral daily  polyethylene glycol 3350 17 Gram(s) Oral daily  senna 1 Tablet(s) Oral at bedtime    MEDICATIONS  (PRN):      Vitals:  T(C): 37 (03-06-24 @ 08:00), Max: 38.3 (03-06-24 @ 00:00)  HR: 82 (03-06-24 @ 16:02) (77 - 105)  BP: 109/56 (03-06-24 @ 16:00) (109/56 - 109/56)  RR: 20 (03-06-24 @ 16:02) (10 - 26)  SpO2: 93% (03-06-24 @ 16:02) (90% - 100%)    Physical Examination:   General - NAD, trached  Cardiovascular - Peripheral pulses palpable, no edema  Eyes - Fundoscopy not performed due to safety precautions in the setting of the COVID-19 pandemic    Neurologic Exam:  Mental status - Eyes open, nonverbal, midline gaze.  Does not attend to examiner. Does not follow commands.     Cranial nerves - Surgical pupils, minimally reactive, decreased blink to threat bilaterally, Midline pupils- minimal crossing either side, face sensation (V1-V3) unable to test, facial strength intact without asymmetry b/l, tongue midline    Motor/sensory - Normal bulk and tone throughout. No pronator drift.  Strength testing  No spontaneous movement of extremities noted. Grimaces to noxious stimuli of all extremities. No withdrawal noted in LUE, RLE and LLE to noxious stimuli. Trace movement of RLE noted to noxious stimuli.     Coordination - Unable to test due to mental status    Gait and station - Unable to test due to mental status.     Labs:                        8.0    13.64 )-----------( 312      ( 06 Mar 2024 00:18 )             27.5     03-06    152<H>  |  118<H>  |  45<H>  ----------------------------<  150<H>  3.9   |  26  |  0.96    Ca    7.7<L>      06 Mar 2024 00:18  Phos  3.0     03-06  Mg     2.8     03-06    TPro  6.4  /  Alb  2.3<L>  /  TBili  0.4  /  DBili  x   /  AST  450<H>  /  ALT  422<H>  /  AlkPhos  112  03-06    CAPILLARY BLOOD GLUCOSE      POCT Blood Glucose.: 139 mg/dL (06 Mar 2024 11:14)    LIVER FUNCTIONS - ( 06 Mar 2024 00:18 )  Alb: 2.3 g/dL / Pro: 6.4 g/dL / ALK PHOS: 112 U/L / ALT: 422 U/L / AST: 450 U/L / GGT: x             Culture - Sputum (collected 04 Mar 2024 09:22)  Source: ET Tube ET Tube  Gram Stain (04 Mar 2024 21:42):    Numerous polymorphonuclear leukocytes per low power field    Moderate Squamous epithelial cells per low power field    Moderate Gram Negative Rods seen per oil power field    Few Gram Positive Cocci in Pairs and Chains seen per oil power field  Preliminary Report (05 Mar 2024 17:50):    Moderate Pseudomonas aeruginosa    Normal Respiratory Nallely present      PT/INR - ( 06 Mar 2024 00:18 )   PT: 12.2 sec;   INR: 1.11 ratio         PTT - ( 06 Mar 2024 00:18 )  PTT:24.0 sec  CSF:                  Radiology:  MR Head No Cont:  (05 Mar 2024 23:28)  CT Head No Cont:  (03 Mar 2024 12:15)    MRI brain without contrast 3/5  -Small scattered foci of diffusion restriction within the right cerebellar hemisphere, left parietal lobe, and symmetrically in the bilateral basal ganglia which may reflect acute/subacute infarcts with associated cytotoxic edema. Some of these findings are new when compared with 2/11/2024.    -Embolic phenomenon can be considered given the vascular distributions. Septic emboli are not excluded.    -Carbon monoxide poisoning cannot be excluded, given symmetric involvement of the bilateral basal ganglia.    -Interval resolution of foci of diffusion restriction within the left cerebellar hemisphere when compared with 2/11/2024.    TTE 2/11  EF 66%, no shunt    CTH no acute pathology  CTA: High grade stenosis of proximal basilar + L PCA  CTP: no perfusion deficit    MRI brain with and without contrast 2/11  No significant interval change in the small acute bilateral cerebellar infarcts. No new superimposed acute intracranial abnormality. No acute intracranial hemorrhage.  2. Findings suspicious for cerebral amyloid angiopathy or hypertensive microhemorrhages.  3. Probable minimal chronic microvascular ischemic disease.  4. Sinus disease.    MRI C, T, L spine ww/o  CERVICAL SPINE: Low-grade cervical degenerative disc disease. Indistinct cord lesions at the C3 and C4 vertebral levels are nonspecific but suggest inflammatory/infectious/demyelinating etiology. No associated enhancement.    2. THORACIC SPINE: Low-grade thoracic degenerative disc disease. Thoracic cord intact. No abnomal enhancement.    3. LUMBAR SPINE: Advanced lower lumbar degenerative disc disease includes moderate degenerative central canal stenosis at L4-L5 and high-grade degenerative foraminal stenosis at L5-S1. Conus intact. Cauda equina demonstrates central clustering with spinal stenosis at L4-L5 and mild radicular enhancement predominantly distal to this location of spinal stenosis, nonspecific, inflammatory versus congestion from the stenosis. Vascular congestion between the conus and the L4-L5 stenosis. Superimposed nodular enhancement at the L4 superior endplate level is also nonspecific, inflammatory versus schwannoma    LP  Clear, no color,   83 glucose (elevated)   67 protein (elevated)  TNC 11, 7% Neutrophils (elevated), 67% lymphocytes (wnl), 26% monocytes (wnl)  3 RBCs  IgG CSF 9.7 (elevated)  CSF albumin 41.3 (elevated)  CSF PCR (-)  IgG/albumin ratio CSF 0.23 (wnl) Serum 0.46 (wnl)  IgG index wnl  IgG synthesis wnl  culture gram stain (-)  ACE CSF (-)  LDH 22 wnl     EEG 2/11 (18 hr study)  Abnormal EEG study  Mild generalized background slowing  Clinical Impression:  Mild diffuse/multi-focal cerebral dysfunction, not specific as to etiology.  There were no epileptiform abnormalities recorded.   Neurology - Consult Note    -  Spectra: [phone] ([facility]), [id number] ([facility])  -    HPI: 73 years old RH male with h/o HTN, HLD, DM, h/o CVA x 2 (no residual deficits) initially presented to [facility] ED with unsteady gait that began on 2/7 (exact LKW unknown) and L facial droop that began 6 AM on 2/9 (went to bed 11 PM on 2/8 without facial droop). No slurred speech, vision changes. Initial NIHSS 0 at [facility]. Hemodynamically stable, CT head with no acute pathology. Chronic left thalamic lacunar infarct. CTA with no large vessel occlusion. 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 CT perfusion with no acute abnormality.     Pt transferred from [facility] to [facility] due to concern for neurologic deterioration i/s/o aforementioned high grade proximal basiliar and L PCA stenosis. On arrival to [facility] ED on 2/9/24, initial NIHSS 4 (+1 for LLE drift, +2 for b/l limb dysmetria, +1 for mild dysarthria). Later, pt started to have difficulty clearing secretions, became stridorous, and started to desaturate, so decision was made to intubate patient. Once pt medically stabilized, taken to angio suite by IR. Unable to obtain further history from patient at [facility] due to intubation/sedation.    2/11  "Angiogram with moderate basilar stenosis with no intervention done. With worsening exam with loss of corneals and cough on 2/10 but still following commands and moving distal extremities. MRI brain before and after worsening with small strokes in the cerebellum likely 2/2 angiogram. MRI C/T/L spine with cord lesions at the C3 and C4 which suggest inflammatory/infectious/demyelinating etiology, also with some nerve root enhancement in the L spine. 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 Pt was poorly responsive to treatment. Initially treated with SAMPRIS (ASA/plavix) due to acute bilateral cerebellar infarcts thought 2/2 to angiogram. Had RRT on 3/3 due to episode of decrease mentation with hypotension. Transferred to MICU and placed on pressors with improvement. Given significant neurological deterioration on 3/3/24, VEEG and MR brain with and w/o contrast was ordered with findings noted as below. Stroke neurology consulted for MR brain findings (3/5/24). Saw pt at bedside, nonverbal, trached.       Review of Systems:   CONSTITUTIONAL: No fevers or chills  NEUROLOGICAL: +As stated in HPI above  SKIN: No itching, burning, rashes, or lesions   All other review of systems is negative unless indicated above.    Allergies:  No Known Allergies      PMHx/PSHx/Family Hx: As above, otherwise see below   Hypertension    Diabetes    CVA (cerebral vascular accident)    HTN (hypertension)    HLD (hyperlipidemia)    DM2 (diabetes mellitus, type 2)    CVA (cerebrovascular accident)        Social Hx:  No current use of tobacco, alcohol, or illicit drugs    Medications:  MEDICATIONS  (STANDING):  albuterol/ipratropium for Nebulization 3 milliLiter(s) Nebulizer every 6 hours  aspirin  chewable 81 milliGRAM(s) Oral daily  chlorhexidine 0.12% Liquid 15 milliLiter(s) Oral Mucosa every 12 hours  chlorhexidine 4% Liquid 1 Application(s) Topical daily  droxidopa 200 milliGRAM(s) Oral every 8 hours  heparin   Injectable 5000 Unit(s) SubCutaneous every 8 hours  insulin lispro (ADMELOG) corrective regimen sliding scale   SubCutaneous every 6 hours  insulin NPH human recombinant 7 Unit(s) SubCutaneous every 6 hours  meropenem  IVPB 1000 milliGRAM(s) IV Intermittent every 8 hours  pantoprazole   Suspension 40 milliGRAM(s) Oral daily  polyethylene glycol 3350 17 Gram(s) Oral daily  senna 1 Tablet(s) Oral at bedtime    MEDICATIONS  (PRN):      Vitals:  T(C): 37 (03-06-24 @ 08:00), Max: 38.3 (03-06-24 @ 00:00)  HR: 82 (03-06-24 @ 16:02) (77 - 105)  BP: 109/56 (03-06-24 @ 16:00) (109/56 - 109/56)  RR: 20 (03-06-24 @ 16:02) (10 - 26)  SpO2: 93% (03-06-24 @ 16:02) (90% - 100%)    Physical Examination:   General - NAD, trached  Cardiovascular - Peripheral pulses palpable, no edema  Eyes - Fundoscopy not performed due to safety precautions in the setting of the COVID-19 pandemic    Neurologic Exam:  Mental status - Eyes open, nonverbal, midline gaze.  Does not attend to examiner. Does not follow commands.     Cranial nerves - Surgical pupils, minimally reactive, decreased blink to threat bilaterally, Midline pupils- minimal crossing either side, face sensation (V1-V3) unable to test, facial strength intact without asymmetry b/l, tongue midline    Motor/sensory - Normal bulk and tone throughout. No pronator drift.  Strength testing  No spontaneous movement of extremities noted. Grimaces to noxious stimuli of all extremities. No withdrawal noted in LUE, RLE and LLE to noxious stimuli. Trace movement of RLE noted to noxious stimuli.     Coordination - Unable to test due to mental status    Gait and station - Unable to test due to mental status.     Labs:                        8.0    13.64 )-----------( 312      ( 06 Mar 2024 00:18 )             27.5     03-06    152<H>  |  118<H>  |  45<H>  ----------------------------<  150<H>  3.9   |  26  |  0.96    Ca    7.7<L>      06 Mar 2024 00:18  Phos  3.0     03-06  Mg     2.8     03-06    TPro  6.4  /  Alb  2.3<L>  /  TBili  0.4  /  DBili  x   /  AST  450<H>  /  ALT  422<H>  /  AlkPhos  112  03-06    CAPILLARY BLOOD GLUCOSE      POCT Blood Glucose.: 139 mg/dL (06 Mar 2024 11:14)    LIVER FUNCTIONS - ( 06 Mar 2024 00:18 )  Alb: 2.3 g/dL / Pro: 6.4 g/dL / ALK PHOS: 112 U/L / ALT: 422 U/L / AST: 450 U/L / GGT: x             Culture - Sputum (collected 04 Mar 2024 09:22)  Source: ET Tube ET Tube  Gram Stain (04 Mar 2024 21:42):    Numerous polymorphonuclear leukocytes per low power field    Moderate Squamous epithelial cells per low power field    Moderate Gram Negative Rods seen per oil power field    Few Gram Positive Cocci in Pairs and Chains seen per oil power field  Preliminary Report (05 Mar 2024 17:50):    Moderate Pseudomonas aeruginosa    Normal Respiratory Nallely present      PT/INR - ( 06 Mar 2024 00:18 )   PT: 12.2 sec;   INR: 1.11 ratio         PTT - ( 06 Mar 2024 00:18 )  PTT:24.0 sec  CSF:                  Radiology:  MR Head No Cont:  (05 Mar 2024 23:28)  CT Head No Cont:  (03 Mar 2024 12:15)    MRI brain without contrast 3/5  -Small scattered foci of diffusion restriction within the right cerebellar hemisphere, left parietal lobe, and symmetrically in the bilateral basal ganglia which may reflect acute/subacute infarcts with associated cytotoxic edema. Some of these findings are new when compared with 2/11/2024.    -Embolic phenomenon can be considered given the vascular distributions. Septic emboli are not excluded.    -Carbon monoxide poisoning cannot be excluded, given symmetric involvement of the bilateral basal ganglia.    -Interval resolution of foci of diffusion restriction within the left cerebellar hemisphere when compared with 2/11/2024.    MRI brain with and without contrast 2/11  No significant interval change in the small acute bilateral cerebellar infarcts. No new superimposed acute intracranial abnormality. No acute intracranial hemorrhage.  2. Findings suspicious for cerebral amyloid angiopathy or hypertensive microhemorrhages.  3. Probable minimal chronic microvascular ischemic disease.  4. Sinus disease.    TTE 2/11  EF 66%, no shunt    CTH no acute pathology  CTA: High grade stenosis of proximal basilar + L PCA  CTP: no perfusion deficit      MRI C, T, L spine ww/o  CERVICAL SPINE: Low-grade cervical degenerative disc disease. Indistinct cord lesions at the C3 and C4 vertebral levels are nonspecific but suggest inflammatory/infectious/demyelinating etiology. No associated enhancement.    2. THORACIC SPINE: Low-grade thoracic degenerative disc disease. Thoracic cord intact. No abnomal enhancement.    3. LUMBAR SPINE: Advanced lower lumbar degenerative disc disease includes moderate degenerative central canal stenosis at L4-L5 and high-grade degenerative foraminal stenosis at L5-S1. Conus intact. Cauda equina demonstrates central clustering with spinal stenosis at L4-L5 and mild radicular enhancement predominantly distal to this location of spinal stenosis, nonspecific, inflammatory versus congestion from the stenosis. Vascular congestion between the conus and the L4-L5 stenosis. Superimposed nodular enhancement at the L4 superior endplate level is also nonspecific, inflammatory versus schwannoma      EEG 2/11 (18 hr study)  Abnormal EEG study  Mild generalized background slowing  Clinical Impression:  Mild diffuse/multi-focal cerebral dysfunction, not specific as to etiology.  There were no epileptiform abnormalities recorded.      LP  Clear, no color,   83 glucose (elevated)   67 protein (elevated)  TNC 11, 7% Neutrophils (elevated), 67% lymphocytes (wnl), 26% monocytes (wnl)  3 RBCs  IgG CSF 9.7 (elevated)  CSF albumin 41.3 (elevated)  CSF PCR (-)  IgG/albumin ratio CSF 0.23 (wnl) Serum 0.46 (wnl)  IgG index wnl  IgG synthesis wnl  culture gram stain (-)  ACE CSF (-)  LDH 22 wnl    Neurology - Consult Note    -  Spectra: [id number] ([facility]), [id number] ([facility])  -    HPI: 73 years old RH male with h/o HTN, HLD, DM, h/o CVA x 2 (no residual deficits) initially presented to [facility] ED with unsteady gait that began on 2/7 (exact LKW unknown) and L facial droop that began 6 AM on 2/9 (went to bed 11 PM on 2/8 without facial droop). Initial NIHSS 0 at [facility]. Hemodynamically stable, CT head with no acute pathology. Chronic left thalamic lacunar infarct. CTA with no large vessel occlusion. 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     Pt transferred from [facility] to [facility] due to concern for neurologic deterioration i/s/o aforementioned high grade proximal basiliar and L PCA stenosis. On arrival to [facility] ED on 2/9/24, initial NIHSS 4 (+1 for LLE drift, +2 for b/l limb dysmetria, +1 for mild dysarthria). Later, pt started to have difficulty clearing secretions, became stridorous, and started to desaturate, so decision was made to intubate patient.  He underwent an angiogram (2/11) which showed moderate basilar stenosis and no intervention was done.     Per NSICU team, the patient had been having progressive weakness and numbness of the extremities for the preceding 1-2 weeks following URI.Pt was treated with 5 sessions of PLEX (2/11- 2/20) and IVIG (2/21/24- 2/25/24) for AIDP (Ding Serrano variant)  based on exam, LP and MR imaging. Pt was poorly responsive to treatment. Initially treated with SAMPRIS (ASA/plavix) due to acute bilateral cerebellar infarcts thought 2/2 to angiogram. Had RRT on 3/3 due to episode of decrease mentation with hypotension. Transferred to MICU and placed on pressors with improvement. Given significant neurological deterioration on 3/3/24, VEEG and MR brain with and w/o contrast was ordered with findings noted as below. Stroke neurology consulted for MR brain findings (3/5/24). Saw pt at bedside, nonverbal, trached.         Review of Systems:   CONSTITUTIONAL: No fevers or chills  NEUROLOGICAL: +As stated in HPI above  SKIN: No itching, burning, rashes, or lesions   All other review of systems is negative unless indicated above.    Allergies:  No Known Allergies      PMHx/PSHx/Family Hx: As above, otherwise see below   Hypertension    Diabetes    CVA (cerebral vascular accident)    HTN (hypertension)    HLD (hyperlipidemia)    DM2 (diabetes mellitus, type 2)    CVA (cerebrovascular accident)        Social Hx:  No current use of tobacco, alcohol, or illicit drugs    Medications:  MEDICATIONS  (STANDING):  albuterol/ipratropium for Nebulization 3 milliLiter(s) Nebulizer every 6 hours  aspirin  chewable 81 milliGRAM(s) Oral daily  chlorhexidine 0.12% Liquid 15 milliLiter(s) Oral Mucosa every 12 hours  chlorhexidine 4% Liquid 1 Application(s) Topical daily  droxidopa 200 milliGRAM(s) Oral every 8 hours  heparin   Injectable 5000 Unit(s) SubCutaneous every 8 hours  insulin lispro (ADMELOG) corrective regimen sliding scale   SubCutaneous every 6 hours  insulin NPH human recombinant 7 Unit(s) SubCutaneous every 6 hours  meropenem  IVPB 1000 milliGRAM(s) IV Intermittent every 8 hours  pantoprazole   Suspension 40 milliGRAM(s) Oral daily  polyethylene glycol 3350 17 Gram(s) Oral daily  senna 1 Tablet(s) Oral at bedtime    MEDICATIONS  (PRN):      Vitals:  T(C): 37 (03-06-24 @ 08:00), Max: 38.3 (03-06-24 @ 00:00)  HR: 82 (03-06-24 @ 16:02) (77 - 105)  BP: 109/56 (03-06-24 @ 16:00) (109/56 - 109/56)  RR: 20 (03-06-24 @ 16:02) (10 - 26)  SpO2: 93% (03-06-24 @ 16:02) (90% - 100%)    Physical Examination:   General - NAD, trached  Cardiovascular - Peripheral pulses palpable, no edema  Eyes - Fundoscopy not performed due to safety precautions in the setting of the COVID-19 pandemic    Neurologic Exam:  Mental status - Eyes open, nonverbal, midline gaze.  Does not attend to examiner. Does not follow commands.     Cranial nerves - Surgical pupils, minimally reactive, decreased blink to threat bilaterally, Midline pupils- minimal crossing either side, face sensation (V1-V3) unable to test, facial strength intact without asymmetry b/l, tongue midline    Motor/sensory - Normal bulk and tone throughout. No pronator drift.  Strength testing  No spontaneous movement of extremities noted. Grimaces to noxious stimuli of all extremities. No withdrawal noted in LUE, RLE and LLE to noxious stimuli. Trace movement of RLE noted to noxious stimuli.     Coordination - Unable to test due to mental status    Gait and station - Unable to test due to mental status.     Labs:                        8.0    13.64 )-----------( 312      ( 06 Mar 2024 00:18 )             27.5     03-06    152<H>  |  118<H>  |  45<H>  ----------------------------<  150<H>  3.9   |  26  |  0.96    Ca    7.7<L>      06 Mar 2024 00:18  Phos  3.0     03-06  Mg     2.8     03-06    TPro  6.4  /  Alb  2.3<L>  /  TBili  0.4  /  DBili  x   /  AST  450<H>  /  ALT  422<H>  /  AlkPhos  112  03-06    CAPILLARY BLOOD GLUCOSE      POCT Blood Glucose.: 139 mg/dL (06 Mar 2024 11:14)    LIVER FUNCTIONS - ( 06 Mar 2024 00:18 )  Alb: 2.3 g/dL / Pro: 6.4 g/dL / ALK PHOS: 112 U/L / ALT: 422 U/L / AST: 450 U/L / GGT: x             Culture - Sputum (collected 04 Mar 2024 09:22)  Source: ET Tube ET Tube  Gram Stain (04 Mar 2024 21:42):    Numerous polymorphonuclear leukocytes per low power field    Moderate Squamous epithelial cells per low power field    Moderate Gram Negative Rods seen per oil power field    Few Gram Positive Cocci in Pairs and Chains seen per oil power field  Preliminary Report (05 Mar 2024 17:50):    Moderate Pseudomonas aeruginosa    Normal Respiratory Nallely present      PT/INR - ( 06 Mar 2024 00:18 )   PT: 12.2 sec;   INR: 1.11 ratio         PTT - ( 06 Mar 2024 00:18 )  PTT:24.0 sec  CSF:                  Radiology:  MR Head No Cont:  (05 Mar 2024 23:28)  CT Head No Cont:  (03 Mar 2024 12:15)    MRI brain without contrast 3/5  -Small scattered foci of diffusion restriction within the right cerebellar hemisphere, left parietal lobe, and symmetrically in the bilateral basal ganglia which may reflect acute/subacute infarcts with associated cytotoxic edema. Some of these findings are new when compared with 2/11/2024.    -Embolic phenomenon can be considered given the vascular distributions. Septic emboli are not excluded.    -Carbon monoxide poisoning cannot be excluded, given symmetric involvement of the bilateral basal ganglia.    -Interval resolution of foci of diffusion restriction within the left cerebellar hemisphere when compared with 2/11/2024.    MRI brain with and without contrast 2/11  No significant interval change in the small acute bilateral cerebellar infarcts. No new superimposed acute intracranial abnormality. No acute intracranial hemorrhage.  2. Findings suspicious for cerebral amyloid angiopathy or hypertensive microhemorrhages.  3. Probable minimal chronic microvascular ischemic disease.  4. Sinus disease.    TTE 2/11  EF 66%, no shunt    CTH no acute pathology  CTA: High grade stenosis of proximal basilar + L PCA  CTP: no perfusion deficit      MRI C, T, L spine ww/o  CERVICAL SPINE: Low-grade cervical degenerative disc disease. Indistinct cord lesions at the C3 and C4 vertebral levels are nonspecific but suggest inflammatory/infectious/demyelinating etiology. No associated enhancement.    2. THORACIC SPINE: Low-grade thoracic degenerative disc disease. Thoracic cord intact. No abnomal enhancement.    3. LUMBAR SPINE: Advanced lower lumbar degenerative disc disease includes moderate degenerative central canal stenosis at L4-L5 and high-grade degenerative foraminal stenosis at L5-S1. Conus intact. Cauda equina demonstrates central clustering with spinal stenosis at L4-L5 and mild radicular enhancement predominantly distal to this location of spinal stenosis, nonspecific, inflammatory versus congestion from the stenosis. Vascular congestion between the conus and the L4-L5 stenosis. Superimposed nodular enhancement at the L4 superior endplate level is also nonspecific, inflammatory versus schwannoma      EEG 2/11 (18 hr study)  Abnormal EEG study  Mild generalized background slowing  Clinical Impression:  Mild diffuse/multi-focal cerebral dysfunction, not specific as to etiology.  There were no epileptiform abnormalities recorded.      LP  Clear, no color,   83 glucose (elevated)   67 protein (elevated)  TNC 11, 7% Neutrophils (elevated), 67% lymphocytes (wnl), 26% monocytes (wnl)  3 RBCs  IgG CSF 9.7 (elevated)  CSF albumin 41.3 (elevated)  CSF PCR (-)  IgG/albumin ratio CSF 0.23 (wnl) Serum 0.46 (wnl)  IgG index wnl  IgG synthesis wnl  culture gram stain (-)  ACE CSF (-)  LDH 22 wnl    Neurology - Consult Note    -  Spectra: [id number] ([facility]), [id number] ([facility])  -    HPI: 73 years old RH male with h/o HTN, HLD, DM, h/o CVA x 2 (no residual deficits) initially presented to [facility] ED with unsteady gait that began on 2/7 (exact LKW unknown) and L facial droop that began 6 AM on 2/9 (went to bed 11 PM on 2/8 without facial droop). Initial NIHSS 0 at [facility]. Hemodynamically stable, CT head with no acute pathology. Chronic left thalamic lacunar infarct. CTA with no large vessel occlusion. 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     Pt transferred from [facility] to [facility] due to concern for neurologic deterioration i/s/o aforementioned high grade proximal basilar and L PCA stenosis. On arrival to [facility] ED on 2/9/24, initial NIHSS 4 (+1 for LLE drift, +2 for b/l limb dysmetria, +1 for mild dysarthria). Later, pt started to have difficulty clearing secretions, became stridorous, and started to desaturate, so decision was made to intubate patient.  He underwent an angiogram (2/11) which showed moderate basilar stenosis and no intervention was done.     Per NSICU team, the patient had been having progressive weakness and numbness of the extremities for the preceding 1-2 weeks following URI.Pt was treated with 5 sessions of PLEX (2/11- 2/20) and IVIG (2/21/24- 2/25/24) for AIDP (Ding Serrano variant)  based on exam, LP and MR imaging. Pt was poorly responsive to treatment. Initially treated with SAMPRIS (ASA/plavix) due to acute bilateral cerebellar infarcts thought 2/2 to angiogram. Had RRT on 3/3 due to episode of decrease mentation with hypotension. Transferred to MICU and placed on pressors with improvement. Given significant neurological deterioration on 3/3/24, VEEG and MR brain with and w/o contrast was ordered with findings noted as below. Stroke neurology consulted for MR brain findings (3/5/24). Saw pt at bedside, nonverbal, trached.         Review of Systems:   CONSTITUTIONAL: No fevers or chills  NEUROLOGICAL: +As stated in HPI above  SKIN: No itching, burning, rashes, or lesions   All other review of systems is negative unless indicated above.    Allergies:  No Known Allergies      PMHx/PSHx/Family Hx: As above, otherwise see below   Hypertension    Diabetes    CVA (cerebral vascular accident)    HTN (hypertension)    HLD (hyperlipidemia)    DM2 (diabetes mellitus, type 2)    CVA (cerebrovascular accident)        Social Hx:  No current use of tobacco, alcohol, or illicit drugs    Medications:  MEDICATIONS  (STANDING):  albuterol/ipratropium for Nebulization 3 milliLiter(s) Nebulizer every 6 hours  aspirin  chewable 81 milliGRAM(s) Oral daily  chlorhexidine 0.12% Liquid 15 milliLiter(s) Oral Mucosa every 12 hours  chlorhexidine 4% Liquid 1 Application(s) Topical daily  droxidopa 200 milliGRAM(s) Oral every 8 hours  heparin   Injectable 5000 Unit(s) SubCutaneous every 8 hours  insulin lispro (ADMELOG) corrective regimen sliding scale   SubCutaneous every 6 hours  insulin NPH human recombinant 7 Unit(s) SubCutaneous every 6 hours  meropenem  IVPB 1000 milliGRAM(s) IV Intermittent every 8 hours  pantoprazole   Suspension 40 milliGRAM(s) Oral daily  polyethylene glycol 3350 17 Gram(s) Oral daily  senna 1 Tablet(s) Oral at bedtime    MEDICATIONS  (PRN):      Vitals:  T(C): 37 (03-06-24 @ 08:00), Max: 38.3 (03-06-24 @ 00:00)  HR: 82 (03-06-24 @ 16:02) (77 - 105)  BP: 109/56 (03-06-24 @ 16:00) (109/56 - 109/56)  RR: 20 (03-06-24 @ 16:02) (10 - 26)  SpO2: 93% (03-06-24 @ 16:02) (90% - 100%)    Physical Examination:   General - NAD, trached  Cardiovascular - Peripheral pulses palpable, no edema    Neurologic Exam:  Mental status - Eyes open, nonverbal, midline gaze.  Does not attend to examiner. Does not follow commands.     Cranial nerves - Surgical pupils, minimally reactive, decreased blink to threat bilaterally, Midline pupils- minimal crossing either side, face sensation (V1-V3) unable to test, facial strength intact without asymmetry b/l, tongue midline    Motor/sensory -   No spontaneous movement of extremities noted. Grimaces to noxious stimuli of all extremities. No withdrawal noted in LUE, RLE and LLE to noxious stimuli. Trace movement of RLE noted to noxious stimuli.     Coordination - Unable to test due to mental status    Gait and station - Unable to test due to mental status.     Labs:                        8.0    13.64 )-----------( 312      ( 06 Mar 2024 00:18 )             27.5     03-06    152<H>  |  118<H>  |  45<H>  ----------------------------<  150<H>  3.9   |  26  |  0.96    Ca    7.7<L>      06 Mar 2024 00:18  Phos  3.0     03-06  Mg     2.8     03-06    TPro  6.4  /  Alb  2.3<L>  /  TBili  0.4  /  DBili  x   /  AST  450<H>  /  ALT  422<H>  /  AlkPhos  112  03-06    CAPILLARY BLOOD GLUCOSE      POCT Blood Glucose.: 139 mg/dL (06 Mar 2024 11:14)    LIVER FUNCTIONS - ( 06 Mar 2024 00:18 )  Alb: 2.3 g/dL / Pro: 6.4 g/dL / ALK PHOS: 112 U/L / ALT: 422 U/L / AST: 450 U/L / GGT: x             Culture - Sputum (collected 04 Mar 2024 09:22)  Source: ET Tube ET Tube  Gram Stain (04 Mar 2024 21:42):    Numerous polymorphonuclear leukocytes per low power field    Moderate Squamous epithelial cells per low power field    Moderate Gram Negative Rods seen per oil power field    Few Gram Positive Cocci in Pairs and Chains seen per oil power field  Preliminary Report (05 Mar 2024 17:50):    Moderate Pseudomonas aeruginosa    Normal Respiratory Nallely present      PT/INR - ( 06 Mar 2024 00:18 )   PT: 12.2 sec;   INR: 1.11 ratio         PTT - ( 06 Mar 2024 00:18 )  PTT:24.0 sec  CSF:                  Radiology:  MR Head No Cont:  (05 Mar 2024 23:28)  CT Head No Cont:  (03 Mar 2024 12:15)    MRI brain without contrast 3/5  -Small scattered foci of diffusion restriction within the right cerebellar hemisphere, left parietal lobe, and symmetrically in the bilateral basal ganglia which may reflect acute/subacute infarcts with associated cytotoxic edema. Some of these findings are new when compared with 2/11/2024.    -Embolic phenomenon can be considered given the vascular distributions. Septic emboli are not excluded.    -Carbon monoxide poisoning cannot be excluded, given symmetric involvement of the bilateral basal ganglia.    -Interval resolution of foci of diffusion restriction within the left cerebellar hemisphere when compared with 2/11/2024.    MRI brain with and without contrast 2/11  No significant interval change in the small acute bilateral cerebellar infarcts. No new superimposed acute intracranial abnormality. No acute intracranial hemorrhage.  2. Findings suspicious for cerebral amyloid angiopathy or hypertensive microhemorrhages.  3. Probable minimal chronic microvascular ischemic disease.  4. Sinus disease.    TTE 2/11  EF 66%, no shunt    CTH no acute pathology  CTA: High grade stenosis of proximal basilar + L PCA  CTP: no perfusion deficit      MRI C, T, L spine ww/o  CERVICAL SPINE: Low-grade cervical degenerative disc disease. Indistinct cord lesions at the C3 and C4 vertebral levels are nonspecific but suggest inflammatory/infectious/demyelinating etiology. No associated enhancement.    2. THORACIC SPINE: Low-grade thoracic degenerative disc disease. Thoracic cord intact. No abnomal enhancement.    3. LUMBAR SPINE: Advanced lower lumbar degenerative disc disease includes moderate degenerative central canal stenosis at L4-L5 and high-grade degenerative foraminal stenosis at L5-S1. Conus intact. Cauda equina demonstrates central clustering with spinal stenosis at L4-L5 and mild radicular enhancement predominantly distal to this location of spinal stenosis, nonspecific, inflammatory versus congestion from the stenosis. Vascular congestion between the conus and the L4-L5 stenosis. Superimposed nodular enhancement at the L4 superior endplate level is also nonspecific, inflammatory versus schwannoma      EEG 2/11 (18 hr study)  Abnormal EEG study  Mild generalized background slowing  Clinical Impression:  Mild diffuse/multi-focal cerebral dysfunction, not specific as to etiology.  There were no epileptiform abnormalities recorded.      LP  Clear, no color,   83 glucose (elevated)   67 protein (elevated)  TNC 11, 7% Neutrophils (elevated), 67% lymphocytes (wnl), 26% monocytes (wnl)  3 RBCs  IgG CSF 9.7 (elevated)  CSF albumin 41.3 (elevated)  CSF PCR (-)  IgG/albumin ratio CSF 0.23 (wnl) Serum 0.46 (wnl)  IgG index wnl  IgG synthesis wnl  culture gram stain (-)  ACE CSF (-)  LDH 22 wnl

## 2024-03-06 NOTE — PROGRESS NOTE ADULT - NS ATTEND AMEND GEN_ALL_CORE FT
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 Pt showing some neurologic improvement, he is able to wiggle all extremities (not against gravity) as well as his torso and his facial muscles. Can communicate by mouthing words and nodding. Restarted on DAPT, held transiently 2/2 concern for hemoptysis, but no further issues to date. Currently without any bleeding. Will need at least 3 months per neuro. Repeat brain MRI this week concerning for multiple bilateral foci especially in right cerebellar, left parietal, and b/l basal ganglia which may be 2/2 embolic events vs hypoperfusion (which is more likely given recent transfer to MICU for vasoplegic shock requiring IV pressors.) TTE and cultures performed without evidence of embolic events, will defer MILADIS for now until pt more stable. Will f/u further neuro recs.  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     Pt further with combined hypoxemic and hypercapnic respiratory failure with failure to wean from ventilator s/p tracheostomy placement (#8 cuffed Portex 2/16 surgery.) Tolerating PRVC 20/500/7/40% well, is able to trigger breaths this week but became hypoxemic with SBT trial today. Will continue to attempt further weaning. Airway clearance therapy in place. Trach care and suctioning as per RCU team. Oral hygiene and aspiration precautions in place.     Hospital course further c/b sympathetic storm while in NSICU with labile BP. Now resolved but over the weekend with episode of hypotension needing transfer to MICU for vasopressor support. Pt was treated for septic shock 2/2 Pseudomonas VAP and weaned off pressors. Zosyn completed, transitioned to meropenem on 3/4 with plan for 7-10 day course. Returned to RCU 3/6. Will wean droxidopa as tolerated. Pt has reportedly had periods of bradycardia transiently, will monitor on telemetry with EKG if recurs.     Pt with oropharyngeal dysphagia s/p PEG placement. Now tolerating feeds at goal rate. Bowel regimen prn. No acute renal issues. DMII well controlled with NPH and ISS with BGFS monitoring.     Dispo pending medical optimization. Pt full code. Pt is a [employer], family asked to consent to blood products with each event as needed. DVT ppx Lovenox. Pt's wife at bedside, will continue to update regularly throughout hospitalization.

## 2024-03-06 NOTE — PROGRESS NOTE ADULT - PROBLEM SELECTOR PLAN 8
- Patient [employer]  - Family would like to be asked prior to administration of blood products   - S/p 1 unit PRBCs on 2/22  - S/p Venofer ( 2/22-2/27)   - Cont Cyanocobalamine, Folic acid, Ferrous Sulfate  - S/p Epogen x 5 days ( Ended 2/27)  - Will limit phlebotomy ( q 48 hrs) to prevent anemia

## 2024-03-06 NOTE — PROGRESS NOTE ADULT - PROBLEM SELECTOR PLAN 4
- BAL 2/21: + PSA   - Blood cxs 2/23: NGTD   - UA 2/23: Negative   - PSA pneumonia; Completed Zosyn x 7 days course ( 2/21-2/28); Meropenem 3/3 --> 3/9

## 2024-03-06 NOTE — CHART NOTE - NSCHARTNOTEFT_GEN_A_CORE
MICU Transfer Note    Transfer from: MICU    Transfer to: (  ) Medicine    (  ) Telemetry     (   ) RCU        (    ) Palliative         (   ) Stroke Unit          (   ) __________________    Accepting Physician:    Signout given to:       HPI:      MICU Course:      Assessment/Plan:      To Do:  [ ]  [ ]  [ ] MICU Transfer Note    Transfer from: MICU    Transfer to: (  ) Medicine    (  ) Telemetry     (X   ) RCU        (    ) Palliative         (   ) Stroke Unit          (   ) __________________    Accepting Physician: Dr. 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 Prior to admission patient had Viral URI ( 1-2 weeks prior to admission) with subsequent weakness. He underwent an angiogram (2/11) which showed moderate stenosis and no intervention was done. MRI Performed c/w small bilateral cerebellar strokes and prior L thalamic strokes, as per Neurology was not c/w current presentation. 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 He is currently s/p 5 rounds of plasma exchange (2/13-2/20) and IVIG x 5 ( 2/21-2/26)  His hospital course was complicated by progressive respiratory failure. CT Chest performed on 2/20 with atelectasis of b/l lower lobes. BAL 2/21 Grew PSA treated with course of Zosyn (2/21-2/28).     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    RRT called on 3/3 for unresponsiveness. Patient with unreactive pupils, no corneal reflex. Also found to be hypotensive and febrile to 105. Noted to have new melena. Hgb 5. Given 1 unit of pRBC. Pending CTH, CTA head and neck, CTA Chest and A/P.      MICU Course:    While in the ICU the patient was started on pressors in the setting of septic shock.  Pressor requirement transiently increased and a central line was placed. However, pressors requirements decreased and pt was placed on droxidopa. Levo requirements were ultimately weaned off and blood pressures remained stable.       Infectious workup was started. Pt was placed temporarily NPO in the setting of possible aspiration pneumonia. CT chest imaging was significant for bibasilar pneumonia in the setting of mucus plugging. Infectious workup was also sent which blood cultures and urine cultures were negative. Sputum cultures came back positive for Pseudomonas and pt will finish with a 7 day course of meropenem.     Labs were significant initially for Hgb of 5 which he received 1 unit pRBC per permission from the family. The pt's hemoglobin remained stable and GI was consulted which recommended no need for urgent EGD at his time and was placed on PPI. Labs were also significant for hypernatremia and the patient was placed back on tube feeds and free water was increased.     In the setting of altered mental status- CT head, CTA head and neck was done as well an EEG. Initial CT head and CTA head/neck showed intracranial atherosclerosis but no large vessel occlusion and no acute infarction. EEG done showed no seizures documented after 24hours. Neurology following recommended MRI which showed Small scattered foci of diffusion restriction within the right cerebellar hemisphere, left parietal lobe, and symmetrically in the bilateral basal ganglia. Due to the fact that these findings may be new compared to previous imaging- neurology stroke team was made aware of these findings. Mental status ultimately improved as pt started to have more purposeful head and mouth movements and intermittently able to follow commands.     Pt was ruled stable to be transferred to RCU.       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 Admitted to MICU in the setting of AMS with hypotension and febrile to 105 with Hgb noted for Hgb 5 admitted to MICu in the setting of septic shock with unclear source.     NEURO:  #AMS  -Unresponsive with unreactive pupils and no corneal reflex on 3/3 for which an RRT was called. In setting of septic shock vs hemorrhagic shock.  - 3/3 CT brain and neck angio showing no intracranial hemorrhage or mass lesion with no interval change since imaging on 11/2023. 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 Hg 5.4. Septic vs Hemorrhagic shock.   - continue Levophed, wean as tolerated- off since 3/6 AM   -  droxidopa 200mg TID added- can wean down to 100mg TID as tolerated   - Patient is a [employer]. Conditional approval for 1u pRBC per spouse      #HTN  Home meds: Atenolol/Chlorthiazide 50mg/25mg daily  - hold in setting of shock    #HLD  Home med: Atorvastatin 80mg daily    PULMONARY:  #Acute Hypoxic Respiratory Failure  Course complicated by progressive respiratory failure. CT Chest 2/20 with atelectasis of bilateral lower lobes.  - BAL 2/21 Grew PSA treated with course of Zosyn (2/21-2/28)  - s/p Tracheostomy #8 Cuffed Portex 2/16 (Dr. Sood)  - Chest PT and suctioning prn  - switched to CPAP for 3/6     #bibasilar PNA with mucoid impacted lower lobe on CT imaging   - cw meropenem and vanc   - fu sputum CX   - chest PT and suctioning PRN .    GI:  #Melena  New melena on 3/3 with Hg drop to 5.4.   - Jew. Spouse gave conditional approval for 1u pRBC  - repeat Hgb 9.4 will obtain repeat CBC   - CT ABD/pelvis: showing no evidence active GI bleed. portal venous gas within main portal vein may be 2/2 to ischemia in setting of shock.    - GI consult: no urgent need for EGD at this time   - monitor CBC  - will start on oral PPI     #S/p PEG 2/26 (Dr. Sood)  - was tolerating tube feeds at goal  - will restart tue feeds     #transaminitis  -  and  --> now uptrending   -most likely in setting of shock     diet: tube feeds    RENAL:  #Hypernatremia  Na 152 on 3/3--> 149 on 3/3   - s/p D5W 100mL/hr and DDAVP   -increase free water 400 q6h     #BALTA- resolving   Cr 1.67 on 3/3. Cr 0.7-0.8 this admission. Now downtrending to 0.9   - IVF as above  - UA and urine lytes  - vences can be removed     ENDO:  #T2DM  Home med: Metformin 1g BID  - NPH 7u q6h  - ISS q6h     HEME/ONC/RHEUM:  #Anemia  #Jew  Hg has been 7-8 this admission, however, new decrease to 5.4 with melena on 3.3.  - no overt signs of bleeding will cw IV PPI pending GI recs   - Melena/GI bleed management as above but Hgb has been stable     DVT ppx:  heparin subQ     ID:  #Sepsis  AMS, febrile, and hypotensive on 3/3. In shock.  - BAL 2/21 Grew PSA treated with course of Zosyn (2/21-2/28)  - Prior BCx this admission have been negative  - procal elevated at 5.04 l MRSA negative I RVP negative I UA neg I UCx neg I BCx NGTD   - sputum cx : growing Pseudomonas pending sensitivities  - Meropenem 2g q8h (3/3 - ) will complete 7 day course   - DC vanc   - Tmax 100.6 will repeat BCx and UCx      SKIN/MSK: no active issues    ETHICS:  Full Code      To Do:  [ ] fu neuro recs/stroke recs regarding MRI findings  [ ] fu sputum culture Pseudomonas sensitivities   [ ] fu hypernatremia - consider increasing free water of IVF if fluid down   [ ] finish meropenem course for 7 day course meropenem (last day 3/10) MICU Transfer Note    Transfer from: MICU    Transfer to: (  ) Medicine    (  ) Telemetry     (X   ) RCU        (    ) Palliative         (   ) Stroke Unit          (   ) __________________    Accepting Physician: Dr. 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 Prior to admission patient had Viral URI ( 1-2 weeks prior to admission) with subsequent weakness. He underwent an angiogram (2/11) which showed moderate stenosis and no intervention was done. MRI Performed c/w small bilateral cerebellar strokes and prior L thalamic strokes, as per Neurology was not c/w current presentation. 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 He is currently s/p 5 rounds of plasma exchange (2/13-2/20) and IVIG x 5 ( 2/21-2/26)  His hospital course was complicated by progressive respiratory failure. CT Chest performed on 2/20 with atelectasis of b/l lower lobes. BAL 2/21 Grew PSA treated with course of Zosyn (2/21-2/28).     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    RRT called on 3/3 for unresponsiveness. Patient with unreactive pupils, no corneal reflex. Also found to be hypotensive and febrile to 105. Noted to have new melena. Hgb 5. Given 1 unit of pRBC. Pending CTH, CTA head and neck, CTA Chest and A/P.      MICU Course:    While in the ICU the patient was started on pressors in the setting of septic shock.  Pressor requirement transiently increased and a central line was placed. However, pressors requirements decreased and pt was placed on droxidopa. Levo requirements were ultimately weaned off and blood pressures remained stable.       Infectious workup was started. Pt was placed temporarily NPO in the setting of possible aspiration pneumonia. CT chest imaging was significant for bibasilar pneumonia in the setting of mucus plugging. Infectious workup was also sent which blood cultures and urine cultures were negative. Sputum cultures came back positive for Pseudomonas and pt will finish with a 7 day course of meropenem.     Labs were significant initially for Hgb of 5 which he received 1 unit pRBC per permission from the family. The pt's hemoglobin remained stable and GI was consulted which recommended no need for urgent EGD at his time and was placed on PPI. Labs were also significant for hypernatremia and the patient was placed back on tube feeds and free water was increased.     In the setting of altered mental status- CT head, CTA head and neck was done as well an EEG. Initial CT head and CTA head/neck showed intracranial atherosclerosis but no large vessel occlusion and no acute infarction. EEG done showed no seizures documented after 24hours. Neurology following recommended MRI which showed Small scattered foci of diffusion restriction within the right cerebellar hemisphere, left parietal lobe, and symmetrically in the bilateral basal ganglia. Due to the fact that these findings may be new compared to previous imaging- neurology stroke team was made aware of these findings and recommended starting baby aspirin and echo to r/o emboli phenomenon.  Mental status ultimately improved as pt started to have more purposeful head and mouth movements and intermittently able to follow commands.     Pt was ruled stable to be transferred to RCU.       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 Admitted to MICU in the setting of AMS with hypotension and febrile to 105 with Hgb noted for Hgb 5 admitted to MICu in the setting of septic shock with unclear source.     NEURO:  #AMS  -Unresponsive with unreactive pupils and no corneal reflex on 3/3 for which an RRT was called. In setting of septic shock vs hemorrhagic shock.  - 3/3 CT brain and neck angio showing no intracranial hemorrhage or mass lesion with no interval change since imaging on 11/2023. 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 Hg 5.4. Septic vs Hemorrhagic shock.   - continue Levophed, wean as tolerated- off since 3/6 AM   -  droxidopa 200mg TID added- can wean down to 100mg TID as tolerated   - Patient is a [employer]. Conditional approval for 1u pRBC per spouse      #HTN  Home meds: Atenolol/Chlorthiazide 50mg/25mg daily  - hold in setting of shock    #HLD  Home med: Atorvastatin 80mg daily    PULMONARY:  #Acute Hypoxic Respiratory Failure  Course complicated by progressive respiratory failure. CT Chest 2/20 with atelectasis of bilateral lower lobes.  - BAL 2/21 Grew PSA treated with course of Zosyn (2/21-2/28)  - s/p Tracheostomy #8 Cuffed Portex 2/16 (Dr. Sood)  - Chest PT and suctioning prn  - switched to CPAP for 3/6     #bibasilar PNA with mucoid impacted lower lobe on CT imaging   - cw meropenem and vanc   - fu sputum CX   - chest PT and suctioning PRN .    GI:  #Melena  New melena on 3/3 with Hg drop to 5.4.   - Scientologist. Spouse gave conditional approval for 1u pRBC  - repeat Hgb 9.4 will obtain repeat CBC   - CT ABD/pelvis: showing no evidence active GI bleed. portal venous gas within main portal vein may be 2/2 to ischemia in setting of shock.    - GI consult: no urgent need for EGD at this time   - monitor CBC  - will start on oral PPI     #S/p PEG 2/26 (Dr. Sood)  - was tolerating tube feeds at goal  - will restart tue feeds     #transaminitis  -  and  --> now uptrending   -most likely in setting of shock     diet: tube feeds    RENAL:  #Hypernatremia  Na 152 on 3/3--> 149 on 3/3   - s/p D5W 100mL/hr and DDAVP   -increase free water 400 q6h     #BALTA- resolving   Cr 1.67 on 3/3. Cr 0.7-0.8 this admission. Now downtrending to 0.9   - IVF as above  - UA and urine lytes  - vences can be removed     ENDO:  #T2DM  Home med: Metformin 1g BID  - NPH 7u q6h  - ISS q6h     HEME/ONC/RHEUM:  #Anemia  #Scientologist  Hg has been 7-8 this admission, however, new decrease to 5.4 with melena on 3.3.  - no overt signs of bleeding will cw IV PPI pending GI recs   - Melena/GI bleed management as above but Hgb has been stable     DVT ppx:  heparin subQ     ID:  #Sepsis  AMS, febrile, and hypotensive on 3/3. In shock.  - BAL 2/21 Grew PSA treated with course of Zosyn (2/21-2/28)  - Prior BCx this admission have been negative  - procal elevated at 5.04 l MRSA negative I RVP negative I UA neg I UCx neg I BCx NGTD   - sputum cx : growing Pseudomonas pending sensitivities  - Meropenem 2g q8h (3/3 - ) will complete 7 day course   - DC vanc   - Tmax 100.6 will hold off BCx and UCx     SKIN/MSK: no active issues    ETHICS:  Full Code      To Do:  [ ] fu neuro recs/stroke recs regarding MRI findings  [ ] fu sputum culture Pseudomonas sensitivities   [ ] fu hypernatremia - consider increasing free water of IVF if fluid down   [ ] finish meropenem course for 7 day course meropenem (last day 3/10)  [ ] fu TTE per neuro/stroke team placed r/o emboli MICU Transfer Note    Transfer from: MICU    Transfer to: (  ) Medicine    (  ) Telemetry     (X   ) RCU        (    ) Palliative         (   ) Stroke Unit          (   ) __________________    Accepting Physician: Dr. 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 Prior to admission patient had Viral URI ( 1-2 weeks prior to admission) with subsequent weakness. He underwent an angiogram (2/11) which showed moderate stenosis and no intervention was done. MRI Performed c/w small bilateral cerebellar strokes and prior L thalamic strokes, as per Neurology was not c/w current presentation. 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 He is currently s/p 5 rounds of plasma exchange (2/13-2/20) and IVIG x 5 ( 2/21-2/26)  His hospital course was complicated by progressive respiratory failure. CT Chest performed on 2/20 with atelectasis of b/l lower lobes. BAL 2/21 Grew PSA treated with course of Zosyn (2/21-2/28).     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    RRT called on 3/3 for unresponsiveness. Patient with unreactive pupils, no corneal reflex. Also found to be hypotensive and febrile to 105. Noted to have new melena. Hgb 5. Given 1 unit of pRBC. Pending CTH, CTA head and neck, CTA Chest and A/P.      MICU Course:    While in the ICU the patient was started on pressors in the setting of septic shock.  Pressor requirement transiently increased and a central line was placed. However, pressors requirements decreased and pt was placed on droxidopa. Levo requirements were ultimately weaned off and blood pressures remained stable.       Infectious workup was started. Pt was placed temporarily NPO in the setting of possible aspiration pneumonia. CT chest imaging was significant for bibasilar pneumonia in the setting of mucus plugging. Infectious workup was also sent which blood cultures and urine cultures were negative. Sputum cultures came back positive for Pseudomonas and pt will finish with a 7 day course of meropenem.     Labs were significant initially for Hgb of 5 which he received 1 unit pRBC per permission from the family. The pt's hemoglobin remained stable and GI was consulted which recommended no need for urgent EGD at his time and was placed on PPI. Labs were also significant for hypernatremia and the patient was placed back on tube feeds and free water was increased.     In the setting of altered mental status- CT head, CTA head and neck was done as well an EEG. Initial CT head and CTA head/neck showed intracranial atherosclerosis but no large vessel occlusion and no acute infarction. EEG done showed no seizures documented after 24hours. Neurology following recommended MRI which showed Small scattered foci of diffusion restriction within the right cerebellar hemisphere, left parietal lobe, and symmetrically in the bilateral basal ganglia. Due to the fact that these findings may be new compared to previous imaging- neurology stroke team was made aware of these findings and recommended starting baby aspirin and echo to r/o emboli phenomenon.  Mental status ultimately improved as pt started to have more purposeful head and mouth movements and intermittently able to follow commands.     Pt was ruled stable to be transferred to RCU.       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 Admitted to MICU in the setting of AMS with hypotension and febrile to 105 with Hgb noted for Hgb 5 admitted to MICu in the setting of septic shock with unclear source.     NEURO:  #AMS  -Unresponsive with unreactive pupils and no corneal reflex on 3/3 for which an RRT was called. In setting of septic shock vs hemorrhagic shock.  - 3/3 CT brain and neck angio showing no intracranial hemorrhage or mass lesion with no interval change since imaging on 11/2023. 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 Hg 5.4. Septic vs Hemorrhagic shock.   - continue Levophed, wean as tolerated- off since 3/6 AM   -  droxidopa 200mg TID added- can wean down to 100mg TID as tolerated   - Patient is a Taoist. Conditional approval for 1u pRBC per spouse      #HTN  Home meds: Atenolol/Chlorthiazide 50mg/25mg daily  - hold in setting of shock    #HLD  Home med: Atorvastatin 80mg daily    PULMONARY:  #Acute Hypoxic Respiratory Failure  Course complicated by progressive respiratory failure. CT Chest 2/20 with atelectasis of bilateral lower lobes.  - BAL 2/21 Grew PSA treated with course of Zosyn (2/21-2/28)  - s/p Tracheostomy #8 Cuffed Portex 2/16 (Dr. Sood)  - Chest PT and suctioning prn  - switched to CPAP for 3/6     #bibasilar PNA with mucoid impacted lower lobe on CT imaging   - cw meropenem and vanc   - fu sputum CX   - chest PT and suctioning PRN .    GI:  #Melena  New melena on 3/3 with Hg drop to 5.4.   - Tenriism. Spouse gave conditional approval for 1u pRBC  - repeat Hgb 9.4 will obtain repeat CBC   - CT ABD/pelvis: showing no evidence active GI bleed. portal venous gas within main portal vein may be 2/2 to ischemia in setting of shock.    - GI consult: no urgent need for EGD at this time   - monitor CBC  - will start on oral PPI     #S/p PEG 2/26 (Dr. Sood)  - was tolerating tube feeds at goal  - will restart tue feeds     #transaminitis  -  and  --> now uptrending   -most likely in setting of shock     diet: tube feeds    RENAL:  #Hypernatremia  Na 152 on 3/3--> 149 on 3/3   - s/p D5W 100mL/hr and DDAVP   -increase free water 400 q6h     #BALTA- resolving   Cr 1.67 on 3/3. Cr 0.7-0.8 this admission. Now downtrending to 0.9   - IVF as above  - UA and urine lytes  - vences can be removed     ENDO:  #T2DM  Home med: Metformin 1g BID  - NPH 7u q6h  - ISS q6h     HEME/ONC/RHEUM:  #Anemia  #Tenriism  Hg has been 7-8 this admission, however, new decrease to 5.4 with melena on 3.3.  - no overt signs of bleeding will cw IV PPI pending GI recs   - Melena/GI bleed management as above but Hgb has been stable     DVT ppx:  heparin subQ     ID:  #Sepsis  AMS, febrile, and hypotensive on 3/3. In shock.  - BAL 2/21 Grew PSA treated with course of Zosyn (2/21-2/28)  - Prior BCx this admission have been negative  - procal elevated at 5.04 l MRSA negative I RVP negative I UA neg I UCx neg I BCx NGTD   - sputum cx : growing Pseudomonas pending sensitivities  - Meropenem 2g q8h (3/3 - ) will complete 7 day course   - DC vanc   - Tmax 100.6 will hold off BCx and UCx     SKIN/MSK: no active issues    ETHICS:  Full Code      To Do:  [ ] fu neuro recs/stroke recs regarding MRI findings  [ ] fu sputum culture Pseudomonas sensitivities   [ ] fu hypernatremia - consider increasing free water of IVF if fluid down   [ ] finish meropenem course for 7 day course meropenem (last day 3/10)  [ ] fu TTE per neuro/stroke team placed r/o emboli MICU Transfer Note    Transfer from: MICU    Transfer to: (  ) Medicine    (  ) Telemetry     (X   ) RCU        (    ) Palliative         (   ) Stroke Unit          (   ) __________________    Accepting Physician: Dr. 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 Prior to admission patient had Viral URI ( 1-2 weeks prior to admission) with subsequent weakness. He underwent an angiogram (2/11) which showed moderate stenosis and no intervention was done. MRI Performed c/w small bilateral cerebellar strokes and prior L thalamic strokes, as per Neurology was not c/w current presentation. 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 He is currently s/p 5 rounds of plasma exchange (2/13-2/20) and IVIG x 5 ( 2/21-2/26)  His hospital course was complicated by progressive respiratory failure. CT Chest performed on 2/20 with atelectasis of b/l lower lobes. BAL 2/21 Grew PSA treated with course of Zosyn (2/21-2/28).     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    RRT called on 3/3 for unresponsiveness. Patient with unreactive pupils, no corneal reflex. Also found to be hypotensive and febrile to 105. Noted to have new melena. Hgb 5. Given 1 unit of pRBC. Pending CTH, CTA head and neck, CTA Chest and A/P.      MICU Course:    While in the ICU the patient was started on pressors in the setting of septic shock.  Pressor requirement transiently increased and a central line was placed. However, pressors requirements decreased and pt was placed on droxidopa. Levo requirements were ultimately weaned off and blood pressures remained stable.       Infectious workup was started. Pt was placed temporarily NPO in the setting of possible aspiration pneumonia. CT chest imaging was significant for bibasilar pneumonia in the setting of mucus plugging. Infectious workup was also sent which blood cultures and urine cultures were negative. Sputum cultures came back positive for Pseudomonas and pt will finish with a 7 day course of meropenem.     Labs were significant initially for Hgb of 5 which he received 1 unit pRBC per permission from the family. The pt's hemoglobin remained stable and GI was consulted which recommended no need for urgent EGD at his time and was placed on PPI. Labs were also significant for hypernatremia and the patient was placed back on tube feeds and free water was increased.     In the setting of altered mental status- CT head, CTA head and neck was done as well an EEG. Initial CT head and CTA head/neck showed intracranial atherosclerosis but no large vessel occlusion and no acute infarction. EEG done showed no seizures documented after 24hours. Neurology following recommended MRI which showed Small scattered foci of diffusion restriction within the right cerebellar hemisphere, left parietal lobe, and symmetrically in the bilateral basal ganglia. Due to the fact that these findings may be new compared to previous imaging- neurology stroke team was made aware of these findings and recommended starting baby aspirin and echo to r/o emboli phenomenon.  Mental status ultimately improved as pt started to have more purposeful head and mouth movements and intermittently able to follow commands.     Pt was ruled stable to be transferred to RCU.       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 Admitted to MICU in the setting of AMS with hypotension and febrile to 105 with Hgb noted for Hgb 5 admitted to MICu in the setting of septic shock with unclear source.     NEURO:  #AMS  -Unresponsive with unreactive pupils and no corneal reflex on 3/3 for which an RRT was called. In setting of septic shock vs hemorrhagic shock.  - 3/3 CT brain and neck angio showing no intracranial hemorrhage or mass lesion with no interval change since imaging on 11/2023. 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 Hg 5.4. Septic vs Hemorrhagic shock.   - continue Levophed, wean as tolerated- off since 3/6 AM   -  droxidopa 200mg TID added- can wean down to 100mg TID as tolerated   - Patient is a [employer]. Conditional approval for 1u pRBC per spouse      #HTN  Home meds: Atenolol/Chlorthiazide 50mg/25mg daily  - hold in setting of shock    #HLD  Home med: Atorvastatin 80mg daily    PULMONARY:  #Acute Hypoxic Respiratory Failure  Course complicated by progressive respiratory failure. CT Chest 2/20 with atelectasis of bilateral lower lobes.  - BAL 2/21 Grew PSA treated with course of Zosyn (2/21-2/28)  - s/p Tracheostomy #8 Cuffed Portex 2/16 (Dr. Sood)  - Chest PT and suctioning prn  - switched to CPAP for 3/6     #bibasilar PNA with mucoid impacted lower lobe on CT imaging   - cw meropenem and vanc   - fu sputum CX   - chest PT and suctioning PRN .    GI:  #Melena  New melena on 3/3 with Hg drop to 5.4.   - [employer]. Spouse gave conditional approval for 1u pRBC  - repeat Hgb 9.4 will obtain repeat CBC   - CT ABD/pelvis: showing no evidence active GI bleed. portal venous gas within main portal vein may be 2/2 to ischemia in setting of shock.    - GI consult: no urgent need for EGD at this time   - monitor CBC  - will start on oral PPI     #S/p PEG 2/26 (Dr. Sood)  - was tolerating tube feeds at goal  - will restart tue feeds     #transaminitis  -  and  --> now uptrending   -most likely in setting of shock     diet: tube feeds    RENAL:  #Hypernatremia  Na 152 on 3/3--> 149 on 3/3   - s/p D5W 100mL/hr and DDAVP   -increase free water 400 q6h     #BALTA- resolving   Cr 1.67 on 3/3. Cr 0.7-0.8 this admission. Now downtrending to 0.9   - IVF as above  - UA and urine lytes  - vences can be removed     ENDO:  #T2DM  Home med: Metformin 1g BID  - NPH 7u q6h  - ISS q6h     HEME/ONC/RHEUM:  #Anemia  #[employer]  Hg has been 7-8 this admission, however, new decrease to 5.4 with melena on 3.3.  - no overt signs of bleeding will cw IV PPI pending GI recs   - Melena/GI bleed management as above but Hgb has been stable     DVT ppx:  heparin subQ     ID:  #Sepsis  AMS, febrile, and hypotensive on 3/3. In shock.  - BAL 2/21 Grew PSA treated with course of Zosyn (2/21-2/28)  - Prior BCx this admission have been negative  - procal elevated at 5.04 l MRSA negative I RVP negative I UA neg I UCx neg I BCx NGTD   - sputum cx : growing Pseudomonas pending sensitivities  - Meropenem 2g q8h (3/3 - ) will complete 7 day course   - DC vanc   - Tmax 100.6 will hold off BCx and UCx     SKIN/MSK: no active issues    ETHICS:  Full Code      To Do:  [ ] fu neuro recs/stroke recs regarding MRI findings  [ ] fu sputum culture Pseudomonas sensitivities   [ ] fu hypernatremia - consider increasing free water of IVF if fluid down   [ ] finish meropenem course for 7 day course meropenem (last day 3/10)  [ ] fu TTE per neuro/stroke team placed r/o emboli MICU Transfer Note    Transfer from: MICU    Transfer to: (  ) Medicine    (  ) Telemetry     (X   ) RCU        (    ) Palliative         (   ) Stroke Unit          (   ) __________________    Accepting Physician: Dr. 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 Prior to admission patient had Viral URI ( 1-2 weeks prior to admission) with subsequent weakness. He underwent an angiogram (2/11) which showed moderate stenosis and no intervention was done. MRI Performed c/w small bilateral cerebellar strokes and prior L thalamic strokes, as per Neurology was not c/w current presentation. 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 He is currently s/p 5 rounds of plasma exchange (2/13-2/20) and IVIG x 5 ( 2/21-2/26)  His hospital course was complicated by progressive respiratory failure. CT Chest performed on 2/20 with atelectasis of b/l lower lobes. BAL 2/21 Grew PSA treated with course of Zosyn (2/21-2/28).     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    RRT called on 3/3 for unresponsiveness. Patient with unreactive pupils, no corneal reflex. Also found to be hypotensive and febrile to 105. Noted to have new melena. Hgb 5. Given 1 unit of pRBC. Pending CTH, CTA head and neck, CTA Chest and A/P.      MICU Course:    While in the ICU the patient was started on pressors in the setting of septic shock.  Pressor requirement transiently increased and a central line was placed. However, pressors requirements decreased and pt was placed on droxidopa. Levo requirements were ultimately weaned off and blood pressures remained stable.       Infectious workup was started. Pt was placed temporarily NPO in the setting of possible aspiration pneumonia. CT chest imaging was significant for bibasilar pneumonia in the setting of mucus plugging. Infectious workup was also sent which blood cultures and urine cultures were negative. Sputum cultures came back positive for Pseudomonas and pt will finish with a 7 day course of meropenem.     Labs were significant initially for Hgb of 5 which he received 1 unit pRBC per permission from the family. The pt's hemoglobin remained stable and GI was consulted which recommended no need for urgent EGD at his time and was placed on PPI. Labs were also significant for hypernatremia and the patient was placed back on tube feeds and free water was increased. Transaminitis also noted on labs and RUQ US was unremarkable most likely in the setting of shock.     In the setting of altered mental status- CT head, CTA head and neck was done as well an EEG. Initial CT head and CTA head/neck showed intracranial atherosclerosis but no large vessel occlusion and no acute infarction. EEG done showed no seizures documented after 24hours. Neurology following recommended MRI which showed Small scattered foci of diffusion restriction within the right cerebellar hemisphere, left parietal lobe, and symmetrically in the bilateral basal ganglia. Due to the fact that these findings may be new compared to previous imaging- neurology stroke team was made aware of these findings and recommended starting baby aspirin and echo to r/o emboli phenomenon.  Mental status ultimately improved as pt started to have more purposeful head and mouth movements and intermittently able to follow commands.     Pt was ruled stable to be transferred to RCU.       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 Admitted to MICU in the setting of AMS with hypotension and febrile to 105 with Hgb noted for Hgb 5 admitted to MICu in the setting of septic shock with unclear source.     NEURO:  #AMS  -Unresponsive with unreactive pupils and no corneal reflex on 3/3 for which an RRT was called. In setting of septic shock vs hemorrhagic shock.  - 3/3 CT brain and neck angio showing no intracranial hemorrhage or mass lesion with no interval change since imaging on 11/2023. 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) with DAPT: ASA81 + Plavix 75mg daily  - Neurology following    CARDIOVASCULAR:  #Shock- resolving   RRT on 3/3 for AMS, hypotensive to 80/40s, and febrile. Hg 5.4. Septic vs Hemorrhagic shock.   - continue Levophed, wean as tolerated- off since 3/6 AM   -  droxidopa 200mg TID added- can wean down to 100mg TID as tolerated   - Patient is a [employer]. Conditional approval for 1u pRBC per spouse      #HTN  Home meds: Atenolol/Chlorthiazide 50mg/25mg daily  - hold in setting of shock    #HLD  Home med: Atorvastatin 80mg daily    PULMONARY:  #Acute Hypoxic Respiratory Failure  Course complicated by progressive respiratory failure. CT Chest 2/20 with atelectasis of bilateral lower lobes.  - BAL 2/21 Grew PSA treated with course of Zosyn (2/21-2/28)  - s/p Tracheostomy #8 Cuffed Portex 2/16 (Dr. Sood)  - Chest PT and suctioning prn  - switched to CPAP for 3/6     #bibasilar PNA with mucoid impacted lower lobe on CT imaging   - cw meropenem and vanc   - fu sputum CX   - chest PT and suctioning PRN .    GI:  #Melena  New melena on 3/3 with Hg drop to 5.4.   - Je[employer]ah's witness. Spouse gave conditional approval for 1u pRBC  - repeat Hgb 9.4 will obtain repeat CBC   - CT ABD/pelvis: showing no evidence active GI bleed. portal venous gas within main portal vein may be 2/2 to ischemia in setting of shock.    - GI consult: no urgent need for EGD at this time   - monitor CBC  - will start on oral PPI     #S/p PEG 2/26 (Dr. Sood)  - was tolerating tube feeds at goal  - will restart tue feeds     #transaminitis  -  and  --> now uptrending   -most likely in setting of shock     diet: tube feeds    RENAL:  #Hypernatremia  Na 152 on 3/3--> 149 on 3/3   - s/p D5W 100mL/hr and DDAVP   -increase free water 400 q6h     #BALTA- resolving   Cr 1.67 on 3/3. Cr 0.7-0.8 this admission. Now downtrending to 0.9   - IVF as above  - UA and urine lytes  - vences can be removed     ENDO:  #T2DM  Home med: Metformin 1g BID  - NPH 7u q6h  - ISS q6h     HEME/ONC/RHEUM:  #Anemia  #[employer]ehovah's witness  Hg has been 7-8 this admission, however, new decrease to 5.4 with melena on 3.3.  - no overt signs of bleeding will cw IV PPI pending GI recs   - Melena/GI bleed management as above but Hgb has been stable     DVT ppx:  heparin subQ     ID:  #Sepsis  AMS, febrile, and hypotensive on 3/3. In shock.  - BAL 2/21 Grew PSA treated with course of Zosyn (2/21-2/28)  - Prior BCx this admission have been negative  - procal elevated at 5.04 l MRSA negative I RVP negative I UA neg I UCx neg I BCx NGTD   - sputum cx : growing Pseudomonas pending sensitivities  - Meropenem 2g q8h (3/3 - ) will complete 7 day course   - DC vanc   - Tmax 100.6 will hold off BCx and UCx     SKIN/MSK: no active issues    ETHICS:  Full Code      To Do:  [ ] fu neuro recs/stroke recs regarding MRI findings  [ ] fu sputum culture Pseudomonas sensitivities   [ ] fu hypernatremia - consider increasing free water of IVF if fluid down   [ ] finish meropenem course for 7 day course meropenem (last day 3/10)  [ ] fu TTE per neuro/stroke team placed r/o emboli  [ ] trend LFTs

## 2024-03-06 NOTE — PROGRESS NOTE ADULT - SUBJECTIVE AND OBJECTIVE BOX
Patient is a 73y old  Male who presents with a chief complaint of Stroke, critical stenosis, evaluation for angiography (06 Mar 2024 16:23)      Interval Events:    REVIEW OF SYSTEMS:  [ ] Positive  [ ] All other systems negative  [ ] Unable to assess ROS because ________    Vital Signs Last 24 Hrs  T(C): 36.7 (03-06-24 @ 18:05), Max: 38.3 (03-06-24 @ 00:00)  T(F): 98.1 (03-06-24 @ 18:05), Max: 100.9 (03-06-24 @ 00:00)  HR: 84 (03-06-24 @ 18:05) (77 - 105)  BP: 110/60 (03-06-24 @ 18:05) (109/56 - 110/60)  RR: 22 (03-06-24 @ 18:05) (10 - 26)  SpO2: 97% (03-06-24 @ 18:05) (90% - 100%)    PHYSICAL EXAM:  HEENT:   [ ]Tracheostomy:  [ ]Pupils equal  [ ]No oral lesions  [ ]Abnormal    SKIN  [ ]No Rash  [ ] Abnormal  [ ] pressure    CARDIAC  [ ]Regular  [ ]Abnormal    PULMONARY  [ ]Bilateral Clear Breath Sounds  [ ]Normal Excursion  [ ]Abnormal    GI  [ ]PEG      [ ] +BS		              [ ]Soft, nondistended, nontender	  [ ]Abnormal    MUSCULOSKELETAL                                   [ ]Bedbound                 [ ]Abnormal    [ ]Ambulatory/OOB to chair                           EXTREMITIES                                         [ ]Normal  [ ]Edema                           NEUROLOGIC  [ ] Normal, non focal  [ ] Focal findings:    PSYCHIATRIC  [ ]Alert and appropriate  [ ] Sedated	 [ ]Agitated    :  [place]: [ ] Yes, if yes: Date of Placement:                   [  ] No    LINES: Central Lines [ ] Yes, if yes: Date of Placement                                     [  ] No    HOSPITAL MEDICATIONS:  MEDICATIONS  (STANDING):  albuterol/ipratropium for Nebulization 3 milliLiter(s) Nebulizer every 6 hours  aspirin  chewable 81 milliGRAM(s) Oral daily  chlorhexidine 0.12% Liquid 15 milliLiter(s) Oral Mucosa every 12 hours  chlorhexidine 4% Liquid 1 Application(s) Topical daily  droxidopa 200 milliGRAM(s) Oral every 8 hours  heparin   Injectable 5000 Unit(s) SubCutaneous every 8 hours  insulin lispro (ADMELOG) corrective regimen sliding scale   SubCutaneous every 6 hours  insulin NPH human recombinant 7 Unit(s) SubCutaneous every 6 hours  meropenem  IVPB 1000 milliGRAM(s) IV Intermittent every 8 hours  pantoprazole   Suspension 40 milliGRAM(s) Oral daily  polyethylene glycol 3350 17 Gram(s) Oral daily  senna 1 Tablet(s) Oral at bedtime    MEDICATIONS  (PRN):      LABS:                        8.0    13.64 )-----------( 312      ( 06 Mar 2024 00:18 )             27.5     03-06    152<H>  |  118<H>  |  45<H>  ----------------------------<  150<H>  3.9   |  26  |  0.96    Ca    7.7<L>      06 Mar 2024 00:18  Phos  3.0     03-06  Mg     2.8     03-06    TPro  6.4  /  Alb  2.3<L>  /  TBili  0.4  /  DBili  x   /  AST  450<H>  /  ALT  422<H>  /  AlkPhos  112  03-06    PT/INR - ( 06 Mar 2024 00:18 )   PT: 12.2 sec;   INR: 1.11 ratio         PTT - ( 06 Mar 2024 00:18 )  PTT:24.0 sec  Urinalysis Basic - ( 06 Mar 2024 00:18 )    Color: x / Appearance: x / SG: x / pH: x  Gluc: 150 mg/dL / Ketone: x  / Bili: x / Urobili: x   Blood: x / Protein: x / Nitrite: x   Leuk Esterase: x / RBC: x / WBC x   Sq Epi: x / Non Sq Epi: x / Bacteria: x      Arterial Blood Gas:  03-06 @ 00:00  7.45/39/66/27/94.7/2.9  ABG lactate: --  Arterial Blood Gas:  03-04 @ 23:54  7.44/39/73/26/96.5/2.2  ABG lactate: --      CAPILLARY BLOOD GLUCOSE    MICROBIOLOGY:     RADIOLOGY:  [ ] Reviewed and interpreted by me    Mode: AC/ CMV (Assist Control/ Continuous Mandatory Ventilation)  RR (machine): 20  TV (machine): 500  FiO2: 30  PEEP: 5  ITime: 1  MAP: 9  PIP: 17   Patient is a 73y old  Male who presents with a chief complaint of Stroke, critical stenosis, evaluation for angiography (06 Mar 2024 16:23)      Interval Events: Transferred from NSCU to RCU in stable condition    REVIEW OF SYSTEMS:  [ ] Positive  [X] All other systems negative:  Limited capacity to communicate  [ ] Unable to assess ROS because ___    Vital Signs Last 24 Hrs  T(C): 36.7 (03-06-24 @ 18:05), Max: 38.3 (03-06-24 @ 00:00)  T(F): 98.1 (03-06-24 @ 18:05), Max: 100.9 (03-06-24 @ 00:00)  HR: 84 (03-06-24 @ 18:05) (77 - 105)  BP: 110/60 (03-06-24 @ 18:05) (109/56 - 110/60)  RR: 22 (03-06-24 @ 18:05) (10 - 26)  SpO2: 97% (03-06-24 @ 18:05) (90% - 100%)    PHYSICAL EXAM:  HEENT:    [X] Tracheostomy:  #8 Cuffed Portex  [X] PERRL B/L; 4mm B/L pupils; EOMI  [X] No oral lesions  [ ] Abnormal    SKIN  [X] No Rash  [ ] Abnormal  [ ] pressure    CARDIAC  [X] Regular  [ ] Abnormal    PULMONARY  [X] Bilateral Clear Breath Sounds  [ ] Normal Excursion  [ ] Abnormal    GI  [X] PEG      [X] +BS		              [X] Soft, nondistended, nontender	  [ ] Abnormal    MUSCULOSKELETAL                                   [X] Bedbound                 [ ] Abnormal:   [ ] Ambulatory/OOB to chair                           EXTREMITIES                                         [ ]Normal  [X] Edema  1+ edema in B/L UEs                           NEUROLOGIC  [ ] Normal, non focal  [X] Focal findings: Alert and Oriented; +gag; no gross facial asymmetry observed; responds appropriately to questions by nodding; able to move B/L feet, trigger movement observed in right knee,  +B/L shoulder shrugs, slight grasp in B/L hands observed; unable to lift legs or arms    PSYCHIATRIC  [X] Alert, responsive with flat affect  [ ] Sedated	 [ ]Agitated    :  [place]: [ ] Yes, if yes: Date of Placement:                   [X] No    LINES: Central Lines [ ] Yes, if yes: Date of Placement                                     [X] No    HOSPITAL MEDICATIONS:  MEDICATIONS  (STANDING):  albuterol/ipratropium for Nebulization 3 milliLiter(s) Nebulizer every 6 hours  aspirin  chewable 81 milliGRAM(s) Oral daily  chlorhexidine 0.12% Liquid 15 milliLiter(s) Oral Mucosa every 12 hours  chlorhexidine 4% Liquid 1 Application(s) Topical daily  droxidopa 200 milliGRAM(s) Oral every 8 hours  heparin   Injectable 5000 Unit(s) SubCutaneous every 8 hours  insulin lispro (ADMELOG) corrective regimen sliding scale   SubCutaneous every 6 hours  insulin NPH human recombinant 7 Unit(s) SubCutaneous every 6 hours  meropenem  IVPB 1000 milliGRAM(s) IV Intermittent every 8 hours  pantoprazole   Suspension 40 milliGRAM(s) Oral daily  polyethylene glycol 3350 17 Gram(s) Oral daily  senna 1 Tablet(s) Oral at bedtime    MEDICATIONS  (PRN):      LABS:             CAPILLARY BLOOD GLUCOSE    MICROBIOLOGY:     RADIOLOGY:  [ ] Reviewed and interpreted by me    Mode: AC/ CMV (Assist Control/ Continuous Mandatory Ventilation)  RR (machine): 20  TV (machine): 500  FiO2: 30  PEEP: 5  ITime: 1  MAP: 9  PIP: 17

## 2024-03-06 NOTE — PROGRESS NOTE ADULT - PROBLEM SELECTOR PLAN 2
- Patient S/p Tracheostomy # 8 Cuffed Portex on 2/16 ( )  - CTA Chest 2/20: No PE, Complete Atelectasis b/l lower lobes    - Currently remains on Mechanical Ventilation: PRVC 500/20/30%/+5  - Weaning limited due inadequately triggering/ Apnea    - Will continue weaning attempts as tolerated  - Continue Chest PT and Suctioning PRN

## 2024-03-06 NOTE — PROGRESS NOTE ADULT - ATTENDING COMMENTS
72 yo M w/ HTN, HLD, T2DM prior CVA's without residual deficits who initially presented with concern for stroke-like symptoms s/p unrevealing angiogram and ultimately found to have clinical picture most concerning for Ding Parham Variant of GBS. S/p trach/peg 2/9/24.  Now with presumed septic shock, melena, of unclear etiology.    shock likely due to pna given sputum culture showing GNR, now pseudomonas  moved toes overnight per family and staff, same this AM  following commands like squeezing his L hand to command  MRI with new infarcts    # acute on chronic hypoxic respiratory failure  # septic shock  # acute UGIB  - c/w vasopressors as needed for shock, hold off on further fluids, c/w droxidopa  - c/w full vent support for now, PS trials as tolerated  - no need for endoscopy at this time as per GI; increase hsq to q8h today, if h/h stable, restart asa/plavix tomorrow/Friday  - c/w broad abx for now, f/u sputum culture sensitivities    #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

## 2024-03-06 NOTE — PROGRESS NOTE ADULT - ASSESSMENT
Impression: R cerebellar and L parietal lesions likely 2/2 acute strokes 2/2 ESUS in the setting of recent angiography. Bilateral basal ganglia lesions, less concerning for strokes in that region, possibly 2/2 toxic/metabolic/infectious processes (CO poisoning/Chance's disease etc)    Recommendations:   Imaging:  [x] MRI brain w/o contrast, if no contraindications - 3/5/24  [] TTE w/ bubble study     Secondary prevention of stroke:  [x] Start 81 mg ASA daily (rectal 325 mg ASA if dysphagia fails) if no contraindication.   [] Permissive HTN up to 220/110 for 24 hours from symptom onset, gradual normotension over 2-3 days  [] Continue Atorvastatin 80 mg daily (long-term goal LDL < 70), if no contraindications  []Tight glucose control (long-term goal HgbA1c < 6%)    Misc/additional recs:  -toxic/metabolic/infectious w/u per primary team  -Dysphagia screen  -Speech and swallow eval if dysphagia screen fails  -NPO until dysphagia screen passed  -Head of bed > 30 degrees for aspiration prevention   -Continuous  telemetry to monitor for arrhythmia  -Stroke labwork: HgbA1C (6.8), fasting lipid panel (LDL 49)  - Baseline EKG  -Neuro checks Q4  -PT/OT  -DVT Prophylaxis: Lovenox 40 mg Sq daily and SCDs   Fall precautions, aspiration precautions  [] ILR     Case seen and discussed with neurology attending Dr. Calvert.          Impression: R cerebellar and L parietal lesions likely 2/2 acute strokes 2/2 ESUS (possibly cardio embolic given different vascular territory involvement) in the setting of recent angiography. Bilateral basal ganglia lesions, less concerning for strokes in that region, possibly 2/2 toxic/metabolic/infectious processes (CO poisoning/Chance's disease etc). Would work up patient for toxic/metabolic/infectious processes for basal ganglia lesions.     Recommendations:   Imaging:  [x] MRI brain w/o contrast, if no contraindications - 3/5/24  [] TTE w/ bubble study     Secondary prevention of stroke:  [x] Start 81 mg ASA daily (rectal 325 mg ASA if dysphagia fails) if no contraindication.   [] Permissive HTN up to 220/110 for 24 hours from symptom onset, gradual normotension over 2-3 days  [] Continue Atorvastatin 80 mg daily (long-term goal LDL < 70), if no contraindications  []Tight glucose control (long-term goal HgbA1c < 6%)    Misc/additional recs:  -toxic/metabolic/infectious w/u per primary team  -Dysphagia screen  -Speech and swallow eval if dysphagia screen fails  -NPO until dysphagia screen passed  -Head of bed > 30 degrees for aspiration prevention   -Continuous  telemetry to monitor for arrhythmia  -Stroke labwork: HgbA1C (6.8), fasting lipid panel (LDL 49)  - Baseline EKG  -Neuro checks Q4  -PT/OT  -DVT Prophylaxis: Lovenox 40 mg Sq daily and SCDs   Fall precautions, aspiration precautions  [] ILR     Case seen and discussed with neurology attending Dr. Calvert.    73M who presented to [facility] ED with ataxia and left facial droop, initially concerning for stroke and w/up notable for moderate basilar stenosis. Transferred to [facility] and quickly developed worsening dysarthria, dysphagia, and weakness. He was intubated and taken to angiogram on 2/11 showed moderate basilar stenosis but no intervention done.  Treated with PLEX and IVIG due to likely GBS (geiger contreras) based on exam, LP and MR imaging. Pt was poorly responsive to treatment. Initially treated with SAMPRIS (ASA/plavix) due to acute bilateral cerebellar infarcts thought 2/2 to angiogram.     MR Brain (3/5/24) with R cerebellar and L parietal lesions likely 2/2 acute/subacute strokes 2/2 ESUS (possibly cardioembolic given different vascular territory involvement) in the setting of recent angiography. Bilateral basal ganglia lesions, less concerning for ischemic strokes in that region but with localized hemorrhages, possibly 2/2 toxic/metabolic/infectious processes (CO poisoning/Chance's disease/cyanide/methanol etc). Would work up patient for toxic/metabolic/infectious processes for basal ganglia lesions.     Recommendations:   Imaging:  [x] MRI brain w/o contrast, if no contraindications - 3/5/24  [] Repeat TTE w/ bubble study     Secondary prevention of stroke:  [x] Start 81 mg ASA daily (rectal 325 mg ASA if dysphagia fails) if no contraindication.   [] Permissive HTN up to 220/110 for 24 hours from symptom onset, gradual normotension over 2-3 days  [] Continue Atorvastatin 80 mg daily (long-term goal LDL < 70), if no contraindications  []Tight glucose control (long-term goal HgbA1c < 6%)    Misc/additional recs:  -toxic/metabolic/infectious w/u per primary team  -Dysphagia screen  -Speech and swallow eval if dysphagia screen fails  -NPO until dysphagia screen passed  -Head of bed > 30 degrees for aspiration prevention   -Continuous  telemetry to monitor for arrhythmia  -Stroke labwork: HgbA1C (6.8), fasting lipid panel (LDL 49)  - Baseline EKG  -Neuro checks Q4  -PT/OT  -DVT Prophylaxis: Lovenox 40 mg Sq daily and SCDs   Fall precautions, aspiration precautions  [] ILR     Case seen and discussed with neurology attending Dr. Calvert.    73M who presented to [facility] ED with ataxia and left facial droop, initially concerning for stroke and w/up notable for moderate basilar stenosis. Transferred to [facility] and quickly developed worsening dysarthria, dysphagia, and weakness. He was intubated and taken to angiogram on 2/11 showed moderate basilar stenosis but no intervention done.  Treated with PLEX and IVIG due to likely GBS (geiger contreras) based on exam, LP and MR imaging. Pt was poorly responsive to treatment. Initially treated with SAMPRIS (ASA/plavix) due to acute bilateral cerebellar infarcts thought 2/2 to angiogram.     MR Brain (3/5/24) with R cerebellar and L parietal lesions likely 2/2 acute/subacute strokes 2/2 ESUS (possibly cardioembolic given different vascular territory involvement) in the setting of recent angiography. Bilateral basal ganglia lesions, less concerning for ischemic strokes in that region but with localized hemorrhages, possibly 2/2 toxic/metabolic/infectious processes (CO poisoning/Chance's disease/cyanide/methanol etc). Would work up patient for toxic/metabolic/infectious processes for basal ganglia lesions.     Recommendations:   Imaging:  [x] MRI brain w/o contrast, if no contraindications - 3/5/24  [] Repeat TTE w/ bubble study     Secondary prevention of stroke:  [x] Start 81 mg ASA daily (rectal 325 mg ASA if dysphagia fails) if no contraindication.   [] Permissive HTN up to 220/110 for 24 hours from symptom onset, gradual normotension over 2-3 days  [] Continue Atorvastatin 80 mg daily (long-term goal LDL < 70), if no contraindications  []Tight glucose control (long-term goal HgbA1c < 6%)    Misc/additional recs:  -toxic/metabolic/infectious w/u per primary team  -Diet: NPO with tube feeds  -Continuous  telemetry to monitor for arrhythmia  -Stroke labwork: HgbA1C (6.8), fasting lipid panel (LDL 49)  -Neuro checks Q4  -PT/OT  -DVT Prophylaxis: Lovenox 40 mg Sq daily and SCDs   Fall precautions, aspiration precautions  [] ILR     Case seen and discussed with neurology attending Dr. Calvert.    73M who presented to [facility] ED with ataxia and left facial droop, initially concerning for stroke and w/up notable for moderate basilar stenosis. Transferred to [facility] and quickly developed worsening dysarthria, dysphagia, and weakness. He was intubated and taken to angiogram on 2/11 showed moderate basilar stenosis but no intervention done.  Treated with PLEX and IVIG due to likely GBS (geiger contreras) based on exam, LP and MR imaging. Pt was poorly responsive to treatment. Initially treated with SAMPRIS (ASA/plavix) due to acute bilateral cerebellar infarcts thought 2/2 to angiogram.     MR Brain (3/5/24) with R cerebellar and L parietal lesions likely 2/2 acute/subacute strokes 2/2 ESUS (possibly cardioembolic given different vascular territory involvement) in the setting of recent angiography; septic emboli not excluded. Bilateral basal ganglia lesions, less concerning for ischemic strokes in that region but with localized hemorrhages, possibly 2/2 toxic/metabolic/infectious processes (CO poisoning/Chance's disease/cyanide/methanol etc). Would work up patient for toxic/metabolic/infectious processes for basal ganglia lesions.     Recommendations:   Imaging:  [] MRI brain w contrast, if no contraindications   [x] MR brain w/o contrast - 3/524  [] Repeat TTE w/ bubble study     Secondary prevention of stroke:  [x] Start 81 mg ASA daily (rectal 325 mg ASA if dysphagia fails) if no contraindication.   [] Permissive HTN up to 220/110 for 24 hours from symptom onset, gradual normotension over 2-3 days  [] Continue Atorvastatin 80 mg daily (long-term goal LDL < 70), if no contraindications  []Tight glucose control (long-term goal HgbA1c < 6%)    Misc/additional recs:  -toxic/metabolic/infectious w/u per primary team  -Diet: NPO with tube feeds  -Continuous  telemetry to monitor for arrhythmia  -Stroke labwork: HgbA1C (6.8), fasting lipid panel (LDL 49)  -Neuro checks Q4  -PT/OT  -DVT Prophylaxis: Lovenox 40 mg Sq daily and SCDs   Fall precautions, aspiration precautions  [] ILR     Case seen and discussed with neurology attending Dr. Calvert.    73M who presented to [facility] ED with ataxia and left facial droop, initially concerning for stroke and w/up notable for moderate basilar stenosis. Transferred to [facility] and quickly developed worsening dysarthria, dysphagia, and weakness. He was intubated and taken to angiogram on 2/11 showed moderate basilar stenosis but no intervention done.  Treated with PLEX and IVIG due to likely GBS (geiger contreras) based on exam, LP and MR imaging. Pt was poorly responsive to treatment. Initially treated with SAMPRIS (ASA/plavix) due to acute bilateral cerebellar infarcts thought 2/2 to angiogram.     MR Brain (3/5/24) with R cerebellar and L parietal lesions likely 2/2 acute/subacute strokes 2/2 ESUS (possibly cardioembolic given different vascular territory involvement) in the setting of recent angiography; septic emboli not excluded. Bilateral basal ganglia lesions, less concerning for ischemic strokes in that region but with localized hemorrhages, possibly 2/2 toxic/metabolic/infectious processes (CO poisoning/Chance's disease/cyanide/methanol etc). Would work up patient for toxic/metabolic/infectious processes for basal ganglia lesions.     Recommendations:   Imaging:  [x] MR brain w/o contrast - 3/524  [] Repeat TTE w/ bubble study     Secondary prevention of stroke:  [x] Start 81 mg ASA daily (rectal 325 mg ASA if dysphagia fails) if no contraindication.   [] Continue Atorvastatin 80 mg daily (long-term goal LDL < 70), if no contraindications  []Tight glucose control (long-term goal HgbA1c < 6%)    Misc/additional recs:  -toxic/metabolic/infectious w/u per primary team  -Diet: NPO with tube feeds  -Continuous  telemetry to monitor for arrhythmia  -Stroke labwork: HgbA1C (6.8), fasting lipid panel (LDL 49)  -Neuro checks Q4  -PT/OT  -DVT Prophylaxis: Lovenox 40 mg Sq daily and SCDs   Fall precautions, aspiration precautions  [] ILR     Case seen and discussed with neurology attending Dr. Calvert.    73M who presented to [facility] ED with ataxia and left facial droop, initially concerning for stroke and w/up notable for moderate basilar stenosis. Transferred to [facility] and quickly developed worsening dysarthria, dysphagia, and weakness. Eventually patient required intubation. DSA on 2/11 showed mid-to-moderate basilar stenosis and therefore no interventions were performed. TTE was unremarkable from 2/11. 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 Patient underwent course of PLEX and IVIG.      MR Brain (3/5/24) demonstrated small R cerebellar and L parietal infarctions likey ESUS in etiology (different vascular distrubution).  Septic emboli due to endocarditis are possible given hx of septic shock.    Bilateral symmetric basal ganglia lesions are less concerning for ischemic strokes. There is localized microhemorrhages, corresponding to those lesions on SWI sequence. Differential diagnosis of these lesions are broad. Hypoxic injury to the brain can produce similar lesions in the form of Delayed Post-hypoxic Leukoencephalopathy, which been described in literature. Another possibilities could include CO, cyanide or methanol poisoning and hx of exposure should be investigated. Chance's disease could produce b/l basal ganglia and thalamic lesions, but typically manifest much earlier in life with hepatic insufficiency.        Recommendations:     - Repeat TTE, followed by [facility]  - Continue telemetry   - Continue ASA 81 mg daily   - Agree with holding Atorvastatin for now given liver injury   - Prolong cardiac monitoring on discharge if no evidence of endocarditis  - NC q4h   - DVT Prophylaxis: Lovenox 40 mg Sq daily and SCDs       Case seen and discussed with neurology attending Dr. Calvert.     With contribution from Ac Wen MD, Stroke Fellow  73M who presented to [facility] ED with ataxia and left facial droop, initially concerning for stroke and w/up notable for moderate basilar stenosis. Transferred to [facility] and quickly developed worsening dysarthria, dysphagia, and weakness. Eventually patient required intubation. DSA on 2/11 showed mid-to-moderate basilar stenosis and therefore no interventions were performed. TTE was unremarkable from 2/11. 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 Patient underwent course of PLEX and IVIG.      MR Brain (3/5/24) demonstrated small R cerebellar and L parietal infarctions likely ESUS in etiology (different vascular distrubution).  Septic emboli due to endocarditis are possible given hx of septic shock.    Bilateral symmetric basal ganglia lesions are less concerning for ischemic strokes. There are localized micro hemorrhages, corresponding to those lesions on SWI sequence. Differential diagnosis of these lesions are broad. Hypoxic injury to the brain can produce similar lesions in the form of Delayed Post-hypoxic Leukoencephalopathy, which been described in literature. Another possibilities could include CO, cyanide or methanol poisoning and hx of exposure should be investigated. Chance's disease could produce b/l basal ganglia and thalamic lesions, but typically manifest much earlier in life with hepatic insufficiency.        Recommendations:     - Repeat TTE, followed by [facility]  - Continue telemetry   - Continue ASA 81 mg daily   - Agree with holding Atorvastatin for now given liver injury   - Prolong cardiac monitoring on discharge if no evidence of endocarditis  - NC q4h   - DVT Prophylaxis: Lovenox 40 mg Sq daily and SCDs       Case seen and discussed with neurology attending Dr. Calvert.     With contribution from Ac Wen MD, Stroke Fellow  73M who presented to [facility] ED with ataxia and left facial droop, initially concerning for stroke and w/up notable for moderate basilar stenosis. Transferred to [facility] and quickly developed worsening dysarthria, dysphagia, and weakness. Eventually patient required intubation. DSA on 2/11 showed mid-to-moderate basilar stenosis and therefore no interventions were performed. TTE was unremarkable from 2/11. 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 Patient underwent course of PLEX and IVIG.      MR Brain (3/5/24) demonstrated small R cerebellar and L parietal infarctions likely ESUS in etiology (different vascular distrubution).  Septic emboli due to endocarditis are possible given hx of septic shock.    Bilateral symmetric basal ganglia lesions are less concerning for ischemic strokes. There are localized micro hemorrhages, corresponding to those lesions on SWI sequence. Differential diagnosis of these lesions are broad. Hypoxic injury to the brain can produce similar lesions with later Delayed Post-hypoxic Leukoencephalopathy, which been described in literature. Another possibilities could include CO, cyanide or methanol poisoning and hx of exposure should be investigated. Chacne's disease could produce b/l basal ganglia and thalamic lesions, but typically manifest much earlier in life with hepatic insufficiency.        Recommendations:     - Repeat TTE, followed by [facility]  - Continue telemetry   - Continue ASA 81 mg daily   - Agree with holding Atorvastatin for now given liver injury   - Prolong cardiac monitoring on discharge if no evidence of endocarditis  - NC q4h   - DVT Prophylaxis: Lovenox 40 mg Sq daily and SCDs       Case seen and discussed with neurology attending Dr. Calvert.     With contribution from Ac Wen MD, Stroke Fellow

## 2024-03-06 NOTE — PROGRESS NOTE ADULT - ASSESSMENT
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 Prior to admission patient had Viral URI ( 1-2 weeks prior to admission) with subsequent weakness. He underwent an angiogram (2/11) which showed moderate stenosis and no intervention was done. MRI Performed c/w small bilateral cerebellar strokes and prior L thalamic strokes, as per Neurology was not c/w current presentation. 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 He is currently s/p 5 rounds of plasma exchange (2/13-2/20) and IVIG x 5 ( 2/21-2/26)  His hospital course was complicated by progressive respiratory failure. CT Chest performed on 2/20 with atelectasis of b/l lower lobes. BAL 2/21 Grew PSA treated with course of Zosyn (2/21-2/28).  Patient transferred to RCU on 2/24.   RRT called on 3/3 for unresponsiveness. Patient with unreactive pupils, no corneal reflex. Also found to be hypotensive and febrile to 105. Noted to have new melena. Hgb 5. Given 1 unit of pRBC.  Transferred to MICU.      MICU Course:  While in the ICU the patient was started on pressors in the setting of septic shock.  Pressor requirement transiently increased and a central line was placed. However, pressors requirements decreased and pt was placed on droxidopa. Levo requirements were ultimately weaned off and blood pressures remained stable.    Infectious workup was started. Pt was placed temporarily NPO in the setting of possible aspiration pneumonia. CT chest imaging was significant for bibasilar pneumonia in the setting of mucus plugging. Infectious workup was also sent which blood cultures and urine cultures were negative. Sputum cultures came back positive for Pseudomonas and pt will finish with a 7 day course of meropenem.   Labs were significant initially for Hgb of 5 which he received 1 unit pRBC per permission from the family. The pt's hemoglobin remained stable and GI was consulted which recommended no need for urgent EGD at his time and was placed on PPI. Labs were also significant for hypernatremia and the patient was placed back on tube feeds and free water was increased. Transaminitis also noted on labs and RUQ US was unremarkable most likely in the setting of shock.   In the setting of altered mental status- CT head, CTA head and neck was done as well an EEG. Initial CT head and CTA head/neck showed intracranial atherosclerosis but no large vessel occlusion and no acute infarction. EEG done showed no seizures documented after 24hours. Neurology following recommended MRI which showed Small scattered foci of diffusion restriction within the right cerebellar hemisphere, left parietal lobe, and symmetrically in the bilateral basal ganglia. Due to the fact that these findings may be new compared to previous imaging- neurology stroke team was made aware of these findings and recommended starting baby aspirin and echo to r/o emboli phenomenon.  Mental status ultimately improved as pt started to have more purposeful head and mouth movements and intermittently able to follow commands.     3/6:  Patient transferred back to RCU in stable condition. Responsive and interactive.

## 2024-03-06 NOTE — PROGRESS NOTE ADULT - CRITICAL CARE ATTENDING COMMENT
Critically ill patient requiring frequent bedside visits with therapy changes.

## 2024-03-06 NOTE — PROGRESS NOTE ADULT - SUBJECTIVE AND OBJECTIVE BOX
INTERVAL HPI/OVERNIGHT EVENTS:    SUBJECTIVE: Patient seen and examined at bedside.     ROS: All negative except as listed above.    VITAL SIGNS:  ICU Vital Signs Last 24 Hrs  T(C): 37.5 (06 Mar 2024 07:00), Max: 38.3 (06 Mar 2024 00:00)  T(F): 99.5 (06 Mar 2024 07:00), Max: 100.9 (06 Mar 2024 00:00)  HR: 81 (06 Mar 2024 07:00) (72 - 105)  BP: --  BP(mean): --  ABP: 142/53 (06 Mar 2024 07:00) (110/51 - 181/63)  ABP(mean): 77 (06 Mar 2024 07:00) (64 - 105)  RR: 20 (06 Mar 2024 07:00) (10 - 25)  SpO2: 94% (06 Mar 2024 07:00) (93% - 100%)    O2 Parameters below as of 06 Mar 2024 00:25  Patient On (Oxygen Delivery Method): ventilator          Mode: AC/ CMV (Assist Control/ Continuous Mandatory Ventilation), RR (machine): 20, TV (machine): 500, FiO2: 30, PEEP: 5, ITime: 1, MAP: 10, PIP: 19  Plateau pressure:   P/F ratio:     03-05 @ 07:01  -  03-06 @ 07:00  --------------------------------------------------------  IN: 2831.8 mL / OUT: 750 mL / NET: 2081.8 mL      CAPILLARY BLOOD GLUCOSE      POCT Blood Glucose.: 131 mg/dL (06 Mar 2024 05:20)      ECG: reviewed.    PHYSICAL EXAM:    GENERAL: NAD, lying in bed comfortably  HEAD:  Atraumatic, normocephalic  EYES: EOMI, PERRLA, conjunctiva and sclera clear  NECK: Supple, trachea midline, no JVD  HEART: Regular rate and rhythm, no murmurs, rubs, or gallops  LUNGS: Unlabored respirations.  Clear to auscultation bilaterally, no crackles, wheezing, or rhonchi  ABDOMEN: Soft, nontender, nondistended, +BS  EXTREMITIES: 2+ peripheral pulses bilaterally, cap refill<2 secs. No clubbing, cyanosis, or edema  NERVOUS SYSTEM:  A&Ox3, following commands, moving all extremities, no focal deficits   SKIN: No rashes or lesions    MEDICATIONS:  MEDICATIONS  (STANDING):  albuterol/ipratropium for Nebulization 3 milliLiter(s) Nebulizer every 6 hours  chlorhexidine 0.12% Liquid 15 milliLiter(s) Oral Mucosa every 12 hours  chlorhexidine 4% Liquid 1 Application(s) Topical daily  droxidopa 200 milliGRAM(s) Oral every 8 hours  heparin   Injectable 5000 Unit(s) SubCutaneous every 12 hours  insulin lispro (ADMELOG) corrective regimen sliding scale   SubCutaneous every 6 hours  insulin NPH human recombinant 7 Unit(s) SubCutaneous every 6 hours  meropenem  IVPB 1000 milliGRAM(s) IV Intermittent every 8 hours  norepinephrine Infusion 0.05 MICROgram(s)/kG/Min (9.52 mL/Hr) IV Continuous <Continuous>  pantoprazole  Injectable 40 milliGRAM(s) IV Push every 12 hours  polyethylene glycol 3350 17 Gram(s) Oral daily  senna 1 Tablet(s) Oral at bedtime    MEDICATIONS  (PRN):      ALLERGIES:  Allergies    No Known Allergies    Intolerances        LABS:                        8.0    13.64 )-----------( 312      ( 06 Mar 2024 00:18 )             27.5     03-06    152<H>  |  118<H>  |  45<H>  ----------------------------<  150<H>  3.9   |  26  |  0.96    Ca    7.7<L>      06 Mar 2024 00:18  Phos  3.0     03-06  Mg     2.8     03-06    TPro  6.4  /  Alb  2.3<L>  /  TBili  0.4  /  DBili  x   /  AST  450<H>  /  ALT  422<H>  /  AlkPhos  112  03-06    PT/INR - ( 06 Mar 2024 00:18 )   PT: 12.2 sec;   INR: 1.11 ratio         PTT - ( 06 Mar 2024 00:18 )  PTT:24.0 sec  Urinalysis Basic - ( 06 Mar 2024 00:18 )    Color: x / Appearance: x / SG: x / pH: x  Gluc: 150 mg/dL / Ketone: x  / Bili: x / Urobili: x   Blood: x / Protein: x / Nitrite: x   Leuk Esterase: x / RBC: x / WBC x   Sq Epi: x / Non Sq Epi: x / Bacteria: x      ABG:  pH, Arterial: 7.45 (03-06-24 @ 00:00)  pCO2, Arterial: 39 mmHg (03-06-24 @ 00:00)  pO2, Arterial: 66 mmHg (03-06-24 @ 00:00)      vBG:    Micro:    Culture - Blood (collected 03-03-24 @ 16:00)  Source: .Blood Blood-Arterial  Preliminary Report (03-05-24 @ 22:02):    No growth at 48 Hours    Culture - Blood (collected 03-03-24 @ 11:15)  Source: .Blood Blood  Preliminary Report (03-05-24 @ 18:01):    No growth at 48 Hours    Culture - Blood (collected 02-23-24 @ 18:03)  Source: .Blood Blood  Final Report (02-28-24 @ 23:00):    No growth at 5 days    Culture - Blood (collected 02-23-24 @ 18:03)  Source: .Blood Blood  Final Report (02-28-24 @ 23:00):    No growth at 5 days    Culture - Blood (collected 02-20-24 @ 16:57)  Source: .Blood Blood  Final Report (02-25-24 @ 23:00):    No growth at 5 days    Culture - Blood (collected 02-20-24 @ 16:47)  Source: .Blood Blood  Final Report (02-25-24 @ 23:00):    No growth at 5 days        Culture - Sputum (collected 03-04-24 @ 09:22)  Source: ET Tube ET Tube  Gram Stain (03-04-24 @ 21:42):    Numerous polymorphonuclear leukocytes per low power field    Moderate Squamous epithelial cells per low power field    Moderate Gram Negative Rods seen per oil power field    Few Gram Positive Cocci in Pairs and Chains seen per oil power field  Preliminary Report (03-05-24 @ 17:50):    Moderate Pseudomonas aeruginosa    Normal Respiratory Nallely present        RADIOLOGY & ADDITIONAL TESTS: Reviewed.   INTERVAL HPI/OVERNIGHT EVENTS: ovn Tmax 100.6     SUBJECTIVE: Patient seen and examined at bedside.     ROS: All negative except as listed above.    VITAL SIGNS:  ICU Vital Signs Last 24 Hrs  T(C): 37.5 (06 Mar 2024 07:00), Max: 38.3 (06 Mar 2024 00:00)  T(F): 99.5 (06 Mar 2024 07:00), Max: 100.9 (06 Mar 2024 00:00)  HR: 81 (06 Mar 2024 07:00) (72 - 105)  BP: --  BP(mean): --  ABP: 142/53 (06 Mar 2024 07:00) (110/51 - 181/63)  ABP(mean): 77 (06 Mar 2024 07:00) (64 - 105)  RR: 20 (06 Mar 2024 07:00) (10 - 25)  SpO2: 94% (06 Mar 2024 07:00) (93% - 100%)    O2 Parameters below as of 06 Mar 2024 00:25  Patient On (Oxygen Delivery Method): ventilator          Mode: AC/ CMV (Assist Control/ Continuous Mandatory Ventilation), RR (machine): 20, TV (machine): 500, FiO2: 30, PEEP: 5, ITime: 1, MAP: 10, PIP: 19  Plateau pressure:   P/F ratio:     03-05 @ 07:01  -  03-06 @ 07:00  --------------------------------------------------------  IN: 2831.8 mL / OUT: 750 mL / NET: 2081.8 mL      CAPILLARY BLOOD GLUCOSE      POCT Blood Glucose.: 131 mg/dL (06 Mar 2024 05:20)      ECG: reviewed.    PHYSICAL EXAM:    GENERAL: NAD, lying in bed comfortably  HEAD:  Atraumatic, normocephalic  EYES: EOMI, PERRLA, conjunctiva and sclera clear  NECK: Supple, trachea midline, no JVD, trach in place   HEART: Regular rate and rhythm, no murmurs, rubs, or gallops  LUNGS: Unlabored respirations.  Clear to auscultation bilaterally, no crackles, wheezing, or rhonchi  ABDOMEN: Soft, nontender, nondistended, PEG in place   EXTREMITIES: 2+ peripheral pulses bilaterally, cap refill<2 secs. No clubbing, cyanosis, or edema  NERVOUS SYSTEM:  A&Ox0, having more purposeful movements and squeezing hands intermittently following commands.   SKIN: No rashes or lesions    MEDICATIONS:  MEDICATIONS  (STANDING):  albuterol/ipratropium for Nebulization 3 milliLiter(s) Nebulizer every 6 hours  chlorhexidine 0.12% Liquid 15 milliLiter(s) Oral Mucosa every 12 hours  chlorhexidine 4% Liquid 1 Application(s) Topical daily  droxidopa 200 milliGRAM(s) Oral every 8 hours  heparin   Injectable 5000 Unit(s) SubCutaneous every 12 hours  insulin lispro (ADMELOG) corrective regimen sliding scale   SubCutaneous every 6 hours  insulin NPH human recombinant 7 Unit(s) SubCutaneous every 6 hours  meropenem  IVPB 1000 milliGRAM(s) IV Intermittent every 8 hours  norepinephrine Infusion 0.05 MICROgram(s)/kG/Min (9.52 mL/Hr) IV Continuous <Continuous>  pantoprazole  Injectable 40 milliGRAM(s) IV Push every 12 hours  polyethylene glycol 3350 17 Gram(s) Oral daily  senna 1 Tablet(s) Oral at bedtime    MEDICATIONS  (PRN):      ALLERGIES:  Allergies    No Known Allergies    Intolerances        LABS:                        8.0    13.64 )-----------( 312      ( 06 Mar 2024 00:18 )             27.5     03-06    152<H>  |  118<H>  |  45<H>  ----------------------------<  150<H>  3.9   |  26  |  0.96    Ca    7.7<L>      06 Mar 2024 00:18  Phos  3.0     03-06  Mg     2.8     03-06    TPro  6.4  /  Alb  2.3<L>  /  TBili  0.4  /  DBili  x   /  AST  450<H>  /  ALT  422<H>  /  AlkPhos  112  03-06    PT/INR - ( 06 Mar 2024 00:18 )   PT: 12.2 sec;   INR: 1.11 ratio         PTT - ( 06 Mar 2024 00:18 )  PTT:24.0 sec  Urinalysis Basic - ( 06 Mar 2024 00:18 )    Color: x / Appearance: x / SG: x / pH: x  Gluc: 150 mg/dL / Ketone: x  / Bili: x / Urobili: x   Blood: x / Protein: x / Nitrite: x   Leuk Esterase: x / RBC: x / WBC x   Sq Epi: x / Non Sq Epi: x / Bacteria: x      ABG:  pH, Arterial: 7.45 (03-06-24 @ 00:00)  pCO2, Arterial: 39 mmHg (03-06-24 @ 00:00)  pO2, Arterial: 66 mmHg (03-06-24 @ 00:00)      vBG:    Micro:    Culture - Blood (collected 03-03-24 @ 16:00)  Source: .Blood Blood-Arterial  Preliminary Report (03-05-24 @ 22:02):    No growth at 48 Hours    Culture - Blood (collected 03-03-24 @ 11:15)  Source: .Blood Blood  Preliminary Report (03-05-24 @ 18:01):    No growth at 48 Hours    Culture - Blood (collected 02-23-24 @ 18:03)  Source: .Blood Blood  Final Report (02-28-24 @ 23:00):    No growth at 5 days    Culture - Blood (collected 02-23-24 @ 18:03)  Source: .Blood Blood  Final Report (02-28-24 @ 23:00):    No growth at 5 days    Culture - Blood (collected 02-20-24 @ 16:57)  Source: .Blood Blood  Final Report (02-25-24 @ 23:00):    No growth at 5 days    Culture - Blood (collected 02-20-24 @ 16:47)  Source: .Blood Blood  Final Report (02-25-24 @ 23:00):    No growth at 5 days        Culture - Sputum (collected 03-04-24 @ 09:22)  Source: ET Tube ET Tube  Gram Stain (03-04-24 @ 21:42):    Numerous polymorphonuclear leukocytes per low power field    Moderate Squamous epithelial cells per low power field    Moderate Gram Negative Rods seen per oil power field    Few Gram Positive Cocci in Pairs and Chains seen per oil power field  Preliminary Report (03-05-24 @ 17:50):    Moderate Pseudomonas aeruginosa    Normal Respiratory Nallely present        RADIOLOGY & ADDITIONAL TESTS: Reviewed.

## 2024-03-06 NOTE — PROGRESS NOTE ADULT - PROBLEM SELECTOR PLAN 6
MRI brain without contrast 3/5: Small scattered foci of diffusion restriction within the right cerebellar hemisphere, left parietal lobe, and symmetrically in the bilateral basal ganglia which may reflect acute/subacute infarcts with associated cytotoxic edema. Some of these findings are new when compared with 2/11/2024.  Embolic phenomenon can be considered given the vascular distributions. Septic emboli are not excluded.  Carbon monoxide poisoning cannot be excluded, given symmetric involvement of the bilateral basal ganglia.  -Neurology following  - Continue ASA 81mg,  LDL <50  - Pending repeat Echo bubble study

## 2024-03-06 NOTE — PROGRESS NOTE ADULT - ASSESSMENT
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 Admitted to MICU in the setting of AMS with hypotension and febrile to 105 with Hgb noted for Hgb 5 admitted to MICu in the setting of septic shock with unclear source.     NEURO:  #AMS  -Unresponsive with unreactive pupils and no corneal reflex on 3/3 for which an RRT was called. In setting of septic shock vs hemorrhagic shock.  - 3/3 CT brain and neck angio showing no intracranial hemorrhage or mass lesion with no interval change since imaging on 11/2023. 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 Hg 5.4. Septic vs Hemorrhagic shock.   - continue Levophed, wean as tolerated  - will add droxidopa 100mg TID   - Patient is a [employer]. Conditional approval for 1u pRBC per spouse        #HTN  Home meds: Atenolol/Chlorthiazide 50mg/25mg daily  - hold in setting of shock    #HLD  Home med: Atorvastatin 80mg daily    PULMONARY:  #Acute Hypoxic Respiratory Failure  Course complicated by progressive respiratory failure. CT Chest 2/20 with atelectasis of bilateral lower lobes.  - BAL 2/21 Grew PSA treated with course of Zosyn (2/21-2/28)  - s/p Tracheostomy #8 Cuffed Portex 2/16 (Dr. Sood)  - Chest PT and suctioning prn    #bibasilar PNA with mucoid impacted lower lobe on CT imaging   - cw meropenem and vanc   - fu sputum CX   - chest PT and suctioning PRN .    GI:  #Melena  New melena on 3/3 with Hg drop to 5.4.   - Voodoo. Spouse gave conditional approval for 1u pRBC  - repeat Hgb 9.4 will obtain repeat CBC   - CT ABD/pelvis: showing no evidence active GI bleed. portal venous gas within main portal vein may be 2/2 to ischemia in setting of shock.    - GI consult: no urgent need for EGD at this time   - monitor CBC    #S/p PEG 2/26 (Dr. Sood)  - was tolerating tube feeds at goal  - will restart tue feeds     #transaminitis  -  and  --> now uptrending   -most likely in setting of shock   - pending RUQ US     Diet: NPO    RENAL:  #Hypernatremia  Na 152 on 3/3--> 149 on 3/3   - s/p D5W 100mL/hr and DDAVP   -increase free water 350 q6h     #BALTA- resolving   Cr 1.67 on 3/3. Cr 0.7-0.8 this admission. Now downtrending to 0.9   - IVF as above  - UA and urine lytes  - vences can be removed     ENDO:  #T2DM  Home med: Metformin 1g BID  - NPH 7u q6h  - ISS q6h     HEME/ONC/RHEUM:  #Anemia  #Voodoo  Hg has been 7-8 this admission, however, new decrease to 5.4 with melena on 3.3.  - no overt signs of bleeding will cw IV PPI pending GI recs   - Melena/GI bleed management as above but Hgb has been stable     DVT ppx: starting heparin subQ     ID:  #Sepsis  AMS, febrile, and hypotensive on 3/3. In shock.  - BAL 2/21 Grew PSA treated with course of Zosyn (2/21-2/28)  - Prior BCx this admission have been negative  - procal elevated at 5.04 l MRSA negative I RVP negative I UA neg I UCx neg I BCx NGTD   - sputum cx : gram stain Moderate Gram Negative Rods and Few Gram Positive Cocci in Pairs and Chains --> awaiting speciation   - Meropenem 2g q8h (3/3 - )  - DC vanc     SKIN/MSK: no active issues    ETHICS:  Full Code       ASSESSMENT & PLAN:   AMANDA WILKINS is a 73y Male with a hx of HTN, HLD, T2DM, prior CVAs without residual deficits who initially presented as a transfer from [place] with concern for CVA in the setting of 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 Admitted to MICU in the setting of AMS with hypotension and febrile to 105 with Hgb noted for Hgb 5 admitted to MICu in the setting of septic shock with unclear source.     NEURO:  #AMS  -Unresponsive with unreactive pupils and no corneal reflex on 3/3 for which an RRT was called. In setting of septic shock vs hemorrhagic shock.  - 3/3 CT brain and neck angio showing no intracranial hemorrhage or mass lesion with no interval change since imaging on 11/2023. 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 Hg 5.4. Septic vs Hemorrhagic shock.   - continue Levophed, wean as tolerated- off since 3/6 AM   -  droxidopa 200mg TID added- can wean down to 100mg TID as tolerated   - Patient is a Rastafarian. Conditional approval for 1u pRBC per spouse      #HTN  Home meds: Atenolol/Chlorthiazide 50mg/25mg daily  - hold in setting of shock    #HLD  Home med: Atorvastatin 80mg daily    PULMONARY:  #Acute Hypoxic Respiratory Failure  Course complicated by progressive respiratory failure. CT Chest 2/20 with atelectasis of bilateral lower lobes.  - BAL 2/21 Grew PSA treated with course of Zosyn (2/21-2/28)  - s/p Tracheostomy #8 Cuffed Portex 2/16 (Dr. Sood)  - Chest PT and suctioning prn  - switched to CPAP for 3/6     #bibasilar PNA with mucoid impacted lower lobe on CT imaging   - cw meropenem and vanc   - fu sputum CX   - chest PT and suctioning PRN .    GI:  #Melena  New melena on 3/3 with Hg drop to 5.4.   - Yarsanism. Spouse gave conditional approval for 1u pRBC  - repeat Hgb 9.4 will obtain repeat CBC   - CT ABD/pelvis: showing no evidence active GI bleed. portal venous gas within main portal vein may be 2/2 to ischemia in setting of shock.    - GI consult: no urgent need for EGD at this time   - monitor CBC  - will start on oral PPI     #S/p PEG 2/26 (Dr. Sood)  - was tolerating tube feeds at goal  - will restart tue feeds     #transaminitis  -  and  --> now uptrending   -most likely in setting of shock     diet: tube feeds    RENAL:  #Hypernatremia  Na 152 on 3/3--> 149 on 3/3   - s/p D5W 100mL/hr and DDAVP   -increase free water 400 q6h     #BALTA- resolving   Cr 1.67 on 3/3. Cr 0.7-0.8 this admission. Now downtrending to 0.9   - IVF as above  - UA and urine lytes  - vences can be removed     ENDO:  #T2DM  Home med: Metformin 1g BID  - NPH 7u q6h  - ISS q6h     HEME/ONC/RHEUM:  #Anemia  #Yarsanism  Hg has been 7-8 this admission, however, new decrease to 5.4 with melena on 3.3.  - no overt signs of bleeding will cw IV PPI pending GI recs   - Melena/GI bleed management as above but Hgb has been stable     DVT ppx:  heparin subQ     ID:  #Sepsis  AMS, febrile, and hypotensive on 3/3. In shock.  - BAL 2/21 Grew PSA treated with course of Zosyn (2/21-2/28)  - Prior BCx this admission have been negative  - procal elevated at 5.04 l MRSA negative I RVP negative I UA neg I UCx neg I BCx NGTD   - sputum cx : growing Pseudomonas pending sensitivities  - Meropenem 2g q8h (3/3 - ) will complete 7 day course   - DC vanc   - Tmax 100.6 will repeat BCx and UCx      SKIN/MSK: no active issues    ETHICS:  Full Code

## 2024-03-06 NOTE — CHART NOTE - NSCHARTNOTEFT_GEN_A_CORE
MAR Accept Note  Transfer to: RCU   Accepting Attending Physician:    Assigned Room:      Patient seen and examined.   Labs and data reviewed.   No findings precluding transfer of service.       HPI/MICU COURSE:   Please refer to MICU transfer note for full details. Briefly, this is a 73 M history HTN, HLD, DMT2, and prior CVA's without residual neurological deficits presented with stroke-like symptoms with unrevealing angiogram but subsequently believed to have findings concerning for GBS Ding Parham Variant s/p PLEX and IVIG. Patient received trach/peg 2/9/24. Course complicated by septic shock from pneumonia with pseudomonas positive sputum culture. Patient also had melena on 3/3; Hgb 5.4 --> repeat 8'-9s s/p PRBC x2 given (of note: Christian. Spouse gave conditional approval for 1u pRBC). CT A&P shows no evidence active GI bleed, but portal venous gas within main portal vein may be from ischemia in setting of shock. GI team was consulted and recommend no urgent need for EGD at this time. MRI brain indicates new infarcts. Neuro consulted and are following. As per chart note, patient moved toes overnight per family and staff and did so again this AM 3/6/24. Reportedly following commands like squeezing his L hand to command.     Patient is medically stable and optimized for transfer to RCU.      FOR FOLLOW-UP:  [ ] fu neuro recs/stroke recs regarding MRI findings  [ ] fu sputum culture Pseudomonas sensitivities   [ ] fu hypernatremia - consider increasing free water of IVF if fluid down   [ ] finish meropenem course for 7 day course meropenem (last day 3/10)  [ ] fu TTE per neuro/stroke team placed r/o emboli.    Unruly Redd MD  Internal Medicine PGY3/MAR  [facility]: [id number] MAR Accept Note  Transfer to: RCU   Accepting Attending Physician:    Assigned Room: 501W    Patient seen and examined.   Labs and data reviewed.   No findings precluding transfer of service.       HPI/MICU COURSE:   Please refer to MICU transfer note for full details. Briefly, this is a 73 M history HTN, HLD, DMT2, and prior CVA's without residual neurological deficits presented with stroke-like symptoms with unrevealing angiogram but subsequently believed to have findings concerning for GBS Ding Parham Variant s/p PLEX and IVIG. Patient received trach/peg 2/9/24. Course complicated by septic shock from pneumonia with pseudomonas positive sputum culture. Patient also had melena on 3/3; Hgb 5.4 --> repeat 8'-9s s/p PRBC x2 given (of note: Confucianist. Spouse gave conditional approval for 1u pRBC). CT A&P shows no evidence active GI bleed, but portal venous gas within main portal vein may be from ischemia in setting of shock. GI team was consulted and recommend no urgent need for EGD at this time. MRI brain indicates new infarcts in parietal and cerebellar region and old infarct in basal ganglia. Neuro team consulted and are following care. As per chart note, patient moved toes overnight per family and staff and did so again this AM 3/6/24. Patient is A&Ox0 but appears to be following commands like squeezing his L hand to command, but waxes and wanes.    Patient is otherwise medically stable and optimized for transfer to RCU.      FOR FOLLOW-UP:  [ ] fu neuro recs/stroke recs regarding MRI findings  [ ] fu sputum culture Pseudomonas sensitivities   [ ] fu hypernatremia - consider increasing free water of IVF if fluid down   [ ] finish meropenem course for 7 day course meropenem (last day 3/10)  [ ] fu TTE per neuro/stroke team placed r/o emboli.    ***PLEASE NOTE that if any plans to transfuse blood, FIRST contact family as patient is reportedly a Confucianist***    Unruly Redd MD  Internal Medicine PGY3/MAR  [facility] [place]: [id number]

## 2024-03-07 ENCOUNTER — RESULT REVIEW (OUTPATIENT)
Age: 74
End: 2024-03-07

## 2024-03-07 LAB
ALBUMIN SERPL ELPH-MCNC: 2.5 G/DL — LOW (ref 3.3–5)
ALP SERPL-CCNC: 116 U/L — SIGNIFICANT CHANGE UP (ref 40–120)
ALT FLD-CCNC: 448 U/L — HIGH (ref 10–45)
ANION GAP SERPL CALC-SCNC: 9 MMOL/L — SIGNIFICANT CHANGE UP (ref 5–17)
AST SERPL-CCNC: 375 U/L — HIGH (ref 10–40)
BILIRUB SERPL-MCNC: 0.4 MG/DL — SIGNIFICANT CHANGE UP (ref 0.2–1.2)
BUN SERPL-MCNC: 32 MG/DL — HIGH (ref 7–23)
CALCIUM SERPL-MCNC: 7.9 MG/DL — LOW (ref 8.4–10.5)
CHLORIDE SERPL-SCNC: 117 MMOL/L — HIGH (ref 96–108)
CO2 SERPL-SCNC: 26 MMOL/L — SIGNIFICANT CHANGE UP (ref 22–31)
CREAT SERPL-MCNC: 0.82 MG/DL — SIGNIFICANT CHANGE UP (ref 0.5–1.3)
EGFR: 93 ML/MIN/1.73M2 — SIGNIFICANT CHANGE UP
GLUCOSE BLDC GLUCOMTR-MCNC: 103 MG/DL — HIGH (ref 70–99)
GLUCOSE BLDC GLUCOMTR-MCNC: 106 MG/DL — HIGH (ref 70–99)
GLUCOSE BLDC GLUCOMTR-MCNC: 119 MG/DL — HIGH (ref 70–99)
GLUCOSE BLDC GLUCOMTR-MCNC: 126 MG/DL — HIGH (ref 70–99)
GLUCOSE SERPL-MCNC: 146 MG/DL — HIGH (ref 70–99)
HCT VFR BLD CALC: 29.4 % — LOW (ref 39–50)
HGB BLD-MCNC: 7.9 G/DL — LOW (ref 13–17)
MAGNESIUM SERPL-MCNC: 2.9 MG/DL — HIGH (ref 1.6–2.6)
MCHC RBC-ENTMCNC: 24.5 PG — LOW (ref 27–34)
MCHC RBC-ENTMCNC: 26.9 GM/DL — LOW (ref 32–36)
MCV RBC AUTO: 91 FL — SIGNIFICANT CHANGE UP (ref 80–100)
NRBC # BLD: 6 /100 WBCS — HIGH (ref 0–0)
PHOSPHATE SERPL-MCNC: 2.5 MG/DL — SIGNIFICANT CHANGE UP (ref 2.5–4.5)
PLATELET # BLD AUTO: 301 K/UL — SIGNIFICANT CHANGE UP (ref 150–400)
POTASSIUM SERPL-MCNC: 3.9 MMOL/L — SIGNIFICANT CHANGE UP (ref 3.5–5.3)
POTASSIUM SERPL-SCNC: 3.9 MMOL/L — SIGNIFICANT CHANGE UP (ref 3.5–5.3)
PROT SERPL-MCNC: 7 G/DL — SIGNIFICANT CHANGE UP (ref 6–8.3)
RBC # BLD: 3.23 M/UL — LOW (ref 4.2–5.8)
RBC # FLD: 26.7 % — HIGH (ref 10.3–14.5)
SODIUM SERPL-SCNC: 152 MMOL/L — HIGH (ref 135–145)
WBC # BLD: 9.24 K/UL — SIGNIFICANT CHANGE UP (ref 3.8–10.5)
WBC # FLD AUTO: 9.24 K/UL — SIGNIFICANT CHANGE UP (ref 3.8–10.5)

## 2024-03-07 PROCEDURE — 99233 SBSQ HOSP IP/OBS HIGH 50: CPT

## 2024-03-07 PROCEDURE — 93321 DOPPLER ECHO F-UP/LMTD STD: CPT | Mod: 26

## 2024-03-07 PROCEDURE — 93308 TTE F-UP OR LMTD: CPT | Mod: 26

## 2024-03-07 RX ORDER — DROXIDOPA 100 MG/1
100 CAPSULE ORAL EVERY 8 HOURS
Refills: 0 | Status: DISCONTINUED | OUTPATIENT
Start: 2024-03-07 | End: 2024-03-08

## 2024-03-07 RX ORDER — IPRATROPIUM/ALBUTEROL SULFATE 18-103MCG
3 AEROSOL WITH ADAPTER (GRAM) INHALATION EVERY 6 HOURS
Refills: 0 | Status: DISCONTINUED | OUTPATIENT
Start: 2024-03-07 | End: 2024-03-07

## 2024-03-07 RX ORDER — SODIUM CHLORIDE 9 MG/ML
1000 INJECTION, SOLUTION INTRAVENOUS
Refills: 0 | Status: DISCONTINUED | OUTPATIENT
Start: 2024-03-07 | End: 2024-03-07

## 2024-03-07 RX ORDER — IPRATROPIUM/ALBUTEROL SULFATE 18-103MCG
3 AEROSOL WITH ADAPTER (GRAM) INHALATION EVERY 6 HOURS
Refills: 0 | Status: DISCONTINUED | OUTPATIENT
Start: 2024-03-07 | End: 2024-03-23

## 2024-03-07 RX ORDER — SODIUM CHLORIDE 9 MG/ML
1000 INJECTION, SOLUTION INTRAVENOUS
Refills: 0 | Status: DISCONTINUED | OUTPATIENT
Start: 2024-03-07 | End: 2024-03-08

## 2024-03-07 RX ORDER — SALIVA SUBSTITUTE COMB NO.11 351 MG
5 POWDER IN PACKET (EA) MUCOUS MEMBRANE EVERY 6 HOURS
Refills: 0 | Status: DISCONTINUED | OUTPATIENT
Start: 2024-03-07 | End: 2024-04-11

## 2024-03-07 RX ADMIN — DROXIDOPA 200 MILLIGRAM(S): 100 CAPSULE ORAL at 04:49

## 2024-03-07 RX ADMIN — Medication 1 DROP(S): at 17:09

## 2024-03-07 RX ADMIN — DROXIDOPA 100 MILLIGRAM(S): 100 CAPSULE ORAL at 21:17

## 2024-03-07 RX ADMIN — MEROPENEM 100 MILLIGRAM(S): 1 INJECTION INTRAVENOUS at 21:17

## 2024-03-07 RX ADMIN — Medication 3 MILLILITER(S): at 05:21

## 2024-03-07 RX ADMIN — HUMAN INSULIN 7 UNIT(S): 100 INJECTION, SUSPENSION SUBCUTANEOUS at 06:29

## 2024-03-07 RX ADMIN — Medication 81 MILLIGRAM(S): at 11:18

## 2024-03-07 RX ADMIN — Medication 5 MILLILITER(S): at 17:08

## 2024-03-07 RX ADMIN — HEPARIN SODIUM 5000 UNIT(S): 5000 INJECTION INTRAVENOUS; SUBCUTANEOUS at 21:16

## 2024-03-07 RX ADMIN — HEPARIN SODIUM 5000 UNIT(S): 5000 INJECTION INTRAVENOUS; SUBCUTANEOUS at 05:15

## 2024-03-07 RX ADMIN — POLYETHYLENE GLYCOL 3350 17 GRAM(S): 17 POWDER, FOR SOLUTION ORAL at 21:18

## 2024-03-07 RX ADMIN — Medication 3 MILLILITER(S): at 11:13

## 2024-03-07 RX ADMIN — Medication 1 DROP(S): at 10:32

## 2024-03-07 RX ADMIN — HUMAN INSULIN 7 UNIT(S): 100 INJECTION, SUSPENSION SUBCUTANEOUS at 17:11

## 2024-03-07 RX ADMIN — CHLORHEXIDINE GLUCONATE 1 APPLICATION(S): 213 SOLUTION TOPICAL at 21:18

## 2024-03-07 RX ADMIN — Medication 1 DROP(S): at 05:15

## 2024-03-07 RX ADMIN — MEROPENEM 100 MILLIGRAM(S): 1 INJECTION INTRAVENOUS at 14:04

## 2024-03-07 RX ADMIN — SODIUM CHLORIDE 70 MILLILITER(S): 9 INJECTION, SOLUTION INTRAVENOUS at 17:09

## 2024-03-07 RX ADMIN — MEROPENEM 100 MILLIGRAM(S): 1 INJECTION INTRAVENOUS at 05:15

## 2024-03-07 RX ADMIN — Medication 1 DROP(S): at 01:38

## 2024-03-07 RX ADMIN — HUMAN INSULIN 7 UNIT(S): 100 INJECTION, SUSPENSION SUBCUTANEOUS at 00:11

## 2024-03-07 RX ADMIN — DROXIDOPA 100 MILLIGRAM(S): 100 CAPSULE ORAL at 14:03

## 2024-03-07 RX ADMIN — CHLORHEXIDINE GLUCONATE 15 MILLILITER(S): 213 SOLUTION TOPICAL at 05:15

## 2024-03-07 RX ADMIN — CHLORHEXIDINE GLUCONATE 15 MILLILITER(S): 213 SOLUTION TOPICAL at 17:08

## 2024-03-07 RX ADMIN — Medication 1 DROP(S): at 21:17

## 2024-03-07 RX ADMIN — HEPARIN SODIUM 5000 UNIT(S): 5000 INJECTION INTRAVENOUS; SUBCUTANEOUS at 14:03

## 2024-03-07 RX ADMIN — Medication 1 DROP(S): at 14:19

## 2024-03-07 RX ADMIN — Medication 5 MILLILITER(S): at 23:57

## 2024-03-07 RX ADMIN — PANTOPRAZOLE SODIUM 40 MILLIGRAM(S): 20 TABLET, DELAYED RELEASE ORAL at 11:18

## 2024-03-07 RX ADMIN — SENNA PLUS 1 TABLET(S): 8.6 TABLET ORAL at 21:17

## 2024-03-07 RX ADMIN — HUMAN INSULIN 7 UNIT(S): 100 INJECTION, SUSPENSION SUBCUTANEOUS at 11:28

## 2024-03-07 RX ADMIN — Medication 5 MILLILITER(S): at 11:27

## 2024-03-07 NOTE — PROGRESS NOTE ADULT - PROBLEM SELECTOR PLAN 2
- Patient S/p Tracheostomy # 8 Cuffed Portex on 2/16 ( )  - CTA Chest 2/20: No PE, Complete Atelectasis b/l lower lobes    - Currently remains on Mechanical Ventilation: PRVC 500/20/30%/+5  - Weaning limited due inadequately triggering/ Apnea    - Will continue weaning attempts as tolerated  - Continue Chest PT and Suctioning PRN - Patient S/p Tracheostomy # 8 Cuffed Portex on 2/16 ( )  - CTA Chest 2/20: No PE, Complete Atelectasis b/l lower lobes    - Currently remains on Mechanical Ventilation: PRVC 500/20/30%/+5  - Weaning limited due inadequately triggering/ Apnea.  Also reports of having bradycardia on PS, will monitor closely.   - Will continue weaning attempts as tolerated  - Continue Chest PT and Suctioning PRN

## 2024-03-07 NOTE — PROGRESS NOTE ADULT - PROBLEM SELECTOR PLAN 8
- Patient Hoahaoism  - Family would like to be asked prior to administration of blood products   - S/p 1 unit PRBCs on 2/22  - S/p Venofer ( 2/22-2/27)   - Cont Cyanocobalamine, Folic acid, Ferrous Sulfate  - S/p Epogen x 5 days ( Ended 2/27)  - Will limit phlebotomy ( q 48 hrs) to prevent anemia

## 2024-03-07 NOTE — PROGRESS NOTE ADULT - PROBLEM SELECTOR PLAN 10
- Cont free water 300 cc q 6 hrs  - Monitor BMP - 3/7: Na 152, free water deficit 5.2L.  added D5W at 70ml/hr  - Cont free water 400 cc q 6 hrs  - Monitor BMP

## 2024-03-07 NOTE — PROGRESS NOTE ADULT - SUBJECTIVE AND OBJECTIVE BOX
Patient is a 73y old  Male who presents with a chief complaint of Stroke, critical stenosis, evaluation for angiography (06 Mar 2024 18:15)      Interval Events:    REVIEW OF SYSTEMS:  [ ] Positive  [ ] All other systems negative  [ ] Unable to assess ROS because ________    Vital Signs Last 24 Hrs  T(C): 37.1 (03-07-24 @ 06:52), Max: 37.1 (03-07-24 @ 00:06)  T(F): 98.7 (03-07-24 @ 06:52), Max: 98.8 (03-07-24 @ 00:06)  HR: 90 (03-07-24 @ 06:52) (77 - 94)  BP: 138/69 (03-07-24 @ 06:52) (109/56 - 138/69)  RR: 20 (03-07-24 @ 06:52) (20 - 26)  SpO2: 98% (03-07-24 @ 06:52) (90% - 100%)    PHYSICAL EXAM:  HEENT:   [ ]Tracheostomy:  [ ]Pupils equal  [ ]No oral lesions  [ ]Abnormal    SKIN  [ ]No Rash  [ ] Abnormal  [ ] pressure    CARDIAC  [ ]Regular  [ ]Abnormal    PULMONARY  [ ]Bilateral Clear Breath Sounds  [ ]Normal Excursion  [ ]Abnormal    GI  [ ]PEG      [ ] +BS		              [ ]Soft, nondistended, nontender	  [ ]Abnormal    MUSCULOSKELETAL                                   [ ]Bedbound                 [ ]Abnormal    [ ]Ambulatory/OOB to chair                           EXTREMITIES                                         [ ]Normal  [ ]Edema                           NEUROLOGIC  [ ] Normal, non focal  [ ] Focal findings:    PSYCHIATRIC  [ ]Alert and appropriate  [ ] Sedated	 [ ]Agitated    :  Fred[place]: [ ] Yes, if yes: Date of Placement:                   [  ] No    LINES: Central Lines [ ] Yes, if yes: Date of Placement                                     [  ] No    HOSPITAL MEDICATIONS:  MEDICATIONS  (STANDING):  albuterol/ipratropium for Nebulization 3 milliLiter(s) Nebulizer every 6 hours  artificial tears (preservative free) Ophthalmic Solution 1 Drop(s) Both EYES every 4 hours  aspirin  chewable 81 milliGRAM(s) Oral daily  chlorhexidine 0.12% Liquid 15 milliLiter(s) Oral Mucosa every 12 hours  chlorhexidine 4% Liquid 1 Application(s) Topical daily  droxidopa 200 milliGRAM(s) Oral every 8 hours  heparin   Injectable 5000 Unit(s) SubCutaneous every 8 hours  insulin lispro (ADMELOG) corrective regimen sliding scale   SubCutaneous every 6 hours  insulin NPH human recombinant 7 Unit(s) SubCutaneous every 6 hours  meropenem  IVPB 1000 milliGRAM(s) IV Intermittent every 8 hours  pantoprazole   Suspension 40 milliGRAM(s) Oral daily  polyethylene glycol 3350 17 Gram(s) Oral daily  senna 1 Tablet(s) Oral at bedtime    MEDICATIONS  (PRN):      LABS:                        8.0    13.64 )-----------( 312      ( 06 Mar 2024 00:18 )             27.5     03-06    152<H>  |  118<H>  |  45<H>  ----------------------------<  150<H>  3.9   |  26  |  0.96    Ca    7.7<L>      06 Mar 2024 00:18  Phos  3.0     03-06  Mg     2.8     03-06    TPro  6.4  /  Alb  2.3<L>  /  TBili  0.4  /  DBili  x   /  AST  450<H>  /  ALT  422<H>  /  AlkPhos  112  03-06    PT/INR - ( 06 Mar 2024 00:18 )   PT: 12.2 sec;   INR: 1.11 ratio         PTT - ( 06 Mar 2024 00:18 )  PTT:24.0 sec  Urinalysis Basic - ( 06 Mar 2024 00:18 )    Color: x / Appearance: x / SG: x / pH: x  Gluc: 150 mg/dL / Ketone: x  / Bili: x / Urobili: x   Blood: x / Protein: x / Nitrite: x   Leuk Esterase: x / RBC: x / WBC x   Sq Epi: x / Non Sq Epi: x / Bacteria: x      Arterial Blood Gas:  03-06 @ 00:00  7.45/39/66/27/94.7/2.9  ABG lactate: --      CAPILLARY BLOOD GLUCOSE    MICROBIOLOGY:     RADIOLOGY:  [ ] Reviewed and interpreted by me    Mode: AC/ CMV (Assist Control/ Continuous Mandatory Ventilation)  RR (machine): 20  TV (machine): 500  FiO2: 30  PEEP: 5  ITime: 1  MAP: 10  PIP: 18   Patient is a 73y old  Male who presents with a chief complaint of Stroke, critical stenosis, evaluation for angiography (06 Mar 2024 18:15)      Interval Events: no events reported over night    REVIEW OF SYSTEMS:  [ ] Positive  [ ] All other systems negative  [ ] Unable to assess ROS because ________    Vital Signs Last 24 Hrs  T(C): 37.1 (03-07-24 @ 06:52), Max: 37.1 (03-07-24 @ 00:06)  T(F): 98.7 (03-07-24 @ 06:52), Max: 98.8 (03-07-24 @ 00:06)  HR: 90 (03-07-24 @ 06:52) (77 - 94)  BP: 138/69 (03-07-24 @ 06:52) (109/56 - 138/69)  RR: 20 (03-07-24 @ 06:52) (20 - 26)  SpO2: 98% (03-07-24 @ 06:52) (90% - 100%)    PHYSICAL EXAM:  HEENT:   [ ]Tracheostomy:  [ ]Pupils equal  [ ]No oral lesions  [ ]Abnormal    SKIN  [ ]No Rash  [ ] Abnormal  [ ] pressure    CARDIAC  [ ]Regular  [ ]Abnormal    PULMONARY  [ ]Bilateral Clear Breath Sounds  [ ]Normal Excursion  [ ]Abnormal    GI  [ ]PEG      [ ] +BS		              [ ]Soft, nondistended, nontender	  [ ]Abnormal    MUSCULOSKELETAL                                   [ ]Bedbound                 [ ]Abnormal    [ ]Ambulatory/OOB to chair                           EXTREMITIES                                         [ ]Normal  [ ]Edema                           NEUROLOGIC  [ ] Normal, non focal  [ ] Focal findings:    PSYCHIATRIC  [ ]Alert and appropriate  [ ] Sedated	 [ ]Agitated    :  [place]: [ ] Yes, if yes: Date of Placement:                   [  ] No    LINES: Central Lines [ ] Yes, if yes: Date of Placement                                     [  ] No    HOSPITAL MEDICATIONS:  MEDICATIONS  (STANDING):  albuterol/ipratropium for Nebulization 3 milliLiter(s) Nebulizer every 6 hours  artificial tears (preservative free) Ophthalmic Solution 1 Drop(s) Both EYES every 4 hours  aspirin  chewable 81 milliGRAM(s) Oral daily  chlorhexidine 0.12% Liquid 15 milliLiter(s) Oral Mucosa every 12 hours  chlorhexidine 4% Liquid 1 Application(s) Topical daily  droxidopa 200 milliGRAM(s) Oral every 8 hours  heparin   Injectable 5000 Unit(s) SubCutaneous every 8 hours  insulin lispro (ADMELOG) corrective regimen sliding scale   SubCutaneous every 6 hours  insulin NPH human recombinant 7 Unit(s) SubCutaneous every 6 hours  meropenem  IVPB 1000 milliGRAM(s) IV Intermittent every 8 hours  pantoprazole   Suspension 40 milliGRAM(s) Oral daily  polyethylene glycol 3350 17 Gram(s) Oral daily  senna 1 Tablet(s) Oral at bedtime    MEDICATIONS  (PRN):      LABS:                        8.0    13.64 )-----------( 312      ( 06 Mar 2024 00:18 )             27.5     03-06    152<H>  |  118<H>  |  45<H>  ----------------------------<  150<H>  3.9   |  26  |  0.96    Ca    7.7<L>      06 Mar 2024 00:18  Phos  3.0     03-06  Mg     2.8     03-06    TPro  6.4  /  Alb  2.3<L>  /  TBili  0.4  /  DBili  x   /  AST  450<H>  /  ALT  422<H>  /  AlkPhos  112  03-06    PT/INR - ( 06 Mar 2024 00:18 )   PT: 12.2 sec;   INR: 1.11 ratio         PTT - ( 06 Mar 2024 00:18 )  PTT:24.0 sec  Urinalysis Basic - ( 06 Mar 2024 00:18 )    Color: x / Appearance: x / SG: x / pH: x  Gluc: 150 mg/dL / Ketone: x  / Bili: x / Urobili: x   Blood: x / Protein: x / Nitrite: x   Leuk Esterase: x / RBC: x / WBC x   Sq Epi: x / Non Sq Epi: x / Bacteria: x      Arterial Blood Gas:  03-06 @ 00:00  7.45/39/66/27/94.7/2.9  ABG lactate: --      CAPILLARY BLOOD GLUCOSE    MICROBIOLOGY:     RADIOLOGY:  [ ] Reviewed and interpreted by me    Mode: AC/ CMV (Assist Control/ Continuous Mandatory Ventilation)  RR (machine): 20  TV (machine): 500  FiO2: 30  PEEP: 5  ITime: 1  MAP: 10  PIP: 18   Patient is a 73y old  Male who presents with a chief complaint of Stroke, critical stenosis, evaluation for angiography (06 Mar 2024 18:15)      Interval Events: no events reported over night    REVIEW OF SYSTEMS:  [ ] Positive  [X] All other systems negative:  Limited capacity to communicate however denies having pain.  [ ] Unable to assess ROS because ________    Vital Signs Last 24 Hrs  T(C): 37.1 (03-07-24 @ 06:52), Max: 37.1 (03-07-24 @ 00:06)  T(F): 98.7 (03-07-24 @ 06:52), Max: 98.8 (03-07-24 @ 00:06)  HR: 90 (03-07-24 @ 06:52) (77 - 94)  BP: 138/69 (03-07-24 @ 06:52) (109/56 - 138/69)  RR: 20 (03-07-24 @ 06:52) (20 - 26)  SpO2: 98% (03-07-24 @ 06:52) (90% - 100%)    PHYSICAL EXAM:  HEENT:    [X] Tracheostomy:  #8 Cuffed Portex  [X] PERRL B/L; 4mm B/L pupils; EOMI  [X] No oral lesions  [ ] Abnormal    SKIN  [X] No Rash  [ ] Abnormal  [ ] pressure    CARDIAC  [X] Regular  [ ] Abnormal    PULMONARY  [X] Bilateral Clear Breath Sounds  [ ] Normal Excursion  [ ] Abnormal    GI  [X] PEG      [X] +BS		              [X] Soft, nondistended, nontender	  [ ] Abnormal    MUSCULOSKELETAL                                   [X] Bedbound                 [ ] Abnormal:   [ ] Ambulatory/OOB to chair                           EXTREMITIES                                         [ ]Normal  [X] Edema  1+ edema in B/L UEs                           NEUROLOGIC  [ ] Normal, non focal  [X] Focal findings: Alert and Oriented; +gag; no gross facial asymmetry observed; responds appropriately to questions by nodding; able to move B/L feet, trigger movement observed in right knee,  +B/L shoulder shrugs, slight grasp in B/L hands observed; unable to lift legs or arms    PSYCHIATRIC  [X] Alert, responsive with flat affect  [ ] Sedated	 [ ]Agitated    :  [place]: [ ] Yes, if yes: Date of Placement:                   [X] No    LINES: Central Lines [ ] Yes, if yes: Date of Placement                                     [X] No      HOSPITAL MEDICATIONS:  MEDICATIONS  (STANDING):  albuterol/ipratropium for Nebulization 3 milliLiter(s) Nebulizer every 6 hours  artificial tears (preservative free) Ophthalmic Solution 1 Drop(s) Both EYES every 4 hours  aspirin  chewable 81 milliGRAM(s) Oral daily  chlorhexidine 0.12% Liquid 15 milliLiter(s) Oral Mucosa every 12 hours  chlorhexidine 4% Liquid 1 Application(s) Topical daily  droxidopa 200 milliGRAM(s) Oral every 8 hours  heparin   Injectable 5000 Unit(s) SubCutaneous every 8 hours  insulin lispro (ADMELOG) corrective regimen sliding scale   SubCutaneous every 6 hours  insulin NPH human recombinant 7 Unit(s) SubCutaneous every 6 hours  meropenem  IVPB 1000 milliGRAM(s) IV Intermittent every 8 hours  pantoprazole   Suspension 40 milliGRAM(s) Oral daily  polyethylene glycol 3350 17 Gram(s) Oral daily  senna 1 Tablet(s) Oral at bedtime    MEDICATIONS  (PRN):      LABS:                        8.0    13.64 )-----------( 312      ( 06 Mar 2024 00:18 )             27.5     03-06    152<H>  |  118<H>  |  45<H>  ----------------------------<  150<H>  3.9   |  26  |  0.96    Ca    7.7<L>      06 Mar 2024 00:18  Phos  3.0     03-06  Mg     2.8     03-06    TPro  6.4  /  Alb  2.3<L>  /  TBili  0.4  /  DBili  x   /  AST  450<H>  /  ALT  422<H>  /  AlkPhos  112  03-06    PT/INR - ( 06 Mar 2024 00:18 )   PT: 12.2 sec;   INR: 1.11 ratio         PTT - ( 06 Mar 2024 00:18 )  PTT:24.0 sec  Urinalysis Basic - ( 06 Mar 2024 00:18 )    Color: x / Appearance: x / SG: x / pH: x  Gluc: 150 mg/dL / Ketone: x  / Bili: x / Urobili: x   Blood: x / Protein: x / Nitrite: x   Leuk Esterase: x / RBC: x / WBC x   Sq Epi: x / Non Sq Epi: x / Bacteria: x      Arterial Blood Gas:  03-06 @ 00:00  7.45/39/66/27/94.7/2.9  ABG lactate: --      CAPILLARY BLOOD GLUCOSE    MICROBIOLOGY:     RADIOLOGY:  [ ] Reviewed and interpreted by me    Mode: AC/ CMV (Assist Control/ Continuous Mandatory Ventilation)  RR (machine): 20  TV (machine): 500  FiO2: 30  PEEP: 5  ITime: 1  MAP: 10  PIP: 18   Patient is a 73y old  Male who presents with a chief complaint of Stroke, critical stenosis, evaluation for angiography (06 Mar 2024 18:15)      Interval Events: no events reported over night    REVIEW OF SYSTEMS:  [ ] Positive  [X] All other systems negative:  Limited capacity to communicate however denies having pain.  [ ] Unable to assess ROS because ________    Vital Signs Last 24 Hrs  T(C): 37.1 (03-07-24 @ 06:52), Max: 37.1 (03-07-24 @ 00:06)  T(F): 98.7 (03-07-24 @ 06:52), Max: 98.8 (03-07-24 @ 00:06)  HR: 90 (03-07-24 @ 06:52) (77 - 94)  BP: 138/69 (03-07-24 @ 06:52) (109/56 - 138/69)  RR: 20 (03-07-24 @ 06:52) (20 - 26)  SpO2: 98% (03-07-24 @ 06:52) (90% - 100%)    PHYSICAL EXAM:  HEENT:    [X] Tracheostomy:  #8 Cuffed Portex  [X] PERRL B/L; 4mm B/L pupils; EOMI  [X] No oral lesions  [ ] Abnormal    SKIN  [X] No Rash  [ ] Abnormal  [ ] pressure    CARDIAC  [X] Regular  [ ] Abnormal    PULMONARY  [X] Bilateral Clear Breath Sounds  [ ] Normal Excursion  [ ] Abnormal    GI  [X] PEG      [X] +BS		              [X] Soft, nondistended, nontender	  [ ] Abnormal    MUSCULOSKELETAL                                   [X] Bedbound                 [ ] Abnormal:   [ ] Ambulatory/OOB to chair                           EXTREMITIES                                         [ ]Normal  [X] Edema  1+ edema in B/L UEs                           NEUROLOGIC  [ ] Normal, non focal  [X] Focal findings: Alert and Oriented; +gag; no gross facial asymmetry observed; responds appropriately to questions by nodding; able to move B/L feet, trigger movement observed in right knee,  +B/L shoulder shrugs, slight grasp in B/L hands observed; unable to lift legs or arms    PSYCHIATRIC  [X] Alert, responsive with flat affect  [ ] Sedated	 [ ]Agitated    :  [place]: [ ] Yes, if yes: Date of Placement:                   [X] No    LINES: Central Lines [ ] Yes, if yes: Date of Placement                                     [X] No      HOSPITAL MEDICATIONS:  MEDICATIONS  (STANDING):  albuterol/ipratropium for Nebulization 3 milliLiter(s) Nebulizer every 6 hours  artificial tears (preservative free) Ophthalmic Solution 1 Drop(s) Both EYES every 4 hours  aspirin  chewable 81 milliGRAM(s) Oral daily  chlorhexidine 0.12% Liquid 15 milliLiter(s) Oral Mucosa every 12 hours  chlorhexidine 4% Liquid 1 Application(s) Topical daily  droxidopa 200 milliGRAM(s) Oral every 8 hours  heparin   Injectable 5000 Unit(s) SubCutaneous every 8 hours  insulin lispro (ADMELOG) corrective regimen sliding scale   SubCutaneous every 6 hours  insulin NPH human recombinant 7 Unit(s) SubCutaneous every 6 hours  meropenem  IVPB 1000 milliGRAM(s) IV Intermittent every 8 hours  pantoprazole   Suspension 40 milliGRAM(s) Oral daily  polyethylene glycol 3350 17 Gram(s) Oral daily  senna 1 Tablet(s) Oral at bedtime    MEDICATIONS  (PRN):      LABS:             03-07    152<H>  |  117<H>  |  32<H>  ----------------------------<  146<H>  3.9   |  26  |  0.82    Ca    7.9<L>      07 Mar 2024 10:56  Phos  2.5     03-07  Mg     2.9     03-07    TPro  7.0  /  Alb  2.5<L>  /  TBili  0.4  /  DBili  x   /  AST  375<H>  /  ALT  448<H>  /  AlkPhos  116  03-07        CAPILLARY BLOOD GLUCOSE    MICROBIOLOGY:     RADIOLOGY:  [ ] Reviewed and interpreted by me    Mode: AC/ CMV (Assist Control/ Continuous Mandatory Ventilation)  RR (machine): 20  TV (machine): 500  FiO2: 30  PEEP: 5  ITime: 1  MAP: 10  PIP: 18   Patient is a 73y old  Male who presents with a chief complaint of Stroke, critical stenosis, evaluation for angiography (06 Mar 2024 18:15)      Interval Events: no events reported over night    REVIEW OF SYSTEMS:  [ ] Positive  [x] All other systems negative  [ ] Unable to assess ROS because ________    Vital Signs Last 24 Hrs  T(C): 37.1 (03-07-24 @ 06:52), Max: 37.1 (03-07-24 @ 00:06)  T(F): 98.7 (03-07-24 @ 06:52), Max: 98.8 (03-07-24 @ 00:06)  HR: 90 (03-07-24 @ 06:52) (77 - 94)  BP: 138/69 (03-07-24 @ 06:52) (109/56 - 138/69)  RR: 20 (03-07-24 @ 06:52) (20 - 26)  SpO2: 98% (03-07-24 @ 06:52) (90% - 100%)    PHYSICAL EXAM:  HEENT:    [x] Tracheostomy:  #8 Cuffed Portex  [ ] PERRL   [ ] No oral lesions  [ ] Abnormal    SKIN  [x] No Rash  [ ] Abnormal  [ ] pressure    CARDIAC  [x] Regular  [ ] Abnormal    PULMONARY  [ ] Bilateral Clear Breath Sounds  [ ] Normal Excursion  [x] Abnormal: Bilateral Coarse breath sounds decreased at bases     GI  [x] PEG      [x] +BS		              [x] Soft, nondistended, nontender	  [ ] Abnormal    MUSCULOSKELETAL                                   [x] Bedbound                 [ ] Abnormal:   [ ] Ambulatory/OOB to chair                           EXTREMITIES                                         [ ]Normal  [x] Edema  1+ edema in B/L UEs                           NEUROLOGIC  [ ] Normal, non focal  [x] Focal findings: Alert and Oriented; +gag; no gross facial asymmetry observed; responds appropriately to questions by nodding; able to move B/L feet, trigger movement observed in right knee,  +B/L shoulder shrugs, slight grasp in B/L hands observed; unable to lift legs or arms    PSYCHIATRIC  [x] Alert  [ ] Sedated	 [ ]Agitated    :  [place]: [ ] Yes, if yes: Date of Placement:                   [x] No    LINES: Central Lines [ ] Yes, if yes: Date of Placement                                     [x] No      HOSPITAL MEDICATIONS:  MEDICATIONS  (STANDING):  albuterol/ipratropium for Nebulization 3 milliLiter(s) Nebulizer every 6 hours  artificial tears (preservative free) Ophthalmic Solution 1 Drop(s) Both EYES every 4 hours  aspirin  chewable 81 milliGRAM(s) Oral daily  chlorhexidine 0.12% Liquid 15 milliLiter(s) Oral Mucosa every 12 hours  chlorhexidine 4% Liquid 1 Application(s) Topical daily  droxidopa 200 milliGRAM(s) Oral every 8 hours  heparin   Injectable 5000 Unit(s) SubCutaneous every 8 hours  insulin lispro (ADMELOG) corrective regimen sliding scale   SubCutaneous every 6 hours  insulin NPH human recombinant 7 Unit(s) SubCutaneous every 6 hours  meropenem  IVPB 1000 milliGRAM(s) IV Intermittent every 8 hours  pantoprazole   Suspension 40 milliGRAM(s) Oral daily  polyethylene glycol 3350 17 Gram(s) Oral daily  senna 1 Tablet(s) Oral at bedtime    MEDICATIONS  (PRN):      LABS:             03-07    152<H>  |  117<H>  |  32<H>  ----------------------------<  146<H>  3.9   |  26  |  0.82    Ca    7.9<L>      07 Mar 2024 10:56  Phos  2.5     03-07  Mg     2.9     03-07    TPro  7.0  /  Alb  2.5<L>  /  TBili  0.4  /  DBili  x   /  AST  375<H>  /  ALT  448<H>  /  AlkPhos  116  03-07        CAPILLARY BLOOD GLUCOSE    MICROBIOLOGY:     RADIOLOGY:  [ ] Reviewed and interpreted by me    Mode: AC/ CMV (Assist Control/ Continuous Mandatory Ventilation)  RR (machine): 20  TV (machine): 500  FiO2: 30  PEEP: 5  ITime: 1  MAP: 10  PIP: 18

## 2024-03-07 NOTE — PROGRESS NOTE ADULT - NS ATTEND AMEND GEN_ALL_CORE FT
Pt is a 73M w/ MHx HTN, HLD, T2DM, and prior CVA's without residual deficits who initially presented with concern for stroke-like symptoms s/p unrevealing angiogram and ultimately found to have clinical picture most concerning for Ding [place] Variant of GBS admitted to [facility] on 2/9/24.     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 Pt showing some neurologic improvement, he is able to wiggle all extremities (not against gravity) as well as his torso and his facial muscles. Can communicate by mouthing words and nodding. Restarted on DAPT, held transiently 2/2 concern for hemoptysis, but no further issues to date. Currently without any bleeding. Will need at least 3 months per neuro. Repeat brain MRI this week concerning for multiple bilateral foci especially in right cerebellar, left parietal, and b/l basal ganglia which may be 2/2 embolic events vs hypoperfusion (which is more likely given recent transfer to MICU for vasoplegic shock requiring IV pressors.) TTE and cultures performed without evidence of embolic events, will defer MILADIS for now until pt more stable. Will f/u further neuro recs.  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     Pt further with combined hypoxemic and hypercapnic respiratory failure with failure to wean from ventilator s/p tracheostomy placement (#8 cuffed Portex 2/16 surgery.) Tolerating PRVC 20/500/7/40% well, is able to trigger breaths this week but became hypoxemic with SBT trial today. Will continue to attempt further weaning. Airway clearance therapy in place. Trach care and suctioning as per RCU team. Oral hygiene and aspiration precautions in place.     Hospital course further c/b sympathetic storm while in NSICU with labile BP. Now resolved but over the weekend with episode of hypotension needing transfer to MICU for vasopressor support. Pt was treated for septic shock 2/2 Pseudomonas VAP and weaned off pressors. Zosyn completed, transitioned to meropenem on 3/4 with plan for 7-10 day course. Returned to RCU 3/6. Will wean droxidopa as tolerated. Pt has reportedly had periods of bradycardia transiently, will monitor on telemetry with EKG if recurs.     Pt with oropharyngeal dysphagia s/p PEG placement. Now tolerating feeds at goal rate. Bowel regimen prn. No acute renal issues. DMII well controlled with NPH and ISS with BGFS monitoring.     Dispo pending medical optimization. Pt full code. Pt is a Restorationist, family asked to consent to blood products with each event as needed. DVT ppx Lovenox. Pt's wife at bedside, will continue to update regularly throughout hospitalization.

## 2024-03-07 NOTE — PROGRESS NOTE ADULT - PROBLEM SELECTOR PLAN 5
- Patient with hx of HTN Prior to admission   - As per wife was on Atenolol /Chlorthiazide 50mg/25mg daily   - Patient remains with labile BP  - Will monitor BP and cont Hydralazine IVP PRN  - Received Hydralazine 5 mg IVP on 2/25   - HLD: Cont Atorvastatin - Patient with hx of HTN Prior to admission   - As per wife was on Atenolol /Chlorthiazide 50mg/25mg daily   - Patient remains with labile BP  - Will monitor BP and cont Hydralazine IVP PRN  - Received Hydralazine 5 mg IVP on 2/25   - 3/7:  weaning off Droxidopa, reduced to 100mg Q8H

## 2024-03-07 NOTE — PROGRESS NOTE ADULT - PROBLEM SELECTOR PLAN 7
- Previously on Metformin as outpatient   - Will NPH 7 units Q6H and ISS   - FS Q6H; Goal -180 - Previously on Metformin as outpatient   - Will continue NPH 7 units Q6H and ISS   - FS Q6H; Goal -180

## 2024-03-08 LAB
ANION GAP SERPL CALC-SCNC: 11 MMOL/L — SIGNIFICANT CHANGE UP (ref 5–17)
BUN SERPL-MCNC: 29 MG/DL — HIGH (ref 7–23)
CALCIUM SERPL-MCNC: 7.6 MG/DL — LOW (ref 8.4–10.5)
CHLORIDE SERPL-SCNC: 113 MMOL/L — HIGH (ref 96–108)
CO2 SERPL-SCNC: 23 MMOL/L — SIGNIFICANT CHANGE UP (ref 22–31)
CREAT SERPL-MCNC: 0.79 MG/DL — SIGNIFICANT CHANGE UP (ref 0.5–1.3)
CULTURE RESULTS: SIGNIFICANT CHANGE UP
CULTURE RESULTS: SIGNIFICANT CHANGE UP
EGFR: 94 ML/MIN/1.73M2 — SIGNIFICANT CHANGE UP
GLUCOSE BLDC GLUCOMTR-MCNC: 136 MG/DL — HIGH (ref 70–99)
GLUCOSE BLDC GLUCOMTR-MCNC: 143 MG/DL — HIGH (ref 70–99)
GLUCOSE BLDC GLUCOMTR-MCNC: 161 MG/DL — HIGH (ref 70–99)
GLUCOSE SERPL-MCNC: 129 MG/DL — HIGH (ref 70–99)
MAGNESIUM SERPL-MCNC: 2.7 MG/DL — HIGH (ref 1.6–2.6)
PHOSPHATE SERPL-MCNC: 2.9 MG/DL — SIGNIFICANT CHANGE UP (ref 2.5–4.5)
POTASSIUM SERPL-MCNC: 4.7 MMOL/L — SIGNIFICANT CHANGE UP (ref 3.5–5.3)
POTASSIUM SERPL-SCNC: 4.7 MMOL/L — SIGNIFICANT CHANGE UP (ref 3.5–5.3)
SODIUM SERPL-SCNC: 147 MMOL/L — HIGH (ref 135–145)
SPECIMEN SOURCE: SIGNIFICANT CHANGE UP
SPECIMEN SOURCE: SIGNIFICANT CHANGE UP

## 2024-03-08 PROCEDURE — 99233 SBSQ HOSP IP/OBS HIGH 50: CPT

## 2024-03-08 RX ORDER — DROXIDOPA 100 MG/1
100 CAPSULE ORAL
Refills: 0 | Status: DISCONTINUED | OUTPATIENT
Start: 2024-03-08 | End: 2024-03-10

## 2024-03-08 RX ORDER — FUROSEMIDE 40 MG
20 TABLET ORAL ONCE
Refills: 0 | Status: COMPLETED | OUTPATIENT
Start: 2024-03-08 | End: 2024-03-08

## 2024-03-08 RX ADMIN — CHLORHEXIDINE GLUCONATE 1 APPLICATION(S): 213 SOLUTION TOPICAL at 21:36

## 2024-03-08 RX ADMIN — Medication 1 DROP(S): at 01:58

## 2024-03-08 RX ADMIN — HUMAN INSULIN 7 UNIT(S): 100 INJECTION, SUSPENSION SUBCUTANEOUS at 05:43

## 2024-03-08 RX ADMIN — Medication 20 MILLIGRAM(S): at 18:14

## 2024-03-08 RX ADMIN — Medication 1 DROP(S): at 13:14

## 2024-03-08 RX ADMIN — Medication 5 MILLILITER(S): at 17:05

## 2024-03-08 RX ADMIN — Medication 1 DROP(S): at 21:32

## 2024-03-08 RX ADMIN — HEPARIN SODIUM 5000 UNIT(S): 5000 INJECTION INTRAVENOUS; SUBCUTANEOUS at 21:32

## 2024-03-08 RX ADMIN — PANTOPRAZOLE SODIUM 40 MILLIGRAM(S): 20 TABLET, DELAYED RELEASE ORAL at 12:05

## 2024-03-08 RX ADMIN — SODIUM CHLORIDE 70 MILLILITER(S): 9 INJECTION, SOLUTION INTRAVENOUS at 05:50

## 2024-03-08 RX ADMIN — Medication 5 MILLILITER(S): at 12:05

## 2024-03-08 RX ADMIN — Medication 5 MILLILITER(S): at 23:32

## 2024-03-08 RX ADMIN — DROXIDOPA 100 MILLIGRAM(S): 100 CAPSULE ORAL at 05:24

## 2024-03-08 RX ADMIN — CHLORHEXIDINE GLUCONATE 15 MILLILITER(S): 213 SOLUTION TOPICAL at 05:25

## 2024-03-08 RX ADMIN — Medication 5 MILLILITER(S): at 05:31

## 2024-03-08 RX ADMIN — Medication 2: at 12:05

## 2024-03-08 RX ADMIN — Medication 1 DROP(S): at 05:25

## 2024-03-08 RX ADMIN — Medication 1 DROP(S): at 09:11

## 2024-03-08 RX ADMIN — MEROPENEM 100 MILLIGRAM(S): 1 INJECTION INTRAVENOUS at 13:14

## 2024-03-08 RX ADMIN — MEROPENEM 100 MILLIGRAM(S): 1 INJECTION INTRAVENOUS at 05:30

## 2024-03-08 RX ADMIN — HUMAN INSULIN 7 UNIT(S): 100 INJECTION, SUSPENSION SUBCUTANEOUS at 00:04

## 2024-03-08 RX ADMIN — CHLORHEXIDINE GLUCONATE 15 MILLILITER(S): 213 SOLUTION TOPICAL at 17:05

## 2024-03-08 RX ADMIN — DROXIDOPA 100 MILLIGRAM(S): 100 CAPSULE ORAL at 19:49

## 2024-03-08 RX ADMIN — Medication 1 DROP(S): at 17:05

## 2024-03-08 RX ADMIN — HEPARIN SODIUM 5000 UNIT(S): 5000 INJECTION INTRAVENOUS; SUBCUTANEOUS at 05:24

## 2024-03-08 RX ADMIN — MEROPENEM 100 MILLIGRAM(S): 1 INJECTION INTRAVENOUS at 21:32

## 2024-03-08 RX ADMIN — HUMAN INSULIN 7 UNIT(S): 100 INJECTION, SUSPENSION SUBCUTANEOUS at 17:44

## 2024-03-08 RX ADMIN — Medication 81 MILLIGRAM(S): at 12:06

## 2024-03-08 RX ADMIN — HUMAN INSULIN 7 UNIT(S): 100 INJECTION, SUSPENSION SUBCUTANEOUS at 12:06

## 2024-03-08 RX ADMIN — SENNA PLUS 1 TABLET(S): 8.6 TABLET ORAL at 21:33

## 2024-03-08 RX ADMIN — POLYETHYLENE GLYCOL 3350 17 GRAM(S): 17 POWDER, FOR SOLUTION ORAL at 21:33

## 2024-03-08 RX ADMIN — HEPARIN SODIUM 5000 UNIT(S): 5000 INJECTION INTRAVENOUS; SUBCUTANEOUS at 13:14

## 2024-03-08 NOTE — PROGRESS NOTE ADULT - SUBJECTIVE AND OBJECTIVE BOX
Patient is a 73y old  Male who presents with a chief complaint of Stroke, critical stenosis, evaluation for angiography (07 Mar 2024 07:57)      Interval Events: No events reported overnight     REVIEW OF SYSTEMS:  [ ] Positive  [x] All other systems negative  [ ] Unable to assess ROS because ________    Vital Signs Last 24 Hrs  T(C): 37.1 (03-08-24 @ 15:50), Max: 37.6 (03-08-24 @ 04:11)  T(F): 98.7 (03-08-24 @ 15:50), Max: 99.6 (03-08-24 @ 04:11)  HR: 91 (03-08-24 @ 15:50) (85 - 98)  BP: 140/65 (03-08-24 @ 15:50) (125/63 - 152/80)  RR: 20 (03-08-24 @ 15:50) (18 - 20)  SpO2: 97% (03-08-24 @ 15:50) (94% - 100%)    PHYSICAL EXAM:  HEENT:    [x] Tracheostomy:  #8 Cuffed Portex  [ ] PERRL   [ ] No oral lesions  [ ] Abnormal    SKIN  [x] No Rash  [ ] Abnormal  [ ] pressure    CARDIAC  [x] Regular  [ ] Abnormal    PULMONARY  [ ] Bilateral Clear Breath Sounds  [ ] Normal Excursion  [x] Abnormal: Bilateral Coarse breath sounds decreased at bases     GI  [x] PEG      [x] +BS		              [x] Soft, nondistended, nontender	  [ ] Abnormal    MUSCULOSKELETAL                                   [x] Bedbound                 [ ] Abnormal:   [ ] Ambulatory/OOB to chair                           EXTREMITIES                                         [ ]Normal  [x] Edema  1+ edema in B/L UEs                           NEUROLOGIC  [ ] Normal, non focal  [x] Focal findings: Alert and Oriented; +gag; no gross facial asymmetry observed; responds appropriately to questions by nodding; able to move B/L feet, trigger movement observed in right knee,  +B/L shoulder shrugs, slight grasp in B/L hands observed; unable to lift legs or arms    PSYCHIATRIC  [x] Alert  [ ] Sedated	 [ ]Agitated    :  [place]: [ ] Yes, if yes: Date of Placement:                   [x] No    LINES: Central Lines [ ] Yes, if yes: Date of Placement                                     [x] No    HOSPITAL MEDICATIONS:  MEDICATIONS  (STANDING):  artificial tears (preservative free) Ophthalmic Solution 1 Drop(s) Both EYES every 4 hours  aspirin  chewable 81 milliGRAM(s) Oral daily  Biotene Dry Mouth Oral Rinse 5 milliLiter(s) Swish and Spit every 6 hours  chlorhexidine 0.12% Liquid 15 milliLiter(s) Oral Mucosa every 12 hours  chlorhexidine 4% Liquid 1 Application(s) Topical daily  dextrose 5%. 1000 milliLiter(s) (70 mL/Hr) IV Continuous <Continuous>  droxidopa 100 milliGRAM(s) Oral <User Schedule>  heparin   Injectable 5000 Unit(s) SubCutaneous every 8 hours  insulin lispro (ADMELOG) corrective regimen sliding scale   SubCutaneous every 6 hours  insulin NPH human recombinant 7 Unit(s) SubCutaneous every 6 hours  meropenem  IVPB 1000 milliGRAM(s) IV Intermittent every 8 hours  pantoprazole   Suspension 40 milliGRAM(s) Oral daily  polyethylene glycol 3350 17 Gram(s) Oral daily  senna 1 Tablet(s) Oral at bedtime    MEDICATIONS  (PRN):  albuterol/ipratropium for Nebulization 3 milliLiter(s) Nebulizer every 6 hours PRN Shortness of Breath and/or Wheezing      LABS:                        7.9    9.24  )-----------( 301      ( 07 Mar 2024 10:56 )             29.4     03-08    147<H>  |  113<H>  |  29<H>  ----------------------------<  129<H>  4.7   |  23  |  0.79    Ca    7.6<L>      08 Mar 2024 07:08  Phos  2.9     03-08  Mg     2.7     03-08    TPro  7.0  /  Alb  2.5<L>  /  TBili  0.4  /  DBili  x   /  AST  375<H>  /  ALT  448<H>  /  AlkPhos  116  03-07      Urinalysis Basic - ( 08 Mar 2024 07:08 )    Color: x / Appearance: x / SG: x / pH: x  Gluc: 129 mg/dL / Ketone: x  / Bili: x / Urobili: x   Blood: x / Protein: x / Nitrite: x   Leuk Esterase: x / RBC: x / WBC x   Sq Epi: x / Non Sq Epi: x / Bacteria: x          CAPILLARY BLOOD GLUCOSE    MICROBIOLOGY:     RADIOLOGY:  [ ] Reviewed and interpreted by me    Mode: AC/ CMV (Assist Control/ Continuous Mandatory Ventilation)  RR (machine): 20  TV (machine): 500  FiO2: 30  PEEP: 5  ITime: 0.9  MAP: 10  PIP: 20

## 2024-03-08 NOTE — PROGRESS NOTE ADULT - PROBLEM SELECTOR PLAN 2
- Patient S/p Tracheostomy # 8 Cuffed Portex on 2/16 ( )  - CTA Chest 3/3: Bibasilar Pneumonia; Currently remains on Meropenem   - Currently remains on Mechanical Ventilation: PRVC 500/20/30%/+5  - Weaning limited due to Apnea and reports of having bradycardia on PS  - Will continue weaning attempts as tolerated  - Continue Chest PT and Suctioning PRN

## 2024-03-08 NOTE — PROGRESS NOTE ADULT - NS ATTEND AMEND GEN_ALL_CORE FT
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 Pt showing some neurologic improvement, he is able to wiggle all extremities (not against gravity) as well as his torso and his facial muscles. Can communicate by mouthing words and nodding. Restarted on DAPT, held transiently 2/2 concern for hemoptysis, but no further issues to date. Currently without any bleeding. Will need at least 3 months per neuro. Repeat brain MRI this week concerning for multiple bilateral foci especially in right cerebellar, left parietal, and b/l basal ganglia which may be 2/2 embolic events vs hypoperfusion (which is more likely given recent transfer to MICU for vasoplegic shock requiring IV pressors.) TTE and cultures performed without evidence of embolic events, will defer MILADIS for now until pt more stable. Will f/u further neuro recs.  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     Pt further with combined hypoxemic and hypercapnic respiratory failure with failure to wean from ventilator s/p tracheostomy placement (#8 cuffed Portex 2/16 surgery.) Tolerating PRVC 20/500/7/40% well, is able to trigger breaths this week but became hypoxemic with SBT trial today. Will continue to attempt further weaning. Airway clearance therapy in place. Trach care and suctioning as per RCU team. Oral hygiene and aspiration precautions in place.     Hospital course further c/b sympathetic storm while in NSICU with labile BP. Now resolved but over the weekend with episode of hypotension needing transfer to MICU for vasopressor support. Pt was treated for septic shock 2/2 Pseudomonas VAP and weaned off pressors. Zosyn completed, transitioned to meropenem on 3/4 with plan for 7-10 day course. Returned to RCU 3/6. Will wean droxidopa as tolerated. Pt has reportedly had periods of bradycardia transiently, will monitor on telemetry with EKG if recurs.     Pt with oropharyngeal dysphagia s/p PEG placement. Now tolerating feeds at goal rate. Bowel regimen prn. No acute renal issues. DMII well controlled with NPH and ISS with BGFS monitoring.     Dispo pending medical optimization. Pt full code. Pt is a Shinto, family asked to consent to blood products with each event as needed. DVT ppx Lovenox. Pt's wife at bedside, will continue to update regularly throughout hospitalization.

## 2024-03-08 NOTE — PROGRESS NOTE ADULT - PROBLEM SELECTOR PLAN 5
- Patient with hx of HTN Prior to admission   - As per wife was on Atenolol /Chlorthiazide Prior to admission   - S/p Pressors while in MICU   - Droxidopa decreased to 100 mg q 12 hrs

## 2024-03-08 NOTE — PROGRESS NOTE ADULT - PROBLEM SELECTOR PLAN 4
- BAL 2/21: PSA; Txd with Zosyn ( 2/21-2/28)  - Sputum 3/4: PSA currently remains on Meropenem 3/3 --> 3/9  - Blood cxs 3/3: NGTD

## 2024-03-08 NOTE — PROGRESS NOTE ADULT - PROBLEM SELECTOR PLAN 3
- Patient with Sympathetic Storming in NSCU   - Patient with Labile BP HTN and Hypotensive Episodes; S/p Pressors

## 2024-03-08 NOTE — PROGRESS NOTE ADULT - ASSESSMENT
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 Prior to admission patient had Viral URI ( 1-2 weeks prior to admission) with subsequent weakness. He underwent an angiogram (2/11) which showed moderate stenosis and no intervention was done. MRI Performed c/w small bilateral cerebellar strokes and prior L thalamic strokes, as per Neurology was not c/w current presentation. 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 He is currently s/p 5 rounds of plasma exchange (2/13-2/20) and IVIG x 5 ( 2/21-2/26)  His hospital course was complicated by progressive respiratory failure. CT Chest performed on 2/20 with atelectasis of b/l lower lobes. BAL 2/21 Grew PSA treated with course of Zosyn (2/21-2/28).  Patient transferred to RCU on 2/24.   RRT called on 3/3 for unresponsiveness. Patient with unreactive pupils, no corneal reflex. Also found to be hypotensive and febrile to 105. Noted to have new melena. Hgb 5. Given 1 unit of pRBC. Transferred to MICU. patient required pressors while in the MICU, BP improved and was transitioned to Droxidopa. Patient had CT C/A/P consistent with Bibasilar pneumonia; Sputum cx grew PSA and was treated with course of Meropenem. Patient evaluated by GI in setting of Melena, transaminitis and portal venous gas on CT imaging. No EGD performed as Melena resolved, Transaminitis was thought to be in setting of shock and portal venous gas was thought to be in setting of recent PEG. EEG was performed in setting of AMS no seizures noted. Repeat MRI Performed which showed Small scattered foci of diffusion restriction within the right cerebellar hemisphere, left parietal lobe, and symmetrically in the bilateral basal ganglia. Neuro recommended Resumption of ASA in setting of possible embolic phenomenon. Repeat ECHO performed RT Ventricular Moderately enlarged, reduced systolic function. Mental status improved and was transferred back to RCU on 3/6.     3/8: Patient attempted on CPAP today but became hypoxic and was placed back on Full Support. BP has remained stable and Droxidopa was decreased to q 12 hrs. Hypernatremia improved will dc IVF and continue free water.  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 Prior to admission patient had Viral URI ( 1-2 weeks prior to admission) with subsequent weakness. He underwent an angiogram (2/11) which showed moderate stenosis and no intervention was done. MRI Performed c/w small bilateral cerebellar strokes and prior L thalamic strokes, as per Neurology was not c/w current presentation. 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 He is currently s/p 5 rounds of plasma exchange (2/13-2/20) and IVIG x 5 ( 2/21-2/26)  His hospital course was complicated by progressive respiratory failure. CT Chest performed on 2/20 with atelectasis of b/l lower lobes. BAL 2/21 Grew PSA treated with course of Zosyn (2/21-2/28).  Patient transferred to RCU on 2/24.   RRT called on 3/3 for unresponsiveness. Patient with unreactive pupils, no corneal reflex. Also found to be hypotensive and febrile to 105. Noted to have new melena. Hgb 5. Given 1 unit of pRBC. Transferred to MICU. patient required pressors while in the MICU, BP improved and was transitioned to Droxidopa. Patient had CT C/A/P consistent with Bibasilar pneumonia; Sputum cx grew PSA and was treated with course of Meropenem. Patient evaluated by GI in setting of Melena, transaminitis and portal venous gas on CT imaging. No EGD performed as Melena resolved, Transaminitis was thought to be in setting of shock and portal venous gas was thought to be in setting of recent PEG. EEG was performed in setting of AMS no seizures noted. Repeat MRI Performed which showed Small scattered foci of diffusion restriction within the right cerebellar hemisphere, left parietal lobe, and symmetrically in the bilateral basal ganglia. Neuro recommended Resumption of ASA in setting of possible embolic phenomenon. Repeat ECHO performed RT Ventricular Moderately enlarged, reduced systolic function. Mental status improved and was transferred back to RCU on 3/6.     3/8: Patient attempted on CPAP today but became hypoxic and was placed back on Full Support. BP has remained stable and Droxidopa was decreased to q 12 hrs. Hypernatremia improved will dc IVF and continue free water. Lasix 20 mg x 1 given in setting of ECHO Findings.

## 2024-03-08 NOTE — PROGRESS NOTE ADULT - PROBLEM SELECTOR PLAN 1
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

## 2024-03-08 NOTE — PROGRESS NOTE ADULT - PROBLEM SELECTOR PLAN 8
- Patient Yazidi  - Family would like to be asked prior to administration of blood products   - S/p 1 unit PRBCs on 2/22 and 3/3  - S/p Venofer ( 2/22-2/27)   - S/p Cyanocobalamine, Folic acid, Ferrous Sulfate  - S/p Epogen x 5 days ( Ended 2/27)  - Will limit phlebotomy ( q 48 hrs) to prevent anemia and use pediatric tubes   - Monitor for melena - Patient Hoahaoism  - Family would like to be asked prior to administration of blood products   - S/p 1 unit PRBCs on 2/22 and 3/3  - S/p Venofer ( 2/22-2/27)   - S/p Cyanocobalamine, Folic acid, Ferrous Sulfate  - S/p Epogen x 5 days ( Ended 2/27)  - Will use pediatric tubes to limit volume for phlebotomy as patient is Shinto   - Monitor for melena

## 2024-03-08 NOTE — PROGRESS NOTE ADULT - PROBLEM SELECTOR PLAN 7
- Previously on Metformin as outpatient   - Will continue NPH 7 units Q6H and ISS   - FS Q6H; Goal -180

## 2024-03-08 NOTE — PROGRESS NOTE ADULT - PROBLEM SELECTOR PLAN 6
MRI 3/5: Small scattered foci of diffusion restriction within the right cerebellar hemisphere, left parietal lobe, and symmetrically in the bilateral basal ganglia.  - Continue ASA 81mg in setting of concern for possible embolic phenomenon   - ECHO 3/7: EF 74% / RT Ventricular Moderately Enlarged and reduced systolic fxn  - Neurology following

## 2024-03-09 LAB
ANION GAP SERPL CALC-SCNC: 6 MMOL/L — SIGNIFICANT CHANGE UP (ref 5–17)
BUN SERPL-MCNC: 20 MG/DL — SIGNIFICANT CHANGE UP (ref 7–23)
CALCIUM SERPL-MCNC: 7.8 MG/DL — LOW (ref 8.4–10.5)
CHLORIDE SERPL-SCNC: 110 MMOL/L — HIGH (ref 96–108)
CO2 SERPL-SCNC: 26 MMOL/L — SIGNIFICANT CHANGE UP (ref 22–31)
CREAT SERPL-MCNC: 0.65 MG/DL — SIGNIFICANT CHANGE UP (ref 0.5–1.3)
EGFR: 99 ML/MIN/1.73M2 — SIGNIFICANT CHANGE UP
GLUCOSE BLDC GLUCOMTR-MCNC: 118 MG/DL — HIGH (ref 70–99)
GLUCOSE BLDC GLUCOMTR-MCNC: 122 MG/DL — HIGH (ref 70–99)
GLUCOSE BLDC GLUCOMTR-MCNC: 124 MG/DL — HIGH (ref 70–99)
GLUCOSE BLDC GLUCOMTR-MCNC: 142 MG/DL — HIGH (ref 70–99)
GLUCOSE SERPL-MCNC: 110 MG/DL — HIGH (ref 70–99)
HCT VFR BLD CALC: 29.6 % — LOW (ref 39–50)
HGB BLD-MCNC: 8.3 G/DL — LOW (ref 13–17)
MCHC RBC-ENTMCNC: 24.5 PG — LOW (ref 27–34)
MCHC RBC-ENTMCNC: 28 GM/DL — LOW (ref 32–36)
MCV RBC AUTO: 87.3 FL — SIGNIFICANT CHANGE UP (ref 80–100)
NRBC # BLD: 4 /100 WBCS — HIGH (ref 0–0)
PLATELET # BLD AUTO: 319 K/UL — SIGNIFICANT CHANGE UP (ref 150–400)
POTASSIUM SERPL-MCNC: 4.5 MMOL/L — SIGNIFICANT CHANGE UP (ref 3.5–5.3)
POTASSIUM SERPL-SCNC: 4.5 MMOL/L — SIGNIFICANT CHANGE UP (ref 3.5–5.3)
RBC # BLD: 3.39 M/UL — LOW (ref 4.2–5.8)
RBC # FLD: 25.1 % — HIGH (ref 10.3–14.5)
SODIUM SERPL-SCNC: 142 MMOL/L — SIGNIFICANT CHANGE UP (ref 135–145)
WBC # BLD: 6.68 K/UL — SIGNIFICANT CHANGE UP (ref 3.8–10.5)
WBC # FLD AUTO: 6.68 K/UL — SIGNIFICANT CHANGE UP (ref 3.8–10.5)

## 2024-03-09 RX ADMIN — HUMAN INSULIN 7 UNIT(S): 100 INJECTION, SUSPENSION SUBCUTANEOUS at 17:14

## 2024-03-09 RX ADMIN — Medication 5 MILLILITER(S): at 05:13

## 2024-03-09 RX ADMIN — POLYETHYLENE GLYCOL 3350 17 GRAM(S): 17 POWDER, FOR SOLUTION ORAL at 21:12

## 2024-03-09 RX ADMIN — Medication 1 DROP(S): at 21:11

## 2024-03-09 RX ADMIN — HUMAN INSULIN 7 UNIT(S): 100 INJECTION, SUSPENSION SUBCUTANEOUS at 11:44

## 2024-03-09 RX ADMIN — HEPARIN SODIUM 5000 UNIT(S): 5000 INJECTION INTRAVENOUS; SUBCUTANEOUS at 05:23

## 2024-03-09 RX ADMIN — DROXIDOPA 100 MILLIGRAM(S): 100 CAPSULE ORAL at 11:12

## 2024-03-09 RX ADMIN — CHLORHEXIDINE GLUCONATE 15 MILLILITER(S): 213 SOLUTION TOPICAL at 05:23

## 2024-03-09 RX ADMIN — Medication 0: at 23:41

## 2024-03-09 RX ADMIN — Medication 5 MILLILITER(S): at 17:06

## 2024-03-09 RX ADMIN — MEROPENEM 100 MILLIGRAM(S): 1 INJECTION INTRAVENOUS at 15:22

## 2024-03-09 RX ADMIN — Medication 1 DROP(S): at 02:10

## 2024-03-09 RX ADMIN — CHLORHEXIDINE GLUCONATE 1 APPLICATION(S): 213 SOLUTION TOPICAL at 21:11

## 2024-03-09 RX ADMIN — HEPARIN SODIUM 5000 UNIT(S): 5000 INJECTION INTRAVENOUS; SUBCUTANEOUS at 15:22

## 2024-03-09 RX ADMIN — HUMAN INSULIN 7 UNIT(S): 100 INJECTION, SUSPENSION SUBCUTANEOUS at 00:21

## 2024-03-09 RX ADMIN — HUMAN INSULIN 7 UNIT(S): 100 INJECTION, SUSPENSION SUBCUTANEOUS at 07:05

## 2024-03-09 RX ADMIN — MEROPENEM 100 MILLIGRAM(S): 1 INJECTION INTRAVENOUS at 21:13

## 2024-03-09 RX ADMIN — Medication 1 DROP(S): at 05:23

## 2024-03-09 RX ADMIN — Medication 1 DROP(S): at 15:18

## 2024-03-09 RX ADMIN — Medication 81 MILLIGRAM(S): at 12:00

## 2024-03-09 RX ADMIN — Medication 5 MILLILITER(S): at 12:00

## 2024-03-09 RX ADMIN — Medication 1 DROP(S): at 10:59

## 2024-03-09 RX ADMIN — Medication 5 MILLILITER(S): at 23:41

## 2024-03-09 RX ADMIN — MEROPENEM 100 MILLIGRAM(S): 1 INJECTION INTRAVENOUS at 05:12

## 2024-03-09 RX ADMIN — PANTOPRAZOLE SODIUM 40 MILLIGRAM(S): 20 TABLET, DELAYED RELEASE ORAL at 12:00

## 2024-03-09 RX ADMIN — Medication 1 DROP(S): at 17:06

## 2024-03-09 RX ADMIN — HEPARIN SODIUM 5000 UNIT(S): 5000 INJECTION INTRAVENOUS; SUBCUTANEOUS at 21:11

## 2024-03-09 RX ADMIN — HUMAN INSULIN 7 UNIT(S): 100 INJECTION, SUSPENSION SUBCUTANEOUS at 23:41

## 2024-03-09 RX ADMIN — SENNA PLUS 1 TABLET(S): 8.6 TABLET ORAL at 21:11

## 2024-03-09 RX ADMIN — CHLORHEXIDINE GLUCONATE 15 MILLILITER(S): 213 SOLUTION TOPICAL at 17:07

## 2024-03-09 NOTE — PROVIDER CONTACT NOTE (CHANGE IN STATUS NOTIFICATION) - ASSESSMENT
Pox 86 on 30% TC HR 45, pt requesting to be placed back on vent support.
Pt on TC 30% since late afternoon . Xanax given at 1900.
pt displays weaker motor on the lower extremities bilaterally. pt still follows commands by wiggling toes but does not lift the lower extremities off the bed against gravity

## 2024-03-09 NOTE — PROVIDER CONTACT NOTE (CHANGE IN STATUS NOTIFICATION) - ACTION/TREATMENT ORDERED:
NP Florentin Mcfarlane and MD David Mcdaniel aware. no interventions at this time
Pt placed back on vent support overnight.
Order to be placed for xanax .5 x 1, return to vent if O2 drops and sustains.

## 2024-03-09 NOTE — CHART NOTE - NSCHARTNOTEFT_GEN_A_CORE
3/6 MRI found new infcar BL BG, concerning cardiac emboli    TTE was reviewed and was unremarkable.  Will follow TTE when it was done.   Please call stroke for any need.    Ej Bar MD PhD  Vascular neurology fellow

## 2024-03-09 NOTE — PROGRESS NOTE ADULT - PROBLEM SELECTOR PLAN 8
- Patient Sikhism  - Family would like to be asked prior to administration of blood products   - S/p 1 unit PRBCs on 2/22 and 3/3  - S/p Venofer ( 2/22-2/27)   - S/p Cyanocobalamine, Folic acid, Ferrous Sulfate  - S/p Epogen x 5 days ( Ended 2/27)  - Will use pediatric tubes to limit volume for phlebotomy as patient is Gnosticism   - Monitor for melena

## 2024-03-09 NOTE — PROVIDER CONTACT NOTE (CHANGE IN STATUS NOTIFICATION) - SITUATION
pt is weaker on the lower extremities bilaterally
Pt being placed back on vent support by RT
Pt requesting xanax for increased anxiety

## 2024-03-09 NOTE — PROGRESS NOTE ADULT - NS ATTEND AMEND GEN_ALL_CORE FT
72 yo M PMH HTN, HLD, T2DM, prior CVA's without residual deficits presented with stroke-like symptoms found to have likely GBS-MFS. S/p PLEX x5 and IVIG. Course c/b mild hemoptysis, septic shock, embolic vs. hypoperfusion related lesions on MRI, and persistent respiratory failure requiring tracheostomy and MV. Clinically with mild neurologic improvement - able to tolerate PS this morning for hours. Will continue with PS trials. No further acute interventions for GBS per neurology. Dispo planning 74 yo M PMH HTN, HLD, T2DM, prior CVA's without residual deficits presented with stroke-like symptoms found to have likely GBS-MFS. S/p PLEX x5 and IVIG. Course c/b mild hemoptysis, septic shock, embolic vs. hypoperfusion related lesions on MRI, and persistent respiratory failure requiring tracheostomy and MV. Clinically with mild neurologic improvement - able to tolerate PS this morning for hours. Will continue with PS trials. No further acute interventions for GBS per neurology. Completing merrem today. Goal net negative given moderately enlarged RV on echo. Dispo planning 74 yo M PMH HTN, HLD, T2DM, prior CVA's without residual deficits presented with stroke-like symptoms found to have likely GBS-MFV. S/p PLEX x5 and IVIG. Course c/b mild hemoptysis, septic shock, embolic vs. hypoperfusion related lesions on MRI, and persistent respiratory failure requiring tracheostomy and MV. Clinically with mild neurologic improvement - able to tolerate PS this morning for hours. Will continue with PS trials. No further acute interventions for GBS per neurology. Completing merrem today. Goal net negative given moderately enlarged RV on echo. Dispo planning

## 2024-03-09 NOTE — PROGRESS NOTE ADULT - ASSESSMENT
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 Prior to admission patient had Viral URI ( 1-2 weeks prior to admission) with subsequent weakness. He underwent an angiogram (2/11) which showed moderate stenosis and no intervention was done. MRI Performed c/w small bilateral cerebellar strokes and prior L thalamic strokes, as per Neurology was not c/w current presentation. 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 He is currently s/p 5 rounds of plasma exchange (2/13-2/20) and IVIG x 5 ( 2/21-2/26)  His hospital course was complicated by progressive respiratory failure. CT Chest performed on 2/20 with atelectasis of b/l lower lobes. BAL 2/21 Grew PSA treated with course of Zosyn (2/21-2/28).  Patient transferred to RCU on 2/24.   RRT called on 3/3 for unresponsiveness. Patient with unreactive pupils, no corneal reflex. Also found to be hypotensive and febrile to 105. Noted to have new melena. Hgb 5. Given 1 unit of pRBC. Transferred to MICU. patient required pressors while in the MICU, BP improved and was transitioned to Droxidopa. Patient had CT C/A/P consistent with Bibasilar pneumonia; Sputum cx grew PSA and was treated with course of Meropenem. Patient evaluated by GI in setting of Melena, transaminitis and portal venous gas on CT imaging. No EGD performed as Melena resolved, Transaminitis was thought to be in setting of shock and portal venous gas was thought to be in setting of recent PEG. EEG was performed in setting of AMS no seizures noted. Repeat MRI Performed which showed Small scattered foci of diffusion restriction within the right cerebellar hemisphere, left parietal lobe, and symmetrically in the bilateral basal ganglia. Neuro recommended Resumption of ASA in setting of possible embolic phenomenon. Repeat ECHO performed RT Ventricular Moderately enlarged, reduced systolic function. Mental status improved and was transferred back to RCU on 3/6.     3/8: Patient attempted on CPAP today but became hypoxic and was placed back on Full Support. BP has remained stable and Droxidopa was decreased to q 12 hrs. Hypernatremia improved will dc IVF and continue free water. Lasix 20 mg x 1 given in setting of ECHO Findings.   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 Prior to admission patient had Viral URI ( 1-2 weeks prior to admission) with subsequent weakness. He underwent an angiogram (2/11) which showed moderate stenosis and no intervention was done. MRI Performed c/w small bilateral cerebellar strokes and prior L thalamic strokes, as per Neurology was not c/w current presentation. 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 He is currently s/p 5 rounds of plasma exchange (2/13-2/20) and IVIG x 5 ( 2/21-2/26)  His hospital course was complicated by progressive respiratory failure. CT Chest performed on 2/20 with atelectasis of b/l lower lobes. BAL 2/21 Grew PSA treated with course of Zosyn (2/21-2/28).  Patient transferred to RCU on 2/24.   RRT called on 3/3 for unresponsiveness. Patient with unreactive pupils, no corneal reflex. Also found to be hypotensive and febrile to 105. Noted to have new melena. Hgb 5. Given 1 unit of pRBC. Transferred to MICU. patient required pressors while in the MICU, BP improved and was transitioned to Droxidopa. Patient had CT C/A/P consistent with Bibasilar pneumonia; Sputum cx grew PSA and was treated with course of Meropenem. Patient evaluated by GI in setting of Melena, transaminitis and portal venous gas on CT imaging. No EGD performed as Melena resolved, Transaminitis was thought to be in setting of shock and portal venous gas was thought to be in setting of recent PEG. EEG was performed in setting of AMS no seizures noted. Repeat MRI Performed which showed Small scattered foci of diffusion restriction within the right cerebellar hemisphere, left parietal lobe, and symmetrically in the bilateral basal ganglia. Neuro recommended Resumption of ASA in setting of possible embolic phenomenon. Repeat ECHO performed RT Ventricular Moderately enlarged, reduced systolic function. Mental status improved and was transferred back to RCU on 3/6.     3/8: Patient attempted on CPAP today but became hypoxic and was placed back on Full Support. BP has remained stable and Droxidopa was decreased to q 12 hrs. Hypernatremia improved will dc IVF and continue free water. Lasix 20 mg x 1 given in setting of ECHO Findings.   3/9: Sodium improved, will continue free water via PEG (reduced volume). 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 Prior to admission patient had Viral URI ( 1-2 weeks prior to admission) with subsequent weakness. He underwent an angiogram (2/11) which showed moderate stenosis and no intervention was done. MRI Performed c/w small bilateral cerebellar strokes and prior L thalamic strokes, as per Neurology was not c/w current presentation. 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 He is currently s/p 5 rounds of plasma exchange (2/13-2/20) and IVIG x 5 ( 2/21-2/26)  His hospital course was complicated by progressive respiratory failure. CT Chest performed on 2/20 with atelectasis of b/l lower lobes. BAL 2/21 Grew PSA treated with course of Zosyn (2/21-2/28).  Patient transferred to RCU on 2/24.   RRT called on 3/3 for unresponsiveness. Patient with unreactive pupils, no corneal reflex. Also found to be hypotensive and febrile to 105. Noted to have new melena. Hgb 5. Given 1 unit of pRBC. Transferred to MICU. patient required pressors while in the MICU, BP improved and was transitioned to Droxidopa. Patient had CT C/A/P consistent with Bibasilar pneumonia; Sputum cx grew PSA and was treated with course of Meropenem. Patient evaluated by GI in setting of Melena, transaminitis and portal venous gas on CT imaging. No EGD performed as Melena resolved, Transaminitis was thought to be in setting of shock and portal venous gas was thought to be in setting of recent PEG. EEG was performed in setting of AMS no seizures noted. Repeat MRI Performed which showed Small scattered foci of diffusion restriction within the right cerebellar hemisphere, left parietal lobe, and symmetrically in the bilateral basal ganglia. Neuro recommended Resumption of ASA in setting of possible embolic phenomenon. Repeat ECHO performed RT Ventricular Moderately enlarged, reduced systolic function. Mental status improved and was transferred back to RCU on 3/6.     3/8: Patient attempted on CPAP today but became hypoxic and was placed back on Full Support. BP has remained stable and Droxidopa was decreased to q 12 hrs. Hypernatremia improved will dc IVF and continue free water. Lasix 20 mg x 1 given in setting of ECHO Findings.   3/9: Sodium improved, will continue free water via PEG (reduced volume).  Patient advanced to PS 12/5 however heart rate then abruptly dropped to 50s, improved once placed back on full support.

## 2024-03-09 NOTE — PROGRESS NOTE ADULT - SUBJECTIVE AND OBJECTIVE BOX
Patient is a 73y old  Male who presents with a chief complaint of Stroke, critical stenosis, evaluation for angiography (08 Mar 2024 17:06)      Interval Events:    REVIEW OF SYSTEMS:  [ ] Positive  [ ] All other systems negative  [ ] Unable to assess ROS because ________    Vital Signs Last 24 Hrs  T(C): 36.9 (03-09-24 @ 04:39), Max: 37.4 (03-08-24 @ 12:05)  T(F): 98.5 (03-09-24 @ 04:39), Max: 99.4 (03-08-24 @ 12:05)  HR: 85 (03-09-24 @ 04:39) (85 - 98)  BP: 132/72 (03-09-24 @ 04:39) (112/59 - 140/65)  RR: 20 (03-09-24 @ 04:39) (20 - 98)  SpO2: 98% (03-09-24 @ 04:39) (94% - 100%)    PHYSICAL EXAM:  HEENT:   [ ]Tracheostomy:  [ ]Pupils equal  [ ]No oral lesions  [ ]Abnormal    SKIN  [ ]No Rash  [ ] Abnormal  [ ] pressure    CARDIAC  [ ]Regular  [ ]Abnormal    PULMONARY  [ ]Bilateral Clear Breath Sounds  [ ]Normal Excursion  [ ]Abnormal    GI  [ ]PEG      [ ] +BS		              [ ]Soft, nondistended, nontender	  [ ]Abnormal    MUSCULOSKELETAL                                   [ ]Bedbound                 [ ]Abnormal    [ ]Ambulatory/OOB to chair                           EXTREMITIES                                         [ ]Normal  [ ]Edema                           NEUROLOGIC  [ ] Normal, non focal  [ ] Focal findings:    PSYCHIATRIC  [ ]Alert and appropriate  [ ] Sedated	 [ ]Agitated    :  [place]: [ ] Yes, if yes: Date of Placement:                   [  ] No    LINES: Central Lines [ ] Yes, if yes: Date of Placement                                     [  ] No    HOSPITAL MEDICATIONS:  MEDICATIONS  (STANDING):  artificial tears (preservative free) Ophthalmic Solution 1 Drop(s) Both EYES every 4 hours  aspirin  chewable 81 milliGRAM(s) Oral daily  Biotene Dry Mouth Oral Rinse 5 milliLiter(s) Swish and Spit every 6 hours  chlorhexidine 0.12% Liquid 15 milliLiter(s) Oral Mucosa every 12 hours  chlorhexidine 4% Liquid 1 Application(s) Topical daily  droxidopa 100 milliGRAM(s) Oral <User Schedule>  heparin   Injectable 5000 Unit(s) SubCutaneous every 8 hours  insulin lispro (ADMELOG) corrective regimen sliding scale   SubCutaneous every 6 hours  insulin NPH human recombinant 7 Unit(s) SubCutaneous every 6 hours  meropenem  IVPB 1000 milliGRAM(s) IV Intermittent every 8 hours  pantoprazole   Suspension 40 milliGRAM(s) Oral daily  polyethylene glycol 3350 17 Gram(s) Oral daily  senna 1 Tablet(s) Oral at bedtime    MEDICATIONS  (PRN):  albuterol/ipratropium for Nebulization 3 milliLiter(s) Nebulizer every 6 hours PRN Shortness of Breath and/or Wheezing      LABS:               CAPILLARY BLOOD GLUCOSE    MICROBIOLOGY:     RADIOLOGY:  [ ] Reviewed and interpreted by me    Mode: AC/ CMV (Assist Control/ Continuous Mandatory Ventilation)  RR (machine): 20  TV (machine): 500  FiO2: 30  PEEP: 5  ITime: 0.9  MAP: 10  PIP: 18   Patient is a 73y old  Male who presents with a chief complaint of Stroke, critical stenosis, evaluation for angiography (08 Mar 2024 17:06)      Interval Events: No events reported over night.     REVIEW OF SYSTEMS:  [ ] Positive  [ ] All other systems negative  [ ] Unable to assess ROS because ________    Vital Signs Last 24 Hrs  T(C): 36.9 (03-09-24 @ 04:39), Max: 37.4 (03-08-24 @ 12:05)  T(F): 98.5 (03-09-24 @ 04:39), Max: 99.4 (03-08-24 @ 12:05)  HR: 85 (03-09-24 @ 04:39) (85 - 98)  BP: 132/72 (03-09-24 @ 04:39) (112/59 - 140/65)  RR: 20 (03-09-24 @ 04:39) (20 - 98)  SpO2: 98% (03-09-24 @ 04:39) (94% - 100%)    PHYSICAL EXAM:  HEENT:   [ ]Tracheostomy:  [ ]Pupils equal  [ ]No oral lesions  [ ]Abnormal    SKIN  [ ]No Rash  [ ] Abnormal  [ ] pressure    CARDIAC  [ ]Regular  [ ]Abnormal    PULMONARY  [ ]Bilateral Clear Breath Sounds  [ ]Normal Excursion  [ ]Abnormal    GI  [ ]PEG      [ ] +BS		              [ ]Soft, nondistended, nontender	  [ ]Abnormal    MUSCULOSKELETAL                                   [ ]Bedbound                 [ ]Abnormal    [ ]Ambulatory/OOB to chair                           EXTREMITIES                                         [ ]Normal  [ ]Edema                           NEUROLOGIC  [ ] Normal, non focal  [ ] Focal findings:    PSYCHIATRIC  [ ]Alert and appropriate  [ ] Sedated	 [ ]Agitated    :  [place]: [ ] Yes, if yes: Date of Placement:                   [  ] No    LINES: Central Lines [ ] Yes, if yes: Date of Placement                                     [  ] No    HOSPITAL MEDICATIONS:  MEDICATIONS  (STANDING):  artificial tears (preservative free) Ophthalmic Solution 1 Drop(s) Both EYES every 4 hours  aspirin  chewable 81 milliGRAM(s) Oral daily  Biotene Dry Mouth Oral Rinse 5 milliLiter(s) Swish and Spit every 6 hours  chlorhexidine 0.12% Liquid 15 milliLiter(s) Oral Mucosa every 12 hours  chlorhexidine 4% Liquid 1 Application(s) Topical daily  droxidopa 100 milliGRAM(s) Oral <User Schedule>  heparin   Injectable 5000 Unit(s) SubCutaneous every 8 hours  insulin lispro (ADMELOG) corrective regimen sliding scale   SubCutaneous every 6 hours  insulin NPH human recombinant 7 Unit(s) SubCutaneous every 6 hours  meropenem  IVPB 1000 milliGRAM(s) IV Intermittent every 8 hours  pantoprazole   Suspension 40 milliGRAM(s) Oral daily  polyethylene glycol 3350 17 Gram(s) Oral daily  senna 1 Tablet(s) Oral at bedtime    MEDICATIONS  (PRN):  albuterol/ipratropium for Nebulization 3 milliLiter(s) Nebulizer every 6 hours PRN Shortness of Breath and/or Wheezing      LABS:               CAPILLARY BLOOD GLUCOSE    MICROBIOLOGY:     RADIOLOGY:  [ ] Reviewed and interpreted by me    Mode: AC/ CMV (Assist Control/ Continuous Mandatory Ventilation)  RR (machine): 20  TV (machine): 500  FiO2: 30  PEEP: 5  ITime: 0.9  MAP: 10  PIP: 18   Patient is a 73y old  Male who presents with a chief complaint of Stroke, critical stenosis, evaluation for angiography (08 Mar 2024 17:06)      Interval Events: No events reported over night.     REVIEW OF SYSTEMS:  [ ] Positive  [X] All other systems negative:  Limited ability to communicate however denies having pain during time of exam  [ ] Unable to assess ROS because ________    Vital Signs Last 24 Hrs  T(C): 36.9 (03-09-24 @ 04:39), Max: 37.4 (03-08-24 @ 12:05)  T(F): 98.5 (03-09-24 @ 04:39), Max: 99.4 (03-08-24 @ 12:05)  HR: 85 (03-09-24 @ 04:39) (85 - 98)  BP: 132/72 (03-09-24 @ 04:39) (112/59 - 140/65)  RR: 20 (03-09-24 @ 04:39) (20 - 98)  SpO2: 98% (03-09-24 @ 04:39) (94% - 100%)    PHYSICAL EXAM:  HEENT:    [X] Tracheostomy:  #8 Cuffed [place]  [X] PERRL B/L; 4mm B/L pupils; EOMI  [X] No oral lesions  [ ] Abnormal    SKIN  [X] No Rash  [ ] Abnormal  [ ] pressure    CARDIAC  [X] Regular  [ ] Abnormal    PULMONARY  [X] Bilateral Clear Breath Sounds  [ ] Normal Excursion  [ ] Abnormal    GI  [X] PEG      [X] +BS		              [X] Soft, nondistended, nontender	  [ ] Abnormal    MUSCULOSKELETAL                                   [X] Bedbound                 [ ] Abnormal:   [ ] Ambulatory/OOB to chair                           EXTREMITIES                                         [ ]Normal  [X] Edema  1+ edema in B/L UEs                           NEUROLOGIC  [ ] Normal, non focal  [X] Focal findings: Alert and Oriented; +gag; no gross facial asymmetry observed; responds appropriately to questions by nodding; able to move B/L feet, trigger movement observed in right knee,  +B/L shoulder shrugs, slight grasp in B/L hands observed; unable to lift legs or arms    PSYCHIATRIC  [X] Alert, responsive with flat affect  [ ] Sedated	 [ ]Agitated    :  [place]: [ ] Yes, if yes: Date of Placement:                   [X] No    LINES: Central Lines [ ] Yes, if yes: Date of Placement                                     [X] No      HOSPITAL MEDICATIONS:  MEDICATIONS  (STANDING):  artificial tears (preservative free) Ophthalmic Solution 1 Drop(s) Both EYES every 4 hours  aspirin  chewable 81 milliGRAM(s) Oral daily  Biotene Dry Mouth Oral Rinse 5 milliLiter(s) Swish and Spit every 6 hours  chlorhexidine 0.12% Liquid 15 milliLiter(s) Oral Mucosa every 12 hours  chlorhexidine 4% Liquid 1 Application(s) Topical daily  droxidopa 100 milliGRAM(s) Oral <User Schedule>  heparin   Injectable 5000 Unit(s) SubCutaneous every 8 hours  insulin lispro (ADMELOG) corrective regimen sliding scale   SubCutaneous every 6 hours  insulin NPH human recombinant 7 Unit(s) SubCutaneous every 6 hours  meropenem  IVPB 1000 milliGRAM(s) IV Intermittent every 8 hours  pantoprazole   Suspension 40 milliGRAM(s) Oral daily  polyethylene glycol 3350 17 Gram(s) Oral daily  senna 1 Tablet(s) Oral at bedtime    MEDICATIONS  (PRN):  albuterol/ipratropium for Nebulization 3 milliLiter(s) Nebulizer every 6 hours PRN Shortness of Breath and/or Wheezing      LABS:               CAPILLARY BLOOD GLUCOSE    MICROBIOLOGY:     RADIOLOGY:  [ ] Reviewed and interpreted by me    Mode: AC/ CMV (Assist Control/ Continuous Mandatory Ventilation)  RR (machine): 20  TV (machine): 500  FiO2: 30  PEEP: 5  ITime: 0.9  MAP: 10  PIP: 18

## 2024-03-10 LAB
ALBUMIN SERPL ELPH-MCNC: 2.5 G/DL — LOW (ref 3.3–5)
ALP SERPL-CCNC: 113 U/L — SIGNIFICANT CHANGE UP (ref 40–120)
ALT FLD-CCNC: 286 U/L — HIGH (ref 10–45)
ANION GAP SERPL CALC-SCNC: 9 MMOL/L — SIGNIFICANT CHANGE UP (ref 5–17)
AST SERPL-CCNC: 208 U/L — HIGH (ref 10–40)
BILIRUB SERPL-MCNC: 0.4 MG/DL — SIGNIFICANT CHANGE UP (ref 0.2–1.2)
BUN SERPL-MCNC: 20 MG/DL — SIGNIFICANT CHANGE UP (ref 7–23)
CALCIUM SERPL-MCNC: 7.7 MG/DL — LOW (ref 8.4–10.5)
CHLORIDE SERPL-SCNC: 110 MMOL/L — HIGH (ref 96–108)
CO2 SERPL-SCNC: 24 MMOL/L — SIGNIFICANT CHANGE UP (ref 22–31)
CREAT SERPL-MCNC: 0.61 MG/DL — SIGNIFICANT CHANGE UP (ref 0.5–1.3)
EGFR: 101 ML/MIN/1.73M2 — SIGNIFICANT CHANGE UP
GLUCOSE BLDC GLUCOMTR-MCNC: 114 MG/DL — HIGH (ref 70–99)
GLUCOSE BLDC GLUCOMTR-MCNC: 144 MG/DL — HIGH (ref 70–99)
GLUCOSE BLDC GLUCOMTR-MCNC: 158 MG/DL — HIGH (ref 70–99)
GLUCOSE SERPL-MCNC: 113 MG/DL — HIGH (ref 70–99)
HCT VFR BLD CALC: 30.8 % — LOW (ref 39–50)
HGB BLD-MCNC: 8.5 G/DL — LOW (ref 13–17)
MAGNESIUM SERPL-MCNC: 2.5 MG/DL — SIGNIFICANT CHANGE UP (ref 1.6–2.6)
MCHC RBC-ENTMCNC: 24.3 PG — LOW (ref 27–34)
MCHC RBC-ENTMCNC: 27.6 GM/DL — LOW (ref 32–36)
MCV RBC AUTO: 88 FL — SIGNIFICANT CHANGE UP (ref 80–100)
NRBC # BLD: 2 /100 WBCS — HIGH (ref 0–0)
PHOSPHATE SERPL-MCNC: 2.6 MG/DL — SIGNIFICANT CHANGE UP (ref 2.5–4.5)
PLATELET # BLD AUTO: 365 K/UL — SIGNIFICANT CHANGE UP (ref 150–400)
POTASSIUM SERPL-MCNC: 4.8 MMOL/L — SIGNIFICANT CHANGE UP (ref 3.5–5.3)
POTASSIUM SERPL-SCNC: 4.8 MMOL/L — SIGNIFICANT CHANGE UP (ref 3.5–5.3)
PROT SERPL-MCNC: 6.7 G/DL — SIGNIFICANT CHANGE UP (ref 6–8.3)
RBC # BLD: 3.5 M/UL — LOW (ref 4.2–5.8)
RBC # FLD: 24.5 % — HIGH (ref 10.3–14.5)
SODIUM SERPL-SCNC: 143 MMOL/L — SIGNIFICANT CHANGE UP (ref 135–145)
WBC # BLD: 8.21 K/UL — SIGNIFICANT CHANGE UP (ref 3.8–10.5)
WBC # FLD AUTO: 8.21 K/UL — SIGNIFICANT CHANGE UP (ref 3.8–10.5)

## 2024-03-10 RX ORDER — FUROSEMIDE 40 MG
20 TABLET ORAL ONCE
Refills: 0 | Status: COMPLETED | OUTPATIENT
Start: 2024-03-10 | End: 2024-03-10

## 2024-03-10 RX ADMIN — Medication 1 DROP(S): at 05:19

## 2024-03-10 RX ADMIN — Medication 1 DROP(S): at 12:28

## 2024-03-10 RX ADMIN — MEROPENEM 100 MILLIGRAM(S): 1 INJECTION INTRAVENOUS at 14:27

## 2024-03-10 RX ADMIN — HEPARIN SODIUM 5000 UNIT(S): 5000 INJECTION INTRAVENOUS; SUBCUTANEOUS at 05:15

## 2024-03-10 RX ADMIN — Medication 81 MILLIGRAM(S): at 12:29

## 2024-03-10 RX ADMIN — Medication 1 DROP(S): at 21:16

## 2024-03-10 RX ADMIN — POLYETHYLENE GLYCOL 3350 17 GRAM(S): 17 POWDER, FOR SOLUTION ORAL at 21:15

## 2024-03-10 RX ADMIN — PANTOPRAZOLE SODIUM 40 MILLIGRAM(S): 20 TABLET, DELAYED RELEASE ORAL at 12:29

## 2024-03-10 RX ADMIN — MEROPENEM 100 MILLIGRAM(S): 1 INJECTION INTRAVENOUS at 05:19

## 2024-03-10 RX ADMIN — HUMAN INSULIN 7 UNIT(S): 100 INJECTION, SUSPENSION SUBCUTANEOUS at 12:53

## 2024-03-10 RX ADMIN — Medication 1 DROP(S): at 19:15

## 2024-03-10 RX ADMIN — MEROPENEM 100 MILLIGRAM(S): 1 INJECTION INTRAVENOUS at 21:15

## 2024-03-10 RX ADMIN — HUMAN INSULIN 7 UNIT(S): 100 INJECTION, SUSPENSION SUBCUTANEOUS at 05:12

## 2024-03-10 RX ADMIN — CHLORHEXIDINE GLUCONATE 1 APPLICATION(S): 213 SOLUTION TOPICAL at 21:16

## 2024-03-10 RX ADMIN — Medication 5 MILLILITER(S): at 12:29

## 2024-03-10 RX ADMIN — Medication 2: at 05:12

## 2024-03-10 RX ADMIN — CHLORHEXIDINE GLUCONATE 15 MILLILITER(S): 213 SOLUTION TOPICAL at 19:02

## 2024-03-10 RX ADMIN — Medication 1 DROP(S): at 01:06

## 2024-03-10 RX ADMIN — HEPARIN SODIUM 5000 UNIT(S): 5000 INJECTION INTRAVENOUS; SUBCUTANEOUS at 21:15

## 2024-03-10 RX ADMIN — Medication 0: at 19:16

## 2024-03-10 RX ADMIN — HEPARIN SODIUM 5000 UNIT(S): 5000 INJECTION INTRAVENOUS; SUBCUTANEOUS at 14:26

## 2024-03-10 RX ADMIN — CHLORHEXIDINE GLUCONATE 15 MILLILITER(S): 213 SOLUTION TOPICAL at 05:15

## 2024-03-10 RX ADMIN — Medication 20 MILLIGRAM(S): at 14:27

## 2024-03-10 RX ADMIN — Medication 5 MILLILITER(S): at 19:03

## 2024-03-10 RX ADMIN — Medication 1 DROP(S): at 14:28

## 2024-03-10 RX ADMIN — HUMAN INSULIN 7 UNIT(S): 100 INJECTION, SUSPENSION SUBCUTANEOUS at 19:03

## 2024-03-10 RX ADMIN — SENNA PLUS 1 TABLET(S): 8.6 TABLET ORAL at 21:15

## 2024-03-10 RX ADMIN — Medication 5 MILLILITER(S): at 05:16

## 2024-03-10 NOTE — PROGRESS NOTE ADULT - SUBJECTIVE AND OBJECTIVE BOX
Patient is a 73y old  Male who presents with a chief complaint of Stroke, critical stenosis, evaluation for angiography (09 Mar 2024 07:10)      Interval Events:    REVIEW OF SYSTEMS:  [ ] Positive  [ ] All other systems negative  [ ] Unable to assess ROS because ________    Vital Signs Last 24 Hrs  T(C): 37.2 (03-10-24 @ 04:21), Max: 37.2 (03-09-24 @ 18:21)  T(F): 99 (03-10-24 @ 04:21), Max: 99 (03-09-24 @ 18:21)  HR: 88 (03-10-24 @ 04:21) (78 - 92)  BP: 139/84 (03-10-24 @ 04:21) (139/84 - 154/84)  RR: 23 (03-10-24 @ 04:21) (18 - 23)  SpO2: 98% (03-10-24 @ 04:21) (96% - 100%)    PHYSICAL EXAM:  HEENT:   [ ]Tracheostomy:  [ ]Pupils equal  [ ]No oral lesions  [ ]Abnormal    SKIN  [ ]No Rash  [ ] Abnormal  [ ] pressure    CARDIAC  [ ]Regular  [ ]Abnormal    PULMONARY  [ ]Bilateral Clear Breath Sounds  [ ]Normal Excursion  [ ]Abnormal    GI  [ ]PEG      [ ] +BS		              [ ]Soft, nondistended, nontender	  [ ]Abnormal    MUSCULOSKELETAL                                   [ ]Bedbound                 [ ]Abnormal    [ ]Ambulatory/OOB to chair                           EXTREMITIES                                         [ ]Normal  [ ]Edema                           NEUROLOGIC  [ ] Normal, non focal  [ ] Focal findings:    PSYCHIATRIC  [ ]Alert and appropriate  [ ] Sedated	 [ ]Agitated    :  [place]: [ ] Yes, if yes: Date of Placement:                   [  ] No    LINES: Central Lines [ ] Yes, if yes: Date of Placement                                     [  ] No    HOSPITAL MEDICATIONS:  MEDICATIONS  (STANDING):  artificial tears (preservative free) Ophthalmic Solution 1 Drop(s) Both EYES every 4 hours  aspirin  chewable 81 milliGRAM(s) Oral daily  Biotene Dry Mouth Oral Rinse 5 milliLiter(s) Swish and Spit every 6 hours  chlorhexidine 0.12% Liquid 15 milliLiter(s) Oral Mucosa every 12 hours  chlorhexidine 4% Liquid 1 Application(s) Topical daily  droxidopa 100 milliGRAM(s) Oral <User Schedule>  heparin   Injectable 5000 Unit(s) SubCutaneous every 8 hours  insulin lispro (ADMELOG) corrective regimen sliding scale   SubCutaneous every 6 hours  insulin NPH human recombinant 7 Unit(s) SubCutaneous every 6 hours  meropenem  IVPB 1000 milliGRAM(s) IV Intermittent every 8 hours  pantoprazole   Suspension 40 milliGRAM(s) Oral daily  polyethylene glycol 3350 17 Gram(s) Oral daily  senna 1 Tablet(s) Oral at bedtime    MEDICATIONS  (PRN):  albuterol/ipratropium for Nebulization 3 milliLiter(s) Nebulizer every 6 hours PRN Shortness of Breath and/or Wheezing      LABS:                        8.3    6.68  )-----------( 319      ( 09 Mar 2024 14:33 )             29.6     03-09    142  |  110<H>  |  20  ----------------------------<  110<H>  4.5   |  26  |  0.65    Ca    7.8<L>      09 Mar 2024 14:33        Urinalysis Basic - ( 09 Mar 2024 14:33 )    Color: x / Appearance: x / SG: x / pH: x  Gluc: 110 mg/dL / Ketone: x  / Bili: x / Urobili: x   Blood: x / Protein: x / Nitrite: x   Leuk Esterase: x / RBC: x / WBC x   Sq Epi: x / Non Sq Epi: x / Bacteria: x          CAPILLARY BLOOD GLUCOSE    MICROBIOLOGY:     RADIOLOGY:  [ ] Reviewed and interpreted by me    Mode: AC/ CMV (Assist Control/ Continuous Mandatory Ventilation)  RR (machine): 20  TV (machine): 500  FiO2: 30  PEEP: 5  ITime: 1  MAP: 10  PIP: 20   Patient is a 73y old  Male who presents with a chief complaint of Stroke, critical stenosis, evaluation for angiography (09 Mar 2024 07:10)      Interval Events:  No acute events overnight.     REVIEW OF SYSTEMS:  [ ] Positive  [ ] All other systems negative  [X] Unable to assess ROS because ___Obtunded___    Vital Signs Last 24 Hrs  T(C): 37.2 (03-10-24 @ 04:21), Max: 37.2 (03-09-24 @ 18:21)  T(F): 99 (03-10-24 @ 04:21), Max: 99 (03-09-24 @ 18:21)  HR: 88 (03-10-24 @ 04:21) (78 - 92)  BP: 139/84 (03-10-24 @ 04:21) (139/84 - 154/84)  RR: 23 (03-10-24 @ 04:21) (18 - 23)  SpO2: 98% (03-10-24 @ 04:21) (96% - 100%)    PHYSICAL EXAM:  HEENT:   [X]Tracheostomy: #8 cuffed portex  [X]Pupils equal  [ ]No oral lesions  [ ]Abnormal    SKIN  [X]No Rash  [ ] Abnormal  [ ] pressure    CARDIAC  [X]Regular  [ ]Abnormal    PULMONARY  [ ]Bilateral Clear Breath Sounds  [ ]Normal Excursion  [X]Abnormal - BL coarse BS with copious secretions    GI  [X]PEG      [X] +BS		              [X]Soft, nondistended, nontender	  [ ]Abnormal    MUSCULOSKELETAL                                   [X]Bedbound                 [ ]Abnormal    [ ]Ambulatory/OOB to chair                           EXTREMITIES                                         [ ]Normal  [X]Edema - generalized pitting edema    NEUROLOGIC  [ ] Normal, non focal  [X] Focal findings: minimally opens eyes to voice, follows commands on BL LE, unable to move BL UE, occassionally nods heads appropriately    PSYCHIATRIC  [X] Obtunded  [ ] Sedated	 [ ]Agitated    :  [place]: [ ] Yes, if yes: Date of Placement:                   [X] No    LINES: Central Lines [ ] Yes, if yes: Date of Placement                                     [X] No    HOSPITAL MEDICATIONS:  MEDICATIONS  (STANDING):  artificial tears (preservative free) Ophthalmic Solution 1 Drop(s) Both EYES every 4 hours  aspirin  chewable 81 milliGRAM(s) Oral daily  Biotene Dry Mouth Oral Rinse 5 milliLiter(s) Swish and Spit every 6 hours  chlorhexidine 0.12% Liquid 15 milliLiter(s) Oral Mucosa every 12 hours  chlorhexidine 4% Liquid 1 Application(s) Topical daily  droxidopa 100 milliGRAM(s) Oral <User Schedule>  heparin   Injectable 5000 Unit(s) SubCutaneous every 8 hours  insulin lispro (ADMELOG) corrective regimen sliding scale   SubCutaneous every 6 hours  insulin NPH human recombinant 7 Unit(s) SubCutaneous every 6 hours  meropenem  IVPB 1000 milliGRAM(s) IV Intermittent every 8 hours  pantoprazole   Suspension 40 milliGRAM(s) Oral daily  polyethylene glycol 3350 17 Gram(s) Oral daily  senna 1 Tablet(s) Oral at bedtime    MEDICATIONS  (PRN):  albuterol/ipratropium for Nebulization 3 milliLiter(s) Nebulizer every 6 hours PRN Shortness of Breath and/or Wheezing      LABS:                        8.3    6.68  )-----------( 319      ( 09 Mar 2024 14:33 )             29.6     03-09    142  |  110<H>  |  20  ----------------------------<  110<H>  4.5   |  26  |  0.65    Ca    7.8<L>      09 Mar 2024 14:33    Urinalysis Basic - ( 09 Mar 2024 14:33 )    Color: x / Appearance: x / SG: x / pH: x  Gluc: 110 mg/dL / Ketone: x  / Bili: x / Urobili: x   Blood: x / Protein: x / Nitrite: x   Leuk Esterase: x / RBC: x / WBC x   Sq Epi: x / Non Sq Epi: x / Bacteria: x    CAPILLARY BLOOD GLUCOSE    MICROBIOLOGY:     RADIOLOGY:  [ ] Reviewed and interpreted by me    Mode: AC/ CMV (Assist Control/ Continuous Mandatory Ventilation)  RR (machine): 20  TV (machine): 500  FiO2: 30  PEEP: 5  ITime: 1  MAP: 10  PIP: 20 Patient is a 73y old  Male who presents with a chief complaint of Stroke, critical stenosis, evaluation for angiography (09 Mar 2024 07:10)      Interval Events:  No acute events overnight.     REVIEW OF SYSTEMS:  [ ] Positive  [ ] All other systems negative  [X] Unable to assess ROS because ___Obtunded___    Vital Signs Last 24 Hrs  T(C): 37.2 (03-10-24 @ 04:21), Max: 37.2 (03-09-24 @ 18:21)  T(F): 99 (03-10-24 @ 04:21), Max: 99 (03-09-24 @ 18:21)  HR: 88 (03-10-24 @ 04:21) (78 - 92)  BP: 139/84 (03-10-24 @ 04:21) (139/84 - 154/84)  RR: 23 (03-10-24 @ 04:21) (18 - 23)  SpO2: 98% (03-10-24 @ 04:21) (96% - 100%)    PHYSICAL EXAM:  HEENT:   [X]Tracheostomy: #8 cuffed portex  [X]Pupils equal  [ ]No oral lesions  [ ]Abnormal    SKIN  [X]No Rash  [ ] Abnormal  [ ] pressure    CARDIAC  [X]Regular  [ ]Abnormal    PULMONARY  [ ]Bilateral Clear Breath Sounds  [ ]Normal Excursion  [X]Abnormal - BL coarse BS with copious secretions    GI  [X]PEG      [X] +BS		              [X]Soft, nondistended, nontender	  [ ]Abnormal    MUSCULOSKELETAL                                   [X]Bedbound                 [ ]Abnormal    [ ]Ambulatory/OOB to chair                           EXTREMITIES                                         [ ]Normal  [X]Edema - generalized pitting edema    NEUROLOGIC  [ ] Normal, non focal  [X] Focal findings: minimally opens eyes to voice, follows commands on BL LE and BL UE, occassionally nods heads appropriately    PSYCHIATRIC  [X] Obtunded  [ ] Sedated	 [ ]Agitated    :  [place]: [ ] Yes, if yes: Date of Placement:                   [X] No    LINES: Central Lines [ ] Yes, if yes: Date of Placement                                     [X] No    HOSPITAL MEDICATIONS:  MEDICATIONS  (STANDING):  artificial tears (preservative free) Ophthalmic Solution 1 Drop(s) Both EYES every 4 hours  aspirin  chewable 81 milliGRAM(s) Oral daily  Biotene Dry Mouth Oral Rinse 5 milliLiter(s) Swish and Spit every 6 hours  chlorhexidine 0.12% Liquid 15 milliLiter(s) Oral Mucosa every 12 hours  chlorhexidine 4% Liquid 1 Application(s) Topical daily  droxidopa 100 milliGRAM(s) Oral <User Schedule>  heparin   Injectable 5000 Unit(s) SubCutaneous every 8 hours  insulin lispro (ADMELOG) corrective regimen sliding scale   SubCutaneous every 6 hours  insulin NPH human recombinant 7 Unit(s) SubCutaneous every 6 hours  meropenem  IVPB 1000 milliGRAM(s) IV Intermittent every 8 hours  pantoprazole   Suspension 40 milliGRAM(s) Oral daily  polyethylene glycol 3350 17 Gram(s) Oral daily  senna 1 Tablet(s) Oral at bedtime    MEDICATIONS  (PRN):  albuterol/ipratropium for Nebulization 3 milliLiter(s) Nebulizer every 6 hours PRN Shortness of Breath and/or Wheezing      LABS:                        8.3    6.68  )-----------( 319      ( 09 Mar 2024 14:33 )             29.6     03-09    142  |  110<H>  |  20  ----------------------------<  110<H>  4.5   |  26  |  0.65    Ca    7.8<L>      09 Mar 2024 14:33    Urinalysis Basic - ( 09 Mar 2024 14:33 )    Color: x / Appearance: x / SG: x / pH: x  Gluc: 110 mg/dL / Ketone: x  / Bili: x / Urobili: x   Blood: x / Protein: x / Nitrite: x   Leuk Esterase: x / RBC: x / WBC x   Sq Epi: x / Non Sq Epi: x / Bacteria: x    CAPILLARY BLOOD GLUCOSE    MICROBIOLOGY:     RADIOLOGY:  [ ] Reviewed and interpreted by me    Mode: AC/ CMV (Assist Control/ Continuous Mandatory Ventilation)  RR (machine): 20  TV (machine): 500  FiO2: 30  PEEP: 5  ITime: 1  MAP: 10  PIP: 20

## 2024-03-10 NOTE — PROGRESS NOTE ADULT - ASSESSMENT
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 Prior to admission patient had Viral URI ( 1-2 weeks prior to admission) with subsequent weakness. He underwent an angiogram (2/11) which showed moderate stenosis and no intervention was done. MRI Performed c/w small bilateral cerebellar strokes and prior L thalamic strokes, as per Neurology was not c/w current presentation. 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 He is currently s/p 5 rounds of plasma exchange (2/13-2/20) and IVIG x 5 ( 2/21-2/26)  His hospital course was complicated by progressive respiratory failure. CT Chest performed on 2/20 with atelectasis of b/l lower lobes. BAL 2/21 Grew PSA treated with course of Zosyn (2/21-2/28).  Patient transferred to RCU on 2/24.   RRT called on 3/3 for unresponsiveness. Patient with unreactive pupils, no corneal reflex. Also found to be hypotensive and febrile to 105. Noted to have new melena. Hgb 5. Given 1 unit of pRBC. Transferred to MICU. patient required pressors while in the MICU, BP improved and was transitioned to Droxidopa. Patient had CT C/A/P consistent with Bibasilar pneumonia; Sputum cx grew PSA and was treated with course of Meropenem. Patient evaluated by GI in setting of Melena, transaminitis and portal venous gas on CT imaging. No EGD performed as Melena resolved, Transaminitis was thought to be in setting of shock and portal venous gas was thought to be in setting of recent PEG. EEG was performed in setting of AMS no seizures noted. Repeat MRI Performed which showed Small scattered foci of diffusion restriction within the right cerebellar hemisphere, left parietal lobe, and symmetrically in the bilateral basal ganglia. Neuro recommended Resumption of ASA in setting of possible embolic phenomenon. Repeat ECHO performed RT Ventricular Moderately enlarged, reduced systolic function. Mental status improved and was transferred back to RCU on 3/6.     3/8: Patient attempted on CPAP today but became hypoxic and was placed back on Full Support. BP has remained stable and Droxidopa was decreased to q 12 hrs. Hypernatremia improved will dc IVF and continue free water. Lasix 20 mg x 1 given in setting of ECHO Findings.   3/9: Sodium improved, will continue free water via PEG (reduced volume).  Patient advanced to PS 12/5 however heart rate then abruptly dropped to 50s, improved once placed back on full support. 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 Prior to admission patient had Viral URI ( 1-2 weeks prior to admission) with subsequent weakness. He underwent an angiogram (2/11) which showed moderate stenosis and no intervention was done. MRI Performed c/w small bilateral cerebellar strokes and prior L thalamic strokes, as per Neurology was not c/w current presentation. 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 He is currently s/p 5 rounds of plasma exchange (2/13-2/20) and IVIG x 5 ( 2/21-2/26)  His hospital course was complicated by progressive respiratory failure. CT Chest performed on 2/20 with atelectasis of b/l lower lobes. BAL 2/21 Grew PSA treated with course of Zosyn (2/21-2/28).  Patient transferred to RCU on 2/24.   RRT called on 3/3 for unresponsiveness. Patient with unreactive pupils, no corneal reflex. Also found to be hypotensive and febrile to 105. Noted to have new melena. Hgb 5. Given 1 unit of pRBC. Transferred to MICU. patient required pressors while in the MICU, BP improved and was transitioned to Droxidopa. Patient had CT C/A/P consistent with Bibasilar pneumonia; Sputum cx grew PSA and was treated with course of Meropenem. Patient evaluated by GI in setting of Melena, transaminitis and portal venous gas on CT imaging. No EGD performed as Melena resolved, Transaminitis was thought to be in setting of shock and portal venous gas was thought to be in setting of recent PEG. EEG was performed in setting of AMS no seizures noted. Repeat MRI Performed which showed Small scattered foci of diffusion restriction within the right cerebellar hemisphere, left parietal lobe, and symmetrically in the bilateral basal ganglia. Neuro recommended Resumption of ASA in setting of possible embolic phenomenon. Repeat ECHO performed RT Ventricular Moderately enlarged, reduced systolic function. Mental status improved and was transferred back to RCU on 3/6.       3/10:  This am pt was placed on CPAP with immediate hypoxemia and bradycardia, resolved with return to full vent supports.  Aggressive suctioning and lavage by bedside RN.  Pt placed back on CPAP and tolerated.  Lasix 20mg x1 given, monitor I&O's.

## 2024-03-10 NOTE — PROGRESS NOTE ADULT - PROBLEM SELECTOR PLAN 2
- Patient S/p Tracheostomy # 8 Cuffed Portex on 2/16 ( )  - CTA Chest 3/3: Bibasilar Pneumonia; Currently remains on Meropenem   - Currently remains on Mechanical Ventilation: PRVC 500/20/30%/+5  - Weaning limited due to Apnea and reports of having bradycardia on PS  - Will continue weaning attempts as tolerated  - Continue Chest PT and Suctioning PRN - Patient S/p Tracheostomy # 8 Cuffed Portex on 2/16 ( )  - CTA Chest 3/3: Bibasilar Pneumonia; Currently remains on Meropenem   - Currently remains on Mechanical Ventilation: PRVC 500/20/30%/+5  - initial weaning attempts pt became hypoxic and bradycardic  - attempts at PS trials improving, continue as tolerated  - Continue Chest PT and Suctioning PRN

## 2024-03-10 NOTE — PROGRESS NOTE ADULT - PROBLEM SELECTOR PLAN 8
- Patient Evangelical  - Family would like to be asked prior to administration of blood products   - S/p 1 unit PRBCs on 2/22 and 3/3  - S/p Venofer ( 2/22-2/27)   - S/p Cyanocobalamine, Folic acid, Ferrous Sulfate  - S/p Epogen x 5 days ( Ended 2/27)  - Will use pediatric tubes to limit volume for phlebotomy as patient is Ep[employer]   - Monitor for melena - Patient Zoroastrian  - Family would like to be asked prior to administration of blood products   - S/p 1 unit PRBCs on 2/22 and 3/3  - S/p Venofer (2/22-2/27)   - S/p Cyanocobalamine, Folic acid, Ferrous Sulfate  - S/p Epogen x 5 days (Ended 2/27)  - Will use pediatric tubes to limit volume for phlebotomy as patient is Pentecostal   - Monitor for melena

## 2024-03-10 NOTE — PROGRESS NOTE ADULT - NS ATTEND AMEND GEN_ALL_CORE FT
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 S/p PLEX x5 and IVIG. Course c/b mild hemoptysis, septic shock, embolic vs. hypoperfusion related lesions on MRI, and persistent respiratory failure requiring tracheostomy and MV. Clinically with mild neurologic improvement - able to tolerate PS this morning for hours. Will continue with PS trials. No further acute interventions for GBS per neurology. Completing merrem today. Goal net negative given moderately enlarged RV on echo. 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 S/p PLEX x5 and IVIG. Course c/b mild hemoptysis, septic shock, embolic vs. hypoperfusion related lesions on MRI, and persistent respiratory failure requiring tracheostomy and MV. Clinically with mild neurologic improvement - able to respond to verbal stimuli, PS limited due to respiratory weakness/bradycardia. Will continue with PS trials. No further acute interventions for GBS per neurology though will need to discuss with vascular neuro need for MILADIS as areas of infarct in BL BG may be cardiogenic in nature..  S/p meropenem. Goal net negative given moderately enlarged RV on echo.

## 2024-03-11 LAB
GLUCOSE BLDC GLUCOMTR-MCNC: 126 MG/DL — HIGH (ref 70–99)
GLUCOSE BLDC GLUCOMTR-MCNC: 131 MG/DL — HIGH (ref 70–99)
GLUCOSE BLDC GLUCOMTR-MCNC: 131 MG/DL — HIGH (ref 70–99)
GLUCOSE BLDC GLUCOMTR-MCNC: 133 MG/DL — HIGH (ref 70–99)
GLUCOSE BLDC GLUCOMTR-MCNC: 93 MG/DL — SIGNIFICANT CHANGE UP (ref 70–99)

## 2024-03-11 PROCEDURE — 99233 SBSQ HOSP IP/OBS HIGH 50: CPT

## 2024-03-11 RX ORDER — FUROSEMIDE 40 MG
20 TABLET ORAL ONCE
Refills: 0 | Status: COMPLETED | OUTPATIENT
Start: 2024-03-11 | End: 2024-03-11

## 2024-03-11 RX ADMIN — HEPARIN SODIUM 5000 UNIT(S): 5000 INJECTION INTRAVENOUS; SUBCUTANEOUS at 17:40

## 2024-03-11 RX ADMIN — HUMAN INSULIN 7 UNIT(S): 100 INJECTION, SUSPENSION SUBCUTANEOUS at 17:25

## 2024-03-11 RX ADMIN — MEROPENEM 100 MILLIGRAM(S): 1 INJECTION INTRAVENOUS at 05:16

## 2024-03-11 RX ADMIN — HEPARIN SODIUM 5000 UNIT(S): 5000 INJECTION INTRAVENOUS; SUBCUTANEOUS at 05:17

## 2024-03-11 RX ADMIN — Medication 5 MILLILITER(S): at 23:49

## 2024-03-11 RX ADMIN — CHLORHEXIDINE GLUCONATE 15 MILLILITER(S): 213 SOLUTION TOPICAL at 17:26

## 2024-03-11 RX ADMIN — Medication 5 MILLILITER(S): at 17:19

## 2024-03-11 RX ADMIN — HEPARIN SODIUM 5000 UNIT(S): 5000 INJECTION INTRAVENOUS; SUBCUTANEOUS at 22:01

## 2024-03-11 RX ADMIN — Medication 5 MILLILITER(S): at 11:36

## 2024-03-11 RX ADMIN — Medication 0: at 00:26

## 2024-03-11 RX ADMIN — PANTOPRAZOLE SODIUM 40 MILLIGRAM(S): 20 TABLET, DELAYED RELEASE ORAL at 11:28

## 2024-03-11 RX ADMIN — Medication 5 MILLILITER(S): at 05:16

## 2024-03-11 RX ADMIN — HUMAN INSULIN 7 UNIT(S): 100 INJECTION, SUSPENSION SUBCUTANEOUS at 11:27

## 2024-03-11 RX ADMIN — Medication 5 MILLILITER(S): at 00:25

## 2024-03-11 RX ADMIN — Medication 81 MILLIGRAM(S): at 11:28

## 2024-03-11 RX ADMIN — Medication 1 DROP(S): at 05:17

## 2024-03-11 RX ADMIN — Medication 1 DROP(S): at 14:43

## 2024-03-11 RX ADMIN — Medication 1 DROP(S): at 17:19

## 2024-03-11 RX ADMIN — HUMAN INSULIN 7 UNIT(S): 100 INJECTION, SUSPENSION SUBCUTANEOUS at 00:25

## 2024-03-11 RX ADMIN — HUMAN INSULIN 7 UNIT(S): 100 INJECTION, SUSPENSION SUBCUTANEOUS at 05:31

## 2024-03-11 RX ADMIN — HUMAN INSULIN 7 UNIT(S): 100 INJECTION, SUSPENSION SUBCUTANEOUS at 23:49

## 2024-03-11 RX ADMIN — Medication 1 DROP(S): at 02:15

## 2024-03-11 RX ADMIN — Medication 1 DROP(S): at 22:05

## 2024-03-11 RX ADMIN — CHLORHEXIDINE GLUCONATE 15 MILLILITER(S): 213 SOLUTION TOPICAL at 05:16

## 2024-03-11 RX ADMIN — CHLORHEXIDINE GLUCONATE 1 APPLICATION(S): 213 SOLUTION TOPICAL at 23:50

## 2024-03-11 RX ADMIN — Medication 1 DROP(S): at 10:40

## 2024-03-11 RX ADMIN — POLYETHYLENE GLYCOL 3350 17 GRAM(S): 17 POWDER, FOR SOLUTION ORAL at 22:01

## 2024-03-11 RX ADMIN — SENNA PLUS 1 TABLET(S): 8.6 TABLET ORAL at 22:00

## 2024-03-11 RX ADMIN — Medication 0: at 05:30

## 2024-03-11 RX ADMIN — Medication 20 MILLIGRAM(S): at 17:19

## 2024-03-11 NOTE — PROGRESS NOTE ADULT - SUBJECTIVE AND OBJECTIVE BOX
Patient is a 73y old  Male who presents with a chief complaint of Stroke, critical stenosis, evaluation for angiography (10 Mar 2024 07:30)      Interval Events:    REVIEW OF SYSTEMS:  [ ] Positive  [ ] All other systems negative  [ ] Unable to assess ROS because ________    Vital Signs Last 24 Hrs  T(C): 37.3 (03-11-24 @ 05:00), Max: 37.4 (03-10-24 @ 09:00)  T(F): 99.1 (03-11-24 @ 05:00), Max: 99.3 (03-10-24 @ 09:00)  HR: 77 (03-11-24 @ 05:00) (77 - 89)  BP: 132/72 (03-11-24 @ 05:00) (132/72 - 168/83)  RR: 20 (03-11-24 @ 05:00) (20 - 20)  SpO2: 98% (03-11-24 @ 05:00) (97% - 100%)    PHYSICAL EXAM:  HEENT:   [ ]Tracheostomy:  [ ]Pupils equal  [ ]No oral lesions  [ ]Abnormal    SKIN  [ ]No Rash  [ ] Abnormal  [ ] pressure    CARDIAC  [ ]Regular  [ ]Abnormal    PULMONARY  [ ]Bilateral Clear Breath Sounds  [ ]Normal Excursion  [ ]Abnormal    GI  [ ]PEG      [ ] +BS		              [ ]Soft, nondistended, nontender	  [ ]Abnormal    MUSCULOSKELETAL                                   [ ]Bedbound                 [ ]Abnormal    [ ]Ambulatory/OOB to chair                           EXTREMITIES                                         [ ]Normal  [ ]Edema                           NEUROLOGIC  [ ] Normal, non focal  [ ] Focal findings:    PSYCHIATRIC  [ ]Alert and appropriate  [ ] Sedated	 [ ]Agitated    :  [place]: [ ] Yes, if yes: Date of Placement:                   [  ] No    LINES: Central Lines [ ] Yes, if yes: Date of Placement                                     [  ] No    HOSPITAL MEDICATIONS:  MEDICATIONS  (STANDING):  artificial tears (preservative free) Ophthalmic Solution 1 Drop(s) Both EYES every 4 hours  aspirin  chewable 81 milliGRAM(s) Oral daily  Biotene Dry Mouth Oral Rinse 5 milliLiter(s) Swish and Spit every 6 hours  chlorhexidine 0.12% Liquid 15 milliLiter(s) Oral Mucosa every 12 hours  chlorhexidine 4% Liquid 1 Application(s) Topical daily  heparin   Injectable 5000 Unit(s) SubCutaneous every 8 hours  insulin lispro (ADMELOG) corrective regimen sliding scale   SubCutaneous every 6 hours  insulin NPH human recombinant 7 Unit(s) SubCutaneous every 6 hours  pantoprazole   Suspension 40 milliGRAM(s) Oral daily  polyethylene glycol 3350 17 Gram(s) Oral daily  senna 1 Tablet(s) Oral at bedtime    MEDICATIONS  (PRN):  albuterol/ipratropium for Nebulization 3 milliLiter(s) Nebulizer every 6 hours PRN Shortness of Breath and/or Wheezing      LABS:                        8.5    8.21  )-----------( 365      ( 10 Mar 2024 08:57 )             30.8     03-10    143  |  110<H>  |  20  ----------------------------<  113<H>  4.8   |  24  |  0.61    Ca    7.7<L>      10 Mar 2024 07:31  Phos  2.6     03-10  Mg     2.5     03-10    TPro  6.7  /  Alb  2.5<L>  /  TBili  0.4  /  DBili  x   /  AST  208<H>  /  ALT  286<H>  /  AlkPhos  113  03-10      Urinalysis Basic - ( 10 Mar 2024 07:31 )    Color: x / Appearance: x / SG: x / pH: x  Gluc: 113 mg/dL / Ketone: x  / Bili: x / Urobili: x   Blood: x / Protein: x / Nitrite: x   Leuk Esterase: x / RBC: x / WBC x   Sq Epi: x / Non Sq Epi: x / Bacteria: x          CAPILLARY BLOOD GLUCOSE    MICROBIOLOGY:     RADIOLOGY:  [ ] Reviewed and interpreted by me    Mode: AC/ CMV (Assist Control/ Continuous Mandatory Ventilation)  RR (machine): 20  TV (machine): 500  FiO2: 30  PEEP: 5  ITime: 1  MAP: 10  PIP: 18   Patient is a 73y old  Male who presents with a chief complaint of Stroke, critical stenosis, evaluation for angiography (10 Mar 2024 07:30)      Interval Events:  No acute events overnight.     REVIEW OF SYSTEMS:  [ ] Positive  [ ] All other systems negative  [X] Unable to assess ROS because ___Obtunded___    Vital Signs Last 24 Hrs  T(C): 37.3 (03-11-24 @ 05:00), Max: 37.4 (03-10-24 @ 09:00)  T(F): 99.1 (03-11-24 @ 05:00), Max: 99.3 (03-10-24 @ 09:00)  HR: 77 (03-11-24 @ 05:00) (77 - 89)  BP: 132/72 (03-11-24 @ 05:00) (132/72 - 168/83)  RR: 20 (03-11-24 @ 05:00) (20 - 20)  SpO2: 98% (03-11-24 @ 05:00) (97% - 100%)    PHYSICAL EXAM:  HEENT:   [X]Tracheostomy: #8 cuffed portex  [X]Pupils equal  [ ]No oral lesions  [ ]Abnormal    SKIN  [X]No Rash  [ ] Abnormal  [ ] pressure    CARDIAC  [X]Regular  [ ]Abnormal    PULMONARY  [ ]Bilateral Clear Breath Sounds  [ ]Normal Excursion  [X]Abnormal - BL coarse BS with copious secretions    GI  [X]PEG      [X] +BS		              [X]Soft, nondistended, nontender	  [ ]Abnormal    MUSCULOSKELETAL                                   [X]Bedbound                 [ ]Abnormal    [ ]Ambulatory/OOB to chair                           EXTREMITIES                                         [ ]Normal  [X]Edema - generalized pitting edema (improving)    NEUROLOGIC  [ ] Normal, non focal  [X] Focal findings: minimally opens eyes to voice, follows commands on BL LE and BL UE, occassionally nods heads appropriately    PSYCHIATRIC  [X] Obtunded  [ ] Sedated	 [ ]Agitated    :  [place]: [ ] Yes, if yes: Date of Placement:                   [X] No    LINES: Central Lines [ ] Yes, if yes: Date of Placement                                     [X] No    HOSPITAL MEDICATIONS:  MEDICATIONS  (STANDING):  artificial tears (preservative free) Ophthalmic Solution 1 Drop(s) Both EYES every 4 hours  aspirin  chewable 81 milliGRAM(s) Oral daily  Biotene Dry Mouth Oral Rinse 5 milliLiter(s) Swish and Spit every 6 hours  chlorhexidine 0.12% Liquid 15 milliLiter(s) Oral Mucosa every 12 hours  chlorhexidine 4% Liquid 1 Application(s) Topical daily  heparin   Injectable 5000 Unit(s) SubCutaneous every 8 hours  insulin lispro (ADMELOG) corrective regimen sliding scale   SubCutaneous every 6 hours  insulin NPH human recombinant 7 Unit(s) SubCutaneous every 6 hours  pantoprazole   Suspension 40 milliGRAM(s) Oral daily  polyethylene glycol 3350 17 Gram(s) Oral daily  senna 1 Tablet(s) Oral at bedtime    MEDICATIONS  (PRN):  albuterol/ipratropium for Nebulization 3 milliLiter(s) Nebulizer every 6 hours PRN Shortness of Breath and/or Wheezing    LABS:                        8.5    8.21  )-----------( 365      ( 10 Mar 2024 08:57 )             30.8     03-10    143  |  110<H>  |  20  ----------------------------<  113<H>  4.8   |  24  |  0.61    Ca    7.7<L>      10 Mar 2024 07:31  Phos  2.6     03-10  Mg     2.5     03-10    TPro  6.7  /  Alb  2.5<L>  /  TBili  0.4  /  DBili  x   /  AST  208<H>  /  ALT  286<H>  /  AlkPhos  113  03-10      Urinalysis Basic - ( 10 Mar 2024 07:31 )    Color: x / Appearance: x / SG: x / pH: x  Gluc: 113 mg/dL / Ketone: x  / Bili: x / Urobili: x   Blood: x / Protein: x / Nitrite: x   Leuk Esterase: x / RBC: x / WBC x   Sq Epi: x / Non Sq Epi: x / Bacteria: x    CAPILLARY BLOOD GLUCOSE    MICROBIOLOGY:     RADIOLOGY:  [ ] Reviewed and interpreted by me    Mode: AC/ CMV (Assist Control/ Continuous Mandatory Ventilation)  RR (machine): 20  TV (machine): 500  FiO2: 30  PEEP: 5  ITime: 1  MAP: 10  PIP: 18

## 2024-03-11 NOTE — PROGRESS NOTE ADULT - NS ATTEND AMEND GEN_ALL_CORE FT
agree with above  Complete course of meropenem.  has been unable to wean due to apnea  d/w neuro adding plavix to ASA.  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 S/p PLEX x5 and IVIG. Course c/b mild hemoptysis, septic shock, embolic vs. hypoperfusion related lesions on MRI, and persistent respiratory failure requiring tracheostomy and MV. Clinically with mild neurologic improvement (moves toes, light squeeze with hands, nods/shakes head). Will continue with PS trials. D/w neuro adding plavix to asa as per prior tx regimen. Completing merrem today. Dispo planning.

## 2024-03-11 NOTE — PROGRESS NOTE ADULT - PROBLEM SELECTOR PLAN 5
- Patient with hx of HTN Prior to admission   - As per wife was on Atenolol /Chlorthiazide Prior to admission   - S/p Pressors while in MICU   - Droxidopa decreased to 100 mg q 12 hrs - Patient with hx of HTN Prior to admission   - As per wife was on Atenolol/Chlorthiazide Prior to admission   - S/p Pressors while in MICU   - Droxidopa decreased to 100 mg q 12 hrs - d/c'd 3/10

## 2024-03-11 NOTE — PROGRESS NOTE ADULT - PROBLEM SELECTOR PLAN 8
- Patient Rastafari  - Family would like to be asked prior to administration of blood products   - S/p 1 unit PRBCs on 2/22 and 3/3  - S/p Venofer (2/22-2/27)   - S/p Cyanocobalamine, Folic acid, Ferrous Sulfate  - S/p Epogen x 5 days (Ended 2/27)  - Will use pediatric tubes to limit volume for phlebotomy as patient is [employer]ism   - Monitor for melena - Patient Sikh  - Family would like to be asked prior to administration of blood products   - S/p 1 unit PRBCs on 2/22 and 3/3  - S/p Venofer (2/22-2/27)   - S/p Cyanocobalamine, Folic acid, Ferrous Sulfate  - S/p Epogen x 5 days (Ended 2/27)  - CT A/P 3/3 negative for acute bleed  - seen by GI 3/3 - no plans for intervention because pt responded appropriately to 1 unit PRBC and no over signs of active bleeding   - Will use pediatric tubes to limit volume for phlebotomy as patient is Church   - Monitor for melena

## 2024-03-11 NOTE — PROGRESS NOTE ADULT - PROBLEM SELECTOR PLAN 2
- Patient S/p Tracheostomy # 8 Cuffed Portex on 2/16 ( )  - CTA Chest 3/3: Bibasilar Pneumonia; Currently remains on Meropenem   - Currently remains on Mechanical Ventilation: PRVC 500/20/30%/+5  - initial weaning attempts pt became hypoxic and bradycardic  - attempts at PS trials improving, continue as tolerated  - Continue Chest PT and Suctioning PRN

## 2024-03-11 NOTE — PROGRESS NOTE ADULT - PROBLEM SELECTOR PLAN 10
- Resolved on IVF 3/8  - free water reduced to 250 cc q 6 hrs  - Monitor BMP - Resolved on IVF 3/8  - free water as ordered  - Monitor BMP

## 2024-03-11 NOTE — PROGRESS NOTE ADULT - PROBLEM SELECTOR PLAN 12
- Patients Wife Margarette  provided medical update at bedside this morning - Patients Wife Margarette provided medical update at bedside this morning

## 2024-03-11 NOTE — PROGRESS NOTE ADULT - PROBLEM SELECTOR PLAN 6
MRI 3/5: Small scattered foci of diffusion restriction within the right cerebellar hemisphere, left parietal lobe, and symmetrically in the bilateral basal ganglia.  - Continue ASA 81mg in setting of concern for possible embolic phenomenon   - ECHO 3/7: EF 74% / RT Ventricular Moderately Enlarged and reduced systolic fxn  - Neurology following - MRI 2/12 - small b/l cerebellar strokes (post-procedural) and prior L thalamic strokes  - SAMMPRIS trial - ASA/Plavix x3 months then ASA monotherapy  - MRI 3/5: Small scattered foci of diffusion restriction within the right cerebellar hemisphere, left parietal lobe, and symmetrically in the bilateral basal ganglia.  - Continue ASA 81mg in setting of concern for possible embolic phenomenon   - plavix d/c'd in setting of anemia 3/3; lipitor d/c'd in setting of liver injury 3/3  - ECHO 3/7: EF 74% RT Ventricular Moderately Enlarged and reduced systolic fxn  - Neurology following

## 2024-03-12 LAB
ANION GAP SERPL CALC-SCNC: 6 MMOL/L — SIGNIFICANT CHANGE UP (ref 5–17)
BUN SERPL-MCNC: 20 MG/DL — SIGNIFICANT CHANGE UP (ref 7–23)
CALCIUM SERPL-MCNC: 8.5 MG/DL — SIGNIFICANT CHANGE UP (ref 8.4–10.5)
CHLORIDE SERPL-SCNC: 112 MMOL/L — HIGH (ref 96–108)
CO2 SERPL-SCNC: 27 MMOL/L — SIGNIFICANT CHANGE UP (ref 22–31)
CREAT SERPL-MCNC: 0.63 MG/DL — SIGNIFICANT CHANGE UP (ref 0.5–1.3)
EGFR: 100 ML/MIN/1.73M2 — SIGNIFICANT CHANGE UP
GLUCOSE BLDC GLUCOMTR-MCNC: 105 MG/DL — HIGH (ref 70–99)
GLUCOSE BLDC GLUCOMTR-MCNC: 113 MG/DL — HIGH (ref 70–99)
GLUCOSE BLDC GLUCOMTR-MCNC: 128 MG/DL — HIGH (ref 70–99)
GLUCOSE BLDC GLUCOMTR-MCNC: 139 MG/DL — HIGH (ref 70–99)
GLUCOSE SERPL-MCNC: 124 MG/DL — HIGH (ref 70–99)
HCT VFR BLD CALC: 31.2 % — LOW (ref 39–50)
HGB BLD-MCNC: 8.6 G/DL — LOW (ref 13–17)
MAGNESIUM SERPL-MCNC: 2.2 MG/DL — SIGNIFICANT CHANGE UP (ref 1.6–2.6)
MCHC RBC-ENTMCNC: 23.6 PG — LOW (ref 27–34)
MCHC RBC-ENTMCNC: 27.6 GM/DL — LOW (ref 32–36)
MCV RBC AUTO: 85.7 FL — SIGNIFICANT CHANGE UP (ref 80–100)
NRBC # BLD: 1 /100 WBCS — HIGH (ref 0–0)
PHOSPHATE SERPL-MCNC: 3 MG/DL — SIGNIFICANT CHANGE UP (ref 2.5–4.5)
PLATELET # BLD AUTO: 355 K/UL — SIGNIFICANT CHANGE UP (ref 150–400)
POTASSIUM SERPL-MCNC: 4.5 MMOL/L — SIGNIFICANT CHANGE UP (ref 3.5–5.3)
POTASSIUM SERPL-SCNC: 4.5 MMOL/L — SIGNIFICANT CHANGE UP (ref 3.5–5.3)
RBC # BLD: 3.64 M/UL — LOW (ref 4.2–5.8)
RBC # FLD: 23.9 % — HIGH (ref 10.3–14.5)
SODIUM SERPL-SCNC: 145 MMOL/L — SIGNIFICANT CHANGE UP (ref 135–145)
WBC # BLD: 6.67 K/UL — SIGNIFICANT CHANGE UP (ref 3.8–10.5)
WBC # FLD AUTO: 6.67 K/UL — SIGNIFICANT CHANGE UP (ref 3.8–10.5)

## 2024-03-12 PROCEDURE — 99233 SBSQ HOSP IP/OBS HIGH 50: CPT

## 2024-03-12 RX ORDER — CLOPIDOGREL BISULFATE 75 MG/1
75 TABLET, FILM COATED ORAL DAILY
Refills: 0 | Status: DISCONTINUED | OUTPATIENT
Start: 2024-03-12 | End: 2024-03-27

## 2024-03-12 RX ORDER — FUROSEMIDE 40 MG
20 TABLET ORAL ONCE
Refills: 0 | Status: COMPLETED | OUTPATIENT
Start: 2024-03-12 | End: 2024-03-12

## 2024-03-12 RX ADMIN — HUMAN INSULIN 7 UNIT(S): 100 INJECTION, SUSPENSION SUBCUTANEOUS at 17:44

## 2024-03-12 RX ADMIN — Medication 5 MILLILITER(S): at 17:45

## 2024-03-12 RX ADMIN — Medication 81 MILLIGRAM(S): at 11:27

## 2024-03-12 RX ADMIN — HEPARIN SODIUM 5000 UNIT(S): 5000 INJECTION INTRAVENOUS; SUBCUTANEOUS at 13:06

## 2024-03-12 RX ADMIN — Medication 1 DROP(S): at 11:53

## 2024-03-12 RX ADMIN — Medication 1 DROP(S): at 01:34

## 2024-03-12 RX ADMIN — HUMAN INSULIN 7 UNIT(S): 100 INJECTION, SUSPENSION SUBCUTANEOUS at 11:54

## 2024-03-12 RX ADMIN — Medication 5 MILLILITER(S): at 11:27

## 2024-03-12 RX ADMIN — HUMAN INSULIN 7 UNIT(S): 100 INJECTION, SUSPENSION SUBCUTANEOUS at 05:46

## 2024-03-12 RX ADMIN — HEPARIN SODIUM 5000 UNIT(S): 5000 INJECTION INTRAVENOUS; SUBCUTANEOUS at 05:32

## 2024-03-12 RX ADMIN — Medication 1 DROP(S): at 21:34

## 2024-03-12 RX ADMIN — Medication 20 MILLIGRAM(S): at 11:27

## 2024-03-12 RX ADMIN — HEPARIN SODIUM 5000 UNIT(S): 5000 INJECTION INTRAVENOUS; SUBCUTANEOUS at 21:27

## 2024-03-12 RX ADMIN — PANTOPRAZOLE SODIUM 40 MILLIGRAM(S): 20 TABLET, DELAYED RELEASE ORAL at 11:27

## 2024-03-12 RX ADMIN — Medication 5 MILLILITER(S): at 05:32

## 2024-03-12 RX ADMIN — CHLORHEXIDINE GLUCONATE 15 MILLILITER(S): 213 SOLUTION TOPICAL at 17:45

## 2024-03-12 RX ADMIN — Medication 1 DROP(S): at 17:46

## 2024-03-12 RX ADMIN — SENNA PLUS 1 TABLET(S): 8.6 TABLET ORAL at 21:27

## 2024-03-12 RX ADMIN — CHLORHEXIDINE GLUCONATE 1 APPLICATION(S): 213 SOLUTION TOPICAL at 21:27

## 2024-03-12 RX ADMIN — CHLORHEXIDINE GLUCONATE 15 MILLILITER(S): 213 SOLUTION TOPICAL at 05:33

## 2024-03-12 RX ADMIN — POLYETHYLENE GLYCOL 3350 17 GRAM(S): 17 POWDER, FOR SOLUTION ORAL at 21:27

## 2024-03-12 RX ADMIN — Medication 1 DROP(S): at 05:32

## 2024-03-12 RX ADMIN — HUMAN INSULIN 7 UNIT(S): 100 INJECTION, SUSPENSION SUBCUTANEOUS at 23:50

## 2024-03-12 RX ADMIN — Medication 5 MILLILITER(S): at 23:49

## 2024-03-12 RX ADMIN — Medication 1 DROP(S): at 13:07

## 2024-03-12 NOTE — PROGRESS NOTE ADULT - PROBLEM SELECTOR PLAN 5
- Patient with hx of HTN Prior to admission   - As per wife was on Atenolol/Chlorthiazide Prior to admission   - S/p Pressors while in MICU   - Droxidopa decreased to 100 mg q 12 hrs - d/c'd 3/10

## 2024-03-12 NOTE — CHART NOTE - NSCHARTNOTEFT_GEN_A_CORE
Chart and imaging reviewed. Patient was seen last week and comprehensive consult provided. Patient is has small b/l embolic appearing strokes. Work up has been negative so far.     - We recommend loop recorder placement prior to discharge   - Aspirin 81 mg for secondary stroke prevention is sufficient for now   - No contraindications for clopidogrel     Ac Wen MD  Stroke Fellow

## 2024-03-12 NOTE — PROGRESS NOTE ADULT - NS ATTEND AMEND GEN_ALL_CORE FT
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 S/p PLEX x5 and IVIG. Course c/b mild hemoptysis, septic shock, embolic vs. hypoperfusion related lesions on MRI, and persistent respiratory failure requiring tracheostomy and MV. Clinically with mild neurologic improvement (moves toes, light squeeze with hands, nods/shakes head). Will continue with PS trials. D/w neuro adding plavix to asa as per prior tx regimen. Completing merrem today. Dispo planning. 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 S/p PLEX x5 and IVIG. Course c/b mild hemoptysis, septic shock, embolic vs. hypoperfusion related lesions on MRI, and persistent respiratory failure requiring tracheostomy and MV. Clinically with mild neurologic improvement (moves toes, light squeeze with hands, nods/shakes head). Will continue with PS trials. Should be on full vent support whenever he needs to be laid flat. D/w neuro adding plavix to asa as per prior tx regimen. Continue Lasix. Dispo planning.  Wife updated at bedside.    Nilda Geiger MD, MSCR

## 2024-03-12 NOTE — PROGRESS NOTE ADULT - PROBLEM SELECTOR PLAN 6
- MRI 2/12 - small b/l cerebellar strokes (post-procedural) and prior L thalamic strokes  - SAMMPRIS trial - ASA/Plavix x3 months then ASA monotherapy  - MRI 3/5: Small scattered foci of diffusion restriction within the right cerebellar hemisphere, left parietal lobe, and symmetrically in the bilateral basal ganglia.  - Continue ASA 81mg in setting of concern for possible embolic phenomenon   - plavix d/c'd in setting of anemia 3/3; lipitor d/c'd in setting of liver injury 3/3  - ECHO 3/7: EF 74% RT Ventricular Moderately Enlarged and reduced systolic fxn  - Neurology following

## 2024-03-12 NOTE — PROGRESS NOTE ADULT - SUBJECTIVE AND OBJECTIVE BOX
Patient is a 73y old  Male who presents with a chief complaint of Stroke, critical stenosis, evaluation for angiography (11 Mar 2024 07:08)      Interval Events:    REVIEW OF SYSTEMS:  [ ] Positive  [ ] All other systems negative  [ ] Unable to assess ROS because ________    Vital Signs Last 24 Hrs  T(C): 36.9 (03-12-24 @ 05:00), Max: 37.2 (03-11-24 @ 11:00)  T(F): 98.5 (03-12-24 @ 05:00), Max: 98.9 (03-11-24 @ 11:00)  HR: 90 (03-12-24 @ 05:00) (83 - 96)  BP: 132/77 (03-12-24 @ 05:00) (107/66 - 133/75)  RR: 20 (03-12-24 @ 05:00) (18 - 20)  SpO2: 99% (03-12-24 @ 05:00) (97% - 99%)    PHYSICAL EXAM:  HEENT:   [ ]Tracheostomy:  [ ]Pupils equal  [ ]No oral lesions  [ ]Abnormal    SKIN  [ ]No Rash  [ ] Abnormal  [ ] pressure    CARDIAC  [ ]Regular  [ ]Abnormal    PULMONARY  [ ]Bilateral Clear Breath Sounds  [ ]Normal Excursion  [ ]Abnormal    GI  [ ]PEG      [ ] +BS		              [ ]Soft, nondistended, nontender	  [ ]Abnormal    MUSCULOSKELETAL                                   [ ]Bedbound                 [ ]Abnormal    [ ]Ambulatory/OOB to chair                           EXTREMITIES                                         [ ]Normal  [ ]Edema                           NEUROLOGIC  [ ] Normal, non focal  [ ] Focal findings:    PSYCHIATRIC  [ ]Alert and appropriate  [ ] Sedated	 [ ]Agitated    :  [place]: [ ] Yes, if yes: Date of Placement:                   [  ] No    LINES: Central Lines [ ] Yes, if yes: Date of Placement                                     [  ] No    HOSPITAL MEDICATIONS:  MEDICATIONS  (STANDING):  artificial tears (preservative free) Ophthalmic Solution 1 Drop(s) Both EYES every 4 hours  aspirin  chewable 81 milliGRAM(s) Oral daily  Biotene Dry Mouth Oral Rinse 5 milliLiter(s) Swish and Spit every 6 hours  chlorhexidine 0.12% Liquid 15 milliLiter(s) Oral Mucosa every 12 hours  chlorhexidine 4% Liquid 1 Application(s) Topical daily  heparin   Injectable 5000 Unit(s) SubCutaneous every 8 hours  insulin lispro (ADMELOG) corrective regimen sliding scale   SubCutaneous every 6 hours  insulin NPH human recombinant 7 Unit(s) SubCutaneous every 6 hours  pantoprazole   Suspension 40 milliGRAM(s) Oral daily  polyethylene glycol 3350 17 Gram(s) Oral daily  senna 1 Tablet(s) Oral at bedtime    MEDICATIONS  (PRN):  albuterol/ipratropium for Nebulization 3 milliLiter(s) Nebulizer every 6 hours PRN Shortness of Breath and/or Wheezing      LABS:                        8.5    8.21  )-----------( 365      ( 10 Mar 2024 08:57 )             30.8     03-10    143  |  110<H>  |  20  ----------------------------<  113<H>  4.8   |  24  |  0.61    Ca    7.7<L>      10 Mar 2024 07:31  Phos  2.6     03-10  Mg     2.5     03-10    TPro  6.7  /  Alb  2.5<L>  /  TBili  0.4  /  DBili  x   /  AST  208<H>  /  ALT  286<H>  /  AlkPhos  113  03-10      Urinalysis Basic - ( 10 Mar 2024 07:31 )    Color: x / Appearance: x / SG: x / pH: x  Gluc: 113 mg/dL / Ketone: x  / Bili: x / Urobili: x   Blood: x / Protein: x / Nitrite: x   Leuk Esterase: x / RBC: x / WBC x   Sq Epi: x / Non Sq Epi: x / Bacteria: x          CAPILLARY BLOOD GLUCOSE    MICROBIOLOGY:     RADIOLOGY:  [ ] Reviewed and interpreted by me    Mode: AC/ CMV (Assist Control/ Continuous Mandatory Ventilation)  RR (machine): 20  TV (machine): 500  FiO2: 30  PEEP: 5  ITime: 1  MAP: 10  PIP: 18   Patient is a 73y old  Male who presents with a chief complaint of Stroke, critical stenosis, evaluation for angiography (11 Mar 2024 07:08)      Interval Events:  No acute events overnight.     REVIEW OF SYSTEMS:  [ ] Positive  [ ] All other systems negative  [X] Unable to assess ROS because ___Obtunded___    Vital Signs Last 24 Hrs  T(C): 36.9 (03-12-24 @ 05:00), Max: 37.2 (03-11-24 @ 11:00)  T(F): 98.5 (03-12-24 @ 05:00), Max: 98.9 (03-11-24 @ 11:00)  HR: 90 (03-12-24 @ 05:00) (83 - 96)  BP: 132/77 (03-12-24 @ 05:00) (107/66 - 133/75)  RR: 20 (03-12-24 @ 05:00) (18 - 20)  SpO2: 99% (03-12-24 @ 05:00) (97% - 99%)    PHYSICAL EXAM:  HEENT:   [X]Tracheostomy: #8 cuffed portex  [X]Pupils equal  [ ]No oral lesions  [ ]Abnormal    SKIN  [X]No Rash  [ ] Abnormal  [ ] pressure    CARDIAC  [X]Regular  [ ]Abnormal    PULMONARY  [ ]Bilateral Clear Breath Sounds  [ ]Normal Excursion  [X]Abnormal - BL coarse BS with copious secretions    GI  [X]PEG      [X] +BS		              [X]Soft, nondistended, nontender	  [ ]Abnormal    MUSCULOSKELETAL                                   [X]Bedbound                 [ ]Abnormal    [ ]Ambulatory/OOB to chair                           EXTREMITIES                                         [ ]Normal  [X]Edema - generalized pitting edema (improving)    NEUROLOGIC  [ ] Normal, non focal  [X] Focal findings: minimally opens eyes to voice, follows commands on BL LE and BL UE, occassionally nods heads appropriately    PSYCHIATRIC  [X] Obtunded  [ ] Sedated	 [ ]Agitated    :  [place]: [ ] Yes, if yes: Date of Placement:                   [X] No    LINES: Central Lines [ ] Yes, if yes: Date of Placement                                     [X] No    HOSPITAL MEDICATIONS:  MEDICATIONS  (STANDING):  artificial tears (preservative free) Ophthalmic Solution 1 Drop(s) Both EYES every 4 hours  aspirin  chewable 81 milliGRAM(s) Oral daily  Biotene Dry Mouth Oral Rinse 5 milliLiter(s) Swish and Spit every 6 hours  chlorhexidine 0.12% Liquid 15 milliLiter(s) Oral Mucosa every 12 hours  chlorhexidine 4% Liquid 1 Application(s) Topical daily  heparin   Injectable 5000 Unit(s) SubCutaneous every 8 hours  insulin lispro (ADMELOG) corrective regimen sliding scale   SubCutaneous every 6 hours  insulin NPH human recombinant 7 Unit(s) SubCutaneous every 6 hours  pantoprazole   Suspension 40 milliGRAM(s) Oral daily  polyethylene glycol 3350 17 Gram(s) Oral daily  senna 1 Tablet(s) Oral at bedtime    MEDICATIONS  (PRN):  albuterol/ipratropium for Nebulization 3 milliLiter(s) Nebulizer every 6 hours PRN Shortness of Breath and/or Wheezing      LABS:                        8.5    8.21  )-----------( 365      ( 10 Mar 2024 08:57 )             30.8     03-10    143  |  110<H>  |  20  ----------------------------<  113<H>  4.8   |  24  |  0.61    Ca    7.7<L>      10 Mar 2024 07:31  Phos  2.6     03-10  Mg     2.5     03-10    TPro  6.7  /  Alb  2.5<L>  /  TBili  0.4  /  DBili  x   /  AST  208<H>  /  ALT  286<H>  /  AlkPhos  113  03-10    Urinalysis Basic - ( 10 Mar 2024 07:31 )    Color: x / Appearance: x / SG: x / pH: x  Gluc: 113 mg/dL / Ketone: x  / Bili: x / Urobili: x   Blood: x / Protein: x / Nitrite: x   Leuk Esterase: x / RBC: x / WBC x   Sq Epi: x / Non Sq Epi: x / Bacteria: x    CAPILLARY BLOOD GLUCOSE    MICROBIOLOGY:     RADIOLOGY:  [ ] Reviewed and interpreted by me    Mode: AC/ CMV (Assist Control/ Continuous Mandatory Ventilation)  RR (machine): 20  TV (machine): 500  FiO2: 30  PEEP: 5  ITime: 1  MAP: 10  PIP: 18

## 2024-03-12 NOTE — PROGRESS NOTE ADULT - PROBLEM SELECTOR PLAN 8
- Patient Adventism  - Family would like to be asked prior to administration of blood products   - S/p 1 unit PRBCs on 2/22 and 3/3  - S/p Venofer (2/22-2/27)   - S/p Cyanocobalamine, Folic acid, Ferrous Sulfate  - S/p Epogen x 5 days (Ended 2/27)  - CT A/P 3/3 negative for acute bleed  - seen by GI 3/3 - no plans for intervention because pt responded appropriately to 1 unit PRBC and no over signs of active bleeding   - Will use pediatric tubes to limit volume for phlebotomy as patient is Catholic   - Monitor for melena

## 2024-03-12 NOTE — PROGRESS NOTE ADULT - ASSESSMENT
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 Prior to admission patient had Viral URI ( 1-2 weeks prior to admission) with subsequent weakness. He underwent an angiogram (2/11) which showed moderate stenosis and no intervention was done. MRI Performed c/w small bilateral cerebellar strokes and prior L thalamic strokes, as per Neurology was not c/w current presentation. 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 He is currently s/p 5 rounds of plasma exchange (2/13-2/20) and IVIG x 5 ( 2/21-2/26)  His hospital course was complicated by progressive respiratory failure. CT Chest performed on 2/20 with atelectasis of b/l lower lobes. BAL 2/21 Grew PSA treated with course of Zosyn (2/21-2/28).  Patient transferred to RCU on 2/24.   RRT called on 3/3 for unresponsiveness. Patient with unreactive pupils, no corneal reflex. Also found to be hypotensive and febrile to 105. Noted to have new melena. Hgb 5. Given 1 unit of pRBC. Transferred to MICU. patient required pressors while in the MICU, BP improved and was transitioned to Droxidopa. Patient had CT C/A/P consistent with Bibasilar pneumonia; Sputum cx grew PSA and was treated with course of Meropenem. Patient evaluated by GI in setting of Melena, transaminitis and portal venous gas on CT imaging. No EGD performed as Melena resolved, Transaminitis was thought to be in setting of shock and portal venous gas was thought to be in setting of recent PEG. EEG was performed in setting of AMS no seizures noted. Repeat MRI Performed which showed Small scattered foci of diffusion restriction within the right cerebellar hemisphere, left parietal lobe, and symmetrically in the bilateral basal ganglia. Neuro recommended Resumption of ASA in setting of possible embolic phenomenon. Repeat ECHO performed RT Ventricular Moderately enlarged, reduced systolic function. Mental status improved and was transferred back to RCU on 3/6.       3/11:  Pt still with occasional episodes of hypoxia and bradycardia.  Episode this am occurred while pt was on PS and was being laid back for turn and reposition.  Resolved with RTV and elevation of HOB.  He was laid back again during rounds on full vent support to observe for bradycardia and hypoxia, he remained stable without hypoxia or bradycardia.  After rounding on pt, wife called team back to bedside for suctioning and "rattling in pts chest".  Pt was suctioned with inline catheter on vent tubing, wife still felt rattling so asked for ambu-lavage and inline suctioning with flexible suction catheter.  Wife remained at bedside, myself and bedside RN, performed ambu-lavage with inline suctioning with flexible catheter, minimal clear thin secretions and no resistance felt while ambu'ing.  Wife was happy with results.  During this, pt maintained O2 sat %, HR remained stable in 90s.  As per wifes request, she does not want him turned and repositioned q2hrs, would like at least 3 hours turn and reposition and PRN if soiled.  Wife also does not want pt to be laid back as she feels it causes a change in mental status/discomfort for him despite our assessment of unchanged mental status.  He also nodded "no" appropriately when asked if he was in pain.    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 Prior to admission patient had Viral URI ( 1-2 weeks prior to admission) with subsequent weakness. He underwent an angiogram (2/11) which showed moderate stenosis and no intervention was done. MRI Performed c/w small bilateral cerebellar strokes and prior L thalamic strokes, as per Neurology was not c/w current presentation. 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 He is currently s/p 5 rounds of plasma exchange (2/13-2/20) and IVIG x 5 ( 2/21-2/26)  His hospital course was complicated by progressive respiratory failure. CT Chest performed on 2/20 with atelectasis of b/l lower lobes. BAL 2/21 Grew PSA treated with course of Zosyn (2/21-2/28).  Patient transferred to RCU on 2/24.   RRT called on 3/3 for unresponsiveness. Patient with unreactive pupils, no corneal reflex. Also found to be hypotensive and febrile to 105. Noted to have new melena. Hgb 5. Given 1 unit of pRBC. Transferred to MICU. patient required pressors while in the MICU, BP improved and was transitioned to Droxidopa. Patient had CT C/A/P consistent with Bibasilar pneumonia; Sputum cx grew PSA and was treated with course of Meropenem. Patient evaluated by GI in setting of Melena, transaminitis and portal venous gas on CT imaging. No EGD performed as Melena resolved, Transaminitis was thought to be in setting of shock and portal venous gas was thought to be in setting of recent PEG. EEG was performed in setting of AMS no seizures noted. Repeat MRI Performed which showed Small scattered foci of diffusion restriction within the right cerebellar hemisphere, left parietal lobe, and symmetrically in the bilateral basal ganglia. Neuro recommended Resumption of ASA in setting of possible embolic phenomenon. Repeat ECHO performed RT Ventricular Moderately enlarged, reduced systolic function. Mental status improved and was transferred back to RCU on 3/6.       3/12:  Pt clinical status has remained unchanged and stable.  Last hypoxic/bradycardic event yesterday 3/11 while being placed in supine position on PS.  The following reviewed/discussed with wife, dr. benz and RCU team during AM rounds - importance of q2hr turn and reposition and pts current course and plan of care.  Wife remained concerned with supine positioning and frequency of turn and reposition - the risks/benefits, process and protocol was discussed and pts wife became agreeable.  Addressed wifes concern of hypoxic/bradycardic events - discussed the uncertainty of the actual reasoning but the possibility of it being due to diaphragmatic weakness 2/2 GBS.  It was explained that there is a possibility that the hypoxia/bradycardia happened because of the pressure from the stomach while in supine position added onto the already weakened diaphragm causing increased difficulty to breath well.  Wife was understanding and in agreement to pt being placed on full support in the event pt needed to be placed in supine position.  Lasix 20mg x1 given.  PS 10/5, tolerating well.

## 2024-03-13 LAB
CULTURE RESULTS: SIGNIFICANT CHANGE UP
GLUCOSE BLDC GLUCOMTR-MCNC: 107 MG/DL — HIGH (ref 70–99)
GLUCOSE BLDC GLUCOMTR-MCNC: 116 MG/DL — HIGH (ref 70–99)
GLUCOSE BLDC GLUCOMTR-MCNC: 123 MG/DL — HIGH (ref 70–99)
GLUCOSE BLDC GLUCOMTR-MCNC: 127 MG/DL — HIGH (ref 70–99)
SPECIMEN SOURCE: SIGNIFICANT CHANGE UP

## 2024-03-13 PROCEDURE — 99233 SBSQ HOSP IP/OBS HIGH 50: CPT

## 2024-03-13 RX ORDER — FUROSEMIDE 40 MG
20 TABLET ORAL EVERY 12 HOURS
Refills: 0 | Status: COMPLETED | OUTPATIENT
Start: 2024-03-13 | End: 2024-03-14

## 2024-03-13 RX ORDER — POLYETHYLENE GLYCOL 3350 17 G/17G
17 POWDER, FOR SOLUTION ORAL
Refills: 0 | Status: DISCONTINUED | OUTPATIENT
Start: 2024-03-13 | End: 2024-03-22

## 2024-03-13 RX ADMIN — HEPARIN SODIUM 5000 UNIT(S): 5000 INJECTION INTRAVENOUS; SUBCUTANEOUS at 05:55

## 2024-03-13 RX ADMIN — Medication 5 MILLILITER(S): at 17:57

## 2024-03-13 RX ADMIN — CLOPIDOGREL BISULFATE 75 MILLIGRAM(S): 75 TABLET, FILM COATED ORAL at 12:58

## 2024-03-13 RX ADMIN — POLYETHYLENE GLYCOL 3350 17 GRAM(S): 17 POWDER, FOR SOLUTION ORAL at 17:57

## 2024-03-13 RX ADMIN — HUMAN INSULIN 7 UNIT(S): 100 INJECTION, SUSPENSION SUBCUTANEOUS at 17:57

## 2024-03-13 RX ADMIN — CHLORHEXIDINE GLUCONATE 1 APPLICATION(S): 213 SOLUTION TOPICAL at 21:36

## 2024-03-13 RX ADMIN — Medication 1 DROP(S): at 05:57

## 2024-03-13 RX ADMIN — SENNA PLUS 1 TABLET(S): 8.6 TABLET ORAL at 21:36

## 2024-03-13 RX ADMIN — Medication 1 DROP(S): at 21:36

## 2024-03-13 RX ADMIN — Medication 1 DROP(S): at 23:54

## 2024-03-13 RX ADMIN — CHLORHEXIDINE GLUCONATE 15 MILLILITER(S): 213 SOLUTION TOPICAL at 05:56

## 2024-03-13 RX ADMIN — Medication 81 MILLIGRAM(S): at 12:45

## 2024-03-13 RX ADMIN — Medication 1 DROP(S): at 18:22

## 2024-03-13 RX ADMIN — Medication 5 MILLILITER(S): at 23:53

## 2024-03-13 RX ADMIN — Medication 20 MILLIGRAM(S): at 17:57

## 2024-03-13 RX ADMIN — Medication 5 MILLILITER(S): at 05:56

## 2024-03-13 RX ADMIN — HUMAN INSULIN 7 UNIT(S): 100 INJECTION, SUSPENSION SUBCUTANEOUS at 23:54

## 2024-03-13 RX ADMIN — Medication 1 DROP(S): at 10:23

## 2024-03-13 RX ADMIN — HUMAN INSULIN 7 UNIT(S): 100 INJECTION, SUSPENSION SUBCUTANEOUS at 12:30

## 2024-03-13 RX ADMIN — HEPARIN SODIUM 5000 UNIT(S): 5000 INJECTION INTRAVENOUS; SUBCUTANEOUS at 21:36

## 2024-03-13 RX ADMIN — Medication 1 DROP(S): at 01:32

## 2024-03-13 RX ADMIN — HUMAN INSULIN 7 UNIT(S): 100 INJECTION, SUSPENSION SUBCUTANEOUS at 05:56

## 2024-03-13 RX ADMIN — Medication 5 MILLILITER(S): at 12:30

## 2024-03-13 RX ADMIN — Medication 10 MILLIGRAM(S): at 12:45

## 2024-03-13 RX ADMIN — HEPARIN SODIUM 5000 UNIT(S): 5000 INJECTION INTRAVENOUS; SUBCUTANEOUS at 12:55

## 2024-03-13 RX ADMIN — PANTOPRAZOLE SODIUM 40 MILLIGRAM(S): 20 TABLET, DELAYED RELEASE ORAL at 12:45

## 2024-03-13 RX ADMIN — Medication 1 DROP(S): at 17:58

## 2024-03-13 RX ADMIN — CHLORHEXIDINE GLUCONATE 15 MILLILITER(S): 213 SOLUTION TOPICAL at 17:57

## 2024-03-13 NOTE — PROGRESS NOTE ADULT - PROBLEM SELECTOR PLAN 6
- MRI 2/12 - small b/l cerebellar strokes (post-procedural) and prior L thalamic strokes  - [facility] trial - ASA/Plavix x3 months then ASA monotherapy  - MRI 3/5: Small scattered foci of diffusion restriction within the right cerebellar hemisphere, left parietal lobe, and symmetrically in the bilateral basal ganglia.  - Continue ASA 81mg in setting of concern for possible embolic phenomenon   - plavix d/c'd in setting of anemia 3/3; lipitor d/c'd in setting of liver injury 3/3  - ECHO 3/7: EF 74% RT Ventricular Moderately Enlarged and reduced systolic fxn  - Neurology following - MRI 2/12 - small b/l cerebellar strokes (post-procedural) and prior L thalamic strokes  - MRI 3/5: Small scattered foci of diffusion restriction within the right cerebellar hemisphere, left parietal lobe, and symmetrically in the bilateral basal ganglia.  - Lipitor d/c'd in setting of liver injury 3/3  [facility]; Cont ASA/Plavix x3 months then ASA monotherapy  - ECHO 3/7: EF 74% RT Ventricular Moderately Enlarged and reduced systolic fxn  - Neurology following

## 2024-03-13 NOTE — PROGRESS NOTE ADULT - PROBLEM SELECTOR PLAN 12
- Patients Wife Margarette provided medical update at bedside this morning - Patients daughter Margarette provided medical update at bedside this morning

## 2024-03-13 NOTE — PROGRESS NOTE ADULT - PROBLEM SELECTOR PLAN 1
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

## 2024-03-13 NOTE — PROGRESS NOTE ADULT - PROBLEM SELECTOR PLAN 2
- Patient S/p Tracheostomy # 8 Cuffed Portex on 2/16 ( )  - CTA Chest 3/3: Bibasilar Pneumonia; Currently remains on Meropenem   - Currently remains on Mechanical Ventilation: PRVC 500/20/30%/+5  - initial weaning attempts pt became hypoxic and bradycardic  - attempts at PS trials improving, continue as tolerated  - Continue Chest PT and Suctioning PRN - Patient S/p Tracheostomy # 8 Cuffed Portex on 2/16 ( )  - CTA Chest 3/3: Bibasilar Pneumonia; Currently remains on Meropenem   - Currently remains on Mechanical Ventilation: PRVC 500/20/30%/+5  - initial weaning attempts pt became hypoxic and bradycardic ( Since resolved)  - Attempts at PS trials improving, continue to progress weaning as tolerated  - Continue Chest PT and Suctioning PRN

## 2024-03-13 NOTE — PROGRESS NOTE ADULT - PROBLEM SELECTOR PLAN 5
- Patient with hx of HTN Prior to admission   - As per wife was on Atenolol/Chlorthiazide Prior to admission   - S/p Pressors while in MICU   - Droxidopa decreased to 100 mg q 12 hrs - d/c'd 3/10 - Patient with hx of HTN Prior to admission; was on Atenolol/Chlorthiazide   - S/p Pressors while in MICU   - S/p Droxidopa; D/cd on 3/10

## 2024-03-13 NOTE — PROGRESS NOTE ADULT - PROBLEM SELECTOR PLAN 8
- Patient L[employer]  - Family would like to be asked prior to administration of blood products   - S/p 1 unit PRBCs on 2/22 and 3/3  - S/p Venofer (2/22-2/27)   - S/p Cyanocobalamine, Folic acid, Ferrous Sulfate  - S/p Epogen x 5 days (Ended 2/27)  - CT A/P 3/3 negative for acute bleed  - seen by GI 3/3 - no plans for intervention because pt responded appropriately to 1 unit PRBC and no over signs of active bleeding   - Will use pediatric tubes to limit volume for phlebotomy as patient is [employer]   - Monitor for melena - Patient [employer]  - Family would like to be asked prior to administration of blood products   - S/p 1 unit PRBCs on 2/22 and 3/3  - S/p Venofer (2/22-2/27)   - S/p Cyanocobalamine, Folic acid, Ferrous Sulfate  - S/p Epogen x 5 days (Ended 2/27)  - Reported Melena on 3/3   - CT A/P 3/3 negative for acute bleed  - GI did not recommend EGD as no overt signs of active bleeding  and responded to PRBCs  - Will use pediatric tubes to limit volume for phlebotomy as patient is [employer]   - Monitor for melena

## 2024-03-13 NOTE — PROGRESS NOTE ADULT - ASSESSMENT
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 Prior to admission patient had Viral URI ( 1-2 weeks prior to admission) with subsequent weakness. He underwent an angiogram (2/11) which showed moderate stenosis and no intervention was done. MRI Performed c/w small bilateral cerebellar strokes and prior L thalamic strokes, as per Neurology was not c/w current presentation. 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 He is currently s/p 5 rounds of plasma exchange (2/13-2/20) and IVIG x 5 ( 2/21-2/26)  His hospital course was complicated by progressive respiratory failure. CT Chest performed on 2/20 with atelectasis of b/l lower lobes. BAL 2/21 Grew PSA treated with course of Zosyn (2/21-2/28).  Patient transferred to RCU on 2/24.   RRT called on 3/3 for unresponsiveness. Patient with unreactive pupils, no corneal reflex. Also found to be hypotensive and febrile to 105. Noted to have new melena. Hgb 5. Given 1 unit of pRBC. Transferred to MICU. patient required pressors while in the MICU, BP improved and was transitioned to Droxidopa. Patient had CT C/A/P consistent with Bibasilar pneumonia; Sputum cx grew PSA and was treated with course of Meropenem. Patient evaluated by GI in setting of Melena, transaminitis and portal venous gas on CT imaging. No EGD performed as Melena resolved, Transaminitis was thought to be in setting of shock and portal venous gas was thought to be in setting of recent PEG. EEG was performed in setting of AMS no seizures noted. Repeat MRI Performed which showed Small scattered foci of diffusion restriction within the right cerebellar hemisphere, left parietal lobe, and symmetrically in the bilateral basal ganglia. Neuro recommended Resumption of ASA in setting of possible embolic phenomenon. Repeat ECHO performed RT Ventricular Moderately enlarged, reduced systolic function. Mental status improved and was transferred back to RCU on 3/6.       3/12:  Pt clinical status has remained unchanged and stable.  Last hypoxic/bradycardic event yesterday 3/11 while being placed in supine position on PS.  The following reviewed/discussed with wife, dr. benz and RCU team during AM rounds - importance of q2hr turn and reposition and pts current course and plan of care.  Wife remained concerned with supine positioning and frequency of turn and reposition - the risks/benefits, process and protocol was discussed and pts wife became agreeable.  Addressed wifes concern of hypoxic/bradycardic events - discussed the uncertainty of the actual reasoning but the possibility of it being due to diaphragmatic weakness 2/2 GBS.  It was explained that there is a possibility that the hypoxia/bradycardia happened because of the pressure from the stomach while in supine position added onto the already weakened diaphragm causing increased difficulty to breath well.  Wife was understanding and in agreement to pt being placed on full support in the event pt needed to be placed in supine position.  Lasix 20mg x1 given.  PS 10/5, tolerating well. 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 Prior to admission patient had Viral URI ( 1-2 weeks prior to admission) with subsequent weakness. He underwent an angiogram (2/11) which showed moderate stenosis and no intervention was done. MRI Performed c/w small bilateral cerebellar strokes and prior L thalamic strokes, as per Neurology was not c/w current presentation. 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 He is currently s/p 5 rounds of plasma exchange (2/13-2/20) and IVIG x 5 ( 2/21-2/26)  His hospital course was complicated by progressive respiratory failure. CT Chest performed on 2/20 with atelectasis of b/l lower lobes. BAL 2/21 Grew PSA treated with course of Zosyn (2/21-2/28).  Patient transferred to RCU on 2/24.   RRT called on 3/3 for unresponsiveness. Patient with unreactive pupils, no corneal reflex. Also found to be hypotensive and febrile to 105. Noted to have new melena. Hgb 5. Given 1 unit of pRBC. Transferred to MICU. patient required pressors while in the MICU, BP improved and was transitioned to Droxidopa. Patient had CT C/A/P consistent with Bibasilar pneumonia; Sputum cx grew PSA and was treated with course of Meropenem. Patient evaluated by GI in setting of Melena, transaminitis and portal venous gas on CT imaging. No EGD performed as Melena resolved, Transaminitis was thought to be in setting of shock and portal venous gas was thought to be in setting of recent PEG. EEG was performed in setting of AMS no seizures noted. Repeat MRI Performed which showed Small scattered foci of diffusion restriction within the right cerebellar hemisphere, left parietal lobe, and symmetrically in the bilateral basal ganglia. Neuro recommended Resumption of ASA in setting of possible embolic phenomenon. Repeat ECHO performed RT Ventricular Moderately enlarged, reduced systolic function. Mental status improved and was transferred back to RCU on 3/6.       3/13:  Pt clinical status has remained unchanged and stable.  Last hypoxic/bradycardic event was on 3/11 while being placed in supine position on PS.  Patient tolerating CPAP trials today will continue to progress as tolerated. Patient remains with anasarca but clinically improving, will give additional Lasix 20 mg IVP Q 12 HRs x 1 day. Patient evaluated by Neurology yesterday and Plavix resumed.  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 Prior to admission patient had Viral URI ( 1-2 weeks prior to admission) with subsequent weakness. He underwent an angiogram (2/11) which showed moderate stenosis and no intervention was done. MRI Performed c/w small bilateral cerebellar strokes and prior L thalamic strokes, as per Neurology was not c/w current presentation. 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 He is currently s/p 5 rounds of plasma exchange (2/13-2/20) and IVIG x 5 ( 2/21-2/26)  His hospital course was complicated by progressive respiratory failure. CT Chest performed on 2/20 with atelectasis of b/l lower lobes. BAL 2/21 Grew PSA treated with course of Zosyn (2/21-2/28).  Patient transferred to RCU on 2/24.   RRT called on 3/3 for unresponsiveness. Patient with unreactive pupils, no corneal reflex. Also found to be hypotensive and febrile to 105. Noted to have new melena. Hgb 5. Given 1 unit of pRBC. Transferred to MICU. patient required pressors while in the MICU, BP improved and was transitioned to Droxidopa. Patient had CT C/A/P consistent with Bibasilar pneumonia; Sputum cx grew PSA and was treated with course of Meropenem. Patient evaluated by GI in setting of Melena, transaminitis and portal venous gas on CT imaging. No EGD performed as Melena resolved, Transaminitis was thought to be in setting of shock and portal venous gas was thought to be in setting of recent PEG. EEG was performed in setting of AMS no seizures noted. Repeat MRI Performed which showed Small scattered foci of diffusion restriction within the right cerebellar hemisphere, left parietal lobe, and symmetrically in the bilateral basal ganglia. Neuro recommended Resumption of ASA in setting of possible embolic phenomenon. Repeat ECHO performed RT Ventricular Moderately enlarged, reduced systolic function. Mental status improved and was transferred back to RCU on 3/6.       3/13: Pt clinical status has remained unchanged and stable.  Last hypoxic/bradycardic event was on 3/11 while being placed in supine position on PS. Patient tolerating CPAP trials today will continue to progress as tolerated. Patient remains with anasarca but clinically improving, will give additional Lasix 20 mg IVP Q 12 HRs x 1 day. Patient evaluated by Neurology yesterday and Plavix resumed. Patient with constipation will increase Miralax to BID and give Dulcolax supp x 1; Monitor for BM.

## 2024-03-13 NOTE — PROGRESS NOTE ADULT - SUBJECTIVE AND OBJECTIVE BOX
Patient is a 73y old  Male who presents with a chief complaint of Stroke, critical stenosis, evaluation for angiography (12 Mar 2024 07:03)      Interval Events: No events reported overnight     REVIEW OF SYSTEMS:  [ ] Positive  [ ] All other systems negative  [ ] Unable to assess ROS because ________    Vital Signs Last 24 Hrs  T(C): 37 (03-13-24 @ 04:40), Max: 37.2 (03-12-24 @ 11:27)  T(F): 98.6 (03-13-24 @ 04:40), Max: 98.9 (03-12-24 @ 11:27)  HR: 91 (03-13-24 @ 04:40) (79 - 98)  BP: 139/74 (03-13-24 @ 04:40) (123/69 - 161/95)  RR: 21 (03-13-24 @ 04:40) (18 - 21)  SpO2: 98% (03-13-24 @ 04:40) (97% - 100%)    PHYSICAL EXAM:  HEENT:   [ ]Tracheostomy:  [ ]Pupils equal  [ ]No oral lesions  [ ]Abnormal    SKIN  [ ]No Rash  [ ] Abnormal  [ ] pressure    CARDIAC  [ ]Regular  [ ]Abnormal    PULMONARY  [ ]Bilateral Clear Breath Sounds  [ ]Normal Excursion  [ ]Abnormal    GI  [ ]PEG      [ ] +BS		              [ ]Soft, nondistended, nontender	  [ ]Abnormal    MUSCULOSKELETAL                                   [ ]Bedbound                 [ ]Abnormal    [ ]Ambulatory/OOB to chair                           EXTREMITIES                                         [ ]Normal  [ ]Edema                           NEUROLOGIC  [ ] Normal, non focal  [ ] Focal findings:    PSYCHIATRIC  [ ]Alert and appropriate  [ ] Sedated	 [ ]Agitated    :  [place]: [ ] Yes, if yes: Date of Placement:                   [  ] No    LINES: Central Lines [ ] Yes, if yes: Date of Placement                                     [  ] No    HOSPITAL MEDICATIONS:  MEDICATIONS  (STANDING):  artificial tears (preservative free) Ophthalmic Solution 1 Drop(s) Both EYES every 4 hours  aspirin  chewable 81 milliGRAM(s) Oral daily  Biotene Dry Mouth Oral Rinse 5 milliLiter(s) Swish and Spit every 6 hours  chlorhexidine 0.12% Liquid 15 milliLiter(s) Oral Mucosa every 12 hours  chlorhexidine 4% Liquid 1 Application(s) Topical daily  clopidogrel Tablet 75 milliGRAM(s) Enteral Tube daily  heparin   Injectable 5000 Unit(s) SubCutaneous every 8 hours  insulin lispro (ADMELOG) corrective regimen sliding scale   SubCutaneous every 6 hours  insulin NPH human recombinant 7 Unit(s) SubCutaneous every 6 hours  pantoprazole   Suspension 40 milliGRAM(s) Oral daily  polyethylene glycol 3350 17 Gram(s) Oral daily  senna 1 Tablet(s) Oral at bedtime    MEDICATIONS  (PRN):  albuterol/ipratropium for Nebulization 3 milliLiter(s) Nebulizer every 6 hours PRN Shortness of Breath and/or Wheezing      LABS:                        8.6    6.67  )-----------( 355      ( 12 Mar 2024 10:47 )             31.2     03-12    145  |  112<H>  |  20  ----------------------------<  124<H>  4.5   |  27  |  0.63    Ca    8.5      12 Mar 2024 10:46  Phos  3.0     03-12  Mg     2.2     03-12        Urinalysis Basic - ( 12 Mar 2024 10:46 )    Color: x / Appearance: x / SG: x / pH: x  Gluc: 124 mg/dL / Ketone: x  / Bili: x / Urobili: x   Blood: x / Protein: x / Nitrite: x   Leuk Esterase: x / RBC: x / WBC x   Sq Epi: x / Non Sq Epi: x / Bacteria: x          CAPILLARY BLOOD GLUCOSE    MICROBIOLOGY:     RADIOLOGY:  [ ] Reviewed and interpreted by me    Mode: AC/ CMV (Assist Control/ Continuous Mandatory Ventilation)  RR (machine): 20  TV (machine): 500  FiO2: 30  PEEP: 5  ITime: 1  MAP: 10  PIP: 21   Patient is a 73y old  Male who presents with a chief complaint of Stroke, critical stenosis, evaluation for angiography (12 Mar 2024 07:03)      Interval Events: No events reported overnight     REVIEW OF SYSTEMS:  [ ] Positive  [x] All other systems negative  [ ] Unable to assess ROS because ________    Vital Signs Last 24 Hrs  T(C): 37 (03-13-24 @ 04:40), Max: 37.2 (03-12-24 @ 11:27)  T(F): 98.6 (03-13-24 @ 04:40), Max: 98.9 (03-12-24 @ 11:27)  HR: 91 (03-13-24 @ 04:40) (79 - 98)  BP: 139/74 (03-13-24 @ 04:40) (123/69 - 161/95)  RR: 21 (03-13-24 @ 04:40) (18 - 21)  SpO2: 98% (03-13-24 @ 04:40) (97% - 100%)    PHYSICAL EXAM:  HEENT:   [x]Tracheostomy: #8 cuffed portex  [x]Pupils equal  [ ]No oral lesions  [ ]Abnormal    SKIN  [x]No Rash  [ ] Abnormal  [ ] pressure    CARDIAC  [x]Regular  [ ]Abnormal    PULMONARY  [ ]Bilateral Clear Breath Sounds  [ ]Normal Excursion  [x]Abnormal: BL coarse Breath sounds    GI  [x]PEG      [x] +BS		              [x]Soft, nondistended, nontender	  [ ]Abnormal    MUSCULOSKELETAL                                   [x]Bedbound                 [ ]Abnormal    [ ]Ambulatory/OOB to chair                           EXTREMITIES                                         [ ]Normal  [x]Edema: Bilateral upper and lower extremity edema bilaterally ( Improving Slowly)     NEUROLOGIC  [ ] Normal, non focal  [x] Focal findings: Eyes open to voice, follows commands on BL LE by wiggling toes and BL UE with squeezing of hands, occasionally nods head to answer yes no questions     PSYCHIATRIC  [x] Alert  [ ] Sedated	 [ ]Agitated    :  [place]: [ ] Yes, if yes: Date of Placement:                   [x] No    LINES: Central Lines [ ] Yes, if yes: Date of Placement                                     [x] No    HOSPITAL MEDICATIONS:  MEDICATIONS  (STANDING):  artificial tears (preservative free) Ophthalmic Solution 1 Drop(s) Both EYES every 4 hours  aspirin  chewable 81 milliGRAM(s) Oral daily  Biotene Dry Mouth Oral Rinse 5 milliLiter(s) Swish and Spit every 6 hours  chlorhexidine 0.12% Liquid 15 milliLiter(s) Oral Mucosa every 12 hours  chlorhexidine 4% Liquid 1 Application(s) Topical daily  clopidogrel Tablet 75 milliGRAM(s) Enteral Tube daily  heparin   Injectable 5000 Unit(s) SubCutaneous every 8 hours  insulin lispro (ADMELOG) corrective regimen sliding scale   SubCutaneous every 6 hours  insulin NPH human recombinant 7 Unit(s) SubCutaneous every 6 hours  pantoprazole   Suspension 40 milliGRAM(s) Oral daily  polyethylene glycol 3350 17 Gram(s) Oral daily  senna 1 Tablet(s) Oral at bedtime    MEDICATIONS  (PRN):  albuterol/ipratropium for Nebulization 3 milliLiter(s) Nebulizer every 6 hours PRN Shortness of Breath and/or Wheezing      LABS:                        8.6    6.67  )-----------( 355      ( 12 Mar 2024 10:47 )             31.2     03-12    145  |  112<H>  |  20  ----------------------------<  124<H>  4.5   |  27  |  0.63    Ca    8.5      12 Mar 2024 10:46  Phos  3.0     03-12  Mg     2.2     03-12        Urinalysis Basic - ( 12 Mar 2024 10:46 )    Color: x / Appearance: x / SG: x / pH: x  Gluc: 124 mg/dL / Ketone: x  / Bili: x / Urobili: x   Blood: x / Protein: x / Nitrite: x   Leuk Esterase: x / RBC: x / WBC x   Sq Epi: x / Non Sq Epi: x / Bacteria: x          CAPILLARY BLOOD GLUCOSE    MICROBIOLOGY:     RADIOLOGY:  [ ] Reviewed and interpreted by me    Mode: AC/ CMV (Assist Control/ Continuous Mandatory Ventilation)  RR (machine): 20  TV (machine): 500  FiO2: 30  PEEP: 5  ITime: 1  MAP: 10  PIP: 21

## 2024-03-13 NOTE — PROGRESS NOTE ADULT - NS ATTEND AMEND GEN_ALL_CORE FT
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 S/p PLEX x5 and IVIG. Course c/b mild hemoptysis, septic shock, embolic vs. hypoperfusion related lesions on MRI, and persistent respiratory failure requiring tracheostomy and MV.     #GBS-MFV  - Clinically with mild neurologic improvement (moves toes, light squeeze with hands, nods/shakes head).   #Acute hypoxic/hypercapneic resp failure  - Will continue with PS trials.   - Should be on full vent support whenever he needs to be laid flat.   #Basilar A. stenosis  - Resume Plavix in addition to ASA  #Hypervolemia  - Continue Lasix.   #Dispo:  - PT/PM&R eval LTAC vs Acute rehab  #Ethics: Full codeDispo planning.  Wife updated at bedside.    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 S/p PLEX x5 and IVIG. Course c/b mild hemoptysis, septic shock, embolic vs. hypoperfusion related lesions on MRI, and persistent respiratory failure requiring tracheostomy and MV.     #GBS-MFV  - Clinically with mild neurologic improvement (moves toes, light squeeze with hands, nods/shakes head).   #Acute hypoxic/hypercapneic resp failure  - Will continue with PS trials.   - Should be on full vent support whenever he needs to be laid flat.   #Mild Carotid stenosis B/L  - Resume Plavix in addition to ASA  #Hypervolemia  - Continue Lasix.   #Dispo:  - PT/PM&R eval LTAC vs Acute rehab  #Ethics: Full codeDispo planning.  Wife updated at bedside.    Nilda Geiger MD, MSCR

## 2024-03-13 NOTE — PROGRESS NOTE ADULT - PROBLEM SELECTOR PLAN 3
- Patient with Sympathetic Storming in NSCU   - Patient with Labile BP HTN and Hypotensive Episodes; S/p Pressors - Patient with Sympathetic Storming in NSCU   - Patient with Labile BP HTN and Hypotensive Episodes; S/p Pressors  - Continue to monitor BP / HR

## 2024-03-13 NOTE — PROGRESS NOTE ADULT - PROBLEM SELECTOR PLAN 4
- BAL 2/21: PSA; Txd with Zosyn ( 2/21-2/28)  - Sputum 3/4: PSA currently remains on Meropenem 3/3 --> 3/9  - Blood cxs 3/3: NGTD - BAL 2/21: PSA; Txd with Zosyn ( 2/21-2/28)  - Sputum 3/4: PSA , Blood cxs 3/3: NGTD  - Txd with course of  Meropenem 3/3 --> 3/11

## 2024-03-14 LAB
ANION GAP SERPL CALC-SCNC: 10 MMOL/L — SIGNIFICANT CHANGE UP (ref 5–17)
BUN SERPL-MCNC: 18 MG/DL — SIGNIFICANT CHANGE UP (ref 7–23)
CALCIUM SERPL-MCNC: 8.7 MG/DL — SIGNIFICANT CHANGE UP (ref 8.4–10.5)
CHLORIDE SERPL-SCNC: 108 MMOL/L — SIGNIFICANT CHANGE UP (ref 96–108)
CO2 SERPL-SCNC: 26 MMOL/L — SIGNIFICANT CHANGE UP (ref 22–31)
CREAT SERPL-MCNC: 0.61 MG/DL — SIGNIFICANT CHANGE UP (ref 0.5–1.3)
EGFR: 101 ML/MIN/1.73M2 — SIGNIFICANT CHANGE UP
GLUCOSE BLDC GLUCOMTR-MCNC: 132 MG/DL — HIGH (ref 70–99)
GLUCOSE BLDC GLUCOMTR-MCNC: 133 MG/DL — HIGH (ref 70–99)
GLUCOSE BLDC GLUCOMTR-MCNC: 134 MG/DL — HIGH (ref 70–99)
GLUCOSE BLDC GLUCOMTR-MCNC: 97 MG/DL — SIGNIFICANT CHANGE UP (ref 70–99)
GLUCOSE SERPL-MCNC: 120 MG/DL — HIGH (ref 70–99)
HCT VFR BLD CALC: 32.7 % — LOW (ref 39–50)
HGB BLD-MCNC: 9.2 G/DL — LOW (ref 13–17)
MAGNESIUM SERPL-MCNC: 2 MG/DL — SIGNIFICANT CHANGE UP (ref 1.6–2.6)
MCHC RBC-ENTMCNC: 23.8 PG — LOW (ref 27–34)
MCHC RBC-ENTMCNC: 28.1 GM/DL — LOW (ref 32–36)
MCV RBC AUTO: 84.7 FL — SIGNIFICANT CHANGE UP (ref 80–100)
NRBC # BLD: 0 /100 WBCS — SIGNIFICANT CHANGE UP (ref 0–0)
PHOSPHATE SERPL-MCNC: 3.8 MG/DL — SIGNIFICANT CHANGE UP (ref 2.5–4.5)
PLATELET # BLD AUTO: 381 K/UL — SIGNIFICANT CHANGE UP (ref 150–400)
POTASSIUM SERPL-MCNC: 4.1 MMOL/L — SIGNIFICANT CHANGE UP (ref 3.5–5.3)
POTASSIUM SERPL-SCNC: 4.1 MMOL/L — SIGNIFICANT CHANGE UP (ref 3.5–5.3)
RBC # BLD: 3.86 M/UL — LOW (ref 4.2–5.8)
RBC # FLD: 22.8 % — HIGH (ref 10.3–14.5)
SODIUM SERPL-SCNC: 144 MMOL/L — SIGNIFICANT CHANGE UP (ref 135–145)
WBC # BLD: 7.55 K/UL — SIGNIFICANT CHANGE UP (ref 3.8–10.5)
WBC # FLD AUTO: 7.55 K/UL — SIGNIFICANT CHANGE UP (ref 3.8–10.5)

## 2024-03-14 PROCEDURE — 99233 SBSQ HOSP IP/OBS HIGH 50: CPT

## 2024-03-14 RX ORDER — FUROSEMIDE 40 MG
20 TABLET ORAL
Refills: 0 | Status: DISCONTINUED | OUTPATIENT
Start: 2024-03-14 | End: 2024-03-27

## 2024-03-14 RX ORDER — NYSTATIN 500MM UNIT
500000 POWDER (EA) MISCELLANEOUS EVERY 6 HOURS
Refills: 0 | Status: DISCONTINUED | OUTPATIENT
Start: 2024-03-14 | End: 2024-04-11

## 2024-03-14 RX ADMIN — Medication 20 MILLIGRAM(S): at 05:41

## 2024-03-14 RX ADMIN — HUMAN INSULIN 7 UNIT(S): 100 INJECTION, SUSPENSION SUBCUTANEOUS at 17:56

## 2024-03-14 RX ADMIN — Medication 20 MILLIGRAM(S): at 17:55

## 2024-03-14 RX ADMIN — Medication 500000 UNIT(S): at 23:56

## 2024-03-14 RX ADMIN — Medication 1 DROP(S): at 22:13

## 2024-03-14 RX ADMIN — Medication 5 MILLILITER(S): at 17:55

## 2024-03-14 RX ADMIN — Medication 1 DROP(S): at 17:58

## 2024-03-14 RX ADMIN — HUMAN INSULIN 7 UNIT(S): 100 INJECTION, SUSPENSION SUBCUTANEOUS at 23:57

## 2024-03-14 RX ADMIN — CHLORHEXIDINE GLUCONATE 15 MILLILITER(S): 213 SOLUTION TOPICAL at 05:41

## 2024-03-14 RX ADMIN — Medication 5 MILLILITER(S): at 05:42

## 2024-03-14 RX ADMIN — Medication 5 MILLILITER(S): at 23:55

## 2024-03-14 RX ADMIN — CHLORHEXIDINE GLUCONATE 1 APPLICATION(S): 213 SOLUTION TOPICAL at 22:18

## 2024-03-14 RX ADMIN — HEPARIN SODIUM 5000 UNIT(S): 5000 INJECTION INTRAVENOUS; SUBCUTANEOUS at 05:41

## 2024-03-14 RX ADMIN — HEPARIN SODIUM 5000 UNIT(S): 5000 INJECTION INTRAVENOUS; SUBCUTANEOUS at 14:55

## 2024-03-14 RX ADMIN — SENNA PLUS 1 TABLET(S): 8.6 TABLET ORAL at 22:12

## 2024-03-14 RX ADMIN — HUMAN INSULIN 7 UNIT(S): 100 INJECTION, SUSPENSION SUBCUTANEOUS at 05:42

## 2024-03-14 RX ADMIN — CHLORHEXIDINE GLUCONATE 15 MILLILITER(S): 213 SOLUTION TOPICAL at 17:58

## 2024-03-14 RX ADMIN — HEPARIN SODIUM 5000 UNIT(S): 5000 INJECTION INTRAVENOUS; SUBCUTANEOUS at 22:12

## 2024-03-14 RX ADMIN — POLYETHYLENE GLYCOL 3350 17 GRAM(S): 17 POWDER, FOR SOLUTION ORAL at 17:55

## 2024-03-14 RX ADMIN — POLYETHYLENE GLYCOL 3350 17 GRAM(S): 17 POWDER, FOR SOLUTION ORAL at 05:42

## 2024-03-14 RX ADMIN — Medication 1 DROP(S): at 05:42

## 2024-03-14 NOTE — PROGRESS NOTE ADULT - PROBLEM SELECTOR PLAN 4
- BAL 2/21: PSA; Txd with Zosyn ( 2/21-2/28)  - Sputum 3/4: PSA , Blood cxs 3/3: NGTD  - Txd with course of  Meropenem 3/3 --> 3/11

## 2024-03-14 NOTE — PROGRESS NOTE ADULT - SUBJECTIVE AND OBJECTIVE BOX
Patient is a 73y old  Male who presents with a chief complaint of Stroke, critical stenosis, evaluation for angiography (13 Mar 2024 08:03)      Interval Events:    REVIEW OF SYSTEMS:  [ ] Positive  [ ] All other systems negative  [ ] Unable to assess ROS because ________    Vital Signs Last 24 Hrs  T(C): 37.3 (03-14-24 @ 05:30), Max: 37.3 (03-14-24 @ 05:30)  T(F): 99.1 (03-14-24 @ 05:30), Max: 99.1 (03-14-24 @ 05:30)  HR: 90 (03-14-24 @ 05:30) (90 - 108)  BP: 128/78 (03-14-24 @ 05:30) (124/77 - 160/89)  RR: 20 (03-14-24 @ 05:30) (19 - 20)  SpO2: 99% (03-14-24 @ 05:30) (93% - 100%)    PHYSICAL EXAM:  HEENT:   [ ]Tracheostomy:  [ ]Pupils equal  [ ]No oral lesions  [ ]Abnormal    SKIN  [ ]No Rash  [ ] Abnormal  [ ] pressure    CARDIAC  [ ]Regular  [ ]Abnormal    PULMONARY  [ ]Bilateral Clear Breath Sounds  [ ]Normal Excursion  [ ]Abnormal    GI  [ ]PEG      [ ] +BS		              [ ]Soft, nondistended, nontender	  [ ]Abnormal    MUSCULOSKELETAL                                   [ ]Bedbound                 [ ]Abnormal    [ ]Ambulatory/OOB to chair                           EXTREMITIES                                         [ ]Normal  [ ]Edema                           NEUROLOGIC  [ ] Normal, non focal  [ ] Focal findings:    PSYCHIATRIC  [ ]Alert and appropriate  [ ] Sedated	 [ ]Agitated    :  [place]: [ ] Yes, if yes: Date of Placement:                   [  ] No    LINES: Central Lines [ ] Yes, if yes: Date of Placement                                     [  ] No    HOSPITAL MEDICATIONS:  MEDICATIONS  (STANDING):  artificial  tears Solution 1 Drop(s) Both EYES every 6 hours  artificial tears (preservative free) Ophthalmic Solution 1 Drop(s) Both EYES every 4 hours  aspirin  chewable 81 milliGRAM(s) Oral daily  Biotene Dry Mouth Oral Rinse 5 milliLiter(s) Swish and Spit every 6 hours  chlorhexidine 0.12% Liquid 15 milliLiter(s) Oral Mucosa every 12 hours  chlorhexidine 4% Liquid 1 Application(s) Topical daily  clopidogrel Tablet 75 milliGRAM(s) Enteral Tube daily  heparin   Injectable 5000 Unit(s) SubCutaneous every 8 hours  insulin lispro (ADMELOG) corrective regimen sliding scale   SubCutaneous every 6 hours  insulin NPH human recombinant 7 Unit(s) SubCutaneous every 6 hours  pantoprazole   Suspension 40 milliGRAM(s) Oral daily  polyethylene glycol 3350 17 Gram(s) Oral two times a day  senna 1 Tablet(s) Oral at bedtime    MEDICATIONS  (PRN):  albuterol/ipratropium for Nebulization 3 milliLiter(s) Nebulizer every 6 hours PRN Shortness of Breath and/or Wheezing      LABS:                         CAPILLARY BLOOD GLUCOSE    MICROBIOLOGY:     RADIOLOGY:  [ ] Reviewed and interpreted by me    Mode: AC/ CMV (Assist Control/ Continuous Mandatory Ventilation)  RR (machine): 20  TV (machine): 500  FiO2: 30  PEEP: 5  ITime: 1  MAP: 10  PIP: 18   Patient is a 73y old  Male who presents with a chief complaint of Stroke, critical stenosis, evaluation for angiography (13 Mar 2024 08:03)      Interval Events: No events reported over night.    REVIEW OF SYSTEMS:  [ ] Positive  [X] All other systems negative:  Reduced ability to communicate however denies having any pain or difficulty breathing  [ ] Unable to assess ROS because ________    Vital Signs Last 24 Hrs  T(C): 37.3 (03-14-24 @ 05:30), Max: 37.3 (03-14-24 @ 05:30)  T(F): 99.1 (03-14-24 @ 05:30), Max: 99.1 (03-14-24 @ 05:30)  HR: 90 (03-14-24 @ 05:30) (90 - 108)  BP: 128/78 (03-14-24 @ 05:30) (124/77 - 160/89)  RR: 20 (03-14-24 @ 05:30) (19 - 20)  SpO2: 99% (03-14-24 @ 05:30) (93% - 100%)      PHYSICAL EXAM:  HEENT:    [X] Tracheostomy:  #8 Cuffed Portex  [X] PERRL B/L; 4mm B/L pupils; EOMI  [X] No oral lesions  [ ] Abnormal    SKIN  [X] No Rash  [ ] Abnormal  [ ] pressure    CARDIAC  [X] Regular  [ ] Abnormal    PULMONARY  [X] Bilateral Clear Breath Sounds  [ ] Normal Excursion  [ ] Abnormal    GI  [X] PEG      [X] +BS		              [X] Soft, nondistended, nontender	  [ ] Abnormal    MUSCULOSKELETAL                                   [X] Bedbound                 [ ] Abnormal:   [ ] Ambulatory/OOB to chair                           EXTREMITIES                                         [ ]Normal  [X] Edema  1+ edema in B/L UEs                           NEUROLOGIC  [ ] Normal, non focal  [X] Focal findings: Alert and Oriented; +gag; no gross facial asymmetry observed; responds appropriately to questions by nodding; able to move B/L feet, trigger movement observed in right knee,  +B/L shoulder shrugs, slight grasp in left hand observed; unable to lift legs or arms    PSYCHIATRIC  [X] Alert, responsive with flat affect  [ ] Sedated	 [ ]Agitated    :  [place]: [ ] Yes, if yes: Date of Placement:                   [X] No    LINES: Central Lines [ ] Yes, if yes: Date of Placement                                     [X] No    HOSPITAL MEDICATIONS:  MEDICATIONS  (STANDING):  artificial  tears Solution 1 Drop(s) Both EYES every 6 hours  artificial tears (preservative free) Ophthalmic Solution 1 Drop(s) Both EYES every 4 hours  aspirin  chewable 81 milliGRAM(s) Oral daily  Biotene Dry Mouth Oral Rinse 5 milliLiter(s) Swish and Spit every 6 hours  chlorhexidine 0.12% Liquid 15 milliLiter(s) Oral Mucosa every 12 hours  chlorhexidine 4% Liquid 1 Application(s) Topical daily  clopidogrel Tablet 75 milliGRAM(s) Enteral Tube daily  heparin   Injectable 5000 Unit(s) SubCutaneous every 8 hours  insulin lispro (ADMELOG) corrective regimen sliding scale   SubCutaneous every 6 hours  insulin NPH human recombinant 7 Unit(s) SubCutaneous every 6 hours  pantoprazole   Suspension 40 milliGRAM(s) Oral daily  polyethylene glycol 3350 17 Gram(s) Oral two times a day  senna 1 Tablet(s) Oral at bedtime    MEDICATIONS  (PRN):  albuterol/ipratropium for Nebulization 3 milliLiter(s) Nebulizer every 6 hours PRN Shortness of Breath and/or Wheezing      LABS:                                       9.2    7.55  )-----------( 381      ( 14 Mar 2024 11:19 )             32.7         03-14    144  |  108  |  18  ----------------------------<  120<H>  4.1   |  26  |  0.61    Ca    8.7      14 Mar 2024 11:19  Phos  3.8     03-14  Mg     2.0     03-14      CAPILLARY BLOOD GLUCOSE    MICROBIOLOGY:     RADIOLOGY:  [ ] Reviewed and interpreted by me    Mode: AC/ CMV (Assist Control/ Continuous Mandatory Ventilation)  RR (machine): 20  TV (machine): 500  FiO2: 30  PEEP: 5  ITime: 1  MAP: 10  PIP: 18

## 2024-03-14 NOTE — PROGRESS NOTE ADULT - NS ATTEND AMEND GEN_ALL_CORE FT
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 S/p PLEX x5 and IVIG. Course c/b mild hemoptysis, septic shock, embolic vs. hypoperfusion related lesions on MRI, and persistent respiratory failure requiring tracheostomy and MV.     #GBS-MFV  - Clinically with mild neurologic improvement (moves toes, light squeeze with hands, nods/shakes head).   #Acute hypoxic/hypercapneic resp failure  - Will continue with PS trials.   - Should be on full vent support whenever he needs to be laid flat.   #Mild Carotid stenosis B/L  - Plavix in addition to ASA per Neuro [facility] trial  #Hypervolemia  - Continue Lasix.   #Dispo:  - PT/PM&R eval LTAC vs Acute rehab, OOB to chair tomorrow if feasible  #Ethics: Full code.  Wife updated at bedside.    Nilda Geiger MD, MSCR

## 2024-03-14 NOTE — CHART NOTE - NSCHARTNOTEFT_GEN_A_CORE
Nutrition Follow Up Note  Patient seen for: Nutrition follow up    Source: [] Patient       [x] Medical Record        [x] RN        [] Family at bedside       [x] Other:     -If unable to interview patient: [x] Trach/Vent/BiPAP  [] Disoriented/confused/inappropriate to interview    Diet Order:   Diet, NPO:   Tube Feeding Modality: Gastrostomy  Jevity 1.2 David (JEVITY1.2RTH)  Total Volume for 24 Hours (mL): 900  Continuous  Starting Tube Feed Rate {mL per Hour}: 30  Increase Tube Feed Rate by (mL): 10     Every 4 hours  Until Goal Tube Feed Rate (mL per Hour): 50  Tube Feed Duration (in Hours): 18  Tube Feed Start Time: 09:00 (24 @ 09:12) [Active]    EN order providing if 100% provision: 1080kcal (kcal/kg) and 50g protein (g/kg)   EN provision: pt meeting % of EER x  days per nursing documentation on flow sheets.     - Is current order appropriate/adequate? see below for recommendations.     Nutrition-related concerns:  -S/p tracheostomy and PEG (2-16)  -free water 250ml every 6 hours ordered   -Renal:   -Endo: Hgb A1c 6.8% (2/10/2024), Insulin NPH 7u q6, SSI ordered to maintain glycemic control  -Resp: Currently remains on Mechanical Ventilation; PS trials    GI: No GI distress reported. Last BM 3/13.  Bowel Regimen? [x] Yes   [] No    Weights:   Dosing weight 101.5kg (2-09)  Daily Weight in k.8 (2-28), 101.7 (02-21)  Weight relatively stable; RD to continue to monitor weight trends as able.     Nutritionally Pertinent MEDICATIONS  (STANDING):  MEDICATIONS  (STANDING):  artificial  tears Solution 1 Drop(s) Both EYES every 6 hours  artificial tears (preservative free) Ophthalmic Solution 1 Drop(s) Both EYES every 4 hours  aspirin  chewable 81 milliGRAM(s) Oral daily  Biotene Dry Mouth Oral Rinse 5 milliLiter(s) Swish and Spit every 6 hours  chlorhexidine 0.12% Liquid 15 milliLiter(s) Oral Mucosa every 12 hours  chlorhexidine 4% Liquid 1 Application(s) Topical daily  clopidogrel Tablet 75 milliGRAM(s) Enteral Tube daily  heparin   Injectable 5000 Unit(s) SubCutaneous every 8 hours  insulin lispro (ADMELOG) corrective regimen sliding scale   SubCutaneous every 6 hours  insulin NPH human recombinant 7 Unit(s) SubCutaneous every 6 hours  pantoprazole   Suspension 40 milliGRAM(s) Oral daily  polyethylene glycol 3350 17 Gram(s) Oral two times a day  senna 1 Tablet(s) Oral at bedtime    MEDICATIONS  (PRN):  albuterol/ipratropium for Nebulization 3 milliLiter(s) Nebulizer every 6 hours PRN Shortness of Breath and/or Wheezing      Pertinent Labs:     A1C with Estimated Average Glucose Result: 6.8 % (02-10-24 @ 01:43)    Finger Sticks:  POCT Blood Glucose.: 133 mg/dL (24 @ 11:33)  POCT Blood Glucose.: 97 mg/dL (24 @ 05:24)  POCT Blood Glucose.: 123 mg/dL (24 @ 23:35)  POCT Blood Glucose.: 116 mg/dL (24 @ 17:21)  POCT Blood Glucose.: 127 mg/dL (24 @ 12:10)    Skin per nursing documentation: No pressure injuries noted.  Edema per nursing documentation: none noted     (based on dosing wt 101.5kg):   Estimated Energy Needs: (23-27kcal/kg): 2334-2740kcal   Estimated Protein Needs: (1.1-1.3g protein/kg): 112-132g protein   Estimated Fluid Needs: deferred to team    Previous Nutrition Diagnosis: Acute Moderate Protein Calorie Malnutrition  Nutrition Diagnosis is: [x] ongoing  [] resolved [] not applicable     Nutrition Care Plan:  [x] In Progress  [] Achieved  [] Not applicable    New Nutrition Diagnosis: [x] Not applicable    Nutrition Interventions:     Education Provided   [] Yes:  [x] No:     Recommendations:      1. When able, recommend change EN feeds to Glucerna 1.5 at 65ml/hr x 24 hrs. To provide (based on dosing wt 101.5kg): 1560ml, 2340kcal (23kcal/kg) and 129g protein (1.27g protein/kg).  2. Monitor GI tolerance. RD to remain available to adjust EN formulary, volume/rate PRN.   -Vitamin D and ferrous sulfate supplementation as per discretion of team.   -Antihyperglycemic regimen deferred to team.   -Monitor wt trends/labs/skin integrity/hydration status/bowel regularity.    Monitoring and Evaluation:   Continue to monitor nutritional intake, tolerance to diet prescription, weights, labs, skin integrity    RD remains available upon request and will follow up per protocol  Zulay Hernández RDN CDN (TEAMS) Nutrition Follow Up Note  Patient seen for: Nutrition follow up    Source: [] Patient       [x] Medical Record        [x] RN        [] Family at bedside       [x] Other:     -If unable to interview patient: [x] Trach/Vent/BiPAP  [] Disoriented/confused/inappropriate to interview    Diet Order:   Diet, NPO:   Tube Feeding Modality: Gastrostomy  Jevity 1.2 David (JEVITY1.2RTH)  Total Volume for 24 Hours (mL): 900  Continuous  Starting Tube Feed Rate {mL per Hour}: 30  Increase Tube Feed Rate by (mL): 10     Every 4 hours  Until Goal Tube Feed Rate (mL per Hour): 50  Tube Feed Duration (in Hours): 18  Tube Feed Start Time: 09:00 (24 @ 09:12) [Active]    EN order providing if 100% provision: 1080kcal (10 kcal/kg) and 50g protein (0.5 g/kg) based on dosing wt 101.5kg  EN provision: pt meeting <80% of EER x 5 days per nursing documentation on flow sheets.     - Is current order appropriate/adequate? see below for recommendations.     Nutrition-related concerns:  -S/p tracheostomy and PEG (2-16)  -free water 250ml every 6 hours ordered   -Renal:   -Endo: Hgb A1c 6.8% (2/10/2024), Insulin NPH 7u q6, SSI ordered to maintain glycemic control  -Resp: Pt remains trach to vent; PS trials    GI: No GI distress reported. Last BM 3/13.  Bowel Regimen? [x] Yes (Miralax, Senna)  [] No    Weights:   Dosing weight 101.5kg (2-09)  Daily Weight in k.8 (2-28), 101.7 (02-21)  Weight relatively stable; RD to continue to monitor weight trends as able.     Nutritionally Pertinent MEDICATIONS  (STANDING):  MEDICATIONS  (STANDING):  artificial  tears Solution 1 Drop(s) Both EYES every 6 hours  artificial tears (preservative free) Ophthalmic Solution 1 Drop(s) Both EYES every 4 hours  aspirin  chewable 81 milliGRAM(s) Oral daily  Biotene Dry Mouth Oral Rinse 5 milliLiter(s) Swish and Spit every 6 hours  chlorhexidine 0.12% Liquid 15 milliLiter(s) Oral Mucosa every 12 hours  chlorhexidine 4% Liquid 1 Application(s) Topical daily  clopidogrel Tablet 75 milliGRAM(s) Enteral Tube daily  heparin   Injectable 5000 Unit(s) SubCutaneous every 8 hours  insulin lispro (ADMELOG) corrective regimen sliding scale   SubCutaneous every 6 hours  insulin NPH human recombinant 7 Unit(s) SubCutaneous every 6 hours  pantoprazole   Suspension 40 milliGRAM(s) Oral daily  polyethylene glycol 3350 17 Gram(s) Oral two times a day  senna 1 Tablet(s) Oral at bedtime    MEDICATIONS  (PRN):  albuterol/ipratropium for Nebulization 3 milliLiter(s) Nebulizer every 6 hours PRN Shortness of Breath and/or Wheezing      Pertinent Labs:  @ 11:19: Na 144, BUN 18, Cr 0.61, <H>, K+ 4.1, Phos 3.8, Mg 2.0, Alk Phos --, ALT/SGPT --, AST/SGOT --, HbA1c --    A1C with Estimated Average Glucose Result: 6.8 % (02-10-24 @ 01:43)    Finger Sticks:  POCT Blood Glucose.: 133 mg/dL (24 @ 11:33)  POCT Blood Glucose.: 97 mg/dL (24 @ 05:24)  POCT Blood Glucose.: 123 mg/dL (24 @ 23:35)  POCT Blood Glucose.: 116 mg/dL (24 @ 17:21)  POCT Blood Glucose.: 127 mg/dL (24 @ 12:10)    Skin per nursing documentation: No pressure injuries noted.  Edema per nursing documentation: none noted     (based on dosing wt 101.5kg):   Estimated Energy Needs: (23-27kcal/kg): 2334-2740kcal   Estimated Protein Needs: (1.1-1.3g protein/kg): 112-132g protein   Estimated Fluid Needs: deferred to team    Previous Nutrition Diagnosis: Acute Moderate Protein Calorie Malnutrition  Nutrition Diagnosis is: [x] ongoing  [] resolved [] not applicable     Nutrition Care Plan:  [x] In Progress  [] Achieved  [] Not applicable    New Nutrition Diagnosis: [x] Not applicable    Nutrition Interventions:     Education Provided   [] Yes:  [x] No:     Recommendations:      1. When able, recommend change EN feeds to Glucerna 1.5 at 65ml/hr x 24 hrs. To provide (based on dosing wt 101.5kg): 1560ml, 2340kcal (23kcal/kg) and 129g protein (1.27g protein/kg).  2. Monitor GI tolerance. RD to remain available to adjust EN formulary, volume/rate PRN.   3. Antihyperglycemic regimen deferred to team.     Monitoring and Evaluation:   Continue to monitor nutritional intake, tolerance to diet prescription, weights, labs, skin integrity    RD remains available upon request and will follow up per protocol  Zulay Hernández RDN CDN (TEAMS) Nutrition Follow Up Note  Patient seen for: Nutrition follow up    Source: [] Patient       [x] Medical Record        [x] RN        [] Family at bedside       [x] Other:     -If unable to interview patient: [x] Trach/Vent/BiPAP  [] Disoriented/confused/inappropriate to interview    Diet Order:   Diet, NPO:   Tube Feeding Modality: Gastrostomy  Jevity 1.2 David (JEVITY1.2RTH)  Total Volume for 24 Hours (mL): 900  Continuous  Starting Tube Feed Rate {mL per Hour}: 30  Increase Tube Feed Rate by (mL): 10     Every 4 hours  Until Goal Tube Feed Rate (mL per Hour): 50  Tube Feed Duration (in Hours): 18  Tube Feed Start Time: 09:00 (24 @ 09:12) [Active]    EN order providing if 100% provision: 1080kcal (10 kcal/kg) and 50g protein (0.5 g/kg) based on dosing wt 101.5kg  EN provision: pt meeting <80% of EER x 5 days per nursing documentation on flow sheets.     - Is current order appropriate/adequate? see below for recommendations.     Nutrition-related concerns:  -S/p tracheostomy and PEG (2-16)  -free water 250ml every 6 hours ordered   -Endo: Hgb A1c 6.8% (2/10/2024), Insulin NPH 7u q6, SSI ordered to maintain glycemic control  -Resp: Pt remains trach to vent; PS trials    GI: No GI distress reported. Last BM 3/13.  Bowel Regimen? [x] Yes (Miralax, Senna)  [] No    Weights:   Dosing weight 101.5kg (2-09)  Daily Weight in k.8 (2-28), 101.7 (02-21)  Weight relatively stable; RD to continue to monitor weight trends as able.     Nutritionally Pertinent MEDICATIONS  (STANDING):  MEDICATIONS  (STANDING):  artificial  tears Solution 1 Drop(s) Both EYES every 6 hours  artificial tears (preservative free) Ophthalmic Solution 1 Drop(s) Both EYES every 4 hours  aspirin  chewable 81 milliGRAM(s) Oral daily  Biotene Dry Mouth Oral Rinse 5 milliLiter(s) Swish and Spit every 6 hours  chlorhexidine 0.12% Liquid 15 milliLiter(s) Oral Mucosa every 12 hours  chlorhexidine 4% Liquid 1 Application(s) Topical daily  clopidogrel Tablet 75 milliGRAM(s) Enteral Tube daily  heparin   Injectable 5000 Unit(s) SubCutaneous every 8 hours  insulin lispro (ADMELOG) corrective regimen sliding scale   SubCutaneous every 6 hours  insulin NPH human recombinant 7 Unit(s) SubCutaneous every 6 hours  pantoprazole   Suspension 40 milliGRAM(s) Oral daily  polyethylene glycol 3350 17 Gram(s) Oral two times a day  senna 1 Tablet(s) Oral at bedtime    MEDICATIONS  (PRN):  albuterol/ipratropium for Nebulization 3 milliLiter(s) Nebulizer every 6 hours PRN Shortness of Breath and/or Wheezing      Pertinent Labs:  @ 11:19: Na 144, BUN 18, Cr 0.61, <H>, K+ 4.1, Phos 3.8, Mg 2.0, Alk Phos --, ALT/SGPT --, AST/SGOT --, HbA1c --    A1C with Estimated Average Glucose Result: 6.8 % (02-10-24 @ 01:43)    Finger Sticks:  POCT Blood Glucose.: 133 mg/dL (24 @ 11:33)  POCT Blood Glucose.: 97 mg/dL (24 @ 05:24)  POCT Blood Glucose.: 123 mg/dL (24 @ 23:35)  POCT Blood Glucose.: 116 mg/dL (24 @ 17:21)  POCT Blood Glucose.: 127 mg/dL (24 @ 12:10)    Skin per nursing documentation: No pressure injuries noted.  Edema per nursing documentation: none noted     (based on dosing wt 101.5kg):   Estimated Energy Needs: (23-27kcal/kg): 2334-2740kcal   Estimated Protein Needs: (1.1-1.3g protein/kg): 112-132g protein   Estimated Fluid Needs: deferred to team    Previous Nutrition Diagnosis: Acute Moderate Protein Calorie Malnutrition  Nutrition Diagnosis is: [x] ongoing  [] resolved [] not applicable     Nutrition Care Plan:  [x] In Progress  [] Achieved  [] Not applicable    New Nutrition Diagnosis: [x] Not applicable    Nutrition Interventions:     Education Provided   [] Yes:  [x] No:     Recommendations:      1. When able, recommend change EN feeds to Glucerna 1.5 at 65ml/hr x 24 hrs. To provide (based on dosing wt 101.5kg): 1560ml, 2340kcal (23kcal/kg) and 129g protein (1.27g protein/kg).  2. Monitor GI tolerance. RD to remain available to adjust EN formulary, volume/rate PRN.   3. Antihyperglycemic regimen deferred to team.     Monitoring and Evaluation:   Continue to monitor nutritional intake, tolerance to diet prescription, weights, labs, skin integrity    RD remains available upon request and will follow up per protocol  Zulay Hernández RDN CDN (TEAMS)

## 2024-03-14 NOTE — PROGRESS NOTE ADULT - PROBLEM SELECTOR PLAN 2
- Patient S/p Tracheostomy # 8 Cuffed Portex on 2/16 ( )  - CTA Chest 3/3: Bibasilar Pneumonia; Currently remains on Meropenem   - Currently remains on Mechanical Ventilation: PRVC 500/20/30%/+5  - initial weaning attempts pt became hypoxic and bradycardic ( Since resolved)  - Attempts at PS trials improving, continue to progress weaning as tolerated  - Continue Chest PT and Suctioning PRN

## 2024-03-14 NOTE — PROGRESS NOTE ADULT - PROBLEM SELECTOR PLAN 5
- Patient with hx of HTN Prior to admission; was on Atenolol/Chlorthiazide   - S/p Pressors while in MICU   - S/p Droxidopa; D/cd on 3/10

## 2024-03-14 NOTE — PROGRESS NOTE ADULT - ASSESSMENT
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 Prior to admission patient had Viral URI ( 1-2 weeks prior to admission) with subsequent weakness. He underwent an angiogram (2/11) which showed moderate stenosis and no intervention was done. MRI Performed c/w small bilateral cerebellar strokes and prior L thalamic strokes, as per Neurology was not c/w current presentation. 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 He is currently s/p 5 rounds of plasma exchange (2/13-2/20) and IVIG x 5 ( 2/21-2/26)  His hospital course was complicated by progressive respiratory failure. CT Chest performed on 2/20 with atelectasis of b/l lower lobes. BAL 2/21 Grew PSA treated with course of Zosyn (2/21-2/28).  Patient transferred to RCU on 2/24.   RRT called on 3/3 for unresponsiveness. Patient with unreactive pupils, no corneal reflex. Also found to be hypotensive and febrile to 105. Noted to have new melena. Hgb 5. Given 1 unit of pRBC. Transferred to MICU. patient required pressors while in the MICU, BP improved and was transitioned to Droxidopa. Patient had CT C/A/P consistent with Bibasilar pneumonia; Sputum cx grew PSA and was treated with course of Meropenem. Patient evaluated by GI in setting of Melena, transaminitis and portal venous gas on CT imaging. No EGD performed as Melena resolved, Transaminitis was thought to be in setting of shock and portal venous gas was thought to be in setting of recent PEG. EEG was performed in setting of AMS no seizures noted. Repeat MRI Performed which showed Small scattered foci of diffusion restriction within the right cerebellar hemisphere, left parietal lobe, and symmetrically in the bilateral basal ganglia. Neuro recommended Resumption of ASA in setting of possible embolic phenomenon. Repeat ECHO performed RT Ventricular Moderately enlarged, reduced systolic function. Mental status improved and was transferred back to RCU on 3/6.       3/13: Pt clinical status has remained unchanged and stable.  Last hypoxic/bradycardic event was on 3/11 while being placed in supine position on PS. Patient tolerating CPAP trials today will continue to progress as tolerated. Patient remains with anasarca but clinically improving, will give additional Lasix 20 mg IVP Q 12 HRs x 1 day. Patient evaluated by Neurology yesterday and Plavix resumed. Patient with constipation will increase Miralax to BID and give Dulcolax supp x 1; Monitor for BM.  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 Prior to admission patient had Viral URI ( 1-2 weeks prior to admission) with subsequent weakness. He underwent an angiogram (2/11) which showed moderate stenosis and no intervention was done. MRI Performed c/w small bilateral cerebellar strokes and prior L thalamic strokes, as per Neurology was not c/w current presentation. 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 He is currently s/p 5 rounds of plasma exchange (2/13-2/20) and IVIG x 5 ( 2/21-2/26)  His hospital course was complicated by progressive respiratory failure. CT Chest performed on 2/20 with atelectasis of b/l lower lobes. BAL 2/21 Grew PSA treated with course of Zosyn (2/21-2/28).  Patient transferred to RCU on 2/24.   RRT called on 3/3 for unresponsiveness. Patient with unreactive pupils, no corneal reflex. Also found to be hypotensive and febrile to 105. Noted to have new melena. Hgb 5. Given 1 unit of pRBC. Transferred to MICU. patient required pressors while in the MICU, BP improved and was transitioned to Droxidopa. Patient had CT C/A/P consistent with Bibasilar pneumonia; Sputum cx grew PSA and was treated with course of Meropenem. Patient evaluated by GI in setting of Melena, transaminitis and portal venous gas on CT imaging. No EGD performed as Melena resolved, Transaminitis was thought to be in setting of shock and portal venous gas was thought to be in setting of recent PEG. EEG was performed in setting of AMS no seizures noted. Repeat MRI Performed which showed Small scattered foci of diffusion restriction within the right cerebellar hemisphere, left parietal lobe, and symmetrically in the bilateral basal ganglia. Neuro recommended Resumption of ASA in setting of possible embolic phenomenon. Repeat ECHO performed RT Ventricular Moderately enlarged, reduced systolic function. Mental status improved and was transferred back to RCU on 3/6.       3/13: Pt clinical status has remained unchanged and stable.  Last hypoxic/bradycardic event was on 3/11 while being placed in supine position on PS. Patient tolerating CPAP trials today will continue to progress as tolerated. Patient remains with anasarca but clinically improving, will give additional Lasix 20 mg IVP Q 12 HRs x 1 day. Patient evaluated by Neurology yesterday and Plavix resumed. Patient with constipation will increase Miralax to BID and give Dulcolax supp x 1; Monitor for BM.   3/14: Patient progressing well on PS 8/5 today, will continue throughout the day with trach collar plans to follow.  Additional lasix 20mg BID given for diuresis as limb edema appears improved.

## 2024-03-14 NOTE — PROGRESS NOTE ADULT - PROBLEM SELECTOR PLAN 1
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

## 2024-03-14 NOTE — PROGRESS NOTE ADULT - PROBLEM SELECTOR PLAN 8
- Patient Protestant  - Family would like to be asked prior to administration of blood products   - S/p 1 unit PRBCs on 2/22 and 3/3  - S/p Venofer (2/22-2/27)   - S/p Cyanocobalamine, Folic acid, Ferrous Sulfate  - S/p Epogen x 5 days (Ended 2/27)  - Reported Melena on 3/3   - CT A/P 3/3 negative for acute bleed  - GI did not recommend EGD as no overt signs of active bleeding  and responded to PRBCs  - Will use pediatric tubes to limit volume for phlebotomy as patient is Mu-ism   - Monitor for melena

## 2024-03-14 NOTE — PROGRESS NOTE ADULT - PROBLEM SELECTOR PLAN 3
- Patient with Sympathetic Storming in NSCU   - Patient with Labile BP HTN and Hypotensive Episodes; S/p Pressors  - Continue to monitor BP / HR

## 2024-03-14 NOTE — PROGRESS NOTE ADULT - PROBLEM SELECTOR PLAN 6
- MRI 2/12 - small b/l cerebellar strokes (post-procedural) and prior L thalamic strokes  - MRI 3/5: Small scattered foci of diffusion restriction within the right cerebellar hemisphere, left parietal lobe, and symmetrically in the bilateral basal ganglia.  - Lipitor d/c'd in setting of liver injury 3/3  [facility]; Cont ASA/Plavix x3 months then ASA monotherapy  - ECHO 3/7: EF 74% RT Ventricular Moderately Enlarged and reduced systolic fxn  - Neurology following

## 2024-03-15 LAB
ANION GAP SERPL CALC-SCNC: 12 MMOL/L — SIGNIFICANT CHANGE UP (ref 5–17)
BUN SERPL-MCNC: 21 MG/DL — SIGNIFICANT CHANGE UP (ref 7–23)
CALCIUM SERPL-MCNC: 8.3 MG/DL — LOW (ref 8.4–10.5)
CHLORIDE SERPL-SCNC: 106 MMOL/L — SIGNIFICANT CHANGE UP (ref 96–108)
CO2 SERPL-SCNC: 29 MMOL/L — SIGNIFICANT CHANGE UP (ref 22–31)
CREAT SERPL-MCNC: 0.57 MG/DL — SIGNIFICANT CHANGE UP (ref 0.5–1.3)
EGFR: 104 ML/MIN/1.73M2 — SIGNIFICANT CHANGE UP
GLUCOSE BLDC GLUCOMTR-MCNC: 124 MG/DL — HIGH (ref 70–99)
GLUCOSE BLDC GLUCOMTR-MCNC: 132 MG/DL — HIGH (ref 70–99)
GLUCOSE BLDC GLUCOMTR-MCNC: 135 MG/DL — HIGH (ref 70–99)
GLUCOSE BLDC GLUCOMTR-MCNC: 138 MG/DL — HIGH (ref 70–99)
GLUCOSE SERPL-MCNC: 120 MG/DL — HIGH (ref 70–99)
POTASSIUM SERPL-MCNC: 4.1 MMOL/L — SIGNIFICANT CHANGE UP (ref 3.5–5.3)
POTASSIUM SERPL-SCNC: 4.1 MMOL/L — SIGNIFICANT CHANGE UP (ref 3.5–5.3)
SODIUM SERPL-SCNC: 147 MMOL/L — HIGH (ref 135–145)

## 2024-03-15 PROCEDURE — 99233 SBSQ HOSP IP/OBS HIGH 50: CPT

## 2024-03-15 RX ORDER — SENNA PLUS 8.6 MG/1
10 TABLET ORAL AT BEDTIME
Refills: 0 | Status: DISCONTINUED | OUTPATIENT
Start: 2024-03-15 | End: 2024-03-22

## 2024-03-15 RX ADMIN — Medication 500000 UNIT(S): at 23:30

## 2024-03-15 RX ADMIN — PANTOPRAZOLE SODIUM 40 MILLIGRAM(S): 20 TABLET, DELAYED RELEASE ORAL at 12:01

## 2024-03-15 RX ADMIN — Medication 5 MILLILITER(S): at 17:48

## 2024-03-15 RX ADMIN — HEPARIN SODIUM 5000 UNIT(S): 5000 INJECTION INTRAVENOUS; SUBCUTANEOUS at 06:02

## 2024-03-15 RX ADMIN — Medication 5 MILLILITER(S): at 23:29

## 2024-03-15 RX ADMIN — CHLORHEXIDINE GLUCONATE 15 MILLILITER(S): 213 SOLUTION TOPICAL at 17:48

## 2024-03-15 RX ADMIN — Medication 500000 UNIT(S): at 06:09

## 2024-03-15 RX ADMIN — HUMAN INSULIN 7 UNIT(S): 100 INJECTION, SUSPENSION SUBCUTANEOUS at 12:03

## 2024-03-15 RX ADMIN — Medication 1 DROP(S): at 22:15

## 2024-03-15 RX ADMIN — CHLORHEXIDINE GLUCONATE 1 APPLICATION(S): 213 SOLUTION TOPICAL at 23:32

## 2024-03-15 RX ADMIN — HEPARIN SODIUM 5000 UNIT(S): 5000 INJECTION INTRAVENOUS; SUBCUTANEOUS at 22:15

## 2024-03-15 RX ADMIN — Medication 20 MILLIGRAM(S): at 13:41

## 2024-03-15 RX ADMIN — Medication 1 DROP(S): at 10:32

## 2024-03-15 RX ADMIN — CHLORHEXIDINE GLUCONATE 15 MILLILITER(S): 213 SOLUTION TOPICAL at 06:10

## 2024-03-15 RX ADMIN — Medication 1 DROP(S): at 06:06

## 2024-03-15 RX ADMIN — CLOPIDOGREL BISULFATE 75 MILLIGRAM(S): 75 TABLET, FILM COATED ORAL at 12:01

## 2024-03-15 RX ADMIN — SENNA PLUS 10 MILLILITER(S): 8.6 TABLET ORAL at 22:15

## 2024-03-15 RX ADMIN — HEPARIN SODIUM 5000 UNIT(S): 5000 INJECTION INTRAVENOUS; SUBCUTANEOUS at 13:41

## 2024-03-15 RX ADMIN — Medication 500000 UNIT(S): at 17:48

## 2024-03-15 RX ADMIN — Medication 5 MILLILITER(S): at 12:02

## 2024-03-15 RX ADMIN — Medication 500000 UNIT(S): at 12:01

## 2024-03-15 RX ADMIN — Medication 5 MILLILITER(S): at 06:10

## 2024-03-15 RX ADMIN — Medication 81 MILLIGRAM(S): at 12:01

## 2024-03-15 RX ADMIN — Medication 1 DROP(S): at 01:45

## 2024-03-15 RX ADMIN — HUMAN INSULIN 7 UNIT(S): 100 INJECTION, SUSPENSION SUBCUTANEOUS at 23:47

## 2024-03-15 RX ADMIN — HUMAN INSULIN 7 UNIT(S): 100 INJECTION, SUSPENSION SUBCUTANEOUS at 18:25

## 2024-03-15 RX ADMIN — POLYETHYLENE GLYCOL 3350 17 GRAM(S): 17 POWDER, FOR SOLUTION ORAL at 17:48

## 2024-03-15 RX ADMIN — HUMAN INSULIN 7 UNIT(S): 100 INJECTION, SUSPENSION SUBCUTANEOUS at 06:03

## 2024-03-15 RX ADMIN — Medication 1 DROP(S): at 13:42

## 2024-03-15 RX ADMIN — POLYETHYLENE GLYCOL 3350 17 GRAM(S): 17 POWDER, FOR SOLUTION ORAL at 06:07

## 2024-03-15 RX ADMIN — Medication 20 MILLIGRAM(S): at 06:08

## 2024-03-15 RX ADMIN — Medication 1 DROP(S): at 17:51

## 2024-03-15 NOTE — PROGRESS NOTE ADULT - SUBJECTIVE AND OBJECTIVE BOX
Patient is a 73y old  Male who presents with a chief complaint of Stroke, critical stenosis, evaluation for angiography (14 Mar 2024 07:30)      Interval Events:    REVIEW OF SYSTEMS:  [ ] Positive  [ ] All other systems negative  [ ] Unable to assess ROS because ________    Vital Signs Last 24 Hrs  T(C): 37.1 (03-15-24 @ 05:34), Max: 37.1 (03-15-24 @ 05:34)  T(F): 98.7 (03-15-24 @ 05:34), Max: 98.7 (03-15-24 @ 05:34)  HR: 98 (03-15-24 @ 05:34) (83 - 100)  BP: 147/82 (03-15-24 @ 05:34) (117/61 - 147/82)  RR: 21 (03-15-24 @ 05:34) (20 - 29)  SpO2: 99% (03-15-24 @ 05:34) (96% - 99%)    PHYSICAL EXAM:  HEENT:   [ ]Tracheostomy:  [ ]Pupils equal  [ ]No oral lesions  [ ]Abnormal    SKIN  [ ]No Rash  [ ] Abnormal  [ ] pressure    CARDIAC  [ ]Regular  [ ]Abnormal    PULMONARY  [ ]Bilateral Clear Breath Sounds  [ ]Normal Excursion  [ ]Abnormal    GI  [ ]PEG      [ ] +BS		              [ ]Soft, nondistended, nontender	  [ ]Abnormal    MUSCULOSKELETAL                                   [ ]Bedbound                 [ ]Abnormal    [ ]Ambulatory/OOB to chair                           EXTREMITIES                                         [ ]Normal  [ ]Edema                           NEUROLOGIC  [ ] Normal, non focal  [ ] Focal findings:    PSYCHIATRIC  [ ]Alert and appropriate  [ ] Sedated	 [ ]Agitated    :  [place]: [ ] Yes, if yes: Date of Placement:                   [  ] No    LINES: Central Lines [ ] Yes, if yes: Date of Placement                                     [  ] No    HOSPITAL MEDICATIONS:  MEDICATIONS  (STANDING):  artificial tears (preservative free) Ophthalmic Solution 1 Drop(s) Both EYES every 4 hours  aspirin  chewable 81 milliGRAM(s) Oral daily  Biotene Dry Mouth Oral Rinse 5 milliLiter(s) Swish and Spit every 6 hours  chlorhexidine 0.12% Liquid 15 milliLiter(s) Oral Mucosa every 12 hours  chlorhexidine 4% Liquid 1 Application(s) Topical daily  clopidogrel Tablet 75 milliGRAM(s) Enteral Tube daily  furosemide   Injectable 20 milliGRAM(s) IV Push two times a day  heparin   Injectable 5000 Unit(s) SubCutaneous every 8 hours  insulin lispro (ADMELOG) corrective regimen sliding scale   SubCutaneous every 6 hours  insulin NPH human recombinant 7 Unit(s) SubCutaneous every 6 hours  nystatin    Suspension 929178 Unit(s) Oral every 6 hours  pantoprazole   Suspension 40 milliGRAM(s) Oral daily  polyethylene glycol 3350 17 Gram(s) Oral two times a day  senna 1 Tablet(s) Oral at bedtime    MEDICATIONS  (PRN):  albuterol/ipratropium for Nebulization 3 milliLiter(s) Nebulizer every 6 hours PRN Shortness of Breath and/or Wheezing      LABS:                        9.2    7.55  )-----------( 381      ( 14 Mar 2024 11:19 )             32.7             CAPILLARY BLOOD GLUCOSE    MICROBIOLOGY:     RADIOLOGY:  [ ] Reviewed and interpreted by me    Mode: AC/ CMV (Assist Control/ Continuous Mandatory Ventilation)  RR (machine): 20  TV (machine): 500  FiO2: 30  PEEP: 5  ITime: 1  MAP: 9  PIP: 17   Patient is a 73y old  Male who presents with a chief complaint of Stroke, critical stenosis, evaluation for angiography (14 Mar 2024 07:30)      Interval Events: No events reported over night    REVIEW OF SYSTEMS:  [X] Positive:  Back pain  [ ] All other systems negative  [  ] Unable to assess ROS because ________    Vital Signs Last 24 Hrs  T(C): 37.1 (03-15-24 @ 05:34), Max: 37.1 (03-15-24 @ 05:34)  T(F): 98.7 (03-15-24 @ 05:34), Max: 98.7 (03-15-24 @ 05:34)  HR: 98 (03-15-24 @ 05:34) (83 - 100)  BP: 147/82 (03-15-24 @ 05:34) (117/61 - 147/82)  RR: 21 (03-15-24 @ 05:34) (20 - 29)  SpO2: 99% (03-15-24 @ 05:34) (96% - 99%)        PHYSICAL EXAM:  HEENT:    [X] Tracheostomy:  #8 Cuffed Portex  [X] PERRL B/L; 4mm B/L pupils; EOMI  [X] No oral lesions  [ ] Abnormal    SKIN  [X] No Rash  [ ] Abnormal  [ ] pressure    CARDIAC  [X] Regular  [ ] Abnormal    PULMONARY  [X] Bilateral Clear Breath Sounds  [ ] Normal Excursion  [ ] Abnormal    GI  [X] PEG      [X] +BS		              [X] Soft, nondistended, nontender	  [ ] Abnormal    MUSCULOSKELETAL                                   [X] Bedbound                 [ ] Abnormal:   [ ] Ambulatory/OOB to chair                           EXTREMITIES                                         [ ]Normal  [X] Edema  1+ edema in B/L UEs                           NEUROLOGIC  [ ] Normal, non focal  [X] Focal findings: Alert and Oriented; +gag; no gross facial asymmetry observed; responds appropriately to questions by nodding; able to move B/L feet, trigger movement observed in right knee,  +B/L shoulder shrugs, slight grasp in left hand observed; unable to lift legs or arms    PSYCHIATRIC  [X] Alert, responsive with flat affect  [ ] Sedated	 [ ]Agitated    :  [place]: [ ] Yes, if yes: Date of Placement:                   [X] No    LINES: Central Lines [ ] Yes, if yes: Date of Placement                                     [X] No      HOSPITAL MEDICATIONS:  MEDICATIONS  (STANDING):  artificial tears (preservative free) Ophthalmic Solution 1 Drop(s) Both EYES every 4 hours  aspirin  chewable 81 milliGRAM(s) Oral daily  Biotene Dry Mouth Oral Rinse 5 milliLiter(s) Swish and Spit every 6 hours  chlorhexidine 0.12% Liquid 15 milliLiter(s) Oral Mucosa every 12 hours  chlorhexidine 4% Liquid 1 Application(s) Topical daily  clopidogrel Tablet 75 milliGRAM(s) Enteral Tube daily  furosemide   Injectable 20 milliGRAM(s) IV Push two times a day  heparin   Injectable 5000 Unit(s) SubCutaneous every 8 hours  insulin lispro (ADMELOG) corrective regimen sliding scale   SubCutaneous every 6 hours  insulin NPH human recombinant 7 Unit(s) SubCutaneous every 6 hours  nystatin    Suspension 172939 Unit(s) Oral every 6 hours  pantoprazole   Suspension 40 milliGRAM(s) Oral daily  polyethylene glycol 3350 17 Gram(s) Oral two times a day  senna 1 Tablet(s) Oral at bedtime    MEDICATIONS  (PRN):  albuterol/ipratropium for Nebulization 3 milliLiter(s) Nebulizer every 6 hours PRN Shortness of Breath and/or Wheezing      LABS:             03-15    147<H>  |  106  |  21  ----------------------------<  120<H>  4.1   |  29  |  0.57    Ca    8.3<L>      15 Mar 2024 12:49  Phos  3.8     03-14  Mg     2.0     03-14          CAPILLARY BLOOD GLUCOSE    MICROBIOLOGY:     RADIOLOGY:  [ ] Reviewed and interpreted by me    Mode: AC/ CMV (Assist Control/ Continuous Mandatory Ventilation)  RR (machine): 20  TV (machine): 500  FiO2: 30  PEEP: 5  ITime: 1  MAP: 9  PIP: 17

## 2024-03-15 NOTE — PROGRESS NOTE ADULT - PROBLEM SELECTOR PLAN 8
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

## 2024-03-15 NOTE — PROGRESS NOTE ADULT - ASSESSMENT
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 Prior to admission patient had Viral URI ( 1-2 weeks prior to admission) with subsequent weakness. He underwent an angiogram (2/11) which showed moderate stenosis and no intervention was done. MRI Performed c/w small bilateral cerebellar strokes and prior L thalamic strokes, as per Neurology was not c/w current presentation. 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 He is currently s/p 5 rounds of plasma exchange (2/13-2/20) and IVIG x 5 ( 2/21-2/26)  His hospital course was complicated by progressive respiratory failure. CT Chest performed on 2/20 with atelectasis of b/l lower lobes. BAL 2/21 Grew PSA treated with course of Zosyn (2/21-2/28).  Patient transferred to RCU on 2/24.   RRT called on 3/3 for unresponsiveness. Patient with unreactive pupils, no corneal reflex. Also found to be hypotensive and febrile to 105. Noted to have new melena. Hgb 5. Given 1 unit of pRBC. Transferred to MICU. patient required pressors while in the MICU, BP improved and was transitioned to Droxidopa. Patient had CT C/A/P consistent with Bibasilar pneumonia; Sputum cx grew PSA and was treated with course of Meropenem. Patient evaluated by GI in setting of Melena, transaminitis and portal venous gas on CT imaging. No EGD performed as Melena resolved, Transaminitis was thought to be in setting of shock and portal venous gas was thought to be in setting of recent PEG. EEG was performed in setting of AMS no seizures noted. Repeat MRI Performed which showed Small scattered foci of diffusion restriction within the right cerebellar hemisphere, left parietal lobe, and symmetrically in the bilateral basal ganglia. Neuro recommended Resumption of ASA in setting of possible embolic phenomenon. Repeat ECHO performed RT Ventricular Moderately enlarged, reduced systolic function. Mental status improved and was transferred back to RCU on 3/6.       3/13: Pt clinical status has remained unchanged and stable.  Last hypoxic/bradycardic event was on 3/11 while being placed in supine position on PS. Patient tolerating CPAP trials today will continue to progress as tolerated. Patient remains with anasarca but clinically improving, will give additional Lasix 20 mg IVP Q 12 HRs x 1 day. Patient evaluated by Neurology yesterday and Plavix resumed. Patient with constipation will increase Miralax to BID and give Dulcolax supp x 1; Monitor for BM.   3/14: Patient progressing well on PS 8/5 today, will continue throughout the day with trach collar plans to follow.  Additional lasix 20mg BID given for diuresis as limb edema appears improved.  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 Prior to admission patient had Viral URI ( 1-2 weeks prior to admission) with subsequent weakness. He underwent an angiogram (2/11) which showed moderate stenosis and no intervention was done. MRI Performed c/w small bilateral cerebellar strokes and prior L thalamic strokes, as per Neurology was not c/w current presentation. 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 He is currently s/p 5 rounds of plasma exchange (2/13-2/20) and IVIG x 5 ( 2/21-2/26)  His hospital course was complicated by progressive respiratory failure. CT Chest performed on 2/20 with atelectasis of b/l lower lobes. BAL 2/21 Grew PSA treated with course of Zosyn (2/21-2/28).  Patient transferred to RCU on 2/24.   RRT called on 3/3 for unresponsiveness. Patient with unreactive pupils, no corneal reflex. Also found to be hypotensive and febrile to 105. Noted to have new melena. Hgb 5. Given 1 unit of pRBC. Transferred to MICU. patient required pressors while in the MICU, BP improved and was transitioned to Droxidopa. Patient had CT C/A/P consistent with Bibasilar pneumonia; Sputum cx grew PSA and was treated with course of Meropenem. Patient evaluated by GI in setting of Melena, transaminitis and portal venous gas on CT imaging. No EGD performed as Melena resolved, Transaminitis was thought to be in setting of shock and portal venous gas was thought to be in setting of recent PEG. EEG was performed in setting of AMS no seizures noted. Repeat MRI Performed which showed Small scattered foci of diffusion restriction within the right cerebellar hemisphere, left parietal lobe, and symmetrically in the bilateral basal ganglia. Neuro recommended Resumption of ASA in setting of possible embolic phenomenon. Repeat ECHO performed RT Ventricular Moderately enlarged, reduced systolic function. Mental status improved and was transferred back to RCU on 3/6.       3/13: Pt clinical status has remained unchanged and stable.  Last hypoxic/bradycardic event was on 3/11 while being placed in supine position on PS. Patient tolerating CPAP trials today will continue to progress as tolerated. Patient remains with anasarca but clinically improving, will give additional Lasix 20 mg IVP Q 12 HRs x 1 day. Patient evaluated by Neurology yesterday and Plavix resumed. Patient with constipation will increase Miralax to BID and give Dulcolax supp x 1; Monitor for BM.   3/14: Patient progressing well on PS 8/5 today, will continue throughout the day with trach collar plans to follow.  Additional lasix 20mg BID given for diuresis as limb edema appears improved.   3/15:  Attempted trach collar however patient quickly developed bradycardia into 50s, placed back on PS.  Will continue lasix and free water.

## 2024-03-15 NOTE — PROGRESS NOTE ADULT - PROBLEM SELECTOR PLAN 6
- MRI 2/12 - small b/l cerebellar strokes (post-procedural) and prior L thalamic strokes  - MRI 3/5: Small scattered foci of diffusion restriction within the right cerebellar hemisphere, left parietal lobe, and symmetrically in the bilateral basal ganglia.  - Lipitor d/c'd in setting of liver injury 3/3  [facility] Cont ASA/Plavix x3 months then ASA monotherapy  - ECHO 3/7: EF 74% RT Ventricular Moderately Enlarged and reduced systolic fxn  - Neurology following

## 2024-03-15 NOTE — PROGRESS NOTE ADULT - NS ATTEND AMEND GEN_ALL_CORE FT
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 S/p PLEX x5 and IVIG. Course c/b mild hemoptysis, septic shock, embolic vs. hypoperfusion related lesions on MRI, and persistent respiratory failure requiring tracheostomy and MV.     #GBS-MFV  - Clinically with slow, mild neurologic improvement (moves toes and distal LEs, light squeeze with hands, nods/shakes head).   #Acute hypoxic/hypercapneic resp failure  - Will continue with PS trials, tolerated 8/5 but not 5/5  - Should be on full vent support whenever he needs to be laid flat.   #Mild Carotid stenosis B/L  - Plavix in addition to ASA per Neuro [facility] trial  #Hypervolemia  - Continue Lasix for today, check BMP to monitor lytes  #Dispo:  - PT/PM&R eval LTAC vs Acute rehab, OOB to chair  #Ethics: Full code.  Daughter updated at bedside.    Nilda Geiger MD, MSCR

## 2024-03-16 LAB
ANION GAP SERPL CALC-SCNC: 10 MMOL/L — SIGNIFICANT CHANGE UP (ref 5–17)
BUN SERPL-MCNC: 26 MG/DL — HIGH (ref 7–23)
CALCIUM SERPL-MCNC: 8.4 MG/DL — SIGNIFICANT CHANGE UP (ref 8.4–10.5)
CHLORIDE SERPL-SCNC: 105 MMOL/L — SIGNIFICANT CHANGE UP (ref 96–108)
CO2 SERPL-SCNC: 27 MMOL/L — SIGNIFICANT CHANGE UP (ref 22–31)
CREAT SERPL-MCNC: 0.55 MG/DL — SIGNIFICANT CHANGE UP (ref 0.5–1.3)
EGFR: 105 ML/MIN/1.73M2 — SIGNIFICANT CHANGE UP
GLUCOSE BLDC GLUCOMTR-MCNC: 129 MG/DL — HIGH (ref 70–99)
GLUCOSE BLDC GLUCOMTR-MCNC: 130 MG/DL — HIGH (ref 70–99)
GLUCOSE BLDC GLUCOMTR-MCNC: 134 MG/DL — HIGH (ref 70–99)
GLUCOSE BLDC GLUCOMTR-MCNC: 162 MG/DL — HIGH (ref 70–99)
GLUCOSE SERPL-MCNC: 162 MG/DL — HIGH (ref 70–99)
HCT VFR BLD CALC: 33.8 % — LOW (ref 39–50)
HGB BLD-MCNC: 9.6 G/DL — LOW (ref 13–17)
MAGNESIUM SERPL-MCNC: 2.2 MG/DL — SIGNIFICANT CHANGE UP (ref 1.6–2.6)
MCHC RBC-ENTMCNC: 23.9 PG — LOW (ref 27–34)
MCHC RBC-ENTMCNC: 28.4 GM/DL — LOW (ref 32–36)
MCV RBC AUTO: 84.3 FL — SIGNIFICANT CHANGE UP (ref 80–100)
NRBC # BLD: 0 /100 WBCS — SIGNIFICANT CHANGE UP (ref 0–0)
PHOSPHATE SERPL-MCNC: 2.8 MG/DL — SIGNIFICANT CHANGE UP (ref 2.5–4.5)
PLATELET # BLD AUTO: 452 K/UL — HIGH (ref 150–400)
POTASSIUM SERPL-MCNC: 3.9 MMOL/L — SIGNIFICANT CHANGE UP (ref 3.5–5.3)
POTASSIUM SERPL-SCNC: 3.9 MMOL/L — SIGNIFICANT CHANGE UP (ref 3.5–5.3)
RBC # BLD: 4.01 M/UL — LOW (ref 4.2–5.8)
RBC # FLD: 21.8 % — HIGH (ref 10.3–14.5)
SODIUM SERPL-SCNC: 142 MMOL/L — SIGNIFICANT CHANGE UP (ref 135–145)
WBC # BLD: 9.89 K/UL — SIGNIFICANT CHANGE UP (ref 3.8–10.5)
WBC # FLD AUTO: 9.89 K/UL — SIGNIFICANT CHANGE UP (ref 3.8–10.5)

## 2024-03-16 PROCEDURE — 99233 SBSQ HOSP IP/OBS HIGH 50: CPT

## 2024-03-16 RX ADMIN — SENNA PLUS 10 MILLILITER(S): 8.6 TABLET ORAL at 21:23

## 2024-03-16 RX ADMIN — Medication 1 DROP(S): at 21:24

## 2024-03-16 RX ADMIN — HUMAN INSULIN 7 UNIT(S): 100 INJECTION, SUSPENSION SUBCUTANEOUS at 23:40

## 2024-03-16 RX ADMIN — Medication 500000 UNIT(S): at 18:11

## 2024-03-16 RX ADMIN — CHLORHEXIDINE GLUCONATE 1 APPLICATION(S): 213 SOLUTION TOPICAL at 21:24

## 2024-03-16 RX ADMIN — Medication 1 DROP(S): at 18:11

## 2024-03-16 RX ADMIN — HEPARIN SODIUM 5000 UNIT(S): 5000 INJECTION INTRAVENOUS; SUBCUTANEOUS at 05:38

## 2024-03-16 RX ADMIN — Medication 20 MILLIGRAM(S): at 14:39

## 2024-03-16 RX ADMIN — Medication 500000 UNIT(S): at 23:09

## 2024-03-16 RX ADMIN — HUMAN INSULIN 7 UNIT(S): 100 INJECTION, SUSPENSION SUBCUTANEOUS at 18:12

## 2024-03-16 RX ADMIN — HEPARIN SODIUM 5000 UNIT(S): 5000 INJECTION INTRAVENOUS; SUBCUTANEOUS at 14:39

## 2024-03-16 RX ADMIN — PANTOPRAZOLE SODIUM 40 MILLIGRAM(S): 20 TABLET, DELAYED RELEASE ORAL at 12:27

## 2024-03-16 RX ADMIN — Medication 1 DROP(S): at 14:39

## 2024-03-16 RX ADMIN — Medication 1 DROP(S): at 01:07

## 2024-03-16 RX ADMIN — Medication 5 MILLILITER(S): at 05:34

## 2024-03-16 RX ADMIN — Medication 1 DROP(S): at 10:11

## 2024-03-16 RX ADMIN — Medication 20 MILLIGRAM(S): at 05:36

## 2024-03-16 RX ADMIN — POLYETHYLENE GLYCOL 3350 17 GRAM(S): 17 POWDER, FOR SOLUTION ORAL at 18:11

## 2024-03-16 RX ADMIN — CLOPIDOGREL BISULFATE 75 MILLIGRAM(S): 75 TABLET, FILM COATED ORAL at 14:39

## 2024-03-16 RX ADMIN — HUMAN INSULIN 7 UNIT(S): 100 INJECTION, SUSPENSION SUBCUTANEOUS at 12:28

## 2024-03-16 RX ADMIN — Medication 5 MILLILITER(S): at 18:12

## 2024-03-16 RX ADMIN — Medication 500000 UNIT(S): at 12:28

## 2024-03-16 RX ADMIN — Medication 5 MILLILITER(S): at 12:28

## 2024-03-16 RX ADMIN — Medication 5 MILLILITER(S): at 23:09

## 2024-03-16 RX ADMIN — HEPARIN SODIUM 5000 UNIT(S): 5000 INJECTION INTRAVENOUS; SUBCUTANEOUS at 21:24

## 2024-03-16 RX ADMIN — Medication 2: at 05:37

## 2024-03-16 RX ADMIN — HUMAN INSULIN 7 UNIT(S): 100 INJECTION, SUSPENSION SUBCUTANEOUS at 05:33

## 2024-03-16 RX ADMIN — POLYETHYLENE GLYCOL 3350 17 GRAM(S): 17 POWDER, FOR SOLUTION ORAL at 05:35

## 2024-03-16 RX ADMIN — Medication 81 MILLIGRAM(S): at 12:27

## 2024-03-16 RX ADMIN — Medication 1 DROP(S): at 05:38

## 2024-03-16 RX ADMIN — Medication 500000 UNIT(S): at 05:37

## 2024-03-16 RX ADMIN — CHLORHEXIDINE GLUCONATE 15 MILLILITER(S): 213 SOLUTION TOPICAL at 18:11

## 2024-03-16 RX ADMIN — CHLORHEXIDINE GLUCONATE 15 MILLILITER(S): 213 SOLUTION TOPICAL at 05:38

## 2024-03-16 NOTE — PROGRESS NOTE ADULT - SUBJECTIVE AND OBJECTIVE BOX
Patient is a 73y old  Male who presents with a chief complaint of Stroke, critical stenosis, evaluation for angiography (15 Mar 2024 07:30)      Interval Events:    REVIEW OF SYSTEMS:  [ ] Positive  [ ] All other systems negative  [ ] Unable to assess ROS because ________    Vital Signs Last 24 Hrs  T(C): 37.6 (03-15-24 @ 23:41), Max: 37.6 (03-15-24 @ 23:41)  T(F): 99.7 (03-15-24 @ 23:41), Max: 99.7 (03-15-24 @ 23:41)  HR: 103 (03-16-24 @ 02:52) (59 - 106)  BP: 122/68 (03-15-24 @ 23:41) (122/68 - 175/90)  RR: 17 (03-15-24 @ 23:41) (17 - 19)  SpO2: 99% (03-16-24 @ 02:52) (63% - 99%)    PHYSICAL EXAM:  HEENT:   [ ]Tracheostomy:  [ ]Pupils equal  [ ]No oral lesions  [ ]Abnormal    SKIN  [ ]No Rash  [ ] Abnormal  [ ] pressure    CARDIAC  [ ]Regular  [ ]Abnormal    PULMONARY  [ ]Bilateral Clear Breath Sounds  [ ]Normal Excursion  [ ]Abnormal    GI  [ ]PEG      [ ] +BS		              [ ]Soft, nondistended, nontender	  [ ]Abnormal    MUSCULOSKELETAL                                   [ ]Bedbound                 [ ]Abnormal    [ ]Ambulatory/OOB to chair                           EXTREMITIES                                         [ ]Normal  [ ]Edema                           NEUROLOGIC  [ ] Normal, non focal  [ ] Focal findings:    PSYCHIATRIC  [ ]Alert and appropriate  [ ] Sedated	 [ ]Agitated    :  [place]: [ ] Yes, if yes: Date of Placement:                   [  ] No    LINES: Central Lines [ ] Yes, if yes: Date of Placement                                     [  ] No    HOSPITAL MEDICATIONS:  MEDICATIONS  (STANDING):  artificial tears (preservative free) Ophthalmic Solution 1 Drop(s) Both EYES every 4 hours  aspirin  chewable 81 milliGRAM(s) Oral daily  Biotene Dry Mouth Oral Rinse 5 milliLiter(s) Swish and Spit every 6 hours  chlorhexidine 0.12% Liquid 15 milliLiter(s) Oral Mucosa every 12 hours  chlorhexidine 4% Liquid 1 Application(s) Topical daily  clopidogrel Tablet 75 milliGRAM(s) Enteral Tube daily  furosemide   Injectable 20 milliGRAM(s) IV Push two times a day  heparin   Injectable 5000 Unit(s) SubCutaneous every 8 hours  insulin lispro (ADMELOG) corrective regimen sliding scale   SubCutaneous every 6 hours  insulin NPH human recombinant 7 Unit(s) SubCutaneous every 6 hours  nystatin    Suspension 935038 Unit(s) Oral every 6 hours  pantoprazole   Suspension 40 milliGRAM(s) Oral daily  polyethylene glycol 3350 17 Gram(s) Oral two times a day  senna Syrup 10 milliLiter(s) Oral at bedtime    MEDICATIONS  (PRN):  albuterol/ipratropium for Nebulization 3 milliLiter(s) Nebulizer every 6 hours PRN Shortness of Breath and/or Wheezing      LABS:                        9.6    9.89  )-----------( 452      ( 16 Mar 2024 06:24 )             33.8     03-16    142  |  105  |  26<H>  ----------------------------<  162<H>  3.9   |  27  |  0.55    Ca    8.4      16 Mar 2024 06:26  Phos  2.8     03-16  Mg     2.2     03-16        Urinalysis Basic - ( 16 Mar 2024 06:26 )    Color: x / Appearance: x / SG: x / pH: x  Gluc: 162 mg/dL / Ketone: x  / Bili: x / Urobili: x   Blood: x / Protein: x / Nitrite: x   Leuk Esterase: x / RBC: x / WBC x   Sq Epi: x / Non Sq Epi: x / Bacteria: x          CAPILLARY BLOOD GLUCOSE    MICROBIOLOGY:     RADIOLOGY:  [ ] Reviewed and interpreted by me    Mode: AC/ CMV (Assist Control/ Continuous Mandatory Ventilation)  RR (machine): 20  TV (machine): 500  FiO2: 30  PEEP: 5  MAP: 12  PIP: 20   Patient is a 73y old  Male who presents with a chief complaint of Stroke, critical stenosis, evaluation for angiography (15 Mar 2024 07:30)      Interval Events: no events reported over night.     REVIEW OF SYSTEMS:  [ ] Positive  [X] All other systems negative  [ ] Unable to assess ROS because ________    Vital Signs Last 24 Hrs  T(C): 37.6 (03-15-24 @ 23:41), Max: 37.6 (03-15-24 @ 23:41)  T(F): 99.7 (03-15-24 @ 23:41), Max: 99.7 (03-15-24 @ 23:41)  HR: 103 (03-16-24 @ 02:52) (59 - 106)  BP: 122/68 (03-15-24 @ 23:41) (122/68 - 175/90)  RR: 17 (03-15-24 @ 23:41) (17 - 19)  SpO2: 99% (03-16-24 @ 02:52) (63% - 99%)    PHYSICAL EXAM:  HEENT:    [X] Tracheostomy:  #8 Cuffed Portex  [X] PERRL B/L; 4mm B/L pupils; EOMI  [X] No oral lesions  [ ] Abnormal    SKIN  [X] No Rash  [ ] Abnormal  [ ] pressure    CARDIAC  [X] Regular  [ ] Abnormal    PULMONARY  [X] Bilateral Clear Breath Sounds  [ ] Normal Excursion  [ ] Abnormal    GI  [X] PEG      [X] +BS		              [X] Soft, nondistended, nontender	  [ ] Abnormal    MUSCULOSKELETAL                                   [X] Bedbound                 [ ] Abnormal:   [ ] Ambulatory/OOB to chair                           EXTREMITIES                                         [ ]Normal  [X] Edema  1+ edema in B/L UEs                           NEUROLOGIC  [ ] Normal, non focal  [X] Focal findings: Alert and Oriented; +gag; no gross facial asymmetry observed; responds appropriately to questions by nodding; able to move B/L feet, trigger movement observed in right knee,  +B/L shoulder shrugs, slight grasp in left hand observed; unable to lift legs or arms    PSYCHIATRIC  [X] Alert, responsive with flat affect  [ ] Sedated	 [ ]Agitated    :  [place]: [ ] Yes, if yes: Date of Placement:                   [X] No    LINES: Central Lines [ ] Yes, if yes: Date of Placement                                     [X] No      HOSPITAL MEDICATIONS:  MEDICATIONS  (STANDING):  artificial tears (preservative free) Ophthalmic Solution 1 Drop(s) Both EYES every 4 hours  aspirin  chewable 81 milliGRAM(s) Oral daily  Biotene Dry Mouth Oral Rinse 5 milliLiter(s) Swish and Spit every 6 hours  chlorhexidine 0.12% Liquid 15 milliLiter(s) Oral Mucosa every 12 hours  chlorhexidine 4% Liquid 1 Application(s) Topical daily  clopidogrel Tablet 75 milliGRAM(s) Enteral Tube daily  furosemide   Injectable 20 milliGRAM(s) IV Push two times a day  heparin   Injectable 5000 Unit(s) SubCutaneous every 8 hours  insulin lispro (ADMELOG) corrective regimen sliding scale   SubCutaneous every 6 hours  insulin NPH human recombinant 7 Unit(s) SubCutaneous every 6 hours  nystatin    Suspension 579570 Unit(s) Oral every 6 hours  pantoprazole   Suspension 40 milliGRAM(s) Oral daily  polyethylene glycol 3350 17 Gram(s) Oral two times a day  senna Syrup 10 milliLiter(s) Oral at bedtime    MEDICATIONS  (PRN):  albuterol/ipratropium for Nebulization 3 milliLiter(s) Nebulizer every 6 hours PRN Shortness of Breath and/or Wheezing      LABS:                        9.6    9.89  )-----------( 452      ( 16 Mar 2024 06:24 )             33.8     03-16    142  |  105  |  26<H>  ----------------------------<  162<H>  3.9   |  27  |  0.55    Ca    8.4      16 Mar 2024 06:26  Phos  2.8     03-16  Mg     2.2     03-16        Urinalysis Basic - ( 16 Mar 2024 06:26 )    Color: x / Appearance: x / SG: x / pH: x  Gluc: 162 mg/dL / Ketone: x  / Bili: x / Urobili: x   Blood: x / Protein: x / Nitrite: x   Leuk Esterase: x / RBC: x / WBC x   Sq Epi: x / Non Sq Epi: x / Bacteria: x          CAPILLARY BLOOD GLUCOSE    MICROBIOLOGY:     RADIOLOGY:  [ ] Reviewed and interpreted by me    Mode: AC/ CMV (Assist Control/ Continuous Mandatory Ventilation)  RR (machine): 20  TV (machine): 500  FiO2: 30  PEEP: 5  MAP: 12  PIP: 20

## 2024-03-16 NOTE — PROGRESS NOTE ADULT - ASSESSMENT
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 Prior to admission patient had Viral URI ( 1-2 weeks prior to admission) with subsequent weakness. He underwent an angiogram (2/11) which showed moderate stenosis and no intervention was done. MRI Performed c/w small bilateral cerebellar strokes and prior L thalamic strokes, as per Neurology was not c/w current presentation. 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 He is currently s/p 5 rounds of plasma exchange (2/13-2/20) and IVIG x 5 ( 2/21-2/26)  His hospital course was complicated by progressive respiratory failure. CT Chest performed on 2/20 with atelectasis of b/l lower lobes. BAL 2/21 Grew PSA treated with course of Zosyn (2/21-2/28).  Patient transferred to RCU on 2/24.   RRT called on 3/3 for unresponsiveness. Patient with unreactive pupils, no corneal reflex. Also found to be hypotensive and febrile to 105. Noted to have new melena. Hgb 5. Given 1 unit of pRBC. Transferred to MICU. patient required pressors while in the MICU, BP improved and was transitioned to Droxidopa. Patient had CT C/A/P consistent with Bibasilar pneumonia; Sputum cx grew PSA and was treated with course of Meropenem. Patient evaluated by GI in setting of Melena, transaminitis and portal venous gas on CT imaging. No EGD performed as Melena resolved, Transaminitis was thought to be in setting of shock and portal venous gas was thought to be in setting of recent PEG. EEG was performed in setting of AMS no seizures noted. Repeat MRI Performed which showed Small scattered foci of diffusion restriction within the right cerebellar hemisphere, left parietal lobe, and symmetrically in the bilateral basal ganglia. Neuro recommended Resumption of ASA in setting of possible embolic phenomenon. Repeat ECHO performed RT Ventricular Moderately enlarged, reduced systolic function. Mental status improved and was transferred back to RCU on 3/6.       3/13: Pt clinical status has remained unchanged and stable.  Last hypoxic/bradycardic event was on 3/11 while being placed in supine position on PS. Patient tolerating CPAP trials today will continue to progress as tolerated. Patient remains with anasarca but clinically improving, will give additional Lasix 20 mg IVP Q 12 HRs x 1 day. Patient evaluated by Neurology yesterday and Plavix resumed. Patient with constipation will increase Miralax to BID and give Dulcolax supp x 1; Monitor for BM.   3/14: Patient progressing well on PS 8/5 today, will continue throughout the day with trach collar plans to follow.  Additional lasix 20mg BID given for diuresis as limb edema appears improved.   3/15:  Attempted trach collar however patient quickly developed bradycardia into 50s, placed back on PS.  Will continue lasix and free water.  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 Prior to admission patient had Viral URI ( 1-2 weeks prior to admission) with subsequent weakness. He underwent an angiogram (2/11) which showed moderate stenosis and no intervention was done. MRI Performed c/w small bilateral cerebellar strokes and prior L thalamic strokes, as per Neurology was not c/w current presentation. 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 He is currently s/p 5 rounds of plasma exchange (2/13-2/20) and IVIG x 5 ( 2/21-2/26)  His hospital course was complicated by progressive respiratory failure. CT Chest performed on 2/20 with atelectasis of b/l lower lobes. BAL 2/21 Grew PSA treated with course of Zosyn (2/21-2/28).  Patient transferred to RCU on 2/24.   RRT called on 3/3 for unresponsiveness. Patient with unreactive pupils, no corneal reflex. Also found to be hypotensive and febrile to 105. Noted to have new melena. Hgb 5. Given 1 unit of pRBC. Transferred to MICU. patient required pressors while in the MICU, BP improved and was transitioned to Droxidopa. Patient had CT C/A/P consistent with Bibasilar pneumonia; Sputum cx grew PSA and was treated with course of Meropenem. Patient evaluated by GI in setting of Melena, transaminitis and portal venous gas on CT imaging. No EGD performed as Melena resolved, Transaminitis was thought to be in setting of shock and portal venous gas was thought to be in setting of recent PEG. EEG was performed in setting of AMS no seizures noted. Repeat MRI Performed which showed Small scattered foci of diffusion restriction within the right cerebellar hemisphere, left parietal lobe, and symmetrically in the bilateral basal ganglia. Neuro recommended Resumption of ASA in setting of possible embolic phenomenon. Repeat ECHO performed RT Ventricular Moderately enlarged, reduced systolic function. Mental status improved and was transferred back to RCU on 3/6.       3/13: Pt clinical status has remained unchanged and stable.  Last hypoxic/bradycardic event was on 3/11 while being placed in supine position on PS. Patient tolerating CPAP trials today will continue to progress as tolerated. Patient remains with anasarca but clinically improving, will give additional Lasix 20 mg IVP Q 12 HRs x 1 day. Patient evaluated by Neurology yesterday and Plavix resumed. Patient with constipation will increase Miralax to BID and give Dulcolax supp x 1; Monitor for BM.   3/14: Patient progressing well on PS 8/5 today, will continue throughout the day with trach collar plans to follow.  Additional lasix 20mg BID given for diuresis as limb edema appears improved.   3/15:  Attempted trach collar however patient quickly developed bradycardia into 50s, placed back on PS.  Will continue lasix and free water.   3/16: No acute events. Will advance PS to 5/5 as tolerated.  Free water reduced to Q12H. 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 Prior to admission patient had Viral URI ( 1-2 weeks prior to admission) with subsequent weakness. He underwent an angiogram (2/11) which showed moderate stenosis and no intervention was done. MRI Performed c/w small bilateral cerebellar strokes and prior L thalamic strokes, as per Neurology was not c/w current presentation. 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 He is currently s/p 5 rounds of plasma exchange (2/13-2/20) and IVIG x 5 ( 2/21-2/26)  His hospital course was complicated by progressive respiratory failure. CT Chest performed on 2/20 with atelectasis of b/l lower lobes. BAL 2/21 Grew PSA treated with course of Zosyn (2/21-2/28).  Patient transferred to RCU on 2/24.   RRT called on 3/3 for unresponsiveness. Patient with unreactive pupils, no corneal reflex. Also found to be hypotensive and febrile to 105. Noted to have new melena. Hgb 5. Given 1 unit of pRBC. Transferred to MICU. patient required pressors while in the MICU, BP improved and was transitioned to Droxidopa. Patient had CT C/A/P consistent with Bibasilar pneumonia; Sputum cx grew PSA and was treated with course of Meropenem. Patient evaluated by GI in setting of Melena, transaminitis and portal venous gas on CT imaging. No EGD performed as Melena resolved, Transaminitis was thought to be in setting of shock and portal venous gas was thought to be in setting of recent PEG. EEG was performed in setting of AMS no seizures noted. Repeat MRI Performed which showed Small scattered foci of diffusion restriction within the right cerebellar hemisphere, left parietal lobe, and symmetrically in the bilateral basal ganglia. Neuro recommended Resumption of ASA in setting of possible embolic phenomenon. Repeat ECHO performed RT Ventricular Moderately enlarged, reduced systolic function. Mental status improved and was transferred back to RCU on 3/6.       3/13: Pt clinical status has remained unchanged and stable.  Last hypoxic/bradycardic event was on 3/11 while being placed in supine position on PS. Patient tolerating CPAP trials today will continue to progress as tolerated. Patient remains with anasarca but clinically improving, will give additional Lasix 20 mg IVP Q 12 HRs x 1 day. Patient evaluated by Neurology yesterday and Plavix resumed. Patient with constipation will increase Miralax to BID and give Dulcolax supp x 1; Monitor for BM.   3/14: Patient progressing well on PS 8/5 today, will continue throughout the day with trach collar plans to follow.  Additional lasix 20mg BID given for diuresis as limb edema appears improved.   3/15:  Attempted trach collar however patient quickly developed bradycardia into 50s, placed back on PS.  Will continue lasix and free water.    3/16: No acute events. Will advance PS to 5/5 as tolerated.  Remains edematous but net negative 165ml.  Will continue lasix, free water reduced to Q12H.

## 2024-03-16 NOTE — PROGRESS NOTE ADULT - NS ATTEND AMEND GEN_ALL_CORE FT
Pt is a 73M w/ MHx HTN, HLD, T2DM, and prior CVA's without residual deficits who initially presented with concern for stroke-like symptoms s/p unrevealing angiogram and ultimately found to have clinical picture most concerning for Ding Parham Variant of GBS admitted to [facility] on 2/9/24.     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 Pt showing some neurologic improvement, he is able to wiggle all extremities (not against gravity) as well as his torso and his facial muscles. Can communicate by mouthing words and nodding. Restarted on DAPT, held transiently 2/2 concern for hemoptysis, but no further issues to date. Currently without any bleeding. Will need at least 3 months per neuro. Repeat brain MRI last week concerning for multiple bilateral foci especially in right cerebellar, left parietal, and b/l basal ganglia. 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     Pt further with combined hypoxemic and hypercapnic respiratory failure with failure to wean from ventilator s/p tracheostomy placement (#8 cuffed Portex 2/16 surgery.) Tolerating PRVC 20/500/7/40% well, now improving with PSV trials. Airway clearance therapy in place. Trach care and suctioning as per RCU team. Oral hygiene and aspiration precautions in place.     Hospital course further c/b sympathetic storm while in NSICU with labile BP. Now resolving. Recently with septic shock 2/2 Pseudomonas VAP, now with resolution off vasopressors. Off droxidopa since 3/10. Abx completed 3/11. Will CTM off Abx with low threshold to re-initiate for clinical s/s acute infectious process. Pt has reportedly had periods of bradycardia transiently, will monitor on telemetry with EKG if recurs.     Pt with oropharyngeal dysphagia s/p PEG placement. Now tolerating feeds at goal rate. Bowel regimen prn. No acute renal issues. DMII well controlled with NPH and ISS with BGFS monitoring.     Dispo pending medical optimization. Pt full code. Pt is a Confucianism, family asked to consent to blood products with each event as needed. DVT ppx Lovenox. Pt's wife at bedside, will continue to update regularly throughout hospitalization.

## 2024-03-16 NOTE — PROGRESS NOTE ADULT - PROBLEM SELECTOR PLAN 6
- MRI 2/12 - small b/l cerebellar strokes (post-procedural) and prior L thalamic strokes  - MRI 3/5: Small scattered foci of diffusion restriction within the right cerebellar hemisphere, left parietal lobe, and symmetrically in the bilateral basal ganglia.  - Lipitor d/c'd in setting of liver injury 3/3  [facility]/Plavix x3 months then ASA monotherapy  - ECHO 3/7: EF 74% RT Ventricular Moderately Enlarged and reduced systolic fxn  - Neurology following

## 2024-03-16 NOTE — PROGRESS NOTE ADULT - PROBLEM SELECTOR PLAN 8
- Patient Confucianist  - Family would like to be asked prior to administration of blood products   - S/p 1 unit PRBCs on 2/22 and 3/3  - S/p Venofer (2/22-2/27)   - S/p Cyanocobalamine, Folic acid, Ferrous Sulfate  - S/p Epogen x 5 days (Ended 2/27)  - Reported Melena on 3/3   - CT A/P 3/3 negative for acute bleed  - GI did not recommend EGD as no overt signs of active bleeding  and responded to PRBCs  - Will use pediatric tubes to limit volume for phlebotomy as patient is Judaism   - Monitor for melena

## 2024-03-17 LAB
ANION GAP SERPL CALC-SCNC: 9 MMOL/L — SIGNIFICANT CHANGE UP (ref 5–17)
BUN SERPL-MCNC: 25 MG/DL — HIGH (ref 7–23)
CALCIUM SERPL-MCNC: 8.6 MG/DL — SIGNIFICANT CHANGE UP (ref 8.4–10.5)
CHLORIDE SERPL-SCNC: 103 MMOL/L — SIGNIFICANT CHANGE UP (ref 96–108)
CO2 SERPL-SCNC: 31 MMOL/L — SIGNIFICANT CHANGE UP (ref 22–31)
CREAT SERPL-MCNC: 0.53 MG/DL — SIGNIFICANT CHANGE UP (ref 0.5–1.3)
EGFR: 106 ML/MIN/1.73M2 — SIGNIFICANT CHANGE UP
GLUCOSE BLDC GLUCOMTR-MCNC: 137 MG/DL — HIGH (ref 70–99)
GLUCOSE BLDC GLUCOMTR-MCNC: 140 MG/DL — HIGH (ref 70–99)
GLUCOSE BLDC GLUCOMTR-MCNC: 148 MG/DL — HIGH (ref 70–99)
GLUCOSE BLDC GLUCOMTR-MCNC: 152 MG/DL — HIGH (ref 70–99)
GLUCOSE SERPL-MCNC: 133 MG/DL — HIGH (ref 70–99)
MAGNESIUM SERPL-MCNC: 2.3 MG/DL — SIGNIFICANT CHANGE UP (ref 1.6–2.6)
PHOSPHATE SERPL-MCNC: 3.4 MG/DL — SIGNIFICANT CHANGE UP (ref 2.5–4.5)
POTASSIUM SERPL-MCNC: 4.2 MMOL/L — SIGNIFICANT CHANGE UP (ref 3.5–5.3)
POTASSIUM SERPL-SCNC: 4.2 MMOL/L — SIGNIFICANT CHANGE UP (ref 3.5–5.3)
SODIUM SERPL-SCNC: 143 MMOL/L — SIGNIFICANT CHANGE UP (ref 135–145)

## 2024-03-17 PROCEDURE — 99233 SBSQ HOSP IP/OBS HIGH 50: CPT

## 2024-03-17 RX ADMIN — Medication 500000 UNIT(S): at 12:57

## 2024-03-17 RX ADMIN — CHLORHEXIDINE GLUCONATE 15 MILLILITER(S): 213 SOLUTION TOPICAL at 17:27

## 2024-03-17 RX ADMIN — CHLORHEXIDINE GLUCONATE 1 APPLICATION(S): 213 SOLUTION TOPICAL at 21:20

## 2024-03-17 RX ADMIN — Medication 5 MILLILITER(S): at 23:56

## 2024-03-17 RX ADMIN — HEPARIN SODIUM 5000 UNIT(S): 5000 INJECTION INTRAVENOUS; SUBCUTANEOUS at 06:44

## 2024-03-17 RX ADMIN — Medication 1 DROP(S): at 21:20

## 2024-03-17 RX ADMIN — CLOPIDOGREL BISULFATE 75 MILLIGRAM(S): 75 TABLET, FILM COATED ORAL at 12:56

## 2024-03-17 RX ADMIN — POLYETHYLENE GLYCOL 3350 17 GRAM(S): 17 POWDER, FOR SOLUTION ORAL at 17:26

## 2024-03-17 RX ADMIN — Medication 5 MILLILITER(S): at 17:27

## 2024-03-17 RX ADMIN — Medication 20 MILLIGRAM(S): at 06:44

## 2024-03-17 RX ADMIN — HUMAN INSULIN 7 UNIT(S): 100 INJECTION, SUSPENSION SUBCUTANEOUS at 05:45

## 2024-03-17 RX ADMIN — HUMAN INSULIN 7 UNIT(S): 100 INJECTION, SUSPENSION SUBCUTANEOUS at 12:46

## 2024-03-17 RX ADMIN — Medication 500000 UNIT(S): at 23:56

## 2024-03-17 RX ADMIN — HEPARIN SODIUM 5000 UNIT(S): 5000 INJECTION INTRAVENOUS; SUBCUTANEOUS at 13:44

## 2024-03-17 RX ADMIN — Medication 1 DROP(S): at 06:47

## 2024-03-17 RX ADMIN — HUMAN INSULIN 7 UNIT(S): 100 INJECTION, SUSPENSION SUBCUTANEOUS at 17:35

## 2024-03-17 RX ADMIN — Medication 2: at 23:57

## 2024-03-17 RX ADMIN — Medication 81 MILLIGRAM(S): at 12:57

## 2024-03-17 RX ADMIN — Medication 5 MILLILITER(S): at 13:06

## 2024-03-17 RX ADMIN — Medication 20 MILLIGRAM(S): at 13:44

## 2024-03-17 RX ADMIN — CHLORHEXIDINE GLUCONATE 15 MILLILITER(S): 213 SOLUTION TOPICAL at 06:46

## 2024-03-17 RX ADMIN — Medication 1 DROP(S): at 13:45

## 2024-03-17 RX ADMIN — POLYETHYLENE GLYCOL 3350 17 GRAM(S): 17 POWDER, FOR SOLUTION ORAL at 06:48

## 2024-03-17 RX ADMIN — HEPARIN SODIUM 5000 UNIT(S): 5000 INJECTION INTRAVENOUS; SUBCUTANEOUS at 21:19

## 2024-03-17 RX ADMIN — Medication 5 MILLILITER(S): at 06:46

## 2024-03-17 RX ADMIN — Medication 1 DROP(S): at 10:16

## 2024-03-17 RX ADMIN — PANTOPRAZOLE SODIUM 40 MILLIGRAM(S): 20 TABLET, DELAYED RELEASE ORAL at 12:57

## 2024-03-17 RX ADMIN — Medication 1 DROP(S): at 17:27

## 2024-03-17 RX ADMIN — Medication 500000 UNIT(S): at 06:47

## 2024-03-17 RX ADMIN — Medication 500000 UNIT(S): at 17:27

## 2024-03-17 RX ADMIN — SENNA PLUS 10 MILLILITER(S): 8.6 TABLET ORAL at 21:19

## 2024-03-17 RX ADMIN — HUMAN INSULIN 7 UNIT(S): 100 INJECTION, SUSPENSION SUBCUTANEOUS at 23:57

## 2024-03-17 NOTE — PROGRESS NOTE ADULT - NS ATTEND AMEND GEN_ALL_CORE FT
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 Pt showing some neurologic improvement, he is able to wiggle all extremities (not against gravity) as well as his torso and his facial muscles. Can communicate by mouthing words and nodding. Restarted on DAPT, held transiently 2/2 concern for hemoptysis, but no further issues to date. Currently without any bleeding. Will need at least 3 months per neuro. Repeat brain MRI last week concerning for multiple bilateral foci especially in right cerebellar, left parietal, and b/l basal ganglia. 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     Pt further with combined hypoxemic and hypercapnic respiratory failure with failure to wean from ventilator s/p tracheostomy placement (#8 cuffed Portex 2/16 surgery.) Tolerating PRVC 20/500/7/40% well, now improving with PSV trials. Airway clearance therapy in place. Trach care and suctioning as per RCU team. Oral hygiene and aspiration precautions in place.     Hospital course further c/b sympathetic storm while in NSICU with labile BP. Now resolving. Recently with septic shock 2/2 Pseudomonas VAP, now with resolution off vasopressors. Off droxidopa since 3/10. Abx completed 3/11. Will CTM off Abx with low threshold to re-initiate for clinical s/s acute infectious process. Pt has reportedly had periods of bradycardia transiently, will monitor on telemetry with EKG if recurs.     Pt with oropharyngeal dysphagia s/p PEG placement. Now tolerating feeds at goal rate. Bowel regimen prn. No acute renal issues. DMII well controlled with NPH and ISS with BGFS monitoring.     Dispo pending medical optimization. Pt full code. Pt is a Protestant, family asked to consent to blood products with each event as needed. DVT ppx Lovenox. Pt's wife at bedside, will continue to update regularly throughout hospitalization. 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 Pt showing some neurologic improvement, he is able to wiggle all extremities (not against gravity) as well as his torso and his facial muscles. Can communicate by mouthing words and nodding. Restarted on DAPT, held transiently 2/2 concern for hemoptysis, but no further issues to date. Currently without any bleeding. Will need at least 3 months per neuro. Repeat brain MRI last week concerning for multiple bilateral foci especially in right cerebellar, left parietal, and b/l basal ganglia. 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     Pt further with combined hypoxemic and hypercapnic respiratory failure with failure to wean from ventilator s/p tracheostomy placement (#8 cuffed Portex 2/16 surgery.) Tolerating PRVC 20/500/7/40% well, now improving with PSV trials. Was unable to tolerate TC on 1st attempt 2/2 bradycardia. Airway clearance therapy in place. Trach care and suctioning as per RCU team. Oral hygiene and aspiration precautions in place.     Hospital course further c/b sympathetic storm while in NSICU with labile BP. Now resolving. Recently with septic shock 2/2 Pseudomonas VAP, now with resolution off vasopressors. Off droxidopa since 3/10. Abx completed 3/11. Will CTM off Abx with low threshold to re-initiate for clinical s/s acute infectious process.    Pt with oropharyngeal dysphagia s/p PEG placement. Now tolerating feeds at goal rate. Bowel regimen prn. No acute renal issues. DMII well controlled with NPH and ISS with BGFS monitoring.     Dispo pending medical optimization. Pt full code. Pt is a Rastafari, family asked to consent to blood products with each event as needed. DVT ppx Lovenox. Pt's wife at bedside, will continue to update regularly throughout hospitalization.

## 2024-03-17 NOTE — PROGRESS NOTE ADULT - SUBJECTIVE AND OBJECTIVE BOX
Patient is a 73y old  Male who presents with a chief complaint of Stroke, critical stenosis, evaluation for angiography (16 Mar 2024 07:54)      Interval Events:    REVIEW OF SYSTEMS:  [ ] Positive  [ ] All other systems negative  [ ] Unable to assess ROS because ________    Vital Signs Last 24 Hrs  T(C): 37.2 (03-17-24 @ 05:09), Max: 37.3 (03-16-24 @ 12:00)  T(F): 98.9 (03-17-24 @ 05:09), Max: 99.2 (03-16-24 @ 12:00)  HR: 101 (03-17-24 @ 05:09) (90 - 101)  BP: 134/78 (03-17-24 @ 05:09) (117/64 - 148/82)  RR: 20 (03-17-24 @ 05:09) (18 - 21)  SpO2: 99% (03-17-24 @ 05:09) (97% - 100%)    PHYSICAL EXAM:  HEENT:   [ ]Tracheostomy:  [ ]Pupils equal  [ ]No oral lesions  [ ]Abnormal    SKIN  [ ]No Rash  [ ] Abnormal  [ ] pressure    CARDIAC  [ ]Regular  [ ]Abnormal    PULMONARY  [ ]Bilateral Clear Breath Sounds  [ ]Normal Excursion  [ ]Abnormal    GI  [ ]PEG      [ ] +BS		              [ ]Soft, nondistended, nontender	  [ ]Abnormal    MUSCULOSKELETAL                                   [ ]Bedbound                 [ ]Abnormal    [ ]Ambulatory/OOB to chair                           EXTREMITIES                                         [ ]Normal  [ ]Edema                           NEUROLOGIC  [ ] Normal, non focal  [ ] Focal findings:    PSYCHIATRIC  [ ]Alert and appropriate  [ ] Sedated	 [ ]Agitated    :  [place]: [ ] Yes, if yes: Date of Placement:                   [  ] No    LINES: Central Lines [ ] Yes, if yes: Date of Placement                                     [  ] No    HOSPITAL MEDICATIONS:  MEDICATIONS  (STANDING):  artificial tears (preservative free) Ophthalmic Solution 1 Drop(s) Both EYES every 4 hours  aspirin  chewable 81 milliGRAM(s) Oral daily  Biotene Dry Mouth Oral Rinse 5 milliLiter(s) Swish and Spit every 6 hours  chlorhexidine 0.12% Liquid 15 milliLiter(s) Oral Mucosa every 12 hours  chlorhexidine 4% Liquid 1 Application(s) Topical daily  clopidogrel Tablet 75 milliGRAM(s) Enteral Tube daily  furosemide   Injectable 20 milliGRAM(s) IV Push two times a day  heparin   Injectable 5000 Unit(s) SubCutaneous every 8 hours  insulin lispro (ADMELOG) corrective regimen sliding scale   SubCutaneous every 6 hours  insulin NPH human recombinant 7 Unit(s) SubCutaneous every 6 hours  nystatin    Suspension 498362 Unit(s) Oral every 6 hours  pantoprazole   Suspension 40 milliGRAM(s) Oral daily  polyethylene glycol 3350 17 Gram(s) Oral two times a day  senna Syrup 10 milliLiter(s) Oral at bedtime    MEDICATIONS  (PRN):  albuterol/ipratropium for Nebulization 3 milliLiter(s) Nebulizer every 6 hours PRN Shortness of Breath and/or Wheezing      LABS:                        9.6    9.89  )-----------( 452      ( 16 Mar 2024 06:24 )             33.8     03-16    142  |  105  |  26<H>  ----------------------------<  162<H>  3.9   |  27  |  0.55    Ca    8.4      16 Mar 2024 06:26  Phos  2.8     03-16  Mg     2.2     03-16        Urinalysis Basic - ( 16 Mar 2024 06:26 )    Color: x / Appearance: x / SG: x / pH: x  Gluc: 162 mg/dL / Ketone: x  / Bili: x / Urobili: x   Blood: x / Protein: x / Nitrite: x   Leuk Esterase: x / RBC: x / WBC x   Sq Epi: x / Non Sq Epi: x / Bacteria: x          CAPILLARY BLOOD GLUCOSE    MICROBIOLOGY:     RADIOLOGY:  [ ] Reviewed and interpreted by me    Mode: AC/ CMV (Assist Control/ Continuous Mandatory Ventilation)  RR (machine): 20  TV (machine): 500  FiO2: 30  PEEP: 5  ITime: 1  MAP: 11  PIP: 20   Patient is a 73y old  Male who presents with a chief complaint of Stroke, critical stenosis, evaluation for angiography (16 Mar 2024 07:54)      Interval Events:  No acute events overnight.     REVIEW OF SYSTEMS:  [ ] Positive  [ ] All other systems negative  [X] Unable to assess ROS because ___occasionally able to nod to communicate but inconsistent___    Vital Signs Last 24 Hrs  T(C): 37.2 (03-17-24 @ 05:09), Max: 37.3 (03-16-24 @ 12:00)  T(F): 98.9 (03-17-24 @ 05:09), Max: 99.2 (03-16-24 @ 12:00)  HR: 101 (03-17-24 @ 05:09) (90 - 101)  BP: 134/78 (03-17-24 @ 05:09) (117/64 - 148/82)  RR: 20 (03-17-24 @ 05:09) (18 - 21)  SpO2: 99% (03-17-24 @ 05:09) (97% - 100%)    PHYSICAL EXAM:  HEENT:   [X]Tracheostomy: #8 cuffed portex  [X]Pupils equal  [ ]No oral lesions  [ ]Abnormal    SKIN  [X]No Rash  [ ] Abnormal  [ ] pressure    CARDIAC  [X]Regular  [ ]Abnormal    PULMONARY  [X]Bilateral Clear Breath Sounds  [ ]Normal Excursion  [ ]Abnormal    GI  [X]PEG      [X] +BS		              [X]Soft, nondistended, nontender	  [ ]Abnormal    MUSCULOSKELETAL                                   [X]Bedbound                 [ ]Abnormal    [ ]Ambulatory/OOB to chair                           EXTREMITIES                                         [ ]Normal  [X]Edema - BL UE edema                         NEUROLOGIC  [ ] Normal, non focal  [X] Focal findings: opens eye spontaneously and to voice, follows commands x4 extremities    PSYCHIATRIC  [X]Alert and appropriate  [ ] Sedated	 [ ]Agitated    :  [place]: [ ] Yes, if yes: Date of Placement:                   [X] No    LINES: Central Lines [ ] Yes, if yes: Date of Placement                                     [X] No    HOSPITAL MEDICATIONS:  MEDICATIONS  (STANDING):  artificial tears (preservative free) Ophthalmic Solution 1 Drop(s) Both EYES every 4 hours  aspirin  chewable 81 milliGRAM(s) Oral daily  Biotene Dry Mouth Oral Rinse 5 milliLiter(s) Swish and Spit every 6 hours  chlorhexidine 0.12% Liquid 15 milliLiter(s) Oral Mucosa every 12 hours  chlorhexidine 4% Liquid 1 Application(s) Topical daily  clopidogrel Tablet 75 milliGRAM(s) Enteral Tube daily  furosemide   Injectable 20 milliGRAM(s) IV Push two times a day  heparin   Injectable 5000 Unit(s) SubCutaneous every 8 hours  insulin lispro (ADMELOG) corrective regimen sliding scale   SubCutaneous every 6 hours  insulin NPH human recombinant 7 Unit(s) SubCutaneous every 6 hours  nystatin    Suspension 657081 Unit(s) Oral every 6 hours  pantoprazole   Suspension 40 milliGRAM(s) Oral daily  polyethylene glycol 3350 17 Gram(s) Oral two times a day  senna Syrup 10 milliLiter(s) Oral at bedtime    MEDICATIONS  (PRN):  albuterol/ipratropium for Nebulization 3 milliLiter(s) Nebulizer every 6 hours PRN Shortness of Breath and/or Wheezing    LABS:                        9.6    9.89  )-----------( 452      ( 16 Mar 2024 06:24 )             33.8     03-16    142  |  105  |  26<H>  ----------------------------<  162<H>  3.9   |  27  |  0.55    Ca    8.4      16 Mar 2024 06:26  Phos  2.8     03-16  Mg     2.2     03-16    Urinalysis Basic - ( 16 Mar 2024 06:26 )    Color: x / Appearance: x / SG: x / pH: x  Gluc: 162 mg/dL / Ketone: x  / Bili: x / Urobili: x   Blood: x / Protein: x / Nitrite: x   Leuk Esterase: x / RBC: x / WBC x   Sq Epi: x / Non Sq Epi: x / Bacteria: x    CAPILLARY BLOOD GLUCOSE    MICROBIOLOGY:     RADIOLOGY:  [ ] Reviewed and interpreted by me    Mode: AC/ CMV (Assist Control/ Continuous Mandatory Ventilation)  RR (machine): 20  TV (machine): 500  FiO2: 30  PEEP: 5  ITime: 1  MAP: 11  PIP: 20

## 2024-03-17 NOTE — PROGRESS NOTE ADULT - ASSESSMENT
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 Prior to admission patient had Viral URI ( 1-2 weeks prior to admission) with subsequent weakness. He underwent an angiogram (2/11) which showed moderate stenosis and no intervention was done. MRI Performed c/w small bilateral cerebellar strokes and prior L thalamic strokes, as per Neurology was not c/w current presentation. 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 He is currently s/p 5 rounds of plasma exchange (2/13-2/20) and IVIG x 5 ( 2/21-2/26)  His hospital course was complicated by progressive respiratory failure. CT Chest performed on 2/20 with atelectasis of b/l lower lobes. BAL 2/21 Grew PSA treated with course of Zosyn (2/21-2/28).  Patient transferred to RCU on 2/24.   RRT called on 3/3 for unresponsiveness. Patient with unreactive pupils, no corneal reflex. Also found to be hypotensive and febrile to 105. Noted to have new melena. Hgb 5. Given 1 unit of pRBC. Transferred to MICU. patient required pressors while in the MICU, BP improved and was transitioned to Droxidopa. Patient had CT C/A/P consistent with Bibasilar pneumonia; Sputum cx grew PSA and was treated with course of Meropenem. Patient evaluated by GI in setting of Melena, transaminitis and portal venous gas on CT imaging. No EGD performed as Melena resolved, Transaminitis was thought to be in setting of shock and portal venous gas was thought to be in setting of recent PEG. EEG was performed in setting of AMS no seizures noted. Repeat MRI Performed which showed Small scattered foci of diffusion restriction within the right cerebellar hemisphere, left parietal lobe, and symmetrically in the bilateral basal ganglia. Neuro recommended Resumption of ASA in setting of possible embolic phenomenon. Repeat ECHO performed RT Ventricular Moderately enlarged, reduced systolic function. Mental status improved and was transferred back to RCU on 3/6.       3/13: Pt clinical status has remained unchanged and stable.  Last hypoxic/bradycardic event was on 3/11 while being placed in supine position on PS. Patient tolerating CPAP trials today will continue to progress as tolerated. Patient remains with anasarca but clinically improving, will give additional Lasix 20 mg IVP Q 12 HRs x 1 day. Patient evaluated by Neurology yesterday and Plavix resumed. Patient with constipation will increase Miralax to BID and give Dulcolax supp x 1; Monitor for BM.   3/14: Patient progressing well on PS 8/5 today, will continue throughout the day with trach collar plans to follow.  Additional lasix 20mg BID given for diuresis as limb edema appears improved.   3/15:  Attempted trach collar however patient quickly developed bradycardia into 50s, placed back on PS.  Will continue lasix and free water.    3/16: No acute events. Will advance PS to 5/5 as tolerated.  Remains edematous but net negative 165ml.  Will continue lasix, free water reduced to Q12H. 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 Prior to admission patient had Viral URI ( 1-2 weeks prior to admission) with subsequent weakness. He underwent an angiogram (2/11) which showed moderate stenosis and no intervention was done. MRI Performed c/w small bilateral cerebellar strokes and prior L thalamic strokes, as per Neurology was not c/w current presentation. 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 He is currently s/p 5 rounds of plasma exchange (2/13-2/20) and IVIG x 5 ( 2/21-2/26)  His hospital course was complicated by progressive respiratory failure. CT Chest performed on 2/20 with atelectasis of b/l lower lobes. BAL 2/21 Grew PSA treated with course of Zosyn (2/21-2/28).  Patient transferred to RCU on 2/24.   RRT called on 3/3 for unresponsiveness. Patient with unreactive pupils, no corneal reflex. Also found to be hypotensive and febrile to 105. Noted to have new melena. Hgb 5. Given 1 unit of pRBC. Transferred to MICU. patient required pressors while in the MICU, BP improved and was transitioned to Droxidopa. Patient had CT C/A/P consistent with Bibasilar pneumonia; Sputum cx grew PSA and was treated with course of Meropenem. Patient evaluated by GI in setting of Melena, transaminitis and portal venous gas on CT imaging. No EGD performed as Melena resolved, Transaminitis was thought to be in setting of shock and portal venous gas was thought to be in setting of recent PEG. EEG was performed in setting of AMS no seizures noted. Repeat MRI Performed which showed Small scattered foci of diffusion restriction within the right cerebellar hemisphere, left parietal lobe, and symmetrically in the bilateral basal ganglia. Neuro recommended Resumption of ASA in setting of possible embolic phenomenon. Repeat ECHO performed RT Ventricular Moderately enlarged, reduced systolic function. Mental status improved and was transferred back to RCU on 3/6.       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 Prior to admission patient had Viral URI ( 1-2 weeks prior to admission) with subsequent weakness. He underwent an angiogram (2/11) which showed moderate stenosis and no intervention was done. MRI Performed c/w small bilateral cerebellar strokes and prior L thalamic strokes, as per Neurology was not c/w current presentation. 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 He is currently s/p 5 rounds of plasma exchange (2/13-2/20) and IVIG x 5 ( 2/21-2/26)  His hospital course was complicated by progressive respiratory failure. CT Chest performed on 2/20 with atelectasis of b/l lower lobes. BAL 2/21 Grew PSA treated with course of Zosyn (2/21-2/28).  Patient transferred to RCU on 2/24.   RRT called on 3/3 for unresponsiveness. Patient with unreactive pupils, no corneal reflex. Also found to be hypotensive and febrile to 105. Noted to have new melena. Hgb 5. Given 1 unit of pRBC. Transferred to MICU. patient required pressors while in the MICU, BP improved and was transitioned to Droxidopa. Patient had CT C/A/P consistent with Bibasilar pneumonia; Sputum cx grew PSA and was treated with course of Meropenem. Patient evaluated by GI in setting of Melena, transaminitis and portal venous gas on CT imaging. No EGD performed as Melena resolved, Transaminitis was thought to be in setting of shock and portal venous gas was thought to be in setting of recent PEG. EEG was performed in setting of AMS no seizures noted. Repeat MRI Performed which showed Small scattered foci of diffusion restriction within the right cerebellar hemisphere, left parietal lobe, and symmetrically in the bilateral basal ganglia. Neuro recommended Resumption of ASA in setting of possible embolic phenomenon. Repeat ECHO performed RT Ventricular Moderately enlarged, reduced systolic function. Mental status improved and was transferred back to RCU on 3/6.       3/17:  Tolerated 8/5 for most of the morning, then started to pull low volumes so was switched to 10/5.  Pt tolerated 10/5 well.  Otherwise, no other significant changes in clinical status.  Cardiology consult (Dr. Cary) for RV enlargement and persistent intermittent episodes of bradycardia.

## 2024-03-17 NOTE — PROGRESS NOTE ADULT - PROBLEM SELECTOR PLAN 6
- MRI 2/12 - small b/l cerebellar strokes (post-procedural) and prior L thalamic strokes  - MRI 3/5: Small scattered foci of diffusion restriction within the right cerebellar hemisphere, left parietal lobe, and symmetrically in the bilateral basal ganglia.  - Lipitor d/c'd in setting of liver injury 3/3  [facility]/Plavix x3 months then ASA monotherapy  - ECHO 3/7: EF 74% RT Ventricular Moderately Enlarged and reduced systolic fxn  - Neurology following

## 2024-03-17 NOTE — PROGRESS NOTE ADULT - PROBLEM SELECTOR PLAN 8
- Patient Jehovah's witness  - Family would like to be asked prior to administration of blood products   - S/p 1 unit PRBCs on 2/22 and 3/3  - S/p Venofer (2/22-2/27)   - S/p Cyanocobalamine, Folic acid, Ferrous Sulfate  - S/p Epogen x 5 days (Ended 2/27)  - Reported Melena on 3/3   - CT A/P 3/3 negative for acute bleed  - GI did not recommend EGD as no overt signs of active bleeding  and responded to PRBCs  - Will use pediatric tubes to limit volume for phlebotomy as patient is Y[employer]anism   - Monitor for melena

## 2024-03-18 LAB
ANION GAP SERPL CALC-SCNC: 11 MMOL/L — SIGNIFICANT CHANGE UP (ref 5–17)
BUN SERPL-MCNC: 29 MG/DL — HIGH (ref 7–23)
CALCIUM SERPL-MCNC: 8.5 MG/DL — SIGNIFICANT CHANGE UP (ref 8.4–10.5)
CHLORIDE SERPL-SCNC: 105 MMOL/L — SIGNIFICANT CHANGE UP (ref 96–108)
CO2 SERPL-SCNC: 27 MMOL/L — SIGNIFICANT CHANGE UP (ref 22–31)
CREAT SERPL-MCNC: 0.57 MG/DL — SIGNIFICANT CHANGE UP (ref 0.5–1.3)
EGFR: 104 ML/MIN/1.73M2 — SIGNIFICANT CHANGE UP
GLUCOSE BLDC GLUCOMTR-MCNC: 137 MG/DL — HIGH (ref 70–99)
GLUCOSE BLDC GLUCOMTR-MCNC: 141 MG/DL — HIGH (ref 70–99)
GLUCOSE BLDC GLUCOMTR-MCNC: 146 MG/DL — HIGH (ref 70–99)
GLUCOSE BLDC GLUCOMTR-MCNC: 147 MG/DL — HIGH (ref 70–99)
GLUCOSE BLDC GLUCOMTR-MCNC: 156 MG/DL — HIGH (ref 70–99)
GLUCOSE SERPL-MCNC: 134 MG/DL — HIGH (ref 70–99)
MAGNESIUM SERPL-MCNC: 2.4 MG/DL — SIGNIFICANT CHANGE UP (ref 1.6–2.6)
PHOSPHATE SERPL-MCNC: 3 MG/DL — SIGNIFICANT CHANGE UP (ref 2.5–4.5)
POTASSIUM SERPL-MCNC: 4.1 MMOL/L — SIGNIFICANT CHANGE UP (ref 3.5–5.3)
POTASSIUM SERPL-SCNC: 4.1 MMOL/L — SIGNIFICANT CHANGE UP (ref 3.5–5.3)
SODIUM SERPL-SCNC: 143 MMOL/L — SIGNIFICANT CHANGE UP (ref 135–145)

## 2024-03-18 PROCEDURE — 99233 SBSQ HOSP IP/OBS HIGH 50: CPT

## 2024-03-18 PROCEDURE — 71275 CT ANGIOGRAPHY CHEST: CPT | Mod: 26

## 2024-03-18 PROCEDURE — 93010 ELECTROCARDIOGRAM REPORT: CPT

## 2024-03-18 RX ADMIN — POLYETHYLENE GLYCOL 3350 17 GRAM(S): 17 POWDER, FOR SOLUTION ORAL at 05:19

## 2024-03-18 RX ADMIN — Medication 1 DROP(S): at 01:34

## 2024-03-18 RX ADMIN — Medication 20 MILLIGRAM(S): at 05:18

## 2024-03-18 RX ADMIN — HUMAN INSULIN 7 UNIT(S): 100 INJECTION, SUSPENSION SUBCUTANEOUS at 18:00

## 2024-03-18 RX ADMIN — Medication 1 DROP(S): at 17:59

## 2024-03-18 RX ADMIN — Medication 1 DROP(S): at 14:41

## 2024-03-18 RX ADMIN — CLOPIDOGREL BISULFATE 75 MILLIGRAM(S): 75 TABLET, FILM COATED ORAL at 12:55

## 2024-03-18 RX ADMIN — HEPARIN SODIUM 5000 UNIT(S): 5000 INJECTION INTRAVENOUS; SUBCUTANEOUS at 14:40

## 2024-03-18 RX ADMIN — CHLORHEXIDINE GLUCONATE 1 APPLICATION(S): 213 SOLUTION TOPICAL at 21:22

## 2024-03-18 RX ADMIN — HUMAN INSULIN 7 UNIT(S): 100 INJECTION, SUSPENSION SUBCUTANEOUS at 13:52

## 2024-03-18 RX ADMIN — Medication 2: at 18:00

## 2024-03-18 RX ADMIN — Medication 500000 UNIT(S): at 05:18

## 2024-03-18 RX ADMIN — Medication 5 MILLILITER(S): at 17:58

## 2024-03-18 RX ADMIN — HEPARIN SODIUM 5000 UNIT(S): 5000 INJECTION INTRAVENOUS; SUBCUTANEOUS at 21:22

## 2024-03-18 RX ADMIN — Medication 81 MILLIGRAM(S): at 16:47

## 2024-03-18 RX ADMIN — Medication 500000 UNIT(S): at 12:54

## 2024-03-18 RX ADMIN — CHLORHEXIDINE GLUCONATE 15 MILLILITER(S): 213 SOLUTION TOPICAL at 17:59

## 2024-03-18 RX ADMIN — Medication 1 DROP(S): at 21:21

## 2024-03-18 RX ADMIN — SENNA PLUS 10 MILLILITER(S): 8.6 TABLET ORAL at 21:21

## 2024-03-18 RX ADMIN — POLYETHYLENE GLYCOL 3350 17 GRAM(S): 17 POWDER, FOR SOLUTION ORAL at 17:59

## 2024-03-18 RX ADMIN — Medication 1 DROP(S): at 10:30

## 2024-03-18 RX ADMIN — Medication 1 DROP(S): at 05:18

## 2024-03-18 RX ADMIN — Medication 5 MILLILITER(S): at 05:18

## 2024-03-18 RX ADMIN — Medication 20 MILLIGRAM(S): at 14:40

## 2024-03-18 RX ADMIN — Medication 5 MILLILITER(S): at 23:24

## 2024-03-18 RX ADMIN — HUMAN INSULIN 7 UNIT(S): 100 INJECTION, SUSPENSION SUBCUTANEOUS at 23:24

## 2024-03-18 RX ADMIN — HEPARIN SODIUM 5000 UNIT(S): 5000 INJECTION INTRAVENOUS; SUBCUTANEOUS at 05:19

## 2024-03-18 RX ADMIN — PANTOPRAZOLE SODIUM 40 MILLIGRAM(S): 20 TABLET, DELAYED RELEASE ORAL at 12:55

## 2024-03-18 RX ADMIN — CHLORHEXIDINE GLUCONATE 15 MILLILITER(S): 213 SOLUTION TOPICAL at 05:18

## 2024-03-18 RX ADMIN — Medication 500000 UNIT(S): at 17:58

## 2024-03-18 RX ADMIN — HUMAN INSULIN 7 UNIT(S): 100 INJECTION, SUSPENSION SUBCUTANEOUS at 06:00

## 2024-03-18 RX ADMIN — Medication 500000 UNIT(S): at 23:25

## 2024-03-18 RX ADMIN — Medication 5 MILLILITER(S): at 12:54

## 2024-03-18 NOTE — CONSULT NOTE ADULT - ASSESSMENT
Episode of Bradycardia   recommend to transfer to tele / ICU setting to have rhythm monitoring to diagnose pt bradycardia     RV enlargement   suspect due to his underlying respiratory failure   would get CTA chest rule out PE /PNA

## 2024-03-18 NOTE — PROGRESS NOTE ADULT - SUBJECTIVE AND OBJECTIVE BOX
Patient is a 73y old  Male who presents with a chief complaint of Stroke, critical stenosis, evaluation for angiography (17 Mar 2024 07:04)      Interval Events:    REVIEW OF SYSTEMS:  [ ] Positive  [ ] All other systems negative  [ ] Unable to assess ROS because ________    Vital Signs Last 24 Hrs  T(C): 37.1 (03-18-24 @ 05:13), Max: 37.4 (03-18-24 @ 01:26)  T(F): 98.7 (03-18-24 @ 05:13), Max: 99.3 (03-18-24 @ 01:26)  HR: 98 (03-18-24 @ 05:13) (93 - 102)  BP: 112/72 (03-18-24 @ 05:13) (112/72 - 157/84)  RR: 20 (03-18-24 @ 05:13) (18 - 20)  SpO2: 100% (03-18-24 @ 05:13) (99% - 100%)    PHYSICAL EXAM:  HEENT:   [ ]Tracheostomy:  [ ]Pupils equal  [ ]No oral lesions  [ ]Abnormal    SKIN  [ ]No Rash  [ ] Abnormal  [ ] pressure    CARDIAC  [ ]Regular  [ ]Abnormal    PULMONARY  [ ]Bilateral Clear Breath Sounds  [ ]Normal Excursion  [ ]Abnormal    GI  [ ]PEG      [ ] +BS		              [ ]Soft, nondistended, nontender	  [ ]Abnormal    MUSCULOSKELETAL                                   [ ]Bedbound                 [ ]Abnormal    [ ]Ambulatory/OOB to chair                           EXTREMITIES                                         [ ]Normal  [ ]Edema                           NEUROLOGIC  [ ] Normal, non focal  [ ] Focal findings:    PSYCHIATRIC  [ ]Alert and appropriate  [ ] Sedated	 [ ]Agitated    :  [place]: [ ] Yes, if yes: Date of Placement:                   [  ] No    LINES: Central Lines [ ] Yes, if yes: Date of Placement                                     [  ] No    HOSPITAL MEDICATIONS:  MEDICATIONS  (STANDING):  artificial tears (preservative free) Ophthalmic Solution 1 Drop(s) Both EYES every 4 hours  aspirin  chewable 81 milliGRAM(s) Oral daily  Biotene Dry Mouth Oral Rinse 5 milliLiter(s) Swish and Spit every 6 hours  chlorhexidine 0.12% Liquid 15 milliLiter(s) Oral Mucosa every 12 hours  chlorhexidine 4% Liquid 1 Application(s) Topical daily  clopidogrel Tablet 75 milliGRAM(s) Enteral Tube daily  furosemide   Injectable 20 milliGRAM(s) IV Push two times a day  heparin   Injectable 5000 Unit(s) SubCutaneous every 8 hours  insulin lispro (ADMELOG) corrective regimen sliding scale   SubCutaneous every 6 hours  insulin NPH human recombinant 7 Unit(s) SubCutaneous every 6 hours  nystatin    Suspension 105605 Unit(s) Oral every 6 hours  pantoprazole   Suspension 40 milliGRAM(s) Oral daily  polyethylene glycol 3350 17 Gram(s) Oral two times a day  senna Syrup 10 milliLiter(s) Oral at bedtime    MEDICATIONS  (PRN):  albuterol/ipratropium for Nebulization 3 milliLiter(s) Nebulizer every 6 hours PRN Shortness of Breath and/or Wheezing      LABS:    03-17    143  |  103  |  25<H>  ----------------------------<  133<H>  4.2   |  31  |  0.53    Ca    8.6      17 Mar 2024 10:48  Phos  3.4     03-17  Mg     2.3     03-17        Urinalysis Basic - ( 17 Mar 2024 10:48 )    Color: x / Appearance: x / SG: x / pH: x  Gluc: 133 mg/dL / Ketone: x  / Bili: x / Urobili: x   Blood: x / Protein: x / Nitrite: x   Leuk Esterase: x / RBC: x / WBC x   Sq Epi: x / Non Sq Epi: x / Bacteria: x          CAPILLARY BLOOD GLUCOSE    MICROBIOLOGY:     RADIOLOGY:  [ ] Reviewed and interpreted by me    Mode: AC/ CMV (Assist Control/ Continuous Mandatory Ventilation)  RR (machine): 20  TV (machine): 500  FiO2: 30  PEEP: 5  ITime: 1  MAP: 10  PIP: 21   Patient is a 73y old  Male who presents with a chief complaint of Stroke, critical stenosis, evaluation for angiography (17 Mar 2024 07:04)      Interval Events:  No acute events overnight.     REVIEW OF SYSTEMS:  [ ] Positive  [ ] All other systems negative  [X] Unable to assess ROS because ___occasionally able to nod to communicate but inconsistent___    Vital Signs Last 24 Hrs  T(C): 37.1 (03-18-24 @ 05:13), Max: 37.4 (03-18-24 @ 01:26)  T(F): 98.7 (03-18-24 @ 05:13), Max: 99.3 (03-18-24 @ 01:26)  HR: 98 (03-18-24 @ 05:13) (93 - 102)  BP: 112/72 (03-18-24 @ 05:13) (112/72 - 157/84)  RR: 20 (03-18-24 @ 05:13) (18 - 20)  SpO2: 100% (03-18-24 @ 05:13) (99% - 100%)    PHYSICAL EXAM:  HEENT:   [X]Tracheostomy: #8 cuffed portex  [X]Pupils equal  [ ]No oral lesions  [ ]Abnormal    SKIN  [X]No Rash  [ ] Abnormal  [ ] pressure    CARDIAC  [X]Regular  [ ]Abnormal    PULMONARY  [X]Bilateral Clear Breath Sounds  [ ]Normal Excursion  [ ]Abnormal    GI  [X]PEG      [X] +BS		              [X]Soft, nondistended, nontender	  [ ]Abnormal    MUSCULOSKELETAL                                   [X]Bedbound                 [ ]Abnormal    [ ]Ambulatory/OOB to chair                           EXTREMITIES                                         [ ]Normal  [X]Edema - BL UE edema                         NEUROLOGIC  [ ] Normal, non focal  [X] Focal findings: opens eye spontaneously and to voice, follows commands x4 extremities    PSYCHIATRIC  [X]Alert and appropriate  [ ] Sedated	 [ ]Agitated    :  [place]: [ ] Yes, if yes: Date of Placement:                   [X] No    LINES: Central Lines [ ] Yes, if yes: Date of Placement                                     [X] No    HOSPITAL MEDICATIONS:  MEDICATIONS  (STANDING):  artificial tears (preservative free) Ophthalmic Solution 1 Drop(s) Both EYES every 4 hours  aspirin  chewable 81 milliGRAM(s) Oral daily  Biotene Dry Mouth Oral Rinse 5 milliLiter(s) Swish and Spit every 6 hours  chlorhexidine 0.12% Liquid 15 milliLiter(s) Oral Mucosa every 12 hours  chlorhexidine 4% Liquid 1 Application(s) Topical daily  clopidogrel Tablet 75 milliGRAM(s) Enteral Tube daily  furosemide   Injectable 20 milliGRAM(s) IV Push two times a day  heparin   Injectable 5000 Unit(s) SubCutaneous every 8 hours  insulin lispro (ADMELOG) corrective regimen sliding scale   SubCutaneous every 6 hours  insulin NPH human recombinant 7 Unit(s) SubCutaneous every 6 hours  nystatin    Suspension 113993 Unit(s) Oral every 6 hours  pantoprazole   Suspension 40 milliGRAM(s) Oral daily  polyethylene glycol 3350 17 Gram(s) Oral two times a day  senna Syrup 10 milliLiter(s) Oral at bedtime    MEDICATIONS  (PRN):  albuterol/ipratropium for Nebulization 3 milliLiter(s) Nebulizer every 6 hours PRN Shortness of Breath and/or Wheezing      LABS:    03-17    143  |  103  |  25<H>  ----------------------------<  133<H>  4.2   |  31  |  0.53    Ca    8.6      17 Mar 2024 10:48  Phos  3.4     03-17  Mg     2.3     03-17        Urinalysis Basic - ( 17 Mar 2024 10:48 )    Color: x / Appearance: x / SG: x / pH: x  Gluc: 133 mg/dL / Ketone: x  / Bili: x / Urobili: x   Blood: x / Protein: x / Nitrite: x   Leuk Esterase: x / RBC: x / WBC x   Sq Epi: x / Non Sq Epi: x / Bacteria: x          CAPILLARY BLOOD GLUCOSE    MICROBIOLOGY:     RADIOLOGY:  [ ] Reviewed and interpreted by me    Mode: AC/ CMV (Assist Control/ Continuous Mandatory Ventilation)  RR (machine): 20  TV (machine): 500  FiO2: 30  PEEP: 5  ITime: 1  MAP: 10  PIP: 21

## 2024-03-18 NOTE — PROGRESS NOTE ADULT - PROBLEM SELECTOR PLAN 8
- Patient Buddhist  - Family would like to be asked prior to administration of blood products   - S/p 1 unit PRBCs on 2/22 and 3/3  - S/p Venofer (2/22-2/27)   - S/p Cyanocobalamine, Folic acid, Ferrous Sulfate  - S/p Epogen x 5 days (Ended 2/27)  - Reported Melena on 3/3   - CT A/P 3/3 negative for acute bleed  - GI did not recommend EGD as no overt signs of active bleeding  and responded to PRBCs  - Will use pediatric tubes to limit volume for phlebotomy as patient is Pentec[employer]al   - Monitor for melena

## 2024-03-18 NOTE — PROGRESS NOTE ADULT - ASSESSMENT
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 Prior to admission patient had Viral URI ( 1-2 weeks prior to admission) with subsequent weakness. He underwent an angiogram (2/11) which showed moderate stenosis and no intervention was done. MRI Performed c/w small bilateral cerebellar strokes and prior L thalamic strokes, as per Neurology was not c/w current presentation. 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 He is currently s/p 5 rounds of plasma exchange (2/13-2/20) and IVIG x 5 ( 2/21-2/26)  His hospital course was complicated by progressive respiratory failure. CT Chest performed on 2/20 with atelectasis of b/l lower lobes. BAL 2/21 Grew PSA treated with course of Zosyn (2/21-2/28).  Patient transferred to RCU on 2/24.   RRT called on 3/3 for unresponsiveness. Patient with unreactive pupils, no corneal reflex. Also found to be hypotensive and febrile to 105. Noted to have new melena. Hgb 5. Given 1 unit of pRBC. Transferred to MICU. patient required pressors while in the MICU, BP improved and was transitioned to Droxidopa. Patient had CT C/A/P consistent with Bibasilar pneumonia; Sputum cx grew PSA and was treated with course of Meropenem. Patient evaluated by GI in setting of Melena, transaminitis and portal venous gas on CT imaging. No EGD performed as Melena resolved, Transaminitis was thought to be in setting of shock and portal venous gas was thought to be in setting of recent PEG. EEG was performed in setting of AMS no seizures noted. Repeat MRI Performed which showed Small scattered foci of diffusion restriction within the right cerebellar hemisphere, left parietal lobe, and symmetrically in the bilateral basal ganglia. Neuro recommended Resumption of ASA in setting of possible embolic phenomenon. Repeat ECHO performed RT Ventricular Moderately enlarged, reduced systolic function. Mental status improved and was transferred back to RCU on 3/6.       3/17:  Tolerated 8/5 for most of the morning, then started to pull low volumes so was switched to 10/5.  Pt tolerated 10/5 well.  Otherwise, no other significant changes in clinical status.  Cardiology consult (Dr. Cary) for RV enlargement and persistent intermittent episodes of bradycardia. 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 Prior to admission patient had Viral URI ( 1-2 weeks prior to admission) with subsequent weakness. He underwent an angiogram (2/11) which showed moderate stenosis and no intervention was done. MRI Performed c/w small bilateral cerebellar strokes and prior L thalamic strokes, as per Neurology was not c/w current presentation. 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 He is currently s/p 5 rounds of plasma exchange (2/13-2/20) and IVIG x 5 ( 2/21-2/26)  His hospital course was complicated by progressive respiratory failure. CT Chest performed on 2/20 with atelectasis of b/l lower lobes. BAL 2/21 Grew PSA treated with course of Zosyn (2/21-2/28).  Patient transferred to RCU on 2/24.   RRT called on 3/3 for unresponsiveness. Patient with unreactive pupils, no corneal reflex. Also found to be hypotensive and febrile to 105. Noted to have new melena. Hgb 5. Given 1 unit of pRBC. Transferred to MICU. patient required pressors while in the MICU, BP improved and was transitioned to Droxidopa. Patient had CT C/A/P consistent with Bibasilar pneumonia; Sputum cx grew PSA and was treated with course of Meropenem. Patient evaluated by GI in setting of Melena, transaminitis and portal venous gas on CT imaging. No EGD performed as Melena resolved, Transaminitis was thought to be in setting of shock and portal venous gas was thought to be in setting of recent PEG. EEG was performed in setting of AMS no seizures noted. Repeat MRI Performed which showed Small scattered foci of diffusion restriction within the right cerebellar hemisphere, left parietal lobe, and symmetrically in the bilateral basal ganglia. Neuro recommended Resumption of ASA in setting of possible embolic phenomenon. Repeat ECHO performed RT Ventricular Moderately enlarged, reduced systolic function. Mental status improved and was transferred back to RCU on 3/6.       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 Prior to admission patient had Viral URI ( 1-2 weeks prior to admission) with subsequent weakness. He underwent an angiogram (2/11) which showed moderate stenosis and no intervention was done. MRI Performed c/w small bilateral cerebellar strokes and prior L thalamic strokes, as per Neurology was not c/w current presentation. 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 He is currently s/p 5 rounds of plasma exchange (2/13-2/20) and IVIG x 5 ( 2/21-2/26)  His hospital course was complicated by progressive respiratory failure. CT Chest performed on 2/20 with atelectasis of b/l lower lobes. BAL 2/21 Grew PSA treated with course of Zosyn (2/21-2/28).  Patient transferred to RCU on 2/24.   RRT called on 3/3 for unresponsiveness. Patient with unreactive pupils, no corneal reflex. Also found to be hypotensive and febrile to 105. Noted to have new melena. Hgb 5. Given 1 unit of pRBC. Transferred to MICU. patient required pressors while in the MICU, BP improved and was transitioned to Droxidopa. Patient had CT C/A/P consistent with Bibasilar pneumonia; Sputum cx grew PSA and was treated with course of Meropenem. Patient evaluated by GI in setting of Melena, transaminitis and portal venous gas on CT imaging. No EGD performed as Melena resolved, Transaminitis was thought to be in setting of shock and portal venous gas was thought to be in setting of recent PEG. EEG was performed in setting of AMS no seizures noted. Repeat MRI Performed which showed Small scattered foci of diffusion restriction within the right cerebellar hemisphere, left parietal lobe, and symmetrically in the bilateral basal ganglia. Neuro recommended Resumption of ASA in setting of possible embolic phenomenon. Repeat ECHO performed RT Ventricular Moderately enlarged, reduced systolic function. Mental status improved and was transferred back to RCU on 3/6.       3/18:  Tolerating PS 8/5.  Neurologically remains the same.  Seen by cardiology today - rec CT Angio r/o PE because of finding of RV enlargement on echo.  Wife at bedside, updated in plan of care and pts clinical status.

## 2024-03-18 NOTE — CONSULT NOTE ADULT - SUBJECTIVE AND OBJECTIVE BOX
CHIEF COMPLAINT:Patient is a 73y old  Male who presents with a chief complaint of Stroke, critical stenosis, evaluation for angiography (18 Mar 2024 07:38)      HISTORY OF PRESENT ILLNESS:    73 male with HTN, HLD< DM II, CVA , GBS, respiratory failure s/p trach on vent  as per RCU pt has been having low HR intermittently briefly   ROS is limited as pt currently sedated on ventilator.     PAST MEDICAL & SURGICAL HISTORY:  Hypertension      Diabetes      CVA (cerebral vascular accident)  x 2 with right side weakness and numbness      HTN (hypertension)      HLD (hyperlipidemia)      DM2 (diabetes mellitus, type 2)      CVA (cerebrovascular accident)      No significant past surgical history              MEDICATIONS:  aspirin  chewable 81 milliGRAM(s) Oral daily  clopidogrel Tablet 75 milliGRAM(s) Enteral Tube daily  furosemide   Injectable 20 milliGRAM(s) IV Push two times a day  heparin   Injectable 5000 Unit(s) SubCutaneous every 8 hours    nystatin    Suspension 665526 Unit(s) Oral every 6 hours    albuterol/ipratropium for Nebulization 3 milliLiter(s) Nebulizer every 6 hours PRN      pantoprazole   Suspension 40 milliGRAM(s) Oral daily  polyethylene glycol 3350 17 Gram(s) Oral two times a day  senna Syrup 10 milliLiter(s) Oral at bedtime    insulin lispro (ADMELOG) corrective regimen sliding scale   SubCutaneous every 6 hours  insulin NPH human recombinant 7 Unit(s) SubCutaneous every 6 hours    artificial tears (preservative free) Ophthalmic Solution 1 Drop(s) Both EYES every 4 hours  Biotene Dry Mouth Oral Rinse 5 milliLiter(s) Swish and Spit every 6 hours  chlorhexidine 0.12% Liquid 15 milliLiter(s) Oral Mucosa every 12 hours  chlorhexidine 4% Liquid 1 Application(s) Topical daily      FAMILY HISTORY:      Non-contributory    SOCIAL HISTORY:    No tobacco, drugs or etoh    Allergies    No Known Allergies    Intolerances    	    REVIEW OF SYSTEMS:  as above  The rest of the 14 points ROS reviewed and except above they are unremarkable.        PHYSICAL EXAM:  T(C): 37.1 (03-18-24 @ 05:13), Max: 37.4 (03-18-24 @ 01:26)  HR: 105 (03-18-24 @ 08:11) (93 - 105)  BP: 112/72 (03-18-24 @ 05:13) (112/72 - 157/84)  RR: 20 (03-18-24 @ 05:13) (18 - 20)  SpO2: 100% (03-18-24 @ 08:11) (99% - 100%)  Wt(kg): --  I&O's Summary    17 Mar 2024 07:01  -  18 Mar 2024 07:00  --------------------------------------------------------  IN: 1230 mL / OUT: 3300 mL / NET: -2070 mL      Appearance: on vent 	  Cardiovascular: Normal S1 S2,    Murmur:   Neck: JVP limited to be evaluated   Respiratory: Lungs few rhonchi   Gastrointestinal:  Soft  Skin: normal   Neuro: limited as pt on vent      LABS/DATA:    TELEMETRY: 	    ECG:  	   	  CARDIAC MARKERS:        < from: TTE W or WO Ultrasound Enhancing Agent (03.07.24 @ 07:54) >  CONCLUSIONS:      1. Technically difficult image quality.   2. Left ventricular systolic function is hyperdynamic with an ejection fraction of 74 % by Glynn's method of disks.   3. Moderately enlarged right ventricular cavity size and reduced systolic function.   4. No pericardial effusion seen.   5. Compared to the transthoracic echocardiogram report on 2/11/2024, The RV function apears reduced.   6. Discusses with DINORAH Posada.    ________________________________________________________________________________________    < end of copied text >            03-18    143  |  105  |  29<H>  ----------------------------<  134<H>  4.1   |  27  |  0.57    Ca    8.5      18 Mar 2024 07:32  Phos  3.0     03-18  Mg     2.4     03-18      proBNP:   Lipid Profile:   HgA1c:   TSH:

## 2024-03-18 NOTE — PROGRESS NOTE ADULT - PROBLEM SELECTOR PLAN 6
- MRI 2/12 - small b/l cerebellar strokes (post-procedural) and prior L thalamic strokes  - MRI 3/5: Small scattered foci of diffusion restriction within the right cerebellar hemisphere, left parietal lobe, and symmetrically in the bilateral basal ganglia.  - Lipitor d/c'd in setting of liver injury 3/3  [facility]/Plavix x3 months then ASA monotherapy  - ECHO 3/7: EF 74% RT Ventricular Moderately Enlarged and reduced systolic fxn  - Neurology following

## 2024-03-18 NOTE — PROGRESS NOTE ADULT - NS ATTEND AMEND GEN_ALL_CORE FT
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    - ASA/Plavix as per neuro  - Wean from vent as tolerated  - CTA to r/o PE due to new RV dysfunction on TTE  - Monitor off abx  - DIspo planning

## 2024-03-19 LAB
ANION GAP SERPL CALC-SCNC: 11 MMOL/L — SIGNIFICANT CHANGE UP (ref 5–17)
BUN SERPL-MCNC: 28 MG/DL — HIGH (ref 7–23)
CALCIUM SERPL-MCNC: 8.7 MG/DL — SIGNIFICANT CHANGE UP (ref 8.4–10.5)
CHLORIDE SERPL-SCNC: 106 MMOL/L — SIGNIFICANT CHANGE UP (ref 96–108)
CO2 SERPL-SCNC: 29 MMOL/L — SIGNIFICANT CHANGE UP (ref 22–31)
CREAT SERPL-MCNC: 0.57 MG/DL — SIGNIFICANT CHANGE UP (ref 0.5–1.3)
EGFR: 104 ML/MIN/1.73M2 — SIGNIFICANT CHANGE UP
GLUCOSE BLDC GLUCOMTR-MCNC: 124 MG/DL — HIGH (ref 70–99)
GLUCOSE BLDC GLUCOMTR-MCNC: 138 MG/DL — HIGH (ref 70–99)
GLUCOSE BLDC GLUCOMTR-MCNC: 145 MG/DL — HIGH (ref 70–99)
GLUCOSE SERPL-MCNC: 133 MG/DL — HIGH (ref 70–99)
MAGNESIUM SERPL-MCNC: 2.4 MG/DL — SIGNIFICANT CHANGE UP (ref 1.6–2.6)
PHOSPHATE SERPL-MCNC: 3.1 MG/DL — SIGNIFICANT CHANGE UP (ref 2.5–4.5)
POTASSIUM SERPL-MCNC: 4 MMOL/L — SIGNIFICANT CHANGE UP (ref 3.5–5.3)
POTASSIUM SERPL-SCNC: 4 MMOL/L — SIGNIFICANT CHANGE UP (ref 3.5–5.3)
SODIUM SERPL-SCNC: 146 MMOL/L — HIGH (ref 135–145)

## 2024-03-19 PROCEDURE — 99233 SBSQ HOSP IP/OBS HIGH 50: CPT

## 2024-03-19 RX ADMIN — HUMAN INSULIN 7 UNIT(S): 100 INJECTION, SUSPENSION SUBCUTANEOUS at 12:50

## 2024-03-19 RX ADMIN — Medication 5 MILLILITER(S): at 05:27

## 2024-03-19 RX ADMIN — Medication 1 DROP(S): at 14:26

## 2024-03-19 RX ADMIN — SENNA PLUS 10 MILLILITER(S): 8.6 TABLET ORAL at 22:06

## 2024-03-19 RX ADMIN — Medication 1 DROP(S): at 05:27

## 2024-03-19 RX ADMIN — Medication 5 MILLILITER(S): at 12:50

## 2024-03-19 RX ADMIN — Medication 1 DROP(S): at 10:15

## 2024-03-19 RX ADMIN — Medication 1 DROP(S): at 22:06

## 2024-03-19 RX ADMIN — Medication 20 MILLIGRAM(S): at 05:27

## 2024-03-19 RX ADMIN — Medication 81 MILLIGRAM(S): at 16:01

## 2024-03-19 RX ADMIN — HUMAN INSULIN 7 UNIT(S): 100 INJECTION, SUSPENSION SUBCUTANEOUS at 17:27

## 2024-03-19 RX ADMIN — CHLORHEXIDINE GLUCONATE 15 MILLILITER(S): 213 SOLUTION TOPICAL at 17:28

## 2024-03-19 RX ADMIN — HEPARIN SODIUM 5000 UNIT(S): 5000 INJECTION INTRAVENOUS; SUBCUTANEOUS at 05:27

## 2024-03-19 RX ADMIN — Medication 500000 UNIT(S): at 12:51

## 2024-03-19 RX ADMIN — CLOPIDOGREL BISULFATE 75 MILLIGRAM(S): 75 TABLET, FILM COATED ORAL at 12:50

## 2024-03-19 RX ADMIN — POLYETHYLENE GLYCOL 3350 17 GRAM(S): 17 POWDER, FOR SOLUTION ORAL at 05:28

## 2024-03-19 RX ADMIN — POLYETHYLENE GLYCOL 3350 17 GRAM(S): 17 POWDER, FOR SOLUTION ORAL at 17:26

## 2024-03-19 RX ADMIN — CHLORHEXIDINE GLUCONATE 1 APPLICATION(S): 213 SOLUTION TOPICAL at 22:07

## 2024-03-19 RX ADMIN — Medication 1 DROP(S): at 01:55

## 2024-03-19 RX ADMIN — Medication 20 MILLIGRAM(S): at 14:24

## 2024-03-19 RX ADMIN — PANTOPRAZOLE SODIUM 40 MILLIGRAM(S): 20 TABLET, DELAYED RELEASE ORAL at 13:12

## 2024-03-19 RX ADMIN — Medication 5 MILLILITER(S): at 17:27

## 2024-03-19 RX ADMIN — Medication 5 MILLILITER(S): at 23:24

## 2024-03-19 RX ADMIN — Medication 500000 UNIT(S): at 17:26

## 2024-03-19 RX ADMIN — HEPARIN SODIUM 5000 UNIT(S): 5000 INJECTION INTRAVENOUS; SUBCUTANEOUS at 14:24

## 2024-03-19 RX ADMIN — HEPARIN SODIUM 5000 UNIT(S): 5000 INJECTION INTRAVENOUS; SUBCUTANEOUS at 22:06

## 2024-03-19 RX ADMIN — Medication 500000 UNIT(S): at 05:28

## 2024-03-19 RX ADMIN — Medication 1 DROP(S): at 17:28

## 2024-03-19 RX ADMIN — Medication 500000 UNIT(S): at 23:24

## 2024-03-19 RX ADMIN — CHLORHEXIDINE GLUCONATE 15 MILLILITER(S): 213 SOLUTION TOPICAL at 05:28

## 2024-03-19 RX ADMIN — HUMAN INSULIN 7 UNIT(S): 100 INJECTION, SUSPENSION SUBCUTANEOUS at 05:28

## 2024-03-19 NOTE — PROGRESS NOTE ADULT - PROBLEM SELECTOR PLAN 12
- Patients daughter Margarette provided medical update at bedside this morning - pt wife remains at bedside and is updated daily

## 2024-03-19 NOTE — PROGRESS NOTE ADULT - NS ATTEND AMEND GEN_ALL_CORE FT
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    - ASA/Plavix as per neuro  - Wean from vent as tolerated  - Follow up official read of CTA, no PE on prelim read  - Monitor off abx  - DIspo planning

## 2024-03-19 NOTE — PROGRESS NOTE ADULT - SUBJECTIVE AND OBJECTIVE BOX
Subjective: Patient seen and examined. No new events except as noted.     SUBJECTIVE/ROS:  on vent     MEDICATIONS:  MEDICATIONS  (STANDING):  artificial tears (preservative free) Ophthalmic Solution 1 Drop(s) Both EYES every 4 hours  aspirin  chewable 81 milliGRAM(s) Oral daily  Biotene Dry Mouth Oral Rinse 5 milliLiter(s) Swish and Spit every 6 hours  chlorhexidine 0.12% Liquid 15 milliLiter(s) Oral Mucosa every 12 hours  chlorhexidine 4% Liquid 1 Application(s) Topical daily  clopidogrel Tablet 75 milliGRAM(s) Enteral Tube daily  furosemide   Injectable 20 milliGRAM(s) IV Push two times a day  heparin   Injectable 5000 Unit(s) SubCutaneous every 8 hours  insulin lispro (ADMELOG) corrective regimen sliding scale   SubCutaneous every 6 hours  insulin NPH human recombinant 7 Unit(s) SubCutaneous every 6 hours  nystatin    Suspension 007259 Unit(s) Oral every 6 hours  pantoprazole   Suspension 40 milliGRAM(s) Oral daily  polyethylene glycol 3350 17 Gram(s) Oral two times a day  senna Syrup 10 milliLiter(s) Oral at bedtime      PHYSICAL EXAM:  T(C): 37.4 (03-19-24 @ 05:30), Max: 37.6 (03-18-24 @ 18:00)  HR: 92 (03-19-24 @ 05:30) (91 - 104)  BP: 139/76 (03-19-24 @ 05:30) (122/74 - 143/82)  RR: 20 (03-19-24 @ 05:30) (18 - 20)  SpO2: 100% (03-19-24 @ 05:30) (97% - 100%)  Wt(kg): --  I&O's Summary    18 Mar 2024 07:01  -  19 Mar 2024 07:00  --------------------------------------------------------  IN: 2530 mL / OUT: 1800 mL / NET: 730 mL        Appearance: on vent 	  Cardiovascular: Normal S1 S2,    Murmur:   Neck: JVP limited to be evaluated   Respiratory: Lungs few rhonchi   Gastrointestinal:  Soft  Skin: normal   Neuro: limited as pt on vent      LABS/DATA:    CARDIAC MARKERS:            03-19    146<H>  |  106  |  28<H>  ----------------------------<  133<H>  4.0   |  29  |  0.57    Ca    8.7      19 Mar 2024 07:18  Phos  3.1     03-19  Mg     2.4     03-19      proBNP:   Lipid Profile:   HgA1c:   TSH:     TELE:  EKG:

## 2024-03-19 NOTE — PROGRESS NOTE ADULT - ASSESSMENT
Episode of Bradycardia   consider transfer to tele / ICU setting to have rhythm monitoring to diagnose pt bradycardia     RV enlargement   suspect due to his underlying respiratory failure   no evidence of PE

## 2024-03-19 NOTE — PROGRESS NOTE ADULT - ASSESSMENT
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 Prior to admission patient had Viral URI ( 1-2 weeks prior to admission) with subsequent weakness. He underwent an angiogram (2/11) which showed moderate stenosis and no intervention was done. MRI Performed c/w small bilateral cerebellar strokes and prior L thalamic strokes, as per Neurology was not c/w current presentation. 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 He is currently s/p 5 rounds of plasma exchange (2/13-2/20) and IVIG x 5 ( 2/21-2/26)  His hospital course was complicated by progressive respiratory failure. CT Chest performed on 2/20 with atelectasis of b/l lower lobes. BAL 2/21 Grew PSA treated with course of Zosyn (2/21-2/28).  Patient transferred to RCU on 2/24.   RRT called on 3/3 for unresponsiveness. Patient with unreactive pupils, no corneal reflex. Also found to be hypotensive and febrile to 105. Noted to have new melena. Hgb 5. Given 1 unit of pRBC. Transferred to MICU. patient required pressors while in the MICU, BP improved and was transitioned to Droxidopa. Patient had CT C/A/P consistent with Bibasilar pneumonia; Sputum cx grew PSA and was treated with course of Meropenem. Patient evaluated by GI in setting of Melena, transaminitis and portal venous gas on CT imaging. No EGD performed as Melena resolved, Transaminitis was thought to be in setting of shock and portal venous gas was thought to be in setting of recent PEG. EEG was performed in setting of AMS no seizures noted. Repeat MRI Performed which showed Small scattered foci of diffusion restriction within the right cerebellar hemisphere, left parietal lobe, and symmetrically in the bilateral basal ganglia. Neuro recommended Resumption of ASA in setting of possible embolic phenomenon. Repeat ECHO performed RT Ventricular Moderately enlarged, reduced systolic function. Mental status improved and was transferred back to RCU on 3/6.       3/18:  Tolerating PS 8/5.  Neurologically remains the same.  Seen by cardiology today - rec CT Angio r/o PE because of finding of RV enlargement on echo.  Wife at bedside, updated in plan of care and pts clinical status.   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 Prior to admission patient had Viral URI ( 1-2 weeks prior to admission) with subsequent weakness. He underwent an angiogram (2/11) which showed moderate stenosis and no intervention was done. MRI Performed c/w small bilateral cerebellar strokes and prior L thalamic strokes, as per Neurology was not c/w current presentation. 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 He is currently s/p 5 rounds of plasma exchange (2/13-2/20) and IVIG x 5 ( 2/21-2/26)  His hospital course was complicated by progressive respiratory failure. CT Chest performed on 2/20 with atelectasis of b/l lower lobes. BAL 2/21 Grew PSA treated with course of Zosyn (2/21-2/28).  Patient transferred to RCU on 2/24.   RRT called on 3/3 for unresponsiveness. Patient with unreactive pupils, no corneal reflex. Also found to be hypotensive and febrile to 105. Noted to have new melena. Hgb 5. Given 1 unit of pRBC. Transferred to MICU. patient required pressors while in the MICU, BP improved and was transitioned to Droxidopa. Patient had CT C/A/P consistent with Bibasilar pneumonia; Sputum cx grew PSA and was treated with course of Meropenem. Patient evaluated by GI in setting of Melena, transaminitis and portal venous gas on CT imaging. No EGD performed as Melena resolved, Transaminitis was thought to be in setting of shock and portal venous gas was thought to be in setting of recent PEG. EEG was performed in setting of AMS no seizures noted. Repeat MRI Performed which showed Small scattered foci of diffusion restriction within the right cerebellar hemisphere, left parietal lobe, and symmetrically in the bilateral basal ganglia. Neuro recommended Resumption of ASA in setting of possible embolic phenomenon. Repeat ECHO performed RT Ventricular Moderately enlarged, reduced systolic function. Mental status improved and was transferred back to RCU on 3/6.       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 Prior to admission patient had Viral URI ( 1-2 weeks prior to admission) with subsequent weakness. He underwent an angiogram (2/11) which showed moderate stenosis and no intervention was done. MRI Performed c/w small bilateral cerebellar strokes and prior L thalamic strokes, as per Neurology was not c/w current presentation. 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 He is currently s/p 5 rounds of plasma exchange (2/13-2/20) and IVIG x 5 ( 2/21-2/26)  His hospital course was complicated by progressive respiratory failure. CT Chest performed on 2/20 with atelectasis of b/l lower lobes. BAL 2/21 Grew PSA treated with course of Zosyn (2/21-2/28).  Patient transferred to RCU on 2/24.   RRT called on 3/3 for unresponsiveness. Patient with unreactive pupils, no corneal reflex. Also found to be hypotensive and febrile to 105. Noted to have new melena. Hgb 5. Given 1 unit of pRBC. Transferred to MICU. patient required pressors while in the MICU, BP improved and was transitioned to Droxidopa. Patient had CT C/A/P consistent with Bibasilar pneumonia; Sputum cx grew PSA and was treated with course of Meropenem. Patient evaluated by GI in setting of Melena, transaminitis and portal venous gas on CT imaging. No EGD performed as Melena resolved, Transaminitis was thought to be in setting of shock and portal venous gas was thought to be in setting of recent PEG. EEG was performed in setting of AMS no seizures noted. Repeat MRI Performed which showed Small scattered foci of diffusion restriction within the right cerebellar hemisphere, left parietal lobe, and symmetrically in the bilateral basal ganglia. Neuro recommended Resumption of ASA in setting of possible embolic phenomenon. Repeat ECHO performed RT Ventricular Moderately enlarged, reduced systolic function. Mental status improved and was transferred back to RCU on 3/6.       3/19:  CT Angio PE - negative.  Continues to PS as tolerated.  Continues with minimal neurological improvement.  Dispo planning initiated.

## 2024-03-19 NOTE — PROGRESS NOTE ADULT - PROBLEM SELECTOR PLAN 5
- Patient with hx of HTN Prior to admission; was on Atenolol/Chlorthiazide   - S/p Pressors while in MICU   - S/p Droxidopa; D/cd on 3/10 - Patient with hx of HTN Prior to admission; was on Atenolol/Chlorthiazide   - S/p Pressors while in MICU   - S/p Droxidopa; D/cd on 3/10  - BPs have remained stable w/o medications - continuing to monitor Surgeon: Jack Bentley M.D.

## 2024-03-19 NOTE — PROGRESS NOTE ADULT - PROBLEM SELECTOR PLAN 1
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

## 2024-03-19 NOTE — PROGRESS NOTE ADULT - PROBLEM SELECTOR PLAN 8
- Patient Confucianist  - Family would like to be asked prior to administration of blood products   - S/p 1 unit PRBCs on 2/22 and 3/3  - S/p Venofer (2/22-2/27)   - S/p Cyanocobalamine, Folic acid, Ferrous Sulfate  - S/p Epogen x 5 days (Ended 2/27)  - Reported Melena on 3/3   - CT A/P 3/3 negative for acute bleed  - GI did not recommend EGD as no overt signs of active bleeding  and responded to PRBCs  - Will use pediatric tubes to limit volume for phlebotomy as patient is Mosque   - Monitor for melena - Patient Hindu  - Family would like to be asked prior to administration of blood products   - S/p 1 unit PRBCs on 2/22 and 3/3  - S/p Venofer (2/22-2/27)   - S/p Cyanocobalamine, Folic acid, Ferrous Sulfate  - S/p Epogen x 5 days (Ended 2/27)  - Reported Melena on 3/3   - CT A/P 3/3 negative for acute bleed  - GI did not recommend EGD as no overt signs of active bleeding and responded to PRBCs  - Will use pediatric tubes to limit volume for phlebotomy as patient is Zoroastrianism   - H&H continues to remain stable, will monitor for melena and signs of bleeding

## 2024-03-19 NOTE — PROGRESS NOTE ADULT - PROBLEM SELECTOR PLAN 4
- BAL 2/21: PSA; Txd with Zosyn ( 2/21-2/28)  - Sputum 3/4: PSA , Blood cxs 3/3: NGTD  - Txd with course of  Meropenem 3/3 --> 3/11 - BAL 2/21: PSA; Txd with Zosyn ( 2/21-2/28)  - Sputum 3/4: PSA , Blood cxs 3/3: NGTD  - Txd with course of Meropenem 3/3 --> 3/11

## 2024-03-19 NOTE — PROGRESS NOTE ADULT - PROBLEM SELECTOR PLAN 6
- MRI 2/12 - small b/l cerebellar strokes (post-procedural) and prior L thalamic strokes  - MRI 3/5: Small scattered foci of diffusion restriction within the right cerebellar hemisphere, left parietal lobe, and symmetrically in the bilateral basal ganglia.  - Lipitor d/c'd in setting of liver injury 3/3  [facility] then ASA monotherapy  - ECHO 3/7: EF 74% RT Ventricular Moderately Enlarged and reduced systolic fxn  - Neurology following - MRI 2/12 - small b/l cerebellar strokes (post-procedural) and prior L thalamic strokes  - MRI 3/5: Small scattered foci of diffusion restriction within the right cerebellar hemisphere, left parietal lobe, and symmetrically in the bilateral basal ganglia.  - Lipitor d/c'd in setting of liver injury 3/3  [facility] (plavix restarted 3/12) then ASA monotherapy  - ECHO 3/7: EF 74% RT Ventricular Moderately Enlarged and reduced systolic fxn  - Neurology following

## 2024-03-19 NOTE — PROGRESS NOTE ADULT - PROBLEM SELECTOR PLAN 10
----- Message from Alysia CATHERINE MD sent at 12/10/2020 11:14 AM CST -----  Notify Marley that her scan didn't show any thyroid nodules or cysts. Thyroid shows mild goiter, no need to undergo surgery or start thyroid replacement therapy.     Proceed with repeat labs as previously recommended for the vit D deficiency and other mild (likely transient) abnormalities.  I would recommend referral to SLEEP MEDICINE specialist. 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     Her weight loss can be due to her depression as she has mentioned to me that her appetite is affected with her depression.   Would she consider seeing a counselor for therapy?  If she is, I would recommend to see RERE RAMIREZ.     MG   - Resolved on IVF 3/8  - free water as ordered  - Monitor BMP

## 2024-03-19 NOTE — PROGRESS NOTE ADULT - SUBJECTIVE AND OBJECTIVE BOX
Patient is a 73y old  Male who presents with a chief complaint of Stroke, critical stenosis, evaluation for angiography (18 Mar 2024 09:13)      Interval Events:    REVIEW OF SYSTEMS:  [ ] Positive  [ ] All other systems negative  [ ] Unable to assess ROS because ________    Vital Signs Last 24 Hrs  T(C): 37.4 (03-19-24 @ 05:30), Max: 37.6 (03-18-24 @ 18:00)  T(F): 99.3 (03-19-24 @ 05:30), Max: 99.7 (03-18-24 @ 18:00)  HR: 92 (03-19-24 @ 05:30) (91 - 105)  BP: 139/76 (03-19-24 @ 05:30) (122/74 - 143/82)  RR: 20 (03-19-24 @ 05:30) (18 - 20)  SpO2: 100% (03-19-24 @ 05:30) (97% - 100%)    PHYSICAL EXAM:  HEENT:   [ ]Tracheostomy:  [ ]Pupils equal  [ ]No oral lesions  [ ]Abnormal    SKIN  [ ]No Rash  [ ] Abnormal  [ ] pressure    CARDIAC  [ ]Regular  [ ]Abnormal    PULMONARY  [ ]Bilateral Clear Breath Sounds  [ ]Normal Excursion  [ ]Abnormal    GI  [ ]PEG      [ ] +BS		              [ ]Soft, nondistended, nontender	  [ ]Abnormal    MUSCULOSKELETAL                                   [ ]Bedbound                 [ ]Abnormal    [ ]Ambulatory/OOB to chair                           EXTREMITIES                                         [ ]Normal  [ ]Edema                           NEUROLOGIC  [ ] Normal, non focal  [ ] Focal findings:    PSYCHIATRIC  [ ]Alert and appropriate  [ ] Sedated	 [ ]Agitated    :  [place]: [ ] Yes, if yes: Date of Placement:                   [  ] No    LINES: Central Lines [ ] Yes, if yes: Date of Placement                                     [  ] No    HOSPITAL MEDICATIONS:  MEDICATIONS  (STANDING):  artificial tears (preservative free) Ophthalmic Solution 1 Drop(s) Both EYES every 4 hours  aspirin  chewable 81 milliGRAM(s) Oral daily  Biotene Dry Mouth Oral Rinse 5 milliLiter(s) Swish and Spit every 6 hours  chlorhexidine 0.12% Liquid 15 milliLiter(s) Oral Mucosa every 12 hours  chlorhexidine 4% Liquid 1 Application(s) Topical daily  clopidogrel Tablet 75 milliGRAM(s) Enteral Tube daily  furosemide   Injectable 20 milliGRAM(s) IV Push two times a day  heparin   Injectable 5000 Unit(s) SubCutaneous every 8 hours  insulin lispro (ADMELOG) corrective regimen sliding scale   SubCutaneous every 6 hours  insulin NPH human recombinant 7 Unit(s) SubCutaneous every 6 hours  nystatin    Suspension 076897 Unit(s) Oral every 6 hours  pantoprazole   Suspension 40 milliGRAM(s) Oral daily  polyethylene glycol 3350 17 Gram(s) Oral two times a day  senna Syrup 10 milliLiter(s) Oral at bedtime    MEDICATIONS  (PRN):  albuterol/ipratropium for Nebulization 3 milliLiter(s) Nebulizer every 6 hours PRN Shortness of Breath and/or Wheezing      LABS:    03-18    143  |  105  |  29<H>  ----------------------------<  134<H>  4.1   |  27  |  0.57    Ca    8.5      18 Mar 2024 07:32  Phos  3.0     03-18  Mg     2.4     03-18        Urinalysis Basic - ( 18 Mar 2024 07:32 )    Color: x / Appearance: x / SG: x / pH: x  Gluc: 134 mg/dL / Ketone: x  / Bili: x / Urobili: x   Blood: x / Protein: x / Nitrite: x   Leuk Esterase: x / RBC: x / WBC x   Sq Epi: x / Non Sq Epi: x / Bacteria: x          CAPILLARY BLOOD GLUCOSE    MICROBIOLOGY:     RADIOLOGY:  [ ] Reviewed and interpreted by me    Mode: AC/ CMV (Assist Control/ Continuous Mandatory Ventilation)  RR (machine): 20  TV (machine): 500  FiO2: 30  PEEP: 5  ITime: 1  MAP: 10  PIP: 17   Patient is a 73y old  Male who presents with a chief complaint of Stroke, critical stenosis, evaluation for angiography (18 Mar 2024 09:13)      Interval Events:  No acute events overnight.    REVIEW OF SYSTEMS:  [ ] Positive  [ ] All other systems negative  [X] Unable to assess ROS because ___occasionally able to nod to communicate but inconsistent___    Vital Signs Last 24 Hrs  T(C): 37.4 (03-19-24 @ 05:30), Max: 37.6 (03-18-24 @ 18:00)  T(F): 99.3 (03-19-24 @ 05:30), Max: 99.7 (03-18-24 @ 18:00)  HR: 92 (03-19-24 @ 05:30) (91 - 105)  BP: 139/76 (03-19-24 @ 05:30) (122/74 - 143/82)  RR: 20 (03-19-24 @ 05:30) (18 - 20)  SpO2: 100% (03-19-24 @ 05:30) (97% - 100%)    PHYSICAL EXAM:  HEENT:   [X]Tracheostomy: #8 cuffed portex  [X]Pupils equal  [ ]No oral lesions  [ ]Abnormal    SKIN  [X]No Rash  [ ] Abnormal  [ ] pressure    CARDIAC  [X]Regular  [ ]Abnormal    PULMONARY  [X]Bilateral Clear Breath Sounds  [ ]Normal Excursion  [ ]Abnormal    GI  [X]PEG      [X] +BS		              [X]Soft, nondistended, nontender	  [ ]Abnormal    MUSCULOSKELETAL                                   [X]Bedbound                 [ ]Abnormal    [ ]Ambulatory/OOB to chair                           EXTREMITIES                                         [ ]Normal  [X]Edema - BL UE edema                         NEUROLOGIC  [ ] Normal, non focal  [X] Focal findings: opens eye spontaneously and to voice, follows commands x4 extremities    PSYCHIATRIC  [X]Alert and appropriate  [ ] Sedated	 [ ]Agitated    :  [place]: [ ] Yes, if yes: Date of Placement:                   [X] No    LINES: Central Lines [ ] Yes, if yes: Date of Placement                                     [X] No    HOSPITAL MEDICATIONS:  MEDICATIONS  (STANDING):  artificial tears (preservative free) Ophthalmic Solution 1 Drop(s) Both EYES every 4 hours  aspirin  chewable 81 milliGRAM(s) Oral daily  Biotene Dry Mouth Oral Rinse 5 milliLiter(s) Swish and Spit every 6 hours  chlorhexidine 0.12% Liquid 15 milliLiter(s) Oral Mucosa every 12 hours  chlorhexidine 4% Liquid 1 Application(s) Topical daily  clopidogrel Tablet 75 milliGRAM(s) Enteral Tube daily  furosemide   Injectable 20 milliGRAM(s) IV Push two times a day  heparin   Injectable 5000 Unit(s) SubCutaneous every 8 hours  insulin lispro (ADMELOG) corrective regimen sliding scale   SubCutaneous every 6 hours  insulin NPH human recombinant 7 Unit(s) SubCutaneous every 6 hours  nystatin    Suspension 653878 Unit(s) Oral every 6 hours  pantoprazole   Suspension 40 milliGRAM(s) Oral daily  polyethylene glycol 3350 17 Gram(s) Oral two times a day  senna Syrup 10 milliLiter(s) Oral at bedtime    MEDICATIONS  (PRN):  albuterol/ipratropium for Nebulization 3 milliLiter(s) Nebulizer every 6 hours PRN Shortness of Breath and/or Wheezing      LABS:    03-18    143  |  105  |  29<H>  ----------------------------<  134<H>  4.1   |  27  |  0.57    Ca    8.5      18 Mar 2024 07:32  Phos  3.0     03-18  Mg     2.4     03-18        Urinalysis Basic - ( 18 Mar 2024 07:32 )    Color: x / Appearance: x / SG: x / pH: x  Gluc: 134 mg/dL / Ketone: x  / Bili: x / Urobili: x   Blood: x / Protein: x / Nitrite: x   Leuk Esterase: x / RBC: x / WBC x   Sq Epi: x / Non Sq Epi: x / Bacteria: x          CAPILLARY BLOOD GLUCOSE    MICROBIOLOGY:     RADIOLOGY:  [ ] Reviewed and interpreted by me    Mode: AC/ CMV (Assist Control/ Continuous Mandatory Ventilation)  RR (machine): 20  TV (machine): 500  FiO2: 30  PEEP: 5  ITime: 1  MAP: 10  PIP: 17

## 2024-03-19 NOTE — PROGRESS NOTE ADULT - PROBLEM SELECTOR PLAN 2
- Patient S/p Tracheostomy # 8 Cuffed Portex on 2/16 ( )  - CTA Chest 3/3: Bibasilar Pneumonia; Currently remains on Meropenem   - Currently remains on Mechanical Ventilation: PRVC 500/20/30%/+5  - initial weaning attempts pt became hypoxic and bradycardic ( Since resolved)  - Attempts at PS trials improving, continue to progress weaning as tolerated  - Continue Chest PT and Suctioning PRN - Patient S/p Tracheostomy # 8 Cuffed Portex on 2/16 ( )  - CTA Chest 3/3: Bibasilar Pneumonia; Currently remains on Meropenem   - Currently remains on Mechanical Ventilation: PRVC 500/20/30%/+5  - initial weaning attempts pt became hypoxic and bradycardic (Since resolved)  - Attempts at PS trials improving, continue to progress weaning as tolerated  - Continue Chest PT and Suctioning PRN

## 2024-03-20 LAB
ANION GAP SERPL CALC-SCNC: 10 MMOL/L — SIGNIFICANT CHANGE UP (ref 5–17)
APPEARANCE UR: CLEAR — SIGNIFICANT CHANGE UP
BACTERIA # UR AUTO: NEGATIVE /HPF — SIGNIFICANT CHANGE UP
BILIRUB UR-MCNC: NEGATIVE — SIGNIFICANT CHANGE UP
BUN SERPL-MCNC: 30 MG/DL — HIGH (ref 7–23)
CALCIUM SERPL-MCNC: 9 MG/DL — SIGNIFICANT CHANGE UP (ref 8.4–10.5)
CAST: 4 /LPF — SIGNIFICANT CHANGE UP (ref 0–4)
CHLORIDE SERPL-SCNC: 108 MMOL/L — SIGNIFICANT CHANGE UP (ref 96–108)
CO2 SERPL-SCNC: 30 MMOL/L — SIGNIFICANT CHANGE UP (ref 22–31)
COLOR SPEC: YELLOW — SIGNIFICANT CHANGE UP
CREAT SERPL-MCNC: 0.59 MG/DL — SIGNIFICANT CHANGE UP (ref 0.5–1.3)
DIFF PNL FLD: NEGATIVE — SIGNIFICANT CHANGE UP
EGFR: 102 ML/MIN/1.73M2 — SIGNIFICANT CHANGE UP
GLUCOSE BLDC GLUCOMTR-MCNC: 149 MG/DL — HIGH (ref 70–99)
GLUCOSE BLDC GLUCOMTR-MCNC: 153 MG/DL — HIGH (ref 70–99)
GLUCOSE BLDC GLUCOMTR-MCNC: 156 MG/DL — HIGH (ref 70–99)
GLUCOSE BLDC GLUCOMTR-MCNC: 165 MG/DL — HIGH (ref 70–99)
GLUCOSE BLDC GLUCOMTR-MCNC: 183 MG/DL — HIGH (ref 70–99)
GLUCOSE SERPL-MCNC: 164 MG/DL — HIGH (ref 70–99)
GLUCOSE UR QL: NEGATIVE MG/DL — SIGNIFICANT CHANGE UP
GRAM STN FLD: ABNORMAL
KETONES UR-MCNC: NEGATIVE MG/DL — SIGNIFICANT CHANGE UP
LEUKOCYTE ESTERASE UR-ACNC: NEGATIVE — SIGNIFICANT CHANGE UP
MAGNESIUM SERPL-MCNC: 2.5 MG/DL — SIGNIFICANT CHANGE UP (ref 1.6–2.6)
NITRITE UR-MCNC: NEGATIVE — SIGNIFICANT CHANGE UP
PH UR: 5.5 — SIGNIFICANT CHANGE UP (ref 5–8)
PHOSPHATE SERPL-MCNC: 3.8 MG/DL — SIGNIFICANT CHANGE UP (ref 2.5–4.5)
POTASSIUM SERPL-MCNC: 3.8 MMOL/L — SIGNIFICANT CHANGE UP (ref 3.5–5.3)
POTASSIUM SERPL-SCNC: 3.8 MMOL/L — SIGNIFICANT CHANGE UP (ref 3.5–5.3)
PROT UR-MCNC: 30 MG/DL
RBC CASTS # UR COMP ASSIST: 1 /HPF — SIGNIFICANT CHANGE UP (ref 0–4)
SODIUM SERPL-SCNC: 148 MMOL/L — HIGH (ref 135–145)
SP GR SPEC: 1.02 — SIGNIFICANT CHANGE UP (ref 1–1.03)
SPECIMEN SOURCE: SIGNIFICANT CHANGE UP
SQUAMOUS # UR AUTO: 1 /HPF — SIGNIFICANT CHANGE UP (ref 0–5)
UROBILINOGEN FLD QL: 1 MG/DL — SIGNIFICANT CHANGE UP (ref 0.2–1)
WBC UR QL: 4 /HPF — SIGNIFICANT CHANGE UP (ref 0–5)

## 2024-03-20 PROCEDURE — 71045 X-RAY EXAM CHEST 1 VIEW: CPT | Mod: 26

## 2024-03-20 PROCEDURE — 99233 SBSQ HOSP IP/OBS HIGH 50: CPT

## 2024-03-20 RX ORDER — SODIUM CHLORIDE 9 MG/ML
1000 INJECTION, SOLUTION INTRAVENOUS
Refills: 0 | Status: DISCONTINUED | OUTPATIENT
Start: 2024-03-20 | End: 2024-03-21

## 2024-03-20 RX ORDER — ACETAMINOPHEN 500 MG
650 TABLET ORAL EVERY 6 HOURS
Refills: 0 | Status: DISCONTINUED | OUTPATIENT
Start: 2024-03-20 | End: 2024-03-23

## 2024-03-20 RX ADMIN — Medication 1 DROP(S): at 22:03

## 2024-03-20 RX ADMIN — SODIUM CHLORIDE 50 MILLILITER(S): 9 INJECTION, SOLUTION INTRAVENOUS at 10:58

## 2024-03-20 RX ADMIN — HEPARIN SODIUM 5000 UNIT(S): 5000 INJECTION INTRAVENOUS; SUBCUTANEOUS at 22:03

## 2024-03-20 RX ADMIN — Medication 650 MILLIGRAM(S): at 11:15

## 2024-03-20 RX ADMIN — CHLORHEXIDINE GLUCONATE 15 MILLILITER(S): 213 SOLUTION TOPICAL at 17:53

## 2024-03-20 RX ADMIN — Medication 2: at 12:06

## 2024-03-20 RX ADMIN — POLYETHYLENE GLYCOL 3350 17 GRAM(S): 17 POWDER, FOR SOLUTION ORAL at 17:53

## 2024-03-20 RX ADMIN — Medication 5 MILLILITER(S): at 06:38

## 2024-03-20 RX ADMIN — Medication 2: at 06:39

## 2024-03-20 RX ADMIN — Medication 1 DROP(S): at 18:02

## 2024-03-20 RX ADMIN — Medication 20 MILLIGRAM(S): at 14:12

## 2024-03-20 RX ADMIN — PANTOPRAZOLE SODIUM 40 MILLIGRAM(S): 20 TABLET, DELAYED RELEASE ORAL at 11:26

## 2024-03-20 RX ADMIN — Medication 500000 UNIT(S): at 06:39

## 2024-03-20 RX ADMIN — Medication 1 DROP(S): at 14:07

## 2024-03-20 RX ADMIN — Medication 500000 UNIT(S): at 17:53

## 2024-03-20 RX ADMIN — Medication 5 MILLILITER(S): at 11:27

## 2024-03-20 RX ADMIN — HUMAN INSULIN 7 UNIT(S): 100 INJECTION, SUSPENSION SUBCUTANEOUS at 00:16

## 2024-03-20 RX ADMIN — Medication 2: at 18:00

## 2024-03-20 RX ADMIN — Medication 650 MILLIGRAM(S): at 12:00

## 2024-03-20 RX ADMIN — Medication 20 MILLIGRAM(S): at 06:38

## 2024-03-20 RX ADMIN — SENNA PLUS 10 MILLILITER(S): 8.6 TABLET ORAL at 22:03

## 2024-03-20 RX ADMIN — POLYETHYLENE GLYCOL 3350 17 GRAM(S): 17 POWDER, FOR SOLUTION ORAL at 06:38

## 2024-03-20 RX ADMIN — CLOPIDOGREL BISULFATE 75 MILLIGRAM(S): 75 TABLET, FILM COATED ORAL at 11:27

## 2024-03-20 RX ADMIN — Medication 81 MILLIGRAM(S): at 11:27

## 2024-03-20 RX ADMIN — Medication 1 DROP(S): at 02:55

## 2024-03-20 RX ADMIN — CHLORHEXIDINE GLUCONATE 1 APPLICATION(S): 213 SOLUTION TOPICAL at 22:03

## 2024-03-20 RX ADMIN — Medication 500000 UNIT(S): at 11:26

## 2024-03-20 RX ADMIN — HEPARIN SODIUM 5000 UNIT(S): 5000 INJECTION INTRAVENOUS; SUBCUTANEOUS at 06:38

## 2024-03-20 RX ADMIN — HUMAN INSULIN 7 UNIT(S): 100 INJECTION, SUSPENSION SUBCUTANEOUS at 12:07

## 2024-03-20 RX ADMIN — HUMAN INSULIN 7 UNIT(S): 100 INJECTION, SUSPENSION SUBCUTANEOUS at 06:39

## 2024-03-20 RX ADMIN — CHLORHEXIDINE GLUCONATE 15 MILLILITER(S): 213 SOLUTION TOPICAL at 06:38

## 2024-03-20 RX ADMIN — HUMAN INSULIN 7 UNIT(S): 100 INJECTION, SUSPENSION SUBCUTANEOUS at 17:52

## 2024-03-20 RX ADMIN — Medication 1 DROP(S): at 11:27

## 2024-03-20 RX ADMIN — Medication 5 MILLILITER(S): at 17:53

## 2024-03-20 RX ADMIN — Medication 1 DROP(S): at 06:39

## 2024-03-20 RX ADMIN — HEPARIN SODIUM 5000 UNIT(S): 5000 INJECTION INTRAVENOUS; SUBCUTANEOUS at 14:12

## 2024-03-20 NOTE — PROGRESS NOTE ADULT - NS ATTEND AMEND GEN_ALL_CORE FT
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    - ASA/Plavix as per neuro  - Wean from vent as tolerated  - Monitor off abx  - D5 gtt for hypernatremia  - DIspo planning

## 2024-03-20 NOTE — PROGRESS NOTE ADULT - ASSESSMENT
Episode of Bradycardia   as per ICU no further episodes  if recurrent then recommend  transfer to tele / ICU setting to have rhythm monitoring to diagnose pt bradycardia     RV enlargement   suspect due to his underlying respiratory failure   no evidence of PE

## 2024-03-20 NOTE — PROGRESS NOTE ADULT - PROBLEM SELECTOR PLAN 2
- Patient S/p Tracheostomy # 8 Cuffed Portex on 2/16 ( )  - CTA Chest 3/3: Bibasilar Pneumonia; Currently remains on Meropenem   - Currently remains on Mechanical Ventilation: PRVC 500/20/30%/+5  - initial weaning attempts pt became hypoxic and bradycardic (Since resolved)  - Attempts at PS trials improving, continue to progress weaning as tolerated  - Continue Chest PT and Suctioning PRN

## 2024-03-20 NOTE — PROGRESS NOTE ADULT - PROBLEM SELECTOR PLAN 8
- Patient [employer]  - Family would like to be asked prior to administration of blood products   - S/p 1 unit PRBCs on 2/22 and 3/3  - S/p Venofer (2/22-2/27)   - S/p Cyanocobalamine, Folic acid, Ferrous Sulfate  - S/p Epogen x 5 days (Ended 2/27)  - Reported Melena on 3/3   - CT A/P 3/3 negative for acute bleed  - GI did not recommend EGD as no overt signs of active bleeding and responded to PRBCs  - Will use pediatric tubes to limit volume for phlebotomy as patient is Mormonism   - H&H continues to remain stable, will monitor for melena and signs of bleeding

## 2024-03-20 NOTE — PROGRESS NOTE ADULT - PROBLEM SELECTOR PLAN 4
- BAL 2/21: PSA; Txd with Zosyn ( 2/21-2/28)  - Sputum 3/4: PSA , Blood cxs 3/3: NGTD  - Txd with course of Meropenem 3/3 --> 3/11 - BAL 2/21: PSA; Txd with Zosyn ( 2/21-2/28)  - Sputum 3/4: PSA , Blood cxs 3/3: NGTD  - Txd with course of Meropenem 3/3 --> 3/11  - 3/20 tmax 100.7 - pancultured, chest xray

## 2024-03-20 NOTE — PROGRESS NOTE ADULT - PROBLEM SELECTOR PLAN 5
- Patient with hx of HTN Prior to admission; was on Atenolol/Chlorthiazide   - S/p Pressors while in MICU   - S/p Droxidopa; D/cd on 3/10  - BPs have remained stable w/o medications - continuing to monitor - Patient with hx of HTN Prior to admission; was on Atenolol/Chlorthiazide   - S/p Pressors while in MICU   - S/p Droxidopa; D/cd on 3/10  - BPs have remains stable w/o medications - continuing to monitor

## 2024-03-20 NOTE — PROGRESS NOTE ADULT - SUBJECTIVE AND OBJECTIVE BOX
Patient is a 73y old  Male who presents with a chief complaint of Stroke, critical stenosis, evaluation for angiography (19 Mar 2024 08:48)      Interval Events:    REVIEW OF SYSTEMS:  [ ] Positive  [ ] All other systems negative  [ ] Unable to assess ROS because ________    Vital Signs Last 24 Hrs  T(C): 37.6 (03-20-24 @ 06:36), Max: 37.6 (03-20-24 @ 06:36)  T(F): 99.7 (03-20-24 @ 06:36), Max: 99.7 (03-20-24 @ 06:36)  HR: 103 (03-20-24 @ 06:36) (92 - 103)  BP: 129/73 (03-20-24 @ 06:36) (129/73 - 145/76)  RR: 18 (03-20-24 @ 06:36) (18 - 21)  SpO2: 98% (03-20-24 @ 06:36) (97% - 100%)    PHYSICAL EXAM:  HEENT:   [ ]Tracheostomy:  [ ]Pupils equal  [ ]No oral lesions  [ ]Abnormal    SKIN  [ ]No Rash  [ ] Abnormal  [ ] pressure    CARDIAC  [ ]Regular  [ ]Abnormal    PULMONARY  [ ]Bilateral Clear Breath Sounds  [ ]Normal Excursion  [ ]Abnormal    GI  [ ]PEG      [ ] +BS		              [ ]Soft, nondistended, nontender	  [ ]Abnormal    MUSCULOSKELETAL                                   [ ]Bedbound                 [ ]Abnormal    [ ]Ambulatory/OOB to chair                           EXTREMITIES                                         [ ]Normal  [ ]Edema                           NEUROLOGIC  [ ] Normal, non focal  [ ] Focal findings:    PSYCHIATRIC  [ ]Alert and appropriate  [ ] Sedated	 [ ]Agitated    :  [place]: [ ] Yes, if yes: Date of Placement:                   [  ] No    LINES: Central Lines [ ] Yes, if yes: Date of Placement                                     [  ] No    HOSPITAL MEDICATIONS:  MEDICATIONS  (STANDING):  artificial tears (preservative free) Ophthalmic Solution 1 Drop(s) Both EYES every 4 hours  aspirin  chewable 81 milliGRAM(s) Oral daily  Biotene Dry Mouth Oral Rinse 5 milliLiter(s) Swish and Spit every 6 hours  chlorhexidine 0.12% Liquid 15 milliLiter(s) Oral Mucosa every 12 hours  chlorhexidine 4% Liquid 1 Application(s) Topical daily  clopidogrel Tablet 75 milliGRAM(s) Enteral Tube daily  furosemide   Injectable 20 milliGRAM(s) IV Push two times a day  heparin   Injectable 5000 Unit(s) SubCutaneous every 8 hours  insulin lispro (ADMELOG) corrective regimen sliding scale   SubCutaneous every 6 hours  insulin NPH human recombinant 7 Unit(s) SubCutaneous every 6 hours  nystatin    Suspension 642424 Unit(s) Oral every 6 hours  pantoprazole   Suspension 40 milliGRAM(s) Oral daily  polyethylene glycol 3350 17 Gram(s) Oral two times a day  senna Syrup 10 milliLiter(s) Oral at bedtime    MEDICATIONS  (PRN):  albuterol/ipratropium for Nebulization 3 milliLiter(s) Nebulizer every 6 hours PRN Shortness of Breath and/or Wheezing      LABS:    03-19    146<H>  |  106  |  28<H>  ----------------------------<  133<H>  4.0   |  29  |  0.57    Ca    8.7      19 Mar 2024 07:18  Phos  3.1     03-19  Mg     2.4     03-19        Urinalysis Basic - ( 19 Mar 2024 07:18 )    Color: x / Appearance: x / SG: x / pH: x  Gluc: 133 mg/dL / Ketone: x  / Bili: x / Urobili: x   Blood: x / Protein: x / Nitrite: x   Leuk Esterase: x / RBC: x / WBC x   Sq Epi: x / Non Sq Epi: x / Bacteria: x          CAPILLARY BLOOD GLUCOSE    MICROBIOLOGY:     RADIOLOGY:  [ ] Reviewed and interpreted by me    Mode: AC/ CMV (Assist Control/ Continuous Mandatory Ventilation)  RR (machine): 20  TV (machine): 500  FiO2: 30  PEEP: 5  ITime: 1  MAP: 10  PIP: 20   Patient is a 73y old  Male who presents with a chief complaint of Stroke, critical stenosis, evaluation for angiography (19 Mar 2024 08:48)      Interval Events:  No acute events overnight.     REVIEW OF SYSTEMS:  [ ] Positive  [ ] All other systems negative  [X] Unable to assess ROS because ___Obtunded_____    Vital Signs Last 24 Hrs  T(C): 37.6 (03-20-24 @ 06:36), Max: 37.6 (03-20-24 @ 06:36)  T(F): 99.7 (03-20-24 @ 06:36), Max: 99.7 (03-20-24 @ 06:36)  HR: 103 (03-20-24 @ 06:36) (92 - 103)  BP: 129/73 (03-20-24 @ 06:36) (129/73 - 145/76)  RR: 18 (03-20-24 @ 06:36) (18 - 21)  SpO2: 98% (03-20-24 @ 06:36) (97% - 100%)    PHYSICAL EXAM:  HEENT:   [X]Tracheostomy: #8 cuffed portex  [X]Pupils equal  [ ]No oral lesions  [ ]Abnormal    SKIN  [X]No Rash  [ ] Abnormal  [ ] pressure    CARDIAC  [X]Regular  [ ]Abnormal    PULMONARY  [X]Bilateral Clear Breath Sounds  [ ]Normal Excursion  [ ]Abnormal    GI  [X]PEG      [X] +BS		              [X]Soft, nondistended, nontender	  [ ]Abnormal    MUSCULOSKELETAL                                   [X]Bedbound                 [ ]Abnormal    [ ]Ambulatory/OOB to chair                           EXTREMITIES                                         [ ]Normal  [X]Edema - BL UE edema                         NEUROLOGIC  [ ] Normal, non focal  [X] Focal findings: opens eye spontaneously and to voice, follows commands x4 extremities    PSYCHIATRIC  [X]Alert and appropriate  [ ] Sedated	 [ ]Agitated    :  [place]: [ ] Yes, if yes: Date of Placement:                   [X] No    LINES: Central Lines [ ] Yes, if yes: Date of Placement                                     [X] No    HOSPITAL MEDICATIONS:  MEDICATIONS  (STANDING):  artificial tears (preservative free) Ophthalmic Solution 1 Drop(s) Both EYES every 4 hours  aspirin  chewable 81 milliGRAM(s) Oral daily  Biotene Dry Mouth Oral Rinse 5 milliLiter(s) Swish and Spit every 6 hours  chlorhexidine 0.12% Liquid 15 milliLiter(s) Oral Mucosa every 12 hours  chlorhexidine 4% Liquid 1 Application(s) Topical daily  clopidogrel Tablet 75 milliGRAM(s) Enteral Tube daily  furosemide   Injectable 20 milliGRAM(s) IV Push two times a day  heparin   Injectable 5000 Unit(s) SubCutaneous every 8 hours  insulin lispro (ADMELOG) corrective regimen sliding scale   SubCutaneous every 6 hours  insulin NPH human recombinant 7 Unit(s) SubCutaneous every 6 hours  nystatin    Suspension 968927 Unit(s) Oral every 6 hours  pantoprazole   Suspension 40 milliGRAM(s) Oral daily  polyethylene glycol 3350 17 Gram(s) Oral two times a day  senna Syrup 10 milliLiter(s) Oral at bedtime    MEDICATIONS  (PRN):  albuterol/ipratropium for Nebulization 3 milliLiter(s) Nebulizer every 6 hours PRN Shortness of Breath and/or Wheezing    LABS:    03-19    146<H>  |  106  |  28<H>  ----------------------------<  133<H>  4.0   |  29  |  0.57    Ca    8.7      19 Mar 2024 07:18  Phos  3.1     03-19  Mg     2.4     03-19    Urinalysis Basic - ( 19 Mar 2024 07:18 )    Color: x / Appearance: x / SG: x / pH: x  Gluc: 133 mg/dL / Ketone: x  / Bili: x / Urobili: x   Blood: x / Protein: x / Nitrite: x   Leuk Esterase: x / RBC: x / WBC x   Sq Epi: x / Non Sq Epi: x / Bacteria: x    CAPILLARY BLOOD GLUCOSE    MICROBIOLOGY:     RADIOLOGY:  [ ] Reviewed and interpreted by me    Mode: AC/ CMV (Assist Control/ Continuous Mandatory Ventilation)  RR (machine): 20  TV (machine): 500  FiO2: 30  PEEP: 5  ITime: 1  MAP: 10  PIP: 20

## 2024-03-20 NOTE — PROGRESS NOTE ADULT - ASSESSMENT
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 Prior to admission patient had Viral URI ( 1-2 weeks prior to admission) with subsequent weakness. He underwent an angiogram (2/11) which showed moderate stenosis and no intervention was done. MRI Performed c/w small bilateral cerebellar strokes and prior L thalamic strokes, as per Neurology was not c/w current presentation. 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 He is currently s/p 5 rounds of plasma exchange (2/13-2/20) and IVIG x 5 ( 2/21-2/26)  His hospital course was complicated by progressive respiratory failure. CT Chest performed on 2/20 with atelectasis of b/l lower lobes. BAL 2/21 Grew PSA treated with course of Zosyn (2/21-2/28).  Patient transferred to RCU on 2/24.   RRT called on 3/3 for unresponsiveness. Patient with unreactive pupils, no corneal reflex. Also found to be hypotensive and febrile to 105. Noted to have new melena. Hgb 5. Given 1 unit of pRBC. Transferred to MICU. patient required pressors while in the MICU, BP improved and was transitioned to Droxidopa. Patient had CT C/A/P consistent with Bibasilar pneumonia; Sputum cx grew PSA and was treated with course of Meropenem. Patient evaluated by GI in setting of Melena, transaminitis and portal venous gas on CT imaging. No EGD performed as Melena resolved, Transaminitis was thought to be in setting of shock and portal venous gas was thought to be in setting of recent PEG. EEG was performed in setting of AMS no seizures noted. Repeat MRI Performed which showed Small scattered foci of diffusion restriction within the right cerebellar hemisphere, left parietal lobe, and symmetrically in the bilateral basal ganglia. Neuro recommended Resumption of ASA in setting of possible embolic phenomenon. Repeat ECHO performed RT Ventricular Moderately enlarged, reduced systolic function. Mental status improved and was transferred back to RCU on 3/6.       3/19:  CT Angio PE - negative.  Continues to PS as tolerated.  Continues with minimal neurological improvement.  Dispo planning initiated. 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 Prior to admission patient had Viral URI ( 1-2 weeks prior to admission) with subsequent weakness. He underwent an angiogram (2/11) which showed moderate stenosis and no intervention was done. MRI Performed c/w small bilateral cerebellar strokes and prior L thalamic strokes, as per Neurology was not c/w current presentation. 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 He is currently s/p 5 rounds of plasma exchange (2/13-2/20) and IVIG x 5 ( 2/21-2/26)  His hospital course was complicated by progressive respiratory failure. CT Chest performed on 2/20 with atelectasis of b/l lower lobes. BAL 2/21 Grew PSA treated with course of Zosyn (2/21-2/28).  Patient transferred to RCU on 2/24.   RRT called on 3/3 for unresponsiveness. Patient with unreactive pupils, no corneal reflex. Also found to be hypotensive and febrile to 105. Noted to have new melena. Hgb 5. Given 1 unit of pRBC. Transferred to MICU. patient required pressors while in the MICU, BP improved and was transitioned to Droxidopa. Patient had CT C/A/P consistent with Bibasilar pneumonia; Sputum cx grew PSA and was treated with course of Meropenem. Patient evaluated by GI in setting of Melena, transaminitis and portal venous gas on CT imaging. No EGD performed as Melena resolved, Transaminitis was thought to be in setting of shock and portal venous gas was thought to be in setting of recent PEG. EEG was performed in setting of AMS no seizures noted. Repeat MRI Performed which showed Small scattered foci of diffusion restriction within the right cerebellar hemisphere, left parietal lobe, and symmetrically in the bilateral basal ganglia. Neuro recommended Resumption of ASA in setting of possible embolic phenomenon. Repeat ECHO performed RT Ventricular Moderately enlarged, reduced systolic function. Mental status improved and was transferred back to RCU on 3/6.       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 Prior to admission patient had Viral URI ( 1-2 weeks prior to admission) with subsequent weakness. He underwent an angiogram (2/11) which showed moderate stenosis and no intervention was done. MRI Performed c/w small bilateral cerebellar strokes and prior L thalamic strokes, as per Neurology was not c/w current presentation. 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 He is currently s/p 5 rounds of plasma exchange (2/13-2/20) and IVIG x 5 ( 2/21-2/26)  His hospital course was complicated by progressive respiratory failure. CT Chest performed on 2/20 with atelectasis of b/l lower lobes. BAL 2/21 Grew PSA treated with course of Zosyn (2/21-2/28).  Patient transferred to RCU on 2/24.   RRT called on 3/3 for unresponsiveness. Patient with unreactive pupils, no corneal reflex. Also found to be hypotensive and febrile to 105. Noted to have new melena. Hgb 5. Given 1 unit of pRBC. Transferred to MICU. patient required pressors while in the MICU, BP improved and was transitioned to Droxidopa. Patient had CT C/A/P consistent with Bibasilar pneumonia; Sputum cx grew PSA and was treated with course of Meropenem. Patient evaluated by GI in setting of Melena, transaminitis and portal venous gas on CT imaging. No EGD performed as Melena resolved, Transaminitis was thought to be in setting of shock and portal venous gas was thought to be in setting of recent PEG. EEG was performed in setting of AMS no seizures noted. Repeat MRI Performed which showed Small scattered foci of diffusion restriction within the right cerebellar hemisphere, left parietal lobe, and symmetrically in the bilateral basal ganglia. Neuro recommended Resumption of ASA in setting of possible embolic phenomenon. Repeat ECHO performed RT Ventricular Moderately enlarged, reduced systolic function. Mental status improved and was transferred back to RCU on 3/6.  Cardiology consulted for tachy-dez, unable to catch events on tele, EKG ST otherwise unremarkable, CT Angio PE rule out for RV enlargement.  Continuing to wean pt from ventilator as tolerated.  Continue aggressive airway management with chest PT/suctioning PRN.  Continuing to monitor for neurological improvement.       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 Prior to admission patient had Viral URI ( 1-2 weeks prior to admission) with subsequent weakness. He underwent an angiogram (2/11) which showed moderate stenosis and no intervention was done. MRI Performed c/w small bilateral cerebellar strokes and prior L thalamic strokes, as per Neurology was not c/w current presentation. 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 He is currently s/p 5 rounds of plasma exchange (2/13-2/20) and IVIG x 5 ( 2/21-2/26)  His hospital course was complicated by progressive respiratory failure. CT Chest performed on 2/20 with atelectasis of b/l lower lobes. BAL 2/21 Grew PSA treated with course of Zosyn (2/21-2/28).  Patient transferred to RCU on 2/24.   RRT called on 3/3 for unresponsiveness. Patient with unreactive pupils, no corneal reflex. Also found to be hypotensive and febrile to 105. Noted to have new melena. Hgb 5. Given 1 unit of pRBC. Transferred to MICU. patient required pressors while in the MICU, BP improved and was transitioned to Droxidopa. Patient had CT C/A/P consistent with Bibasilar pneumonia; Sputum cx grew PSA and was treated with course of Meropenem. Patient evaluated by GI in setting of Melena, transaminitis and portal venous gas on CT imaging. No EGD performed as Melena resolved, Transaminitis was thought to be in setting of shock and portal venous gas was thought to be in setting of recent PEG. EEG was performed in setting of AMS no seizures noted. Repeat MRI Performed which showed Small scattered foci of diffusion restriction within the right cerebellar hemisphere, left parietal lobe, and symmetrically in the bilateral basal ganglia. Neuro recommended Resumption of ASA in setting of possible embolic phenomenon. Repeat ECHO performed RT Ventricular Moderately enlarged, reduced systolic function. Mental status improved and was transferred back to RCU on 3/6.  Cardiology consulted for tachy-dez, unable to catch events on tele, EKG ST otherwise unremarkable, CT Angio PE rule out for RV enlargement.  Continuing to wean pt from ventilator as tolerated.  Continue aggressive airway management with chest PT/suctioning PRN.  Continuing to monitor for neurological improvement.       3/20:  Tmax 100.7 today.  Pancultured, chest xray ordered.  Tolerating PS trials 8/5.  Neurologically unchanged.  Son, daughter and wife all involved in plan of care.  Wife was visiting rehabs with daughter today.  Discharge planning for early next week pending clinical course.   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 Prior to admission patient had Viral URI ( 1-2 weeks prior to admission) with subsequent weakness. He underwent an angiogram (2/11) which showed moderate stenosis and no intervention was done. MRI Performed c/w small bilateral cerebellar strokes and prior L thalamic strokes, as per Neurology was not c/w current presentation. 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 He is currently s/p 5 rounds of plasma exchange (2/13-2/20) and IVIG x 5 ( 2/21-2/26)  His hospital course was complicated by progressive respiratory failure. CT Chest performed on 2/20 with atelectasis of b/l lower lobes. BAL 2/21 Grew PSA treated with course of Zosyn (2/21-2/28).  Patient transferred to RCU on 2/24.   RRT called on 3/3 for unresponsiveness. Patient with unreactive pupils, no corneal reflex. Also found to be hypotensive and febrile to 105. Noted to have new melena. Hgb 5. Given 1 unit of pRBC. Transferred to MICU. patient required pressors while in the MICU, BP improved and was transitioned to Droxidopa. Patient had CT C/A/P consistent with Bibasilar pneumonia; Sputum cx grew PSA and was treated with course of Meropenem. Patient evaluated by GI in setting of Melena, transaminitis and portal venous gas on CT imaging. No EGD performed as Melena resolved, Transaminitis was thought to be in setting of shock and portal venous gas was thought to be in setting of recent PEG. EEG was performed in setting of AMS no seizures noted. Repeat MRI Performed which showed Small scattered foci of diffusion restriction within the right cerebellar hemisphere, left parietal lobe, and symmetrically in the bilateral basal ganglia. Neuro recommended Resumption of ASA in setting of possible embolic phenomenon. Repeat ECHO performed RT Ventricular Moderately enlarged, reduced systolic function. Mental status improved and was transferred back to RCU on 3/6.  Cardiology consulted for tachy-dez, unable to catch events on tele, EKG ST otherwise unremarkable, CT Angio PE rule out for RV enlargement.  Continuing to wean pt from ventilator as tolerated.  Continue aggressive airway management with chest PT/suctioning PRN.  Continuing to monitor for neurological improvement.       3/20:  Tmax 100.7 today.  Pancultured, chest xray clear lungs.  Tolerating PS trials 8/5.  Neurologically unchanged.  Hypernatremia on AM labs - D5 and free water frequency increased - f/u AM labs.  Son, daughter and wife all involved in plan of care.  Wife was visiting rehabs with daughter today.  Discharge planning for early next week pending clinical course.

## 2024-03-20 NOTE — CHART NOTE - NSCHARTNOTEFT_GEN_A_CORE
Nutrition Follow Up Note  Patient seen for: Nutrition follow up    Source: [] Patient       [x] Medical Record        [x] RN        [] Family at bedside       [x] Other:     -If unable to interview patient: [x] Trach/Vent/BiPAP  [] Disoriented/confused/inappropriate to interview    Diet Order:   Diet, NPO:   Tube Feeding Modality: Gastrostomy  Glucerna 1.5 David (GLUCERNA1.5RTH)  Total Volume for 24 Hours (mL): 1560  Continuous  Starting Tube Feed Rate {mL per Hour}: 65  Until Goal Tube Feed Rate (mL per Hour): 65  Tube Feed Duration (in Hours): 24  Tube Feed Start Time: 06:00 (24 @ 18:29) [Active]      EN order providing if 100% provision: 2340kcal (23 kcal/kg) and 129g protein (1.27 g/kg) based on dosing wt 101.5kg  EN provision: pt meeting >80% of EER x 4 days (3/15-3/19) per nursing documentation on flow sheets.     - Is current order appropriate/adequate? see below for recommendations.     Nutrition-related concerns:  -S/p tracheostomy and PEG (2-16)  -free water 250ml every 8 hours ordered   -Endo: Hgb A1c 6.8% (2/10/2024), Insulin NPH 7u q6, SSI ordered to maintain glycemic control  -Resp: Pt remains trach to vent; Attempts at PS trials improving, weaning as tolerated.    GI: No GI distress reported. Last BM 3/18.  Bowel Regimen? [x] Yes (Miralax, Senna)  [] No    Weights:   Dosing weight 101.5kg (2-09)  Daily Weight in k.8 (2-28), 101.7 (02-21) *** No additional weights available to assess.   Weight relatively stable; RD to continue to monitor weight trends as able.     Nutritionally Pertinent MEDICATIONS  (STANDING):  artificial tears (preservative free) Ophthalmic Solution 1 Drop(s) Both EYES every 4 hours  aspirin  chewable 81 milliGRAM(s) Oral daily  Biotene Dry Mouth Oral Rinse 5 milliLiter(s) Swish and Spit every 6 hours  chlorhexidine 0.12% Liquid 15 milliLiter(s) Oral Mucosa every 12 hours  chlorhexidine 4% Liquid 1 Application(s) Topical daily  clopidogrel Tablet 75 milliGRAM(s) Enteral Tube daily  dextrose 5%. 1000 milliLiter(s) (50 mL/Hr) IV Continuous <Continuous>  furosemide   Injectable 20 milliGRAM(s) IV Push two times a day  heparin   Injectable 5000 Unit(s) SubCutaneous every 8 hours  insulin lispro (ADMELOG) corrective regimen sliding scale   SubCutaneous every 6 hours  insulin NPH human recombinant 7 Unit(s) SubCutaneous every 6 hours  nystatin    Suspension 857478 Unit(s) Oral every 6 hours  pantoprazole   Suspension 40 milliGRAM(s) Oral daily  polyethylene glycol 3350 17 Gram(s) Oral two times a day  senna Syrup 10 milliLiter(s) Oral at bedtime    MEDICATIONS  (PRN):  acetaminophen   Oral Liquid .. 650 milliGRAM(s) Oral every 6 hours PRN Temp greater or equal to 38C (100.4F)  albuterol/ipratropium for Nebulization 3 milliLiter(s) Nebulizer every 6 hours PRN Shortness of Breath and/or Wheezing      Pertinent Labs:  @ 07:18: Na 148<H>, BUN 30<H>, Cr 0.59, <H>, K+ 3.8, Phos 3.8, Mg 2.5, Alk Phos --, ALT/SGPT --, AST/SGOT --, HbA1c --    A1C with Estimated Average Glucose Result: 6.8 % (02-10-24 @ 01:43)    Finger Sticks:  POCT Blood Glucose.: 156 mg/dL (24 @ 11:40)  POCT Blood Glucose.: 153 mg/dL (24 @ 06:14)  POCT Blood Glucose.: 149 mg/dL (24 @ 00:07)  POCT Blood Glucose.: 138 mg/dL (24 @ 17:05)    Skin per nursing documentation: No pressure injuries noted.  Edema per nursing documentation: none noted     (based on dosing wt 101.5kg):   Estimated Energy Needs: (23-27kcal/kg): 2334-2740kcal   Estimated Protein Needs: (1.1-1.3g protein/kg): 112-132g protein   Estimated Fluid Needs: deferred to team    Previous Nutrition Diagnosis: Acute Moderate Protein Calorie Malnutrition  Nutrition Diagnosis is: [x] ongoing  [] resolved [] not applicable     Nutrition Care Plan:  [x] In Progress  [] Achieved  [] Not applicable    New Nutrition Diagnosis: [x] Not applicable    Nutrition Interventions:     Education Provided   [] Yes:  [x] No:     Recommendations:      1. Continue Glucerna 1.5 at 65ml/hr x 24 hrs. To provide (based on dosing wt 101.5kg): 1560ml, 2340kcal (23kcal/kg) and 129g protein (1.27g protein/kg).  2. Monitor GI tolerance. RD to remain available to adjust EN formulary, volume/rate PRN.   3. Antihyperglycemic regimen deferred to team.     Monitoring and Evaluation:   Continue to monitor nutritional intake, tolerance to diet prescription, weights, labs, skin integrity    RD remains available upon request and will follow up per protocol  Zulay Hernández RDN CDN (TEAMS) Nutrition Follow Up Note  Patient seen for: Nutrition follow up    Source: [] Patient       [x] Medical Record        [x] RN        [] Family at bedside       [x] Other:     -If unable to interview patient: [x] Trach/Vent/BiPAP  [] Disoriented/confused/inappropriate to interview    Diet Order:   Diet, NPO:   Tube Feeding Modality: Gastrostomy  Glucerna 1.5 David (GLUCERNA1.5RTH)  Total Volume for 24 Hours (mL): 1560  Continuous  Starting Tube Feed Rate {mL per Hour}: 65  Until Goal Tube Feed Rate (mL per Hour): 65  Tube Feed Duration (in Hours): 24  Tube Feed Start Time: 06:00 (24 @ 18:29) [Active]      EN order providing if 100% provision: 2340kcal (23 kcal/kg) and 129g protein (1.27 g/kg) based on dosing wt 101.5kg  EN provision: pt meeting >80% of EER x 4 days (3/15-3/19) per nursing documentation on flow sheets.     - Is current order appropriate/adequate? [x] Yes    Nutrition-related concerns:  -S/p tracheostomy and PEG (2-16)  -free water 250ml every 8 hours ordered   -Endo: Hgb A1c 6.8% (2/10/2024), Insulin NPH 7u q6, SSI ordered to maintain glycemic control  -Resp: Pt remains trach to vent; Attempts at PS trials improving, weaning as tolerated.    GI: No GI distress reported. Last BM 3/18.  Bowel Regimen? [x] Yes (Miralax, Senna)  [] No    Weights:   Dosing weight 101.5kg (2-09)  Daily Weight in k.8 (2-28), 101.7 (02-21) *** No additional weights available to assess.   Weight relatively stable; RD to continue to monitor weight trends as able.     Nutritionally Pertinent MEDICATIONS  (STANDING):  artificial tears (preservative free) Ophthalmic Solution 1 Drop(s) Both EYES every 4 hours  aspirin  chewable 81 milliGRAM(s) Oral daily  Biotene Dry Mouth Oral Rinse 5 milliLiter(s) Swish and Spit every 6 hours  chlorhexidine 0.12% Liquid 15 milliLiter(s) Oral Mucosa every 12 hours  chlorhexidine 4% Liquid 1 Application(s) Topical daily  clopidogrel Tablet 75 milliGRAM(s) Enteral Tube daily  dextrose 5%. 1000 milliLiter(s) (50 mL/Hr) IV Continuous <Continuous>  furosemide   Injectable 20 milliGRAM(s) IV Push two times a day  heparin   Injectable 5000 Unit(s) SubCutaneous every 8 hours  insulin lispro (ADMELOG) corrective regimen sliding scale   SubCutaneous every 6 hours  insulin NPH human recombinant 7 Unit(s) SubCutaneous every 6 hours  nystatin    Suspension 996200 Unit(s) Oral every 6 hours  pantoprazole   Suspension 40 milliGRAM(s) Oral daily  polyethylene glycol 3350 17 Gram(s) Oral two times a day  senna Syrup 10 milliLiter(s) Oral at bedtime    MEDICATIONS  (PRN):  acetaminophen   Oral Liquid .. 650 milliGRAM(s) Oral every 6 hours PRN Temp greater or equal to 38C (100.4F)  albuterol/ipratropium for Nebulization 3 milliLiter(s) Nebulizer every 6 hours PRN Shortness of Breath and/or Wheezing      Pertinent Labs:  @ 07:18: Na 148<H>, BUN 30<H>, Cr 0.59, <H>, K+ 3.8, Phos 3.8, Mg 2.5, Alk Phos --, ALT/SGPT --, AST/SGOT --, HbA1c --    A1C with Estimated Average Glucose Result: 6.8 % (02-10-24 @ 01:43)    Finger Sticks:  POCT Blood Glucose.: 156 mg/dL (24 @ 11:40)  POCT Blood Glucose.: 153 mg/dL (24 @ 06:14)  POCT Blood Glucose.: 149 mg/dL (24 @ 00:07)  POCT Blood Glucose.: 138 mg/dL (24 @ 17:05)    Skin per nursing documentation: No pressure injuries noted.  Edema per nursing documentation: none noted     (based on dosing wt 101.5kg):   Estimated Energy Needs: (23-27kcal/kg): 2334-2740kcal   Estimated Protein Needs: (1.1-1.3g protein/kg): 112-132g protein   Estimated Fluid Needs: deferred to team    Previous Nutrition Diagnosis: Acute Moderate Protein Calorie Malnutrition  Nutrition Diagnosis is: [x] ongoing  [] resolved [] not applicable     Nutrition Care Plan:  [x] In Progress  [] Achieved  [] Not applicable    New Nutrition Diagnosis: [x] Not applicable    Nutrition Interventions:     Education Provided   [] Yes:  [x] No:     Recommendations:      1. Continue Glucerna 1.5 at 65ml/hr x 24 hrs. To provide (based on dosing wt 101.5kg): 1560ml, 2340kcal (23kcal/kg) and 129g protein (1.27g protein/kg).  2. Monitor GI tolerance. RD to remain available to adjust EN formulary, volume/rate PRN.   3. Antihyperglycemic regimen deferred to team.     Monitoring and Evaluation:   Continue to monitor nutritional intake, tolerance to diet prescription, weights, labs, skin integrity    RD remains available upon request and will follow up per protocol  Zulay Hernández RDN CDN (TEAMS)

## 2024-03-20 NOTE — PROGRESS NOTE ADULT - PROBLEM SELECTOR PLAN 1
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

## 2024-03-20 NOTE — PROGRESS NOTE ADULT - PROBLEM SELECTOR PLAN 6
- MRI 2/12 - small b/l cerebellar strokes (post-procedural) and prior L thalamic strokes  - MRI 3/5: Small scattered foci of diffusion restriction within the right cerebellar hemisphere, left parietal lobe, and symmetrically in the bilateral basal ganglia.  - Lipitor d/c'd in setting of liver injury 3/3  [facility]ix x3 months (plavix restarted 3/12) then ASA monotherapy  - ECHO 3/7: EF 74% RT Ventricular Moderately Enlarged and reduced systolic fxn  - Neurology following - MRI 2/12 - small b/l cerebellar strokes (post-procedural) and prior L thalamic strokes  - MRI 3/5: Small scattered foci of diffusion restriction within the right cerebellar hemisphere, left parietal lobe, and symmetrically in the bilateral basal ganglia.  - Lipitor d/c'd in setting of liver injury 3/3  - [facility] trial; Cont ASA/Plavix x3 months then ASA monotherapy  - plavix briefly held in setting of anemia and restarted 3/12 - continue w/ ASA/plavix  - Neurology following

## 2024-03-20 NOTE — PROGRESS NOTE ADULT - PROBLEM SELECTOR PLAN 10
- Resolved on IVF 3/8  - free water as ordered  - Monitor BMP - IVF 3/8 and 3/20  - free water as ordered  - Monitor BMP

## 2024-03-20 NOTE — PROGRESS NOTE ADULT - SUBJECTIVE AND OBJECTIVE BOX
Subjective: Patient seen and examined. No new events except as noted.     SUBJECTIVE/ROS:  ROS is limited as pt currently sedated on ventilator.       MEDICATIONS:  MEDICATIONS  (STANDING):  artificial tears (preservative free) Ophthalmic Solution 1 Drop(s) Both EYES every 4 hours  aspirin  chewable 81 milliGRAM(s) Oral daily  Biotene Dry Mouth Oral Rinse 5 milliLiter(s) Swish and Spit every 6 hours  chlorhexidine 0.12% Liquid 15 milliLiter(s) Oral Mucosa every 12 hours  chlorhexidine 4% Liquid 1 Application(s) Topical daily  clopidogrel Tablet 75 milliGRAM(s) Enteral Tube daily  furosemide   Injectable 20 milliGRAM(s) IV Push two times a day  heparin   Injectable 5000 Unit(s) SubCutaneous every 8 hours  insulin lispro (ADMELOG) corrective regimen sliding scale   SubCutaneous every 6 hours  insulin NPH human recombinant 7 Unit(s) SubCutaneous every 6 hours  nystatin    Suspension 443804 Unit(s) Oral every 6 hours  pantoprazole   Suspension 40 milliGRAM(s) Oral daily  polyethylene glycol 3350 17 Gram(s) Oral two times a day  senna Syrup 10 milliLiter(s) Oral at bedtime      PHYSICAL EXAM:  T(C): 37.6 (03-20-24 @ 06:36), Max: 37.6 (03-20-24 @ 06:36)  HR: 103 (03-20-24 @ 06:36) (92 - 103)  BP: 129/73 (03-20-24 @ 06:36) (129/73 - 145/76)  RR: 18 (03-20-24 @ 06:36) (18 - 21)  SpO2: 98% (03-20-24 @ 06:36) (97% - 100%)  Wt(kg): --  I&O's Summary    19 Mar 2024 07:01  -  20 Mar 2024 07:00  --------------------------------------------------------  IN: 2260 mL / OUT: 975 mL / NET: 1285 mL        Appearance: on vent 	  Cardiovascular: Normal S1 S2,    Murmur:   Neck: JVP limited to be evaluated   Respiratory: Lungs few rhonchi   Gastrointestinal:  Soft  Skin: normal   Neuro: limited as pt on vent      LABS/DATA:    CARDIAC MARKERS:            03-20    148<H>  |  108  |  30<H>  ----------------------------<  164<H>  3.8   |  30  |  0.59    Ca    9.0      20 Mar 2024 07:18  Phos  3.8     03-20  Mg     2.5     03-20      proBNP:   Lipid Profile:   HgA1c:   TSH:     TELE:  EKG:

## 2024-03-20 NOTE — PROGRESS NOTE ADULT - PROBLEM SELECTOR PLAN 3
- Patient with Sympathetic Storming in NSCU   - Patient with Labile BP HTN and Hypotensive Episodes; S/p Pressors  - Continue to monitor BP / HR - Patient with Sympathetic Storming in NSCU   - Patient with Labile BP HTN and Hypotensive Episodes; S/p Pressors  - tachy/dez episodes  - TTE 3/7: EF 74% RT Ventricular Moderately Enlarged and reduced systolic fxn  - 3/18 CT Angio PE - negative   - Continue to monitor BP / HR  - cardiology (Dr. Cary) following

## 2024-03-21 LAB
ANION GAP SERPL CALC-SCNC: 12 MMOL/L — SIGNIFICANT CHANGE UP (ref 5–17)
BUN SERPL-MCNC: 27 MG/DL — HIGH (ref 7–23)
CALCIUM SERPL-MCNC: 8.6 MG/DL — SIGNIFICANT CHANGE UP (ref 8.4–10.5)
CHLORIDE SERPL-SCNC: 107 MMOL/L — SIGNIFICANT CHANGE UP (ref 96–108)
CO2 SERPL-SCNC: 29 MMOL/L — SIGNIFICANT CHANGE UP (ref 22–31)
CREAT SERPL-MCNC: 0.54 MG/DL — SIGNIFICANT CHANGE UP (ref 0.5–1.3)
EGFR: 105 ML/MIN/1.73M2 — SIGNIFICANT CHANGE UP
GLUCOSE BLDC GLUCOMTR-MCNC: 139 MG/DL — HIGH (ref 70–99)
GLUCOSE BLDC GLUCOMTR-MCNC: 153 MG/DL — HIGH (ref 70–99)
GLUCOSE BLDC GLUCOMTR-MCNC: 179 MG/DL — HIGH (ref 70–99)
GLUCOSE SERPL-MCNC: 141 MG/DL — HIGH (ref 70–99)
HCT VFR BLD CALC: 30.3 % — LOW (ref 39–50)
HGB BLD-MCNC: 8.4 G/DL — LOW (ref 13–17)
MAGNESIUM SERPL-MCNC: 2.5 MG/DL — SIGNIFICANT CHANGE UP (ref 1.6–2.6)
MCHC RBC-ENTMCNC: 23.3 PG — LOW (ref 27–34)
MCHC RBC-ENTMCNC: 27.7 GM/DL — LOW (ref 32–36)
MCV RBC AUTO: 83.9 FL — SIGNIFICANT CHANGE UP (ref 80–100)
NRBC # BLD: 0 /100 WBCS — SIGNIFICANT CHANGE UP (ref 0–0)
PHOSPHATE SERPL-MCNC: 3.3 MG/DL — SIGNIFICANT CHANGE UP (ref 2.5–4.5)
PLATELET # BLD AUTO: 488 K/UL — HIGH (ref 150–400)
POTASSIUM SERPL-MCNC: 3.6 MMOL/L — SIGNIFICANT CHANGE UP (ref 3.5–5.3)
POTASSIUM SERPL-SCNC: 3.6 MMOL/L — SIGNIFICANT CHANGE UP (ref 3.5–5.3)
RBC # BLD: 3.61 M/UL — LOW (ref 4.2–5.8)
RBC # FLD: 21.2 % — HIGH (ref 10.3–14.5)
SODIUM SERPL-SCNC: 148 MMOL/L — HIGH (ref 135–145)
WBC # BLD: 7.46 K/UL — SIGNIFICANT CHANGE UP (ref 3.8–10.5)
WBC # FLD AUTO: 7.46 K/UL — SIGNIFICANT CHANGE UP (ref 3.8–10.5)

## 2024-03-21 PROCEDURE — 99233 SBSQ HOSP IP/OBS HIGH 50: CPT

## 2024-03-21 RX ORDER — SODIUM CHLORIDE 9 MG/ML
1000 INJECTION, SOLUTION INTRAVENOUS
Refills: 0 | Status: COMPLETED | OUTPATIENT
Start: 2024-03-21 | End: 2024-03-22

## 2024-03-21 RX ADMIN — Medication 1 DROP(S): at 02:57

## 2024-03-21 RX ADMIN — Medication 1 DROP(S): at 18:08

## 2024-03-21 RX ADMIN — Medication 2: at 18:06

## 2024-03-21 RX ADMIN — Medication 500000 UNIT(S): at 18:50

## 2024-03-21 RX ADMIN — POLYETHYLENE GLYCOL 3350 17 GRAM(S): 17 POWDER, FOR SOLUTION ORAL at 05:58

## 2024-03-21 RX ADMIN — Medication 5 MILLILITER(S): at 11:34

## 2024-03-21 RX ADMIN — SODIUM CHLORIDE 50 MILLILITER(S): 9 INJECTION, SOLUTION INTRAVENOUS at 05:58

## 2024-03-21 RX ADMIN — Medication 20 MILLIGRAM(S): at 13:33

## 2024-03-21 RX ADMIN — Medication 20 MILLIGRAM(S): at 05:59

## 2024-03-21 RX ADMIN — Medication 1 DROP(S): at 10:29

## 2024-03-21 RX ADMIN — Medication 1 DROP(S): at 13:34

## 2024-03-21 RX ADMIN — HEPARIN SODIUM 5000 UNIT(S): 5000 INJECTION INTRAVENOUS; SUBCUTANEOUS at 13:34

## 2024-03-21 RX ADMIN — HUMAN INSULIN 7 UNIT(S): 100 INJECTION, SUSPENSION SUBCUTANEOUS at 18:07

## 2024-03-21 RX ADMIN — HUMAN INSULIN 7 UNIT(S): 100 INJECTION, SUSPENSION SUBCUTANEOUS at 05:58

## 2024-03-21 RX ADMIN — Medication 500000 UNIT(S): at 11:35

## 2024-03-21 RX ADMIN — SODIUM CHLORIDE 75 MILLILITER(S): 9 INJECTION, SOLUTION INTRAVENOUS at 10:29

## 2024-03-21 RX ADMIN — Medication 5 MILLILITER(S): at 05:58

## 2024-03-21 RX ADMIN — CHLORHEXIDINE GLUCONATE 15 MILLILITER(S): 213 SOLUTION TOPICAL at 05:58

## 2024-03-21 RX ADMIN — Medication 1 DROP(S): at 21:46

## 2024-03-21 RX ADMIN — Medication 81 MILLIGRAM(S): at 11:34

## 2024-03-21 RX ADMIN — HEPARIN SODIUM 5000 UNIT(S): 5000 INJECTION INTRAVENOUS; SUBCUTANEOUS at 21:45

## 2024-03-21 RX ADMIN — SENNA PLUS 10 MILLILITER(S): 8.6 TABLET ORAL at 21:45

## 2024-03-21 RX ADMIN — Medication 1 DROP(S): at 06:00

## 2024-03-21 RX ADMIN — HEPARIN SODIUM 5000 UNIT(S): 5000 INJECTION INTRAVENOUS; SUBCUTANEOUS at 05:59

## 2024-03-21 RX ADMIN — POLYETHYLENE GLYCOL 3350 17 GRAM(S): 17 POWDER, FOR SOLUTION ORAL at 18:07

## 2024-03-21 RX ADMIN — Medication 2: at 12:03

## 2024-03-21 RX ADMIN — HUMAN INSULIN 7 UNIT(S): 100 INJECTION, SUSPENSION SUBCUTANEOUS at 00:14

## 2024-03-21 RX ADMIN — Medication 2: at 00:14

## 2024-03-21 RX ADMIN — Medication 500000 UNIT(S): at 00:15

## 2024-03-21 RX ADMIN — CLOPIDOGREL BISULFATE 75 MILLIGRAM(S): 75 TABLET, FILM COATED ORAL at 11:34

## 2024-03-21 RX ADMIN — Medication 5 MILLILITER(S): at 00:15

## 2024-03-21 RX ADMIN — PANTOPRAZOLE SODIUM 40 MILLIGRAM(S): 20 TABLET, DELAYED RELEASE ORAL at 11:34

## 2024-03-21 RX ADMIN — Medication 500000 UNIT(S): at 05:58

## 2024-03-21 RX ADMIN — CHLORHEXIDINE GLUCONATE 1 APPLICATION(S): 213 SOLUTION TOPICAL at 21:46

## 2024-03-21 RX ADMIN — CHLORHEXIDINE GLUCONATE 15 MILLILITER(S): 213 SOLUTION TOPICAL at 18:08

## 2024-03-21 RX ADMIN — Medication 5 MILLILITER(S): at 18:08

## 2024-03-21 RX ADMIN — HUMAN INSULIN 7 UNIT(S): 100 INJECTION, SUSPENSION SUBCUTANEOUS at 12:02

## 2024-03-21 NOTE — PROGRESS NOTE ADULT - ASSESSMENT
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 Prior to admission patient had Viral URI ( 1-2 weeks prior to admission) with subsequent weakness. He underwent an angiogram (2/11) which showed moderate stenosis and no intervention was done. MRI Performed c/w small bilateral cerebellar strokes and prior L thalamic strokes, as per Neurology was not c/w current presentation. 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 He is currently s/p 5 rounds of plasma exchange (2/13-2/20) and IVIG x 5 ( 2/21-2/26)  His hospital course was complicated by progressive respiratory failure. CT Chest performed on 2/20 with atelectasis of b/l lower lobes. BAL 2/21 Grew PSA treated with course of Zosyn (2/21-2/28).  Patient transferred to RCU on 2/24.   RRT called on 3/3 for unresponsiveness. Patient with unreactive pupils, no corneal reflex. Also found to be hypotensive and febrile to 105. Noted to have new melena. Hgb 5. Given 1 unit of pRBC. Transferred to MICU. patient required pressors while in the MICU, BP improved and was transitioned to Droxidopa. Patient had CT C/A/P consistent with Bibasilar pneumonia; Sputum cx grew PSA and was treated with course of Meropenem. Patient evaluated by GI in setting of Melena, transaminitis and portal venous gas on CT imaging. No EGD performed as Melena resolved, Transaminitis was thought to be in setting of shock and portal venous gas was thought to be in setting of recent PEG. EEG was performed in setting of AMS no seizures noted. Repeat MRI Performed which showed Small scattered foci of diffusion restriction within the right cerebellar hemisphere, left parietal lobe, and symmetrically in the bilateral basal ganglia. Neuro recommended Resumption of ASA in setting of possible embolic phenomenon. Repeat ECHO performed RT Ventricular Moderately enlarged, reduced systolic function. Mental status improved and was transferred back to RCU on 3/6.  Cardiology consulted for tachy-dez, unable to catch events on tele, EKG ST otherwise unremarkable, CT Angio PE rule out for RV enlargement.  Continuing to wean pt from ventilator as tolerated.  Continue aggressive airway management with chest PT/suctioning PRN.  Continuing to monitor for neurological improvement.       3/20:  Tmax 100.7 today.  Pancultured, chest xray clear lungs.  Tolerating PS trials 8/5.  Neurologically unchanged.  Hypernatremia on AM labs - D5 and free water frequency increased - f/u AM labs.  Son, daughter and wife all involved in plan of care.  Wife was visiting rehabs with daughter today.  Discharge planning for early next week pending clinical course.   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 Prior to admission patient had Viral URI ( 1-2 weeks prior to admission) with subsequent weakness. He underwent an angiogram (2/11) which showed moderate stenosis and no intervention was done. MRI Performed c/w small bilateral cerebellar strokes and prior L thalamic strokes, as per Neurology was not c/w current presentation. 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 He is currently s/p 5 rounds of plasma exchange (2/13-2/20) and IVIG x 5 ( 2/21-2/26)  His hospital course was complicated by progressive respiratory failure. CT Chest performed on 2/20 with atelectasis of b/l lower lobes. BAL 2/21 Grew PSA treated with course of Zosyn (2/21-2/28).  Patient transferred to RCU on 2/24.   RRT called on 3/3 for unresponsiveness. Patient with unreactive pupils, no corneal reflex. Also found to be hypotensive and febrile to 105. Noted to have new melena. Hgb 5. Given 1 unit of pRBC. Transferred to MICU. patient required pressors while in the MICU, BP improved and was transitioned to Droxidopa. Patient had CT C/A/P consistent with Bibasilar pneumonia; Sputum cx grew PSA and was treated with course of Meropenem. Patient evaluated by GI in setting of Melena, transaminitis and portal venous gas on CT imaging. No EGD performed as Melena resolved, Transaminitis was thought to be in setting of shock and portal venous gas was thought to be in setting of recent PEG. EEG was performed in setting of AMS no seizures noted. Repeat MRI Performed which showed Small scattered foci of diffusion restriction within the right cerebellar hemisphere, left parietal lobe, and symmetrically in the bilateral basal ganglia. Neuro recommended Resumption of ASA in setting of possible embolic phenomenon. Repeat ECHO performed RT Ventricular Moderately enlarged, reduced systolic function. Mental status improved and was transferred back to RCU on 3/6.  Cardiology consulted for tachy-dez, unable to catch events on tele, EKG ST otherwise unremarkable, CT Angio PE rule out for RV enlargement.  Continuing to wean pt from ventilator as tolerated.  Continue aggressive airway management with chest PT/suctioning PRN.  Continuing to monitor for neurological improvement.       3/20:  Tmax 100.7 today.  Pancultured, chest xray clear lungs.  Tolerating PS trials 8/5.  Neurologically unchanged.  Hypernatremia on AM labs - D5 and free water frequency increased - f/u AM labs.  Son, daughter and wife all involved in plan of care.  Wife was visiting rehabs with daughter today.  Discharge planning for early next week pending clinical course.    3/21: Sodium remains elevated, IVF rate increased to 75ml/hr.  Remains on IV lasix.  Afebrile over night.  Awaiting cultures to return.  Continuing to monitor off antibiotics.  Discharge planning for next week in progress.

## 2024-03-21 NOTE — PROGRESS NOTE ADULT - PROBLEM SELECTOR PLAN 10
- IVF 3/8 and 3/20  - free water as ordered  - Monitor BMP - IVF 3/8 and 3/20  - free water as ordered 250ml Q8H  - 3/21:  D5W increased to 75ml/hr  - Monitor BMP

## 2024-03-21 NOTE — PROGRESS NOTE ADULT - PROBLEM SELECTOR PLAN 5
- Patient with hx of HTN Prior to admission; was on Atenolol/Chlorthiazide   - S/p Pressors while in MICU   - S/p Droxidopa; D/cd on 3/10  - BPs have remains stable w/o medications - continuing to monitor

## 2024-03-21 NOTE — PROGRESS NOTE ADULT - PROBLEM SELECTOR PLAN 8
- Patient [facility]  - Family would like to be asked prior to administration of blood products   - S/p 1 unit PRBCs on 2/22 and 3/3  - S/p Venofer (2/22-2/27)   - S/p Cyanocobalamine, Folic acid, Ferrous Sulfate  - S/p Epogen x 5 days (Ended 2/27)  - Reported Melena on 3/3   - CT A/P 3/3 negative for acute bleed  - GI did not recommend EGD as no overt signs of active bleeding and responded to PRBCs  - Will use pediatric tubes to limit volume for phlebotomy as patient is [employer]   - H&H continues to remain stable, will monitor for melena and signs of bleeding

## 2024-03-21 NOTE — PROGRESS NOTE ADULT - SUBJECTIVE AND OBJECTIVE BOX
Patient is a 73y old  Male who presents with a chief complaint of Stroke, critical stenosis, evaluation for angiography (20 Mar 2024 08:31)      Interval Events:    REVIEW OF SYSTEMS:  [ ] Positive  [ ] All other systems negative  [ ] Unable to assess ROS because ________    Vital Signs Last 24 Hrs  T(C): 37.6 (03-21-24 @ 05:22), Max: 38.2 (03-20-24 @ 11:13)  T(F): 99.7 (03-21-24 @ 05:22), Max: 100.7 (03-20-24 @ 11:13)  HR: 96 (03-21-24 @ 05:22) (89 - 112)  BP: 136/71 (03-21-24 @ 05:22) (121/70 - 169/85)  RR: 20 (03-21-24 @ 05:22) (18 - 24)  SpO2: 100% (03-21-24 @ 05:22) (97% - 100%)    PHYSICAL EXAM:  HEENT:   [ ]Tracheostomy:  [ ]Pupils equal  [ ]No oral lesions  [ ]Abnormal    SKIN  [ ]No Rash  [ ] Abnormal  [ ] pressure    CARDIAC  [ ]Regular  [ ]Abnormal    PULMONARY  [ ]Bilateral Clear Breath Sounds  [ ]Normal Excursion  [ ]Abnormal    GI  [ ]PEG      [ ] +BS		              [ ]Soft, nondistended, nontender	  [ ]Abnormal    MUSCULOSKELETAL                                   [ ]Bedbound                 [ ]Abnormal    [ ]Ambulatory/OOB to chair                           EXTREMITIES                                         [ ]Normal  [ ]Edema                           NEUROLOGIC  [ ] Normal, non focal  [ ] Focal findings:    PSYCHIATRIC  [ ]Alert and appropriate  [ ] Sedated	 [ ]Agitated    :  [place]: [ ] Yes, if yes: Date of Placement:                   [  ] No    LINES: Central Lines [ ] Yes, if yes: Date of Placement                                     [  ] No    HOSPITAL MEDICATIONS:  MEDICATIONS  (STANDING):  artificial tears (preservative free) Ophthalmic Solution 1 Drop(s) Both EYES every 4 hours  aspirin  chewable 81 milliGRAM(s) Oral daily  Biotene Dry Mouth Oral Rinse 5 milliLiter(s) Swish and Spit every 6 hours  chlorhexidine 0.12% Liquid 15 milliLiter(s) Oral Mucosa every 12 hours  chlorhexidine 4% Liquid 1 Application(s) Topical daily  clopidogrel Tablet 75 milliGRAM(s) Enteral Tube daily  dextrose 5%. 1000 milliLiter(s) (50 mL/Hr) IV Continuous <Continuous>  furosemide   Injectable 20 milliGRAM(s) IV Push two times a day  heparin   Injectable 5000 Unit(s) SubCutaneous every 8 hours  insulin lispro (ADMELOG) corrective regimen sliding scale   SubCutaneous every 6 hours  insulin NPH human recombinant 7 Unit(s) SubCutaneous every 6 hours  nystatin    Suspension 096787 Unit(s) Oral every 6 hours  pantoprazole   Suspension 40 milliGRAM(s) Oral daily  polyethylene glycol 3350 17 Gram(s) Oral two times a day  senna Syrup 10 milliLiter(s) Oral at bedtime    MEDICATIONS  (PRN):  acetaminophen   Oral Liquid .. 650 milliGRAM(s) Oral every 6 hours PRN Temp greater or equal to 38C (100.4F)  albuterol/ipratropium for Nebulization 3 milliLiter(s) Nebulizer every 6 hours PRN Shortness of Breath and/or Wheezing      LABS:                        8.4    7.46  )-----------( 488      ( 21 Mar 2024 06:35 )             30.3     03-21    148<H>  |  107  |  27<H>  ----------------------------<  141<H>  3.6   |  29  |  0.54    Ca    8.6      21 Mar 2024 06:34  Phos  3.3     03-21  Mg     2.5     03-21        Urinalysis Basic - ( 21 Mar 2024 06:34 )    Color: x / Appearance: x / SG: x / pH: x  Gluc: 141 mg/dL / Ketone: x  / Bili: x / Urobili: x   Blood: x / Protein: x / Nitrite: x   Leuk Esterase: x / RBC: x / WBC x   Sq Epi: x / Non Sq Epi: x / Bacteria: x          CAPILLARY BLOOD GLUCOSE    MICROBIOLOGY:     RADIOLOGY:  [ ] Reviewed and interpreted by me    Mode: AC/ CMV (Assist Control/ Continuous Mandatory Ventilation)  RR (machine): 20  TV (machine): 500  FiO2: 30  PEEP: 5  ITime: 1  MAP: 10  PIP: 22   Patient is a 73y old  Male who presents with a chief complaint of Stroke, critical stenosis, evaluation for angiography (20 Mar 2024 08:31)      Interval Events: No events reported over night.  Tmax 99.9F    REVIEW OF SYSTEMS:  [ ] Positive  [X] All other systems negative  [ ] Unable to assess ROS because ________    Vital Signs Last 24 Hrs  T(C): 37.6 (03-21-24 @ 05:22), Max: 38.2 (03-20-24 @ 11:13)  T(F): 99.7 (03-21-24 @ 05:22), Max: 100.7 (03-20-24 @ 11:13)  HR: 96 (03-21-24 @ 05:22) (89 - 112)  BP: 136/71 (03-21-24 @ 05:22) (121/70 - 169/85)  RR: 20 (03-21-24 @ 05:22) (18 - 24)  SpO2: 100% (03-21-24 @ 05:22) (97% - 100%)    PHYSICAL EXAM:  HEENT:    [X] Tracheostomy:  #8 Cuffed Portex  [X] PERRL B/L; 4mm B/L pupils; EOMI  [X] No oral lesions  [ ] Abnormal    SKIN  [X] No Rash  [ ] Abnormal  [ ] pressure    CARDIAC  [X] Regular  [ ] Abnormal    PULMONARY  [X] Bilateral Clear Breath Sounds  [ ] Normal Excursion  [ ] Abnormal    GI  [X] PEG      [X] +BS		              [X] Soft, nondistended, nontender	  [ ] Abnormal    MUSCULOSKELETAL                                   [X] Bedbound                 [ ] Abnormal:   [ ] Ambulatory/OOB to chair                           EXTREMITIES                                         [ ]Normal  [X] Edema  1+ edema in B/L UEs                           NEUROLOGIC  [ ] Normal, non focal  [X] Focal findings: Alert and Oriented; +gag; no gross facial asymmetry observed; responds appropriately to questions by nodding; able to move B/L feet, trigger movement observed in right knee,  +B/L shoulder shrugs, slight grasp in left hand observed; unable to lift legs or arms    PSYCHIATRIC  [X] Alert, responsive with flat affect  [ ] Sedated	 [ ]Agitated    :  [place]: [ ] Yes, if yes: Date of Placement:                   [X] No    LINES: Central Lines [ ] Yes, if yes: Date of Placement                                     [X] No      HOSPITAL MEDICATIONS:  MEDICATIONS  (STANDING):  artificial tears (preservative free) Ophthalmic Solution 1 Drop(s) Both EYES every 4 hours  aspirin  chewable 81 milliGRAM(s) Oral daily  Biotene Dry Mouth Oral Rinse 5 milliLiter(s) Swish and Spit every 6 hours  chlorhexidine 0.12% Liquid 15 milliLiter(s) Oral Mucosa every 12 hours  chlorhexidine 4% Liquid 1 Application(s) Topical daily  clopidogrel Tablet 75 milliGRAM(s) Enteral Tube daily  dextrose 5%. 1000 milliLiter(s) (50 mL/Hr) IV Continuous <Continuous>  furosemide   Injectable 20 milliGRAM(s) IV Push two times a day  heparin   Injectable 5000 Unit(s) SubCutaneous every 8 hours  insulin lispro (ADMELOG) corrective regimen sliding scale   SubCutaneous every 6 hours  insulin NPH human recombinant 7 Unit(s) SubCutaneous every 6 hours  nystatin    Suspension 459377 Unit(s) Oral every 6 hours  pantoprazole   Suspension 40 milliGRAM(s) Oral daily  polyethylene glycol 3350 17 Gram(s) Oral two times a day  senna Syrup 10 milliLiter(s) Oral at bedtime    MEDICATIONS  (PRN):  acetaminophen   Oral Liquid .. 650 milliGRAM(s) Oral every 6 hours PRN Temp greater or equal to 38C (100.4F)  albuterol/ipratropium for Nebulization 3 milliLiter(s) Nebulizer every 6 hours PRN Shortness of Breath and/or Wheezing      LABS:                        8.4    7.46  )-----------( 488      ( 21 Mar 2024 06:35 )             30.3     03-21    148<H>  |  107  |  27<H>  ----------------------------<  141<H>  3.6   |  29  |  0.54    Ca    8.6      21 Mar 2024 06:34  Phos  3.3     03-21  Mg     2.5     03-21        Urinalysis Basic - ( 21 Mar 2024 06:34 )    Color: x / Appearance: x / SG: x / pH: x  Gluc: 141 mg/dL / Ketone: x  / Bili: x / Urobili: x   Blood: x / Protein: x / Nitrite: x   Leuk Esterase: x / RBC: x / WBC x   Sq Epi: x / Non Sq Epi: x / Bacteria: x          CAPILLARY BLOOD GLUCOSE    MICROBIOLOGY:     RADIOLOGY:  [ ] Reviewed and interpreted by me    Mode: AC/ CMV (Assist Control/ Continuous Mandatory Ventilation)  RR (machine): 20  TV (machine): 500  FiO2: 30  PEEP: 5  ITime: 1  MAP: 10  PIP: 22

## 2024-03-21 NOTE — PROGRESS NOTE ADULT - PROBLEM SELECTOR PLAN 3
- Patient with Sympathetic Storming in NSCU   - Patient with Labile BP HTN and Hypotensive Episodes; S/p Pressors  - tachy/dez episodes  - TTE 3/7: EF 74% RT Ventricular Moderately Enlarged and reduced systolic fxn  - 3/18 CT Angio PE - negative   - Continue to monitor BP / HR  - cardiology (Dr. Cary) following

## 2024-03-21 NOTE — PROGRESS NOTE ADULT - PROBLEM SELECTOR PLAN 6
- MRI 2/12 - small b/l cerebellar strokes (post-procedural) and prior L thalamic strokes  - MRI 3/5: Small scattered foci of diffusion restriction within the right cerebellar hemisphere, left parietal lobe, and symmetrically in the bilateral basal ganglia.  - Lipitor d/c'd in setting of liver injury 3/3  - SAMMPRIS trial; Cont ASA/Plavix x3 months then ASA monotherapy  - plavix briefly held in setting of anemia and restarted 3/12 - continue w/ ASA/plavix  - Neurology following

## 2024-03-21 NOTE — PROGRESS NOTE ADULT - SUBJECTIVE AND OBJECTIVE BOX
Subjective: Patient seen and examined. No new events except as noted.     SUBJECTIVE/ROS:  ROS limited       MEDICATIONS:  MEDICATIONS  (STANDING):  artificial tears (preservative free) Ophthalmic Solution 1 Drop(s) Both EYES every 4 hours  aspirin  chewable 81 milliGRAM(s) Oral daily  Biotene Dry Mouth Oral Rinse 5 milliLiter(s) Swish and Spit every 6 hours  chlorhexidine 0.12% Liquid 15 milliLiter(s) Oral Mucosa every 12 hours  chlorhexidine 4% Liquid 1 Application(s) Topical daily  clopidogrel Tablet 75 milliGRAM(s) Enteral Tube daily  dextrose 5%. 1000 milliLiter(s) (50 mL/Hr) IV Continuous <Continuous>  furosemide   Injectable 20 milliGRAM(s) IV Push two times a day  heparin   Injectable 5000 Unit(s) SubCutaneous every 8 hours  insulin lispro (ADMELOG) corrective regimen sliding scale   SubCutaneous every 6 hours  insulin NPH human recombinant 7 Unit(s) SubCutaneous every 6 hours  nystatin    Suspension 574116 Unit(s) Oral every 6 hours  pantoprazole   Suspension 40 milliGRAM(s) Oral daily  polyethylene glycol 3350 17 Gram(s) Oral two times a day  senna Syrup 10 milliLiter(s) Oral at bedtime      PHYSICAL EXAM:  T(C): 37.6 (03-21-24 @ 05:22), Max: 38.2 (03-20-24 @ 11:13)  HR: 98 (03-21-24 @ 08:51) (89 - 112)  BP: 136/71 (03-21-24 @ 05:22) (121/70 - 169/85)  RR: 20 (03-21-24 @ 08:50) (18 - 24)  SpO2: 99% (03-21-24 @ 08:51) (97% - 100%)  Wt(kg): --  I&O's Summary    20 Mar 2024 07:01  -  21 Mar 2024 07:00  --------------------------------------------------------  IN: 3110 mL / OUT: 400 mL / NET: 2710 mL        Appearance: on vent 	  Cardiovascular: Normal S1 S2,    Murmur:   Neck: JVP limited to be evaluated   Respiratory: Lungs few rhonchi   Gastrointestinal:  Soft  Skin: normal   Neuro: limited as pt on vent      LABS/DATA:    CARDIAC MARKERS:                                8.4    7.46  )-----------( 488      ( 21 Mar 2024 06:35 )             30.3     03-21    148<H>  |  107  |  27<H>  ----------------------------<  141<H>  3.6   |  29  |  0.54    Ca    8.6      21 Mar 2024 06:34  Phos  3.3     03-21  Mg     2.5     03-21      proBNP:   Lipid Profile:   HgA1c:   TSH:     TELE:  EKG:

## 2024-03-21 NOTE — PROGRESS NOTE ADULT - PROBLEM SELECTOR PLAN 1
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

## 2024-03-21 NOTE — PROGRESS NOTE ADULT - PROBLEM SELECTOR PLAN 12
- pt wife remains at bedside and is updated daily - 3/21: Fever workup in progress.  Planning for discharge in upcoming week if no other events.

## 2024-03-21 NOTE — PROGRESS NOTE ADULT - PROBLEM SELECTOR PLAN 4
- BAL 2/21: PSA; Txd with Zosyn ( 2/21-2/28)  - Sputum 3/4: PSA , Blood cxs 3/3: NGTD  - Txd with course of Meropenem 3/3 --> 3/11  - 3/20 tmax 100.7 - pancultured, chest xray - BAL 2/21: PSA; Txd with Zosyn ( 2/21-2/28)  - Sputum 3/4: PSA , Blood cxs 3/3: NGTD  - Txd with course of Meropenem 3/3 --> 3/11  - 3/20 tmax 100.7 - pancultured, chest xray clear, UA negative.  Awaiting sputum and blood cultures.  - 3/21 Monitoring off antibiotics

## 2024-03-21 NOTE — PROGRESS NOTE ADULT - NS ATTEND AMEND GEN_ALL_CORE FT
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    - ASA/Plavix as per neuro  - Wean from vent as tolerated  - Monitor off abx  - D5 gtt for hypernatremia  - DIspo planning

## 2024-03-22 LAB
ANION GAP SERPL CALC-SCNC: 14 MMOL/L — SIGNIFICANT CHANGE UP (ref 5–17)
BUN SERPL-MCNC: 25 MG/DL — HIGH (ref 7–23)
CALCIUM SERPL-MCNC: 9 MG/DL — SIGNIFICANT CHANGE UP (ref 8.4–10.5)
CHLORIDE SERPL-SCNC: 106 MMOL/L — SIGNIFICANT CHANGE UP (ref 96–108)
CO2 SERPL-SCNC: 26 MMOL/L — SIGNIFICANT CHANGE UP (ref 22–31)
CREAT SERPL-MCNC: 0.51 MG/DL — SIGNIFICANT CHANGE UP (ref 0.5–1.3)
EGFR: 107 ML/MIN/1.73M2 — SIGNIFICANT CHANGE UP
GLUCOSE BLDC GLUCOMTR-MCNC: 141 MG/DL — HIGH (ref 70–99)
GLUCOSE BLDC GLUCOMTR-MCNC: 161 MG/DL — HIGH (ref 70–99)
GLUCOSE BLDC GLUCOMTR-MCNC: 163 MG/DL — HIGH (ref 70–99)
GLUCOSE BLDC GLUCOMTR-MCNC: 164 MG/DL — HIGH (ref 70–99)
GLUCOSE BLDC GLUCOMTR-MCNC: 180 MG/DL — HIGH (ref 70–99)
GLUCOSE SERPL-MCNC: 161 MG/DL — HIGH (ref 70–99)
HCT VFR BLD CALC: 30.9 % — LOW (ref 39–50)
HGB BLD-MCNC: 8.4 G/DL — LOW (ref 13–17)
MAGNESIUM SERPL-MCNC: 2.5 MG/DL — SIGNIFICANT CHANGE UP (ref 1.6–2.6)
MCHC RBC-ENTMCNC: 22.8 PG — LOW (ref 27–34)
MCHC RBC-ENTMCNC: 27.2 GM/DL — LOW (ref 32–36)
MCV RBC AUTO: 83.7 FL — SIGNIFICANT CHANGE UP (ref 80–100)
NRBC # BLD: 0 /100 WBCS — SIGNIFICANT CHANGE UP (ref 0–0)
PHOSPHATE SERPL-MCNC: 3 MG/DL — SIGNIFICANT CHANGE UP (ref 2.5–4.5)
PLATELET # BLD AUTO: 523 K/UL — HIGH (ref 150–400)
POTASSIUM SERPL-MCNC: 3.7 MMOL/L — SIGNIFICANT CHANGE UP (ref 3.5–5.3)
POTASSIUM SERPL-SCNC: 3.7 MMOL/L — SIGNIFICANT CHANGE UP (ref 3.5–5.3)
RBC # BLD: 3.69 M/UL — LOW (ref 4.2–5.8)
RBC # FLD: 20.7 % — HIGH (ref 10.3–14.5)
SODIUM SERPL-SCNC: 146 MMOL/L — HIGH (ref 135–145)
WBC # BLD: 8.13 K/UL — SIGNIFICANT CHANGE UP (ref 3.8–10.5)
WBC # FLD AUTO: 8.13 K/UL — SIGNIFICANT CHANGE UP (ref 3.8–10.5)

## 2024-03-22 PROCEDURE — 99233 SBSQ HOSP IP/OBS HIGH 50: CPT

## 2024-03-22 RX ORDER — SODIUM CHLORIDE 9 MG/ML
1000 INJECTION, SOLUTION INTRAVENOUS
Refills: 0 | Status: DISCONTINUED | OUTPATIENT
Start: 2024-03-22 | End: 2024-03-23

## 2024-03-22 RX ORDER — SENNA PLUS 8.6 MG/1
10 TABLET ORAL AT BEDTIME
Refills: 0 | Status: DISCONTINUED | OUTPATIENT
Start: 2024-03-24 | End: 2024-04-11

## 2024-03-22 RX ADMIN — SODIUM CHLORIDE 75 MILLILITER(S): 9 INJECTION, SOLUTION INTRAVENOUS at 10:32

## 2024-03-22 RX ADMIN — Medication 5 MILLILITER(S): at 17:40

## 2024-03-22 RX ADMIN — CLOPIDOGREL BISULFATE 75 MILLIGRAM(S): 75 TABLET, FILM COATED ORAL at 12:09

## 2024-03-22 RX ADMIN — Medication 500000 UNIT(S): at 11:52

## 2024-03-22 RX ADMIN — HUMAN INSULIN 7 UNIT(S): 100 INJECTION, SUSPENSION SUBCUTANEOUS at 11:52

## 2024-03-22 RX ADMIN — Medication 20 MILLIGRAM(S): at 14:10

## 2024-03-22 RX ADMIN — HUMAN INSULIN 7 UNIT(S): 100 INJECTION, SUSPENSION SUBCUTANEOUS at 17:39

## 2024-03-22 RX ADMIN — Medication 81 MILLIGRAM(S): at 12:09

## 2024-03-22 RX ADMIN — Medication 20 MILLIGRAM(S): at 06:22

## 2024-03-22 RX ADMIN — Medication 1 DROP(S): at 21:30

## 2024-03-22 RX ADMIN — Medication 1 DROP(S): at 14:09

## 2024-03-22 RX ADMIN — Medication 500000 UNIT(S): at 06:22

## 2024-03-22 RX ADMIN — Medication 1 DROP(S): at 17:39

## 2024-03-22 RX ADMIN — Medication 2: at 06:21

## 2024-03-22 RX ADMIN — CHLORHEXIDINE GLUCONATE 15 MILLILITER(S): 213 SOLUTION TOPICAL at 06:22

## 2024-03-22 RX ADMIN — Medication 2: at 17:38

## 2024-03-22 RX ADMIN — Medication 5 MILLILITER(S): at 01:05

## 2024-03-22 RX ADMIN — POLYETHYLENE GLYCOL 3350 17 GRAM(S): 17 POWDER, FOR SOLUTION ORAL at 06:21

## 2024-03-22 RX ADMIN — Medication 5 MILLILITER(S): at 11:54

## 2024-03-22 RX ADMIN — Medication 1 DROP(S): at 02:57

## 2024-03-22 RX ADMIN — HUMAN INSULIN 7 UNIT(S): 100 INJECTION, SUSPENSION SUBCUTANEOUS at 01:06

## 2024-03-22 RX ADMIN — Medication 1 DROP(S): at 10:33

## 2024-03-22 RX ADMIN — CHLORHEXIDINE GLUCONATE 1 APPLICATION(S): 213 SOLUTION TOPICAL at 21:30

## 2024-03-22 RX ADMIN — HEPARIN SODIUM 5000 UNIT(S): 5000 INJECTION INTRAVENOUS; SUBCUTANEOUS at 06:21

## 2024-03-22 RX ADMIN — SODIUM CHLORIDE 75 MILLILITER(S): 9 INJECTION, SOLUTION INTRAVENOUS at 03:09

## 2024-03-22 RX ADMIN — Medication 5 MILLILITER(S): at 06:22

## 2024-03-22 RX ADMIN — HUMAN INSULIN 7 UNIT(S): 100 INJECTION, SUSPENSION SUBCUTANEOUS at 06:21

## 2024-03-22 RX ADMIN — Medication 500000 UNIT(S): at 17:39

## 2024-03-22 RX ADMIN — Medication 2: at 01:05

## 2024-03-22 RX ADMIN — Medication 500000 UNIT(S): at 01:05

## 2024-03-22 RX ADMIN — SODIUM CHLORIDE 75 MILLILITER(S): 9 INJECTION, SOLUTION INTRAVENOUS at 11:40

## 2024-03-22 RX ADMIN — PANTOPRAZOLE SODIUM 40 MILLIGRAM(S): 20 TABLET, DELAYED RELEASE ORAL at 11:52

## 2024-03-22 RX ADMIN — HEPARIN SODIUM 5000 UNIT(S): 5000 INJECTION INTRAVENOUS; SUBCUTANEOUS at 21:30

## 2024-03-22 RX ADMIN — Medication 1 DROP(S): at 06:21

## 2024-03-22 RX ADMIN — HEPARIN SODIUM 5000 UNIT(S): 5000 INJECTION INTRAVENOUS; SUBCUTANEOUS at 14:10

## 2024-03-22 RX ADMIN — CHLORHEXIDINE GLUCONATE 15 MILLILITER(S): 213 SOLUTION TOPICAL at 17:39

## 2024-03-22 RX ADMIN — Medication 1 APPLICATION(S): at 21:30

## 2024-03-22 NOTE — PROGRESS NOTE ADULT - PROBLEM SELECTOR PLAN 4
- BAL 2/21: PSA; Txd with Zosyn ( 2/21-2/28)  - Sputum 3/4: PSA , Blood cxs 3/3: NGTD  - Txd with course of Meropenem 3/3 --> 3/11  - 3/20 tmax 100.7 - pancultured, chest xray clear, UA negative.  Awaiting sputum and blood cultures.  - 3/21 Monitoring off antibiotics - BAL 2/21: PSA; Txd with Zosyn ( 2/21-2/28)  - Sputum 3/4: PSA , Blood cxs 3/3: NGTD  - Txd with course of Meropenem 3/3 --> 3/11  - 3/20 tmax 100.7 - pancultured, chest xray clear, UA negative.  Blood cultures -NGTD  Sputum- NGTD  - 3/21 Monitoring off antibiotics

## 2024-03-22 NOTE — PROGRESS NOTE ADULT - PROBLEM SELECTOR PLAN 10
- IVF 3/8 and 3/20  - free water as ordered 250ml Q8H  - 3/21:  D5W increased to 75ml/hr  - Monitor BMP

## 2024-03-22 NOTE — PROGRESS NOTE ADULT - SUBJECTIVE AND OBJECTIVE BOX
Subjective: Patient seen and examined. No new events except as noted.     SUBJECTIVE/ROS:  ros limited      MEDICATIONS:  MEDICATIONS  (STANDING):  artificial tears (preservative free) Ophthalmic Solution 1 Drop(s) Both EYES every 4 hours  aspirin  chewable 81 milliGRAM(s) Oral daily  Biotene Dry Mouth Oral Rinse 5 milliLiter(s) Swish and Spit every 6 hours  chlorhexidine 0.12% Liquid 15 milliLiter(s) Oral Mucosa every 12 hours  chlorhexidine 4% Liquid 1 Application(s) Topical daily  clopidogrel Tablet 75 milliGRAM(s) Enteral Tube daily  dextrose 5%. 1000 milliLiter(s) (75 mL/Hr) IV Continuous <Continuous>  furosemide   Injectable 20 milliGRAM(s) IV Push two times a day  heparin   Injectable 5000 Unit(s) SubCutaneous every 8 hours  insulin lispro (ADMELOG) corrective regimen sliding scale   SubCutaneous every 6 hours  insulin NPH human recombinant 7 Unit(s) SubCutaneous every 6 hours  nystatin    Suspension 297212 Unit(s) Oral every 6 hours  pantoprazole   Suspension 40 milliGRAM(s) Oral daily  polyethylene glycol 3350 17 Gram(s) Oral two times a day  senna Syrup 10 milliLiter(s) Oral at bedtime      PHYSICAL EXAM:  T(C): 37.2 (03-22-24 @ 05:54), Max: 37.4 (03-22-24 @ 00:19)  HR: 96 (03-22-24 @ 08:30) (95 - 102)  BP: 129/72 (03-22-24 @ 05:54) (124/70 - 157/83)  RR: 18 (03-22-24 @ 05:54) (18 - 23)  SpO2: 97% (03-22-24 @ 08:30) (97% - 99%)  Wt(kg): --  I&O's Summary    21 Mar 2024 07:01  -  22 Mar 2024 07:00  --------------------------------------------------------  IN: 3689 mL / OUT: 1550 mL / NET: 2139 mL        Appearance: on vent 	  Cardiovascular: Normal S1 S2,    Murmur:   Neck: JVP limited to be evaluated   Respiratory: Lungs few rhonchi   Gastrointestinal:  Soft  Skin: normal   Neuro: limited as pt on vent     LABS/DATA:    CARDIAC MARKERS:                                8.4    8.13  )-----------( 523      ( 22 Mar 2024 07:44 )             30.9     03-22    146<H>  |  106  |  25<H>  ----------------------------<  161<H>  3.7   |  26  |  0.51    Ca    9.0      22 Mar 2024 07:44  Phos  3.0     03-22  Mg     2.5     03-22      proBNP:   Lipid Profile:   HgA1c:   TSH:     TELE:  EKG:

## 2024-03-22 NOTE — PROGRESS NOTE ADULT - PROBLEM SELECTOR PLAN 8
- Patient Religion  - Family would like to be asked prior to administration of blood products   - S/p 1 unit PRBCs on 2/22 and 3/3  - S/p Venofer (2/22-2/27)   - S/p Cyanocobalamine, Folic acid, Ferrous Sulfate  - S/p Epogen x 5 days (Ended 2/27)  - Reported Melena on 3/3   - CT A/P 3/3 negative for acute bleed  - GI did not recommend EGD as no overt signs of active bleeding and responded to PRBCs  - Will use pediatric tubes to limit volume for phlebotomy as patient is Jew   - H&H continues to remain stable, will monitor for melena and signs of bleeding

## 2024-03-22 NOTE — PROGRESS NOTE ADULT - SUBJECTIVE AND OBJECTIVE BOX
Patient is a 73y old  Male who presents with a chief complaint of Stroke, critical stenosis, evaluation for angiography (21 Mar 2024 09:11)      Interval Events:    REVIEW OF SYSTEMS:  [ ] Positive  [ ] All other systems negative  [ ] Unable to assess ROS because ________    Vital Signs Last 24 Hrs  T(C): 37.2 (03-22-24 @ 05:54), Max: 37.4 (03-22-24 @ 00:19)  T(F): 99 (03-22-24 @ 05:54), Max: 99.4 (03-22-24 @ 00:19)  HR: 95 (03-22-24 @ 05:54) (95 - 102)  BP: 129/72 (03-22-24 @ 05:54) (124/70 - 157/83)  RR: 18 (03-22-24 @ 05:54) (18 - 23)  SpO2: 99% (03-22-24 @ 05:54) (97% - 99%)    PHYSICAL EXAM:  HEENT:   [ ]Tracheostomy:  [ ]Pupils equal  [ ]No oral lesions  [ ]Abnormal    SKIN  [ ]No Rash  [ ] Abnormal  [ ] pressure    CARDIAC  [ ]Regular  [ ]Abnormal    PULMONARY  [ ]Bilateral Clear Breath Sounds  [ ]Normal Excursion  [ ]Abnormal    GI  [ ]PEG      [ ] +BS		              [ ]Soft, nondistended, nontender	  [ ]Abnormal    MUSCULOSKELETAL                                   [ ]Bedbound                 [ ]Abnormal    [ ]Ambulatory/OOB to chair                           EXTREMITIES                                         [ ]Normal  [ ]Edema                           NEUROLOGIC  [ ] Normal, non focal  [ ] Focal findings:    PSYCHIATRIC  [ ]Alert and appropriate  [ ] Sedated	 [ ]Agitated    :  [place]: [ ] Yes, if yes: Date of Placement:                   [  ] No    LINES: Central Lines [ ] Yes, if yes: Date of Placement                                     [  ] No    HOSPITAL MEDICATIONS:  MEDICATIONS  (STANDING):  artificial tears (preservative free) Ophthalmic Solution 1 Drop(s) Both EYES every 4 hours  aspirin  chewable 81 milliGRAM(s) Oral daily  Biotene Dry Mouth Oral Rinse 5 milliLiter(s) Swish and Spit every 6 hours  chlorhexidine 0.12% Liquid 15 milliLiter(s) Oral Mucosa every 12 hours  chlorhexidine 4% Liquid 1 Application(s) Topical daily  clopidogrel Tablet 75 milliGRAM(s) Enteral Tube daily  dextrose 5%. 1000 milliLiter(s) (75 mL/Hr) IV Continuous <Continuous>  furosemide   Injectable 20 milliGRAM(s) IV Push two times a day  heparin   Injectable 5000 Unit(s) SubCutaneous every 8 hours  insulin lispro (ADMELOG) corrective regimen sliding scale   SubCutaneous every 6 hours  insulin NPH human recombinant 7 Unit(s) SubCutaneous every 6 hours  nystatin    Suspension 336750 Unit(s) Oral every 6 hours  pantoprazole   Suspension 40 milliGRAM(s) Oral daily  polyethylene glycol 3350 17 Gram(s) Oral two times a day  senna Syrup 10 milliLiter(s) Oral at bedtime    MEDICATIONS  (PRN):  acetaminophen   Oral Liquid .. 650 milliGRAM(s) Oral every 6 hours PRN Temp greater or equal to 38C (100.4F)  albuterol/ipratropium for Nebulization 3 milliLiter(s) Nebulizer every 6 hours PRN Shortness of Breath and/or Wheezing      LABS:                        8.4    7.46  )-----------( 488      ( 21 Mar 2024 06:35 )             30.3     03-21    148<H>  |  107  |  27<H>  ----------------------------<  141<H>  3.6   |  29  |  0.54    Ca    8.6      21 Mar 2024 06:34  Phos  3.3     03-21  Mg     2.5     03-21        Urinalysis Basic - ( 21 Mar 2024 06:34 )    Color: x / Appearance: x / SG: x / pH: x  Gluc: 141 mg/dL / Ketone: x  / Bili: x / Urobili: x   Blood: x / Protein: x / Nitrite: x   Leuk Esterase: x / RBC: x / WBC x   Sq Epi: x / Non Sq Epi: x / Bacteria: x          CAPILLARY BLOOD GLUCOSE    MICROBIOLOGY:     RADIOLOGY:  [ ] Reviewed and interpreted by me    Mode: AC/ CMV (Assist Control/ Continuous Mandatory Ventilation)  RR (machine): 20  TV (machine): 500  FiO2: 30  PEEP: 5  ITime: 1  MAP: 11  PIP: 20   Patient is a 73y old  Male who presents with a chief complaint of Stroke, critical stenosis, evaluation for angiography (21 Mar 2024 09:11)      Interval Events: No events reported over night.     REVIEW OF SYSTEMS:  [ ] Positive  [ ] All other systems negative  [X] Unable to assess ROS because ___Limited capacity to communicate    Vital Signs Last 24 Hrs  T(C): 37.2 (03-22-24 @ 05:54), Max: 37.4 (03-22-24 @ 00:19)  T(F): 99 (03-22-24 @ 05:54), Max: 99.4 (03-22-24 @ 00:19)  HR: 95 (03-22-24 @ 05:54) (95 - 102)  BP: 129/72 (03-22-24 @ 05:54) (124/70 - 157/83)  RR: 18 (03-22-24 @ 05:54) (18 - 23)  SpO2: 99% (03-22-24 @ 05:54) (97% - 99%)    PHYSICAL EXAM:  HEENT:    [X] Tracheostomy:  #8 Cuffed Portex  [X] PERRL B/L; 4mm B/L pupils; EOMI  [X] No oral lesions  [ ] Abnormal    SKIN  [X] No Rash  [ ] Abnormal  [ ] pressure    CARDIAC  [X] Regular  [ ] Abnormal    PULMONARY  [X] Bilateral Clear Breath Sounds  [ ] Normal Excursion  [ ] Abnormal    GI  [X] PEG      [X] +BS		              [X] Soft, nondistended, nontender	  [ ] Abnormal    MUSCULOSKELETAL                                   [X] Bedbound                 [ ] Abnormal:   [ ] Ambulatory/OOB to chair                           EXTREMITIES                                         [ ]Normal  [X] Edema  1+ edema in B/L UEs                           NEUROLOGIC  [ ] Normal, non focal  [X] Focal findings: Alert and Oriented; +gag; no gross facial asymmetry observed; responds appropriately to questions by nodding; able to move B/L feet, trigger movement observed in right knee,  +B/L shoulder shrugs, slight grasp in left hand observed; unable to lift legs or arms    PSYCHIATRIC  [X] Alert, responsive with flat affect  [ ] Sedated	 [ ]Agitated    :  [place]: [ ] Yes, if yes: Date of Placement:                   [X] No    LINES: Central Lines [ ] Yes, if yes: Date of Placement                                     [X] No      HOSPITAL MEDICATIONS:  MEDICATIONS  (STANDING):  artificial tears (preservative free) Ophthalmic Solution 1 Drop(s) Both EYES every 4 hours  aspirin  chewable 81 milliGRAM(s) Oral daily  Biotene Dry Mouth Oral Rinse 5 milliLiter(s) Swish and Spit every 6 hours  chlorhexidine 0.12% Liquid 15 milliLiter(s) Oral Mucosa every 12 hours  chlorhexidine 4% Liquid 1 Application(s) Topical daily  clopidogrel Tablet 75 milliGRAM(s) Enteral Tube daily  dextrose 5%. 1000 milliLiter(s) (75 mL/Hr) IV Continuous <Continuous>  furosemide   Injectable 20 milliGRAM(s) IV Push two times a day  heparin   Injectable 5000 Unit(s) SubCutaneous every 8 hours  insulin lispro (ADMELOG) corrective regimen sliding scale   SubCutaneous every 6 hours  insulin NPH human recombinant 7 Unit(s) SubCutaneous every 6 hours  nystatin    Suspension 312280 Unit(s) Oral every 6 hours  pantoprazole   Suspension 40 milliGRAM(s) Oral daily  polyethylene glycol 3350 17 Gram(s) Oral two times a day  senna Syrup 10 milliLiter(s) Oral at bedtime    MEDICATIONS  (PRN):  acetaminophen   Oral Liquid .. 650 milliGRAM(s) Oral every 6 hours PRN Temp greater or equal to 38C (100.4F)  albuterol/ipratropium for Nebulization 3 milliLiter(s) Nebulizer every 6 hours PRN Shortness of Breath and/or Wheezing      LABS:                         CAPILLARY BLOOD GLUCOSE    MICROBIOLOGY:     RADIOLOGY:  [ ] Reviewed and interpreted by me    Mode: AC/ CMV (Assist Control/ Continuous Mandatory Ventilation)  RR (machine): 20  TV (machine): 500  FiO2: 30  PEEP: 5  ITime: 1  MAP: 11  PIP: 20

## 2024-03-22 NOTE — PROGRESS NOTE ADULT - ASSESSMENT
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 Prior to admission patient had Viral URI ( 1-2 weeks prior to admission) with subsequent weakness. He underwent an angiogram (2/11) which showed moderate stenosis and no intervention was done. MRI Performed c/w small bilateral cerebellar strokes and prior L thalamic strokes, as per Neurology was not c/w current presentation. 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 He is currently s/p 5 rounds of plasma exchange (2/13-2/20) and IVIG x 5 ( 2/21-2/26)  His hospital course was complicated by progressive respiratory failure. CT Chest performed on 2/20 with atelectasis of b/l lower lobes. BAL 2/21 Grew PSA treated with course of Zosyn (2/21-2/28).  Patient transferred to RCU on 2/24.   RRT called on 3/3 for unresponsiveness. Patient with unreactive pupils, no corneal reflex. Also found to be hypotensive and febrile to 105. Noted to have new melena. Hgb 5. Given 1 unit of pRBC. Transferred to MICU. patient required pressors while in the MICU, BP improved and was transitioned to Droxidopa. Patient had CT C/A/P consistent with Bibasilar pneumonia; Sputum cx grew PSA and was treated with course of Meropenem. Patient evaluated by GI in setting of Melena, transaminitis and portal venous gas on CT imaging. No EGD performed as Melena resolved, Transaminitis was thought to be in setting of shock and portal venous gas was thought to be in setting of recent PEG. EEG was performed in setting of AMS no seizures noted. Repeat MRI Performed which showed Small scattered foci of diffusion restriction within the right cerebellar hemisphere, left parietal lobe, and symmetrically in the bilateral basal ganglia. Neuro recommended Resumption of ASA in setting of possible embolic phenomenon. Repeat ECHO performed RT Ventricular Moderately enlarged, reduced systolic function. Mental status improved and was transferred back to RCU on 3/6.  Cardiology consulted for tachy-dez, unable to catch events on tele, EKG ST otherwise unremarkable, CT Angio PE rule out for RV enlargement.  Continuing to wean pt from ventilator as tolerated.  Continue aggressive airway management with chest PT/suctioning PRN.  Continuing to monitor for neurological improvement.       3/20:  Tmax 100.7 today.  Pancultured, chest xray clear lungs.  Tolerating PS trials 8/5.  Neurologically unchanged.  Hypernatremia on AM labs - D5 and free water frequency increased - f/u AM labs.  Son, daughter and wife all involved in plan of care.  Wife was visiting rehabs with daughter today.  Discharge planning for early next week pending clinical course.    3/21: Sodium remains elevated, IVF rate increased to 75ml/hr.  Remains on IV lasix.  Afebrile over night.  Awaiting cultures to return.  Continuing to monitor off antibiotics.  Discharge planning for next week in progress.  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 Prior to admission patient had Viral URI ( 1-2 weeks prior to admission) with subsequent weakness. He underwent an angiogram (2/11) which showed moderate stenosis and no intervention was done. MRI Performed c/w small bilateral cerebellar strokes and prior L thalamic strokes, as per Neurology was not c/w current presentation. 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 He is currently s/p 5 rounds of plasma exchange (2/13-2/20) and IVIG x 5 ( 2/21-2/26)  His hospital course was complicated by progressive respiratory failure. CT Chest performed on 2/20 with atelectasis of b/l lower lobes. BAL 2/21 Grew PSA treated with course of Zosyn (2/21-2/28).  Patient transferred to RCU on 2/24.   RRT called on 3/3 for unresponsiveness. Patient with unreactive pupils, no corneal reflex. Also found to be hypotensive and febrile to 105. Noted to have new melena. Hgb 5. Given 1 unit of pRBC. Transferred to MICU. patient required pressors while in the MICU, BP improved and was transitioned to Droxidopa. Patient had CT C/A/P consistent with Bibasilar pneumonia; Sputum cx grew PSA and was treated with course of Meropenem. Patient evaluated by GI in setting of Melena, transaminitis and portal venous gas on CT imaging. No EGD performed as Melena resolved, Transaminitis was thought to be in setting of shock and portal venous gas was thought to be in setting of recent PEG. EEG was performed in setting of AMS no seizures noted. Repeat MRI Performed which showed Small scattered foci of diffusion restriction within the right cerebellar hemisphere, left parietal lobe, and symmetrically in the bilateral basal ganglia. Neuro recommended Resumption of ASA in setting of possible embolic phenomenon. Repeat ECHO performed RT Ventricular Moderately enlarged, reduced systolic function. Mental status improved and was transferred back to RCU on 3/6.  Cardiology consulted for tachy-dez, unable to catch events on tele, EKG ST otherwise unremarkable, CT Angio PE rule out for RV enlargement.  Continuing to wean pt from ventilator as tolerated.  Continue aggressive airway management with chest PT/suctioning PRN.  Continuing to monitor for neurological improvement.       3/20:  Tmax 100.7 today.  Pancultured, chest xray clear lungs.  Tolerating PS trials 8/5.  Neurologically unchanged.  Hypernatremia on AM labs - D5 and free water frequency increased - f/u AM labs.  Son, daughter and wife all involved in plan of care.  Wife was visiting rehabs with daughter today.  Discharge planning for early next week pending clinical course.    3/21: Sodium remains elevated, IVF rate increased to 75ml/hr.  Remains on IV lasix.  Afebrile over night.  Awaiting cultures to return.  Continuing to monitor off antibiotics.  Discharge planning for next week in progress.   3/22: Sodium improving, will continue IVF for another day.  Blood cultures remain negative, sputum growing pseudomonas which has grown on prior cultures.  Afebrile with normal wbc count, will continue to monitor off antibiotics.

## 2024-03-23 LAB
-  AZTREONAM: SIGNIFICANT CHANGE UP
-  CEFEPIME: SIGNIFICANT CHANGE UP
-  CEFTAZIDIME/AVIBACTAM: SIGNIFICANT CHANGE UP
-  CEFTAZIDIME: SIGNIFICANT CHANGE UP
-  CEFTOLOZANE/TAZOBACTAM: SIGNIFICANT CHANGE UP
-  CIPROFLOXACIN: SIGNIFICANT CHANGE UP
-  IMIPENEM: SIGNIFICANT CHANGE UP
-  LEVOFLOXACIN: SIGNIFICANT CHANGE UP
-  MEROPENEM: SIGNIFICANT CHANGE UP
-  PIPERACILLIN/TAZOBACTAM: SIGNIFICANT CHANGE UP
ANION GAP SERPL CALC-SCNC: 10 MMOL/L — SIGNIFICANT CHANGE UP (ref 5–17)
BLANDM BLD POS QL PROBE: SIGNIFICANT CHANGE UP
BUN SERPL-MCNC: 24 MG/DL — HIGH (ref 7–23)
CALCIUM SERPL-MCNC: 8.8 MG/DL — SIGNIFICANT CHANGE UP (ref 8.4–10.5)
CHLORIDE SERPL-SCNC: 103 MMOL/L — SIGNIFICANT CHANGE UP (ref 96–108)
CO2 SERPL-SCNC: 28 MMOL/L — SIGNIFICANT CHANGE UP (ref 22–31)
CREAT SERPL-MCNC: 0.49 MG/DL — LOW (ref 0.5–1.3)
CULTURE RESULTS: ABNORMAL
EGFR: 108 ML/MIN/1.73M2 — SIGNIFICANT CHANGE UP
GLUCOSE BLDC GLUCOMTR-MCNC: 178 MG/DL — HIGH (ref 70–99)
GLUCOSE BLDC GLUCOMTR-MCNC: 179 MG/DL — HIGH (ref 70–99)
GLUCOSE BLDC GLUCOMTR-MCNC: 180 MG/DL — HIGH (ref 70–99)
GLUCOSE BLDC GLUCOMTR-MCNC: 184 MG/DL — HIGH (ref 70–99)
GLUCOSE SERPL-MCNC: 171 MG/DL — HIGH (ref 70–99)
MAGNESIUM SERPL-MCNC: 2.2 MG/DL — SIGNIFICANT CHANGE UP (ref 1.6–2.6)
METHOD TYPE: SIGNIFICANT CHANGE UP
METHOD TYPE: SIGNIFICANT CHANGE UP
ORGANISM # SPEC MICROSCOPIC CNT: ABNORMAL
PHOSPHATE SERPL-MCNC: 3.1 MG/DL — SIGNIFICANT CHANGE UP (ref 2.5–4.5)
POTASSIUM SERPL-MCNC: 4 MMOL/L — SIGNIFICANT CHANGE UP (ref 3.5–5.3)
POTASSIUM SERPL-SCNC: 4 MMOL/L — SIGNIFICANT CHANGE UP (ref 3.5–5.3)
SODIUM SERPL-SCNC: 141 MMOL/L — SIGNIFICANT CHANGE UP (ref 135–145)
SPECIMEN SOURCE: SIGNIFICANT CHANGE UP

## 2024-03-23 PROCEDURE — 71045 X-RAY EXAM CHEST 1 VIEW: CPT | Mod: 26

## 2024-03-23 PROCEDURE — 99233 SBSQ HOSP IP/OBS HIGH 50: CPT

## 2024-03-23 RX ORDER — ACETAMINOPHEN 500 MG
1000 TABLET ORAL EVERY 6 HOURS
Refills: 0 | Status: DISCONTINUED | OUTPATIENT
Start: 2024-03-23 | End: 2024-04-11

## 2024-03-23 RX ORDER — IPRATROPIUM/ALBUTEROL SULFATE 18-103MCG
3 AEROSOL WITH ADAPTER (GRAM) INHALATION EVERY 8 HOURS
Refills: 0 | Status: DISCONTINUED | OUTPATIENT
Start: 2024-03-23 | End: 2024-04-11

## 2024-03-23 RX ORDER — ACETAMINOPHEN 500 MG
1000 TABLET ORAL ONCE
Refills: 0 | Status: COMPLETED | OUTPATIENT
Start: 2024-03-23 | End: 2024-03-23

## 2024-03-23 RX ORDER — GABAPENTIN 400 MG/1
100 CAPSULE ORAL EVERY 8 HOURS
Refills: 0 | Status: DISCONTINUED | OUTPATIENT
Start: 2024-03-23 | End: 2024-04-11

## 2024-03-23 RX ORDER — SODIUM CHLORIDE 9 MG/ML
4 INJECTION INTRAMUSCULAR; INTRAVENOUS; SUBCUTANEOUS EVERY 8 HOURS
Refills: 0 | Status: DISCONTINUED | OUTPATIENT
Start: 2024-03-23 | End: 2024-04-11

## 2024-03-23 RX ADMIN — Medication 500000 UNIT(S): at 06:04

## 2024-03-23 RX ADMIN — CHLORHEXIDINE GLUCONATE 15 MILLILITER(S): 213 SOLUTION TOPICAL at 06:03

## 2024-03-23 RX ADMIN — Medication 20 MILLIGRAM(S): at 13:19

## 2024-03-23 RX ADMIN — HUMAN INSULIN 7 UNIT(S): 100 INJECTION, SUSPENSION SUBCUTANEOUS at 00:06

## 2024-03-23 RX ADMIN — CHLORHEXIDINE GLUCONATE 15 MILLILITER(S): 213 SOLUTION TOPICAL at 17:25

## 2024-03-23 RX ADMIN — SODIUM CHLORIDE 4 MILLILITER(S): 9 INJECTION INTRAMUSCULAR; INTRAVENOUS; SUBCUTANEOUS at 21:38

## 2024-03-23 RX ADMIN — Medication 1 APPLICATION(S): at 21:28

## 2024-03-23 RX ADMIN — HEPARIN SODIUM 5000 UNIT(S): 5000 INJECTION INTRAVENOUS; SUBCUTANEOUS at 13:19

## 2024-03-23 RX ADMIN — HUMAN INSULIN 7 UNIT(S): 100 INJECTION, SUSPENSION SUBCUTANEOUS at 17:25

## 2024-03-23 RX ADMIN — Medication 500000 UNIT(S): at 23:52

## 2024-03-23 RX ADMIN — Medication 500000 UNIT(S): at 00:06

## 2024-03-23 RX ADMIN — Medication 5 MILLILITER(S): at 17:24

## 2024-03-23 RX ADMIN — HUMAN INSULIN 7 UNIT(S): 100 INJECTION, SUSPENSION SUBCUTANEOUS at 06:02

## 2024-03-23 RX ADMIN — Medication 3 MILLILITER(S): at 11:42

## 2024-03-23 RX ADMIN — Medication 5 MILLILITER(S): at 00:06

## 2024-03-23 RX ADMIN — Medication 5 MILLILITER(S): at 12:44

## 2024-03-23 RX ADMIN — HUMAN INSULIN 7 UNIT(S): 100 INJECTION, SUSPENSION SUBCUTANEOUS at 12:44

## 2024-03-23 RX ADMIN — Medication 1 DROP(S): at 21:27

## 2024-03-23 RX ADMIN — Medication 1 DROP(S): at 17:25

## 2024-03-23 RX ADMIN — HUMAN INSULIN 7 UNIT(S): 100 INJECTION, SUSPENSION SUBCUTANEOUS at 23:51

## 2024-03-23 RX ADMIN — Medication 2: at 23:50

## 2024-03-23 RX ADMIN — HEPARIN SODIUM 5000 UNIT(S): 5000 INJECTION INTRAVENOUS; SUBCUTANEOUS at 21:28

## 2024-03-23 RX ADMIN — Medication 1 DROP(S): at 06:05

## 2024-03-23 RX ADMIN — Medication 1 DROP(S): at 10:18

## 2024-03-23 RX ADMIN — GABAPENTIN 100 MILLIGRAM(S): 400 CAPSULE ORAL at 21:27

## 2024-03-23 RX ADMIN — Medication 1 DROP(S): at 13:26

## 2024-03-23 RX ADMIN — Medication 2: at 17:24

## 2024-03-23 RX ADMIN — CLOPIDOGREL BISULFATE 75 MILLIGRAM(S): 75 TABLET, FILM COATED ORAL at 12:45

## 2024-03-23 RX ADMIN — PANTOPRAZOLE SODIUM 40 MILLIGRAM(S): 20 TABLET, DELAYED RELEASE ORAL at 12:45

## 2024-03-23 RX ADMIN — Medication 81 MILLIGRAM(S): at 12:45

## 2024-03-23 RX ADMIN — HEPARIN SODIUM 5000 UNIT(S): 5000 INJECTION INTRAVENOUS; SUBCUTANEOUS at 06:03

## 2024-03-23 RX ADMIN — Medication 500000 UNIT(S): at 12:45

## 2024-03-23 RX ADMIN — Medication 2: at 00:06

## 2024-03-23 RX ADMIN — Medication 5 MILLILITER(S): at 06:04

## 2024-03-23 RX ADMIN — Medication 3 MILLILITER(S): at 21:38

## 2024-03-23 RX ADMIN — Medication 500000 UNIT(S): at 17:24

## 2024-03-23 RX ADMIN — Medication 400 MILLIGRAM(S): at 17:21

## 2024-03-23 RX ADMIN — Medication 1 APPLICATION(S): at 06:03

## 2024-03-23 RX ADMIN — Medication 2: at 06:03

## 2024-03-23 RX ADMIN — Medication 20 MILLIGRAM(S): at 06:03

## 2024-03-23 RX ADMIN — Medication 2: at 12:44

## 2024-03-23 RX ADMIN — Medication 1 APPLICATION(S): at 13:19

## 2024-03-23 RX ADMIN — GABAPENTIN 100 MILLIGRAM(S): 400 CAPSULE ORAL at 15:03

## 2024-03-23 RX ADMIN — CHLORHEXIDINE GLUCONATE 1 APPLICATION(S): 213 SOLUTION TOPICAL at 21:28

## 2024-03-23 RX ADMIN — Medication 5 MILLILITER(S): at 23:52

## 2024-03-23 RX ADMIN — SODIUM CHLORIDE 75 MILLILITER(S): 9 INJECTION, SOLUTION INTRAVENOUS at 06:02

## 2024-03-23 RX ADMIN — Medication 1 DROP(S): at 02:44

## 2024-03-23 NOTE — PROGRESS NOTE ADULT - PROBLEM SELECTOR PLAN 1
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

## 2024-03-23 NOTE — PROGRESS NOTE ADULT - ASSESSMENT
Episode of Bradycardia   as per ICU no further episodes  if recurrent then recommend  transfer to tele / ICU setting to have rhythm monitoring to diagnose pt bradycardia     RV enlargement   suspect due to his underlying respiratory failure   no evidence of PE     for now will sign off  please call back with any questions

## 2024-03-23 NOTE — PROGRESS NOTE ADULT - PROBLEM SELECTOR PLAN 2
- Patient S/p Tracheostomy # 8 Cuffed Portex on 2/16 ( )  - CTA Chest 3/3: Bibasilar Pneumonia; Currently remains on Meropenem   - Currently remains on Mechanical Ventilation: PRVC 500/20/30%/+5  - initial weaning attempts pt became hypoxic and bradycardic (Since resolved)  - Attempts at PS trials improving, continue to progress weaning as tolerated  - Continue Chest PT and Suctioning PRN - Patient S/p Tracheostomy # 8 Cuffed Portex on 2/16 ( )  - CTA Chest 3/3: Bibasilar Pneumonia; Currently remains on Meropenem   - Currently remains on Mechanical Ventilation: PRVC 500/12/40%/+7  - Attempts at PS trials improving, continue to progress weaning as tolerated.  Has not tolerated trach collar trials as he becomes bradycardic and hypoxic.   - Continue Chest PT and Suctioning PRN

## 2024-03-23 NOTE — PROGRESS NOTE ADULT - ASSESSMENT
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 Prior to admission patient had Viral URI ( 1-2 weeks prior to admission) with subsequent weakness. He underwent an angiogram (2/11) which showed moderate stenosis and no intervention was done. MRI Performed c/w small bilateral cerebellar strokes and prior L thalamic strokes, as per Neurology was not c/w current presentation. 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 He is currently s/p 5 rounds of plasma exchange (2/13-2/20) and IVIG x 5 ( 2/21-2/26)  His hospital course was complicated by progressive respiratory failure. CT Chest performed on 2/20 with atelectasis of b/l lower lobes. BAL 2/21 Grew PSA treated with course of Zosyn (2/21-2/28).  Patient transferred to RCU on 2/24.   RRT called on 3/3 for unresponsiveness. Patient with unreactive pupils, no corneal reflex. Also found to be hypotensive and febrile to 105. Noted to have new melena. Hgb 5. Given 1 unit of pRBC. Transferred to MICU. patient required pressors while in the MICU, BP improved and was transitioned to Droxidopa. Patient had CT C/A/P consistent with Bibasilar pneumonia; Sputum cx grew PSA and was treated with course of Meropenem. Patient evaluated by GI in setting of Melena, transaminitis and portal venous gas on CT imaging. No EGD performed as Melena resolved, Transaminitis was thought to be in setting of shock and portal venous gas was thought to be in setting of recent PEG. EEG was performed in setting of AMS no seizures noted. Repeat MRI Performed which showed Small scattered foci of diffusion restriction within the right cerebellar hemisphere, left parietal lobe, and symmetrically in the bilateral basal ganglia. Neuro recommended Resumption of ASA in setting of possible embolic phenomenon. Repeat ECHO performed RT Ventricular Moderately enlarged, reduced systolic function. Mental status improved and was transferred back to RCU on 3/6.  Cardiology consulted for tachy-dez, unable to catch events on tele, EKG ST otherwise unremarkable, CT Angio PE rule out for RV enlargement.  Continuing to wean pt from ventilator as tolerated.  Continue aggressive airway management with chest PT/suctioning PRN.  Continuing to monitor for neurological improvement.       3/20:  Tmax 100.7 today.  Pancultured, chest xray clear lungs.  Tolerating PS trials 8/5.  Neurologically unchanged.  Hypernatremia on AM labs - D5 and free water frequency increased - f/u AM labs.  Son, daughter and wife all involved in plan of care.  Wife was visiting rehabs with daughter today.  Discharge planning for early next week pending clinical course.    3/21: Sodium remains elevated, IVF rate increased to 75ml/hr.  Remains on IV lasix.  Afebrile over night.  Awaiting cultures to return.  Continuing to monitor off antibiotics.  Discharge planning for next week in progress.   3/22: Sodium improving, will continue IVF for another day.  Blood cultures remain negative, sputum growing pseudomonas which has grown on prior cultures.  Afebrile with normal wbc count, will continue to monitor off antibiotics. 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 Prior to admission patient had Viral URI ( 1-2 weeks prior to admission) with subsequent weakness. He underwent an angiogram (2/11) which showed moderate stenosis and no intervention was done. MRI Performed c/w small bilateral cerebellar strokes and prior L thalamic strokes, as per Neurology was not c/w current presentation. 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 He is currently s/p 5 rounds of plasma exchange (2/13-2/20) and IVIG x 5 ( 2/21-2/26)  His hospital course was complicated by progressive respiratory failure. CT Chest performed on 2/20 with atelectasis of b/l lower lobes. BAL 2/21 Grew PSA treated with course of Zosyn (2/21-2/28).  Patient transferred to RCU on 2/24.   RRT called on 3/3 for unresponsiveness. Patient with unreactive pupils, no corneal reflex. Also found to be hypotensive and febrile to 105. Noted to have new melena. Hgb 5. Given 1 unit of pRBC. Transferred to MICU. patient required pressors while in the MICU, BP improved and was transitioned to Droxidopa. Patient had CT C/A/P consistent with Bibasilar pneumonia; Sputum cx grew PSA and was treated with course of Meropenem. Patient evaluated by GI in setting of Melena, transaminitis and portal venous gas on CT imaging. No EGD performed as Melena resolved, Transaminitis was thought to be in setting of shock and portal venous gas was thought to be in setting of recent PEG. EEG was performed in setting of AMS no seizures noted. Repeat MRI Performed which showed Small scattered foci of diffusion restriction within the right cerebellar hemisphere, left parietal lobe, and symmetrically in the bilateral basal ganglia. Neuro recommended Resumption of ASA in setting of possible embolic phenomenon. Repeat ECHO performed RT Ventricular Moderately enlarged, reduced systolic function. Mental status improved and was transferred back to RCU on 3/6.  Cardiology consulted for tachy-dez, unable to catch events on tele, EKG ST otherwise unremarkable, CT Angio PE rule out for RV enlargement.  Continuing to wean pt from ventilator as tolerated.  Continue aggressive airway management with chest PT/suctioning PRN.  Continuing to monitor for neurological improvement.       3/20:  Tmax 100.7 today.  Pancultured, chest xray clear lungs.  Tolerating PS trials 8/5.  Neurologically unchanged.  Hypernatremia on AM labs - D5 and free water frequency increased - f/u AM labs.  Son, daughter and wife all involved in plan of care.  Wife was visiting rehabs with daughter today.  Discharge planning for early next week pending clinical course.    3/21: Sodium remains elevated, IVF rate increased to 75ml/hr.  Remains on IV lasix.  Afebrile over night.  Awaiting cultures to return.  Continuing to monitor off antibiotics.  Discharge planning for next week in progress.   3/22: Sodium improving, will continue IVF for another day.  Blood cultures remain negative, sputum growing pseudomonas which has grown on prior cultures.  Afebrile with normal wbc count, will continue to monitor off antibiotics.  3/23: Patient hypoxic and required ambu and lavage this morning with moderate mucoid secretions recovered, O2 sat improved from mid 80s to low 90s temporarily.  Increased PEEP from 5 to 7, was able to lower FiO2 back to 40% as O2 sats remains above 95%.  CXR reveals hazy RUL,RLL, concerning for mucous plug.  Chest PT and duonebs requested.  IVF discontinued as sodium improved.  Remains on lasix 20mg IB BID.  Complained of leg pains, starting gabapentin for neuropathic pain in legs.

## 2024-03-23 NOTE — PROGRESS NOTE ADULT - NS ATTEND AMEND GEN_ALL_CORE FT
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    Episode of repetitive movement of lower jaw - however while doing so - patient tracking, followed occasional commands, less likely siezure, ?myoclonus, check EEG, will discuss with neuro. FiO2 increased to 70% overnight, able to titrate down to 40% by this afternoon. Repeat CXR with ?increased pulm congestion vs new opacity. Afebrile, will fu sputum cx, ABG, if again develops worsened plugging/secretions or signs of infection will resume abx.     - ASA/Plavix as per neuro  - Wean from vent as tolerated  - dc IVF, hypernatremia resolved  - monitor Na

## 2024-03-23 NOTE — PROGRESS NOTE ADULT - PROBLEM SELECTOR PLAN 4
- BAL 2/21: PSA; Txd with Zosyn ( 2/21-2/28)  - Sputum 3/4: PSA , Blood cxs 3/3: NGTD  - Txd with course of Meropenem 3/3 --> 3/11  - 3/20 tmax 100.7 - pancultured, chest xray clear, UA negative.  Blood cultures -NGTD  Sputum- NGTD  - 3/21 Monitoring off antibiotics

## 2024-03-23 NOTE — PROGRESS NOTE ADULT - PROBLEM SELECTOR PLAN 8
- Patient Faith  - Family would like to be asked prior to administration of blood products   - S/p 1 unit PRBCs on 2/22 and 3/3  - S/p Venofer (2/22-2/27)   - S/p Cyanocobalamine, Folic acid, Ferrous Sulfate  - S/p Epogen x 5 days (Ended 2/27)  - Reported Melena on 3/3   - CT A/P 3/3 negative for acute bleed  - GI did not recommend EGD as no overt signs of active bleeding and responded to PRBCs  - Will use pediatric tubes to limit volume for phlebotomy as patient is [employer]   - H&H continues to remain stable, will monitor for melena and signs of bleeding

## 2024-03-23 NOTE — PROGRESS NOTE ADULT - SUBJECTIVE AND OBJECTIVE BOX
Subjective: Patient seen and examined. No new events except as noted.     SUBJECTIVE/ROS:  MEDICATIONS:  MEDICATIONS  (STANDING):  artificial tears (preservative free) Ophthalmic Solution 1 Drop(s) Both EYES every 4 hours  aspirin  chewable 81 milliGRAM(s) Oral daily  Biotene Dry Mouth Oral Rinse 5 milliLiter(s) Swish and Spit every 6 hours  chlorhexidine 0.12% Liquid 15 milliLiter(s) Oral Mucosa every 12 hours  chlorhexidine 4% Liquid 1 Application(s) Topical daily  clopidogrel Tablet 75 milliGRAM(s) Enteral Tube daily  dextrose 5%. 1000 milliLiter(s) (75 mL/Hr) IV Continuous <Continuous>  furosemide   Injectable 20 milliGRAM(s) IV Push two times a day  heparin   Injectable 5000 Unit(s) SubCutaneous every 8 hours  insulin lispro (ADMELOG) corrective regimen sliding scale   SubCutaneous every 6 hours  insulin NPH human recombinant 7 Unit(s) SubCutaneous every 6 hours  nystatin    Suspension 238020 Unit(s) Oral every 6 hours  pantoprazole   Suspension 40 milliGRAM(s) Oral daily  petrolatum Ophthalmic Ointment 1 Application(s) Both EYES every 8 hours      PHYSICAL EXAM:  T(C): 36.6 (03-23-24 @ 04:24), Max: 37.3 (03-23-24 @ 00:00)  HR: 97 (03-23-24 @ 08:31) (88 - 101)  BP: 150/86 (03-23-24 @ 04:24) (128/86 - 150/86)  RR: 20 (03-23-24 @ 04:24) (20 - 21)  SpO2: 100% (03-23-24 @ 08:31) (96% - 100%)  Wt(kg): --  I&O's Summary    22 Mar 2024 07:01  -  23 Mar 2024 07:00  --------------------------------------------------------  IN: 2960 mL / OUT: 1650 mL / NET: 1310 mL            Appearance: on vent 	  Cardiovascular: Normal S1 S2,    Murmur:   Neck: JVP limited to be evaluated   Respiratory: Lungs few rhonchi   Gastrointestinal:  Soft  Skin: normal   Neuro: limited as pt on vent      LABS/DATA:    CARDIAC MARKERS:                                8.4    8.13  )-----------( 523      ( 22 Mar 2024 07:44 )             30.9     03-23    141  |  103  |  24<H>  ----------------------------<  171<H>  4.0   |  28  |  0.49<L>    Ca    8.8      23 Mar 2024 06:47  Phos  3.1     03-23  Mg     2.2     03-23      proBNP:   Lipid Profile:   HgA1c:   TSH:     TELE:  EKG:

## 2024-03-23 NOTE — PROGRESS NOTE ADULT - SUBJECTIVE AND OBJECTIVE BOX
Patient is a 73y old  Male who presents with a chief complaint of Stroke, critical stenosis, evaluation for angiography (22 Mar 2024 09:57)      Interval Events:    REVIEW OF SYSTEMS:  [ ] Positive  [ ] All other systems negative  [ ] Unable to assess ROS because ________    Vital Signs Last 24 Hrs  T(C): 36.6 (03-23-24 @ 04:24), Max: 37.3 (03-23-24 @ 00:00)  T(F): 97.8 (03-23-24 @ 04:24), Max: 99.1 (03-23-24 @ 00:00)  HR: 93 (03-23-24 @ 04:24) (88 - 101)  BP: 150/86 (03-23-24 @ 04:24) (128/86 - 150/86)  RR: 20 (03-23-24 @ 04:24) (20 - 21)  SpO2: 100% (03-23-24 @ 04:24) (96% - 100%)    PHYSICAL EXAM:  HEENT:   [ ]Tracheostomy:  [ ]Pupils equal  [ ]No oral lesions  [ ]Abnormal    SKIN  [ ]No Rash  [ ] Abnormal  [ ] pressure    CARDIAC  [ ]Regular  [ ]Abnormal    PULMONARY  [ ]Bilateral Clear Breath Sounds  [ ]Normal Excursion  [ ]Abnormal    GI  [ ]PEG      [ ] +BS		              [ ]Soft, nondistended, nontender	  [ ]Abnormal    MUSCULOSKELETAL                                   [ ]Bedbound                 [ ]Abnormal    [ ]Ambulatory/OOB to chair                           EXTREMITIES                                         [ ]Normal  [ ]Edema                           NEUROLOGIC  [ ] Normal, non focal  [ ] Focal findings:    PSYCHIATRIC  [ ]Alert and appropriate  [ ] Sedated	 [ ]Agitated    :  [place]: [ ] Yes, if yes: Date of Placement:                   [  ] No    LINES: Central Lines [ ] Yes, if yes: Date of Placement                                     [  ] No    HOSPITAL MEDICATIONS:  MEDICATIONS  (STANDING):  artificial tears (preservative free) Ophthalmic Solution 1 Drop(s) Both EYES every 4 hours  aspirin  chewable 81 milliGRAM(s) Oral daily  Biotene Dry Mouth Oral Rinse 5 milliLiter(s) Swish and Spit every 6 hours  chlorhexidine 0.12% Liquid 15 milliLiter(s) Oral Mucosa every 12 hours  chlorhexidine 4% Liquid 1 Application(s) Topical daily  clopidogrel Tablet 75 milliGRAM(s) Enteral Tube daily  dextrose 5%. 1000 milliLiter(s) (75 mL/Hr) IV Continuous <Continuous>  furosemide   Injectable 20 milliGRAM(s) IV Push two times a day  heparin   Injectable 5000 Unit(s) SubCutaneous every 8 hours  insulin lispro (ADMELOG) corrective regimen sliding scale   SubCutaneous every 6 hours  insulin NPH human recombinant 7 Unit(s) SubCutaneous every 6 hours  nystatin    Suspension 557615 Unit(s) Oral every 6 hours  pantoprazole   Suspension 40 milliGRAM(s) Oral daily  petrolatum Ophthalmic Ointment 1 Application(s) Both EYES every 8 hours    MEDICATIONS  (PRN):  acetaminophen   Oral Liquid .. 650 milliGRAM(s) Oral every 6 hours PRN Temp greater or equal to 38C (100.4F)  albuterol/ipratropium for Nebulization 3 milliLiter(s) Nebulizer every 6 hours PRN Shortness of Breath and/or Wheezing      LABS:                          03-23    141  |  103  |  24<H>  ----------------------------<  171<H>  4.0   |  28  |  0.49<L>    Ca    8.8      23 Mar 2024 06:47  Phos  3.1     03-23  Mg     2.2     03-23        Urinalysis Basic - ( 23 Mar 2024 06:47 )    Color: x / Appearance: x / SG: x / pH: x  Gluc: 171 mg/dL / Ketone: x  / Bili: x / Urobili: x   Blood: x / Protein: x / Nitrite: x   Leuk Esterase: x / RBC: x / WBC x   Sq Epi: x / Non Sq Epi: x / Bacteria: x          CAPILLARY BLOOD GLUCOSE    MICROBIOLOGY:     RADIOLOGY:  [ ] Reviewed and interpreted by me    Mode: AC/ CMV (Assist Control/ Continuous Mandatory Ventilation)  RR (machine): 20  TV (machine): 500  FiO2: 30  PEEP: 5  MAP: 10  PIP: 24   Patient is a 73y old  Male who presents with a chief complaint of Stroke, critical stenosis, evaluation for angiography (22 Mar 2024 09:57)      Interval Events: No events over night on vent.    REVIEW OF SYSTEMS:  [ ] Positive  [ ] All other systems negative  [ ] Unable to assess ROS because ___Limited capacity to communicate however complains of pain in both legs    Vital Signs Last 24 Hrs  T(C): 36.6 (03-23-24 @ 04:24), Max: 37.3 (03-23-24 @ 00:00)  T(F): 97.8 (03-23-24 @ 04:24), Max: 99.1 (03-23-24 @ 00:00)  HR: 93 (03-23-24 @ 04:24) (88 - 101)  BP: 150/86 (03-23-24 @ 04:24) (128/86 - 150/86)  RR: 20 (03-23-24 @ 04:24) (20 - 21)  SpO2: 100% (03-23-24 @ 04:24) (96% - 100%)      PHYSICAL EXAM:  HEENT:    [X] Tracheostomy:  #8 Cuffed Portex  [X] PERRL B/L; 4mm B/L pupils; EOMI  [X] No oral lesions  [ ] Abnormal    SKIN  [X] No Rash  [ ] Abnormal  [ ] pressure    CARDIAC  [X] Regular  [ ] Abnormal    PULMONARY  [X] Bilateral Clear Breath Sounds  [ ] Normal Excursion  [ ] Abnormal    GI  [X] PEG      [X] +BS		              [X] Soft, nondistended, nontender	  [ ] Abnormal    MUSCULOSKELETAL                                   [X] Bedbound                 [ ] Abnormal:   [ ] Ambulatory/OOB to chair                           EXTREMITIES                                         [ ]Normal  [X] Edema  1+ edema in B/L UEs                           NEUROLOGIC  [ ] Normal, non focal  [X] Focal findings: Lethargic initially and required sternal rub to awaken; eyes noted to be more closed during sleeping however not completely closed; No gag reflex on when suctioned via trach; no gross facial asymmetry observed; responds appropriately to questions by nodding; able to move B/L feet, trigger movement observed in right knee,  +B/L shoulder shrugs, slight grasp in both hands observed; unable to lift legs or arms    PSYCHIATRIC  [X] Alert, responsive with flat affect  [ ] Sedated	 [ ]Agitated    :  [place]: [ ] Yes, if yes: Date of Placement:                   [X] No    LINES: Central Lines [ ] Yes, if yes: Date of Placement                                     [X] No      HOSPITAL MEDICATIONS:  MEDICATIONS  (STANDING):  artificial tears (preservative free) Ophthalmic Solution 1 Drop(s) Both EYES every 4 hours  aspirin  chewable 81 milliGRAM(s) Oral daily  Biotene Dry Mouth Oral Rinse 5 milliLiter(s) Swish and Spit every 6 hours  chlorhexidine 0.12% Liquid 15 milliLiter(s) Oral Mucosa every 12 hours  chlorhexidine 4% Liquid 1 Application(s) Topical daily  clopidogrel Tablet 75 milliGRAM(s) Enteral Tube daily  dextrose 5%. 1000 milliLiter(s) (75 mL/Hr) IV Continuous <Continuous>  furosemide   Injectable 20 milliGRAM(s) IV Push two times a day  heparin   Injectable 5000 Unit(s) SubCutaneous every 8 hours  insulin lispro (ADMELOG) corrective regimen sliding scale   SubCutaneous every 6 hours  insulin NPH human recombinant 7 Unit(s) SubCutaneous every 6 hours  nystatin    Suspension 174115 Unit(s) Oral every 6 hours  pantoprazole   Suspension 40 milliGRAM(s) Oral daily  petrolatum Ophthalmic Ointment 1 Application(s) Both EYES every 8 hours    MEDICATIONS  (PRN):  acetaminophen   Oral Liquid .. 650 milliGRAM(s) Oral every 6 hours PRN Temp greater or equal to 38C (100.4F)  albuterol/ipratropium for Nebulization 3 milliLiter(s) Nebulizer every 6 hours PRN Shortness of Breath and/or Wheezing      LABS:                         03-23    141  |  103  |  24<H>  ----------------------------<  171<H>  4.0   |  28  |  0.49<L>    Ca    8.8      23 Mar 2024 06:47  Phos  3.1     03-23  Mg     2.2     03-23        Urinalysis Basic - ( 23 Mar 2024 06:47 )    Color: x / Appearance: x / SG: x / pH: x  Gluc: 171 mg/dL / Ketone: x  / Bili: x / Urobili: x   Blood: x / Protein: x / Nitrite: x   Leuk Esterase: x / RBC: x / WBC x   Sq Epi: x / Non Sq Epi: x / Bacteria: x          CAPILLARY BLOOD GLUCOSE    MICROBIOLOGY:     RADIOLOGY:  [ ] Reviewed and interpreted by me    Mode: AC/ CMV (Assist Control/ Continuous Mandatory Ventilation)  RR (machine): 20  TV (machine): 500  FiO2: 30  PEEP: 5  MAP: 10  PIP: 24

## 2024-03-23 NOTE — PROGRESS NOTE ADULT - PROBLEM SELECTOR PLAN 10
- IVF 3/8 and 3/20  - free water as ordered 250ml Q8H  - 3/21:  D5W increased to 75ml/hr  - Monitor BMP - IVF 3/8 and 3/20  - free water as ordered 250ml Q8H  - 3/21:  D5W increased to 75ml/hr  - 3/23 Resolved.  IVF discontinued.   - Monitor BMP

## 2024-03-24 LAB
-  AMOXICILLIN/CLAVULANIC ACID: SIGNIFICANT CHANGE UP
-  AMPICILLIN/SULBACTAM: SIGNIFICANT CHANGE UP
-  AMPICILLIN: SIGNIFICANT CHANGE UP
-  AZTREONAM: SIGNIFICANT CHANGE UP
-  CEFAZOLIN: SIGNIFICANT CHANGE UP
-  CEFEPIME: SIGNIFICANT CHANGE UP
-  CEFOXITIN: SIGNIFICANT CHANGE UP
-  CEFTRIAXONE: SIGNIFICANT CHANGE UP
-  CEFUROXIME: SIGNIFICANT CHANGE UP
-  CIPROFLOXACIN: SIGNIFICANT CHANGE UP
-  ERTAPENEM: SIGNIFICANT CHANGE UP
-  GENTAMICIN: SIGNIFICANT CHANGE UP
-  LEVOFLOXACIN: SIGNIFICANT CHANGE UP
-  MEROPENEM: SIGNIFICANT CHANGE UP
-  NITROFURANTOIN: SIGNIFICANT CHANGE UP
-  PIPERACILLIN/TAZOBACTAM: SIGNIFICANT CHANGE UP
-  TOBRAMYCIN: SIGNIFICANT CHANGE UP
-  TRIMETHOPRIM/SULFAMETHOXAZOLE: SIGNIFICANT CHANGE UP
ANION GAP SERPL CALC-SCNC: 8 MMOL/L — SIGNIFICANT CHANGE UP (ref 5–17)
BUN SERPL-MCNC: 27 MG/DL — HIGH (ref 7–23)
CALCIUM SERPL-MCNC: 9 MG/DL — SIGNIFICANT CHANGE UP (ref 8.4–10.5)
CHLORIDE SERPL-SCNC: 102 MMOL/L — SIGNIFICANT CHANGE UP (ref 96–108)
CO2 SERPL-SCNC: 31 MMOL/L — SIGNIFICANT CHANGE UP (ref 22–31)
CREAT SERPL-MCNC: 0.5 MG/DL — SIGNIFICANT CHANGE UP (ref 0.5–1.3)
CULTURE RESULTS: ABNORMAL
EGFR: 108 ML/MIN/1.73M2 — SIGNIFICANT CHANGE UP
GLUCOSE BLDC GLUCOMTR-MCNC: 182 MG/DL — HIGH (ref 70–99)
GLUCOSE BLDC GLUCOMTR-MCNC: 188 MG/DL — HIGH (ref 70–99)
GLUCOSE BLDC GLUCOMTR-MCNC: 199 MG/DL — HIGH (ref 70–99)
GLUCOSE BLDC GLUCOMTR-MCNC: 228 MG/DL — HIGH (ref 70–99)
GLUCOSE SERPL-MCNC: 182 MG/DL — HIGH (ref 70–99)
MAGNESIUM SERPL-MCNC: 2.5 MG/DL — SIGNIFICANT CHANGE UP (ref 1.6–2.6)
METHOD TYPE: SIGNIFICANT CHANGE UP
ORGANISM # SPEC MICROSCOPIC CNT: ABNORMAL
ORGANISM # SPEC MICROSCOPIC CNT: ABNORMAL
PHOSPHATE SERPL-MCNC: 3.2 MG/DL — SIGNIFICANT CHANGE UP (ref 2.5–4.5)
POTASSIUM SERPL-MCNC: 4.2 MMOL/L — SIGNIFICANT CHANGE UP (ref 3.5–5.3)
POTASSIUM SERPL-SCNC: 4.2 MMOL/L — SIGNIFICANT CHANGE UP (ref 3.5–5.3)
SODIUM SERPL-SCNC: 141 MMOL/L — SIGNIFICANT CHANGE UP (ref 135–145)
SPECIMEN SOURCE: SIGNIFICANT CHANGE UP

## 2024-03-24 PROCEDURE — 99232 SBSQ HOSP IP/OBS MODERATE 35: CPT

## 2024-03-24 RX ADMIN — Medication 500000 UNIT(S): at 18:02

## 2024-03-24 RX ADMIN — Medication 5 MILLILITER(S): at 11:35

## 2024-03-24 RX ADMIN — GABAPENTIN 100 MILLIGRAM(S): 400 CAPSULE ORAL at 14:16

## 2024-03-24 RX ADMIN — HUMAN INSULIN 7 UNIT(S): 100 INJECTION, SUSPENSION SUBCUTANEOUS at 18:01

## 2024-03-24 RX ADMIN — Medication 500000 UNIT(S): at 11:35

## 2024-03-24 RX ADMIN — Medication 2: at 05:50

## 2024-03-24 RX ADMIN — HEPARIN SODIUM 5000 UNIT(S): 5000 INJECTION INTRAVENOUS; SUBCUTANEOUS at 05:49

## 2024-03-24 RX ADMIN — Medication 1 DROP(S): at 14:16

## 2024-03-24 RX ADMIN — GABAPENTIN 100 MILLIGRAM(S): 400 CAPSULE ORAL at 05:51

## 2024-03-24 RX ADMIN — CHLORHEXIDINE GLUCONATE 1 APPLICATION(S): 213 SOLUTION TOPICAL at 22:10

## 2024-03-24 RX ADMIN — CLOPIDOGREL BISULFATE 75 MILLIGRAM(S): 75 TABLET, FILM COATED ORAL at 11:38

## 2024-03-24 RX ADMIN — SODIUM CHLORIDE 4 MILLILITER(S): 9 INJECTION INTRAMUSCULAR; INTRAVENOUS; SUBCUTANEOUS at 13:53

## 2024-03-24 RX ADMIN — Medication 1 APPLICATION(S): at 14:16

## 2024-03-24 RX ADMIN — Medication 5 MILLILITER(S): at 18:02

## 2024-03-24 RX ADMIN — Medication 2: at 12:21

## 2024-03-24 RX ADMIN — SENNA PLUS 10 MILLILITER(S): 8.6 TABLET ORAL at 22:12

## 2024-03-24 RX ADMIN — Medication 1 DROP(S): at 02:48

## 2024-03-24 RX ADMIN — PANTOPRAZOLE SODIUM 40 MILLIGRAM(S): 20 TABLET, DELAYED RELEASE ORAL at 11:34

## 2024-03-24 RX ADMIN — Medication 1 DROP(S): at 22:10

## 2024-03-24 RX ADMIN — Medication 2: at 17:59

## 2024-03-24 RX ADMIN — Medication 1 APPLICATION(S): at 05:51

## 2024-03-24 RX ADMIN — SODIUM CHLORIDE 4 MILLILITER(S): 9 INJECTION INTRAMUSCULAR; INTRAVENOUS; SUBCUTANEOUS at 05:08

## 2024-03-24 RX ADMIN — HUMAN INSULIN 7 UNIT(S): 100 INJECTION, SUSPENSION SUBCUTANEOUS at 05:50

## 2024-03-24 RX ADMIN — HUMAN INSULIN 7 UNIT(S): 100 INJECTION, SUSPENSION SUBCUTANEOUS at 12:20

## 2024-03-24 RX ADMIN — Medication 1 DROP(S): at 05:51

## 2024-03-24 RX ADMIN — Medication 20 MILLIGRAM(S): at 14:16

## 2024-03-24 RX ADMIN — Medication 1 DROP(S): at 11:25

## 2024-03-24 RX ADMIN — GABAPENTIN 100 MILLIGRAM(S): 400 CAPSULE ORAL at 22:10

## 2024-03-24 RX ADMIN — SODIUM CHLORIDE 4 MILLILITER(S): 9 INJECTION INTRAMUSCULAR; INTRAVENOUS; SUBCUTANEOUS at 21:10

## 2024-03-24 RX ADMIN — Medication 1 APPLICATION(S): at 22:11

## 2024-03-24 RX ADMIN — Medication 500000 UNIT(S): at 05:52

## 2024-03-24 RX ADMIN — Medication 5 MILLILITER(S): at 05:52

## 2024-03-24 RX ADMIN — Medication 3 MILLILITER(S): at 13:47

## 2024-03-24 RX ADMIN — HEPARIN SODIUM 5000 UNIT(S): 5000 INJECTION INTRAVENOUS; SUBCUTANEOUS at 14:16

## 2024-03-24 RX ADMIN — Medication 3 MILLILITER(S): at 05:08

## 2024-03-24 RX ADMIN — Medication 1 DROP(S): at 18:02

## 2024-03-24 RX ADMIN — CHLORHEXIDINE GLUCONATE 15 MILLILITER(S): 213 SOLUTION TOPICAL at 05:51

## 2024-03-24 RX ADMIN — CHLORHEXIDINE GLUCONATE 15 MILLILITER(S): 213 SOLUTION TOPICAL at 18:02

## 2024-03-24 RX ADMIN — Medication 20 MILLIGRAM(S): at 05:49

## 2024-03-24 RX ADMIN — HEPARIN SODIUM 5000 UNIT(S): 5000 INJECTION INTRAVENOUS; SUBCUTANEOUS at 22:11

## 2024-03-24 RX ADMIN — Medication 3 MILLILITER(S): at 21:10

## 2024-03-24 RX ADMIN — Medication 81 MILLIGRAM(S): at 11:38

## 2024-03-24 NOTE — PROGRESS NOTE ADULT - PROBLEM SELECTOR PLAN 10
- IVF 3/8 and 3/20  - free water as ordered 250ml Q8H  - 3/21:  D5W increased to 75ml/hr  - 3/23 Resolved.  IVF discontinued.   - Monitor BMP

## 2024-03-24 NOTE — PATIENT PROFILE ADULT - NSPRONUTRITIONRISK_GEN_A_NUR
No indicators present unsuccessful PTA drainage, given symptoms will obtain ct of neck Tor: Acknowledged from Dr. Fernandez, pending CT evaluation for possible deep neck infection.

## 2024-03-24 NOTE — PROGRESS NOTE ADULT - SUBJECTIVE AND OBJECTIVE BOX
Patient is a 73y old  Male who presents with a chief complaint of Stroke, critical stenosis, evaluation for angiography (23 Mar 2024 09:45)      Interval Events:    REVIEW OF SYSTEMS:  [ ] Positive  [ ] All other systems negative  [ ] Unable to assess ROS because ________    Vital Signs Last 24 Hrs  T(C): 37.3 (03-24-24 @ 04:00), Max: 39.4 (03-23-24 @ 17:15)  T(F): 99.2 (03-24-24 @ 04:00), Max: 102.9 (03-23-24 @ 17:15)  HR: 100 (03-24-24 @ 08:45) (93 - 115)  BP: 124/72 (03-24-24 @ 04:00) (113/67 - 124/72)  RR: 15 (03-24-24 @ 04:00) (15 - 23)  SpO2: 98% (03-24-24 @ 08:45) (86% - 100%)    PHYSICAL EXAM:  HEENT:   [ ]Tracheostomy:  [ ]Pupils equal  [ ]No oral lesions  [ ]Abnormal    SKIN  [ ]No Rash  [ ] Abnormal  [ ] pressure    CARDIAC  [ ]Regular  [ ]Abnormal    PULMONARY  [ ]Bilateral Clear Breath Sounds  [ ]Normal Excursion  [ ]Abnormal    GI  [ ]PEG      [ ] +BS		              [ ]Soft, nondistended, nontender	  [ ]Abnormal    MUSCULOSKELETAL                                   [ ]Bedbound                 [ ]Abnormal    [ ]Ambulatory/OOB to chair                           EXTREMITIES                                         [ ]Normal  [ ]Edema                           NEUROLOGIC  [ ] Normal, non focal  [ ] Focal findings:    PSYCHIATRIC  [ ]Alert and appropriate  [ ] Sedated	 [ ]Agitated    :  [place]: [ ] Yes, if yes: Date of Placement:                   [  ] No    LINES: Central Lines [ ] Yes, if yes: Date of Placement                                     [  ] No    HOSPITAL MEDICATIONS:  MEDICATIONS  (STANDING):  albuterol/ipratropium for Nebulization 3 milliLiter(s) Nebulizer every 8 hours  artificial tears (preservative free) Ophthalmic Solution 1 Drop(s) Both EYES every 4 hours  aspirin  chewable 81 milliGRAM(s) Oral daily  Biotene Dry Mouth Oral Rinse 5 milliLiter(s) Swish and Spit every 6 hours  chlorhexidine 0.12% Liquid 15 milliLiter(s) Oral Mucosa every 12 hours  chlorhexidine 4% Liquid 1 Application(s) Topical daily  clopidogrel Tablet 75 milliGRAM(s) Enteral Tube daily  furosemide   Injectable 20 milliGRAM(s) IV Push two times a day  gabapentin Solution 100 milliGRAM(s) Enteral Tube every 8 hours  heparin   Injectable 5000 Unit(s) SubCutaneous every 8 hours  insulin lispro (ADMELOG) corrective regimen sliding scale   SubCutaneous every 6 hours  insulin NPH human recombinant 7 Unit(s) SubCutaneous every 6 hours  nystatin    Suspension 771003 Unit(s) Oral every 6 hours  pantoprazole   Suspension 40 milliGRAM(s) Oral daily  petrolatum Ophthalmic Ointment 1 Application(s) Both EYES every 8 hours  senna Syrup 10 milliLiter(s) Oral at bedtime  sodium chloride 3%  Inhalation 4 milliLiter(s) Inhalation every 8 hours    MEDICATIONS  (PRN):  acetaminophen   Oral Liquid .. 1000 milliGRAM(s) Enteral Tube every 6 hours PRN Temp greater or equal to 38.5C (101.3F), Mild Pain (1 - 3)      LABS:    03-24    141  |  102  |  27<H>  ----------------------------<  182<H>  4.2   |  31  |  0.50    Ca    9.0      24 Mar 2024 06:45  Phos  3.2     03-24  Mg     2.5     03-24        Urinalysis Basic - ( 24 Mar 2024 06:45 )    Color: x / Appearance: x / SG: x / pH: x  Gluc: 182 mg/dL / Ketone: x  / Bili: x / Urobili: x   Blood: x / Protein: x / Nitrite: x   Leuk Esterase: x / RBC: x / WBC x   Sq Epi: x / Non Sq Epi: x / Bacteria: x          CAPILLARY BLOOD GLUCOSE    MICROBIOLOGY:     RADIOLOGY:  [ ] Reviewed and interpreted by me    Mode: CPAP with PS  FiO2: 40  PEEP: 7  PS: 10  MAP: 11  PIP: 17   Patient is a 73y old  Male who presents with a chief complaint of Stroke, critical stenosis, evaluation for angiography (23 Mar 2024 09:45)      Interval Events:  No acute events overnight.     REVIEW OF SYSTEMS:  [ ] Positive  [ ] All other systems negative  [X] Unable to assess ROS because ___Obtunded_____    Vital Signs Last 24 Hrs  T(C): 37.3 (03-24-24 @ 04:00), Max: 39.4 (03-23-24 @ 17:15)  T(F): 99.2 (03-24-24 @ 04:00), Max: 102.9 (03-23-24 @ 17:15)  HR: 100 (03-24-24 @ 08:45) (93 - 115)  BP: 124/72 (03-24-24 @ 04:00) (113/67 - 124/72)  RR: 15 (03-24-24 @ 04:00) (15 - 23)  SpO2: 98% (03-24-24 @ 08:45) (86% - 100%)    PHYSICAL EXAM:  HEENT:   [X]Tracheostomy: #8 cuffed portex  [X]Pupils equal  [ ]No oral lesions  [ ]Abnormal    SKIN  [X]No Rash  [ ] Abnormal  [ ] pressure    CARDIAC  [X]Regular  [ ]Abnormal    PULMONARY  [X]Bilateral Clear Breath Sounds  [ ]Normal Excursion  [ ]Abnormal    GI  [X]PEG      [X] +BS		              [X]Soft, nondistended, nontender	  [ ]Abnormal    MUSCULOSKELETAL                                   [X]Bedbound                 [ ]Abnormal    [ ]Ambulatory/OOB to chair                           EXTREMITIES                                         [ ]Normal  [X]Edema - BL UE edema                         NEUROLOGIC  [ ] Normal, non focal  [X] Focal findings: opens eye spontaneously and to voice, follows commands x4 extremities    PSYCHIATRIC  [X]Alert and appropriate  [ ] Sedated	 [ ]Agitated    :  [place]: [ ] Yes, if yes: Date of Placement:                   [X] No    LINES: Central Lines [ ] Yes, if yes: Date of Placement                                     [X] No    HOSPITAL MEDICATIONS:  MEDICATIONS  (STANDING):  albuterol/ipratropium for Nebulization 3 milliLiter(s) Nebulizer every 8 hours  artificial tears (preservative free) Ophthalmic Solution 1 Drop(s) Both EYES every 4 hours  aspirin  chewable 81 milliGRAM(s) Oral daily  Biotene Dry Mouth Oral Rinse 5 milliLiter(s) Swish and Spit every 6 hours  chlorhexidine 0.12% Liquid 15 milliLiter(s) Oral Mucosa every 12 hours  chlorhexidine 4% Liquid 1 Application(s) Topical daily  clopidogrel Tablet 75 milliGRAM(s) Enteral Tube daily  furosemide   Injectable 20 milliGRAM(s) IV Push two times a day  gabapentin Solution 100 milliGRAM(s) Enteral Tube every 8 hours  heparin   Injectable 5000 Unit(s) SubCutaneous every 8 hours  insulin lispro (ADMELOG) corrective regimen sliding scale   SubCutaneous every 6 hours  insulin NPH human recombinant 7 Unit(s) SubCutaneous every 6 hours  nystatin    Suspension 640211 Unit(s) Oral every 6 hours  pantoprazole   Suspension 40 milliGRAM(s) Oral daily  petrolatum Ophthalmic Ointment 1 Application(s) Both EYES every 8 hours  senna Syrup 10 milliLiter(s) Oral at bedtime  sodium chloride 3%  Inhalation 4 milliLiter(s) Inhalation every 8 hours    MEDICATIONS  (PRN):  acetaminophen   Oral Liquid .. 1000 milliGRAM(s) Enteral Tube every 6 hours PRN Temp greater or equal to 38.5C (101.3F), Mild Pain (1 - 3)    LABS:    03-24    141  |  102  |  27<H>  ----------------------------<  182<H>  4.2   |  31  |  0.50    Ca    9.0      24 Mar 2024 06:45  Phos  3.2     03-24  Mg     2.5     03-24    Urinalysis Basic - ( 24 Mar 2024 06:45 )    Color: x / Appearance: x / SG: x / pH: x  Gluc: 182 mg/dL / Ketone: x  / Bili: x / Urobili: x   Blood: x / Protein: x / Nitrite: x   Leuk Esterase: x / RBC: x / WBC x   Sq Epi: x / Non Sq Epi: x / Bacteria: x    CAPILLARY BLOOD GLUCOSE    MICROBIOLOGY:     RADIOLOGY:  [ ] Reviewed and interpreted by me    Mode: CPAP with PS  FiO2: 40  PEEP: 7  PS: 10  MAP: 11  PIP: 17

## 2024-03-24 NOTE — PROGRESS NOTE ADULT - PROBLEM SELECTOR PLAN 1
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

## 2024-03-24 NOTE — PROGRESS NOTE ADULT - PROBLEM SELECTOR PLAN 8
- Patient Jehovah's witness  - Family would like to be asked prior to administration of blood products   - S/p 1 unit PRBCs on 2/22 and 3/3  - S/p Venofer (2/22-2/27)   - S/p Cyanocobalamine, Folic acid, Ferrous Sulfate  - S/p Epogen x 5 days (Ended 2/27)  - Reported Melena on 3/3   - CT A/P 3/3 negative for acute bleed  - GI did not recommend EGD as no overt signs of active bleeding and responded to PRBCs  - Will use pediatric tubes to limit volume for phlebotomy as patient is Yarsani   - H&H continues to remain stable, will monitor for melena and signs of bleeding

## 2024-03-24 NOTE — PROGRESS NOTE ADULT - PROBLEM SELECTOR PLAN 2
- Patient S/p Tracheostomy # 8 Cuffed Portex on 2/16 ( )  - CTA Chest 3/3: Bibasilar Pneumonia; Currently remains on Meropenem   - Currently remains on Mechanical Ventilation: PRVC 500/12/40%/+7  - Attempts at PS trials improving, continue to progress weaning as tolerated.  Has not tolerated trach collar trials as he becomes bradycardic and hypoxic.   - Continue Chest PT and Suctioning PRN

## 2024-03-24 NOTE — PROGRESS NOTE ADULT - ASSESSMENT
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 Prior to admission patient had Viral URI ( 1-2 weeks prior to admission) with subsequent weakness. He underwent an angiogram (2/11) which showed moderate stenosis and no intervention was done. MRI Performed c/w small bilateral cerebellar strokes and prior L thalamic strokes, as per Neurology was not c/w current presentation. 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 He is currently s/p 5 rounds of plasma exchange (2/13-2/20) and IVIG x 5 ( 2/21-2/26)  His hospital course was complicated by progressive respiratory failure. CT Chest performed on 2/20 with atelectasis of b/l lower lobes. BAL 2/21 Grew PSA treated with course of Zosyn (2/21-2/28).  Patient transferred to RCU on 2/24.   RRT called on 3/3 for unresponsiveness. Patient with unreactive pupils, no corneal reflex. Also found to be hypotensive and febrile to 105. Noted to have new melena. Hgb 5. Given 1 unit of pRBC. Transferred to MICU. patient required pressors while in the MICU, BP improved and was transitioned to Droxidopa. Patient had CT C/A/P consistent with Bibasilar pneumonia; Sputum cx grew PSA and was treated with course of Meropenem. Patient evaluated by GI in setting of Melena, transaminitis and portal venous gas on CT imaging. No EGD performed as Melena resolved, Transaminitis was thought to be in setting of shock and portal venous gas was thought to be in setting of recent PEG. EEG was performed in setting of AMS no seizures noted. Repeat MRI Performed which showed Small scattered foci of diffusion restriction within the right cerebellar hemisphere, left parietal lobe, and symmetrically in the bilateral basal ganglia. Neuro recommended Resumption of ASA in setting of possible embolic phenomenon. Repeat ECHO performed RT Ventricular Moderately enlarged, reduced systolic function. Mental status improved and was transferred back to RCU on 3/6.  Cardiology consulted for tachy-dez, unable to catch events on tele, EKG ST otherwise unremarkable, CT Angio PE rule out for RV enlargement.  Continuing to wean pt from ventilator as tolerated.  Continue aggressive airway management with chest PT/suctioning PRN.  Continuing to monitor for neurological improvement.       3/20:  Tmax 100.7 today.  Pancultured, chest xray clear lungs.  Tolerating PS trials 8/5.  Neurologically unchanged.  Hypernatremia on AM labs - D5 and free water frequency increased - f/u AM labs.  Son, daughter and wife all involved in plan of care.  Wife was visiting rehabs with daughter today.  Discharge planning for early next week pending clinical course.    3/21: Sodium remains elevated, IVF rate increased to 75ml/hr.  Remains on IV lasix.  Afebrile over night.  Awaiting cultures to return.  Continuing to monitor off antibiotics.  Discharge planning for next week in progress.   3/22: Sodium improving, will continue IVF for another day.  Blood cultures remain negative, sputum growing pseudomonas which has grown on prior cultures.  Afebrile with normal wbc count, will continue to monitor off antibiotics.  3/23: Patient hypoxic and required ambu and lavage this morning with moderate mucoid secretions recovered, O2 sat improved from mid 80s to low 90s temporarily.  Increased PEEP from 5 to 7, was able to lower FiO2 back to 40% as O2 sats remains above 95%.  CXR reveals hazy RUL,RLL, concerning for mucous plug.  Chest PT and duonebs requested.  IVF discontinued as sodium improved.  Remains on lasix 20mg IB BID.  Complained of leg pains, starting gabapentin for neuropathic pain in legs.  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 Prior to admission patient had Viral URI ( 1-2 weeks prior to admission) with subsequent weakness. He underwent an angiogram (2/11) which showed moderate stenosis and no intervention was done. MRI Performed c/w small bilateral cerebellar strokes and prior L thalamic strokes, as per Neurology was not c/w current presentation. 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 He is currently s/p 5 rounds of plasma exchange (2/13-2/20) and IVIG x 5 ( 2/21-2/26)  His hospital course was complicated by progressive respiratory failure. CT Chest performed on 2/20 with atelectasis of b/l lower lobes. BAL 2/21 Grew PSA treated with course of Zosyn (2/21-2/28).  Patient transferred to RCU on 2/24.   RRT called on 3/3 for unresponsiveness. Patient with unreactive pupils, no corneal reflex. Also found to be hypotensive and febrile to 105. Noted to have new melena. Hgb 5. Given 1 unit of pRBC. Transferred to MICU. patient required pressors while in the MICU, BP improved and was transitioned to Droxidopa. Patient had CT C/A/P consistent with Bibasilar pneumonia; Sputum cx grew PSA and was treated with course of Meropenem. Patient evaluated by GI in setting of Melena, transaminitis and portal venous gas on CT imaging. No EGD performed as Melena resolved, Transaminitis was thought to be in setting of shock and portal venous gas was thought to be in setting of recent PEG. EEG was performed in setting of AMS no seizures noted. Repeat MRI Performed which showed Small scattered foci of diffusion restriction within the right cerebellar hemisphere, left parietal lobe, and symmetrically in the bilateral basal ganglia. Neuro recommended Resumption of ASA in setting of possible embolic phenomenon. Repeat ECHO performed RT Ventricular Moderately enlarged, reduced systolic function. Mental status improved and was transferred back to RCU on 3/6.  Cardiology consulted for tachy-dez, unable to catch events on tele, EKG ST otherwise unremarkable, CT Angio PE rule out for RV enlargement.  Continuing to wean pt from ventilator as tolerated.  Continue aggressive airway management with chest PT/suctioning PRN.  Continuing to monitor for neurological improvement.       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 Prior to admission patient had Viral URI ( 1-2 weeks prior to admission) with subsequent weakness. He underwent an angiogram (2/11) which showed moderate stenosis and no intervention was done. MRI Performed c/w small bilateral cerebellar strokes and prior L thalamic strokes, as per Neurology was not c/w current presentation. 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 He is currently s/p 5 rounds of plasma exchange (2/13-2/20) and IVIG x 5 ( 2/21-2/26)  His hospital course was complicated by progressive respiratory failure. CT Chest performed on 2/20 with atelectasis of b/l lower lobes. BAL 2/21 Grew PSA treated with course of Zosyn (2/21-2/28).  Patient transferred to RCU on 2/24.   RRT called on 3/3 for unresponsiveness. Patient with unreactive pupils, no corneal reflex. Also found to be hypotensive and febrile to 105. Noted to have new melena. Hgb 5. Given 1 unit of pRBC. Transferred to MICU. patient required pressors while in the MICU, BP improved and was transitioned to Droxidopa. Patient had CT C/A/P consistent with Bibasilar pneumonia; Sputum cx grew PSA and was treated with course of Meropenem. Patient evaluated by GI in setting of Melena, transaminitis and portal venous gas on CT imaging. No EGD performed as Melena resolved, Transaminitis was thought to be in setting of shock and portal venous gas was thought to be in setting of recent PEG. EEG was performed in setting of AMS no seizures noted. Repeat MRI Performed which showed Small scattered foci of diffusion restriction within the right cerebellar hemisphere, left parietal lobe, and symmetrically in the bilateral basal ganglia. Neuro recommended Resumption of ASA in setting of possible embolic phenomenon. Repeat ECHO performed RT Ventricular Moderately enlarged, reduced systolic function. Mental status improved and was transferred back to RCU on 3/6.  Cardiology consulted for tachy-dez, unable to catch events on tele, EKG ST otherwise unremarkable, CT Angio PE rule out for RV enlargement.  Continuing to wean pt from ventilator as tolerated.  Continue aggressive airway management with chest PT/suctioning PRN.  Continuing to monitor for neurological improvement.       3/24:  Tolerating PS 5/7.  Neurologically unchanged.

## 2024-03-25 LAB
ANION GAP SERPL CALC-SCNC: 8 MMOL/L — SIGNIFICANT CHANGE UP (ref 5–17)
BUN SERPL-MCNC: 35 MG/DL — HIGH (ref 7–23)
CALCIUM SERPL-MCNC: 8.7 MG/DL — SIGNIFICANT CHANGE UP (ref 8.4–10.5)
CHLORIDE SERPL-SCNC: 105 MMOL/L — SIGNIFICANT CHANGE UP (ref 96–108)
CO2 SERPL-SCNC: 33 MMOL/L — HIGH (ref 22–31)
CREAT SERPL-MCNC: 0.53 MG/DL — SIGNIFICANT CHANGE UP (ref 0.5–1.3)
CULTURE RESULTS: SIGNIFICANT CHANGE UP
CULTURE RESULTS: SIGNIFICANT CHANGE UP
EGFR: 106 ML/MIN/1.73M2 — SIGNIFICANT CHANGE UP
GLUCOSE BLDC GLUCOMTR-MCNC: 210 MG/DL — HIGH (ref 70–99)
GLUCOSE BLDC GLUCOMTR-MCNC: 213 MG/DL — HIGH (ref 70–99)
GLUCOSE BLDC GLUCOMTR-MCNC: 213 MG/DL — HIGH (ref 70–99)
GLUCOSE BLDC GLUCOMTR-MCNC: 218 MG/DL — HIGH (ref 70–99)
GLUCOSE SERPL-MCNC: 206 MG/DL — HIGH (ref 70–99)
MAGNESIUM SERPL-MCNC: 2.7 MG/DL — HIGH (ref 1.6–2.6)
PHOSPHATE SERPL-MCNC: 2.8 MG/DL — SIGNIFICANT CHANGE UP (ref 2.5–4.5)
POTASSIUM SERPL-MCNC: 4.6 MMOL/L — SIGNIFICANT CHANGE UP (ref 3.5–5.3)
POTASSIUM SERPL-SCNC: 4.6 MMOL/L — SIGNIFICANT CHANGE UP (ref 3.5–5.3)
SODIUM SERPL-SCNC: 146 MMOL/L — HIGH (ref 135–145)
SPECIMEN SOURCE: SIGNIFICANT CHANGE UP
SPECIMEN SOURCE: SIGNIFICANT CHANGE UP

## 2024-03-25 PROCEDURE — 99232 SBSQ HOSP IP/OBS MODERATE 35: CPT

## 2024-03-25 RX ADMIN — Medication 1 DROP(S): at 02:51

## 2024-03-25 RX ADMIN — Medication 4: at 12:19

## 2024-03-25 RX ADMIN — Medication 1 APPLICATION(S): at 22:43

## 2024-03-25 RX ADMIN — Medication 500000 UNIT(S): at 17:48

## 2024-03-25 RX ADMIN — PANTOPRAZOLE SODIUM 40 MILLIGRAM(S): 20 TABLET, DELAYED RELEASE ORAL at 12:27

## 2024-03-25 RX ADMIN — Medication 5 MILLILITER(S): at 05:31

## 2024-03-25 RX ADMIN — Medication 3 MILLILITER(S): at 21:05

## 2024-03-25 RX ADMIN — Medication 1 DROP(S): at 17:48

## 2024-03-25 RX ADMIN — SODIUM CHLORIDE 4 MILLILITER(S): 9 INJECTION INTRAMUSCULAR; INTRAVENOUS; SUBCUTANEOUS at 13:24

## 2024-03-25 RX ADMIN — Medication 3 MILLILITER(S): at 05:08

## 2024-03-25 RX ADMIN — CHLORHEXIDINE GLUCONATE 15 MILLILITER(S): 213 SOLUTION TOPICAL at 05:31

## 2024-03-25 RX ADMIN — Medication 500000 UNIT(S): at 00:03

## 2024-03-25 RX ADMIN — Medication 500000 UNIT(S): at 12:28

## 2024-03-25 RX ADMIN — Medication 4: at 17:49

## 2024-03-25 RX ADMIN — Medication 5 MILLILITER(S): at 17:48

## 2024-03-25 RX ADMIN — HUMAN INSULIN 7 UNIT(S): 100 INJECTION, SUSPENSION SUBCUTANEOUS at 05:33

## 2024-03-25 RX ADMIN — SENNA PLUS 10 MILLILITER(S): 8.6 TABLET ORAL at 22:43

## 2024-03-25 RX ADMIN — CHLORHEXIDINE GLUCONATE 15 MILLILITER(S): 213 SOLUTION TOPICAL at 17:48

## 2024-03-25 RX ADMIN — Medication 20 MILLIGRAM(S): at 05:31

## 2024-03-25 RX ADMIN — Medication 1 DROP(S): at 05:32

## 2024-03-25 RX ADMIN — Medication 5 MILLILITER(S): at 12:29

## 2024-03-25 RX ADMIN — HUMAN INSULIN 7 UNIT(S): 100 INJECTION, SUSPENSION SUBCUTANEOUS at 17:49

## 2024-03-25 RX ADMIN — Medication 3 MILLILITER(S): at 13:24

## 2024-03-25 RX ADMIN — Medication 1 APPLICATION(S): at 05:32

## 2024-03-25 RX ADMIN — GABAPENTIN 100 MILLIGRAM(S): 400 CAPSULE ORAL at 13:36

## 2024-03-25 RX ADMIN — Medication 20 MILLIGRAM(S): at 13:37

## 2024-03-25 RX ADMIN — Medication 1 DROP(S): at 22:44

## 2024-03-25 RX ADMIN — CLOPIDOGREL BISULFATE 75 MILLIGRAM(S): 75 TABLET, FILM COATED ORAL at 12:27

## 2024-03-25 RX ADMIN — GABAPENTIN 100 MILLIGRAM(S): 400 CAPSULE ORAL at 22:51

## 2024-03-25 RX ADMIN — Medication 1 DROP(S): at 13:36

## 2024-03-25 RX ADMIN — HEPARIN SODIUM 5000 UNIT(S): 5000 INJECTION INTRAVENOUS; SUBCUTANEOUS at 05:32

## 2024-03-25 RX ADMIN — HEPARIN SODIUM 5000 UNIT(S): 5000 INJECTION INTRAVENOUS; SUBCUTANEOUS at 13:37

## 2024-03-25 RX ADMIN — Medication 1 DROP(S): at 09:41

## 2024-03-25 RX ADMIN — HEPARIN SODIUM 5000 UNIT(S): 5000 INJECTION INTRAVENOUS; SUBCUTANEOUS at 22:43

## 2024-03-25 RX ADMIN — Medication 5 MILLILITER(S): at 00:03

## 2024-03-25 RX ADMIN — HUMAN INSULIN 7 UNIT(S): 100 INJECTION, SUSPENSION SUBCUTANEOUS at 00:03

## 2024-03-25 RX ADMIN — Medication 500000 UNIT(S): at 05:32

## 2024-03-25 RX ADMIN — GABAPENTIN 100 MILLIGRAM(S): 400 CAPSULE ORAL at 05:31

## 2024-03-25 RX ADMIN — Medication 1 APPLICATION(S): at 13:37

## 2024-03-25 RX ADMIN — CHLORHEXIDINE GLUCONATE 1 APPLICATION(S): 213 SOLUTION TOPICAL at 22:44

## 2024-03-25 RX ADMIN — Medication 4: at 00:04

## 2024-03-25 RX ADMIN — Medication 81 MILLIGRAM(S): at 12:27

## 2024-03-25 RX ADMIN — SODIUM CHLORIDE 4 MILLILITER(S): 9 INJECTION INTRAMUSCULAR; INTRAVENOUS; SUBCUTANEOUS at 05:08

## 2024-03-25 RX ADMIN — Medication 4: at 05:33

## 2024-03-25 RX ADMIN — SODIUM CHLORIDE 4 MILLILITER(S): 9 INJECTION INTRAMUSCULAR; INTRAVENOUS; SUBCUTANEOUS at 21:06

## 2024-03-25 RX ADMIN — HUMAN INSULIN 7 UNIT(S): 100 INJECTION, SUSPENSION SUBCUTANEOUS at 12:18

## 2024-03-25 NOTE — PROGRESS NOTE ADULT - PROBLEM SELECTOR PLAN 12
- 3/21: Fever workup in progress.  Planning for discharge in upcoming week if no other events. - Planning for discharge this week

## 2024-03-25 NOTE — PROGRESS NOTE ADULT - PROBLEM SELECTOR PLAN 9
- S/p PEG 2/16 ( Dr. Sood)   - Tolerating TFs at goal rate  - Cont Bowel Regimen; Senna and Miralax - S/p PEG 2/16 (Dr. Sood)   - Tolerating TFs at goal rate  - Cont Bowel Regimen; Senna and Miralax

## 2024-03-25 NOTE — PROGRESS NOTE ADULT - PROBLEM SELECTOR PLAN 8
- Patient Anabaptism  - Family would like to be asked prior to administration of blood products   - S/p 1 unit PRBCs on 2/22 and 3/3  - S/p Venofer (2/22-2/27)   - S/p Cyanocobalamine, Folic acid, Ferrous Sulfate  - S/p Epogen x 5 days (Ended 2/27)  - Reported Melena on 3/3   - CT A/P 3/3 negative for acute bleed  - GI did not recommend EGD as no overt signs of active bleeding and responded to PRBCs  - Will use pediatric tubes to limit volume for phlebotomy as patient is Faith   - H&H continues to remain stable, will monitor for melena and signs of bleeding

## 2024-03-25 NOTE — PROGRESS NOTE ADULT - NS ATTEND AMEND GEN_ALL_CORE FT
Agree with above  73M PMH hypertension, DM2, H/O prior CVAs w/o residual deficits initially p/w CVA-like symptoms with imaging c/w basilar and L PCA stenosis, in the setting of recent viral URI. 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 s/p PLEX / IVIg with progressive respiratory failure requiring intubation and failure to wean, now s/p trache / PEG  - Remains trache to vent, currently on PS 10/7, with full vent overnight (12/500/40%/+7)  - Afebrile, continue to monitor off antibiotics.  - OOB to chair when possible, follows commands somewhat.  - Currently on Lasix 20mg BID and FWB 250mg Q8h. Na 146. Will add metolazone 5mg daily for concerted diuresis and D/C FWB once Na normalizes.  - Dispo planning for LTAC

## 2024-03-25 NOTE — PROGRESS NOTE ADULT - ASSESSMENT
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 Prior to admission patient had Viral URI ( 1-2 weeks prior to admission) with subsequent weakness. He underwent an angiogram (2/11) which showed moderate stenosis and no intervention was done. MRI Performed c/w small bilateral cerebellar strokes and prior L thalamic strokes, as per Neurology was not c/w current presentation. 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 He is currently s/p 5 rounds of plasma exchange (2/13-2/20) and IVIG x 5 ( 2/21-2/26)  His hospital course was complicated by progressive respiratory failure. CT Chest performed on 2/20 with atelectasis of b/l lower lobes. BAL 2/21 Grew PSA treated with course of Zosyn (2/21-2/28).  Patient transferred to RCU on 2/24.   RRT called on 3/3 for unresponsiveness. Patient with unreactive pupils, no corneal reflex. Also found to be hypotensive and febrile to 105. Noted to have new melena. Hgb 5. Given 1 unit of pRBC. Transferred to MICU. patient required pressors while in the MICU, BP improved and was transitioned to Droxidopa. Patient had CT C/A/P consistent with Bibasilar pneumonia; Sputum cx grew PSA and was treated with course of Meropenem. Patient evaluated by GI in setting of Melena, transaminitis and portal venous gas on CT imaging. No EGD performed as Melena resolved, Transaminitis was thought to be in setting of shock and portal venous gas was thought to be in setting of recent PEG. EEG was performed in setting of AMS no seizures noted. Repeat MRI Performed which showed Small scattered foci of diffusion restriction within the right cerebellar hemisphere, left parietal lobe, and symmetrically in the bilateral basal ganglia. Neuro recommended Resumption of ASA in setting of possible embolic phenomenon. Repeat ECHO performed RT Ventricular Moderately enlarged, reduced systolic function. Mental status improved and was transferred back to RCU on 3/6.  Cardiology consulted for tachy-dez, unable to catch events on tele, EKG ST otherwise unremarkable, CT Angio PE rule out for RV enlargement.  Continuing to wean pt from ventilator as tolerated.  Continue aggressive airway management with chest PT/suctioning PRN.  Continuing to monitor for neurological improvement.       3/24:  Tolerating PS 5/7.  Neurologically unchanged.   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 Prior to admission patient had Viral URI ( 1-2 weeks prior to admission) with subsequent weakness. He underwent an angiogram (2/11) which showed moderate stenosis and no intervention was done. MRI Performed c/w small bilateral cerebellar strokes and prior L thalamic strokes, as per Neurology was not c/w current presentation. 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 He is currently s/p 5 rounds of plasma exchange (2/13-2/20) and IVIG x 5 ( 2/21-2/26)  His hospital course was complicated by progressive respiratory failure. CT Chest performed on 2/20 with atelectasis of b/l lower lobes. BAL 2/21 Grew PSA treated with course of Zosyn (2/21-2/28).  Patient transferred to RCU on 2/24.   RRT called on 3/3 for unresponsiveness. Patient with unreactive pupils, no corneal reflex. Also found to be hypotensive and febrile to 105. Noted to have new melena. Hgb 5. Given 1 unit of pRBC. Transferred to MICU. patient required pressors while in the MICU, BP improved and was transitioned to Droxidopa. Patient had CT C/A/P consistent with Bibasilar pneumonia; Sputum cx grew PSA and was treated with course of Meropenem. Patient evaluated by GI in setting of Melena, transaminitis and portal venous gas on CT imaging. No EGD performed as Melena resolved, Transaminitis was thought to be in setting of shock and portal venous gas was thought to be in setting of recent PEG. EEG was performed in setting of AMS no seizures noted. Repeat MRI Performed which showed Small scattered foci of diffusion restriction within the right cerebellar hemisphere, left parietal lobe, and symmetrically in the bilateral basal ganglia. Neuro recommended Resumption of ASA in setting of possible embolic phenomenon. Repeat ECHO performed RT Ventricular Moderately enlarged, reduced systolic function. Mental status improved and was transferred back to RCU on 3/6.  Cardiology consulted for tachy-dez, unable to catch events on tele, EKG ST otherwise unremarkable, CT Angio PE rule out for RV enlargement.  Continuing to wean pt from ventilator as tolerated.  Continue aggressive airway management with chest PT/suctioning PRN.  Continuing to monitor for neurological improvement.       3/25:  OOB to chair.  PS 10/7.  Metolazone added for hypernatremia.  Sodium Chloride tabs d/c'd.  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 Prior to admission patient had Viral URI ( 1-2 weeks prior to admission) with subsequent weakness. He underwent an angiogram (2/11) which showed moderate stenosis and no intervention was done. MRI Performed c/w small bilateral cerebellar strokes and prior L thalamic strokes, as per Neurology was not c/w current presentation. 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 He is currently s/p 5 rounds of plasma exchange (2/13-2/20) and IVIG x 5 ( 2/21-2/26)  His hospital course was complicated by progressive respiratory failure. CT Chest performed on 2/20 with atelectasis of b/l lower lobes. BAL 2/21 Grew PSA treated with course of Zosyn (2/21-2/28).  Patient transferred to RCU on 2/24.   RRT called on 3/3 for unresponsiveness. Patient with unreactive pupils, no corneal reflex. Also found to be hypotensive and febrile to 105. Noted to have new melena. Hgb 5. Given 1 unit of pRBC. Transferred to MICU. patient required pressors while in the MICU, BP improved and was transitioned to Droxidopa. Patient had CT C/A/P consistent with Bibasilar pneumonia; Sputum cx grew PSA and was treated with course of Meropenem. Patient evaluated by GI in setting of Melena, transaminitis and portal venous gas on CT imaging. No EGD performed as Melena resolved, Transaminitis was thought to be in setting of shock and portal venous gas was thought to be in setting of recent PEG. EEG was performed in setting of AMS no seizures noted. Repeat MRI Performed which showed Small scattered foci of diffusion restriction within the right cerebellar hemisphere, left parietal lobe, and symmetrically in the bilateral basal ganglia. Neuro recommended Resumption of ASA in setting of possible embolic phenomenon. Repeat ECHO performed RT Ventricular Moderately enlarged, reduced systolic function. Mental status improved and was transferred back to RCU on 3/6.  Cardiology consulted for tachy-dez, unable to catch events on tele, EKG ST otherwise unremarkable, CT Angio PE rule out for RV enlargement.  Continuing to wean pt from ventilator as tolerated.  Continue aggressive airway management with chest PT/suctioning PRN.  Continuing to monitor for neurological improvement.       3/25:  OOB to chair.  PS 10/7.  Hypernatremia, continuing free water, metolazone added - follow up AM BMP - d/c free water when sodium normalizes.  Neurologically unchanged, hemodynamically stable, afebrile.

## 2024-03-25 NOTE — PROGRESS NOTE ADULT - PROBLEM SELECTOR PLAN 10
- IVF 3/8 and 3/20  - free water as ordered 250ml Q8H  - 3/21:  D5W increased to 75ml/hr  - 3/23 Resolved.  IVF discontinued.   - Monitor BMP - IVF 3/8 and 3/20  - free water   - 3/21: D5W increased to 75ml/hr  - 3/23 Resolved.  IVF discontinued  - 3/25 metolazone added  - Monitor BMP

## 2024-03-25 NOTE — PROGRESS NOTE ADULT - PROBLEM SELECTOR PLAN 2
- Patient S/p Tracheostomy # 8 Cuffed Portex on 2/16 ( )  - CTA Chest 3/3: Bibasilar Pneumonia; Currently remains on Meropenem   - Currently remains on Mechanical Ventilation: PRVC 500/12/40%/+7  - Attempts at PS trials improving, continue to progress weaning as tolerated.  Has not tolerated trach collar trials as he becomes bradycardic and hypoxic.   - Continue Chest PT and Suctioning PRN - Patient S/p Tracheostomy # 8 Cuffed Portex on 2/16 ( )  - CTA Chest 3/3: Bibasilar Pneumonia; Currently remains on Meropenem   - Currently remains on Mechanical Ventilation: PRVC 500/12/40%/+7  - Attempts at PS trials improving, continue to progress weaning as tolerated.  Has not tolerated trach collar trials as he becomes bradycardic and hypoxic  - Continue Chest PT and Suctioning PRN

## 2024-03-25 NOTE — PROGRESS NOTE ADULT - PROBLEM SELECTOR PLAN 4
- BAL 2/21: PSA; Txd with Zosyn ( 2/21-2/28)  - Sputum 3/4: PSA , Blood cxs 3/3: NGTD  - Txd with course of Meropenem 3/3 --> 3/11  - 3/20 tmax 100.7 - pancultured, chest xray clear, UA negative.  Blood cultures -NGTD  Sputum- NGTD  - 3/21 Monitoring off antibiotics - BAL 2/21: PSA; Txd with Zosyn ( 2/21-2/28)  - Sputum 3/4: PSA , Blood cxs 3/3: NGTD  - Txd with course of Meropenem 3/3 --> 3/11  - 3/20 tmax 100.7 - pancultured, chest xray clear, UA negative, Blood cultures -NGTD,  Sputum- NGTD  - 3/21 Monitoring off antibiotics

## 2024-03-26 LAB
ANION GAP SERPL CALC-SCNC: 11 MMOL/L — SIGNIFICANT CHANGE UP (ref 5–17)
APPEARANCE UR: ABNORMAL
APPEARANCE UR: CLEAR — SIGNIFICANT CHANGE UP
BACTERIA # UR AUTO: ABNORMAL /HPF
BILIRUB UR-MCNC: NEGATIVE — SIGNIFICANT CHANGE UP
BILIRUB UR-MCNC: NEGATIVE — SIGNIFICANT CHANGE UP
BUN SERPL-MCNC: 57 MG/DL — HIGH (ref 7–23)
CALCIUM SERPL-MCNC: 8.7 MG/DL — SIGNIFICANT CHANGE UP (ref 8.4–10.5)
CAST: 0 /LPF — SIGNIFICANT CHANGE UP (ref 0–4)
CHLORIDE SERPL-SCNC: 106 MMOL/L — SIGNIFICANT CHANGE UP (ref 96–108)
CO2 SERPL-SCNC: 34 MMOL/L — HIGH (ref 22–31)
COLOR SPEC: YELLOW — SIGNIFICANT CHANGE UP
COLOR SPEC: YELLOW — SIGNIFICANT CHANGE UP
CREAT SERPL-MCNC: 0.66 MG/DL — SIGNIFICANT CHANGE UP (ref 0.5–1.3)
DIFF PNL FLD: NEGATIVE — SIGNIFICANT CHANGE UP
DIFF PNL FLD: NEGATIVE — SIGNIFICANT CHANGE UP
EGFR: 99 ML/MIN/1.73M2 — SIGNIFICANT CHANGE UP
GLUCOSE BLDC GLUCOMTR-MCNC: 211 MG/DL — HIGH (ref 70–99)
GLUCOSE BLDC GLUCOMTR-MCNC: 226 MG/DL — HIGH (ref 70–99)
GLUCOSE BLDC GLUCOMTR-MCNC: 251 MG/DL — HIGH (ref 70–99)
GLUCOSE BLDC GLUCOMTR-MCNC: 255 MG/DL — HIGH (ref 70–99)
GLUCOSE BLDC GLUCOMTR-MCNC: 278 MG/DL — HIGH (ref 70–99)
GLUCOSE SERPL-MCNC: 190 MG/DL — HIGH (ref 70–99)
GLUCOSE UR QL: NEGATIVE MG/DL — SIGNIFICANT CHANGE UP
GLUCOSE UR QL: NEGATIVE MG/DL — SIGNIFICANT CHANGE UP
GRAM STN FLD: ABNORMAL
HCT VFR BLD CALC: 26.2 % — LOW (ref 39–50)
HGB BLD-MCNC: 7.1 G/DL — LOW (ref 13–17)
KETONES UR-MCNC: NEGATIVE MG/DL — SIGNIFICANT CHANGE UP
KETONES UR-MCNC: NEGATIVE MG/DL — SIGNIFICANT CHANGE UP
LEUKOCYTE ESTERASE UR-ACNC: NEGATIVE — SIGNIFICANT CHANGE UP
LEUKOCYTE ESTERASE UR-ACNC: NEGATIVE — SIGNIFICANT CHANGE UP
MAGNESIUM SERPL-MCNC: 2.9 MG/DL — HIGH (ref 1.6–2.6)
MCHC RBC-ENTMCNC: 23.1 PG — LOW (ref 27–34)
MCHC RBC-ENTMCNC: 27.1 GM/DL — LOW (ref 32–36)
MCV RBC AUTO: 85.3 FL — SIGNIFICANT CHANGE UP (ref 80–100)
NITRITE UR-MCNC: NEGATIVE — SIGNIFICANT CHANGE UP
NITRITE UR-MCNC: NEGATIVE — SIGNIFICANT CHANGE UP
NRBC # BLD: 1 /100 WBCS — HIGH (ref 0–0)
PH UR: 5 — SIGNIFICANT CHANGE UP (ref 5–8)
PH UR: >=9 (ref 5–8)
PHOSPHATE SERPL-MCNC: 4.2 MG/DL — SIGNIFICANT CHANGE UP (ref 2.5–4.5)
PLATELET # BLD AUTO: 488 K/UL — HIGH (ref 150–400)
POTASSIUM SERPL-MCNC: 4.3 MMOL/L — SIGNIFICANT CHANGE UP (ref 3.5–5.3)
POTASSIUM SERPL-SCNC: 4.3 MMOL/L — SIGNIFICANT CHANGE UP (ref 3.5–5.3)
PROT UR-MCNC: SIGNIFICANT CHANGE UP MG/DL
PROT UR-MCNC: SIGNIFICANT CHANGE UP MG/DL
RBC # BLD: 3.07 M/UL — LOW (ref 4.2–5.8)
RBC # FLD: 20.6 % — HIGH (ref 10.3–14.5)
RBC CASTS # UR COMP ASSIST: 1 /HPF — SIGNIFICANT CHANGE UP (ref 0–4)
REVIEW: SIGNIFICANT CHANGE UP
SODIUM SERPL-SCNC: 151 MMOL/L — HIGH (ref 135–145)
SP GR SPEC: 1.01 — SIGNIFICANT CHANGE UP (ref 1–1.03)
SP GR SPEC: 1.02 — SIGNIFICANT CHANGE UP (ref 1–1.03)
SPECIMEN SOURCE: SIGNIFICANT CHANGE UP
SQUAMOUS # UR AUTO: 1 /HPF — SIGNIFICANT CHANGE UP (ref 0–5)
TRI-PHOS CRY UR QL COMP ASSIST: PRESENT
UROBILINOGEN FLD QL: 0.2 MG/DL — SIGNIFICANT CHANGE UP (ref 0.2–1)
UROBILINOGEN FLD QL: 0.2 MG/DL — SIGNIFICANT CHANGE UP (ref 0.2–1)
WBC # BLD: 11.31 K/UL — HIGH (ref 3.8–10.5)
WBC # FLD AUTO: 11.31 K/UL — HIGH (ref 3.8–10.5)
WBC UR QL: 2 /HPF — SIGNIFICANT CHANGE UP (ref 0–5)

## 2024-03-26 PROCEDURE — 99232 SBSQ HOSP IP/OBS MODERATE 35: CPT

## 2024-03-26 RX ORDER — ACETAMINOPHEN 500 MG
1000 TABLET ORAL ONCE
Refills: 0 | Status: COMPLETED | OUTPATIENT
Start: 2024-03-26 | End: 2024-03-26

## 2024-03-26 RX ORDER — SODIUM CHLORIDE 9 MG/ML
1000 INJECTION, SOLUTION INTRAVENOUS
Refills: 0 | Status: DISCONTINUED | OUTPATIENT
Start: 2024-03-26 | End: 2024-03-27

## 2024-03-26 RX ADMIN — Medication 6: at 23:16

## 2024-03-26 RX ADMIN — Medication 1 DROP(S): at 01:49

## 2024-03-26 RX ADMIN — Medication 3 MILLILITER(S): at 05:31

## 2024-03-26 RX ADMIN — HEPARIN SODIUM 5000 UNIT(S): 5000 INJECTION INTRAVENOUS; SUBCUTANEOUS at 14:17

## 2024-03-26 RX ADMIN — CLOPIDOGREL BISULFATE 75 MILLIGRAM(S): 75 TABLET, FILM COATED ORAL at 12:20

## 2024-03-26 RX ADMIN — CHLORHEXIDINE GLUCONATE 15 MILLILITER(S): 213 SOLUTION TOPICAL at 05:41

## 2024-03-26 RX ADMIN — Medication 500000 UNIT(S): at 12:20

## 2024-03-26 RX ADMIN — Medication 5 MILLILITER(S): at 17:56

## 2024-03-26 RX ADMIN — Medication 1 APPLICATION(S): at 21:27

## 2024-03-26 RX ADMIN — Medication 1 DROP(S): at 09:58

## 2024-03-26 RX ADMIN — Medication 1 DROP(S): at 05:41

## 2024-03-26 RX ADMIN — HEPARIN SODIUM 5000 UNIT(S): 5000 INJECTION INTRAVENOUS; SUBCUTANEOUS at 05:39

## 2024-03-26 RX ADMIN — Medication 20 MILLIGRAM(S): at 14:17

## 2024-03-26 RX ADMIN — Medication 3 MILLILITER(S): at 16:35

## 2024-03-26 RX ADMIN — SODIUM CHLORIDE 4 MILLILITER(S): 9 INJECTION INTRAMUSCULAR; INTRAVENOUS; SUBCUTANEOUS at 16:35

## 2024-03-26 RX ADMIN — SENNA PLUS 10 MILLILITER(S): 8.6 TABLET ORAL at 21:28

## 2024-03-26 RX ADMIN — CHLORHEXIDINE GLUCONATE 1 APPLICATION(S): 213 SOLUTION TOPICAL at 21:27

## 2024-03-26 RX ADMIN — Medication 500000 UNIT(S): at 23:17

## 2024-03-26 RX ADMIN — Medication 500000 UNIT(S): at 05:39

## 2024-03-26 RX ADMIN — Medication 5 MILLILITER(S): at 12:20

## 2024-03-26 RX ADMIN — Medication 1 APPLICATION(S): at 05:42

## 2024-03-26 RX ADMIN — GABAPENTIN 100 MILLIGRAM(S): 400 CAPSULE ORAL at 14:17

## 2024-03-26 RX ADMIN — GABAPENTIN 100 MILLIGRAM(S): 400 CAPSULE ORAL at 21:26

## 2024-03-26 RX ADMIN — GABAPENTIN 100 MILLIGRAM(S): 400 CAPSULE ORAL at 05:40

## 2024-03-26 RX ADMIN — Medication 6: at 00:39

## 2024-03-26 RX ADMIN — SODIUM CHLORIDE 4 MILLILITER(S): 9 INJECTION INTRAMUSCULAR; INTRAVENOUS; SUBCUTANEOUS at 05:31

## 2024-03-26 RX ADMIN — Medication 500000 UNIT(S): at 00:38

## 2024-03-26 RX ADMIN — Medication 400 MILLIGRAM(S): at 05:39

## 2024-03-26 RX ADMIN — SODIUM CHLORIDE 4 MILLILITER(S): 9 INJECTION INTRAMUSCULAR; INTRAVENOUS; SUBCUTANEOUS at 21:10

## 2024-03-26 RX ADMIN — HUMAN INSULIN 7 UNIT(S): 100 INJECTION, SUSPENSION SUBCUTANEOUS at 12:24

## 2024-03-26 RX ADMIN — Medication 81 MILLIGRAM(S): at 12:20

## 2024-03-26 RX ADMIN — Medication 5 MILLILITER(S): at 05:43

## 2024-03-26 RX ADMIN — Medication 1 DROP(S): at 21:28

## 2024-03-26 RX ADMIN — Medication 1 DROP(S): at 17:56

## 2024-03-26 RX ADMIN — Medication 1 APPLICATION(S): at 14:18

## 2024-03-26 RX ADMIN — Medication 5 MILLILITER(S): at 00:37

## 2024-03-26 RX ADMIN — CHLORHEXIDINE GLUCONATE 15 MILLILITER(S): 213 SOLUTION TOPICAL at 17:56

## 2024-03-26 RX ADMIN — PANTOPRAZOLE SODIUM 40 MILLIGRAM(S): 20 TABLET, DELAYED RELEASE ORAL at 12:20

## 2024-03-26 RX ADMIN — HUMAN INSULIN 7 UNIT(S): 100 INJECTION, SUSPENSION SUBCUTANEOUS at 23:16

## 2024-03-26 RX ADMIN — Medication 500000 UNIT(S): at 17:56

## 2024-03-26 RX ADMIN — Medication 20 MILLIGRAM(S): at 05:39

## 2024-03-26 RX ADMIN — HUMAN INSULIN 7 UNIT(S): 100 INJECTION, SUSPENSION SUBCUTANEOUS at 05:54

## 2024-03-26 RX ADMIN — Medication 1 DROP(S): at 14:17

## 2024-03-26 RX ADMIN — Medication 4: at 12:25

## 2024-03-26 RX ADMIN — HUMAN INSULIN 7 UNIT(S): 100 INJECTION, SUSPENSION SUBCUTANEOUS at 17:56

## 2024-03-26 RX ADMIN — HEPARIN SODIUM 5000 UNIT(S): 5000 INJECTION INTRAVENOUS; SUBCUTANEOUS at 21:27

## 2024-03-26 RX ADMIN — SODIUM CHLORIDE 100 MILLILITER(S): 9 INJECTION, SOLUTION INTRAVENOUS at 12:39

## 2024-03-26 RX ADMIN — Medication 3 MILLILITER(S): at 21:10

## 2024-03-26 RX ADMIN — Medication 5 MILLILITER(S): at 23:18

## 2024-03-26 RX ADMIN — Medication 6: at 17:57

## 2024-03-26 RX ADMIN — HUMAN INSULIN 7 UNIT(S): 100 INJECTION, SUSPENSION SUBCUTANEOUS at 00:37

## 2024-03-26 NOTE — PROGRESS NOTE ADULT - ASSESSMENT
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 Prior to admission patient had Viral URI ( 1-2 weeks prior to admission) with subsequent weakness. He underwent an angiogram (2/11) which showed moderate stenosis and no intervention was done. MRI Performed c/w small bilateral cerebellar strokes and prior L thalamic strokes, as per Neurology was not c/w current presentation. 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 He is currently s/p 5 rounds of plasma exchange (2/13-2/20) and IVIG x 5 ( 2/21-2/26)  His hospital course was complicated by progressive respiratory failure. CT Chest performed on 2/20 with atelectasis of b/l lower lobes. BAL 2/21 Grew PSA treated with course of Zosyn (2/21-2/28).  Patient transferred to RCU on 2/24.   RRT called on 3/3 for unresponsiveness. Patient with unreactive pupils, no corneal reflex. Also found to be hypotensive and febrile to 105. Noted to have new melena. Hgb 5. Given 1 unit of pRBC. Transferred to MICU. patient required pressors while in the MICU, BP improved and was transitioned to Droxidopa. Patient had CT C/A/P consistent with Bibasilar pneumonia; Sputum cx grew PSA and was treated with course of Meropenem. Patient evaluated by GI in setting of Melena, transaminitis and portal venous gas on CT imaging. No EGD performed as Melena resolved, Transaminitis was thought to be in setting of shock and portal venous gas was thought to be in setting of recent PEG. EEG was performed in setting of AMS no seizures noted. Repeat MRI Performed which showed Small scattered foci of diffusion restriction within the right cerebellar hemisphere, left parietal lobe, and symmetrically in the bilateral basal ganglia. Neuro recommended Resumption of ASA in setting of possible embolic phenomenon. Repeat ECHO performed RT Ventricular Moderately enlarged, reduced systolic function. Mental status improved and was transferred back to RCU on 3/6.  Cardiology consulted for tachy-dez, unable to catch events on tele, EKG ST otherwise unremarkable, CT Angio PE rule out for RV enlargement.  Continuing to wean pt from ventilator as tolerated.  Continue aggressive airway management with chest PT/suctioning PRN.  Continuing to monitor for neurological improvement.       3/25:  OOB to chair.  PS 10/7.  Hypernatremia, continuing free water, metolazone added - follow up AM BMP - d/c free water when sodium normalizes.  Neurologically unchanged, hemodynamically stable, afebrile. 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 Prior to admission patient had Viral URI ( 1-2 weeks prior to admission) with subsequent weakness. He underwent an angiogram (2/11) which showed moderate stenosis and no intervention was done. MRI Performed c/w small bilateral cerebellar strokes and prior L thalamic strokes, as per Neurology was not c/w current presentation. 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 He is currently s/p 5 rounds of plasma exchange (2/13-2/20) and IVIG x 5 ( 2/21-2/26)  His hospital course was complicated by progressive respiratory failure. CT Chest performed on 2/20 with atelectasis of b/l lower lobes. BAL 2/21 Grew PSA treated with course of Zosyn (2/21-2/28).  Patient transferred to RCU on 2/24.   RRT called on 3/3 for unresponsiveness. Patient with unreactive pupils, no corneal reflex. Also found to be hypotensive and febrile to 105. Noted to have new melena. Hgb 5. Given 1 unit of pRBC. Transferred to MICU. patient required pressors while in the MICU, BP improved and was transitioned to Droxidopa. Patient had CT C/A/P consistent with Bibasilar pneumonia; Sputum cx grew PSA and was treated with course of Meropenem. Patient evaluated by GI in setting of Melena, transaminitis and portal venous gas on CT imaging. No EGD performed as Melena resolved, Transaminitis was thought to be in setting of shock and portal venous gas was thought to be in setting of recent PEG. EEG was performed in setting of AMS no seizures noted. Repeat MRI Performed which showed Small scattered foci of diffusion restriction within the right cerebellar hemisphere, left parietal lobe, and symmetrically in the bilateral basal ganglia. Neuro recommended Resumption of ASA in setting of possible embolic phenomenon. Repeat ECHO performed RT Ventricular Moderately enlarged, reduced systolic function. Mental status improved and was transferred back to RCU on 3/6.  Cardiology consulted for tachy-dez, unable to catch events on tele, EKG ST otherwise unremarkable, CT Angio PE rule out for RV enlargement.  Continuing to wean pt from ventilator as tolerated.  Continue aggressive airway management with chest PT/suctioning PRN.  Continuing to monitor for neurological improvement.       3/25:  OOB to chair.  PS 10/7.  Hypernatremia, continuing free water and Lasix/metolazone - d/c free water when sodium normalizes.  Neurologically unchanged, f/u cultures from 101 fever 3/26

## 2024-03-26 NOTE — PROGRESS NOTE ADULT - PROBLEM SELECTOR PLAN 8
- Patient [employer]  - Family would like to be asked prior to administration of blood products   - S/p 1 unit PRBCs on 2/22 and 3/3  - S/p Venofer (2/22-2/27)   - S/p Cyanocobalamine, Folic acid, Ferrous Sulfate  - S/p Epogen x 5 days (Ended 2/27)  - Reported Melena on 3/3   - CT A/P 3/3 negative for acute bleed  - GI did not recommend EGD as no overt signs of active bleeding and responded to PRBCs  - Will use pediatric tubes to limit volume for phlebotomy as patient is Islam   - H&H continues to remain stable, will monitor for melena and signs of bleeding

## 2024-03-26 NOTE — PROGRESS NOTE ADULT - PROBLEM SELECTOR PLAN 6
- MRI 2/12 - small b/l cerebellar strokes (post-procedural) and prior L thalamic strokes  - MRI 3/5: Small scattered foci of diffusion restriction within the right cerebellar hemisphere, left parietal lobe, and symmetrically in the bilateral basal ganglia.  - Lipitor d/c'd in setting of liver injury 3/3  - SAMMPRIS trial; Cont ASA/Plavix x3 months then ASA monotherapy  - plavix briefly held in setting of anemia and restarted 3/12 - continue w/ ASA/plavix  - Neurology following - MRI 2/12 - small b/l cerebellar strokes (post-procedural) and prior L thalamic strokes  - MRI 3/5: Small scattered foci of diffusion restriction within the right cerebellar hemisphere, left parietal lobe, and symmetrically in the bilateral basal ganglia.  - Lipitor d/c'd in setting of liver injury 3/3  - SAMMPRIS trial; Cont ASA/Plavix x3 months then ASA monotherapy  - Plavix briefly held in setting of anemia and restarted 3/12 - continue w/ ASA/Plavix  - Neurology following

## 2024-03-26 NOTE — PROGRESS NOTE ADULT - NS ATTEND AMEND GEN_ALL_CORE FT
Agree with above  73M PMH hypertension, DM2, H/O prior CVAs w/o residual deficits initially p/w CVA-like symptoms with imaging c/w basilar and L PCA stenosis, in the setting of recent viral URI. 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 s/p PLEX / IVIg with progressive respiratory failure requiring intubation and failure to wean, now s/p trache / PEG  - Remains trache to vent, currently on PS 10/7 @40%, with full vent overnight (12/500/40%/+7)  - Spiked fever, pan-cultured. F/U results. Monitor off antibiotics  - Na rising, free water deficit 4L, Increase free water to 250Q6, and give 1L D5W over 10 hours and c/w metolazone and Lasix   - Dispo planning for LTAC.

## 2024-03-26 NOTE — PROGRESS NOTE ADULT - PROBLEM SELECTOR PLAN 2
- Patient S/p Tracheostomy # 8 Cuffed Portex on 2/16 ( )  - CTA Chest 3/3: Bibasilar Pneumonia; Currently remains on Meropenem   - Currently remains on Mechanical Ventilation: PRVC 500/12/40%/+7  - Attempts at PS trials improving, continue to progress weaning as tolerated.  Has not tolerated trach collar trials as he becomes bradycardic and hypoxic  - Continue Chest PT and Suctioning PRN

## 2024-03-26 NOTE — CHART NOTE - NSCHARTNOTEFT_GEN_A_CORE
Nutrition Follow Up Note  Patient seen for: Nutrition follow up.    Chart reviewed and events noted.     Source: [] Patient       [x] Medical Record        [] RN        [] Family at bedside       [] Other:    -If unable to interview patient: [x] Trach/Vent/BiPAP  [] Disoriented/confused/inappropriate to interview    Diet Order:   Diet, NPO:   Tube Feeding Modality: Gastrostomy  Glucerna 1.5 David ([id number])  Total Volume for 24 Hours (mL): 1560  Continuous  Starting Tube Feed Rate {mL per Hour}: 65  Until Goal Tube Feed Rate (mL per Hour): 65  Tube Feed Duration (in Hours): 24  Tube Feed Start Time: 06:00 (24)  Free water 250 mL every 8 hours    Enteral Order Provides: total volume 1560 mL, 2340 kcals, 129 gm protein, and 1184 mL free water. Meets 23 kcals/kG and ~1.3 gm protein/kG based on dosing wt 101.5 kG.   Current Rate: 65 mL/hr    - Is current order appropriate/adequate? [x] Yes    Nutrition-related concerns:  - S/p tracheostomy and PEG (2-16)  - Hx of diabetes; Ordered for NPH and sliding scale of Admelog   - Hypernatremia; ordered for free water flushes   - Guillain-Railroad syndrome s/p PLEX and IVIG  - Trach to vent; Attempts at PS trials improving, weaning as tolerated.    GI: No known GI distress. Last BM 3/25.  Bowel Regimen? [x] Yes  [] No  -> Ordered for pantoprazole     Weights:   RD obtained wt: 102.8 (-26)  Daily Weight in k.3 (-20), 101.8 (-28), 101.7 (-)   RD obtained wt more consistent with wts from February. Wt fluctuations likely as pt bring diuresed   RD will continue to trend as new wts available/able.     Drug Dosing Weight  Height (cm): 188 (10 Feb 2024 13:08)  Weight (kg): 101.5 (2024 20:08)  BMI (kg/m2): 28.7 (10 Feb 2024 13:08)    Nutritionally Pertinent   MEDICATIONS  (STANDING):  aspirin  chewable 81 milliGRAM(s) Oral daily  clopidogrel Tablet 75 milliGRAM(s) Enteral Tube daily  furosemide   Injectable 20 milliGRAM(s) IV Push two times a day  gabapentin Solution 100 milliGRAM(s) Enteral Tube every 8 hours  heparin   Injectable 5000 Unit(s) SubCutaneous every 8 hours  insulin lispro (ADMELOG) corrective regimen sliding scale   SubCutaneous every 6 hours  insulin NPH human recombinant 7 Unit(s) SubCutaneous every 6 hours  metolazone 5 milliGRAM(s) Oral daily  nystatin    Suspension 923185 Unit(s) Oral every 6 hours  pantoprazole   Suspension 40 milliGRAM(s) Oral daily  senna Syrup 10 milliLiter(s) Oral at bedtime    Pertinent Labs:  @ 07:19: Na 151<H>, BUN 57<H>, Cr 0.66, <H>, K+ 4.3, Phos 4.2, Mg 2.9<H>    A1C with Estimated Average Glucose Result: 6.8 % (02-10-24 @ 01:43)    Finger Sticks:  POCT Blood Glucose.: 211 mg/dL ( @ 05:44)  POCT Blood Glucose.: 255 mg/dL ( @ 00:04)  POCT Blood Glucose.: 210 mg/dL ( @ 17:30)  POCT Blood Glucose.: 218 mg/dL ( @ 11:24)    Skin per nursing documentation: No pressure injuries noted.  Edema per nursing documentation: 1+ left arm; right arm  2+ left leg; right leg    Based on dosing wt 101.5 kg with consideration for Guillain-Railroad syndrome, trach to vent, malnutrition   Estimated Energy Needs: (20-25 kcal/kg): 1353-8573.5 kcals  Estimated Protein Needs: (1.1-1.3 g protein/kg): 112-132 g protein   Estimated Fluid Needs: deferred to team  [facility] Equation: 2108 kcals (3/26)    Previous Nutrition Diagnosis: Acute Moderate Protein Calorie Malnutrition  Nutrition Diagnosis is: [x] ongoing  [] resolved [] not applicable     Nutrition Care Plan:  [x] In Progress  [] Achieved  [] Not applicable    New Nutrition Diagnosis: [x] Not applicable    Nutrition Interventions:     Education Provided   [] Yes:  [x] No: Not applicable    Recommendations:      1) Glucerna 1.5 at 60 mL/hr x 24 hrs to provide total volume 1440 mL, 2160 kcals, 119 gm protein, and 1093 mL free water. Meets 21 kcals/kG and ~1.2 gm protein/kG based on dosing wt 101.5 kG.   -> Fluid needs deferred to provider.     Monitoring and Evaluation:   Continue to monitor nutritional intake, tolerance to diet prescription, weights, labs, skin integrity    RD remains available upon request and will follow up per protocol  Yaneth Smith, MS, RD, CDN, CNSC, [facility] TEAMS

## 2024-03-26 NOTE — PROGRESS NOTE ADULT - SUBJECTIVE AND OBJECTIVE BOX
Patient is a 73y old  Male who presents with a chief complaint of Stroke, critical stenosis, evaluation for angiography (25 Mar 2024 07:52)    HPI:  73 years old RH male with h/o HTN, HLD, DM, h/o CVA x 2 (no residual deficits) initially presented to [facility] ED with unsteady gait that began on 2/7 (exact LKW unknown) and L facial droop that began 6 AM on 2/9 (went to bed 11 PM on 2/8 without facial droop). No slurred speech, vision changes. Initial NIHSS 0 at [facility]. Hemodynamically stable, CT head with no acute pathology. Chronic left thalamic lacunar infarct. CTA with no large vessel occlusion. 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 CT perfusion with no acute abnormality.   Pt transfered from [facility] to [facility] due to concern for neurologic deterioration i/s/o aforementioned high grade proximal basiliar and L PCA stenosis. On arrival to [facility] ED, initial NIHSS 4 (+1 for LLE drift, +2 for b/l limb dysmetria, +1 for mild dysarthria). Later, pt started to have difficulty clearing secretions, became stridorous, and started to desaturate, so decision was made to intubate patient. Once pt medically stabilized, taken to angio suite by IR. Unable to obtain further history from patient at [facility] due to intubation/sedation (09 Feb 2024 20:27)    Interval Events:    REVIEW OF SYSTEMS:  [ ] Positive  [ ] All other systems negative  [ ] Unable to assess ROS because ________    Vital Signs Last 24 Hrs  T(C): 38.1 (03-25-24 @ 22:00), Max: 38.3 (03-25-24 @ 18:00)  T(F): 100.6 (03-25-24 @ 22:00), Max: 101 (03-25-24 @ 18:00)  HR: 102 (03-26-24 @ 05:51) (100 - 109)  BP: 110/67 (03-26-24 @ 04:08) (110/67 - 131/70)  RR: 19 (03-26-24 @ 04:08) (18 - 19)  SpO2: 100% (03-26-24 @ 05:51) (98% - 100%)    PHYSICAL EXAM:  HEENT:   [ ]Tracheostomy:  [ ]Pupils equal  [ ]No oral lesions  [ ]Abnormal    SKIN  [ ] No Rash  [ ] Abnormal  [ ] pressure    CARDIAC  [ ]Regular  [ ]Abnormal    PULMONARY  [ ]Bilateral Clear Breath Sounds  [ ]Normal Excursion  [ ]Abnormal    GI  [ ]PEG      [ ] +BS		              [ ]Soft, nondistended, nontender	  [ ]Abnormal    MUSCULOSKELETAL                                   [ ]Bedbound                 [ ]Abnormal    [ ]Ambulatory/OOB to chair                           EXTREMITIES                                         [ ]Normal  [ ]Edema                           NEUROLOGIC  [ ] Normal, non focal  [ ] Focal findings:    PSYCHIATRIC  [ ]Alert and appropriate  [ ] Sedated	 [ ]Agitated    :  [place]: [ ] Yes, if yes: Date of Placement:                   [  ] No    LINES: Central Lines [ ] Yes, if yes: Date of Placement                                     [  ] No    HOSPITAL MEDICATIONS:  MEDICATIONS  (STANDING):  albuterol/ipratropium for Nebulization 3 milliLiter(s) Nebulizer every 8 hours  artificial tears (preservative free) Ophthalmic Solution 1 Drop(s) Both EYES every 4 hours  aspirin  chewable 81 milliGRAM(s) Oral daily  Biotene Dry Mouth Oral Rinse 5 milliLiter(s) Swish and Spit every 6 hours  chlorhexidine 0.12% Liquid 15 milliLiter(s) Oral Mucosa every 12 hours  chlorhexidine 4% Liquid 1 Application(s) Topical daily  clopidogrel Tablet 75 milliGRAM(s) Enteral Tube daily  furosemide   Injectable 20 milliGRAM(s) IV Push two times a day  gabapentin Solution 100 milliGRAM(s) Enteral Tube every 8 hours  heparin   Injectable 5000 Unit(s) SubCutaneous every 8 hours  insulin lispro (ADMELOG) corrective regimen sliding scale   SubCutaneous every 6 hours  insulin NPH human recombinant 7 Unit(s) SubCutaneous every 6 hours  metolazone 5 milliGRAM(s) Oral daily  nystatin    Suspension 935526 Unit(s) Oral every 6 hours  pantoprazole   Suspension 40 milliGRAM(s) Oral daily  petrolatum Ophthalmic Ointment 1 Application(s) Both EYES every 8 hours  senna Syrup 10 milliLiter(s) Oral at bedtime  sodium chloride 3%  Inhalation 4 milliLiter(s) Inhalation every 8 hours    MEDICATIONS  (PRN):  acetaminophen   Oral Liquid .. 1000 milliGRAM(s) Enteral Tube every 6 hours PRN Temp greater or equal to 38.5C (101.3F), Mild Pain (1 - 3)      LABS:                        7.1    11.31 )-----------( 488      ( 26 Mar 2024 07:19 )             26.2     03-26    151<H>  |  106  |  57<H>  ----------------------------<  190<H>  4.3   |  34<H>  |  0.66    Ca    8.7      26 Mar 2024 07:19  Phos  4.2     03-26  Mg     2.9     03-26      Mode: AC/ CMV (Assist Control/ Continuous Mandatory Ventilation)  RR (machine): 12  TV (machine): 500  FiO2: 40  PEEP: 7  ITime: 1  MAP: 12  PIP: 22   Patient is a 73y old  Male who presents with a chief complaint of Stroke, critical stenosis, evaluation for angiography (25 Mar 2024 07:52)    HPI:  73 years old RH male with h/o HTN, HLD, DM, h/o CVA x 2 (no residual deficits) initially presented to [facility] ED with unsteady gait that began on 2/7 (exact LKW unknown) and L facial droop that began 6 AM on 2/9 (went to bed 11 PM on 2/8 without facial droop). No slurred speech, vision changes. Initial NIHSS 0 at [facility]. Hemodynamically stable, CT head with no acute pathology. Chronic left thalamic lacunar infarct. CTA with no large vessel occlusion. 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 CT perfusion with no acute abnormality.   Pt transferred from [facility] to [facility] due to concern for neurologic deterioration i/s/o aforementioned high grade proximal basilar and L PCA stenosis. On arrival to [facility] ED, initial NIHSS 4 (+1 for LLE drift, +2 for b/l limb dysmetria, +1 for mild dysarthria). Later, pt started to have difficulty clearing secretions, became stridorous, and started to desaturate, so decision was made to intubate patient. Once pt medically stabilized, taken to angio suite by IR. Unable to obtain further history from patient at [facility] due to intubation/sedation (09 Feb 2024 20:27)    Interval Events:    REVIEW OF SYSTEMS:  [ ] Positive  [ ] All other systems negative  [x ] Unable to assess ROS because patient NON-verbal    Vital Signs Last 24 Hrs  T(C): 38.1 (03-25-24 @ 22:00), Max: 38.3 (03-25-24 @ 18:00)  T(F): 100.6 (03-25-24 @ 22:00), Max: 101 (03-25-24 @ 18:00)  HR: 102 (03-26-24 @ 05:51) (100 - 109)  BP: 110/67 (03-26-24 @ 04:08) (110/67 - 131/70)  RR: 19 (03-26-24 @ 04:08) (18 - 19)  SpO2: 100% (03-26-24 @ 05:51) (98% - 100%)    PHYSICAL EXAM:  HEENT:   [X]Tracheostomy: #8 cuffed portex  [X]Pupils equal  [ ]No oral lesions  [ ]Abnormal    SKIN  [X]No Rash  [ ] Abnormal  [ ] pressure    CARDIAC  [X]Regular  [ ]Abnormal    PULMONARY  [X]Bilateral Clear Breath Sounds  [ ]Normal Excursion  [ ]Abnormal    GI  [X]PEG      [X] +BS		              [X]Soft, nondistended, nontender	  [ ]Abnormal    MUSCULOSKELETAL                                   [X]Bedbound                 [ ]Abnormal    [ ]Ambulatory/OOB to chair                           EXTREMITIES                                         [ ]Normal  [X]Edema - BL UE edema                         NEUROLOGIC  [ ] Normal, non focal  [X] Focal findings: opens eye spontaneously and to voice, follows commands x4 extremities    PSYCHIATRIC  [X]Alert and appropriate  [ ] Sedated	 [ ]Agitated    :  [place]: [ ] Yes, if yes: Date of Placement:                   [X] No    LINES: Central Lines [ ] Yes, if yes: Date of Placement                                     [X] No    HOSPITAL MEDICATIONS:  MEDICATIONS  (STANDING):  albuterol/ipratropium for Nebulization 3 milliLiter(s) Nebulizer every 8 hours  artificial tears (preservative free) Ophthalmic Solution 1 Drop(s) Both EYES every 4 hours  aspirin  chewable 81 milliGRAM(s) Oral daily  Biotene Dry Mouth Oral Rinse 5 milliLiter(s) Swish and Spit every 6 hours  chlorhexidine 0.12% Liquid 15 milliLiter(s) Oral Mucosa every 12 hours  chlorhexidine 4% Liquid 1 Application(s) Topical daily  clopidogrel Tablet 75 milliGRAM(s) Enteral Tube daily  furosemide   Injectable 20 milliGRAM(s) IV Push two times a day  gabapentin Solution 100 milliGRAM(s) Enteral Tube every 8 hours  heparin   Injectable 5000 Unit(s) SubCutaneous every 8 hours  insulin lispro (ADMELOG) corrective regimen sliding scale   SubCutaneous every 6 hours  insulin NPH human recombinant 7 Unit(s) SubCutaneous every 6 hours  metolazone 5 milliGRAM(s) Oral daily  nystatin    Suspension 945349 Unit(s) Oral every 6 hours  pantoprazole   Suspension 40 milliGRAM(s) Oral daily  petrolatum Ophthalmic Ointment 1 Application(s) Both EYES every 8 hours  senna Syrup 10 milliLiter(s) Oral at bedtime  sodium chloride 3%  Inhalation 4 milliLiter(s) Inhalation every 8 hours    MEDICATIONS  (PRN):  acetaminophen   Oral Liquid .. 1000 milliGRAM(s) Enteral Tube every 6 hours PRN Temp greater or equal to 38.5C (101.3F), Mild Pain (1 - 3)      LABS:                        7.1    11.31 )-----------( 488      ( 26 Mar 2024 07:19 )             26.2     03-26    151<H>  |  106  |  57<H>  ----------------------------<  190<H>  4.3   |  34<H>  |  0.66    Ca    8.7      26 Mar 2024 07:19  Phos  4.2     03-26  Mg     2.9     03-26      Mode: AC/ CMV (Assist Control/ Continuous Mandatory Ventilation)  RR (machine): 12  TV (machine): 500  FiO2: 40  PEEP: 7  ITime: 1  MAP: 12  PIP: 22   Patient is a 73y old  Male who presents with a chief complaint of Stroke, critical stenosis, evaluation for angiography (25 Mar 2024 07:52)    HPI:  73 years old RH male with h/o HTN, HLD, DM, h/o CVA x 2 (no residual deficits) initially presented to [facility] ED with unsteady gait that began on 2/7 (exact LKW unknown) and L facial droop that began 6 AM on 2/9 (went to bed 11 PM on 2/8 without facial droop). No slurred speech, vision changes. Initial NIHSS 0 at [facility]. Hemodynamically stable, CT head with no acute pathology. Chronic left thalamic lacunar infarct. CTA with no large vessel occlusion. 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 CT perfusion with no acute abnormality.   Pt transferred from [facility] to [facility] due to concern for neurologic deterioration i/s/o aforementioned high grade proximal basilar and L PCA stenosis. On arrival to [facility] ED, initial NIHSS 4 (+1 for LLE drift, +2 for b/l limb dysmetria, +1 for mild dysarthria). Later, pt started to have difficulty clearing secretions, became stridorous, and started to desaturate, so decision was made to intubate patient. Once pt medically stabilized, taken to angio suite by IR. Unable to obtain further history from patient at [facility] due to intubation/sedation (09 Feb 2024 20:27)    Interval Events: Fever of 101, cultures sent.    REVIEW OF SYSTEMS:  [ ] Positive  [ ] All other systems negative  [x ] Unable to assess ROS because patient NON-verbal    Vital Signs Last 24 Hrs  T(C): 38.1 (03-25-24 @ 22:00), Max: 38.3 (03-25-24 @ 18:00)  T(F): 100.6 (03-25-24 @ 22:00), Max: 101 (03-25-24 @ 18:00)  HR: 102 (03-26-24 @ 05:51) (100 - 109)  BP: 110/67 (03-26-24 @ 04:08) (110/67 - 131/70)  RR: 19 (03-26-24 @ 04:08) (18 - 19)  SpO2: 100% (03-26-24 @ 05:51) (98% - 100%)    PHYSICAL EXAM:  HEENT:   [X]Tracheostomy: #8 cuffed Portex  [X]Pupils equal  [ ]No oral lesions  [ ]Abnormal    SKIN  [X]No Rash  [ ] Abnormal  [ ] pressure    CARDIAC  [X]Regular  [ ]Abnormal    PULMONARY  [X]Bilateral Clear Breath Sounds  [ ]Normal Excursion  [ ]Abnormal    GI  [X]PEG      [X] +BS		              [X]Soft, nondistended, nontender	  [ ]Abnormal    MUSCULOSKELETAL                                   [X]Bedbound                 [ ]Abnormal    [ ]Ambulatory/OOB to chair                           EXTREMITIES                                         [ ]Normal  [X]Edema - BL UE edema                         NEUROLOGIC  [ ] Normal, non focal  [X] Focal findings: opens eye spontaneously and to voice, follows commands x4 extremities    PSYCHIATRIC  [X]Alert and appropriate  [ ] Sedated	 [ ]Agitated    :  [place]: [ ] Yes, if yes: Date of Placement:                   [X] No    LINES: Central Lines [ ] Yes, if yes: Date of Placement                                     [X] No    HOSPITAL MEDICATIONS:  MEDICATIONS  (STANDING):  albuterol/ipratropium for Nebulization 3 milliLiter(s) Nebulizer every 8 hours  artificial tears (preservative free) Ophthalmic Solution 1 Drop(s) Both EYES every 4 hours  aspirin  chewable 81 milliGRAM(s) Oral daily  Biotene Dry Mouth Oral Rinse 5 milliLiter(s) Swish and Spit every 6 hours  chlorhexidine 0.12% Liquid 15 milliLiter(s) Oral Mucosa every 12 hours  chlorhexidine 4% Liquid 1 Application(s) Topical daily  clopidogrel Tablet 75 milliGRAM(s) Enteral Tube daily  furosemide   Injectable 20 milliGRAM(s) IV Push two times a day  gabapentin Solution 100 milliGRAM(s) Enteral Tube every 8 hours  heparin   Injectable 5000 Unit(s) SubCutaneous every 8 hours  insulin lispro (ADMELOG) corrective regimen sliding scale   SubCutaneous every 6 hours  insulin NPH human recombinant 7 Unit(s) SubCutaneous every 6 hours  metolazone 5 milliGRAM(s) Oral daily  nystatin    Suspension 685480 Unit(s) Oral every 6 hours  pantoprazole   Suspension 40 milliGRAM(s) Oral daily  petrolatum Ophthalmic Ointment 1 Application(s) Both EYES every 8 hours  senna Syrup 10 milliLiter(s) Oral at bedtime  sodium chloride 3%  Inhalation 4 milliLiter(s) Inhalation every 8 hours    MEDICATIONS  (PRN):  acetaminophen   Oral Liquid .. 1000 milliGRAM(s) Enteral Tube every 6 hours PRN Temp greater or equal to 38.5C (101.3F), Mild Pain (1 - 3)      LABS:                        7.1    11.31 )-----------( 488      ( 26 Mar 2024 07:19 )             26.2     03-26    151<H>  |  106  |  57<H>  ----------------------------<  190<H>  4.3   |  34<H>  |  0.66    Ca    8.7      26 Mar 2024 07:19  Phos  4.2     03-26  Mg     2.9     03-26      Mode: AC/ CMV (Assist Control/ Continuous Mandatory Ventilation)  RR (machine): 12  TV (machine): 500  FiO2: 40  PEEP: 7  ITime: 1  MAP: 12  PIP: 22

## 2024-03-26 NOTE — PROGRESS NOTE ADULT - PROBLEM SELECTOR PLAN 10
- IVF 3/8 and 3/20  - free water   - 3/21: D5W increased to 75ml/hr  - 3/23 Resolved.  IVF discontinued  - 3/25 metolazone added  - Monitor BMP

## 2024-03-27 DIAGNOSIS — I95.9 HYPOTENSION, UNSPECIFIED: ICD-10-CM

## 2024-03-27 LAB
ALBUMIN SERPL ELPH-MCNC: 2.5 G/DL — LOW (ref 3.3–5)
ALP SERPL-CCNC: 112 U/L — SIGNIFICANT CHANGE UP (ref 40–120)
ALT FLD-CCNC: 61 U/L — HIGH (ref 10–45)
ANION GAP SERPL CALC-SCNC: 11 MMOL/L — SIGNIFICANT CHANGE UP (ref 5–17)
ANION GAP SERPL CALC-SCNC: 17 MMOL/L — SIGNIFICANT CHANGE UP (ref 5–17)
ANION GAP SERPL CALC-SCNC: 9 MMOL/L — SIGNIFICANT CHANGE UP (ref 5–17)
APPEARANCE UR: ABNORMAL
APTT BLD: 28.8 SEC — SIGNIFICANT CHANGE UP (ref 24.5–35.6)
AST SERPL-CCNC: 38 U/L — SIGNIFICANT CHANGE UP (ref 10–40)
BASE EXCESS BLDV CALC-SCNC: 5.5 MMOL/L — HIGH (ref -2–3)
BILIRUB SERPL-MCNC: 0.2 MG/DL — SIGNIFICANT CHANGE UP (ref 0.2–1.2)
BILIRUB UR-MCNC: NEGATIVE — SIGNIFICANT CHANGE UP
BLD GP AB SCN SERPL QL: NEGATIVE — SIGNIFICANT CHANGE UP
BUN SERPL-MCNC: 87 MG/DL — HIGH (ref 7–23)
BUN SERPL-MCNC: 89 MG/DL — HIGH (ref 7–23)
BUN SERPL-MCNC: 91 MG/DL — HIGH (ref 7–23)
CA-I SERPL-SCNC: 1.05 MMOL/L — LOW (ref 1.15–1.33)
CALCIUM SERPL-MCNC: 7.9 MG/DL — LOW (ref 8.4–10.5)
CALCIUM SERPL-MCNC: 8 MG/DL — LOW (ref 8.4–10.5)
CALCIUM SERPL-MCNC: 8.9 MG/DL — SIGNIFICANT CHANGE UP (ref 8.4–10.5)
CHLORIDE BLDV-SCNC: 109 MMOL/L — HIGH (ref 96–108)
CHLORIDE SERPL-SCNC: 105 MMOL/L — SIGNIFICANT CHANGE UP (ref 96–108)
CHLORIDE SERPL-SCNC: 105 MMOL/L — SIGNIFICANT CHANGE UP (ref 96–108)
CHLORIDE SERPL-SCNC: 109 MMOL/L — HIGH (ref 96–108)
CO2 BLDV-SCNC: 32 MMOL/L — HIGH (ref 22–26)
CO2 SERPL-SCNC: 26 MMOL/L — SIGNIFICANT CHANGE UP (ref 22–31)
CO2 SERPL-SCNC: 34 MMOL/L — HIGH (ref 22–31)
CO2 SERPL-SCNC: 34 MMOL/L — HIGH (ref 22–31)
COLOR SPEC: SIGNIFICANT CHANGE UP
CREAT SERPL-MCNC: 0.89 MG/DL — SIGNIFICANT CHANGE UP (ref 0.5–1.3)
CREAT SERPL-MCNC: 0.92 MG/DL — SIGNIFICANT CHANGE UP (ref 0.5–1.3)
CREAT SERPL-MCNC: 1.11 MG/DL — SIGNIFICANT CHANGE UP (ref 0.5–1.3)
DIFF PNL FLD: ABNORMAL
EGFR: 70 ML/MIN/1.73M2 — SIGNIFICANT CHANGE UP
EGFR: 88 ML/MIN/1.73M2 — SIGNIFICANT CHANGE UP
EGFR: 90 ML/MIN/1.73M2 — SIGNIFICANT CHANGE UP
GAS PNL BLDV: 147 MMOL/L — HIGH (ref 136–145)
GAS PNL BLDV: SIGNIFICANT CHANGE UP
GAS PNL BLDV: SIGNIFICANT CHANGE UP
GLUCOSE BLDC GLUCOMTR-MCNC: 181 MG/DL — HIGH (ref 70–99)
GLUCOSE BLDC GLUCOMTR-MCNC: 191 MG/DL — HIGH (ref 70–99)
GLUCOSE BLDC GLUCOMTR-MCNC: 246 MG/DL — HIGH (ref 70–99)
GLUCOSE BLDC GLUCOMTR-MCNC: 291 MG/DL — HIGH (ref 70–99)
GLUCOSE BLDC GLUCOMTR-MCNC: 317 MG/DL — HIGH (ref 70–99)
GLUCOSE BLDV-MCNC: 311 MG/DL — HIGH (ref 70–99)
GLUCOSE SERPL-MCNC: 186 MG/DL — HIGH (ref 70–99)
GLUCOSE SERPL-MCNC: 300 MG/DL — HIGH (ref 70–99)
GLUCOSE SERPL-MCNC: 341 MG/DL — HIGH (ref 70–99)
GLUCOSE UR QL: NEGATIVE MG/DL — SIGNIFICANT CHANGE UP
HCO3 BLDV-SCNC: 31 MMOL/L — HIGH (ref 22–29)
HCT VFR BLD CALC: 21.9 % — LOW (ref 39–50)
HCT VFR BLD CALC: 23.4 % — LOW (ref 39–50)
HCT VFR BLD CALC: 25.2 % — LOW (ref 39–50)
HCT VFR BLD CALC: 29.8 % — LOW (ref 39–50)
HCT VFR BLDA CALC: 16 % — CRITICAL LOW (ref 39–51)
HGB BLD CALC-MCNC: 5.3 G/DL — CRITICAL LOW (ref 12.6–17.4)
HGB BLD-MCNC: 5.6 G/DL — CRITICAL LOW (ref 13–17)
HGB BLD-MCNC: 6.3 G/DL — CRITICAL LOW (ref 13–17)
HGB BLD-MCNC: 6.8 G/DL — CRITICAL LOW (ref 13–17)
HGB BLD-MCNC: 8.9 G/DL — LOW (ref 13–17)
INR BLD: 1.34 RATIO — HIGH (ref 0.85–1.18)
KETONES UR-MCNC: NEGATIVE MG/DL — SIGNIFICANT CHANGE UP
LACTATE BLDV-MCNC: 4.9 MMOL/L — CRITICAL HIGH (ref 0.5–2)
LACTATE SERPL-SCNC: 6.1 MMOL/L — CRITICAL HIGH (ref 0.5–2)
LEUKOCYTE ESTERASE UR-ACNC: ABNORMAL
MAGNESIUM SERPL-MCNC: 3 MG/DL — HIGH (ref 1.6–2.6)
MAGNESIUM SERPL-MCNC: 3.2 MG/DL — HIGH (ref 1.6–2.6)
MCHC RBC-ENTMCNC: 22.4 PG — LOW (ref 27–34)
MCHC RBC-ENTMCNC: 22.8 PG — LOW (ref 27–34)
MCHC RBC-ENTMCNC: 22.9 PG — LOW (ref 27–34)
MCHC RBC-ENTMCNC: 25.4 PG — LOW (ref 27–34)
MCHC RBC-ENTMCNC: 25.6 GM/DL — LOW (ref 32–36)
MCHC RBC-ENTMCNC: 26.9 GM/DL — LOW (ref 32–36)
MCHC RBC-ENTMCNC: 27 GM/DL — LOW (ref 32–36)
MCHC RBC-ENTMCNC: 29.9 GM/DL — LOW (ref 32–36)
MCV RBC AUTO: 84.6 FL — SIGNIFICANT CHANGE UP (ref 80–100)
MCV RBC AUTO: 84.9 FL — SIGNIFICANT CHANGE UP (ref 80–100)
MCV RBC AUTO: 85.1 FL — SIGNIFICANT CHANGE UP (ref 80–100)
MCV RBC AUTO: 87.6 FL — SIGNIFICANT CHANGE UP (ref 80–100)
NITRITE UR-MCNC: NEGATIVE — SIGNIFICANT CHANGE UP
NRBC # BLD: 3 /100 WBCS — HIGH (ref 0–0)
NRBC # BLD: 5 /100 WBCS — HIGH (ref 0–0)
NRBC # BLD: 6 /100 WBCS — HIGH (ref 0–0)
NRBC # BLD: 7 /100 WBCS — HIGH (ref 0–0)
OB PNL STL: NEGATIVE — SIGNIFICANT CHANGE UP
PCO2 BLDV: 51 MMHG — SIGNIFICANT CHANGE UP (ref 42–55)
PH BLDV: 7.39 — SIGNIFICANT CHANGE UP (ref 7.32–7.43)
PH UR: 5 — SIGNIFICANT CHANGE UP (ref 5–8)
PHOSPHATE SERPL-MCNC: 4.2 MG/DL — SIGNIFICANT CHANGE UP (ref 2.5–4.5)
PHOSPHATE SERPL-MCNC: 5 MG/DL — HIGH (ref 2.5–4.5)
PLATELET # BLD AUTO: 353 K/UL — SIGNIFICANT CHANGE UP (ref 150–400)
PLATELET # BLD AUTO: 372 K/UL — SIGNIFICANT CHANGE UP (ref 150–400)
PLATELET # BLD AUTO: 454 K/UL — HIGH (ref 150–400)
PLATELET # BLD AUTO: 474 K/UL — HIGH (ref 150–400)
PO2 BLDV: 115 MMHG — HIGH (ref 25–45)
POTASSIUM BLDV-SCNC: 4.9 MMOL/L — SIGNIFICANT CHANGE UP (ref 3.5–5.1)
POTASSIUM SERPL-MCNC: 4 MMOL/L — SIGNIFICANT CHANGE UP (ref 3.5–5.3)
POTASSIUM SERPL-MCNC: 4.7 MMOL/L — SIGNIFICANT CHANGE UP (ref 3.5–5.3)
POTASSIUM SERPL-MCNC: 4.9 MMOL/L — SIGNIFICANT CHANGE UP (ref 3.5–5.3)
POTASSIUM SERPL-SCNC: 4 MMOL/L — SIGNIFICANT CHANGE UP (ref 3.5–5.3)
POTASSIUM SERPL-SCNC: 4.7 MMOL/L — SIGNIFICANT CHANGE UP (ref 3.5–5.3)
POTASSIUM SERPL-SCNC: 4.9 MMOL/L — SIGNIFICANT CHANGE UP (ref 3.5–5.3)
PROCALCITONIN SERPL-MCNC: 2.04 NG/ML — HIGH (ref 0.02–0.1)
PROT SERPL-MCNC: 6.6 G/DL — SIGNIFICANT CHANGE UP (ref 6–8.3)
PROT UR-MCNC: 30 MG/DL
PROTHROM AB SERPL-ACNC: 14.6 SEC — HIGH (ref 9.5–13)
RBC # BLD: 2.5 M/UL — LOW (ref 4.2–5.8)
RBC # BLD: 2.75 M/UL — LOW (ref 4.2–5.8)
RBC # BLD: 2.98 M/UL — LOW (ref 4.2–5.8)
RBC # BLD: 3.51 M/UL — LOW (ref 4.2–5.8)
RBC # FLD: 17.3 % — HIGH (ref 10.3–14.5)
RBC # FLD: 18.9 % — HIGH (ref 10.3–14.5)
RBC # FLD: 20.5 % — HIGH (ref 10.3–14.5)
RBC # FLD: 20.6 % — HIGH (ref 10.3–14.5)
RH IG SCN BLD-IMP: POSITIVE — SIGNIFICANT CHANGE UP
SAO2 % BLDV: 98.3 % — HIGH (ref 67–88)
SODIUM SERPL-SCNC: 148 MMOL/L — HIGH (ref 135–145)
SODIUM SERPL-SCNC: 150 MMOL/L — HIGH (ref 135–145)
SODIUM SERPL-SCNC: 152 MMOL/L — HIGH (ref 135–145)
SP GR SPEC: 1.02 — SIGNIFICANT CHANGE UP (ref 1–1.03)
UROBILINOGEN FLD QL: 1 MG/DL — SIGNIFICANT CHANGE UP (ref 0.2–1)
WBC # BLD: 13.68 K/UL — HIGH (ref 3.8–10.5)
WBC # BLD: 14.53 K/UL — HIGH (ref 3.8–10.5)
WBC # BLD: 14.94 K/UL — HIGH (ref 3.8–10.5)
WBC # BLD: 16.6 K/UL — HIGH (ref 3.8–10.5)
WBC # FLD AUTO: 13.68 K/UL — HIGH (ref 3.8–10.5)
WBC # FLD AUTO: 14.53 K/UL — HIGH (ref 3.8–10.5)
WBC # FLD AUTO: 14.94 K/UL — HIGH (ref 3.8–10.5)
WBC # FLD AUTO: 16.6 K/UL — HIGH (ref 3.8–10.5)

## 2024-03-27 PROCEDURE — 43235 EGD DIAGNOSTIC BRUSH WASH: CPT | Mod: GC

## 2024-03-27 PROCEDURE — 99233 SBSQ HOSP IP/OBS HIGH 50: CPT

## 2024-03-27 PROCEDURE — 71045 X-RAY EXAM CHEST 1 VIEW: CPT | Mod: 26

## 2024-03-27 RX ORDER — SODIUM CHLORIDE 9 MG/ML
1000 INJECTION, SOLUTION INTRAVENOUS ONCE
Refills: 0 | Status: COMPLETED | OUTPATIENT
Start: 2024-03-27 | End: 2024-03-27

## 2024-03-27 RX ORDER — SODIUM CHLORIDE 9 MG/ML
500 INJECTION INTRAMUSCULAR; INTRAVENOUS; SUBCUTANEOUS
Refills: 0 | Status: COMPLETED | OUTPATIENT
Start: 2024-03-27 | End: 2024-03-27

## 2024-03-27 RX ORDER — METRONIDAZOLE 500 MG
500 TABLET ORAL ONCE
Refills: 0 | Status: COMPLETED | OUTPATIENT
Start: 2024-03-27 | End: 2024-03-27

## 2024-03-27 RX ORDER — CEFEPIME 1 G/1
INJECTION, POWDER, FOR SOLUTION INTRAMUSCULAR; INTRAVENOUS
Refills: 0 | Status: DISCONTINUED | OUTPATIENT
Start: 2024-03-27 | End: 2024-03-31

## 2024-03-27 RX ORDER — DROXIDOPA 100 MG/1
100 CAPSULE ORAL
Refills: 0 | Status: DISCONTINUED | OUTPATIENT
Start: 2024-03-27 | End: 2024-04-02

## 2024-03-27 RX ORDER — MEROPENEM 1 G/30ML
1000 INJECTION INTRAVENOUS EVERY 8 HOURS
Refills: 0 | Status: DISCONTINUED | OUTPATIENT
Start: 2024-03-27 | End: 2024-03-27

## 2024-03-27 RX ORDER — SODIUM CHLORIDE 9 MG/ML
1000 INJECTION INTRAMUSCULAR; INTRAVENOUS; SUBCUTANEOUS ONCE
Refills: 0 | Status: COMPLETED | OUTPATIENT
Start: 2024-03-27 | End: 2024-03-27

## 2024-03-27 RX ORDER — MIDODRINE HYDROCHLORIDE 2.5 MG/1
10 TABLET ORAL THREE TIMES A DAY
Refills: 0 | Status: DISCONTINUED | OUTPATIENT
Start: 2024-03-27 | End: 2024-03-27

## 2024-03-27 RX ORDER — METRONIDAZOLE 500 MG
500 TABLET ORAL EVERY 8 HOURS
Refills: 0 | Status: DISCONTINUED | OUTPATIENT
Start: 2024-03-27 | End: 2024-03-31

## 2024-03-27 RX ORDER — CEFEPIME 1 G/1
1000 INJECTION, POWDER, FOR SOLUTION INTRAMUSCULAR; INTRAVENOUS ONCE
Refills: 0 | Status: COMPLETED | OUTPATIENT
Start: 2024-03-27 | End: 2024-03-27

## 2024-03-27 RX ORDER — PANTOPRAZOLE SODIUM 20 MG/1
40 TABLET, DELAYED RELEASE ORAL EVERY 12 HOURS
Refills: 0 | Status: DISCONTINUED | OUTPATIENT
Start: 2024-03-27 | End: 2024-04-11

## 2024-03-27 RX ORDER — ACETAMINOPHEN 500 MG
1000 TABLET ORAL ONCE
Refills: 0 | Status: COMPLETED | OUTPATIENT
Start: 2024-03-27 | End: 2024-03-27

## 2024-03-27 RX ORDER — METRONIDAZOLE 500 MG
TABLET ORAL
Refills: 0 | Status: DISCONTINUED | OUTPATIENT
Start: 2024-03-27 | End: 2024-03-31

## 2024-03-27 RX ORDER — CEFEPIME 1 G/1
1000 INJECTION, POWDER, FOR SOLUTION INTRAMUSCULAR; INTRAVENOUS EVERY 8 HOURS
Refills: 0 | Status: DISCONTINUED | OUTPATIENT
Start: 2024-03-27 | End: 2024-03-31

## 2024-03-27 RX ADMIN — SODIUM CHLORIDE 30 MILLILITER(S): 9 INJECTION INTRAMUSCULAR; INTRAVENOUS; SUBCUTANEOUS at 19:30

## 2024-03-27 RX ADMIN — Medication 20 MILLIGRAM(S): at 05:04

## 2024-03-27 RX ADMIN — SODIUM CHLORIDE 4 MILLILITER(S): 9 INJECTION INTRAMUSCULAR; INTRAVENOUS; SUBCUTANEOUS at 22:09

## 2024-03-27 RX ADMIN — GABAPENTIN 100 MILLIGRAM(S): 400 CAPSULE ORAL at 05:06

## 2024-03-27 RX ADMIN — Medication 500000 UNIT(S): at 05:02

## 2024-03-27 RX ADMIN — Medication 1 APPLICATION(S): at 15:00

## 2024-03-27 RX ADMIN — DROXIDOPA 100 MILLIGRAM(S): 100 CAPSULE ORAL at 22:08

## 2024-03-27 RX ADMIN — PANTOPRAZOLE SODIUM 40 MILLIGRAM(S): 20 TABLET, DELAYED RELEASE ORAL at 13:05

## 2024-03-27 RX ADMIN — DROXIDOPA 100 MILLIGRAM(S): 100 CAPSULE ORAL at 15:30

## 2024-03-27 RX ADMIN — Medication 400 MILLIGRAM(S): at 13:47

## 2024-03-27 RX ADMIN — Medication 1 DROP(S): at 05:03

## 2024-03-27 RX ADMIN — Medication 1 DROP(S): at 22:14

## 2024-03-27 RX ADMIN — Medication 3 MILLILITER(S): at 05:24

## 2024-03-27 RX ADMIN — Medication 1 DROP(S): at 13:06

## 2024-03-27 RX ADMIN — Medication 5 MILLILITER(S): at 11:52

## 2024-03-27 RX ADMIN — SODIUM CHLORIDE 4 MILLILITER(S): 9 INJECTION INTRAMUSCULAR; INTRAVENOUS; SUBCUTANEOUS at 13:57

## 2024-03-27 RX ADMIN — Medication 1 APPLICATION(S): at 22:14

## 2024-03-27 RX ADMIN — CHLORHEXIDINE GLUCONATE 15 MILLILITER(S): 213 SOLUTION TOPICAL at 05:03

## 2024-03-27 RX ADMIN — Medication 1 DROP(S): at 10:30

## 2024-03-27 RX ADMIN — SODIUM CHLORIDE 1000 MILLILITER(S): 9 INJECTION INTRAMUSCULAR; INTRAVENOUS; SUBCUTANEOUS at 08:10

## 2024-03-27 RX ADMIN — SODIUM CHLORIDE 1000 MILLILITER(S): 9 INJECTION, SOLUTION INTRAVENOUS at 11:30

## 2024-03-27 RX ADMIN — Medication 5 MILLILITER(S): at 05:02

## 2024-03-27 RX ADMIN — CHLORHEXIDINE GLUCONATE 15 MILLILITER(S): 213 SOLUTION TOPICAL at 18:57

## 2024-03-27 RX ADMIN — Medication 5 MILLILITER(S): at 19:00

## 2024-03-27 RX ADMIN — Medication 1 DROP(S): at 01:31

## 2024-03-27 RX ADMIN — Medication 500000 UNIT(S): at 18:58

## 2024-03-27 RX ADMIN — MEROPENEM 100 MILLIGRAM(S): 1 INJECTION INTRAVENOUS at 08:38

## 2024-03-27 RX ADMIN — CEFEPIME 100 MILLIGRAM(S): 1 INJECTION, POWDER, FOR SOLUTION INTRAMUSCULAR; INTRAVENOUS at 14:59

## 2024-03-27 RX ADMIN — SODIUM CHLORIDE 4 MILLILITER(S): 9 INJECTION INTRAMUSCULAR; INTRAVENOUS; SUBCUTANEOUS at 05:24

## 2024-03-27 RX ADMIN — Medication 6: at 06:07

## 2024-03-27 RX ADMIN — Medication 100 MILLIGRAM(S): at 22:08

## 2024-03-27 RX ADMIN — Medication 1000 MILLIGRAM(S): at 04:22

## 2024-03-27 RX ADMIN — Medication 1 APPLICATION(S): at 05:03

## 2024-03-27 RX ADMIN — CHLORHEXIDINE GLUCONATE 1 APPLICATION(S): 213 SOLUTION TOPICAL at 22:11

## 2024-03-27 RX ADMIN — Medication 1 DROP(S): at 19:01

## 2024-03-27 RX ADMIN — Medication 100 MILLIGRAM(S): at 15:40

## 2024-03-27 RX ADMIN — Medication 4: at 11:54

## 2024-03-27 RX ADMIN — HEPARIN SODIUM 5000 UNIT(S): 5000 INJECTION INTRAVENOUS; SUBCUTANEOUS at 05:03

## 2024-03-27 RX ADMIN — MIDODRINE HYDROCHLORIDE 10 MILLIGRAM(S): 2.5 TABLET ORAL at 11:55

## 2024-03-27 RX ADMIN — Medication 2: at 19:00

## 2024-03-27 RX ADMIN — Medication 3 MILLILITER(S): at 13:57

## 2024-03-27 RX ADMIN — Medication 1000 MILLIGRAM(S): at 06:00

## 2024-03-27 RX ADMIN — CEFEPIME 100 MILLIGRAM(S): 1 INJECTION, POWDER, FOR SOLUTION INTRAMUSCULAR; INTRAVENOUS at 22:10

## 2024-03-27 RX ADMIN — Medication 3 MILLILITER(S): at 22:09

## 2024-03-27 RX ADMIN — HUMAN INSULIN 7 UNIT(S): 100 INJECTION, SUSPENSION SUBCUTANEOUS at 06:06

## 2024-03-27 RX ADMIN — Medication 500000 UNIT(S): at 11:49

## 2024-03-27 RX ADMIN — GABAPENTIN 100 MILLIGRAM(S): 400 CAPSULE ORAL at 22:08

## 2024-03-27 RX ADMIN — Medication 1000 MILLIGRAM(S): at 14:30

## 2024-03-27 NOTE — PROGRESS NOTE ADULT - SUBJECTIVE AND OBJECTIVE BOX
Chief Complaint:  Patient is a 73y old  Male who presents with a chief complaint of Stroke, critical stenosis, evaluation for angiography (27 Mar 2024 09:50)     Interval Events:   GI previously saw patient inhouse for  melena, but responded with hgb appropriately with resolution of melena, post PEG 2024 (normal exam). Now called back for recurrence of melena and hgb drop from 8 to 5, plavix and TFs held 3/27 in the morning, per primary, now receiving blood, RRT called. Patient stable in RCU though with change in mental status (not responsive per primary)and hypotensive. Hypernatremic. Note patient's course c/b bleeding at trach site.    Hospital Medications:  acetaminophen   Oral Liquid .. 1000 milliGRAM(s) Enteral Tube every 6 hours PRN  albuterol/ipratropium for Nebulization 3 milliLiter(s) Nebulizer every 8 hours  artificial tears (preservative free) Ophthalmic Solution 1 Drop(s) Both EYES every 4 hours  Biotene Dry Mouth Oral Rinse 5 milliLiter(s) Swish and Spit every 6 hours  cefepime   IVPB 1000 milliGRAM(s) IV Intermittent once  cefepime   IVPB 1000 milliGRAM(s) IV Intermittent every 8 hours  cefepime   IVPB      chlorhexidine 0.12% Liquid 15 milliLiter(s) Oral Mucosa every 12 hours  chlorhexidine 4% Liquid 1 Application(s) Topical daily  droxidopa 100 milliGRAM(s) Oral <User Schedule>  gabapentin Solution 100 milliGRAM(s) Enteral Tube every 8 hours  insulin lispro (ADMELOG) corrective regimen sliding scale   SubCutaneous every 6 hours  insulin NPH human recombinant 7 Unit(s) SubCutaneous every 6 hours  metroNIDAZOLE  IVPB 500 milliGRAM(s) IV Intermittent once  metroNIDAZOLE  IVPB      metroNIDAZOLE  IVPB 500 milliGRAM(s) IV Intermittent every 8 hours  nystatin    Suspension 033744 Unit(s) Oral every 6 hours  pantoprazole  Injectable 40 milliGRAM(s) IV Push every 12 hours  petrolatum Ophthalmic Ointment 1 Application(s) Both EYES every 8 hours  senna Syrup 10 milliLiter(s) Oral at bedtime  sodium chloride 3%  Inhalation 4 milliLiter(s) Inhalation every 8 hours      ROS:   Unable to obtain due to patient's condition     PHYSICAL EXAM:   Vital Signs:  Vital Signs Last 24 Hrs  T(C): 38.3 (27 Mar 2024 14:31), Max: 38.9 (27 Mar 2024 13:25)  T(F): 101 (27 Mar 2024 14:31), Max: 102 (27 Mar 2024 13:25)  HR: 94 (27 Mar 2024 14:31) (90 - 114)  BP: 113/63 (27 Mar 2024 14:31) (77/43 - 118/62)  BP(mean): --  RR: 12 (27 Mar 2024 14:31) (12 - 23)  SpO2: 100% (27 Mar 2024 14:31) (98% - 100%)    Parameters below as of 27 Mar 2024 14:31      O2 Concentration (%): 40  Daily     Daily Weight in k.4 (27 Mar 2024 10:00)    GENERAL: lethargic appearing  NEURO: aox0  HEENT: anicteric sclera, no conjunctival pallor appreciated  CHEST: trach in place  CARDIAC: tachycardic  ABDOMEN: soft, non-tender, non-distended, no rebound or guarding  RYLEY: melena, no red blood  EXTREMITIES: warm, well perfused, no edema  SKIN: no lesions noted    LABS:                          6.3    13.68 )-----------( 372      ( 27 Mar 2024 13:24 )             23.4         148<H>  |  105  |  91<H>  ----------------------------<  341<H>  4.7   |  26  |  1.11    Ca    8.0<L>      27 Mar 2024 08:42  Phos  5.0       Mg     3.0         TPro  6.6  /  Alb  2.5<L>  /  TBili  0.2  /  DBili  x   /  AST  38  /  ALT  61<H>  /  AlkPhos  112      LIVER FUNCTIONS - ( 27 Mar 2024 08:42 )  Alb: 2.5 g/dL / Pro: 6.6 g/dL / ALK PHOS: 112 U/L / ALT: 61 U/L / AST: 38 U/L / GGT: x             Interval Diagnostic Studies:   < from: Xray Chest 1 View-PORTABLE IMMEDIATE (Xray Chest 1 View-PORTABLE IMMEDIATE .) (24 @ 09:27) >  XR CHEST PORTABLE IMMED 1V   ORDERED BY:  ROZINA AL     PROCEDURE DATE:  2024          INTERPRETATION:  EXAMINATION: XR CHEST IMMEDIATE    CLINICAL INDICATION: Hypoxia    TECHNIQUE: Single frontal, portable view of the chest was obtained.    COMPARISON: Chest x-ray 3/23/2024    FINDINGS:    The heart is normal in size.  Partial clearing at the right lung base. Left lung is clear.  There is no pneumothorax or left-sided pleural effusion.  No acute bony abnormality.    IMPRESSION:  Partial clearing at the right lung base.    --- End of Report ---          ROZINA SAVAGE MD; Resident Radiologist  This document has been electronically signed.  LISA PALM MD; Attending Radiologist  This document has been electronically signed. Mar 27 2024 12:29PM    < end of copied text >

## 2024-03-27 NOTE — RAPID RESPONSE TEAM SUMMARY - NSSITUATIONBACKGROUNDRRT_GEN_ALL_CORE
73y Male with a hx of HTN, HLD, T2DM, prior CVAs without residual deficits who initially presented as a transfer from [place] with concern for CVA in the setting of 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 Rapid called initially as code stroke.
73 year old male with hypertension, hyperlipidemia, diabetes, prior CVA's without residual deficits who initially presented as a transfer from [place] with concern for CVA in the setting of 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    RRT called for hypotension and unresponsiveness.   BP on arrival 75/47, O2 100%, , Patient with sluggish pupils, muffled lung sounds bilaterally likely from the ventillator artifact, patient taking own breaths on PRVC setting vent.     Assessment/Interventions:  This patient with recently downtrending Hb and recent fevers off abx likely has a combination of GI bleed and infection.  Patient with active melena during RRT.  - 1L LR bolused pressure bag, pressure improved to 104 systolic at end of RRT  - 1 dose of meropenem  - Immediate portable CXR with improved lung fields, low concern for PNA  - Full labs: CBC, CMP, VBG w lactate, Procal, Coags, Type and screen, to be followed up by primary team  - blood cultures recently sent still in lab so not repeated   - MICU was consulted during RRT since was recently ICU patient    Recommendations:  - f/u labs as above  - after type and screen results, transfuse blood appropriately based on Hb  - Urgent GI consult since had GI bleed affecting hemodynamics   - hold DAPT and heparin SubQ for now  - f/u MICU recommendations   - Urinalysis and urine culture to complete sepsis workup, decide if want to continue abx per primary team

## 2024-03-27 NOTE — PROGRESS NOTE ADULT - PROBLEM SELECTOR PLAN 6
- Patient with Sympathetic Storming in NSCU   - Patient with Labile BP and Hx of Tachy/dez episodes  - TTE 3/7: EF 74% RT Ventricular Moderately Enlarged and reduced systolic fxn  - 3/18 CT Angio PE: Negative   - Continue to monitor BP / HR  - Cardiology (Dr. Cary) following

## 2024-03-27 NOTE — PROGRESS NOTE ADULT - ASSESSMENT
73 year old male with hypertension, hyperlipidemia, diabetes, prior CVA's without residual deficits who initially presented as a transfer from [place] with concern for CVA in the setting of 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 Prior to admission patient had Viral URI ( 1-2 weeks prior to admission) with subsequent weakness. He underwent an angiogram (2/11) which showed moderate stenosis and no intervention was done. MRI Performed c/w small bilateral cerebellar strokes and prior L thalamic strokes, as per Neurology was not c/w current presentation. 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 He is currently s/p 5 rounds of plasma exchange (2/13-2/20) and IVIG x 5 ( 2/21-2/26)  His hospital course was complicated by progressive respiratory failure. CT Chest performed on 2/20 with atelectasis of b/l lower lobes. BAL 2/21 Grew PSA treated with course of Zosyn (2/21-2/28).  Patient transferred to RCU on 2/24. RRT called on 3/3 for unresponsiveness. Patient with unreactive pupils, no corneal reflex. Also found to be hypotensive and febrile to 105. Noted to have new melena. Hgb 5. Given 1 unit of pRBC. Transferred to MICU. patient required pressors while in the MICU, BP improved and was transitioned to Droxidopa. Patient had CT C/A/P consistent with Bibasilar pneumonia; Sputum cx grew PSA and was treated with course of Meropenem. Patient evaluated by GI in setting of Melena, transaminitis and portal venous gas on CT imaging. No EGD performed as Melena resolved, Transaminitis was thought to be in setting of shock and portal venous gas was thought to be in setting of recent PEG. EEG was performed in setting of AMS no seizures noted. Repeat MRI Performed which showed Small scattered foci of diffusion restriction within the right cerebellar hemisphere, left parietal lobe, and symmetrically in the bilateral basal ganglia. Neuro recommended Resumption of ASA in setting of possible embolic phenomenon. Repeat ECHO performed RT Ventricular Moderately enlarged, reduced systolic function. Mental status improved and was transferred back to RCU on 3/6.  Cardiology consulted for tachy-dez, unable to catch events on tele, EKG ST otherwise unremarkable, CT Angio negative for PE.     3/27: Patient S/p RRT this morning in setting of Hypotension ( SBP 70S) followed by multiple bouts of Melena ( 4 total as of today). Repeat cbc revealed decreasing H+H ( 5.6/21.9). Will transfuse 2 units of PRBCS, Hep Sub, ASA, Plavix and Lasix dcd. Patient febrile 101.7 overnight was given 1 dose of Meropenem during RRT; Repeat CXR Clearing RT Base, Bld Cxs and Urine cxs sent, Sputum cxs gram neg rods identification pending. Will empirically initiate Cefepime and Flagyl. Patient remains with persistent hypotension bolused 2 liters of fluid and Droxidopa added.

## 2024-03-27 NOTE — PROVIDER CONTACT NOTE (OTHER) - RECOMMENDATIONS
Ice packs and PO tylenol
Titrate genny to BP goal of 140-180 mmhg
MD Rut Prajapati and MD David Mcdaniel and TAMARA Lopez at bedside.
Speak with family member.
CBC and type and screen stat. ! liter of NS Bolus stat
MD David Mcdaniel and TAMARA frye made aware and at bedside to assess Pt
Bedside assessment.
Provider notified and at bedside to assess Pt

## 2024-03-27 NOTE — PROGRESS NOTE ADULT - PROBLEM SELECTOR PLAN 1
- Patient with Melena again today   - Transfused 2 Units of PRBCs  - GI Requesting additional unit of PRBCs hgb goal of 8   - Protonix changed to 40 mg q 12 hrs IVP   - Plan for EGD today   - Monitor CBC Q 6 HRS   - Patient is Jehovas Witness   - Family would like to be asked prior to administration of blood products; Have agreed to administration today - Patient with Melena again today   - Transfused 2 Units of PRBCs  - GI Requesting additional unit of PRBCs hgb goal of 8   - Protonix changed to 40 mg q 12 hrs IVP   - Plan for EGD today   - Monitor CBC Q 6 HRS   - Patient is Jehovas Witness   - Family is in agreement for administration of blood products

## 2024-03-27 NOTE — PROGRESS NOTE ADULT - PROBLEM SELECTOR PLAN 7
- MRI 2/12 - small b/l cerebellar strokes (post-procedural) and prior L thalamic strokes  - MRI 3/5: Small scattered foci of diffusion restriction within the right cerebellar hemisphere, left parietal lobe, and symmetrically in the bilateral basal ganglia.  - Lipitor d/c'd in setting of liver injury 3/3  - Was on ASA And Plavix for SAMMPRIS trial in setting of intracranial stenosis   - ASA and Plavix dcd in setting of Melena

## 2024-03-27 NOTE — PROGRESS NOTE ADULT - SUBJECTIVE AND OBJECTIVE BOX
Patient is a 73y old  Male who presents with a chief complaint of Stroke, critical stenosis, evaluation for angiography (26 Mar 2024 08:27)      Interval Events: S/p RRT for hypotension     REVIEW OF SYSTEMS:  [ ] Positive  [ ] All other systems negative  [ ] Unable to assess ROS because ________    Vital Signs Last 24 Hrs  T(C): 37.4 (03-27-24 @ 06:00), Max: 38.7 (03-27-24 @ 04:07)  T(F): 99.3 (03-27-24 @ 06:00), Max: 101.7 (03-27-24 @ 04:07)  HR: 102 (03-27-24 @ 08:35) (98 - 114)  BP: 114/69 (03-27-24 @ 04:07) (105/52 - 118/62)  RR: 19 (03-27-24 @ 04:07) (18 - 23)  SpO2: 100% (03-27-24 @ 08:35) (98% - 100%)    PHYSICAL EXAM:  HEENT:   [ ]Tracheostomy:  [ ]Pupils equal  [ ]No oral lesions  [ ]Abnormal    SKIN  [ ]No Rash  [ ] Abnormal  [ ] pressure    CARDIAC  [ ]Regular  [ ]Abnormal    PULMONARY  [ ]Bilateral Clear Breath Sounds  [ ]Normal Excursion  [ ]Abnormal    GI  [ ]PEG      [ ] +BS		              [ ]Soft, nondistended, nontender	  [ ]Abnormal    MUSCULOSKELETAL                                   [ ]Bedbound                 [ ]Abnormal    [ ]Ambulatory/OOB to chair                           EXTREMITIES                                         [ ]Normal  [ ]Edema                           NEUROLOGIC  [ ] Normal, non focal  [ ] Focal findings:    PSYCHIATRIC  [ ]Alert and appropriate  [ ] Sedated	 [ ]Agitated    :  [place]: [ ] Yes, if yes: Date of Placement:                   [  ] No    LINES: Central Lines [ ] Yes, if yes: Date of Placement                                     [  ] No    HOSPITAL MEDICATIONS:  MEDICATIONS  (STANDING):  albuterol/ipratropium for Nebulization 3 milliLiter(s) Nebulizer every 8 hours  artificial tears (preservative free) Ophthalmic Solution 1 Drop(s) Both EYES every 4 hours  Biotene Dry Mouth Oral Rinse 5 milliLiter(s) Swish and Spit every 6 hours  chlorhexidine 0.12% Liquid 15 milliLiter(s) Oral Mucosa every 12 hours  chlorhexidine 4% Liquid 1 Application(s) Topical daily  dextrose 5%. 1000 milliLiter(s) (100 mL/Hr) IV Continuous <Continuous>  gabapentin Solution 100 milliGRAM(s) Enteral Tube every 8 hours  insulin lispro (ADMELOG) corrective regimen sliding scale   SubCutaneous every 6 hours  insulin NPH human recombinant 7 Unit(s) SubCutaneous every 6 hours  meropenem  IVPB 1000 milliGRAM(s) IV Intermittent every 8 hours  metolazone 5 milliGRAM(s) Oral daily  nystatin    Suspension 253976 Unit(s) Oral every 6 hours  pantoprazole   Suspension 40 milliGRAM(s) Oral daily  petrolatum Ophthalmic Ointment 1 Application(s) Both EYES every 8 hours  senna Syrup 10 milliLiter(s) Oral at bedtime  sodium chloride 0.9% Bolus 1000 milliLiter(s) IV Bolus once  sodium chloride 3%  Inhalation 4 milliLiter(s) Inhalation every 8 hours    MEDICATIONS  (PRN):  acetaminophen   Oral Liquid .. 1000 milliGRAM(s) Enteral Tube every 6 hours PRN Temp greater or equal to 38.5C (101.3F), Mild Pain (1 - 3)      LABS:                        5.6    16.60 )-----------( 474      ( 27 Mar 2024 08:23 )             21.9     03-27    148<H>  |  105  |  91<H>  ----------------------------<  341<H>  4.7   |  26  |  1.11    Ca    8.0<L>      27 Mar 2024 08:42  Phos  5.0     03-27  Mg     3.0     03-27    TPro  6.6  /  Alb  2.5<L>  /  TBili  0.2  /  DBili  x   /  AST  38  /  ALT  61<H>  /  AlkPhos  112  03-27    PT/INR - ( 27 Mar 2024 08:42 )   PT: 14.6 sec;   INR: 1.34 ratio         PTT - ( 27 Mar 2024 08:42 )  PTT:28.8 sec  Urinalysis Basic - ( 27 Mar 2024 08:42 )    Color: x / Appearance: x / SG: x / pH: x  Gluc: 341 mg/dL / Ketone: x  / Bili: x / Urobili: x   Blood: x / Protein: x / Nitrite: x   Leuk Esterase: x / RBC: x / WBC x   Sq Epi: x / Non Sq Epi: x / Bacteria: x        Venous Blood Gas:  03-27 @ 08:25  7.39/51/115/31/98.3  VBG Lactate: 4.9    CAPILLARY BLOOD GLUCOSE    MICROBIOLOGY:     RADIOLOGY:  [ ] Reviewed and interpreted by me    Mode: AC/ CMV (Assist Control/ Continuous Mandatory Ventilation)  RR (machine): 12  TV (machine): 500  FiO2: 40  PEEP: 7  ITime: 1  MAP: 11  PIP: 19   Patient is a 73y old  Male who presents with a chief complaint of Stroke, critical stenosis, evaluation for angiography (26 Mar 2024 08:27)      Interval Events: S/p RRT for hypotension in setting of Melena     REVIEW OF SYSTEMS:  [ ] Positive  [ ] All other systems negative  [x] Unable to assess ROS because patient not answering verbal questioning     Vital Signs Last 24 Hrs  T(C): 37.4 (03-27-24 @ 06:00), Max: 38.7 (03-27-24 @ 04:07)  T(F): 99.3 (03-27-24 @ 06:00), Max: 101.7 (03-27-24 @ 04:07)  HR: 102 (03-27-24 @ 08:35) (98 - 114)  BP: 114/69 (03-27-24 @ 04:07) (105/52 - 118/62)  RR: 19 (03-27-24 @ 04:07) (18 - 23)  SpO2: 100% (03-27-24 @ 08:35) (98% - 100%)    PHYSICAL EXAM:  HEENT:   [x]Tracheostomy: #8 cuffed Portex  [x]Pupils equal; sluggish  [ ]No oral lesions  [ ]Abnormal    SKIN  [x]No Rash  [ ] Abnormal  [ ] pressure    CARDIAC  [x]Regular  [ ]Abnormal    PULMONARY  [x]Bilateral Coarse Breath Sounds  [ ]Normal Excursion  [ ]Abnormal    GI  [x]PEG      [x] +BS		              [x]Soft, nondistended, nontender	  [ ]Abnormal    MUSCULOSKELETAL                                   [x]Bedbound                 [ ]Abnormal    [ ]Ambulatory/OOB to chair                           EXTREMITIES                                         [ ]Normal  [x]Edema - BL UE edema LFT >RT                   NEUROLOGIC  [ ] Normal, non focal  [x] Focal findings: Minimally responsive to noxious stimuli, Not following commands     PSYCHIATRIC  [ ]Alert and appropriate  [x] Sedated	 [ ]Agitated    :  [place]: [ ] Yes, if yes: Date of Placement:                   [x] No    LINES: Central Lines [ ] Yes, if yes: Date of Placement                                     [x] No    HOSPITAL MEDICATIONS:  MEDICATIONS  (STANDING):  albuterol/ipratropium for Nebulization 3 milliLiter(s) Nebulizer every 8 hours  artificial tears (preservative free) Ophthalmic Solution 1 Drop(s) Both EYES every 4 hours  Biotene Dry Mouth Oral Rinse 5 milliLiter(s) Swish and Spit every 6 hours  chlorhexidine 0.12% Liquid 15 milliLiter(s) Oral Mucosa every 12 hours  chlorhexidine 4% Liquid 1 Application(s) Topical daily  dextrose 5%. 1000 milliLiter(s) (100 mL/Hr) IV Continuous <Continuous>  gabapentin Solution 100 milliGRAM(s) Enteral Tube every 8 hours  insulin lispro (ADMELOG) corrective regimen sliding scale   SubCutaneous every 6 hours  insulin NPH human recombinant 7 Unit(s) SubCutaneous every 6 hours  meropenem  IVPB 1000 milliGRAM(s) IV Intermittent every 8 hours  metolazone 5 milliGRAM(s) Oral daily  nystatin    Suspension 991419 Unit(s) Oral every 6 hours  pantoprazole   Suspension 40 milliGRAM(s) Oral daily  petrolatum Ophthalmic Ointment 1 Application(s) Both EYES every 8 hours  senna Syrup 10 milliLiter(s) Oral at bedtime  sodium chloride 0.9% Bolus 1000 milliLiter(s) IV Bolus once  sodium chloride 3%  Inhalation 4 milliLiter(s) Inhalation every 8 hours    MEDICATIONS  (PRN):  acetaminophen   Oral Liquid .. 1000 milliGRAM(s) Enteral Tube every 6 hours PRN Temp greater or equal to 38.5C (101.3F), Mild Pain (1 - 3)      LABS:                        5.6    16.60 )-----------( 474      ( 27 Mar 2024 08:23 )             21.9     03-27    148<H>  |  105  |  91<H>  ----------------------------<  341<H>  4.7   |  26  |  1.11    Ca    8.0<L>      27 Mar 2024 08:42  Phos  5.0     03-27  Mg     3.0     03-27    TPro  6.6  /  Alb  2.5<L>  /  TBili  0.2  /  DBili  x   /  AST  38  /  ALT  61<H>  /  AlkPhos  112  03-27    PT/INR - ( 27 Mar 2024 08:42 )   PT: 14.6 sec;   INR: 1.34 ratio         PTT - ( 27 Mar 2024 08:42 )  PTT:28.8 sec  Urinalysis Basic - ( 27 Mar 2024 08:42 )    Color: x / Appearance: x / SG: x / pH: x  Gluc: 341 mg/dL / Ketone: x  / Bili: x / Urobili: x   Blood: x / Protein: x / Nitrite: x   Leuk Esterase: x / RBC: x / WBC x   Sq Epi: x / Non Sq Epi: x / Bacteria: x        Venous Blood Gas:  03-27 @ 08:25  7.39/51/115/31/98.3  VBG Lactate: 4.9    CAPILLARY BLOOD GLUCOSE    MICROBIOLOGY:     RADIOLOGY:  [ ] Reviewed and interpreted by me    Mode: AC/ CMV (Assist Control/ Continuous Mandatory Ventilation)  RR (machine): 12  TV (machine): 500  FiO2: 40  PEEP: 7  ITime: 1  MAP: 11  PIP: 19

## 2024-03-27 NOTE — PRE PROCEDURE NOTE - PRE PROCEDURE EVALUATION
Pre-Endoscopy Evaluation    Attending Physician: Dr. Jhonny Weber    Procedure: EGD      Indication for Procedure: melena    Pertinent History: See [employer] chart    PAST MEDICAL & SURGICAL HISTORY:  Hypertension      Diabetes      CVA (cerebral vascular accident)  x 2 with right side weakness and numbness      HTN (hypertension)      HLD (hyperlipidemia)      DM2 (diabetes mellitus, type 2)      CVA (cerebrovascular accident)      No significant past surgical history          Allergies    No Known Allergies    Intolerances        Medications: MEDICATIONS  (STANDING):  albuterol/ipratropium for Nebulization 3 milliLiter(s) Nebulizer every 8 hours  artificial tears (preservative free) Ophthalmic Solution 1 Drop(s) Both EYES every 4 hours  Biotene Dry Mouth Oral Rinse 5 milliLiter(s) Swish and Spit every 6 hours  cefepime   IVPB 1000 milliGRAM(s) IV Intermittent every 8 hours  cefepime   IVPB      chlorhexidine 0.12% Liquid 15 milliLiter(s) Oral Mucosa every 12 hours  chlorhexidine 4% Liquid 1 Application(s) Topical daily  droxidopa 100 milliGRAM(s) Oral <User Schedule>  gabapentin Solution 100 milliGRAM(s) Enteral Tube every 8 hours  insulin lispro (ADMELOG) corrective regimen sliding scale   SubCutaneous every 6 hours  insulin NPH human recombinant 7 Unit(s) SubCutaneous every 6 hours  metroNIDAZOLE  IVPB 500 milliGRAM(s) IV Intermittent once  metroNIDAZOLE  IVPB      metroNIDAZOLE  IVPB 500 milliGRAM(s) IV Intermittent every 8 hours  nystatin    Suspension [id number] Unit(s) Oral every 6 hours  pantoprazole  Injectable 40 milliGRAM(s) IV Push every 12 hours  petrolatum Ophthalmic Ointment 1 Application(s) Both EYES every 8 hours  senna Syrup 10 milliLiter(s) Oral at bedtime  sodium chloride 3%  Inhalation 4 milliLiter(s) Inhalation every 8 hours    MEDICATIONS  (PRN):  acetaminophen   Oral Liquid .. 1000 milliGRAM(s) Enteral Tube every 6 hours PRN Temp greater or equal to 38.5C (101.3F), Mild Pain (1 - 3)      Pertinent lab data:                        6.3    13.68 )-----------( 372      ( 27 Mar 2024 13:24 )             23.4     03-27    148<H>  |  105  |  91<H>  ----------------------------<  341<H>  4.7   |  26  |  1.11    Ca    8.0<L>      27 Mar 2024 08:42  Phos  5.0     03-  Mg     3.0     -27    TPro  6.6  /  Alb  2.5<L>  /  TBili  0.2  /  DBili  x   /  AST  38  /  ALT  61<H>  /  AlkPhos  112  03-27    PT/INR - ( 27 Mar 2024 08:42 )   PT: 14.6 sec;   INR: 1.34 ratio         PTT - ( 27 Mar 2024 08:42 )  PTT:28.8 sec            Physical Examination:  Daily     Daily Weight in k.4 (27 Mar 2024 10:00)  Vital Signs Last 24 Hrs  T(C): 38.3 (27 Mar 2024 14:31), Max: 38.9 (27 Mar 2024 13:25)  T(F): 101 (27 Mar 2024 14:31), Max: 102 (27 Mar 2024 13:25)  HR: 98 (27 Mar 2024 14:52) (90 - 114)  BP: 113/63 (27 Mar 2024 14:31) (77/43 - 118/62)  BP(mean): --  RR: 12 (27 Mar 2024 14:31) (12 - 23)  SpO2: 97% (27 Mar 2024 14:52) (97% - 100%)    Parameters below as of 27 Mar 2024 14:31      O2 Concentration (%): 40  GENERAL: lethargic appearing  NEURO: aox0  HEENT: anicteric sclera, no conjunctival pallor appreciated  CHEST: trach in place  CARDIAC: tachycardic  ABDOMEN: soft, non-tender, non-distended, no rebound or guarding  RYLEY: melena, no red blood  EXTREMITIES: warm, well perfused   SKIN: no lesions noted    Comments:    ASA Class: I []  II []  III [x]  IV []    The patient is a suitable candidate for the planned procedure unless box checked [ ]  No, explain:

## 2024-03-27 NOTE — PROGRESS NOTE ADULT - PROBLEM SELECTOR PLAN 3
- Patient persistently Hypotensive today  - S/p 2 liters of Fluids   - Will initiate Droxidopa 100 mg q 8 hrs

## 2024-03-27 NOTE — PROGRESS NOTE ADULT - PROBLEM SELECTOR PLAN 8
- Previously on Metformin as outpatient   - NPH Currently on hold in setting of NPO Status   - Continue ISS   - FS Q6H; Goal -180

## 2024-03-27 NOTE — PROGRESS NOTE ADULT - PROBLEM SELECTOR PLAN 2
- BAL 2/21: PSA; Txd with Zosyn ( 2/21-2/28)  - Sputum 3/4: PSA , Blood cxs 3/3: NGTD  - Txd with course of Meropenem 3/3 --> 3/11  - Patient Febrile again today T.max 102  - Repeat Blood cxs and Urine cxs sent ( Pending)   - Sputum cx 3/26: Rare gram negative rods ( Identification pending)   - Will empirically initiate Cefepime and Flagyl to cover for GI Source

## 2024-03-27 NOTE — PROGRESS NOTE ADULT - PROBLEM SELECTOR PLAN 5
- Patient S/p Tracheostomy # 8 Cuffed Portex on 2/16 ( )  - CTA Chest 3/3: Bibasilar Pneumonia; Currently remains on Meropenem   - Currently remains on Mechanical Ventilation: PRVC 500/12/40%/+7  - Will hold on weaning attempts today in setting of clinical decompensation   - Pt Has not tolerated trach collar trials as he becomes bradycardic and hypoxic during attempts   - Continue Chest PT and Suctioning PRN

## 2024-03-27 NOTE — PROGRESS NOTE ADULT - PROBLEM SELECTOR PLAN 4
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

## 2024-03-27 NOTE — PROGRESS NOTE ADULT - PROBLEM SELECTOR PLAN 10
- S/p D5 IVF Yesterday   - Metolazone dcd in setting of hypotension   - S/p 2 liters of IVF today   - Continue Free water 250 cc q 6hrs   - Monitor BMP

## 2024-03-27 NOTE — PROGRESS NOTE ADULT - ASSESSMENT
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 RRT today for hypotension , AMS and melena     #Melena   #Portal vein gas  Would suspect iatrogenic given his recent EGD for PEG placement (EGD 2/2024 normal findings) but cannot r/o PUD as a potential cause given his drop in hbg and reported melena, vs , erosive esophagitis/gastropathy, angiectasias, Dieulafoy.     #Hypernatremic   #SIR/Septic shock   #Hx of CVA  # Ding Serrano variant of GBS and started on trial of DAPT now s/p 5 rounds of plasma exchange (2/13-2/20) and IVIG x 5 ( 2/21-2/26)    Recommendations:  - At this time, patient is not medically optimized for endoscopy unit; please consult MICU for admission with plans for EGD if remains within GOC  - Continue to transfuse, goal hgb > 8   - Repeat hgb after pRBC completed  - PPI 40 IV BID  - Maintain working IV access  - q4h CBC  - Daily BMP, coags  - Ensure adequate hydration  - BCx/UCx  - Plavix and TFs held 3/27 AM  - REst of care per primary    Preliminary note until signed by Attending.    Thank you for involving us in this patient's care. Please reach out if any further questions or concerns.    Molly Harris MD  Gastroenterology/Hepatology Fellow, PGY-7    NON-URGENT CONSULTS:  Please email [email or web address] OR  [email or web address]  AT NIGHT AND ON WEEKENDS:  Contact on-call GI fellow via answering service ([phone]) from 5pm-8am and on weekends/holidays  MONDAY-FRIDAY 8AM-5PM:  Pager: [phone] ([facility])  Pager: [phone] ([facility])   73 year old male with hypertension, hyperlipidemia, diabetes, prior CVA's without residual deficits who initially presented as a transfer from [place] with concern for CVA in the setting of high-grade proximal basilar and left posterior cerebral artery stenosis s/p angiogram (2/11) showing moderate stenosis 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 RRT today for hypotension , AMS and melena     #Melena   #Portal vein gas  Would suspect iatrogenic given his recent EGD for PEG placement (EGD 2/2024 normal findings) but cannot r/o PUD as a potential cause given his drop in hbg and reported melena, vs , erosive esophagitis/gastropathy, angiectasias, Dieulafoy.     #Hypernatremic   #SIR/Septic shock   #Hx of CVA  # Ding Serrano variant of GBS and started on trial of DAPT now s/p 5 rounds of plasma exchange (2/13-2/20) and IVIG x 5 ( 2/21-2/26)    Recommendations:  - MICU consulted  - Continue to hold TFs, plan for urgent EGD  - Continue to transfuse, goal hgb > 8   - Repeat hgb after pRBC completed  - PPI 40 IV BID  - Maintain working IV access  - q4h CBC  - Daily BMP, coags  - Ensure adequate hydration  - BCx/UCx  - Plavix and TFs held 3/27 AM  - REst of care per primary    Preliminary note until signed by Attending.    Thank you for involving us in this patient's care. Please reach out if any further questions or concerns.    Molly Harris MD  Gastroenterology/Hepatology Fellow, PGY-7    NON-URGENT CONSULTS:  Please email [email or web address] OR  [email or web address]  AT NIGHT AND ON WEEKENDS:  Contact on-call GI fellow via answering service ([phone]) from 5pm-8am and on weekends/holidays  MONDAY-FRIDAY 8AM-5PM:  Pager: [phone] ([facility])  Pager: [phone] ([facility])

## 2024-03-27 NOTE — PROGRESS NOTE ADULT - NS ATTEND AMEND GEN_ALL_CORE FT
Agree with above  73M PMH hypertension, DM2, H/O prior CVAs w/o residual deficits initially p/w CVA-like symptoms with imaging c/w basilar and L PCA stenosis, in the setting of recent viral URI. 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 s/p PLEX / IVIg with progressive respiratory failure requiring intubation and failure to wean, now s/p trache / PEG  - Remains trache to vent, transitioning between PS 10/7 @40%, and full vent (12/500/40%/+7)  - RRT called this AM for fever + hypotension, found to have melena and Hgb drop to 6.8, lactate 6.1.    * D/C ASA / Plavix for now    * Transfuse 2U pRBCs, stat    * GI consult    * PPI IV BID    * Remains on vent for now, minimal oxygen requirements; can give IVF as needed to maintain adequate MAP  - Fever: F/U culture results. Given repetitive fevers in the setting of shock, will empirically start cefepime / Flagyl to cover abdominal and respiratory sources.  - Dehydration: Na stable with free water deficit ~4L, c/w free water boluses 250Q6  - Dispo pending medical stability

## 2024-03-28 LAB
-  AZTREONAM: SIGNIFICANT CHANGE UP
-  AZTREONAM: SIGNIFICANT CHANGE UP
-  CEFEPIME: SIGNIFICANT CHANGE UP
-  CEFEPIME: SIGNIFICANT CHANGE UP
-  CEFTAZIDIME/AVIBACTAM: SIGNIFICANT CHANGE UP
-  CEFTAZIDIME: SIGNIFICANT CHANGE UP
-  CEFTAZIDIME: SIGNIFICANT CHANGE UP
-  CEFTOLOZANE/TAZOBACTAM: SIGNIFICANT CHANGE UP
-  CIPROFLOXACIN: SIGNIFICANT CHANGE UP
-  CIPROFLOXACIN: SIGNIFICANT CHANGE UP
-  IMIPENEM: SIGNIFICANT CHANGE UP
-  IMIPENEM: SIGNIFICANT CHANGE UP
-  LEVOFLOXACIN: SIGNIFICANT CHANGE UP
-  LEVOFLOXACIN: SIGNIFICANT CHANGE UP
-  MEROPENEM: SIGNIFICANT CHANGE UP
-  MEROPENEM: SIGNIFICANT CHANGE UP
-  PIPERACILLIN/TAZOBACTAM: SIGNIFICANT CHANGE UP
-  PIPERACILLIN/TAZOBACTAM: SIGNIFICANT CHANGE UP
ANION GAP SERPL CALC-SCNC: 9 MMOL/L — SIGNIFICANT CHANGE UP (ref 5–17)
BLANDM BLD POS QL PROBE: SIGNIFICANT CHANGE UP
BUN SERPL-MCNC: 75 MG/DL — HIGH (ref 7–23)
CALCIUM SERPL-MCNC: 8.4 MG/DL — SIGNIFICANT CHANGE UP (ref 8.4–10.5)
CHLORIDE SERPL-SCNC: 109 MMOL/L — HIGH (ref 96–108)
CO2 SERPL-SCNC: 35 MMOL/L — HIGH (ref 22–31)
CREAT SERPL-MCNC: 0.72 MG/DL — SIGNIFICANT CHANGE UP (ref 0.5–1.3)
CULTURE RESULTS: ABNORMAL
CULTURE RESULTS: NO GROWTH — SIGNIFICANT CHANGE UP
EGFR: 96 ML/MIN/1.73M2 — SIGNIFICANT CHANGE UP
GLUCOSE BLDC GLUCOMTR-MCNC: 160 MG/DL — HIGH (ref 70–99)
GLUCOSE BLDC GLUCOMTR-MCNC: 196 MG/DL — HIGH (ref 70–99)
GLUCOSE BLDC GLUCOMTR-MCNC: 201 MG/DL — HIGH (ref 70–99)
GLUCOSE BLDC GLUCOMTR-MCNC: 216 MG/DL — HIGH (ref 70–99)
GLUCOSE SERPL-MCNC: 211 MG/DL — HIGH (ref 70–99)
HCT VFR BLD CALC: 30.2 % — LOW (ref 39–50)
HCT VFR BLD CALC: 30.3 % — LOW (ref 39–50)
HCT VFR BLD CALC: 31.1 % — LOW (ref 39–50)
HCT VFR BLD CALC: 31.8 % — LOW (ref 39–50)
HGB BLD-MCNC: 8.6 G/DL — LOW (ref 13–17)
HGB BLD-MCNC: 8.8 G/DL — LOW (ref 13–17)
HGB BLD-MCNC: 9.4 G/DL — LOW (ref 13–17)
HGB BLD-MCNC: 9.4 G/DL — LOW (ref 13–17)
LACTATE SERPL-SCNC: 1.9 MMOL/L — SIGNIFICANT CHANGE UP (ref 0.5–2)
MAGNESIUM SERPL-MCNC: 3.3 MG/DL — HIGH (ref 1.6–2.6)
MCHC RBC-ENTMCNC: 24.8 PG — LOW (ref 27–34)
MCHC RBC-ENTMCNC: 25.3 PG — LOW (ref 27–34)
MCHC RBC-ENTMCNC: 25.4 PG — LOW (ref 27–34)
MCHC RBC-ENTMCNC: 25.5 PG — LOW (ref 27–34)
MCHC RBC-ENTMCNC: 28.5 GM/DL — LOW (ref 32–36)
MCHC RBC-ENTMCNC: 29 GM/DL — LOW (ref 32–36)
MCHC RBC-ENTMCNC: 29.6 GM/DL — LOW (ref 32–36)
MCHC RBC-ENTMCNC: 30.2 GM/DL — LOW (ref 32–36)
MCV RBC AUTO: 84.5 FL — SIGNIFICANT CHANGE UP (ref 80–100)
MCV RBC AUTO: 85.5 FL — SIGNIFICANT CHANGE UP (ref 80–100)
MCV RBC AUTO: 87 FL — SIGNIFICANT CHANGE UP (ref 80–100)
MCV RBC AUTO: 87.6 FL — SIGNIFICANT CHANGE UP (ref 80–100)
METHOD TYPE: SIGNIFICANT CHANGE UP
NRBC # BLD: 2 /100 WBCS — HIGH (ref 0–0)
ORGANISM # SPEC MICROSCOPIC CNT: ABNORMAL
PHOSPHATE SERPL-MCNC: 4 MG/DL — SIGNIFICANT CHANGE UP (ref 2.5–4.5)
PLATELET # BLD AUTO: 347 K/UL — SIGNIFICANT CHANGE UP (ref 150–400)
PLATELET # BLD AUTO: 361 K/UL — SIGNIFICANT CHANGE UP (ref 150–400)
PLATELET # BLD AUTO: SIGNIFICANT CHANGE UP K/UL (ref 150–400)
PLATELET # BLD AUTO: SIGNIFICANT CHANGE UP K/UL (ref 150–400)
POTASSIUM SERPL-MCNC: 3.8 MMOL/L — SIGNIFICANT CHANGE UP (ref 3.5–5.3)
POTASSIUM SERPL-SCNC: 3.8 MMOL/L — SIGNIFICANT CHANGE UP (ref 3.5–5.3)
RBC # BLD: 3.46 M/UL — LOW (ref 4.2–5.8)
RBC # BLD: 3.47 M/UL — LOW (ref 4.2–5.8)
RBC # BLD: 3.68 M/UL — LOW (ref 4.2–5.8)
RBC # BLD: 3.72 M/UL — LOW (ref 4.2–5.8)
RBC # FLD: 17.5 % — HIGH (ref 10.3–14.5)
RBC # FLD: 17.8 % — HIGH (ref 10.3–14.5)
RBC # FLD: 18 % — HIGH (ref 10.3–14.5)
RBC # FLD: 18.1 % — HIGH (ref 10.3–14.5)
SODIUM SERPL-SCNC: 153 MMOL/L — HIGH (ref 135–145)
SPECIMEN SOURCE: SIGNIFICANT CHANGE UP
SPECIMEN SOURCE: SIGNIFICANT CHANGE UP
WBC # BLD: 16 K/UL — HIGH (ref 3.8–10.5)
WBC # BLD: 16.07 K/UL — HIGH (ref 3.8–10.5)
WBC # BLD: 16.09 K/UL — HIGH (ref 3.8–10.5)
WBC # BLD: 17.5 K/UL — HIGH (ref 3.8–10.5)
WBC # FLD AUTO: 16 K/UL — HIGH (ref 3.8–10.5)
WBC # FLD AUTO: 16.07 K/UL — HIGH (ref 3.8–10.5)
WBC # FLD AUTO: 16.09 K/UL — HIGH (ref 3.8–10.5)
WBC # FLD AUTO: 17.5 K/UL — HIGH (ref 3.8–10.5)

## 2024-03-28 PROCEDURE — 99232 SBSQ HOSP IP/OBS MODERATE 35: CPT

## 2024-03-28 PROCEDURE — 99232 SBSQ HOSP IP/OBS MODERATE 35: CPT | Mod: GC

## 2024-03-28 RX ORDER — SUCRALFATE 1 G
1 TABLET ORAL EVERY 6 HOURS
Refills: 0 | Status: DISCONTINUED | OUTPATIENT
Start: 2024-03-28 | End: 2024-04-11

## 2024-03-28 RX ORDER — SODIUM CHLORIDE 9 MG/ML
1000 INJECTION, SOLUTION INTRAVENOUS
Refills: 0 | Status: DISCONTINUED | OUTPATIENT
Start: 2024-03-28 | End: 2024-03-29

## 2024-03-28 RX ADMIN — SODIUM CHLORIDE 75 MILLILITER(S): 9 INJECTION, SOLUTION INTRAVENOUS at 11:32

## 2024-03-28 RX ADMIN — CHLORHEXIDINE GLUCONATE 15 MILLILITER(S): 213 SOLUTION TOPICAL at 17:47

## 2024-03-28 RX ADMIN — Medication 4: at 23:39

## 2024-03-28 RX ADMIN — GABAPENTIN 100 MILLIGRAM(S): 400 CAPSULE ORAL at 05:47

## 2024-03-28 RX ADMIN — Medication 5 MILLILITER(S): at 17:47

## 2024-03-28 RX ADMIN — SODIUM CHLORIDE 4 MILLILITER(S): 9 INJECTION INTRAMUSCULAR; INTRAVENOUS; SUBCUTANEOUS at 13:50

## 2024-03-28 RX ADMIN — GABAPENTIN 100 MILLIGRAM(S): 400 CAPSULE ORAL at 21:40

## 2024-03-28 RX ADMIN — Medication 1 APPLICATION(S): at 16:42

## 2024-03-28 RX ADMIN — Medication 1 DROP(S): at 02:14

## 2024-03-28 RX ADMIN — Medication 1 DROP(S): at 05:47

## 2024-03-28 RX ADMIN — SENNA PLUS 10 MILLILITER(S): 8.6 TABLET ORAL at 21:36

## 2024-03-28 RX ADMIN — Medication 2: at 17:48

## 2024-03-28 RX ADMIN — CEFEPIME 100 MILLIGRAM(S): 1 INJECTION, POWDER, FOR SOLUTION INTRAMUSCULAR; INTRAVENOUS at 21:36

## 2024-03-28 RX ADMIN — Medication 500000 UNIT(S): at 11:45

## 2024-03-28 RX ADMIN — Medication 1 DROP(S): at 13:20

## 2024-03-28 RX ADMIN — Medication 1 APPLICATION(S): at 21:36

## 2024-03-28 RX ADMIN — Medication 1 GRAM(S): at 17:47

## 2024-03-28 RX ADMIN — DROXIDOPA 100 MILLIGRAM(S): 100 CAPSULE ORAL at 21:40

## 2024-03-28 RX ADMIN — Medication 4: at 11:53

## 2024-03-28 RX ADMIN — HUMAN INSULIN 7 UNIT(S): 100 INJECTION, SUSPENSION SUBCUTANEOUS at 11:53

## 2024-03-28 RX ADMIN — Medication 1 GRAM(S): at 11:43

## 2024-03-28 RX ADMIN — HUMAN INSULIN 7 UNIT(S): 100 INJECTION, SUSPENSION SUBCUTANEOUS at 23:38

## 2024-03-28 RX ADMIN — CHLORHEXIDINE GLUCONATE 15 MILLILITER(S): 213 SOLUTION TOPICAL at 05:48

## 2024-03-28 RX ADMIN — Medication 2: at 00:07

## 2024-03-28 RX ADMIN — Medication 1 GRAM(S): at 23:38

## 2024-03-28 RX ADMIN — HUMAN INSULIN 7 UNIT(S): 100 INJECTION, SUSPENSION SUBCUTANEOUS at 17:50

## 2024-03-28 RX ADMIN — SODIUM CHLORIDE 4 MILLILITER(S): 9 INJECTION INTRAMUSCULAR; INTRAVENOUS; SUBCUTANEOUS at 05:33

## 2024-03-28 RX ADMIN — Medication 5 MILLILITER(S): at 00:06

## 2024-03-28 RX ADMIN — Medication 500000 UNIT(S): at 23:38

## 2024-03-28 RX ADMIN — Medication 5 MILLILITER(S): at 23:38

## 2024-03-28 RX ADMIN — CHLORHEXIDINE GLUCONATE 1 APPLICATION(S): 213 SOLUTION TOPICAL at 21:36

## 2024-03-28 RX ADMIN — Medication 500000 UNIT(S): at 05:47

## 2024-03-28 RX ADMIN — GABAPENTIN 100 MILLIGRAM(S): 400 CAPSULE ORAL at 13:20

## 2024-03-28 RX ADMIN — CEFEPIME 100 MILLIGRAM(S): 1 INJECTION, POWDER, FOR SOLUTION INTRAMUSCULAR; INTRAVENOUS at 13:13

## 2024-03-28 RX ADMIN — Medication 500000 UNIT(S): at 00:07

## 2024-03-28 RX ADMIN — CEFEPIME 100 MILLIGRAM(S): 1 INJECTION, POWDER, FOR SOLUTION INTRAMUSCULAR; INTRAVENOUS at 05:48

## 2024-03-28 RX ADMIN — SODIUM CHLORIDE 4 MILLILITER(S): 9 INJECTION INTRAMUSCULAR; INTRAVENOUS; SUBCUTANEOUS at 21:10

## 2024-03-28 RX ADMIN — Medication 3 MILLILITER(S): at 05:33

## 2024-03-28 RX ADMIN — Medication 1 DROP(S): at 17:48

## 2024-03-28 RX ADMIN — Medication 3 MILLILITER(S): at 21:10

## 2024-03-28 RX ADMIN — DROXIDOPA 100 MILLIGRAM(S): 100 CAPSULE ORAL at 16:36

## 2024-03-28 RX ADMIN — PANTOPRAZOLE SODIUM 40 MILLIGRAM(S): 20 TABLET, DELAYED RELEASE ORAL at 05:48

## 2024-03-28 RX ADMIN — PANTOPRAZOLE SODIUM 40 MILLIGRAM(S): 20 TABLET, DELAYED RELEASE ORAL at 17:47

## 2024-03-28 RX ADMIN — Medication 1 DROP(S): at 21:36

## 2024-03-28 RX ADMIN — Medication 5 MILLILITER(S): at 11:43

## 2024-03-28 RX ADMIN — Medication 100 MILLIGRAM(S): at 14:33

## 2024-03-28 RX ADMIN — Medication 1 DROP(S): at 09:46

## 2024-03-28 RX ADMIN — Medication 100 MILLIGRAM(S): at 21:40

## 2024-03-28 RX ADMIN — Medication 1 APPLICATION(S): at 06:15

## 2024-03-28 RX ADMIN — Medication 500000 UNIT(S): at 17:47

## 2024-03-28 RX ADMIN — Medication 3 MILLILITER(S): at 13:50

## 2024-03-28 RX ADMIN — DROXIDOPA 100 MILLIGRAM(S): 100 CAPSULE ORAL at 05:47

## 2024-03-28 RX ADMIN — Medication 2: at 06:15

## 2024-03-28 RX ADMIN — Medication 100 MILLIGRAM(S): at 05:49

## 2024-03-28 RX ADMIN — Medication 5 MILLILITER(S): at 05:48

## 2024-03-28 NOTE — PROGRESS NOTE ADULT - SUBJECTIVE AND OBJECTIVE BOX
Chief Complaint:  Patient is a 73y old  Male who presents with a chief complaint of Stroke, critical stenosis, evaluation for angiography (28 Mar 2024 09:05)        Interval Events:   Per primary patient had dark colored/black colored stool overnight x1  Hgb 8.6 from 9.4, 9.4, 8.9 from 5.6 x/p 3 pRBCs  Elevated white count   He was febrile to 101 yesterday     Hospital Medications:  acetaminophen   Oral Liquid .. 1000 milliGRAM(s) Enteral Tube every 6 hours PRN  albuterol/ipratropium for Nebulization 3 milliLiter(s) Nebulizer every 8 hours  artificial tears (preservative free) Ophthalmic Solution 1 Drop(s) Both EYES every 4 hours  Biotene Dry Mouth Oral Rinse 5 milliLiter(s) Swish and Spit every 6 hours  cefepime   IVPB 1000 milliGRAM(s) IV Intermittent every 8 hours  cefepime   IVPB      chlorhexidine 0.12% Liquid 15 milliLiter(s) Oral Mucosa every 12 hours  chlorhexidine 4% Liquid 1 Application(s) Topical daily  dextrose 5%. 1000 milliLiter(s) IV Continuous <Continuous>  droxidopa 100 milliGRAM(s) Oral <User Schedule>  gabapentin Solution 100 milliGRAM(s) Enteral Tube every 8 hours  insulin lispro (ADMELOG) corrective regimen sliding scale   SubCutaneous every 6 hours  insulin NPH human recombinant 7 Unit(s) SubCutaneous every 6 hours  metroNIDAZOLE  IVPB      metroNIDAZOLE  IVPB 500 milliGRAM(s) IV Intermittent every 8 hours  nystatin    Suspension [id number] Unit(s) Oral every 6 hours  pantoprazole  Injectable 40 milliGRAM(s) IV Push every 12 hours  petrolatum Ophthalmic Ointment 1 Application(s) Both EYES every 8 hours  senna Syrup 10 milliLiter(s) Oral at bedtime  sodium chloride 3%  Inhalation 4 milliLiter(s) Inhalation every 8 hours  sucralfate suspension 1 Gram(s) Oral every 6 hours      ROS:   Unable to obtain due to patient's condition.    PHYSICAL EXAM:   Vital Signs:  Vital Signs Last 24 Hrs  T(C): 37 (28 Mar 2024 07:50), Max: 38.9 (27 Mar 2024 13:25)  T(F): 98.6 (28 Mar 2024 07:50), Max: 102 (27 Mar 2024 13:25)  HR: 91 (28 Mar 2024 12:01) (80 - 103)  BP: 118/66 (28 Mar 2024 07:50) (95/53 - 129/54)  BP(mean): 82 (27 Mar 2024 16:00) (82 - 82)  RR: 12 (28 Mar 2024 07:50) (10 - 19)  SpO2: 100% (28 Mar 2024 12:01) (97% - 100%)    Parameters below as of 28 Mar 2024 07:50      O2 Concentration (%): 40  Daily Height in cm: 187.96 (27 Mar 2024 16:00)    Daily     GENERAL: lethargic appearing  NEURO: aox0  HEENT: anicteric sclera, no conjunctival pallor appreciated  CHEST: trach in place  CARDIAC: tachycardic  ABDOMEN: soft, non-tender, non-distended, no rebound or guarding  EXTREMITIES: warm, well perfused, no edema  SKIN: no lesions noted    LABS:                          8.6    16.09 )-----------( 361      ( 28 Mar 2024 11:50 )             30.2     03-28    153<H>  |  109<H>  |  75<H>  ----------------------------<  211<H>  3.8   |  35<H>  |  0.72    Ca    8.4      28 Mar 2024 06:37  Phos  4.0     03-28  Mg     3.3     03-28    TPro  6.6  /  Alb  2.5<L>  /  TBili  0.2  /  DBili  x   /  AST  38  /  ALT  61<H>  /  AlkPhos  112  03-27    LIVER FUNCTIONS - ( 27 Mar 2024 08:42 )  Alb: 2.5 g/dL / Pro: 6.6 g/dL / ALK PHOS: 112 U/L / ALT: 61 U/L / AST: 38 U/L / GGT: x             Interval Diagnostic Studies:   No new imaging.

## 2024-03-28 NOTE — PROGRESS NOTE ADULT - ASSESSMENT
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 RRT today for hypotension , AMS and melena     #Melena   #Portal vein gas  Would suspect iatrogenic given his recent EGD for PEG placement (EGD 2/2024 normal findings) but cannot r/o PUD as a potential cause given his drop in hbg and reported melena, vs , erosive esophagitis/gastropathy, angiectasias, [place].   - s/p EGD without duodenal erosions, but no active bleeding or findings to explain bleeding. Technically difficult EGD given narrowing in upper esophagus.    #Hypernatremic   #SIR/Septic shock   #Hx of CVA  # Ding Serrano variant of GBS and started on trial of DAPT now s/p 5 rounds of plasma exchange (2/13-2/20) and IVIG x 5 ( 2/21-2/26)    Recommendations:  - Appropriate response to pRBCs, febrile  - No additional intervention from GI at this time  - Fever with altered mental status, defer work up to primary  - f/u BCx, UCx  - PPI 40 daily  - If hgb remains stable can resume TFs  - If Ongoing drop in hgb with overt bleeding and instability, consult MICU, obtain stat CTA and consult IR  - Maintain working IV access  - BID CBC  - REst of care per primary    Preliminary note until signed by Attending.    Thank you for involving us in this patient's care. Please reach out if any further questions or concerns.    Molly Harris MD  Gastroenterology/Hepatology Fellow, PGY-7    NON-URGENT CONSULTS:  Please email [email or web address]  [email or web address]  AT NIGHT AND ON WEEKENDS:  Contact on-call GI fellow via answering service ([phone]) from 5pm-8am and on weekends/holidays  MONDAY-FRIDAY 8AM-5PM:  Pager: [phone] ([facility])  Pager: [phone] ([facility]

## 2024-03-28 NOTE — PROGRESS NOTE ADULT - NS ATTEND AMEND GEN_ALL_CORE FT
Agree with above  73M PMH hypertension, DM2, H/O prior CVAs w/o residual deficits initially p/w CVA-like symptoms with imaging c/w basilar and L PCA stenosis, in the setting of recent viral URI. 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 s/p PLEX / IVIg with progressive respiratory failure requiring intubation and failure to wean, now s/p trache / PEG  - Remains trache to vent, transitioning between PS 10/7 @40%, and full vent (12/500/40%/+7)  - s/p 3/0/0 and 2L IVF, BP ok, Hgb stable (8.9 -> 9.4 -> 9.4), melena overnight  - Now s/p EGD, which showed duodenal erosions. c/w PPI and add sucralfate  - Remains on vent for now, minimal oxygen requirements; can give IVF as needed to maintain adequate MAP  - Fever: Sputum culture positive for Pseudomonas. On empiric cefepime and Flagyl.  - Dehydration: Na 153 with free water deficit 4.7L, c/w free water boluses 350cc Q4  - Dispo pending medical stability. Agree with above  73M PMH hypertension, DM2, H/O prior CVAs w/o residual deficits initially p/w CVA-like symptoms with imaging c/w basilar and L PCA stenosis, in the setting of recent viral URI. 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 s/p PLEX / IVIg with progressive respiratory failure requiring intubation and failure to wean, now s/p trache / PEG  - Remains trache to vent, transitioning between PS 10/7 @40%, and full vent (12/500/40%/+7)  - s/p 3/0/0 and 2L IVF, BP ok, Hgb stable (8.9 -> 9.4 -> 9.4), melena overnight, but no evidence of acute ongoing bleeding, lactate cleared.  - Now s/p EGD, which showed duodenal erosions. c/w PPI and add sucralfate  - Remains on vent for now, minimal oxygen requirements; can give IVF as needed to maintain adequate MAP  - Fever: Sputum culture positive for Pseudomonas. On empiric cefepime and Flagyl.  - Dehydration: Na 153 with free water deficit 4.7L, c/w free water boluses 350cc Q4  - Dispo pending medical stability.

## 2024-03-28 NOTE — PROGRESS NOTE ADULT - PROBLEM SELECTOR PLAN 1
- Patient with large Melenaotic Episodes on 3/27 w/ significant drop in H+H  - Was Transfused 3 Units of PRBCs  - EGD 3/27: Technically Limited Study, + Duodenal Erosions but no evidence of Active Bleed    - Continue Protonix 40 mg q 12 hrs IVP, Sucralfate q 6hrs added   - Patient with 1 Reported Melanotic stool over night    - Case d/w GI this morning can trial TFs as H+H has remained stable   - If patient develops decreasing H+H or HD instability will obtain STAT CTA and contact IR   - Will decrease CBC Frequency to q 12 hrs   - Patient is Jehovas Witness However Family is in agreement for administration of blood products ( Per Wife)

## 2024-03-28 NOTE — PROGRESS NOTE ADULT - ATTENDING COMMENTS
GI bleeding  No bleeding source on yesterday's EGD  Would resume feeds  Rec CT angio if bleeding recurs

## 2024-03-28 NOTE — PROVIDER CONTACT NOTE (OTHER) - ACTION/TREATMENT ORDERED:
MD made aware, will continue to monitor, hold feeds for the night, continue with free water, no IV fluids for now, VS q4hs

## 2024-03-28 NOTE — PROVIDER CONTACT NOTE (OTHER) - ASSESSMENT
Patient is on full vent support.
VSS
Pt  with change in exam. Pt with no movement on upper exterminates and no movement on BLE.
Pt  with change in exam. Pt with no movement on upper exterminates and not following commands on BLE.
See flowsheet.
Patient is able to follow command and participates in neurological assessment
/69  Sp02 98     Temp 101.7
Patient is able to follow command and participates in neurological assessment. Pt with no cough/gag, Pt not overbreathing Vent, No positive carinal on L eye.
Pt minimally responsive
/106, repeated 219/108, manual 210/64 BP, Sp02 98, pt mucus plugged and destated 85, ambu bag the pt and suctioned

## 2024-03-28 NOTE — PROVIDER CONTACT NOTE (OTHER) - BACKGROUND
73 year old male admitted for cerebral infarction
Patient with past medical history of stroke, hypertension, DM2 and hyperlipidemia
Patient with past medical history of stroke, hypertension, DM2 and hyperlipidemia
73 year old male admitted for cerebral infarction
Patient with past medical history of stroke, hypertension, DM2 and hyperlipidemia
Pt being treated for GBS
DX: GBS PLEX treatment in progress.  Basilar artery stenosis.
73 year old male admitted for cerebral infarction
CVA DM2
Patient with past medical history of stroke, hypertension, DM2 and hyperlipidemia

## 2024-03-28 NOTE — PROVIDER CONTACT NOTE (OTHER) - SITUATION
pt had temp of 101.7,
Temp 100.7 rectal
Patient being transported to MRI with transport, respiratory and Teresa Schafer (MD) and patients BP is 86/56 with a MAP of 58mmhg
Pt  with change in exam. Pt with no movement on upper exterminates and no movement on BLE.
Pt  with change in exam. Pt with no movement on upper exterminates and not following commands on BLE.
pt transferred from ICU, RN assessed initial vitals and got 209/106, SP02 98% full vent support, oral temp 99.4, HR 77, manual /64, pt diaphoresis, , pt mucus plugged and destated 85
Pt wife refusing 2400 blood glucose testing, family member wants blood drawn from [employer].
Pt with no cough/gag, Pt not overbreathing Vent, No positive carinal on L eye.
pt had 1 episode of dark/black colored stool during the night
Pt not following on B/L UE

## 2024-03-28 NOTE — PROGRESS NOTE ADULT - ASSESSMENT
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 Prior to admission patient had Viral URI ( 1-2 weeks prior to admission) with subsequent weakness. He underwent an angiogram (2/11) which showed moderate stenosis and no intervention was done. MRI Performed c/w small bilateral cerebellar strokes and prior L thalamic strokes, as per Neurology was not c/w current presentation. 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 He is currently s/p 5 rounds of plasma exchange (2/13-2/20) and IVIG x 5 ( 2/21-2/26)  His hospital course was complicated by progressive respiratory failure. CT Chest performed on 2/20 with atelectasis of b/l lower lobes. BAL 2/21 Grew PSA treated with course of Zosyn (2/21-2/28).  Patient transferred to RCU on 2/24. RRT called on 3/3 for unresponsiveness. Patient with unreactive pupils, no corneal reflex. Also found to be hypotensive and febrile to 105. Noted to have new melena. Hgb 5. Given 1 unit of pRBC. Transferred to MICU. patient required pressors while in the MICU, BP improved and was transitioned to Droxidopa. Patient had CT C/A/P consistent with Bibasilar pneumonia; Sputum cx grew PSA and was treated with course of Meropenem. Patient evaluated by GI in setting of Melena, transaminitis and portal venous gas on CT imaging. No EGD performed as Melena resolved, Transaminitis was thought to be in setting of shock and portal venous gas was thought to be in setting of recent PEG. EEG was performed in setting of AMS no seizures noted. Repeat MRI Performed which showed Small scattered foci of diffusion restriction within the right cerebellar hemisphere, left parietal lobe, and symmetrically in the bilateral basal ganglia. Neuro recommended Resumption of ASA in setting of possible embolic phenomenon. Repeat ECHO performed RT Ventricular Moderately enlarged, reduced systolic function. Mental status improved and was transferred back to RCU on 3/6.  Cardiology consulted for tachy-dez, unable to catch events on tele, EKG ST otherwise unremarkable, CT Angio negative for PE. Patient with recurrent Melena on 3/27 resulting in RRT For hypotension for which he required 3 units of PRBCs. S/p EGD; No active bleeding noted but found to have duodenal erosions.    3/28: Patient S/p RRT yesterday in setting of Hypotension and Melena. Patient required 3 units of PRBCS fluid Resuscitation and initiation of Droxidopa. EGD performed technically limited study due to narrowing of the upper esophagus but was noted to have duodenal erosions w/o evidence of active bleed. Patient still with some residual Melena this morning but remains Hemodynamically stable with stable H+H case d/w GI can attempt to restart TFs this afternoon. If patient develops decreasing H+H or HD instability will obtain CTA and contact IR as per GI recc. Patient Febrile yesterday placed on Cefepime and Zosyn; blood NGTD, Sputum cx growing few PSA ( Sensitivity pending)

## 2024-03-28 NOTE — PROGRESS NOTE ADULT - PROBLEM SELECTOR PLAN 4
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

## 2024-03-28 NOTE — PROGRESS NOTE ADULT - PROBLEM SELECTOR PLAN 5
- Patient S/p Tracheostomy # 8 Cuffed Portex on 2/16 ( )  - CTA Chest 3/3: Bibasilar Pneumonia; Currently remains on Meropenem   - Currently remains on Mechanical Ventilation: PRVC 500/12/40%/+7  - Will hold on weaning attempts today in setting of recent clinical decompensation   - Pt Has not tolerated trach collar trials as he becomes bradycardic and hypoxic during attempts   - Continue Chest PT and Suctioning PRN

## 2024-03-28 NOTE — PROGRESS NOTE ADULT - PROBLEM SELECTOR PLAN 3
- Patients BP improved today   - S/p 2 liters of Fluids on 3/27  - Will continue Droxidopa 100 mg q 8 hrs

## 2024-03-28 NOTE — PROGRESS NOTE ADULT - ATTENDING SUPERVISION STATEMENT
Resident
Resident
Fellow
Resident
Fellow
Fellow
Resident
Fellow
Resident
Fellow
Fellow
Resident
Fellow
Fellow
Fellow/Student
Resident
Resident/Fellow

## 2024-03-28 NOTE — PROGRESS NOTE ADULT - PROBLEM SELECTOR PLAN 2
- BAL 2/21: PSA; Txd with Zosyn ( 2/21-2/28)  - Sputum 3/4: PSA , Blood cxs 3/3: NGTD  - Txd with course of Meropenem 3/3 --> 3/11  - Patient Febrile yesterday T.max 102  - Repeat Blood cxs 3/26:NGTD  and Urine cxs 3/27: NGTD   - Sputum cx 3/26: Few PSA (Sensitivity Pending)   - Will empirically Continue Cefepime and Flagyl to cover for possible GI Source

## 2024-03-28 NOTE — PROGRESS NOTE ADULT - SUBJECTIVE AND OBJECTIVE BOX
Patient is a 73y old  Male who presents with a chief complaint of Stroke, critical stenosis, evaluation for angiography (27 Mar 2024 14:52)      Interval Events: Patient S/p EGD yesterday evening found to have duodneal erosions but no active source of bleeding. RN Reported one melanotic stool overnight     REVIEW OF SYSTEMS:  [ ] Positive  [ ] All other systems negative  [x] Unable to assess ROS because patient is non-verbal     Vital Signs Last 24 Hrs  T(C): 37.7 (03-28-24 @ 06:00), Max: 38.9 (03-27-24 @ 13:25)  T(F): 99.8 (03-28-24 @ 06:00), Max: 102 (03-27-24 @ 13:25)  HR: 96 (03-28-24 @ 06:01) (80 - 103)  BP: 112/61 (03-28-24 @ 04:20) (89/54 - 129/54)  RR: 12 (03-28-24 @ 04:20) (10 - 19)  SpO2: 99% (03-28-24 @ 06:01) (97% - 100%)    PHYSICAL EXAM:  HEENT:   [x]Tracheostomy: #8 cuffed Portex  [x]Pupils equal; sluggish  [ ]No oral lesions  [ ]Abnormal    SKIN  [x]No Rash  [ ] Abnormal  [ ] pressure    CARDIAC  [x]Regular  [ ]Abnormal    PULMONARY  [x]Bilateral Coarse Breath Sounds  [ ]Normal Excursion  [ ]Abnormal    GI  [x]PEG      [x] +BS		              [x]Soft, nondistended, nontender	  [ ]Abnormal    MUSCULOSKELETAL                                   [x]Bedbound                 [ ]Abnormal    [ ]Ambulatory/OOB to chair                           EXTREMITIES                                         [ ]Normal  [x]Edema - BL UE edema LFT >RT                   NEUROLOGIC  [ ] Normal, non focal  [x] Focal findings: Minimally responsive to noxious stimuli, Not following commands     PSYCHIATRIC  [ ]Alert and appropriate  [x] Sedated	 [ ]Agitated    :  [place]: [ ] Yes, if yes: Date of Placement:                   [x] No    LINES: Central Lines [ ] Yes, if yes: Date of Placement                                     [x] No      HOSPITAL MEDICATIONS:  MEDICATIONS  (STANDING):  albuterol/ipratropium for Nebulization 3 milliLiter(s) Nebulizer every 8 hours  artificial tears (preservative free) Ophthalmic Solution 1 Drop(s) Both EYES every 4 hours  Biotene Dry Mouth Oral Rinse 5 milliLiter(s) Swish and Spit every 6 hours  cefepime   IVPB 1000 milliGRAM(s) IV Intermittent every 8 hours  cefepime   IVPB      chlorhexidine 0.12% Liquid 15 milliLiter(s) Oral Mucosa every 12 hours  chlorhexidine 4% Liquid 1 Application(s) Topical daily  droxidopa 100 milliGRAM(s) Oral <User Schedule>  gabapentin Solution 100 milliGRAM(s) Enteral Tube every 8 hours  insulin lispro (ADMELOG) corrective regimen sliding scale   SubCutaneous every 6 hours  insulin NPH human recombinant 7 Unit(s) SubCutaneous every 6 hours  metroNIDAZOLE  IVPB      metroNIDAZOLE  IVPB 500 milliGRAM(s) IV Intermittent every 8 hours  nystatin    Suspension 534166 Unit(s) Oral every 6 hours  pantoprazole  Injectable 40 milliGRAM(s) IV Push every 12 hours  petrolatum Ophthalmic Ointment 1 Application(s) Both EYES every 8 hours  senna Syrup 10 milliLiter(s) Oral at bedtime  sodium chloride 3%  Inhalation 4 milliLiter(s) Inhalation every 8 hours    MEDICATIONS  (PRN):  acetaminophen   Oral Liquid .. 1000 milliGRAM(s) Enteral Tube every 6 hours PRN Temp greater or equal to 38.5C (101.3F), Mild Pain (1 - 3)      LABS:                        9.4    16.00 )-----------( x        ( 28 Mar 2024 06:37 )             31.8     03-28    153<H>  |  109<H>  |  75<H>  ----------------------------<  211<H>  3.8   |  35<H>  |  0.72    Ca    8.4      28 Mar 2024 06:37  Phos  4.0     03-28  Mg     3.3     03-28    TPro  6.6  /  Alb  2.5<L>  /  TBili  0.2  /  DBili  x   /  AST  38  /  ALT  61<H>  /  AlkPhos  112  03-27    PT/INR - ( 27 Mar 2024 08:42 )   PT: 14.6 sec;   INR: 1.34 ratio         PTT - ( 27 Mar 2024 08:42 )  PTT:28.8 sec  Urinalysis Basic - ( 28 Mar 2024 06:37 )    Color: x / Appearance: x / SG: x / pH: x  Gluc: 211 mg/dL / Ketone: x  / Bili: x / Urobili: x   Blood: x / Protein: x / Nitrite: x   Leuk Esterase: x / RBC: x / WBC x   Sq Epi: x / Non Sq Epi: x / Bacteria: x        Venous Blood Gas:  03-27 @ 08:25  7.39/51/115/31/98.3  VBG Lactate: 4.9    CAPILLARY BLOOD GLUCOSE    MICROBIOLOGY:     RADIOLOGY:  [ ] Reviewed and interpreted by me    Mode: AC/ CMV (Assist Control/ Continuous Mandatory Ventilation)  RR (machine): 12  TV (machine): 500  FiO2: 40  PEEP: 7  ITime: 0.9  MAP: 10  PIP: 20

## 2024-03-28 NOTE — PROVIDER CONTACT NOTE (OTHER) - REASON
Pt not following on B/L UE
pt BP hypertensive 209/106
Pt family refusing blood glucose testing
Temp 100.7 Rect
Hypotension
Pt hypotensive systolic BP 70s-80s
Temp 101.7
pt dark colored stool
Pt with change in exam. Pt with no movement on upper exterminates and not following commands on BLE.
Pt  with change in exam. Pt with no movement on upper exterminates and no movement on BLE.
Pt with no cough/gag, Pt not overbreathing Vent, No positive carinal on L eye.

## 2024-03-28 NOTE — PROVIDER CONTACT NOTE (OTHER) - DATE AND TIME:
27-Mar-2024 07:50
11-Feb-2024 01:00
20-Mar-2024 11:20
10-Feb-2024 11:30
10-Mar-2024 23:30
27-Mar-2024 20:31
23-Feb-2024 19:53
12-Feb-2024 20:45
17-Feb-2024 07:00
12-Feb-2024 20:41
27-Mar-2024 04:21

## 2024-03-28 NOTE — PROVIDER CONTACT NOTE (OTHER) - NAME OF MD/NP/PA/DO NOTIFIED:
Chanelle Verdugo
Jamir Butt
MD David Mcdaniel and TAMARA frye
MD Moon, NAKUL Lerma and TAMARA Arredondo made aware
TAMARA Martin
John Freire
Jovita Cameron
Sarai Lane MD and TAMARA Quintana
MD Rut Prajapati and MD David Mcdaniel and TAMARA Lopez
NAKUL Viveros
Teresa Schafer)

## 2024-03-29 LAB
ANION GAP SERPL CALC-SCNC: 11 MMOL/L — SIGNIFICANT CHANGE UP (ref 5–17)
BUN SERPL-MCNC: 53 MG/DL — HIGH (ref 7–23)
CALCIUM SERPL-MCNC: 8.3 MG/DL — LOW (ref 8.4–10.5)
CHLORIDE SERPL-SCNC: 104 MMOL/L — SIGNIFICANT CHANGE UP (ref 96–108)
CO2 SERPL-SCNC: 32 MMOL/L — HIGH (ref 22–31)
CREAT SERPL-MCNC: 0.56 MG/DL — SIGNIFICANT CHANGE UP (ref 0.5–1.3)
EGFR: 104 ML/MIN/1.73M2 — SIGNIFICANT CHANGE UP
GLUCOSE BLDC GLUCOMTR-MCNC: 190 MG/DL — HIGH (ref 70–99)
GLUCOSE BLDC GLUCOMTR-MCNC: 198 MG/DL — HIGH (ref 70–99)
GLUCOSE BLDC GLUCOMTR-MCNC: 241 MG/DL — HIGH (ref 70–99)
GLUCOSE BLDC GLUCOMTR-MCNC: 247 MG/DL — HIGH (ref 70–99)
GLUCOSE SERPL-MCNC: 229 MG/DL — HIGH (ref 70–99)
HCT VFR BLD CALC: 30.4 % — LOW (ref 39–50)
HGB BLD-MCNC: 8.8 G/DL — LOW (ref 13–17)
MAGNESIUM SERPL-MCNC: 3 MG/DL — HIGH (ref 1.6–2.6)
MCHC RBC-ENTMCNC: 25.1 PG — LOW (ref 27–34)
MCHC RBC-ENTMCNC: 28.9 GM/DL — LOW (ref 32–36)
MCV RBC AUTO: 86.6 FL — SIGNIFICANT CHANGE UP (ref 80–100)
NRBC # BLD: 2 /100 WBCS — HIGH (ref 0–0)
PHOSPHATE SERPL-MCNC: 2.2 MG/DL — LOW (ref 2.5–4.5)
PLATELET # BLD AUTO: SIGNIFICANT CHANGE UP K/UL (ref 150–400)
POTASSIUM SERPL-MCNC: 3 MMOL/L — LOW (ref 3.5–5.3)
POTASSIUM SERPL-SCNC: 3 MMOL/L — LOW (ref 3.5–5.3)
PROCALCITONIN SERPL-MCNC: 0.91 NG/ML — HIGH (ref 0.02–0.1)
RBC # BLD: 3.51 M/UL — LOW (ref 4.2–5.8)
RBC # FLD: 18.3 % — HIGH (ref 10.3–14.5)
SODIUM SERPL-SCNC: 147 MMOL/L — HIGH (ref 135–145)
WBC # BLD: 13.99 K/UL — HIGH (ref 3.8–10.5)
WBC # FLD AUTO: 13.99 K/UL — HIGH (ref 3.8–10.5)

## 2024-03-29 PROCEDURE — 99232 SBSQ HOSP IP/OBS MODERATE 35: CPT

## 2024-03-29 RX ORDER — SODIUM,POTASSIUM PHOSPHATES 278-250MG
1 POWDER IN PACKET (EA) ORAL EVERY 6 HOURS
Refills: 0 | Status: COMPLETED | OUTPATIENT
Start: 2024-03-29 | End: 2024-03-29

## 2024-03-29 RX ORDER — POTASSIUM CHLORIDE 20 MEQ
40 PACKET (EA) ORAL EVERY 4 HOURS
Refills: 0 | Status: COMPLETED | OUTPATIENT
Start: 2024-03-29 | End: 2024-03-29

## 2024-03-29 RX ORDER — POTASSIUM CHLORIDE 20 MEQ
10 PACKET (EA) ORAL
Refills: 0 | Status: COMPLETED | OUTPATIENT
Start: 2024-03-29 | End: 2024-03-29

## 2024-03-29 RX ADMIN — Medication 1 GRAM(S): at 11:38

## 2024-03-29 RX ADMIN — PANTOPRAZOLE SODIUM 40 MILLIGRAM(S): 20 TABLET, DELAYED RELEASE ORAL at 05:06

## 2024-03-29 RX ADMIN — Medication 1 APPLICATION(S): at 05:07

## 2024-03-29 RX ADMIN — Medication 2: at 11:43

## 2024-03-29 RX ADMIN — Medication 500000 UNIT(S): at 11:36

## 2024-03-29 RX ADMIN — Medication 1 DROP(S): at 14:25

## 2024-03-29 RX ADMIN — SODIUM CHLORIDE 75 MILLILITER(S): 9 INJECTION, SOLUTION INTRAVENOUS at 06:49

## 2024-03-29 RX ADMIN — Medication 1 TABLET(S): at 11:36

## 2024-03-29 RX ADMIN — PANTOPRAZOLE SODIUM 40 MILLIGRAM(S): 20 TABLET, DELAYED RELEASE ORAL at 17:54

## 2024-03-29 RX ADMIN — Medication 1 TABLET(S): at 10:33

## 2024-03-29 RX ADMIN — Medication 2: at 17:55

## 2024-03-29 RX ADMIN — CHLORHEXIDINE GLUCONATE 15 MILLILITER(S): 213 SOLUTION TOPICAL at 17:54

## 2024-03-29 RX ADMIN — HUMAN INSULIN 7 UNIT(S): 100 INJECTION, SUSPENSION SUBCUTANEOUS at 11:43

## 2024-03-29 RX ADMIN — CEFEPIME 100 MILLIGRAM(S): 1 INJECTION, POWDER, FOR SOLUTION INTRAMUSCULAR; INTRAVENOUS at 21:19

## 2024-03-29 RX ADMIN — GABAPENTIN 100 MILLIGRAM(S): 400 CAPSULE ORAL at 21:19

## 2024-03-29 RX ADMIN — HUMAN INSULIN 7 UNIT(S): 100 INJECTION, SUSPENSION SUBCUTANEOUS at 17:55

## 2024-03-29 RX ADMIN — CEFEPIME 100 MILLIGRAM(S): 1 INJECTION, POWDER, FOR SOLUTION INTRAMUSCULAR; INTRAVENOUS at 05:05

## 2024-03-29 RX ADMIN — Medication 1 DROP(S): at 05:06

## 2024-03-29 RX ADMIN — Medication 5 MILLILITER(S): at 17:54

## 2024-03-29 RX ADMIN — CEFEPIME 100 MILLIGRAM(S): 1 INJECTION, POWDER, FOR SOLUTION INTRAMUSCULAR; INTRAVENOUS at 14:24

## 2024-03-29 RX ADMIN — Medication 3 MILLILITER(S): at 21:28

## 2024-03-29 RX ADMIN — Medication 500000 UNIT(S): at 17:56

## 2024-03-29 RX ADMIN — Medication 1 GRAM(S): at 17:56

## 2024-03-29 RX ADMIN — Medication 40 MILLIEQUIVALENT(S): at 10:33

## 2024-03-29 RX ADMIN — Medication 4: at 05:22

## 2024-03-29 RX ADMIN — Medication 5 MILLILITER(S): at 05:06

## 2024-03-29 RX ADMIN — Medication 5 MILLILITER(S): at 23:25

## 2024-03-29 RX ADMIN — Medication 5 MILLILITER(S): at 11:36

## 2024-03-29 RX ADMIN — SODIUM CHLORIDE 4 MILLILITER(S): 9 INJECTION INTRAMUSCULAR; INTRAVENOUS; SUBCUTANEOUS at 21:28

## 2024-03-29 RX ADMIN — Medication 3 MILLILITER(S): at 05:05

## 2024-03-29 RX ADMIN — Medication 3 MILLILITER(S): at 14:23

## 2024-03-29 RX ADMIN — Medication 1 GRAM(S): at 05:06

## 2024-03-29 RX ADMIN — SODIUM CHLORIDE 4 MILLILITER(S): 9 INJECTION INTRAMUSCULAR; INTRAVENOUS; SUBCUTANEOUS at 14:23

## 2024-03-29 RX ADMIN — GABAPENTIN 100 MILLIGRAM(S): 400 CAPSULE ORAL at 14:24

## 2024-03-29 RX ADMIN — Medication 1 APPLICATION(S): at 14:25

## 2024-03-29 RX ADMIN — CHLORHEXIDINE GLUCONATE 1 APPLICATION(S): 213 SOLUTION TOPICAL at 21:31

## 2024-03-29 RX ADMIN — Medication 1 DROP(S): at 02:24

## 2024-03-29 RX ADMIN — SODIUM CHLORIDE 4 MILLILITER(S): 9 INJECTION INTRAMUSCULAR; INTRAVENOUS; SUBCUTANEOUS at 05:05

## 2024-03-29 RX ADMIN — DROXIDOPA 100 MILLIGRAM(S): 100 CAPSULE ORAL at 21:18

## 2024-03-29 RX ADMIN — DROXIDOPA 100 MILLIGRAM(S): 100 CAPSULE ORAL at 14:24

## 2024-03-29 RX ADMIN — Medication 100 MILLIEQUIVALENT(S): at 11:38

## 2024-03-29 RX ADMIN — Medication 40 MILLIEQUIVALENT(S): at 14:24

## 2024-03-29 RX ADMIN — GABAPENTIN 100 MILLIGRAM(S): 400 CAPSULE ORAL at 05:09

## 2024-03-29 RX ADMIN — DROXIDOPA 100 MILLIGRAM(S): 100 CAPSULE ORAL at 05:06

## 2024-03-29 RX ADMIN — Medication 500000 UNIT(S): at 23:25

## 2024-03-29 RX ADMIN — Medication 1 GRAM(S): at 23:25

## 2024-03-29 RX ADMIN — CHLORHEXIDINE GLUCONATE 15 MILLILITER(S): 213 SOLUTION TOPICAL at 05:06

## 2024-03-29 RX ADMIN — Medication 100 MILLIGRAM(S): at 21:19

## 2024-03-29 RX ADMIN — Medication 100 MILLIEQUIVALENT(S): at 10:33

## 2024-03-29 RX ADMIN — Medication 500000 UNIT(S): at 05:06

## 2024-03-29 RX ADMIN — Medication 1 DROP(S): at 17:55

## 2024-03-29 RX ADMIN — HUMAN INSULIN 7 UNIT(S): 100 INJECTION, SUSPENSION SUBCUTANEOUS at 05:22

## 2024-03-29 RX ADMIN — Medication 1 DROP(S): at 21:19

## 2024-03-29 RX ADMIN — Medication 100 MILLIGRAM(S): at 14:24

## 2024-03-29 RX ADMIN — Medication 100 MILLIGRAM(S): at 05:21

## 2024-03-29 RX ADMIN — Medication 1 DROP(S): at 10:33

## 2024-03-29 RX ADMIN — Medication 4: at 23:25

## 2024-03-29 RX ADMIN — HUMAN INSULIN 7 UNIT(S): 100 INJECTION, SUSPENSION SUBCUTANEOUS at 23:25

## 2024-03-29 RX ADMIN — Medication 1 APPLICATION(S): at 21:19

## 2024-03-29 NOTE — PROVIDER CONTACT NOTE (CRITICAL VALUE NOTIFICATION) - ACTION/TREATMENT ORDERED:
no new orders at this time
Two units of PRBC infused as ordered
Labs obtained during rapid response activatiion
PA notified. No further recs noted.

## 2024-03-29 NOTE — PROGRESS NOTE ADULT - SUBJECTIVE AND OBJECTIVE BOX
Patient is a 73y old  Male who presents with a chief complaint of Stroke, critical stenosis, evaluation for angiography (28 Mar 2024 12:16)    HPI:  73 years old RH male with h/o HTN, HLD, DM, h/o CVA x 2 (no residual deficits) initially presented to [facility] ED with unsteady gait that began on 2/7 (exact LKW unknown) and L facial droop that began 6 AM on 2/9 (went to bed 11 PM on 2/8 without facial droop). No slurred speech, vision changes. Initial NIHSS 0 at [facility]. Hemodynamically stable, CT head with no acute pathology. Chronic left thalamic lacunar infarct. CTA with no large vessel occlusion. 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 CT perfusion with no acute abnormality.     Pt transfered from [facility] to [facility] due to concern for neurologic deterioration i/s/o aforementioned high grade proximal basiliar and L PCA stenosis. On arrival to [facility] ED, initial NIHSS 4 (+1 for LLE drift, +2 for b/l limb dysmetria, +1 for mild dysarthria). Later, pt started to have difficulty clearing secretions, became stridorous, and started to desaturate, so decision was made to intubate patient. Once pt medically stabilized, taken to angio suite by IR. Unable to obtain further history from patient at [facility] due to intubation/sedation (09 Feb 2024 20:27)    Interval Events:    REVIEW OF SYSTEMS:  [ ] Positive  [ ] All other systems negative  [ ] Unable to assess ROS because ________    Vital Signs Last 24 Hrs  T(C): 36.8 (03-29-24 @ 06:00), Max: 37.5 (03-28-24 @ 17:00)  T(F): 98.2 (03-29-24 @ 06:00), Max: 99.5 (03-28-24 @ 17:00)  HR: 86 (03-29-24 @ 07:42) (83 - 95)  BP: 104/55 (03-29-24 @ 06:00) (104/55 - 121/64)  RR: 12 (03-29-24 @ 06:00) (12 - 19)  SpO2: 100% (03-29-24 @ 07:42) (98% - 100%)    PHYSICAL EXAM:  HEENT:   [ ]Tracheostomy:  [ ]Pupils equal  [ ]No oral lesions  [ ]Abnormal    SKIN  [ ] No Rash  [ ] Abnormal  [ ] pressure    CARDIAC  [ ]Regular  [ ]Abnormal    PULMONARY  [ ]Bilateral Clear Breath Sounds  [ ]Normal Excursion  [ ]Abnormal    GI  [ ]PEG      [ ] +BS		              [ ]Soft, nondistended, nontender	  [ ]Abnormal    MUSCULOSKELETAL                                   [ ]Bedbound                 [ ]Abnormal    [ ]Ambulatory/OOB to chair                           EXTREMITIES                                         [ ]Normal  [ ]Edema                           NEUROLOGIC  [ ] Normal, non focal  [ ] Focal findings:    PSYCHIATRIC  [ ]Alert and appropriate  [ ] Sedated	 [ ]Agitated    :  [place]: [ ] Yes, if yes: Date of Placement:                   [  ] No    LINES: Central Lines [ ] Yes, if yes: Date of Placement                                     [  ] No    HOSPITAL MEDICATIONS:  MEDICATIONS  (STANDING):  albuterol/ipratropium for Nebulization 3 milliLiter(s) Nebulizer every 8 hours  artificial tears (preservative free) Ophthalmic Solution 1 Drop(s) Both EYES every 4 hours  Biotene Dry Mouth Oral Rinse 5 milliLiter(s) Swish and Spit every 6 hours  cefepime   IVPB 1000 milliGRAM(s) IV Intermittent every 8 hours  cefepime   IVPB      chlorhexidine 0.12% Liquid 15 milliLiter(s) Oral Mucosa every 12 hours  chlorhexidine 4% Liquid 1 Application(s) Topical daily  dextrose 5%. 1000 milliLiter(s) (75 mL/Hr) IV Continuous <Continuous>  droxidopa 100 milliGRAM(s) Oral <User Schedule>  gabapentin Solution 100 milliGRAM(s) Enteral Tube every 8 hours  insulin lispro (ADMELOG) corrective regimen sliding scale   SubCutaneous every 6 hours  insulin NPH human recombinant 7 Unit(s) SubCutaneous every 6 hours  metroNIDAZOLE  IVPB 500 milliGRAM(s) IV Intermittent every 8 hours  metroNIDAZOLE  IVPB      nystatin    Suspension 386302 Unit(s) Oral every 6 hours  pantoprazole  Injectable 40 milliGRAM(s) IV Push every 12 hours  petrolatum Ophthalmic Ointment 1 Application(s) Both EYES every 8 hours  senna Syrup 10 milliLiter(s) Oral at bedtime  sodium chloride 3%  Inhalation 4 milliLiter(s) Inhalation every 8 hours  sucralfate suspension 1 Gram(s) Oral every 6 hours    MEDICATIONS  (PRN):  acetaminophen   Oral Liquid .. 1000 milliGRAM(s) Enteral Tube every 6 hours PRN Temp greater or equal to 38.5C (101.3F), Mild Pain (1 - 3)      LABS:                        8.8    16.07 )-----------( 347      ( 28 Mar 2024 17:42 )             30.3     03-28    153<H>  |  109<H>  |  75<H>  ----------------------------<  211<H>  3.8   |  35<H>  |  0.72    Ca    8.4      28 Mar 2024 06:37  Phos  4.0     03-28  Mg     3.3     03-28    TPro  6.6  /  Alb  2.5<L>  /  TBili  0.2  /  DBili  x   /  AST  38  /  ALT  61<H>  /  AlkPhos  112  03-27    PT/INR - ( 27 Mar 2024 08:42 )   PT: 14.6 sec;   INR: 1.34 ratio         PTT - ( 27 Mar 2024 08:42 )  PTT:28.8 sec    Venous Blood Gas:  03-27 @ 08:25  7.39/51/115/31/98.3  VBG Lactate: 4.9      Mode: CPAP with PS  FiO2: 40  PEEP: 7  PS: 10  MAP: 12  PIP: 18     Patient is a 73y old  Male who presents with a chief complaint of Stroke, critical stenosis, evaluation for angiography (28 Mar 2024 12:16)    HPI:  73 years old RH male with h/o HTN, HLD, DM, h/o CVA x 2 (no residual deficits) initially presented to [facility] ED with unsteady gait that began on 2/7 (exact LKW unknown) and L facial droop that began 6 AM on 2/9 (went to bed 11 PM on 2/8 without facial droop). No slurred speech, vision changes. Initial NIHSS 0 at [facility]. Hemodynamically stable, CT head with no acute pathology. Chronic left thalamic lacunar infarct. CTA with no large vessel occlusion. 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 CT perfusion with no acute abnormality.     Pt transfered from [facility] to [facility] due to concern for neurologic deterioration i/s/o aforementioned high grade proximal basiliar and L PCA stenosis. On arrival to [facility] ED, initial NIHSS 4 (+1 for LLE drift, +2 for b/l limb dysmetria, +1 for mild dysarthria). Later, pt started to have difficulty clearing secretions, became stridorous, and started to desaturate, so decision was made to intubate patient. Once pt medically stabilized, taken to angio suite by IR. Unable to obtain further history from patient at [facility] due to intubation/sedation (09 Feb 2024 20:27)    Interval Events: No new issues overnight    REVIEW OF SYSTEMS:  [ ] Positive  [ ] All other systems negative  [ x] Unable to assess ROS because patient is NON-verbal    Vital Signs Last 24 Hrs  T(C): 36.8 (03-29-24 @ 06:00), Max: 37.5 (03-28-24 @ 17:00)  T(F): 98.2 (03-29-24 @ 06:00), Max: 99.5 (03-28-24 @ 17:00)  HR: 86 (03-29-24 @ 07:42) (83 - 95)  BP: 104/55 (03-29-24 @ 06:00) (104/55 - 121/64)  RR: 12 (03-29-24 @ 06:00) (12 - 19)  SpO2: 100% (03-29-24 @ 07:42) (98% - 100%)    PHYSICAL EXAM:  HEENT:   [x]Tracheostomy: #8 cuffed Portex  [x]Pupils equal; sluggish  [ ]No oral lesions  [ ]Abnormal    SKIN  [x]No Rash  [ ] Abnormal  [ ] pressure    CARDIAC  [x]Regular  [ ]Abnormal    PULMONARY  [x]Bilateral Coarse Breath Sounds  [ ]Normal Excursion  [ ]Abnormal    GI  [x]PEG      [x] +BS		              [x]Soft, nondistended, nontender	  [ ]Abnormal    MUSCULOSKELETAL                                   [x]Bedbound                 [ ]Abnormal    [ ]Ambulatory/OOB to chair                           EXTREMITIES                                         [ ]Normal  [x]Edema - BL UE edema LFT >RT                   NEUROLOGIC  [ ] Normal, non focal  [x] Focal findings: Minimally responsive to noxious stimuli, Not following commands     PSYCHIATRIC  [ ]Alert and appropriate  [x] Sedated	 [ ]Agitated    :  [place]: [ ] Yes, if yes: Date of Placement:                   [x] No    LINES: Central Lines [ ] Yes, if yes: Date of Placement                                     [x] No    HOSPITAL MEDICATIONS:  MEDICATIONS  (STANDING):  albuterol/ipratropium for Nebulization 3 milliLiter(s) Nebulizer every 8 hours  artificial tears (preservative free) Ophthalmic Solution 1 Drop(s) Both EYES every 4 hours  Biotene Dry Mouth Oral Rinse 5 milliLiter(s) Swish and Spit every 6 hours  cefepime   IVPB 1000 milliGRAM(s) IV Intermittent every 8 hours  cefepime   IVPB      chlorhexidine 0.12% Liquid 15 milliLiter(s) Oral Mucosa every 12 hours  chlorhexidine 4% Liquid 1 Application(s) Topical daily  dextrose 5%. 1000 milliLiter(s) (75 mL/Hr) IV Continuous <Continuous>  droxidopa 100 milliGRAM(s) Oral <User Schedule>  gabapentin Solution 100 milliGRAM(s) Enteral Tube every 8 hours  insulin lispro (ADMELOG) corrective regimen sliding scale   SubCutaneous every 6 hours  insulin NPH human recombinant 7 Unit(s) SubCutaneous every 6 hours  metroNIDAZOLE  IVPB 500 milliGRAM(s) IV Intermittent every 8 hours  metroNIDAZOLE  IVPB      nystatin    Suspension [id number] Unit(s) Oral every 6 hours  pantoprazole  Injectable 40 milliGRAM(s) IV Push every 12 hours  petrolatum Ophthalmic Ointment 1 Application(s) Both EYES every 8 hours  senna Syrup 10 milliLiter(s) Oral at bedtime  sodium chloride 3%  Inhalation 4 milliLiter(s) Inhalation every 8 hours  sucralfate suspension 1 Gram(s) Oral every 6 hours    MEDICATIONS  (PRN):  acetaminophen   Oral Liquid .. 1000 milliGRAM(s) Enteral Tube every 6 hours PRN Temp greater or equal to 38.5C (101.3F), Mild Pain (1 - 3)      LABS:                        8.8    16.07 )-----------( 347      ( 28 Mar 2024 17:42 )             30.3     03-28    153<H>  |  109<H>  |  75<H>  ----------------------------<  211<H>  3.8   |  35<H>  |  0.72    Ca    8.4      28 Mar 2024 06:37  Phos  4.0     03-28  Mg     3.3     03-28    TPro  6.6  /  Alb  2.5<L>  /  TBili  0.2  /  DBili  x   /  AST  38  /  ALT  61<H>  /  AlkPhos  112  03-27    PT/INR - ( 27 Mar 2024 08:42 )   PT: 14.6 sec;   INR: 1.34 ratio         PTT - ( 27 Mar 2024 08:42 )  PTT:28.8 sec    Venous Blood Gas:  03-27 @ 08:25  7.39/51/115/31/98.3  VBG Lactate: 4.9      Mode: CPAP with PS  FiO2: 40  PEEP: 7  PS: 10  MAP: 12  PIP: 18

## 2024-03-29 NOTE — PROVIDER CONTACT NOTE (CRITICAL VALUE NOTIFICATION) - SITUATION
pt's lactate is 6.1
sputum culture shows few pseudomonas, carbapenem resistant, few normal pseudomonas, normal respiratory maggy

## 2024-03-29 NOTE — PROVIDER CONTACT NOTE (CRITICAL VALUE NOTIFICATION) - BACKGROUND
Patient is a 73y old  Male who presents with a chief complaint of Stroke, critical stenosis, evaluation for angiography

## 2024-03-29 NOTE — PROGRESS NOTE ADULT - PROBLEM SELECTOR PLAN 1
- Patient with large Melanotic Episodes on 3/27 w/ significant drop in H+H  - Was Transfused 3 Units of PRBCs  - EGD 3/27: Technically Limited Study, + Duodenal Erosions but no evidence of Active Bleed    - Continue Protonix 40 mg q 12 hrs IVP, Sucralfate q 6hrs added   - Patient with 1 Reported Melanotic stool over night    - Case d/w GI this morning can trial TFs as H+H has remained stable   - If patient develops decreasing H+H or HD instability will obtain STAT CTA and contact IR   - Will decrease CBC Frequency to q 12 hrs   - Patient is Baptism However Family is in agreement for administration of blood products ( Per Wife)

## 2024-03-29 NOTE — PROVIDER CONTACT NOTE (CRITICAL VALUE NOTIFICATION) - TEST AND RESULT REPORTED:
sputum culture from 3/20 final results-few pseudomoinas aeruginosa, carapenam resistant, multiple morphological strains
From CBC; Hgb is 5.6
From CBC Hgb is 6.8
sputum culture, few pseudomonas, carbapenem resistant, few normal pseudomonas, normal respiratory maggy
lactate 6.1

## 2024-03-29 NOTE — PROGRESS NOTE ADULT - PROBLEM SELECTOR PLAN 5
- Patient S/p Tracheostomy # 8 Cuffed Portex on 2/16 ( Dr. Sood)  - CTA Chest 3/3: Bibasilar Pneumonia; Currently remains on Meropenem   - Currently remains on Mechanical Ventilation: PRVC 500/12/40%/+7  - Will hold on weaning attempts today in setting of recent clinical decompensation   - Pt Has not tolerated trach collar trials as he becomes bradycardic and hypoxic during attempts   - Continue Chest PT and Suctioning PRN

## 2024-03-29 NOTE — PROGRESS NOTE ADULT - NS ATTEND AMEND GEN_ALL_CORE FT
Agree with above  73M PMH hypertension, DM2, H/O prior CVAs w/o residual deficits initially p/w CVA-like symptoms with imaging c/w basilar and L PCA stenosis, in the setting of recent viral URI. 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 s/p PLEX / IVIg with progressive respiratory failure requiring intubation and failure to wean, now s/p trache / PEG  - Remains trache to vent, transitioning between PS 10/7 @40%, and full vent (12/500/40%/+7) overnight to rest.  - –120s on droxydopa, continue current dose  - occasional melena episode, appears to be resolving from prior duodenal erosion given Hgb stable (Hgb 8.8).  - Now s/p EGD, c/w PPI and sucralfate  - Fever: Sputum culture positive for Pseudomonas. On empiric cefepime and Flagyl.  - Dehydration: Na 147, c/w free water boluses 350cc Q4  - Dispo pending medical stability.

## 2024-03-29 NOTE — PROGRESS NOTE ADULT - ASSESSMENT
73 year old male with hypertension, hyperlipidemia, diabetes, prior CVA's without residual deficits who initially presented as a transfer from [place] with concern for CVA in the setting of 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 Prior to admission patient had Viral URI ( 1-2 weeks prior to admission) with subsequent weakness. He underwent an angiogram (2/11) which showed moderate stenosis and no intervention was done. MRI Performed c/w small bilateral cerebellar strokes and prior L thalamic strokes, as per Neurology was not c/w current presentation. 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 He is currently s/p 5 rounds of plasma exchange (2/13-2/20) and IVIG x 5 ( 2/21-2/26)  His hospital course was complicated by progressive respiratory failure. CT Chest performed on 2/20 with atelectasis of b/l lower lobes. BAL 2/21 Grew PSA treated with course of Zosyn (2/21-2/28).  Patient transferred to RCU on 2/24. RRT called on 3/3 for unresponsiveness. Patient with unreactive pupils, no corneal reflex. Also found to be hypotensive and febrile to 105. Noted to have new melena. Hgb 5. Given 1 unit of pRBC. Transferred to MICU. patient required pressors while in the MICU, BP improved and was transitioned to Droxidopa. Patient had CT C/A/P consistent with Bibasilar pneumonia; Sputum cx grew PSA and was treated with course of Meropenem. Patient evaluated by GI in setting of Melena, transaminitis and portal venous gas on CT imaging. No EGD performed as Melena resolved, Transaminitis was thought to be in setting of shock and portal venous gas was thought to be in setting of recent PEG. EEG was performed in setting of AMS no seizures noted. Repeat MRI Performed which showed Small scattered foci of diffusion restriction within the right cerebellar hemisphere, left parietal lobe, and symmetrically in the bilateral basal ganglia. Neuro recommended Resumption of ASA in setting of possible embolic phenomenon. Repeat ECHO performed RT Ventricular Moderately enlarged, reduced systolic function. Mental status improved and was transferred back to RCU on 3/6.  Cardiology consulted for tachy-dez, unable to catch events on tele, EKG ST otherwise unremarkable, CT Angio negative for PE. Patient with recurrent Melena on 3/27 resulting in RRT For hypotension for which he required 3 units of PRBCs. S/p EGD; No active bleeding noted but found to have duodenal erosions.    3/28: Patient S/p RRT yesterday in setting of Hypotension and Melena. Patient required 3 units of PRBCS fluid Resuscitation and initiation of Droxidopa. EGD performed technically limited study due to narrowing of the upper esophagus but was noted to have duodenal erosions w/o evidence of active bleed. Patient still with some residual Melena this morning but remains Hemodynamically stable with stable H+H case d/w GI can attempt to restart TFs this afternoon. If patient develops decreasing H+H or HD instability will obtain CTA and contact IR as per GI recc. Patient Febrile yesterday placed on Cefepime and Zosyn; blood NGTD, Sputum cx growing few PSA ( Sensitivity pending)  73 year old male with hypertension, hyperlipidemia, diabetes, prior CVA's without residual deficits who initially presented as a transfer from [place] with concern for CVA in the setting of 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 Prior to admission patient had Viral URI ( 1-2 weeks prior to admission) with subsequent weakness. He underwent an angiogram (2/11) which showed moderate stenosis and no intervention was done. MRI Performed c/w small bilateral cerebellar strokes and prior L thalamic strokes, as per Neurology was not c/w current presentation. 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 He is currently s/p 5 rounds of plasma exchange (2/13-2/20) and IVIG x 5 ( 2/21-2/26)  His hospital course was complicated by progressive respiratory failure. CT Chest performed on 2/20 with atelectasis of b/l lower lobes. BAL 2/21 Grew PSA treated with course of Zosyn (2/21-2/28).  Patient transferred to RCU on 2/24. RRT called on 3/3 for unresponsiveness. Patient with unreactive pupils, no corneal reflex. Also found to be hypotensive and febrile to 105. Noted to have new melena. Hgb 5. Given 1 unit of pRBC. Transferred to MICU. patient required pressors while in the MICU, BP improved and was transitioned to Droxidopa. Patient had CT C/A/P consistent with Bibasilar pneumonia; Sputum cx grew PSA and was treated with course of Meropenem. Patient evaluated by GI in setting of Melena, transaminitis and portal venous gas on CT imaging. No EGD performed as Melena resolved, Transaminitis was thought to be in setting of shock and portal venous gas was thought to be in setting of recent PEG. EEG was performed in setting of AMS no seizures noted. Repeat MRI Performed which showed Small scattered foci of diffusion restriction within the right cerebellar hemisphere, left parietal lobe, and symmetrically in the bilateral basal ganglia. Neuro recommended Resumption of ASA in setting of possible embolic phenomenon. Repeat ECHO performed RT Ventricular Moderately enlarged, reduced systolic function. Mental status improved and was transferred back to RCU on 3/6.  Cardiology consulted for tachy-dez, unable to catch events on tele, EKG ST otherwise unremarkable, CT Angio negative for PE. Patient with recurrent Melena on 3/27 resulting in RRT For hypotension for which he required 3 units of PRBCs. S/p EGD; No active bleeding noted but found to have duodenal erosions.    3/29: Patient S/p RRT in setting of Hypotension and Melena. Patient required 3 units of PRBCS fluid Resuscitation and initiation of Droxidopa. EGD performed technically limited study due to narrowing of the upper esophagus but was noted to have duodenal erosions w/o evidence of active bleed. Patient remains Hemodynamically stable with stable H+H. If patient develops decreasing H+H or HD instability will obtain CTA and contact IR as per GI recc. Patient Febrile yesterday placed on Cefepime and Zosyn; blood NGTD.

## 2024-03-29 NOTE — INPATIENT CERTIFICATION FOR MEDICARE PATIENTS - IN ORDER TO MEET MEDICARE REQUIREMENTS.
In order to meet Medicare requirements, the clinical documentation must support the information cited in the admission order.  Please be sure to provide detailed and clear documentation about the following in the admitting note/history and physical:
Breath sounds clear and equal bilaterally.

## 2024-03-29 NOTE — PROVIDER CONTACT NOTE (CRITICAL VALUE NOTIFICATION) - ASSESSMENT
sputum culture shows few pseudomonas, carbapenem resistant, few normal pseudomonas, normal respiratory maggy

## 2024-03-30 LAB
ANION GAP SERPL CALC-SCNC: 11 MMOL/L — SIGNIFICANT CHANGE UP (ref 5–17)
BUN SERPL-MCNC: 34 MG/DL — HIGH (ref 7–23)
CALCIUM SERPL-MCNC: 8.9 MG/DL — SIGNIFICANT CHANGE UP (ref 8.4–10.5)
CHLORIDE SERPL-SCNC: 101 MMOL/L — SIGNIFICANT CHANGE UP (ref 96–108)
CO2 SERPL-SCNC: 31 MMOL/L — SIGNIFICANT CHANGE UP (ref 22–31)
CREAT SERPL-MCNC: 0.42 MG/DL — LOW (ref 0.5–1.3)
CULTURE RESULTS: SIGNIFICANT CHANGE UP
EGFR: 114 ML/MIN/1.73M2 — SIGNIFICANT CHANGE UP
GLUCOSE BLDC GLUCOMTR-MCNC: 147 MG/DL — HIGH (ref 70–99)
GLUCOSE BLDC GLUCOMTR-MCNC: 190 MG/DL — HIGH (ref 70–99)
GLUCOSE BLDC GLUCOMTR-MCNC: 199 MG/DL — HIGH (ref 70–99)
GLUCOSE BLDC GLUCOMTR-MCNC: 206 MG/DL — HIGH (ref 70–99)
GLUCOSE SERPL-MCNC: 219 MG/DL — HIGH (ref 70–99)
HCT VFR BLD CALC: 31.3 % — LOW (ref 39–50)
HGB BLD-MCNC: 9.3 G/DL — LOW (ref 13–17)
MAGNESIUM SERPL-MCNC: 2.8 MG/DL — HIGH (ref 1.6–2.6)
MCHC RBC-ENTMCNC: 25.8 PG — LOW (ref 27–34)
MCHC RBC-ENTMCNC: 29.7 GM/DL — LOW (ref 32–36)
MCV RBC AUTO: 86.7 FL — SIGNIFICANT CHANGE UP (ref 80–100)
NRBC # BLD: 3 /100 WBCS — HIGH (ref 0–0)
PHOSPHATE SERPL-MCNC: 2.4 MG/DL — LOW (ref 2.5–4.5)
PLATELET # BLD AUTO: 298 K/UL — SIGNIFICANT CHANGE UP (ref 150–400)
POTASSIUM SERPL-MCNC: 4.1 MMOL/L — SIGNIFICANT CHANGE UP (ref 3.5–5.3)
POTASSIUM SERPL-SCNC: 4.1 MMOL/L — SIGNIFICANT CHANGE UP (ref 3.5–5.3)
RBC # BLD: 3.61 M/UL — LOW (ref 4.2–5.8)
RBC # FLD: 18.1 % — HIGH (ref 10.3–14.5)
SODIUM SERPL-SCNC: 143 MMOL/L — SIGNIFICANT CHANGE UP (ref 135–145)
SPECIMEN SOURCE: SIGNIFICANT CHANGE UP
WBC # BLD: 12.77 K/UL — HIGH (ref 3.8–10.5)
WBC # FLD AUTO: 12.77 K/UL — HIGH (ref 3.8–10.5)

## 2024-03-30 PROCEDURE — 99233 SBSQ HOSP IP/OBS HIGH 50: CPT

## 2024-03-30 RX ADMIN — DROXIDOPA 100 MILLIGRAM(S): 100 CAPSULE ORAL at 22:46

## 2024-03-30 RX ADMIN — GABAPENTIN 100 MILLIGRAM(S): 400 CAPSULE ORAL at 22:46

## 2024-03-30 RX ADMIN — Medication 1 DROP(S): at 01:09

## 2024-03-30 RX ADMIN — CEFEPIME 100 MILLIGRAM(S): 1 INJECTION, POWDER, FOR SOLUTION INTRAMUSCULAR; INTRAVENOUS at 14:29

## 2024-03-30 RX ADMIN — Medication 1 GRAM(S): at 22:47

## 2024-03-30 RX ADMIN — SODIUM CHLORIDE 4 MILLILITER(S): 9 INJECTION INTRAMUSCULAR; INTRAVENOUS; SUBCUTANEOUS at 21:02

## 2024-03-30 RX ADMIN — HUMAN INSULIN 7 UNIT(S): 100 INJECTION, SUSPENSION SUBCUTANEOUS at 17:52

## 2024-03-30 RX ADMIN — Medication 1 GRAM(S): at 05:09

## 2024-03-30 RX ADMIN — CEFEPIME 100 MILLIGRAM(S): 1 INJECTION, POWDER, FOR SOLUTION INTRAMUSCULAR; INTRAVENOUS at 22:46

## 2024-03-30 RX ADMIN — Medication 1 APPLICATION(S): at 05:10

## 2024-03-30 RX ADMIN — Medication 3 MILLILITER(S): at 21:01

## 2024-03-30 RX ADMIN — Medication 5 MILLILITER(S): at 22:48

## 2024-03-30 RX ADMIN — Medication 1 DROP(S): at 17:53

## 2024-03-30 RX ADMIN — CHLORHEXIDINE GLUCONATE 15 MILLILITER(S): 213 SOLUTION TOPICAL at 17:51

## 2024-03-30 RX ADMIN — Medication 3 MILLILITER(S): at 14:47

## 2024-03-30 RX ADMIN — Medication 100 MILLIGRAM(S): at 05:08

## 2024-03-30 RX ADMIN — Medication 500000 UNIT(S): at 22:47

## 2024-03-30 RX ADMIN — HUMAN INSULIN 7 UNIT(S): 100 INJECTION, SUSPENSION SUBCUTANEOUS at 23:59

## 2024-03-30 RX ADMIN — Medication 500000 UNIT(S): at 11:38

## 2024-03-30 RX ADMIN — Medication 1 GRAM(S): at 17:51

## 2024-03-30 RX ADMIN — Medication 1 DROP(S): at 22:49

## 2024-03-30 RX ADMIN — Medication 1 DROP(S): at 10:08

## 2024-03-30 RX ADMIN — HUMAN INSULIN 7 UNIT(S): 100 INJECTION, SUSPENSION SUBCUTANEOUS at 11:47

## 2024-03-30 RX ADMIN — CEFEPIME 100 MILLIGRAM(S): 1 INJECTION, POWDER, FOR SOLUTION INTRAMUSCULAR; INTRAVENOUS at 05:08

## 2024-03-30 RX ADMIN — SODIUM CHLORIDE 4 MILLILITER(S): 9 INJECTION INTRAMUSCULAR; INTRAVENOUS; SUBCUTANEOUS at 05:03

## 2024-03-30 RX ADMIN — HUMAN INSULIN 7 UNIT(S): 100 INJECTION, SUSPENSION SUBCUTANEOUS at 06:34

## 2024-03-30 RX ADMIN — PANTOPRAZOLE SODIUM 40 MILLIGRAM(S): 20 TABLET, DELAYED RELEASE ORAL at 17:51

## 2024-03-30 RX ADMIN — Medication 1 APPLICATION(S): at 22:48

## 2024-03-30 RX ADMIN — Medication 100 MILLIGRAM(S): at 23:04

## 2024-03-30 RX ADMIN — Medication 100 MILLIGRAM(S): at 14:29

## 2024-03-30 RX ADMIN — GABAPENTIN 100 MILLIGRAM(S): 400 CAPSULE ORAL at 14:29

## 2024-03-30 RX ADMIN — SENNA PLUS 10 MILLILITER(S): 8.6 TABLET ORAL at 22:47

## 2024-03-30 RX ADMIN — SODIUM CHLORIDE 4 MILLILITER(S): 9 INJECTION INTRAMUSCULAR; INTRAVENOUS; SUBCUTANEOUS at 14:47

## 2024-03-30 RX ADMIN — Medication 1 DROP(S): at 14:30

## 2024-03-30 RX ADMIN — CHLORHEXIDINE GLUCONATE 1 APPLICATION(S): 213 SOLUTION TOPICAL at 23:05

## 2024-03-30 RX ADMIN — Medication 1 DROP(S): at 05:10

## 2024-03-30 RX ADMIN — Medication 2: at 17:52

## 2024-03-30 RX ADMIN — CHLORHEXIDINE GLUCONATE 15 MILLILITER(S): 213 SOLUTION TOPICAL at 05:09

## 2024-03-30 RX ADMIN — Medication 500000 UNIT(S): at 17:51

## 2024-03-30 RX ADMIN — DROXIDOPA 100 MILLIGRAM(S): 100 CAPSULE ORAL at 05:09

## 2024-03-30 RX ADMIN — Medication 3 MILLILITER(S): at 05:03

## 2024-03-30 RX ADMIN — GABAPENTIN 100 MILLIGRAM(S): 400 CAPSULE ORAL at 05:08

## 2024-03-30 RX ADMIN — Medication 500000 UNIT(S): at 05:09

## 2024-03-30 RX ADMIN — Medication 1 GRAM(S): at 11:38

## 2024-03-30 RX ADMIN — Medication 1 APPLICATION(S): at 14:29

## 2024-03-30 RX ADMIN — Medication 4: at 06:35

## 2024-03-30 RX ADMIN — Medication 5 MILLILITER(S): at 17:51

## 2024-03-30 RX ADMIN — Medication 5 MILLILITER(S): at 05:09

## 2024-03-30 RX ADMIN — DROXIDOPA 100 MILLIGRAM(S): 100 CAPSULE ORAL at 14:29

## 2024-03-30 RX ADMIN — Medication 5 MILLILITER(S): at 11:38

## 2024-03-30 RX ADMIN — PANTOPRAZOLE SODIUM 40 MILLIGRAM(S): 20 TABLET, DELAYED RELEASE ORAL at 05:09

## 2024-03-30 NOTE — PROGRESS NOTE ADULT - SUBJECTIVE AND OBJECTIVE BOX
Patient is a 73y old  Male who presents with a chief complaint of Stroke, critical stenosis, evaluation for angiography (29 Mar 2024 07:50)    HPI:  73 years old RH male with h/o HTN, HLD, DM, h/o CVA x 2 (no residual deficits) initially presented to [facility] ED with unsteady gait that began on 2/7 (exact LKW unknown) and L facial droop that began 6 AM on 2/9 (went to bed 11 PM on 2/8 without facial droop). No slurred speech, vision changes. Initial NIHSS 0 at [facility]. Hemodynamically stable, CT head with no acute pathology. Chronic left thalamic lacunar infarct. CTA with no large vessel occlusion. 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 CT perfusion with no acute abnormality.   Pt transfered from [facility] to [facility] due to concern for neurologic deterioration i/s/o aforementioned high grade proximal basiliar and L PCA stenosis. On arrival to [facility] ED, initial NIHSS 4 (+1 for LLE drift, +2 for b/l limb dysmetria, +1 for mild dysarthria). Later, pt started to have difficulty clearing secretions, became stridorous, and started to desaturate, so decision was made to intubate patient. Once pt medically stabilized, taken to angio suite by IR. Unable to obtain further history from patient at [facility] due to intubation/sedation (09 Feb 2024 20:27)    Interval Events:    REVIEW OF SYSTEMS:  [ ] Positive  [ ] All other systems negative  [ ] Unable to assess ROS because ________    Vital Signs Last 24 Hrs  T(C): 36.6 (03-30-24 @ 04:24), Max: 36.9 (03-29-24 @ 12:11)  T(F): 97.9 (03-30-24 @ 04:24), Max: 98.5 (03-29-24 @ 12:11)  HR: 81 (03-30-24 @ 05:03) (71 - 93)  BP: 129/74 (03-30-24 @ 04:24) (114/66 - 132/65)  RR: 15 (03-30-24 @ 04:24) (14 - 21)  SpO2: 100% (03-30-24 @ 05:03) (99% - 100%)    PHYSICAL EXAM:  HEENT:   [ ]Tracheostomy:  [ ]Pupils equal  [ ]No oral lesions  [ ]Abnormal    SKIN  [ ] No Rash  [ ] Abnormal  [ ] pressure    CARDIAC  [ ]Regular  [ ]Abnormal    PULMONARY  [ ]Bilateral Clear Breath Sounds  [ ]Normal Excursion  [ ]Abnormal    GI  [ ]PEG      [ ] +BS		              [ ]Soft, nondistended, nontender	  [ ]Abnormal    MUSCULOSKELETAL                                   [ ]Bedbound                 [ ]Abnormal    [ ]Ambulatory/OOB to chair                           EXTREMITIES                                         [ ]Normal  [ ]Edema                           NEUROLOGIC  [ ] Normal, non focal  [ ] Focal findings:    PSYCHIATRIC  [ ]Alert and appropriate  [ ] Sedated	 [ ]Agitated    :  [place]: [ ] Yes, if yes: Date of Placement:                   [  ] No    LINES: Central Lines [ ] Yes, if yes: Date of Placement                                     [  ] No    HOSPITAL MEDICATIONS:  MEDICATIONS  (STANDING):  albuterol/ipratropium for Nebulization 3 milliLiter(s) Nebulizer every 8 hours  artificial tears (preservative free) Ophthalmic Solution 1 Drop(s) Both EYES every 4 hours  Biotene Dry Mouth Oral Rinse 5 milliLiter(s) Swish and Spit every 6 hours  cefepime   IVPB 1000 milliGRAM(s) IV Intermittent every 8 hours  cefepime   IVPB      chlorhexidine 0.12% Liquid 15 milliLiter(s) Oral Mucosa every 12 hours  chlorhexidine 4% Liquid 1 Application(s) Topical daily  droxidopa 100 milliGRAM(s) Oral <User Schedule>  gabapentin Solution 100 milliGRAM(s) Enteral Tube every 8 hours  insulin lispro (ADMELOG) corrective regimen sliding scale   SubCutaneous every 6 hours  insulin NPH human recombinant 7 Unit(s) SubCutaneous every 6 hours  metroNIDAZOLE  IVPB      metroNIDAZOLE  IVPB 500 milliGRAM(s) IV Intermittent every 8 hours  nystatin    Suspension 338244 Unit(s) Oral every 6 hours  pantoprazole  Injectable 40 milliGRAM(s) IV Push every 12 hours  petrolatum Ophthalmic Ointment 1 Application(s) Both EYES every 8 hours  senna Syrup 10 milliLiter(s) Oral at bedtime  sodium chloride 3%  Inhalation 4 milliLiter(s) Inhalation every 8 hours  sucralfate suspension 1 Gram(s) Oral every 6 hours    MEDICATIONS  (PRN):  acetaminophen   Oral Liquid .. 1000 milliGRAM(s) Enteral Tube every 6 hours PRN Temp greater or equal to 38.5C (101.3F), Mild Pain (1 - 3)      LABS:                        9.3    12.77 )-----------( x        ( 30 Mar 2024 06:51 )             31.3     03-30    143  |  101  |  34<H>  ----------------------------<  219<H>  4.1   |  31  |  0.42<L>    Ca    8.9      30 Mar 2024 06:55  Phos  2.4     03-30  Mg     2.8     03-30          Mode: AC/ CMV (Assist Control/ Continuous Mandatory Ventilation)  RR (machine): 12  TV (machine): 500  FiO2: 40  PEEP: 7  ITime: 1  MAP: 12  PIP: 20     Patient is a 73y old  Male who presents with a chief complaint of Stroke, critical stenosis, evaluation for angiography (29 Mar 2024 07:50)    HPI:  73 years old RH male with h/o HTN, HLD, DM, h/o CVA x 2 (no residual deficits) initially presented to [facility] ED with unsteady gait that began on 2/7 (exact LKW unknown) and L facial droop that began 6 AM on 2/9 (went to bed 11 PM on 2/8 without facial droop). No slurred speech, vision changes. Initial NIHSS 0 at [facility]. Hemodynamically stable, CT head with no acute pathology. Chronic left thalamic lacunar infarct. CTA with no large vessel occlusion. 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 CT perfusion with no acute abnormality.   Pt transfered from [facility] to [facility] due to concern for neurologic deterioration i/s/o aforementioned high grade proximal basiliar and L PCA stenosis. On arrival to [facility] ED, initial NIHSS 4 (+1 for LLE drift, +2 for b/l limb dysmetria, +1 for mild dysarthria). Later, pt started to have difficulty clearing secretions, became stridorous, and started to desaturate, so decision was made to intubate patient. Once pt medically stabilized, taken to angio suite by IR. Unable to obtain further history from patient at [facility] due to intubation/sedation (09 Feb 2024 20:27)    Interval Events: No issues overnight    REVIEW OF SYSTEMS:  [ ] Positive  [ ] All other systems negative  [x ] Unable to assess ROS because patient is NON-verbal.    Vital Signs Last 24 Hrs  T(C): 36.6 (03-30-24 @ 04:24), Max: 36.9 (03-29-24 @ 12:11)  T(F): 97.9 (03-30-24 @ 04:24), Max: 98.5 (03-29-24 @ 12:11)  HR: 81 (03-30-24 @ 05:03) (71 - 93)  BP: 129/74 (03-30-24 @ 04:24) (114/66 - 132/65)  RR: 15 (03-30-24 @ 04:24) (14 - 21)  SpO2: 100% (03-30-24 @ 05:03) (99% - 100%)    PHYSICAL EXAM:  HEENT:   [x]Tracheostomy: #8 cuffed Portex  [x]Pupils equal; sluggish  [ ]No oral lesions  [ ]Abnormal    SKIN  [x]No Rash  [ ] Abnormal  [ ] pressure    CARDIAC  [x]Regular  [ ]Abnormal    PULMONARY  [x]Bilateral Coarse Breath Sounds  [ ]Normal Excursion  [ ]Abnormal    GI  [x]PEG      [x] +BS		              [x]Soft, nondistended, nontender	  [ ]Abnormal    MUSCULOSKELETAL                                   [x]Bedbound                 [ ]Abnormal    [ ]Ambulatory/OOB to chair                           EXTREMITIES                                         [ ]Normal  [x]Edema - BL UE edema LFT >RT                   NEUROLOGIC  [ ] Normal, non focal  [x] Focal findings: Minimally responsive to noxious stimuli, Not following commands     PSYCHIATRIC  [x] Unable to assess    :  [place]: [ ] Yes, if yes: Date of Placement:                   [x] No    LINES: Central Lines [ ] Yes, if yes: Date of Placement                                     [x] No    HOSPITAL MEDICATIONS:  MEDICATIONS  (STANDING):  albuterol/ipratropium for Nebulization 3 milliLiter(s) Nebulizer every 8 hours  artificial tears (preservative free) Ophthalmic Solution 1 Drop(s) Both EYES every 4 hours  Biotene Dry Mouth Oral Rinse 5 milliLiter(s) Swish and Spit every 6 hours  cefepime   IVPB 1000 milliGRAM(s) IV Intermittent every 8 hours  cefepime   IVPB      chlorhexidine 0.12% Liquid 15 milliLiter(s) Oral Mucosa every 12 hours  chlorhexidine 4% Liquid 1 Application(s) Topical daily  droxidopa 100 milliGRAM(s) Oral <User Schedule>  gabapentin Solution 100 milliGRAM(s) Enteral Tube every 8 hours  insulin lispro (ADMELOG) corrective regimen sliding scale   SubCutaneous every 6 hours  insulin NPH human recombinant 7 Unit(s) SubCutaneous every 6 hours  metroNIDAZOLE  IVPB      metroNIDAZOLE  IVPB 500 milliGRAM(s) IV Intermittent every 8 hours  nystatin    Suspension [id number] Unit(s) Oral every 6 hours  pantoprazole  Injectable 40 milliGRAM(s) IV Push every 12 hours  petrolatum Ophthalmic Ointment 1 Application(s) Both EYES every 8 hours  senna Syrup 10 milliLiter(s) Oral at bedtime  sodium chloride 3%  Inhalation 4 milliLiter(s) Inhalation every 8 hours  sucralfate suspension 1 Gram(s) Oral every 6 hours    MEDICATIONS  (PRN):  acetaminophen   Oral Liquid .. 1000 milliGRAM(s) Enteral Tube every 6 hours PRN Temp greater or equal to 38.5C (101.3F), Mild Pain (1 - 3)      LABS:                        9.3    12.77 )-----------( x        ( 30 Mar 2024 06:51 )             31.3     03-30    143  |  101  |  34<H>  ----------------------------<  219<H>  4.1   |  31  |  0.42<L>    Ca    8.9      30 Mar 2024 06:55  Phos  2.4     03-30  Mg     2.8     03-30      Mode: AC/ CMV (Assist Control/ Continuous Mandatory Ventilation)  RR (machine): 12  TV (machine): 500  FiO2: 40  PEEP: 7  ITime: 1  MAP: 12  PIP: 20

## 2024-03-30 NOTE — PROGRESS NOTE ADULT - ASSESSMENT
73 year old male with hypertension, hyperlipidemia, diabetes, prior CVA's without residual deficits who initially presented as a transfer from [place] with concern for CVA in the setting of 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 Prior to admission patient had Viral URI ( 1-2 weeks prior to admission) with subsequent weakness. He underwent an angiogram (2/11) which showed moderate stenosis and no intervention was done. MRI Performed c/w small bilateral cerebellar strokes and prior L thalamic strokes, as per Neurology was not c/w current presentation. 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 He is currently s/p 5 rounds of plasma exchange (2/13-2/20) and IVIG x 5 ( 2/21-2/26)  His hospital course was complicated by progressive respiratory failure. CT Chest performed on 2/20 with atelectasis of b/l lower lobes. BAL 2/21 Grew PSA treated with course of Zosyn (2/21-2/28).  Patient transferred to RCU on 2/24. RRT called on 3/3 for unresponsiveness. Patient with unreactive pupils, no corneal reflex. Also found to be hypotensive and febrile to 105. Noted to have new melena. Hgb 5. Given 1 unit of pRBC. Transferred to MICU. patient required pressors while in the MICU, BP improved and was transitioned to Droxidopa. Patient had CT C/A/P consistent with Bibasilar pneumonia; Sputum cx grew PSA and was treated with course of Meropenem. Patient evaluated by GI in setting of Melena, transaminitis and portal venous gas on CT imaging. No EGD performed as Melena resolved, Transaminitis was thought to be in setting of shock and portal venous gas was thought to be in setting of recent PEG. EEG was performed in setting of AMS no seizures noted. Repeat MRI Performed which showed Small scattered foci of diffusion restriction within the right cerebellar hemisphere, left parietal lobe, and symmetrically in the bilateral basal ganglia. Neuro recommended Resumption of ASA in setting of possible embolic phenomenon. Repeat ECHO performed RT Ventricular Moderately enlarged, reduced systolic function. Mental status improved and was transferred back to RCU on 3/6.  Cardiology consulted for tachy-dez, unable to catch events on tele, EKG ST otherwise unremarkable, CT Angio negative for PE. Patient with recurrent Melena on 3/27 resulting in RRT For hypotension for which he required 3 units of PRBCs. S/p EGD; No active bleeding noted but found to have duodenal erosions.    3/29: Patient S/p RRT in setting of Hypotension and Melena. Patient required 3 units of PRBCS fluid Resuscitation and initiation of Droxidopa. EGD performed technically limited study due to narrowing of the upper esophagus but was noted to have duodenal erosions w/o evidence of active bleed. Patient remains Hemodynamically stable with stable H+H. If patient develops decreasing H+H or HD instability will obtain CTA and contact IR as per GI recc. Patient Febrile yesterday placed on Cefepime and Zosyn; blood NGTD. 73 year old male with hypertension, hyperlipidemia, diabetes, prior CVA's without residual deficits who initially presented as a transfer from [place] with concern for CVA in the setting of 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 Prior to admission patient had Viral URI ( 1-2 weeks prior to admission) with subsequent weakness. He underwent an angiogram (2/11) which showed moderate stenosis and no intervention was done. MRI Performed c/w small bilateral cerebellar strokes and prior L thalamic strokes, as per Neurology was not c/w current presentation. 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 He is currently s/p 5 rounds of plasma exchange (2/13-2/20) and IVIG x 5 ( 2/21-2/26)  His hospital course was complicated by progressive respiratory failure. CT Chest performed on 2/20 with atelectasis of b/l lower lobes. BAL 2/21 Grew PSA treated with course of Zosyn (2/21-2/28).  Patient transferred to RCU on 2/24. RRT called on 3/3 for unresponsiveness. Patient with unreactive pupils, no corneal reflex. Also found to be hypotensive and febrile to 105. Noted to have new melena. Hgb 5. Given 1 unit of pRBC. Transferred to MICU. patient required pressors while in the MICU, BP improved and was transitioned to Droxidopa. Patient had CT C/A/P consistent with Bibasilar pneumonia; Sputum cx grew PSA and was treated with course of Meropenem. Patient evaluated by GI in setting of Melena, transaminitis and portal venous gas on CT imaging. No EGD performed as Melena resolved, Transaminitis was thought to be in setting of shock and portal venous gas was thought to be in setting of recent PEG. EEG was performed in setting of AMS no seizures noted. Repeat MRI Performed which showed Small scattered foci of diffusion restriction within the right cerebellar hemisphere, left parietal lobe, and symmetrically in the bilateral basal ganglia. Neuro recommended Resumption of ASA in setting of possible embolic phenomenon. Repeat ECHO performed RT Ventricular Moderately enlarged, reduced systolic function. Mental status improved and was transferred back to RCU on 3/6.  Cardiology consulted for tachy-dez, unable to catch events on tele, EKG ST otherwise unremarkable, CT Angio negative for PE. Patient with recurrent Melena on 3/27 resulting in RRT For hypotension for which he required 3 units of PRBCs. S/p EGD; No active bleeding noted but found to have duodenal erosions.    3/30: Patient S/p RRT in setting of Hypotension and Melena. Patient required 3 units of PRBCS fluid Resuscitation and initiation of Droxidopa. EGD performed technically limited study due to narrowing of the upper esophagus but was noted to have duodenal erosions w/o evidence of active bleed. Patient remains Hemodynamically stable with stable H+H. If patient develops decreasing H+H or HD instability will obtain CTA and contact IR as per GI recc. Patient Febrile yesterday placed on Cefepime and Zosyn; blood NGTD.

## 2024-03-30 NOTE — PROGRESS NOTE ADULT - PROBLEM SELECTOR PLAN 3
- Patients BP improved today   - S/p 2 liters of Fluids on 3/27  - Will continue Droxidopa 100 mg q 8 hrs Bexarotene Pregnancy And Lactation Text: This medication is Pregnancy Category X and should not be given to women who are pregnant or may become pregnant. This medication should not be used if you are breast feeding.

## 2024-03-30 NOTE — PROGRESS NOTE ADULT - PROBLEM SELECTOR PLAN 1
- Patient with large Melanotic Episodes on 3/27 w/ significant drop in H+H  - Was Transfused 3 Units of PRBCs  - EGD 3/27: Technically Limited Study, + Duodenal Erosions but no evidence of Active Bleed    - Continue Protonix 40 mg q 12 hrs IVP, Sucralfate q 6hrs added   - Patient with 1 Reported Melanotic stool over night    - Case d/w GI this morning can trial TFs as H+H has remained stable   - If patient develops decreasing H+H or HD instability will obtain STAT CTA and contact IR   - Will decrease CBC Frequency to q 12 hrs   - Patient is [employer] However Family is in agreement for administration of blood products ( Per Wife)

## 2024-03-30 NOTE — PROGRESS NOTE ADULT - NS ATTEND AMEND GEN_ALL_CORE FT
agree with above  hemodyn stable, Hb stable  weaning as able  flagyl, cefepime  d/c planning in progress

## 2024-03-31 LAB
ANION GAP SERPL CALC-SCNC: 11 MMOL/L — SIGNIFICANT CHANGE UP (ref 5–17)
BUN SERPL-MCNC: 21 MG/DL — SIGNIFICANT CHANGE UP (ref 7–23)
CALCIUM SERPL-MCNC: 8.3 MG/DL — LOW (ref 8.4–10.5)
CHLORIDE SERPL-SCNC: 98 MMOL/L — SIGNIFICANT CHANGE UP (ref 96–108)
CO2 SERPL-SCNC: 30 MMOL/L — SIGNIFICANT CHANGE UP (ref 22–31)
CREAT SERPL-MCNC: 0.34 MG/DL — LOW (ref 0.5–1.3)
CULTURE RESULTS: SIGNIFICANT CHANGE UP
CULTURE RESULTS: SIGNIFICANT CHANGE UP
EGFR: 121 ML/MIN/1.73M2 — SIGNIFICANT CHANGE UP
GLUCOSE BLDC GLUCOMTR-MCNC: 150 MG/DL — HIGH (ref 70–99)
GLUCOSE BLDC GLUCOMTR-MCNC: 153 MG/DL — HIGH (ref 70–99)
GLUCOSE BLDC GLUCOMTR-MCNC: 155 MG/DL — HIGH (ref 70–99)
GLUCOSE BLDC GLUCOMTR-MCNC: 192 MG/DL — HIGH (ref 70–99)
GLUCOSE SERPL-MCNC: 159 MG/DL — HIGH (ref 70–99)
HCT VFR BLD CALC: 33.3 % — LOW (ref 39–50)
HGB BLD-MCNC: 9.6 G/DL — LOW (ref 13–17)
MAGNESIUM SERPL-MCNC: 2.8 MG/DL — HIGH (ref 1.6–2.6)
MCHC RBC-ENTMCNC: 25.3 PG — LOW (ref 27–34)
MCHC RBC-ENTMCNC: 28.8 GM/DL — LOW (ref 32–36)
MCV RBC AUTO: 87.6 FL — SIGNIFICANT CHANGE UP (ref 80–100)
NRBC # BLD: 3 /100 WBCS — HIGH (ref 0–0)
PHOSPHATE SERPL-MCNC: 2.1 MG/DL — LOW (ref 2.5–4.5)
PLATELET # BLD AUTO: 357 K/UL — SIGNIFICANT CHANGE UP (ref 150–400)
POTASSIUM SERPL-MCNC: 4.1 MMOL/L — SIGNIFICANT CHANGE UP (ref 3.5–5.3)
POTASSIUM SERPL-SCNC: 4.1 MMOL/L — SIGNIFICANT CHANGE UP (ref 3.5–5.3)
RBC # BLD: 3.8 M/UL — LOW (ref 4.2–5.8)
RBC # FLD: 18.7 % — HIGH (ref 10.3–14.5)
SODIUM SERPL-SCNC: 139 MMOL/L — SIGNIFICANT CHANGE UP (ref 135–145)
SPECIMEN SOURCE: SIGNIFICANT CHANGE UP
SPECIMEN SOURCE: SIGNIFICANT CHANGE UP
WBC # BLD: 12.23 K/UL — HIGH (ref 3.8–10.5)
WBC # FLD AUTO: 12.23 K/UL — HIGH (ref 3.8–10.5)

## 2024-03-31 PROCEDURE — 99233 SBSQ HOSP IP/OBS HIGH 50: CPT

## 2024-03-31 RX ORDER — SODIUM,POTASSIUM PHOSPHATES 278-250MG
1 POWDER IN PACKET (EA) ORAL EVERY 4 HOURS
Refills: 0 | Status: COMPLETED | OUTPATIENT
Start: 2024-03-31 | End: 2024-03-31

## 2024-03-31 RX ORDER — CEFEPIME 1 G/1
1000 INJECTION, POWDER, FOR SOLUTION INTRAMUSCULAR; INTRAVENOUS EVERY 8 HOURS
Refills: 0 | Status: DISCONTINUED | OUTPATIENT
Start: 2024-03-31 | End: 2024-04-01

## 2024-03-31 RX ORDER — METRONIDAZOLE 500 MG
500 TABLET ORAL EVERY 8 HOURS
Refills: 0 | Status: DISCONTINUED | OUTPATIENT
Start: 2024-03-31 | End: 2024-04-01

## 2024-03-31 RX ADMIN — Medication 3 MILLILITER(S): at 21:13

## 2024-03-31 RX ADMIN — Medication 1 GRAM(S): at 12:52

## 2024-03-31 RX ADMIN — SODIUM CHLORIDE 4 MILLILITER(S): 9 INJECTION INTRAMUSCULAR; INTRAVENOUS; SUBCUTANEOUS at 15:05

## 2024-03-31 RX ADMIN — SODIUM CHLORIDE 4 MILLILITER(S): 9 INJECTION INTRAMUSCULAR; INTRAVENOUS; SUBCUTANEOUS at 21:13

## 2024-03-31 RX ADMIN — Medication 2: at 11:31

## 2024-03-31 RX ADMIN — Medication 5 MILLILITER(S): at 12:52

## 2024-03-31 RX ADMIN — Medication 500000 UNIT(S): at 18:24

## 2024-03-31 RX ADMIN — Medication 500000 UNIT(S): at 23:30

## 2024-03-31 RX ADMIN — DROXIDOPA 100 MILLIGRAM(S): 100 CAPSULE ORAL at 15:22

## 2024-03-31 RX ADMIN — Medication 1 APPLICATION(S): at 21:11

## 2024-03-31 RX ADMIN — Medication 1 GRAM(S): at 05:50

## 2024-03-31 RX ADMIN — HUMAN INSULIN 7 UNIT(S): 100 INJECTION, SUSPENSION SUBCUTANEOUS at 05:54

## 2024-03-31 RX ADMIN — Medication 1 APPLICATION(S): at 05:52

## 2024-03-31 RX ADMIN — Medication 500000 UNIT(S): at 12:53

## 2024-03-31 RX ADMIN — Medication 1 APPLICATION(S): at 14:49

## 2024-03-31 RX ADMIN — Medication 100 MILLIGRAM(S): at 14:48

## 2024-03-31 RX ADMIN — Medication 1 DROP(S): at 02:16

## 2024-03-31 RX ADMIN — Medication 1 GRAM(S): at 18:24

## 2024-03-31 RX ADMIN — Medication 1 DROP(S): at 15:27

## 2024-03-31 RX ADMIN — CEFEPIME 100 MILLIGRAM(S): 1 INJECTION, POWDER, FOR SOLUTION INTRAMUSCULAR; INTRAVENOUS at 21:10

## 2024-03-31 RX ADMIN — CHLORHEXIDINE GLUCONATE 15 MILLILITER(S): 213 SOLUTION TOPICAL at 18:25

## 2024-03-31 RX ADMIN — Medication 100 MILLIGRAM(S): at 21:10

## 2024-03-31 RX ADMIN — HUMAN INSULIN 7 UNIT(S): 100 INJECTION, SUSPENSION SUBCUTANEOUS at 18:23

## 2024-03-31 RX ADMIN — SODIUM CHLORIDE 4 MILLILITER(S): 9 INJECTION INTRAMUSCULAR; INTRAVENOUS; SUBCUTANEOUS at 05:40

## 2024-03-31 RX ADMIN — Medication 3 MILLILITER(S): at 05:39

## 2024-03-31 RX ADMIN — GABAPENTIN 100 MILLIGRAM(S): 400 CAPSULE ORAL at 21:10

## 2024-03-31 RX ADMIN — Medication 100 MILLIGRAM(S): at 05:52

## 2024-03-31 RX ADMIN — Medication 5 MILLILITER(S): at 23:30

## 2024-03-31 RX ADMIN — Medication 1 GRAM(S): at 23:30

## 2024-03-31 RX ADMIN — GABAPENTIN 100 MILLIGRAM(S): 400 CAPSULE ORAL at 05:50

## 2024-03-31 RX ADMIN — CHLORHEXIDINE GLUCONATE 1 APPLICATION(S): 213 SOLUTION TOPICAL at 21:12

## 2024-03-31 RX ADMIN — Medication 5 MILLILITER(S): at 18:24

## 2024-03-31 RX ADMIN — Medication 1 TABLET(S): at 14:49

## 2024-03-31 RX ADMIN — DROXIDOPA 100 MILLIGRAM(S): 100 CAPSULE ORAL at 05:56

## 2024-03-31 RX ADMIN — Medication 1 DROP(S): at 10:48

## 2024-03-31 RX ADMIN — Medication 1 TABLET(S): at 11:17

## 2024-03-31 RX ADMIN — DROXIDOPA 100 MILLIGRAM(S): 100 CAPSULE ORAL at 21:10

## 2024-03-31 RX ADMIN — Medication 2: at 23:33

## 2024-03-31 RX ADMIN — CEFEPIME 100 MILLIGRAM(S): 1 INJECTION, POWDER, FOR SOLUTION INTRAMUSCULAR; INTRAVENOUS at 05:52

## 2024-03-31 RX ADMIN — Medication 500000 UNIT(S): at 05:51

## 2024-03-31 RX ADMIN — Medication 1 DROP(S): at 05:51

## 2024-03-31 RX ADMIN — Medication 2: at 05:54

## 2024-03-31 RX ADMIN — Medication 5 MILLILITER(S): at 05:50

## 2024-03-31 RX ADMIN — HUMAN INSULIN 7 UNIT(S): 100 INJECTION, SUSPENSION SUBCUTANEOUS at 11:31

## 2024-03-31 RX ADMIN — Medication 2: at 00:00

## 2024-03-31 RX ADMIN — GABAPENTIN 100 MILLIGRAM(S): 400 CAPSULE ORAL at 14:49

## 2024-03-31 RX ADMIN — Medication 3 MILLILITER(S): at 15:05

## 2024-03-31 RX ADMIN — PANTOPRAZOLE SODIUM 40 MILLIGRAM(S): 20 TABLET, DELAYED RELEASE ORAL at 05:50

## 2024-03-31 RX ADMIN — Medication 1 DROP(S): at 21:12

## 2024-03-31 RX ADMIN — PANTOPRAZOLE SODIUM 40 MILLIGRAM(S): 20 TABLET, DELAYED RELEASE ORAL at 18:24

## 2024-03-31 RX ADMIN — CHLORHEXIDINE GLUCONATE 15 MILLILITER(S): 213 SOLUTION TOPICAL at 05:53

## 2024-03-31 RX ADMIN — CEFEPIME 100 MILLIGRAM(S): 1 INJECTION, POWDER, FOR SOLUTION INTRAMUSCULAR; INTRAVENOUS at 14:50

## 2024-03-31 RX ADMIN — Medication 1 TABLET(S): at 18:24

## 2024-03-31 RX ADMIN — Medication 1 DROP(S): at 18:51

## 2024-03-31 RX ADMIN — HUMAN INSULIN 7 UNIT(S): 100 INJECTION, SUSPENSION SUBCUTANEOUS at 23:32

## 2024-03-31 NOTE — PROGRESS NOTE ADULT - SUBJECTIVE AND OBJECTIVE BOX
Patient is a 73y old  Male who presents with a chief complaint of Stroke, critical stenosis, evaluation for angiography (30 Mar 2024 08:04)    HPI:  73 years old RH male with h/o HTN, HLD, DM, h/o CVA x 2 (no residual deficits) initially presented to [facility] ED with unsteady gait that began on 2/7 (exact LKW unknown) and L facial droop that began 6 AM on 2/9 (went to bed 11 PM on 2/8 without facial droop). No slurred speech, vision changes. Initial NIHSS 0 at [facility]. Hemodynamically stable, CT head with no acute pathology. Chronic left thalamic lacunar infarct. CTA with no large vessel occlusion. 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 CT perfusion with no acute abnormality.   Pt transferred from [facility] to [facility] due to concern for neurologic deterioration i/s/o aforementioned high grade proximal basiliar and L PCA stenosis. On arrival to [facility] ED, initial NIHSS 4 (+1 for LLE drift, +2 for b/l limb dysmetria, +1 for mild dysarthria). Later, pt started to have difficulty clearing secretions, became stridorous, and started to desaturate, so decision was made to intubate patient. Once pt medically stabilized, taken to angio suite by IR. Unable to obtain further history from patient at [facility] due to intubation/sedation (09 Feb 2024 20:27)    Interval Events:    REVIEW OF SYSTEMS:  [ ] Positive  [ ] All other systems negative  [ ] Unable to assess ROS because ________    Vital Signs Last 24 Hrs  T(C): 37 (03-31-24 @ 07:00), Max: 37 (03-31-24 @ 07:00)  T(F): 98.6 (03-31-24 @ 07:00), Max: 98.6 (03-31-24 @ 07:00)  HR: 86 (03-31-24 @ 07:00) (76 - 86)  BP: 123/68 (03-31-24 @ 07:00) (123/68 - 158/88)  RR: 20 (03-31-24 @ 07:00) (20 - 21)  SpO2: 100% (03-31-24 @ 07:00) (99% - 100%)    PHYSICAL EXAM:  HEENT:   [ ]Tracheostomy:  [ ]Pupils equal  [ ]No oral lesions  [ ]Abnormal    SKIN  [ ] No Rash  [ ] Abnormal  [ ] pressure    CARDIAC  [ ]Regular  [ ]Abnormal    PULMONARY  [ ]Bilateral Clear Breath Sounds  [ ]Normal Excursion  [ ]Abnormal    GI  [ ]PEG      [ ] +BS		              [ ]Soft, nondistended, nontender	  [ ]Abnormal    MUSCULOSKELETAL                                   [ ]Bedbound                 [ ]Abnormal    [ ]Ambulatory/OOB to chair                           EXTREMITIES                                         [ ]Normal  [ ]Edema                           NEUROLOGIC  [ ] Normal, non focal  [ ] Focal findings:    PSYCHIATRIC  [ ]Alert and appropriate  [ ] Sedated	 [ ]Agitated    :  [place]: [ ] Yes, if yes: Date of Placement:                   [  ] No    LINES: Central Lines [ ] Yes, if yes: Date of Placement                                     [  ] No    HOSPITAL MEDICATIONS:  MEDICATIONS  (STANDING):  albuterol/ipratropium for Nebulization 3 milliLiter(s) Nebulizer every 8 hours  artificial tears (preservative free) Ophthalmic Solution 1 Drop(s) Both EYES every 4 hours  Biotene Dry Mouth Oral Rinse 5 milliLiter(s) Swish and Spit every 6 hours  cefepime   IVPB 1000 milliGRAM(s) IV Intermittent every 8 hours  cefepime   IVPB      chlorhexidine 0.12% Liquid 15 milliLiter(s) Oral Mucosa every 12 hours  chlorhexidine 4% Liquid 1 Application(s) Topical daily  droxidopa 100 milliGRAM(s) Oral <User Schedule>  gabapentin Solution 100 milliGRAM(s) Enteral Tube every 8 hours  insulin lispro (ADMELOG) corrective regimen sliding scale   SubCutaneous every 6 hours  insulin NPH human recombinant 7 Unit(s) SubCutaneous every 6 hours  metroNIDAZOLE  IVPB      metroNIDAZOLE  IVPB 500 milliGRAM(s) IV Intermittent every 8 hours  nystatin    Suspension 990524 Unit(s) Oral every 6 hours  pantoprazole  Injectable 40 milliGRAM(s) IV Push every 12 hours  petrolatum Ophthalmic Ointment 1 Application(s) Both EYES every 8 hours  senna Syrup 10 milliLiter(s) Oral at bedtime  sodium chloride 3%  Inhalation 4 milliLiter(s) Inhalation every 8 hours  sucralfate suspension 1 Gram(s) Oral every 6 hours    MEDICATIONS  (PRN):  acetaminophen   Oral Liquid .. 1000 milliGRAM(s) Enteral Tube every 6 hours PRN Temp greater or equal to 38.5C (101.3F), Mild Pain (1 - 3)      LABS:                        9.3    12.77 )-----------( 298      ( 30 Mar 2024 06:51 )             31.3     03-30    143  |  101  |  34<H>  ----------------------------<  219<H>  4.1   |  31  |  0.42<L>    Ca    8.9      30 Mar 2024 06:55  Phos  2.4     03-30  Mg     2.8     03-30    Mode: AC/ CMV (Assist Control/ Continuous Mandatory Ventilation)  RR (machine): 12  TV (machine): 500  FiO2: 40  PEEP: 7  ITime: 1  MAP: 11  PIP: 20 Patient is a 73y old  Male who presents with a chief complaint of Stroke, critical stenosis, evaluation for angiography (30 Mar 2024 08:04)    HPI:  73 years old RH male with h/o HTN, HLD, DM, h/o CVA x 2 (no residual deficits) initially presented to [facility] ED with unsteady gait that began on 2/7 (exact LKW unknown) and L facial droop that began 6 AM on 2/9 (went to bed 11 PM on 2/8 without facial droop). No slurred speech, vision changes. Initial NIHSS 0 at [facility]. Hemodynamically stable, CT head with no acute pathology. Chronic left thalamic lacunar infarct. CTA with no large vessel occlusion. 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 CT perfusion with no acute abnormality.   Pt transferred from [facility] to [facility] due to concern for neurologic deterioration i/s/o aforementioned high grade proximal basilar and L PCA stenosis. On arrival to [facility] ED, initial NIHSS 4 (+1 for LLE drift, +2 for b/l limb dysmetria, +1 for mild dysarthria). Later, pt started to have difficulty clearing secretions, became stridorous, and started to desaturate, so decision was made to intubate patient. Once pt medically stabilized, taken to angio suite by IR. Unable to obtain further history from patient at [facility] due to intubation/sedation (09 Feb 2024 20:27)    Interval Events: No issues overnight    REVIEW OF SYSTEMS:  [ ] Positive  [ ] All other systems negative  [ x] Unable to assess ROS because patient is NON-verbal    Vital Signs Last 24 Hrs  T(C): 37 (03-31-24 @ 07:00), Max: 37 (03-31-24 @ 07:00)  T(F): 98.6 (03-31-24 @ 07:00), Max: 98.6 (03-31-24 @ 07:00)  HR: 86 (03-31-24 @ 07:00) (76 - 86)  BP: 123/68 (03-31-24 @ 07:00) (123/68 - 158/88)  RR: 20 (03-31-24 @ 07:00) (20 - 21)  SpO2: 100% (03-31-24 @ 07:00) (99% - 100%)    PHYSICAL EXAM:  HEENT:   [x]Tracheostomy: #8 cuffed Portex  [x]Pupils equal; sluggish  [ ]No oral lesions  [ ]Abnormal    SKIN  [x]No Rash  [ ] Abnormal  [ ] pressure    CARDIAC  [x]Regular  [ ]Abnormal    PULMONARY  [x]Bilateral Coarse Breath Sounds  [ ]Normal Excursion  [ ]Abnormal    GI  [x]PEG      [x] +BS		              [x]Soft, nondistended, nontender	  [ ]Abnormal    MUSCULOSKELETAL                                   [x]Bedbound                 [ ]Abnormal    [ ]Ambulatory/OOB to chair                           EXTREMITIES                                         [ ]Normal  [x]Edema - BL UE edema LFT >RT                   NEUROLOGIC  [ ] Normal, non focal  [x] Focal findings: Minimally responsive to noxious stimuli, Not following commands     PSYCHIATRIC  [x] Unable to assess    :  [place]: [ ] Yes, if yes: Date of Placement:                   [x] No    LINES: Central Lines [ ] Yes, if yes: Date of Placement                                     [x] No    HOSPITAL MEDICATIONS:  MEDICATIONS  (STANDING):  albuterol/ipratropium for Nebulization 3 milliLiter(s) Nebulizer every 8 hours  artificial tears (preservative free) Ophthalmic Solution 1 Drop(s) Both EYES every 4 hours  Biotene Dry Mouth Oral Rinse 5 milliLiter(s) Swish and Spit every 6 hours  cefepime   IVPB 1000 milliGRAM(s) IV Intermittent every 8 hours  cefepime   IVPB      chlorhexidine 0.12% Liquid 15 milliLiter(s) Oral Mucosa every 12 hours  chlorhexidine 4% Liquid 1 Application(s) Topical daily  droxidopa 100 milliGRAM(s) Oral <User Schedule>  gabapentin Solution 100 milliGRAM(s) Enteral Tube every 8 hours  insulin lispro (ADMELOG) corrective regimen sliding scale   SubCutaneous every 6 hours  insulin NPH human recombinant 7 Unit(s) SubCutaneous every 6 hours  metroNIDAZOLE  IVPB      metroNIDAZOLE  IVPB 500 milliGRAM(s) IV Intermittent every 8 hours  nystatin    Suspension 911710 Unit(s) Oral every 6 hours  pantoprazole  Injectable 40 milliGRAM(s) IV Push every 12 hours  petrolatum Ophthalmic Ointment 1 Application(s) Both EYES every 8 hours  senna Syrup 10 milliLiter(s) Oral at bedtime  sodium chloride 3%  Inhalation 4 milliLiter(s) Inhalation every 8 hours  sucralfate suspension 1 Gram(s) Oral every 6 hours    MEDICATIONS  (PRN):  acetaminophen   Oral Liquid .. 1000 milliGRAM(s) Enteral Tube every 6 hours PRN Temp greater or equal to 38.5C (101.3F), Mild Pain (1 - 3)      LABS:                        9.3    12.77 )-----------( 298      ( 30 Mar 2024 06:51 )             31.3     03-30    143  |  101  |  34<H>  ----------------------------<  219<H>  4.1   |  31  |  0.42<L>    Ca    8.9      30 Mar 2024 06:55  Phos  2.4     03-30  Mg     2.8     03-30    Mode: AC/ CMV (Assist Control/ Continuous Mandatory Ventilation)  RR (machine): 12  TV (machine): 500  FiO2: 40  PEEP: 7  ITime: 1  MAP: 11  PIP: 20

## 2024-03-31 NOTE — PROGRESS NOTE ADULT - PROBLEM SELECTOR PLAN 1
- Patient with large Melanotic Episodes on 3/27 w/ significant drop in H+H  - Was Transfused 3 Units of PRBCs  - EGD 3/27: Technically Limited Study, + Duodenal Erosions but no evidence of Active Bleed    - Continue Protonix 40 mg q 12 hrs IVP, Sucralfate q 6hrs added   - Patient with 1 Reported Melanotic stool over night    - Case d/w GI this morning can trial TFs as H+H has remained stable   - If patient develops decreasing H+H or HD instability will obtain STAT CTA and contact IR   - Will decrease CBC Frequency to q 12 hrs   - Patient is Advent However Family is in agreement for administration of blood products ( Per Wife)

## 2024-04-01 LAB
ANION GAP SERPL CALC-SCNC: 8 MMOL/L — SIGNIFICANT CHANGE UP (ref 5–17)
BUN SERPL-MCNC: 19 MG/DL — SIGNIFICANT CHANGE UP (ref 7–23)
CALCIUM SERPL-MCNC: 8.1 MG/DL — LOW (ref 8.4–10.5)
CHLORIDE SERPL-SCNC: 98 MMOL/L — SIGNIFICANT CHANGE UP (ref 96–108)
CO2 SERPL-SCNC: 34 MMOL/L — HIGH (ref 22–31)
CREAT SERPL-MCNC: 0.33 MG/DL — LOW (ref 0.5–1.3)
EGFR: 122 ML/MIN/1.73M2 — SIGNIFICANT CHANGE UP
GLUCOSE BLDC GLUCOMTR-MCNC: 162 MG/DL — HIGH (ref 70–99)
GLUCOSE BLDC GLUCOMTR-MCNC: 163 MG/DL — HIGH (ref 70–99)
GLUCOSE BLDC GLUCOMTR-MCNC: 170 MG/DL — HIGH (ref 70–99)
GLUCOSE BLDC GLUCOMTR-MCNC: 180 MG/DL — HIGH (ref 70–99)
GLUCOSE SERPL-MCNC: 174 MG/DL — HIGH (ref 70–99)
HCT VFR BLD CALC: 32.1 % — LOW (ref 39–50)
HGB BLD-MCNC: 9.2 G/DL — LOW (ref 13–17)
MAGNESIUM SERPL-MCNC: 2.4 MG/DL — SIGNIFICANT CHANGE UP (ref 1.6–2.6)
MCHC RBC-ENTMCNC: 25.4 PG — LOW (ref 27–34)
MCHC RBC-ENTMCNC: 28.7 GM/DL — LOW (ref 32–36)
MCV RBC AUTO: 88.7 FL — SIGNIFICANT CHANGE UP (ref 80–100)
NRBC # BLD: 2 /100 WBCS — HIGH (ref 0–0)
PHOSPHATE SERPL-MCNC: 2.6 MG/DL — SIGNIFICANT CHANGE UP (ref 2.5–4.5)
PLATELET # BLD AUTO: 339 K/UL — SIGNIFICANT CHANGE UP (ref 150–400)
POTASSIUM SERPL-MCNC: 4.3 MMOL/L — SIGNIFICANT CHANGE UP (ref 3.5–5.3)
POTASSIUM SERPL-SCNC: 4.3 MMOL/L — SIGNIFICANT CHANGE UP (ref 3.5–5.3)
RBC # BLD: 3.62 M/UL — LOW (ref 4.2–5.8)
RBC # FLD: 18.8 % — HIGH (ref 10.3–14.5)
SODIUM SERPL-SCNC: 140 MMOL/L — SIGNIFICANT CHANGE UP (ref 135–145)
WBC # BLD: 11.73 K/UL — HIGH (ref 3.8–10.5)
WBC # FLD AUTO: 11.73 K/UL — HIGH (ref 3.8–10.5)

## 2024-04-01 PROCEDURE — 99233 SBSQ HOSP IP/OBS HIGH 50: CPT

## 2024-04-01 RX ORDER — METRONIDAZOLE 500 MG
500 TABLET ORAL EVERY 8 HOURS
Refills: 0 | Status: COMPLETED | OUTPATIENT
Start: 2024-04-01 | End: 2024-04-04

## 2024-04-01 RX ORDER — FUROSEMIDE 40 MG
40 TABLET ORAL ONCE
Refills: 0 | Status: COMPLETED | OUTPATIENT
Start: 2024-04-01 | End: 2024-04-01

## 2024-04-01 RX ORDER — CEFEPIME 1 G/1
1000 INJECTION, POWDER, FOR SOLUTION INTRAMUSCULAR; INTRAVENOUS EVERY 8 HOURS
Refills: 0 | Status: DISCONTINUED | OUTPATIENT
Start: 2024-04-01 | End: 2024-04-11

## 2024-04-01 RX ADMIN — Medication 1 DROP(S): at 13:07

## 2024-04-01 RX ADMIN — Medication 2: at 05:45

## 2024-04-01 RX ADMIN — Medication 40 MILLIGRAM(S): at 10:06

## 2024-04-01 RX ADMIN — CEFEPIME 100 MILLIGRAM(S): 1 INJECTION, POWDER, FOR SOLUTION INTRAMUSCULAR; INTRAVENOUS at 13:07

## 2024-04-01 RX ADMIN — Medication 1 APPLICATION(S): at 13:07

## 2024-04-01 RX ADMIN — Medication 1 GRAM(S): at 05:42

## 2024-04-01 RX ADMIN — Medication 5 MILLILITER(S): at 12:28

## 2024-04-01 RX ADMIN — Medication 1 GRAM(S): at 12:28

## 2024-04-01 RX ADMIN — Medication 1 APPLICATION(S): at 21:22

## 2024-04-01 RX ADMIN — Medication 500000 UNIT(S): at 17:10

## 2024-04-01 RX ADMIN — PANTOPRAZOLE SODIUM 40 MILLIGRAM(S): 20 TABLET, DELAYED RELEASE ORAL at 17:09

## 2024-04-01 RX ADMIN — Medication 500000 UNIT(S): at 05:42

## 2024-04-01 RX ADMIN — Medication 2: at 23:41

## 2024-04-01 RX ADMIN — Medication 3 MILLILITER(S): at 22:47

## 2024-04-01 RX ADMIN — Medication 100 MILLIGRAM(S): at 13:07

## 2024-04-01 RX ADMIN — Medication 500000 UNIT(S): at 23:40

## 2024-04-01 RX ADMIN — Medication 5 MILLILITER(S): at 05:42

## 2024-04-01 RX ADMIN — Medication 1 DROP(S): at 05:49

## 2024-04-01 RX ADMIN — Medication 100 MILLIGRAM(S): at 21:22

## 2024-04-01 RX ADMIN — CEFEPIME 100 MILLIGRAM(S): 1 INJECTION, POWDER, FOR SOLUTION INTRAMUSCULAR; INTRAVENOUS at 21:22

## 2024-04-01 RX ADMIN — SODIUM CHLORIDE 4 MILLILITER(S): 9 INJECTION INTRAMUSCULAR; INTRAVENOUS; SUBCUTANEOUS at 22:48

## 2024-04-01 RX ADMIN — CHLORHEXIDINE GLUCONATE 1 APPLICATION(S): 213 SOLUTION TOPICAL at 21:23

## 2024-04-01 RX ADMIN — Medication 100 MILLIGRAM(S): at 05:43

## 2024-04-01 RX ADMIN — HUMAN INSULIN 7 UNIT(S): 100 INJECTION, SUSPENSION SUBCUTANEOUS at 12:28

## 2024-04-01 RX ADMIN — Medication 2: at 12:28

## 2024-04-01 RX ADMIN — Medication 5 MILLILITER(S): at 23:40

## 2024-04-01 RX ADMIN — GABAPENTIN 100 MILLIGRAM(S): 400 CAPSULE ORAL at 13:06

## 2024-04-01 RX ADMIN — GABAPENTIN 100 MILLIGRAM(S): 400 CAPSULE ORAL at 05:49

## 2024-04-01 RX ADMIN — HUMAN INSULIN 7 UNIT(S): 100 INJECTION, SUSPENSION SUBCUTANEOUS at 05:44

## 2024-04-01 RX ADMIN — Medication 1 GRAM(S): at 23:41

## 2024-04-01 RX ADMIN — PANTOPRAZOLE SODIUM 40 MILLIGRAM(S): 20 TABLET, DELAYED RELEASE ORAL at 05:42

## 2024-04-01 RX ADMIN — CEFEPIME 100 MILLIGRAM(S): 1 INJECTION, POWDER, FOR SOLUTION INTRAMUSCULAR; INTRAVENOUS at 05:43

## 2024-04-01 RX ADMIN — CHLORHEXIDINE GLUCONATE 15 MILLILITER(S): 213 SOLUTION TOPICAL at 17:10

## 2024-04-01 RX ADMIN — Medication 3 MILLILITER(S): at 13:21

## 2024-04-01 RX ADMIN — Medication 1 GRAM(S): at 17:09

## 2024-04-01 RX ADMIN — Medication 1 DROP(S): at 10:07

## 2024-04-01 RX ADMIN — Medication 5 MILLILITER(S): at 17:09

## 2024-04-01 RX ADMIN — Medication 3 MILLILITER(S): at 05:19

## 2024-04-01 RX ADMIN — HUMAN INSULIN 7 UNIT(S): 100 INJECTION, SUSPENSION SUBCUTANEOUS at 17:27

## 2024-04-01 RX ADMIN — DROXIDOPA 100 MILLIGRAM(S): 100 CAPSULE ORAL at 21:22

## 2024-04-01 RX ADMIN — Medication 1 APPLICATION(S): at 05:43

## 2024-04-01 RX ADMIN — DROXIDOPA 100 MILLIGRAM(S): 100 CAPSULE ORAL at 05:42

## 2024-04-01 RX ADMIN — Medication 500000 UNIT(S): at 12:28

## 2024-04-01 RX ADMIN — DROXIDOPA 100 MILLIGRAM(S): 100 CAPSULE ORAL at 13:06

## 2024-04-01 RX ADMIN — HUMAN INSULIN 7 UNIT(S): 100 INJECTION, SUSPENSION SUBCUTANEOUS at 23:41

## 2024-04-01 RX ADMIN — CHLORHEXIDINE GLUCONATE 15 MILLILITER(S): 213 SOLUTION TOPICAL at 05:42

## 2024-04-01 RX ADMIN — SODIUM CHLORIDE 4 MILLILITER(S): 9 INJECTION INTRAMUSCULAR; INTRAVENOUS; SUBCUTANEOUS at 13:22

## 2024-04-01 RX ADMIN — SODIUM CHLORIDE 4 MILLILITER(S): 9 INJECTION INTRAMUSCULAR; INTRAVENOUS; SUBCUTANEOUS at 05:19

## 2024-04-01 RX ADMIN — Medication 2: at 17:27

## 2024-04-01 RX ADMIN — Medication 1 DROP(S): at 17:10

## 2024-04-01 RX ADMIN — Medication 1 DROP(S): at 21:22

## 2024-04-01 RX ADMIN — GABAPENTIN 100 MILLIGRAM(S): 400 CAPSULE ORAL at 21:22

## 2024-04-01 NOTE — PROGRESS NOTE ADULT - SUBJECTIVE AND OBJECTIVE BOX
Patient is a 73y old  Male who presents with a chief complaint of Stroke, critical stenosis, evaluation for angiography (31 Mar 2024 07:30)    HPI:  73 years old RH male with h/o HTN, HLD, DM, h/o CVA x 2 (no residual deficits) initially presented to [facility] ED with unsteady gait that began on 2/7 (exact LKW unknown) and L facial droop that began 6 AM on 2/9 (went to bed 11 PM on 2/8 without facial droop). No slurred speech, vision changes. Initial NIHSS 0 at [facility]. Hemodynamically stable, CT head with no acute pathology. Chronic left thalamic lacunar infarct. CTA with no large vessel occlusion. 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 CT perfusion with no acute abnormality.   Pt transfered from [facility] to [facility] due to concern for neurologic deterioration i/s/o aforementioned high grade proximal basiliar and L PCA stenosis. On arrival to [facility] ED, initial NIHSS 4 (+1 for LLE drift, +2 for b/l limb dysmetria, +1 for mild dysarthria). Later, pt started to have difficulty clearing secretions, became stridorous, and started to desaturate, so decision was made to intubate patient. Once pt medically stabilized, taken to angio suite by IR. Unable to obtain further history from patient at [facility] due to intubation/sedation (09 Feb 2024 20:27)    Interval Events:    REVIEW OF SYSTEMS:  [ ] Positive  [ ] All other systems negative  [ ] Unable to assess ROS because ________    Vital Signs Last 24 Hrs  T(C): 36.5 (04-01-24 @ 06:00), Max: 37.1 (03-31-24 @ 09:00)  T(F): 97.7 (04-01-24 @ 06:00), Max: 98.7 (03-31-24 @ 09:00)  HR: 79 (04-01-24 @ 06:00) (79 - 88)  BP: 119/69 (04-01-24 @ 06:00) (110/66 - 141/61)  RR: 16 (04-01-24 @ 06:00) (16 - 20)  SpO2: 100% (04-01-24 @ 06:00) (98% - 100%)    PHYSICAL EXAM:  HEENT:   [ ]Tracheostomy:  [ ]Pupils equal  [ ]No oral lesions  [ ]Abnormal    SKIN  [ ] No Rash  [ ] Abnormal  [ ] pressure    CARDIAC  [ ]Regular  [ ]Abnormal    PULMONARY  [ ]Bilateral Clear Breath Sounds  [ ]Normal Excursion  [ ]Abnormal    GI  [ ]PEG      [ ] +BS		              [ ]Soft, nondistended, nontender	  [ ]Abnormal    MUSCULOSKELETAL                                   [ ]Bedbound                 [ ]Abnormal    [ ]Ambulatory/OOB to chair                           EXTREMITIES                                         [ ]Normal  [ ]Edema                           NEUROLOGIC  [ ] Normal, non focal  [ ] Focal findings:    PSYCHIATRIC  [ ]Alert and appropriate  [ ] Sedated	 [ ]Agitated    :  [place]: [ ] Yes, if yes: Date of Placement:                   [  ] No    LINES: Central Lines [ ] Yes, if yes: Date of Placement                                     [  ] No    HOSPITAL MEDICATIONS:  MEDICATIONS  (STANDING):  albuterol/ipratropium for Nebulization 3 milliLiter(s) Nebulizer every 8 hours  artificial tears (preservative free) Ophthalmic Solution 1 Drop(s) Both EYES every 4 hours  Biotene Dry Mouth Oral Rinse 5 milliLiter(s) Swish and Spit every 6 hours  cefepime   IVPB 1000 milliGRAM(s) IV Intermittent every 8 hours  chlorhexidine 0.12% Liquid 15 milliLiter(s) Oral Mucosa every 12 hours  chlorhexidine 4% Liquid 1 Application(s) Topical daily  droxidopa 100 milliGRAM(s) Oral <User Schedule>  gabapentin Solution 100 milliGRAM(s) Enteral Tube every 8 hours  insulin lispro (ADMELOG) corrective regimen sliding scale   SubCutaneous every 6 hours  insulin NPH human recombinant 7 Unit(s) SubCutaneous every 6 hours  metroNIDAZOLE  IVPB 500 milliGRAM(s) IV Intermittent every 8 hours  nystatin    Suspension 887521 Unit(s) Oral every 6 hours  pantoprazole  Injectable 40 milliGRAM(s) IV Push every 12 hours  petrolatum Ophthalmic Ointment 1 Application(s) Both EYES every 8 hours  senna Syrup 10 milliLiter(s) Oral at bedtime  sodium chloride 3%  Inhalation 4 milliLiter(s) Inhalation every 8 hours  sucralfate suspension 1 Gram(s) Oral every 6 hours    MEDICATIONS  (PRN):  acetaminophen   Oral Liquid .. 1000 milliGRAM(s) Enteral Tube every 6 hours PRN Temp greater or equal to 38.5C (101.3F), Mild Pain (1 - 3)      LABS:                        9.6    12.23 )-----------( 357      ( 31 Mar 2024 07:11 )             33.3     03-31    139  |  98  |  21  ----------------------------<  159<H>  4.1   |  30  |  0.34<L>    Ca    8.3<L>      31 Mar 2024 07:08  Phos  2.1     03-31  Mg     2.8     03-31        Urinalysis Basic - ( 31 Mar 2024 07:08 )    Color: x / Appearance: x / SG: x / pH: x  Gluc: 159 mg/dL / Ketone: x  / Bili: x / Urobili: x   Blood: x / Protein: x / Nitrite: x   Leuk Esterase: x / RBC: x / WBC x   Sq Epi: x / Non Sq Epi: x / Bacteria: x      Mode: AC/ CMV (Assist Control/ Continuous Mandatory Ventilation)  RR (machine): 12  TV (machine): 500  FiO2: 40  PEEP: 7  ITime: 1  MAP: 11  PIP: 20   Patient is a 73y old  Male who presents with a chief complaint of Stroke, critical stenosis, evaluation for angiography (31 Mar 2024 07:30)    HPI:  73 years old RH male with h/o HTN, HLD, DM, h/o CVA x 2 (no residual deficits) initially presented to [facility] ED with unsteady gait that began on 2/7 (exact LKW unknown) and L facial droop that began 6 AM on 2/9 (went to bed 11 PM on 2/8 without facial droop). No slurred speech, vision changes. Initial NIHSS 0 at [facility]. Hemodynamically stable, CT head with no acute pathology. Chronic left thalamic lacunar infarct. CTA with no large vessel occlusion. 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 CT perfusion with no acute abnormality.   Pt transferred from [facility] to [facility] due to concern for neurologic deterioration i/s/o aforementioned high grade proximal basilar and L PCA stenosis. On arrival to [facility] ED, initial NIHSS 4 (+1 for LLE drift, +2 for b/l limb dysmetria, +1 for mild dysarthria). Later, pt started to have difficulty clearing secretions, became stridorous, and started to desaturate, so decision was made to intubate patient. Once pt medically stabilized, taken to angio suite by IR. Unable to obtain further history from patient at [facility] due to intubation/sedation (09 Feb 2024 20:27)    Interval Events: No issues overnight    REVIEW OF SYSTEMS:  [ ] Positive  [ ] All other systems negative  [x ] Unable to assess ROS because patient is NON-verbal    Vital Signs Last 24 Hrs  T(C): 36.5 (04-01-24 @ 06:00), Max: 37.1 (03-31-24 @ 09:00)  T(F): 97.7 (04-01-24 @ 06:00), Max: 98.7 (03-31-24 @ 09:00)  HR: 79 (04-01-24 @ 06:00) (79 - 88)  BP: 119/69 (04-01-24 @ 06:00) (110/66 - 141/61)  RR: 16 (04-01-24 @ 06:00) (16 - 20)  SpO2: 100% (04-01-24 @ 06:00) (98% - 100%)    PHYSICAL EXAM:  HEENT:   [x]Tracheostomy: #8 cuffed Portex  [x]Pupils equal; sluggish  [ ]No oral lesions  [ ]Abnormal    SKIN  [x]No Rash  [ ] Abnormal  [ ] pressure    CARDIAC  [x]Regular  [ ]Abnormal    PULMONARY  [x]Bilateral Coarse Breath Sounds  [ ]Normal Excursion  [ ]Abnormal    GI  [x]PEG      [x] +BS		              [x]Soft, nondistended, nontender	  [ ]Abnormal    MUSCULOSKELETAL                                   [x]Bedbound                 [ ]Abnormal    [ ]Ambulatory/OOB to chair                           EXTREMITIES                                         [ ]Normal  [x]Edema - BL UE edema LFT >RT                   NEUROLOGIC  [ ] Normal, non focal  [x] Focal findings: Minimally responsive to noxious stimuli, Not following commands     PSYCHIATRIC  [x] Unable to assess    :  [place]: [ ] Yes, if yes: Date of Placement:                   [x] No    LINES: Central Lines [ ] Yes, if yes: Date of Placement                                     [x] No    HOSPITAL MEDICATIONS:  MEDICATIONS  (STANDING):  albuterol/ipratropium for Nebulization 3 milliLiter(s) Nebulizer every 8 hours  artificial tears (preservative free) Ophthalmic Solution 1 Drop(s) Both EYES every 4 hours  Biotene Dry Mouth Oral Rinse 5 milliLiter(s) Swish and Spit every 6 hours  cefepime   IVPB 1000 milliGRAM(s) IV Intermittent every 8 hours  chlorhexidine 0.12% Liquid 15 milliLiter(s) Oral Mucosa every 12 hours  chlorhexidine 4% Liquid 1 Application(s) Topical daily  droxidopa 100 milliGRAM(s) Oral <User Schedule>  gabapentin Solution 100 milliGRAM(s) Enteral Tube every 8 hours  insulin lispro (ADMELOG) corrective regimen sliding scale   SubCutaneous every 6 hours  insulin NPH human recombinant 7 Unit(s) SubCutaneous every 6 hours  metroNIDAZOLE  IVPB 500 milliGRAM(s) IV Intermittent every 8 hours  nystatin    Suspension 036097 Unit(s) Oral every 6 hours  pantoprazole  Injectable 40 milliGRAM(s) IV Push every 12 hours  petrolatum Ophthalmic Ointment 1 Application(s) Both EYES every 8 hours  senna Syrup 10 milliLiter(s) Oral at bedtime  sodium chloride 3%  Inhalation 4 milliLiter(s) Inhalation every 8 hours  sucralfate suspension 1 Gram(s) Oral every 6 hours    MEDICATIONS  (PRN):  acetaminophen   Oral Liquid .. 1000 milliGRAM(s) Enteral Tube every 6 hours PRN Temp greater or equal to 38.5C (101.3F), Mild Pain (1 - 3)      LABS:                        9.6    12.23 )-----------( 357      ( 31 Mar 2024 07:11 )             33.3     03-31    139  |  98  |  21  ----------------------------<  159<H>  4.1   |  30  |  0.34<L>    Ca    8.3<L>      31 Mar 2024 07:08  Phos  2.1     03-31  Mg     2.8     03-31        Urinalysis Basic - ( 31 Mar 2024 07:08 )    Color: x / Appearance: x / SG: x / pH: x  Gluc: 159 mg/dL / Ketone: x  / Bili: x / Urobili: x   Blood: x / Protein: x / Nitrite: x   Leuk Esterase: x / RBC: x / WBC x   Sq Epi: x / Non Sq Epi: x / Bacteria: x      Mode: AC/ CMV (Assist Control/ Continuous Mandatory Ventilation)  RR (machine): 12  TV (machine): 500  FiO2: 40  PEEP: 7  ITime: 1  MAP: 11  PIP: 20

## 2024-04-01 NOTE — PROGRESS NOTE ADULT - ASSESSMENT
73 year old male with hypertension, hyperlipidemia, diabetes, prior CVA's without residual deficits who initially presented as a transfer from [facility] with concern for CVA in the setting of 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 Prior to admission patient had Viral URI ( 1-2 weeks prior to admission) with subsequent weakness. He underwent an angiogram (2/11) which showed moderate stenosis and no intervention was done. MRI Performed c/w small bilateral cerebellar strokes and prior L thalamic strokes, as per Neurology was not c/w current presentation. 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 He is currently s/p 5 rounds of plasma exchange (2/13-2/20) and IVIG x 5 ( 2/21-2/26)  His hospital course was complicated by progressive respiratory failure. CT Chest performed on 2/20 with atelectasis of b/l lower lobes. BAL 2/21 Grew PSA treated with course of Zosyn (2/21-2/28).  Patient transferred to RCU on 2/24. RRT called on 3/3 for unresponsiveness. Patient with unreactive pupils, no corneal reflex. Also found to be hypotensive and febrile to 105. Noted to have new melena. Hgb 5. Given 1 unit of pRBC. Transferred to MICU. patient required pressors while in the MICU, BP improved and was transitioned to Droxidopa. Patient had CT C/A/P consistent with Bibasilar pneumonia; Sputum cx grew PSA and was treated with course of Meropenem. Patient evaluated by GI in setting of Melena, transaminitis and portal venous gas on CT imaging. No EGD performed as Melena resolved, Transaminitis was thought to be in setting of shock and portal venous gas was thought to be in setting of recent PEG. EEG was performed in setting of AMS no seizures noted. Repeat MRI Performed which showed Small scattered foci of diffusion restriction within the right cerebellar hemisphere, left parietal lobe, and symmetrically in the bilateral basal ganglia. Neuro recommended Resumption of ASA in setting of possible embolic phenomenon. Repeat ECHO performed RT Ventricular Moderately enlarged, reduced systolic function. Mental status improved and was transferred back to RCU on 3/6.  Cardiology consulted for tachy-dez, unable to catch events on tele, EKG ST otherwise unremarkable, CT Angio negative for PE. Patient with recurrent Melena on 3/27 resulting in RRT For hypotension for which he required 3 units of PRBCs. S/p EGD; No active bleeding noted but found to have duodenal erosions.    3/31: Patient remains Hemodynamically stable with stable H+H. If patient develops decreasing H+H or HD instability will obtain CTA and contact IR as per GI recc. Patient Febrile yesterday placed on Cefepime and Zosyn; blood NGTD.

## 2024-04-01 NOTE — PROGRESS NOTE ADULT - PROBLEM SELECTOR PROBLEM 12
Counseling regarding goals of care

## 2024-04-01 NOTE — PROGRESS NOTE ADULT - PROBLEM SELECTOR PLAN 1
- Patient with large Melanotic Episodes on 3/27 w/ significant drop in H+H  - Was Transfused 3 Units of PRBCs  - EGD 3/27: Technically Limited Study, + Duodenal Erosions but no evidence of Active Bleed    - Continue Protonix 40 mg q 12 hrs IVP, Sucralfate q 6hrs added   - Patient with 1 Reported Melanotic stool over night    - Case d/w GI this morning can trial TFs as H+H has remained stable   - If patient develops decreasing H+H or HD instability will obtain STAT CTA and contact IR   - Will decrease CBC Frequency to q 12 hrs   - Patient is Roman [employer] However Family is in agreement for administration of blood products ( Per Wife)

## 2024-04-01 NOTE — PROGRESS NOTE ADULT - NS ATTEND AMEND GEN_ALL_CORE FT
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 Hospital course has been complicated by VAP, massive GIB with hemorrhagic shock, worsening neurologic vascular injury with small scattered foci of diffusion restriction within the right cerebellar hemisphere, left parietal lobe, and symmetrically in the bilateral basal ganglia, recurrent GIB (3/27) found to have duodenal erosions on endoscopy.     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 cerebellar and L parietal infarctions suspicious for septic embolic phenomena and raised suspicion for endocarditis  Bilateral symmetric basal ganglia lesions with differntial including hypoxic injury producing delayed post-hypoxic leukoencephalopathy verses CO, cyanide, or methanol  His neurologic exam is severely limited. Able to move his RLE, open eyes spontaneously, with some mouth movements. However, no meaningful communication.   - Per neurology 3/6/24: may consider repeating MRI in 4~5 wks time to see any further changes such as DPHL injury  - Also requested MILADIS  - We are now holding his DAPT in the setting of recurrent GIB  - Will confirm repeat MRI and MILADIS with neurology    #GIB, Acute blood loss anemia   #Duodenal erosions  Now with stable H/H and no further evidence of bleeding.  - PPI as above  - Resume tube feeding  - On Flagyl and Cefepime with concern for translocation and sepsis related to recurrent bleeds, will plan for 7 days of Flagyl and Cefepime as outlined below    #VAP:   SCx 2/21: PSa  SCx 3/4 PSa   SCx 3/20 PSa, CRE  3/26: CRE Pseudomonas  S/P Meropenem, Zosyn courses previously during hospitalization  He has now been resumed on Cefepime for empiric coverage  - Continue Cefepime, plan for 14 day course    #Hypotension:   - Seems to have resolved, will see if we can hold droxidopa    #Acute respiratory failure with hypoxia: Remains on ventilator support, tolerating limited high pressure support trials.   He currently has an 8 cuffed portex, placed 2/16 with Dr. Sood  - Continue wean briseyda as tolerated  - Suctioning, chest pt as needed  - Oral care as per RCU team  - OOB as tolerated  - PT/OT    Rest as above

## 2024-04-02 LAB
ANION GAP SERPL CALC-SCNC: 9 MMOL/L — SIGNIFICANT CHANGE UP (ref 5–17)
BLD GP AB SCN SERPL QL: NEGATIVE — SIGNIFICANT CHANGE UP
BUN SERPL-MCNC: 19 MG/DL — SIGNIFICANT CHANGE UP (ref 7–23)
CALCIUM SERPL-MCNC: 8.9 MG/DL — SIGNIFICANT CHANGE UP (ref 8.4–10.5)
CHLORIDE SERPL-SCNC: 101 MMOL/L — SIGNIFICANT CHANGE UP (ref 96–108)
CO2 SERPL-SCNC: 34 MMOL/L — HIGH (ref 22–31)
CREAT SERPL-MCNC: 0.34 MG/DL — LOW (ref 0.5–1.3)
EGFR: 120 ML/MIN/1.73M2 — SIGNIFICANT CHANGE UP
GLUCOSE BLDC GLUCOMTR-MCNC: 141 MG/DL — HIGH (ref 70–99)
GLUCOSE BLDC GLUCOMTR-MCNC: 148 MG/DL — HIGH (ref 70–99)
GLUCOSE BLDC GLUCOMTR-MCNC: 150 MG/DL — HIGH (ref 70–99)
GLUCOSE BLDC GLUCOMTR-MCNC: 168 MG/DL — HIGH (ref 70–99)
GLUCOSE BLDC GLUCOMTR-MCNC: 175 MG/DL — HIGH (ref 70–99)
GLUCOSE BLDC GLUCOMTR-MCNC: 180 MG/DL — HIGH (ref 70–99)
GLUCOSE SERPL-MCNC: 175 MG/DL — HIGH (ref 70–99)
HCT VFR BLD CALC: 26.3 % — LOW (ref 39–50)
HCT VFR BLD CALC: 32.2 % — LOW (ref 39–50)
HGB BLD-MCNC: 7.5 G/DL — LOW (ref 13–17)
HGB BLD-MCNC: 9.2 G/DL — LOW (ref 13–17)
MAGNESIUM SERPL-MCNC: 2.2 MG/DL — SIGNIFICANT CHANGE UP (ref 1.6–2.6)
MCHC RBC-ENTMCNC: 24.9 PG — LOW (ref 27–34)
MCHC RBC-ENTMCNC: 25.1 PG — LOW (ref 27–34)
MCHC RBC-ENTMCNC: 28.5 GM/DL — LOW (ref 32–36)
MCHC RBC-ENTMCNC: 28.6 GM/DL — LOW (ref 32–36)
MCV RBC AUTO: 87.3 FL — SIGNIFICANT CHANGE UP (ref 80–100)
MCV RBC AUTO: 88 FL — SIGNIFICANT CHANGE UP (ref 80–100)
NRBC # BLD: 1 /100 WBCS — HIGH (ref 0–0)
NRBC # BLD: 1 /100 WBCS — HIGH (ref 0–0)
PHOSPHATE SERPL-MCNC: 3.1 MG/DL — SIGNIFICANT CHANGE UP (ref 2.5–4.5)
PLATELET # BLD AUTO: 275 K/UL — SIGNIFICANT CHANGE UP (ref 150–400)
PLATELET # BLD AUTO: 387 K/UL — SIGNIFICANT CHANGE UP (ref 150–400)
POTASSIUM SERPL-MCNC: 4.4 MMOL/L — SIGNIFICANT CHANGE UP (ref 3.5–5.3)
POTASSIUM SERPL-SCNC: 4.4 MMOL/L — SIGNIFICANT CHANGE UP (ref 3.5–5.3)
RBC # BLD: 2.99 M/UL — LOW (ref 4.2–5.8)
RBC # BLD: 3.69 M/UL — LOW (ref 4.2–5.8)
RBC # FLD: 18.6 % — HIGH (ref 10.3–14.5)
RBC # FLD: 18.6 % — HIGH (ref 10.3–14.5)
RH IG SCN BLD-IMP: POSITIVE — SIGNIFICANT CHANGE UP
SODIUM SERPL-SCNC: 144 MMOL/L — SIGNIFICANT CHANGE UP (ref 135–145)
WBC # BLD: 11.98 K/UL — HIGH (ref 3.8–10.5)
WBC # BLD: 13.04 K/UL — HIGH (ref 3.8–10.5)
WBC # FLD AUTO: 11.98 K/UL — HIGH (ref 3.8–10.5)
WBC # FLD AUTO: 13.04 K/UL — HIGH (ref 3.8–10.5)

## 2024-04-02 PROCEDURE — 99233 SBSQ HOSP IP/OBS HIGH 50: CPT

## 2024-04-02 PROCEDURE — 70551 MRI BRAIN STEM W/O DYE: CPT | Mod: 26

## 2024-04-02 RX ADMIN — Medication 1 GRAM(S): at 18:49

## 2024-04-02 RX ADMIN — CHLORHEXIDINE GLUCONATE 1 APPLICATION(S): 213 SOLUTION TOPICAL at 22:59

## 2024-04-02 RX ADMIN — Medication 100 MILLIGRAM(S): at 05:01

## 2024-04-02 RX ADMIN — SODIUM CHLORIDE 4 MILLILITER(S): 9 INJECTION INTRAMUSCULAR; INTRAVENOUS; SUBCUTANEOUS at 22:47

## 2024-04-02 RX ADMIN — PANTOPRAZOLE SODIUM 40 MILLIGRAM(S): 20 TABLET, DELAYED RELEASE ORAL at 05:00

## 2024-04-02 RX ADMIN — Medication 100 MILLIGRAM(S): at 21:28

## 2024-04-02 RX ADMIN — Medication 2: at 06:17

## 2024-04-02 RX ADMIN — Medication 1 GRAM(S): at 05:00

## 2024-04-02 RX ADMIN — Medication 1 DROP(S): at 01:03

## 2024-04-02 RX ADMIN — CEFEPIME 100 MILLIGRAM(S): 1 INJECTION, POWDER, FOR SOLUTION INTRAMUSCULAR; INTRAVENOUS at 05:01

## 2024-04-02 RX ADMIN — Medication 500000 UNIT(S): at 18:58

## 2024-04-02 RX ADMIN — Medication 5 MILLILITER(S): at 18:49

## 2024-04-02 RX ADMIN — CHLORHEXIDINE GLUCONATE 15 MILLILITER(S): 213 SOLUTION TOPICAL at 05:00

## 2024-04-02 RX ADMIN — GABAPENTIN 100 MILLIGRAM(S): 400 CAPSULE ORAL at 12:19

## 2024-04-02 RX ADMIN — DROXIDOPA 100 MILLIGRAM(S): 100 CAPSULE ORAL at 05:00

## 2024-04-02 RX ADMIN — Medication 1 APPLICATION(S): at 14:22

## 2024-04-02 RX ADMIN — SODIUM CHLORIDE 4 MILLILITER(S): 9 INJECTION INTRAMUSCULAR; INTRAVENOUS; SUBCUTANEOUS at 05:24

## 2024-04-02 RX ADMIN — SENNA PLUS 10 MILLILITER(S): 8.6 TABLET ORAL at 21:32

## 2024-04-02 RX ADMIN — GABAPENTIN 100 MILLIGRAM(S): 400 CAPSULE ORAL at 21:26

## 2024-04-02 RX ADMIN — CHLORHEXIDINE GLUCONATE 15 MILLILITER(S): 213 SOLUTION TOPICAL at 18:49

## 2024-04-02 RX ADMIN — Medication 1 APPLICATION(S): at 05:01

## 2024-04-02 RX ADMIN — Medication 100 MILLIGRAM(S): at 15:30

## 2024-04-02 RX ADMIN — Medication 5 MILLILITER(S): at 13:00

## 2024-04-02 RX ADMIN — GABAPENTIN 100 MILLIGRAM(S): 400 CAPSULE ORAL at 05:00

## 2024-04-02 RX ADMIN — Medication 1 GRAM(S): at 12:59

## 2024-04-02 RX ADMIN — CEFEPIME 100 MILLIGRAM(S): 1 INJECTION, POWDER, FOR SOLUTION INTRAMUSCULAR; INTRAVENOUS at 15:30

## 2024-04-02 RX ADMIN — PANTOPRAZOLE SODIUM 40 MILLIGRAM(S): 20 TABLET, DELAYED RELEASE ORAL at 18:50

## 2024-04-02 RX ADMIN — Medication 1 DROP(S): at 14:00

## 2024-04-02 RX ADMIN — Medication 1 DROP(S): at 10:41

## 2024-04-02 RX ADMIN — Medication 3 MILLILITER(S): at 05:24

## 2024-04-02 RX ADMIN — CEFEPIME 100 MILLIGRAM(S): 1 INJECTION, POWDER, FOR SOLUTION INTRAMUSCULAR; INTRAVENOUS at 21:27

## 2024-04-02 RX ADMIN — Medication 5 MILLILITER(S): at 05:01

## 2024-04-02 RX ADMIN — Medication 3 MILLILITER(S): at 22:47

## 2024-04-02 RX ADMIN — Medication 1 DROP(S): at 05:01

## 2024-04-02 RX ADMIN — Medication 1 APPLICATION(S): at 21:29

## 2024-04-02 RX ADMIN — Medication 1 DROP(S): at 18:49

## 2024-04-02 RX ADMIN — Medication 500000 UNIT(S): at 05:01

## 2024-04-02 RX ADMIN — HUMAN INSULIN 7 UNIT(S): 100 INJECTION, SUSPENSION SUBCUTANEOUS at 18:48

## 2024-04-02 RX ADMIN — HUMAN INSULIN 7 UNIT(S): 100 INJECTION, SUSPENSION SUBCUTANEOUS at 06:17

## 2024-04-02 RX ADMIN — Medication 1 DROP(S): at 21:29

## 2024-04-02 RX ADMIN — Medication 500000 UNIT(S): at 13:00

## 2024-04-02 NOTE — PROGRESS NOTE ADULT - PROBLEM SELECTOR PLAN 7
- MRI 2/12: small b/l cerebellar strokes (post-procedural) and prior L thalamic strokes  - MRI 3/5: Small scattered foci of diffusion restriction within the right cerebellar hemisphere, left parietal lobe, and symmetrically in the bilateral basal ganglia.  - Lipitor d/c'd in setting of liver injury 3/3  - Was on ASA And Plavix for SAMMPRIS trial in setting of intracranial stenosis   - ASA and Plavix dcd in setting of Recurrent Melena

## 2024-04-02 NOTE — PROGRESS NOTE ADULT - ASSESSMENT
73 year old male with hypertension, hyperlipidemia, diabetes, prior CVA's without residual deficits who initially presented as a transfer from [place] with concern for CVA in the setting of 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 Prior to admission patient had Viral URI ( 1-2 weeks prior to admission) with subsequent weakness. He underwent an angiogram (2/11) which showed moderate stenosis and no intervention was done. MRI Performed c/w small bilateral cerebellar strokes and prior L thalamic strokes, as per Neurology was not c/w current presentation. 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 He is currently s/p 5 rounds of plasma exchange (2/13-2/20) and IVIG x 5 ( 2/21-2/26)  His hospital course was complicated by progressive respiratory failure. CT Chest performed on 2/20 with atelectasis of b/l lower lobes. BAL 2/21 Grew PSA treated with course of Zosyn (2/21-2/28).  Patient transferred to RCU on 2/24. RRT called on 3/3 for unresponsiveness. Patient with unreactive pupils, no corneal reflex. Also found to be hypotensive and febrile to 105. Noted to have new melena. Hgb 5. Given 1 unit of pRBC. Transferred to MICU. patient required pressors while in the MICU, BP improved and was transitioned to Droxidopa. Patient had CT C/A/P consistent with Bibasilar pneumonia; Sputum cx grew PSA and was treated with course of Meropenem. Patient evaluated by GI in setting of Melena, transaminitis and portal venous gas on CT imaging. No EGD performed as Melena resolved, Transaminitis was thought to be in setting of shock and portal venous gas was thought to be in setting of recent PEG. EEG was performed in setting of AMS no seizures noted. Repeat MRI Performed which showed Small scattered foci of diffusion restriction within the right cerebellar hemisphere, left parietal lobe, and symmetrically in the bilateral basal ganglia. Neuro recommended Resumption of ASA in setting of possible embolic phenomenon. Repeat ECHO performed RT Ventricular Moderately enlarged, reduced systolic function. Mental status improved and was transferred back to RCU on 3/6.  Cardiology consulted for tachy-dez, unable to catch events on tele, EKG ST otherwise unremarkable, CT Angio negative for PE. Patient with recurrent Melena on 3/27 resulting in RRT For hypotension for which he required 3 units of PRBCs. S/p EGD; No active bleeding noted but found to have duodenal erosions.    4/2: No events reported overnight. Patient is scheduled for repeat MRI this afternoon. Patient has remained Hemodynamically stable will dc Droxidopa.

## 2024-04-02 NOTE — PROGRESS NOTE ADULT - PROBLEM SELECTOR PLAN 3
- Previous Hypotension likely in setting of GI Bleed  - Patients BP has since remained stable   - Will Dc Droxidopa   - Continue to monitor BP

## 2024-04-02 NOTE — PROGRESS NOTE ADULT - PROBLEM SELECTOR PLAN 1
- Patient with large Melanotic Episodes on 3/27 w/ significant drop in H+H  - Was Transfused 3 Units of PRBCs on 3/27  - EGD 3/27: Technically Limited Study, + Duodenal Erosions but no evidence of Active Bleed    - Continue Protonix 40 mg q 12 hrs IVP, Sucralfate q 6 hrs added   - If patient develops decreasing H+H or HD instability will obtain STAT CTA and contact IR as per GI   - Continue to monitor CBC   - Patient is L[employer] day However Family is in agreement for administration of blood products ( Per Wife)

## 2024-04-02 NOTE — PROGRESS NOTE ADULT - SUBJECTIVE AND OBJECTIVE BOX
Patient is a 74y old  Male who presents with a chief complaint of Stroke, critical stenosis, evaluation for angiography (01 Apr 2024 07:47)      Interval Events: No events reported overnight     REVIEW OF SYSTEMS:  [ ] Positive  [ ] All other systems negative  [ ] Unable to assess ROS because ________    Vital Signs Last 24 Hrs  T(C): 36.7 (04-02-24 @ 04:34), Max: 37.7 (04-01-24 @ 14:00)  T(F): 98 (04-02-24 @ 04:34), Max: 99.9 (04-01-24 @ 14:00)  HR: 86 (04-02-24 @ 05:24) (84 - 93)  BP: 119/64 (04-02-24 @ 04:34) (106/63 - 127/72)  RR: 19 (04-02-24 @ 04:34) (16 - 20)  SpO2: 100% (04-02-24 @ 05:24) (98% - 100%)    PHYSICAL EXAM:  HEENT:   [ ]Tracheostomy:  [ ]Pupils equal  [ ]No oral lesions  [ ]Abnormal    SKIN  [ ]No Rash  [ ] Abnormal  [ ] pressure    CARDIAC  [ ]Regular  [ ]Abnormal    PULMONARY  [ ]Bilateral Clear Breath Sounds  [ ]Normal Excursion  [ ]Abnormal    GI  [ ]PEG      [ ] +BS		              [ ]Soft, nondistended, nontender	  [ ]Abnormal    MUSCULOSKELETAL                                   [ ]Bedbound                 [ ]Abnormal    [ ]Ambulatory/OOB to chair                           EXTREMITIES                                         [ ]Normal  [ ]Edema                           NEUROLOGIC  [ ] Normal, non focal  [ ] Focal findings:    PSYCHIATRIC  [ ]Alert and appropriate  [ ] Sedated	 [ ]Agitated    :  [place]: [ ] Yes, if yes: Date of Placement:                   [  ] No    LINES: Central Lines [ ] Yes, if yes: Date of Placement                                     [  ] No    HOSPITAL MEDICATIONS:  MEDICATIONS  (STANDING):  albuterol/ipratropium for Nebulization 3 milliLiter(s) Nebulizer every 8 hours  artificial tears (preservative free) Ophthalmic Solution 1 Drop(s) Both EYES every 4 hours  Biotene Dry Mouth Oral Rinse 5 milliLiter(s) Swish and Spit every 6 hours  cefepime   IVPB 1000 milliGRAM(s) IV Intermittent every 8 hours  chlorhexidine 0.12% Liquid 15 milliLiter(s) Oral Mucosa every 12 hours  chlorhexidine 4% Liquid 1 Application(s) Topical daily  droxidopa 100 milliGRAM(s) Oral <User Schedule>  gabapentin Solution 100 milliGRAM(s) Enteral Tube every 8 hours  insulin lispro (ADMELOG) corrective regimen sliding scale   SubCutaneous every 6 hours  insulin NPH human recombinant 7 Unit(s) SubCutaneous every 6 hours  metroNIDAZOLE  IVPB 500 milliGRAM(s) IV Intermittent every 8 hours  nystatin    Suspension 850914 Unit(s) Oral every 6 hours  pantoprazole  Injectable 40 milliGRAM(s) IV Push every 12 hours  petrolatum Ophthalmic Ointment 1 Application(s) Both EYES every 8 hours  senna Syrup 10 milliLiter(s) Oral at bedtime  sodium chloride 3%  Inhalation 4 milliLiter(s) Inhalation every 8 hours  sucralfate suspension 1 Gram(s) Oral every 6 hours    MEDICATIONS  (PRN):  acetaminophen   Oral Liquid .. 1000 milliGRAM(s) Enteral Tube every 6 hours PRN Temp greater or equal to 38.5C (101.3F), Mild Pain (1 - 3)      LABS:                        9.2    11.73 )-----------( 339      ( 01 Apr 2024 07:41 )             32.1     04-02    144  |  101  |  19  ----------------------------<  175<H>  4.4   |  34<H>  |  0.34<L>    Ca    8.9      02 Apr 2024 06:49  Phos  3.1     04-02  Mg     2.2     04-02        Urinalysis Basic - ( 02 Apr 2024 06:49 )    Color: x / Appearance: x / SG: x / pH: x  Gluc: 175 mg/dL / Ketone: x  / Bili: x / Urobili: x   Blood: x / Protein: x / Nitrite: x   Leuk Esterase: x / RBC: x / WBC x   Sq Epi: x / Non Sq Epi: x / Bacteria: x          CAPILLARY BLOOD GLUCOSE    MICROBIOLOGY:     RADIOLOGY:  [ ] Reviewed and interpreted by me    Mode: AC/ CMV (Assist Control/ Continuous Mandatory Ventilation)  RR (machine): 12  TV (machine): 500  FiO2: 40  PEEP: 5  ITime: 0.79  MAP: 9  PIP: 17   Patient is a 74y old  Male who presents with a chief complaint of Stroke, critical stenosis, evaluation for angiography (01 Apr 2024 07:47)      Interval Events: No events reported overnight     REVIEW OF SYSTEMS:  [ ] Positive  [x] All other systems negative  [ ] Unable to assess ROS because ________    Vital Signs Last 24 Hrs  T(C): 36.7 (04-02-24 @ 04:34), Max: 37.7 (04-01-24 @ 14:00)  T(F): 98 (04-02-24 @ 04:34), Max: 99.9 (04-01-24 @ 14:00)  HR: 86 (04-02-24 @ 05:24) (84 - 93)  BP: 119/64 (04-02-24 @ 04:34) (106/63 - 127/72)  RR: 19 (04-02-24 @ 04:34) (16 - 20)  SpO2: 100% (04-02-24 @ 05:24) (98% - 100%)    PHYSICAL EXAM:  HEENT:   [x]Tracheostomy: #8 cuffed Portex  [x]Pupils equal  [ ]No oral lesions  [ ]Abnormal    SKIN  [x]No Rash  [ ] Abnormal  [ ] pressure    CARDIAC  [x]Regular  [ ]Abnormal    PULMONARY  [x]Bilateral Coarse Breath Sounds  [ ]Normal Excursion  [ ]Abnormal    GI  [x]PEG      [x] +BS		              [x]Soft, nondistended, nontender	  [ ]Abnormal    MUSCULOSKELETAL                                   [x]Bedbound                 [ ]Abnormal    [ ]Ambulatory/OOB to chair                           EXTREMITIES                                         [ ]Normal  [x]Edema: BL UE edema LFT >RT                   NEUROLOGIC  [ ] Normal, non focal  [x] Focal findings: Awake nodding head to answer questions, Attempting to squeeze with RT Hand upon command     PSYCHIATRIC  [x] Unable to assess    :  [place]: [ ] Yes, if yes: Date of Placement:                   [x] No    LINES: Central Lines [ ] Yes, if yes: Date of Placement                                     [x] No  HOSPITAL MEDICATIONS:  MEDICATIONS  (STANDING):  albuterol/ipratropium for Nebulization 3 milliLiter(s) Nebulizer every 8 hours  artificial tears (preservative free) Ophthalmic Solution 1 Drop(s) Both EYES every 4 hours  Biotene Dry Mouth Oral Rinse 5 milliLiter(s) Swish and Spit every 6 hours  cefepime   IVPB 1000 milliGRAM(s) IV Intermittent every 8 hours  chlorhexidine 0.12% Liquid 15 milliLiter(s) Oral Mucosa every 12 hours  chlorhexidine 4% Liquid 1 Application(s) Topical daily  droxidopa 100 milliGRAM(s) Oral <User Schedule>  gabapentin Solution 100 milliGRAM(s) Enteral Tube every 8 hours  insulin lispro (ADMELOG) corrective regimen sliding scale   SubCutaneous every 6 hours  insulin NPH human recombinant 7 Unit(s) SubCutaneous every 6 hours  metroNIDAZOLE  IVPB 500 milliGRAM(s) IV Intermittent every 8 hours  nystatin    Suspension [id number] Unit(s) Oral every 6 hours  pantoprazole  Injectable 40 milliGRAM(s) IV Push every 12 hours  petrolatum Ophthalmic Ointment 1 Application(s) Both EYES every 8 hours  senna Syrup 10 milliLiter(s) Oral at bedtime  sodium chloride 3%  Inhalation 4 milliLiter(s) Inhalation every 8 hours  sucralfate suspension 1 Gram(s) Oral every 6 hours    MEDICATIONS  (PRN):  acetaminophen   Oral Liquid .. 1000 milliGRAM(s) Enteral Tube every 6 hours PRN Temp greater or equal to 38.5C (101.3F), Mild Pain (1 - 3)      LABS:                        9.2    11.73 )-----------( 339      ( 01 Apr 2024 07:41 )             32.1     04-02    144  |  101  |  19  ----------------------------<  175<H>  4.4   |  34<H>  |  0.34<L>    Ca    8.9      02 Apr 2024 06:49  Phos  3.1     04-02  Mg     2.2     04-02        Urinalysis Basic - ( 02 Apr 2024 06:49 )    Color: x / Appearance: x / SG: x / pH: x  Gluc: 175 mg/dL / Ketone: x  / Bili: x / Urobili: x   Blood: x / Protein: x / Nitrite: x   Leuk Esterase: x / RBC: x / WBC x   Sq Epi: x / Non Sq Epi: x / Bacteria: x          CAPILLARY BLOOD GLUCOSE    MICROBIOLOGY:     RADIOLOGY:  [ ] Reviewed and interpreted by me    Mode: AC/ CMV (Assist Control/ Continuous Mandatory Ventilation)  RR (machine): 12  TV (machine): 500  FiO2: 40  PEEP: 5  ITime: 0.79  MAP: 9  PIP: 17

## 2024-04-02 NOTE — CHART NOTE - NSCHARTNOTEFT_GEN_A_CORE
73M who presented to [facility] ED with ataxia and left facial droop, initially concerning for stroke and workup notable for moderate basilar stenosis. Transferred to [facility] and quickly developed worsening dysarthria, dysphagia, and weakness. Eventually patient required intubation. DSA on 2/11 showed mid-to-moderate basilar stenosis and therefore no interventions were performed. TTE was unremarkable from 2/11. 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 Patient underwent course of PLEX and IVIG.  Course complicated by melena on 3/27 resulting in RRT For hypotension for which he required 3 units of PRBCs.     Imaging:  MR Brain w/o 4/2/24. No acute infarct upon prelim review, pending official read    MR Brain (3/5/24) demonstrated small R cerebellar and L parietal infarctions likely ESUS in etiology (different vascular distrubution).  Septic emboli due to endocarditis are possible given hx of septic shock.      Impression:    1. Small infarcts in R cerebellar and L frontal region, with concern for embolic event, ESUS.     2. Bilateral symmetric basal ganglia lesions are less concerning for ischemic strokes. Repeat MRI Brain w/ no acute infarct. Previous MR showed localized micro hemorrhages, corresponding to these lesions on SWI sequence.  Differential diagnosis includes hypoxic injury, which can produce similar lesions with Delayed Post-hypoxic Leukoencephalopathy. Other possibilities include CO, cyanide or methanol poisoning and hx of exposure should be investigated.     Recommendations:     [x] MRI Brain w/o reviewed, pending read. No acute infarct upon prelim review   [ ] No need for MILADIS at this time   [ ] ASA 81 mg was held due to melena requiring pRBC, hypotension. Can resume when medically able   [x] Agree with holding Atorvastatin for now given liver injury   [ ] DVT Prophylaxis: Mechanical SCDs for now  [ ] Remainder of care per RCU      Case discussed with Stroke attending Dr. Calvert. 73M who presented to [facility] ED with ataxia and left facial droop, initially concerning for stroke and workup notable for moderate basilar stenosis. Transferred to [facility] and quickly developed worsening dysarthria, dysphagia, and weakness. Eventually patient required intubation. DSA on 2/11 showed mid-to-moderate basilar stenosis and therefore no interventions were performed. TTE was unremarkable from 2/11. 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 Patient underwent course of PLEX and IVIG.  Course complicated by melena on 3/27 resulting in RRT For hypotension for which he required 3 units of PRBCs.     Imaging:  MR Brain w/o 4/2/24. No acute infarct upon prelim review, pending official read    MR Brain (3/5/24) demonstrated small R cerebellar and L parietal infarctions likely ESUS in etiology (different vascular distrubution).  Septic emboli due to endocarditis are possible given hx of septic shock.      Impression:    1. Small infarcts in R cerebellar and L frontal region, with concern for embolic event, ESUS.     2. Bilateral symmetric basal ganglia lesions are less concerning for ischemic strokes. Repeat MRI Brain w/ no acute infarct. Previous MR showed localized micro hemorrhages, corresponding to these lesions on SWI sequence.  Differential diagnosis includes hypoxic injury, which can produce similar lesions with Delayed Post-hypoxic Leukoencephalopathy. Other possibilities include CO, cyanide or methanol poisoning     Recommendations:     [x] Repeat MRI Brain w/o reviewed, pending read. No acute infarct upon prelim review   [ ] No need for MILADIS at this time   [ ] ASA 81 mg was held due to melena requiring pRBC, hypotension. Recommend to resume when medically able, if no contraindication per primary  [x] Agree with holding Atorvastatin for now given liver injury   [ ] DVT Prophylaxis: Mechanical SCDs for now  [ ] RCU requesting Neuro team to discuss prognosis with patient's spouse, will plan for discussion tomorrow   [ ] Remainder of care per RCU      Case discussed with Stroke attending Dr. Calvert. 73M who presented to [facility] ED with ataxia and left facial droop, initially concerning for stroke and workup notable for moderate basilar stenosis. Transferred to [facility] and quickly developed worsening dysarthria, dysphagia, and weakness. Eventually patient required intubation. DSA on 2/11 showed mid-to-moderate basilar stenosis and therefore no interventions were performed. TTE was unremarkable from 2/11. 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 Patient underwent course of PLEX and IVIG.  Course complicated by melena on 3/27 resulting in RRT For hypotension for which he required 3 units of PRBCs.     Imaging:  MR Brain w/o 4/2/24.   1.  Faint foci of diffusion restriction in the high right frontal lobe   and right occipital lobe with corresponding increase in T2/FLAIR signal.   Findings could indicate a acute to subacute infarction. Given different   vascular distributions, embolic process should still be considered.   Previously seen regions of diffusion restrictionhave resolved.  2.  The previously seen foci of increased T2/FLAIR signal in the basal   ganglia has improved, however now there is corresponding increased T1   signal within the affected regions. Findings support a toxic/metabolic   process or hepatic dysfunction. Recommend clinical and laboratory   correlation.  3.  Bilateral mastoid effusions with fluid in the middle ear cavities.   Recommend clinical correlation for otomastoiditis.    MR Brain (3/5/24) demonstrated small R cerebellar and L parietal infarctions likely ESUS in etiology (different vascular distrubution).  Septic emboli due to endocarditis are possible given hx of septic shock.      Impression:    1. Small infarcts in R cerebellar and L frontal region, with concern for embolic event, ESUS.     2. Bilateral symmetric basal ganglia lesions are less concerning for ischemic strokes. Repeat MRI Brain w/ no acute infarct. Previous MR showed localized micro hemorrhages, corresponding to these lesions on SWI sequence.  Differential diagnosis includes hypoxic injury, which can produce similar lesions with Delayed Post-hypoxic Leukoencephalopathy. Other possibilities include CO, cyanide or methanol poisoning     Recommendations:     [x] Repeat MRI Brain w/o reviewed, pending read. No acute infarct upon prelim review   [ ] No need for MILADIS at this time   [] Consider ILR  [ ] ASA 81 mg was held due to melena requiring pRBC, hypotension. Recommend to resume when medically able, if no contraindication per primary  [x] Agree with holding Atorvastatin for now given liver injury   [ ] DVT Prophylaxis: Mechanical SCDs for now  [ ] RCU requesting Neuro team to discuss prognosis with patient's spouse, will plan for discussion tomorrow   [ ] Remainder of care per RCU      Case discussed with Stroke attending Dr. Calvert. 73M who presented to [facility] ED with ataxia and left facial droop, initially concerning for stroke and workup notable for moderate basilar stenosis. Transferred to [facility] and quickly developed worsening dysarthria, dysphagia, and weakness. Eventually patient required intubation. DSA on 2/11 showed mid-to-moderate basilar stenosis and therefore no interventions were performed. TTE was unremarkable from 2/11. 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 Patient underwent course of PLEX and IVIG.  Course complicated by melena on 3/27 resulting in RRT For hypotension for which he required 3 units of PRBCs.     Imaging:  MR Brain w/o 4/2/24.   1.  Faint foci of diffusion restriction in the high right frontal lobe   and right occipital lobe with corresponding increase in T2/FLAIR signal.   Findings could indicate a acute to subacute infarction. Given different   vascular distributions, embolic process should still be considered.   Previously seen regions of diffusion restrictionhave resolved.  2.  The previously seen foci of increased T2/FLAIR signal in the basal   ganglia has improved, however now there is corresponding increased T1   signal within the affected regions. Findings support a toxic/metabolic   process or hepatic dysfunction. Recommend clinical and laboratory   correlation.  3.  Bilateral mastoid effusions with fluid in the middle ear cavities.   Recommend clinical correlation for otomastoiditis.    MR Brain (3/5/24) demonstrated small R cerebellar and L parietal infarctions likely ESUS in etiology (different vascular distrubution).  Septic emboli due to endocarditis are possible given hx of septic shock.      Impression:    1. Small infarcts in R cerebellar and L frontal region, with concern for embolic event, ESUS.     2. Bilateral symmetric basal ganglia lesions are less concerning for ischemic strokes. Repeat MRI Brain w/ no acute infarct. Previous MR showed localized micro hemorrhages, corresponding to these lesions on SWI sequence.  Differential diagnosis includes hypoxic injury, which can produce similar lesions with Delayed Post-hypoxic Leukoencephalopathy. Other possibilities include CO, cyanide or methanol poisoning     Recommendations:     [x] Repeat MRI Brain w/o reviewed   [ ] Recommend MILADIS at this time   [] Telemetry   [] Will need ILR  [ ] ASA 81 mg was held due to melena requiring pRBC, hypotension. Recommend to resume when medically able, if no contraindication per primary  [x] Agree with holding Atorvastatin for now given liver injury   [ ] DVT Prophylaxis: Mechanical SCDs for now  [ ] Discussed with family and primary team   [ ] Remainder of care per RCU      Case discussed with Stroke attending Dr. Calvert.

## 2024-04-02 NOTE — PROGRESS NOTE ADULT - NS ATTEND AMEND GEN_ALL_CORE FT
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 Hospital course has been complicated by VAP, massive GIB with hemorrhagic shock, worsening neurologic vascular injury with small scattered foci of diffusion restriction within the right cerebellar hemisphere, left parietal lobe, and symmetrically in the bilateral basal ganglia, recurrent GIB (3/27) found to have duodenal erosions on endoscopy.     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 cerebellar and L parietal infarctions suspicious for septic embolic phenomena and raised suspicion for endocarditis  Bilateral symmetric basal ganglia lesions with differntial including hypoxic injury producing delayed post-hypoxic leukoencephalopathy verses toxidrome. Suspect more likely related to hypoxia/hypoperfusion in the setting of hemorrhagic/septic shock event.  His neurologic exam is severely limited. Able to move his RLE, open eyes spontaneously, with some mouth and head movements. However, no meaningful communication at the time of my exam.   - Per neurology 3/6/24: may consider repeating MRI in 4~5 wks time to see any further changes such as DPHL injury. We reached out to them again, will plan for MRI today 4/2/24 to reassess for DPHL.   - Also requested MILADIS with concern for embolic stroke. Has had no other evidence of embolic phenomena since that time and cultures have remained clear. Will repeat MRI and reassess with neuro necessity of pursuing additional procedures.   - We are now holding his DAPT in the setting of recurrent GIB.     CV: #Hypotension:   - Seems to have resolved, will see if we can hold droxidopa    R:  #Acute respiratory failure, mixed hypoxic hypercapnia: Remains on ventilator support, tolerating limited high pressure support trials.   He currently has an 8 cuffed portex, placed 2/16 with Dr. Sood  - Continue wean briseyda as tolerated  - Suctioning, chest pt as needed  - Oral care as per RCU team  - OOB as tolerated  - PT/OT  #Respiratory muscle weakness in the setting of AIDP    #VAP:   SCx 2/21: PSa  SCx 3/4 PSa   SCx 3/20 PSa, CRE  3/26: CRE Pseudomonas  S/P Meropenem, Zosyn courses previously during hospitalization  He has now been resumed on Cefepime for empiric coverage  - Continue Cefepime, plan for 14 day course    GI  #GIB, Acute blood loss anemia   #Duodenal erosions  Now with stable H/H and no further evidence of bleeding.  - PPI as above  - Resume tube feeding  - On Flagyl and Cefepime with concern for translocation and sepsis related to recurrent bleeds, will plan for 7 days of Flagyl and Cefepime as outlined below    R: Stable creatinine    Rest as above

## 2024-04-02 NOTE — PROGRESS NOTE ADULT - PROBLEM SELECTOR PLAN 2
- BAL 2/21: PSA; Txd with Zosyn ( 2/21-2/28)  - Sputum 3/4: PSA , Blood cxs 3/3: NGTD  - Txd with course of Meropenem 3/3 --> 3/11  - Repeat Blood cxs 3/26:NGTD  and Urine cxs 3/27: NGTD   - Sputum cx 3/26: CRE PSA  - Will empirically Continue Cefepime and Flagyl to cover for possible GI Source / PSA growing In the sputum

## 2024-04-02 NOTE — PROGRESS NOTE ADULT - PROBLEM SELECTOR PLAN 5
- Patient S/p Tracheostomy # 8 Cuffed Portex on 2/16 ( Dr. Sood)  - Vent Settings: PRVC 500/12/40%/+5  - Continue Pressure Support Trials as Tolerated   - Pt Has previously not tolerated trach collar trials as he becomes bradycardic and hypoxic during attempts   - Continue Chest PT and Suctioning PRN

## 2024-04-03 LAB
ANION GAP SERPL CALC-SCNC: 11 MMOL/L — SIGNIFICANT CHANGE UP (ref 5–17)
BUN SERPL-MCNC: 15 MG/DL — SIGNIFICANT CHANGE UP (ref 7–23)
CALCIUM SERPL-MCNC: 8.3 MG/DL — LOW (ref 8.4–10.5)
CHLORIDE SERPL-SCNC: 103 MMOL/L — SIGNIFICANT CHANGE UP (ref 96–108)
CO2 SERPL-SCNC: 29 MMOL/L — SIGNIFICANT CHANGE UP (ref 22–31)
CREAT SERPL-MCNC: 0.32 MG/DL — LOW (ref 0.5–1.3)
EGFR: 122 ML/MIN/1.73M2 — SIGNIFICANT CHANGE UP
GLUCOSE BLDC GLUCOMTR-MCNC: 142 MG/DL — HIGH (ref 70–99)
GLUCOSE BLDC GLUCOMTR-MCNC: 146 MG/DL — HIGH (ref 70–99)
GLUCOSE BLDC GLUCOMTR-MCNC: 159 MG/DL — HIGH (ref 70–99)
GLUCOSE BLDC GLUCOMTR-MCNC: 180 MG/DL — HIGH (ref 70–99)
GLUCOSE SERPL-MCNC: 167 MG/DL — HIGH (ref 70–99)
HCT VFR BLD CALC: 31.5 % — LOW (ref 39–50)
HGB BLD-MCNC: 8.8 G/DL — LOW (ref 13–17)
MAGNESIUM SERPL-MCNC: 2.1 MG/DL — SIGNIFICANT CHANGE UP (ref 1.6–2.6)
MCHC RBC-ENTMCNC: 24.6 PG — LOW (ref 27–34)
MCHC RBC-ENTMCNC: 27.9 GM/DL — LOW (ref 32–36)
MCV RBC AUTO: 88.2 FL — SIGNIFICANT CHANGE UP (ref 80–100)
NRBC # BLD: 0 /100 WBCS — SIGNIFICANT CHANGE UP (ref 0–0)
PHOSPHATE SERPL-MCNC: 2.9 MG/DL — SIGNIFICANT CHANGE UP (ref 2.5–4.5)
PLATELET # BLD AUTO: 431 K/UL — HIGH (ref 150–400)
POTASSIUM SERPL-MCNC: 4.6 MMOL/L — SIGNIFICANT CHANGE UP (ref 3.5–5.3)
POTASSIUM SERPL-SCNC: 4.6 MMOL/L — SIGNIFICANT CHANGE UP (ref 3.5–5.3)
RBC # BLD: 3.57 M/UL — LOW (ref 4.2–5.8)
RBC # FLD: 18.5 % — HIGH (ref 10.3–14.5)
SODIUM SERPL-SCNC: 143 MMOL/L — SIGNIFICANT CHANGE UP (ref 135–145)
WBC # BLD: 13.14 K/UL — HIGH (ref 3.8–10.5)
WBC # FLD AUTO: 13.14 K/UL — HIGH (ref 3.8–10.5)

## 2024-04-03 PROCEDURE — 99233 SBSQ HOSP IP/OBS HIGH 50: CPT

## 2024-04-03 PROCEDURE — 99222 1ST HOSP IP/OBS MODERATE 55: CPT | Mod: GC

## 2024-04-03 RX ORDER — ASPIRIN/CALCIUM CARB/MAGNESIUM 324 MG
81 TABLET ORAL DAILY
Refills: 0 | Status: DISCONTINUED | OUTPATIENT
Start: 2024-04-03 | End: 2024-04-03

## 2024-04-03 RX ORDER — ASPIRIN/CALCIUM CARB/MAGNESIUM 324 MG
81 TABLET ORAL DAILY
Refills: 0 | Status: DISCONTINUED | OUTPATIENT
Start: 2024-04-03 | End: 2024-04-11

## 2024-04-03 RX ADMIN — PANTOPRAZOLE SODIUM 40 MILLIGRAM(S): 20 TABLET, DELAYED RELEASE ORAL at 05:39

## 2024-04-03 RX ADMIN — Medication 1 DROP(S): at 10:44

## 2024-04-03 RX ADMIN — GABAPENTIN 100 MILLIGRAM(S): 400 CAPSULE ORAL at 14:48

## 2024-04-03 RX ADMIN — Medication 1 DROP(S): at 17:19

## 2024-04-03 RX ADMIN — Medication 1 GRAM(S): at 11:30

## 2024-04-03 RX ADMIN — Medication 2: at 00:05

## 2024-04-03 RX ADMIN — Medication 5 MILLILITER(S): at 00:03

## 2024-04-03 RX ADMIN — Medication 500000 UNIT(S): at 05:34

## 2024-04-03 RX ADMIN — Medication 500000 UNIT(S): at 17:18

## 2024-04-03 RX ADMIN — Medication 100 MILLIGRAM(S): at 21:24

## 2024-04-03 RX ADMIN — Medication 1 GRAM(S): at 00:03

## 2024-04-03 RX ADMIN — Medication 1 GRAM(S): at 05:36

## 2024-04-03 RX ADMIN — Medication 1 DROP(S): at 05:34

## 2024-04-03 RX ADMIN — Medication 1 DROP(S): at 14:49

## 2024-04-03 RX ADMIN — Medication 5 MILLILITER(S): at 17:18

## 2024-04-03 RX ADMIN — Medication 100 MILLIGRAM(S): at 14:48

## 2024-04-03 RX ADMIN — Medication 500000 UNIT(S): at 11:30

## 2024-04-03 RX ADMIN — SENNA PLUS 10 MILLILITER(S): 8.6 TABLET ORAL at 21:23

## 2024-04-03 RX ADMIN — Medication 500000 UNIT(S): at 01:04

## 2024-04-03 RX ADMIN — Medication 3 MILLILITER(S): at 13:31

## 2024-04-03 RX ADMIN — HUMAN INSULIN 7 UNIT(S): 100 INJECTION, SUSPENSION SUBCUTANEOUS at 00:04

## 2024-04-03 RX ADMIN — CHLORHEXIDINE GLUCONATE 15 MILLILITER(S): 213 SOLUTION TOPICAL at 05:35

## 2024-04-03 RX ADMIN — Medication 5 MILLILITER(S): at 05:34

## 2024-04-03 RX ADMIN — Medication 5 MILLILITER(S): at 11:30

## 2024-04-03 RX ADMIN — Medication 1 APPLICATION(S): at 05:41

## 2024-04-03 RX ADMIN — Medication 3 MILLILITER(S): at 22:34

## 2024-04-03 RX ADMIN — Medication 1 APPLICATION(S): at 14:49

## 2024-04-03 RX ADMIN — Medication 1 APPLICATION(S): at 21:26

## 2024-04-03 RX ADMIN — Medication 100 MILLIGRAM(S): at 05:33

## 2024-04-03 RX ADMIN — PANTOPRAZOLE SODIUM 40 MILLIGRAM(S): 20 TABLET, DELAYED RELEASE ORAL at 17:19

## 2024-04-03 RX ADMIN — HUMAN INSULIN 7 UNIT(S): 100 INJECTION, SUSPENSION SUBCUTANEOUS at 12:09

## 2024-04-03 RX ADMIN — CEFEPIME 100 MILLIGRAM(S): 1 INJECTION, POWDER, FOR SOLUTION INTRAMUSCULAR; INTRAVENOUS at 05:33

## 2024-04-03 RX ADMIN — SODIUM CHLORIDE 4 MILLILITER(S): 9 INJECTION INTRAMUSCULAR; INTRAVENOUS; SUBCUTANEOUS at 05:21

## 2024-04-03 RX ADMIN — Medication 2: at 12:09

## 2024-04-03 RX ADMIN — Medication 1 DROP(S): at 21:26

## 2024-04-03 RX ADMIN — CEFEPIME 100 MILLIGRAM(S): 1 INJECTION, POWDER, FOR SOLUTION INTRAMUSCULAR; INTRAVENOUS at 14:48

## 2024-04-03 RX ADMIN — HUMAN INSULIN 7 UNIT(S): 100 INJECTION, SUSPENSION SUBCUTANEOUS at 17:41

## 2024-04-03 RX ADMIN — SODIUM CHLORIDE 4 MILLILITER(S): 9 INJECTION INTRAMUSCULAR; INTRAVENOUS; SUBCUTANEOUS at 13:31

## 2024-04-03 RX ADMIN — CHLORHEXIDINE GLUCONATE 15 MILLILITER(S): 213 SOLUTION TOPICAL at 17:18

## 2024-04-03 RX ADMIN — SODIUM CHLORIDE 4 MILLILITER(S): 9 INJECTION INTRAMUSCULAR; INTRAVENOUS; SUBCUTANEOUS at 22:35

## 2024-04-03 RX ADMIN — GABAPENTIN 100 MILLIGRAM(S): 400 CAPSULE ORAL at 05:33

## 2024-04-03 RX ADMIN — Medication 3 MILLILITER(S): at 05:22

## 2024-04-03 RX ADMIN — HUMAN INSULIN 7 UNIT(S): 100 INJECTION, SUSPENSION SUBCUTANEOUS at 06:01

## 2024-04-03 RX ADMIN — GABAPENTIN 100 MILLIGRAM(S): 400 CAPSULE ORAL at 21:22

## 2024-04-03 RX ADMIN — Medication 1 GRAM(S): at 17:19

## 2024-04-03 RX ADMIN — Medication 1 DROP(S): at 01:01

## 2024-04-03 RX ADMIN — CEFEPIME 100 MILLIGRAM(S): 1 INJECTION, POWDER, FOR SOLUTION INTRAMUSCULAR; INTRAVENOUS at 21:25

## 2024-04-03 NOTE — PROGRESS NOTE ADULT - PROBLEM SELECTOR PLAN 10
- Patients wife Margarette updated at bedside this morning  - Patients Daughter updated at bedside this afternoon regarding updated Neuro recommendations  - Case d/w Neurology team requested they speak with patients wife

## 2024-04-03 NOTE — CONSULT NOTE ADULT - ATTENDING COMMENTS
72 yo M pmh HTN, HL, DM, CVAs, transferred from OSH for CVA in setting of high-grade proximal basilar and left posterior cerebral artery stenosis s/p angiogram ultimately requiring NSICU stay c/b [facility] s/p plasma exchange/IVIG and ultimately respi failure requiring trach/peg and transferred to RCU.  In RCU had severe hypotension and readmitted to MICU for further care and ultimately started on pressors.  Patient febrile and increase in wbc suggestive of septic shock.  However patient also with melena and hgb.  After 1 unit prbc hgb back to baseline and hypotension persisting suggestive hypovolemia is not the only underlying cause.  On imaging patient found to have gas in portal vein as well.  However no sign of pneumointestinalis or bowel ischemia on imaging.    Imp  Melena/anemia  Septic Shock presumed  GBS now s/p trach/peg  multiple CVA  Critically ill with poor prognosis  Portal vein gas of unknown etiology - may be 2/2 recent EGD/PEG, ulcer disease, or bowel ischemia not yet seen on imaging (all documented causes of this in literature)    Plan:  IV PPI BID  NPO  Hold on EGD pending sepsis workup, though may ultimately need  Supportive care as per ICU, Infectious workup  Would ensure any abx plan covers bowel ischemia in case there is underlying not yet scene on imaging (would likely reflect result of shock rather than cause of shock)  GI to follow
The patient is not on Tele. There is no clear discharge plan. Can consider Zio vs. ILR at the time of discharge. It is unclear how detection of AF will  at this time as it does not appear that the patient would be able to tolerate oral anticoagulation if AF were to be detected in the setting of his GIBs. Not a candidate for HEENA occlusion. EP will sign off. Can call back when there is a clear discharge plan. Please continue to monitor his heart rhythm. If abnormal obtain a 12-lead ECG.    WANDA Milian

## 2024-04-03 NOTE — PROGRESS NOTE ADULT - NS ATTEND AMEND GEN_ALL_CORE FT
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 Hospital course has been complicated by VAP, massive GIB with hemorrhagic shock, worsening neurologic vascular injury with small scattered foci of diffusion restriction within the right cerebellar hemisphere, left parietal lobe, and symmetrically in the bilateral basal ganglia, recurrent GIB (3/27) found to have duodenal erosions on endoscopy.     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 cerebellar and L parietal infarctions suspicious for septic embolic phenomena and raised suspicion for endocarditis  Bilateral symmetric basal ganglia lesions with differential including hypoxic injury producing delayed post-hypoxic leukoencephalopathy verses toxidrome. Suspect more likely related to hypoxia/hypoperfusion in the setting of hemorrhagic/septic shock event.  His neurologic exam is severely limited. Able to move his RLE, open eyes spontaneously, with some mouth and head movements. However, no meaningful communication at the time of my exam.   - Repeat MRI completed, neurology planning to review and provide further recommendations. No indication for MILADIS at this time from their perspective.  - Will challenge with ASA. Holding Plavix still.     CV: #Hypotension: in the setting of hemorrhagic, septic shock. Resolved. Off oral vasopressor    R:  #Acute respiratory failure, mixed hypoxic hypercapnia: Remains on ventilator support, tolerating limited high pressure support trials.   He currently has an 8 cuffed portex, placed 2/16 with Dr. Sood  - Continue wean briseyda as tolerated  - Suctioning, chest pt as needed  - Oral care as per RCU team  - OOB as tolerated  - PT/OT  #Respiratory muscle weakness in the setting of AIDP    #VAP:   SCx 2/21: PSa  SCx 3/4 PSa   SCx 3/20 PSa, CRE  3/26: CRE Pseudomonas  S/P Meropenem, Zosyn courses previously during hospitalization  He has now been resumed on Cefepime for empiric coverage  - Continue Cefepime, plan for 14 day course    GI  #GIB, Acute blood loss anemia   #Duodenal erosions  Now with stable H/H and no further evidence of bleeding.  - PPI as above  - Resume tube feeding  - On Flagyl and Cefepime with concern for translocation and sepsis related to recurrent bleeds, will plan for 7 days of Flagyl and Cefepime as outlined below    R: Stable creatinine    Rest as above

## 2024-04-03 NOTE — CONSULT NOTE ADULT - ASSESSMENT
73 year old male with hypertension, hyperlipidemia, diabetes, prior CVA's without residual deficits who presented as a transfer from [place] with concern for CVA in the setting of 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 Prior to admission, patient had a viral URI (1-2 weeks prior to admission) with subsequent weakness concerning for GBS. His head and neck imaging prior strokes which, as per Neurology, were not c/w current presentation. He is currently s/p 5 rounds of plasma exchange (2/13-2/20) and IVIG x 5 ( 2/21-2/26). His hospital course was complicated by progressive respiratory failure 2/2 PNA s/p abx. Patient was transferred to RCU on 2/24. RRT called on 3/3 for unresponsiveness in the setting of hemorrhagic shock. No EGD was performed as melena resolved. AMS was noted, and a repeat MRI demonstrated small scattered foci of diffusion restriction within the right cerebellar hemisphere, left parietal lobe, and symmetrically in the bilateral basal ganglia. Neuro recommended resumption of ASA in setting of possible embolic phenomenon. He was transferred back to RCU on 3/6 as his mental status improved. Cardiology was consulted for concern for tachy-dez, unable to catch events on tele and EKG was otherwise unremarkable. Patient experienced recurrent melena on 3/27 resulting in RRT For hypotension for which he required 3 units of PRBCs. S/p EGD; No active bleeding noted but found to have duodenal erosions. He is s/p repeat MRI yesterday evening, which demonstrated new acute vs subacute infarcts. Neurology is recommending MILADIS and ILR implant. He will be tried on ASA 81 mg today as his CBC has remained stable and w/o further episodes of melena.       Plan  -currently NPO for MILADIS tomorrow  -ILR implant once closer to discharge  -can utilize the pulse oximetry waveforms during vital signs checks to assess for waveform irregularity. If irregularity is present, recommend obtaining a STAT 12-lead ECG    -will sign off for now, please reconsult when patient is closer to discharge for ILR implant

## 2024-04-03 NOTE — PROGRESS NOTE ADULT - SUBJECTIVE AND OBJECTIVE BOX
Patient is a 74y old  Male who presents with a chief complaint of Stroke, critical stenosis, evaluation for angiography (02 Apr 2024 07:46)      Interval Events: No events reported overnight     REVIEW OF SYSTEMS:  [ ] Positive  [ ] All other systems negative  [x] Unable to assess ROS because not answering verbal questioning     Vital Signs Last 24 Hrs  T(C): 37.3 (04-03-24 @ 04:46), Max: 37.3 (04-02-24 @ 23:06)  T(F): 99.1 (04-03-24 @ 04:46), Max: 99.2 (04-02-24 @ 23:06)  HR: 92 (04-03-24 @ 05:35) (82 - 98)  BP: 132/65 (04-03-24 @ 04:46) (118/57 - 141/62)  RR: 13 (04-03-24 @ 04:46) (13 - 19)  SpO2: 100% (04-03-24 @ 05:35) (99% - 100%)    PHYSICAL EXAM:  HEENT:   [x]Tracheostomy: #8 cuffed Portex  [x]Pupils equal  [ ]No oral lesions  [ ]Abnormal    SKIN  [x]No Rash  [ ] Abnormal  [ ] pressure    CARDIAC  [x]Regular  [ ]Abnormal    PULMONARY  [x]Bilateral Coarse Breath Sounds  [ ]Normal Excursion  [ ]Abnormal    GI  [x]PEG      [x] +BS		              [x]Soft, nondistended, nontender	  [ ]Abnormal    MUSCULOSKELETAL                                   [x]Bedbound                 [ ]Abnormal    [ ]Ambulatory/OOB to chair                           EXTREMITIES                                         [ ]Normal  [x]Edema: BL UE edema LFT >RT                   NEUROLOGIC  [ ] Normal, non focal  [x] Focal findings: Awake nodding head to answer questions, Attempting to squeeze with RT Hand upon command    PSYCHIATRIC  [x] Unable to assess    :  [place]: [ ] Yes, if yes: Date of Placement:                   [x] No    LINES: Central Lines [ ] Yes, if yes: Date of Placement                                     [x] No    HOSPITAL MEDICATIONS:  MEDICATIONS  (STANDING):  albuterol/ipratropium for Nebulization 3 milliLiter(s) Nebulizer every 8 hours  artificial tears (preservative free) Ophthalmic Solution 1 Drop(s) Both EYES every 4 hours  Biotene Dry Mouth Oral Rinse 5 milliLiter(s) Swish and Spit every 6 hours  cefepime   IVPB 1000 milliGRAM(s) IV Intermittent every 8 hours  chlorhexidine 0.12% Liquid 15 milliLiter(s) Oral Mucosa every 12 hours  chlorhexidine 4% Liquid 1 Application(s) Topical daily  gabapentin Solution 100 milliGRAM(s) Enteral Tube every 8 hours  insulin lispro (ADMELOG) corrective regimen sliding scale   SubCutaneous every 6 hours  insulin NPH human recombinant 7 Unit(s) SubCutaneous every 6 hours  metroNIDAZOLE  IVPB 500 milliGRAM(s) IV Intermittent every 8 hours  nystatin    Suspension 187709 Unit(s) Oral every 6 hours  pantoprazole  Injectable 40 milliGRAM(s) IV Push every 12 hours  petrolatum Ophthalmic Ointment 1 Application(s) Both EYES every 8 hours  senna Syrup 10 milliLiter(s) Oral at bedtime  sodium chloride 3%  Inhalation 4 milliLiter(s) Inhalation every 8 hours  sucralfate suspension 1 Gram(s) Oral every 6 hours    MEDICATIONS  (PRN):  acetaminophen   Oral Liquid .. 1000 milliGRAM(s) Enteral Tube every 6 hours PRN Temp greater or equal to 38.5C (101.3F), Mild Pain (1 - 3)      LABS:                        9.2    13.04 )-----------( 275      ( 02 Apr 2024 10:36 )             32.2     04-02    144  |  101  |  19  ----------------------------<  175<H>  4.4   |  34<H>  |  0.34<L>    Ca    8.9      02 Apr 2024 06:49  Phos  3.1     04-02  Mg     2.2     04-02        Urinalysis Basic - ( 02 Apr 2024 06:49 )    Color: x / Appearance: x / SG: x / pH: x  Gluc: 175 mg/dL / Ketone: x  / Bili: x / Urobili: x   Blood: x / Protein: x / Nitrite: x   Leuk Esterase: x / RBC: x / WBC x   Sq Epi: x / Non Sq Epi: x / Bacteria: x          CAPILLARY BLOOD GLUCOSE    MICROBIOLOGY:     RADIOLOGY:  [ ] Reviewed and interpreted by me    Mode: AC/ CMV (Assist Control/ Continuous Mandatory Ventilation)  RR (machine): 12  TV (machine): 500  FiO2: 40  PEEP: 5  ITime: 0.9  MAP: 11  PIP: 18   Patient is a 74y old  Male who presents with a chief complaint of Stroke, critical stenosis, evaluation for angiography (02 Apr 2024 07:46)      Interval Events: No events reported overnight     REVIEW OF SYSTEMS:  [ ] Positive  [ ] All other systems negative  [x] Unable to assess ROS because not answering verbal questioning     Vital Signs Last 24 Hrs  T(C): 37.3 (04-03-24 @ 04:46), Max: 37.3 (04-02-24 @ 23:06)  T(F): 99.1 (04-03-24 @ 04:46), Max: 99.2 (04-02-24 @ 23:06)  HR: 92 (04-03-24 @ 05:35) (82 - 98)  BP: 132/65 (04-03-24 @ 04:46) (118/57 - 141/62)  RR: 13 (04-03-24 @ 04:46) (13 - 19)  SpO2: 100% (04-03-24 @ 05:35) (99% - 100%)    PHYSICAL EXAM:  HEENT:   [x]Tracheostomy: #8 cuffed Portex  [x]Pupils equal  [ ]No oral lesions  [ ]Abnormal    SKIN  [x]No Rash  [ ] Abnormal  [ ] pressure    CARDIAC  [x]Regular  [ ]Abnormal    PULMONARY  [x]Bilateral Coarse Breath Sounds  [ ]Normal Excursion  [ ]Abnormal    GI  [x]PEG      [x] +BS		              [x]Soft, nondistended, nontender	  [ ]Abnormal    MUSCULOSKELETAL                                   [x]Bedbound                 [ ]Abnormal    [ ]Ambulatory/OOB to chair                           EXTREMITIES                                         [ ]Normal  [x]Edema: BL UE edema LFT >RT                   NEUROLOGIC  [ ] Normal, non focal  [x] Focal findings: Awake nodding head to answer questions, Attempting to squeeze slightly with bilateral hands, will move bilateral toes intermittently and usually with a delayed response     PSYCHIATRIC  [x] Unable to assess    :  [place]: [ ] Yes, if yes: Date of Placement:                   [x] No    LINES: Central Lines [ ] Yes, if yes: Date of Placement                                     [x] No    HOSPITAL MEDICATIONS:  MEDICATIONS  (STANDING):  albuterol/ipratropium for Nebulization 3 milliLiter(s) Nebulizer every 8 hours  artificial tears (preservative free) Ophthalmic Solution 1 Drop(s) Both EYES every 4 hours  Biotene Dry Mouth Oral Rinse 5 milliLiter(s) Swish and Spit every 6 hours  cefepime   IVPB 1000 milliGRAM(s) IV Intermittent every 8 hours  chlorhexidine 0.12% Liquid 15 milliLiter(s) Oral Mucosa every 12 hours  chlorhexidine 4% Liquid 1 Application(s) Topical daily  gabapentin Solution 100 milliGRAM(s) Enteral Tube every 8 hours  insulin lispro (ADMELOG) corrective regimen sliding scale   SubCutaneous every 6 hours  insulin NPH human recombinant 7 Unit(s) SubCutaneous every 6 hours  metroNIDAZOLE  IVPB 500 milliGRAM(s) IV Intermittent every 8 hours  nystatin    Suspension 786106 Unit(s) Oral every 6 hours  pantoprazole  Injectable 40 milliGRAM(s) IV Push every 12 hours  petrolatum Ophthalmic Ointment 1 Application(s) Both EYES every 8 hours  senna Syrup 10 milliLiter(s) Oral at bedtime  sodium chloride 3%  Inhalation 4 milliLiter(s) Inhalation every 8 hours  sucralfate suspension 1 Gram(s) Oral every 6 hours    MEDICATIONS  (PRN):  acetaminophen   Oral Liquid .. 1000 milliGRAM(s) Enteral Tube every 6 hours PRN Temp greater or equal to 38.5C (101.3F), Mild Pain (1 - 3)      LABS:                        9.2    13.04 )-----------( 275      ( 02 Apr 2024 10:36 )             32.2     04-02    144  |  101  |  19  ----------------------------<  175<H>  4.4   |  34<H>  |  0.34<L>    Ca    8.9      02 Apr 2024 06:49  Phos  3.1     04-02  Mg     2.2     04-02        Urinalysis Basic - ( 02 Apr 2024 06:49 )    Color: x / Appearance: x / SG: x / pH: x  Gluc: 175 mg/dL / Ketone: x  / Bili: x / Urobili: x   Blood: x / Protein: x / Nitrite: x   Leuk Esterase: x / RBC: x / WBC x   Sq Epi: x / Non Sq Epi: x / Bacteria: x          CAPILLARY BLOOD GLUCOSE    MICROBIOLOGY:     RADIOLOGY:  [ ] Reviewed and interpreted by me    Mode: AC/ CMV (Assist Control/ Continuous Mandatory Ventilation)  RR (machine): 12  TV (machine): 500  FiO2: 40  PEEP: 5  ITime: 0.9  MAP: 11  PIP: 18

## 2024-04-03 NOTE — PROGRESS NOTE ADULT - PROBLEM SELECTOR PLAN 1
- MRI 2/12: small b/l cerebellar strokes (post-procedural) and prior L thalamic strokes  - MRI 3/5: Small scattered foci of diffusion restriction within the right cerebellar hemisphere, left parietal lobe, and symmetrically in the bilateral basal ganglia.  - Lipitor d/c'd in setting of liver injury 3/3  - Was on ASA And Plavix for SAMMPRIS trial in setting of intracranial stenosis   - ASA and Plavix dcd in setting of Recurrent Melena on 3/27  - Repeat MRI 4/2: Concerning for Acute / Subacute Infarctions w/ concern for embolic Distribution   - Case d/w Neurology team will proceed with MILADIS and ILR placement ( EP Consult called)  - Will re-trial ASA and monitor for GI Bleeding

## 2024-04-03 NOTE — PROGRESS NOTE ADULT - PROBLEM SELECTOR PLAN 5
- Patient with large Melanotic Episodes on 3/27 w/ significant drop in H+H  - Was Transfused 3 Units of PRBCs on 3/27  - EGD 3/27: Technically Limited Study, + Duodenal Erosions but no evidence of Active Bleed    - Remains on Protonix 40 mg q 12 hrs IVP and Sucralfate q 6    - If patient develops decreasing H+H or HD instability will need CTA of abdomen  - Continue to monitor CBC   - Patient is Mandaen However Family is in agreement for administration of blood products ( Per Wife)

## 2024-04-03 NOTE — PROGRESS NOTE ADULT - ASSESSMENT
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 Prior to admission patient had Viral URI ( 1-2 weeks prior to admission) with subsequent weakness. He underwent an angiogram (2/11) which showed moderate stenosis and no intervention was done. MRI Performed c/w small bilateral cerebellar strokes and prior L thalamic strokes, as per Neurology was not c/w current presentation. 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 He is currently s/p 5 rounds of plasma exchange (2/13-2/20) and IVIG x 5 ( 2/21-2/26)  His hospital course was complicated by progressive respiratory failure. CT Chest performed on 2/20 with atelectasis of b/l lower lobes. BAL 2/21 Grew PSA treated with course of Zosyn (2/21-2/28).  Patient transferred to [facility]CU on 2/24. RRT called on 3/3 for unresponsiveness. Patient with unreactive pupils, no corneal reflex. Also found to be hypotensive and febrile to 105. Noted to have new melena. Hgb 5. Given 1 unit of pRBC. Transferred to MICU. patient required pressors while in the MICU, BP improved and was transitioned to Droxidopa. Patient had CT C/A/P consistent with Bibasilar pneumonia; Sputum cx grew PSA and was treated with course of Meropenem. Patient evaluated by GI in setting of Melena, transaminitis and portal venous gas on CT imaging. No EGD performed as Melena resolved, Transaminitis was thought to be in setting of shock and portal venous gas was thought to be in setting of recent PEG. EEG was performed in setting of AMS no seizures noted. Repeat MRI Performed which showed Small scattered foci of diffusion restriction within the right cerebellar hemisphere, left parietal lobe, and symmetrically in the bilateral basal ganglia. Neuro recommended Resumption of ASA in setting of possible embolic phenomenon. Repeat ECHO performed RT Ventricular Moderately enlarged, reduced systolic function. Mental status improved and was transferred back to RCU on 3/6.  Cardiology consulted for tachy-dez, unable to catch events on tele, EKG ST otherwise unremarkable, CT Angio negative for PE. Patient with recurrent Melena on 3/27 resulting in RRT For hypotension for which he required 3 units of PRBCs. S/p EGD; No active bleeding noted but found to have duodenal erosions.    4/3: Patient S/p MRI yesterday evening, case rediscussed with Neurology this afternoon. MRI report with new acute vs subacute infarcts. As per Neurology recommending MILADIS and ILR placement. Case d/w Cardiology fellow will make patient NPO at midnight for MILADIS on 4/4. EP Called for ILR Placement. Will attempt to trial ASA 81 mg today as CBC has remained stable and w/o further episodes of melena.

## 2024-04-03 NOTE — PROGRESS NOTE ADULT - PROBLEM SELECTOR PLAN 7
- Previously on Metformin as outpatient   - Continue ISS   - Hold NPH While NPO this evening   - FS Q6H; Goal -180

## 2024-04-03 NOTE — PROGRESS NOTE ADULT - PROBLEM SELECTOR PLAN 4
- BAL 2/21: PSA; Txd with Zosyn ( 2/21-2/28)  - Sputum 3/4: PSA , Blood cxs 3/3: NGTD  - Txd with course of Meropenem 3/3 --> 3/11  - Blood cxs 3/26:NGTD  and Urine cxs 3/27: NGTD   - Sputum cx 3/26: CRE PSA  - Continue Cefepime x 14 day course to tx CRE PSA ( Ends 4/11)   - Cont Flagyl to cover for possible GI Source x 7 Days ( Ends 4/04 )

## 2024-04-03 NOTE — CONSULT NOTE ADULT - SUBJECTIVE AND OBJECTIVE BOX
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 Prior to admission, patient had a viral URI (1-2 weeks prior to admission) with subsequent weakness concerning for GBS. His head and neck imaging prior strokes which, as per Neurology, were not c/w current presentation. He is currently s/p 5 rounds of plasma exchange (2/13-2/20) and IVIG x 5 ( 2/21-2/26). His hospital course was complicated by progressive respiratory failure 2/2 PNA s/p abx. Patient was transferred to [facility] on 2/24. RRT called on 3/3 for unresponsiveness in the setting of hemorrhagic shock. No EGD was performed as melena resolved. AMS was noted, and a repeat MRI demonstrated small scattered foci of diffusion restriction within the right cerebellar hemisphere, left parietal lobe, and symmetrically in the bilateral basal ganglia. Neuro recommended resumption of ASA in setting of possible embolic phenomenon. He was transferred back to [facility] on 3/6 as his mental status improved. Cardiology was consulted for concern for tachy-dez, unable to catch events on tele and EKG was otherwise unremarkable. Patient experienced recurrent melena on 3/27 resulting in RRT For hypotension for which he required 3 units of PRBCs. S/p EGD; No active bleeding noted but found to have duodenal erosions. He is s/p repeat MRI yesterday evening, which demonstrated new acute vs subacute infarcts. Neurology is recommending MILADIS and ILR implant. He will be tried on ASA 81 mg today as his CBC has remained stable and w/o further episodes of melena.       PMHx:   Hypertension    Diabetes    CVA (cerebral vascular accident)    HTN (hypertension)    HLD (hyperlipidemia)    DM2 (diabetes mellitus, type 2)    CVA (cerebrovascular accident)      PSHx:   No significant past surgical history      FAMILY HISTORY:    Allergies:  No Known Allergies    Home Medications:  ascorbic acid 1000 mg oral tablet: 1 tab(s) orally once a day (13 Feb 2024 10:24)  aspirin 81 mg oral delayed release tablet: 1 tab(s) orally once a day x stroke prevention (13 Feb 2024 10:24)  atenolol-chlorthalidone 50 mg-25 mg oral tablet: 1 tab(s) orally once a day (09 Feb 2024 11:24)  atenolol-chlorthalidone 50 mg-25 mg oral tablet: 1 tab(s) orally once a day (13 Feb 2024 10:24)  metFORMIN 1000 mg oral tablet: 1 tab(s) orally 2 times a day (13 Feb 2024 10:24)  potassium chloride 20 mEq oral tablet, extended release: 1 tab(s) orally once a day (13 Feb 2024 10:24)    Current Medications:   acetaminophen   Oral Liquid .. 1000 milliGRAM(s) Enteral Tube every 6 hours PRN  albuterol/ipratropium for Nebulization 3 milliLiter(s) Nebulizer every 8 hours  artificial tears (preservative free) Ophthalmic Solution 1 Drop(s) Both EYES every 4 hours  aspirin  chewable 81 milliGRAM(s) Oral daily  Biotene Dry Mouth Oral Rinse 5 milliLiter(s) Swish and Spit every 6 hours  cefepime   IVPB 1000 milliGRAM(s) IV Intermittent every 8 hours  chlorhexidine 0.12% Liquid 15 milliLiter(s) Oral Mucosa every 12 hours  chlorhexidine 4% Liquid 1 Application(s) Topical daily  gabapentin Solution 100 milliGRAM(s) Enteral Tube every 8 hours  insulin lispro (ADMELOG) corrective regimen sliding scale   SubCutaneous every 6 hours  insulin NPH human recombinant 7 Unit(s) SubCutaneous every 6 hours  metroNIDAZOLE  IVPB 500 milliGRAM(s) IV Intermittent every 8 hours  nystatin    Suspension [id number] Unit(s) Oral every 6 hours  pantoprazole  Injectable 40 milliGRAM(s) IV Push every 12 hours  petrolatum Ophthalmic Ointment 1 Application(s) Both EYES every 8 hours  senna Syrup 10 milliLiter(s) Oral at bedtime  sodium chloride 3%  Inhalation 4 milliLiter(s) Inhalation every 8 hours  sucralfate suspension 1 Gram(s) Oral every 6 hours    Social History  Smoking History: denies  Alcohol Use: denies  Drug Use: denies    REVIEW OF SYSTEMS:  Constitutional:     [X] negative [ ] fevers [ ] chills [ ] weight loss [ ] weight gain  HEENT:                  [X] negative [ ] dry eyes [ ] eye irritation [ ] postnasal drip [ ] nasal congestion  CV:                         [X] negative  [ ] chest pain [ ] orthopnea [ ] palpitations [ ] murmur  Resp:                     [X] negative [ ] cough [ ] shortness of breath [ ] wheezing [ ] sputum [ ] hemoptysis  GI:                          [X] negative [ ] nausea [ ] vomiting [ ] diarrhea [ ] constipation [ ] abd pain [ ] dysphagia   :                        [X] negative [ ] dysuria [ ] nocturia [ ] hematuria [ ] increased urinary frequency  MSK:                      [X] negative [ ] back pain [ ] myalgias [ ] arthralgias [ ] fracture  Skin:                       [X] negative [ ] rash [ ] itch  Neuro:                   [X] negative [ ] headache [ ] dizziness [ ] syncope [ ] weakness [ ] numbness  Psych:                    [X] negative [ ] anxiety [ ] depression  Endo:                     [X] negative [ ] diabetes [ ] thyroid problem  Heme/Lymph:      [X] negative [ ] anemia [ ] bleeding problem  Allergic/Immune: [X] negative [ ] itchy eyes [ ] nasal discharge [ ] hives [ ] angioedema    [X] All other systems negative or otherwise described above.  [ ] Unable to assess ROS due to ________.    ICU Vital Signs Last 24 Hrs  T(C): 36.6 (03 Apr 2024 16:49), Max: 37.8 (03 Apr 2024 11:00)  T(F): 97.9 (03 Apr 2024 16:49), Max: 100.1 (03 Apr 2024 11:00)  HR: 93 (03 Apr 2024 16:49) (82 - 103)  BP: 152/84 (03 Apr 2024 16:49) (132/65 - 160/77)  BP(mean): --  ABP: --  ABP(mean): --  RR: 22 (03 Apr 2024 16:49) (13 - 29)  SpO2: 100% (03 Apr 2024 16:49) (99% - 100%)    O2 Parameters below as of 03 Apr 2024 16:49  Patient On (Oxygen Delivery Method): ventilator, CPAP/PSV    O2 Concentration (%): 30      Orthostatic VS    Daily     Daily   I&O's Summary    02 Apr 2024 07:01  -  03 Apr 2024 07:00  --------------------------------------------------------  IN: 2555 mL / OUT: 1550 mL / NET: 1005 mL    03 Apr 2024 07:01  -  03 Apr 2024 17:56  --------------------------------------------------------  IN: 1100 mL / OUT: 0 mL / NET: 1100 mL        Physical Exam:  GENERAL: No acute distress, ill-appearing  ENT: conjunctiva and sclera clear, s/p tracheostomy  CHEST/LUNG: no respiratory distress  HEART: Regular rate and rhythm; No murmurs, rubs, or gallops, radial and DP 2+ b/l  ABDOMEN: Soft, Nontender, Nondistended  EXTREMITIES:  No clubbing, cyanosis, or edema  SKIN: No lesions    LABS:                        8.8    13.14 )-----------( 431      ( 03 Apr 2024 09:06 )             31.5       04-03    143  |  103  |  15  ----------------------------<  167<H>  4.6   |  29  |  0.32<L>    Ca    8.3<L>      03 Apr 2024 09:06  Phos  2.9     04-03  Mg     2.1     04-03        Urinalysis Basic - ( 03 Apr 2024 09:06 )    Color: x / Appearance: x / SG: x / pH: x  Gluc: 167 mg/dL / Ketone: x  / Bili: x / Urobili: x   Blood: x / Protein: x / Nitrite: x   Leuk Esterase: x / RBC: x / WBC x   Sq Epi: x / Non Sq Epi: x / Bacteria: x          RADIOLOGY & ADDITIONAL STUDIES:    ECG: Personally reviewed    Echo:  < from: TTE W or WO Ultrasound Enhancing Agent (03.07.24 @ 07:54) >  CONCLUSIONS:      1. Technically difficult image quality.   2. Left ventricular systolic function is hyperdynamic with an ejection fraction of 74 % by Glynn's method of disks.   3. Moderately enlarged right ventricular cavity size and reduced systolic function.   4. No pericardial effusion seen.   5. Compared to the transthoracic echocardiogram report on 2/11/2024, The RV function apears reduced.   6. Discusses with U TAMARA Posada.    ________________________________________________________________________________________  FINDINGS:     Left Ventricle:  Left ventricular systolic function is hyperdynamic with a calculated ejection fraction of 74 % by the Glynn's biplane method of disks. Hyperdynamic left ventricular systolic function. Left ventricular endocardium is not well visualized.     Right Ventricle:  The right ventricular cavity is moderately enlarged in size and reduced systolic function. Tricuspid annular plane systolic excursion (TAPSE) is 1.6 cm (normal >=1.7 cm).     Aortic Valve:     Pericardium:  No pericardial effusion seen.  ____________________________________________________________________    < end of copied text >    Other misc imaging:   < from: MR Head No Cont (04.02.24 @ 15:50) >  IMPRESSION:    1.  Faint foci of diffusion restriction in the high right frontal lobe   and right occipital lobe with corresponding increase in T2/FLAIR signal.   Findings could indicate a acute to subacute infarction. Given different   vascular distributions, embolic process should still be considered.   Previously seen regions of diffusion restrictionhave resolved.  2.  The previously seen foci of increased T2/FLAIR signal in the basal   ganglia has improved, however now there is corresponding increased T1   signal within the affected regions. Findings support a toxic/metabolic   process or hepatic dysfunction. Recommend clinical and laboratory   correlation.  3.  Bilateral mastoid effusions with fluid in the middle ear cavities.   Recommend clinical correlation for otomastoiditis.    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 Prior to admission, patient had a viral URI (1-2 weeks prior to admission) with subsequent weakness concerning for GBS. His head and neck imaging prior strokes which, as per Neurology, were not c/w current presentation. He is currently s/p 5 rounds of plasma exchange (2/13-2/20) and IVIG x 5 ( 2/21-2/26). His hospital course was complicated by progressive respiratory failure 2/2 PNA s/p abx. Patient was transferred to [facility] on 2/24. RRT called on 3/3 for unresponsiveness in the setting of hemorrhagic shock. No EGD was performed as melena resolved. AMS was noted, and a repeat MRI demonstrated small scattered foci of diffusion restriction within the right cerebellar hemisphere, left parietal lobe, and symmetrically in the bilateral basal ganglia. Neuro recommended resumption of ASA in setting of possible embolic phenomenon. He was transferred back to [facility] on 3/6 as his mental status improved. Cardiology was consulted for concern for tachy-dez, unable to catch events on tele and EKG was otherwise unremarkable. Patient experienced recurrent melena on 3/27 resulting in RRT For hypotension for which he required 3 units of PRBCs. S/p EGD; No active bleeding noted but found to have duodenal erosions. He is s/p repeat MRI yesterday evening, which demonstrated new acute vs subacute infarcts. Neurology is recommending MILADIS and ILR implant. He will be tried on ASA 81 mg today as his CBC has remained stable and w/o further episodes of melena.       PMHx:   Hypertension    Diabetes    CVA (cerebral vascular accident)    HTN (hypertension)    HLD (hyperlipidemia)    DM2 (diabetes mellitus, type 2)    CVA (cerebrovascular accident)      PSHx:   No significant past surgical history      FAMILY HISTORY:    Allergies:  No Known Allergies    Home Medications:  ascorbic acid 1000 mg oral tablet: 1 tab(s) orally once a day (13 Feb 2024 10:24)  aspirin 81 mg oral delayed release tablet: 1 tab(s) orally once a day x stroke prevention (13 Feb 2024 10:24)  atenolol-chlorthalidone 50 mg-25 mg oral tablet: 1 tab(s) orally once a day (09 Feb 2024 11:24)  atenolol-chlorthalidone 50 mg-25 mg oral tablet: 1 tab(s) orally once a day (13 Feb 2024 10:24)  metFORMIN 1000 mg oral tablet: 1 tab(s) orally 2 times a day (13 Feb 2024 10:24)  potassium chloride 20 mEq oral tablet, extended release: 1 tab(s) orally once a day (13 Feb 2024 10:24)    Current Medications:   acetaminophen   Oral Liquid .. 1000 milliGRAM(s) Enteral Tube every 6 hours PRN  albuterol/ipratropium for Nebulization 3 milliLiter(s) Nebulizer every 8 hours  artificial tears (preservative free) Ophthalmic Solution 1 Drop(s) Both EYES every 4 hours  aspirin  chewable 81 milliGRAM(s) Oral daily  Biotene Dry Mouth Oral Rinse 5 milliLiter(s) Swish and Spit every 6 hours  cefepime   IVPB 1000 milliGRAM(s) IV Intermittent every 8 hours  chlorhexidine 0.12% Liquid 15 milliLiter(s) Oral Mucosa every 12 hours  chlorhexidine 4% Liquid 1 Application(s) Topical daily  gabapentin Solution 100 milliGRAM(s) Enteral Tube every 8 hours  insulin lispro (ADMELOG) corrective regimen sliding scale   SubCutaneous every 6 hours  insulin NPH human recombinant 7 Unit(s) SubCutaneous every 6 hours  metroNIDAZOLE  IVPB 500 milliGRAM(s) IV Intermittent every 8 hours  nystatin    Suspension [id number] Unit(s) Oral every 6 hours  pantoprazole  Injectable 40 milliGRAM(s) IV Push every 12 hours  petrolatum Ophthalmic Ointment 1 Application(s) Both EYES every 8 hours  senna Syrup 10 milliLiter(s) Oral at bedtime  sodium chloride 3%  Inhalation 4 milliLiter(s) Inhalation every 8 hours  sucralfate suspension 1 Gram(s) Oral every 6 hours    REVIEW OF SYSTEMS:  Constitutional:     [ ] negative [ ] fevers [ ] chills [ ] weight loss [ ] weight gain  HEENT:                  [ ] negative [ ] dry eyes [ ] eye irritation [ ] postnasal drip [ ] nasal congestion  CV:                         [ ] negative  [ ] chest pain [ ] orthopnea [ ] palpitations [ ] murmur  Resp:                     [ ] negative [ ] cough [ ] shortness of breath [ ] wheezing [ ] sputum [ ] hemoptysis  GI:                          [ ] negative [ ] nausea [ ] vomiting [ ] diarrhea [ ] constipation [ ] abd pain [ ] dysphagia   :                        [ ] negative [ ] dysuria [ ] nocturia [ ] hematuria [ ] increased urinary frequency  MSK:                      [ ] negative [ ] back pain [ ] myalgias [ ] arthralgias [ ] fracture  Skin:                       [ ] negative [ ] rash [ ] itch  Neuro:                   [ ] negative [ ] headache [ ] dizziness [ ] syncope [ ] weakness [ ] numbness  Psych:                    [ ] negative [ ] anxiety [ ] depression  Endo:                     [ ] negative [ ] diabetes [ ] thyroid problem  Heme/Lymph:      [ ] negative [ ] anemia [ ] bleeding problem  Allergic/Immune: [ ] negative [ ] itchy eyes [ ] nasal discharge [ ] hives [ ] angioedema    [ ] All other systems negative or otherwise described above.  [X] Unable to assess ROS due to intubation, critical illness    ICU Vital Signs Last 24 Hrs  T(C): 36.6 (03 Apr 2024 16:49), Max: 37.8 (03 Apr 2024 11:00)  T(F): 97.9 (03 Apr 2024 16:49), Max: 100.1 (03 Apr 2024 11:00)  HR: 93 (03 Apr 2024 16:49) (82 - 103)  BP: 152/84 (03 Apr 2024 16:49) (132/65 - 160/77)  BP(mean): --  ABP: --  ABP(mean): --  RR: 22 (03 Apr 2024 16:49) (13 - 29)  SpO2: 100% (03 Apr 2024 16:49) (99% - 100%)    O2 Parameters below as of 03 Apr 2024 16:49  Patient On (Oxygen Delivery Method): ventilator, CPAP/PSV    O2 Concentration (%): 30      Orthostatic VS    Daily     Daily   I&O's Summary    02 Apr 2024 07:01  -  03 Apr 2024 07:00  --------------------------------------------------------  IN: 2555 mL / OUT: 1550 mL / NET: 1005 mL    03 Apr 2024 07:01  -  03 Apr 2024 17:56  --------------------------------------------------------  IN: 1100 mL / OUT: 0 mL / NET: 1100 mL        Physical Exam:  GENERAL: No acute distress, ill-appearing  ENT: conjunctiva and sclera clear, s/p tracheostomy  CHEST/LUNG: no respiratory distress  HEART: Regular rate and rhythm; No murmurs, rubs, or gallops, radial and DP 2+ b/l  ABDOMEN: Soft, Nontender, Nondistended  EXTREMITIES:  No clubbing, cyanosis, or edema  SKIN: No lesions    LABS:                        8.8    13.14 )-----------( 431      ( 03 Apr 2024 09:06 )             31.5       04-03    143  |  103  |  15  ----------------------------<  167<H>  4.6   |  29  |  0.32<L>    Ca    8.3<L>      03 Apr 2024 09:06  Phos  2.9     04-03  Mg     2.1     04-03        Urinalysis Basic - ( 03 Apr 2024 09:06 )    Color: x / Appearance: x / SG: x / pH: x  Gluc: 167 mg/dL / Ketone: x  / Bili: x / Urobili: x   Blood: x / Protein: x / Nitrite: x   Leuk Esterase: x / RBC: x / WBC x   Sq Epi: x / Non Sq Epi: x / Bacteria: x          RADIOLOGY & ADDITIONAL STUDIES:    ECG: Personally reviewed    Echo:  < from: TTE W or WO Ultrasound Enhancing Agent (03.07.24 @ 07:54) >  CONCLUSIONS:      1. Technically difficult image quality.   2. Left ventricular systolic function is hyperdynamic with an ejection fraction of 74 % by Glynn's method of disks.   3. Moderately enlarged right ventricular cavity size and reduced systolic function.   4. No pericardial effusion seen.   5. Compared to the transthoracic echocardiogram report on 2/11/2024, The RV function apears reduced.   6. Discusses with DINORAH Posada.    ________________________________________________________________________________________  FINDINGS:     Left Ventricle:  Left ventricular systolic function is hyperdynamic with a calculated ejection fraction of 74 % by the Glynn's biplane method of disks. Hyperdynamic left ventricular systolic function. Left ventricular endocardium is not well visualized.     Right Ventricle:  The right ventricular cavity is moderately enlarged in size and reduced systolic function. Tricuspid annular plane systolic excursion (TAPSE) is 1.6 cm (normal >=1.7 cm).     Aortic Valve:     Pericardium:  No pericardial effusion seen.  ____________________________________________________________________    < end of copied text >    Other misc imaging:   < from: MR Head No Cont (04.02.24 @ 15:50) >  IMPRESSION:    1.  Faint foci of diffusion restriction in the high right frontal lobe   and right occipital lobe with corresponding increase in T2/FLAIR signal.   Findings could indicate a acute to subacute infarction. Given different   vascular distributions, embolic process should still be considered.   Previously seen regions of diffusion restrictionhave resolved.  2.  The previously seen foci of increased T2/FLAIR signal in the basal   ganglia has improved, however now there is corresponding increased T1   signal within the affected regions. Findings support a toxic/metabolic   process or hepatic dysfunction. Recommend clinical and laboratory   correlation.  3.  Bilateral mastoid effusions with fluid in the middle ear cavities.   Recommend clinical correlation for otomastoiditis.    --- End of Report ---    < end of copied text >

## 2024-04-03 NOTE — PROGRESS NOTE ADULT - PROBLEM SELECTOR PLAN 6
- Patient with Sympathetic Storming in NSCU   - Patient with Labile BP and Hx of Tachy/dez episodes  - TTE 3/7: EF 74% RT Ventricular Moderately Enlarged and reduced systolic fxn  - 3/18 CT Angio PE: Negative   - Continue to monitor BP / HR

## 2024-04-04 DIAGNOSIS — H65.90 UNSPECIFIED NONSUPPURATIVE OTITIS MEDIA, UNSPECIFIED EAR: ICD-10-CM

## 2024-04-04 LAB
ANION GAP SERPL CALC-SCNC: 6 MMOL/L — SIGNIFICANT CHANGE UP (ref 5–17)
APTT BLD: 26 SEC — SIGNIFICANT CHANGE UP (ref 24.5–35.6)
BUN SERPL-MCNC: 14 MG/DL — SIGNIFICANT CHANGE UP (ref 7–23)
CALCIUM SERPL-MCNC: 9.7 MG/DL — SIGNIFICANT CHANGE UP (ref 8.4–10.5)
CHLORIDE SERPL-SCNC: 100 MMOL/L — SIGNIFICANT CHANGE UP (ref 96–108)
CO2 SERPL-SCNC: 33 MMOL/L — HIGH (ref 22–31)
CREAT SERPL-MCNC: 0.35 MG/DL — LOW (ref 0.5–1.3)
EGFR: 119 ML/MIN/1.73M2 — SIGNIFICANT CHANGE UP
GLUCOSE BLDC GLUCOMTR-MCNC: 120 MG/DL — HIGH (ref 70–99)
GLUCOSE BLDC GLUCOMTR-MCNC: 128 MG/DL — HIGH (ref 70–99)
GLUCOSE BLDC GLUCOMTR-MCNC: 155 MG/DL — HIGH (ref 70–99)
GLUCOSE BLDC GLUCOMTR-MCNC: 165 MG/DL — HIGH (ref 70–99)
GLUCOSE SERPL-MCNC: 115 MG/DL — HIGH (ref 70–99)
HCT VFR BLD CALC: 28.4 % — LOW (ref 39–50)
HGB BLD-MCNC: 8.3 G/DL — LOW (ref 13–17)
INR BLD: 1.27 RATIO — HIGH (ref 0.85–1.18)
MAGNESIUM SERPL-MCNC: 2.1 MG/DL — SIGNIFICANT CHANGE UP (ref 1.6–2.6)
MCHC RBC-ENTMCNC: 25.2 PG — LOW (ref 27–34)
MCHC RBC-ENTMCNC: 29.2 GM/DL — LOW (ref 32–36)
MCV RBC AUTO: 86.1 FL — SIGNIFICANT CHANGE UP (ref 80–100)
NRBC # BLD: 0 /100 WBCS — SIGNIFICANT CHANGE UP (ref 0–0)
PHOSPHATE SERPL-MCNC: 2.9 MG/DL — SIGNIFICANT CHANGE UP (ref 2.5–4.5)
PLATELET # BLD AUTO: 443 K/UL — HIGH (ref 150–400)
POTASSIUM SERPL-MCNC: 4.6 MMOL/L — SIGNIFICANT CHANGE UP (ref 3.5–5.3)
POTASSIUM SERPL-SCNC: 4.6 MMOL/L — SIGNIFICANT CHANGE UP (ref 3.5–5.3)
PROTHROM AB SERPL-ACNC: 13.9 SEC — HIGH (ref 9.5–13)
RBC # BLD: 3.3 M/UL — LOW (ref 4.2–5.8)
RBC # FLD: 18.2 % — HIGH (ref 10.3–14.5)
SODIUM SERPL-SCNC: 139 MMOL/L — SIGNIFICANT CHANGE UP (ref 135–145)
WBC # BLD: 11.39 K/UL — HIGH (ref 3.8–10.5)
WBC # FLD AUTO: 11.39 K/UL — HIGH (ref 3.8–10.5)

## 2024-04-04 PROCEDURE — 99233 SBSQ HOSP IP/OBS HIGH 50: CPT

## 2024-04-04 RX ORDER — FLUTICASONE PROPIONATE 50 MCG
1 SPRAY, SUSPENSION NASAL
Refills: 0 | Status: DISCONTINUED | OUTPATIENT
Start: 2024-04-04 | End: 2024-04-11

## 2024-04-04 RX ADMIN — CEFEPIME 100 MILLIGRAM(S): 1 INJECTION, POWDER, FOR SOLUTION INTRAMUSCULAR; INTRAVENOUS at 22:37

## 2024-04-04 RX ADMIN — Medication 100 MILLIGRAM(S): at 05:18

## 2024-04-04 RX ADMIN — Medication 1 GRAM(S): at 23:32

## 2024-04-04 RX ADMIN — CHLORHEXIDINE GLUCONATE 15 MILLILITER(S): 213 SOLUTION TOPICAL at 05:13

## 2024-04-04 RX ADMIN — Medication 2: at 18:09

## 2024-04-04 RX ADMIN — CHLORHEXIDINE GLUCONATE 15 MILLILITER(S): 213 SOLUTION TOPICAL at 18:11

## 2024-04-04 RX ADMIN — Medication 1 GRAM(S): at 00:02

## 2024-04-04 RX ADMIN — Medication 2: at 23:58

## 2024-04-04 RX ADMIN — Medication 1 APPLICATION(S): at 05:13

## 2024-04-04 RX ADMIN — CHLORHEXIDINE GLUCONATE 1 APPLICATION(S): 213 SOLUTION TOPICAL at 00:01

## 2024-04-04 RX ADMIN — Medication 100 MILLIGRAM(S): at 14:38

## 2024-04-04 RX ADMIN — Medication 3 MILLILITER(S): at 05:19

## 2024-04-04 RX ADMIN — GABAPENTIN 100 MILLIGRAM(S): 400 CAPSULE ORAL at 07:01

## 2024-04-04 RX ADMIN — Medication 500000 UNIT(S): at 00:02

## 2024-04-04 RX ADMIN — Medication 1 DROP(S): at 02:12

## 2024-04-04 RX ADMIN — Medication 1 DROP(S): at 12:03

## 2024-04-04 RX ADMIN — Medication 2: at 00:04

## 2024-04-04 RX ADMIN — Medication 5 MILLILITER(S): at 05:19

## 2024-04-04 RX ADMIN — Medication 500000 UNIT(S): at 12:03

## 2024-04-04 RX ADMIN — GABAPENTIN 100 MILLIGRAM(S): 400 CAPSULE ORAL at 22:36

## 2024-04-04 RX ADMIN — PANTOPRAZOLE SODIUM 40 MILLIGRAM(S): 20 TABLET, DELAYED RELEASE ORAL at 05:17

## 2024-04-04 RX ADMIN — Medication 3 MILLILITER(S): at 21:34

## 2024-04-04 RX ADMIN — Medication 81 MILLIGRAM(S): at 12:03

## 2024-04-04 RX ADMIN — CEFEPIME 100 MILLIGRAM(S): 1 INJECTION, POWDER, FOR SOLUTION INTRAMUSCULAR; INTRAVENOUS at 14:38

## 2024-04-04 RX ADMIN — Medication 1 APPLICATION(S): at 22:38

## 2024-04-04 RX ADMIN — HUMAN INSULIN 7 UNIT(S): 100 INJECTION, SUSPENSION SUBCUTANEOUS at 18:09

## 2024-04-04 RX ADMIN — Medication 1 DROP(S): at 22:37

## 2024-04-04 RX ADMIN — Medication 1 GRAM(S): at 12:03

## 2024-04-04 RX ADMIN — SODIUM CHLORIDE 4 MILLILITER(S): 9 INJECTION INTRAMUSCULAR; INTRAVENOUS; SUBCUTANEOUS at 13:21

## 2024-04-04 RX ADMIN — Medication 5 MILLILITER(S): at 12:02

## 2024-04-04 RX ADMIN — Medication 1 SPRAY(S): at 18:13

## 2024-04-04 RX ADMIN — CEFEPIME 100 MILLIGRAM(S): 1 INJECTION, POWDER, FOR SOLUTION INTRAMUSCULAR; INTRAVENOUS at 05:16

## 2024-04-04 RX ADMIN — SENNA PLUS 10 MILLILITER(S): 8.6 TABLET ORAL at 22:37

## 2024-04-04 RX ADMIN — Medication 3 MILLILITER(S): at 13:21

## 2024-04-04 RX ADMIN — Medication 5 MILLILITER(S): at 23:33

## 2024-04-04 RX ADMIN — CHLORHEXIDINE GLUCONATE 1 APPLICATION(S): 213 SOLUTION TOPICAL at 22:39

## 2024-04-04 RX ADMIN — Medication 1 DROP(S): at 10:13

## 2024-04-04 RX ADMIN — Medication 1 APPLICATION(S): at 12:03

## 2024-04-04 RX ADMIN — Medication 1 DROP(S): at 18:11

## 2024-04-04 RX ADMIN — Medication 5 MILLILITER(S): at 18:11

## 2024-04-04 RX ADMIN — Medication 500000 UNIT(S): at 23:32

## 2024-04-04 RX ADMIN — Medication 1 GRAM(S): at 05:17

## 2024-04-04 RX ADMIN — SODIUM CHLORIDE 4 MILLILITER(S): 9 INJECTION INTRAMUSCULAR; INTRAVENOUS; SUBCUTANEOUS at 21:34

## 2024-04-04 RX ADMIN — SODIUM CHLORIDE 4 MILLILITER(S): 9 INJECTION INTRAMUSCULAR; INTRAVENOUS; SUBCUTANEOUS at 05:19

## 2024-04-04 RX ADMIN — HUMAN INSULIN 7 UNIT(S): 100 INJECTION, SUSPENSION SUBCUTANEOUS at 12:31

## 2024-04-04 RX ADMIN — Medication 500000 UNIT(S): at 18:11

## 2024-04-04 RX ADMIN — Medication 5 MILLILITER(S): at 00:03

## 2024-04-04 RX ADMIN — Medication 1 DROP(S): at 05:13

## 2024-04-04 RX ADMIN — PANTOPRAZOLE SODIUM 40 MILLIGRAM(S): 20 TABLET, DELAYED RELEASE ORAL at 18:11

## 2024-04-04 RX ADMIN — Medication 1 GRAM(S): at 18:51

## 2024-04-04 RX ADMIN — GABAPENTIN 100 MILLIGRAM(S): 400 CAPSULE ORAL at 14:37

## 2024-04-04 RX ADMIN — Medication 500000 UNIT(S): at 05:16

## 2024-04-04 NOTE — CONSULT NOTE ADULT - SUBJECTIVE AND OBJECTIVE BOX
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 Prior to admission patient had Viral URI ( 1-2 weeks prior to admission) with subsequent weakness. He underwent an angiogram (2/11) which showed moderate stenosis and no intervention was done. MRI Performed c/w small bilateral cerebellar strokes and prior L thalamic strokes, as per Neurology was not c/w current presentation. 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 He is currently s/p 5 rounds of plasma exchange (2/13-2/20) and IVIG x 5 ( 2/21-2/26)  His hospital course was complicated by progressive respiratory failure, now s/p tracheostomy placement. ENT consulted for b/l mastoid effusion found on MRI head. Unable to obtain further history due to pts clinical condition.      PAST MEDICAL & SURGICAL HISTORY:  Hypertension      Diabetes      CVA (cerebral vascular accident)  x 2 with right side weakness and numbness      HTN (hypertension)      HLD (hyperlipidemia)      DM2 (diabetes mellitus, type 2)      CVA (cerebrovascular accident)      No significant past surgical history        Allergies    No Known Allergies    Intolerances      MEDICATIONS  (STANDING):  albuterol/ipratropium for Nebulization 3 milliLiter(s) Nebulizer every 8 hours  artificial tears (preservative free) Ophthalmic Solution 1 Drop(s) Both EYES every 4 hours  aspirin  chewable 81 milliGRAM(s) Oral daily  Biotene Dry Mouth Oral Rinse 5 milliLiter(s) Swish and Spit every 6 hours  cefepime   IVPB 1000 milliGRAM(s) IV Intermittent every 8 hours  chlorhexidine 0.12% Liquid 15 milliLiter(s) Oral Mucosa every 12 hours  chlorhexidine 4% Liquid 1 Application(s) Topical daily  gabapentin Solution 100 milliGRAM(s) Enteral Tube every 8 hours  insulin lispro (ADMELOG) corrective regimen sliding scale   SubCutaneous every 6 hours  insulin NPH human recombinant 7 Unit(s) SubCutaneous every 6 hours  metroNIDAZOLE  IVPB 500 milliGRAM(s) IV Intermittent every 8 hours  nystatin    Suspension [id number] Unit(s) Oral every 6 hours  pantoprazole  Injectable 40 milliGRAM(s) IV Push every 12 hours  petrolatum Ophthalmic Ointment 1 Application(s) Both EYES every 8 hours  senna Syrup 10 milliLiter(s) Oral at bedtime  sodium chloride 3%  Inhalation 4 milliLiter(s) Inhalation every 8 hours  sucralfate suspension 1 Gram(s) Oral every 6 hours    MEDICATIONS  (PRN):  acetaminophen   Oral Liquid .. 1000 milliGRAM(s) Enteral Tube every 6 hours PRN Temp greater or equal to 38.5C (101.3F), Mild Pain (1 - 3)      Social History: unable to obtain due to pts clinical condition     Family history: unable to obtain due to pts clinical condition     ROS:   unable to obtain due to pts clinical condition     Vital Signs Last 24 Hrs  T(C): 37.4 (04 Apr 2024 13:41), Max: 37.4 (04 Apr 2024 13:41)  T(F): 99.3 (04 Apr 2024 13:41), Max: 99.3 (04 Apr 2024 13:41)  HR: 98 (04 Apr 2024 13:41) (87 - 100)  BP: 138/84 (04 Apr 2024 13:41) (115/60 - 152/84)  BP(mean): --  RR: 18 (04 Apr 2024 13:41) (12 - 27)  SpO2: 100% (04 Apr 2024 13:41) (97% - 100%)    Parameters below as of 04 Apr 2024 13:41  Patient On (Oxygen Delivery Method): ventilator                              8.3    11.39 )-----------( 443      ( 04 Apr 2024 06:34 )             28.4    04-04    139  |  100  |  14  ----------------------------<  115<H>  4.6   |  33<H>  |  0.35<L>    Ca    9.7      04 Apr 2024 06:34  Phos  2.9     04-04  Mg     2.1     04-04     PT/INR - ( 04 Apr 2024 06:34 )   PT: 13.9 sec;   INR: 1.27 ratio         PTT - ( 04 Apr 2024 06:34 )  PTT:26.0 sec    PHYSICAL EXAM:  Gen: NAD  Skin: No rashes, bruises, or lesions  Head: Normocephalic, Atraumatic  Face: no edema, erythema, or fluctuance. Parotid glands soft without mass  Eyes: no scleral injection  Ears: Right - ear canal clear, TM intact with effusion, no erythema. No evidence of any fluid drainage. No mastoid tenderness, erythema, or ear bulging            Left - ear canal clear, TM intact with effusion, no erythema. No evidence of any fluid drainage. No mastoid tenderness, erythema, or ear bulging  Nose: Nares bilaterally patent, no discharge  Mouth: No Stridor / Drooling / Trismus.  Mucosa moist, tongue/uvula midline, oropharynx clear  Neck: Flat, supple, no lymphadenopathy, trachea midline, no masses  Lymphatic: No lymphadenopathy  Resp: breathing easily, no stridor  CV: no peripheral edema/cyanosis  GI: nondistended   Peripheral vascular: no JVD or edema  Neuro: facial nerve intact, no facial droop        Fiberoptic Indirect laryngoscopy:  (Scope #2 used)        IMAGING/ADDITIONAL STUDIES: MRI HEAD   IMPRESSION:    1. Faint foci of diffusion restriction in the high right frontal lobe and right occipital lobe with corresponding increase in T2/FLAIR signal. Findings could indicate a acute to subacute infarction. Given different vascular distributions, embolic process should still be considered. Previously seen regions of diffusion restriction have resolved.  2. The previously seen foci of increased T2/FLAIR signal in the basal ganglia has improved, however now there is corresponding increased T1 signal within the affected regions. Findings support a toxic/metabolic process or hepatic dysfunction. Recommend clinical and laboratory correlation.  3. Bilateral mastoid effusions with fluid in the middle ear cavities. Recommend clinical correlation for otomastoiditis.    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 Prior to admission patient had Viral URI ( 1-2 weeks prior to admission) with subsequent weakness. He underwent an angiogram (2/11) which showed moderate stenosis and no intervention was done. MRI Performed c/w small bilateral cerebellar strokes and prior L thalamic strokes, as per Neurology was not c/w current presentation. 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 He is currently s/p 5 rounds of plasma exchange (2/13-2/20) and IVIG x 5 ( 2/21-2/26)  His hospital course was complicated by progressive respiratory failure, now s/p tracheostomy placement. ENT consulted for b/l mastoid effusion found on MRI head. Unable to obtain further history due to pts clinical condition.      PAST MEDICAL & SURGICAL HISTORY:  Hypertension      Diabetes      CVA (cerebral vascular accident)  x 2 with right side weakness and numbness      HTN (hypertension)      HLD (hyperlipidemia)      DM2 (diabetes mellitus, type 2)      CVA (cerebrovascular accident)      No significant past surgical history        Allergies    No Known Allergies    Intolerances      MEDICATIONS  (STANDING):  albuterol/ipratropium for Nebulization 3 milliLiter(s) Nebulizer every 8 hours  artificial tears (preservative free) Ophthalmic Solution 1 Drop(s) Both EYES every 4 hours  aspirin  chewable 81 milliGRAM(s) Oral daily  Biotene Dry Mouth Oral Rinse 5 milliLiter(s) Swish and Spit every 6 hours  cefepime   IVPB 1000 milliGRAM(s) IV Intermittent every 8 hours  chlorhexidine 0.12% Liquid 15 milliLiter(s) Oral Mucosa every 12 hours  chlorhexidine 4% Liquid 1 Application(s) Topical daily  gabapentin Solution 100 milliGRAM(s) Enteral Tube every 8 hours  insulin lispro (ADMELOG) corrective regimen sliding scale   SubCutaneous every 6 hours  insulin NPH human recombinant 7 Unit(s) SubCutaneous every 6 hours  metroNIDAZOLE  IVPB 500 milliGRAM(s) IV Intermittent every 8 hours  nystatin    Suspension [id number] Unit(s) Oral every 6 hours  pantoprazole  Injectable 40 milliGRAM(s) IV Push every 12 hours  petrolatum Ophthalmic Ointment 1 Application(s) Both EYES every 8 hours  senna Syrup 10 milliLiter(s) Oral at bedtime  sodium chloride 3%  Inhalation 4 milliLiter(s) Inhalation every 8 hours  sucralfate suspension 1 Gram(s) Oral every 6 hours    MEDICATIONS  (PRN):  acetaminophen   Oral Liquid .. 1000 milliGRAM(s) Enteral Tube every 6 hours PRN Temp greater or equal to 38.5C (101.3F), Mild Pain (1 - 3)      Social History: unable to obtain due to pts clinical condition     Family history: unable to obtain due to pts clinical condition     ROS:   unable to obtain due to pts clinical condition     Vital Signs Last 24 Hrs  T(C): 37.4 (04 Apr 2024 13:41), Max: 37.4 (04 Apr 2024 13:41)  T(F): 99.3 (04 Apr 2024 13:41), Max: 99.3 (04 Apr 2024 13:41)  HR: 98 (04 Apr 2024 13:41) (87 - 100)  BP: 138/84 (04 Apr 2024 13:41) (115/60 - 152/84)  BP(mean): --  RR: 18 (04 Apr 2024 13:41) (12 - 27)  SpO2: 100% (04 Apr 2024 13:41) (97% - 100%)    Parameters below as of 04 Apr 2024 13:41  Patient On (Oxygen Delivery Method): ventilator                              8.3    11.39 )-----------( 443      ( 04 Apr 2024 06:34 )             28.4    04-04    139  |  100  |  14  ----------------------------<  115<H>  4.6   |  33<H>  |  0.35<L>    Ca    9.7      04 Apr 2024 06:34  Phos  2.9     04-04  Mg     2.1     04-04     PT/INR - ( 04 Apr 2024 06:34 )   PT: 13.9 sec;   INR: 1.27 ratio         PTT - ( 04 Apr 2024 06:34 )  PTT:26.0 sec    PHYSICAL EXAM:  Gen: NAD  Skin: No rashes, bruises, or lesions  Head: Normocephalic, Atraumatic  Face: no edema, erythema, or fluctuance. Parotid glands soft without mass  Eyes: no scleral injection  Ears: Right - ear canal clear, TM intact with effusion, no erythema. No evidence of any fluid drainage. No mastoid tenderness, erythema, or ear bulging            Left - ear canal clear, TM intact with effusion, no erythema. No evidence of any fluid drainage. No mastoid tenderness, erythema, or ear bulging  Nose: Nares bilaterally patent, no discharge  Mouth: No Stridor / Drooling / Trismus.  Mucosa moist, tongue/uvula midline, oropharynx clear  Neck: Flat, supple, no lymphadenopathy, trachea midline, no masses  Lymphatic: No lymphadenopathy  Resp: breathing easily, no stridor  CV: no peripheral edema/cyanosis  GI: nondistended   Peripheral vascular: no JVD or edema  Neuro: facial nerve intact, no facial droop        Diagnostic Nasal Endoscopy  Indication for procedure: b/l mastoid effusion   "Anterior rhinoscopy insufficient to account for symptoms"    Scope #3: flexible fiber optic telescope used with surgilube     + Right sigmoidal septal deviation noted. Mucosa moist with scant mucus, normal inferior/middle/superior turbinates, normal inferior/middle/superior meatus, normal fontanelles, maxillary ostia clear, sphenoethmoidal recess clear bilaterally. B/l eustachian tube appears normal. No polyps noted. Pooling of secretions noted in nasopharynx.          IMAGING/ADDITIONAL STUDIES: MRI HEAD   IMPRESSION:    1. Faint foci of diffusion restriction in the high right frontal lobe and right occipital lobe with corresponding increase in T2/FLAIR signal. Findings could indicate a acute to subacute infarction. Given different vascular distributions, embolic process should still be considered. Previously seen regions of diffusion restriction have resolved.  2. The previously seen foci of increased T2/FLAIR signal in the basal ganglia has improved, however now there is corresponding increased T1 signal within the affected regions. Findings support a toxic/metabolic process or hepatic dysfunction. Recommend clinical and laboratory correlation.  3. Bilateral mastoid effusions with fluid in the middle ear cavities. Recommend clinical correlation for otomastoiditis.    --- End of Report ---

## 2024-04-04 NOTE — PROGRESS NOTE ADULT - SUBJECTIVE AND OBJECTIVE BOX
23-Dec-2020 Patient is a 74y old  Male who presents with a chief complaint of Stroke, critical stenosis, evaluation for angiography (03 Apr 2024 17:55)      Interval Events:    REVIEW OF SYSTEMS:  [ ] Positive  [ ] All other systems negative  [ ] Unable to assess ROS because ________    Vital Signs Last 24 Hrs  T(C): 37.2 (04-04-24 @ 05:08), Max: 37.8 (04-03-24 @ 11:00)  T(F): 98.9 (04-04-24 @ 05:08), Max: 100.1 (04-03-24 @ 11:00)  HR: 93 (04-04-24 @ 05:18) (87 - 103)  BP: 133/68 (04-04-24 @ 05:08) (115/60 - 160/77)  RR: 14 (04-04-24 @ 05:08) (12 - 29)  SpO2: 100% (04-04-24 @ 05:18) (98% - 100%)    PHYSICAL EXAM:  HEENT:   [ ]Tracheostomy:  [ ]Pupils equal  [ ]No oral lesions  [ ]Abnormal    SKIN  [ ]No Rash  [ ] Abnormal  [ ] pressure    CARDIAC  [ ]Regular  [ ]Abnormal    PULMONARY  [ ]Bilateral Clear Breath Sounds  [ ]Normal Excursion  [ ]Abnormal    GI  [ ]PEG      [ ] +BS		              [ ]Soft, nondistended, nontender	  [ ]Abnormal    MUSCULOSKELETAL                                   [ ]Bedbound                 [ ]Abnormal    [ ]Ambulatory/OOB to chair                           EXTREMITIES                                         [ ]Normal  [ ]Edema                           NEUROLOGIC  [ ] Normal, non focal  [ ] Focal findings:    PSYCHIATRIC  [ ]Alert and appropriate  [ ] Sedated	 [ ]Agitated    :  Fredrick: [ ] Yes, if yes: Date of Placement:                   [  ] No    LINES: Central Lines [ ] Yes, if yes: Date of Placement                                     [  ] No    HOSPITAL MEDICATIONS:  MEDICATIONS  (STANDING):  albuterol/ipratropium for Nebulization 3 milliLiter(s) Nebulizer every 8 hours  artificial tears (preservative free) Ophthalmic Solution 1 Drop(s) Both EYES every 4 hours  aspirin  chewable 81 milliGRAM(s) Oral daily  Biotene Dry Mouth Oral Rinse 5 milliLiter(s) Swish and Spit every 6 hours  cefepime   IVPB 1000 milliGRAM(s) IV Intermittent every 8 hours  chlorhexidine 0.12% Liquid 15 milliLiter(s) Oral Mucosa every 12 hours  chlorhexidine 4% Liquid 1 Application(s) Topical daily  gabapentin Solution 100 milliGRAM(s) Enteral Tube every 8 hours  insulin lispro (ADMELOG) corrective regimen sliding scale   SubCutaneous every 6 hours  insulin NPH human recombinant 7 Unit(s) SubCutaneous every 6 hours  metroNIDAZOLE  IVPB 500 milliGRAM(s) IV Intermittent every 8 hours  nystatin    Suspension [id number] Unit(s) Oral every 6 hours  pantoprazole  Injectable 40 milliGRAM(s) IV Push every 12 hours  petrolatum Ophthalmic Ointment 1 Application(s) Both EYES every 8 hours  senna Syrup 10 milliLiter(s) Oral at bedtime  sodium chloride 3%  Inhalation 4 milliLiter(s) Inhalation every 8 hours  sucralfate suspension 1 Gram(s) Oral every 6 hours    MEDICATIONS  (PRN):  acetaminophen   Oral Liquid .. 1000 milliGRAM(s) Enteral Tube every 6 hours PRN Temp greater or equal to 38.5C (101.3F), Mild Pain (1 - 3)      LABS:                        8.3    11.39 )-----------( 443      ( 04 Apr 2024 06:34 )             28.4     04-03    143  |  103  |  15  ----------------------------<  167<H>  4.6   |  29  |  0.32<L>    Ca    8.3<L>      03 Apr 2024 09:06  Phos  2.9     04-03  Mg     2.1     04-03      PT/INR - ( 04 Apr 2024 06:34 )   PT: 13.9 sec;   INR: 1.27 ratio         PTT - ( 04 Apr 2024 06:34 )  PTT:26.0 sec  Urinalysis Basic - ( 03 Apr 2024 09:06 )    Color: x / Appearance: x / SG: x / pH: x  Gluc: 167 mg/dL / Ketone: x  / Bili: x / Urobili: x   Blood: x / Protein: x / Nitrite: x   Leuk Esterase: x / RBC: x / WBC x   Sq Epi: x / Non Sq Epi: x / Bacteria: x          CAPILLARY BLOOD GLUCOSE    MICROBIOLOGY:     RADIOLOGY:  [ ] Reviewed and interpreted by me    Mode: AC/ CMV (Assist Control/ Continuous Mandatory Ventilation)  RR (machine): 12  TV (machine): 500  FiO2: 40  PEEP: 5  ITime: 0.9  MAP: 8  PIP: 19   Patient is a 74y old  Male who presents with a chief complaint of Stroke, critical stenosis, evaluation for angiography (03 Apr 2024 17:55)      Interval Events:  No acute events overnight.     REVIEW OF SYSTEMS:  [ ] Positive  [ ] All other systems negative  [X] Unable to assess ROS because __Intermittently able to nod yes or no__    Vital Signs Last 24 Hrs  T(C): 37.2 (04-04-24 @ 05:08), Max: 37.8 (04-03-24 @ 11:00)  T(F): 98.9 (04-04-24 @ 05:08), Max: 100.1 (04-03-24 @ 11:00)  HR: 93 (04-04-24 @ 05:18) (87 - 103)  BP: 133/68 (04-04-24 @ 05:08) (115/60 - 160/77)  RR: 14 (04-04-24 @ 05:08) (12 - 29)  SpO2: 100% (04-04-24 @ 05:18) (98% - 100%)    PHYSICAL EXAM:  HEENT:   [X]Tracheostomy: #8 Cuffed Portex  [X]Pupils equal  [ ]No oral lesions  [ ]Abnormal    SKIN  [X]No Rash  [ ] Abnormal  [ ] pressure    CARDIAC  [X]Regular  [X]Abnormal - intermittent mild tachycardia    PULMONARY  [ ]Bilateral Clear Breath Sounds  [ ]Normal Excursion  [X]Abnormal - BL Coarse BS    GI  [X]PEG      [X] +BS		              [X]Soft, nondistended, nontender	  [ ]Abnormal    MUSCULOSKELETAL                                   [ ]Bedbound                 [ ]Abnormal    [X]Ambulatory/OOB to chair                           EXTREMITIES                                         [ ]Normal  [X]Edema - BL UE swelling, BL feet swelling                           NEUROLOGIC  [ ] Normal, non focal  [X] Focal findings: awake and alert, able to nod to simple commands, follows commands on all 4 extremities    PSYCHIATRIC  [X]Alert and appropriate  [ ] Sedated	 [ ]Agitated    :  Fredrick: [ ] Yes, if yes: Date of Placement:                   [X] No    LINES: Central Lines [ ] Yes, if yes: Date of Placement                                     [X] No    HOSPITAL MEDICATIONS:  MEDICATIONS  (STANDING):  albuterol/ipratropium for Nebulization 3 milliLiter(s) Nebulizer every 8 hours  artificial tears (preservative free) Ophthalmic Solution 1 Drop(s) Both EYES every 4 hours  aspirin  chewable 81 milliGRAM(s) Oral daily  Biotene Dry Mouth Oral Rinse 5 milliLiter(s) Swish and Spit every 6 hours  cefepime   IVPB 1000 milliGRAM(s) IV Intermittent every 8 hours  chlorhexidine 0.12% Liquid 15 milliLiter(s) Oral Mucosa every 12 hours  chlorhexidine 4% Liquid 1 Application(s) Topical daily  gabapentin Solution 100 milliGRAM(s) Enteral Tube every 8 hours  insulin lispro (ADMELOG) corrective regimen sliding scale   SubCutaneous every 6 hours  insulin NPH human recombinant 7 Unit(s) SubCutaneous every 6 hours  metroNIDAZOLE  IVPB 500 milliGRAM(s) IV Intermittent every 8 hours  nystatin    Suspension [id number] Unit(s) Oral every 6 hours  pantoprazole  Injectable 40 milliGRAM(s) IV Push every 12 hours  petrolatum Ophthalmic Ointment 1 Application(s) Both EYES every 8 hours  senna Syrup 10 milliLiter(s) Oral at bedtime  sodium chloride 3%  Inhalation 4 milliLiter(s) Inhalation every 8 hours  sucralfate suspension 1 Gram(s) Oral every 6 hours    MEDICATIONS  (PRN):  acetaminophen   Oral Liquid .. 1000 milliGRAM(s) Enteral Tube every 6 hours PRN Temp greater or equal to 38.5C (101.3F), Mild Pain (1 - 3)    LABS:                        8.3    11.39 )-----------( 443      ( 04 Apr 2024 06:34 )             28.4     04-03    143  |  103  |  15  ----------------------------<  167<H>  4.6   |  29  |  0.32<L>    Ca    8.3<L>      03 Apr 2024 09:06  Phos  2.9     04-03  Mg     2.1     04-03    PT/INR - ( 04 Apr 2024 06:34 )   PT: 13.9 sec;   INR: 1.27 ratio      PTT - ( 04 Apr 2024 06:34 )  PTT:26.0 sec  Urinalysis Basic - ( 03 Apr 2024 09:06 )    Color: x / Appearance: x / SG: x / pH: x  Gluc: 167 mg/dL / Ketone: x  / Bili: x / Urobili: x   Blood: x / Protein: x / Nitrite: x   Leuk Esterase: x / RBC: x / WBC x   Sq Epi: x / Non Sq Epi: x / Bacteria: x    CAPILLARY BLOOD GLUCOSE    MICROBIOLOGY:     RADIOLOGY:  [ ] Reviewed and interpreted by me    Mode: AC/ CMV (Assist Control/ Continuous Mandatory Ventilation)  RR (machine): 12  TV (machine): 500  FiO2: 40  PEEP: 5  ITime: 0.9  MAP: 8  PIP: 19

## 2024-04-04 NOTE — PROGRESS NOTE ADULT - ASSESSMENT
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 Prior to admission patient had Viral URI ( 1-2 weeks prior to admission) with subsequent weakness. He underwent an angiogram (2/11) which showed moderate stenosis and no intervention was done. MRI Performed c/w small bilateral cerebellar strokes and prior L thalamic strokes, as per Neurology was not c/w current presentation. 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 He is currently s/p 5 rounds of plasma exchange (2/13-2/20) and IVIG x 5 ( 2/21-2/26)  His hospital course was complicated by progressive respiratory failure. CT Chest performed on 2/20 with atelectasis of b/l lower lobes. BAL 2/21 Grew PSA treated with course of Zosyn (2/21-2/28).  Patient transferred to RCU on 2/24. RRT called on 3/3 for unresponsiveness. Patient with unreactive pupils, no corneal reflex. Also found to be hypotensive and febrile to 105. Noted to have new melena. Hgb 5. Given 1 unit of pRBC. Transferred to MICU. patient required pressors while in the MICU, BP improved and was transitioned to Droxidopa. Patient had CT C/A/P consistent with Bibasilar pneumonia; Sputum cx grew PSA and was treated with course of Meropenem. Patient evaluated by GI in setting of Melena, transaminitis and portal venous gas on CT imaging. No EGD performed as Melena resolved, Transaminitis was thought to be in setting of shock and portal venous gas was thought to be in setting of recent PEG. EEG was performed in setting of AMS no seizures noted. Repeat MRI Performed which showed Small scattered foci of diffusion restriction within the right cerebellar hemisphere, left parietal lobe, and symmetrically in the bilateral basal ganglia. Neuro recommended Resumption of ASA in setting of possible embolic phenomenon. Repeat ECHO performed RT Ventricular Moderately enlarged, reduced systolic function. Mental status improved and was transferred back to RCU on 3/6.  Cardiology consulted for tachy-dez, unable to catch events on tele, EKG ST otherwise unremarkable, CT Angio negative for PE. Patient with recurrent Melena on 3/27 resulting in RRT For hypotension for which he required 3 units of PRBCs. S/p EGD; No active bleeding noted but found to have duodenal erosions.    4/3: Patient S/p MRI yesterday evening, case rediscussed with Neurology this afternoon. MRI report with new acute vs subacute infarcts. As per Neurology recommending MILADIS and ILR placement. Case d/w Cardiology fellow will make patient NPO at midnight for MILADIS on 4/4. EP Called for ILR Placement. Will attempt to trial ASA 81 mg today as CBC has remained stable and w/o further episodes of melena.  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 Prior to admission patient had Viral URI ( 1-2 weeks prior to admission) with subsequent weakness. He underwent an angiogram (2/11) which showed moderate stenosis and no intervention was done. MRI Performed c/w small bilateral cerebellar strokes and prior L thalamic strokes, as per Neurology was not c/w current presentation. 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 He is currently s/p 5 rounds of plasma exchange (2/13-2/20) and IVIG x 5 ( 2/21-2/26)  His hospital course was complicated by progressive respiratory failure. CT Chest performed on 2/20 with atelectasis of b/l lower lobes. BAL 2/21 Grew PSA treated with course of Zosyn (2/21-2/28).  Patient transferred to RCU on 2/24. RRT called on 3/3 for unresponsiveness. Patient with unreactive pupils, no corneal reflex. Also found to be hypotensive and febrile to 105. Noted to have new melena. Hgb 5. Given 1 unit of pRBC. Transferred to MICU. patient required pressors while in the MICU, BP improved and was transitioned to Droxidopa. Patient had CT C/A/P consistent with Bibasilar pneumonia; Sputum cx grew PSA and was treated with course of Meropenem. Patient evaluated by GI in setting of Melena, transaminitis and portal venous gas on CT imaging. No EGD performed as Melena resolved, Transaminitis was thought to be in setting of shock and portal venous gas was thought to be in setting of recent PEG. EEG was performed in setting of AMS no seizures noted. Repeat MRI Performed which showed Small scattered foci of diffusion restriction within the right cerebellar hemisphere, left parietal lobe, and symmetrically in the bilateral basal ganglia. Neuro recommended Resumption of ASA in setting of possible embolic phenomenon. Repeat ECHO performed RT Ventricular Moderately enlarged, reduced systolic function. Mental status improved and was transferred back to RCU on 3/6.  Cardiology consulted for tachy-dez, unable to catch events on tele, EKG ST otherwise unremarkable, CT Angio negative for PE. Patient with recurrent Melena on 3/27 resulting in RRT For hypotension for which he required 3 units of PRBCs. S/p EGD; No active bleeding noted but found to have duodenal erosions.    4/4:  PST as tolerated, 8/5 today.  Slight drop in Hgb this AM - follow up CBC in am.  Pending MILADIS - ECHO fellow requesting GI clearance for downtrending hgb - case discussed with GI and ECHO fellow - cleared by GI - there was concern with the proximal narrowing found on EGD, that the probe would not fit - as per GI, XP probe was 5.4cm and fit, therefore based off of measurements of probe received from ECHO fellow, there should be no issues.  Pt will be NPO after midnight tonight for MILADIS in OR tomorrow 4/5.  ENT saw pt for bilateral ear effusions found on MRI - flonase started as per ENT recs.  Wife updated at bedside.

## 2024-04-04 NOTE — PROGRESS NOTE ADULT - NS ATTEND AMEND GEN_ALL_CORE FT
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 Hospital course has been complicated by VAP, massive GIB with hemorrhagic shock, worsening neurologic vascular injury with small scattered foci of diffusion restriction within the right cerebellar hemisphere, left parietal lobe, and symmetrically in the bilateral basal ganglia, recurrent GIB (3/27) found to have duodenal erosions on endoscopy.     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 cerebellar and L parietal infarctions suspicious for septic embolic phenomena and raised suspicion for endocarditis  Bilateral symmetric basal ganglia lesions with differential including hypoxic injury producing delayed post-hypoxic leukoencephalopathy verses toxidrome. Suspect more likely related to hypoxia/hypoperfusion in the setting of hemorrhagic/septic shock event.  His neurologic exam is severely limited. Able to move his RLE, open eyes spontaneously, with some mouth and head movements. However, no meaningful communication at the time of my exam.   - Repeat MRI completed, neurology planning to review and provide further recommendations. No indication for MILADIS at this time from their perspective.  - Will challenge with ASA. Holding Plavix still.     CV: #Hypotension: in the setting of hemorrhagic, septic shock. Resolved. Off oral vasopressor    R:  #Acute respiratory failure, mixed hypoxic hypercapnia: Remains on ventilator support, tolerating limited high pressure support trials.   He currently has an 8 cuffed portex, placed 2/16 with Dr. Sood  - Continue wean briseyda as tolerated  - Suctioning, chest pt as needed  - Oral care as per RCU team  - OOB as tolerated  - PT/OT  #Respiratory muscle weakness in the setting of AIDP    #VAP:   SCx 2/21: PSa  SCx 3/4 PSa   SCx 3/20 PSa, CRE  3/26: CRE Pseudomonas  S/P Meropenem, Zosyn courses previously during hospitalization  He has now been resumed on Cefepime for empiric coverage  - Continue Cefepime, plan for 14 day course    GI  #GIB, Acute blood loss anemia   #Duodenal erosions  Now with stable H/H and no further evidence of bleeding.  - PPI as above  - Resume tube feeding  - On Flagyl and Cefepime with concern for translocation and sepsis related to recurrent bleeds, will plan for 7 days of Flagyl and Cefepime as outlined below    R: Stable creatinine    Rest as above 73 year old male with HTN, HLD, DM, CVA x 2 (no residual deficits presented with stroke-like symptoms found to have chronic left thalamic lacunar infarct and high grade proximal basilar, left posterior cerebral stenosis requiring transfer to [facility], hospital course with progressive neurologic and respiratory decline inconsistent with cerebrovascular pathology and 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 Hospital course has been complicated by VAP, massive GIB with hemorrhagic shock, worsening neurologic vascular injury with small scattered foci of diffusion restriction within the right cerebellar hemisphere, left parietal lobe, and symmetrically in the bilateral basal ganglia, recurrent GIB (3/27) found to have duodenal erosions on endoscopy.     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 cerebellar and L parietal infarctions suspicious for septic embolic phenomena and raised suspicion for endocarditis  Bilateral symmetric basal ganglia lesions with differential including hypoxic injury producing delayed post-hypoxic leukoencephalopathy verses toxidrome. Suspect more likely related to hypoxia/hypoperfusion in the setting of hemorrhagic/septic shock event.  His neurologic exam is severely limited. Able to move his RLE, open eyes spontaneously, with some mouth and head movements. He is able to follow commands by squeezing hands, and answer simple questions shaking head yes or no.  - Repeat MRI completed. Per neurology, there are areas of new infarcts concerning for embolic events. Requesting MILADIS. Discussed with cardiology, after GI clearance, will plan to complete in OR 4/5/24.   - Also requesting ILR. Discussed with EP team. Potentially can add before discharge. However, considering recurrent GIB and hemorrhagic shock, even if AF discovered, unlikely to A/C at this time and not candidate for HEENA closure.  - Rechallenge with ASA. Holding Plavix still.   - Fluid in mastoid air cells on MRI, will obtain otoscope from ENT for clinical evaluation    CV: #Hypotension: in the setting of hemorrhagic, septic shock. Resolved. Off oral vasopressor    R:  #Acute respiratory failure, mixed hypoxic hypercapnia: Remains on ventilator support, tolerating limited high pressure support trials.   He currently has an 8 cuffed portex, placed 2/16 with Dr. Sood  - Continue wean briseyda as tolerated  - Suctioning, chest pt as needed  - Oral care as per RCU team  - OOB as tolerated  - PT/OT  #Respiratory muscle weakness in the setting of AIDP    #VAP:   SCx 2/21: PSa  SCx 3/4 PSa   SCx 3/20 PSa, CRE  3/26: CRE Pseudomonas  S/P Meropenem, Zosyn courses previously during hospitalization  He has now been resumed on Cefepime for empiric coverage  - Continue Cefepime, plan for 14 day course    GI  #GIB, Acute blood loss anemia   #Duodenal erosions  Now with stable H/H and no further evidence of bleeding.  - PPI as above  - Resume tube feeding  - On Flagyl and Cefepime with concern for translocation and sepsis related to recurrent bleeds, will plan for 7 days of Flagyl and Cefepime as outlined below    R: Stable creatinine    Rest as above

## 2024-04-04 NOTE — PROGRESS NOTE ADULT - PROBLEM SELECTOR PLAN 1
- MRI 2/12: small b/l cerebellar strokes (post-procedural) and prior L thalamic strokes  - MRI 3/5: Small scattered foci of diffusion restriction within the right cerebellar hemisphere, left parietal lobe, and symmetrically in the bilateral basal ganglia.  - Lipitor d/c'd in setting of liver injury 3/3  - Was on ASA And Plavix for SAMMPRIS trial in setting of intracranial stenosis   - ASA and Plavix dcd in setting of Recurrent Melena on 3/27  - Repeat MRI 4/2: Concerning for Acute / Subacute Infarctions w/ concern for embolic Distribution   - Case d/w Neurology team will proceed with MILADIS and ILR placement ( EP Consult called)  - Will re-trial ASA and monitor for GI Bleeding - MRI 2/12: small b/l cerebellar strokes (post-procedural) and prior L thalamic strokes  - MRI 3/5: Small scattered foci of diffusion restriction within the right cerebellar hemisphere, left parietal lobe, and symmetrically in the bilateral basal ganglia.  - Lipitor d/c'd in setting of liver injury 3/3  - Was on ASA And Plavix for SAMMPRIS trial in setting of intracranial stenosis   - ASA and Plavix dcd in setting of Recurrent Melena on 3/27  - Repeat MRI 4/2: Concerning for Acute / Subacute Infarctions w/ concern for embolic Distribution   - Case d/w Neurology team will proceed with MILADIS and ILR placement (as per EP, high risk for infection with ILR placement, will place closer to discharge); pending MILADIS   - ASA restarted 4/3, monitor hgb

## 2024-04-04 NOTE — CHART NOTE - NSCHARTNOTEFT_GEN_A_CORE
As per primary team patient has not been on telemetry as RCU does not offer tele on service. In order for telemetry given patients current medical state he would require transfer to ICU level of care. Alternatively, discussed the possibility for ILR during admission with Cardiology fellow. As per cards fellow, given ongoing infection patient would be higher risk for the procedure at this juncture. Given timing for MILADIS later today (patient now NPO for procedure), will hold off ILR for now and reconsider later during admission as infection clears and MILADIS results.   In the meantime would continue with re-trial ASA and monitor for GI Bleeding.    Neurology to follow     Discussed with attending Dr. Calvert As per primary team patient has not been on telemetry as RCU does not offer tele on service. In order for telemetry given patients current medical state he would require transfer to ICU level of care. Alternatively, discussed the possibility for ILR during admission with Cardiology fellow. As per cards fellow, given ongoing infection patient would be higher risk for the procedure at this juncture. Given timing for MILADIS later today (patient now NPO for procedure), will hold off ILR for now and reconsider later during admission as infection clears and MILADIS results.   In the meantime would continue with re-trial ASA and monitor for GI Bleeding.    Neurology to follow     Discussed with attending Dr. Calvert  -------  Pt MRI was reviewed by stroke team. Minimal additional stroke burden. Pt was not on tele, not tolerating ASA due to recent GI bleed, no AC/AP currently. When cardiac embolic stroke cannot be fully ruled out, will highly recommend MILADIS (scheduled for tomorrow), tele/ Loop when able and PO ASA 81mg qD when able.     Ej Bar MD PhD  Vascular neurology fellow PGY5

## 2024-04-04 NOTE — CONSULT NOTE ADULT - CONSULT REQUESTED DATE/TIME
12-Feb-2024 10:45
16-Feb-2024 08:02
29-Feb-2024 10:59
05-Mar-2024 11:27
03-Apr-2024 15:55
18-Mar-2024 09:13
04-Apr-2024
23-Feb-2024 15:49
03-Mar-2024 17:50
12-Feb-2024 12:46

## 2024-04-04 NOTE — PROGRESS NOTE ADULT - PROBLEM SELECTOR PLAN 9
- Heparin Sub q held in setting of recent Melena  - Continue Compression Devices bilaterally - Heparin Subq held in setting of recent Melena  - Continue Compression Devices bilaterally

## 2024-04-04 NOTE — PROGRESS NOTE ADULT - PROBLEM SELECTOR PLAN 5
- Patient with large Melanotic Episodes on 3/27 w/ significant drop in H+H  - Was Transfused 3 Units of PRBCs on 3/27  - EGD 3/27: Technically Limited Study, + Duodenal Erosions but no evidence of Active Bleed    - Remains on Protonix 40 mg q 12 hrs IVP and Sucralfate q 6    - If patient develops decreasing H+H or HD instability will need CTA of abdomen  - Continue to monitor CBC   - Patient is [employer] However Family is in agreement for administration of blood products ( Per Wife)

## 2024-04-04 NOTE — PROGRESS NOTE ADULT - PROBLEM SELECTOR PLAN 10
- Patients wife Margarette updated at bedside this morning  - Patients Daughter updated at bedside this afternoon regarding updated Neuro recommendations  - Case d/w Neurology team requested they speak with patients wife - Patients wife Margarette updated at bedside  - Case d/w Neurology team - requested they speak with patients wife

## 2024-04-04 NOTE — CONSULT NOTE ADULT - PROVIDER SPECIALTY LIST ADULT
Electrophysiology
Gastroenterology
Rehab Medicine
ENT
Palliative Care
Pulmonology
Transfusion Medicine
Cardiology
Surgery
Neurology

## 2024-04-04 NOTE — CONSULT NOTE ADULT - REASON FOR ADMISSION
Stroke, critical stenosis, evaluation for angiography

## 2024-04-04 NOTE — CHART NOTE - NSCHARTNOTEFT_GEN_A_CORE
Patient with no overt GI bleeding per primary team. S/p upper endoscopy on 3/27/2024 with moderate narrowing at the entry of the esophagus which was traversed after downsizing scope. A few localized erosions in the duodenum as well as G-tube were noted with an otherwise normal endoscopy. No active bleeding or etiology of bleeding were identified. There are no GI contraindications to proceed with MILADIS. Exam may be technically challenging based on upper GI narrowing.    Samuel Edge MD  Gastroenterology/Hepatology Fellow

## 2024-04-04 NOTE — CONSULT NOTE ADULT - PROBLEM SELECTOR RECOMMENDATION 9
- Recommend flonase nasal spray, 1 spray to b/l nares BID x4-6 weeks  - Patient should follow up in ENT office as an outpatient. May see Dr. Marion, Dr. Guerrero, Dr. Michelle, Dr. Stafford. Call [phone].

## 2024-04-04 NOTE — CHART NOTE - NSCHARTNOTEFT_GEN_A_CORE
Nutrition Follow Up Note  Patient seen for: follow up  Chart reviewed, events noted    Source: [] Patient       [x] Chart        [] RN        [] Family at bedside       [] Other:  - If unable to interview patient: [] Trach/Vent/BiPAP  [] Disoriented/confused/inappropriate to interview    Diet Order:   Diet, NPO:   Tube Feeding Modality: Gastrostomy  Glucerna 1.5 David (GLUCERNA1.5RTH)  Total Volume for 24 Hours (mL): 1560  Continuous  Starting Tube Feed Rate {mL per Hour}: 30  Increase Tube Feed Rate by (mL): 10     Every 4 hours  Until Goal Tube Feed Rate (mL per Hour): 65  Tube Feed Duration (in Hours): 24  Tube Feed Start Time: 06:00 (03-28-24)    Is current order appropriate/adequate? [] Yes  []  No:     PO intake:   [] >75%  Adequate    [] 50-75%  Fair       [] <50%  Poor    Nutrition-related concerns:  -   -   -     GI:  Last BM:   Bowel Regimen? [x] Yes:   Bowel Regimen? [x] No    Weights:   Daily     Nutritionally Pertinent MEDICATIONS  (STANDING):  cefepime   IVPB  insulin lispro (ADMELOG) corrective regimen sliding scale  insulin NPH human recombinant  metroNIDAZOLE  IVPB  nystatin    Suspension  pantoprazole  Injectable  senna Syrup  sucralfate suspension    Pertinent Labs: 04-04 @ 06:34: Na 139, BUN 14, Cr 0.35<L>, <H>, K+ 4.6, Phos 2.9, Mg 2.1, Alk Phos --, ALT/SGPT --, AST/SGOT --, HbA1c --    A1C with Estimated Average Glucose Result: 6.8 % (02-10-24 @ 01:43)    Finger Sticks:  POCT Blood Glucose.: 120 mg/dL (04-04 @ 11:45)  POCT Blood Glucose.: 128 mg/dL (04-04 @ 05:16)  POCT Blood Glucose.: 180 mg/dL (04-03 @ 23:43)  POCT Blood Glucose.: 142 mg/dL (04-03 @ 17:37)    Skin per nursing documentation:   Edema:     Estimated Needs:   [] no change since previous assessment  [] recalculated:     Previous Nutrition Diagnosis:   Nutrition Diagnosis is: [x] ongoing  [] resolved [] not applicable     New Nutrition Diagnosis: [x] Not applicable    Nutrition Care Plan:  [x] In Progress  [] Achieved  [] Not applicable    Nutrition Interventions:     Education Provided:       [] Yes:  [] No:     Recommendations:          Monitoring and Evaluation:   Continue to monitor nutritional intake, tolerance to diet prescription, weights, labs, skin integrity    RD remains available upon request and will follow up per protocol  Nathen Morris RD, CDN - contact on TEAMS. Nutrition Follow Up Note  Patient seen for: follow up  Chart reviewed, events noted    Source: [] Patient       [x] Chart        [] RN        [] Family at bedside       [] Other:  - If unable to interview patient: [] Trach/Vent/BiPAP  [] Disoriented/confused/inappropriate to interview    Diet Order:   Diet, NPO:   Tube Feeding Modality: Gastrostomy  Glucerna 1.5 David (GLUCERNA1.5RTH)  Total Volume for 24 Hours (mL): 1560  Continuous  Starting Tube Feed Rate {mL per Hour}: 30  Increase Tube Feed Rate by (mL): 10     Every 4 hours  Until Goal Tube Feed Rate (mL per Hour): 65  Tube Feed Duration (in Hours): 24  Tube Feed Start Time: 06:00 (03-28-24)    Is current order appropriate/adequate? [] Yes  []  No:     EN Provision x5 Days:  4/3: 1105cc  4/2: 1235cc  4/1: 1430cc  3/31: 1680cc  3/30: 1560cc    Nutrition-related concerns:  -   -   -     GI:  Last BM: 4/2  Bowel Regimen? [x] Yes: senna    Weights:     Nutritionally Pertinent MEDICATIONS  (STANDING):  cefepime   IVPB  insulin lispro (ADMELOG) corrective regimen sliding scale  insulin NPH human recombinant  metroNIDAZOLE  IVPB  nystatin    Suspension  pantoprazole  Injectable  senna Syrup  sucralfate suspension    Pertinent Labs: 04-04 @ 06:34: Na 139, BUN 14, Cr 0.35<L>, <H>, K+ 4.6, Phos 2.9, Mg 2.1, Alk Phos --, ALT/SGPT --, AST/SGOT --, HbA1c --    A1C with Estimated Average Glucose Result: 6.8 % (02-10-24 @ 01:43)    Finger Sticks:  POCT Blood Glucose.: 120 mg/dL (04-04 @ 11:45)  POCT Blood Glucose.: 128 mg/dL (04-04 @ 05:16)  POCT Blood Glucose.: 180 mg/dL (04-03 @ 23:43)  POCT Blood Glucose.: 142 mg/dL (04-03 @ 17:37)    Skin per nursing documentation: no pressure injuries per documentation  Edema: +2 generalized edema per documentation    Estimated Needs:   [x] no change since previous assessment  Estimated Caloric Needs: 2030-2538kcal/day (20-25kcal/kg)  Estimated Protein Needs: 112-132g/pro/day (1.1-1.3g/pro/kg)  Estimated Fluid Needs: defer to team  based on .5kg w/ consideration for GBS, trach to vent, malnutrition    Previous Nutrition Diagnosis: acute-moderate protein calorie malnutrition  Nutrition Diagnosis is: [x] ongoing  [] resolved [] not applicable     New Nutrition Diagnosis: [x] Not applicable    Nutrition Care Plan:  [x] In Progress  [] Achieved  [] Not applicable    Nutrition Interventions:     Education Provided:       [] Yes:  [x] No:     Recommendations:          Monitoring and Evaluation:   Continue to monitor nutritional intake, tolerance to diet prescription, weights, labs, skin integrity    RD remains available upon request and will follow up per protocol  Nathen Morris RD, CDN - contact on TEAMS. Nutrition Follow Up Note  Patient seen for: follow up  Chart reviewed, events noted    Per chart, patient is a 72 y/o male with PMH including HTN, HLD, DM, h/o prior CVAs without residual deficits. 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 S/p trach/PEG during admission. Hospital course c/b RRT for unresponsiveness 3/3, had MICU stay for hypotension, febrile to 105, new melena, required pressor support, treatment for bibasilar PNA. MRI 4/2 w/ new acute vs subacute infarcts per MD, now current plans for MILADIS and ILR placement per chart.    Source: [] Patient       [x] Chart        [] RN        [] Family at bedside       [] Other:  - If unable to interview patient: [x] Trach/Vent/BiPAP  [] Disoriented/confused/inappropriate to interview    Diet Order:   Diet, NPO:   Tube Feeding Modality: Gastrostomy  Glucerna 1.5 David (GLUCERNA1.5RTH)  Total Volume for 24 Hours (mL): 1560  Continuous  Starting Tube Feed Rate {mL per Hour}: 30  Increase Tube Feed Rate by (mL): 10     Every 4 hours  Until Goal Tube Feed Rate (mL per Hour): 65  Tube Feed Duration (in Hours): 24  Tube Feed Start Time: 06:00 (03-28-24)    TF regimen provides 2340kcal, 129g protein, 1184cc free H2O.    Is current order appropriate/adequate? [x] Yes  []  No:     EN Provision x5 Days:  4/3: 1105cc  4/2: 1235cc  4/1: 1430cc  3/31: 1680cc  3/30: 1560cc  = ~90% EN provision on average x5 days    Nutrition-related concerns:  - patient NPO when seen at bedside w/ TFs off for planned MILADIS today, previously receiving TFs of Glucerna 1.5 at goal of 65cc/hr  - prior ongoing hypernatremia, now WNL from 3/30  - overall blood glucose trend holding in ~mid 100s, continues w/ admelog and NPH for insulin regimen  - remains on antibiotic regimen for PNA per chart    GI:  Last BM: 4/2  Bowel Regimen? [x] Yes: senna    Weights:   3/27: 90.4kg  3/20: 92.3kg  2/28: 101.8kg  2/21: 101.7kg  - overall weight trend noted x1 month, patient w/ reduction in degree of edema during above time frame atop of acute illnesses and malnutrition during admission, will continue to monitor weight trend    Nutritionally Pertinent MEDICATIONS  (STANDING):  cefepime   IVPB  insulin lispro (ADMELOG) corrective regimen sliding scale  insulin NPH human recombinant  metroNIDAZOLE  IVPB  nystatin    Suspension  pantoprazole  Injectable  senna Syrup  sucralfate suspension    Pertinent Labs: 04-04 @ 06:34: Na 139, BUN 14, Cr 0.35<L>, <H>, K+ 4.6, Phos 2.9, Mg 2.1, Alk Phos --, ALT/SGPT --, AST/SGOT --, HbA1c --    A1C with Estimated Average Glucose Result: 6.8 % (02-10-24 @ 01:43)    Finger Sticks:  POCT Blood Glucose.: 120 mg/dL (04-04 @ 11:45)  POCT Blood Glucose.: 128 mg/dL (04-04 @ 05:16)  POCT Blood Glucose.: 180 mg/dL (04-03 @ 23:43)  POCT Blood Glucose.: 142 mg/dL (04-03 @ 17:37)    Skin per nursing documentation: no pressure injuries per documentation  Edema: +2 generalized edema per documentation    Estimated Needs:   [x] no change since previous assessment  Estimated Caloric Needs: 2030-2538kcal/day (20-25kcal/kg)  Estimated Protein Needs: 112-132g/pro/day (1.1-1.3g/pro/kg)  Estimated Fluid Needs: defer to team  based on .5kg w/ consideration for GBS, trach to vent, malnutrition    Previous Nutrition Diagnosis: acute-moderate protein calorie malnutrition  Nutrition Diagnosis is: [x] ongoing  [] resolved [] not applicable     New Nutrition Diagnosis: [x] Not applicable    Nutrition Care Plan:  [x] In Progress  [] Achieved  [] Not applicable    Nutrition Interventions:     Education Provided:       [] Yes:  [x] No:     Recommendations:      1. when medically feasible to resume TFs, recommend resuming prior regimen of Glucerna 1.5 w/ goal rate of 65cc/hr x24 hours (1560cc total volume)  2. defer fluid management to team: free H2O flushes of 350cc Q8H in place noted  3. monitor tolerance of TF regimen, weight trend, electrolytes, blood glucose levels, labs, BMs    Monitoring and Evaluation:   Continue to monitor nutritional intake, tolerance to diet prescription, weights, labs, skin integrity    RD remains available upon request and will follow up per protocol  Nathen Morris RD, CDN - contact on TEAMS.

## 2024-04-04 NOTE — CONSULT NOTE ADULT - ASSESSMENT
73 year old male with hypertension, hyperlipidemia, diabetes, prior CVA's without residual deficits who initially presented as a transfer from [place] with concern for CVA in the setting of 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 His hospital course was complicated by progressive respiratory failure, now s/p tracheostomy placement. ENT consulted for b/l mastoid effusion found on MRI head. On exam, b/l middle ear effusions noted, noninfectious.  73 year old male with hypertension, hyperlipidemia, diabetes, prior CVA's without residual deficits who initially presented as a transfer from [place] with concern for CVA in the setting of 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 His hospital course was complicated by progressive respiratory failure, now s/p tracheostomy placement. ENT consulted for b/l mastoid effusion found on MRI head. On exam, b/l middle ear effusions noted, noninfectious. Diagnostic nasal endoscopy was unremarkable, b/l eustachian tubes appear normal.

## 2024-04-05 ENCOUNTER — RESULT REVIEW (OUTPATIENT)
Age: 74
End: 2024-04-05

## 2024-04-05 LAB
ANION GAP SERPL CALC-SCNC: 9 MMOL/L — SIGNIFICANT CHANGE UP (ref 5–17)
BUN SERPL-MCNC: 12 MG/DL — SIGNIFICANT CHANGE UP (ref 7–23)
CALCIUM SERPL-MCNC: 9.1 MG/DL — SIGNIFICANT CHANGE UP (ref 8.4–10.5)
CHLORIDE SERPL-SCNC: 100 MMOL/L — SIGNIFICANT CHANGE UP (ref 96–108)
CO2 SERPL-SCNC: 31 MMOL/L — SIGNIFICANT CHANGE UP (ref 22–31)
CREAT SERPL-MCNC: 0.32 MG/DL — LOW (ref 0.5–1.3)
EGFR: 122 ML/MIN/1.73M2 — SIGNIFICANT CHANGE UP
GLUCOSE BLDC GLUCOMTR-MCNC: 118 MG/DL — HIGH (ref 70–99)
GLUCOSE BLDC GLUCOMTR-MCNC: 118 MG/DL — HIGH (ref 70–99)
GLUCOSE BLDC GLUCOMTR-MCNC: 96 MG/DL — SIGNIFICANT CHANGE UP (ref 70–99)
GLUCOSE SERPL-MCNC: 110 MG/DL — HIGH (ref 70–99)
HCT VFR BLD CALC: 32.7 % — LOW (ref 39–50)
HGB BLD-MCNC: 9.1 G/DL — LOW (ref 13–17)
MAGNESIUM SERPL-MCNC: 2 MG/DL — SIGNIFICANT CHANGE UP (ref 1.6–2.6)
MCHC RBC-ENTMCNC: 24.1 PG — LOW (ref 27–34)
MCHC RBC-ENTMCNC: 27.8 GM/DL — LOW (ref 32–36)
MCV RBC AUTO: 86.7 FL — SIGNIFICANT CHANGE UP (ref 80–100)
NRBC # BLD: 0 /100 WBCS — SIGNIFICANT CHANGE UP (ref 0–0)
PHOSPHATE SERPL-MCNC: 2.7 MG/DL — SIGNIFICANT CHANGE UP (ref 2.5–4.5)
PLATELET # BLD AUTO: 504 K/UL — HIGH (ref 150–400)
POTASSIUM SERPL-MCNC: 4.4 MMOL/L — SIGNIFICANT CHANGE UP (ref 3.5–5.3)
POTASSIUM SERPL-SCNC: 4.4 MMOL/L — SIGNIFICANT CHANGE UP (ref 3.5–5.3)
RBC # BLD: 3.77 M/UL — LOW (ref 4.2–5.8)
RBC # FLD: 18.1 % — HIGH (ref 10.3–14.5)
SODIUM SERPL-SCNC: 140 MMOL/L — SIGNIFICANT CHANGE UP (ref 135–145)
WBC # BLD: 11.67 K/UL — HIGH (ref 3.8–10.5)
WBC # FLD AUTO: 11.67 K/UL — HIGH (ref 3.8–10.5)

## 2024-04-05 PROCEDURE — 99233 SBSQ HOSP IP/OBS HIGH 50: CPT

## 2024-04-05 PROCEDURE — 76376 3D RENDER W/INTRP POSTPROCES: CPT | Mod: 26

## 2024-04-05 PROCEDURE — 93325 DOPPLER ECHO COLOR FLOW MAPG: CPT | Mod: 26

## 2024-04-05 PROCEDURE — 93312 ECHO TRANSESOPHAGEAL: CPT | Mod: 26

## 2024-04-05 PROCEDURE — 93320 DOPPLER ECHO COMPLETE: CPT | Mod: 26

## 2024-04-05 DEVICE — SURGIFOAM PAD 8CM X 12.5CM X 10MM (100): Type: IMPLANTABLE DEVICE | Status: FUNCTIONAL

## 2024-04-05 RX ORDER — ONDANSETRON 8 MG/1
4 TABLET, FILM COATED ORAL ONCE
Refills: 0 | Status: DISCONTINUED | OUTPATIENT
Start: 2024-04-05 | End: 2024-04-05

## 2024-04-05 RX ADMIN — Medication 3 MILLILITER(S): at 14:55

## 2024-04-05 RX ADMIN — Medication 5 MILLILITER(S): at 11:33

## 2024-04-05 RX ADMIN — Medication 5 MILLILITER(S): at 06:14

## 2024-04-05 RX ADMIN — Medication 1 DROP(S): at 01:02

## 2024-04-05 RX ADMIN — CHLORHEXIDINE GLUCONATE 15 MILLILITER(S): 213 SOLUTION TOPICAL at 06:13

## 2024-04-05 RX ADMIN — PANTOPRAZOLE SODIUM 40 MILLIGRAM(S): 20 TABLET, DELAYED RELEASE ORAL at 19:41

## 2024-04-05 RX ADMIN — Medication 1 GRAM(S): at 06:13

## 2024-04-05 RX ADMIN — Medication 1 DROP(S): at 06:14

## 2024-04-05 RX ADMIN — GABAPENTIN 100 MILLIGRAM(S): 400 CAPSULE ORAL at 13:07

## 2024-04-05 RX ADMIN — Medication 3 MILLILITER(S): at 05:32

## 2024-04-05 RX ADMIN — GABAPENTIN 100 MILLIGRAM(S): 400 CAPSULE ORAL at 21:19

## 2024-04-05 RX ADMIN — Medication 500000 UNIT(S): at 06:14

## 2024-04-05 RX ADMIN — Medication 1 DROP(S): at 09:15

## 2024-04-05 RX ADMIN — Medication 1 APPLICATION(S): at 21:20

## 2024-04-05 RX ADMIN — GABAPENTIN 100 MILLIGRAM(S): 400 CAPSULE ORAL at 06:13

## 2024-04-05 RX ADMIN — SODIUM CHLORIDE 4 MILLILITER(S): 9 INJECTION INTRAMUSCULAR; INTRAVENOUS; SUBCUTANEOUS at 21:26

## 2024-04-05 RX ADMIN — Medication 81 MILLIGRAM(S): at 11:33

## 2024-04-05 RX ADMIN — CEFEPIME 100 MILLIGRAM(S): 1 INJECTION, POWDER, FOR SOLUTION INTRAMUSCULAR; INTRAVENOUS at 21:19

## 2024-04-05 RX ADMIN — PANTOPRAZOLE SODIUM 40 MILLIGRAM(S): 20 TABLET, DELAYED RELEASE ORAL at 06:12

## 2024-04-05 RX ADMIN — Medication 1 DROP(S): at 21:46

## 2024-04-05 RX ADMIN — SENNA PLUS 10 MILLILITER(S): 8.6 TABLET ORAL at 21:19

## 2024-04-05 RX ADMIN — CHLORHEXIDINE GLUCONATE 1 APPLICATION(S): 213 SOLUTION TOPICAL at 21:20

## 2024-04-05 RX ADMIN — Medication 1 GRAM(S): at 23:54

## 2024-04-05 RX ADMIN — Medication 1 DROP(S): at 13:08

## 2024-04-05 RX ADMIN — SODIUM CHLORIDE 4 MILLILITER(S): 9 INJECTION INTRAMUSCULAR; INTRAVENOUS; SUBCUTANEOUS at 05:32

## 2024-04-05 RX ADMIN — Medication 1 APPLICATION(S): at 06:13

## 2024-04-05 RX ADMIN — Medication 1 APPLICATION(S): at 13:08

## 2024-04-05 RX ADMIN — Medication 1 SPRAY(S): at 06:14

## 2024-04-05 RX ADMIN — Medication 3 MILLILITER(S): at 21:26

## 2024-04-05 RX ADMIN — CEFEPIME 100 MILLIGRAM(S): 1 INJECTION, POWDER, FOR SOLUTION INTRAMUSCULAR; INTRAVENOUS at 06:12

## 2024-04-05 RX ADMIN — SODIUM CHLORIDE 4 MILLILITER(S): 9 INJECTION INTRAMUSCULAR; INTRAVENOUS; SUBCUTANEOUS at 14:55

## 2024-04-05 RX ADMIN — Medication 1 GRAM(S): at 11:33

## 2024-04-05 RX ADMIN — Medication 1 GRAM(S): at 19:41

## 2024-04-05 RX ADMIN — Medication 500000 UNIT(S): at 11:32

## 2024-04-05 RX ADMIN — CEFEPIME 100 MILLIGRAM(S): 1 INJECTION, POWDER, FOR SOLUTION INTRAMUSCULAR; INTRAVENOUS at 13:08

## 2024-04-05 NOTE — PROGRESS NOTE ADULT - PROBLEM SELECTOR PLAN 10
- Patients wife Margarette updated at bedside  - Case d/w Neurology team - requested they speak with patients wife - Patients wife Margarette updated at bedside  - Neurology team - spoke to patients wife

## 2024-04-05 NOTE — PROGRESS NOTE ADULT - PROBLEM SELECTOR PLAN 1
- MRI 2/12: small b/l cerebellar strokes (post-procedural) and prior L thalamic strokes  - MRI 3/5: Small scattered foci of diffusion restriction within the right cerebellar hemisphere, left parietal lobe, and symmetrically in the bilateral basal ganglia.  - Lipitor d/c'd in setting of liver injury 3/3  - Was on ASA And Plavix for SAMMPRIS trial in setting of intracranial stenosis   - ASA and Plavix dcd in setting of Recurrent Melena on 3/27  - Repeat MRI 4/2: Concerning for Acute / Subacute Infarctions w/ concern for embolic Distribution   - Case d/w Neurology team will proceed with MILADIS and ILR placement (as per EP, high risk for infection with ILR placement, will place closer to discharge); pending MILADIS   - ASA restarted 4/3, monitor hgb

## 2024-04-05 NOTE — PROGRESS NOTE ADULT - PROBLEM SELECTOR PLAN 4
- BAL 2/21: PSA; Txd with Zosyn ( 2/21-2/28)  - Sputum 3/4: PSA , Blood cxs 3/3: NGTD  - Txd with course of Meropenem 3/3 --> 3/11  - Blood cxs 3/26:NGTD  and Urine cxs 3/27: NGTD   - Sputum cx 3/26: CRE PSA  - Continue Cefepime x 14 day course to tx CRE PSA ( Ends 4/11)   - Cont Flagyl to cover for possible GI Source x 7 Days ( Ends 4/04 ) - BAL 2/21: PSA; Txd with Zosyn ( 2/21-2/28)  - Sputum 3/4: PSA , Blood cxs 3/3: NGTD  - Txd with course of Meropenem 3/3 --> 3/11  - Blood cxs 3/26:NGTD  and Urine cxs 3/27: NGTD   - Sputum cx 3/26: CRE PSA  - Continue Cefepime x 14 day course to tx CRE PSA ( Ends 4/11)   - Completed Flagyl ( Ended 4/04 )

## 2024-04-05 NOTE — PROGRESS NOTE ADULT - ASSESSMENT
73 year old male with hypertension, hyperlipidemia, diabetes, prior CVA's without residual deficits who initially presented as a transfer from [place] with concern for CVA in the setting of 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 Prior to admission patient had Viral URI ( 1-2 weeks prior to admission) with subsequent weakness. He underwent an angiogram (2/11) which showed moderate stenosis and no intervention was done. MRI Performed c/w small bilateral cerebellar strokes and prior L thalamic strokes, as per Neurology was not c/w current presentation. 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 He is currently s/p 5 rounds of plasma exchange (2/13-2/20) and IVIG x 5 ( 2/21-2/26)  His hospital course was complicated by progressive respiratory failure. CT Chest performed on 2/20 with atelectasis of b/l lower lobes. BAL 2/21 Grew PSA treated with course of Zosyn (2/21-2/28).  Patient transferred to RCU on 2/24. RRT called on 3/3 for unresponsiveness. Patient with unreactive pupils, no corneal reflex. Also found to be hypotensive and febrile to 105. Noted to have new melena. Hgb 5. Given 1 unit of pRBC. Transferred to MICU. patient required pressors while in the MICU, BP improved and was transitioned to Droxidopa. Patient had CT C/A/P consistent with Bibasilar pneumonia; Sputum cx grew PSA and was treated with course of Meropenem. Patient evaluated by GI in setting of Melena, transaminitis and portal venous gas on CT imaging. No EGD performed as Melena resolved, Transaminitis was thought to be in setting of shock and portal venous gas was thought to be in setting of recent PEG. EEG was performed in setting of AMS no seizures noted. Repeat MRI Performed which showed Small scattered foci of diffusion restriction within the right cerebellar hemisphere, left parietal lobe, and symmetrically in the bilateral basal ganglia. Neuro recommended Resumption of ASA in setting of possible embolic phenomenon. Repeat ECHO performed RT Ventricular Moderately enlarged, reduced systolic function. Mental status improved and was transferred back to RCU on 3/6.  Cardiology consulted for tachy-dez, unable to catch events on tele, EKG ST otherwise unremarkable, CT Angio negative for PE. Patient with recurrent Melena on 3/27 resulting in RRT For hypotension for which he required 3 units of PRBCs. S/p EGD; No active bleeding noted but found to have duodenal erosions.    4/4:  PST as tolerated, 8/5 today.  Slight drop in Hgb this AM - follow up CBC in am.  Pending MILADIS - ECHO fellow requesting GI clearance for downtrending hgb - case discussed with GI and ECHO fellow - cleared by GI - there was concern with the proximal narrowing found on EGD, that the probe would not fit - as per GI, XP probe was 5.4cm and fit, therefore based off of measurements of probe received from ECHO fellow, there should be no issues.  Pt will be NPO after midnight tonight for MILADIS in OR tomorrow 4/5.  ENT saw pt for bilateral ear effusions found on MRI - flonase started as per ENT recs.  Wife updated at bedside.  73 year old male with hypertension, hyperlipidemia, diabetes, prior CVA's without residual deficits who initially presented as a transfer from [place] with concern for CVA in the setting of 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 Prior to admission patient had Viral URI ( 1-2 weeks prior to admission) with subsequent weakness. He underwent an angiogram (2/11) which showed moderate stenosis and no intervention was done. MRI Performed c/w small bilateral cerebellar strokes and prior L thalamic strokes, as per Neurology was not c/w current presentation. 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 He is currently s/p 5 rounds of plasma exchange (2/13-2/20) and IVIG x 5 ( 2/21-2/26)  His hospital course was complicated by progressive respiratory failure. CT Chest performed on 2/20 with atelectasis of b/l lower lobes. BAL 2/21 Grew PSA treated with course of Zosyn (2/21-2/28).  Patient transferred to RCU on 2/24. RRT called on 3/3 for unresponsiveness. Patient with unreactive pupils, no corneal reflex. Also found to be hypotensive and febrile to 105. Noted to have new melena. Hgb 5. Given 1 unit of pRBC. Transferred to MICU. patient required pressors while in the MICU, BP improved and was transitioned to Droxidopa. Patient had CT C/A/P consistent with Bibasilar pneumonia; Sputum cx grew PSA and was treated with course of Meropenem. Patient evaluated by GI in setting of Melena, transaminitis and portal venous gas on CT imaging. No EGD performed as Melena resolved, Transaminitis was thought to be in setting of shock and portal venous gas was thought to be in setting of recent PEG. EEG was performed in setting of AMS no seizures noted. Repeat MRI Performed which showed Small scattered foci of diffusion restriction within the right cerebellar hemisphere, left parietal lobe, and symmetrically in the bilateral basal ganglia. Neuro recommended Resumption of ASA in setting of possible embolic phenomenon. Repeat ECHO performed RT Ventricular Moderately enlarged, reduced systolic function. Mental status improved and was transferred back to RCU on 3/6.  Cardiology consulted for tachy-dez, unable to catch events on tele, EKG ST otherwise unremarkable, CT Angio negative for PE. Patient with recurrent Melena on 3/27 resulting in RRT For hypotension for which he required 3 units of PRBCs. S/p EGD; No active bleeding noted but found to have duodenal erosions.    4/5:  PST as tolerated, 8/5 today.  Pending MILADIS -  cleared by GI.  NPO after midnight for MILADIS in OR.  ENT saw pt for bilateral ear effusions found on MRI - Flonase started as per ENT recs.  Wife updated at bedside.

## 2024-04-05 NOTE — PROGRESS NOTE ADULT - SUBJECTIVE AND OBJECTIVE BOX
Patient is a 74y old  Male who presents with a chief complaint of Stroke, critical stenosis, evaluation for angiography (04 Apr 2024 14:11)    HPI:  73 years old RH male with h/o HTN, HLD, DM, h/o CVA x 2 (no residual deficits) initially presented to [facility] ED with unsteady gait that began on 2/7 (exact LKW unknown) and L facial droop that began 6 AM on 2/9 (went to bed 11 PM on 2/8 without facial droop). No slurred speech, vision changes. Initial NIHSS 0 at [facility]. Hemodynamically stable, CT head with no acute pathology. Chronic left thalamic lacunar infarct. CTA with no large vessel occlusion. 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 CT perfusion with no acute abnormality.     Pt transfered from [facility] to [facility] due to concern for neurologic deterioration i/s/o aforementioned high grade proximal basiliar and L PCA stenosis. On arrival to [facility] ED, initial NIHSS 4 (+1 for LLE drift, +2 for b/l limb dysmetria, +1 for mild dysarthria). Later, pt started to have difficulty clearing secretions, became stridorous, and started to desaturate, so decision was made to intubate patient. Once pt medically stabilized, taken to angio suite by IR. Unable to obtain further history from patient at [facility] due to intubation/sedation (09 Feb 2024 20:27)    Interval Events:    REVIEW OF SYSTEMS:  [ ] Positive  [ ] All other systems negative  [ ] Unable to assess ROS because ________    Vital Signs Last 24 Hrs  T(C): 37.1 (04-04-24 @ 23:42), Max: 37.4 (04-04-24 @ 13:41)  T(F): 98.8 (04-04-24 @ 23:42), Max: 99.3 (04-04-24 @ 13:41)  HR: 86 (04-05-24 @ 05:47) (85 - 102)  BP: 126/68 (04-04-24 @ 23:42) (125/61 - 138/84)  RR: 17 (04-04-24 @ 23:42) (17 - 20)  SpO2: 100% (04-05-24 @ 05:47) (97% - 100%)    PHYSICAL EXAM:  HEENT:   [ ]Tracheostomy:  [ ]Pupils equal  [ ]No oral lesions  [ ]Abnormal    SKIN  [ ] No Rash  [ ] Abnormal  [ ] pressure    CARDIAC  [ ]Regular  [ ]Abnormal    PULMONARY  [ ]Bilateral Clear Breath Sounds  [ ]Normal Excursion  [ ]Abnormal    GI  [ ]PEG      [ ] +BS		              [ ]Soft, nondistended, nontender	  [ ]Abnormal    MUSCULOSKELETAL                                   [ ]Bedbound                 [ ]Abnormal    [ ]Ambulatory/OOB to chair                           EXTREMITIES                                         [ ]Normal  [ ]Edema                           NEUROLOGIC  [ ] Normal, non focal  [ ] Focal findings:    PSYCHIATRIC  [ ]Alert and appropriate  [ ] Sedated	 [ ]Agitated    :  [place]: [ ] Yes, if yes: Date of Placement:                   [  ] No    LINES: Central Lines [ ] Yes, if yes: Date of Placement                                     [  ] No    HOSPITAL MEDICATIONS:  MEDICATIONS  (STANDING):  albuterol/ipratropium for Nebulization 3 milliLiter(s) Nebulizer every 8 hours  artificial tears (preservative free) Ophthalmic Solution 1 Drop(s) Both EYES every 4 hours  aspirin  chewable 81 milliGRAM(s) Oral daily  Biotene Dry Mouth Oral Rinse 5 milliLiter(s) Swish and Spit every 6 hours  cefepime   IVPB 1000 milliGRAM(s) IV Intermittent every 8 hours  chlorhexidine 0.12% Liquid 15 milliLiter(s) Oral Mucosa every 12 hours  chlorhexidine 4% Liquid 1 Application(s) Topical daily  fluticasone propionate 50 MICROgram(s)/spray Nasal Spray 1 Spray(s) Both Nostrils two times a day  gabapentin Solution 100 milliGRAM(s) Enteral Tube every 8 hours  insulin lispro (ADMELOG) corrective regimen sliding scale   SubCutaneous every 6 hours  insulin NPH human recombinant 7 Unit(s) SubCutaneous every 6 hours  nystatin    Suspension 682299 Unit(s) Oral every 6 hours  pantoprazole  Injectable 40 milliGRAM(s) IV Push every 12 hours  petrolatum Ophthalmic Ointment 1 Application(s) Both EYES every 8 hours  senna Syrup 10 milliLiter(s) Oral at bedtime  sodium chloride 3%  Inhalation 4 milliLiter(s) Inhalation every 8 hours  sucralfate suspension 1 Gram(s) Oral every 6 hours    MEDICATIONS  (PRN):  acetaminophen   Oral Liquid .. 1000 milliGRAM(s) Enteral Tube every 6 hours PRN Temp greater or equal to 38.5C (101.3F), Mild Pain (1 - 3)      LABS:                        8.3    11.39 )-----------( 443      ( 04 Apr 2024 06:34 )             28.4     04-05    140  |  100  |  12  ----------------------------<  110<H>  4.4   |  31  |  0.32<L>    Ca    9.1      05 Apr 2024 06:36  Phos  2.7     04-05  Mg     2.0     04-05      PT/INR - ( 04 Apr 2024 06:34 )   PT: 13.9 sec;   INR: 1.27 ratio         PTT - ( 04 Apr 2024 06:34 )  PTT:26.0 sec      Mode: AC/ CMV (Assist Control/ Continuous Mandatory Ventilation)  RR (machine): 12  TV (machine): 500  FiO2: 40  PEEP: 5  ITime: 1  MAP: 8  PIP: 18     Patient is a 74y old  Male who presents with a chief complaint of Stroke, critical stenosis, evaluation for angiography (04 Apr 2024 14:11)    HPI:  73 years old RH male with h/o HTN, HLD, DM, h/o CVA x 2 (no residual deficits) initially presented to [facility] ED with unsteady gait that began on 2/7 (exact LKW unknown) and L facial droop that began 6 AM on 2/9 (went to bed 11 PM on 2/8 without facial droop). No slurred speech, vision changes. Initial NIHSS 0 at [facility]. Hemodynamically stable, CT head with no acute pathology. Chronic left thalamic lacunar infarct. CTA with no large vessel occlusion. 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 CT perfusion with no acute abnormality.     Pt transferred from [facility] to [facility] due to concern for neurologic deterioration i/s/o aforementioned high grade proximal basilar and L PCA stenosis. On arrival to [facility] ED, initial NIHSS 4 (+1 for LLE drift, +2 for b/l limb dysmetria, +1 for mild dysarthria). Later, pt started to have difficulty clearing secretions, became stridorous, and started to desaturate, so decision was made to intubate patient. Once pt medically stabilized, taken to angio suite by IR. Unable to obtain further history from patient at [facility] due to intubation/sedation (09 Feb 2024 20:27)    Interval Events: No issues overnight, NPO since midnight for MILADIS today.    REVIEW OF SYSTEMS:  [ ] Positive  [ ] All other systems negative  [ x] Unable to assess ROS because patient is NON-verbal and inconsistent with alternate communication    Vital Signs Last 24 Hrs  T(C): 37.1 (04-04-24 @ 23:42), Max: 37.4 (04-04-24 @ 13:41)  T(F): 98.8 (04-04-24 @ 23:42), Max: 99.3 (04-04-24 @ 13:41)  HR: 86 (04-05-24 @ 05:47) (85 - 102)  BP: 126/68 (04-04-24 @ 23:42) (125/61 - 138/84)  RR: 17 (04-04-24 @ 23:42) (17 - 20)  SpO2: 100% (04-05-24 @ 05:47) (97% - 100%)    PHYSICAL EXAM:  HEENT:   [X]Tracheostomy: #8 Cuffed Portex  [X]Pupils equal  [ ]No oral lesions  [ ]Abnormal    SKIN  [X]No Rash  [ ] Abnormal  [ ] pressure    CARDIAC  [X]Regular  [X]Abnormal - intermittent mild tachycardia    PULMONARY  [ ]Bilateral Clear Breath Sounds  [ ]Normal Excursion  [X]Abnormal - BL Coarse BS    GI  [X]PEG      [X] +BS		              [X]Soft, nondistended, nontender	  [ ]Abnormal    MUSCULOSKELETAL                                   [ ]Bedbound                 [ ]Abnormal    [X]Ambulatory/OOB to chair                           EXTREMITIES                                         [ ]Normal  [X]Edema - BL UE swelling, BL feet swelling                           NEUROLOGIC  [ ] Normal, non focal  [X] Focal findings: awake and alert, able to nod to simple commands, follows commands on all 4 extremities    PSYCHIATRIC  [X] unable to assess  [ ] Sedated	 [ ]Agitated    :  [place]: [ ] Yes, if yes: Date of Placement:                   [X] No    LINES: Central Lines [ ] Yes, if yes: Date of Placement                                     [X] No    HOSPITAL MEDICATIONS:  MEDICATIONS  (STANDING):  albuterol/ipratropium for Nebulization 3 milliLiter(s) Nebulizer every 8 hours  artificial tears (preservative free) Ophthalmic Solution 1 Drop(s) Both EYES every 4 hours  aspirin  chewable 81 milliGRAM(s) Oral daily  Biotene Dry Mouth Oral Rinse 5 milliLiter(s) Swish and Spit every 6 hours  cefepime   IVPB 1000 milliGRAM(s) IV Intermittent every 8 hours  chlorhexidine 0.12% Liquid 15 milliLiter(s) Oral Mucosa every 12 hours  chlorhexidine 4% Liquid 1 Application(s) Topical daily  fluticasone propionate 50 MICROgram(s)/spray Nasal Spray 1 Spray(s) Both Nostrils two times a day  gabapentin Solution 100 milliGRAM(s) Enteral Tube every 8 hours  insulin lispro (ADMELOG) corrective regimen sliding scale   SubCutaneous every 6 hours  insulin NPH human recombinant 7 Unit(s) SubCutaneous every 6 hours  nystatin    Suspension 627602 Unit(s) Oral every 6 hours  pantoprazole  Injectable 40 milliGRAM(s) IV Push every 12 hours  petrolatum Ophthalmic Ointment 1 Application(s) Both EYES every 8 hours  senna Syrup 10 milliLiter(s) Oral at bedtime  sodium chloride 3%  Inhalation 4 milliLiter(s) Inhalation every 8 hours  sucralfate suspension 1 Gram(s) Oral every 6 hours    MEDICATIONS  (PRN):  acetaminophen   Oral Liquid .. 1000 milliGRAM(s) Enteral Tube every 6 hours PRN Temp greater or equal to 38.5C (101.3F), Mild Pain (1 - 3)      LABS:                        8.3    11.39 )-----------( 443      ( 04 Apr 2024 06:34 )             28.4     04-05    140  |  100  |  12  ----------------------------<  110<H>  4.4   |  31  |  0.32<L>    Ca    9.1      05 Apr 2024 06:36  Phos  2.7     04-05  Mg     2.0     04-05      PT/INR - ( 04 Apr 2024 06:34 )   PT: 13.9 sec;   INR: 1.27 ratio    PTT - ( 04 Apr 2024 06:34 )  PTT:26.0 sec      Mode: AC/ CMV (Assist Control/ Continuous Mandatory Ventilation)  RR (machine): 12  TV (machine): 500  FiO2: 40  PEEP: 5  ITime: 1  MAP: 8  PIP: 18

## 2024-04-05 NOTE — PRE-ANESTHESIA EVALUATION ADULT - NSANTHPMHFT_GEN_ALL_CORE
trach'ed and PEG'ed  GI bleed suspected, not responding to transfusions. Hemodynamically stable    Vent dependent
DM2, HLD, HTN, CVA, GBS s/p PLEX/IVIG, AMS s/p trach, PNA, Nondenominational, GIB

## 2024-04-05 NOTE — PROGRESS NOTE ADULT - NS ATTEND AMEND GEN_ALL_CORE FT
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 Hospital course has been complicated by VAP, massive GIB with hemorrhagic shock, worsening neurologic vascular injury with small scattered foci of diffusion restriction within the right cerebellar hemisphere, left parietal lobe, and symmetrically in the bilateral basal ganglia, recurrent GIB (3/27) found to have duodenal erosions on endoscopy.     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 cerebellar and L parietal infarctions suspicious for septic embolic phenomena and raised suspicion for endocarditis  Bilateral symmetric basal ganglia lesions with differential including hypoxic injury producing delayed post-hypoxic leukoencephalopathy verses toxidrome. Suspect more likely related to hypoxia/hypoperfusion in the setting of hemorrhagic/septic shock event.  His neurologic exam is severely limited. Able to move his RLE, open eyes spontaneously, with some mouth and head movements. He is able to follow commands by squeezing hands, and answer simple questions shaking head yes or no.  - Repeat MRI completed. Per neurology, there are areas of new infarcts concerning for embolic events. Requesting MILADIS. Discussed with cardiology, after GI clearance, will plan to complete in OR 4/5/24.   - Also requesting ILR. Discussed with EP team. Potentially can add before discharge. However, considering recurrent GIB and hemorrhagic shock, even if AF discovered, unlikely to A/C at this time and not candidate for HEENA closure.  - Rechallenged with ASA. Holding Plavix still.   - Fluid in mastoid air cells on MR, no evidence of OM or mastoiditis.    CV: #Hypotension: in the setting of hemorrhagic, septic shock. Resolved. Off oral vasopressor    R:  #Acute respiratory failure, mixed hypoxic hypercapnia: Remains on ventilator support, tolerating limited high pressure support trials.   He currently has an 8 cuffed portex, placed 2/16 with Dr. Sood  - Continue wean vent as tolerated  - Suctioning, chest pt as needed  - Oral care as per RCU team  - OOB as tolerated  - PT/OT  #Respiratory muscle weakness in the setting of AIDP    #VAP:   SCx 2/21: PSa  SCx 3/4 PSa   SCx 3/20 PSa, CRE  3/26: CRE Pseudomonas  S/P Meropenem, Zosyn courses previously during hospitalization  He has now been resumed on Cefepime for empiric coverage  - Continue Cefepime, plan for 14 day course    GI  #GIB, Acute blood loss anemia   #Duodenal erosions  Now with stable H/H and no further evidence of bleeding.  - PPI as above  - Resume tube feeding  - On Flagyl and Cefepime with concern for translocation and sepsis related to recurrent bleeds, will plan for 7 days of Flagyl and Cefepime as outlined below    R: Stable creatinine    Rest as above

## 2024-04-05 NOTE — PRE-ANESTHESIA EVALUATION ADULT - NSANTHOSAYNRD_GEN_A_CORE
No. YUMIKO screening performed.  STOP BANG Legend: 0-2 = LOW Risk; 3-4 = INTERMEDIATE Risk; 5-8 = HIGH Risk
No. YUMIKO screening performed.  STOP BANG Legend: 0-2 = LOW Risk; 3-4 = INTERMEDIATE Risk; 5-8 = HIGH Risk

## 2024-04-05 NOTE — PRE PROCEDURE NOTE - PRE PROCEDURE EVALUATION
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 Hospital course has been complicated by VAP, massive GIB with hemorrhagic shock, worsening neurologic vascular injury with small scattered foci of diffusion restriction within the right cerebellar hemisphere, left parietal lobe, and symmetrically in the bilateral basal ganglia, recurrent GIB (3/27) found to have duodenal erosions on endoscopy.    presents for Transesophageal echocardiogram.    RELEVANT PMH/PSH:    Any respiratory problems: Asthma, COPD, YUMIKO, recent respiratory illness? NO    Any history of Atlantoaxial disease and severe generalized cervical arthritis? NO    Oral/Dental history: Any dentures, removable, loose, broken tooth/teeth, mouth sores? NO    Significant dysphagia and odynophagia? NO    Any GI history of: Esophageal surgeries, strictures, diverticula, masses, varices, diaphragmatic hernia, stomach ulcers, stomach surgeries, bariatric surgery, GI bleed? NO    SOCIAL HISTORY:   [x ] Non-smoker [ ] Smoker [ ] Alcohol  ALLERGIES: No Known Allergies  MEDICATION: REVIEWED  REVIEW OF SYSTEMS: unable to obtain    HEME/LYMPH: No easy bruising, or bleeding gums    PHYSICAL EXAM:  Vital Signs Last 24 Hrs  T(C): 37.1 (05 Apr 2024 08:02), Max: 37.1 (04 Apr 2024 23:42)  T(F): 98.7 (05 Apr 2024 08:02), Max: 98.8 (04 Apr 2024 23:42)  HR: 94 (05 Apr 2024 08:55) (85 - 102)  BP: 107/68 (05 Apr 2024 08:02) (107/68 - 131/78)  BP(mean): --  RR: 19 (05 Apr 2024 08:02) (17 - 19)  SpO2: 98% (05 Apr 2024 08:55) (98% - 100%)    Parameters below as of 05 Apr 2024 08:02  Patient On (Oxygen Delivery Method): ventilator      Vital Signs:  BP    /      HR       O2 sat     Height      weight   Appearance: Normal	  HEENT:   Normal oral mucosa, PERRL, EOMI	  Cardiovascular: Normal S1 S2, No JVD, No murmurs, No edema  Respiratory: Lungs clear to auscultation	  Gastrointestinal:  Soft, Non-tender, + BS	  Neurologic/PSY: Non-focal, A&O x 3  Extremities: No clubbing, cyanosis or edema    LABS: reviewed                        9.1    11.67 )-----------( 504      ( 05 Apr 2024 10:38 )             32.7   04-05    140  |  100  |  12  ----------------------------<  110<H>  4.4   |  31  |  0.32<L>    Ca    9.1      05 Apr 2024 06:36  Phos  2.7     04-05  Mg     2.0     04-05      CARDIAC TESTING/STUDIES:    [ ] ECG  [ ] Echocardiogram:  [ ] Catheterization:  [ ] Stress Test:  	  [ ] PPM/AICD:	    Plan: 	  MILADIS Procedure Candidate:	YES, but is high risk , will perform in the OR, will avoid gastric views due to duodenal erosions  Medical Records: YES  Available ECHO images and reports Reviewed: YES     Day of Procedure Attestation:  I have personally seen, examined, and participated in the care of this patient. I have reviewed all pertinent information, including history, physical exam, sedation plan, and agree except as noted.    Attestation Statements:  I have personally seen and examined the patient.  I fully participated in the care of this patient.  I have made amendments to the documentation where necessary, and agree with the history, physical exam, and plan as documented by the Fellow.     Duane Matias MD, PhD  Cardiology Attending  [facility]    For day time coverage Mon-Fri see Non-Service Consult Attending on [email or web address], password: [email or web address]; daytime weekends covered by general cardiology consult service attending.)

## 2024-04-06 LAB
ANION GAP SERPL CALC-SCNC: 9 MMOL/L — SIGNIFICANT CHANGE UP (ref 5–17)
BUN SERPL-MCNC: 12 MG/DL — SIGNIFICANT CHANGE UP (ref 7–23)
CALCIUM SERPL-MCNC: 8.5 MG/DL — SIGNIFICANT CHANGE UP (ref 8.4–10.5)
CHLORIDE SERPL-SCNC: 102 MMOL/L — SIGNIFICANT CHANGE UP (ref 96–108)
CO2 SERPL-SCNC: 26 MMOL/L — SIGNIFICANT CHANGE UP (ref 22–31)
CREAT SERPL-MCNC: 0.32 MG/DL — LOW (ref 0.5–1.3)
EGFR: 122 ML/MIN/1.73M2 — SIGNIFICANT CHANGE UP
GLUCOSE BLDC GLUCOMTR-MCNC: 133 MG/DL — HIGH (ref 70–99)
GLUCOSE BLDC GLUCOMTR-MCNC: 141 MG/DL — HIGH (ref 70–99)
GLUCOSE BLDC GLUCOMTR-MCNC: 142 MG/DL — HIGH (ref 70–99)
GLUCOSE BLDC GLUCOMTR-MCNC: 147 MG/DL — HIGH (ref 70–99)
GLUCOSE BLDC GLUCOMTR-MCNC: 160 MG/DL — HIGH (ref 70–99)
GLUCOSE SERPL-MCNC: 115 MG/DL — HIGH (ref 70–99)
HCT VFR BLD CALC: 31.2 % — LOW (ref 39–50)
HGB BLD-MCNC: 8.9 G/DL — LOW (ref 13–17)
MAGNESIUM SERPL-MCNC: 2 MG/DL — SIGNIFICANT CHANGE UP (ref 1.6–2.6)
MCHC RBC-ENTMCNC: 24.5 PG — LOW (ref 27–34)
MCHC RBC-ENTMCNC: 28.5 GM/DL — LOW (ref 32–36)
MCV RBC AUTO: 85.7 FL — SIGNIFICANT CHANGE UP (ref 80–100)
NRBC # BLD: 0 /100 WBCS — SIGNIFICANT CHANGE UP (ref 0–0)
PHOSPHATE SERPL-MCNC: 3.1 MG/DL — SIGNIFICANT CHANGE UP (ref 2.5–4.5)
PLATELET # BLD AUTO: 488 K/UL — HIGH (ref 150–400)
POTASSIUM SERPL-MCNC: 5.2 MMOL/L — SIGNIFICANT CHANGE UP (ref 3.5–5.3)
POTASSIUM SERPL-SCNC: 5.2 MMOL/L — SIGNIFICANT CHANGE UP (ref 3.5–5.3)
RBC # BLD: 3.64 M/UL — LOW (ref 4.2–5.8)
RBC # FLD: 18 % — HIGH (ref 10.3–14.5)
SODIUM SERPL-SCNC: 137 MMOL/L — SIGNIFICANT CHANGE UP (ref 135–145)
WBC # BLD: 9.28 K/UL — SIGNIFICANT CHANGE UP (ref 3.8–10.5)
WBC # FLD AUTO: 9.28 K/UL — SIGNIFICANT CHANGE UP (ref 3.8–10.5)

## 2024-04-06 PROCEDURE — 99233 SBSQ HOSP IP/OBS HIGH 50: CPT

## 2024-04-06 RX ORDER — FUROSEMIDE 40 MG
40 TABLET ORAL ONCE
Refills: 0 | Status: COMPLETED | OUTPATIENT
Start: 2024-04-06 | End: 2024-04-06

## 2024-04-06 RX ADMIN — CEFEPIME 100 MILLIGRAM(S): 1 INJECTION, POWDER, FOR SOLUTION INTRAMUSCULAR; INTRAVENOUS at 22:01

## 2024-04-06 RX ADMIN — SODIUM CHLORIDE 4 MILLILITER(S): 9 INJECTION INTRAMUSCULAR; INTRAVENOUS; SUBCUTANEOUS at 05:33

## 2024-04-06 RX ADMIN — Medication 3 MILLILITER(S): at 13:48

## 2024-04-06 RX ADMIN — Medication 5 MILLILITER(S): at 17:08

## 2024-04-06 RX ADMIN — Medication 5 MILLILITER(S): at 05:55

## 2024-04-06 RX ADMIN — SENNA PLUS 10 MILLILITER(S): 8.6 TABLET ORAL at 22:00

## 2024-04-06 RX ADMIN — Medication 1 GRAM(S): at 17:09

## 2024-04-06 RX ADMIN — Medication 1 APPLICATION(S): at 05:55

## 2024-04-06 RX ADMIN — Medication 5 MILLILITER(S): at 23:41

## 2024-04-06 RX ADMIN — Medication 500000 UNIT(S): at 17:09

## 2024-04-06 RX ADMIN — HUMAN INSULIN 7 UNIT(S): 100 INJECTION, SUSPENSION SUBCUTANEOUS at 00:26

## 2024-04-06 RX ADMIN — Medication 40 MILLIGRAM(S): at 13:07

## 2024-04-06 RX ADMIN — Medication 1 GRAM(S): at 11:51

## 2024-04-06 RX ADMIN — Medication 500000 UNIT(S): at 05:56

## 2024-04-06 RX ADMIN — Medication 81 MILLIGRAM(S): at 12:57

## 2024-04-06 RX ADMIN — Medication 1 DROP(S): at 10:19

## 2024-04-06 RX ADMIN — Medication 1 DROP(S): at 05:55

## 2024-04-06 RX ADMIN — Medication 1 APPLICATION(S): at 13:07

## 2024-04-06 RX ADMIN — Medication 5 MILLILITER(S): at 00:16

## 2024-04-06 RX ADMIN — Medication 3 MILLILITER(S): at 05:33

## 2024-04-06 RX ADMIN — HUMAN INSULIN 7 UNIT(S): 100 INJECTION, SUSPENSION SUBCUTANEOUS at 11:52

## 2024-04-06 RX ADMIN — Medication 1 GRAM(S): at 23:40

## 2024-04-06 RX ADMIN — SODIUM CHLORIDE 4 MILLILITER(S): 9 INJECTION INTRAMUSCULAR; INTRAVENOUS; SUBCUTANEOUS at 22:10

## 2024-04-06 RX ADMIN — Medication 1 SPRAY(S): at 17:09

## 2024-04-06 RX ADMIN — SODIUM CHLORIDE 4 MILLILITER(S): 9 INJECTION INTRAMUSCULAR; INTRAVENOUS; SUBCUTANEOUS at 13:49

## 2024-04-06 RX ADMIN — CHLORHEXIDINE GLUCONATE 15 MILLILITER(S): 213 SOLUTION TOPICAL at 17:09

## 2024-04-06 RX ADMIN — PANTOPRAZOLE SODIUM 40 MILLIGRAM(S): 20 TABLET, DELAYED RELEASE ORAL at 17:09

## 2024-04-06 RX ADMIN — Medication 5 MILLILITER(S): at 11:52

## 2024-04-06 RX ADMIN — Medication 1 DROP(S): at 17:10

## 2024-04-06 RX ADMIN — CHLORHEXIDINE GLUCONATE 15 MILLILITER(S): 213 SOLUTION TOPICAL at 05:56

## 2024-04-06 RX ADMIN — CEFEPIME 100 MILLIGRAM(S): 1 INJECTION, POWDER, FOR SOLUTION INTRAMUSCULAR; INTRAVENOUS at 05:54

## 2024-04-06 RX ADMIN — Medication 1 DROP(S): at 13:07

## 2024-04-06 RX ADMIN — GABAPENTIN 100 MILLIGRAM(S): 400 CAPSULE ORAL at 13:07

## 2024-04-06 RX ADMIN — CHLORHEXIDINE GLUCONATE 1 APPLICATION(S): 213 SOLUTION TOPICAL at 22:02

## 2024-04-06 RX ADMIN — Medication 500000 UNIT(S): at 23:41

## 2024-04-06 RX ADMIN — Medication 1 DROP(S): at 01:45

## 2024-04-06 RX ADMIN — HUMAN INSULIN 7 UNIT(S): 100 INJECTION, SUSPENSION SUBCUTANEOUS at 17:08

## 2024-04-06 RX ADMIN — PANTOPRAZOLE SODIUM 40 MILLIGRAM(S): 20 TABLET, DELAYED RELEASE ORAL at 05:54

## 2024-04-06 RX ADMIN — Medication 1 SPRAY(S): at 05:56

## 2024-04-06 RX ADMIN — GABAPENTIN 100 MILLIGRAM(S): 400 CAPSULE ORAL at 22:00

## 2024-04-06 RX ADMIN — Medication 500000 UNIT(S): at 11:51

## 2024-04-06 RX ADMIN — Medication 1 APPLICATION(S): at 22:01

## 2024-04-06 RX ADMIN — Medication 3 MILLILITER(S): at 22:10

## 2024-04-06 RX ADMIN — GABAPENTIN 100 MILLIGRAM(S): 400 CAPSULE ORAL at 06:03

## 2024-04-06 RX ADMIN — CEFEPIME 100 MILLIGRAM(S): 1 INJECTION, POWDER, FOR SOLUTION INTRAMUSCULAR; INTRAVENOUS at 13:08

## 2024-04-06 RX ADMIN — Medication 500000 UNIT(S): at 00:16

## 2024-04-06 RX ADMIN — HUMAN INSULIN 7 UNIT(S): 100 INJECTION, SUSPENSION SUBCUTANEOUS at 23:41

## 2024-04-06 RX ADMIN — HUMAN INSULIN 7 UNIT(S): 100 INJECTION, SUSPENSION SUBCUTANEOUS at 05:55

## 2024-04-06 RX ADMIN — Medication 1 DROP(S): at 22:01

## 2024-04-06 RX ADMIN — Medication 1 GRAM(S): at 05:55

## 2024-04-06 NOTE — PROGRESS NOTE ADULT - PROBLEM SELECTOR PLAN 4
- BAL 2/21: PSA; Txd with Zosyn ( 2/21-2/28)  - Sputum 3/4: PSA , Blood cxs 3/3: NGTD  - Txd with course of Meropenem 3/3 --> 3/11  - Blood cxs 3/26:NGTD  and Urine cxs 3/27: NGTD   - Sputum cx 3/26: CRE PSA  - Continue Cefepime x 14 day course to tx CRE PSA ( Ends 4/11)   - Completed Flagyl ( Ended 4/04 )

## 2024-04-06 NOTE — PROGRESS NOTE ADULT - PROBLEM SELECTOR PLAN 1
- MRI 2/12: small b/l cerebellar strokes (post-procedural) and prior L thalamic strokes  - MRI 3/5: Small scattered foci of diffusion restriction within the right cerebellar hemisphere, left parietal lobe, and symmetrically in the bilateral basal ganglia.  - Lipitor d/c'd in setting of liver injury 3/3  - Was on ASA And Plavix for SAMMPRIS trial in setting of intracranial stenosis   - ASA and Plavix dcd in setting of Recurrent Melena on 3/27  - Repeat MRI 4/2: Concerning for Acute / Subacute Infarctions w/ concern for embolic Distribution   - Case d/w Neurology team will proceed with MILADIS and ILR placement (as per EP, high risk for infection with ILR placement, will place closer to discharge); pending MILADIS   - ASA restarted 4/3, monitor hgb - MRI 2/12: small b/l cerebellar strokes (post-procedural) and prior L thalamic strokes  - MRI 3/5: Small scattered foci of diffusion restriction within the right cerebellar hemisphere, left parietal lobe, and symmetrically in the bilateral basal ganglia.  - Lipitor d/c'd in setting of liver injury 3/3  - Was on ASA And Plavix for SAMMPRIS trial in setting of intracranial stenosis   - ASA and Plavix dcd in setting of Recurrent Melena on 3/27  - Repeat MRI 4/2: Concerning for Acute / Subacute Infarctions w/ concern for embolic Distribution   - Case d/w Neurology team will proceed with MILADIS and ILR placement (as per EP, high risk for infection with ILR placement, will place closer to discharge); pending MILADIS   - ASA restarted 4/3, monitor hgb  - MILADIS 4/6: LVSF is normal with EF 60 to 65 %. Normal RV cavity size, with normal SF. No echocardiographic evidence of vegetations. No thrombus seen in the left atrial, left atrial appendage, right atrial or right atrial appendage. Negative for intracardiac shunt.

## 2024-04-06 NOTE — PROGRESS NOTE ADULT - NS ATTEND AMEND GEN_ALL_CORE FT
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 Hospital course has been complicated by VAP, massive GIB with hemorrhagic shock, worsening neurologic vascular injury with small scattered foci of diffusion restriction within the right cerebellar hemisphere, left parietal lobe, and symmetrically in the bilateral basal ganglia, recurrent GIB (3/27) found to have duodenal erosions on endoscopy.     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 cerebellar and L parietal infarctions suspicious for septic embolic phenomena and raised suspicion for endocarditis  Bilateral symmetric basal ganglia lesions with differential including hypoxic injury producing delayed post-hypoxic leukoencephalopathy verses toxidrome. Suspect more likely related to hypoxia/hypoperfusion in the setting of hemorrhagic/septic shock event.  His neurologic exam is severely limited. Able to move his RLE, open eyes spontaneously, with some mouth and head movements. He is able to follow commands by squeezing hands, and answer simple questions shaking head yes or no.  - Repeat MRI completed. Per neurology, there are areas of new infarcts concerning for embolic events. Requesting MILADIS. Discussed with cardiology, after GI clearance, will plan to complete in OR 4/5/24.   - Also requesting ILR. Discussed with EP team. Potentially can add before discharge. However, considering recurrent GIB and hemorrhagic shock, even if AF discovered, unlikely to A/C at this time and not candidate for HEENA closure.  - Rechallenged with ASA. Holding Plavix still.   - Fluid in mastoid air cells on MR, no evidence of OM or mastoiditis.    CV: #Hypotension: in the setting of hemorrhagic, septic shock. Resolved. Off oral vasopressor    R:  #Acute respiratory failure, mixed hypoxic hypercapnia: Remains on ventilator support, tolerating limited high pressure support trials.   He currently has an 8 cuffed portex, placed 2/16 with Dr. Sood  - Continue wean vent as tolerated  - Suctioning, chest pt as needed  - Oral care as per RCU team  - OOB as tolerated  - PT/OT  #Respiratory muscle weakness in the setting of AIDP    #VAP:   SCx 2/21: PSa  SCx 3/4 PSa   SCx 3/20 PSa, CRE  3/26: CRE Pseudomonas  S/P Meropenem, Zosyn courses previously during hospitalization  He has now been resumed on Cefepime for empiric coverage  - Continue Cefepime, plan for 14 day course    GI  #GIB, Acute blood loss anemia   #Duodenal erosions  Now with stable H/H and no further evidence of bleeding.  - PPI as above  - Resume tube feeding  - On Flagyl and Cefepime with concern for translocation and sepsis related to recurrent bleeds, will plan for 7 days of Flagyl and Cefepime as outlined below    R: Stable creatinine    Rest as above.

## 2024-04-06 NOTE — PROGRESS NOTE ADULT - ASSESSMENT
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 Prior to admission patient had Viral URI ( 1-2 weeks prior to admission) with subsequent weakness. He underwent an angiogram (2/11) which showed moderate stenosis and no intervention was done. MRI Performed c/w small bilateral cerebellar strokes and prior L thalamic strokes, as per Neurology was not c/w current presentation. 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 He is currently s/p 5 rounds of plasma exchange (2/13-2/20) and IVIG x 5 ( 2/21-2/26)  His hospital course was complicated by progressive respiratory failure. CT Chest performed on 2/20 with atelectasis of b/l lower lobes. BAL 2/21 Grew PSA treated with course of Zosyn (2/21-2/28).  Patient transferred to RCU on 2/24. RRT called on 3/3 for unresponsiveness. Patient with unreactive pupils, no corneal reflex. Also found to be hypotensive and febrile to 105. Noted to have new melena. Hgb 5. Given 1 unit of pRBC. Transferred to MICU. patient required pressors while in the MICU, BP improved and was transitioned to Droxidopa. Patient had CT C/A/P consistent with Bibasilar pneumonia; Sputum cx grew PSA and was treated with course of Meropenem. Patient evaluated by GI in setting of Melena, transaminitis and portal venous gas on CT imaging. No EGD performed as Melena resolved, Transaminitis was thought to be in setting of shock and portal venous gas was thought to be in setting of recent PEG. EEG was performed in setting of AMS no seizures noted. Repeat MRI Performed which showed Small scattered foci of diffusion restriction within the right cerebellar hemisphere, left parietal lobe, and symmetrically in the bilateral basal ganglia. Neuro recommended Resumption of ASA in setting of possible embolic phenomenon. Repeat ECHO performed RT Ventricular Moderately enlarged, reduced systolic function. Mental status improved and was transferred back to RCU on 3/6.  Cardiology consulted for tachy-dez, unable to catch events on tele, EKG ST otherwise unremarkable, CT Angio negative for PE. Patient with recurrent Melena on 3/27 resulting in RRT For hypotension for which he required 3 units of PRBCs. S/p EGD; No active bleeding noted but found to have duodenal erosions.    4/5:  PST as tolerated, 8/5 today.  Pending MILADIS -  cleared by GI.  NPO after midnight for MILADIS in OR.  ENT saw pt for bilateral ear effusions found on MRI - Flonase started as per ENT recs.  Wife updated at bedside.  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 Prior to admission patient had Viral URI ( 1-2 weeks prior to admission) with subsequent weakness. He underwent an angiogram (2/11) which showed moderate stenosis and no intervention was done. MRI Performed c/w small bilateral cerebellar strokes and prior L thalamic strokes, as per Neurology was not c/w current presentation. 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 He is currently s/p 5 rounds of plasma exchange (2/13-2/20) and IVIG x 5 ( 2/21-2/26)  His hospital course was complicated by progressive respiratory failure. CT Chest performed on 2/20 with atelectasis of b/l lower lobes. BAL 2/21 Grew PSA treated with course of Zosyn (2/21-2/28).  Patient transferred to RCU on 2/24. RRT called on 3/3 for unresponsiveness. Patient with unreactive pupils, no corneal reflex. Also found to be hypotensive and febrile to 105. Noted to have new melena. Hgb 5. Given 1 unit of pRBC. Transferred to MICU. patient required pressors while in the MICU, BP improved and was transitioned to Droxidopa. Patient had CT C/A/P consistent with Bibasilar pneumonia; Sputum cx grew PSA and was treated with course of Meropenem. Patient evaluated by GI in setting of Melena, transaminitis and portal venous gas on CT imaging. No EGD performed as Melena resolved, Transaminitis was thought to be in setting of shock and portal venous gas was thought to be in setting of recent PEG. EEG was performed in setting of AMS no seizures noted. Repeat MRI Performed which showed Small scattered foci of diffusion restriction within the right cerebellar hemisphere, left parietal lobe, and symmetrically in the bilateral basal ganglia. Neuro recommended Resumption of ASA in setting of possible embolic phenomenon. Repeat ECHO performed RT Ventricular Moderately enlarged, reduced systolic function. Mental status improved and was transferred back to RCU on 3/6.  Cardiology consulted for tachy-dez, unable to catch events on tele, EKG ST otherwise unremarkable, CT Angio negative for PE. Patient with recurrent Melena on 3/27 resulting in RRT For hypotension for which he required 3 units of PRBCs. S/p EGD; No active bleeding noted but found to have duodenal erosions.    4/6:  PST as tolerated, 8/5 today.  MILADIS essentially normal, no vegetations or thrombi noted.

## 2024-04-06 NOTE — PROGRESS NOTE ADULT - PROBLEM SELECTOR PLAN 5
- Patient with large Melanotic Episodes on 3/27 w/ significant drop in H+H  - Was Transfused 3 Units of PRBCs on 3/27  - EGD 3/27: Technically Limited Study, + Duodenal Erosions but no evidence of Active Bleed    - Remains on Protonix 40 mg q 12 hrs IVP and Sucralfate q 6    - If patient develops decreasing H+H or HD instability will need CTA of abdomen  - Continue to monitor CBC   - Patient is Caodaism However Family is in agreement for administration of blood products ( Per Wife)

## 2024-04-06 NOTE — PROGRESS NOTE ADULT - PROBLEM SELECTOR PLAN 7
- Previously on Metformin as outpatient   - Continue ISS   - Hold NPH While NPO this evening   - FS Q6H; Goal -180 - Previously on Metformin as outpatient   - Continue ISS   - FS Q6H; Goal -180

## 2024-04-06 NOTE — PROGRESS NOTE ADULT - SUBJECTIVE AND OBJECTIVE BOX
Patient is a 74y old  Male who presents with a chief complaint of Stroke, critical stenosis, evaluation for angiography (05 Apr 2024 07:51)    HPI:  73 years old RH male with h/o HTN, HLD, DM, h/o CVA x 2 (no residual deficits) initially presented to [facility] ED with unsteady gait that began on 2/7 (exact LKW unknown) and L facial droop that began 6 AM on 2/9 (went to bed 11 PM on 2/8 without facial droop). No slurred speech, vision changes. Initial NIHSS 0 at [facility]. Hemodynamically stable, CT head with no acute pathology. Chronic left thalamic lacunar infarct. CTA with no large vessel occlusion. 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 CT perfusion with no acute abnormality.   Pt transfered from [facility] to [facility] due to concern for neurologic deterioration i/s/o aforementioned high grade proximal basiliar and L PCA stenosis. On arrival to [facility] ED, initial NIHSS 4 (+1 for LLE drift, +2 for b/l limb dysmetria, +1 for mild dysarthria). Later, pt started to have difficulty clearing secretions, became stridorous, and started to desaturate, so decision was made to intubate patient. Once pt medically stabilized, taken to angio suite by IR. Unable to obtain further history from patient at [facility] due to intubation/sedation (09 Feb 2024 20:27)    Interval Events:    REVIEW OF SYSTEMS:  [ ] Positive  [ ] All other systems negative  [ ] Unable to assess ROS because ________    Vital Signs Last 24 Hrs  T(C): 36.8 (04-06-24 @ 06:00), Max: 37 (04-05-24 @ 17:35)  T(F): 98.2 (04-06-24 @ 06:00), Max: 98.6 (04-05-24 @ 17:35)  HR: 94 (04-06-24 @ 05:52) (72 - 101)  BP: 117/70 (04-06-24 @ 06:00) (108/63 - 149/70)  RR: 19 (04-06-24 @ 05:52) (12 - 19)  SpO2: 99% (04-06-24 @ 06:00) (95% - 100%)    PHYSICAL EXAM:  HEENT:   [ ]Tracheostomy:  [ ]Pupils equal  [ ]No oral lesions  [ ]Abnormal    SKIN  [ ] No Rash  [ ] Abnormal  [ ] pressure    CARDIAC  [ ]Regular  [ ]Abnormal    PULMONARY  [ ]Bilateral Clear Breath Sounds  [ ]Normal Excursion  [ ]Abnormal    GI  [ ]PEG      [ ] +BS		              [ ]Soft, nondistended, nontender	  [ ]Abnormal    MUSCULOSKELETAL                                   [ ]Bedbound                 [ ]Abnormal    [ ]Ambulatory/OOB to chair                           EXTREMITIES                                         [ ]Normal  [ ]Edema                           NEUROLOGIC  [ ] Normal, non focal  [ ] Focal findings:    PSYCHIATRIC  [ ]Alert and appropriate  [ ] Sedated	 [ ]Agitated    :  [place]: [ ] Yes, if yes: Date of Placement:                   [  ] No    LINES: Central Lines [ ] Yes, if yes: Date of Placement                                     [  ] No    HOSPITAL MEDICATIONS:  MEDICATIONS  (STANDING):  albuterol/ipratropium for Nebulization 3 milliLiter(s) Nebulizer every 8 hours  artificial tears (preservative free) Ophthalmic Solution 1 Drop(s) Both EYES every 4 hours  aspirin  chewable 81 milliGRAM(s) Oral daily  Biotene Dry Mouth Oral Rinse 5 milliLiter(s) Swish and Spit every 6 hours  cefepime   IVPB 1000 milliGRAM(s) IV Intermittent every 8 hours  chlorhexidine 0.12% Liquid 15 milliLiter(s) Oral Mucosa every 12 hours  chlorhexidine 4% Liquid 1 Application(s) Topical daily  fluticasone propionate 50 MICROgram(s)/spray Nasal Spray 1 Spray(s) Both Nostrils two times a day  gabapentin Solution 100 milliGRAM(s) Enteral Tube every 8 hours  insulin lispro (ADMELOG) corrective regimen sliding scale   SubCutaneous every 6 hours  insulin NPH human recombinant 7 Unit(s) SubCutaneous every 6 hours  nystatin    Suspension 545519 Unit(s) Oral every 6 hours  pantoprazole  Injectable 40 milliGRAM(s) IV Push every 12 hours  petrolatum Ophthalmic Ointment 1 Application(s) Both EYES every 8 hours  senna Syrup 10 milliLiter(s) Oral at bedtime  sodium chloride 3%  Inhalation 4 milliLiter(s) Inhalation every 8 hours  sucralfate suspension 1 Gram(s) Oral every 6 hours    MEDICATIONS  (PRN):  acetaminophen   Oral Liquid .. 1000 milliGRAM(s) Enteral Tube every 6 hours PRN Temp greater or equal to 38.5C (101.3F), Mild Pain (1 - 3)      LABS:                        9.1    11.67 )-----------( 504      ( 05 Apr 2024 10:38 )             32.7     04-05    140  |  100  |  12  ----------------------------<  110<H>  4.4   |  31  |  0.32<L>    Ca    9.1      05 Apr 2024 06:36  Phos  2.7     04-05  Mg     2.0     04-05        Mode: AC/ CMV (Assist Control/ Continuous Mandatory Ventilation)  RR (machine): 12  TV (machine): 500  FiO2: 40  PEEP: 5  ITime: 1  MAP: 10  PIP: 21   Patient is a 74y old  Male who presents with a chief complaint of Stroke, critical stenosis, evaluation for angiography (05 Apr 2024 07:51)    HPI:  73 years old RH male with h/o HTN, HLD, DM, h/o CVA x 2 (no residual deficits) initially presented to [facility] ED with unsteady gait that began on 2/7 (exact LKW unknown) and L facial droop that began 6 AM on 2/9 (went to bed 11 PM on 2/8 without facial droop). No slurred speech, vision changes. Initial NIHSS 0 at [facility]. Hemodynamically stable, CT head with no acute pathology. Chronic left thalamic lacunar infarct. CTA with no large vessel occlusion. 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 CT perfusion with no acute abnormality.   Pt transferred from [facility] to [facility] due to concern for neurologic deterioration i/s/o aforementioned high grade proximal basilar and L PCA stenosis. On arrival to [facility] ED, initial NIHSS 4 (+1 for LLE drift, +2 for b/l limb dysmetria, +1 for mild dysarthria). Later, pt started to have difficulty clearing secretions, became stridorous, and started to desaturate, so decision was made to intubate patient. Once pt medically stabilized, taken to angio suite by IR. Unable to obtain further history from patient at [facility] due to intubation/sedation (09 Feb 2024 20:27)    Interval Events: S/P MILADIS yesterday    REVIEW OF SYSTEMS:  [ ] Positive  [ ] All other systems negative  [ ] Unable to assess ROS because ________    Vital Signs Last 24 Hrs  T(C): 36.8 (04-06-24 @ 06:00), Max: 37 (04-05-24 @ 17:35)  T(F): 98.2 (04-06-24 @ 06:00), Max: 98.6 (04-05-24 @ 17:35)  HR: 94 (04-06-24 @ 05:52) (72 - 101)  BP: 117/70 (04-06-24 @ 06:00) (108/63 - 149/70)  RR: 19 (04-06-24 @ 05:52) (12 - 19)  SpO2: 99% (04-06-24 @ 06:00) (95% - 100%)    PHYSICAL EXAM:  HEENT:   [ ]Tracheostomy:  [ ]Pupils equal  [ ]No oral lesions  [ ]Abnormal    SKIN  [ ] No Rash  [ ] Abnormal  [ ] pressure    CARDIAC  [ ]Regular  [ ]Abnormal    PULMONARY  [ ]Bilateral Clear Breath Sounds  [ ]Normal Excursion  [ ]Abnormal    GI  [ ]PEG      [ ] +BS		              [ ]Soft, nondistended, nontender	  [ ]Abnormal    MUSCULOSKELETAL                                   [ ]Bedbound                 [ ]Abnormal    [ ]Ambulatory/OOB to chair                           EXTREMITIES                                         [ ]Normal  [ ]Edema                           NEUROLOGIC  [ ] Normal, non focal  [ ] Focal findings:    PSYCHIATRIC  [ ]Alert and appropriate  [ ] Sedated	 [ ]Agitated    :  [place]: [ ] Yes, if yes: Date of Placement:                   [  ] No    LINES: Central Lines [ ] Yes, if yes: Date of Placement                                     [  ] No    HOSPITAL MEDICATIONS:  MEDICATIONS  (STANDING):  albuterol/ipratropium for Nebulization 3 milliLiter(s) Nebulizer every 8 hours  artificial tears (preservative free) Ophthalmic Solution 1 Drop(s) Both EYES every 4 hours  aspirin  chewable 81 milliGRAM(s) Oral daily  Biotene Dry Mouth Oral Rinse 5 milliLiter(s) Swish and Spit every 6 hours  cefepime   IVPB 1000 milliGRAM(s) IV Intermittent every 8 hours  chlorhexidine 0.12% Liquid 15 milliLiter(s) Oral Mucosa every 12 hours  chlorhexidine 4% Liquid 1 Application(s) Topical daily  fluticasone propionate 50 MICROgram(s)/spray Nasal Spray 1 Spray(s) Both Nostrils two times a day  gabapentin Solution 100 milliGRAM(s) Enteral Tube every 8 hours  insulin lispro (ADMELOG) corrective regimen sliding scale   SubCutaneous every 6 hours  insulin NPH human recombinant 7 Unit(s) SubCutaneous every 6 hours  nystatin    Suspension [id number] Unit(s) Oral every 6 hours  pantoprazole  Injectable 40 milliGRAM(s) IV Push every 12 hours  petrolatum Ophthalmic Ointment 1 Application(s) Both EYES every 8 hours  senna Syrup 10 milliLiter(s) Oral at bedtime  sodium chloride 3%  Inhalation 4 milliLiter(s) Inhalation every 8 hours  sucralfate suspension 1 Gram(s) Oral every 6 hours    MEDICATIONS  (PRN):  acetaminophen   Oral Liquid .. 1000 milliGRAM(s) Enteral Tube every 6 hours PRN Temp greater or equal to 38.5C (101.3F), Mild Pain (1 - 3)      LABS:                        9.1    11.67 )-----------( 504      ( 05 Apr 2024 10:38 )             32.7     04-05    140  |  100  |  12  ----------------------------<  110<H>  4.4   |  31  |  0.32<L>    Ca    9.1      05 Apr 2024 06:36  Phos  2.7     04-05  Mg     2.0     04-05        Mode: AC/ CMV (Assist Control/ Continuous Mandatory Ventilation)  RR (machine): 12  TV (machine): 500  FiO2: 40  PEEP: 5  ITime: 1  MAP: 10  PIP: 21   Patient is a 74y old  Male who presents with a chief complaint of Stroke, critical stenosis, evaluation for angiography (05 Apr 2024 07:51)    HPI:  73 years old RH male with h/o HTN, HLD, DM, h/o CVA x 2 (no residual deficits) initially presented to [facility] ED with unsteady gait that began on 2/7 (exact LKW unknown) and L facial droop that began 6 AM on 2/9 (went to bed 11 PM on 2/8 without facial droop). No slurred speech, vision changes. Initial NIHSS 0 at [facility]. Hemodynamically stable, CT head with no acute pathology. Chronic left thalamic lacunar infarct. CTA with no large vessel occlusion. 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 CT perfusion with no acute abnormality.   Pt transferred from [facility] to [facility] due to concern for neurologic deterioration i/s/o aforementioned high grade proximal basilar and L PCA stenosis. On arrival to [facility] ED, initial NIHSS 4 (+1 for LLE drift, +2 for b/l limb dysmetria, +1 for mild dysarthria). Later, pt started to have difficulty clearing secretions, became stridorous, and started to desaturate, so decision was made to intubate patient. Once pt medically stabilized, taken to angio suite by IR. Unable to obtain further history from patient at [facility] due to intubation/sedation (09 Feb 2024 20:27)    Interval Events: S/P MILADIS yesterday    REVIEW OF SYSTEMS:  [ ] Positive  [x ] All other systems negative  [ ] Unable to assess ROS because ________    Vital Signs Last 24 Hrs  T(C): 36.8 (04-06-24 @ 06:00), Max: 37 (04-05-24 @ 17:35)  T(F): 98.2 (04-06-24 @ 06:00), Max: 98.6 (04-05-24 @ 17:35)  HR: 94 (04-06-24 @ 05:52) (72 - 101)  BP: 117/70 (04-06-24 @ 06:00) (108/63 - 149/70)  RR: 19 (04-06-24 @ 05:52) (12 - 19)  SpO2: 99% (04-06-24 @ 06:00) (95% - 100%)    PHYSICAL EXAM:  HEENT:   [X]Tracheostomy: #8 Cuffed Portex  [X]Pupils equal  [ ]No oral lesions  [ ]Abnormal    SKIN  [X]No Rash  [ ] Abnormal  [ ] pressure    CARDIAC  [X]Regular  [X]Abnormal - intermittent mild tachycardia    PULMONARY  [ ]Bilateral Clear Breath Sounds  [ ]Normal Excursion  [X]Abnormal - BL Coarse BS    GI  [X]PEG      [X] +BS		              [X]Soft, nondistended, nontender	  [ ]Abnormal    MUSCULOSKELETAL                                   [ ]Bedbound                 [ ]Abnormal    [X]Ambulatory/OOB to chair                           EXTREMITIES                                         [ ]Normal  [X]Edema - BL UE swelling, BL feet swelling                           NEUROLOGIC  [ ] Normal, non focal  [X] Focal findings: awake and alert, able to nod to simple commands, follows commands on all 4 extremities.    PSYCHIATRIC  [X] unable to assess  [ ] Sedated	 [ ]Agitated    :  [place]: [ ] Yes, if yes: Date of Placement:                   [X] No    LINES: Central Lines [ ] Yes, if yes: Date of Placement                                     [X] No    HOSPITAL MEDICATIONS:  MEDICATIONS  (STANDING):  albuterol/ipratropium for Nebulization 3 milliLiter(s) Nebulizer every 8 hours  artificial tears (preservative free) Ophthalmic Solution 1 Drop(s) Both EYES every 4 hours  aspirin  chewable 81 milliGRAM(s) Oral daily  Biotene Dry Mouth Oral Rinse 5 milliLiter(s) Swish and Spit every 6 hours  cefepime   IVPB 1000 milliGRAM(s) IV Intermittent every 8 hours  chlorhexidine 0.12% Liquid 15 milliLiter(s) Oral Mucosa every 12 hours  chlorhexidine 4% Liquid 1 Application(s) Topical daily  fluticasone propionate 50 MICROgram(s)/spray Nasal Spray 1 Spray(s) Both Nostrils two times a day  gabapentin Solution 100 milliGRAM(s) Enteral Tube every 8 hours  insulin lispro (ADMELOG) corrective regimen sliding scale   SubCutaneous every 6 hours  insulin NPH human recombinant 7 Unit(s) SubCutaneous every 6 hours  nystatin    Suspension [id number] Unit(s) Oral every 6 hours  pantoprazole  Injectable 40 milliGRAM(s) IV Push every 12 hours  petrolatum Ophthalmic Ointment 1 Application(s) Both EYES every 8 hours  senna Syrup 10 milliLiter(s) Oral at bedtime  sodium chloride 3%  Inhalation 4 milliLiter(s) Inhalation every 8 hours  sucralfate suspension 1 Gram(s) Oral every 6 hours    MEDICATIONS  (PRN):  acetaminophen   Oral Liquid .. 1000 milliGRAM(s) Enteral Tube every 6 hours PRN Temp greater or equal to 38.5C (101.3F), Mild Pain (1 - 3)      LABS:                        9.1    11.67 )-----------( 504      ( 05 Apr 2024 10:38 )             32.7     04-05    140  |  100  |  12  ----------------------------<  110<H>  4.4   |  31  |  0.32<L>    Ca    9.1      05 Apr 2024 06:36  Phos  2.7     04-05  Mg     2.0     04-05        Mode: AC/ CMV (Assist Control/ Continuous Mandatory Ventilation)  RR (machine): 12  TV (machine): 500  FiO2: 40  PEEP: 5  ITime: 1  MAP: 10  PIP: 21

## 2024-04-06 NOTE — PROGRESS NOTE ADULT - PROBLEM SELECTOR PLAN 8
- S/p PEG 2/16 (Dr. Sood)   - Tolerating feeds at goal rate   - Continue Free water 350 cc q 8 hrs - S/p PEG 2/16 (Dr. Sood)   - Tolerating feeds at goal rate   - Continue Free water

## 2024-04-07 LAB
GLUCOSE BLDC GLUCOMTR-MCNC: 130 MG/DL — HIGH (ref 70–99)
GLUCOSE BLDC GLUCOMTR-MCNC: 138 MG/DL — HIGH (ref 70–99)
GLUCOSE BLDC GLUCOMTR-MCNC: 141 MG/DL — HIGH (ref 70–99)
GLUCOSE BLDC GLUCOMTR-MCNC: 141 MG/DL — HIGH (ref 70–99)

## 2024-04-07 PROCEDURE — 99233 SBSQ HOSP IP/OBS HIGH 50: CPT

## 2024-04-07 RX ADMIN — GABAPENTIN 100 MILLIGRAM(S): 400 CAPSULE ORAL at 13:03

## 2024-04-07 RX ADMIN — Medication 1 DROP(S): at 01:46

## 2024-04-07 RX ADMIN — Medication 1 DROP(S): at 05:33

## 2024-04-07 RX ADMIN — SODIUM CHLORIDE 4 MILLILITER(S): 9 INJECTION INTRAMUSCULAR; INTRAVENOUS; SUBCUTANEOUS at 22:36

## 2024-04-07 RX ADMIN — Medication 1 APPLICATION(S): at 21:29

## 2024-04-07 RX ADMIN — HUMAN INSULIN 7 UNIT(S): 100 INJECTION, SUSPENSION SUBCUTANEOUS at 12:27

## 2024-04-07 RX ADMIN — Medication 500000 UNIT(S): at 05:33

## 2024-04-07 RX ADMIN — Medication 1 APPLICATION(S): at 13:06

## 2024-04-07 RX ADMIN — GABAPENTIN 100 MILLIGRAM(S): 400 CAPSULE ORAL at 05:57

## 2024-04-07 RX ADMIN — Medication 1 DROP(S): at 17:01

## 2024-04-07 RX ADMIN — CHLORHEXIDINE GLUCONATE 1 APPLICATION(S): 213 SOLUTION TOPICAL at 21:30

## 2024-04-07 RX ADMIN — SODIUM CHLORIDE 4 MILLILITER(S): 9 INJECTION INTRAMUSCULAR; INTRAVENOUS; SUBCUTANEOUS at 05:33

## 2024-04-07 RX ADMIN — Medication 3 MILLILITER(S): at 14:09

## 2024-04-07 RX ADMIN — CHLORHEXIDINE GLUCONATE 15 MILLILITER(S): 213 SOLUTION TOPICAL at 05:33

## 2024-04-07 RX ADMIN — Medication 1 DROP(S): at 13:03

## 2024-04-07 RX ADMIN — CEFEPIME 100 MILLIGRAM(S): 1 INJECTION, POWDER, FOR SOLUTION INTRAMUSCULAR; INTRAVENOUS at 05:34

## 2024-04-07 RX ADMIN — CHLORHEXIDINE GLUCONATE 15 MILLILITER(S): 213 SOLUTION TOPICAL at 17:00

## 2024-04-07 RX ADMIN — Medication 5 MILLILITER(S): at 11:05

## 2024-04-07 RX ADMIN — Medication 500000 UNIT(S): at 17:00

## 2024-04-07 RX ADMIN — GABAPENTIN 100 MILLIGRAM(S): 400 CAPSULE ORAL at 21:28

## 2024-04-07 RX ADMIN — Medication 1 GRAM(S): at 17:00

## 2024-04-07 RX ADMIN — Medication 1 GRAM(S): at 05:57

## 2024-04-07 RX ADMIN — SODIUM CHLORIDE 4 MILLILITER(S): 9 INJECTION INTRAMUSCULAR; INTRAVENOUS; SUBCUTANEOUS at 14:09

## 2024-04-07 RX ADMIN — Medication 500000 UNIT(S): at 11:04

## 2024-04-07 RX ADMIN — Medication 1 SPRAY(S): at 17:01

## 2024-04-07 RX ADMIN — Medication 1 SPRAY(S): at 05:34

## 2024-04-07 RX ADMIN — Medication 1 DROP(S): at 21:29

## 2024-04-07 RX ADMIN — SENNA PLUS 10 MILLILITER(S): 8.6 TABLET ORAL at 21:29

## 2024-04-07 RX ADMIN — PANTOPRAZOLE SODIUM 40 MILLIGRAM(S): 20 TABLET, DELAYED RELEASE ORAL at 05:34

## 2024-04-07 RX ADMIN — Medication 3 MILLILITER(S): at 22:36

## 2024-04-07 RX ADMIN — Medication 81 MILLIGRAM(S): at 11:04

## 2024-04-07 RX ADMIN — Medication 5 MILLILITER(S): at 05:33

## 2024-04-07 RX ADMIN — Medication 3 MILLILITER(S): at 05:33

## 2024-04-07 RX ADMIN — Medication 1 APPLICATION(S): at 05:33

## 2024-04-07 RX ADMIN — PANTOPRAZOLE SODIUM 40 MILLIGRAM(S): 20 TABLET, DELAYED RELEASE ORAL at 17:02

## 2024-04-07 RX ADMIN — Medication 1 GRAM(S): at 11:04

## 2024-04-07 RX ADMIN — HUMAN INSULIN 7 UNIT(S): 100 INJECTION, SUSPENSION SUBCUTANEOUS at 17:46

## 2024-04-07 RX ADMIN — CEFEPIME 100 MILLIGRAM(S): 1 INJECTION, POWDER, FOR SOLUTION INTRAMUSCULAR; INTRAVENOUS at 13:01

## 2024-04-07 RX ADMIN — HUMAN INSULIN 7 UNIT(S): 100 INJECTION, SUSPENSION SUBCUTANEOUS at 05:57

## 2024-04-07 RX ADMIN — Medication 1 DROP(S): at 11:09

## 2024-04-07 RX ADMIN — Medication 5 MILLILITER(S): at 17:00

## 2024-04-07 RX ADMIN — CEFEPIME 100 MILLIGRAM(S): 1 INJECTION, POWDER, FOR SOLUTION INTRAMUSCULAR; INTRAVENOUS at 21:29

## 2024-04-07 NOTE — PROGRESS NOTE ADULT - SUBJECTIVE AND OBJECTIVE BOX
Patient is a 74y old  Male who presents with a chief complaint of Stroke, critical stenosis, evaluation for angiography (06 Apr 2024 08:13)    HPI:  73 years old RH male with h/o HTN, HLD, DM, h/o CVA x 2 (no residual deficits) initially presented to [facility] ED with unsteady gait that began on 2/7 (exact LKW unknown) and L facial droop that began 6 AM on 2/9 (went to bed 11 PM on 2/8 without facial droop). No slurred speech, vision changes. Initial NIHSS 0 at [facility]. Hemodynamically stable, CT head with no acute pathology. Chronic left thalamic lacunar infarct. CTA with no large vessel occlusion. 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 CT perfusion with no acute abnormality.   Pt transfered from [facility] to [facility] due to concern for neurologic deterioration i/s/o aforementioned high grade proximal basiliar and L PCA stenosis. On arrival to [facility] ED, initial NIHSS 4 (+1 for LLE drift, +2 for b/l limb dysmetria, +1 for mild dysarthria). Later, pt started to have difficulty clearing secretions, became stridorous, and started to desaturate, so decision was made to intubate patient. Once pt medically stabilized, taken to angio suite by IR. Unable to obtain further history from patient at [facility] due to intubation/sedation (09 Feb 2024 20:27)    Interval Events: No issues overnight    REVIEW OF SYSTEMS:  [ ] Positive  [x] All other systems negative  [ ] Unable to assess ROS because ________    Vital Signs Last 24 Hrs  T(C): 36.4 (04-07-24 @ 10:51), Max: 37.1 (04-06-24 @ 20:30)  T(F): 97.6 (04-07-24 @ 10:51), Max: 98.8 (04-06-24 @ 20:30)  HR: 99 (04-07-24 @ 10:51) (80 - 99)  BP: 145/77 (04-07-24 @ 10:51) (121/64 - 146/72)  RR: 18 (04-07-24 @ 10:51) (13 - 22)  SpO2: 100% (04-07-24 @ 10:51) (95% - 100%)    PHYSICAL EXAM:  HEENT:   [X]Tracheostomy: #8 Cuffed Portex  [X]Pupils equal  [ ]No oral lesions  [ ]Abnormal    SKIN  [X]No Rash  [ ] Abnormal  [ ] pressure    CARDIAC  [X]Regular  []Abnormal    PULMONARY  [x ]Bilateral Clear Breath Sounds  [ ]Normal Excursion  []Abnormal    GI  [X]PEG      [X] +BS		              [X]Soft, nondistended, nontender	  [ ]Abnormal    MUSCULOSKELETAL                                   [x ]Bedbound                 [ ]Abnormal    []Ambulatory/OOB to chair                           EXTREMITIES                                         [ ]Normal  [X]Edema - BL UE swelling,+1 BL feet swelling                           NEUROLOGIC  [ ] Normal, non focal  [X] Focal findings: awake and alert, able to nod to simple commands, follows commands on all 4 extremities.    PSYCHIATRIC  [X] unable to assess  [ ] Sedated	 [ ]Agitated    :  [place]: [ ] Yes, if yes: Date of Placement:                   [X] No    LINES: Central Lines [ ] Yes, if yes: Date of Placement                                     [X] No    HOSPITAL MEDICATIONS:  MEDICATIONS  (STANDING):  albuterol/ipratropium for Nebulization 3 milliLiter(s) Nebulizer every 8 hours  artificial tears (preservative free) Ophthalmic Solution 1 Drop(s) Both EYES every 4 hours  aspirin  chewable 81 milliGRAM(s) Oral daily  Biotene Dry Mouth Oral Rinse 5 milliLiter(s) Swish and Spit every 6 hours  cefepime   IVPB 1000 milliGRAM(s) IV Intermittent every 8 hours  chlorhexidine 0.12% Liquid 15 milliLiter(s) Oral Mucosa every 12 hours  chlorhexidine 4% Liquid 1 Application(s) Topical daily  fluticasone propionate 50 MICROgram(s)/spray Nasal Spray 1 Spray(s) Both Nostrils two times a day  gabapentin Solution 100 milliGRAM(s) Enteral Tube every 8 hours  insulin lispro (ADMELOG) corrective regimen sliding scale   SubCutaneous every 6 hours  insulin NPH human recombinant 7 Unit(s) SubCutaneous every 6 hours  nystatin    Suspension 784324 Unit(s) Oral every 6 hours  pantoprazole  Injectable 40 milliGRAM(s) IV Push every 12 hours  petrolatum Ophthalmic Ointment 1 Application(s) Both EYES every 8 hours  senna Syrup 10 milliLiter(s) Oral at bedtime  sodium chloride 3%  Inhalation 4 milliLiter(s) Inhalation every 8 hours  sucralfate suspension 1 Gram(s) Oral every 6 hours    MEDICATIONS  (PRN):  acetaminophen   Oral Liquid .. 1000 milliGRAM(s) Enteral Tube every 6 hours PRN Temp greater or equal to 38.5C (101.3F), Mild Pain (1 - 3)      LABS:                        8.9    9.28  )-----------( 488      ( 06 Apr 2024 11:09 )             31.2     04-06    137  |  102  |  12  ----------------------------<  115<H>  5.2   |  26  |  0.32<L>    Ca    8.5      06 Apr 2024 07:32  Phos  3.1     04-06  Mg     2.0     04-06    Mode: CPAP with PS  FiO2: 40  PEEP: 5  PS: 8  MAP: 12  PIP: 26

## 2024-04-07 NOTE — PROGRESS NOTE ADULT - ASSESSMENT
73 year old male with hypertension, hyperlipidemia, diabetes, prior CVA's without residual deficits who initially presented as a transfer from [place] with concern for CVA in the setting of 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 Prior to admission patient had Viral URI ( 1-2 weeks prior to admission) with subsequent weakness. He underwent an angiogram (2/11) which showed moderate stenosis and no intervention was done. MRI Performed c/w small bilateral cerebellar strokes and prior L thalamic strokes, as per Neurology was not c/w current presentation. 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 He is currently s/p 5 rounds of plasma exchange (2/13-2/20) and IVIG x 5 ( 2/21-2/26)  His hospital course was complicated by progressive respiratory failure. CT Chest performed on 2/20 with atelectasis of b/l lower lobes. BAL 2/21 Grew PSA treated with course of Zosyn (2/21-2/28).  Patient transferred to RCU on 2/24. RRT called on 3/3 for unresponsiveness. Patient with unreactive pupils, no corneal reflex. Also found to be hypotensive and febrile to 105. Noted to have new melena. Hgb 5. Given 1 unit of pRBC. Transferred to MICU. patient required pressors while in the MICU, BP improved and was transitioned to Droxidopa. Patient had CT C/A/P consistent with Bibasilar pneumonia; Sputum cx grew PSA and was treated with course of Meropenem. Patient evaluated by GI in setting of Melena, transaminitis and portal venous gas on CT imaging. No EGD performed as Melena resolved, Transaminitis was thought to be in setting of shock and portal venous gas was thought to be in setting of recent PEG. EEG was performed in setting of AMS no seizures noted. Repeat MRI Performed which showed Small scattered foci of diffusion restriction within the right cerebellar hemisphere, left parietal lobe, and symmetrically in the bilateral basal ganglia. Neuro recommended Resumption of ASA in setting of possible embolic phenomenon. Repeat ECHO performed RT Ventricular Moderately enlarged, reduced systolic function. Mental status improved and was transferred back to RCU on 3/6.  Cardiology consulted for tachy-dez, unable to catch events on tele, EKG ST otherwise unremarkable, CT Angio negative for PE. Patient with recurrent Melena on 3/27 resulting in RRT For hypotension for which he required 3 units of PRBCs. S/p EGD; No active bleeding noted but found to have duodenal erosions.    4/7:  PST as tolerated, 8/5 today.  MILADIS essentially normal, no vegetations or thrombi noted. Will complete Cefepime 4/11 for CR PSA. 73 year old male with hypertension, hyperlipidemia, diabetes, prior CVA's without residual deficits who initially presented as a transfer from [place] with concern for CVA in the setting of 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 Prior to admission patient had Viral URI ( 1-2 weeks prior to admission) with subsequent weakness. He underwent an angiogram (2/11) which showed moderate stenosis and no intervention was done. MRI Performed c/w small bilateral cerebellar strokes and prior L thalamic strokes, as per Neurology was not c/w current presentation. 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 He is currently s/p 5 rounds of plasma exchange (2/13-2/20) and IVIG x 5 ( 2/21-2/26)  His hospital course was complicated by progressive respiratory failure. CT Chest performed on 2/20 with atelectasis of b/l lower lobes. BAL 2/21 Grew PSA treated with course of Zosyn (2/21-2/28).  Patient transferred to RCU on 2/24. RRT called on 3/3 for unresponsiveness. Patient with unreactive pupils, no corneal reflex. Also found to be hypotensive and febrile to 105. Noted to have new melena. Hgb 5. Given 1 unit of pRBC. Transferred to MICU. patient required pressors while in the MICU, BP improved and was transitioned to Droxidopa. Patient had CT C/A/P consistent with Bibasilar pneumonia; Sputum cx grew PSA and was treated with course of Meropenem. Patient evaluated by GI in setting of Melena, transaminitis and portal venous gas on CT imaging. No EGD performed as Melena resolved, Transaminitis was thought to be in setting of shock and portal venous gas was thought to be in setting of recent PEG. EEG was performed in setting of AMS no seizures noted. Repeat MRI Performed which showed Small scattered foci of diffusion restriction within the right cerebellar hemisphere, left parietal lobe, and symmetrically in the bilateral basal ganglia. Neuro recommended Resumption of ASA in setting of possible embolic phenomenon. Repeat ECHO performed RT Ventricular Moderately enlarged, reduced systolic function. Mental status improved and was transferred back to RCU on 3/6.  Cardiology consulted for tachy-dez, unable to catch events on tele, EKG ST otherwise unremarkable, CT Angio negative for PE. Patient with recurrent Melena on 3/27 resulting in RRT For hypotension for which he required 3 units of PRBCs. S/p EGD; No active bleeding noted but found to have duodenal erosions.    4/7:  PST as tolerated, 10/5 today.  MILADIS essentially normal, no vegetations or thrombi noted. Will complete Cefepime 4/11 for CR PSA.

## 2024-04-07 NOTE — PROGRESS NOTE ADULT - PROBLEM SELECTOR PLAN 5
- Patient with large Melanotic Episodes on 3/27 w/ significant drop in H+H  - Was Transfused 3 Units of PRBCs on 3/27  - EGD 3/27: Technically Limited Study, + Duodenal Erosions but no evidence of Active Bleed    - Remains on Protonix 40 mg q 12 hrs IVP and Sucralfate q 6    - If patient develops decreasing H+H or HD instability will need CTA of abdomen  - Continue to monitor CBC   - Patient is [employer] However Family is in agreement for administration of blood products ( Per Wife)

## 2024-04-07 NOTE — PROGRESS NOTE ADULT - PROBLEM SELECTOR PLAN 1
- MRI 2/12: small b/l cerebellar strokes (post-procedural) and prior L thalamic strokes  - MRI 3/5: Small scattered foci of diffusion restriction within the right cerebellar hemisphere, left parietal lobe, and symmetrically in the bilateral basal ganglia.  - Lipitor d/c'd in setting of liver injury 3/3  - Was on ASA And Plavix for SAMMPRIS trial in setting of intracranial stenosis   - ASA and Plavix dcd in setting of Recurrent Melena on 3/27  - Repeat MRI 4/2: Concerning for Acute / Subacute Infarctions w/ concern for embolic Distribution   - Case d/w Neurology team will proceed with MILADIS and ILR placement (as per EP, high risk for infection with ILR placement, will place closer to discharge); pending MILADIS   - ASA restarted 4/3, monitor hgb  - MILADIS 4/6: LVSF is normal with EF 60 to 65 %. Normal RV cavity size, with normal SF. No echocardiographic evidence of vegetations. No thrombus seen in the left atrial, left atrial appendage, right atrial or right atrial appendage. Negative for intracardiac shunt.

## 2024-04-07 NOTE — PROGRESS NOTE ADULT - NS ATTEND AMEND GEN_ALL_CORE FT
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 Hospital course has been complicated by VAP, massive GIB with hemorrhagic shock, worsening neurologic vascular injury with small scattered foci of diffusion restriction within the right cerebellar hemisphere, left parietal lobe, and symmetrically in the bilateral basal ganglia, recurrent GIB (3/27) found to have duodenal erosions on endoscopy.     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 cerebellar and L parietal infarctions suspicious for septic embolic phenomena and raised suspicion for endocarditis  Bilateral symmetric basal ganglia lesions with differential including hypoxic injury producing delayed post-hypoxic leukoencephalopathy verses toxidrome. Suspect more likely related to hypoxia/hypoperfusion in the setting of hemorrhagic/septic shock event.  His neurologic exam is severely limited. Able to move his RLE, open eyes spontaneously, with some mouth and head movements. He is able to follow commands by squeezing hands, and answer simple questions shaking head yes or no.  - Repeat MRI completed. Per neurology, there are areas of new infarcts concerning for embolic events. Requesting MILADIS. Discussed with cardiology, after GI clearance, will plan to complete in OR 4/5/24.   - Also requesting ILR. Discussed with EP team. Potentially can add before discharge. However, considering recurrent GIB and hemorrhagic shock, even if AF discovered, unlikely to A/C at this time and not candidate for HEENA closure.  - Rechallenged with ASA. Holding Plavix still.   - Fluid in mastoid air cells on MR, no evidence of OM or mastoiditis.    CV: #Hypotension: in the setting of hemorrhagic, septic shock. Resolved. Off oral vasopressor    R:  #Acute respiratory failure, mixed hypoxic hypercapnia: Remains on ventilator support, tolerating limited high pressure support trials.   He currently has an 8 cuffed portex, placed 2/16 with Dr. Sood  - Continue wean vent as tolerated  - Suctioning, chest pt as needed  - Oral care as per RCU team  - OOB as tolerated  - PT/OT  #Respiratory muscle weakness in the setting of AIDP    #VAP:   SCx 2/21: PSa  SCx 3/4 PSa   SCx 3/20 PSa, CRE  3/26: CRE Pseudomonas  S/P Meropenem, Zosyn courses previously during hospitalization  He has now been resumed on Cefepime for empiric coverage  - Continue Cefepime, plan for 14 day course    GI  #GIB, Acute blood loss anemia   #Duodenal erosions  Now with stable H/H and no further evidence of bleeding.  - PPI as above  - Resume tube feeding  - On Flagyl and Cefepime with concern for translocation and sepsis related to recurrent bleeds, will plan for 7 days of Flagyl and Cefepime as outlined below    R: Stable creatinine    Rest as above.

## 2024-04-08 LAB
GLUCOSE BLDC GLUCOMTR-MCNC: 135 MG/DL — HIGH (ref 70–99)
GLUCOSE BLDC GLUCOMTR-MCNC: 141 MG/DL — HIGH (ref 70–99)
GLUCOSE BLDC GLUCOMTR-MCNC: 142 MG/DL — HIGH (ref 70–99)
GLUCOSE BLDC GLUCOMTR-MCNC: 157 MG/DL — HIGH (ref 70–99)

## 2024-04-08 PROCEDURE — 99233 SBSQ HOSP IP/OBS HIGH 50: CPT

## 2024-04-08 RX ORDER — DOXAZOSIN MESYLATE 4 MG
2 TABLET ORAL AT BEDTIME
Refills: 0 | Status: DISCONTINUED | OUTPATIENT
Start: 2024-04-08 | End: 2024-04-11

## 2024-04-08 RX ADMIN — Medication 3 MILLILITER(S): at 21:02

## 2024-04-08 RX ADMIN — Medication 5 MILLILITER(S): at 00:07

## 2024-04-08 RX ADMIN — Medication 500000 UNIT(S): at 17:08

## 2024-04-08 RX ADMIN — Medication 5 MILLILITER(S): at 05:32

## 2024-04-08 RX ADMIN — Medication 5 MILLILITER(S): at 17:08

## 2024-04-08 RX ADMIN — Medication 500000 UNIT(S): at 00:08

## 2024-04-08 RX ADMIN — Medication 1 DROP(S): at 05:30

## 2024-04-08 RX ADMIN — SODIUM CHLORIDE 4 MILLILITER(S): 9 INJECTION INTRAMUSCULAR; INTRAVENOUS; SUBCUTANEOUS at 14:47

## 2024-04-08 RX ADMIN — Medication 3 MILLILITER(S): at 05:26

## 2024-04-08 RX ADMIN — SODIUM CHLORIDE 4 MILLILITER(S): 9 INJECTION INTRAMUSCULAR; INTRAVENOUS; SUBCUTANEOUS at 21:02

## 2024-04-08 RX ADMIN — CHLORHEXIDINE GLUCONATE 15 MILLILITER(S): 213 SOLUTION TOPICAL at 05:32

## 2024-04-08 RX ADMIN — CEFEPIME 100 MILLIGRAM(S): 1 INJECTION, POWDER, FOR SOLUTION INTRAMUSCULAR; INTRAVENOUS at 21:10

## 2024-04-08 RX ADMIN — CHLORHEXIDINE GLUCONATE 15 MILLILITER(S): 213 SOLUTION TOPICAL at 17:10

## 2024-04-08 RX ADMIN — Medication 1 DROP(S): at 02:29

## 2024-04-08 RX ADMIN — HUMAN INSULIN 7 UNIT(S): 100 INJECTION, SUSPENSION SUBCUTANEOUS at 06:05

## 2024-04-08 RX ADMIN — Medication 1 SPRAY(S): at 05:33

## 2024-04-08 RX ADMIN — Medication 1 APPLICATION(S): at 13:29

## 2024-04-08 RX ADMIN — PANTOPRAZOLE SODIUM 40 MILLIGRAM(S): 20 TABLET, DELAYED RELEASE ORAL at 17:09

## 2024-04-08 RX ADMIN — Medication 1 SPRAY(S): at 17:09

## 2024-04-08 RX ADMIN — Medication 1 APPLICATION(S): at 05:31

## 2024-04-08 RX ADMIN — Medication 1 GRAM(S): at 17:09

## 2024-04-08 RX ADMIN — Medication 1 GRAM(S): at 23:42

## 2024-04-08 RX ADMIN — GABAPENTIN 100 MILLIGRAM(S): 400 CAPSULE ORAL at 06:14

## 2024-04-08 RX ADMIN — SENNA PLUS 10 MILLILITER(S): 8.6 TABLET ORAL at 21:10

## 2024-04-08 RX ADMIN — HUMAN INSULIN 7 UNIT(S): 100 INJECTION, SUSPENSION SUBCUTANEOUS at 17:32

## 2024-04-08 RX ADMIN — Medication 2 MILLIGRAM(S): at 21:11

## 2024-04-08 RX ADMIN — Medication 2: at 23:41

## 2024-04-08 RX ADMIN — Medication 5 MILLILITER(S): at 11:22

## 2024-04-08 RX ADMIN — PANTOPRAZOLE SODIUM 40 MILLIGRAM(S): 20 TABLET, DELAYED RELEASE ORAL at 05:32

## 2024-04-08 RX ADMIN — GABAPENTIN 100 MILLIGRAM(S): 400 CAPSULE ORAL at 21:10

## 2024-04-08 RX ADMIN — HUMAN INSULIN 7 UNIT(S): 100 INJECTION, SUSPENSION SUBCUTANEOUS at 00:08

## 2024-04-08 RX ADMIN — Medication 1 GRAM(S): at 11:22

## 2024-04-08 RX ADMIN — Medication 1 APPLICATION(S): at 21:10

## 2024-04-08 RX ADMIN — Medication 500000 UNIT(S): at 11:22

## 2024-04-08 RX ADMIN — Medication 500000 UNIT(S): at 05:32

## 2024-04-08 RX ADMIN — Medication 500000 UNIT(S): at 23:42

## 2024-04-08 RX ADMIN — Medication 81 MILLIGRAM(S): at 11:22

## 2024-04-08 RX ADMIN — Medication 1 GRAM(S): at 00:06

## 2024-04-08 RX ADMIN — GABAPENTIN 100 MILLIGRAM(S): 400 CAPSULE ORAL at 13:28

## 2024-04-08 RX ADMIN — SODIUM CHLORIDE 4 MILLILITER(S): 9 INJECTION INTRAMUSCULAR; INTRAVENOUS; SUBCUTANEOUS at 05:26

## 2024-04-08 RX ADMIN — CHLORHEXIDINE GLUCONATE 1 APPLICATION(S): 213 SOLUTION TOPICAL at 21:21

## 2024-04-08 RX ADMIN — HUMAN INSULIN 7 UNIT(S): 100 INJECTION, SUSPENSION SUBCUTANEOUS at 11:23

## 2024-04-08 RX ADMIN — Medication 5 MILLILITER(S): at 23:42

## 2024-04-08 RX ADMIN — HUMAN INSULIN 7 UNIT(S): 100 INJECTION, SUSPENSION SUBCUTANEOUS at 23:40

## 2024-04-08 RX ADMIN — CEFEPIME 100 MILLIGRAM(S): 1 INJECTION, POWDER, FOR SOLUTION INTRAMUSCULAR; INTRAVENOUS at 05:31

## 2024-04-08 RX ADMIN — Medication 1 GRAM(S): at 05:30

## 2024-04-08 RX ADMIN — CEFEPIME 100 MILLIGRAM(S): 1 INJECTION, POWDER, FOR SOLUTION INTRAMUSCULAR; INTRAVENOUS at 13:13

## 2024-04-08 RX ADMIN — Medication 3 MILLILITER(S): at 14:47

## 2024-04-08 NOTE — PROGRESS NOTE ADULT - SUBJECTIVE AND OBJECTIVE BOX
Patient is a 74y old  Male who presents with a chief complaint of Stroke, critical stenosis, evaluation for angiography (07 Apr 2024 13:39)      Interval Events:    REVIEW OF SYSTEMS:  [ ] Positive  [ ] All other systems negative  [ ] Unable to assess ROS because ________    Vital Signs Last 24 Hrs  T(C): 37.5 (04-08-24 @ 05:00), Max: 37.5 (04-08-24 @ 05:00)  T(F): 99.5 (04-08-24 @ 05:00), Max: 99.5 (04-08-24 @ 05:00)  HR: 86 (04-08-24 @ 05:46) (85 - 99)  BP: 138/81 (04-08-24 @ 05:00) (138/81 - 155/76)  RR: 17 (04-08-24 @ 05:00) (14 - 22)  SpO2: 100% (04-08-24 @ 05:46) (96% - 100%)PHYSICAL EXAM:  HEENT:   [ ]Tracheostomy:  [ ]Pupils equal  [ ]No oral lesions  [ ]Abnormal        SKIN  [ ]No Rash  [ ] Abnormal  [ ] pressure    CARDIAC  [ ]Regular  [ ]Abnormal    PULMONARY  [ ]Bilateral Clear Breath Sounds  [ ]Normal Excursion  [ ]Abnormal    GI  [ ]PEG      [ ] +BS		              [ ]Soft, nondistended, nontender	  [ ]Abnormal    MUSCULOSKELETAL                                   [ ]Bedbound                 [ ]Abnormal    [ ]Ambulatory/OOB to chair                           EXTREMITIES                                         [ ]Normal  [ ]Edema                           NEUROLOGIC  [ ] Normal, non focal  [ ] Focal findings:    PSYCHIATRIC  [ ]Alert and appropriate  [ ] Sedated	 [ ]Agitated    :  [place]: [ ] Yes, if yes: Date of Placement:                   [  ] No    LINES: Central Lines [ ] Yes, if yes: Date of Placement                                     [  ] No    HOSPITAL MEDICATIONS:  MEDICATIONS  (STANDING):  albuterol/ipratropium for Nebulization 3 milliLiter(s) Nebulizer every 8 hours  artificial tears (preservative free) Ophthalmic Solution 1 Drop(s) Both EYES every 4 hours  aspirin  chewable 81 milliGRAM(s) Oral daily  Biotene Dry Mouth Oral Rinse 5 milliLiter(s) Swish and Spit every 6 hours  cefepime   IVPB 1000 milliGRAM(s) IV Intermittent every 8 hours  chlorhexidine 0.12% Liquid 15 milliLiter(s) Oral Mucosa every 12 hours  chlorhexidine 4% Liquid 1 Application(s) Topical daily  fluticasone propionate 50 MICROgram(s)/spray Nasal Spray 1 Spray(s) Both Nostrils two times a day  gabapentin Solution 100 milliGRAM(s) Enteral Tube every 8 hours  insulin lispro (ADMELOG) corrective regimen sliding scale   SubCutaneous every 6 hours  insulin NPH human recombinant 7 Unit(s) SubCutaneous every 6 hours  nystatin    Suspension 094603 Unit(s) Oral every 6 hours  pantoprazole  Injectable 40 milliGRAM(s) IV Push every 12 hours  petrolatum Ophthalmic Ointment 1 Application(s) Both EYES every 8 hours  senna Syrup 10 milliLiter(s) Oral at bedtime  sodium chloride 3%  Inhalation 4 milliLiter(s) Inhalation every 8 hours  sucralfate suspension 1 Gram(s) Oral every 6 hours    MEDICATIONS  (PRN):  acetaminophen   Oral Liquid .. 1000 milliGRAM(s) Enteral Tube every 6 hours PRN Temp greater or equal to 38.5C (101.3F), Mild Pain (1 - 3)      LABS:                        8.9    9.28  )-----------( 488      ( 06 Apr 2024 11:09 )             31.2     04-06    137  |  102  |  12  ----------------------------<  115<H>  5.2   |  26  |  0.32<L>    Ca    8.5      06 Apr 2024 07:32  Phos  3.1     04-06  Mg     2.0     04-06        Urinalysis Basic - ( 06 Apr 2024 07:32 )    Color: x / Appearance: x / SG: x / pH: x  Gluc: 115 mg/dL / Ketone: x  / Bili: x / Urobili: x   Blood: x / Protein: x / Nitrite: x   Leuk Esterase: x / RBC: x / WBC x   Sq Epi: x / Non Sq Epi: x / Bacteria: x          CAPILLARY BLOOD GLUCOSE    MICROBIOLOGY:     RADIOLOGY:  [ ] Reviewed and interpreted by me    Mode: AC/ CMV (Assist Control/ Continuous Mandatory Ventilation)  RR (machine): 12  TV (machine): 500  FiO2: 40  PEEP: 5  ITime: 1  MAP: 10  PIP: 22   Patient is a 74y old  Male who presents with a chief complaint of Stroke, critical stenosis, evaluation for angiography (07 Apr 2024 13:39)      Interval Events: Afebrile and Stable on Full vent support                         Continuing Cefepime for CRE PSA treatment     REVIEW OF SYSTEMS:  [ ] Positive  [ ] All other systems negative  [x ] Unable to assess ROS because ___Nonverbal _____    Vital Signs Last 24 Hrs  T(C): 37.5 (04-08-24 @ 05:00), Max: 37.5 (04-08-24 @ 05:00)  T(F): 99.5 (04-08-24 @ 05:00), Max: 99.5 (04-08-24 @ 05:00)  HR: 86 (04-08-24 @ 05:46) (85 - 99)  BP: 138/81 (04-08-24 @ 05:00) (138/81 - 155/76)  RR: 17 (04-08-24 @ 05:00) (14 - 22)  SpO2: 100% (04-08-24 @ 05:46) (96% - 100%)PHYSICAL EXAM:  HEENT:   [ ]Tracheostomy:  [ ]Pupils equal  [ ]No oral lesions  [ ]Abnormal        SKIN  [ ]No Rash  [ ] Abnormal  [ ] pressure    CARDIAC  [ ]Regular  [ ]Abnormal    PULMONARY  [ ]Bilateral Clear Breath Sounds  [ ]Normal Excursion  [ ]Abnormal    GI  [ ]PEG      [ ] +BS		              [ ]Soft, nondistended, nontender	  [ ]Abnormal    MUSCULOSKELETAL                                   [ ]Bedbound                 [ ]Abnormal    [ ]Ambulatory/OOB to chair                           EXTREMITIES                                         [ ]Normal  [ ]Edema                           NEUROLOGIC  [ ] Normal, non focal  [ ] Focal findings:    PSYCHIATRIC  [ ]Alert and appropriate  [ ] Sedated	 [ ]Agitated    :  [place]: [ ] Yes, if yes: Date of Placement:                   [  ] No    LINES: Central Lines [ ] Yes, if yes: Date of Placement                                     [  ] No    HOSPITAL MEDICATIONS:  MEDICATIONS  (STANDING):  albuterol/ipratropium for Nebulization 3 milliLiter(s) Nebulizer every 8 hours  artificial tears (preservative free) Ophthalmic Solution 1 Drop(s) Both EYES every 4 hours  aspirin  chewable 81 milliGRAM(s) Oral daily  Biotene Dry Mouth Oral Rinse 5 milliLiter(s) Swish and Spit every 6 hours  cefepime   IVPB 1000 milliGRAM(s) IV Intermittent every 8 hours  chlorhexidine 0.12% Liquid 15 milliLiter(s) Oral Mucosa every 12 hours  chlorhexidine 4% Liquid 1 Application(s) Topical daily  fluticasone propionate 50 MICROgram(s)/spray Nasal Spray 1 Spray(s) Both Nostrils two times a day  gabapentin Solution 100 milliGRAM(s) Enteral Tube every 8 hours  insulin lispro (ADMELOG) corrective regimen sliding scale   SubCutaneous every 6 hours  insulin NPH human recombinant 7 Unit(s) SubCutaneous every 6 hours  nystatin    Suspension 963835 Unit(s) Oral every 6 hours  pantoprazole  Injectable 40 milliGRAM(s) IV Push every 12 hours  petrolatum Ophthalmic Ointment 1 Application(s) Both EYES every 8 hours  senna Syrup 10 milliLiter(s) Oral at bedtime  sodium chloride 3%  Inhalation 4 milliLiter(s) Inhalation every 8 hours  sucralfate suspension 1 Gram(s) Oral every 6 hours    MEDICATIONS  (PRN):  acetaminophen   Oral Liquid .. 1000 milliGRAM(s) Enteral Tube every 6 hours PRN Temp greater or equal to 38.5C (101.3F), Mild Pain (1 - 3)      LABS:                        8.9    9.28  )-----------( 488      ( 06 Apr 2024 11:09 )             31.2     04-06    137  |  102  |  12  ----------------------------<  115<H>  5.2   |  26  |  0.32<L>    Ca    8.5      06 Apr 2024 07:32  Phos  3.1     04-06  Mg     2.0     04-06        Urinalysis Basic - ( 06 Apr 2024 07:32 )    Color: x / Appearance: x / SG: x / pH: x  Gluc: 115 mg/dL / Ketone: x  / Bili: x / Urobili: x   Blood: x / Protein: x / Nitrite: x   Leuk Esterase: x / RBC: x / WBC x   Sq Epi: x / Non Sq Epi: x / Bacteria: x          CAPILLARY BLOOD GLUCOSE    MICROBIOLOGY:     RADIOLOGY:  [ ] Reviewed and interpreted by me    Mode: AC/ CMV (Assist Control/ Continuous Mandatory Ventilation)  RR (machine): 12  TV (machine): 500  FiO2: 40  PEEP: 5  ITime: 1  MAP: 10  PIP: 22   Patient is a 74y old  Male who presents with a chief complaint of Stroke, critical stenosis, evaluation for angiography (07 Apr 2024 13:39)      Interval Events: Afebrile and Stable on Full vent support                         Continuing Cefepime for CRE PSA treatment, ends 4/11    REVIEW OF SYSTEMS:  [ ] Positive  [ ] All other systems negative  [x ] Unable to assess ROS because ___Nonverbal 2/2 CVA, Tracheostomy, Infections_____    Vital Signs Last 24 Hrs  T(C): 37.5 (04-08-24 @ 05:00), Max: 37.5 (04-08-24 @ 05:00)  T(F): 99.5 (04-08-24 @ 05:00), Max: 99.5 (04-08-24 @ 05:00)  HR: 86 (04-08-24 @ 05:46) (85 - 99)  BP: 138/81 (04-08-24 @ 05:00) (138/81 - 155/76)  RR: 17 (04-08-24 @ 05:00) (14 - 22)  SpO2: 100% (04-08-24 @ 05:46) (96% - 100%)    PHYSICAL EXAM:    HEENT:   [X]Tracheostomy: #8 Cuffed Portex  [X]Pupils equal  [ ]No oral lesions  [ ]Abnormal    SKIN  [X]No Rash  [ ] Abnormal  [ ] pressure    CARDIAC  [X]Regular  []Abnormal    PULMONARY  [x ]Bilateral Clear Breath Sounds  [ ]Normal Excursion  []Abnormal    GI  [X]PEG site c/d/i    [X] +BS		              [X]Soft, nondistended, nontender	  [ ]Abnormal    MUSCULOSKELETAL                                   [x ]Bedbound                 [ ] Abnormal    [ ] Ambulatory/OOB to chair                           EXTREMITIES                                         [ ]Normal  [X]Edema - Upper and Lower extremity edema, improving                            NEUROLOGIC  [ ] Normal, non focal  [X] Focal findings: awake and alert, able to nod to simple commands, follows commands on all 4 extremities.    PSYCHIATRIC  [X] unable to assess  [ ] Sedated	 [ ]Agitated    :  [place]: [ ] Yes, if yes: Date of Placement:                   [X] No    LINES: Central Lines [ ] Yes, if yes: Date of Placement                                     [X] No    HOSPITAL MEDICATIONS:  MEDICATIONS  (STANDING):  albuterol/ipratropium for Nebulization 3 milliLiter(s) Nebulizer every 8 hours  artificial tears (preservative free) Ophthalmic Solution 1 Drop(s) Both EYES every 4 hours  aspirin  chewable 81 milliGRAM(s) Oral daily  Biotene Dry Mouth Oral Rinse 5 milliLiter(s) Swish and Spit every 6 hours  cefepime   IVPB 1000 milliGRAM(s) IV Intermittent every 8 hours  chlorhexidine 0.12% Liquid 15 milliLiter(s) Oral Mucosa every 12 hours  chlorhexidine 4% Liquid 1 Application(s) Topical daily  fluticasone propionate 50 MICROgram(s)/spray Nasal Spray 1 Spray(s) Both Nostrils two times a day  gabapentin Solution 100 milliGRAM(s) Enteral Tube every 8 hours  insulin lispro (ADMELOG) corrective regimen sliding scale   SubCutaneous every 6 hours  insulin NPH human recombinant 7 Unit(s) SubCutaneous every 6 hours  nystatin    Suspension [id number] Unit(s) Oral every 6 hours  pantoprazole  Injectable 40 milliGRAM(s) IV Push every 12 hours  petrolatum Ophthalmic Ointment 1 Application(s) Both EYES every 8 hours  senna Syrup 10 milliLiter(s) Oral at bedtime  sodium chloride 3%  Inhalation 4 milliLiter(s) Inhalation every 8 hours  sucralfate suspension 1 Gram(s) Oral every 6 hours    MEDICATIONS  (PRN):  acetaminophen   Oral Liquid .. 1000 milliGRAM(s) Enteral Tube every 6 hours PRN Temp greater or equal to 38.5C (101.3F), Mild Pain (1 - 3)      LABS:                        8.9    9.28  )-----------( 488      ( 06 Apr 2024 11:09 )             31.2     04-06    137  |  102  |  12  ----------------------------<  115<H>  5.2   |  26  |  0.32<L>    Ca    8.5      06 Apr 2024 07:32  Phos  3.1     04-06  Mg     2.0     04-06        Urinalysis Basic - ( 06 Apr 2024 07:32 )    Color: x / Appearance: x / SG: x / pH: x  Gluc: 115 mg/dL / Ketone: x  / Bili: x / Urobili: x   Blood: x / Protein: x / Nitrite: x   Leuk Esterase: x / RBC: x / WBC x   Sq Epi: x / Non Sq Epi: x / Bacteria: x          CAPILLARY BLOOD GLUCOSE    MICROBIOLOGY:     RADIOLOGY:  [ ] Reviewed and interpreted by me    Mode: AC/ CMV (Assist Control/ Continuous Mandatory Ventilation)  RR (machine): 12  TV (machine): 500  FiO2: 40  PEEP: 5  ITime: 1  MAP: 10  PIP: 22

## 2024-04-08 NOTE — PROGRESS NOTE ADULT - NS ATTEND AMEND GEN_ALL_CORE FT
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 Hospital course has been complicated by VAP, massive GIB with hemorrhagic shock, worsening neurologic vascular injury with small scattered foci of diffusion restriction within the right cerebellar hemisphere, left parietal lobe, and symmetrically in the bilateral basal ganglia, recurrent GIB (3/27) found to have duodenal erosions on endoscopy.     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 cerebellar and L parietal infarctions suspicious for septic embolic phenomena and raised suspicion for endocarditis  Bilateral symmetric basal ganglia lesions with differential including hypoxic injury producing delayed post-hypoxic leukoencephalopathy verses toxidrome. Suspect more likely related to hypoxia/hypoperfusion in the setting of hemorrhagic/septic shock event.  His neurologic exam is severely limited. Open eyes spontaneously, with some mouth and head movements. He is able to follow commands by squeezing hands, and answer simple questions shaking head yes or no.  - Repeat MRI completed. Per neurology, there are areas of new infarcts concerning for embolic events. MILADIS done, no vegetations present  - Also requesting ILR. Discussed with EP team. Potentially can add before discharge. However, considering recurrent GIB and hemorrhagic shock, even if AF discovered, unlikely to A/C at this time and not candidate for HEENA closure.  - Rechallenge with ASA. Holding Plavix still.     CV: #Hypotension: in the setting of hemorrhagic, septic shock. Resolved. Off oral vasopressor. Normotensive today   Hx of bradycardia - not on beta blockers at this time     Resp   #Acute respiratory failure, mixed hypoxic hypercapnia: Remains on ventilator support, tolerating limited high pressure support trials.   He currently has an 8 cuffed portex, placed 2/16 with Dr. Sood  - Continue wean briseyda as tolerated - CPAP during the day, does not tolerate TC   - Suctioning, chest pt as needed  - Oral care as per RCU team  - OOB as tolerated  - PT/OT  #Respiratory muscle weakness in the setting of AIDP    #VAP:   SCx 2/21: PSa  SCx 3/4 PSa   SCx 3/20 PSa, CRE  3/26: CRE Pseudomonas  S/P Meropenem, Zosyn courses previously during hospitalization  He has now been resumed on Cefepime for empiric coverage  - Continue Cefepime, plan for 14 day course    GI  #GIB, Acute blood loss anemia   #Duodenal erosions  Now with stable H/H and no further evidence of bleeding.  - PPI as above, carafate  - tolerating tube feeds     ID  Cefepime for CRE pseudomonas in sputum until 4/11    Heme  Soleal DVTs, not on AC, surveillance ultrasounds

## 2024-04-08 NOTE — PROGRESS NOTE ADULT - PROBLEM SELECTOR PLAN 1
- MRI 2/12: small b/l cerebellar strokes (post-procedural) and prior L thalamic strokes  - MRI 3/5: Small scattered foci of diffusion restriction within the right cerebellar hemisphere, left parietal lobe, and symmetrically in the bilateral basal ganglia.  - Lipitor d/c'd in setting of liver injury 3/3  - Was on ASA And Plavix for SAMMPRIS trial in setting of intracranial stenosis   - ASA and Plavix dcd in setting of Recurrent Melena on 3/27  - Repeat MRI 4/2: Concerning for Acute / Subacute Infarctions w/ concern for embolic Distribution   - Case d/w Neurology team will proceed with MILADIS and ILR placement (as per EP, high risk for infection with ILR placement, will place closer to discharge); pending MILADIS   - ASA restarted 4/3, monitor hgb  - MILADIS 4/6: LVSF is normal with EF 60 to 65 %. Normal RV cavity size, with normal SF. No echocardiographic evidence of vegetations. No thrombus seen in the left atrial, left atrial appendage, right atrial or right atrial appendage. Negative for intracardiac shunt. - MRI 2/12: small b/l cerebellar strokes (post-procedural) and prior L thalamic strokes  - MRI 3/5: Small scattered foci of diffusion restriction within the right cerebellar hemisphere, left parietal lobe, and symmetrically in the bilateral basal ganglia.  - Lipitor d/c'd in setting of liver injury 3/3  - Was on ASA And Plavix for SAMMPRIS trial in setting of intracranial stenosis   - ASA and Plavix dcd in setting of Recurrent Melena on 3/27  - Repeat MRI 4/2: Concerning for Acute / Subacute Infarctions w/ concern for embolic Distribution   - Case d/w Neurology team will proceed with MILADIS and ILR placement (as per EP, high risk for infection with ILR placement, will place closer to discharge)  - ASA restarted 4/3, monitor hgb  - MILADIS 4/6: LVSF is normal with EF 60 to 65 %. Normal RV cavity size, with normal SF. No echocardiographic evidence of vegetations. No thrombus seen in the left atrial, left atrial appendage, right atrial or right atrial appendage. Negative for intracardiac shunt.

## 2024-04-08 NOTE — PROGRESS NOTE ADULT - PROBLEM SELECTOR PLAN 5
- Patient with large Melanotic Episodes on 3/27 w/ significant drop in H+H  - Was Transfused 3 Units of PRBCs on 3/27  - EGD 3/27: Technically Limited Study, + Duodenal Erosions but no evidence of Active Bleed    - Remains on Protonix 40 mg q 12 hrs IVP and Sucralfate q 6    - If patient develops decreasing H+H or HD instability will need CTA of abdomen  - Continue to monitor CBC   - Patient is Faith However Family is in agreement for administration of blood products ( Per Wife)

## 2024-04-08 NOTE — PROGRESS NOTE ADULT - ASSESSMENT
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 Prior to admission patient had Viral URI ( 1-2 weeks prior to admission) with subsequent weakness. He underwent an angiogram (2/11) which showed moderate stenosis and no intervention was done. MRI Performed c/w small bilateral cerebellar strokes and prior L thalamic strokes, as per Neurology was not c/w current presentation. 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 He is currently s/p 5 rounds of plasma exchange (2/13-2/20) and IVIG x 5 ( 2/21-2/26)  His hospital course was complicated by progressive respiratory failure. CT Chest performed on 2/20 with atelectasis of b/l lower lobes. BAL 2/21 Grew PSA treated with course of Zosyn (2/21-2/28).  Patient transferred to RCU on 2/24. RRT called on 3/3 for unresponsiveness. Patient with unreactive pupils, no corneal reflex. Also found to be hypotensive and febrile to 105. Noted to have new melena. Hgb 5. Given 1 unit of pRBC. Transferred to MICU. patient required pressors while in the MICU, BP improved and was transitioned to Droxidopa. Patient had CT C/A/P consistent with Bibasilar pneumonia; Sputum cx grew PSA and was treated with course of Meropenem. Patient evaluated by GI in setting of Melena, transaminitis and portal venous gas on CT imaging. No EGD performed as Melena resolved, Transaminitis was thought to be in setting of shock and portal venous gas was thought to be in setting of recent PEG. EEG was performed in setting of AMS no seizures noted. Repeat MRI Performed which showed Small scattered foci of diffusion restriction within the right cerebellar hemisphere, left parietal lobe, and symmetrically in the bilateral basal ganglia. Neuro recommended Resumption of ASA in setting of possible embolic phenomenon. Repeat ECHO performed RT Ventricular Moderately enlarged, reduced systolic function. Mental status improved and was transferred back to RCU on 3/6.  Cardiology consulted for tachy-edz, unable to catch events on tele, EKG ST otherwise unremarkable, CT Angio negative for PE. Patient with recurrent Melena on 3/27 resulting in RRT For hypotension for which he required 3 units of PRBCs. S/p EGD; No active bleeding noted but found to have duodenal erosions.    4/6:  PST as tolerated, 8/5 today.  MILADIS essentially normal, no vegetations or thrombi noted. 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 Prior to admission patient had Viral URI ( 1-2 weeks prior to admission) with subsequent weakness. He underwent an angiogram (2/11) which showed moderate stenosis and no intervention was done. MRI Performed c/w small bilateral cerebellar strokes and prior L thalamic strokes, as per Neurology was not c/w current presentation. 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 He is currently s/p 5 rounds of plasma exchange (2/13-2/20) and IVIG x 5 ( 2/21-2/26)  His hospital course was complicated by progressive respiratory failure. CT Chest performed on 2/20 with atelectasis of b/l lower lobes. BAL 2/21 Grew PSA treated with course of Zosyn (2/21-2/28).  Patient transferred to RCU on 2/24. RRT called on 3/3 for unresponsiveness. Patient with unreactive pupils, no corneal reflex. Also found to be hypotensive and febrile to 105. Noted to have new melena. Hgb 5. Given 1 unit of pRBC. Transferred to MICU. patient required pressors while in the MICU, BP improved and was transitioned to Droxidopa. Patient had CT C/A/P consistent with Bibasilar pneumonia; Sputum cx grew PSA and was treated with course of Meropenem. Patient evaluated by GI in setting of Melena, transaminitis and portal venous gas on CT imaging. No EGD performed as Melena resolved, Transaminitis was thought to be in setting of shock and portal venous gas was thought to be in setting of recent PEG. EEG was performed in setting of AMS no seizures noted. Repeat MRI Performed which showed Small scattered foci of diffusion restriction within the right cerebellar hemisphere, left parietal lobe, and symmetrically in the bilateral basal ganglia. Neuro recommended Resumption of ASA in setting of possible embolic phenomenon. Repeat ECHO performed RT Ventricular Moderately enlarged, reduced systolic function. Mental status improved and was transferred back to RCU on 3/6.  Cardiology consulted for tachy-dez, unable to catch events on tele, EKG ST otherwise unremarkable, CT Angio negative for PE. Patient with recurrent Melena on 3/27 resulting in RRT For hypotension for which he required 3 units of PRBCs. S/p EGD; No active bleeding noted but found to have duodenal erosions.    4/8: Continue CRE PSA treatment w/ Cefepime ends 4/11;  tolerates PSV 8/5. Recent MILADIS essentially normal, no vegetations or thrombi noted. 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 Prior to admission patient had Viral URI ( 1-2 weeks prior to admission) with subsequent weakness. He underwent an angiogram (2/11) which showed moderate stenosis and no intervention was done. MRI Performed c/w small bilateral cerebellar strokes and prior L thalamic strokes, as per Neurology was not c/w current presentation. 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 He is currently s/p 5 rounds of plasma exchange (2/13-2/20) and IVIG x 5 ( 2/21-2/26)  His hospital course was complicated by progressive respiratory failure. CT Chest performed on 2/20 with atelectasis of b/l lower lobes. BAL 2/21 Grew PSA treated with course of Zosyn (2/21-2/28).  Patient transferred to RCU on 2/24. RRT called on 3/3 for unresponsiveness. Patient with unreactive pupils, no corneal reflex. Also found to be hypotensive and febrile to 105. Noted to have new melena. Hgb 5. Given 1 unit of pRBC. Transferred to MICU. patient required pressors while in the MICU, BP improved and was transitioned to Droxidopa. Patient had CT C/A/P consistent with Bibasilar pneumonia; Sputum cx grew PSA and was treated with course of Meropenem. Patient evaluated by GI in setting of Melena, transaminitis and portal venous gas on CT imaging. No EGD performed as Melena resolved, Transaminitis was thought to be in setting of shock and portal venous gas was thought to be in setting of recent PEG. EEG was performed in setting of AMS no seizures noted. Repeat MRI Performed which showed Small scattered foci of diffusion restriction within the right cerebellar hemisphere, left parietal lobe, and symmetrically in the bilateral basal ganglia. Neuro recommended Resumption of ASA in setting of possible embolic phenomenon. Repeat ECHO performed RT Ventricular Moderately enlarged, reduced systolic function. Mental status improved and was transferred back to RCU on 3/6.  Cardiology consulted for tachy-dez, unable to catch events on tele, EKG ST otherwise unremarkable, CT Angio negative for PE. Patient with recurrent Melena on 3/27 resulting in RRT For hypotension for which he required 3 units of PRBCs. S/p EGD; No active bleeding noted but found to have duodenal erosions.    4/8: Continue CRE PSA treatment w/ Cefepime ends 4/11;  tolerates PSV 8/5. Recent MILADIS essentially normal, no vegetations or thrombi noted. Urinary Retention starting Cardura. Will need ILR placement prior to discharge.

## 2024-04-09 ENCOUNTER — TRANSCRIPTION ENCOUNTER (OUTPATIENT)
Age: 74
End: 2024-04-09

## 2024-04-09 LAB
ANION GAP SERPL CALC-SCNC: 9 MMOL/L — SIGNIFICANT CHANGE UP (ref 5–17)
BUN SERPL-MCNC: 12 MG/DL — SIGNIFICANT CHANGE UP (ref 7–23)
CALCIUM SERPL-MCNC: 8.6 MG/DL — SIGNIFICANT CHANGE UP (ref 8.4–10.5)
CHLORIDE SERPL-SCNC: 101 MMOL/L — SIGNIFICANT CHANGE UP (ref 96–108)
CO2 SERPL-SCNC: 29 MMOL/L — SIGNIFICANT CHANGE UP (ref 22–31)
CREAT SERPL-MCNC: 0.3 MG/DL — LOW (ref 0.5–1.3)
EGFR: 125 ML/MIN/1.73M2 — SIGNIFICANT CHANGE UP
GLUCOSE BLDC GLUCOMTR-MCNC: 136 MG/DL — HIGH (ref 70–99)
GLUCOSE BLDC GLUCOMTR-MCNC: 138 MG/DL — HIGH (ref 70–99)
GLUCOSE BLDC GLUCOMTR-MCNC: 140 MG/DL — HIGH (ref 70–99)
GLUCOSE BLDC GLUCOMTR-MCNC: 161 MG/DL — HIGH (ref 70–99)
GLUCOSE SERPL-MCNC: 147 MG/DL — HIGH (ref 70–99)
HCT VFR BLD CALC: 28.5 % — LOW (ref 39–50)
HGB BLD-MCNC: 8.2 G/DL — LOW (ref 13–17)
MAGNESIUM SERPL-MCNC: 2 MG/DL — SIGNIFICANT CHANGE UP (ref 1.6–2.6)
MCHC RBC-ENTMCNC: 24.2 PG — LOW (ref 27–34)
MCHC RBC-ENTMCNC: 28.8 GM/DL — LOW (ref 32–36)
MCV RBC AUTO: 84.1 FL — SIGNIFICANT CHANGE UP (ref 80–100)
NRBC # BLD: 0 /100 WBCS — SIGNIFICANT CHANGE UP (ref 0–0)
PHOSPHATE SERPL-MCNC: 2.8 MG/DL — SIGNIFICANT CHANGE UP (ref 2.5–4.5)
PLATELET # BLD AUTO: 500 K/UL — HIGH (ref 150–400)
POTASSIUM SERPL-MCNC: 4.4 MMOL/L — SIGNIFICANT CHANGE UP (ref 3.5–5.3)
POTASSIUM SERPL-SCNC: 4.4 MMOL/L — SIGNIFICANT CHANGE UP (ref 3.5–5.3)
RBC # BLD: 3.39 M/UL — LOW (ref 4.2–5.8)
RBC # FLD: 18 % — HIGH (ref 10.3–14.5)
SODIUM SERPL-SCNC: 139 MMOL/L — SIGNIFICANT CHANGE UP (ref 135–145)
WBC # BLD: 11.34 K/UL — HIGH (ref 3.8–10.5)
WBC # FLD AUTO: 11.34 K/UL — HIGH (ref 3.8–10.5)

## 2024-04-09 PROCEDURE — 99233 SBSQ HOSP IP/OBS HIGH 50: CPT

## 2024-04-09 RX ADMIN — Medication 5 MILLILITER(S): at 05:55

## 2024-04-09 RX ADMIN — Medication 1 DROP(S): at 14:02

## 2024-04-09 RX ADMIN — Medication 1 GRAM(S): at 12:17

## 2024-04-09 RX ADMIN — Medication 1 APPLICATION(S): at 22:15

## 2024-04-09 RX ADMIN — Medication 1 SPRAY(S): at 05:49

## 2024-04-09 RX ADMIN — CHLORHEXIDINE GLUCONATE 1 APPLICATION(S): 213 SOLUTION TOPICAL at 22:15

## 2024-04-09 RX ADMIN — SODIUM CHLORIDE 4 MILLILITER(S): 9 INJECTION INTRAMUSCULAR; INTRAVENOUS; SUBCUTANEOUS at 15:03

## 2024-04-09 RX ADMIN — CHLORHEXIDINE GLUCONATE 15 MILLILITER(S): 213 SOLUTION TOPICAL at 17:41

## 2024-04-09 RX ADMIN — Medication 500000 UNIT(S): at 05:51

## 2024-04-09 RX ADMIN — SENNA PLUS 10 MILLILITER(S): 8.6 TABLET ORAL at 22:15

## 2024-04-09 RX ADMIN — HUMAN INSULIN 7 UNIT(S): 100 INJECTION, SUSPENSION SUBCUTANEOUS at 23:50

## 2024-04-09 RX ADMIN — Medication 3 MILLILITER(S): at 05:21

## 2024-04-09 RX ADMIN — Medication 1 DROP(S): at 22:14

## 2024-04-09 RX ADMIN — PANTOPRAZOLE SODIUM 40 MILLIGRAM(S): 20 TABLET, DELAYED RELEASE ORAL at 17:40

## 2024-04-09 RX ADMIN — Medication 1 GRAM(S): at 23:50

## 2024-04-09 RX ADMIN — Medication 1 APPLICATION(S): at 13:58

## 2024-04-09 RX ADMIN — Medication 5 MILLILITER(S): at 17:39

## 2024-04-09 RX ADMIN — Medication 500000 UNIT(S): at 17:40

## 2024-04-09 RX ADMIN — Medication 1 DROP(S): at 00:47

## 2024-04-09 RX ADMIN — Medication 1 SPRAY(S): at 17:41

## 2024-04-09 RX ADMIN — Medication 1 GRAM(S): at 17:40

## 2024-04-09 RX ADMIN — Medication 81 MILLIGRAM(S): at 12:18

## 2024-04-09 RX ADMIN — Medication 1 GRAM(S): at 05:49

## 2024-04-09 RX ADMIN — CHLORHEXIDINE GLUCONATE 15 MILLILITER(S): 213 SOLUTION TOPICAL at 05:50

## 2024-04-09 RX ADMIN — GABAPENTIN 100 MILLIGRAM(S): 400 CAPSULE ORAL at 22:15

## 2024-04-09 RX ADMIN — Medication 1 DROP(S): at 10:06

## 2024-04-09 RX ADMIN — CEFEPIME 100 MILLIGRAM(S): 1 INJECTION, POWDER, FOR SOLUTION INTRAMUSCULAR; INTRAVENOUS at 22:14

## 2024-04-09 RX ADMIN — GABAPENTIN 100 MILLIGRAM(S): 400 CAPSULE ORAL at 13:59

## 2024-04-09 RX ADMIN — SODIUM CHLORIDE 4 MILLILITER(S): 9 INJECTION INTRAMUSCULAR; INTRAVENOUS; SUBCUTANEOUS at 05:21

## 2024-04-09 RX ADMIN — Medication 1 APPLICATION(S): at 05:55

## 2024-04-09 RX ADMIN — Medication 5 MILLILITER(S): at 23:50

## 2024-04-09 RX ADMIN — CEFEPIME 100 MILLIGRAM(S): 1 INJECTION, POWDER, FOR SOLUTION INTRAMUSCULAR; INTRAVENOUS at 05:42

## 2024-04-09 RX ADMIN — Medication 3 MILLILITER(S): at 15:02

## 2024-04-09 RX ADMIN — Medication 1 DROP(S): at 05:55

## 2024-04-09 RX ADMIN — Medication 2 MILLIGRAM(S): at 22:15

## 2024-04-09 RX ADMIN — CEFEPIME 100 MILLIGRAM(S): 1 INJECTION, POWDER, FOR SOLUTION INTRAMUSCULAR; INTRAVENOUS at 13:59

## 2024-04-09 RX ADMIN — Medication 500000 UNIT(S): at 12:18

## 2024-04-09 RX ADMIN — PANTOPRAZOLE SODIUM 40 MILLIGRAM(S): 20 TABLET, DELAYED RELEASE ORAL at 05:44

## 2024-04-09 RX ADMIN — Medication 3 MILLILITER(S): at 21:03

## 2024-04-09 RX ADMIN — Medication 1 DROP(S): at 17:41

## 2024-04-09 RX ADMIN — Medication 5 MILLILITER(S): at 12:17

## 2024-04-09 RX ADMIN — Medication 500000 UNIT(S): at 23:50

## 2024-04-09 RX ADMIN — HUMAN INSULIN 7 UNIT(S): 100 INJECTION, SUSPENSION SUBCUTANEOUS at 17:38

## 2024-04-09 RX ADMIN — GABAPENTIN 100 MILLIGRAM(S): 400 CAPSULE ORAL at 05:51

## 2024-04-09 RX ADMIN — HUMAN INSULIN 7 UNIT(S): 100 INJECTION, SUSPENSION SUBCUTANEOUS at 05:46

## 2024-04-09 RX ADMIN — SODIUM CHLORIDE 4 MILLILITER(S): 9 INJECTION INTRAMUSCULAR; INTRAVENOUS; SUBCUTANEOUS at 21:03

## 2024-04-09 RX ADMIN — HUMAN INSULIN 7 UNIT(S): 100 INJECTION, SUSPENSION SUBCUTANEOUS at 12:16

## 2024-04-09 RX ADMIN — Medication 2: at 05:46

## 2024-04-09 NOTE — PROGRESS NOTE ADULT - SUBJECTIVE AND OBJECTIVE BOX
Patient is a 74y old  Male who presents with a chief complaint of Stroke, critical stenosis, evaluation for angiography (08 Apr 2024 07:06)      Interval Events:    REVIEW OF SYSTEMS:  [ ] Positive  [ ] All other systems negative  [ ] Unable to assess ROS because ________    Vital Signs Last 24 Hrs  T(C): 37.1 (04-09-24 @ 04:04), Max: 37.5 (04-08-24 @ 18:00)  T(F): 98.7 (04-09-24 @ 04:04), Max: 99.5 (04-08-24 @ 18:00)  HR: 101 (04-09-24 @ 06:06) (81 - 103)  BP: 131/74 (04-09-24 @ 04:04) (123/66 - 131/74)  RR: 21 (04-09-24 @ 04:04) (16 - 21)  SpO2: 98% (04-09-24 @ 06:06) (98% - 100%)PHYSICAL EXAM:  HEENT:   [ ]Tracheostomy:  [ ]Pupils equal  [ ]No oral lesions  [ ]Abnormal        SKIN  [ ]No Rash  [ ] Abnormal  [ ] pressure    CARDIAC  [ ]Regular  [ ]Abnormal    PULMONARY  [ ]Bilateral Clear Breath Sounds  [ ]Normal Excursion  [ ]Abnormal    GI  [ ]PEG      [ ] +BS		              [ ]Soft, nondistended, nontender	  [ ]Abnormal    MUSCULOSKELETAL                                   [ ]Bedbound                 [ ]Abnormal    [ ]Ambulatory/OOB to chair                           EXTREMITIES                                         [ ]Normal  [ ]Edema                           NEUROLOGIC  [ ] Normal, non focal  [ ] Focal findings:    PSYCHIATRIC  [ ]Alert and appropriate  [ ] Sedated	 [ ]Agitated    :  [place]: [ ] Yes, if yes: Date of Placement:                   [  ] No    LINES: Central Lines [ ] Yes, if yes: Date of Placement                                     [  ] No    HOSPITAL MEDICATIONS:  MEDICATIONS  (STANDING):  albuterol/ipratropium for Nebulization 3 milliLiter(s) Nebulizer every 8 hours  artificial tears (preservative free) Ophthalmic Solution 1 Drop(s) Both EYES every 4 hours  aspirin  chewable 81 milliGRAM(s) Oral daily  Biotene Dry Mouth Oral Rinse 5 milliLiter(s) Swish and Spit every 6 hours  cefepime   IVPB 1000 milliGRAM(s) IV Intermittent every 8 hours  chlorhexidine 0.12% Liquid 15 milliLiter(s) Oral Mucosa every 12 hours  chlorhexidine 4% Liquid 1 Application(s) Topical daily  doxazosin 2 milliGRAM(s) Oral at bedtime  fluticasone propionate 50 MICROgram(s)/spray Nasal Spray 1 Spray(s) Both Nostrils two times a day  gabapentin Solution 100 milliGRAM(s) Enteral Tube every 8 hours  insulin lispro (ADMELOG) corrective regimen sliding scale   SubCutaneous every 6 hours  insulin NPH human recombinant 7 Unit(s) SubCutaneous every 6 hours  nystatin    Suspension 448767 Unit(s) Oral every 6 hours  pantoprazole  Injectable 40 milliGRAM(s) IV Push every 12 hours  petrolatum Ophthalmic Ointment 1 Application(s) Both EYES every 8 hours  senna Syrup 10 milliLiter(s) Oral at bedtime  sodium chloride 3%  Inhalation 4 milliLiter(s) Inhalation every 8 hours  sucralfate suspension 1 Gram(s) Oral every 6 hours    MEDICATIONS  (PRN):  acetaminophen   Oral Liquid .. 1000 milliGRAM(s) Enteral Tube every 6 hours PRN Temp greater or equal to 38.5C (101.3F), Mild Pain (1 - 3)      LABS:                  CAPILLARY BLOOD GLUCOSE    MICROBIOLOGY:     RADIOLOGY:  [ ] Reviewed and interpreted by me    Mode: AC/ CMV (Assist Control/ Continuous Mandatory Ventilation)  RR (machine): 12  TV (machine): 500  FiO2: 40  PEEP: 5  ITime: 1  MAP: 10  PIP: 18   Patient is a 74y old  Male who presents with a chief complaint of Stroke, critical stenosis, evaluation for angiography (08 Apr 2024 07:06)      Interval Events: Tolerating PSV 8/5 trials                         Stable     REVIEW OF SYSTEMS:  [ ] Positive  [ ] All other systems negative  [x Unable to assess ROS because ____Nonverbal 2/2 CVA, Tracheostomy, Multiple Comorbidities  ____    Vital Signs Last 24 Hrs  T(C): 37.1 (04-09-24 @ 04:04), Max: 37.5 (04-08-24 @ 18:00)  T(F): 98.7 (04-09-24 @ 04:04), Max: 99.5 (04-08-24 @ 18:00)  HR: 101 (04-09-24 @ 06:06) (81 - 103)  BP: 131/74 (04-09-24 @ 04:04) (123/66 - 131/74)  RR: 21 (04-09-24 @ 04:04) (16 - 21)  SpO2: 98% (04-09-24 @ 06:06) (98% - 100%)PHYSICAL EXAM:  HEENT:   [ ]Tracheostomy:  [ ]Pupils equal  [ ]No oral lesions  [ ]Abnormal        SKIN  [ ]No Rash  [ ] Abnormal  [ ] pressure    CARDIAC  [ ]Regular  [ ]Abnormal    PULMONARY  [ ]Bilateral Clear Breath Sounds  [ ]Normal Excursion  [ ]Abnormal    GI  [ ]PEG      [ ] +BS		              [ ]Soft, nondistended, nontender	  [ ]Abnormal    MUSCULOSKELETAL                                   [ ]Bedbound                 [ ]Abnormal    [ ]Ambulatory/OOB to chair                           EXTREMITIES                                         [ ]Normal  [ ]Edema                           NEUROLOGIC  [ ] Normal, non focal  [ ] Focal findings:    PSYCHIATRIC  [ ]Alert and appropriate  [ ] Sedated	 [ ]Agitated    :  [place]: [ ] Yes, if yes: Date of Placement:                   [  ] No    LINES: Central Lines [ ] Yes, if yes: Date of Placement                                     [  ] No    HOSPITAL MEDICATIONS:  MEDICATIONS  (STANDING):  albuterol/ipratropium for Nebulization 3 milliLiter(s) Nebulizer every 8 hours  artificial tears (preservative free) Ophthalmic Solution 1 Drop(s) Both EYES every 4 hours  aspirin  chewable 81 milliGRAM(s) Oral daily  Biotene Dry Mouth Oral Rinse 5 milliLiter(s) Swish and Spit every 6 hours  cefepime   IVPB 1000 milliGRAM(s) IV Intermittent every 8 hours  chlorhexidine 0.12% Liquid 15 milliLiter(s) Oral Mucosa every 12 hours  chlorhexidine 4% Liquid 1 Application(s) Topical daily  doxazosin 2 milliGRAM(s) Oral at bedtime  fluticasone propionate 50 MICROgram(s)/spray Nasal Spray 1 Spray(s) Both Nostrils two times a day  gabapentin Solution 100 milliGRAM(s) Enteral Tube every 8 hours  insulin lispro (ADMELOG) corrective regimen sliding scale   SubCutaneous every 6 hours  insulin NPH human recombinant 7 Unit(s) SubCutaneous every 6 hours  nystatin    Suspension 907934 Unit(s) Oral every 6 hours  pantoprazole  Injectable 40 milliGRAM(s) IV Push every 12 hours  petrolatum Ophthalmic Ointment 1 Application(s) Both EYES every 8 hours  senna Syrup 10 milliLiter(s) Oral at bedtime  sodium chloride 3%  Inhalation 4 milliLiter(s) Inhalation every 8 hours  sucralfate suspension 1 Gram(s) Oral every 6 hours    MEDICATIONS  (PRN):  acetaminophen   Oral Liquid .. 1000 milliGRAM(s) Enteral Tube every 6 hours PRN Temp greater or equal to 38.5C (101.3F), Mild Pain (1 - 3)      LABS:                  CAPILLARY BLOOD GLUCOSE    MICROBIOLOGY:     RADIOLOGY:  [ ] Reviewed and interpreted by me    Mode: AC/ CMV (Assist Control/ Continuous Mandatory Ventilation)  RR (machine): 12  TV (machine): 500  FiO2: 40  PEEP: 5  ITime: 1  MAP: 10  PIP: 18   Patient is a 74y old  Male who presents with a chief complaint of Stroke, critical stenosis, evaluation for angiography (08 Apr 2024 07:06)      Interval Events: Continuing Cefepime for CRE PSA treatment, ends 4/11                         Tolerating PSV 8/5 trials                          Stable on Full vent support; Fio2 30%     REVIEW OF SYSTEMS:  [ ] Positive  [ ] All other systems negative  [x Unable to assess ROS because ____Nonverbal 2/2 CVA, Tracheostomy, Multiple Comorbidities  ____    Vital Signs Last 24 Hrs  T(C): 37.1 (04-09-24 @ 04:04), Max: 37.5 (04-08-24 @ 18:00)  T(F): 98.7 (04-09-24 @ 04:04), Max: 99.5 (04-08-24 @ 18:00)  HR: 101 (04-09-24 @ 06:06) (81 - 103)  BP: 131/74 (04-09-24 @ 04:04) (123/66 - 131/74)  RR: 21 (04-09-24 @ 04:04) (16 - 21)  SpO2: 98% (04-09-24 @ 06:06) (98% - 100%)    PHYSICAL EXAM:    HEENT:   [X]Tracheostomy: #8 Cuffed Portex  [X]Pupils equal  [ ]No oral lesions  [ ]Abnormal    SKIN  [X]No Rash  [ ] Abnormal  [ ] pressure    CARDIAC  [X]Regular  []Abnormal    PULMONARY  [x ]Bilateral Clear Breath Sounds  [ ]Normal Excursion  []Abnormal    GI  [X]PEG site c/d/i    [X] +BS		              [X]Soft, nondistended, nontender	  [ ]Abnormal    MUSCULOSKELETAL                                   [x ]Bedbound                 [ ] Abnormal    [ ] Ambulatory/OOB to chair                           EXTREMITIES                                         [ ]Normal  [X]Edema - Upper and Lower extremity edema, improving                            NEUROLOGIC  [ ] Normal, non focal  [X] Focal findings: awake and alert, able to nod to simple commands, follows commands on all 4 extremities.    PSYCHIATRIC  [X] unable to assess  [ ] Sedated	 [ ]Agitated    :  [place]: [ ] Yes, if yes: Date of Placement:                   [X] No    LINES: Central Lines [ ] Yes, if yes: Date of Placement                                     [X] No    HOSPITAL MEDICATIONS:  MEDICATIONS  (STANDING):  albuterol/ipratropium for Nebulization 3 milliLiter(s) Nebulizer every 8 hours  artificial tears (preservative free) Ophthalmic Solution 1 Drop(s) Both EYES every 4 hours  aspirin  chewable 81 milliGRAM(s) Oral daily  Biotene Dry Mouth Oral Rinse 5 milliLiter(s) Swish and Spit every 6 hours  cefepime   IVPB 1000 milliGRAM(s) IV Intermittent every 8 hours  chlorhexidine 0.12% Liquid 15 milliLiter(s) Oral Mucosa every 12 hours  chlorhexidine 4% Liquid 1 Application(s) Topical daily  doxazosin 2 milliGRAM(s) Oral at bedtime  fluticasone propionate 50 MICROgram(s)/spray Nasal Spray 1 Spray(s) Both Nostrils two times a day  gabapentin Solution 100 milliGRAM(s) Enteral Tube every 8 hours  insulin lispro (ADMELOG) corrective regimen sliding scale   SubCutaneous every 6 hours  insulin NPH human recombinant 7 Unit(s) SubCutaneous every 6 hours  nystatin    Suspension [id number] Unit(s) Oral every 6 hours  pantoprazole  Injectable 40 milliGRAM(s) IV Push every 12 hours  petrolatum Ophthalmic Ointment 1 Application(s) Both EYES every 8 hours  senna Syrup 10 milliLiter(s) Oral at bedtime  sodium chloride 3%  Inhalation 4 milliLiter(s) Inhalation every 8 hours  sucralfate suspension 1 Gram(s) Oral every 6 hours    MEDICATIONS  (PRN):  acetaminophen   Oral Liquid .. 1000 milliGRAM(s) Enteral Tube every 6 hours PRN Temp greater or equal to 38.5C (101.3F), Mild Pain (1 - 3)      LABS:                  CAPILLARY BLOOD GLUCOSE    MICROBIOLOGY:     RADIOLOGY:  [ ] Reviewed and interpreted by me    Mode: AC/ CMV (Assist Control/ Continuous Mandatory Ventilation)  RR (machine): 12  TV (machine): 500  FiO2: 40  PEEP: 5  ITime: 1  MAP: 10  PIP: 18

## 2024-04-09 NOTE — PROGRESS NOTE ADULT - PROBLEM SELECTOR PLAN 1
- MRI 2/12: small b/l cerebellar strokes (post-procedural) and prior L thalamic strokes  - MRI 3/5: Small scattered foci of diffusion restriction within the right cerebellar hemisphere, left parietal lobe, and symmetrically in the bilateral basal ganglia.  - Lipitor d/c'd in setting of liver injury 3/3  - Was on ASA And Plavix for SAMMPRIS trial in setting of intracranial stenosis   - ASA and Plavix dcd in setting of Recurrent Melena on 3/27  - Repeat MRI 4/2: Concerning for Acute / Subacute Infarctions w/ concern for embolic Distribution   - Case d/w Neurology team will proceed with MILADIS and ILR placement (as per EP, high risk for infection with ILR placement, will place closer to discharge)  - ASA restarted 4/3, monitor hgb  - MILADIS 4/6: LVSF is normal with EF 60 to 65 %. Normal RV cavity size, with normal SF. No echocardiographic evidence of vegetations. No thrombus seen in the left atrial, left atrial appendage, right atrial or right atrial appendage. Negative for intracardiac shunt.

## 2024-04-09 NOTE — DISCHARGE NOTE PROVIDER - DETAILS OF MALNUTRITION DIAGNOSIS/DIAGNOSES
This patient has been assessed with a concern for Malnutrition and was treated during this hospitalization for the following Nutrition diagnosis/diagnoses:     -  02/14/2024: Moderate protein-calorie malnutrition

## 2024-04-09 NOTE — DISCHARGE NOTE PROVIDER - NSDCMRMEDTOKEN_GEN_ALL_CORE_FT
ascorbic acid 1000 mg oral tablet: 1 tab(s) orally once a day  aspirin 81 mg oral delayed release tablet: 1 tab(s) orally once a day x stroke prevention  aspirin 81 mg oral delayed release tablet: 1 tab(s) orally once a day  atenolol-chlorthalidone 50 mg-25 mg oral tablet: 1 tab(s) orally once a day  atenolol-chlorthalidone 50 mg-25 mg oral tablet: 1 tab(s) orally once a day  atorvastatin 80 mg oral tablet: 1 tab(s) orally once a day (at bedtime)  cyanocobalamin 1000 mcg oral tablet: 1 tab(s) orally once a day  metFORMIN 1000 mg oral tablet: 1 tab(s) orally 2 times a day  metFORMIN 1000 mg oral tablet: 1 tab(s) orally 2 times a day   Outpatient physical therapy: 1 application buccal once a day   potassium chloride 20 mEq oral tablet, extended release: 1 tab(s) orally once a day   acetaminophen 160 mg/5 mL oral suspension: 31.25 milliliter(s) orally every 6 hours As needed Temp greater or equal to 38.5C (101.3F), Mild Pain (1 - 3)  aspirin 81 mg oral tablet, chewable: 1 tab(s) orally once a day  doxazosin 2 mg oral tablet: 1 tab(s) orally once a day (at bedtime)  fluticasone 50 mcg/inh nasal spray: 1 spray(s) nasal 2 times a day  gabapentin 250 mg/5 mL oral solution: 2 milliliter(s) orally every 8 hours  insulin isophane (NPH) 100 units/mL human recombinant subcutaneous suspension: 7 unit(s) subcutaneous every 6 hours  ipratropium-albuterol 0.5 mg-2.5 mg/3 mL inhalation solution: 3 milliliter(s) inhaled every 8 hours  nystatin 100,000 units/mL oral suspension: 5 milliliter(s) orally every 6 hours  ocular lubricant ophthalmic ointment: 1 application to each affected eye every 8 hours  ocular lubricant ophthalmic solution: 1 drop(s) to each affected eye every 4 hours  saliva substitutes oral solution: 5 milliliter(s) orally every 6 hours  senna (sennosides) 8.8 mg/5 mL oral syrup: 10 milliliter(s) orally once a day (at bedtime)  sucralfate 1 g/10 mL oral suspension: 10 milliliter(s) orally every 6 hours   acetaminophen 160 mg/5 mL oral suspension: 31.25 milliliter(s) by gastrostomy tube every 6 hours as needed for Temp greater or equal to 38.5C (101.3F), Mild Pain (1 - 3)  aspirin 81 mg oral tablet, chewable: 1 tab(s) by gastrostomy tube once a day  doxazosin 2 mg oral tablet: 1 tab(s) by gastrostomy tube once a day (at bedtime)  fluticasone 50 mcg/inh nasal spray: 1 spray(s) nasal 2 times a day  gabapentin 250 mg/5 mL oral solution: 2 milliliter(s) by gastrostomy tube every 8 hours  insulin isophane (NPH) 100 units/mL human recombinant subcutaneous suspension: 7 unit(s) subcutaneous every 6 hours  insulin lispro 100 units/mL injectable solution: 1 unit(s) injectable every 6 hours 2 Unit(s) if Glucose 151 - 200  4 Unit(s) if Glucose 201 - 250  6 Unit(s) if Glucose 251 - 300  8 Unit(s) if Glucose 301 - 350  10 Unit(s) if Glucose 351 - 400  12 Unit(s) if Glucose Greater Than 400  ipratropium-albuterol 0.5 mg-2.5 mg/3 mL inhalation solution: 3 milliliter(s) inhaled every 8 hours  nystatin 100,000 units/mL oral suspension: 5 milliliter(s) orally every 6 hours  ocular lubricant ophthalmic ointment: 1 application to each affected eye every 8 hours  ocular lubricant ophthalmic solution: 1 drop(s) to each affected eye every 4 hours  saliva substitutes oral solution: 5 milliliter(s) orally every 6 hours  senna (sennosides) 8.8 mg/5 mL oral syrup: 10 milliliter(s) by gastrostomy tube once a day (at bedtime)  sucralfate 1 g/10 mL oral suspension: 10 milliliter(s) by gastrostomy tube every 6 hours

## 2024-04-09 NOTE — DISCHARGE NOTE PROVIDER - NSDCCPCAREPLAN_GEN_ALL_CORE_FT
PRINCIPAL DISCHARGE DIAGNOSIS  Diagnosis: Acute ischemic stroke  Assessment and Plan of Treatment: MRI 2/12: small b/l cerebellar strokes (post-procedural) and prior L thalamic strokes  - MRI 3/5: Small scattered foci of diffusion restriction within the right cerebellar hemisphere, left parietal lobe, and symmetrically in the bilateral basal ganglia.  - Lipitor d/c'd in setting of liver injury 3/3  - Was on ASA And Plavix for SAMMPRIS trial in setting of intracranial stenosis   - ASA and Plavix dcd in setting of Recurrent Melena on 3/27  - Repeat MRI 4/2: Concerning for Acute / Subacute Infarctions w/ concern for embolic Distribution   - Case d/w Neurology team will proceed with MILADIS and ILR placement (as per EP, high risk for infection with ILR placement, will place closer to discharge)  - ASA restarted 4/3, monitor hgb  - MILADIS 4/6: LVSF is normal with EF 60 to 65 %. Normal RV cavity size, with normal SF. No echocardiographic evidence of vegetations. No thrombus seen in the left atrial, left atrial appendage, right atrial or right atrial appendage. Negative for intracardiac shunt.      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    Diagnosis: Required emergent intubation  Assessment and Plan of Treatment:      PRINCIPAL DISCHARGE DIAGNOSIS  Diagnosis: Acute ischemic stroke  Assessment and Plan of Treatment: MRI 2/12: small b/l cerebellar strokes (post-procedural) and prior L thalamic strokes  - MRI 3/5: Small scattered foci of diffusion restriction within the right cerebellar hemisphere, left parietal lobe, and symmetrically in the bilateral basal ganglia.  - Lipitor d/c'd in setting of liver injury 3/3  - Was on ASA And Plavix for SAMMPRIS trial in setting of intracranial stenosis   - ASA and Plavix dcd in setting of Recurrent Melena on 3/27  - Repeat MRI 4/2: Concerning for Acute / Subacute Infarctions w/ concern for embolic Distribution   - ASA restarted 4/3, currently tolerating w/o Melena monitor hgb  - MILADIS 4/6: LVSF is normal with EF 60 to 65 %. Normal RV cavity size, with normal SF. No echocardiographic evidence of vegetations. No thrombus seen in the left atrial, left atrial appendage, right atrial or right atrial appendage. 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    Diagnosis: Acute respiratory failure with hypoxia  Assessment and Plan of Treatment: - Patient S/p Tracheostomy # 8 Cuffed Portex on 2/16 ( Dr. Sood)  - Vent Settings: PRVC 500/12/40%/+5  - Continue Pressure Support Trials as Tolerated   - Pt Has previously not tolerated trach collar trials as he became bradycardic and hypoxic during attempts  - Continue weaning attempts as tolerated    - Continue Chest PT and Suctioning PRN.      Diagnosis: Infection  Assessment and Plan of Treatment: - BAL 2/21: PSA; Txd with Zosyn ( 2/21-2/28)  - Sputum 3/4: PSA , Blood cxs 3/3: NGTD  - Txd with course of Meropenem 3/3 --> 3/11  - Blood cxs 3/26:NGTD  and Urine cxs 3/27: NGTD   - Sputum cx 3/26: CRE PSA  - Continue Cefepime x 14 day course to tx CRE PSA ( Ended 4/11)   - Completed Flagyl ( Ended 4/04 ).      Diagnosis: Anemia  Assessment and Plan of Treatment: - Patient with large Melanotic Episodes on 3/27 w/ significant drop in H+H  - Was Transfused 3 Units of PRBCs on 3/27  - EGD 3/27: Technically Limited Study, + Duodenal Erosions but no evidence of Active Bleed    - Remains on Protonix 40 mg q 12 hrs  and Sucralfate q 6    - If patient develops decreasing H+H or HD instability would need CTA of abdomen  - Continue to monitor CBC   - Patient is [employer] However Family is in agreement for administration of blood products ( Per Wife).      Diagnosis: Sympathetic storming  Assessment and Plan of Treatment: - Patient with Sympathetic Storming in NSCU   - Patient with Labile BP and Hx of Tachy/dez episodes  - TTE 3/7: EF 74% RT Ventricular Moderately Enlarged and reduced systolic fxn  - 3/18 CT Angio PE: Negative   - Continue to monitor BP / HR      Diagnosis: Diabetes  Assessment and Plan of Treatment: - Previously on Metformin as outpatient   - Continue ISS and NPH Q 6hrs   - FS Q6H; Goal -180.    Diagnosis: Dysphagia  Assessment and Plan of Treatment: - S/p PEG 2/16 (Dr. Sood)   - Tolerating feeds at goal rate   - Continue Free water.      Diagnosis: Urinary retention  Assessment and Plan of Treatment: - Patient with intermittent urinary retention   - Continue to Bladder Scan and straight cath q 6 hrs PRN > 450 cc of retained urine      Diagnosis: Need for prophylactic measure  Assessment and Plan of Treatment: - Heparin Sub q held in setting of recent Melena  - Continue Compression Devices bilaterally.

## 2024-04-09 NOTE — DISCHARGE NOTE PROVIDER - ATTENDING DISCHARGE PHYSICAL EXAMINATION:
Awake, alert, tracking and mouthing words  able to lift both arms antigravity, moves bilateral toes   abdomen soft, non tender  LE warm, well perfused  lungs with coarse breath sounds bilaterally, on ventilator support

## 2024-04-09 NOTE — DISCHARGE NOTE PROVIDER - HOSPITAL COURSE
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 Prior to admission patient had Viral URI ( 1-2 weeks prior to admission) with subsequent weakness. He underwent an angiogram (2/11) which showed moderate stenosis and no intervention was done. MRI Performed c/w small bilateral cerebellar strokes and prior L thalamic strokes, as per Neurology was not c/w current presentation. 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 He is currently s/p 5 rounds of plasma exchange (2/13-2/20) and IVIG x 5 ( 2/21-2/26)  His hospital course was complicated by progressive respiratory failure. CT Chest performed on 2/20 with atelectasis of b/l lower lobes. BAL 2/21 Grew PSA treated with course of Zosyn (2/21-2/28).  Patient transferred to RCU on 2/24. RRT called on 3/3 for unresponsiveness. Patient with unreactive pupils, no corneal reflex. Also found to be hypotensive and febrile to 105. Noted to have new melena. Hgb 5. Given 1 unit of pRBC. Transferred to MICU. patient required pressors while in the MICU, BP improved and was transitioned to Droxidopa. Patient had CT C/A/P consistent with Bibasilar pneumonia; Sputum cx grew PSA and was treated with course of Meropenem. Patient evaluated by GI in setting of Melena, transaminitis and portal venous gas on CT imaging. No EGD performed as Melena resolved, Transaminitis was thought to be in setting of shock and portal venous gas was thought to be in setting of recent PEG. EEG was performed in setting of AMS no seizures noted. Repeat MRI Performed which showed Small scattered foci of diffusion restriction within the right cerebellar hemisphere, left parietal lobe, and symmetrically in the bilateral basal ganglia. Neuro recommended Resumption of ASA in setting of possible embolic phenomenon. Repeat ECHO performed RT Ventricular Moderately enlarged, reduced systolic function. Mental status improved and was transferred back to RCU on 3/6.  Cardiology consulted for tachy-dez, unable to catch events on tele, EKG ST otherwise unremarkable, CT Angio negative for PE. Patient with recurrent Melena on 3/27 resulting in RRT For hypotension for which he required 3 units of PRBCs. S/p EGD; No active bleeding noted but found to have duodenal erosions. Treating for CSE PSA 3/27-4/11 x 14d course. S/p MILADIS 4/6 with no vegetations or thrombus. 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 Prior to admission patient had Viral URI ( 1-2 weeks prior to admission) with subsequent weakness. He underwent an angiogram (2/11) which showed moderate stenosis and no intervention was done. MRI Performed c/w small bilateral cerebellar strokes and prior L thalamic strokes, as per Neurology was not c/w current presentation. 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 Patient is s/p 5 rounds of plasma exchange (2/13-2/20) and IVIG x 5 ( 2/21-2/26)  His hospital course was complicated by progressive respiratory failure. CT Chest performed on 2/20 with atelectasis of b/l lower lobes. BAL 2/21 Grew PSA treated with course of Zosyn (2/21-2/28).  Patient transferred to RCU on 2/24. RRT called on 3/3 for unresponsiveness. Patient with unreactive pupils, no corneal reflex. Also found to be hypotensive and febrile to 105. Noted to have new melena. Hgb 5. Given 1 unit of pRBC. Transferred to MICU. patient required pressors while in the MICU, BP improved and was transitioned to Droxidopa. Patient had CT C/A/P consistent with Bibasilar pneumonia; Sputum cx grew PSA and was treated with course of Meropenem. Patient evaluated by GI in setting of Melena, transaminitis and portal venous gas on CT imaging. No EGD performed at that time as Melena resolved, Transaminitis was thought to be in setting of shock and portal venous gas was thought to be in setting of recent PEG. EEG was performed in setting of AMS no seizures noted. Repeat MRI Performed which showed Small scattered foci of diffusion restriction within the right cerebellar hemisphere, left parietal lobe, and symmetrically in the bilateral basal ganglia. Neuro recommended Resumption of ASA in setting of possible embolic phenomenon. Repeat ECHO performed RT Ventricular Moderately enlarged, reduced systolic function. Mental status improved and was transferred back to RCU on 3/6.  Cardiology consulted for tachy-dez, unable to catch events on tele, EKG ST otherwise unremarkable, CT Angio negative for PE. Patient with recurrent Melena on 3/27 resulting in RRT For hypotension for which he required 3 units of PRBCs. S/p EGD; No active bleeding noted but found to have duodenal erosions. Patient was also subsequently febrile and was Treated for CSE PSA 3/27-4/11 x ( 14d course). Repeat MRI Performed on 4/2 still with concern for ongoing embolic strokes. Patient S/p MILADIS 4/6 with no vegetations /thrombus or intracardiac shunt noted. Patient was evaluated by EP was currently deemed not a candidate for ILR as patient would be at high risk for infection and would not be a candidate for full AC in the setting of recurrent Melena. Patient was resumed on ASA on 4/3 and has been tolerating with stable CBC. Patients motor strength has been improving and is currently tolerating CPAP Trials. Patient is medically stable for transfer to [facility] for ongoing weaning attempts and rehabilitation.

## 2024-04-09 NOTE — DISCHARGE NOTE PROVIDER - CARE PROVIDER_API CALL
Leighton Elizondo  Neurology  [address] [place] [address]  Phone: [phone]  Fax: [phone]  Follow Up Time:     Deacon Cary  Cardiovascular Disease  [address] [address]  Phone: [phone]  Fax: [phone]  Follow Up Time:

## 2024-04-09 NOTE — PROGRESS NOTE ADULT - NS ATTEND AMEND GEN_ALL_CORE FT
73 year old male with HTN, HLD, DM, CVA x 2 (no residual deficits presented with stroke-like symptoms found to have chronic left thalamic lacunar infarct and high grade proximal basilar, left posterior cerebral stenosis requiring transfer to [facility], hospital course with progressive neurologic and respiratory decline inconsistent with cerebrovascular pathology and 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 Hospital course has been complicated by VAP, massive GIB with hemorrhagic shock, worsening neurologic vascular injury with small scattered foci of diffusion restriction within the right cerebellar hemisphere, left parietal lobe, and symmetrically in the bilateral basal ganglia, recurrent GIB (3/27) found to have duodenal erosions on endoscopy.     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 cerebellar and L parietal infarctions suspicious for septic embolic phenomena and raised suspicion for endocarditis  Bilateral symmetric basal ganglia lesions with differential including hypoxic injury producing delayed post-hypoxic leukoencephalopathy verses toxidrome. Suspect more likely related to hypoxia/hypoperfusion in the setting of hemorrhagic/septic shock event.  His neurologic exam is severely limited. Open eyes spontaneously, with some mouth and head movements. He is able to follow commands by squeezing hands, and answer simple questions shaking head yes or no.  - Repeat MRI completed. Per neurology, there are areas of new infarcts concerning for embolic events. MILADIS done, no vegetations present  - Also requesting ILR. Discussed with EP team. Potentially can add before discharge. However, considering recurrent GIB and hemorrhagic shock, even if AF discovered, unlikely to A/C at this time and not candidate for HEENA closure.  - Rechallenge with ASA. Holding Plavix still.     CV: #Hypotension: in the setting of hemorrhagic, septic shock. Resolved. Off oral vasopressor. Normotensive today   Hx of bradycardia - not on beta blockers at this time     Resp   #Acute respiratory failure, mixed hypoxic hypercapnia: Remains on ventilator support, tolerating limited high pressure support trials.   He currently has an 8 cuffed portex, placed 2/16 with Dr. Sood  - Continue wean briseyda as tolerated - CPAP during the day, does not tolerate TC   - Suctioning, chest pt as needed  - Oral care as per RCU team  - OOB as tolerated  - PT/OT  #Respiratory muscle weakness in the setting of AIDP    #VAP:   SCx 2/21: PSa  SCx 3/4 PSa   SCx 3/20 PSa, CRE  3/26: CRE Pseudomonas  S/P Meropenem, Zosyn courses previously during hospitalization  He has now been resumed on Cefepime for empiric coverage  - Continue Cefepime, plan for 14 day course    GI  #GIB, Acute blood loss anemia   #Duodenal erosions  Now with stable H/H and no further evidence of bleeding.  - PPI as above, carafate  - tolerating tube feeds     ID  Cefepime for CRE pseudomonas in sputum until 4/11    Heme  Soleal DVTs, not on AC, surveillance ultrasounds.        requiring straight cath, started Cardura 4/8    Dispo planning

## 2024-04-09 NOTE — CHART NOTE - NSCHARTNOTEFT_GEN_A_CORE
Nutrition Follow Up Note  Patient seen for: Nutrition Follow Up     Chart reviewed, events noted. 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 See provider note 4/9 for additional details re: clinical course. Interim events: Continues on CRE PSA treatment. S/P trach; pressure support trials as tolerated. S/P PEG 2/16.     Source: [] Patient       [x] EMR        [x] RN        [] Family at bedside       [x] Other:    -If unable to interview patient: [x] Trach/Vent/BiPAP  [x] Disoriented/confused/inappropriate to interview    Diet Order:   Diet, NPO:   Tube Feeding Modality: Gastrostomy  Glucerna 1.5 David (GLUCERNA1.5RTH)  Total Volume for 24 Hours (mL): 1560  Continuous  Starting Tube Feed Rate {mL per Hour}: 30  Increase Tube Feed Rate by (mL): 10     Every 4 hours  Until Goal Tube Feed Rate (mL per Hour): 65  Tube Feed Duration (in Hours): 24  Tube Feed Start Time: 06:00 (03-28-24)    EN Order Provides: 1560ml, 2340kcal and 129g protein   -Feeds infusing at 65ml/hr, tolerating well.     Is current diet order appropriate/adequate? see below for updated diet recommendations     Nutrition-related concerns:  -Last BM 4/8 x 2. On bowel regimen (senna). Prescribed IV Protonix.   -Continues on antibiotics as prescribed.   -Continues on insulin lispro sliding scale and NPH 7u 6 hrs for glycemic control.   -Receiving free water flushes of 250ml q 12 hrs.     Weights:   3/27: 90.4kg  3/20: 92.3kg  2/28: 101.8kg  2/21: 101.7kg  -Wt loss trend noted. Likely related to fluid shifts and acute illness, now with prolonged hospitalization.     MEDICATIONS  (STANDING):  cefepime   IVPB  doxazosin  insulin lispro (ADMELOG) corrective regimen sliding scale  insulin NPH human recombinant  nystatin    Suspension  pantoprazole  Injectable  senna Syrup  sucralfate suspension    Pertinent Labs: 04-09 @ 06:45: Na 139, BUN 12, Cr 0.30<L>, <H>, K+ 4.4, Phos 2.8, Mg 2.0, Alk Phos --, ALT/SGPT --, AST/SGOT --, HbA1c --    A1C with Estimated Average Glucose Result: 6.8 % (02-10-24 @ 01:43)    Finger Sticks:  POCT Blood Glucose.: 138 mg/dL (04-09 @ 11:39)  POCT Blood Glucose.: 161 mg/dL (04-09 @ 05:36)  POCT Blood Glucose.: 157 mg/dL (04-08 @ 23:35)  POCT Blood Glucose.: 142 mg/dL (04-08 @ 17:30)    Skin per nursing documentation: no documented pressure injuries    Edema: 2+ generalized     [x] no change since previous assessment  Estimated Caloric Needs: 2030-2538kcal/day (20-25kcal/kg)  Estimated Protein Needs: 112-132g/pro/day (1.1-1.3g/pro/kg)  Estimated Fluid Needs: defer to team  based on .5kg w/ consideration for GBS, trach to vent, malnutrition    Previous Nutrition Diagnosis: acute-moderate protein calorie malnutrition  Nutrition Diagnosis is: [x] ongoing  [] resolved [] not applicable     New Nutrition Diagnosis: [x] Not applicable    Nutrition Care Plan:  [x] In Progress  [] Achieved  [] Not applicable    Nutrition Interventions:     Education Provided:       [] Yes:  [x] No: interdisciplinary medical team       Recommendations:      1. Continue Glucerna 1.5 at 65ml/hr x 24 hrs (1560cc total). Will provide 2340kcal and 129g protein. Based on dosing wt 101.5kg, provides: 23kcal/kg and 1.27g protein/kg.   2. Monitor GI tolerance. RD to remain available to adjust EN formulary, volume/rate PRN.   3. Free water flushes deferred to team.   4. Monitor wt trends/labs/skin integrity/hydration status/bowel regularity.     Monitoring and Evaluation:   Continue to monitor nutritional intake, tolerance to diet prescription, weights, labs, skin integrity    RD remains available upon request and will follow up per protocol    Jacey Marquez RD, available via TEAMS

## 2024-04-09 NOTE — PROGRESS NOTE ADULT - ASSESSMENT
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 Prior to admission patient had Viral URI ( 1-2 weeks prior to admission) with subsequent weakness. He underwent an angiogram (2/11) which showed moderate stenosis and no intervention was done. MRI Performed c/w small bilateral cerebellar strokes and prior L thalamic strokes, as per Neurology was not c/w current presentation. 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 He is currently s/p 5 rounds of plasma exchange (2/13-2/20) and IVIG x 5 ( 2/21-2/26)  His hospital course was complicated by progressive respiratory failure. CT Chest performed on 2/20 with atelectasis of b/l lower lobes. BAL 2/21 Grew PSA treated with course of Zosyn (2/21-2/28).  Patient transferred to RCU on 2/24. RRT called on 3/3 for unresponsiveness. Patient with unreactive pupils, no corneal reflex. Also found to be hypotensive and febrile to 105. Noted to have new melena. Hgb 5. Given 1 unit of pRBC. Transferred to MICU. patient required pressors while in the MICU, BP improved and was transitioned to Droxidopa. Patient had CT C/A/P consistent with Bibasilar pneumonia; Sputum cx grew PSA and was treated with course of Meropenem. Patient evaluated by GI in setting of Melena, transaminitis and portal venous gas on CT imaging. No EGD performed as Melena resolved, Transaminitis was thought to be in setting of shock and portal venous gas was thought to be in setting of recent PEG. EEG was performed in setting of AMS no seizures noted. Repeat MRI Performed which showed Small scattered foci of diffusion restriction within the right cerebellar hemisphere, left parietal lobe, and symmetrically in the bilateral basal ganglia. Neuro recommended Resumption of ASA in setting of possible embolic phenomenon. Repeat ECHO performed RT Ventricular Moderately enlarged, reduced systolic function. Mental status improved and was transferred back to RCU on 3/6.  Cardiology consulted for tachy-dez, unable to catch events on tele, EKG ST otherwise unremarkable, CT Angio negative for PE. Patient with recurrent Melena on 3/27 resulting in RRT For hypotension for which he required 3 units of PRBCs. S/p EGD; No active bleeding noted but found to have duodenal erosions.    4/8: Continue CRE PSA treatment w/ Cefepime ends 4/11;  tolerates PSV 8/5. Recent MILADIS essentially normal, no vegetations or thrombi noted. Urinary Retention starting Cardura. Will need ILR placement prior to discharge.  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 Prior to admission patient had Viral URI ( 1-2 weeks prior to admission) with subsequent weakness. He underwent an angiogram (2/11) which showed moderate stenosis and no intervention was done. MRI Performed c/w small bilateral cerebellar strokes and prior L thalamic strokes, as per Neurology was not c/w current presentation. 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 He is currently s/p 5 rounds of plasma exchange (2/13-2/20) and IVIG x 5 ( 2/21-2/26)  His hospital course was complicated by progressive respiratory failure. CT Chest performed on 2/20 with atelectasis of b/l lower lobes. BAL 2/21 Grew PSA treated with course of Zosyn (2/21-2/28).  Patient transferred to RCU on 2/24. RRT called on 3/3 for unresponsiveness. Patient with unreactive pupils, no corneal reflex. Also found to be hypotensive and febrile to 105. Noted to have new melena. Hgb 5. Given 1 unit of pRBC. Transferred to MICU. patient required pressors while in the MICU, BP improved and was transitioned to Droxidopa. Patient had CT C/A/P consistent with Bibasilar pneumonia; Sputum cx grew PSA and was treated with course of Meropenem. Patient evaluated by GI in setting of Melena, transaminitis and portal venous gas on CT imaging. No EGD performed as Melena resolved, Transaminitis was thought to be in setting of shock and portal venous gas was thought to be in setting of recent PEG. EEG was performed in setting of AMS no seizures noted. Repeat MRI Performed which showed Small scattered foci of diffusion restriction within the right cerebellar hemisphere, left parietal lobe, and symmetrically in the bilateral basal ganglia. Neuro recommended Resumption of ASA in setting of possible embolic phenomenon. Repeat ECHO performed RT Ventricular Moderately enlarged, reduced systolic function. Mental status improved and was transferred back to RCU on 3/6.  Cardiology consulted for tachy-dez, unable to catch events on tele, EKG ST otherwise unremarkable, CT Angio negative for PE. Patient with recurrent Melena on 3/27 resulting in RRT For hypotension for which he required 3 units of PRBCs. S/p EGD; No active bleeding noted but found to have duodenal erosions. Treating for CSE PSA 3/27-4/11 x 14d course. S/p MILADIS 4/6 with no vegetations or thrombus.      4/9: Continue CRE PSA treatment w/ Cefepime ends 4/11;  tolerates PSV 8/5. Recent MILADIS essentially normal, no vegetations or thrombi noted. Urinary Retention starting Cardura. Spoke with EP NP on 4/9 for possible ILR placement prior to discharge, however team feels it is not indicated as patient cannot be anticoagulated due to recurrent GIB and therefore wound not .

## 2024-04-09 NOTE — PROGRESS NOTE ADULT - PROBLEM SELECTOR PLAN 5
- Patient with large Melanotic Episodes on 3/27 w/ significant drop in H+H  - Was Transfused 3 Units of PRBCs on 3/27  - EGD 3/27: Technically Limited Study, + Duodenal Erosions but no evidence of Active Bleed    - Remains on Protonix 40 mg q 12 hrs IVP and Sucralfate q 6    - If patient develops decreasing H+H or HD instability will need CTA of abdomen  - Continue to monitor CBC   - Patient is Jewish However Family is in agreement for administration of blood products ( Per Wife)

## 2024-04-10 DIAGNOSIS — R33.9 RETENTION OF URINE, UNSPECIFIED: ICD-10-CM

## 2024-04-10 LAB
GLUCOSE BLDC GLUCOMTR-MCNC: 112 MG/DL — HIGH (ref 70–99)
GLUCOSE BLDC GLUCOMTR-MCNC: 118 MG/DL — HIGH (ref 70–99)
GLUCOSE BLDC GLUCOMTR-MCNC: 125 MG/DL — HIGH (ref 70–99)
GLUCOSE BLDC GLUCOMTR-MCNC: 128 MG/DL — HIGH (ref 70–99)

## 2024-04-10 PROCEDURE — 99233 SBSQ HOSP IP/OBS HIGH 50: CPT

## 2024-04-10 RX ADMIN — Medication 500000 UNIT(S): at 11:36

## 2024-04-10 RX ADMIN — Medication 500000 UNIT(S): at 05:50

## 2024-04-10 RX ADMIN — SODIUM CHLORIDE 4 MILLILITER(S): 9 INJECTION INTRAMUSCULAR; INTRAVENOUS; SUBCUTANEOUS at 21:22

## 2024-04-10 RX ADMIN — Medication 500000 UNIT(S): at 18:09

## 2024-04-10 RX ADMIN — Medication 1 DROP(S): at 11:35

## 2024-04-10 RX ADMIN — HUMAN INSULIN 7 UNIT(S): 100 INJECTION, SUSPENSION SUBCUTANEOUS at 18:09

## 2024-04-10 RX ADMIN — CEFEPIME 100 MILLIGRAM(S): 1 INJECTION, POWDER, FOR SOLUTION INTRAMUSCULAR; INTRAVENOUS at 14:31

## 2024-04-10 RX ADMIN — Medication 3 MILLILITER(S): at 05:52

## 2024-04-10 RX ADMIN — Medication 3 MILLILITER(S): at 11:14

## 2024-04-10 RX ADMIN — Medication 1 DROP(S): at 01:59

## 2024-04-10 RX ADMIN — Medication 500000 UNIT(S): at 23:49

## 2024-04-10 RX ADMIN — Medication 1 GRAM(S): at 23:52

## 2024-04-10 RX ADMIN — Medication 1 APPLICATION(S): at 21:21

## 2024-04-10 RX ADMIN — Medication 3 MILLILITER(S): at 21:22

## 2024-04-10 RX ADMIN — Medication 1 GRAM(S): at 05:50

## 2024-04-10 RX ADMIN — Medication 1 DROP(S): at 14:31

## 2024-04-10 RX ADMIN — Medication 1 SPRAY(S): at 05:52

## 2024-04-10 RX ADMIN — PANTOPRAZOLE SODIUM 40 MILLIGRAM(S): 20 TABLET, DELAYED RELEASE ORAL at 18:09

## 2024-04-10 RX ADMIN — CEFEPIME 100 MILLIGRAM(S): 1 INJECTION, POWDER, FOR SOLUTION INTRAMUSCULAR; INTRAVENOUS at 05:50

## 2024-04-10 RX ADMIN — Medication 5 MILLILITER(S): at 11:35

## 2024-04-10 RX ADMIN — HUMAN INSULIN 7 UNIT(S): 100 INJECTION, SUSPENSION SUBCUTANEOUS at 23:50

## 2024-04-10 RX ADMIN — Medication 5 MILLILITER(S): at 18:11

## 2024-04-10 RX ADMIN — Medication 2 MILLIGRAM(S): at 21:19

## 2024-04-10 RX ADMIN — Medication 1 DROP(S): at 18:17

## 2024-04-10 RX ADMIN — Medication 5 MILLILITER(S): at 05:49

## 2024-04-10 RX ADMIN — HUMAN INSULIN 7 UNIT(S): 100 INJECTION, SUSPENSION SUBCUTANEOUS at 12:52

## 2024-04-10 RX ADMIN — Medication 1 DROP(S): at 05:51

## 2024-04-10 RX ADMIN — CHLORHEXIDINE GLUCONATE 15 MILLILITER(S): 213 SOLUTION TOPICAL at 18:36

## 2024-04-10 RX ADMIN — Medication 81 MILLIGRAM(S): at 11:36

## 2024-04-10 RX ADMIN — GABAPENTIN 100 MILLIGRAM(S): 400 CAPSULE ORAL at 21:19

## 2024-04-10 RX ADMIN — HUMAN INSULIN 7 UNIT(S): 100 INJECTION, SUSPENSION SUBCUTANEOUS at 05:51

## 2024-04-10 RX ADMIN — Medication 1 APPLICATION(S): at 05:51

## 2024-04-10 RX ADMIN — SODIUM CHLORIDE 4 MILLILITER(S): 9 INJECTION INTRAMUSCULAR; INTRAVENOUS; SUBCUTANEOUS at 11:14

## 2024-04-10 RX ADMIN — CHLORHEXIDINE GLUCONATE 15 MILLILITER(S): 213 SOLUTION TOPICAL at 05:50

## 2024-04-10 RX ADMIN — Medication 1 DROP(S): at 21:20

## 2024-04-10 RX ADMIN — Medication 1 GRAM(S): at 18:09

## 2024-04-10 RX ADMIN — CHLORHEXIDINE GLUCONATE 1 APPLICATION(S): 213 SOLUTION TOPICAL at 21:30

## 2024-04-10 RX ADMIN — CEFEPIME 100 MILLIGRAM(S): 1 INJECTION, POWDER, FOR SOLUTION INTRAMUSCULAR; INTRAVENOUS at 21:19

## 2024-04-10 RX ADMIN — GABAPENTIN 100 MILLIGRAM(S): 400 CAPSULE ORAL at 14:30

## 2024-04-10 RX ADMIN — PANTOPRAZOLE SODIUM 40 MILLIGRAM(S): 20 TABLET, DELAYED RELEASE ORAL at 05:50

## 2024-04-10 RX ADMIN — SODIUM CHLORIDE 4 MILLILITER(S): 9 INJECTION INTRAMUSCULAR; INTRAVENOUS; SUBCUTANEOUS at 05:55

## 2024-04-10 RX ADMIN — Medication 1 APPLICATION(S): at 14:30

## 2024-04-10 RX ADMIN — Medication 0: at 12:53

## 2024-04-10 RX ADMIN — GABAPENTIN 100 MILLIGRAM(S): 400 CAPSULE ORAL at 05:50

## 2024-04-10 RX ADMIN — Medication 1 GRAM(S): at 11:36

## 2024-04-10 RX ADMIN — SENNA PLUS 10 MILLILITER(S): 8.6 TABLET ORAL at 21:19

## 2024-04-10 RX ADMIN — Medication 5 MILLILITER(S): at 23:49

## 2024-04-10 RX ADMIN — Medication 1 SPRAY(S): at 18:17

## 2024-04-10 NOTE — PROGRESS NOTE ADULT - PROBLEM SELECTOR PLAN 5
- Patient with large Melanotic Episodes on 3/27 w/ significant drop in H+H  - Was Transfused 3 Units of PRBCs on 3/27  - EGD 3/27: Technically Limited Study, + Duodenal Erosions but no evidence of Active Bleed    - Remains on Protonix 40 mg q 12 hrs IVP and Sucralfate q 6    - If patient develops decreasing H+H or HD instability will need CTA of abdomen  - Continue to monitor CBC   - Patient is Yazdanism However Family is in agreement for administration of blood products ( Per Wife)

## 2024-04-10 NOTE — PROGRESS NOTE ADULT - PROBLEM SELECTOR PLAN 10
- Patients wife Margarette updated at bedside this morning   - Patients Brother  and Children updated over the phone at bedside in the RCU this morning   - Tentatively planned for Dc Tomorrow 4/11 to [facility] - Heparin Sub q held in setting of recent Melena  - Continue Compression Devices bilaterally

## 2024-04-10 NOTE — PROGRESS NOTE ADULT - ASSESSMENT
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 Prior to admission patient had Viral URI ( 1-2 weeks prior to admission) with subsequent weakness. He underwent an angiogram (2/11) which showed moderate stenosis and no intervention was done. MRI Performed c/w small bilateral cerebellar strokes and prior L thalamic strokes, as per Neurology was not c/w current presentation. 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 He is currently s/p 5 rounds of plasma exchange (2/13-2/20) and IVIG x 5 ( 2/21-2/26)  His hospital course was complicated by progressive respiratory failure. CT Chest performed on 2/20 with atelectasis of b/l lower lobes. BAL 2/21 Grew PSA treated with course of Zosyn (2/21-2/28).  Patient transferred to RCU on 2/24. RRT called on 3/3 for unresponsiveness. Patient with unreactive pupils, no corneal reflex. Also found to be hypotensive and febrile to 105. Noted to have new melena. Hgb 5. Given 1 unit of pRBC. Transferred to MICU. patient required pressors while in the MICU, BP improved and was transitioned to Droxidopa. Patient had CT C/A/P consistent with Bibasilar pneumonia; Sputum cx grew PSA and was treated with course of Meropenem. Patient evaluated by GI in setting of Melena, transaminitis and portal venous gas on CT imaging. No EGD performed as Melena resolved, Transaminitis was thought to be in setting of shock and portal venous gas was thought to be in setting of recent PEG. EEG was performed in setting of AMS no seizures noted. Repeat MRI Performed which showed Small scattered foci of diffusion restriction within the right cerebellar hemisphere, left parietal lobe, and symmetrically in the bilateral basal ganglia. Neuro recommended Resumption of ASA in setting of possible embolic phenomenon. Repeat ECHO performed RT Ventricular Moderately enlarged, reduced systolic function. Mental status improved and was transferred back to RCU on 3/6.  Cardiology consulted for tachy-dez, unable to catch events on tele, EKG ST otherwise unremarkable, CT Angio negative for PE. Patient with recurrent Melena on 3/27 resulting in RRT For hypotension for which he required 3 units of PRBCs. S/p EGD; No active bleeding noted but found to have duodenal erosions. Treating for CSE PSA 3/27-4/11 x 14d course. S/p MILADIS 4/6 with no vegetations or thrombus. Patient seen by EP was not deemed appropriate to place ILR at this time as patient currently not a candidate for Full AC given recurrent GI Bleeds and would put patient at increased risk for infection.     4/10: No events reported overnight. Patient to complete Cefepime tomorrow. Patient tolerating CPAP Trials will continue to progress as tolerated. Patient required Straight Catheterization yesterday x 1, will continue Cardura. Case management confirmed with [facility] they can accommodate ISC and will not need [place] upon dc.

## 2024-04-10 NOTE — PROGRESS NOTE ADULT - SUBJECTIVE AND OBJECTIVE BOX
Patient is a 74y old  Male who presents with a chief complaint of Stroke, critical stenosis, evaluation for angiography (09 Apr 2024 18:53)      Interval Events: No events reported overnight     REVIEW OF SYSTEMS:  [ ] Positive  [ ] All other systems negative  [ ] Unable to assess ROS because ________    Vital Signs Last 24 Hrs  T(C): 36.8 (04-10-24 @ 04:37), Max: 37.3 (04-09-24 @ 11:53)  T(F): 98.3 (04-10-24 @ 04:37), Max: 99.1 (04-09-24 @ 11:53)  HR: 82 (04-10-24 @ 06:26) (78 - 105)  BP: 118/63 (04-10-24 @ 04:37) (118/63 - 145/72)  RR: 15 (04-10-24 @ 06:26) (15 - 20)  SpO2: 100% (04-10-24 @ 06:26) (99% - 100%)    PHYSICAL EXAM:  HEENT:   [ ]Tracheostomy:  [ ]Pupils equal  [ ]No oral lesions  [ ]Abnormal    SKIN  [ ]No Rash  [ ] Abnormal  [ ] pressure    CARDIAC  [ ]Regular  [ ]Abnormal    PULMONARY  [ ]Bilateral Clear Breath Sounds  [ ]Normal Excursion  [ ]Abnormal    GI  [ ]PEG      [ ] +BS		              [ ]Soft, nondistended, nontender	  [ ]Abnormal    MUSCULOSKELETAL                                   [ ]Bedbound                 [ ]Abnormal    [ ]Ambulatory/OOB to chair                           EXTREMITIES                                         [ ]Normal  [ ]Edema                           NEUROLOGIC  [ ] Normal, non focal  [ ] Focal findings:    PSYCHIATRIC  [ ]Alert and appropriate  [ ] Sedated	 [ ]Agitated    :  [place]: [ ] Yes, if yes: Date of Placement:                   [  ] No    LINES: Central Lines [ ] Yes, if yes: Date of Placement                                     [  ] No    HOSPITAL MEDICATIONS:  MEDICATIONS  (STANDING):  albuterol/ipratropium for Nebulization 3 milliLiter(s) Nebulizer every 8 hours  artificial tears (preservative free) Ophthalmic Solution 1 Drop(s) Both EYES every 4 hours  aspirin  chewable 81 milliGRAM(s) Oral daily  Biotene Dry Mouth Oral Rinse 5 milliLiter(s) Swish and Spit every 6 hours  cefepime   IVPB 1000 milliGRAM(s) IV Intermittent every 8 hours  chlorhexidine 0.12% Liquid 15 milliLiter(s) Oral Mucosa every 12 hours  chlorhexidine 4% Liquid 1 Application(s) Topical daily  doxazosin 2 milliGRAM(s) Oral at bedtime  fluticasone propionate 50 MICROgram(s)/spray Nasal Spray 1 Spray(s) Both Nostrils two times a day  gabapentin Solution 100 milliGRAM(s) Enteral Tube every 8 hours  insulin lispro (ADMELOG) corrective regimen sliding scale   SubCutaneous every 6 hours  insulin NPH human recombinant 7 Unit(s) SubCutaneous every 6 hours  nystatin    Suspension 345812 Unit(s) Oral every 6 hours  pantoprazole  Injectable 40 milliGRAM(s) IV Push every 12 hours  petrolatum Ophthalmic Ointment 1 Application(s) Both EYES every 8 hours  senna Syrup 10 milliLiter(s) Oral at bedtime  sodium chloride 3%  Inhalation 4 milliLiter(s) Inhalation every 8 hours  sucralfate suspension 1 Gram(s) Oral every 6 hours    MEDICATIONS  (PRN):  acetaminophen   Oral Liquid .. 1000 milliGRAM(s) Enteral Tube every 6 hours PRN Temp greater or equal to 38.5C (101.3F), Mild Pain (1 - 3)      LABS:                        8.2    11.34 )-----------( 500      ( 09 Apr 2024 06:45 )             28.5     04-09    139  |  101  |  12  ----------------------------<  147<H>  4.4   |  29  |  0.30<L>    Ca    8.6      09 Apr 2024 06:45  Phos  2.8     04-09  Mg     2.0     04-09        Urinalysis Basic - ( 09 Apr 2024 06:45 )    Color: x / Appearance: x / SG: x / pH: x  Gluc: 147 mg/dL / Ketone: x  / Bili: x / Urobili: x   Blood: x / Protein: x / Nitrite: x   Leuk Esterase: x / RBC: x / WBC x   Sq Epi: x / Non Sq Epi: x / Bacteria: x          CAPILLARY BLOOD GLUCOSE    MICROBIOLOGY:     RADIOLOGY:  [ ] Reviewed and interpreted by me    Mode: AC/ CMV (Assist Control/ Continuous Mandatory Ventilation)  RR (machine): 12  TV (machine): 500  FiO2: 40  PEEP: 5  ITime: 0.9  MAP: 10  PIP: 16   Patient is a 74y old  Male who presents with a chief complaint of Stroke, critical stenosis, evaluation for angiography (09 Apr 2024 18:53)      Interval Events: No events reported overnight     REVIEW OF SYSTEMS:  [ ] Positive  [x] All other systems negative  [ ] Unable to assess ROS because ________    Vital Signs Last 24 Hrs  T(C): 36.8 (04-10-24 @ 04:37), Max: 37.3 (04-09-24 @ 11:53)  T(F): 98.3 (04-10-24 @ 04:37), Max: 99.1 (04-09-24 @ 11:53)  HR: 82 (04-10-24 @ 06:26) (78 - 105)  BP: 118/63 (04-10-24 @ 04:37) (118/63 - 145/72)  RR: 15 (04-10-24 @ 06:26) (15 - 20)  SpO2: 100% (04-10-24 @ 06:26) (99% - 100%)    PHYSICAL EXAM:  HEENT:   [x]Tracheostomy: # 8 Cuffed Portex  [x]Pupils equal  [ ]No oral lesions  [ ]Abnormal    SKIN  [x]No Rash  [ ] Abnormal  [ ] pressure    CARDIAC  [x]Regular  [ ]Abnormal    PULMONARY  [x]Bilateral CoarseBreath Sounds  [ ]Normal Excursion  [ ]Abnormal    GI  [x]PEG site c/d/i    [x] +BS		              [x]Soft, nondistended, nontender	  [ ]Abnormal    MUSCULOSKELETAL                                   [x]Bedbound                 [ ] Abnormal    [ ] Ambulatory/OOB to chair                           EXTREMITIES                                         [ ]Normal  [x]Edema: Upper and Lower extremity edema, improving                            NEUROLOGIC  [ ] Normal, non focal  [x] Focal findings: awake and alert, able to nod to simple commands, follows commands on all 4 extremities Upper extremity strength > LE    PSYCHIATRIC  [x] Alert  [ ] Sedated	 [ ]Agitated    :  [place]: [ ] Yes, if yes: Date of Placement:                   [x] No; Straight cath x 1     LINES: Central Lines [ ] Yes, if yes: Date of Placement                                     [x] No    HOSPITAL MEDICATIONS:  MEDICATIONS  (STANDING):  albuterol/ipratropium for Nebulization 3 milliLiter(s) Nebulizer every 8 hours  artificial tears (preservative free) Ophthalmic Solution 1 Drop(s) Both EYES every 4 hours  aspirin  chewable 81 milliGRAM(s) Oral daily  Biotene Dry Mouth Oral Rinse 5 milliLiter(s) Swish and Spit every 6 hours  cefepime   IVPB 1000 milliGRAM(s) IV Intermittent every 8 hours  chlorhexidine 0.12% Liquid 15 milliLiter(s) Oral Mucosa every 12 hours  chlorhexidine 4% Liquid 1 Application(s) Topical daily  doxazosin 2 milliGRAM(s) Oral at bedtime  fluticasone propionate 50 MICROgram(s)/spray Nasal Spray 1 Spray(s) Both Nostrils two times a day  gabapentin Solution 100 milliGRAM(s) Enteral Tube every 8 hours  insulin lispro (ADMELOG) corrective regimen sliding scale   SubCutaneous every 6 hours  insulin NPH human recombinant 7 Unit(s) SubCutaneous every 6 hours  nystatin    Suspension 431762 Unit(s) Oral every 6 hours  pantoprazole  Injectable 40 milliGRAM(s) IV Push every 12 hours  petrolatum Ophthalmic Ointment 1 Application(s) Both EYES every 8 hours  senna Syrup 10 milliLiter(s) Oral at bedtime  sodium chloride 3%  Inhalation 4 milliLiter(s) Inhalation every 8 hours  sucralfate suspension 1 Gram(s) Oral every 6 hours    MEDICATIONS  (PRN):  acetaminophen   Oral Liquid .. 1000 milliGRAM(s) Enteral Tube every 6 hours PRN Temp greater or equal to 38.5C (101.3F), Mild Pain (1 - 3)      LABS:                        8.2    11.34 )-----------( 500      ( 09 Apr 2024 06:45 )             28.5     04-09    139  |  101  |  12  ----------------------------<  147<H>  4.4   |  29  |  0.30<L>    Ca    8.6      09 Apr 2024 06:45  Phos  2.8     04-09  Mg     2.0     04-09        Urinalysis Basic - ( 09 Apr 2024 06:45 )    Color: x / Appearance: x / SG: x / pH: x  Gluc: 147 mg/dL / Ketone: x  / Bili: x / Urobili: x   Blood: x / Protein: x / Nitrite: x   Leuk Esterase: x / RBC: x / WBC x   Sq Epi: x / Non Sq Epi: x / Bacteria: x          CAPILLARY BLOOD GLUCOSE    MICROBIOLOGY:     RADIOLOGY:  [ ] Reviewed and interpreted by me    Mode: AC/ CMV (Assist Control/ Continuous Mandatory Ventilation)  RR (machine): 12  TV (machine): 500  FiO2: 40  PEEP: 5  ITime: 0.9  MAP: 10  PIP: 16   Patient is a 74y old  Male who presents with a chief complaint of Stroke, critical stenosis, evaluation for angiography (09 Apr 2024 18:53)      Interval Events: No events reported overnight     REVIEW OF SYSTEMS:  [ ] Positive  [x] All other systems negative  [ ] Unable to assess ROS because ________    Vital Signs Last 24 Hrs  T(C): 36.8 (04-10-24 @ 04:37), Max: 37.3 (04-09-24 @ 11:53)  T(F): 98.3 (04-10-24 @ 04:37), Max: 99.1 (04-09-24 @ 11:53)  HR: 82 (04-10-24 @ 06:26) (78 - 105)  BP: 118/63 (04-10-24 @ 04:37) (118/63 - 145/72)  RR: 15 (04-10-24 @ 06:26) (15 - 20)  SpO2: 100% (04-10-24 @ 06:26) (99% - 100%)    PHYSICAL EXAM:  HEENT:   [x]Tracheostomy: # 8 Cuffed Portex  [x]Pupils equal  [ ]No oral lesions  [ ]Abnormal    SKIN  [x]No Rash  [ ] Abnormal  [ ] pressure    CARDIAC  [x]Regular  [ ]Abnormal    PULMONARY  [x]Bilateral Coarse Breath Sounds  [ ]Normal Excursion  [ ]Abnormal    GI  [x]PEG site c/d/i    [x] +BS		              [x]Soft, nondistended, nontender	  [ ]Abnormal    MUSCULOSKELETAL                                   [x]Bedbound                 [ ] Abnormal    [ ] Ambulatory/OOB to chair                           EXTREMITIES                                         [ ]Normal  [x]Edema: Upper and Lower extremity edema, improving                            NEUROLOGIC  [ ] Normal, non focal  [x] Focal findings: awake and alert, able to nod to simple commands, follows commands on all 4 extremities Upper extremity strength > LE    PSYCHIATRIC  [x] Alert  [ ] Sedated	 [ ]Agitated    :  [place]: [ ] Yes, if yes: Date of Placement:                   [x] No; Straight cath x 1     LINES: Central Lines [ ] Yes, if yes: Date of Placement                                     [x] No    HOSPITAL MEDICATIONS:  MEDICATIONS  (STANDING):  albuterol/ipratropium for Nebulization 3 milliLiter(s) Nebulizer every 8 hours  artificial tears (preservative free) Ophthalmic Solution 1 Drop(s) Both EYES every 4 hours  aspirin  chewable 81 milliGRAM(s) Oral daily  Biotene Dry Mouth Oral Rinse 5 milliLiter(s) Swish and Spit every 6 hours  cefepime   IVPB 1000 milliGRAM(s) IV Intermittent every 8 hours  chlorhexidine 0.12% Liquid 15 milliLiter(s) Oral Mucosa every 12 hours  chlorhexidine 4% Liquid 1 Application(s) Topical daily  doxazosin 2 milliGRAM(s) Oral at bedtime  fluticasone propionate 50 MICROgram(s)/spray Nasal Spray 1 Spray(s) Both Nostrils two times a day  gabapentin Solution 100 milliGRAM(s) Enteral Tube every 8 hours  insulin lispro (ADMELOG) corrective regimen sliding scale   SubCutaneous every 6 hours  insulin NPH human recombinant 7 Unit(s) SubCutaneous every 6 hours  nystatin    Suspension 131138 Unit(s) Oral every 6 hours  pantoprazole  Injectable 40 milliGRAM(s) IV Push every 12 hours  petrolatum Ophthalmic Ointment 1 Application(s) Both EYES every 8 hours  senna Syrup 10 milliLiter(s) Oral at bedtime  sodium chloride 3%  Inhalation 4 milliLiter(s) Inhalation every 8 hours  sucralfate suspension 1 Gram(s) Oral every 6 hours    MEDICATIONS  (PRN):  acetaminophen   Oral Liquid .. 1000 milliGRAM(s) Enteral Tube every 6 hours PRN Temp greater or equal to 38.5C (101.3F), Mild Pain (1 - 3)      LABS:                        8.2    11.34 )-----------( 500      ( 09 Apr 2024 06:45 )             28.5     04-09    139  |  101  |  12  ----------------------------<  147<H>  4.4   |  29  |  0.30<L>    Ca    8.6      09 Apr 2024 06:45  Phos  2.8     04-09  Mg     2.0     04-09        Urinalysis Basic - ( 09 Apr 2024 06:45 )    Color: x / Appearance: x / SG: x / pH: x  Gluc: 147 mg/dL / Ketone: x  / Bili: x / Urobili: x   Blood: x / Protein: x / Nitrite: x   Leuk Esterase: x / RBC: x / WBC x   Sq Epi: x / Non Sq Epi: x / Bacteria: x          CAPILLARY BLOOD GLUCOSE    MICROBIOLOGY:     RADIOLOGY:  [ ] Reviewed and interpreted by me    Mode: AC/ CMV (Assist Control/ Continuous Mandatory Ventilation)  RR (machine): 12  TV (machine): 500  FiO2: 40  PEEP: 5  ITime: 0.9  MAP: 10  PIP: 16

## 2024-04-10 NOTE — PROGRESS NOTE ADULT - NS ATTEND AMEND GEN_ALL_CORE FT
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 Hospital course has been complicated by VAP, massive GIB with hemorrhagic shock, worsening neurologic vascular injury with small scattered foci of diffusion restriction within the right cerebellar hemisphere, left parietal lobe, and symmetrically in the bilateral basal ganglia, recurrent GIB (3/27) found to have duodenal erosions on endoscopy.     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 cerebellar and L parietal infarctions suspicious for septic embolic phenomena and raised suspicion for endocarditis  Bilateral symmetric basal ganglia lesions with differential including hypoxic injury producing delayed post-hypoxic leukoencephalopathy verses toxidrome. Suspect more likely related to hypoxia/hypoperfusion in the setting of hemorrhagic/septic shock event.  His neurologic exam is severely limited. Open eyes spontaneously, with some mouth and head movements. He is able to follow commands by squeezing hands, and answer simple questions shaking head yes or no.  - Repeat MRI completed. Per neurology, there are areas of new infarcts concerning for embolic events. MILADIS done, no vegetations present  -  Discussed with EP team. Not a candidate for ILR at this time Considering recurrent GIB and hemorrhagic shock, even if AF discovered, not a candidate for A/C at this time and not candidate for HEENA closure. In addition ILR would be nidus for infection and he is high risk for developing copmlications.   - Rechallenge with ASA. Holding Plavix still.     CV: #Hypotension: in the setting of hemorrhagic, septic shock. Resolved. Off oral vasopressor. Normotensive today   Hx of bradycardia - not on beta blockers at this time     Resp   #Acute respiratory failure, mixed hypoxic hypercapnia: Remains on ventilator support, tolerating limited high pressure support trials.   He currently has an 8 cuffed portex, placed 2/16 with Dr. Sood  - Continue wean briseyda as tolerated - CPAP during the day, does not tolerate TC   - Suctioning, chest pt as needed  - Oral care as per RCU team  - OOB as tolerated  - PT/OT  #Respiratory muscle weakness in the setting of AIDP    #VAP:   SCx 2/21: PSa  SCx 3/4 PSa   SCx 3/20 PSa, CRE  3/26: CRE Pseudomonas  S/P Meropenem, Zosyn courses previously during hospitalization  He has now been resumed on Cefepime for empiric coverage  - Continue Cefepime, plan for 14 day course    GI  #GIB, Acute blood loss anemia   #Duodenal erosions  Now with stable H/H and no further evidence of bleeding.  - PPI as above, carafate  - tolerating tube feeds     ID  Cefepime for CRE pseudomonas in sputum until 4/11    Heme  Soleal DVTs, not on AC, surveillance ultrasounds.        requiring straight cath, started Cardura 4/8

## 2024-04-10 NOTE — PROGRESS NOTE ADULT - PROBLEM SELECTOR PLAN 1
- MRI 2/12: small b/l cerebellar strokes (post-procedural) and prior L thalamic strokes  - MRI 3/5: Small scattered foci of diffusion restriction within the right cerebellar hemisphere, left parietal lobe, and symmetrically in the bilateral basal ganglia.  - Lipitor d/c'd in setting of liver injury 3/3  - Was on ASA And Plavix for SAMMPRIS trial in setting of intracranial stenosis   - ASA and Plavix dcd in setting of Recurrent Melena on 3/27  - Repeat MRI 4/2: Concerning for Acute / Subacute Infarctions w/ concern for embolic Distribution  - MILADIS 4/6: EF 60 to 65 %. Normal RV cavity size, with normal SF. No evidence of vegetations. No thrombus seen in the left atrial, left atrial appendage, right atrial or right atrial appendage. Negative for intracardiac shunt  - Patient evaluated by EP not a candidate for ILR as patient high rx for infection and not a candidate for full AC  - ASA restarted on 4/3, Currently w/o signs of melena / Cont to monitor cbc

## 2024-04-10 NOTE — PROGRESS NOTE ADULT - PROBLEM SELECTOR PLAN 9
- Heparin Sub q held in setting of recent Melena  - Continue Compression Devices bilaterally - Patient with intermittent urinary retention   - Continue to Bladder Scan and straight cath q 6 hrs PRN > 450 cc of retained urine  - LTACH Can accommodate ICS upon dc

## 2024-04-10 NOTE — PROGRESS NOTE ADULT - PROBLEM SELECTOR PLAN 11
- Patients wife Margarette updated at bedside this morning   - Patients Brother  and Children updated over the phone at bedside in the RCU this morning   - Tentatively planned for Dc Tomorrow 4/11 to LTACH - Patients wife Margarette updated at bedside this morning   - Patients Brother  and Children updated over the phone at bedside by the RCU this morning   - Tentatively planned for Dc Tomorrow 4/11 to LTACH

## 2024-04-11 VITALS
HEART RATE: 91 BPM | SYSTOLIC BLOOD PRESSURE: 152 MMHG | RESPIRATION RATE: 18 BRPM | DIASTOLIC BLOOD PRESSURE: 73 MMHG | TEMPERATURE: 98 F | OXYGEN SATURATION: 99 %

## 2024-04-11 LAB
ANION GAP SERPL CALC-SCNC: 9 MMOL/L — SIGNIFICANT CHANGE UP (ref 5–17)
BUN SERPL-MCNC: 12 MG/DL — SIGNIFICANT CHANGE UP (ref 7–23)
CALCIUM SERPL-MCNC: 9.2 MG/DL — SIGNIFICANT CHANGE UP (ref 8.4–10.5)
CHLORIDE SERPL-SCNC: 102 MMOL/L — SIGNIFICANT CHANGE UP (ref 96–108)
CO2 SERPL-SCNC: 27 MMOL/L — SIGNIFICANT CHANGE UP (ref 22–31)
CREAT SERPL-MCNC: 0.33 MG/DL — LOW (ref 0.5–1.3)
EGFR: 121 ML/MIN/1.73M2 — SIGNIFICANT CHANGE UP
GLUCOSE BLDC GLUCOMTR-MCNC: 121 MG/DL — HIGH (ref 70–99)
GLUCOSE BLDC GLUCOMTR-MCNC: 126 MG/DL — HIGH (ref 70–99)
GLUCOSE BLDC GLUCOMTR-MCNC: 93 MG/DL — SIGNIFICANT CHANGE UP (ref 70–99)
GLUCOSE SERPL-MCNC: 101 MG/DL — HIGH (ref 70–99)
HCT VFR BLD CALC: 28.9 % — LOW (ref 39–50)
HGB BLD-MCNC: 7.9 G/DL — LOW (ref 13–17)
MAGNESIUM SERPL-MCNC: 2 MG/DL — SIGNIFICANT CHANGE UP (ref 1.6–2.6)
MCHC RBC-ENTMCNC: 23.5 PG — LOW (ref 27–34)
MCHC RBC-ENTMCNC: 27.3 GM/DL — LOW (ref 32–36)
MCV RBC AUTO: 86 FL — SIGNIFICANT CHANGE UP (ref 80–100)
NRBC # BLD: 0 /100 WBCS — SIGNIFICANT CHANGE UP (ref 0–0)
PHOSPHATE SERPL-MCNC: 3.8 MG/DL — SIGNIFICANT CHANGE UP (ref 2.5–4.5)
PLATELET # BLD AUTO: 489 K/UL — HIGH (ref 150–400)
POTASSIUM SERPL-MCNC: 4.8 MMOL/L — SIGNIFICANT CHANGE UP (ref 3.5–5.3)
POTASSIUM SERPL-SCNC: 4.8 MMOL/L — SIGNIFICANT CHANGE UP (ref 3.5–5.3)
RBC # BLD: 3.36 M/UL — LOW (ref 4.2–5.8)
RBC # FLD: 18.3 % — HIGH (ref 10.3–14.5)
SARS-COV-2 RNA SPEC QL NAA+PROBE: SIGNIFICANT CHANGE UP
SODIUM SERPL-SCNC: 138 MMOL/L — SIGNIFICANT CHANGE UP (ref 135–145)
WBC # BLD: 7.9 K/UL — SIGNIFICANT CHANGE UP (ref 3.8–10.5)
WBC # FLD AUTO: 7.9 K/UL — SIGNIFICANT CHANGE UP (ref 3.8–10.5)

## 2024-04-11 PROCEDURE — 87483 CNS DNA AMP PROBE TYPE 12-25: CPT

## 2024-04-11 PROCEDURE — 87046 STOOL CULTR AEROBIC BACT EA: CPT

## 2024-04-11 PROCEDURE — 70498 CT ANGIOGRAPHY NECK: CPT | Mod: MA

## 2024-04-11 PROCEDURE — 81003 URINALYSIS AUTO W/O SCOPE: CPT

## 2024-04-11 PROCEDURE — 82040 ASSAY OF SERUM ALBUMIN: CPT

## 2024-04-11 PROCEDURE — 83516 IMMUNOASSAY NONANTIBODY: CPT

## 2024-04-11 PROCEDURE — 83970 ASSAY OF PARATHORMONE: CPT

## 2024-04-11 PROCEDURE — 87476 LYME DIS DNA AMP PROBE: CPT

## 2024-04-11 PROCEDURE — 84207 ASSAY OF VITAMIN B-6: CPT

## 2024-04-11 PROCEDURE — 83519 RIA NONANTIBODY: CPT

## 2024-04-11 PROCEDURE — 86780 TREPONEMA PALLIDUM: CPT

## 2024-04-11 PROCEDURE — 92523 SPEECH SOUND LANG COMPREHEN: CPT

## 2024-04-11 PROCEDURE — 87641 MR-STAPH DNA AMP PROBE: CPT

## 2024-04-11 PROCEDURE — 84100 ASSAY OF PHOSPHORUS: CPT

## 2024-04-11 PROCEDURE — 94640 AIRWAY INHALATION TREATMENT: CPT

## 2024-04-11 PROCEDURE — 72158 MRI LUMBAR SPINE W/O & W/DYE: CPT

## 2024-04-11 PROCEDURE — 84145 PROCALCITONIN (PCT): CPT

## 2024-04-11 PROCEDURE — 93325 DOPPLER ECHO COLOR FLOW MAPG: CPT

## 2024-04-11 PROCEDURE — 70450 CT HEAD/BRAIN W/O DYE: CPT | Mod: MC

## 2024-04-11 PROCEDURE — 81001 URINALYSIS AUTO W/SCOPE: CPT

## 2024-04-11 PROCEDURE — 85018 HEMOGLOBIN: CPT

## 2024-04-11 PROCEDURE — 87899 AGENT NOS ASSAY W/OPTIC: CPT

## 2024-04-11 PROCEDURE — 36430 TRANSFUSION BLD/BLD COMPNT: CPT

## 2024-04-11 PROCEDURE — 88185 FLOWCYTOMETRY/TC ADD-ON: CPT

## 2024-04-11 PROCEDURE — 86923 COMPATIBILITY TEST ELECTRIC: CPT

## 2024-04-11 PROCEDURE — 84295 ASSAY OF SERUM SODIUM: CPT

## 2024-04-11 PROCEDURE — 86480 TB TEST CELL IMMUN MEASURE: CPT

## 2024-04-11 PROCEDURE — 86140 C-REACTIVE PROTEIN: CPT

## 2024-04-11 PROCEDURE — 84484 ASSAY OF TROPONIN QUANT: CPT

## 2024-04-11 PROCEDURE — 83873 ASSAY OF CSF PROTEIN: CPT

## 2024-04-11 PROCEDURE — 93970 EXTREMITY STUDY: CPT

## 2024-04-11 PROCEDURE — C1894: CPT

## 2024-04-11 PROCEDURE — 86788 WEST NILE VIRUS AB IGM: CPT

## 2024-04-11 PROCEDURE — 86900 BLOOD TYPING SEROLOGIC ABO: CPT

## 2024-04-11 PROCEDURE — 97129 THER IVNTJ 1ST 15 MIN: CPT

## 2024-04-11 PROCEDURE — 83916 OLIGOCLONAL BANDS: CPT

## 2024-04-11 PROCEDURE — P9045: CPT

## 2024-04-11 PROCEDURE — 82945 GLUCOSE OTHER FLUID: CPT

## 2024-04-11 PROCEDURE — C1769: CPT

## 2024-04-11 PROCEDURE — 87385 HISTOPLASMA CAPSUL AG IA: CPT

## 2024-04-11 PROCEDURE — 86362 MOG-IGG1 ANTB CBA EACH: CPT

## 2024-04-11 PROCEDURE — 80061 LIPID PANEL: CPT

## 2024-04-11 PROCEDURE — 87799 DETECT AGENT NOS DNA QUANT: CPT

## 2024-04-11 PROCEDURE — 76705 ECHO EXAM OF ABDOMEN: CPT

## 2024-04-11 PROCEDURE — P9040: CPT

## 2024-04-11 PROCEDURE — C9399: CPT

## 2024-04-11 PROCEDURE — 99239 HOSP IP/OBS DSCHRG MGMT >30: CPT

## 2024-04-11 PROCEDURE — 80048 BASIC METABOLIC PNL TOTAL CA: CPT

## 2024-04-11 PROCEDURE — 71045 X-RAY EXAM CHEST 1 VIEW: CPT

## 2024-04-11 PROCEDURE — 80202 ASSAY OF VANCOMYCIN: CPT

## 2024-04-11 PROCEDURE — 0042T: CPT | Mod: MA

## 2024-04-11 PROCEDURE — A9585: CPT

## 2024-04-11 PROCEDURE — 85014 HEMATOCRIT: CPT

## 2024-04-11 PROCEDURE — 84155 ASSAY OF PROTEIN SERUM: CPT

## 2024-04-11 PROCEDURE — 36514 APHERESIS PLASMA: CPT

## 2024-04-11 PROCEDURE — 96374 THER/PROPH/DIAG INJ IV PUSH: CPT

## 2024-04-11 PROCEDURE — C1887: CPT

## 2024-04-11 PROCEDURE — 82310 ASSAY OF CALCIUM: CPT

## 2024-04-11 PROCEDURE — 84300 ASSAY OF URINE SODIUM: CPT

## 2024-04-11 PROCEDURE — 86850 RBC ANTIBODY SCREEN: CPT

## 2024-04-11 PROCEDURE — 82746 ASSAY OF FOLIC ACID SERUM: CPT

## 2024-04-11 PROCEDURE — C1751: CPT

## 2024-04-11 PROCEDURE — 82784 ASSAY IGA/IGD/IGG/IGM EACH: CPT

## 2024-04-11 PROCEDURE — 84425 ASSAY OF VITAMIN B-1: CPT

## 2024-04-11 PROCEDURE — 96376 TX/PRO/DX INJ SAME DRUG ADON: CPT

## 2024-04-11 PROCEDURE — 95700 EEG CONT REC W/VID EEG TECH: CPT

## 2024-04-11 PROCEDURE — 86617 LYME DISEASE ANTIBODY: CPT

## 2024-04-11 PROCEDURE — P9016: CPT

## 2024-04-11 PROCEDURE — 82042 OTHER SOURCE ALBUMIN QUAN EA: CPT

## 2024-04-11 PROCEDURE — 36415 COLL VENOUS BLD VENIPUNCTURE: CPT

## 2024-04-11 PROCEDURE — 84165 PROTEIN E-PHORESIS SERUM: CPT

## 2024-04-11 PROCEDURE — 97535 SELF CARE MNGMENT TRAINING: CPT

## 2024-04-11 PROCEDURE — 74177 CT ABD & PELVIS W/CONTRAST: CPT | Mod: MC

## 2024-04-11 PROCEDURE — 82435 ASSAY OF BLOOD CHLORIDE: CPT

## 2024-04-11 PROCEDURE — 82525 ASSAY OF COPPER: CPT

## 2024-04-11 PROCEDURE — 82175 ASSAY OF ARSENIC: CPT

## 2024-04-11 PROCEDURE — 83036 HEMOGLOBIN GLYCOSYLATED A1C: CPT

## 2024-04-11 PROCEDURE — 70496 CT ANGIOGRAPHY HEAD: CPT | Mod: MC

## 2024-04-11 PROCEDURE — 85730 THROMBOPLASTIN TIME PARTIAL: CPT

## 2024-04-11 PROCEDURE — 87449 NOS EACH ORGANISM AG IA: CPT

## 2024-04-11 PROCEDURE — 82306 VITAMIN D 25 HYDROXY: CPT

## 2024-04-11 PROCEDURE — 82565 ASSAY OF CREATININE: CPT

## 2024-04-11 PROCEDURE — P9059: CPT

## 2024-04-11 PROCEDURE — 82553 CREATINE MB FRACTION: CPT

## 2024-04-11 PROCEDURE — 86225 DNA ANTIBODY NATIVE: CPT

## 2024-04-11 PROCEDURE — 36226 PLACE CATH VERTEBRAL ART: CPT

## 2024-04-11 PROCEDURE — 86592 SYPHILIS TEST NON-TREP QUAL: CPT

## 2024-04-11 PROCEDURE — 84436 ASSAY OF TOTAL THYROXINE: CPT

## 2024-04-11 PROCEDURE — 87070 CULTURE OTHR SPECIMN AEROBIC: CPT

## 2024-04-11 PROCEDURE — 97166 OT EVAL MOD COMPLEX 45 MIN: CPT

## 2024-04-11 PROCEDURE — 71260 CT THORAX DX C+: CPT | Mod: MC

## 2024-04-11 PROCEDURE — C8929: CPT

## 2024-04-11 PROCEDURE — 82947 ASSAY GLUCOSE BLOOD QUANT: CPT

## 2024-04-11 PROCEDURE — 97110 THERAPEUTIC EXERCISES: CPT

## 2024-04-11 PROCEDURE — 82272 OCCULT BLD FECES 1-3 TESTS: CPT

## 2024-04-11 PROCEDURE — 84443 ASSAY THYROID STIM HORMONE: CPT

## 2024-04-11 PROCEDURE — 85385 FIBRINOGEN ANTIGEN: CPT

## 2024-04-11 PROCEDURE — 86901 BLOOD TYPING SEROLOGIC RH(D): CPT

## 2024-04-11 PROCEDURE — 84156 ASSAY OF PROTEIN URINE: CPT

## 2024-04-11 PROCEDURE — 85576 BLOOD PLATELET AGGREGATION: CPT

## 2024-04-11 PROCEDURE — 85384 FIBRINOGEN ACTIVITY: CPT

## 2024-04-11 PROCEDURE — 93308 TTE F-UP OR LMTD: CPT

## 2024-04-11 PROCEDURE — 97530 THERAPEUTIC ACTIVITIES: CPT

## 2024-04-11 PROCEDURE — 87040 BLOOD CULTURE FOR BACTERIA: CPT

## 2024-04-11 PROCEDURE — 36600 WITHDRAWAL OF ARTERIAL BLOOD: CPT

## 2024-04-11 PROCEDURE — 85610 PROTHROMBIN TIME: CPT

## 2024-04-11 PROCEDURE — 87635 SARS-COV-2 COVID-19 AMP PRB: CPT

## 2024-04-11 PROCEDURE — 99291 CRITICAL CARE FIRST HOUR: CPT | Mod: 25

## 2024-04-11 PROCEDURE — 36569 INSJ PICC 5 YR+ W/O IMAGING: CPT

## 2024-04-11 PROCEDURE — 96375 TX/PRO/DX INJ NEW DRUG ADDON: CPT

## 2024-04-11 PROCEDURE — 87086 URINE CULTURE/COLONY COUNT: CPT

## 2024-04-11 PROCEDURE — 93005 ELECTROCARDIOGRAM TRACING: CPT

## 2024-04-11 PROCEDURE — 97162 PT EVAL MOD COMPLEX 30 MIN: CPT

## 2024-04-11 PROCEDURE — 82140 ASSAY OF AMMONIA: CPT

## 2024-04-11 PROCEDURE — 76376 3D RENDER W/INTRP POSTPROCES: CPT

## 2024-04-11 PROCEDURE — 85025 COMPLETE CBC W/AUTO DIFF WBC: CPT

## 2024-04-11 PROCEDURE — 86403 PARTICLE AGGLUT ANTBDY SCRN: CPT

## 2024-04-11 PROCEDURE — 84132 ASSAY OF SERUM POTASSIUM: CPT

## 2024-04-11 PROCEDURE — 87205 SMEAR GRAM STAIN: CPT

## 2024-04-11 PROCEDURE — 87389 HIV-1 AG W/HIV-1&-2 AB AG IA: CPT

## 2024-04-11 PROCEDURE — C9460: CPT

## 2024-04-11 PROCEDURE — 82330 ASSAY OF CALCIUM: CPT

## 2024-04-11 PROCEDURE — 86789 WEST NILE VIRUS ANTIBODY: CPT

## 2024-04-11 PROCEDURE — 82803 BLOOD GASES ANY COMBINATION: CPT

## 2024-04-11 PROCEDURE — 87186 SC STD MICRODIL/AGAR DIL: CPT

## 2024-04-11 PROCEDURE — 87640 STAPH A DNA AMP PROBE: CPT

## 2024-04-11 PROCEDURE — 36223 PLACE CATH CAROTID/INOM ART: CPT

## 2024-04-11 PROCEDURE — 85652 RBC SED RATE AUTOMATED: CPT

## 2024-04-11 PROCEDURE — 83735 ASSAY OF MAGNESIUM: CPT

## 2024-04-11 PROCEDURE — 71275 CT ANGIOGRAPHY CHEST: CPT | Mod: MC

## 2024-04-11 PROCEDURE — 82570 ASSAY OF URINE CREATININE: CPT

## 2024-04-11 PROCEDURE — 93320 DOPPLER ECHO COMPLETE: CPT

## 2024-04-11 PROCEDURE — 82550 ASSAY OF CK (CPK): CPT

## 2024-04-11 PROCEDURE — 86255 FLUORESCENT ANTIBODY SCREEN: CPT

## 2024-04-11 PROCEDURE — 86334 IMMUNOFIX E-PHORESIS SERUM: CPT

## 2024-04-11 PROCEDURE — 87102 FUNGUS ISOLATION CULTURE: CPT

## 2024-04-11 PROCEDURE — 72156 MRI NECK SPINE W/O & W/DYE: CPT

## 2024-04-11 PROCEDURE — 82164 ANGIOTENSIN I ENZYME TEST: CPT

## 2024-04-11 PROCEDURE — 83520 IMMUNOASSAY QUANT NOS NONAB: CPT

## 2024-04-11 PROCEDURE — 70553 MRI BRAIN STEM W/O & W/DYE: CPT

## 2024-04-11 PROCEDURE — 87116 MYCOBACTERIA CULTURE: CPT

## 2024-04-11 PROCEDURE — 92507 TX SP LANG VOICE COMM INDIV: CPT

## 2024-04-11 PROCEDURE — 93321 DOPPLER ECHO F-UP/LMTD STD: CPT

## 2024-04-11 PROCEDURE — 86053 AQAPRN-4 ANTB FLO CYTMTRY EA: CPT

## 2024-04-11 PROCEDURE — 85362 FIBRIN DEGRADATION PRODUCTS: CPT

## 2024-04-11 PROCEDURE — 82607 VITAMIN B-12: CPT

## 2024-04-11 PROCEDURE — 88184 FLOWCYTOMETRY/ TC 1 MARKER: CPT

## 2024-04-11 PROCEDURE — 84157 ASSAY OF PROTEIN OTHER: CPT

## 2024-04-11 PROCEDURE — 94003 VENT MGMT INPAT SUBQ DAY: CPT

## 2024-04-11 PROCEDURE — 72157 MRI CHEST SPINE W/O & W/DYE: CPT

## 2024-04-11 PROCEDURE — 83605 ASSAY OF LACTIC ACID: CPT

## 2024-04-11 PROCEDURE — 86341 ISLET CELL ANTIBODY: CPT

## 2024-04-11 PROCEDURE — 87077 CULTURE AEROBIC IDENTIFY: CPT

## 2024-04-11 PROCEDURE — 86038 ANTINUCLEAR ANTIBODIES: CPT

## 2024-04-11 PROCEDURE — 70551 MRI BRAIN STEM W/O DYE: CPT | Mod: MC

## 2024-04-11 PROCEDURE — 83615 LACTATE (LD) (LDH) ENZYME: CPT

## 2024-04-11 PROCEDURE — 84540 ASSAY OF URINE/UREA-N: CPT

## 2024-04-11 PROCEDURE — 82962 GLUCOSE BLOOD TEST: CPT

## 2024-04-11 PROCEDURE — 94002 VENT MGMT INPAT INIT DAY: CPT

## 2024-04-11 PROCEDURE — 86618 LYME DISEASE ANTIBODY: CPT

## 2024-04-11 PROCEDURE — 85027 COMPLETE CBC AUTOMATED: CPT

## 2024-04-11 PROCEDURE — 93312 ECHO TRANSESOPHAGEAL: CPT

## 2024-04-11 PROCEDURE — 87045 FECES CULTURE AEROBIC BACT: CPT

## 2024-04-11 PROCEDURE — 84439 ASSAY OF FREE THYROXINE: CPT

## 2024-04-11 PROCEDURE — 89051 BODY FLUID CELL COUNT: CPT

## 2024-04-11 PROCEDURE — 31500 INSERT EMERGENCY AIRWAY: CPT

## 2024-04-11 PROCEDURE — P9041: CPT

## 2024-04-11 PROCEDURE — 80053 COMPREHEN METABOLIC PANEL: CPT

## 2024-04-11 PROCEDURE — 84480 ASSAY TRIIODOTHYRONINE (T3): CPT

## 2024-04-11 PROCEDURE — 83880 ASSAY OF NATRIURETIC PEPTIDE: CPT

## 2024-04-11 PROCEDURE — 95714 VEEG EA 12-26 HR UNMNTR: CPT

## 2024-04-11 RX ORDER — ATENOLOL AND CHLORTHALIDONE 50; 25 MG/1; MG/1
1 TABLET ORAL
Refills: 0 | DISCHARGE

## 2024-04-11 RX ORDER — ASCORBIC ACID 60 MG
1 TABLET,CHEWABLE ORAL
Refills: 0 | DISCHARGE

## 2024-04-11 RX ORDER — FLUTICASONE PROPIONATE 50 MCG
1 SPRAY, SUSPENSION NASAL
Qty: 0 | Refills: 0 | DISCHARGE
Start: 2024-04-11

## 2024-04-11 RX ORDER — DOXAZOSIN MESYLATE 4 MG
1 TABLET ORAL
Qty: 0 | Refills: 0 | DISCHARGE
Start: 2024-04-11

## 2024-04-11 RX ORDER — METFORMIN HYDROCHLORIDE 850 MG/1
1 TABLET ORAL
Refills: 0 | DISCHARGE

## 2024-04-11 RX ORDER — GABAPENTIN 400 MG/1
2 CAPSULE ORAL
Qty: 0 | Refills: 0 | DISCHARGE
Start: 2024-04-11

## 2024-04-11 RX ORDER — ASPIRIN/CALCIUM CARB/MAGNESIUM 324 MG
1 TABLET ORAL
Refills: 0 | DISCHARGE

## 2024-04-11 RX ORDER — ACETAMINOPHEN 500 MG
31.25 TABLET ORAL
Qty: 0 | Refills: 0 | DISCHARGE
Start: 2024-04-11

## 2024-04-11 RX ORDER — POTASSIUM CHLORIDE 20 MEQ
1 PACKET (EA) ORAL
Refills: 0 | DISCHARGE

## 2024-04-11 RX ORDER — INSULIN LISPRO 100/ML
1 VIAL (ML) SUBCUTANEOUS
Qty: 0 | Refills: 0 | DISCHARGE
Start: 2024-04-11

## 2024-04-11 RX ORDER — IPRATROPIUM/ALBUTEROL SULFATE 18-103MCG
3 AEROSOL WITH ADAPTER (GRAM) INHALATION
Qty: 0 | Refills: 0 | DISCHARGE
Start: 2024-04-11

## 2024-04-11 RX ORDER — HUMAN INSULIN 100 [IU]/ML
7 INJECTION, SUSPENSION SUBCUTANEOUS
Qty: 0 | Refills: 0 | DISCHARGE
Start: 2024-04-11

## 2024-04-11 RX ORDER — SALIVA SUBSTITUTE COMB NO.11 351 MG
5 POWDER IN PACKET (EA) MUCOUS MEMBRANE
Qty: 0 | Refills: 0 | DISCHARGE
Start: 2024-04-11

## 2024-04-11 RX ORDER — NYSTATIN 500MM UNIT
5 POWDER (EA) MISCELLANEOUS
Qty: 0 | Refills: 0 | DISCHARGE
Start: 2024-04-11

## 2024-04-11 RX ORDER — SUCRALFATE 1 G
10 TABLET ORAL
Qty: 0 | Refills: 0 | DISCHARGE
Start: 2024-04-11

## 2024-04-11 RX ORDER — SENNA PLUS 8.6 MG/1
10 TABLET ORAL
Qty: 0 | Refills: 0 | DISCHARGE
Start: 2024-04-11

## 2024-04-11 RX ORDER — ASPIRIN/CALCIUM CARB/MAGNESIUM 324 MG
1 TABLET ORAL
Qty: 0 | Refills: 0 | DISCHARGE
Start: 2024-04-11

## 2024-04-11 RX ADMIN — Medication 1 DROP(S): at 14:45

## 2024-04-11 RX ADMIN — Medication 1 APPLICATION(S): at 14:45

## 2024-04-11 RX ADMIN — Medication 1 DROP(S): at 01:39

## 2024-04-11 RX ADMIN — GABAPENTIN 100 MILLIGRAM(S): 400 CAPSULE ORAL at 14:44

## 2024-04-11 RX ADMIN — Medication 1 SPRAY(S): at 05:34

## 2024-04-11 RX ADMIN — Medication 1 GRAM(S): at 11:48

## 2024-04-11 RX ADMIN — PANTOPRAZOLE SODIUM 40 MILLIGRAM(S): 20 TABLET, DELAYED RELEASE ORAL at 05:32

## 2024-04-11 RX ADMIN — Medication 81 MILLIGRAM(S): at 11:48

## 2024-04-11 RX ADMIN — HUMAN INSULIN 7 UNIT(S): 100 INJECTION, SUSPENSION SUBCUTANEOUS at 05:33

## 2024-04-11 RX ADMIN — Medication 500000 UNIT(S): at 11:48

## 2024-04-11 RX ADMIN — Medication 500000 UNIT(S): at 05:33

## 2024-04-11 RX ADMIN — Medication 5 MILLILITER(S): at 17:44

## 2024-04-11 RX ADMIN — Medication 3 MILLILITER(S): at 05:21

## 2024-04-11 RX ADMIN — Medication 1 APPLICATION(S): at 05:33

## 2024-04-11 RX ADMIN — Medication 1 DROP(S): at 17:45

## 2024-04-11 RX ADMIN — CEFEPIME 100 MILLIGRAM(S): 1 INJECTION, POWDER, FOR SOLUTION INTRAMUSCULAR; INTRAVENOUS at 05:32

## 2024-04-11 RX ADMIN — Medication 1 GRAM(S): at 05:32

## 2024-04-11 RX ADMIN — Medication 1 GRAM(S): at 17:44

## 2024-04-11 RX ADMIN — Medication 5 MILLILITER(S): at 05:33

## 2024-04-11 RX ADMIN — Medication 500000 UNIT(S): at 17:44

## 2024-04-11 RX ADMIN — CHLORHEXIDINE GLUCONATE 15 MILLILITER(S): 213 SOLUTION TOPICAL at 17:44

## 2024-04-11 RX ADMIN — Medication 1 DROP(S): at 05:33

## 2024-04-11 RX ADMIN — GABAPENTIN 100 MILLIGRAM(S): 400 CAPSULE ORAL at 05:32

## 2024-04-11 RX ADMIN — Medication 5 MILLILITER(S): at 11:49

## 2024-04-11 RX ADMIN — Medication 3 MILLILITER(S): at 13:49

## 2024-04-11 RX ADMIN — Medication 1 SPRAY(S): at 17:45

## 2024-04-11 RX ADMIN — HUMAN INSULIN 7 UNIT(S): 100 INJECTION, SUSPENSION SUBCUTANEOUS at 12:18

## 2024-04-11 RX ADMIN — Medication 1 DROP(S): at 10:46

## 2024-04-11 RX ADMIN — CHLORHEXIDINE GLUCONATE 15 MILLILITER(S): 213 SOLUTION TOPICAL at 05:32

## 2024-04-11 RX ADMIN — SODIUM CHLORIDE 4 MILLILITER(S): 9 INJECTION INTRAMUSCULAR; INTRAVENOUS; SUBCUTANEOUS at 05:21

## 2024-04-11 RX ADMIN — SODIUM CHLORIDE 4 MILLILITER(S): 9 INJECTION INTRAMUSCULAR; INTRAVENOUS; SUBCUTANEOUS at 13:49

## 2024-04-11 NOTE — PROGRESS NOTE ADULT - PROBLEM SELECTOR PROBLEM 1
CVA (cerebrovascular accident)
GBS (Guillain-[place] syndrome)
Anemia
CVA (cerebrovascular accident)
GBS (Guillain-[place] syndrome)
GBS (Guillain-[place] syndrome)
GBS (Guillain-[place] syndrome)
CVA (cerebrovascular accident)
CVA (cerebrovascular accident)
GBS (Guillain-[place] syndrome)
GBS (Guillain-[place] syndrome)
Anemia
CVA (cerebrovascular accident)
GBS (Guillain-[place] syndrome)
GBS (Guillain-[place] syndrome)
Anemia
GBS (Guillain-[place] syndrome)
GBS (Guillain-[place] syndrome)
GBS (Guillain-[place] syndrome)
GBS (Guillain-[place] syndrome)
Anemia
GBS (Guillain-[place] syndrome)
GBS (Guillain-[place] syndrome)
GBS (Guillain-[place] syndrome)
GBS (Guillain-[place] syndrome)
GBS (Guillain-[place] syndrome)
CVA (cerebrovascular accident)
CVA (cerebrovascular accident)
Anemia
GBS (Guillain-[place] syndrome)
GBS (Guillain-[place] syndrome)
GBS (Guillain-[place] syndrome)
GBS (Guillain-[place] syndrome)
GBS (Guillain-[place] syndrome)
GBS (Guillain-[place] syndrome)
CVA (cerebrovascular accident)
GBS (Guillain-[place] syndrome)
GBS (Guillain-[place] syndrome)
GBS (Guillain-[place] syndrome)
GBS (Guillain-[place] syndrome)
CVA (cerebrovascular accident)
GBS (Guillain-[place] syndrome)
Anemia
GBS (Guillain-[place] syndrome)
Anemia
GBS (Guillain-[place] syndrome)

## 2024-04-11 NOTE — PROGRESS NOTE ADULT - TIME BILLING
Chart review, exam, counseling, coordination of care
Review of patient records, including history, laboratory data, and imaging. Patient evaluation and assessment. Coordination of care. Time excludes teaching or bedside procedures.
Review of patient records, including history, laboratory data, imaging. Patient evaluation and assessment. Coordination of care. Time excludes teaching
review of the medical record, interpretation of labs, imaging studies, discussion with interdisciplinary team, physical exam and medical decision making as above
Review of patient records, including history, laboratory data, imaging. Patient evaluation and assessment. Coordination of care. Time excludes teaching
Review of patient records, including history, laboratory data, imaging. Patient evaluation and assessment. Coordination of care. Time excludes teaching
Review of medical records, labs, imaging , coordination of care and did not include time spent teaching.
review of the medical record, interpretation of labs, imaging studies, discussion with interdisciplinary team, physical exam and medical decision making as above
Review of patient records, including history, laboratory data, imaging. Patient evaluation and assessment. Coordination of care. Time excludes teaching
Review of patient records, including history, laboratory data, imaging. Patient evaluation and assessment. Coordination of care. Time excludes teaching
Medical management as above, reviewing chart and coordinating care with primary team/staff, as well as reviewing vitals, radiology, medication list, recent labs, and prior records.    Does not include teaching time.
review of the medical record, interpretation of labs, imaging studies, discussion with interdisciplinary team, physical exam and medical decision making as above
review of the medical record, interpretation of labs, imaging studies, discussion with interdisciplinary team, physical exam and medical decision making as above
Review of medical records, labs, imaging , coordination of care and did not include time spent teaching.
Reviewing the EMR, vitals, imaging, medication list, recent labs, prior records and coordinating care with medical providers. This time excludes procedures and teaching.
review of the medical record, interpretation of labs, imaging studies, discussion with interdisciplinary team, physical exam and medical decision making as above
Review of patient records, including history, laboratory data, imaging. Patient evaluation and assessment. Coordination of care. Time excludes teaching.
Reviewed images and labs. Clinical decision making, changes in medication regimen. Dispo planning.
Reviewed images and labs. Clinical decision making, changes in medication regimen. Dispo planning.
Reviewing the EMR, vitals, imaging, medication list, recent labs, prior records and coordinating care with medical providers. This time excludes procedures and teaching.
Review of patient records, including history, laboratory data, imaging. Patient evaluation and assessment. Coordination of care. Time excludes teaching.
Reviewed images and labs. Clinical decision making, changes in medication regimen. Frequent bedside visits. Discussion with consultants, family, and patient.
Review of patient records, including history, laboratory data, and imaging. Patient evaluation and assessment. Coordination of care. Time excludes teaching or bedside procedures.
Review of medical records, labs, imaging , coordination of care and did not include time spent teaching.
Review of patient records, including history, laboratory data, imaging. Patient evaluation and assessment. Coordination of care. Time excludes teaching
Reviewing the EMR, vitals, imaging, medication list, recent labs, prior records and coordinating care with medical providers. This time excludes procedures and teaching.
review of the medical record, interpretation of labs, imaging studies, discussion with interdisciplinary team, physical exam and medical decision making as above
Medical management as above, reviewing chart and coordinating care with primary team/staff, as well as reviewing vitals, radiology, medication list, recent labs, and prior records.    Does not include teaching time.
Review of medical records, labs, imaging , coordination of care and did not include time spent teaching.
Review of patient records, including history, laboratory data, and imaging. Patient evaluation and assessment. Coordination of care. Time excludes teaching or bedside procedures.
Reviewed images and labs. Clinical decision making, changes in medication regimen. Dispo planning
Reviewed images and labs. Clinical decision making, changes in medication regimen. Vent management, hemodynamic monitoring.
Review of patient records, including history, laboratory data, and imaging. Patient evaluation and assessment. Coordination of care. Time excludes teaching or bedside procedures.
Review of patient records, including history, laboratory data, and imaging. Patient evaluation and assessment. Coordination of care. Time excludes teaching or bedside procedures.
Review of medical records, labs, imaging , coordination of care and did not include time spent teaching.

## 2024-04-11 NOTE — PROGRESS NOTE ADULT - NUTRITIONAL ASSESSMENT
This patient has been assessed with a concern for Malnutrition and has been determined to have a diagnosis/diagnoses of Moderate protein-calorie malnutrition.    This patient is being managed with:   Diet NPO with Tube Feed-  Tube Feeding Modality: Gastrostomy  Glucerna 1.2 David (GLUCERNARTH)  Total Volume for 24 Hours (mL): 1440  Continuous  Starting Tube Feed Rate {mL per Hour}: 30  Increase Tube Feed Rate by (mL): 10     Every 4 hours  Until Goal Tube Feed Rate (mL per Hour): 60  Tube Feed Duration (in Hours): 24  Tube Feed Start Time: 12:00  Entered: Feb 17 2024 11:55AM  
This patient has been assessed with a concern for Malnutrition and has been determined to have a diagnosis/diagnoses of Moderate protein-calorie malnutrition.    This patient is being managed with:   Diet NPO with Tube Feed-  Tube Feeding Modality: Gastrostomy  Glucerna 1.2 David (GLUCERNARTH)  Total Volume for 24 Hours (mL): 1440  Continuous  Starting Tube Feed Rate {mL per Hour}: 30  Increase Tube Feed Rate by (mL): 10     Every 4 hours  Until Goal Tube Feed Rate (mL per Hour): 60  Tube Feed Duration (in Hours): 24  Tube Feed Start Time: 12:00  Entered: Feb 17 2024 11:55AM  
This patient has been assessed with a concern for Malnutrition and has been determined to have a diagnosis/diagnoses of Moderate protein-calorie malnutrition.    This patient is being managed with:   Diet NPO with Tube Feed-  Tube Feeding Modality: Gastrostomy  Glucerna 1.2 David (GLUCERNARTH)  Total Volume for 24 Hours (mL): 1680  Continuous  Starting Tube Feed Rate {mL per Hour}: 30  Increase Tube Feed Rate by (mL): 10     Every 4 hours  Until Goal Tube Feed Rate (mL per Hour): 70  Tube Feed Duration (in Hours): 24  Tube Feed Start Time: 12:00  Supplement Feeding Modality:  Gastrostomy  Probiotic Yogurt/Smoothie Cans or Servings Per Day:  2       Frequency:  Daily  Entered: Feb 23 2024  9:44AM  
This patient has been assessed with a concern for Malnutrition and has been determined to have a diagnosis/diagnoses of Moderate protein-calorie malnutrition.    This patient is being managed with:   Diet NPO with Tube Feed-  Tube Feeding Modality: Orogastric  Glucerna 1.2 David (GLUCERNARTH)  Total Volume for 24 Hours (mL): 1440  Continuous     Every 4 hours  Until Goal Tube Feed Rate (mL per Hour): 60  Tube Feed Duration (in Hours): 24  Tube Feed Start Time: 22:10  Entered: Feb 13 2024  9:33AM  
This patient has been assessed with a concern for Malnutrition and has been determined to have a diagnosis/diagnoses of Moderate protein-calorie malnutrition.    This patient is being managed with:   Diet NPO-  Except Medications  Entered: Feb 16 2024  9:29AM  
This patient has been assessed with a concern for Malnutrition and has been determined to have a diagnosis/diagnoses of Moderate protein-calorie malnutrition.    This patient is being managed with:   Diet NPO-  Tube Feeding Modality: Gastrostomy  Glucerna 1.5 David (GLUCERNA1.5RTH)  Total Volume for 24 Hours (mL): 1560  Continuous  Starting Tube Feed Rate {mL per Hour}: 30  Increase Tube Feed Rate by (mL): 10     Every 4 hours  Until Goal Tube Feed Rate (mL per Hour): 65  Tube Feed Duration (in Hours): 24  Tube Feed Start Time: 06:00  Entered: Mar 28 2024 12:13PM  
This patient has been assessed with a concern for Malnutrition and has been determined to have a diagnosis/diagnoses of Moderate protein-calorie malnutrition.    This patient is being managed with:   Diet NPO-  Tube Feeding Modality: Gastrostomy  Glucerna 1.5 David (GLUCERNA1.5RTH)  Total Volume for 24 Hours (mL): 1560  Continuous  Starting Tube Feed Rate {mL per Hour}: 65  Until Goal Tube Feed Rate (mL per Hour): 65  Tube Feed Duration (in Hours): 24  Tube Feed Start Time: 06:00  Entered: Mar 14 2024  6:29PM  
This patient has been assessed with a concern for Malnutrition and has been determined to have a diagnosis/diagnoses of Moderate protein-calorie malnutrition.    This patient is being managed with:   Diet NPO-  Tube Feeding Modality: Gastrostomy  Jevity 1.2 David (JEVITY1.2RTH)  Total Volume for 24 Hours (mL): 900  Continuous  Starting Tube Feed Rate {mL per Hour}: 30  Increase Tube Feed Rate by (mL): 10     Every 4 hours  Until Goal Tube Feed Rate (mL per Hour): 50  Tube Feed Duration (in Hours): 18  Tube Feed Start Time: 09:00  Entered: Mar  5 2024  9:12AM  
This patient has been assessed with a concern for Malnutrition and has been determined to have a diagnosis/diagnoses of Moderate protein-calorie malnutrition.  
This patient has been assessed with a concern for Malnutrition and has been determined to have a diagnosis/diagnoses of Moderate protein-calorie malnutrition.    This patient is being managed with:   Diet NPO with Tube Feed-  Tube Feeding Modality: Gastrostomy  Glucerna 1.2 David (GLUCERNARTH)  Total Volume for 24 Hours (mL): 1440  Continuous  Starting Tube Feed Rate {mL per Hour}: 30  Increase Tube Feed Rate by (mL): 10     Every 4 hours  Until Goal Tube Feed Rate (mL per Hour): 60  Tube Feed Duration (in Hours): 24  Tube Feed Start Time: 12:00  Entered: Feb 17 2024 11:55AM  
This patient has been assessed with a concern for Malnutrition and has been determined to have a diagnosis/diagnoses of Moderate protein-calorie malnutrition.    This patient is being managed with:   Diet NPO with Tube Feed-  Tube Feeding Modality: Gastrostomy  Glucerna 1.2 David (GLUCERNARTH)  Total Volume for 24 Hours (mL): 1680  Continuous  Starting Tube Feed Rate {mL per Hour}: 30  Increase Tube Feed Rate by (mL): 10     Every 4 hours  Until Goal Tube Feed Rate (mL per Hour): 70  Tube Feed Duration (in Hours): 24  Tube Feed Start Time: 12:00  Supplement Feeding Modality:  Gastrostomy  Probiotic Yogurt/Smoothie Cans or Servings Per Day:  2       Frequency:  Daily  Entered: Feb 23 2024  9:44AM  
This patient has been assessed with a concern for Malnutrition and has been determined to have a diagnosis/diagnoses of Moderate protein-calorie malnutrition.    This patient is being managed with:   Diet NPO with Tube Feed-  Tube Feeding Modality: Gastrostomy  Glucerna 1.2 David (GLUCERNARTH)  Total Volume for 24 Hours (mL): 1680  Continuous  Starting Tube Feed Rate {mL per Hour}: 30  Increase Tube Feed Rate by (mL): 10     Every 4 hours  Until Goal Tube Feed Rate (mL per Hour): 70  Tube Feed Duration (in Hours): 24  Tube Feed Start Time: 12:00  Supplement Feeding Modality:  Gastrostomy  Probiotic Yogurt/Smoothie Cans or Servings Per Day:  2       Frequency:  Daily  Entered: Feb 23 2024  9:44AM  
This patient has been assessed with a concern for Malnutrition and has been determined to have a diagnosis/diagnoses of Moderate protein-calorie malnutrition.    This patient is being managed with:   Diet NPO-  Except Medications  Entered: Mar 27 2024 12:37PM  
This patient has been assessed with a concern for Malnutrition and has been determined to have a diagnosis/diagnoses of Moderate protein-calorie malnutrition.  
This patient has been assessed with a concern for Malnutrition and has been determined to have a diagnosis/diagnoses of Moderate protein-calorie malnutrition.    This patient is being managed with:   Diet NPO with Tube Feed-  Tube Feeding Modality: Gastrostomy  Glucerna 1.2 David (GLUCERNARTH)  Total Volume for 24 Hours (mL): 1440  Continuous  Starting Tube Feed Rate {mL per Hour}: 30  Increase Tube Feed Rate by (mL): 10     Every 4 hours  Until Goal Tube Feed Rate (mL per Hour): 60  Tube Feed Duration (in Hours): 24  Tube Feed Start Time: 12:00  Entered: Feb 17 2024 11:55AM  
This patient has been assessed with a concern for Malnutrition and has been determined to have a diagnosis/diagnoses of Moderate protein-calorie malnutrition.    This patient is being managed with:   Diet NPO with Tube Feed-  Tube Feeding Modality: Orogastric  Glucerna 1.2 David (GLUCERNARTH)  Total Volume for 24 Hours (mL): 1440  Continuous     Every 4 hours  Until Goal Tube Feed Rate (mL per Hour): 60  Tube Feed Duration (in Hours): 24  Tube Feed Start Time: 22:10  Entered: Feb 13 2024  9:33AM  
This patient has been assessed with a concern for Malnutrition and has been determined to have a diagnosis/diagnoses of Moderate protein-calorie malnutrition.    This patient is being managed with:   Diet NPO with Tube Feed-  Tube Feeding Modality: Orogastric  Glucerna 1.2 David (GLUCERNARTH)  Total Volume for 24 Hours (mL): 1440  Continuous     Every 4 hours  Until Goal Tube Feed Rate (mL per Hour): 60  Tube Feed Duration (in Hours): 24  Tube Feed Start Time: 22:10  Entered: Feb 13 2024  9:33AM  
This patient has been assessed with a concern for Malnutrition and has been determined to have a diagnosis/diagnoses of Moderate protein-calorie malnutrition.    This patient is being managed with:   Diet NPO-  Tube Feeding Modality: Gastrostomy  Glucerna 1.5 David (GLUCERNA1.5RTH)  Total Volume for 24 Hours (mL): 1560  Continuous  Starting Tube Feed Rate {mL per Hour}: 65  Until Goal Tube Feed Rate (mL per Hour): 65  Tube Feed Duration (in Hours): 24  Tube Feed Start Time: 06:00  Entered: Mar 14 2024  6:29PM  
This patient has been assessed with a concern for Malnutrition and has been determined to have a diagnosis/diagnoses of Moderate protein-calorie malnutrition.    This patient is being managed with:   Diet NPO-  Tube Feeding Modality: Gastrostomy  Jevity 1.2 David (JEVITY1.2RTH)  Total Volume for 24 Hours (mL): 900  Continuous  Starting Tube Feed Rate {mL per Hour}: 30  Increase Tube Feed Rate by (mL): 10     Every 4 hours  Until Goal Tube Feed Rate (mL per Hour): 50  Tube Feed Duration (in Hours): 18  Tube Feed Start Time: 09:00  Entered: Mar  5 2024  9:12AM  
This patient has been assessed with a concern for Malnutrition and has been determined to have a diagnosis/diagnoses of Moderate protein-calorie malnutrition.    This patient is being managed with:   Diet NPO with Tube Feed-  Tube Feeding Modality: Gastrostomy  Glucerna 1.2 David (GLUCERNARTH)  Total Volume for 24 Hours (mL): 1440  Continuous  Starting Tube Feed Rate {mL per Hour}: 30  Increase Tube Feed Rate by (mL): 10     Every 4 hours  Until Goal Tube Feed Rate (mL per Hour): 60  Tube Feed Duration (in Hours): 24  Tube Feed Start Time: 12:00  Entered: Feb 17 2024 11:55AM  
This patient has been assessed with a concern for Malnutrition and has been determined to have a diagnosis/diagnoses of Moderate protein-calorie malnutrition.    This patient is being managed with:   Diet NPO with Tube Feed-  Tube Feeding Modality: Gastrostomy  Glucerna 1.2 David (GLUCERNARTH)  Total Volume for 24 Hours (mL): 1440  Continuous  Starting Tube Feed Rate {mL per Hour}: 30  Increase Tube Feed Rate by (mL): 10     Every 4 hours  Until Goal Tube Feed Rate (mL per Hour): 60  Tube Feed Duration (in Hours): 24  Tube Feed Start Time: 12:00  Entered: Feb 17 2024 11:55AM  
This patient has been assessed with a concern for Malnutrition and has been determined to have a diagnosis/diagnoses of Moderate protein-calorie malnutrition.    This patient is being managed with:   Diet NPO with Tube Feed-  Tube Feeding Modality: Gastrostomy  Glucerna 1.2 David (GLUCERNARTH)  Total Volume for 24 Hours (mL): 1440  Continuous  Starting Tube Feed Rate {mL per Hour}: 30  Increase Tube Feed Rate by (mL): 10     Every 4 hours  Until Goal Tube Feed Rate (mL per Hour): 60  Tube Feed Duration (in Hours): 24  Tube Feed Start Time: 12:00  Supplement Feeding Modality:  Gastrostomy  Probiotic Yogurt/Smoothie Cans or Servings Per Day:  2       Frequency:  Daily  Entered: Feb 21 2024  9:10AM  
This patient has been assessed with a concern for Malnutrition and has been determined to have a diagnosis/diagnoses of Moderate protein-calorie malnutrition.    This patient is being managed with:   Diet NPO with Tube Feed-  Tube Feeding Modality: Orogastric  Glucerna 1.2 David (GLUCERNARTH)  Total Volume for 24 Hours (mL): 1440  Continuous     Every 4 hours  Until Goal Tube Feed Rate (mL per Hour): 60  Tube Feed Duration (in Hours): 24  Tube Feed Start Time: 22:10  Entered: Feb 13 2024  9:33AM  
This patient has been assessed with a concern for Malnutrition and has been determined to have a diagnosis/diagnoses of Moderate protein-calorie malnutrition.    This patient is being managed with:   Diet NPO-  Tube Feeding Modality: Gastrostomy  Jevity 1.2 David (JEVITY1.2RTH)  Total Volume for 24 Hours (mL): 900  Continuous  Starting Tube Feed Rate {mL per Hour}: 30  Increase Tube Feed Rate by (mL): 10     Every 4 hours  Until Goal Tube Feed Rate (mL per Hour): 50  Tube Feed Duration (in Hours): 18  Tube Feed Start Time: 09:00  Entered: Mar  5 2024  9:12AM  
This patient has been assessed with a concern for Malnutrition and has been determined to have a diagnosis/diagnoses of Moderate protein-calorie malnutrition.  
This patient has been assessed with a concern for Malnutrition and has been determined to have a diagnosis/diagnoses of Moderate protein-calorie malnutrition.    This patient is being managed with:   Diet NPO-  Tube Feeding Modality: Gastrostomy  Glucerna 1.5 David (GLUCERNA1.5RTH)  Total Volume for 24 Hours (mL): 1560  Continuous  Starting Tube Feed Rate {mL per Hour}: 65  Until Goal Tube Feed Rate (mL per Hour): 65  Tube Feed Duration (in Hours): 24  Tube Feed Start Time: 06:00  Entered: Mar 14 2024  6:29PM  
This patient has been assessed with a concern for Malnutrition and has been determined to have a diagnosis/diagnoses of Moderate protein-calorie malnutrition.    This patient is being managed with:   Diet NPO with Tube Feed-  Tube Feeding Modality: Gastrostomy  Glucerna 1.2 David (GLUCERNARTH)  Total Volume for 24 Hours (mL): 1440  Continuous  Starting Tube Feed Rate {mL per Hour}: 30  Increase Tube Feed Rate by (mL): 10     Every 4 hours  Until Goal Tube Feed Rate (mL per Hour): 60  Tube Feed Duration (in Hours): 24  Tube Feed Start Time: 12:00  Entered: Feb 17 2024 11:55AM  
This patient has been assessed with a concern for Malnutrition and has been determined to have a diagnosis/diagnoses of Moderate protein-calorie malnutrition.    This patient is being managed with:   Diet NPO-  Tube Feeding Modality: Gastrostomy  Jevity 1.2 David (JEVITY1.2RTH)  Total Volume for 24 Hours (mL): 900  Continuous  Starting Tube Feed Rate {mL per Hour}: 30  Increase Tube Feed Rate by (mL): 10     Every 4 hours  Until Goal Tube Feed Rate (mL per Hour): 50  Tube Feed Duration (in Hours): 18  Tube Feed Start Time: 09:00  Entered: Mar  5 2024  9:12AM  
This patient has been assessed with a concern for Malnutrition and has been determined to have a diagnosis/diagnoses of Moderate protein-calorie malnutrition.    This patient is being managed with:   Diet NPO-  Tube Feeding Modality: Gastrostomy  Jevity 1.2 David (JEVITY1.2RTH)  Total Volume for 24 Hours (mL): 900  Continuous  Starting Tube Feed Rate {mL per Hour}: 30  Increase Tube Feed Rate by (mL): 10     Every 4 hours  Until Goal Tube Feed Rate (mL per Hour): 50  Tube Feed Duration (in Hours): 18  Tube Feed Start Time: 09:00  Entered: Mar  5 2024  9:12AM  
This patient has been assessed with a concern for Malnutrition and has been determined to have a diagnosis/diagnoses of Moderate protein-calorie malnutrition.    This patient is being managed with:   Diet NPO-  Tube Feeding Modality: Gastrostomy  Glucerna 1.5 David (GLUCERNA1.5RTH)  Total Volume for 24 Hours (mL): 1560  Continuous  Starting Tube Feed Rate {mL per Hour}: 30  Increase Tube Feed Rate by (mL): 10     Every 4 hours  Until Goal Tube Feed Rate (mL per Hour): 65  Tube Feed Duration (in Hours): 24  Tube Feed Start Time: 06:00  Entered: Mar 28 2024 12:13PM  
This patient has been assessed with a concern for Malnutrition and has been determined to have a diagnosis/diagnoses of Moderate protein-calorie malnutrition.    This patient is being managed with:   Diet NPO-  Tube Feeding Modality: Gastrostomy  Glucerna 1.5 David (GLUCERNA1.5RTH)  Total Volume for 24 Hours (mL): 1560  Continuous  Starting Tube Feed Rate {mL per Hour}: 30  Increase Tube Feed Rate by (mL): 10     Every 4 hours  Until Goal Tube Feed Rate (mL per Hour): 65  Tube Feed Duration (in Hours): 24  Tube Feed Start Time: 06:00  Entered: Mar 28 2024 12:13PM  
This patient has been assessed with a concern for Malnutrition and has been determined to have a diagnosis/diagnoses of Moderate protein-calorie malnutrition.    This patient is being managed with:   Diet NPO with Tube Feed-  Tube Feeding Modality: Gastrostomy  Glucerna 1.2 David (GLUCERNARTH)  Total Volume for 24 Hours (mL): 1680  Continuous  Starting Tube Feed Rate {mL per Hour}: 30  Increase Tube Feed Rate by (mL): 10     Every 4 hours  Until Goal Tube Feed Rate (mL per Hour): 70  Tube Feed Duration (in Hours): 24  Tube Feed Start Time: 12:00  Supplement Feeding Modality:  Gastrostomy  Probiotic Yogurt/Smoothie Cans or Servings Per Day:  2       Frequency:  Daily  Entered: Feb 23 2024  9:44AM  
This patient has been assessed with a concern for Malnutrition and has been determined to have a diagnosis/diagnoses of Moderate protein-calorie malnutrition.    This patient is being managed with:   Diet NPO-  Tube Feeding Modality: Gastrostomy  Jevity 1.2 David (JEVITY1.2RTH)  Total Volume for 24 Hours (mL): 900  Continuous  Starting Tube Feed Rate {mL per Hour}: 30  Increase Tube Feed Rate by (mL): 10     Every 4 hours  Until Goal Tube Feed Rate (mL per Hour): 50  Tube Feed Duration (in Hours): 18  Tube Feed Start Time: 09:00  Entered: Mar  5 2024  9:12AM  
This patient has been assessed with a concern for Malnutrition and has been determined to have a diagnosis/diagnoses of Moderate protein-calorie malnutrition.    This patient is being managed with:   Diet NPO-  Tube Feeding Modality: Gastrostomy  Jevity 1.2 David (JEVITY1.2RTH)  Total Volume for 24 Hours (mL): 900  Continuous  Starting Tube Feed Rate {mL per Hour}: 30  Increase Tube Feed Rate by (mL): 10     Every 4 hours  Until Goal Tube Feed Rate (mL per Hour): 50  Tube Feed Duration (in Hours): 18  Tube Feed Start Time: 09:00  Entered: Mar  5 2024  9:12AM  
This patient has been assessed with a concern for Malnutrition and has been determined to have a diagnosis/diagnoses of Moderate protein-calorie malnutrition.    This patient is being managed with:   Diet NPO-  Tube Feeding Modality: Gastrostomy  Glucerna 1.5 David (GLUCERNA1.5RTH)  Total Volume for 24 Hours (mL): 1560  Continuous  Starting Tube Feed Rate {mL per Hour}: 65  Until Goal Tube Feed Rate (mL per Hour): 65  Tube Feed Duration (in Hours): 24  Tube Feed Start Time: 06:00  Entered: Mar 14 2024  6:29PM  
This patient has been assessed with a concern for Malnutrition and has been determined to have a diagnosis/diagnoses of Moderate protein-calorie malnutrition.    This patient is being managed with:   Diet NPO-  Tube Feeding Modality: Gastrostomy  Glucerna 1.5 David (GLUCERNA1.5RTH)  Total Volume for 24 Hours (mL): 1560  Continuous  Starting Tube Feed Rate {mL per Hour}: 65  Until Goal Tube Feed Rate (mL per Hour): 65  Tube Feed Duration (in Hours): 24  Tube Feed Start Time: 06:00  Entered: Mar 14 2024  6:29PM  
This patient has been assessed with a concern for Malnutrition and has been determined to have a diagnosis/diagnoses of Moderate protein-calorie malnutrition.    This patient is being managed with:   Diet NPO-  Tube Feeding Modality: Gastrostomy  Glucerna 1.5 David (GLUCERNA1.5RTH)  Total Volume for 24 Hours (mL): 1560  Continuous  Starting Tube Feed Rate {mL per Hour}: 30  Increase Tube Feed Rate by (mL): 10     Every 4 hours  Until Goal Tube Feed Rate (mL per Hour): 65  Tube Feed Duration (in Hours): 24  Tube Feed Start Time: 06:00  Entered: Mar 28 2024 12:13PM  
This patient has been assessed with a concern for Malnutrition and has been determined to have a diagnosis/diagnoses of Moderate protein-calorie malnutrition.    This patient is being managed with:   Diet NPO-  Tube Feeding Modality: Gastrostomy  Jevity 1.2 David (JEVITY1.2RTH)  Total Volume for 24 Hours (mL): 900  Continuous  Starting Tube Feed Rate {mL per Hour}: 30  Increase Tube Feed Rate by (mL): 10     Every 4 hours  Until Goal Tube Feed Rate (mL per Hour): 50  Tube Feed Duration (in Hours): 18  Tube Feed Start Time: 09:00  Entered: Mar  5 2024  9:12AM  
This patient has been assessed with a concern for Malnutrition and has been determined to have a diagnosis/diagnoses of Moderate protein-calorie malnutrition.    This patient is being managed with:   Diet NPO with Tube Feed-  Tube Feeding Modality: Gastrostomy  Glucerna 1.2 David (GLUCERNARTH)  Total Volume for 24 Hours (mL): 1680  Continuous  Starting Tube Feed Rate {mL per Hour}: 30  Increase Tube Feed Rate by (mL): 10     Every 4 hours  Until Goal Tube Feed Rate (mL per Hour): 70  Tube Feed Duration (in Hours): 24  Tube Feed Start Time: 12:00  Supplement Feeding Modality:  Gastrostomy  Probiotic Yogurt/Smoothie Cans or Servings Per Day:  2       Frequency:  Daily  Entered: Feb 23 2024  9:44AM  
This patient has been assessed with a concern for Malnutrition and has been determined to have a diagnosis/diagnoses of Moderate protein-calorie malnutrition.    This patient is being managed with:   Diet NPO with Tube Feed-  Tube Feeding Modality: Gastrostomy  Glucerna 1.2 David (GLUCERNARTH)  Total Volume for 24 Hours (mL): 1680  Continuous  Starting Tube Feed Rate {mL per Hour}: 30  Increase Tube Feed Rate by (mL): 10     Every 4 hours  Until Goal Tube Feed Rate (mL per Hour): 70  Tube Feed Duration (in Hours): 24  Tube Feed Start Time: 12:00  Supplement Feeding Modality:  Gastrostomy  Probiotic Yogurt/Smoothie Cans or Servings Per Day:  2       Frequency:  Daily  Entered: Feb 23 2024  9:44AM  
This patient has been assessed with a concern for Malnutrition and has been determined to have a diagnosis/diagnoses of Moderate protein-calorie malnutrition.    This patient is being managed with:   Diet NPO-  Tube Feeding Modality: Gastrostomy  Glucerna 1.5 David (GLUCERNA1.5RTH)  Total Volume for 24 Hours (mL): 1560  Continuous  Starting Tube Feed Rate {mL per Hour}: 65  Until Goal Tube Feed Rate (mL per Hour): 65  Tube Feed Duration (in Hours): 24  Tube Feed Start Time: 06:00  Entered: Mar 14 2024  6:29PM  
This patient has been assessed with a concern for Malnutrition and has been determined to have a diagnosis/diagnoses of Moderate protein-calorie malnutrition.    This patient is being managed with:   Diet NPO after Midnight-     NPO Start Date: 03-Apr-2024   NPO Start Time: 23:59  Except Medications  Entered: Apr  3 2024  4:17PM    Diet NPO-  Tube Feeding Modality: Gastrostomy  Glucerna 1.5 David (GLUCERNA1.5RTH)  Total Volume for 24 Hours (mL): 1560  Continuous  Starting Tube Feed Rate {mL per Hour}: 30  Increase Tube Feed Rate by (mL): 10     Every 4 hours  Until Goal Tube Feed Rate (mL per Hour): 65  Tube Feed Duration (in Hours): 24  Tube Feed Start Time: 06:00  Entered: Mar 28 2024 12:13PM  
This patient has been assessed with a concern for Malnutrition and has been determined to have a diagnosis/diagnoses of Moderate protein-calorie malnutrition.    This patient is being managed with:   Diet NPO-  Tube Feeding Modality: Gastrostomy  Glucerna 1.5 David (GLUCERNA1.5RTH)  Total Volume for 24 Hours (mL): 1560  Continuous  Starting Tube Feed Rate {mL per Hour}: 65  Until Goal Tube Feed Rate (mL per Hour): 65  Tube Feed Duration (in Hours): 24  Tube Feed Start Time: 06:00  Entered: Mar 14 2024  6:29PM  
This patient has been assessed with a concern for Malnutrition and has been determined to have a diagnosis/diagnoses of Moderate protein-calorie malnutrition.    This patient is being managed with:   Diet NPO-  Tube Feeding Modality: Gastrostomy  Glucerna 1.5 David (GLUCERNA1.5RTH)  Total Volume for 24 Hours (mL): 1560  Continuous  Starting Tube Feed Rate {mL per Hour}: 30  Increase Tube Feed Rate by (mL): 10     Every 4 hours  Until Goal Tube Feed Rate (mL per Hour): 65  Tube Feed Duration (in Hours): 24  Tube Feed Start Time: 06:00  Entered: Mar 28 2024 12:13PM  
This patient has been assessed with a concern for Malnutrition and has been determined to have a diagnosis/diagnoses of Moderate protein-calorie malnutrition.    This patient is being managed with:   Diet NPO with Tube Feed-  Tube Feeding Modality: Gastrostomy  Glucerna 1.2 David (GLUCERNARTH)  Total Volume for 24 Hours (mL): 1680  Continuous  Starting Tube Feed Rate {mL per Hour}: 30  Increase Tube Feed Rate by (mL): 10     Every 4 hours  Until Goal Tube Feed Rate (mL per Hour): 70  Tube Feed Duration (in Hours): 24  Tube Feed Start Time: 12:00  Supplement Feeding Modality:  Gastrostomy  Probiotic Yogurt/Smoothie Cans or Servings Per Day:  2       Frequency:  Daily  Entered: Feb 23 2024  9:44AM  
This patient has been assessed with a concern for Malnutrition and has been determined to have a diagnosis/diagnoses of Moderate protein-calorie malnutrition.    This patient is being managed with:   Diet NPO-  Tube Feeding Modality: Gastrostomy  Glucerna 1.5 David ([id number])  Total Volume for 24 Hours (mL): 1560  Continuous  Starting Tube Feed Rate {mL per Hour}: 30  Increase Tube Feed Rate by (mL): 10     Every 4 hours  Until Goal Tube Feed Rate (mL per Hour): 65  Tube Feed Duration (in Hours): 24  Tube Feed Start Time: 06:00  Entered: Mar 28 2024 12:13PM  
This patient has been assessed with a concern for Malnutrition and has been determined to have a diagnosis/diagnoses of Moderate protein-calorie malnutrition.    This patient is being managed with:   Diet NPO-  Tube Feeding Modality: Gastrostomy  Glucerna 1.5 David ([id number])  Total Volume for 24 Hours (mL): 1560  Continuous  Starting Tube Feed Rate {mL per Hour}: 65  Until Goal Tube Feed Rate (mL per Hour): 65  Tube Feed Duration (in Hours): 24  Tube Feed Start Time: 06:00  Entered: Mar 14 2024  6:29PM  
This patient has been assessed with a concern for Malnutrition and has been determined to have a diagnosis/diagnoses of Moderate protein-calorie malnutrition.    This patient is being managed with:   Diet NPO after Midnight-     NPO Start Date: 04-Apr-2024   NPO Start Time: 23:59  Entered: Apr 4 2024  6:32PM    Diet NPO-  Tube Feeding Modality: Gastrostomy  Glucerna 1.5 David ([email or web address])  Total Volume for 24 Hours (mL): 1560  Continuous  Starting Tube Feed Rate {mL per Hour}: 30  Increase Tube Feed Rate by (mL): 10     Every 4 hours  Until Goal Tube Feed Rate (mL per Hour): 65  Tube Feed Duration (in Hours): 24  Tube Feed Start Time: 06:00  Entered: Mar 28 2024 12:13PM  
This patient has been assessed with a concern for Malnutrition and has been determined to have a diagnosis/diagnoses of Moderate protein-calorie malnutrition.    This patient is being managed with:   Diet NPO with Tube Feed-  Tube Feeding Modality: Gastrostomy  Glucerna 1.2 David (GLUCERNARTH)  Total Volume for 24 Hours (mL): 1680  Continuous  Starting Tube Feed Rate {mL per Hour}: 30  Increase Tube Feed Rate by (mL): 10     Every 4 hours  Until Goal Tube Feed Rate (mL per Hour): 70  Tube Feed Duration (in Hours): 24  Tube Feed Start Time: 12:00  Supplement Feeding Modality:  Gastrostomy  Probiotic Yogurt/Smoothie Cans or Servings Per Day:  2       Frequency:  Daily  Entered: Feb 23 2024  9:44AM  
This patient has been assessed with a concern for Malnutrition and has been determined to have a diagnosis/diagnoses of Moderate protein-calorie malnutrition.    This patient is being managed with:   Diet NPO-  Tube Feeding Modality: Gastrostomy  Glucerna 1.5 David (GLUCERNA1.5RTH)  Total Volume for 24 Hours (mL): 1560  Continuous  Starting Tube Feed Rate {mL per Hour}: 30  Increase Tube Feed Rate by (mL): 10     Every 4 hours  Until Goal Tube Feed Rate (mL per Hour): 65  Tube Feed Duration (in Hours): 24  Tube Feed Start Time: 06:00  Entered: Mar 28 2024 12:13PM  
This patient has been assessed with a concern for Malnutrition and has been determined to have a diagnosis/diagnoses of Moderate protein-calorie malnutrition.    This patient is being managed with:   Diet NPO-  Tube Feeding Modality: Gastrostomy  Glucerna 1.5 David ([email or web address])  Total Volume for 24 Hours (mL): 1560  Continuous  Starting Tube Feed Rate {mL per Hour}: 30  Increase Tube Feed Rate by (mL): 10     Every 4 hours  Until Goal Tube Feed Rate (mL per Hour): 65  Tube Feed Duration (in Hours): 24  Tube Feed Start Time: 06:00  Entered: Mar 28 2024 12:13PM  
This patient has been assessed with a concern for Malnutrition and has been determined to have a diagnosis/diagnoses of Moderate protein-calorie malnutrition.    This patient is being managed with:   Diet NPO-  Tube Feeding Modality: Gastrostomy  Glucerna 1.5 David (GLUCERNA1.5RTH)  Total Volume for 24 Hours (mL): 1560  Continuous  Starting Tube Feed Rate {mL per Hour}: 65  Until Goal Tube Feed Rate (mL per Hour): 65  Tube Feed Duration (in Hours): 24  Tube Feed Start Time: 06:00  Entered: Mar 14 2024  6:29PM  
This patient has been assessed with a concern for Malnutrition and has been determined to have a diagnosis/diagnoses of Moderate protein-calorie malnutrition.    This patient is being managed with:   Diet NPO-  Tube Feeding Modality: Gastrostomy  Glucerna 1.5 David (GLUCERNA1.5RTH)  Total Volume for 24 Hours (mL): 1560  Continuous  Starting Tube Feed Rate {mL per Hour}: 65  Until Goal Tube Feed Rate (mL per Hour): 65  Tube Feed Duration (in Hours): 24  Tube Feed Start Time: 06:00  Entered: Mar 14 2024  6:29PM  
This patient has been assessed with a concern for Malnutrition and has been determined to have a diagnosis/diagnoses of Moderate protein-calorie malnutrition.    This patient is being managed with:   Diet NPO-  Tube Feeding Modality: Gastrostomy  Jevity 1.2 David (JEVITY1.2RTH)  Total Volume for 24 Hours (mL): 900  Continuous  Starting Tube Feed Rate {mL per Hour}: 30  Increase Tube Feed Rate by (mL): 10     Every 4 hours  Until Goal Tube Feed Rate (mL per Hour): 50  Tube Feed Duration (in Hours): 18  Tube Feed Start Time: 09:00  Entered: Mar  5 2024  9:12AM  
This patient has been assessed with a concern for Malnutrition and has been determined to have a diagnosis/diagnoses of Moderate protein-calorie malnutrition.    This patient is being managed with:   Diet NPO-  Tube Feeding Modality: Gastrostomy  Jevity 1.2 David (JEVITY1.2RTH)  Total Volume for 24 Hours (mL): 900  Continuous  Starting Tube Feed Rate {mL per Hour}: 30  Increase Tube Feed Rate by (mL): 10     Every 4 hours  Until Goal Tube Feed Rate (mL per Hour): 50  Tube Feed Duration (in Hours): 18  Tube Feed Start Time: 09:00  Entered: Mar  5 2024  9:12AM  
This patient has been assessed with a concern for Malnutrition and has been determined to have a diagnosis/diagnoses of Moderate protein-calorie malnutrition.    This patient is being managed with:   Diet NPO-  Tube Feeding Modality: Gastrostomy  Glucerna 1.5 David (GLUCERNA1.5RTH)  Total Volume for 24 Hours (mL): 1560  Continuous  Starting Tube Feed Rate {mL per Hour}: 65  Until Goal Tube Feed Rate (mL per Hour): 65  Tube Feed Duration (in Hours): 24  Tube Feed Start Time: 06:00  Entered: Mar 14 2024  6:29PM  

## 2024-04-11 NOTE — PROGRESS NOTE ADULT - PROBLEM SELECTOR PROBLEM 3
Sympathetic storming
Sympathetic storming
Acute respiratory failure with hypoxia
Hypotension
Sympathetic storming
Sympathetic storming
Acute respiratory failure with hypoxia
Sympathetic storming
Acute respiratory failure with hypoxia
Hypotension
Sympathetic storming
Acute respiratory failure with hypoxia
Hypotension
Sympathetic storming
Acute respiratory failure with hypoxia
Hypotension
Sympathetic storming
Acute respiratory failure with hypoxia
Sympathetic storming
Acute respiratory failure with hypoxia
Acute respiratory failure with hypoxia
Hypotension
Acute respiratory failure with hypoxia
Sympathetic storming
Hypotension
Sympathetic storming
Hypotension
Sympathetic storming
Sympathetic storming

## 2024-04-11 NOTE — PROGRESS NOTE ADULT - NS ATTEND AMEND GEN_ALL_CORE FT
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 Hospital course has been complicated by VAP, massive GIB with hemorrhagic shock, worsening neurologic vascular injury with small scattered foci of diffusion restriction within the right cerebellar hemisphere, left parietal lobe, and symmetrically in the bilateral basal ganglia, recurrent GIB (3/27) found to have duodenal erosions on endoscopy.     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 cerebellar and L parietal infarctions suspicious for septic embolic phenomena and raised suspicion for endocarditis  Bilateral symmetric basal ganglia lesions with differential including hypoxic injury producing delayed post-hypoxic leukoencephalopathy verses toxidrome. Suspect more likely related to hypoxia/hypoperfusion in the setting of hemorrhagic/septic shock event.  His neurologic exam is severely limited. Open eyes spontaneously, with some mouth and head movements. He is able to follow commands by squeezing hands, and answer simple questions shaking head yes or no.  - Repeat MRI completed. Per neurology, there are areas of new infarcts concerning for embolic events. MILADIS done, no vegetations present  -  Discussed with EP team. Not a candidate for ILR at this time Considering recurrent GIB and hemorrhagic shock, even if AF discovered, not a candidate for A/C at this time and not candidate for HEENA closure. In addition ILR would be nidus for infection and he is high risk for developing copmlications. Extensive discussions with family that he is still at risk for embolic strokes but unable to ac at this time given risks of AC outweigh benefits   - Rechallenge with ASA. Holding Plavix still.     CV: #Hypotension: in the setting of hemorrhagic, septic shock. Resolved. Off oral vasopressor. Normotensive today   Hx of bradycardia - not on beta blockers at this time     Resp   #Acute respiratory failure, mixed hypoxic hypercapnia: Remains on ventilator support, tolerating limited high pressure support trials.   He currently has an 8 cuffed portex, placed 2/16 with Dr. Sood  - Continue wean briseyda as tolerated - CPAP during the day, does not tolerate TC   - Suctioning, chest pt as needed  - Oral care as per RCU team  - OOB as tolerated  - PT/OT  #Respiratory muscle weakness in the setting of AIDP    #VAP:   SCx 2/21: PSa  SCx 3/4 PSa   SCx 3/20 PSa, CRE  3/26: CRE Pseudomonas  S/P Meropenem, Zosyn courses previously during hospitalization  He has now been resumed on Cefepime for empiric coverage  - Continue Cefepime, plan for 14 day course    GI  #GIB, Acute blood loss anemia   #Duodenal erosions  Now with stable H/H and no further evidence of bleeding.  - PPI as above, carafate  - tolerating tube feeds     ID  Cefepime for CRE pseudomonas in sputum until 4/11    Heme  Soleal DVTs, not on AC, surveillance ultrasounds.        requiring straight cath, started Cardura 4/8.     anticipate dc today

## 2024-04-11 NOTE — PROGRESS NOTE ADULT - NS_MD_PANP_GEN_ALL_CORE

## 2024-04-11 NOTE — PROGRESS NOTE ADULT - ASSESSMENT
73 year old male with hypertension, hyperlipidemia, diabetes, prior CVA's without residual deficits who initially presented as a transfer from [place] with concern for CVA in the setting of 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 Prior to admission patient had Viral URI ( 1-2 weeks prior to admission) with subsequent weakness. He underwent an angiogram (2/11) which showed moderate stenosis and no intervention was done. MRI Performed c/w small bilateral cerebellar strokes and prior L thalamic strokes, as per Neurology was not c/w current presentation. 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 He is currently s/p 5 rounds of plasma exchange (2/13-2/20) and IVIG x 5 ( 2/21-2/26)  His hospital course was complicated by progressive respiratory failure. CT Chest performed on 2/20 with atelectasis of b/l lower lobes. BAL 2/21 Grew PSA treated with course of Zosyn (2/21-2/28).  Patient transferred to RCU on 2/24. RRT called on 3/3 for unresponsiveness. Patient with unreactive pupils, no corneal reflex. Also found to be hypotensive and febrile to 105. Noted to have new melena. Hgb 5. Given 1 unit of pRBC. Transferred to MICU. patient required pressors while in the MICU, BP improved and was transitioned to Droxidopa. Patient had CT C/A/P consistent with Bibasilar pneumonia; Sputum cx grew PSA and was treated with course of Meropenem. Patient evaluated by GI in setting of Melena, transaminitis and portal venous gas on CT imaging. No EGD performed as Melena resolved, Transaminitis was thought to be in setting of shock and portal venous gas was thought to be in setting of recent PEG. EEG was performed in setting of AMS no seizures noted. Repeat MRI Performed which showed Small scattered foci of diffusion restriction within the right cerebellar hemisphere, left parietal lobe, and symmetrically in the bilateral basal ganglia. Neuro recommended Resumption of ASA in setting of possible embolic phenomenon. Repeat ECHO performed RT Ventricular Moderately enlarged, reduced systolic function. Mental status improved and was transferred back to RCU on 3/6.  Cardiology consulted for tachy-dez, unable to catch events on tele, EKG ST otherwise unremarkable, CT Angio negative for PE. Patient with recurrent Melena on 3/27 resulting in RRT For hypotension for which he required 3 units of PRBCs. S/p EGD; No active bleeding noted but found to have duodenal erosions. Treating for CSE PSA 3/27-4/11 x 14d course. S/p MILADIS 4/6 with no vegetations or thrombus. Patient seen by EP was not deemed appropriate to place ILR at this time as patient currently not a candidate for Full AC given recurrent GI Bleeds and would put patient at increased risk for infection.     4/10: No events reported overnight. Patient to complete Cefepime tomorrow. Patient tolerating CPAP Trials will continue to progress as tolerated. Patient required Straight Catheterization yesterday x 1, will continue Cardura. Case management confirmed with [facility] they can accommodate ISC and will not need [place] upon dc.  73 year old male with hypertension, hyperlipidemia, diabetes, prior CVA's without residual deficits who initially presented as a transfer from [place] with concern for CVA in the setting of 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 Prior to admission patient had Viral URI ( 1-2 weeks prior to admission) with subsequent weakness. He underwent an angiogram (2/11) which showed moderate stenosis and no intervention was done. MRI Performed c/w small bilateral cerebellar strokes and prior L thalamic strokes, as per Neurology was not c/w current presentation. 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 He is currently s/p 5 rounds of plasma exchange (2/13-2/20) and IVIG x 5 ( 2/21-2/26)  His hospital course was complicated by progressive respiratory failure. CT Chest performed on 2/20 with atelectasis of b/l lower lobes. BAL 2/21 Grew PSA treated with course of Zosyn (2/21-2/28).  Patient transferred to RCU on 2/24. RRT called on 3/3 for unresponsiveness. Patient with unreactive pupils, no corneal reflex. Also found to be hypotensive and febrile to 105. Noted to have new melena. Hgb 5. Given 1 unit of pRBC. Transferred to MICU. patient required pressors while in the MICU, BP improved and was transitioned to Droxidopa. Patient had CT C/A/P consistent with Bibasilar pneumonia; Sputum cx grew PSA and was treated with course of Meropenem. Patient evaluated by GI in setting of Melena, transaminitis and portal venous gas on CT imaging. No EGD performed as Melena resolved, Transaminitis was thought to be in setting of shock and portal venous gas was thought to be in setting of recent PEG. EEG was performed in setting of AMS no seizures noted. Repeat MRI Performed which showed Small scattered foci of diffusion restriction within the right cerebellar hemisphere, left parietal lobe, and symmetrically in the bilateral basal ganglia. Neuro recommended Resumption of ASA in setting of possible embolic phenomenon. Repeat ECHO performed RT Ventricular Moderately enlarged, reduced systolic function. Mental status improved and was transferred back to RCU on 3/6.  Cardiology consulted for tachy-dez, unable to catch events on tele, EKG ST otherwise unremarkable, CT Angio negative for PE. Patient with recurrent Melena on 3/27 resulting in RRT For hypotension for which he required 3 units of PRBCs. S/p EGD; No active bleeding noted but found to have duodenal erosions. Treating for CSE PSA 3/27-4/11 x 14d course. S/p MILADIS 4/6 with no vegetations or thrombus. Patient seen by EP was not deemed appropriate to place ILR at this time as patient currently not a candidate for Full AC given recurrent GI Bleeds and would put patient at increased risk for infection.     4/11: Discharge to [facility] today

## 2024-04-11 NOTE — PROGRESS NOTE ADULT - NS ATTEND BILL GEN_ALL_CORE
Attending to bill

## 2024-04-11 NOTE — PROGRESS NOTE ADULT - PROBLEM SELECTOR PLAN 9
- Patient with intermittent urinary retention   - Continue to Bladder Scan and straight cath q 6 hrs PRN > 450 cc of retained urine  - [facility] Can accommodate ICS upon dc

## 2024-04-11 NOTE — PROGRESS NOTE ADULT - SUBJECTIVE AND OBJECTIVE BOX
Patient is a 74y old  Male who presents with a chief complaint of Stroke, critical stenosis, evaluation for angiography (10 Apr 2024 07:28)    HPI:  73 years old RH male with h/o HTN, HLD, DM, h/o CVA x 2 (no residual deficits) initially presented to [facility] ED with unsteady gait that began on 2/7 (exact LKW unknown) and L facial droop that began 6 AM on 2/9 (went to bed 11 PM on 2/8 without facial droop). No slurred speech, vision changes. Initial NIHSS 0 at [facility]. Hemodynamically stable, CT head with no acute pathology. Chronic left thalamic lacunar infarct. CTA with no large vessel occlusion. 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 CT perfusion with no acute abnormality.   Pt transferred from [facility] to [facility] due to concern for neurologic deterioration i/s/o aforementioned high grade proximal basilar and L PCA stenosis. On arrival to [facility] ED, initial NIHSS 4 (+1 for LLE drift, +2 for b/l limb dysmetria, +1 for mild dysarthria). Later, pt started to have difficulty clearing secretions, became stridorous, and started to desaturate, so decision was made to intubate patient. Once pt medically stabilized, taken to angio suite by IR. Unable to obtain further history from patient at [facility] due to intubation/sedation (09 Feb 2024 20:27)    Interval Events:    REVIEW OF SYSTEMS:  [ ] Positive  [ ] All other systems negative  [ ] Unable to assess ROS because ________    Vital Signs Last 24 Hrs  T(C): 37.1 (04-11-24 @ 04:30), Max: 37.1 (04-11-24 @ 04:30)  T(F): 98.7 (04-11-24 @ 04:30), Max: 98.7 (04-11-24 @ 04:30)  HR: 91 (04-11-24 @ 06:42) (72 - 100)  BP: 121/66 (04-11-24 @ 04:30) (121/66 - 151/79)  RR: 13 (04-11-24 @ 06:42) (12 - 20)  SpO2: 100% (04-11-24 @ 06:42) (100% - 100%)    PHYSICAL EXAM:  HEENT:   [ ]Tracheostomy:  [ ]Pupils equal  [ ]No oral lesions  [ ]Abnormal    SKIN  [ ] No Rash  [ ] Abnormal  [ ] pressure    CARDIAC  [ ]Regular  [ ]Abnormal    PULMONARY  [ ]Bilateral Clear Breath Sounds  [ ]Normal Excursion  [ ]Abnormal    GI  [ ]PEG      [ ] +BS		              [ ]Soft, nondistended, nontender	  [ ]Abnormal    MUSCULOSKELETAL                                   [ ]Bedbound                 [ ]Abnormal    [ ]Ambulatory/OOB to chair                           EXTREMITIES                                         [ ]Normal  [ ]Edema                           NEUROLOGIC  [ ] Normal, non focal  [ ] Focal findings:    PSYCHIATRIC  [ ]Alert and appropriate  [ ] Sedated	 [ ]Agitated    :  [place]: [ ] Yes, if yes: Date of Placement:                   [  ] No    LINES: Central Lines [ ] Yes, if yes: Date of Placement                                     [  ] No    HOSPITAL MEDICATIONS:  MEDICATIONS  (STANDING):  albuterol/ipratropium for Nebulization 3 milliLiter(s) Nebulizer every 8 hours  artificial tears (preservative free) Ophthalmic Solution 1 Drop(s) Both EYES every 4 hours  aspirin  chewable 81 milliGRAM(s) Oral daily  Biotene Dry Mouth Oral Rinse 5 milliLiter(s) Swish and Spit every 6 hours  cefepime   IVPB 1000 milliGRAM(s) IV Intermittent every 8 hours  chlorhexidine 0.12% Liquid 15 milliLiter(s) Oral Mucosa every 12 hours  chlorhexidine 4% Liquid 1 Application(s) Topical daily  doxazosin 2 milliGRAM(s) Oral at bedtime  fluticasone propionate 50 MICROgram(s)/spray Nasal Spray 1 Spray(s) Both Nostrils two times a day  gabapentin Solution 100 milliGRAM(s) Enteral Tube every 8 hours  insulin lispro (ADMELOG) corrective regimen sliding scale   SubCutaneous every 6 hours  insulin NPH human recombinant 7 Unit(s) SubCutaneous every 6 hours  nystatin    Suspension 589172 Unit(s) Oral every 6 hours  pantoprazole  Injectable 40 milliGRAM(s) IV Push every 12 hours  petrolatum Ophthalmic Ointment 1 Application(s) Both EYES every 8 hours  senna Syrup 10 milliLiter(s) Oral at bedtime  sodium chloride 3%  Inhalation 4 milliLiter(s) Inhalation every 8 hours  sucralfate suspension 1 Gram(s) Oral every 6 hours    MEDICATIONS  (PRN):  acetaminophen   Oral Liquid .. 1000 milliGRAM(s) Enteral Tube every 6 hours PRN Temp greater or equal to 38.5C (101.3F), Mild Pain (1 - 3)      LABS:                        7.9    7.90  )-----------( 489      ( 11 Apr 2024 07:17 )             28.9     04-11    138  |  102  |  12  ----------------------------<  101<H>  4.8   |  27  |  0.33<L>    Ca    9.2      11 Apr 2024 07:15  Phos  3.8     04-11  Mg     2.0     04-11      Mode: AC/ CMV (Assist Control/ Continuous Mandatory Ventilation)  RR (machine): 12  TV (machine): 500  FiO2: 40  PEEP: 5  ITime: 1  MAP: 11  PIP: 22   Patient is a 74y old  Male who presents with a chief complaint of Stroke, critical stenosis, evaluation for angiography (10 Apr 2024 07:28)    HPI:  73 years old RH male with h/o HTN, HLD, DM, h/o CVA x 2 (no residual deficits) initially presented to [facility] ED with unsteady gait that began on 2/7 (exact LKW unknown) and L facial droop that began 6 AM on 2/9 (went to bed 11 PM on 2/8 without facial droop). No slurred speech, vision changes. Initial NIHSS 0 at [facility]. Hemodynamically stable, CT head with no acute pathology. Chronic left thalamic lacunar infarct. CTA with no large vessel occlusion. 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 CT perfusion with no acute abnormality.   Pt transferred from [facility] to [facility] due to concern for neurologic deterioration i/s/o aforementioned high grade proximal basilar and L PCA stenosis. On arrival to [facility] ED, initial NIHSS 4 (+1 for LLE drift, +2 for b/l limb dysmetria, +1 for mild dysarthria). Later, pt started to have difficulty clearing secretions, became stridorous, and started to desaturate, so decision was made to intubate patient. Once pt medically stabilized, taken to angio suite by IR. Unable to obtain further history from patient at [facility] due to intubation/sedation (09 Feb 2024 20:27)    Interval Events: No issues overnight    REVIEW OF SYSTEMS:  [ ] Positive  [x ] All other systems negative  [ ] Unable to assess ROS because ________    Vital Signs Last 24 Hrs  T(C): 37.1 (04-11-24 @ 04:30), Max: 37.1 (04-11-24 @ 04:30)  T(F): 98.7 (04-11-24 @ 04:30), Max: 98.7 (04-11-24 @ 04:30)  HR: 91 (04-11-24 @ 06:42) (72 - 100)  BP: 121/66 (04-11-24 @ 04:30) (121/66 - 151/79)  RR: 13 (04-11-24 @ 06:42) (12 - 20)  SpO2: 100% (04-11-24 @ 06:42) (100% - 100%)    PHYSICAL EXAM:  HEENT:   [x]Tracheostomy: # 8 Cuffed Portex  [x]Pupils equal  [ ]No oral lesions  [ ]Abnormal    SKIN  [x]No Rash  [ ] Abnormal  [ ] pressure    CARDIAC  [x]Regular  [ ]Abnormal    PULMONARY  [x]Bilateral Coarse Breath Sounds  [ ]Normal Excursion  [ ]Abnormal    GI  [x]PEG site c/d/i    [x] +BS		              [x]Soft, nondistended, nontender	  [ ]Abnormal    MUSCULOSKELETAL                                   [x]Bedbound                 [ ] Abnormal    [ ] Ambulatory/OOB to chair                           EXTREMITIES                                         [ ]Normal  [x]Edema: Upper and Lower extremity edema, improving                            NEUROLOGIC  [ ] Normal, non focal  [x] Focal findings: awake and alert, able to nod to simple commands, follows commands on all 4 extremities Upper extremity strength > LE    PSYCHIATRIC  [x] Alert  [ ] Sedated	 [ ]Agitated    :  [place]: [ ] Yes, if yes: Date of Placement:                   [x] No; Straight cath x 1     LINES: Central Lines [ ] Yes, if yes: Date of Placement                                     [x] No    HOSPITAL MEDICATIONS:  MEDICATIONS  (STANDING):  albuterol/ipratropium for Nebulization 3 milliLiter(s) Nebulizer every 8 hours  artificial tears (preservative free) Ophthalmic Solution 1 Drop(s) Both EYES every 4 hours  aspirin  chewable 81 milliGRAM(s) Oral daily  Biotene Dry Mouth Oral Rinse 5 milliLiter(s) Swish and Spit every 6 hours  cefepime   IVPB 1000 milliGRAM(s) IV Intermittent every 8 hours  chlorhexidine 0.12% Liquid 15 milliLiter(s) Oral Mucosa every 12 hours  chlorhexidine 4% Liquid 1 Application(s) Topical daily  doxazosin 2 milliGRAM(s) Oral at bedtime  fluticasone propionate 50 MICROgram(s)/spray Nasal Spray 1 Spray(s) Both Nostrils two times a day  gabapentin Solution 100 milliGRAM(s) Enteral Tube every 8 hours  insulin lispro (ADMELOG) corrective regimen sliding scale   SubCutaneous every 6 hours  insulin NPH human recombinant 7 Unit(s) SubCutaneous every 6 hours  nystatin    Suspension 705698 Unit(s) Oral every 6 hours  pantoprazole  Injectable 40 milliGRAM(s) IV Push every 12 hours  petrolatum Ophthalmic Ointment 1 Application(s) Both EYES every 8 hours  senna Syrup 10 milliLiter(s) Oral at bedtime  sodium chloride 3%  Inhalation 4 milliLiter(s) Inhalation every 8 hours  sucralfate suspension 1 Gram(s) Oral every 6 hours    MEDICATIONS  (PRN):  acetaminophen   Oral Liquid .. 1000 milliGRAM(s) Enteral Tube every 6 hours PRN Temp greater or equal to 38.5C (101.3F), Mild Pain (1 - 3)      LABS:                        7.9    7.90  )-----------( 489      ( 11 Apr 2024 07:17 )             28.9     04-11    138  |  102  |  12  ----------------------------<  101<H>  4.8   |  27  |  0.33<L>    Ca    9.2      11 Apr 2024 07:15  Phos  3.8     04-11  Mg     2.0     04-11      Mode: AC/ CMV (Assist Control/ Continuous Mandatory Ventilation)  RR (machine): 12  TV (machine): 500  FiO2: 40  PEEP: 5  ITime: 1  MAP: 11  PIP: 22

## 2024-04-11 NOTE — PROGRESS NOTE ADULT - NS ATTEND OPT1 GEN_ALL_CORE

## 2024-04-11 NOTE — PROGRESS NOTE ADULT - PROBLEM SELECTOR PLAN 11
- Patients wife Margarette updated at bedside this morning   - Patients Brother  and Children updated over the phone at bedside by the RCU this morning   - Tentatively planned for Dc Tomorrow 4/11 to [facility]

## 2024-04-11 NOTE — PROGRESS NOTE ADULT - PROBLEM SELECTOR PROBLEM 11
Need for prophylactic measure
Counseling regarding goals of care
Need for prophylactic measure
Need for prophylactic measure
Counseling regarding goals of care
Need for prophylactic measure
Need for prophylactic measure
Counseling regarding goals of care
Need for prophylactic measure
Counseling regarding goals of care
Need for prophylactic measure
Need for prophylactic measure
Counseling regarding goals of care
Counseling regarding goals of care
Need for prophylactic measure
Need for prophylactic measure
Counseling regarding goals of care
Need for prophylactic measure
Counseling regarding goals of care
Need for prophylactic measure
Need for prophylactic measure
Counseling regarding goals of care
Need for prophylactic measure
Counseling regarding goals of care
Need for prophylactic measure
Counseling regarding goals of care
Counseling regarding goals of care

## 2024-04-11 NOTE — PROGRESS NOTE ADULT - TIME-BASED
50
75
50
55
55
57
50
45
55
55
56
57
50
56
45
50
43
50
55
50
40
55
40
45
50
40
45
50
55
40
45
55
50
50
45

## 2024-04-11 NOTE — PROGRESS NOTE ADULT - PROBLEM/PLAN-1
DISPLAY PLAN FREE TEXT
That inpatient services furnished since the previous certification or recertification were, and continue to be required: for treatment that could reasonably be expected to improve the patient's condition; or, for diagnostic study;    That the hospital records show that the services furnished were: intensive treatment services; admission and related services necessary for diagnostic study or equivalent services;    That the patient continues to need, on a daily basis active inpatient treatment furnished directly by or requiring the supervision of inpatient psychiatric facility personnel.

## 2024-04-11 NOTE — PROGRESS NOTE ADULT - PROBLEM SELECTOR PLAN 5
[Physical Growth and Development] : physical growth and development [Risk Reduction] : risk reduction [Violence and Injury Prevention] : violence and injury prevention [Normal Growth] : growth [Normal Development] : development  [Full Activity without restrictions including Physical Education & Athletics] : Full Activity without restrictions including Physical Education & Athletics [I have examined the above-named student and completed the preparticipation physical evaluation. The athlete does not present apparent clinical contraindications to practice and participate in sport(s) as outlined above. A copy of the physical exam is on r] : I have examined the above-named student and completed the preparticipation physical evaluation. The athlete does not present apparent clinical contraindications to practice and participate in sport(s) as outlined above. A copy of the physical exam is on record in my office and can be made available to the school at the request of the parents. If conditions arise after the athlete has been cleared for participation, the physician may rescind the clearance until the problem is resolved and the potential consequences are completely explained to the athlete (and parents/guardians). [No Vaccines] : no vaccines needed [FreeTextEntry6] : mom refuses HPV  [FreeTextEntry1] : Routine labs, f/u FLP - Patient with large Melanotic Episodes on 3/27 w/ significant drop in H+H  - Was Transfused 3 Units of PRBCs on 3/27  - EGD 3/27: Technically Limited Study, + Duodenal Erosions but no evidence of Active Bleed    - Remains on Protonix 40 mg q 12 hrs IVP and Sucralfate q 6    - If patient develops decreasing H+H or HD instability will need CTA of abdomen  - Continue to monitor CBC   - Patient is Sabianism However Family is in agreement for administration of blood products ( Per Wife)

## 2024-04-11 NOTE — PROGRESS NOTE ADULT - PROBLEM SELECTOR PROBLEM 2
Acute respiratory failure with hypoxia
Infection
Acute respiratory failure with hypoxia
Infection
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
Acute respiratory failure with hypoxia
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
Acute respiratory failure with hypoxia
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
Acute respiratory failure with hypoxia
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
Acute respiratory failure with hypoxia
Infection
Acute respiratory failure with hypoxia
Acute respiratory failure with hypoxia
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
Acute respiratory failure with hypoxia
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
Acute respiratory failure with hypoxia
Acute respiratory failure with hypoxia
GBS (Guillain-Wat[place] syndrome)
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
Acute respiratory failure with hypoxia
GBS (Guillain-Bailey syndrome)
Acute respiratory failure with hypoxia
Infection
Infection
Acute respiratory failure with hypoxia
Infection
Infection

## 2024-06-04 NOTE — OCCUPATIONAL THERAPY INITIAL EVALUATION ADULT - RLE MMT, REHAB EVAL
You were seen in the ENT Clinic today by Senait Jefferson. If you have any questions or concerns after your appointment, please contact us (see below)    The following has been recommended for you based upon your appointment today:      Please return to clinic     How to Contact Us:  Send a [facility] message to your provider. Our team will respond to you via [facility]. Occasionally, we will need to call you to get further information.  For urgent matters (Monday-Friday), call the ENT Clinic: [phone] and speak with a call center team member - they will route your call appropriately.   If you'd like to speak directly with a nurse, please find our contact information below. We do our best to check voicemail frequently throughout the day, and will work to call you back within 1-2 days. For urgent matters, please use the general clinic phone numbers listed above.      Maria Del Carmen VINCENT LPN  Direct: [phone]           2-/5

## 2024-08-06 ENCOUNTER — APPOINTMENT (OUTPATIENT)
Dept: OTOLARYNGOLOGY | Facility: CLINIC | Age: 74
End: 2024-08-06

## 2024-08-06 PROBLEM — Z86.69 H/O GUILLAIN-BARRE SYNDROME: Status: RESOLVED | Noted: 2024-08-06 | Resolved: 2024-08-06

## 2024-08-06 PROBLEM — Z87.09 HISTORY OF ACUTE RESPIRATORY FAILURE: Status: RESOLVED | Noted: 2024-08-06 | Resolved: 2024-08-06

## 2024-08-06 PROBLEM — Z86.19 HISTORY OF SEPSIS: Status: RESOLVED | Noted: 2024-08-06 | Resolved: 2024-08-06

## 2024-08-06 PROBLEM — I63.81 THALAMIC INFARCTION: Status: ACTIVE | Noted: 2021-12-20

## 2024-08-06 PROBLEM — Z86.73 HISTORY OF CEREBROVASCULAR ACCIDENT: Status: RESOLVED | Noted: 2024-08-06 | Resolved: 2024-08-06

## 2024-08-06 PROBLEM — Z87.01 HISTORY OF ASPIRATION PNEUMONIA: Status: RESOLVED | Noted: 2024-08-06 | Resolved: 2024-08-06

## 2024-08-06 PROBLEM — Z93.0 TRACHEOSTOMY DEPENDENT: Status: ACTIVE | Noted: 2024-08-06

## 2024-08-06 PROCEDURE — 99204 OFFICE O/P NEW MOD 45 MIN: CPT | Mod: 25

## 2024-08-06 PROCEDURE — 31579 LARYNGOSCOPY TELESCOPIC: CPT

## 2024-08-06 NOTE — ASSESSMENT
[FreeTextEntry1] : Assessment/Plan:  #1 Bilateral severe vocal fold hypomobility  #2 PEG dependent #3 Trach dependent #4 GB #5 Hx of CVA in '22  Patient should maintain trach while still has severe hypomobility of vocal folds.  My hope as this improves as his other mobility improves. Will continue to work with SLP for advancement of diet.  If goes another month without use of vent trach should be switched to cuffless. I would like to follow up with him in 2 months.  I have also recommended he get neuro followup scheduled for his GB.   Total time: 45 minutes; total time does not include time spent performing the procedure and its related elements. It does include all other face to face and non face to face pre and post visit tasks performed on the same date of service.

## 2024-08-06 NOTE — REASON FOR VISIT
[Initial Consultation] : an initial consultation for [Other: _____] : [unfilled] [FreeTextEntry2] : dysphagia and airway evaluation

## 2024-08-06 NOTE — PROCEDURE

## 2024-08-06 NOTE — PHYSICAL EXAM
[Normal] : mucosa is normal [Impaired mobility] : impaired mobility [de-identified] : trach faceplate in good position [de-identified] : can not raise tongue to roof of mouth

## 2024-08-06 NOTE — HISTORY OF PRESENT ILLNESS
[de-identified] : AMANDA WILKINS is a 74 year old man who presents to the [facility] with dysphagia and airway evaluation.  He is referred by [facility].  FEES done 7/18/2024 Denies bleeding, bruising, swelling around the trach or foul smelling secretions. Trach size is 7 Portex and capped. Last trach change (unsure). Patient has history of CVA in 2022.  This recovery was complicated by Lauren Zeng in Feb 2024. This led to trach.  Currently has 7 cuffed Portex trach tube.  Was getting vent at night up until 1 month ago.  Is getting part of his nutrition through mouth and part through PEG. Family report had intubation lasting approx 3-4 weeks.  Admits to dysphonia. Had no issues with breathing prior to GB. Should not cap trach while sleeping at night.

## 2024-08-06 NOTE — HISTORY OF PRESENT ILLNESS
[de-identified] : AMANDA WILKINS is a 74 year old man who presents to the [facility] with dysphagia and airway evaluation.  He is referred by [facility].  FEES done 7/18/2024 Denies bleeding, bruising, swelling around the trach or foul smelling secretions. Trach size is 7 Portex and capped. Last trach change (unsure). Patient has history of CVA in 2022.  This recovery was complicated by Lauren Zeng in Feb 2024. This led to trach.  Currently has 7 cuffed Portex trach tube.  Was getting vent at night up until 1 month ago.  Is getting part of his nutrition through mouth and part through PEG. Family report had intubation lasting approx 3-4 weeks.  Admits to dysphonia. Had no issues with breathing prior to GB. Should not cap trach while sleeping at night.

## 2024-08-06 NOTE — PHYSICAL EXAM
[Normal] : mucosa is normal [Impaired mobility] : impaired mobility [de-identified] : trach faceplate in good position [de-identified] : can not raise tongue to roof of mouth

## 2024-08-06 NOTE — PROCEDURE
[de-identified] : Stroboscopic Laryngoscopy Procedure Note:  Indication:	Assess laryngeal biomechanics and vocal fold oscillation.  Description of Procedure:	Informed consent was verbally obtained from the patient prior to the procedure. The patient was seated in the clinic chair. Topical anesthesia was achieved by first spraying the nasal cavities with 4% lidocaine and nasal decongestant.   Findings:  Supraglottis: no masses or lesions  Glottis:    Structure:                        Right: crisp and shows no lesions or masses                        Left:  crisp and shows no lesions or masses                 Mobility:                        Right:  severe hypomobility, paramedian                       Left:  severe hypomobility, paramedian                Amplitude:                        Right:  increased                       Left:  increased                Closure: complete                 Wave symmetry:  symmetric  Subglottis: no masses or lesions within the visualized subglottis Visualized airway is widely patent.

## 2024-10-03 NOTE — ED ADULT NURSE NOTE - OBJECTIVE STATEMENT
Addended by: CHRISTIAN ISAACS on: 10/3/2024 12:05 PM     Modules accepted: Orders     No pertinent PMH. Pt c/o intermittent right arm and leg numbness

## 2024-10-29 ENCOUNTER — APPOINTMENT (OUTPATIENT)
Dept: OTOLARYNGOLOGY | Facility: CLINIC | Age: 74
End: 2024-10-29
Payer: MEDICARE

## 2024-10-29 VITALS
WEIGHT: 217 LBS | HEIGHT: 72 IN | SYSTOLIC BLOOD PRESSURE: 119 MMHG | HEART RATE: 72 BPM | OXYGEN SATURATION: 100 % | RESPIRATION RATE: 17 BRPM | BODY MASS INDEX: 29.39 KG/M2 | DIASTOLIC BLOOD PRESSURE: 80 MMHG

## 2024-10-29 DIAGNOSIS — J38.02 PARALYSIS OF VOCAL CORDS AND LARYNX, BILATERAL: ICD-10-CM

## 2024-10-29 DIAGNOSIS — R13.10 DYSPHAGIA, UNSPECIFIED: ICD-10-CM

## 2024-10-29 DIAGNOSIS — I63.9 CEREBRAL INFARCTION, UNSPECIFIED: ICD-10-CM

## 2024-10-29 PROCEDURE — 31579 LARYNGOSCOPY TELESCOPIC: CPT

## 2024-10-29 PROCEDURE — 99213 OFFICE O/P EST LOW 20 MIN: CPT | Mod: 25

## 2024-10-29 RX ORDER — DOXAZOSIN 8 MG/1
TABLET ORAL
Refills: 0 | Status: ACTIVE | COMMUNITY

## 2024-10-29 RX ORDER — GLUC/MSM/COLGN2/HYAL/ANTIARTH3 375-375-20
TABLET ORAL
Refills: 0 | Status: ACTIVE | COMMUNITY

## 2024-11-07 NOTE — ED ADULT TRIAGE NOTE - ACCOMPANIED BY
Sutures out in 7 to 10 days follow-up with work well for removal.    Tylenol or Motrin as needed for pain.    Keep area clean and dry.  Wash with soap and water daily   Self

## 2025-01-16 NOTE — PROGRESS NOTE ADULT - ASSESSMENT
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 Prior to admission patient had Viral URI ( 1-2 weeks prior to admission) with subsequent weakness. He underwent an angiogram (2/11) which showed moderate stenosis and no intervention was done. MRI Performed c/w small bilateral cerebellar strokes and prior L thalamic strokes, as per Neurology was not c/w current presentation. 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 He is currently s/p 5 rounds of plasma exchange (2/13-2/20) and IVIG x 5 ( 2/21-2/26)  His hospital course was complicated by progressive respiratory failure. CT Chest performed on 2/20 with atelectasis of b/l lower lobes. BAL 2/21 Grew PSA treated with course of Zosyn (2/21-2/28).  Patient transferred to [facility] on 2/24.   RRT called on 3/3 for unresponsiveness. Patient with unreactive pupils, no corneal reflex. Also found to be hypotensive and febrile to 105. Noted to have new melena. Hgb 5. Given 1 unit of pRBC. Transferred to MICU. patient required pressors while in the MICU, BP improved and was transitioned to Droxidopa. Patient had CT C/A/P consistent with Bibasilar pneumonia; Sputum cx grew PSA and was treated with course of Meropenem. Patient evaluated by GI in setting of Melena, transaminitis and portal venous gas on CT imaging. No EGD performed as Melena resolved, Transaminitis was thought to be in setting of shock and portal venous gas was thought to be in setting of recent PEG. EEG was performed in setting of AMS no seizures noted. Repeat MRI Performed which showed Small scattered foci of diffusion restriction within the right cerebellar hemisphere, left parietal lobe, and symmetrically in the bilateral basal ganglia. Neuro recommended Resumption of ASA in setting of possible embolic phenomenon. Repeat ECHO performed RT Ventricular Moderately enlarged, reduced systolic function. Mental status improved and was transferred back to [facility]CU on 3/6.       3/10:  This am pt was placed on CPAP with immediate hypoxemia and bradycardia, resolved with return to full vent supports.  Aggressive suctioning and lavage by bedside RN.  Pt placed back on CPAP and tolerated.  Lasix 20mg x1 given, monitor I&O's. 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 Prior to admission patient had Viral URI ( 1-2 weeks prior to admission) with subsequent weakness. He underwent an angiogram (2/11) which showed moderate stenosis and no intervention was done. MRI Performed c/w small bilateral cerebellar strokes and prior L thalamic strokes, as per Neurology was not c/w current presentation. 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 He is currently s/p 5 rounds of plasma exchange (2/13-2/20) and IVIG x 5 ( 2/21-2/26)  His hospital course was complicated by progressive respiratory failure. CT Chest performed on 2/20 with atelectasis of b/l lower lobes. BAL 2/21 Grew PSA treated with course of Zosyn (2/21-2/28).  Patient transferred to [facility] on 2/24.   RRT called on 3/3 for unresponsiveness. Patient with unreactive pupils, no corneal reflex. Also found to be hypotensive and febrile to 105. Noted to have new melena. Hgb 5. Given 1 unit of pRBC. Transferred to MICU. patient required pressors while in the MICU, BP improved and was transitioned to Droxidopa. Patient had CT C/A/P consistent with Bibasilar pneumonia; Sputum cx grew PSA and was treated with course of Meropenem. Patient evaluated by GI in setting of Melena, transaminitis and portal venous gas on CT imaging. No EGD performed as Melena resolved, Transaminitis was thought to be in setting of shock and portal venous gas was thought to be in setting of recent PEG. EEG was performed in setting of AMS no seizures noted. Repeat MRI Performed which showed Small scattered foci of diffusion restriction within the right cerebellar hemisphere, left parietal lobe, and symmetrically in the bilateral basal ganglia. Neuro recommended Resumption of ASA in setting of possible embolic phenomenon. Repeat ECHO performed RT Ventricular Moderately enlarged, reduced systolic function. Mental status improved and was transferred back to [facility] on 3/6.       3/11:  Pt still with occasional episodes of hypoxia and bradycardia.  Episode this am occurred while pt was on PS and was being laid back for turn and reposition.  Resolved with RTV and elevation of HOB.  He was laid back again during rounds on full vent support to observe for bradycardia and hypoxia, he remained stable without hypoxia or bradycardia.  After rounding on pt, wife called team back to bedside for suctioning and "rattling in pts chest".  Pt was suctioned with inline catheter on vent tubing, wife still felt rattling so asked for ambu-lavage and inline suctioning with flexible suction catheter.  Wife remained at bedside, myself and bedside RN, performed ambu-lavage with inline suctioning with flexible catheter, minimal clear thin secretions and no resistance felt while ambu'ing.  Wife was happy with results.  During this, pt maintained O2 sat %, HR remained stable in 90s.  As per wifes request, she does not want him turned and repositioned q2hrs, would like at least 3 hours turn and reposition and PRN if soiled.  Wife also does not want pt to be laid back as she feels it causes a change in mental status/discomfort for him despite our assessment of unchanged mental status.  He also nodded "no" appropriately when asked if he was in pain.

## (undated) DEVICE — TUBING SUCTION 20FT

## (undated) DEVICE — MEDICATION LABELS W MARKER

## (undated) DEVICE — POSITIONER FOAM EGG CRATE ULNAR 2PCS (PINK)

## (undated) DEVICE — TUBING IV SET GRAVITY 3Y 100" MACRO

## (undated) DEVICE — VENODYNE/SCD SLEEVE CALF MEDIUM

## (undated) DEVICE — SUT SOFSILK 2-0 18" TIES

## (undated) DEVICE — DRAPE C ARM UNIVERSAL

## (undated) DEVICE — SUT SOFSILK 0 18" TIES

## (undated) DEVICE — BALLOON US ENDO

## (undated) DEVICE — SUT SOFSILK 4-0 18" TIES

## (undated) DEVICE — SYR ALLIANCE II INFLATION 60ML

## (undated) DEVICE — SOL INJ NS 0.9% 500ML 2 PORT

## (undated) DEVICE — PACK CARDIAC YELLOW

## (undated) DEVICE — SAW BLADE MICROAIRE STERNUM 1X34X9.4MM

## (undated) DEVICE — DRSG DERMABOND 0.7ML

## (undated) DEVICE — GLV 7.5 PROTEXIS (WHITE)

## (undated) DEVICE — GLV 8 PROTEXIS (WHITE)

## (undated) DEVICE — SENSOR O2 FINGER ADULT

## (undated) DEVICE — CATH IV SAFE BC 20G X 1.16" (PINK)

## (undated) DEVICE — FOLEY HOLDER STATLOCK 2 WAY ADULT

## (undated) DEVICE — WARMING BLANKET FULL UNDERBODY

## (undated) DEVICE — SUCTION YANKAUER NO CONTROL VENT

## (undated) DEVICE — PACK CAROTID ENDARTERECTOMY

## (undated) DEVICE — DRAPE MINOR PROCEDURE

## (undated) DEVICE — SUT MONOCRYL 4-0 18" PS-2

## (undated) DEVICE — BITE BLOCK ADULT 20 X 27MM (GREEN)

## (undated) DEVICE — SUT SOFSILK 3-0 18" TIES

## (undated) DEVICE — ELCTR BOVIE TIP BLADE MEGADYNE E-Z CLEAN 2.5" (SHORT)

## (undated) DEVICE — SUT BOOT STANDARD (ASSORTED) 5 PAIR

## (undated) DEVICE — SUT PROLENE 6-0 4-30" C-1

## (undated) DEVICE — PACK IV START WITH CHG

## (undated) DEVICE — DRAPE MAYO STAND 30"

## (undated) DEVICE — TUBING SUCTION CONN 6FT STERILE

## (undated) DEVICE — DRAPE TOWEL BLUE 17" X 24"

## (undated) DEVICE — SUT POLYSORB 3-0 30" V-20 UNDYED

## (undated) DEVICE — CATH IV SAFE BC 22G X 1" (BLUE)